# Patient Record
Sex: MALE | Race: WHITE | NOT HISPANIC OR LATINO | Employment: OTHER | URBAN - METROPOLITAN AREA
[De-identification: names, ages, dates, MRNs, and addresses within clinical notes are randomized per-mention and may not be internally consistent; named-entity substitution may affect disease eponyms.]

---

## 2017-10-04 ENCOUNTER — APPOINTMENT (OUTPATIENT)
Dept: ULTRASOUND IMAGING | Facility: CLINIC | Age: 82
End: 2017-10-04
Attending: PHYSICIAN ASSISTANT
Payer: MEDICARE

## 2017-10-04 ENCOUNTER — HOSPITAL ENCOUNTER (OUTPATIENT)
Facility: CLINIC | Age: 82
Setting detail: OBSERVATION
Discharge: HOME | End: 2017-10-05
Attending: EMERGENCY MEDICINE | Admitting: INTERNAL MEDICINE
Payer: MEDICARE

## 2017-10-04 DIAGNOSIS — S80.11XA HEMATOMA OF RIGHT LOWER EXTREMITY, INITIAL ENCOUNTER: ICD-10-CM

## 2017-10-04 DIAGNOSIS — R79.1 SUPRATHERAPEUTIC INR: ICD-10-CM

## 2017-10-04 DIAGNOSIS — D64.9 ANEMIA, UNSPECIFIED TYPE: ICD-10-CM

## 2017-10-04 LAB
ANION GAP SERPL CALCULATED.3IONS-SCNC: 7 MMOL/L (ref 3–14)
BASOPHILS # BLD AUTO: 0 10E9/L (ref 0–0.2)
BASOPHILS NFR BLD AUTO: 0.1 %
BUN SERPL-MCNC: 20 MG/DL (ref 7–30)
CALCIUM SERPL-MCNC: 7.9 MG/DL (ref 8.5–10.1)
CHLORIDE SERPL-SCNC: 107 MMOL/L (ref 94–109)
CO2 SERPL-SCNC: 25 MMOL/L (ref 20–32)
CREAT SERPL-MCNC: 0.99 MG/DL (ref 0.66–1.25)
DIFFERENTIAL METHOD BLD: ABNORMAL
EOSINOPHIL # BLD AUTO: 0 10E9/L (ref 0–0.7)
EOSINOPHIL NFR BLD AUTO: 0.2 %
ERYTHROCYTE [DISTWIDTH] IN BLOOD BY AUTOMATED COUNT: 19.1 % (ref 10–15)
GFR SERPL CREATININE-BSD FRML MDRD: 71 ML/MIN/1.7M2
GLUCOSE SERPL-MCNC: 141 MG/DL (ref 70–99)
HCT VFR BLD AUTO: 30.1 % (ref 40–53)
HGB BLD-MCNC: 8.8 G/DL (ref 13.3–17.7)
HGB BLD-MCNC: 9.9 G/DL (ref 13.3–17.7)
IMM GRANULOCYTES # BLD: 0 10E9/L (ref 0–0.4)
IMM GRANULOCYTES NFR BLD: 0.2 %
INR PPP: 5.23 (ref 0.86–1.14)
LACTATE BLD-SCNC: 2.1 MMOL/L (ref 0.7–2)
LYMPHOCYTES # BLD AUTO: 0.6 10E9/L (ref 0.8–5.3)
LYMPHOCYTES NFR BLD AUTO: 4.5 %
MCH RBC QN AUTO: 30.6 PG (ref 26.5–33)
MCHC RBC AUTO-ENTMCNC: 32.9 G/DL (ref 31.5–36.5)
MCV RBC AUTO: 93 FL (ref 78–100)
MONOCYTES # BLD AUTO: 1.2 10E9/L (ref 0–1.3)
MONOCYTES NFR BLD AUTO: 9.5 %
NEUTROPHILS # BLD AUTO: 10.7 10E9/L (ref 1.6–8.3)
NEUTROPHILS NFR BLD AUTO: 85.5 %
NRBC # BLD AUTO: 0 10*3/UL
NRBC BLD AUTO-RTO: 0 /100
PLATELET # BLD AUTO: 59 10E9/L (ref 150–450)
POTASSIUM SERPL-SCNC: 4.1 MMOL/L (ref 3.4–5.3)
RBC # BLD AUTO: 3.24 10E12/L (ref 4.4–5.9)
SODIUM SERPL-SCNC: 139 MMOL/L (ref 133–144)
WBC # BLD AUTO: 12.6 10E9/L (ref 4–11)

## 2017-10-04 PROCEDURE — 83605 ASSAY OF LACTIC ACID: CPT | Performed by: INTERNAL MEDICINE

## 2017-10-04 PROCEDURE — 25000128 H RX IP 250 OP 636: Performed by: INTERNAL MEDICINE

## 2017-10-04 PROCEDURE — 85018 HEMOGLOBIN: CPT | Performed by: INTERNAL MEDICINE

## 2017-10-04 PROCEDURE — 96361 HYDRATE IV INFUSION ADD-ON: CPT

## 2017-10-04 PROCEDURE — 25000128 H RX IP 250 OP 636: Performed by: PHYSICIAN ASSISTANT

## 2017-10-04 PROCEDURE — G0378 HOSPITAL OBSERVATION PER HR: HCPCS

## 2017-10-04 PROCEDURE — 80048 BASIC METABOLIC PNL TOTAL CA: CPT | Performed by: PHYSICIAN ASSISTANT

## 2017-10-04 PROCEDURE — 93971 EXTREMITY STUDY: CPT | Mod: RT

## 2017-10-04 PROCEDURE — 85025 COMPLETE CBC W/AUTO DIFF WBC: CPT | Performed by: PHYSICIAN ASSISTANT

## 2017-10-04 PROCEDURE — 96374 THER/PROPH/DIAG INJ IV PUSH: CPT

## 2017-10-04 PROCEDURE — 36415 COLL VENOUS BLD VENIPUNCTURE: CPT | Performed by: INTERNAL MEDICINE

## 2017-10-04 PROCEDURE — 99220 ZZC INITIAL OBSERVATION CARE,LEVL III: CPT | Performed by: INTERNAL MEDICINE

## 2017-10-04 PROCEDURE — 85610 PROTHROMBIN TIME: CPT | Performed by: PHYSICIAN ASSISTANT

## 2017-10-04 PROCEDURE — 99285 EMERGENCY DEPT VISIT HI MDM: CPT | Mod: 25

## 2017-10-04 RX ORDER — NALOXONE HYDROCHLORIDE 0.4 MG/ML
.1-.4 INJECTION, SOLUTION INTRAMUSCULAR; INTRAVENOUS; SUBCUTANEOUS
Status: DISCONTINUED | OUTPATIENT
Start: 2017-10-04 | End: 2017-10-05 | Stop reason: HOSPADM

## 2017-10-04 RX ORDER — ACETAMINOPHEN 325 MG/1
650 TABLET ORAL EVERY 4 HOURS PRN
Status: DISCONTINUED | OUTPATIENT
Start: 2017-10-04 | End: 2017-10-05 | Stop reason: HOSPADM

## 2017-10-04 RX ORDER — ONDANSETRON 2 MG/ML
4 INJECTION INTRAMUSCULAR; INTRAVENOUS EVERY 6 HOURS PRN
Status: DISCONTINUED | OUTPATIENT
Start: 2017-10-04 | End: 2017-10-05 | Stop reason: HOSPADM

## 2017-10-04 RX ORDER — AMOXICILLIN 250 MG
1-2 CAPSULE ORAL 2 TIMES DAILY
Status: DISCONTINUED | OUTPATIENT
Start: 2017-10-04 | End: 2017-10-05 | Stop reason: HOSPADM

## 2017-10-04 RX ORDER — DILTIAZEM HYDROCHLORIDE 30 MG/1
30 TABLET, FILM COATED ORAL ONCE
Status: COMPLETED | OUTPATIENT
Start: 2017-10-04 | End: 2017-10-05

## 2017-10-04 RX ORDER — ONDANSETRON 4 MG/1
4 TABLET, ORALLY DISINTEGRATING ORAL EVERY 6 HOURS PRN
Status: DISCONTINUED | OUTPATIENT
Start: 2017-10-04 | End: 2017-10-05 | Stop reason: HOSPADM

## 2017-10-04 RX ORDER — DILTIAZEM HYDROCHLORIDE 30 MG/1
30 TABLET, FILM COATED ORAL 2 TIMES DAILY
Status: DISCONTINUED | OUTPATIENT
Start: 2017-10-05 | End: 2017-10-05 | Stop reason: HOSPADM

## 2017-10-04 RX ORDER — ASCORBIC ACID 500 MG
500 TABLET ORAL DAILY
Status: DISCONTINUED | OUTPATIENT
Start: 2017-10-05 | End: 2017-10-05 | Stop reason: HOSPADM

## 2017-10-04 RX ORDER — DILTIAZEM HYDROCHLORIDE 30 MG/1
60 TABLET, FILM COATED ORAL 2 TIMES DAILY
Status: DISCONTINUED | OUTPATIENT
Start: 2017-10-05 | End: 2017-10-05 | Stop reason: HOSPADM

## 2017-10-04 RX ADMIN — SODIUM CHLORIDE 1000 ML: 9 INJECTION, SOLUTION INTRAVENOUS at 22:57

## 2017-10-04 RX ADMIN — PHYTONADIONE 5 MG: 10 INJECTION, EMULSION INTRAMUSCULAR; INTRAVENOUS; SUBCUTANEOUS at 21:03

## 2017-10-04 ASSESSMENT — ENCOUNTER SYMPTOMS
FEVER: 0
ABDOMINAL PAIN: 0
VOMITING: 0
DYSURIA: 0
CHILLS: 0
COUGH: 0
DIARRHEA: 0
NAUSEA: 0
SHORTNESS OF BREATH: 0

## 2017-10-04 NOTE — IP AVS SNAPSHOT
[facility]    [address] [place] [id number]    Phone:  [phone]                                       After Visit Summary   10/4/2017    Jama Paul    MRN: [id number]           After Visit Summary Signature Page     I have received my discharge instructions, and my questions have been answered. I have discussed any challenges I see with this plan with the nurse or doctor.    ..........................................................................................................................................  Patient/Patient Representative Signature      ..........................................................................................................................................  Patient Representative Print Name and Relationship to Patient    ..................................................               ................................................  Date                                            Time    ..........................................................................................................................................  Reviewed by Signature/Title    ...................................................              ..............................................  Date                                                            Time

## 2017-10-04 NOTE — IP AVS SNAPSHOT
MRN:[id number]                      After Visit Summary   10/4/2017    Jama Paul    MRN: [id number]           Thank you!     Thank you for choosing [facility] for your care. Our goal is always to provide you with excellent care. Hearing back from our patients is one way we can continue to improve our services. Please take a few minutes to complete the written survey that you may receive in the mail after you visit with us. Thank you!        Patient Information     Date Of Birth          2/13/1931        About your hospital stay     You were admitted on:  October 4, 2017 You last received care in the:  [facility]    You were discharged on:  October 5, 2017        Reason for your hospital stay       You were registered to observation due to supratherapeutic INR and left leg hematoma.                  Who to Call     For medical emergencies, please call 911.  For non-urgent questions about your medical care, please call your primary care provider or clinic, [phone]          Attending Provider     Provider Specialty    Anila Shepherd MD Emergency Medicine    [facility] ([place], [place]), Adolfo Perkins MD Internal Medicine       Primary Care Provider Office Phone # Fax #    Clint Pizarro [phone]      After Care Instructions     Activity       Your activity upon discharge: activity as tolerated            Diet       Follow this diet upon discharge: Orders Placed This Encounter      Regular Diet Adult                  Follow-up Appointments     Follow-up and recommended labs and tests        Follow up with primary care provider, Clint Pizarro, as scheduled later this afternoon.  The following labs/tests are recommended: INR, Iron studies, B12 and folic acid level.  Ongoing discussion regarding anticoagulation therapy.                  Pending Results     No orders found for last 3 day(s).            Statement of Approval     Ordered          10/05/17 1000  I have  "reviewed and agree with all the recommendations and orders detailed in this document.  EFFECTIVE NOW     Approved and electronically signed by:  Carter Whiting PA-C             Admission Information     Date & Time Provider Department Dept. Phone    10/4/2017 Adolfo Meadows MD [facility] [phone]      Your Vitals Were     Blood Pressure Pulse Temperature Respirations Height Weight    134/81 102 98.2  F (36.8  C) 16 1.829 m (6') 72.6 kg (160 lb)    Pulse Oximetry BMI (Body Mass Index)                98% 21.7 kg/m2          MyChart Information     [employer] lets you send messages to your doctor, view your test results, renew your prescriptions, schedule appointments and more. To sign up, go to [email or web address] . Click on \"Log in\" on the left side of the screen, which will take you to the Welcome page. Then click on \"Sign up Now\" on the right side of the page.     You will be asked to enter the access code listed below, as well as some personal information. Please follow the directions to create your username and password.     Your access code is: [email or web address]  Expires: 1/3/2018 10:31 AM     Your access code will  in 90 days. If you need help or a new code, please call your [facility] clinic or [phone].        Care EveryWhere ID     This is your Care EveryWhere ID. This could be used by other organizations to access your [facility] medical records  [id number]        Equal Access to Services     [facility] : Hadii aad ku hadasho Soomaali, waaxda luqadaha, qaybta kaalmada adeegyada, emani mayers . So [facility] [phone].    ATENCIÓN: Si habla español, tiene a cleaning disposición servicios gratuitos de asistencia lingüística. Llame al [phone].    We comply with applicable federal civil rights laws and [place] laws. We do not discriminate on the basis of race, color, national origin, age, disability, sex, sexual orientation, or gender " identity.               Review of your medicines      CONTINUE these medicines which have NOT CHANGED        Dose / Directions    CYANOCOBALAMIN PO   Notes to Patient:  Resume taking as you do at home          Dose:  500 mcg   Take 500 mcg by mouth daily   Refills:  0       * DILTIAZEM HCL PO   Notes to Patient:  Resume taking as you do at home          Dose:  30 mg   Take 30 mg by mouth 2 times daily Am and afternoon dose   Refills:  0       * DILTIAZEM HCL PO   Notes to Patient:  Resume taking as you do at home          Dose:  60 mg   Take 60 mg by mouth 2 times daily Evening and HS dose   Refills:  0       MAGNESIUM OXIDE PO   Notes to Patient:  Resume taking as you do at home          Dose:  200 mg   Take 200 mg by mouth daily   Refills:  0       * Multi-vitamin Tabs tablet   Notes to Patient:  Resume taking as you do at home          Dose:  1 tablet   Take 1 tablet by mouth daily   Refills:  0       * ICAPS AREDS FORMULA PO   Notes to Patient:  Resume taking as you do at home          Dose:  1 tablet   Take 1 tablet by mouth daily   Refills:  0       POTASSIUM CITRATE PO   Notes to Patient:  Resume taking as you do at home          Dose:  5 mEq   Take 5 mEq by mouth daily OTC dose unknown   Refills:  0       vitamin B complex with vitamin C Tabs tablet   Notes to Patient:  Resume taking as you do at home          Dose:  1 tablet   Take 1 tablet by mouth daily   Refills:  0       VITAMIN C PO   Notes to Patient:  Resume taking as you do at home          Dose:  500 mg   Take 500 mg by mouth daily   Refills:  0       VITAMIN D (CHOLECALCIFEROL) PO   Notes to Patient:  Resume taking as you do at home          Dose:  2000 Units   Take 2,000 Units by mouth daily   Refills:  0       * Notice:  This list has 4 medication(s) that are the same as other medications prescribed for you. Read the directions carefully, and ask your doctor or other care provider to review them with you.      STOP taking     warfarin 5 MG  tablet   Commonly known as:  COUMADIN                    Protect others around you: Learn how to safely use, store and throw away your medicines at www.disposemymeds.org.             Medication List: This is a list of all your medications and when to take them. Check marks below indicate your daily home schedule. Keep this list as a reference.      Medications           Morning Afternoon Evening Bedtime As Needed    CYANOCOBALAMIN PO   Take 500 mcg by mouth daily   Notes to Patient:  Resume taking as you do at home                                  * DILTIAZEM HCL PO   Take 30 mg by mouth 2 times daily Am and afternoon dose   Last time this was given:  60 mg on 10/5/2017  8:34 AM   Notes to Patient:  Resume taking as you do at home                                  * DILTIAZEM HCL PO   Take 60 mg by mouth 2 times daily Evening and HS dose   Last time this was given:  60 mg on 10/5/2017  8:34 AM   Notes to Patient:  Resume taking as you do at home                                  MAGNESIUM OXIDE PO   Take 200 mg by mouth daily   Notes to Patient:  Resume taking as you do at home                                  * Multi-vitamin Tabs tablet   Take 1 tablet by mouth daily   Notes to Patient:  Resume taking as you do at home                                  * ICAPS AREDS FORMULA PO   Take 1 tablet by mouth daily   Notes to Patient:  Resume taking as you do at home                                  POTASSIUM CITRATE PO   Take 5 mEq by mouth daily OTC dose unknown   Notes to Patient:  Resume taking as you do at home                                  vitamin B complex with vitamin C Tabs tablet   Take 1 tablet by mouth daily   Notes to Patient:  Resume taking as you do at home                                  VITAMIN C PO   Take 500 mg by mouth daily   Notes to Patient:  Resume taking as you do at home                                  VITAMIN D (CHOLECALCIFEROL) PO   Take 2,000 Units by mouth daily   Notes to Patient:  Resume  taking as you do at home                                  * Notice:  This list has 4 medication(s) that are the same as other medications prescribed for you. Read the directions carefully, and ask your doctor or other care provider to review them with you.

## 2017-10-05 VITALS
OXYGEN SATURATION: 98 % | DIASTOLIC BLOOD PRESSURE: 81 MMHG | BODY MASS INDEX: 21.67 KG/M2 | RESPIRATION RATE: 16 BRPM | HEIGHT: 72 IN | WEIGHT: 160 LBS | HEART RATE: 102 BPM | TEMPERATURE: 98.2 F | SYSTOLIC BLOOD PRESSURE: 134 MMHG

## 2017-10-05 LAB
ANION GAP SERPL CALCULATED.3IONS-SCNC: 8 MMOL/L (ref 3–14)
BUN SERPL-MCNC: 17 MG/DL (ref 7–30)
CALCIUM SERPL-MCNC: 7.7 MG/DL (ref 8.5–10.1)
CHLORIDE SERPL-SCNC: 108 MMOL/L (ref 94–109)
CO2 SERPL-SCNC: 24 MMOL/L (ref 20–32)
CREAT SERPL-MCNC: 0.94 MG/DL (ref 0.66–1.25)
ERYTHROCYTE [DISTWIDTH] IN BLOOD BY AUTOMATED COUNT: 19.4 % (ref 10–15)
GFR SERPL CREATININE-BSD FRML MDRD: 76 ML/MIN/1.7M2
GLUCOSE SERPL-MCNC: 96 MG/DL (ref 70–99)
HCT VFR BLD AUTO: 24.5 % (ref 40–53)
HGB BLD-MCNC: 8.2 G/DL (ref 13.3–17.7)
INR PPP: 2.07 (ref 0.86–1.14)
LACTATE BLD-SCNC: 1.1 MMOL/L (ref 0.7–2)
MCH RBC QN AUTO: 30.9 PG (ref 26.5–33)
MCHC RBC AUTO-ENTMCNC: 33.5 G/DL (ref 31.5–36.5)
MCV RBC AUTO: 93 FL (ref 78–100)
PLATELET # BLD AUTO: 52 10E9/L (ref 150–450)
POTASSIUM SERPL-SCNC: 4 MMOL/L (ref 3.4–5.3)
RBC # BLD AUTO: 2.65 10E12/L (ref 4.4–5.9)
SODIUM SERPL-SCNC: 140 MMOL/L (ref 133–144)
WBC # BLD AUTO: 9.8 10E9/L (ref 4–11)

## 2017-10-05 PROCEDURE — 80048 BASIC METABOLIC PNL TOTAL CA: CPT | Performed by: INTERNAL MEDICINE

## 2017-10-05 PROCEDURE — 36415 COLL VENOUS BLD VENIPUNCTURE: CPT | Performed by: INTERNAL MEDICINE

## 2017-10-05 PROCEDURE — 85610 PROTHROMBIN TIME: CPT | Performed by: INTERNAL MEDICINE

## 2017-10-05 PROCEDURE — 25000132 ZZH RX MED GY IP 250 OP 250 PS 637: Mod: GY | Performed by: INTERNAL MEDICINE

## 2017-10-05 PROCEDURE — A9270 NON-COVERED ITEM OR SERVICE: HCPCS | Mod: GY | Performed by: INTERNAL MEDICINE

## 2017-10-05 PROCEDURE — 83605 ASSAY OF LACTIC ACID: CPT | Performed by: INTERNAL MEDICINE

## 2017-10-05 PROCEDURE — 96361 HYDRATE IV INFUSION ADD-ON: CPT

## 2017-10-05 PROCEDURE — 40000893 ZZH STATISTIC PT IP EVAL DEFER

## 2017-10-05 PROCEDURE — 99217 ZZC OBSERVATION CARE DISCHARGE: CPT | Performed by: PHYSICIAN ASSISTANT

## 2017-10-05 PROCEDURE — G0378 HOSPITAL OBSERVATION PER HR: HCPCS

## 2017-10-05 PROCEDURE — 85027 COMPLETE CBC AUTOMATED: CPT | Performed by: INTERNAL MEDICINE

## 2017-10-05 RX ORDER — METOPROLOL TARTRATE 1 MG/ML
2.5 INJECTION, SOLUTION INTRAVENOUS EVERY 4 HOURS PRN
Status: DISCONTINUED | OUTPATIENT
Start: 2017-10-05 | End: 2017-10-05 | Stop reason: HOSPADM

## 2017-10-05 RX ORDER — METOPROLOL TARTRATE 25 MG/1
25 TABLET, FILM COATED ORAL ONCE
Status: COMPLETED | OUTPATIENT
Start: 2017-10-05 | End: 2017-10-05

## 2017-10-05 RX ADMIN — DILTIAZEM HYDROCHLORIDE 30 MG: 30 TABLET, FILM COATED ORAL at 00:33

## 2017-10-05 RX ADMIN — METOPROLOL TARTRATE 25 MG: 25 TABLET ORAL at 00:52

## 2017-10-05 RX ADMIN — SENNOSIDES AND DOCUSATE SODIUM 2 TABLET: 8.6; 5 TABLET ORAL at 00:51

## 2017-10-05 RX ADMIN — DILTIAZEM HYDROCHLORIDE 60 MG: 30 TABLET, FILM COATED ORAL at 08:34

## 2017-10-05 RX ADMIN — ACETAMINOPHEN 650 MG: 325 TABLET, FILM COATED ORAL at 05:03

## 2017-10-05 NOTE — ED PROVIDER NOTES
Emergency Department Attending Supervision Note  10/4/2017  8:09 PM      I evaluated this patient in conjunction with Gwendolyn Brooks PA-C.      Briefly, the patient has a history of HTN, anticoagulated on Coumadin for atrial fibrillation, and presented with leg pain. The patient had noticed some right calf pain and swelling a few days ago. He does not recall experiencing any trauma to the area. The pain and swelling increased, and he now has a hematoma at his right medial calf. The patient has been seen at [facility] the past two days. Yesterday, he saw Dr. Cuellar, who told him it was a hematoma, and he should be icing it. Dr. Boateng saw the patient today, and told him to present to the ED due to possible compartment syndrome.    On my exam:    Physical Exam   Constitutional:  Patient is oriented to person, place, and time. They appear well-developed and well-nourished. Mild distress secondary to leg pain.   HENT:   Mouth/Throat:   Oropharynx is clear and moist.   Eyes:    Conjunctivae normal and EOM are normal. Pupils are equal, round, and reactive to light.   Neck:    Normal range of motion.   Cardiovascular: Normal rate, Irregular rhythm and normal heart sounds.  Exam reveals no gallop and no friction rub.  No murmur heard.   Pulmonary/Chest:  Effort normal and breath sounds normal. Patient has no wheezes. Patient has no rales.   Abdominal:   Soft. Bowel sounds are normal. Patient exhibits no mass. There is no tenderness. There is no rebound and no guarding.   Musculoskeletal:  Patient has equal DP pulses bilaterally. He has a large hematoma on his right medial calf from the bottom of his knee, extending down to the ankle. He has a small pinpoint area of weeping at the mid calf. No surrounding erythema. Tenderness to palpation at site of hematoma. No paraesthesias. No pain with plantar or dorsi flexion of the foot.  Neurological:   Patient is alert and oriented to person, place, and time. Patient  has normal strength. No cranial nerve deficit or sensory deficit. GCS 15  Skin:   Skin is warm and dry. No rash noted. No erythema.   Psychiatric:   Patient has a normal mood and affect. Patient's behavior is normal. Judgment and thought content normal.     My impression:  Jama Paul is a 86 year old male with a large right leg hematoma, who is supratherapeutic on Coumadin. He last took Coumadin this morning at 1000. He has not had any recent dose changes. He has not had a any medication changes, it is not clear why he is supratherapeutic. He has had an enlarged hematoma from yesterday, so was sent from [facility]. Imaging confirms a very large hematoma. I do not see evidence of compartment syndrome. He has no pallor, no paraesthesias, no loss of pulses. No pain with passive range of motion of his foot, but he does have tenderness over the site of his hematoma with palpation. He has firm compartments, but not tense compartments. At this point I believe he requires reversal of Coumadin with Vitamin K, and admission to the hospital. Patient was admitted to Dr. Meadows.     Diagnosis:  1. Right leg hematoma  2. Supratherapeutic INR    Rubén GUERRERO, am serving as a scribe on 10/4/2017 at 8:09 PM to personally document services performed by Anila Shepherd MD, based on my observations and the provider's statements to me.         Anila Shepherd MD  10/04/17 4134

## 2017-10-05 NOTE — PLAN OF CARE
Problem: Patient Care Overview  Goal: Plan of Care/Patient Progress Review  Outcome: Improving  -diagnostic tests and consults completed and resulted - not met                  -vital signs normal or at patient baseline -not met  Nurse to notify provider when observation goals have been met and patient is ready for discharge.

## 2017-10-05 NOTE — H&P
[facility]    History and Physical  Hospitalist       Date of Admission:  10/4/2017    Assessment & Plan   Jama Paul is a 86 year old male with PMH of afib on warfarin, HTN, who presents with right leg hematoma. S/p Vit K IV in ER. Patient is stable. Admitted as obs.    Right leg hematoma: Nontraumatic, and no medication or recent dietary changes. He describes pain with walking, but cans still ambulate. No evidence of compartment syndrome on exam. US shows a [id number] hematoma. Initial INR is 5.2, and Hgb is 9.9-->8.8 (baseline ~11-13). No evidence of bleeding elsewhere. HR is 102. Patient's hematoma appears to have stabilized following Vit K IV, and given no evidence of compartment syndrome no role for surgery is indicated.   - Pedal pulse checks and compartment syndrome eval by nurses  - Telemetry  - Recheck INR, CBC in AM  - Given this major bleeding event, warfarin will be stopped and further treatment with warfarin should be discussed with PCP in 1-2 weeks    HTN  Possible orthostatic hypotension  Takes diltiazem for heart rate control of his atrial fibrillation. He endorses SBP ~105 in the morning, which sometimes causes dizziness/and lightheadedness. However when he lowers the dose of his diltiazem, HR increases to > 100, and so after discussion with his PCP he tolerates some hypotension as a side effect.  - Orthostatics    DVT Prophylaxis: None due to hematoma  Code Status: Full Code  PT ordered for tomorrow    Disposition: Expected discharge in 1 days if patient remains stable.      Adolfo Meadows MD    Primary Care Physician   Clint Pizarro    Chief Complaint   R leg swelling/ecchymosis    History is obtained from the patient. Wife Liane and daughter Anna at bedside.    History of Present Illness   Jama Paul is a 86 year old male with PMH of atrial fibrillation on coumadin, essential HTN, who presents with R leg ecchymosis. He describes spontaneous onset of R leg  swelling and ecchymosis yesterday morning around 10AM. He denies any trauma to the site; he remains active by walking every day with a stick, and riding a recumbent bike. He denies any new recent medications, or dietary changes. He denies any bleeding, and denies bloody or black/tarry stools. He has been on coumadin for 6 years. He denies any fevers, chills, shortness of breath. He denies any falls in the past month.    Regarding his blood pressure, he states that he has some relative hypotension in the morning with his diltiazem SBP ~105, but that when he lowers the dose his HR increases to > 100, and so he tolerates some hypotension as a side effect.    Past Medical History    I have reviewed this patient's medical history and updated it with pertinent information if needed.   Past Medical History:   Diagnosis Date     Antiplatelet or antithrombotic long-term use      Arrhythmia      Atrial fibrillation (H)      Elevated PSA      Unspecified essential hypertension        Past Surgical History   I have reviewed this patient's surgical history and updated it with pertinent information if needed.  Past Surgical History:   Procedure Laterality Date     COLONOSCOPY       EXPLORE GROIN  4/30/2014    Procedure: EXPLORE GROIN;  Surgeon: Sam Aguirre MD;  Location:  OR     HERNIORRHAPHY INGUINAL       HERNIORRHAPHY INGUINAL  4/30/2014    Procedure: HERNIORRHAPHY INGUINAL;  Surgeon: Sam Aguirre MD;  Location:  OR     MOHS MICROGRAPHIC PROCEDURE       PHACOEMULSIFICATION CLEAR CORNEA WITH STANDARD INTRAOCULAR LENS IMPLANT Right 9/8/2015    Procedure: PHACOEMULSIFICATION CLEAR CORNEA WITH STANDARD INTRAOCULAR LENS IMPLANT;  Surgeon: Gil Shannon MD;  Location:  EC     septic olecranon bursitis[         Prior to Admission Medications   Prior to Admission Medications   Prescriptions Last Dose Informant Patient Reported? Taking?   Ascorbic Acid (VITAMIN C PO) 10/3/2017 at Unknown time Self Yes Yes   Sig:  Take 500 mg by mouth daily   CYANOCOBALAMIN PO 10/3/2017 at Unknown time Self Yes Yes   Sig: Take 500 mcg by mouth daily   DILTIAZEM HCL PO 10/4/2017 at x1 Self Yes Yes   Sig: Take 30 mg by mouth 2 times daily Am and afternoon dose   DILTIAZEM HCL PO 10/3/2017 at Unknown time Self Yes Yes   Sig: Take 60 mg by mouth 2 times daily Evening and HS dose   MAGNESIUM OXIDE PO 10/3/2017 at Unknown time Self Yes Yes   Sig: Take 200 mg by mouth daily   Multiple Vitamins-Minerals (ICAPS AREDS FORMULA PO) 10/4/2017 at Unknown time Self Yes Yes   Sig: Take 1 tablet by mouth daily   POTASSIUM CITRATE PO 10/3/2017 at Unknown time Self Yes Yes   Sig: Take 5 mEq by mouth daily OTC dose unknown   VITAMIN D, CHOLECALCIFEROL, PO 10/3/2017 at Unknown time Self Yes Yes   Sig: Take 2,000 Units by mouth daily   multivitamin, therapeutic with minerals (MULTI-VITAMIN) TABS 10/3/2017 at Unknown time Self Yes Yes   Sig: Take 1 tablet by mouth daily   vitamin  B complex with vitamin C (VITAMIN  B COMPLEX) TABS 10/3/2017 at Unknown time Self Yes Yes   Sig: Take 1 tablet by mouth daily   warfarin (COUMADIN) 5 MG tablet 10/4/2017 at AM Self No Yes   Sig: Take 1 tablet by mouth daily. Take as directed by physician      Facility-Administered Medications: None     Allergies   No Known Allergies    Social History   I have reviewed this patient's social history and updated it with pertinent information if needed. Jama Paul  reports that he has never smoked. He does not have any smokeless tobacco history on file. He reports that he does not drink alcohol or use illicit drugs. 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    Family History   I have reviewed this patient's family history and updated it with pertinent information if needed.   No family history on file.Patient denies any family history of heart disease. Brother had hypertension. Dad had lung  cancer.    Review of Systems   The 10 point Review of Systems is negative other than noted in the HPI or here.     Physical Exam   Temp: 97.9  F (36.6  C) Temp src: Oral BP: 128/75 Pulse: 117 Heart Rate: 104 Resp: 20 SpO2: 98 % O2 Device: None (Room air)    Vital Signs with Ranges  Temp:  [97.9  F (36.6  C)] 97.9  F (36.6  C)  Pulse:  [] 117  Heart Rate:  [104] 104  Resp:  [20-28] 20  BP: (118-128)/(67-75) 128/75  SpO2:  [98 %-100 %] 98 %  160 lbs 0 oz    Constitutional: Male in NAD  Eyes: PERRL, nonicteric, normal ocular movements  HEENT: Normocephalic, atraumatic, oral mucosa moist  Respiratory: CTAB, no wheezing or crackles  Cardiovascular: irregular rate and rhythm, normal S1/2, no m/r/g  GI: No organomegaly, normoactive bowel sounds, nontender, nondistended, no flank tenderness  Skin: No rashes or scars  Musculoskeletal: Right lower extremity swollen and with large elliptical ecchymosis ~12cm in length over anterior portion. Good warmth in both legs and good distal pulses bilaterally. No significant knee swelling.  Neurologic: A&Ox3, no cranial nerve defects I-XI, normal strength in UE and LE  Psychiatric: Appropriate affect and mood    Data   Data reviewed today:  I personally reviewed leg US showing hematoma.    Recent Labs  Lab 10/04/17  1857   WBC 12.6*   HGB 9.9*   MCV 93   PLT 59*   INR 5.23*      POTASSIUM 4.1   CHLORIDE 107   CO2 25   BUN 20   CR 0.99   ANIONGAP 7   BARON 7.9*   *       Imaging:  Recent Results (from the past 24 hour(s))   US Lower Extremity Venous Duplex Right    Narrative    US LOWER EXTREMITY VENOUS DUPLEX RIGHT   10/4/2017 7:42 PM     HISTORY: Right leg pain. Fall.    COMPARISON: None.    TECHNIQUE: Spectral doppler and waveform analysis were performed on  the right lower extremity veins.    FINDINGS: The right common femoral, femoral, and popliteal veins are  patent, compressible, and negative for deep venous thrombosis. Calf  veins are segmentally seen but appear  negative for DVT where  visualized. Heterogeneous fluid collection anteriorly and medially at  the level of the right mid calf has an appearance suggestive of a  hematoma, and measures 10.4 x 10.1 x 3.6 cm.      Impression    IMPRESSION:    1. No evidence for deep venous thrombosis in the right lower extremity  veins.   2. A hematoma overlying the right lower leg anteriorly and medially at  the level of the midcalf measures 10.4 cm in greatest dimension.    ANDREEA DIXON MD

## 2017-10-05 NOTE — PROGRESS NOTES
[facility]    Sepsis Evaluation Progress Note    Date of Service: 10/04/2017    I was called to see Jama Paul due to abnormal vital signs triggering the Sepsis SIRS screening alert. He is not known to have an infection.     Physical Exam    Vital Signs:  Temp: 97.1  F (36.2  C) Temp src: Oral BP: 127/72 Pulse: 102 Heart Rate: 104 Resp: 16 SpO2: 99 % O2 Device: None (Room air)      Lab:  Lactic Acid   Date Value Ref Range Status   10/04/2017 2.1 (H) 0.7 - 2.0 mmol/L Final       The patient is at baseline mental status.    The rest of their physical exam is significant for R leg hematoma.    Assessment and Plan    The SIRS and exam findings are likely due to R leg hematoma, there is no sign of sepsis at this time.    Disposition: The patient will remain on the current unit. We will continue to monitor this patient closely.    Adolfo Meadows MD

## 2017-10-05 NOTE — PLAN OF CARE
Problem: Patient Care Overview  Goal: Plan of Care/Patient Progress Review  Outcome: Improving  -diagnostic tests and consults completed and resulted - not met                  -vital signs normal or at patient baseline -not met     HR elevated in AM (120's-130's), morning dose of 60 mg diltiazem given to pt and HR monitored. 90 minutes after medication administered HR decreased to 80's-90's. Pt requesting to be discharged and f/u w/his cardiologist outpatient this afternoon.  Orthostatics negative. Tolerating regular diet. RLE edematous, no drainage noted. Denies pain. Pt has f/u scheduled w/cardiologist this afternoon at 1500. Will d/c this morning.

## 2017-10-05 NOTE — PHARMACY-ADMISSION MEDICATION HISTORY
Admission medication history interview status for the 10/4/2017  admission is complete. 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 No    Time spent in this activity: 15 minutes    Prior to Admission medications    Medication Sig Last Dose Taking? Auth Provider   DILTIAZEM HCL PO Take 30 mg by mouth 2 times daily Am and afternoon dose 10/4/2017 at x1 Yes Unknown, Entered By History   DILTIAZEM HCL PO Take 60 mg by mouth 2 times daily Evening and HS dose 10/3/2017 at Unknown time Yes Unknown, Entered By History   Multiple Vitamins-Minerals (ICAPS AREDS FORMULA PO) Take 1 tablet by mouth daily 10/4/2017 at Unknown time Yes Unknown, Entered By History   CYANOCOBALAMIN PO Take 500 mcg by mouth daily 10/3/2017 at Unknown time Yes Unknown, Entered By History   Ascorbic Acid (VITAMIN C PO) Take 500 mg by mouth daily 10/3/2017 at Unknown time Yes Unknown, Entered By History   MAGNESIUM OXIDE PO Take 200 mg by mouth daily 10/3/2017 at Unknown time Yes Unknown, Entered By History   POTASSIUM CITRATE PO Take 5 mEq by mouth daily OTC dose unknown 10/3/2017 at Unknown time Yes Unknown, Entered By History   vitamin  B complex with vitamin C (VITAMIN  B COMPLEX) TABS Take 1 tablet by mouth daily 10/3/2017 at Unknown time Yes Unknown, Entered By History   multivitamin, therapeutic with minerals (MULTI-VITAMIN) TABS Take 1 tablet by mouth daily 10/3/2017 at Unknown time Yes Unknown, Entered By History   VITAMIN D, CHOLECALCIFEROL, PO Take 2,000 Units by mouth daily 10/3/2017 at Unknown time Yes Unknown, Entered By History   warfarin (COUMADIN) 5 MG tablet Take 1 tablet by mouth daily. Take as directed by  physician 10/4/2017 at AM Yes Clint Pizarro MD Tiffany M. Reinitz, PharmD

## 2017-10-05 NOTE — DISCHARGE SUMMARY
PRIMARY CARE PROVIDER:  Dr. Clint Pizarro.      DATE OF ADMISSION:  10/04/2017.      DATE OF DISCHARGE 10/05/2017.      DISCHARGE DIAGNOSES:   1.  Spontaneous right leg hematoma with a supertherapeutic INR.   2.  Chronic atrial fibrillation.   3.  Hypertension with possible orthostatic hypotension secondary to medication side effect.   4.  Vitamin B12 and vitamin D deficiencies.      DISCHARGE MEDICATIONS:       Review of your medicines      CONTINUE these medicines which have NOT CHANGED       Dose / Directions    CYANOCOBALAMIN PO   Notes to Patient:  Resume taking as you do at home          Dose:  500 mcg   Take 500 mcg by mouth daily   Refills:  0       * DILTIAZEM HCL PO   Notes to Patient:  Resume taking as you do at home          Dose:  30 mg   Take 30 mg by mouth 2 times daily Am and afternoon dose   Refills:  0       * DILTIAZEM HCL PO   Notes to Patient:  Resume taking as you do at home          Dose:  60 mg   Take 60 mg by mouth 2 times daily Evening and HS dose   Refills:  0       MAGNESIUM OXIDE PO   Notes to Patient:  Resume taking as you do at home          Dose:  200 mg   Take 200 mg by mouth daily   Refills:  0       * Multi-vitamin Tabs tablet   Notes to Patient:  Resume taking as you do at home          Dose:  1 tablet   Take 1 tablet by mouth daily   Refills:  0       * ICAPS AREDS FORMULA PO   Notes to Patient:  Resume taking as you do at home          Dose:  1 tablet   Take 1 tablet by mouth daily   Refills:  0       POTASSIUM CITRATE PO   Notes to Patient:  Resume taking as you do at home          Dose:  5 mEq   Take 5 mEq by mouth daily OTC dose unknown   Refills:  0       vitamin B complex with vitamin C Tabs tablet   Notes to Patient:  Resume taking as you do at home          Dose:  1 tablet   Take 1 tablet by mouth daily   Refills:  0       VITAMIN C PO   Notes to Patient:  Resume taking as you do at home          Dose:  500 mg   Take 500 mg by mouth daily   Refills:  0       VITAMIN D  (CHOLECALCIFEROL) PO   Notes to Patient:  Resume taking as you do at home          Dose:  2000 Units   Take 2,000 Units by mouth daily   Refills:  0       * Notice:  This list has 4 medication(s) that are the same as other medications prescribed for you. Read the directions carefully, and ask your doctor or other care provider to review them with you.      STOP taking          warfarin 5 MG tablet   Commonly known as:  COUMADIN                  MEDICATION CHANGES:  Jama Paul has been advised to hold or stop taking Coumadin until followup with primary care provider.      ALLERGIES:  No known drug allergies.      DISPOSITION:  Home.      FOLLOWUP WITH RECOMMENDATIONS:  The patient will follow up with Dr. Clint Pizarro as scheduled later this afternoon.  The patient will likely undergo an INR check.  Iron studies are being advised, B12 level and folic acid level, and ongoing discussion regarding anticoagulation therapy.      ACTIVITY:  As tolerated.      DIET:  Regular diet.      CONSULTS:  None.      LABORATORY, IMAGING AND PROCEDURES:   1.  Routine laboratory studies that included CBC with platelet differential, basic metabolic panel, INR, lactic acid level, hemoglobin, repeat lactic acid, a repeat basic metabolic panel and a repeat CBC with platelets.   2.  Right lower extremity venous ultrasound duplex.      PENDING RESULTS:  None.      PHYSICAL EXAMINATION ON DAY OF DISCHARGE:   VITAL SIGNS:  Temperature is 98.2 degrees Fahrenheit with a blood pressure of 134/81, heart rate of 88 beats per minute, respiratory rate of 16, O2 saturation 98%.  The patient is on room air and denying any pain.   GENERAL:  The patient is awake, alert and cooperative, in no apparent distress, alert and oriented x3.   HEENT:  Head is normocephalic, atraumatic.  Moist mucous membranes present.  No exudates noted in the posterior pharynx.  Uvula is midline.   NECK:  Supple, normal range of motion, no tracheal deviation, no cervical  lymphadenopathy present.   CARDIOVASCULAR:  Irregularly irregular.  No rubs, murmurs or gallops appreciated.   PULMONARY:  Lungs are clear to auscultation bilaterally, no wheezes, rhonchi or rales appreciated.   GASTROINTESTINAL:  Bowel sounds are present in all 4 quadrants, soft, nontender, nondistended.   NEUROLOGIC:  Cranial nerves II-XII appear grossly intact.  The patient is seen moving all 4 extremities without any difficulty.   EXTREMITIES:  The patient has substantial ecchymotic changes of the right lower extremity with noted hematoma which is currently undergoing a compression wrap.  There is some tightness when palpating the right calf but is nontender.  Left lower extremity is nontender to palpation and no edema is noted.      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      For full details, please read H&P as written by Dr. Adolfo Meadows.      HOSPITAL COURSE:   1.  Spontaneous right lower extremity hematoma with noted supertherapeutic INR:  The patient stated that this occurred randomly.  No evidence of trauma and no recent medication or dietary changes.  The patient was noted to have an INR that was supratherapeutic at 5.4.  The patient had presented because of ongoing right pain with ambulation.  Evaluation was negative for compartment syndrome.  Right lower extremity venous ultrasound duplex was negative for DVT.  The patient received vitamin K IV route in the Emergency Department.  INR on day of discharge is currently 2.  Hemoglobin appears to be low at 8.8, but no active bleeding is noted.  The patient was monitored on telemetry and patient remained in atrial fibrillation with occasional RVR.  The patient indicated that he had not  taken any of his diltiazem yesterday.  Coumadin was held during this stay and patient plans to follow up with his primary care provider later this afternoon.   2.  Chronic atrial fibrillation:  Patient is controlled with almost 4 times daily Diltiazem and chronic Coumadin.  The patient has a CHADS2-VASc score of 3.  The patient's prior to admit diltiazem was continued and his Coumadin was held.  In fact, the patient received IV vitamin K.  INR on day of discharge was noted to be 2.  The patient did have as needed Lopressor for which the patient did receive once.  The patient will be following up with primary care provider later this afternoon.  Would recommend continuation of diltiazem with ongoing management and adjustment to primary care provider.  The patient has been advised to hold Coumadin until ongoing discussion with his primary care provider is performed.   3.  Essential hypertension with noted orthostatic hypotension:  Patient had indicated that he occasionally has some orthostatic hypotension with systolic blood pressures in the 100 range in the mornings with occasional symptoms of dizziness and lightheadedness.  It is thought that this is secondary to diltiazem.  However, if his diltiazem dose decreases, his heart rate usually goes above 100.  The patient is working with Dr. Clint Pizarro, his primary care provider for management.  The patient plans to follow up with him later this afternoon.   4.  Vitamin B12 and vitamin D deficiency:  Patient's prior to admit replacement therapy was held during this stay and will be resumed at time of discharge.      CODE STATUS:  The patient elected to be full code.      The patient was discussed with Dr. Shelby Meadows who agrees with discharge at this time.  Dr. Meadows will evaluate the patient independently.      TOTAL DISCHARGE TIME:  Less than 30 minutes.         SHELBY MEADOWS MD       As dictated by AYDEE MENDOSA PA-C            D: 10/05/2017 10:39   T:  10/05/2017 11:06   MT: JAMAL      Name:     YINKA GONZALEZ   MRN:      [id number]        Account:        [id number]   :      1931           Admit Date:     [id number]                                  Discharge Date:       Document: [id number]       cc: Clint Pizarro MD

## 2017-10-05 NOTE — PLAN OF CARE
Problem: Patient Care Overview  Goal: Plan of Care/Patient Progress Review  Outcome: Improving  Pt is A&O X4. Pt leg wound has been covered and dressing is CDI. Pt vitals activated  sepsis protocol. 1000 ml bolus  And lactic was redrawn. New results are WDL. Pt has had elevated -150's  and rapid Afib. Hospitalitis notified and medication was ordered and given HR dropped into the 80-90's but A-fib still present. . Pt denies pain, VSS except for HR. Pt is voiding Continue to monitor.

## 2017-10-05 NOTE — ED PROVIDER NOTES
History     Chief Complaint:  Leg Pain    HPI   Jama Paul is a 86 year old male with what history of atrial fibrillation on warfarin who presents for evaluation of right leg pain.  He states that two days ago he noticed a small area of redness to the medial part of his right ankle while at his cabin while walking outside. Since that time, he has noted significant expanding ecchymosis and hematoma formation to his right calf.  He was seen at [facility] orthopedics by Dr. Cuellar yesterday and was diagnosed with a hematoma and asked to ice and elevate the extremity.  Today, the hematoma continued to expand and was evaluated in the orthopedic urgent care area and was told to come to the ED for ultrasound and laboratory evaluation. He denies injury or trauma to the area. The daughter notes that even from this morning it is markedly larger and rapidly expanding.  He denies any pain at rest, but states that it is painful when he is walking  around.  He denies any numbness/tingling to the extremity. Last INR was checked and 9/20 and was 2.8. He states that two weeks ago he had a flu-like illness, now resolved. No sick contacts or recent travel. He denies fever, shortness of breath, chest pain, abdominal pain, n/v/d, or any other acute symptoms.     Allergies:  The patient has no known drug allergies.    Medications:    Tiazac  Coumadin     Past Medical History:    Arrhythmia  Atrial fibrillation  HTN    Past Surgical History:    Colonoscopy  Herniorrhaphy   Phacoemulsification    Family History:    History reviewed.  No significant family history.    Social History:  Relationship status:   Tobacco use: Negative  Alcohol use: Negative  The patient presents with his wife and daughter.     Review of Systems   Constitutional: Negative for chills and fever.   Respiratory: Negative for cough and shortness of breath.    Cardiovascular: Negative for chest pain.   Gastrointestinal: Negative for abdominal pain,  diarrhea, nausea and vomiting.   Genitourinary: Negative for dysuria.   Musculoskeletal:        Right leg pain and swelling   Skin: Positive for rash.     Physical Exam   First Vitals:  BP: 118/67  Pulse: 89  Temp: 97.9  F (36.6  C)  Resp: 28  Height: 182.9 cm (6')  Weight: 72.6 kg (160 lb)  SpO2: 100 %    Physical Exam  General: Resting comfortably.  Alert and oriented.   Head:  The scalp, face, and head appear normal   Eyes:  The pupils are equal, round, and reactive to light     Extraocular muscles are intact    Conjunctivae and sclerae are normal    CV:  Irregularly irregular rhythm with normal rate    Normal S1/S2    No pathological murmur detected     Equal posterior tibialis/dorsalis pedis pulses noted bilaterally    Good capillary refill  Resp:  Lungs are clear to auscultation    Non-labored    No rales or wheezing   GI:  Abdomen is soft, non-distended    No rebound tenderness     Normal bowel sounds   MS:  No pain with active or passive dorsi or plantar flexion.  Skin:  Large area of ecchymosis noted to right medial calf area with an obvious hematoma. Tenderness to palpation of hematoma. No pallor.  No warmth noted. No erythema. Spontaneously weeping.    Neuro: Speech is normal and fluent. Distal sensation intact distal to hematoma.     Emergency Department Course   Laboratory:  Results for orders placed or performed during the hospital encounter of 10/04/17 (from the past 24 hour(s))   CBC with platelets differential   Result Value Ref Range    WBC 12.6 (H) 4.0 - 11.0 10e9/L    RBC Count 3.24 (L) 4.4 - 5.9 10e12/L    Hemoglobin 9.9 (L) 13.3 - 17.7 g/dL    Hematocrit 30.1 (L) 40.0 - 53.0 %    MCV 93 78 - 100 fl    MCH 30.6 26.5 - 33.0 pg    MCHC 32.9 31.5 - 36.5 g/dL    RDW 19.1 (H) 10.0 - 15.0 %    Platelet Count 59 (L) 150 - 450 10e9/L    Diff Method Automated Method     % Neutrophils 85.5 %    % Lymphocytes 4.5 %    % Monocytes 9.5 %    % Eosinophils 0.2 %    % Basophils 0.1 %    % Immature Granulocytes  0.2 %    Nucleated RBCs 0 0 /100    Absolute Neutrophil 10.7 (H) 1.6 - 8.3 10e9/L    Absolute Lymphocytes 0.6 (L) 0.8 - 5.3 10e9/L    Absolute Monocytes 1.2 0.0 - 1.3 10e9/L    Absolute Eosinophils 0.0 0.0 - 0.7 10e9/L    Absolute Basophils 0.0 0.0 - 0.2 10e9/L    Abs Immature Granulocytes 0.0 0 - 0.4 10e9/L    Absolute Nucleated RBC 0.0    Basic metabolic panel   Result Value Ref Range    Sodium 139 133 - 144 mmol/L    Potassium 4.1 3.4 - 5.3 mmol/L    Chloride 107 94 - 109 mmol/L    Carbon Dioxide 25 20 - 32 mmol/L    Anion Gap 7 3 - 14 mmol/L    Glucose 141 (H) 70 - 99 mg/dL    Urea Nitrogen 20 7 - 30 mg/dL    Creatinine 0.99 0.66 - 1.25 mg/dL    GFR Estimate 71 >60 mL/min/1.7m2    GFR Estimate If Black 86 >60 mL/min/1.7m2    Calcium 7.9 (L) 8.5 - 10.1 mg/dL   INR   Result Value Ref Range    INR 5.23 (HH) 0.86 - 1.14   US Lower Extremity Venous Duplex Right    Narrative    US LOWER EXTREMITY VENOUS DUPLEX RIGHT   10/4/2017 7:42 PM     HISTORY: Right leg pain. Fall.    COMPARISON: None.    TECHNIQUE: Spectral doppler and waveform analysis were performed on  the right lower extremity veins.    FINDINGS: The right common femoral, femoral, and popliteal veins are  patent, compressible, and negative for deep venous thrombosis. Calf  veins are segmentally seen but appear negative for DVT where  visualized. Heterogeneous fluid collection anteriorly and medially at  the level of the right mid calf has an appearance suggestive of a  hematoma, and measures 10.4 x 10.1 x 3.6 cm.      Impression    IMPRESSION:    1. No evidence for deep venous thrombosis in the right lower extremity  veins.   2. A hematoma overlying the right lower leg anteriorly and medially at  the level of the midcalf measures 10.4 cm in greatest dimension.     Imaging:   US Lower Extremity Venous Duplex Right   Preliminary Result   IMPRESSION:     1. No evidence for deep venous thrombosis in the right lower extremity   veins.    2. A hematoma overlying  the right lower leg anteriorly and medially at   the level of the midcalf measures 10.4 cm in greatest dimension.        Interventions:  Medications   phytonadione (AQUA-MEPHYTON) 5 mg in NaCl 0.9 % 50 mL intermittent infusion (0 mg Intravenous ED Infusing on Admission/transfer 10/4/17 2106)     Emergency Department Course:  Nursing notes and vitals reviewed. Dr. Shepherd also assessed this patient.  I performed an exam of the patient as documented above.  The above workup was undertaken.  I rechecked the patient and discussed results with him and his family  Dr. Cordoba of the hospitalist service was contacted and agreed to admission of this patient.  IV vitamin K was given  Findings and plan explained to the Patient and spouse and daughter. Patient discharged home, status improved, with instructions regarding supportive care, medications, and reasons to return as well as the importance of close follow-up was reviewed.      Impression & Plan    Medical Decision Making:  Jama Paul is a 86 year old male who presents for evaluation of right leg pain.  The patient is vitally stable and afebrile.  On exam, there is an obvious hematoma to his right calf.  There is no evidence of compartment syndrome.  Pulses are intact and equal bilaterally. There is no paresthesia or pallor.  Passive and active range of motion intact without pain.  CBC demonstrates anemia, but seems to be a chronic issue for this patient, so this is hard to correlate whether this is a significant drop from previous value in 2016.  BMP unremarkable.  INR is elevated to 5.23. Unsure of the cause of his sudden increase in INR, but the patient states that he recently had a flulike illness, but denies any major dietary changes. Ultrasound demonstrates a large midcalf hematoma measuring 81m50j7 cm. I do not think that the hematoma is infected. Although his white blood cells are mildly elevated,  there is no fever, warmth, or erythema noted on exam.  IV  vitamin K was initiated.  Given the size of his hematoma in the context of supra therapeutic INR, I feel like this patient warrants observation.  Dr. Cordoba of the hospital service was contacted and agreed to admission of this patient to observation. All  questions were answered prior to admission.        Diagnosis:    ICD-10-CM    1. Hematoma of right lower extremity, initial encounter S80.11XA    2. Supratherapeutic INR R79.1    3. Anemia, unspecified type D64.9        Disposition:  discharged to home    Rubén Sagastume  10/4/2017    EMERGENCY DEPARTMENT       Gwendolyn Brooks PA-C  10/04/17 1111       Anila Shepherd MD  10/04/17 [id number]

## 2017-10-05 NOTE — ED NOTES
[facility]  ED Nurse Handoff Report    ED Chief complaint: Leg Pain (bumped his leg on something yesterday, seen at [facility]. on warfarin. worse today, went back to [facility] and sent here for doppler flow US of leg, check labs . also c/o 'feeling off / not right' )      ED Diagnosis:   Final diagnoses:   Hematoma of right lower extremity, initial encounter   Supratherapeutic INR   Anemia, unspecified type       Code Status: See H&P    Allergies: No Known Allergies    Activity level - Baseline/Home:  Independent    Activity Level - Current:   Stand with Assist     Needed?: No    Isolation: No  Infection: Not Applicable    Bariatric?: No    Vital Signs:   Vitals:    10/04/17 1551 10/04/17 2000   BP: 118/67    Pulse: 89    Resp: 28    Temp: 97.9  F (36.6  C)    TempSrc: Oral    SpO2: 100% 98%   Weight: 72.6 kg (160 lb)    Height: 1.829 m (6')        Cardiac Rhythm: ,        Pain level: 0-10 Pain Scale: 3    Is this patient confused?: No    Patient Report: Initial Complaint: Right leg pain/bruising after hitting leg on something, on warfarin  Focused Assessment: AOx4. AVSS. Bruising/pain to right leg. CMS intact.   Abnormal Results: INR > 5  Treatments provided: INR reversed with aqua-mephyton  Family Comments: at bedside  OBS brochure/video discussed/provided to patient: yes      ED Medications:   Medications   phytonadione (AQUA-MEPHYTON) 5 mg in NaCl 0.9 % 50 mL intermittent infusion (not administered)       Drips infusing?:  No      ED NURSE PHONE NUMBER: *[id number]

## 2017-10-05 NOTE — PLAN OF CARE
Problem: Patient Care Overview  Goal: Plan of Care/Patient Progress Review  Outcome: Adequate for Discharge Date Met:  10/05/17  Discharge summary reviewed and given to pt. Follow-up appointment with PCP already scheduled for this afternoon. No new medications. Pt family provided transport. Pt left with all belongings.

## 2017-10-05 NOTE — PLAN OF CARE
Problem: Patient Care Overview  Goal: Plan of Care/Patient Progress Review  PT: Orders received, chart reviewed. Pt admitted with a leg hematoma under observation status. Pt seen at [facility] for this also. Pt has no skilled inpatient PT needs at this time.

## 2017-10-15 PROCEDURE — 96361 HYDRATE IV INFUSION ADD-ON: CPT

## 2017-10-26 ENCOUNTER — HOSPITAL ENCOUNTER (OUTPATIENT)
Dept: WOUND CARE | Facility: CLINIC | Age: 82
Discharge: HOME | End: 2017-10-26
Attending: PHYSICIAN ASSISTANT | Admitting: PHYSICIAN ASSISTANT
Payer: MEDICARE

## 2017-10-26 VITALS
BODY MASS INDEX: 21.68 KG/M2 | SYSTOLIC BLOOD PRESSURE: 132 MMHG | HEIGHT: 72 IN | TEMPERATURE: 97.9 F | RESPIRATION RATE: 18 BRPM | DIASTOLIC BLOOD PRESSURE: 69 MMHG | WEIGHT: 160.05 LBS | HEART RATE: 102 BPM

## 2017-10-26 DIAGNOSIS — S80.11XA HEMATOMA OF RIGHT LOWER EXTREMITY, INITIAL ENCOUNTER: Primary | ICD-10-CM

## 2017-10-26 PROCEDURE — 11042 DBRDMT SUBQ TIS 1ST 20SQCM/<: CPT | Performed by: PHYSICIAN ASSISTANT

## 2017-10-26 PROCEDURE — 99214 OFFICE O/P EST MOD 30 MIN: CPT | Mod: 25

## 2017-10-26 PROCEDURE — 11042 DBRDMT SUBQ TIS 1ST 20SQCM/<: CPT

## 2017-10-26 PROCEDURE — 99203 OFFICE O/P NEW LOW 30 MIN: CPT | Mod: 25 | Performed by: PHYSICIAN ASSISTANT

## 2017-10-26 RX ORDER — WARFARIN SODIUM 2.5 MG/1
2.5-5 TABLET ORAL
COMMUNITY
End: 2018-01-30

## 2017-10-26 NOTE — PROGRESS NOTES
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  He presented to the ED 10/4/17 after developing a spontaneous hematoma on his right lower leg. Was discovered to have an INR of 5.4 at that time and was admitted for adjustment. The hematoma was never evacuated intentionally but has been slightly oozing through the open sore.     WOUND CARE: Applying basic cover dressing.    DATE WOUND ACQUIRED: 10/4/2017    PAST MEDICAL HISTORY: hypertension, elevated PSA, atrial fibrillation, arrhythmia, anticoagulated    SOCIAL HISTORY: , daughter and wife can help with dressings if needed    MEDICATIONS:   Current Outpatient Prescriptions   Medication     warfarin (COUMADIN) 2.5 MG tablet     DILTIAZEM HCL PO     DILTIAZEM HCL PO     Multiple Vitamins-Minerals (ICAPS AREDS FORMULA PO)     CYANOCOBALAMIN PO     Ascorbic Acid (VITAMIN C PO)     MAGNESIUM OXIDE PO     POTASSIUM CITRATE PO     vitamin  B complex with vitamin C (VITAMIN  B COMPLEX) TABS     multivitamin, therapeutic with minerals (MULTI-VITAMIN) TABS     VITAMIN D, CHOLECALCIFEROL, PO     No current facility-administered medications for this encounter.        REVIEW OF SYSTEMS:  CONSTITUTIONAL: Denies fevers or acute illness  CARDIOVASCULAR: Denies circulatory issues or previous vascular procedures  ENDOCRINE: Denies diabetes    VITALS: /69  Pulse 102  Temp 97.9  F (36.6  C) (Temporal)  Resp 18  Ht 1.829 m (6')  Wt 72.6 kg (160 lb 0.9 oz)  BMI 21.71 kg/m2    PHYSICAL EXAM:  GENERAL: Patient is alert and oriented and in no acute distress  INTEGUMENTARY:   WOUND ASSESSMENT #1:     Location: right medial LL proximal      Size: 1.7 cm x 1.9 cm with a depth of 0.2 cm     Drainage: copious amount of clotted sanguinous drainage    Wound description: covered in eschar, once debrided copious amount of hematoma was evacuated,  significant undermining appreciated  WOUND ASSESSMENT #2:     Location: right medial LL distal      Size: 1.7 cm x 0.4 cm with a depth of 0.2 cm    Drainage: copious amount of serosanguinous drainage    Wound description: moist yellow slough overlying granular bed    PROCEDURE: Per standing orders, topical 4% lidocaine was applied to the wound by the [facility]. Timeout was called and informed consent was obtained. Using a sharp curette a surgical debridement was performed down to and including subcutaneous tissue of <20cm2. Hemostasis was achieved with pressure. The patient tolerated the procedure well.     ASSESSMENT: ulceration of right lower with fat-layer exposed    PLAN:   1. Primary dressing: hydrogen peroxide damp gauze; Secondary dressing: gauze  2. Compression with SpandaGrip  3. Will likely need wound VAC, and/or debridement with skin grafting due to extent of undermining. We will have him follow-up next week to evaluate his progress and determine a treatment plan going forward.    FOLLOW-UP: 1 week    CLAIR SARKAR PA-C

## 2017-10-31 ENCOUNTER — HOSPITAL ENCOUNTER (OUTPATIENT)
Dept: WOUND CARE | Facility: CLINIC | Age: 82
Discharge: HOME | End: 2017-10-31
Attending: PHYSICIAN ASSISTANT | Admitting: PHYSICIAN ASSISTANT
Payer: MEDICARE

## 2017-10-31 VITALS — HEART RATE: 114 BPM | DIASTOLIC BLOOD PRESSURE: 68 MMHG | TEMPERATURE: 98 F | SYSTOLIC BLOOD PRESSURE: 125 MMHG

## 2017-10-31 PROCEDURE — 11042 DBRDMT SUBQ TIS 1ST 20SQCM/<: CPT

## 2017-10-31 PROCEDURE — 11042 DBRDMT SUBQ TIS 1ST 20SQCM/<: CPT | Performed by: PHYSICIAN ASSISTANT

## 2017-10-31 NOTE — LETTER
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 He is unable to leave [place] currently due to wound care needs. Without proper care he is at risk of limb and even life-threatening infection.     Please don't hesitate to contact to our office with any questions.         Sincerely,          Gwendolyn Monson PA-C

## 2017-10-31 NOTE — PROGRESS NOTES
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  He presented to the ED 10/4/17 after developing a spontaneous hematoma on his right lower leg. Was discovered to have an INR of 5.4 at that time and was admitted for adjustment. The hematoma was never evacuated intentionally but has been slightly oozing through the open sore.     At our visit last week, a significant amount of hematoma was debrided revealing undermining up to 10 cm. He was planning on traveling to [place] soon but inquires whether he should stay in town to manage wound care.     WOUND CARE: Wound is being packed with hydrogen-peroxide damp gauze.     DATE WOUND ACQUIRED: 10/4/2017    PAST MEDICAL HISTORY: hypertension, elevated PSA, atrial fibrillation, arrhythmia, anticoagulated    SOCIAL HISTORY: , daughter and wife can help with dressings if needed, [facility] will go out three times weekly for dressing changes    MEDICATIONS: reviewed, see med rec for full list, significant for warfarin, multivitamin     VITALS: /68  Pulse 114  Temp 98  F (36.7  C) (Tympanic)    PHYSICAL EXAM:  GENERAL: Patient is alert and oriented and in no acute distress  INTEGUMENTARY:   WOUND ASSESSMENT #1:     Location: right medial LL       Size: 3.5 cm x 4.5 cm with a depth of 1.8 cm with undermining of 5.5 cm    Drainage: copious amount of clotted sanguinous drainage    Wound description: two wound openings were surgically opened to form one larger wound, more clot was appreciated and removed to reveal healthy bleeding tissue    PROCEDURE: Per standing orders, topical 4% lidocaine was applied to the wound by the [facility]. Timeout was called and informed consent was obtained. Using a sharp curette a surgical debridement was performed down to and including subcutaneous tissue of <20cm2. Hemostasis was achieved with pressure. The  patient tolerated the procedure well.     ASSESSMENT: ulceration of right lower with fat-layer exposed    PLAN:   1. NPWT was ordered, will continue with peroxide-damp gauze until VAC arrives  2. Discouraged travel to [place] until we can discontinue wound VAC and start more conservative therapy  3. Can continue normal activity      FOLLOW-UP: 2 weeks    CLAIR SARKAR PA-C

## 2017-11-14 ENCOUNTER — HOSPITAL ENCOUNTER (OUTPATIENT)
Dept: WOUND CARE | Facility: CLINIC | Age: 82
Discharge: HOME | End: 2017-11-14
Attending: PHYSICIAN ASSISTANT | Admitting: PHYSICIAN ASSISTANT
Payer: MEDICARE

## 2017-11-14 VITALS
DIASTOLIC BLOOD PRESSURE: 98 MMHG | RESPIRATION RATE: 18 BRPM | SYSTOLIC BLOOD PRESSURE: 131 MMHG | TEMPERATURE: 98.4 F | HEART RATE: 90 BPM

## 2017-11-14 PROCEDURE — 97605 NEG PRS WND THER DME<=50SQCM: CPT

## 2017-11-14 PROCEDURE — 97605 NEG PRS WND THER DME<=50SQCM: CPT | Performed by: PHYSICIAN ASSISTANT

## 2017-11-14 NOTE — PROGRESS NOTES
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  He presented to the ED 10/4/17 after developing a spontaneous hematoma on his right lower leg. Was discovered to have an INR of 5.4 at that time and was admitted for adjustment. The hematoma was never evacuated intentionally but has been slightly oozing through the open sore. At our first two visits a significant amount of hematoma was evacuated revealing extensive undermining.     INTERVAL HISTORY: Has now started on NPWT through [facility].     DATE WOUND ACQUIRED: 10/4/2017    PAST MEDICAL HISTORY: hypertension, elevated PSA, atrial fibrillation, arrhythmia, anticoagulated    SOCIAL HISTORY: , daughter and wife can help with dressings if needed, [facility] will go out three times weekly for dressing changes    MEDICATIONS: reviewed, see med rec for full list, significant for warfarin, multivitamin     VITALS: BP (!) 131/98  Pulse 90  Temp 98.4  F (36.9  C) (Temporal)  Resp 18    PHYSICAL EXAM:  GENERAL: Patient is alert and oriented and in no acute distress  INTEGUMENTARY:   WOUND ASSESSMENT #1:     Location: right medial LL       Size: 3.1 cm x 4.3 cm with a depth of 1.0 cm with undermining of at least 8 cm    Drainage: copious amount of clotted sanguinous drainage    Wound description: two wound openings were surgically opened to form one larger wound, more clot was appreciated and removed to reveal healthy bleeding tissue    PROCEDURE: Per standing order, topical 4% lidocaine was applied to the wound by the [facility]. The wound VAC was reapplied to the wound by the [facility].     ASSESSMENT: ulceration of right lower with fat-layer exposed    PLAN:   1. Continue NPWT  2. Instruct home RN to make sure to insert sponge deep into undermining    FOLLOW-UP: 3 weeks    CLAIR SARKAR PA-C

## 2017-11-17 ENCOUNTER — HOSPITAL ENCOUNTER (OUTPATIENT)
Facility: CLINIC | Age: 82
Setting detail: OBSERVATION
Discharge: HOME | End: 2017-11-18
Attending: INTERNAL MEDICINE | Admitting: INTERNAL MEDICINE
Payer: MEDICARE

## 2017-11-17 DIAGNOSIS — N40.1 BENIGN PROSTATIC HYPERPLASIA WITH INCOMPLETE BLADDER EMPTYING: Primary | ICD-10-CM

## 2017-11-17 DIAGNOSIS — R39.14 BENIGN PROSTATIC HYPERPLASIA WITH INCOMPLETE BLADDER EMPTYING: Primary | ICD-10-CM

## 2017-11-17 PROBLEM — R53.1 WEAKNESS: Status: ACTIVE | Noted: 2017-11-17

## 2017-11-17 LAB
ALBUMIN SERPL-MCNC: 3.2 G/DL (ref 3.4–5)
ALBUMIN UR-MCNC: 30 MG/DL
ALP SERPL-CCNC: 50 U/L (ref 40–150)
ALT SERPL W P-5'-P-CCNC: 14 U/L (ref 0–70)
ANION GAP SERPL CALCULATED.3IONS-SCNC: 5 MMOL/L (ref 3–14)
APPEARANCE UR: CLEAR
AST SERPL W P-5'-P-CCNC: 11 U/L (ref 0–45)
BASOPHILS # BLD AUTO: 0 10E9/L (ref 0–0.2)
BASOPHILS NFR BLD AUTO: 0.2 %
BILIRUB SERPL-MCNC: 0.7 MG/DL (ref 0.2–1.3)
BILIRUB UR QL STRIP: NEGATIVE
BUN SERPL-MCNC: 19 MG/DL (ref 7–30)
CALCIUM SERPL-MCNC: 8.2 MG/DL (ref 8.5–10.1)
CHLORIDE SERPL-SCNC: 105 MMOL/L (ref 94–109)
CO2 SERPL-SCNC: 27 MMOL/L (ref 20–32)
COLOR UR AUTO: YELLOW
CREAT SERPL-MCNC: 1.07 MG/DL (ref 0.66–1.25)
DIFFERENTIAL METHOD BLD: ABNORMAL
EOSINOPHIL # BLD AUTO: 0 10E9/L (ref 0–0.7)
EOSINOPHIL NFR BLD AUTO: 0 %
ERYTHROCYTE [DISTWIDTH] IN BLOOD BY AUTOMATED COUNT: 20 % (ref 10–15)
GFR SERPL CREATININE-BSD FRML MDRD: 65 ML/MIN/1.7M2
GLUCOSE SERPL-MCNC: 85 MG/DL (ref 70–99)
GLUCOSE UR STRIP-MCNC: NEGATIVE MG/DL
HCT VFR BLD AUTO: 31.2 % (ref 40–53)
HGB BLD-MCNC: 10.1 G/DL (ref 13.3–17.7)
HGB UR QL STRIP: ABNORMAL
IMM GRANULOCYTES # BLD: 0 10E9/L (ref 0–0.4)
IMM GRANULOCYTES NFR BLD: 0.1 %
INR PPP: 1.56 (ref 0.86–1.14)
KETONES UR STRIP-MCNC: NEGATIVE MG/DL
LEUKOCYTE ESTERASE UR QL STRIP: ABNORMAL
LYMPHOCYTES # BLD AUTO: 1.1 10E9/L (ref 0.8–5.3)
LYMPHOCYTES NFR BLD AUTO: 11.9 %
MCH RBC QN AUTO: 30.9 PG (ref 26.5–33)
MCHC RBC AUTO-ENTMCNC: 32.4 G/DL (ref 31.5–36.5)
MCV RBC AUTO: 95 FL (ref 78–100)
MONOCYTES # BLD AUTO: 0.7 10E9/L (ref 0–1.3)
MONOCYTES NFR BLD AUTO: 7.3 %
MUCOUS THREADS #/AREA URNS LPF: PRESENT /LPF
NEUTROPHILS # BLD AUTO: 7.4 10E9/L (ref 1.6–8.3)
NEUTROPHILS NFR BLD AUTO: 80.5 %
NITRATE UR QL: NEGATIVE
NRBC # BLD AUTO: 0 10*3/UL
NRBC BLD AUTO-RTO: 0 /100
PH UR STRIP: 6 PH (ref 5–7)
PLATELET # BLD AUTO: 76 10E9/L (ref 150–450)
POTASSIUM SERPL-SCNC: 4.4 MMOL/L (ref 3.4–5.3)
PROT SERPL-MCNC: 6.5 G/DL (ref 6.8–8.8)
RBC # BLD AUTO: 3.27 10E12/L (ref 4.4–5.9)
RBC #/AREA URNS AUTO: >182 /HPF (ref 0–2)
SODIUM SERPL-SCNC: 137 MMOL/L (ref 133–144)
SOURCE: ABNORMAL
SP GR UR STRIP: 1.01 (ref 1–1.03)
UROBILINOGEN UR STRIP-MCNC: NORMAL MG/DL (ref 0–2)
WBC # BLD AUTO: 9.2 10E9/L (ref 4–11)
WBC #/AREA URNS AUTO: 76 /HPF (ref 0–2)

## 2017-11-17 PROCEDURE — 80053 COMPREHEN METABOLIC PANEL: CPT | Performed by: PHYSICIAN ASSISTANT

## 2017-11-17 PROCEDURE — 96374 THER/PROPH/DIAG INJ IV PUSH: CPT

## 2017-11-17 PROCEDURE — 99220 ZZC INITIAL OBSERVATION CARE,LEVL III: CPT | Performed by: PHYSICIAN ASSISTANT

## 2017-11-17 PROCEDURE — 25000125 ZZHC RX 250

## 2017-11-17 PROCEDURE — 25000132 ZZH RX MED GY IP 250 OP 250 PS 637: Mod: GY | Performed by: PHYSICIAN ASSISTANT

## 2017-11-17 PROCEDURE — A9270 NON-COVERED ITEM OR SERVICE: HCPCS | Mod: GY | Performed by: PHYSICIAN ASSISTANT

## 2017-11-17 PROCEDURE — 36415 COLL VENOUS BLD VENIPUNCTURE: CPT | Performed by: PHYSICIAN ASSISTANT

## 2017-11-17 PROCEDURE — 25000128 H RX IP 250 OP 636: Performed by: PHYSICIAN ASSISTANT

## 2017-11-17 PROCEDURE — 25000125 ZZHC RX 250: Performed by: PHYSICIAN ASSISTANT

## 2017-11-17 PROCEDURE — G0378 HOSPITAL OBSERVATION PER HR: HCPCS

## 2017-11-17 PROCEDURE — 87086 URINE CULTURE/COLONY COUNT: CPT | Performed by: INTERNAL MEDICINE

## 2017-11-17 PROCEDURE — 87186 SC STD MICRODIL/AGAR DIL: CPT | Performed by: INTERNAL MEDICINE

## 2017-11-17 PROCEDURE — 99207 ZZC APP CREDIT; MD BILLING SHARED VISIT: CPT | Performed by: INTERNAL MEDICINE

## 2017-11-17 PROCEDURE — 87088 URINE BACTERIA CULTURE: CPT | Performed by: INTERNAL MEDICINE

## 2017-11-17 PROCEDURE — 85025 COMPLETE CBC W/AUTO DIFF WBC: CPT | Performed by: PHYSICIAN ASSISTANT

## 2017-11-17 PROCEDURE — 81001 URINALYSIS AUTO W/SCOPE: CPT | Performed by: PHYSICIAN ASSISTANT

## 2017-11-17 PROCEDURE — 85610 PROTHROMBIN TIME: CPT | Performed by: PHYSICIAN ASSISTANT

## 2017-11-17 RX ORDER — ACETAMINOPHEN 325 MG/1
650 TABLET ORAL EVERY 4 HOURS PRN
Status: DISCONTINUED | OUTPATIENT
Start: 2017-11-17 | End: 2017-11-18 | Stop reason: HOSPADM

## 2017-11-17 RX ORDER — METOPROLOL TARTRATE 1 MG/ML
2.5 INJECTION, SOLUTION INTRAVENOUS EVERY 4 HOURS PRN
Status: DISCONTINUED | OUTPATIENT
Start: 2017-11-17 | End: 2017-11-18 | Stop reason: HOSPADM

## 2017-11-17 RX ORDER — PROCHLORPERAZINE 25 MG
12.5 SUPPOSITORY, RECTAL RECTAL EVERY 12 HOURS PRN
Status: DISCONTINUED | OUTPATIENT
Start: 2017-11-17 | End: 2017-11-18 | Stop reason: HOSPADM

## 2017-11-17 RX ORDER — BISACODYL 10 MG
10 SUPPOSITORY, RECTAL RECTAL DAILY PRN
Status: DISCONTINUED | OUTPATIENT
Start: 2017-11-17 | End: 2017-11-18 | Stop reason: HOSPADM

## 2017-11-17 RX ORDER — ACETAMINOPHEN 650 MG/1
650 SUPPOSITORY RECTAL EVERY 4 HOURS PRN
Status: DISCONTINUED | OUTPATIENT
Start: 2017-11-17 | End: 2017-11-18 | Stop reason: HOSPADM

## 2017-11-17 RX ORDER — POLYETHYLENE GLYCOL 3350 17 G/17G
17 POWDER, FOR SOLUTION ORAL DAILY PRN
Status: DISCONTINUED | OUTPATIENT
Start: 2017-11-17 | End: 2017-11-18 | Stop reason: HOSPADM

## 2017-11-17 RX ORDER — AMOXICILLIN 250 MG
2 CAPSULE ORAL 2 TIMES DAILY PRN
Status: DISCONTINUED | OUTPATIENT
Start: 2017-11-17 | End: 2017-11-18 | Stop reason: HOSPADM

## 2017-11-17 RX ORDER — METOPROLOL TARTRATE 25 MG/1
25 TABLET, FILM COATED ORAL 2 TIMES DAILY
Status: DISCONTINUED | OUTPATIENT
Start: 2017-11-17 | End: 2017-11-18 | Stop reason: HOSPADM

## 2017-11-17 RX ORDER — HYDROCODONE BITARTRATE AND ACETAMINOPHEN 5; 325 MG/1; MG/1
1 TABLET ORAL EVERY 4 HOURS PRN
Status: DISCONTINUED | OUTPATIENT
Start: 2017-11-17 | End: 2017-11-18 | Stop reason: HOSPADM

## 2017-11-17 RX ORDER — SODIUM CHLORIDE 9 MG/ML
INJECTION, SOLUTION INTRAVENOUS CONTINUOUS
Status: DISCONTINUED | OUTPATIENT
Start: 2017-11-17 | End: 2017-11-18

## 2017-11-17 RX ORDER — ONDANSETRON 4 MG/1
4 TABLET, ORALLY DISINTEGRATING ORAL EVERY 6 HOURS PRN
Status: DISCONTINUED | OUTPATIENT
Start: 2017-11-17 | End: 2017-11-18 | Stop reason: HOSPADM

## 2017-11-17 RX ORDER — AMOXICILLIN 250 MG
1 CAPSULE ORAL 2 TIMES DAILY PRN
Status: DISCONTINUED | OUTPATIENT
Start: 2017-11-17 | End: 2017-11-18 | Stop reason: HOSPADM

## 2017-11-17 RX ORDER — TAMSULOSIN HYDROCHLORIDE 0.4 MG/1
0.4 CAPSULE ORAL AT BEDTIME
Status: DISCONTINUED | OUTPATIENT
Start: 2017-11-17 | End: 2017-11-18 | Stop reason: HOSPADM

## 2017-11-17 RX ORDER — LIDOCAINE 40 MG/G
CREAM TOPICAL
Status: DISCONTINUED | OUTPATIENT
Start: 2017-11-17 | End: 2017-11-18 | Stop reason: HOSPADM

## 2017-11-17 RX ORDER — ONDANSETRON 2 MG/ML
4 INJECTION INTRAMUSCULAR; INTRAVENOUS EVERY 6 HOURS PRN
Status: DISCONTINUED | OUTPATIENT
Start: 2017-11-17 | End: 2017-11-18 | Stop reason: HOSPADM

## 2017-11-17 RX ORDER — NALOXONE HYDROCHLORIDE 0.4 MG/ML
.1-.4 INJECTION, SOLUTION INTRAMUSCULAR; INTRAVENOUS; SUBCUTANEOUS
Status: DISCONTINUED | OUTPATIENT
Start: 2017-11-17 | End: 2017-11-18 | Stop reason: HOSPADM

## 2017-11-17 RX ORDER — PROCHLORPERAZINE MALEATE 5 MG
5 TABLET ORAL EVERY 6 HOURS PRN
Status: DISCONTINUED | OUTPATIENT
Start: 2017-11-17 | End: 2017-11-18 | Stop reason: HOSPADM

## 2017-11-17 RX ADMIN — METOPROLOL TARTRATE 25 MG: 25 TABLET ORAL at 18:14

## 2017-11-17 RX ADMIN — SODIUM CHLORIDE, PRESERVATIVE FREE: 5 INJECTION INTRAVENOUS at 14:23

## 2017-11-17 RX ADMIN — TAMSULOSIN HYDROCHLORIDE 0.4 MG: 0.4 CAPSULE ORAL at 21:03

## 2017-11-17 RX ADMIN — SODIUM CHLORIDE, PRESERVATIVE FREE: 5 INJECTION INTRAVENOUS at 14:20

## 2017-11-17 RX ADMIN — LIDOCAINE HYDROCHLORIDE 10 ML: 20 JELLY TOPICAL at 16:33

## 2017-11-17 RX ADMIN — METOPROLOL TARTRATE 2.5 MG: 5 INJECTION INTRAVENOUS at 18:15

## 2017-11-17 ASSESSMENT — ACTIVITIES OF DAILY LIVING (ADL): FALL_HISTORY_WITHIN_LAST_SIX_MONTHS: NO

## 2017-11-17 NOTE — IP AVS SNAPSHOT
[facility]    [address] [place] [address]., [address]    [place] [address]    Phone:  [phone]                                       After Visit Summary   11/17/2017    Jama Paul    MRN: [id number]           After Visit Summary Signature Page     I have received my discharge instructions, and my questions have been answered. I have discussed any challenges I see with this plan with the nurse or doctor.    ..........................................................................................................................................  Patient/Patient Representative Signature      ..........................................................................................................................................  Patient Representative Print Name and Relationship to Patient    ..................................................               ................................................  Date                                            Time    ..........................................................................................................................................  Reviewed by Signature/Title    ...................................................              ..............................................  Date                                                            Time

## 2017-11-17 NOTE — PHARMACY-ANTICOAGULATION SERVICE
Clinical Pharmacy - Warfarin Dosing Consult     Pharmacy has been consulted to manage this patient s warfarin therapy.  Indication: Atrial Fibrillation  Therapy Goal: INR 2-3  Provider/Team: RHIANNA Romano  Warfarin Prior to Admission: Yes  Warfarin PTA Regimen: 2.5mg daily  Recent documented change in oral intake/nutrition: No    INR   Date Value Ref Range Status   10/05/2017 2.07 (H) 0.86 - 1.14 Final   10/04/2017 5.23 (HH) 0.86 - 1.14 Final     Comment:     Critical Value called to and read back by  MAR MOCTEZUMA IN ER AT 1928 ARM         Will dose based on INR today.  Pharmacy will monitor Jama Paul daily and order warfarin doses to achieve specified goal.      Please contact pharmacy as soon as possible if the warfarin needs to be held for a procedure or if the warfarin goals change.

## 2017-11-17 NOTE — PHARMACY-ADMISSION MEDICATION HISTORY
Admission medication history interview status for the 11/17/2017  admission is complete. See EPIC admission navigator for prior to admission medications     Medication history source reliability:Good    Actions taken by pharmacist (provider contacted, etc):None     Additional medication history information not noted on PTA med list :None    Medication reconciliation/reorder completed by provider prior to medication history? No    Time spent in this activity: 10 min    Prior to Admission medications    Medication Sig Last Dose Taking? Auth Provider   METOPROLOL TARTRATE PO Take 25 mg by mouth 2 times daily 11/17/2017 at x1 Yes Reported, Patient   warfarin (COUMADIN) 2.5 MG tablet Take 2.5 mg by mouth daily 11/17/2017 at Unknown time Yes Reported, Patient   Multiple Vitamins-Minerals (ICAPS AREDS FORMULA PO) Take 1 tablet by mouth daily 11/16/2017 at Unknown time Yes Unknown, Entered By History   CYANOCOBALAMIN PO Take 500 mcg by mouth daily 11/16/2017 at Unknown time Yes Unknown, Entered By History   Ascorbic Acid (VITAMIN C PO) Take 500 mg by mouth daily 11/16/2017 at Unknown time Yes Unknown, Entered By History   MAGNESIUM OXIDE PO Take 200 mg by mouth daily 11/16/2017 at Unknown time Yes Unknown, Entered By History   POTASSIUM CITRATE PO Take 5 mEq by mouth daily OTC dose unknown 11/16/2017 at Unknown time Yes Unknown, Entered By History   vitamin  B complex with vitamin C (VITAMIN  B COMPLEX) TABS Take 1 tablet by mouth daily 11/16/2017 at Unknown time Yes Unknown, Entered By History   multivitamin, therapeutic with minerals (MULTI-VITAMIN) TABS Take 1 tablet by mouth daily 11/16/2017 at Unknown time Yes Unknown, Entered By History   VITAMIN D, CHOLECALCIFEROL, PO Take 2,000 Units by mouth daily 11/16/2017 at Unknown time Yes Unknown, Entered By History

## 2017-11-17 NOTE — H&P
PRIMARY CARE PROVIDER:  Clint Pizarro MD      CHIEF COMPLAINT:  Nocturia with associated weakness and unsteadiness.      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      The patient indicated that for the last 2 weeks he has developed nocturia that has become problematic.  He indicated that in the last 3-4 evenings, he has had significant diuresis, for which he has completely soaked through, at minimal, 10 hand towels and is having greater, greater difficulty walking following this diuresis.  The patient was assessed in primary care clinic and subsequently directly admitted to [facility].      I evaluated the patient in his hospital room, where he was lying in bed upon arrival.  His wife was seated at the bedside.  The patient indicated that, again he is having significant diuresis, with massive amounts of urine produced in the evening, for which he cannot control.  However, he states during the day he is having minimal urine production, no frequency, and denied dysuria.  He has been having some unsteadiness and weakness with ambulation and occasionally feeling as though he is about to faint.  He also indicated that he recently was being seen in [facility] and has a wound VAC in place that is changed every other day, and he was recently taken off his diltiazem and started on metoprolol tartrate.  Otherwise, the patient was denying headaches, chest pain, palpitations, shortness of breath, abdominal pain.      PAST MEDICAL HISTORY:   1.   Chronic atrial fibrillation.   2.  Hypertension.   3.  Vitamin B12 deficiency.   4.  Vitamin D deficiency.   5.  Recent spontaneous right lower extremity hematoma formation in the setting of supratherapeutic INR with a current wound VAC in place.   6.  History of elevated PSA, for which the patient follows with [facility].   7.  Chronic thrombocytopenia.       PAST SURGICAL HISTORY:   1.  Bilateral hernia repair.   2.  Mohs procedure for basal cell carcinoma.   3.  Left elbow septic bursitis evacuation/bursectomy.      PRIOR TO ADMIT MEDICATIONS:    Prior to Admission medications    Medication Sig Last Dose Taking? Auth Provider   METOPROLOL TARTRATE PO Take 25 mg by mouth 2 times daily 11/17/2017 at x1 Yes Reported, Patient   warfarin (COUMADIN) 2.5 MG tablet Take 2.5 mg by mouth daily 11/17/2017 at Unknown time Yes Reported, Patient   Multiple Vitamins-Minerals (ICAPS AREDS FORMULA PO) Take 1 tablet by mouth daily 11/16/2017 at Unknown time Yes Unknown, Entered By History   CYANOCOBALAMIN PO Take 500 mcg by mouth daily 11/16/2017 at Unknown time Yes Unknown, Entered By History   Ascorbic Acid (VITAMIN C PO) Take 500 mg by mouth daily 11/16/2017 at Unknown time Yes Unknown, Entered By History   MAGNESIUM OXIDE PO Take 200 mg by mouth daily 11/16/2017 at Unknown time Yes Unknown, Entered By History   POTASSIUM CITRATE PO Take 5 mEq by mouth daily OTC dose unknown 11/16/2017 at Unknown time Yes Unknown, Entered By History   vitamin  B complex with vitamin C (VITAMIN  B COMPLEX) TABS Take 1 tablet by mouth daily 11/16/2017 at Unknown time Yes Unknown, Entered By History   multivitamin, therapeutic with minerals (MULTI-VITAMIN) TABS Take 1 tablet by mouth daily 11/16/2017 at Unknown time Yes Unknown, Entered By History   VITAMIN D, CHOLECALCIFEROL, PO Take 2,000 Units by mouth daily 11/16/2017 at Unknown time Yes Unknown, Entered By History     ALLERGIES:  No known drug allergies.      SOCIAL HISTORY:  The  patient currently resides in a house in [place] with his wife.  He is a lifelong nonsmoker, indicated he consumes alcohol very minimally.  He denies street or illicit drug use and does not currently utilize a cane or walker.      FAMILY HISTORY:   1.  Mother is , had hypertension and CVA.   2.  Father is , had lung cancer.      REVIEW OF SYSTEMS:  A 10-point review of systems was performed.  All pertinent positives are listed in the history of present illness, otherwise is negative.      PHYSICAL EXAMINATION:   VITAL SIGNS:  Temperature is 97.5 degrees Fahrenheit with a blood pressure of 109/78, heart rate of 100 beats per minute, respiratory rate is not documented, O2 saturation 94% on room air.  The patient is denying any pain.   GENERAL:  The patient is awake, alert and cooperative, in no apparent distress, alert and oriented x3.   HEENT:  Normocephalic, atraumatic.  Moist mucous membranes present.  No exudates noted in the posterior pharynx.  Uvula is midline.  Eyes:  Pupils are equal, round, reactive to light.  Extraocular movements are intact.  Normal sclerae.   NECK:  Supple.  Normal range of motion.  No tracheal deviation.  No cervical lymphadenopathy present.   CARDIOVASCULAR:  Irregularly irregular.  No rubs, murmurs or gallops appreciated.   PULMONARY:  Lungs are clear to auscultation bilaterally.  No wheezes, rhonchi or rales appreciated.   GASTROINTESTINAL:  Bowel sounds are present in all 4 quadrants.  Soft, nontender, nondistended.   NEUROLOGIC:  Cranial nerves II-XII are grossly intact.  The patient demonstrates equal sensation, coordination and strength in the upper and lower extremities bilaterally.   EXTREMITIES:  No lower extremity edema noted bilaterally.  Calves were nontender to palpation.  A right lower extremity wound VAC is in place with gauze present around the site.      ASSESSMENT AND PLAN:  Jama Paul is an 86-year-old male with a past medical history significant for  chronic atrial fibrillation, hypertension, vitamin B12 deficiency, vitamin D deficiency, recent spontaneous right lower extremity hematoma formation in the setting of supratherapeutic international normalized ratio with ongoing wound vacuum-assisted closure management and history of elevated prostate-specific antigen, followed by [facility], who was directly admitted to [facility] due to significant nocturia with associated unsteadiness and weakness.   1.  Nocturia with associated weakness and unsteadiness:  The patient has indicated a substantial diuresis that has been occurring for the last 3 or 4 nights, for which he is soaking through 10 hand towels and has become very weak and having a greater difficulty walking, with associated unsteadiness and a sensation of almost wanting to faint.  The patient's nocturia seems to have begun approximately 2 weeks ago.  Unclear etiology and could represent BPH.  At this point, will check orthostatic blood pressures, urine analysis with urine culture, a comprehensive metabolic panel and complete blood count with differential.  We will order for Intravenous fluids with normal saline to be administered at 100 mL per hour.  Have also requested Physical Therapy and Social Work assessments. Will start QHS Flomax 0.4 mg.    2.  Chronic atrial fibrillation:  This appears to be stable at this point.  The patient was previously transitioned from diltiazem to metoprolol.  Will resume prior to admit metoprolol tartrate 25 mg 2 times daily and Coumadin therapy with pharmacy assistance.   3.  Hypertension:  The patient's blood pressures are slightly soft, currently at 109/78.  Will continue with metoprolol with hold parameters in place.   4.  Vitamin B12 and vitamin D deficiency:  Will hold prior to admit supplements.  These can be resumed at the time of discharge.   5.  Recent spontaneous right lower extremity hematoma formation in the setting of supratherapeutic  international normalized ratio:  This appears to be improving and resolving.  The patient currently has a wound vacuum-assisted closure in place that is changed every other day.  Will request a Wound Care consult.   6.  Chronic Thrombocytopenia:  Platelets are historically low between 50-70 range.  Does not appear to have active bleeding.  Will monitor.  7.  Deep vein thrombosis prophylaxis:  Will continue with Coumadin therapy with Pharmacy to assist with management.      CODE STATUS:  Full code.      DISPOSITION:  The patient is currently being registered under observation due to nocturia with associated weakness and lightheadedness.  Will begin workup this afternoon and PT assessment, and if the patient improves with IV fluids and monitoring overnight, the patient will be discharged home tomorrow.      The patient was discussed with Dr. Garcia who agrees with the assessment and plan as stated above.  Dr. Garcia will evaluate the patient independently.         DAVID GARCIA MD       As dictated by AYDEE MENDOSA PA-C            D: 2017 14:50   T: 2017 15:19   MT:       Name:     YINKA GONZALEZ   MRN:      [id number]        Account:      [id number]   :      1931           Admitted:     [id number]      Document: [id number]       cc: Clint Pizarro MD

## 2017-11-17 NOTE — PROVIDER NOTIFICATION
To RHIANNA Rosado   x2 nurses met resistance and unable to straight cath. PVR >470. clot in catheter when pulled back. Denies hematuria     - order for urology consult

## 2017-11-17 NOTE — IP AVS SNAPSHOT
MRN:[id number]                      After Visit Summary   11/17/2017    Jama Paul    MRN: [id number]           Thank you!     Thank you for choosing [facility] for your care. Our goal is always to provide you with excellent care. Hearing back from our patients is one way we can continue to improve our services. Please take a few minutes to complete the written survey that you may receive in the mail after you visit with us. Thank you!        Patient Information     Date Of Birth          2/13/1931        Designated Caregiver       Most Recent Value    Caregiver    Will someone help with your care after discharge? yes    Name of designated caregiver Paige    Phone number of caregiver [phone]    Caregiver address [facility]      About your hospital stay     You were admitted on:  November 17, 2017 You last received care in the:  [facility]    You were discharged on:  November 18, 2017        Reason for your hospital stay       Further evaluation of increased urinary frequency likely due to enlarged prostate.                  Who to Call     For medical emergencies, please call 911.  For non-urgent questions about your medical care, please call your primary care provider or clinic, [phone]          Attending Provider     Provider Specialty    Rick Ambrosio MD Internal Medicine       Primary Care Provider Office Phone # Fax #    Clint Pizarro [phone]      After Care Instructions     Activity       Your activity upon discharge: activity as tolerated            Diet       Follow this diet upon discharge: Orders Placed This Encounter      Room Service      Combination Diet Low Saturated Fat Na <2400mg Diet, No Caffeine Diet            Discharge Instructions       Resume previously ordered home PT.            Monitor and record       blood pressure daily  pulse daily  Keep log and f/u with PCP.                  Follow-up  "Appointments     Follow-up and recommended labs and tests        Follow up with primary care provider, Clint Pizarro, within 7 days for hospital follow- up.  No follow up labs or test are needed.  Follow up with Urology on 17.                  Your next 10 appointments already scheduled     Dec 13, 2017 11:00 AM CST   Return Visit with Dorian Rodriguez MD   [facility] ([facility])    [address]  [address]  [facility] [address]   [phone]              Pending Results     Date and Time Order Name Status Description    2017 2110 Urine Culture Aerobic Bacterial Preliminary             Statement of Approval     Ordered          17 6108  I have reviewed and agree with all the recommendations and orders detailed in this document.  EFFECTIVE NOW     Approved and electronically signed by:  Brooks Andres MD             Admission Information     Date & Time Provider Department Dept. Phone    2017 Rick Ambrosio MD [facility] [phone]      Your Vitals Were     Blood Pressure Pulse Temperature Respirations Height Weight    122/80 (BP Location: Right arm) 93 97.8  F (36.6  C) (Oral) 18 1.829 m (6') 74 kg (163 lb 3.2 oz)    Pulse Oximetry BMI (Body Mass Index)                98% 22.13 kg/m2          MyChart Information     [employer] lets you send messages to your doctor, view your test results, renew your prescriptions, schedule appointments and more. To sign up, go to [email or web address] . Click on \"Log in\" on the left side of the screen, which will take you to the Welcome page. Then click on \"Sign up Now\" on the right side of the page.     You will be asked to enter the access code listed below, as well as some personal information. Please follow the directions to create your username and password.     Your access code is: [email or web address]  Expires: 1/3/2018  9:31 AM     Your access code will  " in 90 days. If you need help or a new code, please call your [facility] clinic or [phone].        Care EveryWhere ID     This is your Care EveryWhere ID. This could be used by other organizations to access your [facility] medical records  [id number]        Equal Access to Services     CECILE MOURA : Caridad vargas Soofelia, waaxda luqadaha, qaybta kaalmada adeegyabrian, emani avalos. So [facility] [phone].    ATENCIÓN: Si habla español, tiene a cleaning disposición servicios gratuitos de asistencia lingüística. Llame al [phone].    We comply with applicable federal civil rights laws and [place] laws. We do not discriminate on the basis of race, color, national origin, age, disability, sex, sexual orientation, or gender identity.               Review of your medicines      START taking        Dose / Directions    tamsulosin 0.4 MG capsule   Commonly known as:  FLOMAX   Used for:  Benign prostatic hyperplasia with incomplete bladder emptying        Dose:  0.4 mg   Take 1 capsule (0.4 mg) by mouth At Bedtime for 14 days   Quantity:  14 capsule   Refills:  0         CONTINUE these medicines which have NOT CHANGED        Dose / Directions    CYANOCOBALAMIN PO        Dose:  500 mcg   Take 500 mcg by mouth daily   Refills:  0       MAGNESIUM OXIDE PO        Dose:  200 mg   Take 200 mg by mouth daily   Refills:  0       METOPROLOL TARTRATE PO        Dose:  25 mg   Take 25 mg by mouth 2 times daily   Refills:  0       * Multi-vitamin Tabs tablet        Dose:  1 tablet   Take 1 tablet by mouth daily   Refills:  0       * ICAPS AREDS FORMULA PO        Dose:  1 tablet   Take 1 tablet by mouth daily   Refills:  0       POTASSIUM CITRATE PO        Dose:  5 mEq   Take 5 mEq by mouth daily OTC dose unknown   Refills:  0       vitamin B complex with vitamin C Tabs tablet        Dose:  1 tablet   Take 1 tablet by mouth daily   Refills:  0       VITAMIN C PO        Dose:  500 mg   Take 500 mg by  mouth daily   Refills:  0       VITAMIN D (CHOLECALCIFEROL) PO        Dose:  2000 Units   Take 2,000 Units by mouth daily   Refills:  0       warfarin 2.5 MG tablet   Commonly known as:  COUMADIN        Dose:  2.5 mg   Take 2.5 mg by mouth daily   Refills:  0       * Notice:  This list has 2 medication(s) that are the same as other medications prescribed for you. Read the directions carefully, and ask your doctor or other care provider to review them with you.         Where to get your medicines      These medications were sent to [facility] [id number] [place] [address] - [address] AT [address] [place] [address] & [facility]  [address] [place], [place] [address] [address]    Hours:  24-hours Phone:  [phone]     tamsulosin 0.4 MG capsule                Protect others around you: Learn how to safely use, store and throw away your medicines at www.disposemymeds.org.             Medication List: This is a list of all your medications and when to take them. Check marks below indicate your daily home schedule. Keep this list as a reference.      Medications           Morning Afternoon Evening Bedtime As Needed    CYANOCOBALAMIN PO   Take 500 mcg by mouth daily                                   MAGNESIUM OXIDE PO   Take 200 mg by mouth daily                                   METOPROLOL TARTRATE PO   Take 25 mg by mouth 2 times daily   Last time this was given:  25 mg on 11/18/2017  8:37 AM                                      * Multi-vitamin Tabs tablet   Take 1 tablet by mouth daily                                   * ICAPS AREDS FORMULA PO   Take 1 tablet by mouth daily                                   POTASSIUM CITRATE PO   Take 5 mEq by mouth daily OTC dose unknown                                   tamsulosin 0.4 MG capsule   Commonly known as:  FLOMAX   Take 1 capsule (0.4 mg) by mouth At Bedtime for 14 days   Last time this was given:  0.4 mg on 11/17/2017  9:03 PM                                   vitamin B complex with vitamin C  Tabs tablet   Take 1 tablet by mouth daily                                   VITAMIN C PO   Take 500 mg by mouth daily                                   VITAMIN D (CHOLECALCIFEROL) PO   Take 2,000 Units by mouth daily                                   warfarin 2.5 MG tablet   Commonly known as:  COUMADIN   Take 2.5 mg by mouth daily                                   * Notice:  This list has 2 medication(s) that are the same as other medications prescribed for you. Read the directions carefully, and ask your doctor or other care provider to review them with you.              More Information      Understanding Atrial Fibrillation  What is atrial fibrillation?  Atrial fibrillation is an irregular heartbeat. It is fairly common, but it can be serious.   The atria are the two upper chambers of the heart. Normally, they have a steady, constant beat.   If the beat is rapid and uneven, we call this atrial fibrillation. It may feel as if your heart is racing out of control. This can cause discomfort, but the real danger is that blood can pool and form clots in the heart.   If a piece of a blood clot breaks loose, it may travel through the arteries until it gets stuck. This could block blood flow to an organ or other body part. If it blocks a heart artery, you could have a heart attack. If it blocks a brain artery, you could have a stroke.   What causes it?  Atrial fibrillation can be caused by:    Heart disease, such as coronary artery disease, myocarditis (inflammation of the heart muscle), rheumatic heart disease or heart valve disorder    Heart defects you were born with    A long history of high blood pressure    Swelling caused by an injury near the heart.  Sometimes it seems to happen for no reason.  What are the symptoms?  Not everyone feels symptoms, but if they do, symptoms may include:    Fast, fluttering or irregular heartbeat    Chest pain    Trouble breathing, or a sense that you can't catch your  breath    Feeling weak or dizzy    Feeling far more tired than normal    Cold sweats.  How is it diagnosed?  Your doctor will do an electrocardiogram (EKG). Electrodes will be place on your wrists, ankles and chest. Wires connect the electrodes to an EKG machine, which will record electrical signals from your heart.  How is it treated?  Treatment is important to reduce the risk of stroke, heart attack or other tissue damage.    A number of medicines are used to treat atrial fibrillation. Some slow your heart rate. Some help change the heartbeat back to normal. Some help keep blood clots from forming.    An electric charge may be used to get your heart back to its regular beat.    In some cases, a pacemaker or another implanted device can be used to control your heartbeat.  For informational purposes only. Not to replace the advice of your health care provider.   Copyright   2006 [employer]. All rights reserved. [employer] 763967 - REV 07/17.            Tamsulosin Hydrochloride Oral capsule  What is this medicine?  TAMSULOSIN (dale REMINGTON leslye sin) is used to treat enlargement of the prostate gland in men, a condition called benign prostatic hyperplasia or BPH. It is not for use in women. It works by relaxing muscles in the prostate and bladder neck. This improves urine flow and reduces BPH symptoms.  This medicine may be used for other purposes; ask your health care provider or pharmacist if you have questions.  What should I tell my health care provider before I take this medicine?  They need to know if you have any of the following conditions:    advanced kidney disease    advanced liver disease    low blood pressure    prostate cancer    an unusual or allergic reaction to tamsulosin, sulfa drugs, other medicines, foods, dyes, or preservatives    pregnant or trying to get pregnant    breast-feeding  How should I use this medicine?  Take this medicine by mouth about 30 minutes after the same meal every  day. Follow the directions on the prescription label. Swallow the capsules whole with a glass of water. Do not crush, chew, or open capsules. Do not take your medicine more often than directed. Do not stop taking your medicine unless your doctor tells you to.  Talk to your pediatrician regarding the use of this medicine in children. Special care may be needed.  Overdosage: If you think you have taken too much of this medicine contact a poison control center or emergency room at once.  NOTE: This medicine is only for you. Do not share this medicine with others.  What if I miss a dose?  If you miss a dose, take it as soon as you can. If it is almost time for your next dose, take only that dose. Do not take double or extra doses. If you stop taking your medicine for several days or more, ask your doctor or health care professional what dose you should start back on.  What may interact with this medicine?    cimetidine    fluoxetine    ketoconazole    medicines for erectile disfunction like sildenafil, tadalafil, vardenafil    medicines for high blood pressure    other alpha-blockers like alfuzosin, doxazosin, phentolamine, phenoxybenzamine, prazosin, terazosin    warfarin  This list may not describe all possible interactions. Give your health care provider a list of all the medicines, herbs, non-prescription drugs, or dietary supplements you use. Also tell them if you smoke, drink alcohol, or use illegal drugs. Some items may interact with your medicine.  What should I watch for while using this medicine?  Visit your doctor or health care professional for regular check ups. You will need lab work done before you start this medicine and regularly while you are taking it. Check your blood pressure as directed. Ask your health care professional what your blood pressure should be, and when you should contact him or her.  This medicine may make you feel dizzy or lightheaded. This is more likely to happen after the first  dose, after an increase in dose, or during hot weather or exercise. Drinking alcohol and taking some medicines can make this worse. Do not drive, use machinery, or do anything that needs mental alertness until you know how this medicine affects you. Do not sit or stand up quickly. If you begin to feel dizzy, sit down until you feel better. These effects can decrease once your body adjusts to the medicine.  Contact your doctor or health care professional right away if you have an erection that lasts longer than 4 hours or if it becomes painful. This may be a sign of a serious problem and must be treated right away to prevent permanent damage.  If you are thinking of having cataract surgery, tell your eye surgeon that you have taken this medicine.  What side effects may I notice from receiving this medicine?  Side effects that you should report to your doctor or health care professional as soon as possible:    allergic reactions like skin rash or itching, hives, swelling of the lips, mouth, tongue, or throat    breathing problems    change in vision    feeling faint or lightheaded    irregular heartbeat    prolonged or painful erection    weakness  Side effects that usually do not require medical attention (report to your doctor or health care professional if they continue or are bothersome):    back pain    change in sex drive or performance    constipation, nausea or vomiting    cough    drowsy    runny or stuffy nose    trouble sleeping  This list may not describe all possible side effects. Call your doctor for medical advice about side effects. You may report side effects to FDA at 4-648-FDA-1359.  Where should I keep my medicine?  Keep out of the reach of children.  Store at room temperature between 15 and 30 degrees C (59 and 86 degrees F). Throw away any unused medicine after the expiration date.  NOTE:This sheet is a summary. It may not cover all possible information. If you have questions about this medicine,  talk to your doctor, pharmacist, or health care provider. Copyright  2016 [employer]

## 2017-11-17 NOTE — PROGRESS NOTES
WOC consult received for wound vac consult. Pt currently under observation status in hospital and currently has home vac that is functioning well at therapy -125mmhg with intact dressing to RLE. Pt has tape over tubing he reports he uses for support so it does not pull off. Pt indicates he has homecare and vac change was last performed yesterday 11/16 and is due 11/18. Vac dressing left intact on home vac and call placed to PA to discuss dressing change since WOC team does not perform weekend visits. If patient Discharges home today or tomorrow homecare to perform scheduled dressing on 11/18. Pt health is currently stable besides frequent urination and will most likely return home Saturday or Sunday.  If patient remains in the hospital over the weekend WOC to f/u on Monday. If vac malfunctions staff may consult Vascular to see if they would assess. Vac should only remain on at most 2 hours if malfunctioning or alarming. If it alarms staff should remove and place wet to dry dressing in wound and cover with absorbing dressing and change 2xdaily. Wound care orders entered in case of leaking wound vac.     TO BE PERFORMED ONLY IF WOUND VAC MALFUNCTIONS    1. Gently remove dressing drape and any black sponge that is in wound bed. May moisten black sponge if sticking.  2. Cleanse wound with Microklenz  3. Apply Cavilon Skin prep to periwound.  4. Moisten kerlix with saline and loosely pack into wound bed.  5. Cover with ABD  6. Secure with Kerlix and tape  7. Change dressing 2xdaily.     **Wound vac currently in room is home wound vac, do not remove from patients belongings. **    Education/Communication 20minutes

## 2017-11-18 VITALS
TEMPERATURE: 97.8 F | HEART RATE: 93 BPM | RESPIRATION RATE: 18 BRPM | SYSTOLIC BLOOD PRESSURE: 122 MMHG | HEIGHT: 72 IN | DIASTOLIC BLOOD PRESSURE: 80 MMHG | OXYGEN SATURATION: 98 % | WEIGHT: 163.2 LBS | BODY MASS INDEX: 22.11 KG/M2

## 2017-11-18 LAB — INR PPP: 1.56 (ref 0.86–1.14)

## 2017-11-18 PROCEDURE — G0378 HOSPITAL OBSERVATION PER HR: HCPCS

## 2017-11-18 PROCEDURE — A9270 NON-COVERED ITEM OR SERVICE: HCPCS | Mod: GY | Performed by: PHYSICIAN ASSISTANT

## 2017-11-18 PROCEDURE — 25000132 ZZH RX MED GY IP 250 OP 250 PS 637: Mod: GY | Performed by: PHYSICIAN ASSISTANT

## 2017-11-18 PROCEDURE — A9270 NON-COVERED ITEM OR SERVICE: HCPCS | Mod: GY | Performed by: INTERNAL MEDICINE

## 2017-11-18 PROCEDURE — 99217 ZZC OBSERVATION CARE DISCHARGE: CPT | Performed by: INTERNAL MEDICINE

## 2017-11-18 PROCEDURE — 85610 PROTHROMBIN TIME: CPT | Performed by: PHYSICIAN ASSISTANT

## 2017-11-18 PROCEDURE — 25000132 ZZH RX MED GY IP 250 OP 250 PS 637: Mod: GY | Performed by: INTERNAL MEDICINE

## 2017-11-18 PROCEDURE — 36415 COLL VENOUS BLD VENIPUNCTURE: CPT | Performed by: PHYSICIAN ASSISTANT

## 2017-11-18 PROCEDURE — 25000128 H RX IP 250 OP 636: Performed by: PHYSICIAN ASSISTANT

## 2017-11-18 RX ORDER — CIPROFLOXACIN 500 MG/1
500 TABLET, FILM COATED ORAL EVERY 12 HOURS SCHEDULED
Status: DISCONTINUED | OUTPATIENT
Start: 2017-11-18 | End: 2017-11-18

## 2017-11-18 RX ORDER — TAMSULOSIN HYDROCHLORIDE 0.4 MG/1
0.4 CAPSULE ORAL AT BEDTIME
Qty: 14 CAPSULE | Refills: 0 | Status: SHIPPED | OUTPATIENT
Start: 2017-11-18 | End: 2017-12-02

## 2017-11-18 RX ORDER — WARFARIN SODIUM 4 MG/1
4 TABLET ORAL
Status: DISCONTINUED | OUTPATIENT
Start: 2017-11-18 | End: 2017-11-18

## 2017-11-18 RX ORDER — WARFARIN SODIUM 5 MG/1
5 TABLET ORAL
Status: DISCONTINUED | OUTPATIENT
Start: 2017-11-18 | End: 2017-11-18 | Stop reason: HOSPADM

## 2017-11-18 RX ADMIN — METOPROLOL TARTRATE 25 MG: 25 TABLET ORAL at 08:37

## 2017-11-18 RX ADMIN — SODIUM CHLORIDE, PRESERVATIVE FREE: 5 INJECTION INTRAVENOUS at 00:51

## 2017-11-18 RX ADMIN — CIPROFLOXACIN HYDROCHLORIDE 500 MG: 500 TABLET, FILM COATED ORAL at 08:37

## 2017-11-18 NOTE — DISCHARGE SUMMARY
[facility]    Discharge Summary  Hospitalist    Date of Admission:  11/17/2017  Date of Discharge:  11/18/2017  Discharging Provider: Brooks Andres MD  Date of Service (when I saw the patient): 11/18/17    Discharge Diagnoses   Nocturia due to probable BPH.  Urinary retention due to BPH  Hx chronic afib  Subtherapeutic INR  Deconditioned  Abnormal UA likely due to hematuria from traumatic straight cath attempts    History of Present Illness   Please see admission H&P for full details.    Hospital Course   Jama Paul was admitted on 11/17/2017.  The following problems were addressed during his hospitalization:    Jama Paul is an 86-year-old male with a past medical history significant for chronic atrial fibrillation, hypertension, vitamin B12 deficiency, vitamin D deficiency, recent spontaneous right lower extremity hematoma formation in the setting of supratherapeutic international normalized ratio with ongoing wound vacuum-assisted closure management and history of elevated prostate-specific antigen, followed by [facility], who was directly admitted to [facility] due to significant nocturia with associated unsteadiness and weakness.     1.  Nocturia with associated weakness and unsteadiness:  The patient has indicated a substantial diuresis that has been occurring for the last 3 or 4 nights, for which he is soaking through 10 hand towels and has become very weak and having a greater difficulty walking, with associated unsteadiness and a sensation of almost wanting to faint.  The patient's nocturia seems to have begun approximately 2 weeks ago.  Unclear etiology and could represent BPH. Negative orthostatics but patient was given IVF on admission. Hgb at 10.1 and above previous value of 8.2 on 10/5. Renal function and electrolytes normal. UA with blood but collected after failed straight cath, made interpretation for UTI unclear but was started on Cipro po  nonetheless. PVR was greater than 300ml and since staff unable to pass catheter, Urology was consulted.   Dr. Kraus did not recommend patient go home with catheter thus will have him f/u on 11/20 in clinic with Dr. Kelly. Patient was started on Flomax on admission which he feels provided improvement in urine flow and reduced nocturia. Urine culture pending at discharge time. PT recommended resuming home PT. Patient ambulating hallways without dizziness, palpitations, chest pain, worsening SOB. Tolerating diet. Patient would like to be discharged. Clinically stable for discharge home.    2.  Chronic atrial fibrillation:  This appears to be stable at this point.  The patient was previously transitioned from diltiazem to metoprolol.  Will resume prior to admit metoprolol tartrate 25 mg 2 times daily and Coumadin therapy with pharmacy assistance. Patient noted to have intermittent tachycardia while moving in bed. He did ambulate with staff prior to discharge with HR less than 110, remains asymptomatic as noted above. Patient states he has been on a stable dose of beta blocker managed via [place] and does not want to increase dose at this time. Recommend patient to check BP and HR daily at home and bring log at PCP follow up.    3.  Hypertension:  Stable, on home meds as above.    4.  Vitamin B12 and vitamin D deficiency:  Will hold prior to admit supplements while admitted under observation.  These can be resumed at the time of discharge.     5.  Recent spontaneous right lower extremity hematoma formation in the setting of supratherapeutic international normalized ratio:  This appears to be improving and resolving.  The patient currently has a wound vacuum-assisted closure in place that is changed every other day.  Appreciate Wound Care recommendations. Patient afebrile and with normal WBC. Follow up as previously scheduled for ongoing wound vac cares.    6.  Chronic Thrombocytopenia:  Platelets are historically low  between 50-70 range. Stable this stay.    Active Problems:    Weakness    Brooks Andres MD    Significant Results and Procedures   See below.    Pending Results   These results will be followed up by PCP and Urology.  Unresulted Labs Ordered in the Past 30 Days of this Admission     Date and Time Order Name Status Description    11/17/2017 2110 Urine Culture Aerobic Bacterial Preliminary           Code Status   Full Code       Primary Care Physician   Clint Pizarro    Physical Exam   Temp: 97.8  F (36.6  C) Temp src: Oral BP: 122/80 Pulse: 93 Heart Rate: 115 Resp: 18 SpO2: 98 % O2 Device: None (Room air)    Vitals:    11/18/17 0547   Weight: 74 kg (163 lb 3.2 oz)     Vital Signs with Ranges  Temp:  [97.6  F (36.4  C)-98.3  F (36.8  C)] 97.8  F (36.6  C)  Pulse:  [] 93  Heart Rate:  [] 115  Resp:  [16-18] 18  BP: (102-130)/(69-89) 122/80  SpO2:  [95 %-98 %] 98 %  I/O last 3 completed shifts:  In: 3291.67 [P.O.:1380; I.V.:1911.67]  Out: 725 [Urine:725]    Constitutional: Awake, alert, cooperative, no apparent distress  Respiratory: Clear to auscultation bilaterally, no crackles or wheezing  Cardiovascular: Regular rate and rhythm, normal S1 and S2, and no murmur noted  GI: Normal bowel sounds, soft, non-distended, non-tender  Skin/Integumen: No rashes, no cyanosis, no edema, RLE wound vac on medial calf in place.  Other: Oriented x 3, follows all commands.    Discharge Disposition   Discharged to home  Condition at discharge: Satisfactory    Consultations This Hospital Stay   PHARMACY TO DOSE WARFARIN  PHYSICAL THERAPY ADULT IP CONSULT  SOCIAL WORK IP CONSULT  WOUND OSTOMY CONTINENCE NURSE  IP CONSULT  UROLOGY IP CONSULT    Time Spent on this Encounter   Brooks GUERRERO, personally saw the patient today and spent greater than 30 minutes discharging this patient.    Discharge Orders     Reason for your hospital stay   Further evaluation of increased urinary frequency likely due to enlarged  prostate.     Follow-up and recommended labs and tests    Follow up with primary care provider, Clint Pizarro, within 7 days for hospital follow- up.  No follow up labs or test are needed.  Follow up with Urology on 11/20/17.     Activity   Your activity upon discharge: activity as tolerated     Monitor and record   blood pressure daily  pulse daily  Keep log and f/u with PCP.     Discharge Instructions   Resume previously ordered home PT.     Full Code     Diet   Follow this diet upon discharge: Orders Placed This Encounter     Room Service     Combination Diet Low Saturated Fat Na <2400mg Diet, No Caffeine Diet       Discharge Medications   Current Discharge Medication List      START taking these medications    Details   tamsulosin (FLOMAX) 0.4 MG capsule Take 1 capsule (0.4 mg) by mouth At Bedtime for 14 days  Qty: 14 capsule, Refills: 0    Associated Diagnoses: Benign prostatic hyperplasia with incomplete bladder emptying         CONTINUE these medications which have NOT CHANGED    Details   METOPROLOL TARTRATE PO Take 25 mg by mouth 2 times daily      warfarin (COUMADIN) 2.5 MG tablet Take 2.5 mg by mouth daily      !! Multiple Vitamins-Minerals (ICAPS AREDS FORMULA PO) Take 1 tablet by mouth daily      CYANOCOBALAMIN PO Take 500 mcg by mouth daily      Ascorbic Acid (VITAMIN C PO) Take 500 mg by mouth daily      MAGNESIUM OXIDE PO Take 200 mg by mouth daily      POTASSIUM CITRATE PO Take 5 mEq by mouth daily OTC dose unknown      vitamin  B complex with vitamin C (VITAMIN  B COMPLEX) TABS Take 1 tablet by mouth daily      !! multivitamin, therapeutic with minerals (MULTI-VITAMIN) TABS Take 1 tablet by mouth daily      VITAMIN D, CHOLECALCIFEROL, PO Take 2,000 Units by mouth daily       !! - Potential duplicate medications found. Please discuss with provider.        Allergies   No Known Allergies  Data   Most Recent 3 CBC's:  Recent Labs   Lab Test  11/17/17   1420  10/05/17   0548  10/04/17   2203   10/04/17   1857   WBC  9.2  9.8   --   12.6*   HGB  10.1*  8.2*  8.8*  9.9*   MCV  95  93   --   93   PLT  76*  52*   --   59*      Most Recent 3 BMP's:  Recent Labs   Lab Test  11/17/17   1420  10/05/17   0548  10/04/17   1857   NA  137  140  139   POTASSIUM  4.4  4.0  4.1   CHLORIDE  105  108  107   CO2  27  24  25   BUN  19  17  20   CR  1.07  0.94  0.99   ANIONGAP  5  8  7   BARON  8.2*  7.7*  7.9*   GLC  85  96  141*     Most Recent 2 LFT's:  Recent Labs   Lab Test  11/17/17   1420  04/30/14   1620   AST  11  23   ALT  14  26   ALKPHOS  50  43   BILITOTAL  0.7  1.1     Most Recent INR's and Anticoagulation Dosing History:  Anticoagulation Dose History     Recent Dosing and Labs Latest Ref Rng & Units 5/4/2014 5/5/2014 5/6/2014 10/4/2017 10/5/2017 11/17/2017 11/18/2017    Warfarin 3 mg - 6 mg - - - - - -    Warfarin 5 mg - - - 5 mg - - - -    INR 0.86 - 1.14 1.90(H) 2.96(H) 2.39(H) 5.23(HH) 2.07(H) 1.56(H) 1.56(H)        Most Recent 3 Troponin's:  Recent Labs   Lab Test  05/04/14   2105  05/04/14   1655  05/04/14   1140   TROPI  <0.012  <0.012  <0.012     Most Recent Cholesterol Panel:  Recent Labs   Lab Test  04/18/12   0849   CHOL  127   LDL  55   HDL  56   TRIG  81     Most Recent 6 Bacteria Isolates From Any Culture (See EPIC Reports for Culture Details):  Recent Labs   Lab Test  11/17/17   2110   CULT  Culture negative < 24 hours, reincubate     Most Recent TSH, T4 and A1c Labs:No lab results found.  Results for orders placed or performed during the hospital encounter of 10/04/17   US Lower Extremity Venous Duplex Right    Narrative    US LOWER EXTREMITY VENOUS DUPLEX RIGHT   10/4/2017 7:42 PM     HISTORY: Right leg pain. Fall.    COMPARISON: None.    TECHNIQUE: Spectral doppler and waveform analysis were performed on  the right lower extremity veins.    FINDINGS: The right common femoral, femoral, and popliteal veins are  patent, compressible, and negative for deep venous thrombosis. Calf  veins are  segmentally seen but appear negative for DVT where  visualized. Heterogeneous fluid collection anteriorly and medially at  the level of the right mid calf has an appearance suggestive of a  hematoma, and measures 10.4 x 10.1 x 3.6 cm.      Impression    IMPRESSION:    1. No evidence for deep venous thrombosis in the right lower extremity  veins.   2. A hematoma overlying the right lower leg anteriorly and medially at  the level of the midcalf measures 10.4 cm in greatest dimension.    ANDREEA DIXON MD

## 2017-11-18 NOTE — PLAN OF CARE
Problem: Patient Care Overview  Goal: Plan of Care/Patient Progress Review  Outcome: No Change  Pt. A&O. SBA. IV Fluids infusing.  Tele Afib RVR, upon arrival 105-110, increased to 110-120s with an episode to 150 with activity. PA paged. PO metoprolol given with PRN IV metoprolol. Rate currently 105-125 at rest. Pt. C/o nocturia with urgency and incontinence. Unable to get UA sample from pt. Due to urgency/inc. Bladder scanned for retention of 470cc. V.O. From [place] to straight cath for UA/retention. Writer and another unit nurse both met resistance and unable to obtain urine, PA paged, urology consult placed for tomorrow. Pt. Ortho BP negative, denies any blurry vision or dizziness since arrival.  Wound vac in place.

## 2017-11-18 NOTE — PROVIDER NOTIFICATION
MD Notification    Notified Person:  MD    Notified Persons Name:Dmitry    Notification Date/Time: 11/18 7:30    Notification Interaction:  Paged     Purpose of Notification: pos UA     Orders Received:    Comments:

## 2017-11-18 NOTE — PLAN OF CARE
Problem: Patient Care Overview  Goal: Plan of Care/Patient Progress Review  PT: PT orders received, chart reviewed, screen complete. Pt admit 11/17 under observation status with nocturia and associated weakness and unsteadiness, extreme diuresis. Prior to admit pt living in home with wife receiving home PT multiple times per week as well as a home RN for wound vac cares. 2 stairs to get in, but no stairs within home. Does not typically use walker, but has one available when needed.    Discharge Planner PT   Patient plan for discharge: Return home with home PT and use of walker.  Current status: Pt amb 250 ft with FWW, CGA. Demonstrates some impulsivity. Cues for walker management when turning.  Barriers to return to prior living situation: None.  Recommendations for discharge: Return home with home PT and use of walker.  Rationale for recommendations: Pt reports he is near baseline. 2 stairs to enter, but no stairs within home. Receives home PT multiple times per week. Wife available to assist if needed. Pt is safe to return home at this time.       Entered by: Josey Lopez 11/18/2017 9:46 AM

## 2017-11-18 NOTE — PLAN OF CARE
[id number]  Pt voiding small amount of urine at times and also incontinent. Flomax started this evening, bladder scan volume at 2245 was 415ml. Urine specimen sent to lab, Cx results pending.

## 2017-11-18 NOTE — PLAN OF CARE
Problem: Patient Care Overview  Goal: Plan of Care/Patient Progress Review  Outcome: Adequate for Discharge Date Met: 11/18/17  Pt. A&O. Tele Afib CVR/RVR. SBA with walker. Unsteady but improves with walker. Seen by PT and pt. States he has been working with PT at home and been using his walker the past couple days. Wound vac intact. Ortho BP neg, denies any blurry vision or dizziness during admission. Pt. Has continued incontinence during day and NOC which he places towels or clothes to catch the urine, retaining >400 cc. No fever, denies any burning, urine yellow nonodorous. D/C Cipro. Continue Flomax and pt. States he will see Dr. Kelly on Monday. Pt. Afib was 105-118 at start of shift, improved to 85-98 post PO metoprolol. Ambulated x 2 during shift with rate of 110 and resolved to 90s quickly with rest. After consult with Urologist pt. HR spiked to 150, pt. Reported being anxious to go and nonsymptomatic. MD updated, continue discharge. Pt. Stated he will recheck BP and HR at home and take his home metoprolol and coumadin. Educated on follow up plan, when to seek medical attention, s/sx of afib.  Pt. Picking up flomax from primary pharmacy.

## 2017-11-18 NOTE — PLAN OF CARE
Problem: Patient Care Overview  Goal: Plan of Care/Patient Progress Review  Outcome: Improving  Pt A and O x4, VSS overnight. Pain free. Wound vac to right leg wound, dressing intact. Voids in small amounts. Urology to see today as previous nurse(s) were unable to pass catheter (they are aware). Ns infusing. Tele A-fib with CVR.

## 2017-11-20 ENCOUNTER — TELEPHONE (OUTPATIENT)
Dept: WOUND CARE | Facility: CLINIC | Age: 82
End: 2017-11-20

## 2017-11-20 LAB
BACTERIA SPEC CULT: ABNORMAL
Lab: ABNORMAL
SPECIMEN SOURCE: ABNORMAL

## 2017-11-20 NOTE — TELEPHONE ENCOUNTER
Lea RN with home health reporting marked change in periwound skin.  Boggy from 9-10 o'clock in area where there was a tunnel that is closed.  Concerned that tunnel is abscessed.  Patient scheduled to be seen at [facility] on 11/22 to evaluate boggy area and wound bed.

## 2017-11-22 ENCOUNTER — HOSPITAL ENCOUNTER (OUTPATIENT)
Dept: WOUND CARE | Facility: CLINIC | Age: 82
Discharge: HOME | End: 2017-11-22
Attending: SURGERY | Admitting: SURGERY
Payer: MEDICARE

## 2017-11-22 VITALS
SYSTOLIC BLOOD PRESSURE: 123 MMHG | RESPIRATION RATE: 16 BRPM | HEART RATE: 103 BPM | TEMPERATURE: 98.6 F | DIASTOLIC BLOOD PRESSURE: 81 MMHG

## 2017-11-22 DIAGNOSIS — S80.11XA LEG HEMATOMA, RIGHT, INITIAL ENCOUNTER: Primary | ICD-10-CM

## 2017-11-22 PROCEDURE — A6197 ALGINATE DRSG >16 <=48 SQ IN: HCPCS

## 2017-11-22 PROCEDURE — 10140 I&D HMTMA SEROMA/FLUID COLLJ: CPT | Performed by: SURGERY

## 2017-11-22 PROCEDURE — 10160 PNXR ASPIR ABSC HMTMA BULLA: CPT | Performed by: SURGERY

## 2017-11-22 PROCEDURE — 11042 DBRDMT SUBQ TIS 1ST 20SQCM/<: CPT | Performed by: SURGERY

## 2017-11-22 RX ORDER — HYDROCODONE BITARTRATE AND ACETAMINOPHEN 5; 325 MG/1; MG/1
1-2 TABLET ORAL EVERY 6 HOURS PRN
Qty: 30 TABLET | Refills: 0 | Status: SHIPPED | OUTPATIENT
Start: 2017-11-22 | End: 2017-12-07

## 2017-11-23 NOTE — PROGRESS NOTES
[facility]AL[facility]       DATE OF SERVICE:  2017.         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  He had been on warfarin and his INR went up to above 5.  At any rate, the hematoma eventually opened up and he was first seen in the [facility]ound Clinic on 10/26.  The hematoma was evacuated and the patient was started on negative pressure wound therapy.  The patient returns today for followup.      PHYSICAL EXAMINATION:  On exam today, the patient is afebrile with temperature 98.6, blood pressure 123/81, pulse 103 and respirations 16.  The right proximal medial leg wound measures 3.2 x 3.9 x 0.4 cm with undermining of up to 4 cm.  There is a new area where the epithelium is denuded anterior to the previous opening.  Underneath this, there appears to be some hematoma and I think that this area should be opened to facilitate dressing changes.      PROCEDURE NOTE:  After informed consent and 10 mL of 1% Xylocaine with epinephrine, a 3 cm incision was made from the open ulcer anteriorly.  I then used a large curette to debride the hematoma which was organizing in that space.  We also took the curet and went around the periphery as best we could, as far as 4 cm proximally.  There was some bleeding and that was controlled with pressure dressing.      PLAN:  I think at this time because of the difficulty getting a negative pressure wound therapy sponge into the entire undermined area that we are going to go with the [place].  This will be cut to size and changed 3 times a week.  The patient does have a home health care nurses now.  He will return to the [facility] in 1 week's time.         CLARISSA CUEVAS MD             D: 2017 14:45   T: 2017 19:51   MT: BRYCE      Name:     YINKA GONZALEZ   MRN:      [id number]        Account:      [id number]   :      1931           Visit Date:    11/22/2017      Document: [id number]

## 2017-11-29 ENCOUNTER — HOSPITAL ENCOUNTER (OUTPATIENT)
Dept: WOUND CARE | Facility: CLINIC | Age: 82
Discharge: HOME | End: 2017-11-29
Attending: SURGERY | Admitting: SURGERY
Payer: MEDICARE

## 2017-11-29 VITALS — DIASTOLIC BLOOD PRESSURE: 75 MMHG | TEMPERATURE: 97.2 F | SYSTOLIC BLOOD PRESSURE: 119 MMHG

## 2017-11-29 PROCEDURE — 99212 OFFICE O/P EST SF 10 MIN: CPT | Performed by: SURGERY

## 2017-11-29 PROCEDURE — 97602 WOUND(S) CARE NON-SELECTIVE: CPT

## 2017-11-29 PROCEDURE — 27211076 ZZH DRESSING FIBRACOL 4X4 3/8 INCH

## 2017-11-30 NOTE — PROGRESS NOTES
[facility]       DATE OF SERVICE:  2017.         Yinka Gonzalez is an 86-year-old gentleman who had a spontaneous hematoma in his right medial lower leg secondary to an elevated INR.  He had necrosis over this and the hematoma was unroofed and drained.  One week ago he had some additional hematoma in an undermined area that was evacuated and incision was made in this area to facilitate dressing changes.  The patient returns today for continued wound care.  The patient has no new complaints.      PHYSICAL EXAMINATION:  On exam  today, the patient is afebrile with temperature 97.2, blood pressure  75, pulse of 88.  The right medial lower leg wound today measures 5.4 x 7.4 x 1.2 cm.  There is undermining from 11 to 2 of 2 cm.  There is no sign of acute infection.  The base of the ulcer is granular.  No debridement today is necessary.      IMPRESSION:  An improving wound secondary to hematoma.      PLAN:  At this point, we are going to switch to a collagen dressing in the form of Fibracol and this will be changed 3 times a week.  He will wear SpandaGrip for compression.  He will return to the [facility] in 2 weeks' time.         CLARISSA CUEVAS MD             D: 2017 16:18   T: 2017 09:17   MT: BRYCE      Name:     YINKA GONZALEZ   MRN:      [id number]        Account:      [id number]   :      1931           Visit Date:   2017      Document: [id number]

## 2017-12-07 ENCOUNTER — HOSPITAL ENCOUNTER (OUTPATIENT)
Facility: CLINIC | Age: 82
Setting detail: OBSERVATION
Discharge: HOME | End: 2017-12-08
Attending: EMERGENCY MEDICINE | Admitting: INTERNAL MEDICINE
Payer: MEDICARE

## 2017-12-07 ENCOUNTER — APPOINTMENT (OUTPATIENT)
Dept: GENERAL RADIOLOGY | Facility: CLINIC | Age: 82
End: 2017-12-07
Attending: EMERGENCY MEDICINE
Payer: MEDICARE

## 2017-12-07 DIAGNOSIS — I42.9 CARDIOMYOPATHY (H): ICD-10-CM

## 2017-12-07 DIAGNOSIS — Z79.01 LONG TERM CURRENT USE OF ANTICOAGULANT THERAPY: ICD-10-CM

## 2017-12-07 DIAGNOSIS — I51.89 CARDIAC MASS: ICD-10-CM

## 2017-12-07 DIAGNOSIS — R42 LIGHTHEADEDNESS: ICD-10-CM

## 2017-12-07 DIAGNOSIS — I50.9 CONGESTIVE HEART FAILURE, UNSPECIFIED CONGESTIVE HEART FAILURE CHRONICITY, UNSPECIFIED CONGESTIVE HEART FAILURE TYPE: ICD-10-CM

## 2017-12-07 DIAGNOSIS — I42.9 CARDIOMYOPATHY, UNSPECIFIED TYPE (H): Primary | ICD-10-CM

## 2017-12-07 DIAGNOSIS — I48.91 ATRIAL FIBRILLATION WITH RAPID VENTRICULAR RESPONSE (H): ICD-10-CM

## 2017-12-07 DIAGNOSIS — R53.81 PHYSICAL DECONDITIONING: ICD-10-CM

## 2017-12-07 PROBLEM — R53.1 GENERALIZED WEAKNESS: Status: ACTIVE | Noted: 2017-12-07

## 2017-12-07 LAB
ALBUMIN UR-MCNC: NEGATIVE MG/DL
ANION GAP SERPL CALCULATED.3IONS-SCNC: 7 MMOL/L (ref 3–14)
APPEARANCE UR: CLEAR
BACTERIA #/AREA URNS HPF: ABNORMAL /HPF
BASOPHILS # BLD AUTO: 0 10E9/L (ref 0–0.2)
BASOPHILS NFR BLD AUTO: 0.1 %
BILIRUB UR QL STRIP: NEGATIVE
BUN SERPL-MCNC: 22 MG/DL (ref 7–30)
CALCIUM SERPL-MCNC: 8.4 MG/DL (ref 8.5–10.1)
CHLORIDE SERPL-SCNC: 106 MMOL/L (ref 94–109)
CO2 SERPL-SCNC: 25 MMOL/L (ref 20–32)
COLOR UR AUTO: ABNORMAL
CREAT SERPL-MCNC: 1.1 MG/DL (ref 0.66–1.25)
D DIMER PPP FEU-MCNC: <0.3 UG/ML FEU (ref 0–0.5)
DIFFERENTIAL METHOD BLD: ABNORMAL
EOSINOPHIL # BLD AUTO: 0 10E9/L (ref 0–0.7)
EOSINOPHIL NFR BLD AUTO: 0 %
ERYTHROCYTE [DISTWIDTH] IN BLOOD BY AUTOMATED COUNT: 19.2 % (ref 10–15)
GFR SERPL CREATININE-BSD FRML MDRD: 63 ML/MIN/1.7M2
GLUCOSE SERPL-MCNC: 72 MG/DL (ref 70–99)
GLUCOSE UR STRIP-MCNC: NEGATIVE MG/DL
HCT VFR BLD AUTO: 33.1 % (ref 40–53)
HGB BLD-MCNC: 10.6 G/DL (ref 13.3–17.7)
HGB UR QL STRIP: NEGATIVE
IMM GRANULOCYTES # BLD: 0 10E9/L (ref 0–0.4)
IMM GRANULOCYTES NFR BLD: 0.1 %
INR PPP: 1.78 (ref 0.86–1.14)
INTERPRETATION ECG - MUSE: NORMAL
KETONES UR STRIP-MCNC: NEGATIVE MG/DL
LEUKOCYTE ESTERASE UR QL STRIP: NEGATIVE
LYMPHOCYTES # BLD AUTO: 1 10E9/L (ref 0.8–5.3)
LYMPHOCYTES NFR BLD AUTO: 13.6 %
MCH RBC QN AUTO: 30.8 PG (ref 26.5–33)
MCHC RBC AUTO-ENTMCNC: 32 G/DL (ref 31.5–36.5)
MCV RBC AUTO: 96 FL (ref 78–100)
MONOCYTES # BLD AUTO: 0.7 10E9/L (ref 0–1.3)
MONOCYTES NFR BLD AUTO: 8.6 %
MUCOUS THREADS #/AREA URNS LPF: PRESENT /LPF
NEUTROPHILS # BLD AUTO: 5.9 10E9/L (ref 1.6–8.3)
NEUTROPHILS NFR BLD AUTO: 77.6 %
NITRATE UR QL: NEGATIVE
NRBC # BLD AUTO: 0 10*3/UL
NRBC BLD AUTO-RTO: 0 /100
NT-PROBNP SERPL-MCNC: 4737 PG/ML (ref 0–1800)
PH UR STRIP: 6 PH (ref 5–7)
PLATELET # BLD AUTO: 52 10E9/L (ref 150–450)
PLATELET # BLD EST: ABNORMAL 10*3/UL
POTASSIUM SERPL-SCNC: 4.3 MMOL/L (ref 3.4–5.3)
RBC # BLD AUTO: 3.44 10E12/L (ref 4.4–5.9)
RBC #/AREA URNS AUTO: 1 /HPF (ref 0–2)
SODIUM SERPL-SCNC: 138 MMOL/L (ref 133–144)
SOURCE: ABNORMAL
SP GR UR STRIP: 1 (ref 1–1.03)
TROPONIN I SERPL-MCNC: <0.015 UG/L (ref 0–0.04)
UROBILINOGEN UR STRIP-MCNC: NORMAL MG/DL (ref 0–2)
WBC # BLD AUTO: 7.6 10E9/L (ref 4–11)
WBC #/AREA URNS AUTO: 1 /HPF (ref 0–2)

## 2017-12-07 PROCEDURE — 96375 TX/PRO/DX INJ NEW DRUG ADDON: CPT

## 2017-12-07 PROCEDURE — 81001 URINALYSIS AUTO W/SCOPE: CPT | Performed by: INTERNAL MEDICINE

## 2017-12-07 PROCEDURE — 84484 ASSAY OF TROPONIN QUANT: CPT | Performed by: EMERGENCY MEDICINE

## 2017-12-07 PROCEDURE — 25000128 H RX IP 250 OP 636: Performed by: INTERNAL MEDICINE

## 2017-12-07 PROCEDURE — 96374 THER/PROPH/DIAG INJ IV PUSH: CPT

## 2017-12-07 PROCEDURE — 85025 COMPLETE CBC W/AUTO DIFF WBC: CPT | Performed by: EMERGENCY MEDICINE

## 2017-12-07 PROCEDURE — 99285 EMERGENCY DEPT VISIT HI MDM: CPT | Mod: 25

## 2017-12-07 PROCEDURE — 25000128 H RX IP 250 OP 636: Performed by: EMERGENCY MEDICINE

## 2017-12-07 PROCEDURE — 85610 PROTHROMBIN TIME: CPT | Performed by: EMERGENCY MEDICINE

## 2017-12-07 PROCEDURE — 83880 ASSAY OF NATRIURETIC PEPTIDE: CPT | Performed by: EMERGENCY MEDICINE

## 2017-12-07 PROCEDURE — A9270 NON-COVERED ITEM OR SERVICE: HCPCS | Mod: GY | Performed by: INTERNAL MEDICINE

## 2017-12-07 PROCEDURE — 96361 HYDRATE IV INFUSION ADD-ON: CPT

## 2017-12-07 PROCEDURE — 99220 ZZC INITIAL OBSERVATION CARE,LEVL III: CPT | Performed by: INTERNAL MEDICINE

## 2017-12-07 PROCEDURE — 85379 FIBRIN DEGRADATION QUANT: CPT | Performed by: EMERGENCY MEDICINE

## 2017-12-07 PROCEDURE — 25000125 ZZHC RX 250: Performed by: EMERGENCY MEDICINE

## 2017-12-07 PROCEDURE — 71020 XR CHEST 2 VW: CPT

## 2017-12-07 PROCEDURE — 80048 BASIC METABOLIC PNL TOTAL CA: CPT | Performed by: EMERGENCY MEDICINE

## 2017-12-07 PROCEDURE — 93005 ELECTROCARDIOGRAM TRACING: CPT

## 2017-12-07 PROCEDURE — 25000132 ZZH RX MED GY IP 250 OP 250 PS 637: Mod: GY | Performed by: INTERNAL MEDICINE

## 2017-12-07 PROCEDURE — G0378 HOSPITAL OBSERVATION PER HR: HCPCS

## 2017-12-07 RX ORDER — METOPROLOL SUCCINATE 50 MG/1
50 TABLET, EXTENDED RELEASE ORAL EVERY EVENING
Status: DISCONTINUED | OUTPATIENT
Start: 2017-12-07 | End: 2017-12-08

## 2017-12-07 RX ORDER — PROCHLORPERAZINE MALEATE 5 MG
5 TABLET ORAL EVERY 6 HOURS PRN
Status: DISCONTINUED | OUTPATIENT
Start: 2017-12-07 | End: 2017-12-08 | Stop reason: HOSPADM

## 2017-12-07 RX ORDER — AMOXICILLIN 250 MG
1 CAPSULE ORAL 2 TIMES DAILY PRN
Status: DISCONTINUED | OUTPATIENT
Start: 2017-12-07 | End: 2017-12-08 | Stop reason: HOSPADM

## 2017-12-07 RX ORDER — AMOXICILLIN 250 MG
2 CAPSULE ORAL 2 TIMES DAILY PRN
Status: DISCONTINUED | OUTPATIENT
Start: 2017-12-07 | End: 2017-12-08 | Stop reason: HOSPADM

## 2017-12-07 RX ORDER — ACETAMINOPHEN 650 MG/1
650 SUPPOSITORY RECTAL EVERY 4 HOURS PRN
Status: DISCONTINUED | OUTPATIENT
Start: 2017-12-07 | End: 2017-12-08 | Stop reason: HOSPADM

## 2017-12-07 RX ORDER — WARFARIN SODIUM 2.5 MG/1
2.5 TABLET ORAL
Status: COMPLETED | OUTPATIENT
Start: 2017-12-07 | End: 2017-12-07

## 2017-12-07 RX ORDER — ONDANSETRON 4 MG/1
4 TABLET, ORALLY DISINTEGRATING ORAL EVERY 6 HOURS PRN
Status: DISCONTINUED | OUTPATIENT
Start: 2017-12-07 | End: 2017-12-08 | Stop reason: HOSPADM

## 2017-12-07 RX ORDER — ACETAMINOPHEN 325 MG/1
650 TABLET ORAL EVERY 4 HOURS PRN
Status: DISCONTINUED | OUTPATIENT
Start: 2017-12-07 | End: 2017-12-08 | Stop reason: HOSPADM

## 2017-12-07 RX ORDER — METOPROLOL TARTRATE 1 MG/ML
5 INJECTION, SOLUTION INTRAVENOUS ONCE
Status: DISCONTINUED | OUTPATIENT
Start: 2017-12-07 | End: 2017-12-07

## 2017-12-07 RX ORDER — METOPROLOL TARTRATE 1 MG/ML
2.5 INJECTION, SOLUTION INTRAVENOUS EVERY 4 HOURS PRN
Status: DISCONTINUED | OUTPATIENT
Start: 2017-12-07 | End: 2017-12-08 | Stop reason: HOSPADM

## 2017-12-07 RX ORDER — ONDANSETRON 2 MG/ML
4 INJECTION INTRAMUSCULAR; INTRAVENOUS EVERY 6 HOURS PRN
Status: DISCONTINUED | OUTPATIENT
Start: 2017-12-07 | End: 2017-12-08 | Stop reason: HOSPADM

## 2017-12-07 RX ORDER — PROCHLORPERAZINE 25 MG
12.5 SUPPOSITORY, RECTAL RECTAL EVERY 12 HOURS PRN
Status: DISCONTINUED | OUTPATIENT
Start: 2017-12-07 | End: 2017-12-08 | Stop reason: HOSPADM

## 2017-12-07 RX ORDER — LIDOCAINE 40 MG/G
CREAM TOPICAL
Status: DISCONTINUED | OUTPATIENT
Start: 2017-12-07 | End: 2017-12-08 | Stop reason: HOSPADM

## 2017-12-07 RX ORDER — NALOXONE HYDROCHLORIDE 0.4 MG/ML
.1-.4 INJECTION, SOLUTION INTRAMUSCULAR; INTRAVENOUS; SUBCUTANEOUS
Status: DISCONTINUED | OUTPATIENT
Start: 2017-12-07 | End: 2017-12-08 | Stop reason: HOSPADM

## 2017-12-07 RX ORDER — POLYETHYLENE GLYCOL 3350 17 G/17G
17 POWDER, FOR SOLUTION ORAL DAILY PRN
Status: DISCONTINUED | OUTPATIENT
Start: 2017-12-07 | End: 2017-12-08 | Stop reason: HOSPADM

## 2017-12-07 RX ORDER — METOPROLOL TARTRATE 1 MG/ML
5 INJECTION, SOLUTION INTRAVENOUS ONCE
Status: COMPLETED | OUTPATIENT
Start: 2017-12-07 | End: 2017-12-07

## 2017-12-07 RX ORDER — SODIUM CHLORIDE 9 MG/ML
INJECTION, SOLUTION INTRAVENOUS ONCE
Status: COMPLETED | OUTPATIENT
Start: 2017-12-07 | End: 2017-12-07

## 2017-12-07 RX ORDER — METOPROLOL SUCCINATE 25 MG/1
25 TABLET, EXTENDED RELEASE ORAL DAILY
Status: DISCONTINUED | OUTPATIENT
Start: 2017-12-07 | End: 2017-12-08

## 2017-12-07 RX ORDER — FUROSEMIDE 10 MG/ML
20 INJECTION INTRAMUSCULAR; INTRAVENOUS ONCE
Status: COMPLETED | OUTPATIENT
Start: 2017-12-07 | End: 2017-12-07

## 2017-12-07 RX ADMIN — WARFARIN SODIUM 2.5 MG: 2.5 TABLET ORAL at 17:12

## 2017-12-07 RX ADMIN — FUROSEMIDE 20 MG: 10 INJECTION, SOLUTION INTRAVENOUS at 17:12

## 2017-12-07 RX ADMIN — METOPROLOL TARTRATE 5 MG: 5 INJECTION INTRAVENOUS at 12:07

## 2017-12-07 RX ADMIN — METOPROLOL SUCCINATE 50 MG: 50 TABLET, EXTENDED RELEASE ORAL at 20:30

## 2017-12-07 RX ADMIN — METOPROLOL SUCCINATE 25 MG: 25 TABLET, EXTENDED RELEASE ORAL at 17:12

## 2017-12-07 RX ADMIN — SODIUM CHLORIDE: 9 INJECTION, SOLUTION INTRAVENOUS at 12:08

## 2017-12-07 ASSESSMENT — ENCOUNTER SYMPTOMS
NAUSEA: 0
WEAKNESS: 1
VOMITING: 0
WOUND: 1
COUGH: 0
FEVER: 0
SHORTNESS OF BREATH: 1

## 2017-12-07 NOTE — PHARMACY-ADMISSION MEDICATION HISTORY
Admission Medication History    Admission medication history interview status for the 12/7/2017 admission is complete. See EPIC admission navigator for prior to admission medications     Medication history source reliability:Good    Actions taken by pharmacist (provider contacted, etc):None     Additional medication history information not noted on PTA med list :None    Medication reconciliation/reorder completed by provider prior to medication history? No    Time spent in this activity: 15 minutes    Prior to Admission medications    Medication Sig Last Dose Taking? Auth Provider   warfarin (COUMADIN) 2.5 MG tablet Take 2.5-5 mg by mouth -  For A-fib  INR goal 2-3  5 mg on Monday, Wednesday and Friday  2.5 mg on all other days 12/7/2017 at AM Yes Reported, Patient   Multiple Vitamins-Minerals (ICAPS AREDS FORMULA PO) Take 1 tablet by mouth daily 12/6/2017 at Unknown time Yes Unknown, Entered By History   CYANOCOBALAMIN PO Take 500 mcg by mouth daily 12/6/2017 at Unknown time Yes Unknown, Entered By History   Ascorbic Acid (VITAMIN C PO) Take 500 mg by mouth daily 12/6/2017 at Unknown time Yes Unknown, Entered By History   POTASSIUM CITRATE PO Take 5 mEq by mouth daily OTC dose unknown 12/6/2017 at Unknown time Yes Unknown, Entered By History   vitamin  B complex with vitamin C (VITAMIN  B COMPLEX) TABS Take 1 tablet by mouth daily 12/6/2017 at Unknown time Yes Unknown, Entered By History   multivitamin, therapeutic with minerals (MULTI-VITAMIN) TABS Take 1 tablet by mouth daily 12/6/2017 at Unknown time Yes Unknown, Entered By History   VITAMIN D, CHOLECALCIFEROL, PO Take 2,000 Units by mouth daily 12/6/2017 at Unknown time Yes Unknown, Entered By History       Raymundo Frost, PharmD

## 2017-12-07 NOTE — IP AVS SNAPSHOT
MRN:[id number]                      After Visit Summary   12/7/2017    Jama Paul    MRN: [id number]           Thank you!     Thank you for choosing [facility] for your care. Our goal is always to provide you with excellent care. Hearing back from our patients is one way we can continue to improve our services. Please take a few minutes to complete the written survey that you may receive in the mail after you visit with us. Thank you!        Patient Information     Date Of Birth          2/13/1931        About your hospital stay     You were admitted on:  December 7, 2017 You last received care in the:  [facility]    You were discharged on:  December 8, 2017        Reason for your hospital stay       Observation hospitalization for atrial fibrillation with rapid ventricular response and for evaluation of cardiomyopathy.                  Who to Call     For medical emergencies, please call 911.  For non-urgent questions about your medical care, please call your primary care provider or clinic, [phone]          Attending Provider     Provider Specialty    Swati Bowman MD Emergency Medicine    [facility], Rick Briceno MD Internal Medicine       Primary Care Provider Office Phone # Fax #    Clint Pizarro [phone]      After Care Instructions     Activity       Your activity upon discharge: activity as tolerated            Diet       Follow this diet upon discharge:     Cardiac diet. Low Saturated Fat.  Sodium <2400mg per day.                  Follow-up Appointments     Follow-up and recommended labs and tests        Follow up with primary care provider, Clint Pizarro, within 7 days for hospital follow- up.  Follow up with Cardiology on 12/11 Monday at [facility] for a cardiac MRI.  Dr. Dillard has arranged for this.                  Your next 10 appointments already scheduled     Dec 11, 2017  7:30 AM CST   MR CARDIAC W CONTRAST STRESS AND  FLOW with SCIMR1   [facility] ([facility])    [address]  [address]  [place] [address] [phone]           Take your medicines as usual, unless your doctor tells you not to.   If you take Viagra, Levitra or Cialis, stop taking it 48 hours before your test.   If you take Aggrenox or dipyridamole (Persantine, Permole), stop taking it 48 hours before your test.   If you take Theophylline or Aminophylline, stop taking it 12 hours before your test.   If you are diabetic and take oral hypoglycemics, do not take them on the day of your test.  The day before your exam, drink extra fluids at least six 8-ounce glasses (unless your doctor wants you to limit your fluids).  Stop all caffeine 12 hours before the test. This includes coffee, tea, soda, chocolate and certain medicines (such as Anacin, Excedrin and NoDoz). Also avoid decaf coffee and tea, as these contain small amounts of caffeine.  Do not eat or drink for 3 hours before your exam. You may drink water and take your morning medicines.  You may need a blood test (creatinine test) within 30 days of your exam. Follow your doctor s orders if this is arranged before your exam.  If you are very claustrophobic, please let you doctor know. You may get a sedative medicine from your doctor before you arrive. Bring the medicine to the exam. Do not take it at home. Arrive one hour early. Bring someone who can take you home after the test. Your medicine will make you sleepy. After the exam, you may not drive, take a bus or take a taxi by yourself.  The MRI machine uses a strong magnet. Please wear clothes without metal (snaps, zippers). A sweatsuit works well, or we may give you a hospital gown.  Please remove any body piercings and hair extensions before you arrive. You will remove watches, jewelry, hairpins, wallets, dentures, partial dental plates and hearing aids. You may wear contact lenses, and  you may be able to wear your rings. We have a safe place to keep your personal items, but it is safer to leave them at home.   **IMPORTANT** THE INSTRUCTIONS BELOW ARE ONLY FOR THOSE PATIENTS WHO HAVE BEEN TOLD THEY WILL RECEIVE SEDATION OR GENERAL ANESTHESIA DURING THEIR MRI PROCEDURE:  IF YOU WILL RECEIVE SEDATION (take medicine to help you relax during your exam):   You must get the medicine from your doctor before you arrive. Bring the medicine to the exam. Do not take it at home.   Arrive one hour early. Bring someone who can take you home after the test. Your medicine will make you sleepy. After the exam, you may not drive, take a bus or take a taxi by yourself.   No eating 8 hours before your exam. You may have clear liquids up until 4 hours before your exam. (Clear liquids include water, clear tea, black coffee and fruit juice without pulp.)  IF YOU WILL RECEIVE ANESTHESIA (be asleep for your exam):   Arrive 1 1/2 hours early. Bring someone who can take you home after the test. You may not drive, take a bus or take a taxi by yourself.   No eating 8 hours before your exam. You may have clear liquids up until 4 hours before your exam. (Clear liquids include water, clear tea, black coffee and fruit juice without pulp.)  If you have any questions, please contact your Imaging Department exam site.            Dec 12, 2017  3:30 PM CST   Return Visit with Anila Nair PA-C   [facility] ([facility])    [address]  [place] [address] [phone]            Dec 13, 2017  3:15 PM CST   Return Visit with Dorian Rodriguez MD   [facility] ([facility])    [address]  [address]  [place] [address] [phone]              Additional Services     Physical Therapy Referral       *This therapy referral will be filtered to a centralized scheduling office at [facility]  "Services and the patient will receive a call to schedule an appointment at a [facility] location most convenient for them. *     [facility] provides Physical Therapy evaluation and treatment and many specialty services across the [place] system.  If requesting a specialty program, please choose from the list below.    If you have not heard from the scheduling office within 2 business days, please call [phone] for all locations, with the exception of [place], please call [phone].  Treatment: Evaluation & Treatment    Please be aware that coverage of these services is subject to the terms and limitations of your health insurance plan.  Call member services at your health plan with any benefit or coverage questions.      **Note to Provider:  If you are referring outside of [place] for the therapy appointment, please list the name of the location in the \"special instructions\" above, print the referral and give to the patient to schedule the appointment.                  Future tests that were ordered for you     MRI Cardiac w/contrast & stress & flow                 Pending Results     No orders found for last 3 day(s).            Statement of Approval     Ordered          12/08/17 2032  I have reviewed and agree with all the recommendations and orders detailed in this document.  EFFECTIVE NOW     Approved and electronically signed by:  Bharat Cortés PA             Admission Information     Date & Time Provider Department Dept. Phone    12/7/2017 Rick Ambrosio MD [facility] [phone]      Your Vitals Were     Blood Pressure Pulse Temperature Respirations Height Weight    149/95 (BP Location: Left arm) 116 96  F (35.6  C) (Oral) 18 1.829 m (6') 73.3 kg (161 lb 11.2 oz)    Pulse Oximetry BMI (Body Mass Index)                98% 21.93 kg/m2          MyChart Information     [employer] lets you send messages to your doctor, view your test results, renew your " "prescriptions, schedule appointments and more. To sign up, go to [email or web address] . Click on \"Log in\" on the left side of the screen, which will take you to the Welcome page. Then click on \"Sign up Now\" on the right side of the page.     You will be asked to enter the access code listed below, as well as some personal information. Please follow the directions to create your username and password.     Your access code is: [email or web address]  Expires: 1/3/2018  9:31 AM     Your access code will  in 90 days. If you need help or a new code, please call your [facility] clinic or [phone].        Care EveryWhere ID     This is your Care EveryWhere ID. This could be used by other organizations to access your [facility] medical records  [id number]        Equal Access to Services     CECILE [facility] Center[facility]AR : Caridad Carrillo, autumn isbell, danielle kapoor, emani mayers . So [facility] [phone].    ATENCIÓN: Si habla español, tiene a cleaning disposición servicios gratuitos de asistencia lingüística. Jaycee al [phone].    We comply with applicable federal civil rights laws and [place] laws. We do not discriminate on the basis of race, color, national origin, age, disability, sex, sexual orientation, or gender identity.               Review of your medicines      START taking        Dose / Directions    lisinopril 5 MG tablet   Commonly known as:  PRINIVIL/ZESTRIL   Used for:  Cardiomyopathy, unspecified type (H)        Dose:  5 mg   Take 1 tablet (5 mg) by mouth daily   Quantity:  30 tablet   Refills:  1       metoprolol 50 MG 24 hr tablet   Commonly known as:  TOPROL-XL   Used for:  Atrial fibrillation with rapid ventricular response (H)        Dose:  50 mg   Take 1 tablet (50 mg) by mouth 2 times daily   Quantity:  60 tablet   Refills:  1         CONTINUE these medicines which have NOT CHANGED        Dose / Directions    CYANOCOBALAMIN PO   Notes to Patient:  Resume taking " as you do at home          Dose:  500 mcg   Take 500 mcg by mouth daily   Refills:  0       * Multi-vitamin Tabs tablet   Notes to Patient:  Resume taking as you do at home            Dose:  1 tablet   Take 1 tablet by mouth daily   Refills:  0       * ICAPS AREDS FORMULA PO   Notes to Patient:  Resume taking as you do at home            Dose:  1 tablet   Take 1 tablet by mouth daily   Refills:  0       POTASSIUM CITRATE PO   Notes to Patient:  Resume taking as you do at home            Dose:  5 mEq   Take 5 mEq by mouth daily OTC dose unknown   Refills:  0       vitamin B complex with vitamin C Tabs tablet   Notes to Patient:  Resume taking as you do at home            Dose:  1 tablet   Take 1 tablet by mouth daily   Refills:  0       VITAMIN C PO   Notes to Patient:  Resume taking as you do at home            Dose:  500 mg   Take 500 mg by mouth daily   Refills:  0       VITAMIN D (CHOLECALCIFEROL) PO        Dose:  2000 Units   Take 2,000 Units by mouth daily   Refills:  0       warfarin 2.5 MG tablet   Commonly known as:  COUMADIN   Notes to Patient:  Resume taking as you do at home            Dose:  2.5-5 mg   Take 2.5-5 mg by mouth - For A-fib INR goal 2-3 5 mg on Monday, Wednesday and Friday 2.5 mg on all other days   Refills:  0       * Notice:  This list has 2 medication(s) that are the same as other medications prescribed for you. Read the directions carefully, and ask your doctor or other care provider to review them with you.         Where to get your medicines      These medications were sent to [facility] - [address]  [address] [place] [address], [place] [address]     Phone:  [phone]     lisinopril 5 MG tablet    metoprolol 50 MG 24 hr tablet                Protect others around you: Learn how to safely use, store and throw away your medicines at www.disposemymeds.org.             Medication List: This is a list of all your medications and when to take them. Check  marks below indicate your daily home schedule. Keep this list as a reference.      Medications           Morning Afternoon Evening Bedtime As Needed    CYANOCOBALAMIN PO   Take 500 mcg by mouth daily   Notes to Patient:  Resume taking as you do at home                                  lisinopril 5 MG tablet   Commonly known as:  PRINIVIL/ZESTRIL   Take 1 tablet (5 mg) by mouth daily   Last time this was given:  5 mg on 12/8/2017  4:39 PM                    Tomorrow 12/9               metoprolol 50 MG 24 hr tablet   Commonly known as:  TOPROL-XL   Take 1 tablet (50 mg) by mouth 2 times daily   Last time this was given:  25 mg on 12/8/2017 10:02 AM                    Today 6/8               * Multi-vitamin Tabs tablet   Take 1 tablet by mouth daily   Notes to Patient:  Resume taking as you do at home                                    * ICAPS AREDS FORMULA PO   Take 1 tablet by mouth daily   Notes to Patient:  Resume taking as you do at home                                    POTASSIUM CITRATE PO   Take 5 mEq by mouth daily OTC dose unknown   Notes to Patient:  Resume taking as you do at home                                    vitamin B complex with vitamin C Tabs tablet   Take 1 tablet by mouth daily   Notes to Patient:  Resume taking as you do at home                                    VITAMIN C PO   Take 500 mg by mouth daily   Notes to Patient:  Resume taking as you do at home                                    VITAMIN D (CHOLECALCIFEROL) PO   Take 2,000 Units by mouth daily                                warfarin 2.5 MG tablet   Commonly known as:  COUMADIN   Take 2.5-5 mg by mouth - For A-fib INR goal 2-3 5 mg on Monday, Wednesday and Friday 2.5 mg on all other days   Last time this was given:  2.5 mg on 12/7/2017  5:12 PM   Notes to Patient:  Resume taking as you do at home                                    * Notice:  This list has 2 medication(s) that are the same as other medications prescribed for you. Read  the directions carefully, and ask your doctor or other care provider to review them with you.

## 2017-12-07 NOTE — ED NOTES
[facility]  ED Nurse Handoff Report    ED Chief complaint: Generalized Weakness (and shortness of breath for the past week. Had near syncopal episode today.)      ED Diagnosis:   Final diagnoses:   Congestive heart failure, unspecified congestive heart failure chronicity, unspecified congestive heart failure type (H)   Atrial fibrillation with rapid ventricular response (H)   Long term current use of anticoagulant therapy   Lightheadedness       Code Status: Full Code    Allergies: No Known Allergies    Activity level - Baseline/Home:  Independent    Activity Level - Current:   Independent     Needed?: No    Isolation: No  Infection: Not Applicable    Bariatric?: No    Vital Signs:   Vitals:    12/07/17 1430 12/07/17 1445 12/07/17 1500 12/07/17 1515   BP: 132/86 (!) 134/114 126/83 122/86   Pulse:       Resp: 17 12   Temp:       TempSrc:       SpO2: 97% 92%  95%   Weight:       Height:           Cardiac Rhythm: ,   Cardiac  Cardiac Rhythm: Atrial fibrillation    Pain level: 0-10 Pain Scale: 0    Is this patient confused?: No    Patient Report: Initial Complaint: A/O x4.  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  Chronic afib and on coumadin.  Today presented with rapid afib.  5mg metoprolol given IV: heart rate 81 currently. 2nd dose available if heart rate rises above 100.  VS stable.  INR: 1.78; Hgb: 10.6; BNP 4737.  Possible plan for ECHO.  Will observe tonight.  Focused Assessment: see above  Tests Performed: blood  Abnormal Results: see above  Treatments provided: see above    Family Comments: none    OBS brochure/video discussed/provided to patient: Yes    ED Medications:   Medications   metoprolol (LOPRESSOR) injection 5 mg (not administered)   metoprolol (LOPRESSOR) injection 5 mg (5 mg Intravenous Given 12/7/17 1207)   0.9% sodium chloride infusion ( Intravenous New Bag 12/7/17 1208)       Drips infusing?:  No      ED NURSE PHONE NUMBER: [phone]

## 2017-12-07 NOTE — PLAN OF CARE
Problem: Patient Care Overview  Goal: Plan of Care/Patient Progress Review  Outpatient/Observation goals to be met before discharge home:    -diagnostic tests and consults completed and resulted : not met  -vital signs normal or at patient baseline : not met.HR tachy  - adequate control of HR : not met. In 120  Nurse to notify provider when observation goals have been met and patient is ready for discharge.

## 2017-12-07 NOTE — PHARMACY-ANTICOAGULATION SERVICE
Clinical Pharmacy - Warfarin Dosing Consult     Pharmacy has been consulted to manage this patient s warfarin therapy.  Indication: Atrial Fibrillation  Therapy Goal: INR 2-3  Warfarin Prior to Admission: Yes  Warfarin PTA Regimen: 5mg MWF, 2.5mg ROW,   had 2.5mg 12/7 am     INR   Date Value Ref [place] Status   12/07/2017 1.78 (H) 0.86 - 1.14 Final   11/18/2017 1.56 (H) 0.86 - 1.14 Final       Recommend warfarin 2.5 mg today in addition to 2.5mg taken at home this am.  Pharmacy will monitor Jama Paul daily and order warfarin doses to achieve specified goal.      Please contact pharmacy as soon as possible if the warfarin needs to be held for a procedure or if the warfarin goals change.

## 2017-12-07 NOTE — H&P
[facility]    History and Physical  Hospitalist       Date of Admission:  12/7/2017    Assessment & Plan   Jama Paul is a 86 year old male with past medical hx of chronic atrial fibrillation on warfarin and HTN who presents with c/o generalized weakness and exertional shortness of breath over the last 1 week. He is noted to be in A. Fib with RVR.    Atrial fibrillation with RVR  Has chronic a. Fib.  HR was 130 on EKG obtained in the ED. HR improved after 1x IV metoprolol to the 90s and intermittently going to low 100s at rest.  He takes Metoprolol Tartrate 25mg BID or just Metoprolol XL 50mg qhs depending on his BP. Given HR improved, will adjust PO meds for better control.   -- schedule Metoprolol XL 50qhs and 25mg qAM   -- will obtain echo to asses LV function  -- if HR remains persistently elevated, consider cards EP consultation  -- metoprolol 5mg IV q4hrs prn for HR >120  -- continue with Warfarin per pharmD dosing, INR 1.78  -- telemetry      Dyspnea on exertion:   Suspect this could be due to A.fib with RVR and some CHF component.  N-terminal BNP is 4000s.  CXR shows some blunting of costophrenic angle.  Less likely PE, D-dimer negative. Has LLE pitting edema, and per patient, chronic and stable.    -- will give Lasix 20mg IV x 1.   -- monitor urine output  -- echo as above    Generalized weakness.   Suspect this may be related to poorly controlled HR. CXR not suggestive of infection. No leukocytosis.   -- treatment of A. Fib as above  -- will check UA to ensure no underlying UTI contributing  -- PT evaluation ordered    Incidental finding of mass like opacity projecting off the lower right heart margin:  -- per radiology report, this is low attenuation, suggesting a fat component, but is indeterminate  -- follow up CT is recommended, discussed with patient and recommend outpatient CT imaging follow up    Right LE wound due to spontaneous hematoma in setting of supra therapeutic INR;    -- stable. Follows at [facility].  Gets dressing changes every other day    Chronic Thrombocytopenia:  -- 52 today and appears around his baseline. No signs of active bleeding  -- monitor      DVT Prophylaxis: Warfarin  Code Status: Full Code    Disposition: Expected discharge in 24-48 hours    [facility]    Primary Care Physician   Clint Pizarro    Chief Complaint   Generalized weakness  Shortness of breath    History is obtained from the patient    History of Present Illness   Jama Paul is a 86 year old male with past medical hx of chronic atrial fibrillation on warfarin and HTN who presents with c/o generalized weakness and exertional shortness of breath over the last 1 week.  He reports today he was so weak and could even get up to do anything which prompted him to present to the ED. He denies chest pain, palpitation, lightheadedness,, orthopnea, PND, fever, chills, diarrhea, abdominal pain, urinary symptoms, focal weakness or numbness.  He reports he checks his BP and radial pulse twice a day and has been normal.  He also has RLE wound that gets dressing changes every other day and per patient nurse check vitals at that time and his vitals were stable.      In the ED, patient is evaluated by Dr. Bowman. He was noted to be in A.fib with RVR with HR in the 130s.  BP was stable. Trop negative. D-dimer is negative. N-terminal pro-BNP is 4737. CXR showed mild left costophrenic angle non-specific blunting, pleural fluid vs thickening.  In addition, there is a new focal rounded approximately 4 cm masslike opacity projecting off the lower right heart margin on the frontal view. It is fairly lowattenuation, suggesting a fat component, but is indeterminate. CT scan recommended.    Patient was given Metoprolol IV x 1 with HR improvement in the 90s and some fluids. Admission for observation requested.        Past Medical History    I have reviewed this patient's medical history and  updated it with pertinent information if needed.   Past Medical History:   Diagnosis Date     Atrial fibrillation on chronic anticoagulation      Essential hypertension    Vitamin B12 deficiency.   Vitamin D deficiency.   Spontaneous right lower extremity hematoma formation in the setting of supratherapeutic INR in 10/2017  Chronic thrombocytopenia    Past Surgical History   I have reviewed this patient's surgical history and updated it with pertinent information if needed.  Past Surgical History:   Procedure Laterality Date     COLONOSCOPY       EXPLORE GROIN  4/30/2014    Procedure: EXPLORE GROIN;  Surgeon: Sam Aguirre MD;  Location:  OR     HERNIORRHAPHY INGUINAL       HERNIORRHAPHY INGUINAL  4/30/2014    Procedure: HERNIORRHAPHY INGUINAL;  Surgeon: Sam Aguirre MD;  Location:  OR     MOHS MICROGRAPHIC PROCEDURE       PHACOEMULSIFICATION CLEAR CORNEA WITH STANDARD INTRAOCULAR LENS IMPLANT Right 9/8/2015    Procedure: PHACOEMULSIFICATION CLEAR CORNEA WITH STANDARD INTRAOCULAR LENS IMPLANT;  Surgeon: Gil Shannon MD;  Location:  EC     septic olecranon bursitis[         Prior to Admission Medications   Prior to Admission Medications   Prescriptions Last Dose Informant Patient Reported? Taking?   Ascorbic Acid (VITAMIN C PO) 12/6/2017 at Unknown time Self Yes Yes   Sig: Take 500 mg by mouth daily   CYANOCOBALAMIN PO 12/6/2017 at Unknown time Self Yes Yes   Sig: Take 500 mcg by mouth daily   Multiple Vitamins-Minerals (ICAPS AREDS FORMULA PO) 12/6/2017 at Unknown time Self Yes Yes   Sig: Take 1 tablet by mouth daily   POTASSIUM CITRATE PO 12/6/2017 at Unknown time Self Yes Yes   Sig: Take 5 mEq by mouth daily OTC dose unknown   VITAMIN D, CHOLECALCIFEROL, PO 12/6/2017 at Unknown time Self Yes Yes   Sig: Take 2,000 Units by mouth daily   multivitamin, therapeutic with minerals (MULTI-VITAMIN) TABS 12/6/2017 at Unknown time Self Yes Yes   Sig: Take 1 tablet by mouth daily   vitamin  B complex  with vitamin C (VITAMIN  B COMPLEX) TABS 12/6/2017 at Unknown time Self Yes Yes   Sig: Take 1 tablet by mouth daily   warfarin (COUMADIN) 2.5 MG tablet 12/7/2017 at AM Self Yes Yes   Sig: Take 2.5-5 mg by mouth -  For A-fib  INR goal 2-3  5 mg on Monday, Wednesday and Friday  2.5 mg on all other days      Facility-Administered Medications: None     Allergies   No Known Allergies    Social History   I have reviewed this patient's social history and updated it with pertinent information if needed. Jama Paul  reports that he has never smoked. He does not have any smokeless tobacco history on file. He reports that he does not drink alcohol or use illicit drugs.    Family History   Reviewed with patient and non-contributory to current presentation.     Review of Systems   The 10 point Review of Systems is negative other than noted in the HPI    Physical Exam   Temp: 97.6  F (36.4  C) Temp src: Oral BP: (!) 135/91 Pulse: 116 Heart Rate: 87 Resp: 29 SpO2: 99 % O2 Device: None (Room air)    Vital Signs with Ranges  Temp:  [97.6  F (36.4  C)] 97.6  F (36.4  C)  Pulse:  [116] 116  Heart Rate:  [] 87  Resp:  [12-29] 29  BP: (113-135)/() 135/91  SpO2:  [92 %-100 %] 99 %  160 lbs 0 oz    Constitutional: alert, awake and no apparent distress  HEENT: normocephalic, oral mucosa is moist, no pharyngeal erythema or exudate, no conjunctival injection  Respiratory: decreased breathsounds at the bases, upper lung fields clear to auscultation  Cardiovascular: irregularly irregular  GI: soft, and non-tender, active bowel sounds  Lymph/Hematologic: no cervical lymphadeonpathy  Skin: warm. Dry and scaly in the lower exts.  Right LE shin in dressing from prior wound  Musculoskeletal: normal muscle tone, no joint effusion or swelling. LLE with 1+ pitting edema  Neurologic: strength appears symmetrical bilaterally in the UE and LE  Psychiatric: mood and affect are normal    Data   Data reviewed today:  I personally reviewed  the EKG tracing showing a.fib with RVR and the chest x-ray image(s) showing as mentioned above.    Recent Labs  Lab 12/07/17  1155   WBC 7.6   HGB 10.6*   MCV 96   PLT 52*   INR 1.78*      POTASSIUM 4.3   CHLORIDE 106   CO2 25   BUN 22   CR 1.10   ANIONGAP 7   BARON 8.4*   GLC 72   TROPI <0.015       Recent Results (from the past 24 hour(s))   XR Chest 2 Views    Narrative    CHEST TWO VIEWS 12/7/2017 12:22 PM    HISTORY:  Short of air.     COMPARISON:  CT scan of the chest from 12/23/2004.      Impression    IMPRESSION:  Probable scarring at the left costophrenic angle. Mild  left costophrenic angle nonspecific blunting, pleural fluid versus  thickening. Cardiac silhouette is mildly enlarged. There is a new  focal rounded approximately 4 cm masslike opacity projecting off the  lower right heart margin on the frontal view. It is fairly low  attenuation, suggesting a fat component, but is indeterminate. CT scan  recommended.    EDIN HENAO MD

## 2017-12-07 NOTE — IP AVS SNAPSHOT
[facility]    [address] [place] [id number]    Phone:  [phone]                                       After Visit Summary   12/7/2017    Jama Paul    MRN: [id number]           After Visit Summary Signature Page     I have received my discharge instructions, and my questions have been answered. I have discussed any challenges I see with this plan with the nurse or doctor.    ..........................................................................................................................................  Patient/Patient Representative Signature      ..........................................................................................................................................  Patient Representative Print Name and Relationship to Patient    ..................................................               ................................................  Date                                            Time    ..........................................................................................................................................  Reviewed by Signature/Title    ...................................................              ..............................................  Date                                                            Time

## 2017-12-07 NOTE — ED PROVIDER NOTES
History     Chief Complaint:  Generalized Weakness     HPI   Jama Paul is a 86 year old male, with a history of a-fib, hypertension, and on Coumadin, who presents with his wife to the ED for evaluation of generalized weakness. The patient reports he has had worsening generalized weakness and RODGERS for the past week. The patient notes having a near syncopal episode today. The patient reports having increased shortness of breath. The patient denies any fevers, cough, or chest pain. The patient notes having a wound on his right lower leg which is being treated through wound care and is doing well. He is on Coumadin for chronic atrial fibrillation but has not noticed his pulse rate racing recently and doesn't feel it going fast today. No nausea or vomiting or recent illness, no fevers.    Allergies:  No known drug allergies    Medications:    HYDROcodone-acetaminophen (NORCO) 5-325 MG per tablet   METOPROLOL TARTRATE PO   warfarin (COUMADIN) 2.5 MG tablet   Multiple Vitamins-Minerals (ICAPS AREDS FORMULA PO)   CYANOCOBALAMIN PO   Ascorbic Acid (VITAMIN C PO)   MAGNESIUM OXIDE PO   POTASSIUM CITRATE PO   vitamin  B complex with vitamin C (VITAMIN  B COMPLEX) TABS   multivitamin, therapeutic with minerals (MULTI-VITAMIN) TABS   VITAMIN D, CHOLECALCIFEROL, PO     Past Medical History:    A-fib  HTN  Cervical radiculopathy  Incarcerated hernia  Face basal cell carcinoma  Colon polyps  Septic olecranon bursitis    Past Surgical History:    Inguinal herniorrhaphy x2  Right IOL  Wound vacuum application     Family History:    History reviewed. No pertinent family history.     Social History:  Smoking status: Never smoker  Alcohol use: No  Presents to ED with wife   Marital Status:   [2]     Review of Systems   Constitutional: Negative for fever.   Respiratory: Positive for shortness of breath. Negative for cough.    Cardiovascular: Negative for chest pain.   Gastrointestinal: Negative for nausea and vomiting.    Skin: Positive for wound.   Neurological: Positive for weakness.   All other systems reviewed and are negative.    Physical Exam   Lying Orthostatic BP: 121/84, Pulse: 105 bpm  Standing Orthostatic BP: 134/114, Pulse: 120 bpm  Patient Vitals for the past 24 hrs:   BP Temp Temp src Pulse Heart Rate Resp SpO2 Height Weight   12/07/17 1600 (!) 135/91 - - - 87 - 99 % - -   12/07/17 1545 (!) 134/94 - - - 85 29 - - -   12/07/17 1530 126/87 - - - 89 25 - - -   12/07/17 1515 122/86 - - - 84 12 95 % - -   12/07/17 1500 126/83 - - - - - - - -   12/07/17 1445 (!) 134/114 - - - - - 92 % - -   12/07/17 1430 132/86 - - - 88 17 97 % - -   12/07/17 1415 (!) 121/109 - - - 114 27 98 % - -   12/07/17 1400 (!) 118/92 - - - 105 16 99 % - -   12/07/17 1345 (!) 129/94 - - - 90 16 97 % - -   12/07/17 1330 132/80 - - - 98 13 98 % - -   12/07/17 1315 129/90 - - - 93 15 100 % - -   12/07/17 1300 113/89 - - - 89 12 97 % - -   12/07/17 1245 123/89 - - - 95 12 97 % - -   12/07/17 1230 116/82 - - - 101 12 98 % - -   12/07/17 1215 121/80 - - - 92 19 - - -   12/07/17 1133 131/88 97.6  F (36.4  C) Oral 116 - 16 97 % 1.829 m (6') 72.6 kg (160 lb)     Physical Exam  Nursing note and vitals reviewed.  Constitutional:  Appears well-developed and well-nourished, comfortable.   HENT:   Head:   No evidence of facial or scalp trauma.  Nose:    Nose normal.   Mouth/Throat:  Mucosa is moist.  Eyes:    Conjunctivae are normal.      Pupils are equal, round, and reactive to light.      Right eye exhibits no discharge. Left eye exhibits no discharge.      No scleral icterus.   Cardiovascular:  Rapid irregular.      Normal heart sounds and intact distal pulses.       No murmur heard.  Pulmonary/Chest:  Effort normal and breath sounds normal. No respiratory distress.     No wheezes. No rales. No chest wall tenderness. No stridor.   Abdominal:   Soft. No distension and no mass. No tenderness.      No rebound and no guarding. No flank pain.  Musculoskeletal:  1+  left lower extremity edema. Normal range of motion.      No tenderness.                                       Neck supple, no midline cervical tenderness.   Neurological:   Alert and oriented to person, place, and year.      No cranial nerve deficit.      Exhibits normal muscle tone. Coordination normal.      GCS eye subscore is 4. GCS verbal subscore is 5.      GCS motor subscore is 6.   Skin:    Dry dressing on right shin. Hypostasis pigmentation in both lower legs. Skin is warm and dry. No rash    noted. No diaphoresis. No erythema.   Psychiatric:   Behavior is normal. Judgment and thought content normal.     Emergency Department Course     ECG (11:40:36):  Rate 136 bpm. RI interval *. QRS duration 82. QT/QTc 268/403. P-R-T axes * 23 6. Atrial fibrillation with rapid ventricular response. Nonspecific ST abnormality. No significant change compared to EKG dated 6/28/2016. Abnormal ECG. Interpreted at 1145 by Swati Bowman MD.    Imaging:  Radiographic findings were communicated with the patient and family who voiced understanding of the findings.    XR Chest 2 Views  IMPRESSION:  Probable scarring at the left costophrenic angle. Mild  left costophrenic angle nonspecific blunting, pleural fluid versus  thickening. Cardiac silhouette is mildly enlarged. There is a new  focal rounded approximately 4 cm masslike opacity projecting off the  lower right heart margin on the frontal view. It is fairly low  attenuation, suggesting a fat component, but is indeterminate. CT scan recommended. As read by Radiology.    Laboratory:  D dimer: <0.3  INR: 1.78(H)  Nt Pro BNP: 4737(H)  Troponin I: <0.015  CBC: HGB 10.6(L), PLT 52(L), o/w WNL (WBC 7.6)   BMP: Calcium 8.4(L) o/w WNL (Creatinine 1.10)    Interventions:  1207: Metoprolol 5mg IV  1208: NS 125ml/hr Infusion IV     Emergency Department Course:  Past medical records, nursing notes, and vitals reviewed.  1141: I performed an exam of the patient and obtained history, as  documented above.  IV inserted and blood drawn.    The patient was sent for a chest x-ray while in the emergency department, findings above.    1353: I rechecked the patient. Explained findings to patient and wife.    1509: I spoke to Dr. Ambrosio of the hospitalist service who accepts the patient for admission.     Findings and plan explained to the Patient and spouse who consents to admission. Discussed the patient with Dr. Ambrosio, who will admit the patient to a cardiac telemetry bed for further monitoring, evaluation, and treatment.     Impression & Plan      Medical Decision Making:  Patient comes in rapid atrial fibrillation. He is on Coumadin for this and normally controls his rate with metoprolol. He hasn't felt well over the past week with increasing weakness and lightheadedness along with some shortness of breath. He has no chest pain. His troponin is normal but his BNP is elevated at 4737. EKG shows rapid atrial fibrillation but no ischemic changes. INR subtherapeutic at 1.78, CBC shows low platelets and hemoglobin but normal white count, BMP is normal. He was given metoprolol 5 mg IV to help slow his rate down and started on a normal saline infusion at 125 an hour. He feels fine in bed. I did get a d-dimer because of the complaint of shortness of breath and it is normal. Chest x-ray did show a small pleural effusion slightly enlarged heart but there was an area at the apex look like the pericardial fat. I talked to the radiologist about this and she felt it looked likely benign but at some point a CT could be done with contrast. She did not feel it needed to be done in the ER. I relayed this to the admitting physician. He will likely need a cardiac echo and adjustment in his medications for better rate control. His pulse rate is now in the 80s and 90s after the 5 mg IV metoprolol dose but if his rate goes back up again over 100 I would give him a second dose. I discussed the case with Dr. Ambrosio will be  admitting the patient observation on telemetry in [facility].    Diagnosis:    ICD-10-CM   1. Congestive heart failure, unspecified congestive heart failure chronicity, unspecified congestive heart failure type (H) I50.9   2. Atrial fibrillation with rapid ventricular response (H) I48.91   3. Long term current use of anticoagulant therapy Z79.01   4. Lightheadedness R42     Disposition: Patient admitted to a cardiac telemetry bed by Dr. Ambrosio. Consider cardiac echo, adjustment in medications for better rate control, and increase the Coumadin dose.    Anila Artis  12/7/2017    EMERGENCY DEPARTMENT    I, Anila Artis, am serving as a scribe at 11:41 AM on 12/7/2017 to document services personally performed by Swati Bowman MD based on my observations and the provider's statements to me.        Swati Bowman MD  12/07/17 [id number]

## 2017-12-08 ENCOUNTER — APPOINTMENT (OUTPATIENT)
Dept: CARDIOLOGY | Facility: CLINIC | Age: 82
End: 2017-12-08
Attending: INTERNAL MEDICINE
Payer: MEDICARE

## 2017-12-08 ENCOUNTER — APPOINTMENT (OUTPATIENT)
Dept: CT IMAGING | Facility: CLINIC | Age: 82
End: 2017-12-08
Attending: PHYSICIAN ASSISTANT
Payer: MEDICARE

## 2017-12-08 VITALS
BODY MASS INDEX: 21.9 KG/M2 | SYSTOLIC BLOOD PRESSURE: 149 MMHG | RESPIRATION RATE: 18 BRPM | DIASTOLIC BLOOD PRESSURE: 95 MMHG | OXYGEN SATURATION: 98 % | HEIGHT: 72 IN | TEMPERATURE: 96 F | HEART RATE: 116 BPM | WEIGHT: 161.7 LBS

## 2017-12-08 DIAGNOSIS — I48.91 ATRIAL FIBRILLATION (H): Primary | ICD-10-CM

## 2017-12-08 LAB
ANION GAP SERPL CALCULATED.3IONS-SCNC: 7 MMOL/L (ref 3–14)
BUN SERPL-MCNC: 21 MG/DL (ref 7–30)
CALCIUM SERPL-MCNC: 8.2 MG/DL (ref 8.5–10.1)
CHLORIDE SERPL-SCNC: 106 MMOL/L (ref 94–109)
CO2 SERPL-SCNC: 25 MMOL/L (ref 20–32)
CREAT SERPL-MCNC: 1.07 MG/DL (ref 0.66–1.25)
GFR SERPL CREATININE-BSD FRML MDRD: 65 ML/MIN/1.7M2
GLUCOSE SERPL-MCNC: 87 MG/DL (ref 70–99)
INR PPP: 1.9 (ref 0.86–1.14)
POTASSIUM SERPL-SCNC: 4 MMOL/L (ref 3.4–5.3)
SODIUM SERPL-SCNC: 138 MMOL/L (ref 133–144)

## 2017-12-08 PROCEDURE — 96376 TX/PRO/DX INJ SAME DRUG ADON: CPT | Mod: 59

## 2017-12-08 PROCEDURE — 25000132 ZZH RX MED GY IP 250 OP 250 PS 637: Mod: GY | Performed by: PHYSICIAN ASSISTANT

## 2017-12-08 PROCEDURE — 36415 COLL VENOUS BLD VENIPUNCTURE: CPT | Performed by: INTERNAL MEDICINE

## 2017-12-08 PROCEDURE — A9270 NON-COVERED ITEM OR SERVICE: HCPCS | Mod: GY | Performed by: PHYSICIAN ASSISTANT

## 2017-12-08 PROCEDURE — 80048 BASIC METABOLIC PNL TOTAL CA: CPT | Performed by: INTERNAL MEDICINE

## 2017-12-08 PROCEDURE — 25000128 H RX IP 250 OP 636: Performed by: INTERNAL MEDICINE

## 2017-12-08 PROCEDURE — 25000125 ZZHC RX 250: Performed by: INTERNAL MEDICINE

## 2017-12-08 PROCEDURE — 99217 ZZC OBSERVATION CARE DISCHARGE: CPT | Performed by: PHYSICIAN ASSISTANT

## 2017-12-08 PROCEDURE — G0378 HOSPITAL OBSERVATION PER HR: HCPCS

## 2017-12-08 PROCEDURE — 71260 CT THORAX DX C+: CPT

## 2017-12-08 PROCEDURE — 99204 OFFICE O/P NEW MOD 45 MIN: CPT | Mod: 25 | Performed by: INTERNAL MEDICINE

## 2017-12-08 PROCEDURE — 93306 TTE W/DOPPLER COMPLETE: CPT | Mod: 26 | Performed by: INTERNAL MEDICINE

## 2017-12-08 PROCEDURE — 25000128 H RX IP 250 OP 636: Performed by: PHYSICIAN ASSISTANT

## 2017-12-08 PROCEDURE — 40000893 ZZH STATISTIC PT IP EVAL DEFER: Performed by: PHYSICAL THERAPIST

## 2017-12-08 PROCEDURE — A9270 NON-COVERED ITEM OR SERVICE: HCPCS | Mod: GY | Performed by: INTERNAL MEDICINE

## 2017-12-08 PROCEDURE — 93306 TTE W/DOPPLER COMPLETE: CPT

## 2017-12-08 PROCEDURE — 25000132 ZZH RX MED GY IP 250 OP 250 PS 637: Mod: GY | Performed by: INTERNAL MEDICINE

## 2017-12-08 PROCEDURE — 85610 PROTHROMBIN TIME: CPT | Performed by: INTERNAL MEDICINE

## 2017-12-08 RX ORDER — ACYCLOVIR 200 MG/1
0-1 CAPSULE ORAL
Status: DISCONTINUED | OUTPATIENT
Start: 2017-12-08 | End: 2017-12-08 | Stop reason: HOSPADM

## 2017-12-08 RX ORDER — METOPROLOL SUCCINATE 50 MG/1
50 TABLET, EXTENDED RELEASE ORAL 2 TIMES DAILY
Status: DISCONTINUED | OUTPATIENT
Start: 2017-12-08 | End: 2017-12-08 | Stop reason: HOSPADM

## 2017-12-08 RX ORDER — LISINOPRIL 5 MG/1
5 TABLET ORAL DAILY
Status: DISCONTINUED | OUTPATIENT
Start: 2017-12-08 | End: 2017-12-08 | Stop reason: HOSPADM

## 2017-12-08 RX ORDER — FUROSEMIDE 10 MG/ML
20 INJECTION INTRAMUSCULAR; INTRAVENOUS ONCE
Status: COMPLETED | OUTPATIENT
Start: 2017-12-08 | End: 2017-12-08

## 2017-12-08 RX ORDER — METOPROLOL SUCCINATE 50 MG/1
50 TABLET, EXTENDED RELEASE ORAL 2 TIMES DAILY
Qty: 60 TABLET | Refills: 1 | Status: SHIPPED | OUTPATIENT
Start: 2017-12-08 | End: 2017-12-20

## 2017-12-08 RX ORDER — WARFARIN SODIUM 5 MG/1
5 TABLET ORAL
Status: DISCONTINUED | OUTPATIENT
Start: 2017-12-08 | End: 2017-12-08 | Stop reason: HOSPADM

## 2017-12-08 RX ORDER — IOPAMIDOL 755 MG/ML
63 INJECTION, SOLUTION INTRAVASCULAR ONCE
Status: COMPLETED | OUTPATIENT
Start: 2017-12-08 | End: 2017-12-08

## 2017-12-08 RX ORDER — LISINOPRIL 5 MG/1
5 TABLET ORAL DAILY
Qty: 30 TABLET | Refills: 1 | Status: SHIPPED | OUTPATIENT
Start: 2017-12-08 | End: 2017-12-20

## 2017-12-08 RX ORDER — DIAZEPAM 5 MG
5 TABLET ORAL
Status: CANCELLED | OUTPATIENT
Start: 2017-12-08

## 2017-12-08 RX ORDER — METOPROLOL TARTRATE 1 MG/ML
5-15 INJECTION, SOLUTION INTRAVENOUS
Status: DISCONTINUED | OUTPATIENT
Start: 2017-12-08 | End: 2017-12-08 | Stop reason: HOSPADM

## 2017-12-08 RX ORDER — NITROGLYCERIN 0.4 MG/1
0.4 TABLET SUBLINGUAL
Status: DISCONTINUED | OUTPATIENT
Start: 2017-12-08 | End: 2017-12-08 | Stop reason: HOSPADM

## 2017-12-08 RX ORDER — METOPROLOL TARTRATE 50 MG
50-100 TABLET ORAL
Status: DISCONTINUED | OUTPATIENT
Start: 2017-12-08 | End: 2017-12-08 | Stop reason: HOSPADM

## 2017-12-08 RX ADMIN — METOPROLOL SUCCINATE 25 MG: 25 TABLET, EXTENDED RELEASE ORAL at 10:02

## 2017-12-08 RX ADMIN — IOPAMIDOL 63 ML: 755 INJECTION, SOLUTION INTRAVENOUS at 15:29

## 2017-12-08 RX ADMIN — SODIUM CHLORIDE 88 ML: 9 INJECTION, SOLUTION INTRAVENOUS at 15:29

## 2017-12-08 RX ADMIN — FUROSEMIDE 20 MG: 10 INJECTION, SOLUTION INTRAVENOUS at 12:13

## 2017-12-08 RX ADMIN — LISINOPRIL 5 MG: 5 TABLET ORAL at 16:39

## 2017-12-08 NOTE — PLAN OF CARE
Problem: Patient Care Overview  Goal: Plan of Care/Patient Progress Review  PT per chart review and discussion with PA and RN, pt with discharge plan to home with HH services, no acute care PT needs identified.  Will defer further services to .  Will complete order.

## 2017-12-08 NOTE — PLAN OF CARE
Problem: Patient Care Overview  Goal: Discharge Needs Assessment  Outcome: Improving  Problem: Patient Care Overview  Goal: Plan of Care/Patient Progress Review  Outcome: No Change  -diagnostic tests and consults completed and resulted : not met, cardiac consult due  -vital signs normal or at patient baseline : met  - adequate control of HR : met., hR in 70s   Nurse to notify provider when observation goals have been met and patient is ready for discharge.

## 2017-12-08 NOTE — DISCHARGE SUMMARY
[facility]    Discharge Summary  Hospitalist    Date of Admission:  12/7/2017  Date of Discharge:  12/8/2017  Discharging Provider: Bharat Cortés  Date of Service (when I saw the patient): 12/08/17    Discharge Diagnoses   Atrial fibrillation with RVR  Cardiomyopathy, likely new   Dyspnea on exertion   Moderate bilateral pleural effusions  Pulmonary nodules  Generalized weakness   Incidental finding of mass like opacity projecting off the lower right heart margin, Marked right atrial enlargement  Hx Right LE wound due to spontaneous hematoma in setting of supra therapeutic INR  Chronic Thrombocytopenia    History of Present Illness   Jama Paul is a 86 year old male with past medical hx of chronic atrial fibrillation on warfarin and HTN who presents with c/o generalized weakness and exertional shortness of breath over the last 1 week.  He reports he was so weak and couldn't even get up to do anything which prompted him to present to the ED. He denies chest pain, palpitation, lightheadedness, orthopnea, PND, fever, chills, diarrhea, abdominal pain, urinary symptoms, focal weakness or numbness.  He reports he checks his BP and radial pulse twice a day and has been normal.  He also has RLE wound that gets dressing changes every other day and per patient nurse check vitals at that time and his vitals were stable.       In the ED, patient is evaluated by Dr. Bowman. He was noted to be in A.fib with RVR with HR in the 130s.  BP was stable. Trop negative. D-dimer is negative. N-terminal pro-BNP is 4737. CXR showed mild left costophrenic angle non-specific blunting, pleural fluid vs thickening.  In addition, there is a new focal rounded approximately 4 cm masslike opacity projecting off the lower right heart margin on the frontal view. Patient was given Metoprolol IV x 1 with HR improvement in the 90s and some fluids. Admission for observation requested.    Hospital Course      Atrial  fibrillation with RVR  Cardiomyopathy, likely new  Has chronic a. Fib.  HR was 130 on EKG obtained in the ED. HR improved after 1x IV metoprolol to the 90s and intermittently going to low 100s at rest.  He takes Metoprolol Tartrate 25mg BID or just Metoprolol XL 50mg qhs depending on his BP. Given HR improved, will adjust PO meds for better control.   -- He was monitored on telemetry.  -- Changed to scheduled Toprol XL 50 mg BID with improvement of HR.  -- Echocardiogram 12/8 with multiple changes from previous echo in 2011.  LVEF reduced to 30-35% from previously normal EF in 2011.  There is moderate to severe concentric left ventricular hypertrophy. There is mod-severe global hypokinesia of the left ventricle. The right ventricle is moderate to severely dilated. Moderately decreas ed right ventricular systolic function. There is mild to moderate (1-2+) mitral regurgitation. There is moderate to mod-severe (2-3+) tricuspid regurgitation.   -- Discussed findings with patient.  He has never been told he has cardiomyopathy or heart failure to his knowledge. He has had an echo at an outside hospital about 1 yr ago ([facility] in [place]) which we are trying to obtain records.  He also has echos more recent than 2011 in the [place] system but results are not viewable in Care Everywhere  -- Cardiology was consulted.  Dr. Dillard and I discussed this patient's test results directly.  Per Dr. Dillard: He could have multiple etiologies for his cardiomyopathy.  One is tachycardia-induced given he presented with afib and RVR and did not know that he was tachycardic.  His heart rate is under better control now.  He is on Toprol 50 in the morning and 25 in the evening.  I have upped that to 50 twice a day.  I have also of course with cardiomyopathy started lisinopril at 5 mg a day.  He is also a bit hypertensive, so both of these adjustments should help with his blood pressure as well.  Cardiomyopathy is another possibility in the  differential as ischemic, so we discussed doing a stress test.  We initially discussed doing a coronary CT and that way we could also look at the possibility of this mass, but the CT scanner is down for coronaries, so we are going to do a cardiac MRI, rest and stress, and that will also be able to determine what this abnormal structure projecting off the right side of the heart on chest x-ray is.  I discussed with Marcelo Plunkett at the [facility].  Will plan to do this on Monday morning.    -- Add lisinopril 5 mg daily.  Do not start diuretics yet per cards.  -- Continue with Warfarin per home dosing, INR 1.78  -- Close cards follow up for Cardiac MRI and medication optimization as above.      Dyspnea on exertion   Moderate bilateral pleural effusions  Pulmonary nodules  Suspect this could be due to A.fib with RVR and some CHF component given pleural effusions seen on CT chest as well as his reduced EF on echo.  N-terminal BNP is 4000s.  CXR shows some blunting of costophrenic angle. CT chest negative for PE. Has LLE pitting edema, and per patient, chronic and stable.    -- Patient was given Lasix 20 mg IV x2 with good UOP   -- Dyspnea has improved somewhat.  No hypoxia.  -- Echo results as above  -- CT chest: 1. No evidence of pulmonary embolism.  2. Moderate-sized bilateral pleural effusions.  3. Marked right atrial enlargement with some adjacent chronic  atelectasis which accounts for the abnormality seen on chest x-ray.  4. 4 mm nodule in the right major fissure is likely a small lymph  Node.  -- Will need follow up CT for incidental pulmonary nodules at 6-12 months.      Generalized weakness.   Suspect this may be related to poorly controlled HR and cardiomyopthy. CXR not suggestive of infection. No leukocytosis.   -- treatment of A. Fib as above  -- Urinalysis does not show evidence for UTI  -- PT evaluation ordered, recommend continued [facility] services with no acute PT needs identified.      Incidental finding of  mass like opacity projecting off the lower right heart margin:  -- Seen on CXR, per radiology report, this is low attenuation, suggesting a fat component, but is indeterminate  -- CT chest shows Marked right atrial enlargement with some adjacent chronic atelectasis which accounts for the abnormality seen on chest x-ray.      Right LE wound due to spontaneous hematoma in setting of supra therapeutic INR;   -- stable. Follows at [facility].  Gets dressing changes every other day      Chronic Thrombocytopenia:  -- 52K and appears around his baseline. No signs of active bleeding  -- monitor    Bharat Cortés    Significant Results and Procedures   Echocardiogram     Pending Results   None    Code Status   Full Code       Primary Care Physician   Clint Pizarro    BP (!) 149/95 (BP Location: Left arm)  Pulse 76  Temp 96  F (35.6  C) (Oral)  Resp 18  Ht 1.829 m (6')  Wt 73.3 kg (161 lb 11.2 oz)  SpO2 98%  BMI 21.93 kg/m2   % O2 Device: Nasal cannula         Vitals:     12/07/17 1133 12/08/17 0546   Weight: 72.6 kg (160 lb) 73.3 kg (161 lb 11.2 oz)      Vital Signs with Ranges  Temp:  [96.3  F (35.7  C)-97.9  F (36.6  C)] 96.3  F (35.7  C)  Pulse:  [116] 116  Heart Rate:  [] 97  Resp:  [12-29] 18  BP: (113-149)/() 146/98  SpO2:  [92 %-100 %] 96 %  I/O last 3 completed shifts:  In: 240 [P.O.:240]  Out: 650 [Urine:650]     Constitutional:  Alert, oriented to person, place, date, situation.  Cooperative, lying in bed in NAD.  Respiratory:  Lungs CTAB.  No crackles, wheezes, or rhonchi, no labored breathing.  Cardiovascular:  Heart irregularly irregular, no MRG, chronic LLE edema.  GI:  Abdomen soft, NT/ND and with normoactive BS  Skin/Integumen:  Warm, dry, non-diaphoretic.  MSK: CMS x4 intact.      Discharge Disposition   Discharged to home  Condition at discharge: Stable    Consultations This Hospital Stay   Dr. Dillard of Cardiology    Discharge Orders     MRI Cardiac w/contrast &  stress & flow     Physical Therapy Referral     Reason for your hospital stay   Observation hospitalization for atrial fibrillation with rapid ventricular response and for evaluation of cardiomyopathy.     Follow-up and recommended labs and tests    Follow up with primary care provider, Clint Pizarro, within 7 days for hospital follow- up.  Follow up with Cardiology on 12/11 Monday at [facility] heart for a cardiac MRI.  Dr. Dillard has arranged for this.     Activity   Your activity upon discharge: activity as tolerated     Full Code     Diet   Follow this diet upon discharge:     Cardiac diet. Low Saturated Fat.  Sodium <2400mg per day.       Discharge Medications   Current Discharge Medication List      START taking these medications    Details   lisinopril (PRINIVIL/ZESTRIL) 5 MG tablet Take 1 tablet (5 mg) by mouth daily  Qty: 30 tablet, Refills: 1    Associated Diagnoses: Cardiomyopathy, unspecified type (H)      metoprolol (TOPROL-XL) 50 MG 24 hr tablet Take 1 tablet (50 mg) by mouth 2 times daily  Qty: 60 tablet, Refills: 1    Associated Diagnoses: Atrial fibrillation with rapid ventricular response (H)         CONTINUE these medications which have NOT CHANGED    Details   warfarin (COUMADIN) 2.5 MG tablet Take 2.5-5 mg by mouth -  For A-fib  INR goal 2-3  5 mg on Monday, Wednesday and Friday  2.5 mg on all other days      !! Multiple Vitamins-Minerals (ICAPS AREDS FORMULA PO) Take 1 tablet by mouth daily      CYANOCOBALAMIN PO Take 500 mcg by mouth daily      Ascorbic Acid (VITAMIN C PO) Take 500 mg by mouth daily      POTASSIUM CITRATE PO Take 5 mEq by mouth daily OTC dose unknown      vitamin  B complex with vitamin C (VITAMIN  B COMPLEX) TABS Take 1 tablet by mouth daily      !! multivitamin, therapeutic with minerals (MULTI-VITAMIN) TABS Take 1 tablet by mouth daily      VITAMIN D, CHOLECALCIFEROL, PO Take 2,000 Units by mouth daily       !! - Potential duplicate medications found. Please discuss  with provider.        Allergies   No Known Allergies  Data   Most Recent 3 CBC's:  Recent Labs   Lab Test  12/07/17   1155  11/17/17   1420  10/05/17   0548   WBC  7.6  9.2  9.8   HGB  10.6*  10.1*  8.2*   MCV  96  95  93   PLT  52*  76*  52*      Most Recent 3 BMP's:  Recent Labs   Lab Test  12/08/17   0645  12/07/17   1155  11/17/17   1420   NA  138  138  137   POTASSIUM  4.0  4.3  4.4   CHLORIDE  106  106  105   CO2  25  25  27   BUN  21  22  19   CR  1.07  1.10  1.07   ANIONGAP  7  7  5   BARON  8.2*  8.4*  8.2*   GLC  87  72  85     Most Recent 2 LFT's:  Recent Labs   Lab Test  11/17/17   1420  04/30/14   1620   AST  11  23   ALT  14  26   ALKPHOS  50  43   BILITOTAL  0.7  1.1     Most Recent INR's and Anticoagulation Dosing History:  Anticoagulation Dose History     Recent Dosing and Labs Latest Ref Rng & Units 5/6/2014 10/4/2017 10/5/2017 11/17/2017 11/18/2017 12/7/2017 12/8/2017    Warfarin 2.5 mg - - - - - - 2.5 mg -    Warfarin 5 mg - 5 mg - - - - - -    INR 0.86 - 1.14 2.39(H) 5.23(HH) 2.07(H) 1.56(H) 1.56(H) 1.78(H) 1.90(H)        Most Recent 3 Troponin's:  Recent Labs   Lab Test  12/07/17   1155  05/04/14   2105  05/04/14   1655   TROPI  <0.015  <0.012  <0.012     Most Recent Cholesterol Panel:  Recent Labs   Lab Test  04/18/12   0849   CHOL  127   LDL  55   HDL  56   TRIG  81     Most Recent 6 Bacteria Isolates From Any Culture (See EPIC Reports for Culture Details):  Recent Labs   Lab Test  11/17/17   2110   CULT  10,000 to 50,000 colonies/mL  Pseudomonas aeruginosa  *     Most Recent TSH, T4 and A1c Labs:No lab results found.  Results for orders placed or performed during the hospital encounter of 12/07/17   XR Chest 2 Views    Narrative    CHEST TWO VIEWS 12/7/2017 12:22 PM    HISTORY:  Short of air.     COMPARISON:  CT scan of the chest from 12/23/2004.      Impression    IMPRESSION:  Probable scarring at the left costophrenic angle. Mild  left costophrenic angle nonspecific blunting, pleural fluid  versus  thickening. Cardiac silhouette is mildly enlarged. There is a new  focal rounded approximately 4 cm masslike opacity projecting off the  lower right heart margin on the frontal view. It is fairly low  attenuation, suggesting a fat component, but is indeterminate. CT scan  recommended.    EDIN HENAO MD   CT Chest Pulmonary Embolism w Contrast    Narrative    CT CHEST PULMONARY EMBOLISM WITH CONTRAST   12/8/2017 3:35 PM     HISTORY: Shortness of breath and right chest mass.     TECHNIQUE:  63 mL Isovue -370. Radiation dose for this scan was  reduced using automated exposure control, adjustment of the mA and/or  kV according to patient size, or iterative reconstruction technique.    COMPARISON: 4/30/2014, 12/7/2017    FINDINGS:  There is no pulmonary embolism. There are moderate-sized  bilateral pleural effusions with compressive atelectasis in both lower  lobes. No pneumothorax. 4 mm nodule noted in the right major fissure  (series 3, image 31). No other pulmonary nodule or mass demonstrated.  Cystic changes noted throughout the lungs.    There is marked right atrial enlargement with some adjacent chronic  atelectasis which is similar to prior studies and likely accounts for  the abnormality seen on chest x-ray.    Cysts in the liver are unchanged. Patient has a large hiatal hernia.  Degenerative changes noted of the skeletal structures.      Impression    IMPRESSION:  1. No evidence of pulmonary embolism.  2. Moderate-sized bilateral pleural effusions.  3. Marked right atrial enlargement with some adjacent chronic  atelectasis which accounts for the abnormality seen on chest x-ray.  4. 4 mm nodule in the right major fissure is likely a small lymph  node.  Recommendations for one or multiple incidental lung nodules < 6mm :    Low risk patients: No routine follow-up.    High risk patients: Optional follow-up CT at 12 months; if  unchanged, no further follow-up.    *Low Risk: Minimal or absent history of  smoking or other known risk  factors.  *Nonsolid (ground glass) or partly solid nodules may require longer  follow-up to exclude indolent adenocarcinoma.  *Recommendations based on Guidelines for the Management of Incidental  Pulmonary Nodules Detected at CT: From the Fleischner Society 2017,  Radiology 2017.    5. Large hiatal hernia.    FRANCISCO J VIDAL MD

## 2017-12-08 NOTE — PLAN OF CARE
Problem: Patient Care Overview  Goal: Discharge Needs Assessment  Outcome: Improving  Problem: Patient Care Overview  Goal: Plan of Care/Patient Progress Review  Outcome: No Change  -diagnostic tests and consults completed and resulted : not met/ echo in am  -vital signs normal or at patient baseline : met  - adequate control of HR : not met.   Nurse to notify provider when observation goals have been met and patient is ready for discharge.

## 2017-12-08 NOTE — PLAN OF CARE
Problem: Patient Care Overview  Goal: Plan of Care/Patient Progress Review  Outcome: Improving  Pt arrived from ED. A/o. VSS. Mc[place]. No pain. ortho's negative. Incontinent at times. HR in 110's. Oral metoprolol given. Post HR is blanca 80-90s. Up SBA. IV lasix given. Baseline edema bilat legs.L<R. drsg noticed on RLL.per pt he bled from his chronic coumadin use and has HH nurse doing drsg change every other day. drsg is not due until tomorrow per pt. afib on tele. Will monitor.

## 2017-12-08 NOTE — PLAN OF CARE
Problem: Patient Care Overview  Goal: Plan of Care/Patient Progress Review  Outcome: Improving  Patient A&O, VsS,RA. Denied pain/dizziness. Tele Afib RVR. ECHO done this morning.  Cardiac consulted. CT scan done. Plan for MRI as out patient. Assist x1 for transfer. IV saline locked.Continue to monitor.

## 2017-12-08 NOTE — PLAN OF CARE
Problem: Patient Care Overview  Goal: Plan of Care/Patient Progress Review  Outcome: Improving      Pt A&O VSS denies pain or discomfort at this time. Tolerated diet up with 1. Baseline BLL edema noted. R lower leg has skin tear prior to arriving the unit. Pt has extended home care service. He prefers [facility] care nurse change his dressing. Pt states the weakness subsidized he plan to go home today.

## 2017-12-08 NOTE — PLAN OF CARE
Problem: Patient Care Overview  Goal: Plan of Care/Patient Progress Review  Outcome: No Change  -diagnostic tests and consults completed and resulted : not met  -vital signs normal or at patient baseline : met  - adequate control of HR : not met.   Nurse to notify provider when observation goals have been met and patient is ready for discharge.

## 2017-12-09 NOTE — PLAN OF CARE
Problem: Patient Care Overview  Goal: Discharge Needs Assessment  Outcome: Improving  Problem: Patient Care Overview  Goal: Plan of Care/Patient Progress Review  Outcome: No Change  -diagnostic tests and consults completed and resulted : not met, cardiac consult  And follow up scheduled and  dicussed with discharged paperwork.  -vital signs normal or at patient baseline : met  - adequate control of HR : met.  Nurse to notify provider when observation goals have been met and patient is ready for discharge.         Patient discharged at 6:06 PM to home. IV was discontinued. Pain at time of discharge was 0/10. Belongings returned to patient.  Discharge instructions and medications reviewed with patient.  Patient verbalized understanding and all questions were answered.  Prescriptions given to patient.  At time of discharge, patient condition was stable and left the obs unit escorted by spouse, son, and RN.

## 2017-12-09 NOTE — CONSULTS
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  In addition, he had a right lower extremity hematoma in the setting of a supratherapeutic INR in 10/2017 for which he has a residual wound.      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  His BNP was 4737.  His troponin was negative.  His creatinine is 1.1.  The directed INR was subtherapeutic at 1.7.  They also did a D-dimer which was negative at less than 0.3.  They ordered a chest x-ray and I reviewed that picture myself.  He has an enlarged cardiac silhouette, but he also has a mass-like opacity projecting off the right side of the heart which is unusual in appearance.  They discussed doing a CT to further characterize.  Furthermore, he had an echocardiogram done today and it showed an ejection fraction of 30-35% with severe global hypokinesis of the left ventricle, moderate to severe dilatation of the right ventricle with moderate to severe tricuspid insufficiency.  This at all new as he has had an echo he reports last at [facility] in 2015 and everything was normal at that time.  He has not had any chest pain or discomfort or other systemic symptoms when he did report he was in atrial fibrillation with RVR, so it is possible that this is tachycardia-induced cardiomyopathy, but of course, ischemic is not ruled out.  He does not have a history of any cardiac events in the past.  His hemoglobin was 10.6 and stable.  He has a normal white count.  EKG on presentation  showed atrial fibrillation with rapid ventricular response with a rate of 136.  Again, he was unaware that he was actually going fast when he came in.      PAST MEDICAL HISTORY INCLUDES:   1.  Atrial fibrillation, on  chronic anticoagulation.   2.  Hypertension.   3.  B12 deficiency.   4.  Vitamin D deficiency.   5.  Spontaneous right lower extremity hematoma formation in the setting of supratherapeutic INR in 10/2017.      PAST SURGICAL HISTORY:  Includes inguinal hernia, Mohs cornea.      MEDICATIONS PRIOR TO ADMISSION:  Included vitamin B12, vitamin C, multivitamin, vitamin D, Coumadin.      ALLERGIES:  No known drug allergies.      SOCIAL HISTORY:  He is a retired physician, lives with his wife.  No smoking.      FAMILY HISTORY:  No early coronary artery disease.      PHYSICAL EXAMINATION:   VITAL SIGNS:  Blood pressure 147/90 with a heart rate of 76.  He is afebrile, satting 96%, 161 pounds.   GENERAL:  He is a male appearing stated age, no acute distress.   CARDIOVASCULAR:  He is irregularly irregular with a holosystolic murmur best heard at the right lower sternal border.  I cannot appreciate a diastolic murmur.   LUNGS:  Bilaterally clear.   ABDOMEN:  Soft, nontender.   EXTREMITIES:  He has a bandage in his right lower extremity, mild edema bilaterally.   PSYCHIATRIC:  Normal affect.   SKIN:  No rashes.   HEENT:  Reasonable dentition.  No icterus.      LABORATORY EVALUATION:  As noted in the HPI.      IMPRESSION, REPORT AND PLAN:   1.  New diagnosis of cardiomyopathy after presenting with shortness of breath with an ejection fraction of around 30% with global hypokinesis.   2.  Severe right ventricular enlargement with severe tricuspid insufficiency, which is a new finding.   3.  Atrial fibrillation with rapid ventricular response.   4.  Abnormal appearance on the right cardiac silhouette on chest x-ray.      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  Today, I discussed his history, symptoms and the results of his testing in detail.  He is complex.   He of course could have multiple etiologies for his cardiomyopathy.  One of course is tachycardia-induced given he presented with afib and RVR  and did not know that he was tachycardic.  His heart rate is under better control now.  He is on Toprol 50 in the morning and 25 in the evening.  I have upped that to 50 twice a day.  I have also of course with cardiomyopathy started lisinopril at 5 mg a day.  He is also a bit hypertensive, so both of these adjustments should help with his blood pressure as well.  Cardiomyopathy is another possibility in the differential as ischemic, so we discussed doing a stress test.  We initially discussed doing a coronary CT and that way we could also look at the possibility of this mass, but the CT scanner is down for coronaries, so we are going to do a cardiac MRI, rest and stress, and that will also be able to determine what this abnormal structure projecting off the right side of the heart on chest x-ray is.  I discussed with Marcelo Plunkett at the [facility].  Will plan to do this on Monday morning.  With regards to his right heart enlargement and severe TR, we usually do not see this in the setting of tachycardia-induced cardiomyopathy.  There have been times where he has been more bedbound because of the hematoma and he has also been subtherapeutic with his INR at times.  Looking back through the records, I always see D-dimer is negative, but I do think we need to evaluate for PE and the possibility of pulmonary hypertension, so a CT of the chest, PE protocol, and at that time, we may also get an idea of what this projection is on the right side of the heart as well.  Would not be unreasonable to do before he leaves.  We will set him up for followup in clinic as well.      The patient understands and agrees with the plan as outlined.  All questions were answered.  It was a pleasure to see him.  Please do not hesitate to contact me with any questions or concerns.         OCTAVIO TORRES MD             D: 12/08/2017 15:12   T: 12/08/2017 18:01   MT: TS      Name:     YINKA GONZALEZ   MRN:      [id number]        Account:        [id number]   :      [id number]           Consult Date:  2017      Document: [id number]

## 2017-12-12 ENCOUNTER — OFFICE VISIT (OUTPATIENT)
Dept: CARDIOLOGY | Facility: CLINIC | Age: 82
End: 2017-12-12
Payer: MEDICARE

## 2017-12-12 VITALS
BODY MASS INDEX: 24.88 KG/M2 | HEIGHT: 69 IN | HEART RATE: 79 BPM | DIASTOLIC BLOOD PRESSURE: 72 MMHG | SYSTOLIC BLOOD PRESSURE: 128 MMHG | WEIGHT: 168 LBS

## 2017-12-12 DIAGNOSIS — I48.20 CHRONIC ATRIAL FIBRILLATION (H): ICD-10-CM

## 2017-12-12 DIAGNOSIS — I42.8 OTHER CARDIOMYOPATHY (H): Primary | ICD-10-CM

## 2017-12-12 PROCEDURE — 99214 OFFICE O/P EST MOD 30 MIN: CPT | Performed by: PHYSICIAN ASSISTANT

## 2017-12-12 NOTE — PATIENT INSTRUCTIONS
Thank you for your [facility] visit today. Your provider has recommended the following:    Medication Changes:  No changes today, but if you change your mind please call about the lasix (furosemide)  Recommendations:  Cardiac MRI as planned  Follow-up:  Cardiology follow up in 1 week.    Reminder:  Please bring in all current medications, over the counter supplements and vitamin bottles to your next appointment.            [facility]

## 2017-12-12 NOTE — PROGRESS NOTES
Please see separate dictation for HPI, PHYSICAL EXAM AND IMPRESSION/PLAN.    CURRENT MEDICATIONS:  Current Outpatient Prescriptions   Medication Sig Dispense Refill     lisinopril (PRINIVIL/ZESTRIL) 5 MG tablet Take 1 tablet (5 mg) by mouth daily 30 tablet 1     metoprolol (TOPROL-XL) 50 MG 24 hr tablet Take 1 tablet (50 mg) by mouth 2 times daily 60 tablet 1     Multiple Vitamins-Minerals (ICAPS AREDS FORMULA PO) Take 1 tablet by mouth daily       CYANOCOBALAMIN PO Take 500 mcg by mouth daily       Ascorbic Acid (VITAMIN C PO) Take 500 mg by mouth daily       POTASSIUM CITRATE PO Take 5 mEq by mouth daily OTC dose unknown       vitamin  B complex with vitamin C (VITAMIN  B COMPLEX) TABS Take 1 tablet by mouth daily       multivitamin, therapeutic with minerals (MULTI-VITAMIN) TABS Take 1 tablet by mouth daily       VITAMIN D, CHOLECALCIFEROL, PO Take 2,000 Units by mouth daily       warfarin (COUMADIN) 2.5 MG tablet Take 2.5-5 mg by mouth -  For A-fib  INR goal 2-3  5 mg on Monday, Wednesday and Friday  2.5 mg on all other days         ALLERGIES:   No Known Allergies    PAST MEDICAL HISTORY:  Past Medical History:   Diagnosis Date     Antiplatelet or antithrombotic long-term use      Arrhythmia      Atrial fibrillation (H)      Elevated PSA      Unspecified essential hypertension        PAST SURGICAL HISTORY:  Past Surgical History:   Procedure Laterality Date     COLONOSCOPY       EXPLORE GROIN  4/30/2014    Procedure: EXPLORE GROIN;  Surgeon: Sam Aguirre MD;  Location:  OR     HERNIORRHAPHY INGUINAL       HERNIORRHAPHY INGUINAL  4/30/2014    Procedure: HERNIORRHAPHY INGUINAL;  Surgeon: Sam Aguirre MD;  Location:  OR     MOHS MICROGRAPHIC PROCEDURE       PHACOEMULSIFICATION CLEAR CORNEA WITH STANDARD INTRAOCULAR LENS IMPLANT Right 9/8/2015    Procedure: PHACOEMULSIFICATION CLEAR CORNEA WITH STANDARD INTRAOCULAR LENS IMPLANT;  Surgeon: Gil Shannon MD;  Location: [facility] swetha  bursitis[         SOCIAL HISTORY:  Social History     Social History     Marital status:      Spouse name: N/A     Number of children: N/A     Years of education: N/A     Social History Main Topics     Smoking status: Never Smoker     Smokeless tobacco: Never Used     Alcohol use No     Drug use: No     Sexual activity: Not Asked     Other Topics Concern     None     Social History Narrative       FAMILY HISTORY:  History reviewed. No pertinent family history.    Review of Systems:  Skin:  Negative       Eyes:  Positive for glasses    ENT:  Negative      Respiratory:  Positive for dyspnea on exertion     Cardiovascular:  Negative      Gastroenterology: Negative      Genitourinary:  not assessed      Musculoskeletal:  Negative      Neurologic:  Negative      Psychiatric:  Negative      Heme/Lymph/Imm:  Negative      Endocrine:  Negative         Reviewed. Remainder of the note dictated.    Anila Nair PA-C

## 2017-12-12 NOTE — MR AVS SNAPSHOT
After Visit Summary   12/12/2017    Jama Paul    MRN: [id number]           Patient Information     Date Of Birth          2/13/1931        Visit Information        Provider Department      12/12/2017 3:30 PM Anila Nair PA-C [facility]        Today's Diagnoses     Essential hypertension    -  1    Other cardiomyopathy (H)          Care Instructions    Thank you for your [facility] visit today. Your provider has recommended the following:    Medication Changes:  No changes today, but if you change your mind please call about the lasix (furosemide)  Recommendations:  Cardiac MRI as planned  Follow-up:  Cardiology follow up in 1 week.    Reminder:  Please bring in all current medications, over the counter supplements and vitamin bottles to your next appointment.            [facility]              Follow-ups after your visit        Additional Services     Follow-Up with Cardiac Advanced Practice Provider                 Your next 10 appointments already scheduled     Dec 13, 2017  3:15 PM CST   Return Visit with Dorian Rodriguez MD   [facility] [place] ([facility])    [address]  [address]  [place] [address]   [phone]            Dec 15, 2017  9:00 AM CST   MR CARDIAC W CONTRAST STRESS AND FLOW with SCIMR1   [facility] (Cardiovascular Imaging at [facility])    [address]  [address]  [place] [address]   [phone]           Take your medicines as usual, unless your doctor tells you not to.   If you take Viagra, Levitra or Cialis, stop taking it 48 hours before your test.   If you take Aggrenox or dipyridamole (Persantine, Permole), stop taking it 48 hours before your test.   If you take Theophylline or Aminophylline, stop taking it 12 hours before your test.   If you are diabetic and take oral hypoglycemics, do  not take them on the day of your test.  The day before your exam, drink extra fluids at least six 8-ounce glasses (unless your doctor wants you to limit your fluids).  Stop all caffeine 12 hours before the test. This includes coffee, tea, soda, chocolate and certain medicines (such as Anacin, Excedrin and NoDoz). Also avoid decaf coffee and tea, as these contain small amounts of caffeine.  Do not eat or drink for 3 hours before your exam. You may drink water and take your morning medicines.  You may need a blood test (creatinine test) within 30 days of your exam. Follow your doctor s orders if this is arranged before your exam.  If you are very claustrophobic, please let you doctor know. You may get a sedative medicine from your doctor before you arrive. Bring the medicine to the exam. Do not take it at home. Arrive one hour early. Bring someone who can take you home after the test. Your medicine will make you sleepy. After the exam, you may not drive, take a bus or take a taxi by yourself.  The MRI machine uses a strong magnet. Please wear clothes without metal (snaps, zippers). A sweatsuit works well, or we may give you a hospital gown.  Please remove any body piercings and hair extensions before you arrive. You will remove watches, jewelry, hairpins, wallets, dentures, partial dental plates and hearing aids. You may wear contact lenses, and you may be able to wear your rings. We have a safe place to keep your personal items, but it is safer to leave them at home.   **IMPORTANT** THE INSTRUCTIONS BELOW ARE ONLY FOR THOSE PATIENTS WHO HAVE BEEN TOLD THEY WILL RECEIVE SEDATION OR GENERAL ANESTHESIA DURING THEIR MRI PROCEDURE:  IF YOU WILL RECEIVE SEDATION (take medicine to help you relax during your exam):   You must get the medicine from your doctor before you arrive. Bring the medicine to the exam. Do not take it at home.   Arrive one hour early. Bring someone who can take you home after the test. Your medicine  will make you sleepy. After the exam, you may not drive, take a bus or take a taxi by yourself.   No eating 8 hours before your exam. You may have clear liquids up until 4 hours before your exam. (Clear liquids include water, clear tea, black coffee and fruit juice without pulp.)  IF YOU WILL RECEIVE ANESTHESIA (be asleep for your exam):   Arrive 1 1/2 hours early. Bring someone who can take you home after the test. You may not drive, take a bus or take a taxi by yourself.   No eating 8 hours before your exam. You may have clear liquids up until 4 hours before your exam. (Clear liquids include water, clear tea, black coffee and fruit juice without pulp.)  If you have any questions, please contact your Imaging Department exam site.            Dec 21, 2017  1:30 PM CST   Neuro Eval with Shwetha Jorge, PT   [facility] ([facility])    [address]  [address]  [place] [address]   [phone]            Jan 31, 2018 10:00 AM CST   Ech Complete with [id number]   [facility]chocardiography (Cardiovascular Imaging at [facility])    [address]  [place] [address] [phone]           1. Please bring or wear a comfortable two-piece outfit. 2. You may eat, drink and take your normal medicines. 3. For any questions that cannot be answered, please contact the ordering physician            Feb 01, 2018  1:15 PM CST   Return Visit with Contreras Dillard MD   [facility] ([facility])    [address]  [place] [address] [phone]              Future tests that were ordered for you today     Open Future Orders        Priority Expected Expires Ordered    Follow-Up with Cardiac Advanced Practice Provider Routine 12/19/2017 12/12/2019 12/12/2017            Who to contact     If you have questions or need follow up information about today's clinic visit or your  "schedule please contact [facility] directly at [phone].  Normal or non-critical lab and imaging results will be communicated to you by MyChart, letter or phone within 4 business days after the clinic has received the results. If you do not hear from us within 7 days, please contact the clinic through MyChart or phone. If you have a critical or abnormal lab result, we will notify you by phone as soon as possible.  Submit refill requests through [employer] or call your pharmacy and they will forward the refill request to us. Please allow 3 business days for your refill to be completed.          Additional Information About Your Visit        [employer] Information     [employer] lets you send messages to your doctor, view your test results, renew your prescriptions, schedule appointments and more. To sign up, go to [email or web address] . Click on \"Log in\" on the left side of the screen, which will take you to the Welcome page. Then click on \"Sign up Now\" on the right side of the page.     You will be asked to enter the access code listed below, as well as some personal information. Please follow the directions to create your username and password.     Your access code is: [email or web address]  Expires: 1/3/2018  9:31 AM     Your access code will  in 90 days. If you need help or a new code, please call your [facility] clinic or [phone].        Care EveryWhere ID     This is your Care EveryWhere ID. This could be used by other organizations to access your [facility] medical records  [id number]        Your Vitals Were     Pulse Height BMI (Body Mass Index)             79 1.74 m (5' 8.5\") 25.17 kg/m2          Blood Pressure from Last 3 Encounters:   17 128/72   17 (!) 149/95   17 119/75    Weight from Last 3 Encounters:   17 76.2 kg (168 lb)   17 73.3 kg (161 lb 11.2 oz)   17 74 kg (163 lb 3.2 oz)               Primary Care Provider Office Phone # " Fax #    Clint Pizarro [phone]       [address] [address]        Equal Access to Services     CECILE MOURA : Caridad fredis alzo alicia Carrillo, wamaineda luqcarmen, qaybta kaparamda emelia, emani rollins michaelchandan buenrostro laalexandriaantelmo avalos. So [facility] [phone].    ATENCIÓN: Si anishala español, tiene a cleaning disposición servicios gratuitos de asistencia lingüística. Llame al [phone].    We comply with applicable federal civil rights laws and [place] laws. We do not discriminate on the basis of race, color, national origin, age, disability, sex, sexual orientation, or gender identity.            Thank you!     Thank you for choosing [facility]  for your care. Our goal is always to provide you with excellent care. Hearing back from our patients is one way we can continue to improve our services. Please take a few minutes to complete the written survey that you may receive in the mail after your visit with us. Thank you!             Your Updated Medication List - Protect others around you: Learn how to safely use, store and throw away your medicines at www.disposemymeds.org.          This list is accurate as of: 12/12/17  4:09 PM.  Always use your most recent med list.                   Brand Name Dispense Instructions for use Diagnosis    CYANOCOBALAMIN PO      Take 500 mcg by mouth daily        lisinopril 5 MG tablet    PRINIVIL/ZESTRIL    30 tablet    Take 1 tablet (5 mg) by mouth daily    Cardiomyopathy, unspecified type (H)       metoprolol 50 MG 24 hr tablet    TOPROL-XL    60 tablet    Take 1 tablet (50 mg) by mouth 2 times daily    Atrial fibrillation with rapid ventricular response (H)       * Multi-vitamin Tabs tablet      Take 1 tablet by mouth daily        * ICAPS AREDS FORMULA PO      Take 1 tablet by mouth daily        POTASSIUM CITRATE PO      Take 5 mEq by mouth daily OTC dose unknown        vitamin B complex  with vitamin C Tabs tablet      Take 1 tablet by mouth daily        VITAMIN C PO      Take 500 mg by mouth daily        VITAMIN D (CHOLECALCIFEROL) PO      Take 2,000 Units by mouth daily        warfarin 2.5 MG tablet    COUMADIN     Take 2.5-5 mg by mouth - For A-fib INR goal 2-3 5 mg on Monday, Wednesday and Friday 2.5 mg on all other days        * Notice:  This list has 2 medication(s) that are the same as other medications prescribed for you. Read the directions carefully, and ask your doctor or other care provider to review them with you.

## 2017-12-12 NOTE — PROGRESS NOTES
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        The patient's cardiac history includes atrial fibrillation which was first diagnosed about 6 years ago per his report.  It sounds like it was paroxysmal at one time, but now he has likely developed chronic atrial fibrillation.  He recently presented to the emergency department with shortness of breath.  At that point he was noted to be in atrial fibrillation with RVR.  He underwent an echocardiogram which showed a reduction in his EF 30%-35%.  There was moderate to severe global hypokinesis of the left ventricle.  The right ventricle was noted to be moderate to severely dilated as well.  There was mild to moderate mitral regurgitation and moderate to moderately severe tricuspid regurgitation.  Per the patient's report, he last underwent an echocardiogram in 2015 at [facility] (the records are not available to me at this time) at which time he reports everything was \"normal\".  As part of his workup during his recent hospitalization, he did have a chest x-ray which showed an abnormal appearance of the right cardiac silhouette.  He subsequently underwent a CTA of the chest to rule out pulmonary embolism.  No pulmonary embolism was noted.  They did mention that there was an area of chronic atelectasis which the radiologist felt accounted for the abnormality seen on the chest x-ray.  It was noted that he had moderate bilateral pleural effusions on the CTA as well.  He was given some IV Lasix during his hospitalization, but the discharge summary mentions that it was recommended to hold off on oral diuretic therapy.  He was changed from short-acting metoprolol (sounds like the patient was dosing this on his own according to his home blood pressure readings " per prior notes) to Toprol-XL 50 mg b.i.d., which improved his heart rate.  He was also started on lisinopril 5 mg daily.  He was set up for an outpatient stress cardiac MRI.  This was supposed to be performed yesterday but had to be rescheduled due to a metallic wound dressing that patient has placed on his right lower leg as treatment for a chronic wound.      At this point, the patient states that he is overall doing well.  He does state that he has been somewhat sedentary recently.  He denies any shortness of breath at rest, orthopnea or PND.  He does have a little bit of lower extremity edema but states that this is overall stable for him.  He is not having any chest discomfort.  He has multiple good questions about his recent cardiac testing and hospitalization, and we went over all of this in detail.  He has been following his blood pressure closely at home and typically is getting readings in the 120s/80.  He also has been following his heart rate at home which has been in the 70s-80s.      CURRENT CARDIAC MEDICATIONS:   1.  Lisinopril 5 mg daily.   2.  Toprol-XL 50 mg b.i.d.   3.  Coumadin as directed.      The remainder of his medications, allergies and review of systems were reviewed and as are documented separately.      PHYSICAL EXAMINATION:   GENERAL:  The patient is a pleasant 86-year-old gentleman who appears his stated age.  He is in no apparent distress.   VITAL SIGNS:  Blood pressure 128/72, pulse 79, weight is 168 pounds.   LUNGS:  Have slightly decreased breath sounds in the right base but are otherwise clear to auscultation.   CARDIAC:  Reveals an irregular rate and rhythm.  I do not appreciate any murmur.   EXTREMITIES:  Lower extremities show 1 to 2+ pitting edema bilaterally to the knee.   NEUROLOGIC:  Alert and oriented.      ASSESSMENT AND PLAN:  The patient is a pleasant 86-year-old gentleman who has a history of atrial fibrillation, previously it has been noted to be paroxysmal, but it  appears that he has now developed more persistent/chronic atrial fibrillation.  He recently presented with heart failure symptoms and was found to be in atrial fibrillation with RVR.  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  He will be undergoing a stress cardiac MRI later this week for further assessment.  At this point, he is on Toprol-XL and lisinopril.  His blood pressure and heart rate appear to be adequately controlled both in the office and at home.        As Dr. Dillard recently mentioned in her consult note, the patient does have right ventricular enlargement and moderate to moderately severe TR, which is not always seen in the setting of tachycardia-induced cardiomyopathy.  He did have recent CT scan which did not show any evidence of pulmonary embolus.  I think for the time being, we can keep an eye on this and look for improvement with medical therapy.  If it does not improve we can consider further testing such as a sleep study and PFTs.  I did discuss with the patient starting a low dose of diuretic therapy, but he declined for the time being.  I told him that this can be rediscussed next week when he follows up after the cardiac MRI (the patient and his family preferred to set up an in-office followup after the MRI to discuss results).      Of note, at the very end of our visit, the patient's daughter caught me in the hallway and mentioned that her father had not been very compliant with taking his medications regularly before his recent hospitalization.      Again, at this time I recommended following up with the stress cardiac MRI later this week.  I did not make any medication changes today but did let the patient and his family know that if he changes his mind about the diuretic therapy, they can certainly contact me.  Otherwise, this can be  rediscussed at his followup next week.  He also does have a followup echocardiogram planned in late January and the patient has requested to follow up with Dr. Villatoro following that study as he had previously met with Dr. Villatoro when he was first diagnosed with atrial fibrillation back in .      Thank you for allowing me to participate in the care of this very pleasant patient.         KYMBERLY GOLDEN PA-C             D: 2017 16:31   T: 2017 17:17   MT:       Name:     YINKA GONZALEZ   MRN:      [id number]        Account:      [id number]   :      1931           Service Date: 2017      Document: [id number]

## 2017-12-12 NOTE — LETTER
"12/12/2017      Clint Pizarro MD  [address] [address]      RE: Jama Smith Humberto       Dear Colleague,    I had the pleasure of seeing Jama Arroyosen in the [facility].    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        The patient's cardiac history includes atrial fibrillation which was first diagnosed about 6 years ago per his report.  It sounds like it was paroxysmal at one time, but now he has likely developed chronic atrial fibrillation.  He recently presented to the emergency department with shortness of breath.  At that point he was noted to be in atrial fibrillation with RVR.  He underwent an echocardiogram which showed a reduction in his EF 30%-35%.  There was moderate to severe global hypokinesis of the left ventricle.  The right ventricle was noted to be moderate to severely dilated as well.  There was mild to moderate mitral regurgitation and moderate to moderately severe tricuspid regurgitation.  Per the patient's report, he last underwent an echocardiogram in 2015 at [facility] (the records are not available to me at this time) at which time he reports everything was \"normal\".  As part of his workup during his recent hospitalization, he did have a chest x-ray which showed an abnormal appearance of the right cardiac silhouette.  He subsequently underwent a CTA of the chest to rule out pulmonary embolism.  No pulmonary embolism was noted.  They did mention that there was an area of chronic atelectasis which the radiologist felt accounted for the abnormality seen on the chest x-ray.  It was noted that he had moderate bilateral pleural effusions on the CTA as well.  He was given some IV Lasix during his " hospitalization, but the discharge summary mentions that it was recommended to hold off on oral diuretic therapy.  He was changed from short-acting metoprolol (sounds like the patient was dosing this on his own according to his home blood pressure readings per prior notes) to Toprol-XL 50 mg b.i.d., which improved his heart rate.  He was also started on lisinopril 5 mg daily.  He was set up for an outpatient stress cardiac MRI.  This was supposed to be performed yesterday but had to be rescheduled due to a metallic wound dressing that patient has placed on his right lower leg as treatment for a chronic wound.      At this point, the patient states that he is overall doing well.  He does state that he has been somewhat sedentary recently.  He denies any shortness of breath at rest, orthopnea or PND.  He does have a little bit of lower extremity edema but states that this is overall stable for him.  He is not having any chest discomfort.  He has multiple good questions about his recent cardiac testing and hospitalization, and we went over all of this in detail.  He has been following his blood pressure closely at home and typically is getting readings in the 120s/80.  He also has been following his heart rate at home which has been in the 70s-80s.      CURRENT CARDIAC MEDICATIONS:   1.  Lisinopril 5 mg daily.   2.  Toprol-XL 50 mg b.i.d.   3.  Coumadin as directed.      The remainder of his medications, allergies and review of systems were reviewed and as are documented separately.      PHYSICAL EXAMINATION:   GENERAL:  The patient is a pleasant 86-year-old gentleman who appears his stated age.  He is in no apparent distress.   VITAL SIGNS:  Blood pressure 128/72, pulse 79, weight is 168 pounds.   LUNGS:  Have slightly decreased breath sounds in the right base but are otherwise clear to auscultation.   CARDIAC:  Reveals an irregular rate and rhythm.  I do not appreciate any murmur.   EXTREMITIES:  Lower extremities  show 1 to 2+ pitting edema bilaterally to the knee.   NEUROLOGIC:  Alert and oriented.      ASSESSMENT AND PLAN:  The patient is a pleasant 86-year-old gentleman who has a history of atrial fibrillation, previously it has been noted to be paroxysmal, but it appears that he has now developed more persistent/chronic atrial fibrillation.  He recently presented with heart failure symptoms and was found to be in atrial fibrillation with RVR.  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  He will be undergoing a stress cardiac MRI later this week for further assessment.  At this point, he is on Toprol-XL and lisinopril.  His blood pressure and heart rate appear to be adequately controlled both in the office and at home.        As Dr. Dillard recently mentioned in her consult note, the patient does have right ventricular enlargement and moderate to moderately severe TR, which is not always seen in the setting of tachycardia-induced cardiomyopathy.  He did have recent CT scan which did not show any evidence of pulmonary embolus.  I think for the time being, we can keep an eye on this and look for improvement with medical therapy.  If it does not improve we can consider further testing such as a sleep study and PFTs.  I did discuss with the patient starting a low dose of diuretic therapy, but he declined for the time being.  I told him that this can be rediscussed next week when he follows up after the cardiac MRI (the patient and his family preferred to set up an in-office followup after the MRI to discuss results).      Of note, at the very end of our visit, the patient's daughter caught me in the hallway and mentioned that her father had not been very compliant with taking his medications regularly before his recent hospitalization.      Again, at this time I recommended  following up with the stress cardiac MRI later this week.  I did not make any medication changes today but did let the patient and his family know that if he changes his mind about the diuretic therapy, they can certainly contact me.  Otherwise, this can be rediscussed at his followup next week.  He also does have a followup echocardiogram planned in late January and the patient has requested to follow up with Dr. Villatoro following that study as he had previously met with Dr. Villatoro when he was first diagnosed with atrial fibrillation back in 2008.      Thank you for allowing me to participate in the care of this very pleasant patient.     Outpatient Encounter Prescriptions as of 12/12/2017   Medication Sig Dispense Refill     [DISCONTINUED] lisinopril (PRINIVIL/ZESTRIL) 5 MG tablet Take 1 tablet (5 mg) by mouth daily 30 tablet 1     [DISCONTINUED] metoprolol (TOPROL-XL) 50 MG 24 hr tablet Take 1 tablet (50 mg) by mouth 2 times daily 60 tablet 1     Multiple Vitamins-Minerals (ICAPS AREDS FORMULA PO) Take 1 tablet by mouth daily       CYANOCOBALAMIN PO Take 500 mcg by mouth daily       Ascorbic Acid (VITAMIN C PO) Take 500 mg by mouth daily       POTASSIUM CITRATE PO Take 5 mEq by mouth daily OTC dose unknown       vitamin  B complex with vitamin C (VITAMIN  B COMPLEX) TABS Take 1 tablet by mouth daily       multivitamin, therapeutic with minerals (MULTI-VITAMIN) TABS Take 1 tablet by mouth daily       VITAMIN D, CHOLECALCIFEROL, PO Take 2,000 Units by mouth daily       warfarin (COUMADIN) 2.5 MG tablet Take 2.5-5 mg by mouth -  For A-fib  INR goal 2-3  5 mg on Monday, Wednesday and Friday  2.5 mg on all other days       No facility-administered encounter medications on file as of 12/12/2017.      Again, thank you for allowing me to participate in the care of your patient.      Sincerely,    Anila Nair PA-C     [facility]

## 2017-12-13 ENCOUNTER — HOSPITAL ENCOUNTER (OUTPATIENT)
Dept: WOUND CARE | Facility: CLINIC | Age: 82
Discharge: HOME | End: 2017-12-13
Attending: SURGERY | Admitting: SURGERY
Payer: MEDICARE

## 2017-12-13 VITALS — TEMPERATURE: 98.1 F | HEART RATE: 103 BPM | SYSTOLIC BLOOD PRESSURE: 138 MMHG | DIASTOLIC BLOOD PRESSURE: 74 MMHG

## 2017-12-13 PROCEDURE — 97597 DBRDMT OPN WND 1ST 20 CM/<: CPT

## 2017-12-13 PROCEDURE — 97597 DBRDMT OPN WND 1ST 20 CM/<: CPT | Performed by: SURGERY

## 2017-12-13 PROCEDURE — A6213 FOAM DRG >16<=48 SQ IN W/BDR: HCPCS

## 2017-12-13 PROCEDURE — A6210 FOAM DRG >16<=48 SQ IN W/O B: HCPCS

## 2017-12-14 NOTE — PROGRESS NOTES
[facility]      DATE OF SERVICE:  12/13/2017.         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  He was treated for a time with a VAC.  We saw him first for this on 11/22/2017.  We evacuated the hematoma and have been dressing the wound with Silvercel.  The patient has no new complaints.      PHYSICAL EXAMINATION:  Today, the patient is afebrile with temperature of 98.1, blood pressure 138/74, pulse of 103.  The patient does have a wound on the right lower leg which measures 5.2 x 6.6 x 0.4 cm with undermining from 12 to 1 of up to 3.8 cm.      The wound measures smaller but the undermining going cephalad is increased.  There is no sign of acute infection but the wound should be debrided.      PROCEDURE NOTE:  After informed consent and topical anesthetic of 4% Xylocaine, a sharp curet is used to curet out the blood that underlies the undermined area.  There is no ongoing bleeding identified.  Pressure secured hemostasis and the patient tolerated the procedure well.        IMPRESSION:  I am concerned that perhaps the nurse that is dressing this wound is packing the wound a little bit tight cephalad.      PLAN:  We are going to ask them just to put in about 2 cm of the dressing and we are going to be using iodoform as the dressing.  They will just tuck this under the undermined area about 2 cm to facilitate drainage of that area and get that skin to stick down to the fascia.  This will be changed daily.  The patient will return to the [facility] to see Dr. Benjamin in about 3 weeks' time.  I did discuss with him the advantage of a skin graft,  this could increase the speed of ulcer closure, but it is not without risk.  He will discuss this with Dr. Benjamin on his next visit.         CLARISSA CUEVAS MD             D: 12/13/2017 15:51   T: 12/14/2017 06:31   MT:       Name:     YINKA GONZALEZ   MRN:      [id number]         Account:      [id number]   :      1931           Visit Date:   2017      Document: [id number]

## 2017-12-15 ENCOUNTER — HOSPITAL ENCOUNTER (OUTPATIENT)
Dept: CARDIOLOGY | Facility: CLINIC | Age: 82
Discharge: HOME | End: 2017-12-15
Attending: INTERNAL MEDICINE | Admitting: INTERNAL MEDICINE
Payer: MEDICARE

## 2017-12-15 DIAGNOSIS — I50.9 CONGESTIVE HEART FAILURE, UNSPECIFIED CONGESTIVE HEART FAILURE CHRONICITY, UNSPECIFIED CONGESTIVE HEART FAILURE TYPE: ICD-10-CM

## 2017-12-15 PROCEDURE — A9585 GADOBUTROL INJECTION: HCPCS | Performed by: INTERNAL MEDICINE

## 2017-12-15 PROCEDURE — 75563 CARD MRI W/STRESS IMG & DYE: CPT | Mod: 26 | Performed by: INTERNAL MEDICINE

## 2017-12-15 PROCEDURE — 93016 CV STRESS TEST SUPVJ ONLY: CPT | Performed by: INTERNAL MEDICINE

## 2017-12-15 PROCEDURE — 93017 CV STRESS TEST TRACING ONLY: CPT

## 2017-12-15 PROCEDURE — 93018 CV STRESS TEST I&R ONLY: CPT | Performed by: INTERNAL MEDICINE

## 2017-12-15 PROCEDURE — 25000128 H RX IP 250 OP 636: Performed by: INTERNAL MEDICINE

## 2017-12-15 RX ORDER — GADOBUTROL 604.72 MG/ML
5-65 INJECTION INTRAVENOUS ONCE
Status: COMPLETED | OUTPATIENT
Start: 2017-12-15 | End: 2017-12-15

## 2017-12-15 RX ORDER — ALBUTEROL SULFATE 90 UG/1
2 AEROSOL, METERED RESPIRATORY (INHALATION) EVERY 5 MIN PRN
Status: DISCONTINUED | OUTPATIENT
Start: 2017-12-15 | End: 2017-12-16 | Stop reason: HOSPADM

## 2017-12-15 RX ORDER — AMINOPHYLLINE 25 MG/ML
100 INJECTION, SOLUTION INTRAVENOUS ONCE
Status: COMPLETED | OUTPATIENT
Start: 2017-12-15 | End: 2017-12-15

## 2017-12-15 RX ORDER — REGADENOSON 0.08 MG/ML
0.4 INJECTION, SOLUTION INTRAVENOUS ONCE
Status: COMPLETED | OUTPATIENT
Start: 2017-12-15 | End: 2017-12-15

## 2017-12-15 RX ORDER — DIAZEPAM 5 MG
5 TABLET ORAL EVERY 30 MIN PRN
Status: DISCONTINUED | OUTPATIENT
Start: 2017-12-15 | End: 2017-12-16 | Stop reason: HOSPADM

## 2017-12-15 RX ORDER — ACYCLOVIR 200 MG/1
0-1 CAPSULE ORAL
Status: DISCONTINUED | OUTPATIENT
Start: 2017-12-15 | End: 2017-12-16 | Stop reason: HOSPADM

## 2017-12-15 RX ADMIN — REGADENOSON 0.4 MG: 0.08 INJECTION, SOLUTION INTRAVENOUS at 10:30

## 2017-12-15 RX ADMIN — AMINOPHYLLINE 100 MG: 25 INJECTION, SOLUTION INTRAVENOUS at 10:34

## 2017-12-15 RX ADMIN — GADOBUTROL 20 ML: 604.72 INJECTION INTRAVENOUS at 11:37

## 2017-12-20 ENCOUNTER — OFFICE VISIT (OUTPATIENT)
Dept: CARDIOLOGY | Facility: CLINIC | Age: 82
End: 2017-12-20
Attending: PHYSICIAN ASSISTANT
Payer: MEDICARE

## 2017-12-20 VITALS
HEIGHT: 69 IN | WEIGHT: 171 LBS | HEART RATE: 110 BPM | BODY MASS INDEX: 25.33 KG/M2 | DIASTOLIC BLOOD PRESSURE: 100 MMHG | SYSTOLIC BLOOD PRESSURE: 150 MMHG | OXYGEN SATURATION: 99 %

## 2017-12-20 DIAGNOSIS — I50.23 ACUTE ON CHRONIC SYSTOLIC CONGESTIVE HEART FAILURE (H): Primary | ICD-10-CM

## 2017-12-20 DIAGNOSIS — I42.8 OTHER CARDIOMYOPATHY (H): ICD-10-CM

## 2017-12-20 DIAGNOSIS — I48.91 ATRIAL FIBRILLATION WITH RAPID VENTRICULAR RESPONSE (H): ICD-10-CM

## 2017-12-20 PROCEDURE — 99214 OFFICE O/P EST MOD 30 MIN: CPT | Mod: 25 | Performed by: NURSE PRACTITIONER

## 2017-12-20 PROCEDURE — 93000 ELECTROCARDIOGRAM COMPLETE: CPT | Performed by: NURSE PRACTITIONER

## 2017-12-20 RX ORDER — FUROSEMIDE 20 MG
20 TABLET ORAL PRN
Qty: 30 TABLET | Refills: 11 | Status: SHIPPED | OUTPATIENT
Start: 2017-12-20 | End: 2017-12-21

## 2017-12-20 RX ORDER — METOPROLOL SUCCINATE 100 MG/1
100 TABLET, EXTENDED RELEASE ORAL 2 TIMES DAILY
Qty: 60 TABLET | Refills: 11 | Status: SHIPPED | OUTPATIENT
Start: 2017-12-20 | End: 2017-12-21

## 2017-12-20 NOTE — PATIENT INSTRUCTIONS
1) STOP Lisinopril     2) Increase Metoprolol XL to 100 mg twice daily    3) Lasix 20 mg as needed weight gain of 2-3 lbs in 24 hours or 5 lbs in week.    4) Follow up 2-3 weeks    5) Weigh yourself daily    6) [phone]

## 2017-12-20 NOTE — LETTER
12/20/2017    Clint Pizarro MD  [address] [address]    RE: Jama Paul       Dear Colleague,    I had the pleasure of seeing Jama Paul in the [facility].    Cardiology Clinic Progress Note  Jama Paul MRN# [id number]   YOB: 1931 Age: 86 year old     Reason for visit: Follow up atrial fibrillation and tricuspid regurgitation           Assessment and Plan:     1. Atrial fibrillation with rapid ventricular rate    EKG showed rate at 111 bpm    Discontinue lisinopril    Increase Toprol XL to 100 mg twice daily    Follow up in 2 weeks    2. Moderate to severe tricuspid regurgitation    3. 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  His past medical history significant for atrial fibrillation that was diagnosed 6 years ago.  It was paroxysmal at one time and also progressed to chronic.  He was recently seen in the emergency department for shortness of breath and found to be in atrial fibrillation with a rapid ventricular's response.  Echocardiogram revealed a new cardiomyopathy with his LVEF was 30-35% with moderate to severe global hypokinesis LV.  There was mild to moderate mitral regurgitation and moderate to moderate severe tricuspid regurgitation.     Cardiac MRI that showed his LVEF was 42% with moderate global hypokinesis of the LV, but RV was mildly dilated with an EF of 45% and there was moderate to severe TR.  There is no ischemia noted on the study.    Today in clinic his blood pressures elevated at 150/100 and his EKG reveals he is in rapid atrial fibrillation at 111 bpm.   His daughter.  "Lorraine accompanies the patient and his wife to the visit today.  She states he is under an immense amount of personal stress.  She states that his son is an alcoholic and is really not to live much longer.          This note was completed in part using Dragon voice recognition software. Although reviewed after completion, some word and grammatical errors may occur       Review of Systems:   Review of Systems:  Skin:  Negative     Eyes:  Positive for glasses  ENT:  Negative    Respiratory:  Positive for dyspnea on exertion;shortness of breath;dyspnea at rest  Cardiovascular:  Negative edema  Gastroenterology: Negative    Genitourinary:  Negative    Musculoskeletal:  Negative    Neurologic:  Negative    Psychiatric:  Negative    Heme/Lymph/Imm:  Negative    Endocrine:  Negative                Physical Exam:     Vitals: BP (!) 150/100 (BP Location: Left arm, Patient Position: Chair, Cuff Size: Adult Regular)  Pulse 110  Ht 1.74 m (5' 8.5\")  Wt 77.6 kg (171 lb)  SpO2 99%  BMI 25.62 kg/m2  Constitutional:  cooperative;well developed frail      Skin:  warm and dry to the touch;no apparent skin lesions or masses noted        Head:  normocephalic        Eyes:  pupils equal and round        ENT:  no pallor or cyanosis        Chest:  clear to auscultation;normal symmetry        Cardiac:   irregular rhythm                Abdomen:  abdomen soft        Extremities and Back:  no edema        Neurological:  affect appropriate             Medications:     Current Outpatient Prescriptions   Medication Sig Dispense Refill     lisinopril (PRINIVIL/ZESTRIL) 5 MG tablet Take 1 tablet (5 mg) by mouth daily 30 tablet 1     metoprolol (TOPROL-XL) 50 MG 24 hr tablet Take 1 tablet (50 mg) by mouth 2 times daily 60 tablet 1     warfarin (COUMADIN) 2.5 MG tablet Take 2.5-5 mg by mouth -  For A-fib  INR goal 2-3  5 mg on Monday, Wednesday and Friday  2.5 mg on all other days       Multiple Vitamins-Minerals (ICAPS AREDS FORMULA PO) Take 1 " tablet by mouth daily       CYANOCOBALAMIN PO Take 500 mcg by mouth daily       Ascorbic Acid (VITAMIN C PO) Take 500 mg by mouth daily       POTASSIUM CITRATE PO Take 5 mEq by mouth daily OTC dose unknown       vitamin  B complex with vitamin C (VITAMIN  B COMPLEX) TABS Take 1 tablet by mouth daily       multivitamin, therapeutic with minerals (MULTI-VITAMIN) TABS Take 1 tablet by mouth daily       VITAMIN D, CHOLECALCIFEROL, PO Take 2,000 Units by mouth daily             Family History   Problem Relation Age of Onset     HEART DISEASE No family hx of        Social History     Social History     Marital status:      Spouse name: N/A     Number of children: N/A     Years of education: N/A     Occupational History     Not on file.     Social History Main Topics     Smoking status: Never Smoker     Smokeless tobacco: Never Used     Alcohol use No     Drug use: No     Sexual activity: Not on file     Other Topics Concern     Not on file     Social History Narrative            Past Medical History:     Past Medical History:   Diagnosis Date     Antiplatelet or antithrombotic long-term use      Arrhythmia      Atrial fibrillation (H)      Elevated PSA      Unspecified essential hypertension               Past Surgical History:     Past Surgical History:   Procedure Laterality Date     COLONOSCOPY       EXPLORE GROIN  4/30/2014    Procedure: EXPLORE GROIN;  Surgeon: Sam Aguirre MD;  Location:  OR     HERNIORRHAPHY INGUINAL       HERNIORRHAPHY INGUINAL  4/30/2014    Procedure: HERNIORRHAPHY INGUINAL;  Surgeon: Sam Aguirre MD;  Location:  OR     MOHS MICROGRAPHIC PROCEDURE       PHACOEMULSIFICATION CLEAR CORNEA WITH STANDARD INTRAOCULAR LENS IMPLANT Right 9/8/2015    Procedure: PHACOEMULSIFICATION CLEAR CORNEA WITH STANDARD INTRAOCULAR LENS IMPLANT;  Surgeon: Gil Shannon MD;  Location:  EC     septic olecranon bursitis[                Allergies:   Review of patient's allergies indicates no  known allergies.       Data:   All laboratory data reviewed:    LAST CHOLESTEROL:  Lab Results   Component Value Date    CHOL 127 04/18/2012     Lab Results   Component Value Date    HDL 56 04/18/2012     Lab Results   Component Value Date    LDL 55 04/18/2012     Lab Results   Component Value Date    TRIG 81 04/18/2012     Lab Results   Component Value Date    CHOLHDLRATIO 2.3 04/18/2012       LAST BMP:  Lab Results   Component Value Date     12/08/2017      Lab Results   Component Value Date    POTASSIUM 4.0 12/08/2017     Lab Results   Component Value Date    CHLORIDE 106 12/08/2017     Lab Results   Component Value Date    BARON 8.2 12/08/2017     Lab Results   Component Value Date    CO2 25 12/08/2017     Lab Results   Component Value Date    BUN 21 12/08/2017     Lab Results   Component Value Date    CR 1.07 12/08/2017     Lab Results   Component Value Date    GLC 87 12/08/2017       LAST CBC:  Lab Results   Component Value Date    WBC 7.6 12/07/2017     Lab Results   Component Value Date    RBC 3.44 12/07/2017     Lab Results   Component Value Date    HGB 10.6 12/07/2017     Lab Results   Component Value Date    HCT 33.1 12/07/2017     Lab Results   Component Value Date    MCV 96 12/07/2017     Lab Results   Component Value Date    MCH 30.8 12/07/2017     Lab Results   Component Value Date    MCHC 32.0 12/07/2017     Lab Results   Component Value Date    RDW 19.2 12/07/2017     Lab Results   Component Value Date    PLT 52 12/07/2017     Thank you for allowing me to participate in the care of your patient.    Sincerely,     ERIC BROWN [facility]

## 2017-12-20 NOTE — PROGRESS NOTES
Cardiology Clinic Progress Note  Jama Paul MRN# [id number]   YOB: 1931 Age: 86 year old     Reason for visit: Follow up atrial fibrillation and tricuspid regurgitation           Assessment and Plan:     1. Atrial fibrillation with rapid ventricular rate    EKG showed rate at 111 bpm    Discontinue lisinopril    Increase Toprol XL to 100 mg twice daily    Follow up in 2 weeks    2. Moderate to severe tricuspid regurgitation    3. 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  His past medical history significant for atrial fibrillation that was diagnosed 6 years ago.  It was paroxysmal at one time and also progressed to chronic.  He was recently seen in the emergency department for shortness of breath and found to be in atrial fibrillation with a rapid ventricular's response.  Echocardiogram revealed a new cardiomyopathy with his LVEF was 30-35% with moderate to severe global hypokinesis LV.  There was mild to moderate mitral regurgitation and moderate to moderate severe tricuspid regurgitation.     Cardiac MRI that showed his LVEF was 42% with moderate global hypokinesis of the LV, but RV was mildly dilated with an EF of 45% and there was moderate to severe TR.  There is no ischemia noted on the study.    Today in clinic his blood pressures elevated at 150/100 and his EKG reveals he is in rapid atrial fibrillation at 111 bpm.   His daughterKarla Peters accompanies the patient and his wife to the visit today.  She states he is under an immense amount of personal stress.  She states that his son is an alcoholic and is really not to live much longer.          This note was completed in part using  "Dragon voice recognition software. Although reviewed after completion, some word and grammatical errors may occur       Review of Systems:   Review of Systems:  Skin:  Negative     Eyes:  Positive for glasses  ENT:  Negative    Respiratory:  Positive for dyspnea on exertion;shortness of breath;dyspnea at rest  Cardiovascular:  Negative edema  Gastroenterology: Negative    Genitourinary:  Negative    Musculoskeletal:  Negative    Neurologic:  Negative    Psychiatric:  Negative    Heme/Lymph/Imm:  Negative    Endocrine:  Negative                Physical Exam:     Vitals: BP (!) 150/100 (BP Location: Left arm, Patient Position: Chair, Cuff Size: Adult Regular)  Pulse 110  Ht 1.74 m (5' 8.5\")  Wt 77.6 kg (171 lb)  SpO2 99%  BMI 25.62 kg/m2  Constitutional:  cooperative;well developed frail      Skin:  warm and dry to the touch;no apparent skin lesions or masses noted        Head:  normocephalic        Eyes:  pupils equal and round        ENT:  no pallor or cyanosis        Chest:  clear to auscultation;normal symmetry        Cardiac:   irregular rhythm                Abdomen:  abdomen soft        Extremities and Back:  no edema        Neurological:  affect appropriate             Medications:     Current Outpatient Prescriptions   Medication Sig Dispense Refill     lisinopril (PRINIVIL/ZESTRIL) 5 MG tablet Take 1 tablet (5 mg) by mouth daily 30 tablet 1     metoprolol (TOPROL-XL) 50 MG 24 hr tablet Take 1 tablet (50 mg) by mouth 2 times daily 60 tablet 1     warfarin (COUMADIN) 2.5 MG tablet Take 2.5-5 mg by mouth -  For A-fib  INR goal 2-3  5 mg on Monday, Wednesday and Friday  2.5 mg on all other days       Multiple Vitamins-Minerals (ICAPS AREDS FORMULA PO) Take 1 tablet by mouth daily       CYANOCOBALAMIN PO Take 500 mcg by mouth daily       Ascorbic Acid (VITAMIN C PO) Take 500 mg by mouth daily       POTASSIUM CITRATE PO Take 5 mEq by mouth daily OTC dose unknown       vitamin  B complex with vitamin C " (VITAMIN  B COMPLEX) TABS Take 1 tablet by mouth daily       multivitamin, therapeutic with minerals (MULTI-VITAMIN) TABS Take 1 tablet by mouth daily       VITAMIN D, CHOLECALCIFEROL, PO Take 2,000 Units by mouth daily             Family History   Problem Relation Age of Onset     HEART DISEASE No family hx of        Social History     Social History     Marital status:      Spouse name: N/A     Number of children: N/A     Years of education: N/A     Occupational History     Not on file.     Social History Main Topics     Smoking status: Never Smoker     Smokeless tobacco: Never Used     Alcohol use No     Drug use: No     Sexual activity: Not on file     Other Topics Concern     Not on file     Social History Narrative            Past Medical History:     Past Medical History:   Diagnosis Date     Antiplatelet or antithrombotic long-term use      Arrhythmia      Atrial fibrillation (H)      Elevated PSA      Unspecified essential hypertension               Past Surgical History:     Past Surgical History:   Procedure Laterality Date     COLONOSCOPY       EXPLORE GROIN  4/30/2014    Procedure: EXPLORE GROIN;  Surgeon: Sam Aguirre MD;  Location:  OR     HERNIORRHAPHY INGUINAL       HERNIORRHAPHY INGUINAL  4/30/2014    Procedure: HERNIORRHAPHY INGUINAL;  Surgeon: Sam Aguirre MD;  Location:  OR     MOHS MICROGRAPHIC PROCEDURE       PHACOEMULSIFICATION CLEAR CORNEA WITH STANDARD INTRAOCULAR LENS IMPLANT Right 9/8/2015    Procedure: PHACOEMULSIFICATION CLEAR CORNEA WITH STANDARD INTRAOCULAR LENS IMPLANT;  Surgeon: Gil Shannon MD;  Location:  EC     septic olecranon bursitis[                Allergies:   Review of patient's allergies indicates no known allergies.       Data:   All laboratory data reviewed:    LAST CHOLESTEROL:  Lab Results   Component Value Date    CHOL 127 04/18/2012     Lab Results   Component Value Date    HDL 56 04/18/2012     Lab Results   Component Value Date    LDL  55 04/18/2012     Lab Results   Component Value Date    TRIG 81 04/18/2012     Lab Results   Component Value Date    CHOLHDLRATIO 2.3 04/18/2012       LAST BMP:  Lab Results   Component Value Date     12/08/2017      Lab Results   Component Value Date    POTASSIUM 4.0 12/08/2017     Lab Results   Component Value Date    CHLORIDE 106 12/08/2017     Lab Results   Component Value Date    BARON 8.2 12/08/2017     Lab Results   Component Value Date    CO2 25 12/08/2017     Lab Results   Component Value Date    BUN 21 12/08/2017     Lab Results   Component Value Date    CR 1.07 12/08/2017     Lab Results   Component Value Date    GLC 87 12/08/2017       LAST CBC:  Lab Results   Component Value Date    WBC 7.6 12/07/2017     Lab Results   Component Value Date    RBC 3.44 12/07/2017     Lab Results   Component Value Date    HGB 10.6 12/07/2017     Lab Results   Component Value Date    HCT 33.1 12/07/2017     Lab Results   Component Value Date    MCV 96 12/07/2017     Lab Results   Component Value Date    MCH 30.8 12/07/2017     Lab Results   Component Value Date    MCHC 32.0 12/07/2017     Lab Results   Component Value Date    RDW 19.2 12/07/2017     Lab Results   Component Value Date    PLT 52 12/07/2017         ERIC BROWN, CNP

## 2017-12-20 NOTE — MR AVS SNAPSHOT
After Visit Summary   12/20/2017    Jama Paul    MRN: [id number]           Patient Information     Date Of Birth          2/13/1931        Visit Information        Provider Department      12/20/2017 3:00 PM Vanita Baig APRN CNP [facility]        Today's Diagnoses     Other cardiomyopathy (H)        Atrial fibrillation with rapid ventricular response (H)          Care Instructions    1) STOP Lisinopril     2) Increase Metoprolol XL to 100 mg twice daily    3) Lasix 20 mg as needed weight gain of 2-3 lbs in 24 hours or 5 lbs in week.    4) Follow up 2-3 weeks    5) Weigh yourself daily    6) [phone]          Follow-ups after your visit        Your next 10 appointments already scheduled     Juan 15, 2018 11:15 AM CST   Return Visit with Wesley Benjamin MD   [facility] ([facility])    [address]  [place] [id number] [phone]            Jan 31, 2018 10:00 AM CST   Ech Complete with [id number]   [facility] ([facility])    [address]  [place] [id number] [phone]           1. Please bring or wear a comfortable two-piece outfit. 2. You may eat, drink and take your normal medicines. 3. For any questions that cannot be answered, please contact the ordering physician            Feb 06, 2018 10:45 AM CST   Return Visit with Cameron Villatoro MD   [facility] ([facility])    [address]  [place] [id number] [phone]              Who to contact     If you have questions or need follow up information about today's clinic visit or your schedule please contact [facility] directly at [phone].  Normal or non-critical lab and imaging results will be communicated to you by  "MyChart, letter or phone within 4 business days after the clinic has received the results. If you do not hear from us within 7 days, please contact the clinic through [employer] or phone. If you have a critical or abnormal lab result, we will notify you by phone as soon as possible.  Submit refill requests through [employer] or call your pharmacy and they will forward the refill request to us. Please allow 3 business days for your refill to be completed.          Additional Information About Your Visit        [employer] Residential Information     [employer] lets you send messages to your doctor, view your test results, renew your prescriptions, schedule appointments and more. To sign up, go to [email or web address] . Click on \"Log in\" on the left side of the screen, which will take you to the Welcome page. Then click on \"Sign up Now\" on the right side of the page.     You will be asked to enter the access code listed below, as well as some personal information. Please follow the directions to create your username and password.     Your access code is: [email or web address]  Expires: 1/3/2018  9:31 AM     Your access code will  in 90 days. If you need help or a new code, please call your [place] clinic or [phone].        Care EveryWhere ID     This is your Care EveryWhere ID. This could be used by other organizations to access your [place] medical records  [id number]        Your Vitals Were     Pulse Height Pulse Oximetry BMI (Body Mass Index)          110 1.74 m (5' 8.5\") 99% 25.62 kg/m2         Blood Pressure from Last 3 Encounters:   17 (!) 150/100   17 138/74   17 128/72    Weight from Last 3 Encounters:   17 77.6 kg (171 lb)   17 76.2 kg (168 lb)   17 73.3 kg (161 lb 11.2 oz)              We Performed the Following     EKG 12-lead complete w/read - Clinics (performed today)     Follow-Up with Cardiac Advanced Practice Provider          Today's Medication Changes          These changes are " accurate as of: 12/20/17  3:55 PM.  If you have any questions, ask your nurse or doctor.               These medicines have changed or have updated prescriptions.        Dose/Directions    metoprolol 100 MG 24 hr tablet   Commonly known as:  TOPROL-XL   This may have changed:    - medication strength  - how much to take   Used for:  Atrial fibrillation with rapid ventricular response (H)   Changed by:  Vanita Baig APRN CNP        Dose:  100 mg   Take 1 tablet (100 mg) by mouth 2 times daily   Quantity:  60 tablet   Refills:  11         Stop taking these medicines if you haven't already. Please contact your care team if you have questions.     lisinopril 5 MG tablet   Commonly known as:  PRINIVIL/ZESTRIL   Stopped by:  Vanita Baig APRN CNP                Where to get your medicines      These medications were sent to [facility] [address] [place] [address] & [address] [address]    Hours:  24-hours Phone:  [phone]     metoprolol 100 MG 24 hr tablet                Primary Care Provider Office Phone # Fax #    Clint Pizarro [phone]       [address] [address]        Equal Access to Services     CECILE MOURA AH: Hadii aad ku hadasho Soomaali, waaxda luqadaha, qaybta kaalmada adeegyada, waxay idiin hayecho avalos. So [facility] [phone].    ATENCIÓN: Si habla español, tiene a cleaning disposición servicios gratuitos de asistencia lingüística. ame al [phone].    We comply with applicable federal civil rights laws and [place] laws. We do not discriminate on the basis of race, color, national origin, age, disability, sex, sexual orientation, or gender identity.            Thank you!     Thank you for choosing [facility]  for your care. Our goal is always to provide you with excellent care. Hearing back from our patients is one way  we can continue to improve our services. Please take a few minutes to complete the written survey that you may receive in the mail after your visit with us. Thank you!             Your Updated Medication List - Protect others around you: Learn how to safely use, store and throw away your medicines at www.disposemymeds.org.          This list is accurate as of: 12/20/17  3:55 PM.  Always use your most recent med list.                   Brand Name Dispense Instructions for use Diagnosis    CYANOCOBALAMIN PO      Take 500 mcg by mouth daily        metoprolol 100 MG 24 hr tablet    TOPROL-XL    60 tablet    Take 1 tablet (100 mg) by mouth 2 times daily    Atrial fibrillation with rapid ventricular response (H)       * Multi-vitamin Tabs tablet      Take 1 tablet by mouth daily        * ICAPS AREDS FORMULA PO      Take 1 tablet by mouth daily        POTASSIUM CITRATE PO      Take 5 mEq by mouth daily OTC dose unknown        vitamin B complex with vitamin C Tabs tablet      Take 1 tablet by mouth daily        VITAMIN C PO      Take 500 mg by mouth daily        VITAMIN D (CHOLECALCIFEROL) PO      Take 2,000 Units by mouth daily        warfarin 2.5 MG tablet    COUMADIN     Take 2.5-5 mg by mouth - For A-fib INR goal 2-3 5 mg on Monday, Wednesday and Friday 2.5 mg on all other days        * Notice:  This list has 2 medication(s) that are the same as other medications prescribed for you. Read the directions carefully, and ask your doctor or other care provider to review them with you.

## 2017-12-21 DIAGNOSIS — I48.91 ATRIAL FIBRILLATION WITH RAPID VENTRICULAR RESPONSE (H): ICD-10-CM

## 2017-12-21 DIAGNOSIS — I50.23 ACUTE ON CHRONIC SYSTOLIC CONGESTIVE HEART FAILURE (H): ICD-10-CM

## 2017-12-21 RX ORDER — METOPROLOL SUCCINATE 100 MG/1
100 TABLET, EXTENDED RELEASE ORAL 2 TIMES DAILY
Qty: 180 TABLET | Refills: 3 | Status: SHIPPED | OUTPATIENT
Start: 2017-12-21 | End: 2018-03-06

## 2017-12-21 RX ORDER — FUROSEMIDE 20 MG
20 TABLET ORAL PRN
Qty: 30 TABLET | Refills: 11 | Status: SHIPPED | OUTPATIENT
Start: 2017-12-21 | End: 2018-01-30

## 2017-12-27 ENCOUNTER — APPOINTMENT (OUTPATIENT)
Dept: ULTRASOUND IMAGING | Facility: CLINIC | Age: 82
End: 2017-12-27
Attending: EMERGENCY MEDICINE
Payer: MEDICARE

## 2017-12-27 ENCOUNTER — TELEPHONE (OUTPATIENT)
Dept: CARDIOLOGY | Facility: CLINIC | Age: 82
End: 2017-12-27

## 2017-12-27 ENCOUNTER — HOSPITAL ENCOUNTER (EMERGENCY)
Facility: CLINIC | Age: 82
Discharge: HOME | End: 2017-12-27
Attending: EMERGENCY MEDICINE | Admitting: EMERGENCY MEDICINE
Payer: MEDICARE

## 2017-12-27 VITALS
DIASTOLIC BLOOD PRESSURE: 89 MMHG | RESPIRATION RATE: 18 BRPM | SYSTOLIC BLOOD PRESSURE: 125 MMHG | WEIGHT: 160 LBS | TEMPERATURE: 97.4 F | BODY MASS INDEX: 23.97 KG/M2 | OXYGEN SATURATION: 98 %

## 2017-12-27 DIAGNOSIS — D69.6 THROMBOCYTOPENIA (H): ICD-10-CM

## 2017-12-27 DIAGNOSIS — R60.0 PERIPHERAL EDEMA: ICD-10-CM

## 2017-12-27 LAB
ANION GAP SERPL CALCULATED.3IONS-SCNC: 2 MMOL/L (ref 3–14)
ANISOCYTOSIS BLD QL SMEAR: ABNORMAL
BASOPHILS # BLD AUTO: 0 10E9/L (ref 0–0.2)
BASOPHILS NFR BLD AUTO: 0.3 %
BUN SERPL-MCNC: 23 MG/DL (ref 7–30)
CALCIUM SERPL-MCNC: 8.1 MG/DL (ref 8.5–10.1)
CHLORIDE SERPL-SCNC: 110 MMOL/L (ref 94–109)
CO2 SERPL-SCNC: 28 MMOL/L (ref 20–32)
CREAT SERPL-MCNC: 1.08 MG/DL (ref 0.66–1.25)
DIFFERENTIAL METHOD BLD: ABNORMAL
EOSINOPHIL # BLD AUTO: 0 10E9/L (ref 0–0.7)
EOSINOPHIL NFR BLD AUTO: 0.1 %
ERYTHROCYTE [DISTWIDTH] IN BLOOD BY AUTOMATED COUNT: 18.7 % (ref 10–15)
GFR SERPL CREATININE-BSD FRML MDRD: 65 ML/MIN/1.7M2
GLUCOSE SERPL-MCNC: 142 MG/DL (ref 70–99)
HCT VFR BLD AUTO: 35.3 % (ref 40–53)
HGB BLD-MCNC: 11.3 G/DL (ref 13.3–17.7)
IMM GRANULOCYTES # BLD: 0 10E9/L (ref 0–0.4)
IMM GRANULOCYTES NFR BLD: 0 %
INR PPP: 2.19 (ref 0.86–1.14)
INTERPRETATION ECG - MUSE: NORMAL
LYMPHOCYTES # BLD AUTO: 0.6 10E9/L (ref 0.8–5.3)
LYMPHOCYTES NFR BLD AUTO: 8.7 %
MACROCYTES BLD QL SMEAR: PRESENT
MCH RBC QN AUTO: 30.9 PG (ref 26.5–33)
MCHC RBC AUTO-ENTMCNC: 32 G/DL (ref 31.5–36.5)
MCV RBC AUTO: 96 FL (ref 78–100)
MONOCYTES # BLD AUTO: 1 10E9/L (ref 0–1.3)
MONOCYTES NFR BLD AUTO: 14.4 %
NEUTROPHILS # BLD AUTO: 5.3 10E9/L (ref 1.6–8.3)
NEUTROPHILS NFR BLD AUTO: 76.5 %
NRBC # BLD AUTO: 0 10*3/UL
NRBC BLD AUTO-RTO: 0 /100
PLATELET # BLD AUTO: 54 10E9/L (ref 150–450)
PLATELET # BLD EST: ABNORMAL 10*3/UL
POTASSIUM SERPL-SCNC: 4.2 MMOL/L (ref 3.4–5.3)
RBC # BLD AUTO: 3.66 10E12/L (ref 4.4–5.9)
RBC INCLUSIONS BLD: SLIGHT
SODIUM SERPL-SCNC: 140 MMOL/L (ref 133–144)
WBC # BLD AUTO: 7 10E9/L (ref 4–11)

## 2017-12-27 PROCEDURE — 93971 EXTREMITY STUDY: CPT | Mod: LT

## 2017-12-27 PROCEDURE — 80048 BASIC METABOLIC PNL TOTAL CA: CPT | Performed by: EMERGENCY MEDICINE

## 2017-12-27 PROCEDURE — 85025 COMPLETE CBC W/AUTO DIFF WBC: CPT | Performed by: EMERGENCY MEDICINE

## 2017-12-27 PROCEDURE — 85610 PROTHROMBIN TIME: CPT | Performed by: EMERGENCY MEDICINE

## 2017-12-27 PROCEDURE — 99285 EMERGENCY DEPT VISIT HI MDM: CPT | Mod: 25

## 2017-12-27 PROCEDURE — 93005 ELECTROCARDIOGRAM TRACING: CPT

## 2017-12-27 ASSESSMENT — ENCOUNTER SYMPTOMS
ARTHRALGIAS: 0
ROS SKIN COMMENTS: LEFT LEG PAIN
COUGH: 0
SHORTNESS OF BREATH: 0

## 2017-12-27 NOTE — ED PROVIDER NOTES
History     Chief Complaint:  Concern for lack of pulse in left foot    HPI   Jama Paul is a 86 year old male with a past medical history of hypertension and atrial fibrillation on Coumadin who presents to the ED at the instruction of his home health care nurse, who saw him earlier today and reportedly couldn't feel a pulse on his left foot. The patient denies any swelling or pain to the left leg. He has not been experiencing chest pain, pressure or cough. The patient's daughter mentions he seems more short of breath lately, but the patient denies shortness of breath here in the ED. The patient's nurse saw him today to check the wound on his right leg and to redress the bandage there.    Allergies:  No known drug allergies    Medications:    Metoprolol  Lasix  Coumadin  Multivitamins  Vitamin D  Vitamin B  Potassium Citrate  Vitamin C  Cyanocobalamin    Past Medical History:    Atrial fibrillation  Elevated PSA  Hypertension  Colon polyps  Cervical radiculopathy    Past Surgical History:    Colonoscopy  Inguinal herniorrhaphy  MOHS micrographic procedure  Phacoemulsification clear cornea with std intraocular lens implant    Family History:    No past pertinent family history.    Social History:  The patient was accompanied to the ED by his daughter and his wife.  Smoking Status: Never  Smokeless Tobacco: Never  Alcohol Use: No  Marital Status:       Review of Systems   Respiratory: Negative for cough and shortness of breath.    Cardiovascular: Negative for chest pain and leg swelling.   Musculoskeletal: Negative for arthralgias.   Skin:        Left leg pain   All other systems reviewed and are negative.    Physical Exam   First Vitals:  Patient Vitals for the past 24 hrs:   BP Temp Temp src Heart Rate Resp SpO2 Weight   12/27/17 1611 125/89 - - 90 18 98 % -   12/27/17 1537 130/79 - - 89 18 98 % -   12/27/17 1447 (!) 143/117 97.4  F (36.3  C) Oral 97 20 98 % 72.6 kg (160 lb)     Physical  Exam  Constitutional: Elderly white male supine.  HENT: No signs of trauma.   Eyes: EOM are normal. Pupils are equal, round, and reactive to light.   Neck: Normal range of motion. No JVD present. No tracheal deviation. No cervical adenopathy.  Cardiovascular: Irregularly regular.  Exam reveals no gallop and no friction rub.    No murmur heard. 1+ femoral pulses bilaterally.  Pulmonary/Chest: Bilateral breath sounds normal. No wheezes, rhonchi or rales.  Abdominal: Soft. No tenderness. No rebound or guarding.   Musculoskeletal: No edema. No tenderness. Right leg- wound dressing in place not removed. Left leg- 1+ pt, 1+ dp, 2+ pop, trace edema, normal warmth and sensation.  Lymphadenopathy: No lymphadenopathy.   Neurological: Alert and oriented to person, place, and time. Normal strength. Coordination normal.   Skin: Skin is warm and dry. No rash noted. No erythema.     Emergency Department Course   ECG done at 1453.  Rate 93 bpm. IN interval *. QRS duration 84. QT/QTc 366/455. P-R-T axes * 21 -21.  Atrial fibrillation.    Imaging:  Radiographic findings were communicated with the patient who voiced understanding of the findings.    US Lower Extremity Venous Duplex Left:  Impression: No evidence of deep venous thrombosis.  Per Radiology.    Laboratory:  CBC: WBC 7.0, HGB 11.3, PLT 54  BMP: Chloride 110(H), Anion Gap 2(L), Glucose 142(H), Calcium 8/1(L), o/w WNL (Creat 1.08)  INR: 2.19(H)    Emergency Department Course:  Nursing notes and vitals reviewed.  I performed an exam of the patient as documented above.     Findings and plan explained to the patient and his family. Patient discharged home with instructions regarding supportive care, medications, and reasons to return. The importance of close follow-up was reviewed.     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 The patient himself, though, denied any pain or numbness. He has some swelling there and this is not new. He does have a wound on the right leg, which is being followed at the wound clinic and this was redressed just before coming in. He denies chest pain, any change in his respiration or belly pain. His daughter is concerned he might be more short of breath lately. On examination of the leg, it is warm. There are good pulses. There is some edema. His lungs are completely clear. His atrial fibrillation is noted on cardiac exam without any murmurs. Patient had an ultrasound ordered by nursing. This is negative. Labs reveal a low platelet count, which is not new to him. After examination, he recently had a large cardiac work up. I asked if he wanted anything else worked up for possible heart failure or lung disease and he declined. Patient will be discharged home with his family. His primary diagnosis is concern for blood clot. This is negative.    Diagnosis:  1. (R60.9) Peripheral edema    2. (D69.6) Thrombocytopenia (H)    3. Concern for blood clot     Disposition:  The patient was discharged home.    12/27/2017    EMERGENCY DEPARTMENT    YOLANDA, Janice Barry, am serving as a scribe at 1525 on December 27, 2017 to document services personally performed by , based on my observations and the provider's statements to me.       Robb Gillis MD  12/27/17 1501

## 2017-12-27 NOTE — TELEPHONE ENCOUNTER
Pt's daughter called stating that the Home Care RN could not find a pulse on patient's leg and foot and told them to proceed directly to the ER.  Called [facility] ER and informed them of situation- they will see him immediately when he gets there.

## 2017-12-27 NOTE — DISCHARGE INSTRUCTIONS
Leg Swelling in Both Legs    Swelling of the feet, ankles, and legs is called edema. It is caused by excess fluid that has collected in the tissues. Extra fluid in the body settles in the lowest part because of gravity. This is why the legs and feet are most affected.  Some of the causes for edema include:    Disease of the heart like congestive heart failure    Standing or sitting for long periods of time    Infection of the feet or legs    Blood pooling in the veins of your legs (venous insufficiency)    Dilated veins in your lower leg (varicose veins)    Garters or other clothing that is tight on your legs. This will cause blood to pool in your legs because the clothing limits blood flow.    Some medicines such as hormones like birth control pills, some blood pressure medicines like calcium channel blockers (amlodipine) and steroids, some antidepressants like MAO inhibitors and tricyclics    Menstrual periods that cause you to retain fluids    Many types of renal disease    Liver failure or cirrhosis    Pregnancy, some swelling is normal, but a sudden increase in leg swelling or weight gain can be a sign of a dangerous complication of pregnancy    Poor nutrition    Thyroid disease  Medical treatment will depend on what is causing the swelling in your legs. Your healthcare provider may prescribe water pills (diuretics) to get rid of the extra fluid.  Home care  Follow these guidelines when caring for yourself at home:    Don't wear clothing like garters that is tight on your legs.    Keep your legs up while lying or sitting.    If infection, injury, or recent surgery is causing the swelling, stay off your legs as much as possible until symptoms get better.    If your healthcare provider says that your leg swelling is caused by venous insufficiency or varicose veins, don't sit or  one place for long periods of time. Take breaks and walk about every few hours. Brisk walking is a good exercise. It helps  circulate the blood that has collected in your leg. Talk with your provider about using support stockings to stop daytime leg swelling.    If your provider says that heart disease is causing your leg swelling, follow a low-salt diet to stop extra fluid from staying in your body. You may also need medicine.  Follow-up care  Follow up with your healthcare provider, or as advised.  When to seek medical advice  Call your healthcare provider right away if any of these occur:    New shortness of breath or chest pain    Shortness of breath or chest pain that gets worse    Swelling in both legs or ankles that gets worse    Swelling of the abdomen    Redness, warmth, or swelling in one leg    Fever of 100.4 F (38 C) or higher, or as directed by your healthcare provider    Yellow color to your skin or eyes    Rapid, unexplained weight gain    Having to sleep upright or use an increased number of pillows  Date Last Reviewed: 3/31/2016    4076-0447 [employer]. [address] [place] [address], [place] [address]. All rights reserved. This information is not intended as a substitute for professional medical care. Always follow your healthcare professional's instructions.

## 2017-12-27 NOTE — ED AVS SNAPSHOT
Emergency Department    [address] [place] [address]    Phone:  [phone]    Fax:  [phone]                                       Jama Paul   MRN: [id number]    Department:   Emergency Department   Date of Visit:  12/27/2017           After Visit Summary Signature Page     I have received my discharge instructions, and my questions have been answered. I have discussed any challenges I see with this plan with the nurse or doctor.    ..........................................................................................................................................  Patient/Patient Representative Signature      ..........................................................................................................................................  Patient Representative Print Name and Relationship to Patient    ..................................................               ................................................  Date                                            Time    ..........................................................................................................................................  Reviewed by Signature/Title    ...................................................              ..............................................  Date                                                            Time

## 2017-12-27 NOTE — ED AVS SNAPSHOT
Emergency Department    [address] [facility] [address]    Phone:  [phone]    Fax:  [phone]                                       Jama Paul   MRN: [id number]    Department:   Emergency Department   Date of Visit:  12/27/2017           Patient Information     Date Of Birth          2/13/1931        Your diagnoses for this visit were:     Peripheral edema     Thrombocytopenia (H)        You were seen by Robb Gillis MD.      Follow-up Information     Follow up with Clint Pizarro MD.    Specialty:  Internal Medicine    Why:  keep appointment    Contact information:    [address] [address] [phone]          Discharge Instructions         Leg Swelling in Both Legs    Swelling of the feet, ankles, and legs is called edema. It is caused by excess fluid that has collected in the tissues. Extra fluid in the body settles in the lowest part because of gravity. This is why the legs and feet are most affected.  Some of the causes for edema include:    Disease of the heart like congestive heart failure    Standing or sitting for long periods of time    Infection of the feet or legs    Blood pooling in the veins of your legs (venous insufficiency)    Dilated veins in your lower leg (varicose veins)    Garters or other clothing that is tight on your legs. This will cause blood to pool in your legs because the clothing limits blood flow.    Some medicines such as hormones like birth control pills, some blood pressure medicines like calcium channel blockers (amlodipine) and steroids, some antidepressants like MAO inhibitors and tricyclics    Menstrual periods that cause you to retain fluids    Many types of renal disease    Liver failure or cirrhosis    Pregnancy, some swelling is normal, but a sudden increase in leg swelling or weight gain can be a sign of a dangerous complication of pregnancy    Poor nutrition    Thyroid  disease  Medical treatment will depend on what is causing the swelling in your legs. Your healthcare provider may prescribe water pills (diuretics) to get rid of the extra fluid.  Home care  Follow these guidelines when caring for yourself at home:    Don't wear clothing like garters that is tight on your legs.    Keep your legs up while lying or sitting.    If infection, injury, or recent surgery is causing the swelling, stay off your legs as much as possible until symptoms get better.    If your healthcare provider says that your leg swelling is caused by venous insufficiency or varicose veins, don't sit or  one place for long periods of time. Take breaks and walk about every few hours. Brisk walking is a good exercise. It helps circulate the blood that has collected in your leg. Talk with your provider about using support stockings to stop daytime leg swelling.    If your provider says that heart disease is causing your leg swelling, follow a low-salt diet to stop extra fluid from staying in your body. You may also need medicine.  Follow-up care  Follow up with your healthcare provider, or as advised.  When to seek medical advice  Call your healthcare provider right away if any of these occur:    New shortness of breath or chest pain    Shortness of breath or chest pain that gets worse    Swelling in both legs or ankles that gets worse    Swelling of the abdomen    Redness, warmth, or swelling in one leg    Fever of 100.4 F (38 C) or higher, or as directed by your healthcare provider    Yellow color to your skin or eyes    Rapid, unexplained weight gain    Having to sleep upright or use an increased number of pillows  Date Last Reviewed: 3/31/2016    5350-6357 [employer]. [address] [place] [address], [place] [address]. All rights reserved. This information is not intended as a substitute for professional medical care. Always follow your healthcare professional's instructions.          Future  Appointments        Provider Department Dept Phone Center    1/3/2018 1:30 PM ERIC BROWN CNP [facility] [phone] [facility]    1/15/2018 11:15 AM Wesley Benjamin MD [facility] [place] [phone] [place] NACHO    1/31/2018 10:00 AM [facility] HEART CLINIC ECHO ROOM 1 [facility] CV Echocardiography [phone] CVIMG    2/6/2018 10:45 AM Cameron Villatoro MD [facility] [phone] [facility] CLIN      24 Hour Appointment Hotline       To make an appointment at any [facility] clinic, call [phone] ([phone]). If you don't have a family doctor or clinic, we will help you find one. [facility] clinics are conveniently located to serve the needs of you and your family.             Review of your medicines      Our records show that you are taking the medicines listed below. If these are incorrect, please call your family doctor or clinic.        Dose / Directions Last dose taken    CYANOCOBALAMIN PO   Dose:  500 mcg        Take 500 mcg by mouth daily   Refills:  0        furosemide 20 MG tablet   Commonly known as:  LASIX   Dose:  20 mg   Quantity:  30 tablet        Take 1 tablet (20 mg) by mouth as needed   Refills:  11        metoprolol 100 MG 24 hr tablet   Commonly known as:  TOPROL-XL   Dose:  100 mg   Quantity:  180 tablet        Take 1 tablet (100 mg) by mouth 2 times daily   Refills:  3        * Multi-vitamin Tabs tablet   Dose:  1 tablet        Take 1 tablet by mouth daily   Refills:  0        * ICAPS AREDS FORMULA PO   Dose:  1 tablet        Take 1 tablet by mouth daily   Refills:  0        POTASSIUM CITRATE PO   Dose:  5 mEq        Take 5 mEq by mouth daily OTC dose unknown   Refills:  0        vitamin B complex with vitamin C Tabs tablet   Dose:  1 tablet        Take 1 tablet by mouth daily   Refills:  0        VITAMIN C PO   Dose:  500 mg        Take 500 mg by mouth daily    Refills:  0        VITAMIN D (CHOLECALCIFEROL) PO   Dose:  2000 Units        Take 2,000 Units by mouth daily   Refills:  0        warfarin 2.5 MG tablet   Commonly known as:  COUMADIN   Dose:  2.5-5 mg        Take 2.5-5 mg by mouth - For A-fib INR goal 2-3 5 mg on Monday, Wednesday and Friday 2.5 mg on all other days   Refills:  0        * Notice:  This list has 2 medication(s) that are the same as other medications prescribed for you. Read the directions carefully, and ask your doctor or other care provider to review them with you.            Procedures and tests performed during your visit     Basic metabolic panel    CBC with platelets + differential    EKG 12 lead    INR    US Lower Extremity Venous Duplex Left      Orders Needing Specimen Collection     None      Pending Results     No orders found from 12/25/2017 to 12/28/2017.            Pending Culture Results     No orders found from 12/25/2017 to 12/28/2017.            Pending Results Instructions     If you had any lab results that were not finalized at the time of your Discharge, you can call the ED Lab Result RN at [phone]. You will be contacted by this team for any positive Lab results or changes in treatment. The nurses are available 7 days a week from 10A to 6:30P.  You can leave a message 24 hours per day and they will return your call.        Test Results From Your Hospital Stay        12/27/2017  3:16 PM      Component Results     Component Value Ref Range & Units Status    Sodium 140 133 - 144 mmol/L Final    Potassium 4.2 3.4 - 5.3 mmol/L Final    Chloride 110 (H) 94 - 109 mmol/L Final    Carbon Dioxide 28 20 - 32 mmol/L Final    Anion Gap 2 (L) 3 - 14 mmol/L Final    Glucose 142 (H) 70 - 99 mg/dL Final    Urea Nitrogen 23 7 - 30 mg/dL Final    Creatinine 1.08 0.66 - 1.25 mg/dL Final    GFR Estimate 65 >60 mL/min/1.7m2 Final    Non  GFR Calc    GFR Estimate If Black 78 >60 mL/min/1.7m2 Final    African American GFR Calc     Calcium 8.1 (L) 8.5 - 10.1 mg/dL Final         12/27/2017  3:24 PM      Component Results     Component Value Ref Range & Units Status    WBC 7.0 4.0 - 11.0 10e9/L Final    RBC Count 3.66 (L) 4.4 - 5.9 10e12/L Final    Hemoglobin 11.3 (L) 13.3 - 17.7 g/dL Final    Hematocrit 35.3 (L) 40.0 - 53.0 % Final    MCV 96 78 - 100 fl Final    MCH 30.9 26.5 - 33.0 pg Final    MCHC 32.0 31.5 - 36.5 g/dL Final    RDW 18.7 (H) 10.0 - 15.0 % Final    Platelet Count 54 (L) 150 - 450 10e9/L Final    Diff Method Automated Method  Final    % Neutrophils 76.5 % Final    % Lymphocytes 8.7 % Final    % Monocytes 14.4 % Final    % Eosinophils 0.1 % Final    % Basophils 0.3 % Final    % Immature Granulocytes 0.0 % Final    Nucleated RBCs 0 0 /100 Final    Absolute Neutrophil 5.3 1.6 - 8.3 10e9/L Final    Absolute Lymphocytes 0.6 (L) 0.8 - 5.3 10e9/L Final    Absolute Monocytes 1.0 0.0 - 1.3 10e9/L Final    Absolute Eosinophils 0.0 0.0 - 0.7 10e9/L Final    Absolute Basophils 0.0 0.0 - 0.2 10e9/L Final    Abs Immature Granulocytes 0.0 0 - 0.4 10e9/L Final    Absolute Nucleated RBC 0.0  Final    Anisocytosis Moderate  Final    RBC Fragments Slight  Final    Macrocytes Present  Final    Platelet Estimate   Final    Automated count confirmed.  Platelet morphology is normal.         12/27/2017  3:09 PM      Component Results     Component Value Ref Range & Units Status    INR 2.19 (H) 0.86 - 1.14 Final         12/27/2017  3:36 PM      Narrative     VENOUS ULTRASOUND LEFT LEG  12/27/2017 3:33 PM     HISTORY: pain, swelling left calf, on Coumadin, hx hematoma on right;     COMPARISON: No direct comparison    FINDINGS:  Examination of the deep veins with graded compression and  color flow Doppler with spectral wave form analysis shows no evidence  of thrombus in the common femoral vein, femoral vein, popliteal vein  or calf veins.        Impression     IMPRESSION: No evidence of deep venous thrombosis.    THANH TINEO DO                 Clinical Quality Measure: Blood Pressure Screening     Your blood pressure was checked while you were in the emergency department today. The last reading we obtained was  BP: 130/79 . Please read the guidelines below about what these numbers mean and what you should do about them.  If your systolic blood pressure (the top number) is less than 120 and your diastolic blood pressure (the bottom number) is less than 80, then your blood pressure is normal. There is nothing more that you need to do about it.  If your systolic blood pressure (the top number) is 120-139 or your diastolic blood pressure (the bottom number) is 80-89, your blood pressure may be higher than it should be. You should have your blood pressure rechecked within a year by a primary care provider.  If your systolic blood pressure (the top number) is 140 or greater or your diastolic blood pressure (the bottom number) is 90 or greater, you may have high blood pressure. High blood pressure is treatable, but if left untreated over time it can put you at risk for heart attack, stroke, or kidney failure. You should have your blood pressure rechecked by a primary care provider within the next 4 weeks.  If your provider in the emergency department today gave you specific instructions to follow-up with your doctor or provider even sooner than that, you should follow that instruction and not wait for up to 4 weeks for your follow-up visit.        Thank you for choosing [facility]       Thank you for choosing [facility] for your care. Our goal is always to provide you with excellent care. Hearing back from our patients is one way we can continue to improve our services. Please take a few minutes to complete the written survey that you may receive in the mail after you visit with us. Thank you!        [employer] Information     [employer] lets you send messages to your doctor, view your test results, renew your prescriptions, schedule appointments and more. To sign up,  "go to [email or web address] . Click on \"Log in\" on the left side of the screen, which will take you to the Welcome page. Then click on \"Sign up Now\" on the right side of the page.     You will be asked to enter the access code listed below, as well as some personal information. Please follow the directions to create your username and password.     Your access code is: [email or web address]  Expires: 1/3/2018  9:31 AM     Your access code will  in 90 days. If you need help or a new code, please call your [facility] clinic or [phone].        Care EveryWhere ID     This is your Care EveryWhere ID. This could be used by other organizations to access your [facility] medical records  [id number]        Equal Access to Services     CECILE MOURA : Caridad Carrillo, autumn isbell, danielle kapoor, emani avalos. So [facility] [phone].    ATENCIÓN: Si habla español, tiene a cleaning disposición servicios gratuitos de asistencia lingüística. Jaycee al [phone].    We comply with applicable federal civil rights laws and [place] laws. We do not discriminate on the basis of race, color, national origin, age, disability, sex, sexual orientation, or gender identity.            After Visit Summary       This is your record. Keep this with you and show to your community pharmacist(s) and doctor(s) at your next visit.                  "

## 2018-01-03 ENCOUNTER — OFFICE VISIT (OUTPATIENT)
Dept: CARDIOLOGY | Facility: CLINIC | Age: 83
End: 2018-01-03
Attending: NURSE PRACTITIONER
Payer: MEDICARE

## 2018-01-03 VITALS
BODY MASS INDEX: 24.44 KG/M2 | SYSTOLIC BLOOD PRESSURE: 123 MMHG | HEIGHT: 69 IN | DIASTOLIC BLOOD PRESSURE: 75 MMHG | HEART RATE: 70 BPM | WEIGHT: 165 LBS

## 2018-01-03 DIAGNOSIS — I50.23 ACUTE ON CHRONIC SYSTOLIC CONGESTIVE HEART FAILURE (H): ICD-10-CM

## 2018-01-03 DIAGNOSIS — I48.91 ATRIAL FIBRILLATION WITH RAPID VENTRICULAR RESPONSE (H): ICD-10-CM

## 2018-01-03 PROCEDURE — 99214 OFFICE O/P EST MOD 30 MIN: CPT | Performed by: NURSE PRACTITIONER

## 2018-01-03 NOTE — PATIENT INSTRUCTIONS
1) No medication changes today    2) Follow at the end of January with echo and Dr. Villatoro    3) Call [phone]

## 2018-01-03 NOTE — MR AVS SNAPSHOT
After Visit Summary   1/3/2018    Jama Paul    MRN: [id number]           Patient Information     Date Of Birth          2/13/1931        Visit Information        Provider Department      1/3/2018 1:30 PM Vanita Baig APRN CNP [facility]        Today's Diagnoses     Atrial fibrillation with rapid ventricular response (H)        Acute on chronic systolic congestive heart failure (H)          Care Instructions    1) No medication changes today    2) Follow at the end of January with echo and Dr. Villatoro    3) Call [phone]          Follow-ups after your visit        Your next 10 appointments already scheduled     Juan 15, 2018 11:15 AM CST   Return Visit with Wesley Benjamin MD   [facility] [place] ([facility])    [address]  [facility] [id number] [phone]            Jan 31, 2018 10:00 AM CST   Ech Complete with [id number]   [facility] CV Echocardiography ([facility])    [address]  [facility] [id number] [phone]           1. Please bring or wear a comfortable two-piece outfit. 2. You may eat, drink and take your normal medicines. 3. For any questions that cannot be answered, please contact the ordering physician            Feb 06, 2018 10:45 AM CST   Return Visit with Cameron Villatoro MD   [facility] ([facility] PSA Clinics)    [address]  [place] [facility] [id number] [phone]              Who to contact     If you have questions or need follow up information about today's clinic visit or your schedule please contact [facility] directly at [phone].  Normal or non-critical lab and imaging results will be communicated to you by MyChart, letter or phone within 4 business days after the clinic has received the  "results. If you do not hear from us within 7 days, please contact the clinic through [employer] or phone. If you have a critical or abnormal lab result, we will notify you by phone as soon as possible.  Submit refill requests through [employer] or call your pharmacy and they will forward the refill request to us. Please allow 3 business days for your refill to be completed.          Additional Information About Your Visit        [employer] Information     [employer] lets you send messages to your doctor, view your test results, renew your prescriptions, schedule appointments and more. To sign up, go to [email or web address] . Click on \"Log in\" on the left side of the screen, which will take you to the Welcome page. Then click on \"Sign up Now\" on the right side of the page.     You will be asked to enter the access code listed below, as well as some personal information. Please follow the directions to create your username and password.     Your access code is: [email or web address]  Expires: 4/3/2018  2:08 PM     Your access code will  in 90 days. If you need help or a new code, please call your [facility] clinic or [phone].        Care EveryWhere ID     This is your Care EveryWhere ID. This could be used by other organizations to access your [facility] medical records  [id number]        Your Vitals Were     Pulse Height BMI (Body Mass Index)             70 1.741 m (5' 8.56\") 24.68 kg/m2          Blood Pressure from Last 3 Encounters:   18 123/75   17 125/89   17 (!) 150/100    Weight from Last 3 Encounters:   18 74.8 kg (165 lb)   17 72.6 kg (160 lb)   17 77.6 kg (171 lb)              We Performed the Following     Follow-Up with Cardiac Advanced Practice Provider        Primary Care Provider Office Phone # Fax #    Clint Pizarro [phone]       [address] [address]        Equal Access to Services     CECILE MOURA AH: Hadii " fredis Carrillo, wamissael jacksondee deeha, qaybta kafred michaelvilla, waxjudd emerald silvermanyvettejudie mayers bailey. So [facility] [phone].    ATENCIÓN: Si habla ferañol, tiene a cleaning disposición servicios gratuitos de asistencia lingüística. Jaycee al [phone].    We comply with applicable federal civil rights laws and [place] laws. We do not discriminate on the basis of race, color, national origin, age, disability, sex, sexual orientation, or gender identity.            Thank you!     Thank you for choosing [facility] HEART [facility]  for your care. Our goal is always to provide you with excellent care. Hearing back from our patients is one way we can continue to improve our services. Please take a few minutes to complete the written survey that you may receive in the mail after your visit with us. Thank you!             Your Updated Medication List - Protect others around you: Learn how to safely use, store and throw away your medicines at www.disposemymeds.org.          This list is accurate as of: 1/3/18  2:08 PM.  Always use your most recent med list.                   Brand Name Dispense Instructions for use Diagnosis    CYANOCOBALAMIN PO      Take 500 mcg by mouth daily        furosemide 20 MG tablet    LASIX    30 tablet    Take 1 tablet (20 mg) by mouth as needed    Atrial fibrillation with rapid ventricular response (H), Acute on chronic systolic congestive heart failure (H)       metoprolol 100 MG 24 hr tablet    TOPROL-XL    180 tablet    Take 1 tablet (100 mg) by mouth 2 times daily    Atrial fibrillation with rapid ventricular response (H)       * Multi-vitamin Tabs tablet      Take 1 tablet by mouth daily        * ICAPS AREDS FORMULA PO      Take 1 tablet by mouth daily        POTASSIUM CITRATE PO      Take 5 mEq by mouth daily OTC dose unknown        vitamin B complex with vitamin C Tabs tablet      Take 1 tablet by mouth daily        VITAMIN C PO      Take 500 mg by mouth daily         VITAMIN D (CHOLECALCIFEROL) PO      Take 2,000 Units by mouth daily        warfarin 2.5 MG tablet    COUMADIN     Take 2.5-5 mg by mouth - For A-fib INR goal 2-3 5 mg on Monday, Wednesday and Friday 2.5 mg on all other days        * Notice:  This list has 2 medication(s) that are the same as other medications prescribed for you. Read the directions carefully, and ask your doctor or other care provider to review them with you.

## 2018-01-03 NOTE — PROGRESS NOTES
Cardiology Clinic Progress Note  Jama Paul MRN# [id number]   YOB: 1931 Age: 86 year old     Reason for visit: Follow up atrial fibrillation and tricuspid regurgitation           Assessment and Plan:     1. Atrial fibrillation with controlled ventricular response     Continue Toprol XL to 100 mg twice daily    Anticoagulated on Coumadin    Follow up with Dr. Villatoro on 2/6/18    2. Moderate to severe tricuspid regurgitation    Repeat echo at the end of the month    3. 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 He is accompanied by his very supportive daughter Lorraine and his wife Paige.  His past medical history significant for atrial fibrillation that was diagnosed 6 years ago.  It was paroxysmal at one time and also progressed to chronic.  He was recently seen in the emergency department for shortness of breath and found to be in atrial fibrillation with a rapid ventricular's response.  Echocardiogram revealed a new cardiomyopathy with his LVEF was 30-35% with moderate to severe global hypokinesis LV.  There was mild to moderate mitral regurgitation and moderate to moderate severe tricuspid regurgitation. A cardiac MRI that showed his LVEF was 42% with moderate global hypokinesis of the LV, but RV was mildly dilated with an EF of 45% and there was moderate to severe TR.  There is no ischemia noted on the study.    At the last visit his metoprolol was increased to 100 mg twice daily for heart rate of 110 bpm.  Today's heart rate shows significant improvement at 70 bpm.  He denies any exertional chest tightness or shortness of breath.  He does note that he has occasional  "shortness of breath while sitting quietly and reading.  He palpates his pulse and per his report it is not rapid.  The patient does have a history of anxiety and significant stressors in his life due to his son struggles with alcoholism.            This note was completed in part using Dragon voice recognition software. Although reviewed after completion, some word and grammatical errors may occur       Review of Systems:   Review of Systems:  Skin:  Negative     Eyes:  Positive for glasses  ENT:  Negative    Respiratory:  Positive for dyspnea on exertion;shortness of breath ('good days, bad days')  Cardiovascular:  Negative    Gastroenterology: Negative    Genitourinary:  not assessed    Musculoskeletal:  Positive for arthritis  Neurologic:  Negative    Psychiatric:  Negative    Heme/Lymph/Imm:  Negative    Endocrine:  Negative                Physical Exam:     Vitals: /75  Pulse 70  Ht 1.741 m (5' 8.56\")  Wt 74.8 kg (165 lb)  BMI 24.68 kg/m2  Constitutional:  cooperative;well developed frail      Skin:  warm and dry to the touch;no apparent skin lesions or masses noted        Head:  normocephalic        Eyes:  pupils equal and round        ENT:  no pallor or cyanosis        Chest:  clear to auscultation;normal symmetry        Cardiac:   irregular rhythm                Abdomen:  abdomen soft        Extremities and Back:  no edema        Neurological:  affect appropriate             Medications:     Current Outpatient Prescriptions   Medication Sig Dispense Refill     metoprolol (TOPROL-XL) 100 MG 24 hr tablet Take 1 tablet (100 mg) by mouth 2 times daily 180 tablet 3     furosemide (LASIX) 20 MG tablet Take 1 tablet (20 mg) by mouth as needed 30 tablet 11     warfarin (COUMADIN) 2.5 MG tablet Take 2.5-5 mg by mouth -  For A-fib  INR goal 2-3  5 mg on Monday, Wednesday and Friday  2.5 mg on all other days       Multiple Vitamins-Minerals (ICAPS AREDS FORMULA PO) Take 1 tablet by mouth daily       " CYANOCOBALAMIN PO Take 500 mcg by mouth daily       Ascorbic Acid (VITAMIN C PO) Take 500 mg by mouth daily       POTASSIUM CITRATE PO Take 5 mEq by mouth daily OTC dose unknown       vitamin  B complex with vitamin C (VITAMIN  B COMPLEX) TABS Take 1 tablet by mouth daily       multivitamin, therapeutic with minerals (MULTI-VITAMIN) TABS Take 1 tablet by mouth daily       VITAMIN D, CHOLECALCIFEROL, PO Take 2,000 Units by mouth daily             Family History   Problem Relation Age of Onset     HEART DISEASE No family hx of        Social History     Social History     Marital status:      Spouse name: N/A     Number of children: N/A     Years of education: N/A     Occupational History     Not on file.     Social History Main Topics     Smoking status: Never Smoker     Smokeless tobacco: Never Used     Alcohol use No     Drug use: No     Sexual activity: Not on file     Other Topics Concern     Not on file     Social History Narrative            Past Medical History:     Past Medical History:   Diagnosis Date     Antiplatelet or antithrombotic long-term use      Arrhythmia      Atrial fibrillation (H)      Elevated PSA      Unspecified essential hypertension               Past Surgical History:     Past Surgical History:   Procedure Laterality Date     COLONOSCOPY       EXPLORE GROIN  4/30/2014    Procedure: EXPLORE GROIN;  Surgeon: Sam Aguirre MD;  Location:  OR     HERNIORRHAPHY INGUINAL       HERNIORRHAPHY INGUINAL  4/30/2014    Procedure: HERNIORRHAPHY INGUINAL;  Surgeon: Sam Aguirre MD;  Location:  OR     MOHS MICROGRAPHIC PROCEDURE       PHACOEMULSIFICATION CLEAR CORNEA WITH STANDARD INTRAOCULAR LENS IMPLANT Right 9/8/2015    Procedure: PHACOEMULSIFICATION CLEAR CORNEA WITH STANDARD INTRAOCULAR LENS IMPLANT;  Surgeon: Gil Shannon MD;  Location:  EC     septic olecranon bursitis[                Allergies:   Review of patient's allergies indicates no known allergies.       Data:    All laboratory data reviewed:    LAST CHOLESTEROL:  Lab Results   Component Value Date    CHOL 127 04/18/2012     Lab Results   Component Value Date    HDL 56 04/18/2012     Lab Results   Component Value Date    LDL 55 04/18/2012     Lab Results   Component Value Date    TRIG 81 04/18/2012     Lab Results   Component Value Date    CHOLHDLRATIO 2.3 04/18/2012       LAST BMP:  Lab Results   Component Value Date     12/08/2017      Lab Results   Component Value Date    POTASSIUM 4.0 12/08/2017     Lab Results   Component Value Date    CHLORIDE 106 12/08/2017     Lab Results   Component Value Date    BARON 8.2 12/08/2017     Lab Results   Component Value Date    CO2 25 12/08/2017     Lab Results   Component Value Date    BUN 21 12/08/2017     Lab Results   Component Value Date    CR 1.07 12/08/2017     Lab Results   Component Value Date    GLC 87 12/08/2017       LAST CBC:  Lab Results   Component Value Date    WBC 7.6 12/07/2017     Lab Results   Component Value Date    RBC 3.44 12/07/2017     Lab Results   Component Value Date    HGB 10.6 12/07/2017     Lab Results   Component Value Date    HCT 33.1 12/07/2017     Lab Results   Component Value Date    MCV 96 12/07/2017     Lab Results   Component Value Date    MCH 30.8 12/07/2017     Lab Results   Component Value Date    MCHC 32.0 12/07/2017     Lab Results   Component Value Date    RDW 19.2 12/07/2017     Lab Results   Component Value Date    PLT 52 12/07/2017         KALPESH FORMAN, APRN, CNP

## 2018-01-15 ENCOUNTER — HOSPITAL ENCOUNTER (OUTPATIENT)
Dept: WOUND CARE | Facility: CLINIC | Age: 83
Discharge: HOME | End: 2018-01-15
Attending: SURGERY | Admitting: SURGERY
Payer: MEDICARE

## 2018-01-15 VITALS — HEART RATE: 99 BPM | TEMPERATURE: 97.8 F | SYSTOLIC BLOOD PRESSURE: 118 MMHG | DIASTOLIC BLOOD PRESSURE: 67 MMHG

## 2018-01-15 PROCEDURE — 11045 DBRDMT SUBQ TISS EACH ADDL: CPT | Performed by: SURGERY

## 2018-01-15 PROCEDURE — A6210 FOAM DRG >16<=48 SQ IN W/O B: HCPCS

## 2018-01-15 PROCEDURE — 11042 DBRDMT SUBQ TIS 1ST 20SQCM/<: CPT | Performed by: SURGERY

## 2018-01-15 NOTE — PROGRESS NOTES
[facility] Progress Note    Subject: Jama Paul MD return to see us at the wound clinic along with his daughter and wife.  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  His INR which had always been very stable as noted be elevated at 5.0   This was treated.  He developed full-thickness skin necrosis requiring debridement.  He is been followed by my associate Dr. Robb Rodriguez at the wound clinic.  Dr. Rodriguez is now retired and the patient comes under my care.    Most recently home health care has been using iodoform on the ulcerated area.  There is much less drainage and slowly decrease in size.  Patient denies any pain in the area and is able to ambulate and do his other activities.    He reports that his appetite and diet with nutrition has been good.      Exam:  /67 (BP Location: Left arm)  Pulse 99  Temp 97.8  F (36.6  C) (Tympanic)  Alert and appropriate.  Irregular heartbeat consistent with atrial fibrillation.  +3 palpable right dorsalis pedis pulse.  No distal edema nor cellulitis.  Dry skin is noted on both legs.    The ulcerated area measures 6.2 x 6.3 x 0.1 cm.  Mild amount of fibrin is noted.  Some of the skin edges do have some darkish discoloration from the old hematoma and there is still some pockets of hematoma noted but relatively superficial.    Procedure:  Time out was called, informed consent obtained, and topical anesthetic of 4% lidocaine was applied per standing protocol, debridement was performed using a #15 blade down to and including subcutaneous tissue.  I debrided all slough and fibrin.  Debrided some of the skin edges for questionable viability with good bleeding being noted.  Several small pockets of hematoma were evacuated but these are still relatively superficial.  Most of the granulation tissue is quite healthy with very good  bleeding.  Bleeding controlled with light pressure. Patient tolerated procedure well.    Impression: Improving right calf ulceration from hematoma.  He has very good blood supply and worsening in improvement of the granulation tissue.  We discussed the possibility of eventually a split-thickness skin graft if the wound does not heal properly.  However, this is premature to discuss this since we need to have better granulation tissue with left hematoma before considering this.  This is totally optional and we will reevaluate this with the patient returns and stressed this to him.    Also discussed the importance of good lotion.  He should continue to be ambulatory since this will be beneficial for healing also.      Plan: We will dress the wounds with iodoform with home health care.  Patient will return to the clinic in 4 weeks time    Wesley Benjamin MD  01/15/18 12:27 PM

## 2018-01-16 ENCOUNTER — TELEPHONE (OUTPATIENT)
Dept: CARDIOLOGY | Facility: CLINIC | Age: 83
End: 2018-01-16

## 2018-01-16 DIAGNOSIS — I10 ESSENTIAL HYPERTENSION: Primary | ICD-10-CM

## 2018-01-16 RX ORDER — LISINOPRIL 5 MG/1
5 TABLET ORAL DAILY
Qty: 30 TABLET | Refills: 11 | Status: SHIPPED | OUTPATIENT
Start: 2018-01-16 | End: 2018-01-17

## 2018-01-16 NOTE — PATIENT INSTRUCTIONS
"Patient called with concerns over his BP. He states that his SBP has been consistently been running 140-145 and he has been feeling tired and \"not with it\". Will restart Lisinopril at 5 mg daily. Script sent to pharmacy. He will call with an update at the end of the week. ERIC Person, CNP  "

## 2018-01-17 DIAGNOSIS — I10 ESSENTIAL HYPERTENSION: ICD-10-CM

## 2018-01-17 RX ORDER — LISINOPRIL 5 MG/1
5 TABLET ORAL DAILY
Qty: 90 TABLET | Refills: 3 | Status: ON HOLD | OUTPATIENT
Start: 2018-01-17 | End: 2018-01-23

## 2018-01-19 ENCOUNTER — APPOINTMENT (OUTPATIENT)
Dept: CARDIOLOGY | Facility: CLINIC | Age: 83
DRG: 287 | End: 2018-01-19
Attending: INTERNAL MEDICINE
Payer: MEDICARE

## 2018-01-19 ENCOUNTER — HOSPITAL ENCOUNTER (INPATIENT)
Facility: CLINIC | Age: 83
LOS: 4 days | Discharge: NURSING, REHAB OR LONG-TERM CARE | DRG: 287 | End: 2018-01-23
Attending: EMERGENCY MEDICINE | Admitting: INTERNAL MEDICINE
Payer: MEDICARE

## 2018-01-19 ENCOUNTER — APPOINTMENT (OUTPATIENT)
Dept: GENERAL RADIOLOGY | Facility: CLINIC | Age: 83
DRG: 287 | End: 2018-01-19
Attending: EMERGENCY MEDICINE
Payer: MEDICARE

## 2018-01-19 DIAGNOSIS — D69.6 THROMBOCYTOPENIA (H): ICD-10-CM

## 2018-01-19 DIAGNOSIS — R79.1 SUPRATHERAPEUTIC INR: ICD-10-CM

## 2018-01-19 DIAGNOSIS — I48.20 CHRONIC ATRIAL FIBRILLATION (H): ICD-10-CM

## 2018-01-19 DIAGNOSIS — I10 ESSENTIAL HYPERTENSION: ICD-10-CM

## 2018-01-19 DIAGNOSIS — I50.9 ACUTE CONGESTIVE HEART FAILURE, UNSPECIFIED CONGESTIVE HEART FAILURE TYPE: Primary | ICD-10-CM

## 2018-01-19 DIAGNOSIS — R53.1 GENERALIZED WEAKNESS: ICD-10-CM

## 2018-01-19 DIAGNOSIS — R06.02 SHORTNESS OF BREATH: ICD-10-CM

## 2018-01-19 LAB
ALBUMIN SERPL-MCNC: 2.8 G/DL (ref 3.4–5)
ALP SERPL-CCNC: 33 U/L (ref 40–150)
ALT SERPL W P-5'-P-CCNC: 27 U/L (ref 0–70)
ANION GAP SERPL CALCULATED.3IONS-SCNC: 8 MMOL/L (ref 3–14)
AST SERPL W P-5'-P-CCNC: 21 U/L (ref 0–45)
BASOPHILS # BLD AUTO: 0 10E9/L (ref 0–0.2)
BASOPHILS NFR BLD AUTO: 0.1 %
BILIRUB SERPL-MCNC: 0.6 MG/DL (ref 0.2–1.3)
BUN SERPL-MCNC: 26 MG/DL (ref 7–30)
CALCIUM SERPL-MCNC: 6.6 MG/DL (ref 8.5–10.1)
CHLORIDE SERPL-SCNC: 113 MMOL/L (ref 94–109)
CO2 SERPL-SCNC: 23 MMOL/L (ref 20–32)
CREAT SERPL-MCNC: 1.08 MG/DL (ref 0.66–1.25)
D DIMER PPP FEU-MCNC: <0.3 UG/ML FEU (ref 0–0.5)
DIFFERENTIAL METHOD BLD: ABNORMAL
EOSINOPHIL # BLD AUTO: 0 10E9/L (ref 0–0.7)
EOSINOPHIL NFR BLD AUTO: 0.3 %
ERYTHROCYTE [DISTWIDTH] IN BLOOD BY AUTOMATED COUNT: 19.3 % (ref 10–15)
GFR SERPL CREATININE-BSD FRML MDRD: 65 ML/MIN/1.7M2
GLUCOSE SERPL-MCNC: 99 MG/DL (ref 70–99)
HCT VFR BLD AUTO: 32.9 % (ref 40–53)
HGB BLD-MCNC: 10.4 G/DL (ref 13.3–17.7)
IMM GRANULOCYTES # BLD: 0 10E9/L (ref 0–0.4)
IMM GRANULOCYTES NFR BLD: 0 %
INR PPP: 3.56 (ref 0.86–1.14)
INTERPRETATION ECG - MUSE: NORMAL
LACTATE BLD-SCNC: 2.1 MMOL/L (ref 0.7–2)
LYMPHOCYTES # BLD AUTO: 0.9 10E9/L (ref 0.8–5.3)
LYMPHOCYTES NFR BLD AUTO: 13.8 %
MAGNESIUM SERPL-MCNC: 2.2 MG/DL (ref 1.6–2.3)
MCH RBC QN AUTO: 30.8 PG (ref 26.5–33)
MCHC RBC AUTO-ENTMCNC: 31.6 G/DL (ref 31.5–36.5)
MCV RBC AUTO: 97 FL (ref 78–100)
MONOCYTES # BLD AUTO: 0.8 10E9/L (ref 0–1.3)
MONOCYTES NFR BLD AUTO: 11.7 %
NEUTROPHILS # BLD AUTO: 5 10E9/L (ref 1.6–8.3)
NEUTROPHILS NFR BLD AUTO: 74.1 %
NRBC # BLD AUTO: 0 10*3/UL
NRBC BLD AUTO-RTO: 0 /100
NT-PROBNP SERPL-MCNC: 5548 PG/ML (ref 0–450)
PLATELET # BLD AUTO: 38 10E9/L (ref 150–450)
POTASSIUM SERPL-SCNC: 3.8 MMOL/L (ref 3.4–5.3)
PROT SERPL-MCNC: 5.2 G/DL (ref 6.8–8.8)
RBC # BLD AUTO: 3.38 10E12/L (ref 4.4–5.9)
SODIUM SERPL-SCNC: 144 MMOL/L (ref 133–144)
TROPONIN I SERPL-MCNC: 0.02 UG/L (ref 0–0.04)
TROPONIN I SERPL-MCNC: 0.03 UG/L (ref 0–0.04)
TROPONIN I SERPL-MCNC: 0.03 UG/L (ref 0–0.04)
TSH SERPL DL<=0.005 MIU/L-ACNC: 2.87 MU/L (ref 0.4–4)
WBC # BLD AUTO: 6.7 10E9/L (ref 4–11)

## 2018-01-19 PROCEDURE — 84484 ASSAY OF TROPONIN QUANT: CPT | Performed by: INTERNAL MEDICINE

## 2018-01-19 PROCEDURE — 25000132 ZZH RX MED GY IP 250 OP 250 PS 637: Mod: GY | Performed by: INTERNAL MEDICINE

## 2018-01-19 PROCEDURE — 83880 ASSAY OF NATRIURETIC PEPTIDE: CPT | Performed by: EMERGENCY MEDICINE

## 2018-01-19 PROCEDURE — 25000128 H RX IP 250 OP 636: Performed by: INTERNAL MEDICINE

## 2018-01-19 PROCEDURE — A9270 NON-COVERED ITEM OR SERVICE: HCPCS | Mod: GY | Performed by: INTERNAL MEDICINE

## 2018-01-19 PROCEDURE — 71046 X-RAY EXAM CHEST 2 VIEWS: CPT

## 2018-01-19 PROCEDURE — 83735 ASSAY OF MAGNESIUM: CPT | Performed by: INTERNAL MEDICINE

## 2018-01-19 PROCEDURE — 84443 ASSAY THYROID STIM HORMONE: CPT | Performed by: INTERNAL MEDICINE

## 2018-01-19 PROCEDURE — 96374 THER/PROPH/DIAG INJ IV PUSH: CPT

## 2018-01-19 PROCEDURE — 85610 PROTHROMBIN TIME: CPT | Performed by: EMERGENCY MEDICINE

## 2018-01-19 PROCEDURE — 83605 ASSAY OF LACTIC ACID: CPT | Performed by: INTERNAL MEDICINE

## 2018-01-19 PROCEDURE — 99223 1ST HOSP IP/OBS HIGH 75: CPT | Mod: AI | Performed by: INTERNAL MEDICINE

## 2018-01-19 PROCEDURE — 99221 1ST HOSP IP/OBS SF/LOW 40: CPT | Mod: 25 | Performed by: INTERNAL MEDICINE

## 2018-01-19 PROCEDURE — 80053 COMPREHEN METABOLIC PANEL: CPT | Performed by: EMERGENCY MEDICINE

## 2018-01-19 PROCEDURE — 93306 TTE W/DOPPLER COMPLETE: CPT

## 2018-01-19 PROCEDURE — G0463 HOSPITAL OUTPT CLINIC VISIT: HCPCS

## 2018-01-19 PROCEDURE — 21400004 ZZH R&B CCU CSC INTERMEDIATE

## 2018-01-19 PROCEDURE — 84484 ASSAY OF TROPONIN QUANT: CPT | Performed by: EMERGENCY MEDICINE

## 2018-01-19 PROCEDURE — 99285 EMERGENCY DEPT VISIT HI MDM: CPT | Mod: 25

## 2018-01-19 PROCEDURE — 85025 COMPLETE CBC W/AUTO DIFF WBC: CPT | Performed by: EMERGENCY MEDICINE

## 2018-01-19 PROCEDURE — 85379 FIBRIN DEGRADATION QUANT: CPT | Performed by: EMERGENCY MEDICINE

## 2018-01-19 PROCEDURE — 93306 TTE W/DOPPLER COMPLETE: CPT | Mod: 26 | Performed by: INTERNAL MEDICINE

## 2018-01-19 PROCEDURE — 99207 ZZC APP CREDIT; MD BILLING SHARED VISIT: CPT | Performed by: INTERNAL MEDICINE

## 2018-01-19 PROCEDURE — 36415 COLL VENOUS BLD VENIPUNCTURE: CPT | Performed by: INTERNAL MEDICINE

## 2018-01-19 PROCEDURE — 93005 ELECTROCARDIOGRAM TRACING: CPT

## 2018-01-19 PROCEDURE — 25000128 H RX IP 250 OP 636: Performed by: EMERGENCY MEDICINE

## 2018-01-19 RX ORDER — FUROSEMIDE 10 MG/ML
40 INJECTION INTRAMUSCULAR; INTRAVENOUS
Status: DISCONTINUED | OUTPATIENT
Start: 2018-01-19 | End: 2018-01-21

## 2018-01-19 RX ORDER — ACETAMINOPHEN 325 MG/1
650 TABLET ORAL EVERY 4 HOURS PRN
Status: DISCONTINUED | OUTPATIENT
Start: 2018-01-19 | End: 2018-01-23 | Stop reason: HOSPADM

## 2018-01-19 RX ORDER — LISINOPRIL 5 MG/1
5 TABLET ORAL DAILY
Status: DISCONTINUED | OUTPATIENT
Start: 2018-01-19 | End: 2018-01-23 | Stop reason: HOSPADM

## 2018-01-19 RX ORDER — FUROSEMIDE 10 MG/ML
40 INJECTION INTRAMUSCULAR; INTRAVENOUS ONCE
Status: COMPLETED | OUTPATIENT
Start: 2018-01-19 | End: 2018-01-19

## 2018-01-19 RX ORDER — POTASSIUM CHLORIDE 7.45 MG/ML
10 INJECTION INTRAVENOUS
Status: DISCONTINUED | OUTPATIENT
Start: 2018-01-19 | End: 2018-01-23 | Stop reason: HOSPADM

## 2018-01-19 RX ORDER — METOPROLOL TARTRATE 1 MG/ML
5-15 INJECTION, SOLUTION INTRAVENOUS EVERY 6 HOURS PRN
Status: DISCONTINUED | OUTPATIENT
Start: 2018-01-19 | End: 2018-01-23 | Stop reason: HOSPADM

## 2018-01-19 RX ORDER — ONDANSETRON 2 MG/ML
4 INJECTION INTRAMUSCULAR; INTRAVENOUS EVERY 6 HOURS PRN
Status: DISCONTINUED | OUTPATIENT
Start: 2018-01-19 | End: 2018-01-23 | Stop reason: HOSPADM

## 2018-01-19 RX ORDER — ACETAMINOPHEN 650 MG/1
650 SUPPOSITORY RECTAL EVERY 4 HOURS PRN
Status: DISCONTINUED | OUTPATIENT
Start: 2018-01-19 | End: 2018-01-23 | Stop reason: HOSPADM

## 2018-01-19 RX ORDER — POTASSIUM CHLORIDE 29.8 MG/ML
20 INJECTION INTRAVENOUS
Status: DISCONTINUED | OUTPATIENT
Start: 2018-01-19 | End: 2018-01-23 | Stop reason: HOSPADM

## 2018-01-19 RX ORDER — NALOXONE HYDROCHLORIDE 0.4 MG/ML
.1-.4 INJECTION, SOLUTION INTRAMUSCULAR; INTRAVENOUS; SUBCUTANEOUS
Status: DISCONTINUED | OUTPATIENT
Start: 2018-01-19 | End: 2018-01-23 | Stop reason: HOSPADM

## 2018-01-19 RX ORDER — ONDANSETRON 4 MG/1
4 TABLET, ORALLY DISINTEGRATING ORAL EVERY 6 HOURS PRN
Status: DISCONTINUED | OUTPATIENT
Start: 2018-01-19 | End: 2018-01-23 | Stop reason: HOSPADM

## 2018-01-19 RX ORDER — POTASSIUM CL/LIDO/0.9 % NACL 10MEQ/0.1L
10 INTRAVENOUS SOLUTION, PIGGYBACK (ML) INTRAVENOUS
Status: DISCONTINUED | OUTPATIENT
Start: 2018-01-19 | End: 2018-01-23 | Stop reason: HOSPADM

## 2018-01-19 RX ORDER — POTASSIUM CHLORIDE 1.5 G/1.58G
20-40 POWDER, FOR SOLUTION ORAL
Status: DISCONTINUED | OUTPATIENT
Start: 2018-01-19 | End: 2018-01-23 | Stop reason: HOSPADM

## 2018-01-19 RX ORDER — MAGNESIUM SULFATE HEPTAHYDRATE 40 MG/ML
4 INJECTION, SOLUTION INTRAVENOUS EVERY 4 HOURS PRN
Status: DISCONTINUED | OUTPATIENT
Start: 2018-01-19 | End: 2018-01-23 | Stop reason: HOSPADM

## 2018-01-19 RX ORDER — POTASSIUM CHLORIDE 1500 MG/1
20-40 TABLET, EXTENDED RELEASE ORAL
Status: DISCONTINUED | OUTPATIENT
Start: 2018-01-19 | End: 2018-01-23 | Stop reason: HOSPADM

## 2018-01-19 RX ORDER — METOPROLOL SUCCINATE 100 MG/1
100 TABLET, EXTENDED RELEASE ORAL 2 TIMES DAILY
Status: DISCONTINUED | OUTPATIENT
Start: 2018-01-19 | End: 2018-01-23 | Stop reason: HOSPADM

## 2018-01-19 RX ADMIN — METOPROLOL SUCCINATE 100 MG: 100 TABLET, EXTENDED RELEASE ORAL at 09:07

## 2018-01-19 RX ADMIN — LISINOPRIL 5 MG: 5 TABLET ORAL at 16:07

## 2018-01-19 RX ADMIN — METOPROLOL SUCCINATE 100 MG: 100 TABLET, EXTENDED RELEASE ORAL at 21:35

## 2018-01-19 RX ADMIN — FUROSEMIDE 40 MG: 10 INJECTION, SOLUTION INTRAVENOUS at 16:06

## 2018-01-19 RX ADMIN — FUROSEMIDE 40 MG: 10 INJECTION, SOLUTION INTRAVENOUS at 03:19

## 2018-01-19 ASSESSMENT — ENCOUNTER SYMPTOMS
WEAKNESS: 1
SHORTNESS OF BREATH: 1
SPEECH DIFFICULTY: 1
FEVER: 0
CHEST TIGHTNESS: 1
LIGHT-HEADEDNESS: 1
COUGH: 0

## 2018-01-19 NOTE — PROGRESS NOTES
[facility]    Hospitalist Progress Note    Assessment & Plan   Jama Paul is a 86 year old male who was admitted on 1/19/2018. With increasing dyspnea at rest and on exertion    Problem 1: Acute on Chronic Systolic heart failure  - history of afib and historically has a tachycardia induced cardiomyopathy, no history of ischemia  - Part of the issue is his lack of diuretic therapy. Now receiving IV furosemide bid  - not receiving ACEI when coming in. Will restart that- Lisinopril 5mg as blood pressure looks it will support it  Last Echocardiogram was  12/8/2017 and EF was 30-35% with severe concentric LVH, moderate to severe global hypokinesia and moderately to severely dilated RV  Also  1-2+ mitral and 2-3+ tricuspid    Problem 2: Afib, rate controlled at this time but history of tachycardia and recent work to control his rate  - Will have cardiology see him  - On warfarin with a history of stable INRs in the 2.1 to 2.2 range, last INR 4 days ago. 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     Problem 5: mild normochromic, normocytic anemia which maybe due to chronic disease but could also be just an normal adjustment to age.    Problem 6: thrombocytopenia, why is not clear. Last normal platelet count was on 2/24/2009 when it was 207K, the platelet count on 4/8/2012 was 129 and all subsequent platelet counts are under 100K.  Liver disease is a distinct possibility, especially given the low albumin. Other liver tests including Bilirubin, AST, Alk Ptase and ALT are normal or low.  Common cause in this area include ETOH. Will check an ammonium level and ask hematology to see.   Addendum: It turns out that he has known about this and Dr. Clint Pizarro has told him not to worry about it until it is under 50K.  He does not drink ETOH very often so that is not the cause and he has no other history of liver disease.  -     DVT Prophylaxis: Warfarin  Code Status: Full Code    Disposition: Expected discharge in 3-4 days once consults are done and therapy stablized.    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  He relates that his radial pulse, when he checked it, was in the 80s but the ECG pulse was often in the 115 range. He had a deterioration of his EF over 2 years and developed RODGERS.  He has no history of ischemic hert disease and no chest pain.  In the recent past, there has been a push to control his rate and due to that his Lisinopril was stopped and the metoprolol dose increased. He has stopped his furosemide. He noted significant dyspnea at rest recently.   He has been on warfarin for years with stable INRs but developed a hematoma on his right shin. He has had some edema for years.  He also has a diagnosis of OLIVA for about 6 years and used the Cpap for  2-3 years then sent it back.  He doesn't remember who his sleep physician is.  He denies headaches, chest pain, or abdominal pain. Bowels and bladder work well and he eats well.     -Data reviewed today: I reviewed all new labs and imaging results over the last 24 hours. I personally reviewed the EKG tracing showing afib with a rate of 106, low limb lead voltage and LVH with strain and the chest x-ray image(s) showing bilateral small pleural effusions and atelectasis at the bases. The vasculature does not appear congested.    Physical Exam   Temp: 97.4  F (36.3  C) Temp src: Oral BP: (!) 137/96   Heart Rate: 92  Resp: 20 SpO2: 93 % O2 Device: None (Room air)    Vitals:    01/19/18 0048 01/19/18 0707   Weight: 72.6 kg (160 lb) 74.1 kg (163 lb 6.4 oz)     Vital Signs with Ranges  Temp:  [97.4  F (36.3  C)-97.7  F (36.5  C)] 97.4  F (36.3  C)  Heart Rate:  [] 92  Resp:  [16-63] 20  BP: (124-142)/() 137/96  SpO2:  [93 %-99 %] 93 %  I/O last 3 completed shifts:  In: -   Out: 850 [Urine:850]    Constitutional: Alert, cooperative  Respiratory: Lungs have dullness at the bases but is otherwise clear  Cardiovascular: irregularly irregular rhythm, variable S1, normal S2 and an S3. I don't hear the TI or MI murmurs (seen on echo)  2-3 mm of pitting edema in the left leg, right leg is wrapped  GI: soft, not tender, bowel sounds normal.  Skin/Integumen: stasis dermatitis in the left lower leg, right leg is wrapped  Other:  INR is 3.56    Medications     Reason anticoagulant not prescribed for atrial fibrillation       - MEDICATION INSTRUCTIONS -       - MEDICATION INSTRUCTIONS -       Reason ACE/ARB/ARNI order not selected         metoprolol succinate  100 mg Oral BID     furosemide  40 mg Intravenous BID       Data     Recent Labs  Lab 01/19/18  0533 01/19/18  0054   WBC  --  6.7   HGB  --  10.4*   MCV  --  97   PLT  --  38*   INR  --  3.56*   NA  --  144   POTASSIUM  --  3.8   CHLORIDE  --  113*   CO2  --  23   BUN  --  26   CR  --  1.08   ANIONGAP  --  8   BARON  --  6.6*   GLC  --  99   ALBUMIN  --  2.8*   PROTTOTAL  --  5.2*   BILITOTAL  --  0.6   ALKPHOS  --  33*   ALT  --  27   AST  --  21   TROPI 0.023 0.030       Imaging:   Recent Results (from the past 24 hour(s))   XR Chest 2 Views    Narrative    XR CHEST 2 VW  1/19/2018 2:30 AM      HISTORY: Dyspnea.      COMPARISON: 12/7/2017.    FINDINGS: The heart is enlarged without pulmonary edema. There are  small bilateral pleural effusions. Bibasilar infiltrates. The lungs  are otherwise clear. No pneumothorax. Degenerative disease in the  spine.      Impression     IMPRESSION: Small bilateral pleural effusions and bibasilar probable  atelectasis.    SARA CORREA MD

## 2018-01-19 NOTE — IP AVS SNAPSHOT
"` [facility]: [phone]                                              INTERAGENCY TRANSFER FORM - NURSING   2018                    Hospital Admission Date: 2018  YINKA GONZALEZ   : 1931  Sex: Male        Attending Provider: Brooks Andres MD     Allergies:  No Known Allergies    Infection:  None   Service:  HOSPITALIST    Ht:  1.854 m (6' 1\")   Wt:  70.1 kg (154 lb 9.6 oz)   Admission Wt:  72.6 kg (160 lb)    BMI:  20.4 kg/m 2   BSA:  1.9 m 2            Patient PCP Information     Provider PCP Type    Clint Pizarro MD General      Current Code Status     Date Active Code Status Order ID Comments User Context       Prior      Code Status History     Date Active Date Inactive Code Status Order ID Comments User Context    2018  8:08 AM  Full Code [id number]  Raymundo Proctor MD Outpatient    2018  5:14 AM 2018  8:08 AM Full Code [id number]  Brooks Andres MD Inpatient    2017  4:47 PM 2018  5:14 AM Full Code [id number]  Bharat Cortés PA Outpatient    2017  4:26 PM 2017  4:47 PM Full Code [id number]  Rikc Ambrosio MD Inpatient    2017  4:29 PM 2017  4:26 PM Full Code [id number]  Brooks Andres MD Outpatient    2017  1:34 PM 2017  4:29 PM Full Code [id number]  Carter Whiting PA-C Inpatient    10/5/2017  9:59 AM 2017  1:34 PM Full Code [id number]  Carter Whiting PA-C Outpatient    10/4/2017 10:04 PM 10/5/2017  9:59 AM Full Code [id number]  Adolfo Meadows MD Inpatient    2014  9:44 AM 10/4/2017 10:04 PM Full Code [id number]  Carter Whiting PA-C Outpatient    2014 11:09 PM 2014  9:44 AM Full Code [id number]  Carter Whiting PA-C Inpatient      Advance Directives        Does patient have a scanned Advance Directive/ACP document in EPIC?           No        Hospital Problems as of 2018              " Priority Class Noted POA    Atrial fibrillation (H) Medium  12/7/2011 Yes      Non-Hospital Problems as of 1/23/2018              Priority Class Noted    Essential hypertension Medium  12/7/2011    Colon polyps Medium  4/11/2012    Elevated PSA Medium  Unknown    BCC (basal cell carcinoma), face Medium  8/10/2012    Incarcerated hernia Medium  4/30/2014    Cervical radiculopathy Medium  5/19/2014    Neck pain Medium  5/21/2014    Hematoma Medium  10/4/2017    Weakness Medium  11/17/2017    Generalized weakness Medium  12/7/2017    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) Medium  1/23/2018      Immunizations     Name Date      Influenza (IIV3) PF 10/15/14     Influenza (IIV3) PF 11/01/12     Zoster vaccine, live 11/01/12          END      ASSESSMENT     Discharge Profile Flowsheet     EXPECTED DISCHARGE     Inspection of bony prominences  Full 01/23/18 1040    Expected Discharge Date  01/24/18 01/21/18 1501   Skin WDL  ex 01/23/18 1040    DISCHARGE NEEDS ASSESSMENT     Skin Color/Characteristics  bruised (ecchymotic);rufina 01/23/18 1040    Transportation Available  car;family or friend will provide 01/21/18 1452   Skin Temperature  warm 01/23/18 1040    # of Referrals Placed by CTS  Post Acute Facilities 01/22/18 2043   Skin Moisture  dry 01/23/18 1040    Equipment Used at Home  none 05/03/14 1029   Skin Integrity  wound(s) 01/23/18 1040    GASTROINTESTINAL (ADULT,PEDIATRIC,OB)     Full except areas not inspected   -- (UTV right shin) 01/20/18 1459    GI WDL  WDL 01/23/18 1040   Inspection under devices  Full 01/23/18 1040    Last Bowel Movement  01/18/18 01/20/18 0142   Skin Elasticity  slow return to original state 01/22/18 2034    Passing flatus  yes 01/22/18 0950   SAFETY      COMMUNICATION ASSESSMENT     Safety WDL  WDL 01/23/18 1040    Patient's communication style  spoken language (English or Bilingual) 01/19/18 0044   All Alarms  alarm(s) activated and audible 01/23/18 1040    SKIN      "Safety Factors  bed in position other than low 01/23/18 1040                 Assessment WDL (Within Defined Limits) Definitions           Safety WDL     Effective: 09/28/15    Row Information: <b>WDL Definition:</b> Bed in low position, wheels locked; call light in reach; upper side rails up x 2; ID band on<br> <font color=\"gray\"><i>Item=AS safety wdl>>List=AS safety wdl>>Version=F14</i></font>      Skin WDL     Effective: 09/28/15    Row Information: <b>WDL Definition:</b> Warm; dry; intact; elastic; without discoloration; pressure points without redness<br> <font color=\"gray\"><i>Item=AS skin wdl>>List=AS skin wdl>>Version=F14</i></font>      Vitals     Vital Signs Flowsheet     VITAL SIGNS     BMI (Calculated)  21.15 01/19/18 0049    Temp  97.4  F (36.3  C) 01/23/18 1103   POSITIONING      Temp src  Oral 01/23/18 1103   Body Position  independently positioning 01/23/18 1104    Resp  18 01/23/18 1103   Head of Bed (HOB)  HOB at 30-45 degrees 01/23/18 1104    Pulse  94 01/23/18 0951   Positioning/Transfer Devices  pillows 01/19/18 0604    Heart Rate  81 01/23/18 1103   DAILY CARE      Pulse/Heart Rate Source  Monitor 01/23/18 1103   Activity Management  ambulated in room;ambulated to bathroom 01/23/18 1104    BP  120/77 01/23/18 1103   Activity Assistance Provided  assistance, stand-by 01/23/18 1104    BP Location  Left arm 01/23/18 1103   Assistive Device Utilized  walker 01/23/18 1104    OXYGEN THERAPY     WILLIAM COMA SCALE      SpO2  97 % 01/23/18 1103   Best Eye Response  4-->(E4) spontaneous 01/23/18 1325    O2 Device  None (Room air) 01/23/18 1103   Best Motor Response  5-->(M5) localizes pain 01/23/18 1325    Oxygen Delivery  2 LPM 01/20/18 1352   Best Verbal Response  5-->(V5) oriented 01/23/18 1325    PAIN/COMFORT     [place] Coma Scale Score  14 01/23/18 1325    Patient Currently in Pain  denies 01/23/18 1325   ECG      0-10 Pain Scale  0 01/19/18 0454   Equipment  electrodes changed;telemetry batteries " "changed 01/21/18 1126    HEIGHT AND WEIGHT     ECG Rhythm  Atrial fibrillation 01/23/18 1325    Height  1.854 m (6' 1\") 01/19/18 0049   RIGHT JUGULAR INTERVENTIONAL PROCEDURE ACCESS      Height Method  Stated 01/19/18 0049   Site Assessment  Unchanged 01/23/18 1325    Weight  70.1 kg (154 lb 9.6 oz) 01/23/18 0608   Hemostasis management  Unchanged 01/23/18 1325    Weight Method  Standing scale 01/23/18 0608   CMS Right Arm  WDL 01/23/18 1325    BSA (Calculated - sq m)  1.93 01/19/18 0049   Radial Pulse - Right Arm  Normal 01/23/18 1325            Patient Lines/Drains/Airways Status    Active LINES/DRAINS/AIRWAYS     Name: Placement date: Placement time: Site: Days: Last dressing change:    Wound 01/19/18 Lower;Right Leg wound from supratherapeutic INR 01/19/18   0500   Leg   4             Patient Lines/Drains/Airways Status    Active PICC/CVC     None            Intake/Output Detail Report     Date Intake     Output Net    Shift P.O. I.V. IV Piggyback Total Urine Total       Noc 01/21/18 2300 - 01/22/18 0659 0 -- -- -- 300 300 -300    Day 01/22/18 0700 - 01/22/18 1459 0 913.75 -- 913.75 200 200 713.75    Julianna 01/22/18 1500 - 01/22/18 2259 -- -- -- -- 350 350 -350    Noc 01/22/18 2300 - 01/23/18 0659 200 -- -- 200 475 475 -275    Day 01/23/18 0700 - 01/23/18 1459 400 3 -- 403 200 200 203      Last Void/BM       Most Recent Value    Urine Occurrence 1 at 01/23/2018 0954    Stool Occurrence       Case Management/Discharge Planning     Case Management/Discharge Planning Flowsheet     REFERRAL INFORMATION     Description of Support System  Supportive;Involved 01/22/18 2043    Did the Initial Social Work Assessment result in a Social Work Case?  Yes 01/22/18 2043   Support Assessment  Adequate family and caregiver support 01/22/18 2043    Admission Type  inpatient 01/22/18 2043   Quality of Family Relationships  supportive;involved 01/22/18 2043    Arrived From  home or self-care 01/22/18 2043   COPING/STRESS      Referral " Source  physician 01/22/18 2043   Major Change/Loss/Stressor  denies 01/19/18 0529    # of Referrals Placed by CTS  Post Acute Facilities 01/22/18 2043   EXPECTED DISCHARGE      Post Acute Facilities  TCU 01/22/18 2043   Expected Discharge Date  01/24/18 01/21/18 1501    Reason For Consult  discharge planning 01/22/18 2043   DISCHARGE PLANNING      Record Reviewed  medical record 01/22/18 2043   Transportation Available  car;family or friend will provide 01/21/18 1452    CTS Assigned to Case  Roxi Teresa 01/22/18 2043   Equipment Used at Home  none 05/03/14 1029    LIVING ENVIRONMENT     ABUSE RISK SCREEN      Lives With  spouse 01/22/18 2043   QUESTION TO PATIENT:  Has a member of your family or a partner(now or in the past) intimidated, hurt, manipulated, or controlled you in any way?  patient declined to answer or is unable to answer 01/19/18 0049    Living Arrangements  house 01/22/18 2043   QUESTION TO PATIENT: Do you feel safe going back to the place where you are living?  patient declined to answer or is unable to answer 01/19/18 0049    Provides Primary Care For  no one 01/22/18 2043   OBSERVATION: Is there reason to believe there has been maltreatment of a vulnerable adult (ie. Physical/Sexual/Emotional abuse, self neglect, lack of adequate food, shelter, medical care, or financial exploitation)?  no 01/19/18 0049    Quality Of Family Relationships  supportive;involved 01/22/18 2043   (R) MENTAL HEALTH SUICIDE RISK      ASSESSMENT OF FAMILY/SOCIAL SUPPORT     Are you depressed or being treated for depression?  No 01/19/18 0636    Marital Status   01/22/18 2043   HOMICIDE RISK      Who is your support system?  Wife;Children 01/22/18 2043   Feels Like Hurting Others  no 01/19/18 0049    Spouse's Name  Paige 01/22/18 2043

## 2018-01-19 NOTE — ED NOTES
[facility]  ED Nurse Handoff Report    ED Chief complaint: Shortness of Breath (Sudden onset of SOB.  Rapid afib.  Chest discomfort with deep breath.  18G left hand accessed by EMS.  ) and Chest Pain      ED Diagnosis:   Final diagnoses:   Chronic atrial fibrillation (H)   Shortness of breath   Acute congestive heart failure, unspecified congestive heart failure type (H)   Thrombocytopenia (H)   Supratherapeutic INR       Code Status: Full Code    Allergies: No Known Allergies    Activity level - Baseline/Home:  Independent    Activity Level - Current:   Independent     Needed?: No    Isolation: No  Infection: Not Applicable    Bariatric?: No    Vital Signs:   Vitals:    01/19/18 0200 01/19/18 0215 01/19/18 0230 01/19/18 0254   BP: (!) 134/114  (!) 142/118 (!) 133/96   Resp: 21 21     Temp:       TempSrc:       SpO2: 93% 94% 99% 96%   Weight:       Height:           Cardiac Rhythm: ,        Pain level: 0-10 Pain Scale: 3    Is this patient confused?: No    Patient Report: Initial Complaint: A/O x4.  Tonight developed sudden SOB.  States it his chest is uncomfortable with deep breaths, otherwise denies pain.  Wife supportive and at bedside.  Ddimer and trop negative.  BNP elevated at 5548 and heart is enlarged on xray.  40mg lasix IV given.  2 IVs left upper ext.  Plan for echocardiogram and consult with cardiologist.  Patient compliant with plan, but voices concern of his road ahead.    Focused Assessment: see above  Tests Performed: see above  Abnormal Results: see above  Treatments provided: lasix    Family Comments: none    OBS brochure/video discussed/provided to patient: N/A    ED Medications:   Medications   furosemide (LASIX) injection 40 mg (40 mg Intravenous Given 1/19/18 0319)       Drips infusing?:  No      ED NURSE PHONE NUMBER: [phone]

## 2018-01-19 NOTE — CONSULTS
REASON FOR CONSULTATION:  Heart failure.      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  He is a patient of my partner, Dr. Villatoro.  He has been followed and worked up for atrial fibrillation with congestive heart failure, pulmonary hypertension and valvular insufficiency.  He had an echo dated 12/08/2017, which I have personally reviewed.  By report, it lists that the LV is normal in size.  There is mild to moderate plus LVH; ejection fraction is approximately 30% -35%.  I should comment that I agree with that.  There is not specific scintillations that would classically suggest amyloid.  There is a peculiar empty space in the midseptum, seen in the parasternal long and short axis views, but not on the apical views.  I do not know if that represents a very large coronary artery, artifact, or its overlay of the right ventricle or the papillary muscle of the right ventricle and this will need to be investigated.  Color Doppler was not turned on.  In addition, there was severe biatrial enlargement,  much worse in the right atrium.  The right ventricle is dilated and hypocontractile.  There was no obvious atrial septal defect.  They report up to 2+ mitral insufficiency and 2 to 3+ tricuspid valve insufficiency; I would agree.  They note in the chart that the IVC was normal in size with normal respiratory collapse but to my eye,  the IVC was actually dilated to approximately 2.4 and did not contract with sniff.  The reason I bring that up, it would suggest that there was pulmonary hypertension.  They checked the RVSP at 21+ RA pressure.  If my interpretation of the IVC is somewhat dilated and noncompressible, it would suggest that the CVP is probably at least 15-20,which would put the RVSP in the high 40s to low 50 range.  The aortic root was borderline enlarged.  There was a left pleural effusion noted and the patient was in atrial fib.      He had an MRI stress test which was negative for ischemia.   This was done on 12/15/2017.  They do note LV had moderate decreased function at 42%.  The mention normal wall thickness, but I believe the echo suggests probably some LVH.  They do not note the right ventricle enlarged and the RVEF is mildly reduced.  They do mention the severe biatrial enlargement.  They do mention moderate to severe tricuspid insufficiency.  They specifically comment that there is no evidence of MI, fibrosis or infiltrative disease.  They do note bilateral pleural effusions and again, as I mentioned, the stress test portion of study was negative.        The patient was prescribed Lasix on a p.r.n. basis but in fact, he only took 1 dose once ever so he has not been on that.  Starting within the last couple of weeks, he has been getting more short of breath.  He says actually says that he cannot talk, that his voice is weak but then the more one talks to him, we realize that what he really means is he is too short of breath to talk but became much worse shortly before admission.  He reports a little bit of lightheadedness.  He talks about a heavy chest, but he thinks it is because it was so hard to breathe, not angina, and as I mentioned, the stress test was negative.  Interestingly, he has not noticed any weight gain or any significant edema.  He always has a low-grade lower extremity edema.  He does have some type of injury.  He is on Coumadin for his atrial fibrillation and has some type of hematoma on his right ankle which had to be surgically drained.  He states that is healing by secondary intention and is doing well.  He has had no recent fever.  He does have a bit of a cough that is nonproductive.  He has not been around anyone who had a URI or has been sick.  He has not had sore throat, diarrhea, etc.  He states that here in the hospital, once he got oxygen and Lasix, he is already significantly better.      PAST MEDICAL HISTORY:   1.  Atrial fibrillation.   2.  Valvular heart disease as  above.   3.  Hypertension.   4.  Elevated PSA.   5.  Colon polyps.   6.  Basal cell cancer skin   7.  Previous incarcerated hernia.    8.  Cervical radiculopathy.   9.  Hematoma on his ankle as I alluded to above.   10.  Inguinal hernia.   11.  Pulmonary hypertension, not mentioned on the echo, but likely present.      MEDICATIONS ON ADMISSION:   1.  Lisinopril 5 mg daily.   2.  Toprol- mg b.i.d.   3.  Lasix 20 mg p.r.n.  As mentioned, he has only taken 1 dose ever.    4.  Coumadin in variable dose.     5.  Multivitamin.     6.  Vitamin B12 500 mcg daily.   7.  Vitamin C 500 mg daily.   8.  Vitamin B complex daily..   9.  Vitamin D 2000 units daily.      ALLERGIES:  None.      SOCIAL HISTORY:  He is a retired eye physician.  He is a never smoker; he does not drink alcohol.      FAMILY HISTORY:  Noncontributory.      PHYSICAL EXAMINATION:   GENERAL:  Reveals a very pleasant gentleman who is able to now talk freely without getting short of breath, although he did occasionally cough and have to stop momentarily before he continued to talk.    VITAL SIGNS:  Temperature is 97.6.  Blood pressure 116/73, heart rate currently is 72, weight 74.1 kg.   SKIN:  There are areas of bruising and thin skin, but no rash.   HEENT:  Nonicteric.   NECK:  Supple without thyromegaly or adenopathy.   CHEST:  Surprisingly clear.  There is some decreased breath sounds at the base which is probably consistent with effusion.   CARDIAC:  Reveals a prominent PMI.  The heart rhythm is a little more rapid now that he is talking.  There is a loud S2.  It is hard to know if it is split or not.  It is either a bundle or from pulmonary hypertension.  I am hearing an increased S2.  There is no classic S3.  There is an intermittent blowing murmur of either mitral or tricuspid valve insufficiency.  There may actually be a slight inflow murmur in early diastole.  Carotid pulses are 2+, femoral pulses are 2+, no bruits.  Neck veins are notably  distended to at least 10-12 cm of water.   ABDOMEN:  Somewhat scaphoid; liver is not enlarged and is not pulsatile.  Spleen tip is not felt.   EXTREMITIES:  There is no cyanosis.  There is no clubbing.  There is trace ankle edema, left greater than right.  The right is actually wrapped from the hematoma that is being addressed as I alluded to above.  There are also some hemosiderin deposits in the lower extremities suggesting there has been chronic edema there.   NEUROLOGIC:  Alert and oriented, cranial nerves II through XII are intact, motor and sensory intact.      LABORATORY DATA:  Echocardiogram from December as above.  Today, sodium 144, potassium 3.8, creatinine 1.0, BUN 26; calcium is quite low at 6.6 but it was 8.1 a couple weeks ago, so that needs to be rechecked.  Magnesium 2.2, albumin low at 2.8, total protein low at 5.2.  Transaminases are normal.  Alkaline phosphatase low at 33.  NT BNP elevated at 5548.  Troponins are negative x 2.  TSH 2.87, glucose 99, white count 6.7, hemoglobin 10.4, MCV 97, platelet count 38,000 and this is not a new finding.  This has not been mentioned previously.  Platelet counts have been low going back at least to 2014.  I do not know if this represents essential thrombocytopenia or not.      IMPRESSION:  From a cardiac standpoint, I presume he is in congestive heart failure.  He has bilateral pleural effusions.  His lung fields are relatively clear.  He has reduced LV and RV ejection fraction, probably mild to closer to moderate MR and moderately severe TR.  I believe he does have pulmonary hypertension, note the IVC is enlarged, suggesting elevated RA pressure.  He has atrial fib.  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  I will have to review the next echo to see what it is that I am seeing in the septum.  If it is a large artery or simply overlay of the papillary muscle of the right ventricle to explain  what that evacuated area is.  The echo does not have the classic look of amyloid, but this is an elderly gentleman with some valve thickening, significant atrial enlargement and perhaps some bone marrow issue. I think the mainstay of therapy will be a diuretic and make sure that the atrial fib rate is controlled.  We may need to investigate if he ever had a bone marrow biopsy, etc., to look for any pathology there which could also perhaps lead to an infiltrative cardiomyopathy such as amyloid, etc. versus senile cardiac amyloid alone versus again just rate-related cardiomyopathy.  We will repeat the echocardiogram and ask them to turn color Doppler on the midseptum to determine if that is actually a vessel I am seeing.   He may be a candidate for Entresto rather than just ACE inhibitors.  We can make that decision later.  I am going to recommend that we hold his Coumadin and use heparin in case we need to do a right heart catheterization on Monday to determine if needs pulmonary hypertension medications.  We will follow with you.         SHELBY SCHUMACHER MD             D: 2018 11:33   T: 2018 12:44   MT: [email or web address]      Name:     YINKA GONZALEZ   MRN:      [id number]        Account:       [id number]   :      1931           Consult Date:  2018      Document: [id number]       cc: Clint Villatoro MD, FACC

## 2018-01-19 NOTE — CONSULTS
BRIEF NUTRITION NOTE      REASON FOR ASSESSMENT:  Jama Paul is a 86 year old male seen by Registered Dietitian for CHF Consult for 2 gm NA Diet Education    Unable to meet with pt at this time, x 2 attempts, will educate as able.      FOLLOW UP/MONITORING:   Will re-evaluate in 2 - 3 days, or sooner, if re-consulted.          Emmie Tejeda RD, LD

## 2018-01-19 NOTE — PROGRESS NOTES
[facility] Nurse Inpatient Wound Assessment     Initial Assessment of wound(s) on pt's:   RLE        Data:   Patient History:      per MD note(s): Jama Paul is an 86-year-old male with a past medical history notable for atrial fibrillation, on anticoagulation, moderate to severe tricuspid regurgitation, tachycardia-mediated cardiomyopathy who presents due to worsening shortness of breath.         Nader Assessment and sub scores:   Nader Score  Av  Min: 19  Max: 19    Positioning: Pillows    Mattress:  Standard , Atmos Air mattress       Moisture Management:  Incontinence Protocol prn      Current Diet / Nutrition:       Active Diet Order      Combination Diet Low Saturated Fat Na <2400mg Diet, No Caffeine Diet    Labs:   Recent Labs   Lab Test  18   0054   ALBUMIN  2.8*   HGB  10.4*   INR  3.56*   WBC  6.7       Wound Assessment (location):   RLE  Wound History:  Hx hematoma related to elevated INR last October, followed at [facility] by Dr. Benjamin, seen last week and started on Iodofoam 3x/week.      Wound Base: dark brick-red moist raw beefy tissue    Specific Dimensions (length x width x depth, in cm) :   6 x 5.5 x 0.3cm    Tunneling:  N/A    Undermining: N/A    Palpation of the wound bed:  normal    Slough appearance:  none    Eschar appearance:  none    Periwound Skin: intact, slight maceration    Color: normal and consistent with surrounding tissue    Temperature  normal     Drainage:  Moderate to large on old dressing placed 2 days ago     Odor: none    Pain:  minimal            Intervention:     Patient's chart evaluated.      Wound(s) assessed    Wound Care: cleansed and redressed    Orders  Written    Supplies  at bedside    Discussed plan of care with Patient and Nurse          Assessment:       RLE wound related to previous hematoma has been slowly improving.  Appears clean and without acute infection.  Continues to have a lot of drainage.  Will use Aquacel Ag while pt in  hospital and then pt can go back to Iodofoam at home (this product not available in hospital).  Will also continue Tubigrip light compression sock to minimize edema.          Plan:     Nursing to notify the Provider(s) and re-consult the WOC Nurse if wound(s) deteriorate(s) or if the wound care plan needs reevaluation.      Plan of care for wound located on RLE: Daily:  1.  Cleanse and moisturize intact skin on RLE with mild cleanser/lotion.   2.  Cleanse wound with MicroKlenz, pat dry.  3.  Apply piece of dry Aquacel Ag to wound, cutting to fit.    4.  Cover with gauze and secure with Kerlix.  Label with time/date/initials.   5.  Apply Tubigrip stocking in double layer, evenly from base of toes to just below knee crease.  Ensure no wrinkles.  Elevate legs and float heels with pillows.      WOC Nurse will return: weekly and prn

## 2018-01-19 NOTE — H&P
CHIEF COMPLAINT:  Shortness of breath.      HISTORY OF PRESENT ILLNESS:  Jama Paul is an 86-year-old male with a past medical history notable for atrial fibrillation, on anticoagulation, moderate to severe tricuspid regurgitation, tachycardia-mediated cardiomyopathy who presents due to worsening shortness of breath.  The patient was admitted last month for shortness of breath and found to have new cardiomyopathy.  He followed up with Cardiology and saw RHIANNA Nair on 12/27/2017.  At that time he underwent cardiac MRI and adjustment of his medications including discontinuation of lisinopril and up-titration of metoprolol due to continued atrial fibrillation with rapid ventricular rate.  His cardiac MRI reportedly did not show any ischemia, and it was recommended for him to follow up with a repeat echocardiogram at the end of January 2018.  He has also been enrolled [facility].  The patient states that earlier yesterday he developed acute onset of worsening shortness of breath and air hunger.  He denies any fevers, chills or night sweats.  He denies any weight changes but does note lower extremity edema that maybe a little bit worse than usual.  He does have a wound on his right lower extremity that is followed by the wound clinic, but apparently was due to a spontaneous hematoma in the setting of a supratherapeutic INR.  The patient denies any chest pain, dizziness, lightheadedness and palpitations.      PAST MEDICAL HISTORY:     1.  Notable for weakness.   2.  Incarcerated hernia.   3.  Right lower extremity hematoma and wound.   4.  Essential hypertension.   5.  Elevated PSA.   6.  Basal cell carcinoma of the face.   7.  Tachycardia related cardiomyopathy.   8.  Atrial fibrillation, on anticoagulation.   9.  Moderate to severe tricuspid regurgitation.   10.  Chronic thrombocytopenia.      MEDICATIONS:     Prescriptions Prior to Admission   Medication Sig Dispense Refill Last Dose      lisinopril (PRINIVIL/ZESTRIL) 5 MG tablet Take 1 tablet (5 mg) by mouth daily 90 tablet 3      metoprolol (TOPROL-XL) 100 MG 24 hr tablet Take 1 tablet (100 mg) by mouth 2 times daily 180 tablet 3 Taking     furosemide (LASIX) 20 MG tablet Take 1 tablet (20 mg) by mouth as needed 30 tablet 11 Taking     warfarin (COUMADIN) 2.5 MG tablet Take 2.5-5 mg by mouth -  For A-fib  INR goal 2-3  5 mg on Monday, Wednesday and Friday  2.5 mg on all other days   Taking     Multiple Vitamins-Minerals (ICAPS AREDS FORMULA PO) Take 1 tablet by mouth daily   Taking     CYANOCOBALAMIN PO Take 500 mcg by mouth daily   Taking     Ascorbic Acid (VITAMIN C PO) Take 500 mg by mouth daily   Taking     POTASSIUM CITRATE PO Take 5 mEq by mouth daily OTC dose unknown   Taking     vitamin  B complex with vitamin C (VITAMIN  B COMPLEX) TABS Take 1 tablet by mouth daily   Taking     multivitamin, therapeutic with minerals (MULTI-VITAMIN) TABS Take 1 tablet by mouth daily   Taking     VITAMIN D, CHOLECALCIFEROL, PO Take 2,000 Units by mouth daily   Taking         ALLERGIES:  No known drug allergies.      SOCIAL HISTORY:  No tobacco or alcohol use.  Presently .      FAMILY HISTORY:  Reviewed and noncontributory.      REVIEW OF SYSTEMS:  A 10-point review of systems performed and negative as per HPI.      PHYSICAL EXAMINATION:   VITAL SIGNS:  Temperature 97.7 degrees Fahrenheit, heart rate 90s to low 100s, blood pressure initially 140/99 and recheck was 139/98, respirations 10-20, satting 94% and higher on room air.  Weight is 72.6 kilograms.   GENERAL:  Mild respiratory distress, sitting upright in the ER chair with wife at bedside, appears stated age.   HEENT:  Normocephalic, atraumatic.  Extraocular motions intact, moist mucous membranes, anicteric sclerae, uvula midline.   NECK:  No lymphadenopathy.  Trachea midline.   CARDIOVASCULAR:  Irregular rhythm, mildly tachycardic at times, no additional heart sounds.   PULMONARY:  Reduced  breath sounds at the bases bilaterally, otherwise clear.   ABDOMEN:  Bowel sounds positive.  Soft, nondistended, nontender.  No rebound or guarding noted.   EXTREMITIES:  Left lower extremity with pitting edema, 2+ up to the knee.  Right lower extremity is wrapped without any drainage.   EXTREMITIES:  Slightly cool to touch, difficult to palpate DP pulses bilaterally.   NEUROLOGIC:  Moves all 4 extremities.  Cranial nerves II-XII grossly intact.   PSYCHIATRIC:  Appropriate mood and affect, oriented x3.   SKIN:  No rashes or bruising noted.  However, right lower extremity is Ace wrapped.      LABORATORY DATA:  CMP shows chloride of 113.  GFR 65, creatinine 1.08, calcium 6.6, albumin 2.8, total protein 5.2, alkaline phosphatase 33.  BNP is 5548.  Initial troponin is negative.  Glucose 99.  CBC shows a hemoglobin of 10.4, hematocrit 32.9, platelet count 38,000.  INR 3.56.  D-dimer is negative.      IMAGING:  Chest x-ray shows small bilateral pleural effusions and bibasilar infiltrates likely due to atelectasis.  EKG shows atrial fibrillation with rapid ventricular rate, on my initial interpretation.  No acute ST-T wave changes indicative of active ischemia.      ASSESSMENT:  Jama Paul is an 86-year-old male with a past medical history notable for chronic atrial fibrillation, on anticoagulation, tachycardia-mediated cardiomyopathy with most recent EF of 30% to 35%, moderate to severe global hypokinesia of the left ventricle, 1-2+ mitral regurgitation, 2-3+ tricuspid regurgitation who presents due to acute onset of shortness of breath.  He is found to be in congestive heart failure exacerbation and atrial fibrillation with RVR and is thus admitted for further management.     PLAN:      1. Decompensated systolic heart failure due to atrial fibrillation with rapid ventricular response, tachycardia-mediated congestive heart failure.  The patient has had difficulty with controlling heart rates in the clinic setting and  his outpatient team has been increasing his metoprolol.  He was also discontinued recently from his lisinopril.  The patient is also not on any Lasix on a scheduled basis though it is available p.r.n.  He is found to have atrial fibrillation with rapid ventricular response.  Initial troponin is negative and EKG without ischemic changes.  His BNP is 5548.  His INR is also elevated at 3.56.  Chest x-ray shows persistent small bilateral pleural effusions.   - Continue with Lasix 40 mg IV b.i.d. that was started in the Emergency Department.  p.r.n. IV metoprolol for heart rate greater than 110-120 beats per minute.    - Check TSH with free T4 reflex.   - The patient was due to have an echocardiogram checked at the end of this month, will order it while he is here.   - Trend troponins, [place] [facility] [place]   - Potassium and magnesium replacement protocols have been ordered.     - Cardiology consult requested for further medication recommendations.   - I's and O's and daily weights.   - Low-salt diet and 2 liter fluid restriction.   - Supplemental oxygen as needed to maintain sats greater than 92%.   - Holding warfarin as INR is supratherapeutic, check INR in a.m.     2.  History of right lower extremity wound due to hematoma.  The patient follows with the [facility].    - Placed consult wound RN while he is admitted.     3.  History of thrombocytopenia.  Baseline platelet count is 50,000 to 80,000.  On admission his platelet count 38,000.   - No clinical bleeding noted, continue to monitor.    - Recheck CBC in a.m.     4.  Deep venous thrombosis prophylaxis:  Patient is supratherapeutic on warfarin.     5. Full code.         CATINA JANSEN MD             D: 2018 04:52   T: 2018 05:59   MT: NTS      Name:     YINKA GONZALEZ   MRN:      [id number]        Account:      [id number]   :      1931           Admitted:     [id number]      Document: [id number]

## 2018-01-19 NOTE — PHARMACY-ADMISSION MEDICATION HISTORY
Admission medication history interview status for the 1/19/2018  admission is complete. See EPIC admission navigator for prior to admission medications     Medication history source reliability:Good    Actions taken by pharmacist (provider contacted, etc):None     Additional medication history information not noted on PTA med list :None    Medication reconciliation/reorder completed by provider prior to medication history? Yes    Time spent in this activity: 30 minutes    Prior to Admission medications    Medication Sig Last Dose Taking? Auth Provider   lisinopril (PRINIVIL/ZESTRIL) 5 MG tablet Take 1 tablet (5 mg) by mouth daily  at pm Yes Vanita Baig APRN CNP   metoprolol (TOPROL-XL) 100 MG 24 hr tablet Take 1 tablet (100 mg) by mouth 2 times daily  Yes Vanita Baig APRN CNP   warfarin (COUMADIN) 2.5 MG tablet Take 2.5-5 mg by mouth -  For A-fib  INR goal 2-3  5 mg on Monday, Wednesday and Friday  2.5 mg on all other days 1/15/2018 Yes Reported, Patient   Multiple Vitamins-Minerals (ICAPS AREDS FORMULA PO) Take 1 tablet by mouth 2 times daily   Yes Unknown, Entered By History   CYANOCOBALAMIN PO Take 500 mcg by mouth daily  Yes Unknown, Entered By History   Ascorbic Acid (VITAMIN C PO) Take 500 mg by mouth daily  Yes Unknown, Entered By History   vitamin  B complex with vitamin C (VITAMIN  B COMPLEX) TABS Take 1 tablet by mouth daily  Yes Unknown, Entered By History   multivitamin, therapeutic with minerals (MULTI-VITAMIN) TABS Take 1 tablet by mouth daily  Yes Unknown, Entered By History   VITAMIN D, CHOLECALCIFEROL, PO Take 2,000 Units by mouth daily  Yes Unknown, Entered By History   furosemide (LASIX) 20 MG tablet Take 1 tablet (20 mg) by mouth as needed new, has not started  Vanita Baig APRN CNP

## 2018-01-19 NOTE — IP AVS SNAPSHOT
[facility]    [address] [place] [address]., [address]    [place] [address]    Phone:  [phone]                                       After Visit Summary   1/19/2018    Jama Paul    MRN: [id number]           After Visit Summary Signature Page     I have received my discharge instructions, and my questions have been answered. I have discussed any challenges I see with this plan with the nurse or doctor.    ..........................................................................................................................................  Patient/Patient Representative Signature      ..........................................................................................................................................  Patient Representative Print Name and Relationship to Patient    ..................................................               ................................................  Date                                            Time    ..........................................................................................................................................  Reviewed by Signature/Title    ...................................................              ..............................................  Date                                                            Time

## 2018-01-19 NOTE — PROGRESS NOTES
[facility] Progress Note  [facility] 66    SH, referral visit. The patient shared about his life long constance and family/personal connections to [employer] institutions. A member of [employer], the patient talked about his current health in context with his constance along with some of his family members struggles to find constance at this time.      provided reflective listening and offered prayer.      Coping with constance practices and family support.     SH will follow.         Martínez Brewer  Chaplain Resident

## 2018-01-19 NOTE — IP AVS SNAPSHOT
` `     [facility] CARDIAC SPECIALTY CARE: [phone]                 INTERAGENCY TRANSFER FORM - NOTES (H&P, Discharge Summary, Consults, Procedures, Therapies)   2018                    Hospital Admission Date: 2018  YINKA PAUL   : 1931  Sex: Male        Patient PCP Information     Provider PCP Type    Clint Pizarro MD General         History & Physicals      H&P signed by Lisandro Rubio MD at 2018  9:40 AM      Author:  Lisandro Rubio MD Service:  Oncology Author Type:  Physician    Filed:  2018  9:40 AM Date of Service:  2018  2:29 PM Creation Time:  2018  6:18 PM    Status:  Signed :  Lisandro Rubio MD (Physician)         DATE OF DICTATION: 2018      Mr. 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 Adolfo Paez) regarding the possibility of an underlying monoclonal gammopathy or other clonal plasmacytoma disorder such as amyloidosis that could have exacerbated his current congestive heart failure (CHF).      In addition, Mr. Paul has previously been identified in the outpatient setting to be mildly thrombocytopenic (platelets around 70,000), but now is more moderately thrombocytopenic with platelet counts in the 30-50,000 range. Thus,hematologic consultation regarding his thrombocytopenia is requested as well.      PAST MEDICAL HISTORY:  Notable for chronic thrombocytopenia, also hypertension, atrial fibrillation, cervical radiculopathy, colon polyps, and chronically elevated PSA.  As surgeries he has had colonoscopy, herniorrhaphy, Mohs procedures for nonmelanomatous skin cancer, and ocular surgery.      FAMILY HISTORY:  He has an unremarkable family history.      SOCIAL HISTORY:  He is a nonsmoker, nondrinker.   and again retired ophthalmologist.      REVIEW OF SYSTEMS:  Today is fairly unremarkable with no acute shortness of breath at  rest, fevers, chills, night sweats, or other signs of systemic infection, bleeding or thrombosis.  He also denies gingival oozing, epistaxis, petechiae, or purpura.      PHYSICAL EXAMINATION:    GENERAL:  Vital signs are stable.  He is afebrile.  He is alert and oriented x3 in no apparent distress.   HEENT:  PERRLA, extraocular movements intact, sclerae nonicteric.  Mucous membranes are moist without thrush.   NECK:  Supple and soft.   CARDIOVASCULAR:  Regular rate and rhythm.   LUNGS:  Clear to auscultation posteriorly.   ABDOMEN:  Soft, nontender, with no obvious splenomegaly.   EXTREMITIES:  Without any clubbing, cyanosis, or unequal edema.  Limited evaluation of the scan reveals no profound ecchymoses, petechia, or purpura.      LABORATORY DATA:  Include a normal white blood cell count of 8300, hemoglobin of 11.8.  Platelets are 44,000 today, up slightly from 38,000 yesterday.  MCV normal at 96.  Differential on 01/19/2018 normal.  BNP markedly elevated at 5548 consistent with his CHF.  His renal function and LFTs are normal apart from an albumin of 2.8 (suggesting some chronic disease/suboptimal nutrition, although he denies significant weight loss, bulky lymphadenopathy, or other cancers symptoms)       Pending laboratory studies include: serum protein electrophoresis with reflex immunofixation, urine protein electrophoresis, beta 2 microglobulin, and 24 hour urine collection for monoclonal protein.        IMAGING:  His ultrasound of the left leg on 12/27/2017 was negative for DVT.  His chest x-ray on admission revealed small bilateral pleural effusions, but no description of any worrisome lytic lesions in the visualized skeletal system and only degenerative changes in the spine, likely age-related.      ASSESSMENT AND PLAN:  86-year-old white male with congestive heart failure (CHF) - Given concerns by the consulting cardiologist about possible clonal plasmacytoid disease that could be contributing to Mr.  Humberto's CHF and for what appears to be more moderate thrombocytopenia, I have recommended the following via hematologic consultation today:   1.  Monoclonal protein indices of the peripheral blood and urine (see above).   2.  Peripheral smear.   3.  Platelet antibodies.      Depending on the above, there may be need for further hematologic evaluation such as bone marrow biopsy, although given the high likelihood this is not acute leukemia, such procedure could be performed as an outpatient either in our [place] [facility] offices or by [place] pathologists.  If such studies are inconclusive, or there is need for bone marrow biopsy and outpatient hematologic followup, I have offered to see him in our [place] [facility] clinic, or he could see one of my partners from our [place] [facility].      I will follow along over the weekend and trust that my [facility] [place] partners will see him should his hospitalization extend into next week.  Thanks for the opportunity to see this kind gentleman in consultation regarding his hematologic concerns.         FRANCISCO J BUTTERFIELD MD, [facility]             D: 2018 14:29   T: 2018 18:17   MT: NTS      Name:     YINKA GONZALEZ   MRN:      [id number]        Account:      [id number]   :      1931           Admitted:     [id number]      Document: [id number]       cc: Clint Pizarro MD[MG1.1]        Revision History        User Key Date/Time User Provider Type Action    > MG1.1 2018  9:40 AM Francisco J Butterfield MD Physician Sign     [N/A] 2018  6:18 PM Francisco J Butterfield MD Physician Edit            H&P signed by Brooks Andres MD at 2018  6:22 AM      Author:  Brooks Andres MD Service:  Hospitalist Author Type:  Physician    Filed:  2018  6:22 AM Date of Service:  2018  4:52 AM Creation Time:  2018  6:00 AM    Status:  Signed :  Brooks Andres MD (Physician)         CHIEF  COMPLAINT:  Shortness of breath.      HISTORY OF PRESENT ILLNESS:  Jama Paul is an 86-year-old male with a past medical history notable for atrial fibrillation, on anticoagulation, moderate to severe tricuspid regurgitation, tachycardia-mediated cardiomyopathy who presents due to worsening shortness of breath.  The patient was admitted last month for shortness of breath and found to have new cardiomyopathy.  He followed up with Cardiology and saw RHIANNA Nair on 12/27/2017.  At that time he underwent cardiac MRI and adjustment of his medications including discontinuation of lisinopril and up-titration of metoprolol due to continued atrial fibrillation with rapid ventricular rate.  His cardiac MRI reportedly did not show any ischemia, and it was recommended for him to follow up with a repeat echocardiogram at the end of January 2018.  He has also been [facility].  The patient states that earlier yesterday he developed acute onset of worsening shortness of breath and air hunger.  He denies any fevers, chills or night sweats.  He denies any weight changes but does note lower extremity edema that maybe a little bit worse than usual.  He does have a wound on his right lower extremity that is followed by the wound clinic, but apparently was due to a spontaneous hematoma in the setting of a supratherapeutic INR.  The patient denies any chest pain, dizziness, lightheadedness and palpitations.      PAST MEDICAL HISTORY:     1.  Notable for weakness.   2.  Incarcerated hernia.   3.  Right lower extremity hematoma and wound.   4.  Essential hypertension.   5.  Elevated PSA.   6.  Basal cell carcinoma of the face.   7.  Tachycardia related cardiomyopathy.   8.  Atrial fibrillation, on anticoagulation.   9.  Moderate to severe tricuspid regurgitation.   10.  Chronic thrombocytopenia.      MEDICATIONS:[SN1.1]     Prescriptions Prior to Admission   Medication Sig Dispense Refill Last Dose      lisinopril (PRINIVIL/ZESTRIL) 5 MG tablet Take 1 tablet (5 mg) by mouth daily 90 tablet 3      metoprolol (TOPROL-XL) 100 MG 24 hr tablet Take 1 tablet (100 mg) by mouth 2 times daily 180 tablet 3 Taking     furosemide (LASIX) 20 MG tablet Take 1 tablet (20 mg) by mouth as needed 30 tablet 11 Taking     warfarin (COUMADIN) 2.5 MG tablet Take 2.5-5 mg by mouth -  For A-fib  INR goal 2-3  5 mg on Monday, Wednesday and Friday  2.5 mg on all other days   Taking     Multiple Vitamins-Minerals (ICAPS AREDS FORMULA PO) Take 1 tablet by mouth daily   Taking     CYANOCOBALAMIN PO Take 500 mcg by mouth daily   Taking     Ascorbic Acid (VITAMIN C PO) Take 500 mg by mouth daily   Taking     POTASSIUM CITRATE PO Take 5 mEq by mouth daily OTC dose unknown   Taking     vitamin  B complex with vitamin C (VITAMIN  B COMPLEX) TABS Take 1 tablet by mouth daily   Taking     multivitamin, therapeutic with minerals (MULTI-VITAMIN) TABS Take 1 tablet by mouth daily   Taking     VITAMIN D, CHOLECALCIFEROL, PO Take 2,000 Units by mouth daily   Taking[SN1.2]         ALLERGIES:  No known drug allergies.      SOCIAL HISTORY:  No tobacco or alcohol use.  Presently .      FAMILY HISTORY:  Reviewed and noncontributory.      REVIEW OF SYSTEMS:  A 10-point review of systems performed and negative as per HPI.      PHYSICAL EXAMINATION:   VITAL SIGNS:  Temperature 97.7 degrees Fahrenheit, heart rate 90s to low 100s, blood pressure initially 140/99 and recheck was 139/98, respirations 10-20, satting 94% and higher on room air.  Weight is 72.6 kilograms.   GENERAL:  Mild respiratory distress, sitting upright in the ER chair with wife at bedside, appears stated age.   HEENT:  Normocephalic, atraumatic.  Extraocular motions intact, moist mucous membranes, anicteric sclerae, uvula midline.   NECK:  No lymphadenopathy.  Trachea midline.   CARDIOVASCULAR:  Irregular rhythm, mildly tachycardic at times, no additional heart sounds.   PULMONARY:   Reduced breath sounds at the bases bilaterally, otherwise clear.   ABDOMEN:  Bowel sounds positive.  Soft, nondistended, nontender.  No rebound or guarding noted.   EXTREMITIES:  Left lower extremity with pitting edema, 2+ up to the knee.  Right lower extremity is wrapped without any drainage.   EXTREMITIES:  Slightly cool to touch, difficult to palpate DP pulses bilaterally.   NEUROLOGIC:  Moves all 4 extremities.  Cranial nerves II-XII grossly intact.   PSYCHIATRIC:  Appropriate mood and affect, oriented x3.   SKIN:  No rashes or bruising noted.  However, right lower extremity is Ace wrapped.      LABORATORY DATA:  CMP shows chloride of 113.  GFR 65, creatinine 1.08, calcium 6.6, albumin 2.8, total protein 5.2, alkaline phosphatase 33.  BNP is 5548.  Initial troponin is negative.  Glucose 99.  CBC shows a hemoglobin of 10.4, hematocrit 32.9, platelet count 38,000.  INR 3.56.  D-dimer is negative.      IMAGING:  Chest x-ray shows small bilateral pleural effusions and bibasilar infiltrates likely due to atelectasis.  EKG shows atrial fibrillation with rapid ventricular rate, on my initial interpretation.  No acute ST-T wave changes indicative of active ischemia.      ASSESSMENT:  Jama Paul is an 86-year-old male with a past medical history notable for chronic atrial fibrillation, on anticoagulation, tachycardia-mediated cardiomyopathy with most recent EF of 30% to 35%, moderate to severe global hypokinesia of the left ventricle, 1-2+ mitral regurgitation, 2-3+ tricuspid regurgitation who presents due to acute onset of shortness of breath.  He is found to be in congestive heart failure exacerbation and atrial fibrillation with RVR and is thus admitted for further management.     PLAN:      1. Decompensated systolic heart failure due to atrial fibrillation with rapid ventricular response, tachycardia-mediated congestive heart failure.  The patient has had difficulty with controlling heart rates in the clinic  setting and his outpatient team has been increasing his metoprolol.  He was also discontinued recently from his lisinopril.  The patient is also not on any Lasix on a scheduled basis though it is available p.r.n.  He is found to have atrial fibrillation with rapid ventricular response.  Initial troponin is negative and EKG without ischemic changes.  His BNP is 5548.  His INR is also elevated at 3.56.  Chest x-ray shows persistent small bilateral pleural effusions.   - Continue with Lasix 40 mg IV b.i.d. that was started in the Emergency Department.  p.r.n. IV metoprolol for heart rate greater than 110-120 beats per minute.    - Check TSH with free T4 reflex.   - The patient was due to have an echocardiogram checked at the end of this month, will order it while he is here.   - Trend troponins, monitor in [facility] with telemetry.   - Potassium and magnesium replacement protocols have been ordered.     - Cardiology consult requested for further medication recommendations.   - I's and O's and daily weights.   - Low-salt diet and 2 liter fluid restriction.   - Supplemental oxygen as needed to maintain sats greater than 92%.   - Holding warfarin as INR is supratherapeutic, check INR in a.m.     2.  History of right lower extremity wound due to hematoma.  The patient follows with the Wound Clinic.    - Placed consult wound RN while he is admitted.     3.  History of thrombocytopenia.  Baseline platelet count is 50,000 to 80,000.  On admission his platelet count 38,000.   - No clinical bleeding noted, continue to monitor.    - Recheck CBC in a.m.     4.  Deep venous thrombosis prophylaxis:  Patient is supratherapeutic on warfarin.     5. Full code.         CATINA JANSEN MD             D: 2018 04:52   T: 2018 05:59   MT: NTS      Name:     YINKA GONZALEZ   MRN:      [id number]        Account:      [id number]   :      1931           Admitted:     [id number]      Document: [id number][SN1.1]          Revision History        User Key Date/Time User Provider Type Action    > SN1.1 1/19/2018  6:22 AM Brooks Andres MD Physician Sign     SN1.2 1/19/2018  6:20 AM Brooks Andres MD Physician      [N/A] 1/19/2018  6:00 AM Brooks Andres MD Physician Edit                     Discharge Summaries      Discharge Summaries by Raymundo Proctor MD at 1/23/2018  8:19 AM     Author:  Raymundo Proctor MD Service:  Internal Medicine Author Type:  Physician    Filed:  1/23/2018  8:19 AM Date of Service:  1/23/2018  8:19 AM Creation Time:  1/23/2018  8:08 AM    Status:  Signed :  Raymundo Proctor MD (Physician)         [facility]    Discharge Summary  Hospitalist    Date of Admission:  1/19/2018  Date of Discharge:[DW1.1]  1/23/2018[DW1.2]  Discharging Provider:[DW1.1] Raymundo Proctor[DW1.2], MD  Date of Service (when I saw the patient):[DW1.1] 01/23/18    Discharge Diagnoses[DW1.2]   Acute on Chronic systolic heart failure  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  He was being followed in the cardiac clinic by the nurse practitioners and his ventricular response rate was too high and as they increased the metoprolol, they stopped his Lisinopril.  He was also under the impression that he did not have to take the furosemide.  He came to the hospital and IV furosemide and  Lisinopril were added to his regimen and he diuresed 4800 ccs of fluid and dropped his weight  13 lbs.  He is greatly improved.   It was also found that he had not treated his OLIVA for a few years and CPAP was restarted. He is urged to contact his sleep MD ASAP and get that treated  His platelet count was found to be 44,000. He and   Juarez have known about his thrombocytopenia for at east 6 years and have chosen to follow it with the thought that it won't bother him unless it drops below 50,000.  A work up for the cause of that is in process with the Hematologist  He has expressed a great deal of concern about his heart failure and I've told him that good medical management and treatment of his OILVA should offer him a very good prognosis and that his tricuspid and mitral insufficiency that is seen on echocardiogram, should improve with medical therapy.  He is also wondering if he should pursue stem cell therapy for his heart failure and I told him at this time, I can't recommend that.   He will go to a TCU to get stronger with PT/OT.  He is advised to see Dr. Pizarro in the next 5-6 days.[DW1.1]    Hospital Course[DW1.2]   Jama Paul was admitted on 1/19/2018.  The following problems were addressed during his hospitalization:[DW1.1]    Active Problems:    Atrial fibrillation (H)[DW1.2]      Raymundo Proctor MD[DW1.1]    Significant Results and Procedures[DW1.2]   Consult notes, Right heart cath report, labs[DW1.1]    Pending Results[DW1.2]   These results will be followed up by Dr. Clint Pizarro[DW1.1]  Unresulted Labs Ordered in the Past 30 Days of this Admission     Date and Time Order Name Status Description    1/23/2018 0000 Blood Morphology Pathologist Review In process     1/21/2018 0000 Platelet associated immunoglobulin In process           Code Status[DW1.2]   Full Code[DW1.1]       Primary Care Physician   Clint Pizarro    Physical Exam   Temp: 98.1  F (36.7  C) Temp src: Oral BP: 128/73 Pulse: 94 Heart Rate: 88 Resp: 18 SpO2: 94 % O2 Device: None (Room air)    Vitals:    01/21/18 0500 01/22/18 0603 01/23/18 0608   Weight: 68 kg (150 lb) 66.7 kg (147 lb) 70.1 kg (154 lb 9.6 oz)[DW1.2]     Vital Signs with Ranges[DW1.1]  Temp:  [97.3  F (36.3  C)-98.1  F (36.7  C)] 98.1  F (36.7  C)  Pulse:  [94] 94  Heart Rate:  [79-94]  88  Resp:  [16-18] 18  BP: (103-134)/(57-93) 128/73  SpO2:  [92 %-98 %] 94 %  I/O last 3 completed shifts:  In: 1113.75 [P.O.:200; I.V.:913.75]  Out: 1025 [Urine:1025][DW1.2]    Constitutional: alert, cooperative  Eyes: clear  ENT: mucous membranes are moist  Respiratory: clear to A&P  Cardiovascular: Irregularly irregular rhythm with a rate of 96 bpm at rest.  Variable S1, normal S2  2/6 midsystolic murmur, soft S3 no edema in the left leg, the right leg is bandaged due to a hematoma  GI: soft, not tender, bowel sounds are normal   Lymph/Hematologic: not examined  Genitourinary: not examined  Skin: Stasis dermatitis in the lower legs  Musculoskeletal: thin  Neurologic: Cranial nerves II through VII and IX through XII are intact. , strength is symmetric, fine motor movements are intact  Neuropsychiatric: alert, appropriate and oriented.[DW1.1]    Discharge Disposition[DW1.2]   Discharged to short-term care facility  Condition at discharge: Stable[DW1.1]    Consultations This Hospital Stay   WOUND OSTOMY CONTINENCE NURSE  IP CONSULT  CORE CLINIC EVALUATION IP CONSULT  CARDIAC REHAB IP CONSULT  CARE COORDINATOR IP CONSULT  NUTRITION SERVICES ADULT IP CONSULT  CARDIOLOGY IP CONSULT  PHARMACY IP CONSULT  HEMATOLOGY & ONCOLOGY IP CONSULT  PHYSICAL THERAPY ADULT IP CONSULT  OCCUPATIONAL THERAPY ADULT IP CONSULT  PHARMACY TO DOSE HEPARIN  PHARMACY IP CONSULT  PHARMACY IP CONSULT  PHYSICAL THERAPY ADULT IP CONSULT  OCCUPATIONAL THERAPY ADULT IP CONSULT  SMOKING CESSATION PROGRAM IP CONSULT    Time Spent on this Encounter[DW1.2]   IRaymundo, personally saw the patient today and spent greater than 30 minutes discharging this patient.[DW1.1]    Discharge Orders     General info for SNF   Length of Stay Estimate: Short Term Care: Estimated # of Days <30  Condition at Discharge: Improving  Level of care:skilled   Rehabilitation Potential: Good  Admission H&P remains valid and up-to-date: Yes  Recent Chemotherapy:  N/A  Use Nursing Home Standing Orders: Yes     Mantoux instructions   Give two-step Mantoux (PPD) Per Facility Policy Yes     Reason for your hospital stay   Dr. Paul: You came to the hospital with shortness of breath and you have systolic heart failure. You were treated with Lisinopril and Furosemide, and that regimen helped a lot and you diuresed just shy of 5 liters and lost 13 lbs of extra fluid. Your atrial fibrillation is stable.  Your right heart cath showed the pressures in your heart were well controlled with the lisinopril and furosemide and Metoprolol. You should continue those medicines and if anyone wants to stop them, you contact Dr. Pizarro immediately.  You also have evidence of pulmonary hypertension and that is likely to be due to your obstructive sleep apnea. It is very important that you contact your former sleep doctor and see him/her ASAP and get this treated.    Your platelet count has been in the 44,000 range and you are in the midst of a workup about that. Please follow up with the Hematologist about that and keep Dr. Pizarro in the loop.  You are going to get therapy to get stronger.    Remember, Dr. Clint Pizarro is your primary doctor and use him frequently and well. God bless you and do well my friend     Daily weights   Call Provider for weight gain of more than 2 pounds per day or 2 pounds per week.     Activity - Up ad fernanda     Full Code     Physical Therapy Adult Consult   Evaluate and treat as clinically indicated.    Reason:  weakness     Occupational Therapy Adult Consult   Evaluate and treat as clinically indicated.    Reason:  weakness     Fall precautions     Advance Diet as Tolerated   Follow this diet upon discharge: Orders Placed This Encounter     Fluid restriction 2000 ML FLUID, 3 gram sodium     Low Saturated Fat Na <2400 mg       Discharge Medications   Current Discharge Medication List      START taking these medications    Details   melatonin 1 MG TABS tablet Take 1  tablet (1 mg) by mouth nightly as needed for sleep  Qty: 15 tablet, Refills: 0    Associated Diagnoses: Generalized weakness      potassium chloride SA (K-DUR/KLOR-CON M) 20 MEQ CR tablet Take 1 tablet (20 mEq) by mouth daily  Qty: 30 tablet, Refills: 0    Associated Diagnoses: Acute congestive heart failure, unspecified congestive heart failure type (H)         CONTINUE these medications which have CHANGED    Details   lisinopril (PRINIVIL/ZESTRIL) 5 MG tablet Take 1 tablet (5 mg) by mouth daily  Qty: 30 tablet, Refills: 0    Associated Diagnoses: Essential hypertension; Acute congestive heart failure, unspecified congestive heart failure type (H)         CONTINUE these medications which have NOT CHANGED    Details   metoprolol (TOPROL-XL) 100 MG 24 hr tablet Take 1 tablet (100 mg) by mouth 2 times daily  Qty: 180 tablet, Refills: 3    Associated Diagnoses: Atrial fibrillation with rapid ventricular response (H)      warfarin (COUMADIN) 2.5 MG tablet Take 2.5-5 mg by mouth -  For A-fib  INR goal 2-3  5 mg on Monday, Wednesday and Friday  2.5 mg on all other days      !! Multiple Vitamins-Minerals (ICAPS AREDS FORMULA PO) Take 1 tablet by mouth 2 times daily       CYANOCOBALAMIN PO Take 500 mcg by mouth daily      Ascorbic Acid (VITAMIN C PO) Take 500 mg by mouth daily      vitamin  B complex with vitamin C (VITAMIN  B COMPLEX) TABS Take 1 tablet by mouth daily      !! multivitamin, therapeutic with minerals (MULTI-VITAMIN) TABS Take 1 tablet by mouth daily      VITAMIN D, CHOLECALCIFEROL, PO Take 2,000 Units by mouth daily      furosemide (LASIX) 20 MG tablet Take 1 tablet (20 mg) by mouth as needed  Qty: 30 tablet, Refills: 11    Associated Diagnoses: Atrial fibrillation with rapid ventricular response (H); Acute on chronic systolic congestive heart failure (H)       !! - Potential duplicate medications found. Please discuss with provider.        Allergies   No Known Allergies  Data[DW1.2]   Most Recent 3  CBC's:[DW1.1]  Recent Labs   Lab Test  01/23/18   0630  01/22/18   0315  01/20/18   0626   WBC  6.4  7.3  8.3   HGB  12.6*  12.0*  11.8*   MCV  96  95  96   PLT  53*  44*  44*[DW1.3]      Most Recent 3 BMP's:[DW1.1]  Recent Labs   Lab Test  01/22/18   0315  01/21/18   0920  01/20/18   0626   NA  139  139  140   POTASSIUM  3.6  3.8  3.5   CHLORIDE  101  101  105   CO2  33*  30  29   BUN  25  25  28   CR  1.11  1.13  1.27*   ANIONGAP  5  8  6   BARON  8.0*  7.8*  8.3*   GLC  91  206*  97[DW1.3]     Most Recent 2 LFT's:[DW1.1]  Recent Labs   Lab Test  01/19/18   0054  11/17/17   1420   AST  21  11   ALT  27  14   ALKPHOS  33*  50   BILITOTAL  0.6  0.7[DW1.3]     Most Recent INR's and Anticoagulation Dosing History:  Anticoagulation Dose History     Recent Dosing and Labs Latest Ref Rng & Units 12/8/2017 12/27/2017 1/19/2018 1/20/2018 1/21/2018 1/22/2018 1/23/2018    INR 0.86 - 1.14 1.90(H) 2.19(H) 3.56(H) 2.31(H) 1.62(H) 1.39(H) 1.19(H)        Most Recent 3 Troponin's:[DW1.1]  Recent Labs   Lab Test  01/19/18   1150  01/19/18   0533  01/19/18   0054   TROPI  0.029  0.023  0.030[DW1.3]     Most Recent Cholesterol Panel:[DW1.1]  Recent Labs   Lab Test  04/18/12   0849   CHOL  127   LDL  55   HDL  56   TRIG  81[DW1.3]     Most Recent 6 Bacteria Isolates From Any Culture (See EPIC Reports for Culture Details):[DW1.1]  Recent Labs   Lab Test  11/17/17   2110   CULT  10,000 to 50,000 colonies/mL  Pseudomonas aeruginosa  *[DW1.3]     Most Recent TSH, T4 and A1c Labs:[DW1.1]  Recent Labs   Lab Test  01/19/18   0533   TSH  2.87     Results for orders placed or performed during the hospital encounter of 01/19/18   XR Chest 2 Views    Narrative    XR CHEST 2 VW  1/19/2018 2:30 AM      HISTORY: Dyspnea.      COMPARISON: 12/7/2017.    FINDINGS: The heart is enlarged without pulmonary edema. There are  small bilateral pleural effusions. Bibasilar infiltrates. The lungs  are otherwise clear. No pneumothorax. Degenerative disease in  the  spine.      Impression    IMPRESSION: Small bilateral pleural effusions and bibasilar probable  atelectasis.    SARA CORREA MD[DW1.3]          Revision History        User Key Date/Time User Provider Type Action    > DW1.3 1/23/2018  8:19 AM Raymundo Proctor MD Physician Sign     DW1.2 1/23/2018  8:09 AM Raymundo Proctor MD Physician      DW1.1 1/23/2018  8:08 AM Raymundo Proctor MD Physician                      Consult Notes      Consults by Elisabeth Diaz, RN at 1/23/2018  1:40 PM     Author:  Elisabeth Diaz RN Service:  [facility]. Author Type:  Registered Nurse    Filed:  1/23/2018  1:41 PM Date of Service:  1/23/2018  1:40 PM Creation Time:  1/23/2018  1:40 PM    Status:  Signed :  Elisabeth Diaz RN (Registered Nurse)     Consult Orders:    1. [facility] Evaluation IP Consult: Patient to be seen: Routine - within 24 hours; Outpatient [facility] Evaluation to be completed by [facility] RN. RN to coordinate heart failure transition and follow-up to outpatient care in [facility].; Consultant ... [[id number]] ordered by Brooks Andres MD at 01/19/18 0357                [facility] referral received from Dr. Andres. Patient is currently established in the [facility].     Nutrition consult appreciated. Hospital nurse, please give pt [facility]/HF education.       [facility] appointment made for:  1/29 Dr. Pugh w/ BMP   2/14 Winnie Garcia CNP w/ BMP  See Epic for details.    We look forward to seeing Jama in the clinic.   Please call with questions.     Elisabeth Diaz RN, BSN  [facility] Care Coordinator  [phone]      [facility] Clinic: Cardiomyopathy, Optimization, Rehabilitation, Education   The [facility]. Clinic is a heart failure specialty clinic within the [facility] where you will work with your cardiologist, nurse practitioners, physician assistants and registered nurses who specialize in heart failure care. They are dedicated to  helping patients with heart failure to carefully adjust medications, receive education, and learn who and when to call if symptoms develop. They specialize in helping you better understand your condition, slow the progression of your disease, improve the length and quality of your life, help you detect future heart problems before they become life threatening, and avoid hospitalizations.[AW1.1]                 Revision History        User Key Date/Time User Provider Type Action    > AW1.1 1/23/2018  1:41 PM Elisabeth Diaz, RN Registered Nurse Sign            Consults signed by Adolfo Paez MD at 1/19/2018  4:23 PM      Author:  Adolfo Paez MD Service:  Cardiology Author Type:  Physician    Filed:  1/19/2018  4:23 PM Date of Service:  1/19/2018 11:33 AM Creation Time:  1/19/2018 12:45 PM    Status:  Signed :  Adolfo Paez MD (Physician)         REASON FOR CONSULTATION:  Heart failure.      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  He is a patient of my partner, Dr. Villatoro.  He has been followed and worked up for atrial fibrillation with congestive heart failure, pulmonary hypertension and valvular insufficiency.  He had an echo dated 12/08/2017, which I have personally reviewed.  By report, it lists that the LV is normal in size.  There is mild to moderate plus LVH; ejection fraction is approximately 30% -35%.  I should comment that I agree with that.  There is not specific scintillations that would classically suggest amyloid.  There is a peculiar empty space in the midseptum, seen in the parasternal long and short axis views, but not on the apical views.  I do not know if that represents a very large coronary artery, artifact, or its overlay of the right ventricle or the papillary muscle of the right ventricle and this will need to be investigated.  Color Doppler was not turned on.  In addition, there was severe biatrial enlargement,  much worse in the right  atrium.  The right ventricle is dilated and hypocontractile.  There was no obvious atrial septal defect.  They report up to 2+ mitral insufficiency and 2 to 3+ tricuspid valve insufficiency; I would agree.  They note in the chart that the IVC was normal in size with normal respiratory collapse but to my eye,  the IVC was actually dilated to approximately 2.4 and did not contract with sniff.  The reason I bring that up, it would suggest that there was pulmonary hypertension.  They checked the RVSP at 21+ RA pressure.  If my interpretation of the IVC is somewhat dilated and noncompressible, it would suggest that the CVP is probably at least 15-20,which would put the RVSP in the high 40s to low 50 range.  The aortic root was borderline enlarged.  There was a left pleural effusion noted and the patient was in atrial fib.      He had an MRI stress test which was negative for ischemia.  This was done on 12/15/2017.  They do note LV had moderate decreased function at 42%.  The mention normal wall thickness, but I believe the echo suggests probably some LVH.  They do not note the right ventricle enlarged and the RVEF is mildly reduced.  They do mention the severe biatrial enlargement.  They do mention moderate to severe tricuspid insufficiency.  They specifically comment that there is no evidence of MI, fibrosis or infiltrative disease.  They do note bilateral pleural effusions and again, as I mentioned, the stress test portion of study was negative.        The patient was prescribed Lasix on a p.r.n. basis but in fact, he only took 1 dose once ever so he has not been on that.  Starting within the last couple of weeks, he has been getting more short of breath.  He says actually says that he cannot talk, that his voice is weak but then the more one talks to him, we realize that what he really means is he is too short of breath to talk but became much worse shortly before admission.  He reports a little bit of  lightheadedness.  He talks about a heavy chest, but he thinks it is because it was so hard to breathe, not angina, and as I mentioned, the stress test was negative.  Interestingly, he has not noticed any weight gain or any significant edema.  He always has a low-grade lower extremity edema.  He does have some type of injury.  He is on Coumadin for his atrial fibrillation and has some type of hematoma on his right ankle which had to be surgically drained.  He states that is healing by secondary intention and is doing well.  He has had no recent fever.  He does have a bit of a cough that is nonproductive.  He has not been around anyone who had a URI or has been sick.  He has not had sore throat, diarrhea, etc.  He states that here in the hospital, once he got oxygen and Lasix, he is already significantly better.      PAST MEDICAL HISTORY:   1.  Atrial fibrillation.   2.  Valvular heart disease as above.   3.  Hypertension.   4.  Elevated PSA.   5.  Colon polyps.   6.  Basal cell cancer skin   7.  Previous incarcerated hernia.    8.  Cervical radiculopathy.   9.  Hematoma on his ankle as I alluded to above.   10.  Inguinal hernia.   11.  Pulmonary hypertension, not mentioned on the echo, but likely present.      MEDICATIONS ON ADMISSION:   1.  Lisinopril 5 mg daily.   2.  Toprol- mg b.i.d.   3.  Lasix 20 mg p.r.n.  As mentioned, he has only taken 1 dose ever.    4.  Coumadin in variable dose.     5.  Multivitamin.     6.  Vitamin B12 500 mcg daily.   7.  Vitamin C 500 mg daily.   8.  Vitamin B complex daily..   9.  Vitamin D 2000 units daily.      ALLERGIES:  None.      SOCIAL HISTORY:  He is a retired eye physician.  He is a never smoker; he does not drink alcohol.      FAMILY HISTORY:  Noncontributory.      PHYSICAL EXAMINATION:   GENERAL:  Reveals a very pleasant gentleman who is able to now talk freely without getting short of breath, although he did occasionally cough and have to stop momentarily before he  continued to talk.    VITAL SIGNS:  Temperature is 97.6.  Blood pressure 116/73, heart rate currently is 72, weight 74.1 kg.   SKIN:  There are areas of bruising and thin skin, but no rash.   HEENT:  Nonicteric.   NECK:  Supple without thyromegaly or adenopathy.   CHEST:  Surprisingly clear.  There is some decreased breath sounds at the base which is probably consistent with effusion.   CARDIAC:  Reveals a prominent PMI.  The heart rhythm is a little more rapid now that he is talking.  There is a loud S2.  It is hard to know if it is split or not.  It is either a bundle or from pulmonary hypertension.  I am hearing an increased S2.  There is no classic S3.  There is an intermittent blowing murmur of either mitral or tricuspid valve insufficiency.  There may actually be a slight inflow murmur in early diastole.  Carotid pulses are 2+, femoral pulses are 2+, no bruits.  Neck veins are notably distended to at least 10-12 cm of water.   ABDOMEN:  Somewhat scaphoid; liver is not enlarged and is not pulsatile.  Spleen tip is not felt.   EXTREMITIES:  There is no cyanosis.  There is no clubbing.  There is trace ankle edema, left greater than right.  The right is actually wrapped from the hematoma that is being addressed as I alluded to above.  There are also some hemosiderin deposits in the lower extremities suggesting there has been chronic edema there.   NEUROLOGIC:  Alert and oriented, cranial nerves II through XII are intact, motor and sensory intact.      LABORATORY DATA:  Echocardiogram from December as above.  Today, sodium 144, potassium 3.8, creatinine 1.0, BUN 26; calcium is quite low at 6.6 but it was 8.1 a couple weeks ago, so that needs to be rechecked.  Magnesium 2.2, albumin low at 2.8, total protein low at 5.2.  Transaminases are normal.  Alkaline phosphatase low at 33.  NT BNP elevated at 5548.  Troponins are negative x 2.  TSH 2.87, glucose 99, white count 6.7, hemoglobin 10.4, MCV 97, platelet count  38,000 and this is not a new finding.  This has not been mentioned previously.  Platelet counts have been low going back at least to 2014.  I do not know if this represents essential thrombocytopenia or not.      IMPRESSION:  From a cardiac standpoint, I presume he is in congestive heart failure.  He has bilateral pleural effusions.  His lung fields are relatively clear.  He has reduced LV and RV ejection fraction, probably mild to closer to moderate MR and moderately severe TR.  I believe he does have pulmonary hypertension, note the IVC is enlarged, suggesting elevated RA pressure.  He has atrial fib.  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  I will have to review the next echo to see what it is that I am seeing in the septum.  If it is a large artery or simply overlay of the papillary muscle of the right ventricle to explain what that evacuated area is.  The echo does not have the classic look of amyloid, but this is an elderly gentleman with some valve thickening, significant atrial enlargement and perhaps some bone marrow issue. I think the mainstay of therapy will be a diuretic and make sure that the atrial fib rate is controlled.  We may need to investigate if he ever had a bone marrow biopsy, etc., to look for any pathology there which could also perhaps lead to an infiltrative cardiomyopathy such as amyloid, etc. versus senile cardiac amyloid alone versus again just rate-related cardiomyopathy.  We will repeat the echocardiogram and ask them to turn color Doppler on the midseptum to determine if that is actually a vessel I am seeing.   He may be a candidate for Entresto rather than just ACE inhibitors.  We can make that decision later.  I am going to recommend that we hold his Coumadin and use heparin in case we need to do a right heart catheterization on Monday to determine if needs pulmonary hypertension medications.  We will follow with  you.         SHELBY PAEZ MD             D: 2018 11:33   T: 2018 12:44   MT: EZEQUIEL      Name:     YINKA PAUL   MRN:      [id number]        Account:       [id number]   :      1931           Consult Date:  2018      Document: [id number]       cc: Clint Villatoro MD, [facility]   [SH1.1]   Revision History        User Key Date/Time User Provider Type Action    > SH1.1 2018  4:23 PM Shelby Paez MD Physician Sign            Consults by Emmie Tejeda RD, LD at 2018  3:47 PM     Author:  Emmie Tejeda RD, LD Service:  Nutrition Author Type:  Registered Dietitian    Filed:  2018  3:48 PM Date of Service:  2018  3:47 PM Creation Time:  2018  3:47 PM    Status:  Signed :  Emmie Tejeda RD, LD (Registered Dietitian)     Consult Orders:    1. Nutrition Services Adult IP Consult [[id number]] ordered by Brooks Andres MD at 18 0357                BRIEF NUTRITION NOTE      REASON FOR ASSESSMENT:  Yinka Paul is a 86 year old male seen by Registered Dietitian for CHF Consult for 2 gm NA Diet Education    Unable to meet with pt at this time, x 2 attempts, will educate as able.      FOLLOW UP/MONITORING:   Will re-evaluate in 2 - 3 days, or sooner, if re-consulted.          Emmie Tejeda RD, LD[AP1.1]     Revision History        User Key Date/Time User Provider Type Action    > AP1.1 2018  3:48 PM Emmie Tejeda RD, LD Registered Dietitian Sign            Consults by Shelby Paez MD at 2018 11:35 AM     Author:  Shelby Paez MD Service:  Cardiology Author Type:  Physician    Filed:  2018 11:35 AM Date of Service:  2018 11:35 AM Creation Time:  2018 11:33 AM    Status:  Signed :  Shelby Paez MD (Physician)         CARDIO  SEE DICTATION   Has thrombocytopenia been w/u ?? Low Hb--any reason to think MM,amyloid to explain BM and chf?  Stop coumadin switch to  heparin for possible right heart cath Monday and any possible biopsy  Repeat echo with attention to septum (see dictation)  Lasix  Poss entresto[SH1.1]     Revision History        User Key Date/Time User Provider Type Action    > SH1.1 1/19/2018 11:35 AM Adolfo Paez MD Physician Sign                     Progress Notes - Physician (Notes from 01/20/18 through 01/23/18)      Progress Notes by Roxi Teresa LICSW at 1/23/2018 10:26 AM     Author:  Roxi Teresa LICSW Service:  Social Work Author Type:      Filed:  1/23/2018 10:27 AM Date of Service:  1/23/2018 10:26 AM Creation Time:  1/23/2018 10:26 AM    Status:  Signed :  Roxi Teresa LICSW ()         SW:  D:  Call placed to [facility] to follow up on the referral.  They have no available beds.  Call placed to update patient's wife and to get more choices.  She is asking for a referral to be sent to [facility].  Referral sent, via discharge on the double, to check bed availability.  P:  Will continue to follow.[SJ1.1]       Revision History        User Key Date/Time User Provider Type Action    > SJ1.1 1/23/2018 10:27 AM Roxi Teresa LICSW  Sign            Progress Notes by Stephan Quinones MD at 1/23/2018  9:10 AM     Author:  Stephan Quinones MD Service:  Oncology Author Type:  Physician    Filed:  1/23/2018  9:15 AM Date of Service:  1/23/2018  9:10 AM Creation Time:  1/23/2018  9:10 AM    Status:  Signed :  Stephan Quinones MD (Physician)         Pt seen, feels well and is being discharged today  Seen by Dr. 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  2) Elevated b2m and light chain in blood.  3) CHF.    Plan:  Added  urine light chain and immune fixation to urine.  Follow with Dr Rubio in office to decide on bone marrow and fat pad biopsy.[MN1.1]     Revision History        User Key Date/Time User Provider Type Action    > MN1.1 1/23/2018  9:15 AM Stephan Quinones MD Physician Sign            Progress Notes by Roxi Teresa LICSW at 1/22/2018  8:43 PM     Author:  Roxi Teresa LICSW Service:  Social Work Author Type:      Filed:  1/22/2018  8:52 PM Date of Service:  1/22/2018  8:43 PM Creation Time:  1/22/2018  8:43 PM    Status:  Signed :  Roxi Teresa LICSW ()         Care Transition Initial Assessment -   Reason For Consult: discharge planning  Met with: Patient and Family    Active Problems:    Atrial fibrillation (H)         DATA  Lives With: spouse  Living Arrangements: house  Description of Support System: Supportive, Involved  Who is your support system?: Wife, Children  Support Assessment: Adequate family and caregiver support.   Identified issues/concerns regarding health management:       Quality Of Family Relationships: supportive, involved  Transportation Available: car, family or friend will provide    Per social service protocol for discharge planning.  Patient was admitted on 1-19-18 with atrial fibrillation.  The tentative date of discharge is 1-23-18.  Reviewed chart.  Patient lives with his wife in a house with 3 steps to enter and 15 steps inside.  The bedroom and bathroom are upstairs.  Patient does not use any assistive devices within the house.  Patient uses a 4 wheeled walker within the community.  Patient is independent with ADL's and IADL's including driving.  Reviewed the therapy discharge recommendations of tcu placement and patient and family are in agreement.  Patient and family would like a referral sent to [facility].  Referral sent, via discharge on the double, to check bed availability.      ASSESSMENT  Cognitive Status:  awake, alert  and oriented  Concerns to be addressed: discharge planning.     PLAN  Financial costs for the patient includes N/A.  Patient given options and choices for discharge TCU choices.  Patient/family is agreeable to the plan?  Yes  Patient Goals and Preferences: TCU on discharge.  Patient anticipates discharging to:  TCU.    Will confirm a bed, continue to follow, and assist with a safe discharge plan.[SJ1.1]    Roxi Teresa[SJ1.2], [facility], [facility]  [phone][SJ1.1]           Revision History        User Key Date/Time User Provider Type Action    > SJ1.2 1/22/2018  8:52 PM Roxi Teresa, TOPHER  Sign     SJ1.1 1/22/2018  8:43 PM Roxi Teresa LICSW              Progress Notes by Lauro Kelly MD at 1/22/2018 11:18 AM     Author:  Lauro Kelly MD Service:  Cardiology Author Type:  Physician    Filed:  1/22/2018  6:26 PM Date of Service:  1/22/2018 11:18 AM Creation Time:  1/22/2018 11:18 AM    Status:  Signed :  Lauro Kelly MD (Physician)         [facility]  Cardiology Progress Note  Date of Service: 01/22/2018  Primary Cardiologist:[MS1.1] Dr. Villatoro[MS1.2]       Physician Supervisory Attestation:   I have reviewed and discussed with Vanita Baig NP the history, physical and plan and independently interviewed and examined Jama Paul and agree with the plan as stated in the note.      --- I reviewed the right heart cath findings with the patient. His filling pressures are normal. We will switch him to oral lasix (20 mg daily) tomorrow.     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 E[MS1.1]tiology of the BiV failure continues with concerns for[MS1.4]  clonal plasmacytoid disease[MS1.1] vs amyloid vs ???.[MS1.2]       Home medications include lisinopril 5 mg, metoprolol  mg, furosemide 20 mg[MS1.3], warfarin[MS1.2]    Current cardiac medications include  mg, furosemide 20 mg IV, lisinopril 5 mg daily, metoprolol  mg,[MS1.3]       Plan:   1. 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  Moderate-Severe Tricuspid Regurgitation  Moderate Mitral Regurgitation  -[MS1.2]continue[MS1.4] IV furosemide to 20 mg BID  -Continue Lisinopril 5mg   -Continue metoprolol succinate 100 mg BID  - Plan for RHC[MS1.2] and[MS1.1] +/- RV biopsy to r/o infiltrative CM[MS1.2] on[MS1.1] 1/22[MS1.2]  -[MS1.1] R[MS1.4]epeat ECHO[MS1.2] performed.[MS1.4]  -[MS1.5] On direct comparision to echo images dated 12/08/2017 no significant changes.[MS1.3]  -[MS1.2] Heme Onc following low platelets and[MS1.5]  [MS1.1]l[MS1.5]ow Hb--any reason to think MM,amyloid to explain BM and chf?   - Dr Heifetz recommended considering  Entresto rather than just ACE inhibitors after RHC  .[MS1.2]    2. Atrial Fibrillation  - holding warfarin for[MS1.1] 1/22[MS1.2]  - heparin bridging for RHC monday      3. Obstructive sleep apnea  - agree with[MS1.1] restarting[MS1.4] CPAP    Vanita Baig,[MS1.1] APRN, CNP[HE1.1]  [facility]  Pager: [phone]-[MS1.1][phone][MS1.2]    Interval History[MS1.1]   Visited with[MS1.5] Jama Paul[MS1.6], his wife and daughter Lorraine today.    He is feeling better, able communicate effectively, and awaiting RHC.   H[MS1.5]e is being followed by [facility] for possible clonal plasmacytoid disease that could be contributing to CHF and for what appears to be more moderate thrombocytopenia,[MS1.3] They have[MS1.2] recommended the following   1.  Monoclonal protein indices of the peripheral blood and urine[MS1.3],[MS1.2] 2.  Peripheral smear.  -To look for red blood cell coining/rouleaux or other dysplastic/abnormal features that would prompt bone marrow biopsy consideration[MS1.3] and[MS1.2] 3.  Platelet antibodies. - To rule out immune causes for thrombocytopenia[MS1.3].[MS1.5]       Physical Exam   Temp: 97.5  F (36.4  C) Temp src: Oral BP: (!) 134/95 Pulse: 94 Heart Rate: 94 Resp: 16 SpO2: 94 % O2 Device: None (Room air)    Vitals:    01/20/18 0500 01/21/18 0500 01/22/18 0603   Weight: 75.6 kg (166 lb 9.6 oz) 68 kg (150 lb) 66.7 kg (147 lb)       Constitutional   alert and oriented, in no acute distress.  Skin   warm and dry to touch  ENT   no pallor or cyanosis  Neck[MS1.1]  No[MS1.5] JVP  Lungs[MS1.1] Clear[MS1.5]  Cardiac[MS1.1] irregularly irregular, + murmur[MS1.5]  Abdomen   abdomen soft, bowel sounds normoactive, no hepatosplenomegaly  Extremities and Back   no clubbing, cyanosis.[MS1.1] No[MS1.5] edema.[MS1.1]  2[MS1.5]+[MS1.1] radial and pedal[MS1.5] pulse    Neurological   no gross motor deficits noted, affect appropriate, oriented to time, person and place.    Medications     NaCl 75 mL/hr (01/22/18 0849)     HEParin 1,100 Units/hr (01/22/18 1113)     Reason anticoagulant not prescribed for atrial fibrillation       - MEDICATION INSTRUCTIONS -       - MEDICATION INSTRUCTIONS -         sodium chloride (PF)  3 mL Intracatheter Q8H     aspirin EC  325 mg Oral Daily     sodium chloride (PF)  10 mL Intravenous Once     furosemide  20 mg Intravenous BID     multivitamin  with lutein  1 capsule Oral BID     metoprolol succinate  100 mg Oral BID      lisinopril  5 mg Oral Daily       Data[MS1.1]   Most Recent 3 CBC's:[MS1.2]  Recent Labs   Lab Test  01/22/18 0315 01/20/18   0626  01/19/18   0054   WBC  7.3  8.3  6.7   HGB  12.0*  11.8*  10.4*   MCV  95  96  97   PLT  44*  44*  38*[MS1.7]     Most Recent 3 BMP's:[MS1.2]  Recent Labs   Lab Test  01/22/18 0315 01/21/18   0920  01/20/18   0626   NA  139  139  140   POTASSIUM  3.6  3.8  3.5   CHLORIDE  101  101  105   CO2  33*  30  29   BUN  25  25  28   CR  1.11  1.13  1.27*   ANIONGAP  5  8  6   BARON  8.0*  7.8*  8.3*   GLC  91  206*  97[MS1.7]   Most Recent 2 LFT's:[MS1.2]  Recent Labs   Lab Test  01/19/18 0054 11/17/17   1420   AST  21  11   ALT  27  14   ALKPHOS  33*  50   BILITOTAL  0.6  0.7[MS1.7]   Most Recent 3 Creatinines:[MS1.2]  Recent Labs   Lab Test  01/22/18 0315 01/21/18   0920  01/20/18   0626   CR  1.11  1.13  1.27*[MS1.7]   Most Recent 3 BNP's:[MS1.2]  Recent Labs   Lab Test  01/19/18 0054 12/07/17   1155   NTBNPI   --   4737*   NTBNP  5548*   --[MS1.7]    Most Recent D-dimer:[MS1.2]  Recent Labs   Lab Test  01/19/18   0054   DD  <0.3[MS1.7]   Most Recent TSH and T4:[MS1.2]  Recent Labs   Lab Test  01/19/18   0533   TSH  2.87[MS1.7]       Last 24 hours labs reviewed   EKG: (reviewed personally)[MS1.1] [MS1.3]      Tele:[MS1.1] Atrial fibrillation 70s-90s  [MS1.2]      MRI cardiac 12/15/2017  SUMMARY   ==========================================================================================================     Clinical history: Cardiomyopathy, atrial fibrillation  Comparison CMR: None     1. The LV is normal in cavity size and wall thickness. The global systolic function is moderately reduced.  The LVEF is 42%. There is moderate global hypokinesis of the left ventricle.     2. The RV is mildly dilated. The global systolic function is mildly reduced. The RVEF is 44%.      3. There is severe biatrial enlargement.     4. There is moderate to severe tricuspid regurgitation.     5.  Regadenoson stress perfusion imaging is negative for ischemia.     6. Late gadolinium enhancement imaging shows no MI, fibrosis or infiltrative disease.      7. Bilateral pleural effusions are noted.[MS1.3]    Echo:[MS1.1]   1/19/2018  Interpretation Summary     Left ventricular systolic function is moderately reduced.The visual ejection  fraction is estimated at 40%. There is moderate global hypokinesia of the left ventricle.  The right ventricle is severely dilated. The right ventricular systolic function is moderately reduced. The right atrium is severely dilated.The left atrium is moderately dilated.  There is moderate to mod-severe (2-3+) tricuspid regurgitation.There is moderate (2+) mitral regurgitation.     On direct comparision to echo images dated 12/08/2017 no significant changes.[MS1.3]           Revision History        User Key Date/Time User Provider Type Action    > AJ1.1 1/22/2018  6:26 PM Lauro Kelly MD Physician Sign     [N/A] 1/22/2018  4:24 PM Vanita Baig APRN CNP Nurse Practitioner Sign     MS1.6 1/22/2018  3:17 PM Tricia Queen, APRN CNP Nurse Practitioner Pend     MS1.5 1/22/2018  3:13 PM Tricia Queen, APRN CNP Nurse Practitioner      MS1.4 1/22/2018  3:09 PM Tricia Queen, APRN CNP Nurse Practitioner      HE1.1 1/22/2018  2:21 PM Vanita Baig APRN CNP Nurse Practitioner Pend     MS1.7 1/22/2018  2:09 PM Tricia Queen, APRN CNP Nurse Practitioner Pend     MS1.2 1/22/2018  1:44 PM Tricia Queen, APRN CNP Nurse Practitioner      MS1.3 1/22/2018 11:41 AM Tricia Queen, APRN CNP Nurse Practitioner      MS1.1 1/22/2018 11:18 AM Tricia Queen APRN CNP Nurse Practitioner             Progress Notes by Raymundo Proctor MD at 1/22/2018  4:33 PM     Author:  Raymundo Proctor MD Service:  Internal Medicine Author Type:  Physician    Filed:  1/22/2018  4:43 PM Date of Service:  1/22/2018  4:33 PM Creation  Time:  1/22/2018  4:33 PM    Status:  Signed :  Raymundo Proctor MD (Physician)         [facility]    Hospitalist Progress Note    Assessment & Plan   Jama Paul is a 86 year old male who was admitted on 1/19/2018. 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  - Pulmonary hypertension by echo, right heart failure shows that his pressures are good  - Moderate to severe Trucuspid regurgitation, hopefully will improve with medical therapy  - moderate mitral regurgitation, hopefully will improve with medical therapy ( furosemide, Lisinopril and Metoprolol  I&Os down 4.9 liters until this AM and weight is down 13 lbs    Problem 2: Afib   - problems with rate control on Metoprolol Succinate increased to 100mg bid with better control  - Some of the tachycardia maybe due to the CHF so better control of that is important  - Continue the Lisinopril, low sodium diet and Furosemide    Problem 3: Obstructive sleep apnea  - has not been treated for years and that is likely contributing to his heart failure,at least the right side  - Treat with Cpap while here  - will need to see his sleep doctor again    Problem 4: Thrombocytopenia, workup in progress, nothing definitive yet and can be completed as an outpatient    DVT Prophylaxis: Warfarin  Code Status: Full Code    Disposition: Expected discharge in 1 days once stablized.    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 Breathing is much better, no chest pain, headache, dizziness or nausea or vomiting. Voiding ok, bowels ok. Doesn't sleep well here. No abdominal pain and eating ok.  Work up for thrombocytopenia in the works    -Data reviewed today: I reviewed all new labs and imaging results over the last 24 hours. I personally reviewed no images or EKG's today.    Physical Exam   Temp: 97.3  F (36.3  C) Temp src: Axillary BP:  103/74 Pulse: 94 Heart Rate: 79 Resp: 16 SpO2: 92 % O2 Device: None (Room air)    Vitals:    01/20/18 0500 01/21/18 0500 01/22/18 0603   Weight: 75.6 kg (166 lb 9.6 oz) 68 kg (150 lb) 66.7 kg (147 lb)     Vital Signs with Ranges  Temp:  [97.3  F (36.3  C)-98.4  F (36.9  C)] 97.3  F (36.3  C)  Pulse:  [94] 94  Heart Rate:  [] 79  Resp:  [16-18] 16  BP: (103-134)/(58-95) 103/74  SpO2:  [92 %-95 %] 92 %  I/O last 3 completed shifts:  In: 1393.75 [P.O.:480; I.V.:913.75]  Out: 1300 [Urine:1300]    Constitutional: Alert, cooperative  After the  Right heart cath he is sleeping soundly  Respiratory: Lungs are clear to A&P  Cardiovascular: irregularly irregular rhythm, variable S1, normal S2, soft S3, no edema  GI: normal bowel sounds, not tender  Skin/Integumen:  Stasis dermatitis in the left leg, right leg bandaged, see previous notes  Other:      Medications     NaCl       Reason anticoagulant not prescribed for atrial fibrillation       - MEDICATION INSTRUCTIONS -       - MEDICATION INSTRUCTIONS -         sodium chloride (PF)  3 mL Intracatheter Q8H     [START ON 1/23/2018] aspirin EC  81 mg Oral Daily     furosemide  20 mg Intravenous BID     multivitamin  with lutein  1 capsule Oral BID     metoprolol succinate  100 mg Oral BID     lisinopril  5 mg Oral Daily       Data     Recent Labs  Lab 01/22/18  0315 01/21/18  0920 01/20/18  0626 01/19/18  1150 01/19/18  0533 01/19/18  0054   WBC 7.3  --  8.3  --   --  6.7   HGB 12.0*  --  11.8*  --   --  10.4*   MCV 95  --  96  --   --  97   PLT 44*  --  44*  --   --  38*   INR 1.39* 1.62* 2.31*  --   --  3.56*    139 140  --   --  144   POTASSIUM 3.6 3.8 3.5  --   --  3.8   CHLORIDE 101 101 105  --   --  113*   CO2 33* 30 29  --   --  23   BUN 25 25 28  --   --  26   CR 1.11 1.13 1.27*  --   --  1.08   ANIONGAP 5 8 6  --   --  8   BARON 8.0* 7.8* 8.3*  --   --  6.6*   GLC 91 206* 97  --   --  99   ALBUMIN  --   --   --   --   --  2.8*   PROTTOTAL  --   --   --   --    --  5.2*   BILITOTAL  --   --   --   --   --  0.6   ALKPHOS  --   --   --   --   --  33*   ALT  --   --   --   --   --  27   AST  --   --   --   --   --  21   TROPI  --   --   --  0.029 0.023 0.030       Imaging:   No results found for this or any previous visit (from the past 24 hour(s)).[DW1.1]       Revision History        User Key Date/Time User Provider Type Action    > DW1.1 1/22/2018  4:43 PM Raymundo Proctor MD Physician Sign            Progress Notes by Maxine Huang at 1/22/2018  8:47 AM     Author:  Maxine Huang Service:  Nutrition Author Type:  Dietetic Intern    Filed:  1/22/2018 11:37 AM Date of Service:  1/22/2018  8:47 AM Creation Time:  1/22/2018  8:47 AM    Status:  Attested :  Maxine Huang (Dietetic Intern)    Cosigner:  Amber Salazar RD, LD at 1/22/2018 11:46 AM        Attestation signed by Amber Salazar RD, LD at 1/22/2018 11:46 AM        I have reviewed the note below    Amber Salazar RD, LD  Clinical Dietitian - [facility]  Pager - [phone]                                 REASON FOR ASSESSMENT:  CHF Consult for 2 gm NA Diet Education    NUTRITION HISTORY:    Information obtained from:  Patient    Living situation:   Lives independently with wife in a house      Grocery shopping:  Wife -[SP1.1] pt states she[SP1.2] reads labels and is very aware of his nutrition needs    Meal preparation:  Wife[SP1.1]   -M[SP1.2]ost things are baked. Indicated a lot of seasoning of food is done with olive oil and lemon    Breakfast:  Yogurt- Yoplait (low-fat)   Cereal- cheerios with skim milk  Sausage (2-3)   Boiled eggs     Lunch:  Beef sandwich   Vegetables   Baked potato     Dinner:   Beef (roast, ribs)   Salad (field greens)   Vegetables (corn, peas, mixed vegetables)  Baked Potato     Previous diet instructions:  None      CURRENT DIET:  Fluid restriction 2000 ml & Caffeine Free    NUTRITION DIAGNOSIS:  Food- and nutrition-related knowledge deficit R/t  no previous CHF education AEB[SP1.1] above history.[SP1.2]     INTERVENTIONS:    Nutrition Prescription:  2g Na     Implementation:    Assessed learning needs, learning preferences, and willingness to learn    Nutrition Education (Content):  a) Provided handouts:  1) Tips for Low Na Diet  2) Label Reading  3) Low Na Foods/Drinks  4) Seasoning Your Food Without Salt  5) Low Na Recipe Booklet  b) Discussed rational for limiting Na for CHF and stressed importance of following 2 gm Na guidelines   c) Encouraged patient to keep a daily food record    Nutrition Education (Application):  a) Discussed current eating habits and recommended alternative food choices    Anticipated good compliance- stated his wife is very aware and will have her look through all the handouts when she arrives later in the day     Diet Education - refer to Education Flowsheet    Goals:    Patient verbalizes understanding of diet by stated his interest in seasoning food without salt & asking questions    All of the above goals met during the education session    Follow Up:    Provided RD contact information for future questions.    Recommend Out-Patient Nutrition Referral, if further diet instructions are needed.      Maxine Huang   Dietetic Intern[SP1.1]        Revision History        User Key Date/Time User Provider Type Action    > SP1.2 1/22/2018 11:37 AM Maxine Huang Dietetic Intern Sign     SP1.1 1/22/2018  8:47 AM Maxine Huang Dietetic Intern             Progress Notes by Raymundo Moncada at 1/22/2018 11:28 AM     Author:  Raymundo Moncada Service:  Spiritual Health Author Type:      Filed:  1/22/2018 11:31 AM Date of Service:  1/22/2018 11:28 AM Creation Time:  1/22/2018 11:28 AM    Status:  Signed :  Raymundo Moncada ()         SPIRITUAL HEALTH SERVICES Progress Note  [employer]    F/U visit.  Pt reflected on his career as  at [employer] and his role in helping get [facility] built.  Pt states that he loves  "living an active and meaningful life, and has a hard time \"sitting still\" (as he is doing as an inpatient here).  Pt also states his appreciation of hospital chaplains and their ministry--especially here at [facility].  SH affirmed pt's motivation for purposeful activity, and provided emotional/spiritual support and prayer.                                                                                                                                           Raymundo Moncada M.Div., [facility]  Staff   Pager [phone][DE1.1]       Revision History        User Key Date/Time User Provider Type Action    > DE1.1 1/22/2018 11:31 AM Raymundo Moncada  Sign            Progress Notes by Casie Aguila MD at 1/22/2018  9:20 AM     Author:  Casie Aguila MD Service:  Hem/Onc Author Type:  Physician    Filed:  1/22/2018  9:23 AM Date of Service:  1/22/2018  9:20 AM Creation Time:  1/22/2018  9:20 AM    Status:  Signed :  Casie Aguila MD (Physician)         Chart check for Dr. Rubio.  Plt count stable at 44K; SPEP and peripheral blood smear pending.[JH1.1]     Revision History        User Key Date/Time User Provider Type Action    > JH1.1 1/22/2018  9:23 AM Casie Aguila MD Physician Sign            Progress Notes by Matty García MD at 1/21/2018 11:46 AM     Author:  Matty García MD Service:  Cardiology Author Type:  Physician    Filed:  1/21/2018  5:59 PM Date of Service:  1/21/2018 11:46 AM Creation Time:  1/21/2018 11:46 AM    Status:  Signed :  Matty García MD (Physician)         [facility]    Cardiology Note    Assessment & Plan   Jama Paul is a 86 year old male who was admitted on 1/19/2018. With   increasing dyspnea at rest and on exertion.  He presents to [facility] with acute decompensated biventricular HF.  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  Moderate-Severe Tricuspid Regurgitation  Moderate Mitral Regurgitation  -[AM1.1]decrease[AM1.3] IV furosemide[AM1.1] to[AM1.3] 20[AM1.2] mg BID  -Continue Lisinopril 5mg   -Continue metoprolol succinate 100 mg BID  - Plan for RHC on Monday  - Last Echocardiogram was  12/8/2017 and EF was 30-35% with severe concentric LVH, moderate to severe global hypokinesia and moderately to severely dilated RV; 1-2+ mitral and 2-3+ tricuspid    Atrial Fibrillation  - holding warfarin for now  - heparin bridging for RHC monday     Obstructive sleep apnea  - agree with CPAP    DVT Prophylaxis: Warfarin  Code Status: Full Code      Matty García MD    Interval History    Diuresing well.  No acute overnight events. No complaints this morning.    -Data reviewed today:     Physical Exam   Temp: 97.4  F (36.3  C) Temp src: Oral BP: 99/62   Heart Rate: 86 Resp: 18 SpO2: 96 % O2 Device: None (Room air) Oxygen Delivery: 2 LPM  Vitals:    01/19/18 0707 01/20/18 0500 01/21/18 0500   Weight: 74.1 kg (163 lb 6.4 oz) 75.6 kg (166 lb 9.6 oz) 68 kg (150 lb)     Vital Signs with Ranges  Temp:  [97.4  F (36.3  C)-97.9  F (36.6  C)] 97.4  F (36.3  C)  Heart Rate:  [86-96] 86  Resp:  [16-18] 18  BP: ()/(56-90) 99/62  SpO2:  [91 %-98 %] 96 %  I/O last 3 completed shifts:  In: 1220 [P.O.:1220]  Out: 2300 [Urine:2300]    Constitutional: Alert, cooperative  Respiratory: Good air movement, clear anteriorly  Cardiovascular: irregularly irregular, variable S1, normal S2; S3. + 1/6 holosystolic murmur loudest at the apex, moderate LE edema, JVP elevated   GI: soft, not tender  Skin/Integumen: stasis dermatitis in the left lower leg, right leg is wrapped      Medications     HEParin 800 Units/hr (01/21/18 1118)     Reason anticoagulant not prescribed for atrial fibrillation       - MEDICATION INSTRUCTIONS -       - MEDICATION INSTRUCTIONS -         multivitamin  with lutein  1  capsule Oral BID     metoprolol succinate  100 mg Oral BID     furosemide  40 mg Intravenous BID     lisinopril  5 mg Oral Daily       Data     Recent Labs  Lab 01/21/18  0920 01/20/18  0626 01/19/18  1150 01/19/18  0533 01/19/18  0054   WBC  --  8.3  --   --  6.7   HGB  --  11.8*  --   --  10.4*   MCV  --  96  --   --  97   PLT  --  44*  --   --  38*   INR 1.62* 2.31*  --   --  3.56*    140  --   --  144   POTASSIUM 3.8 3.5  --   --  3.8   CHLORIDE 101 105  --   --  113*   CO2 30 29  --   --  23   BUN 25 28  --   --  26   CR 1.13 1.27*  --   --  1.08   ANIONGAP 8 6  --   --  8   BARON 7.8* 8.3*  --   --  6.6*   * 97  --   --  99   ALBUMIN  --   --   --   --  2.8*   PROTTOTAL  --   --   --   --  5.2*   BILITOTAL  --   --   --   --  0.6   ALKPHOS  --   --   --   --  33*   ALT  --   --   --   --  27   AST  --   --   --   --  21   TROPI  --   --  0.029 0.023 0.030       Imaging:   No results found for this or any previous visit (from the past 24 hour(s)).[AM1.1]     Revision History        User Key Date/Time User Provider Type Action    > AM1.2 1/21/2018  5:59 PM Matty García MD Physician Sign     AM1.3 1/21/2018 12:56 PM Matty García MD Physician      AM1.1 1/21/2018 11:46 AM Matty García MD Physician             Progress Notes by Desi Araujo OT at 1/21/2018  3:03 PM     Author:  Desi Araujo OT Service:  (none) Author Type:  Occupational Therapist    Filed:  1/21/2018  3:03 PM Date of Service:  1/21/2018  3:03 PM Creation Time:  1/21/2018  3:03 PM    Status:  Signed :  Desi Araujo OT (Occupational Therapist)          01/21/18 1428   Quick Adds   Type of Visit Initial Occupational Therapy Evaluation   Living Environment   Lives With spouse   Living Arrangements house   Home Accessibility stairs to enter home;stairs within home   Number of Stairs to Enter Home 3   Number of Stairs Within Home 15   Transportation Available car;family or friend will provide   Living  Environment Comment Pt lives with spouse in house, stairs to enter, full flight to 2nd story bedroom/full bathroom.   Self-Care   Usual Activity Tolerance good   Current Activity Tolerance moderate   Regular Exercise yes   Activity/Exercise Type other (see comments)  (used to walk increased distance around track, has declined)   Activity/Exercise/Self-Care Comment Pt reports he was ambulating around the track months ago, but recently has only been able to walk around the kitchen table   Functional Level Prior   Ambulation 0-->independent   Transferring 0-->independent   Toileting 0-->independent   Bathing 0-->independent   Dressing 0-->independent   Eating 0-->independent   Communication 0-->understands/communicates without difficulty   Swallowing 0-->swallows foods/liquids without difficulty   Cognition 0 - no cognition issues reported   Fall history within last six months no   Which of the above functional risks had a recent onset or change? ambulation;transferring;toileting;bathing;dressing   Prior Functional Level Comment Pt reports indep in all ADLs, IADLs of driving, light home management and mobility tasks with no AD in house, uses B walking poles and/or 4WW in community for increased distances.   General Information   Onset of Illness/Injury or Date of Surgery - Date 01/19/18   Referring Physician Brooks Andres MD   Patient/Family Goals Statement Pt's goal is to d/c home   Additional Occupational Profile Info/Pertinent History of Current Problem Per chart: Pt is an 86 year old male who was admitted on 1/19/2018 with increasing dyspnea at rest and on exertion, found to have Acute exacerbation of Chronic Systolic heart failure, bilateral pleural effusions   Precautions/Limitations fall precautions   Cognitive Status Examination   Orientation orientation to person, place and time   Level of Consciousness alert   Able to Follow Commands WNL/WFL   Personal Safety (Cognitive) mild impairment;decreased  insight to deficits;decreased awareness, need for assist   Visual Perception   Visual Perception Wears glasses   Sensory Examination   Sensory Comments Pt denies numbness/tingling in BUEs   Pain Assessment   Patient Currently in Pain No   Range of Motion (ROM)   ROM Comment WFL   Strength   Strength Comments Generalized weakness BUEs   Hand Strength   Hand Strength Comments Generalized weakness B gross grasp   Bed Mobility Skill: Sit to Supine   Level of [place]: Sit/Supine stand-by assist   Physical Assist/Nonphysical Assist: Sit/Supine 1 person assist   Bed Mobility Skill: Supine to Sit   Level of [place]: Supine/Sit stand-by assist   Physical Assist/Nonphysical Assist: Supine/Sit 1 person assist   Transfer Skill: Bed to Chair/Chair to Bed   Level of [place]: Bed to Chair contact guard   Physical Assist/Nonphysical Assist: Bed to Chair 1 person assist   Weight-Bearing Restrictions weight-bearing as tolerated   Assistive Device - Transfer Skill Bed to Chair Chair to Bed Rehab Eval standard walker   Transfer Skill: Sit to Stand   Level of [place]: Sit/Stand contact guard   Physical Assist/Nonphysical Assist: Sit/Stand 1 person assist   Transfer Skill: Sit to Stand weight-bearing as tolerated   Assistive Device for Transfer: Sit/Stand standard walker   Balance   Balance Comments Pt with noted balance deficits while ambulating in hallway, 2 LOB, 2 episodes of knee buckling requiring min/mod A to correct, pt reporting balance is decreased compared to normal- PT referral recommended   Upper Body Dressing   Level of [place]: Dress Upper Body minimum assist (75% patients effort)   Physical Assist/Nonphysical Assist: Dress Upper Body 1 person assist   Lower Body Dressing   Level of [place]: Dress Lower Body contact guard   Physical Assist/Nonphysical Assist: Dress Lower Body 1 person assist   Toileting   Level of [place]: Toilet minimum assist (75% patients effort)   Physical  "Assist/Nonphysical Assist: Toilet 1 person assist   Grooming   Level of [place]: Grooming contact guard   Physical Assist/Nonphysical Assist: Grooming 1 person assist   Instrumental Activities of Daily Living (IADL)   Previous Responsibilities medication management;finances;driving   IADL Comments Pt has support of spouse for IADLs as needed   Activities of Daily Living Analysis   Impairments Contributing to Impaired Activities of Daily Living balance impaired;strength decreased   ADL Comments Pt presents to OT/CR below baseline level of functioning in areas of self cares including bathing, dressing, grooming, toileting   General Therapy Interventions   Planned Therapy Interventions ADL retraining;IADL retraining;strengthening;transfer training;home program guidelines;progressive activity/exercise;risk factor education   Clinical Impression   Criteria for Skilled Therapeutic Interventions Met yes, treatment indicated   OT Diagnosis Impaired ADLs, IADLs and mobility   Influenced by the following impairments Decreased strength and functional activity tolerance, impaired balance, cardiac risk factors   Assessment of Occupational Performance 5 or more Performance Deficits   Identified Performance Deficits Bathing, dressing, grooming, toileting, homemaking, transfers   Clinical Decision Making (Complexity) Moderate complexity   Therapy Frequency daily   Predicted Duration of Therapy Intervention (days/wks) 4 days   Anticipated Discharge Disposition Home with Assist;Home with Home Therapy;Transitional Care Facility   Risks and Benefits of Treatment have been explained. Yes   Patient, Family & other staff in agreement with plan of care Yes   Clinical Impression Comments Pt would benefit from skilled OT/CR to maximize safety and indep in all ADLs, IADLS and mobility tasks due to current deficits impacting function, CV strengthening.    [facility] AM-PAC  \"6 Clicks\" Daily Activity Inpatient Short Form   1. " Putting on and taking off regular lower body clothing? 3 - A Little   2. Bathing (including washing, rinsing, drying)? 3 - A Little   3. Toileting, which includes using toilet, bedpan or urinal? 3 - A Little   4. Putting on and taking off regular upper body clothing? 3 - A Little   5. Taking care of personal grooming such as brushing teeth? 3 - A Little   6. Eating meals? 4 - None   Daily Activity Raw Score (Score out of 24.Lower scores equate to lower levels of function) 19   Total Evaluation Time   Total Evaluation Time (Minutes) 10[JL1.1]        Revision History        User Key Date/Time User Provider Type Action    > JL1.1 1/21/2018  3:03 PM Desi Araujo OT Occupational Therapist Sign            Progress Notes by Deidra Joiner MD at 1/21/2018  7:49 AM     Author:  Deidra Joiner MD Service:  Hospitalist Author Type:  Physician    Filed:  1/21/2018  2:42 PM Date of Service:  1/21/2018  7:49 AM Creation Time:  1/21/2018  7:49 AM    Status:  Signed :  Deidra Joiner MD (Physician)         [facility]  Hospitalist Progress Note  Date of service (when I saw the patient)01/21/2018:         Assessment and Plan:   Jama Paul is a 86 year old male who was admitted on 1/19/2018. 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  - diuresing well I/Os -ve 4,525 lit, but wt down 10 lbs down since admission[PK1.1], appears euvolemic[PK1.2]   - currently on IV Lasix 40 mg Bid today,  Lisinopril 5 mg & Toprol 100 mg bid  - creatinine creeping up 1.08-> 1.27[PK1.1] -> 1.13[PK1.2] today  -[PK1.1] lasix decreased to 20 mg bid[PK1.2], repeat BMP in am  - cardiology following, planning right heart cath on Monday       Atrial fibrillation:  - rate controlled at this time but history of tachycardia and recent work to control his rate  - maintained on Toprol 100 mg bid & warfarin PTA was 2.5mg daily and 5mg on MWF  - PTA warfarin stopped in hospital[PK1.1], INR drifted down to 1.62 today[PK1.2]  - pharmacy following,[PK1.1] started IV heparin to bridge[PK1.2] this am's INR[PK1.1] until interventions completed.[PK1.2]      Obstructive sleep apnea:  - not using CPAP any more  - Will start CPAP here       Pedal edema with stasis dermatitis:  - some of this maybe due to 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  Liver disease is a distinct possibility, especially given the low albumin. Other liver tests including Bilirubin, AST, Alk Ptase and ALT are normal or low. Common cause in this area include ETOH.   - counts stable, no obvious bleeding  - hematology consult requested on admission,pending  - cbc in am      DVT Prophylaxis: anticoagulated  Code Status: Full Code      Disposition: Expected discharge[PK1.1] pending completing cardiac w/u.PT/OT following.[PK1.2]    Deidra Joiner MD.  Hospitalist [phone] (7am -6 pm)                        Interval History:[PK1.1]   Feeling better , has  "multiple questions that answered.[PK1.2]              Medications:       multivitamin  with lutein  1 capsule Oral BID     metoprolol succinate  100 mg Oral BID     furosemide  40 mg Intravenous BID     lisinopril  5 mg Oral Daily     naloxone, melatonin, Reason anticoagulant not prescribed for atrial fibrillation, - MEDICATION INSTRUCTIONS -, potassium chloride, potassium chloride, potassium chloride, potassium chloride with lidocaine, potassium chloride, magnesium sulfate, acetaminophen, acetaminophen, ondansetron **OR** ondansetron, metoprolol, - MEDICATION INSTRUCTIONS -               Physical Exam:   Blood pressure 100/68, temperature 97.9  F (36.6  C), temperature source Oral, resp. rate 18, height 1.854 m (6' 1\"), weight 68 kg (150 lb), SpO2 93 %.  Wt Readings from Last 4 Encounters:   18 68 kg (150 lb)   18 74.8 kg (165 lb)   17 72.6 kg (160 lb)   17 77.6 kg (171 lb)         Vital Sign Ranges  Temperature Temp  Av.5  F (36.4  C)  Min: 96.6  F (35.9  C)  Max: 97.9  F (36.6  C)   Blood pressure Systolic (24hrs), Av , Min:100 , Max:133        Diastolic (24hrs), Av, Min:56, Max:85      Pulse No Data Recorded   Respirations Resp  Av.2  Min: 16  Max: 18   Pulse oximetry SpO2  Av.2 %  Min: 93 %  Max: 98 %         Intake/Output Summary (Last 24 hours) at 18 0749  Last data filed at 18 2200   Gross per 24 hour   Intake             1220 ml   Output             2300 ml   Net            -1080 ml       Constitutional: Awake, alert, cooperative, no apparent distress   Lungs: Clear to auscultation bilaterally, no crackles or wheezing   Cardiovascular: Regular rate and rhythm, normal S1 and S2,[PK1.1] systolic[PK1.2] murmur noted   Abdomen: Normal bowel sounds, soft, non-distended, non-tender   Skin: No rashes, no cyanosis, no edema   Neuro:                Data:        Lab Results   Component Value Date     2018     2018     2017 " "   Lab Results   Component Value Date    CHLORIDE 105 01/20/2018    CHLORIDE 113 01/19/2018    CHLORIDE 110 12/27/2017    Lab Results   Component Value Date    BUN 28 01/20/2018    BUN 26 01/19/2018    BUN 23 12/27/2017      Lab Results   Component Value Date    POTASSIUM 3.5 01/20/2018    POTASSIUM 3.8 01/19/2018    POTASSIUM 4.2 12/27/2017    Lab Results   Component Value Date    CO2 29 01/20/2018    CO2 23 01/19/2018    CO2 28 12/27/2017    Lab Results   Component Value Date    CR 1.27 01/20/2018    CR 1.08 01/19/2018    CR 1.08 12/27/2017[PK1.1]             Revision History        User Key Date/Time User Provider Type Action    > PK1.2 1/21/2018  2:42 PM Deidra Joiner MD Physician Sign     PK1.1 1/21/2018  7:49 AM Deidra Joiner MD Physician             Progress Notes by Lisandro Rubio MD at 1/21/2018 10:31 AM     Author:  Lisandro Rubio MD Service:  Oncology Author Type:  Physician    Filed:  1/21/2018 10:36 AM Date of Service:  1/21/2018 10:31 AM Creation Time:  1/21/2018 10:31 AM    Status:  Signed :  Lisandro Rubio MD (Physician)         HEME-ONC PROGRESS NOTE    History: Agent doing okay today with no new worrisome symptoms.    Physical Exam:  VS: Blood pressure 105/64, temperature 97.4  F (36.3  C), temperature source Oral, resp. rate 18, height 1.854 m (6' 1\"), weight 68 kg (150 lb), SpO2 91 %.  GEN- Alert and orientedx3 in NAD.  Exam deferred    LABS:   CBC Results: From yesterday  Recent Labs   Lab Test  01/20/18   0626   WBC  8.3   RBC  3.75*   HGB  11.8*   HCT  35.8*   MCV  96   MCH  31.5   MCHC  33.0   RDW  19.1*   PLT  44*       Last Basic Metabolic Panel:  Lab Results   Component Value Date     01/21/2018      Lab Results   Component Value Date    POTASSIUM 3.8 01/21/2018     Lab Results   Component Value Date    CHLORIDE 101 01/21/2018     Lab Results   Component Value Date    BARON 7.8 01/21/2018     Lab Results   Component Value Date    CO2 30 01/21/2018 "     Lab Results   Component Value Date    BUN 25 01/21/2018     Lab Results   Component Value Date    CR 1.13 01/21/2018     Lab Results   Component Value Date     01/21/2018       Liver Function Studies:   Recent Labs   Lab Test  01/19/18   0054   PROTTOTAL  5.2*   ALBUMIN  2.8*   BILITOTAL  0.6   ALKPHOS  33*   AST  21   ALT  27       Coagulation Studies:  Lab Results   Component Value Date    INR 1.62 01/21/2018       Imaging: none new    Assessment & Plan: 86-year-old white male with congestive heart failure (CHF) -   1)HEME/ONC- Given concerns by the consulting cardiologist about possible clonal plasmacytoid disease that could be contributing to Mr. Paul's CHF and for what appears to be more moderate thrombocytopenia, I recommended the following yesterday:  1.  Monoclonal protein indices of the peripheral blood and urine (see above);  2.  Peripheral smear.  -To look for red blood cell coining/rouleaux or other dysplastic/abnormal features that would prompt bone marrow biopsy consideration.  3.  Platelet antibodies. - To rule out immune causes for thrombocytopenia    At present, the results are pending.     Should we wish to further exclude amyloidosis, one could consider fat pad/bone marrow biopsy for Congo red staining.  In addition, I will send peripheral blood light chain analysis today as well.    Depending on the above, there may be need for further hematologic evaluation such as bone marrow biopsy,    Lisandro Rubio MD, MS  Hematologist-Oncologist  [facility][MG1.1]               Revision History        User Key Date/Time User Provider Type Action    > MG1.1 1/21/2018 10:36 AM Lisandro Rubio MD Physician Sign            Progress Notes by Flora Plummer RT at 1/21/2018  3:51 AM     Author:  Flora Plummer RT Service:  (none) Author Type:  Respiratory Therapist    Filed:  1/21/2018  3:51 AM Date of Service:  1/21/2018  3:51 AM Creation Time:  1/21/2018  3:51 AM    Status:  Signed :   Flora Plummer, RT (Respiratory Therapist)         Placed Pt on CPAP, but only tolerated for short period on time. Refused further use due to discomfort.     1/21/2018  Flora Plummer RRT[AS1.1]       Revision History        User Key Date/Time User Provider Type Action    > AS1.1 1/21/2018  3:51 AM Flora Plummer, RT Respiratory Therapist Sign            Progress Notes by Matty García MD at 1/20/2018  6:10 AM     Author:  aMtty García MD Service:  Cardiology Author Type:  Physician    Filed:  1/20/2018  8:15 PM Date of Service:  1/20/2018  6:10 AM Creation Time:  1/20/2018  6:10 AM    Status:  Signed :  Matty García MD (Physician)         [facility]    Cardiology Note[AM1.1]    Assessment & Plan[AM1.2]   Jama Paul is a 86 year old male who was admitted on 1/19/2018. With   increasing dyspnea at rest and on exertion.  He presents to [facility] with acute decompensated biventricular HF.    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  Moderate-Severe Tricuspid Regurgitation  Moderate Mitral Regurgitation  -Continue IV furosemide 40 mg BID  -Continue Lisinopril 5mg   -Continue metoprolol succinate 100 mg BID  - Plan for RHC on Monday +/- RV biopsy to r/o infiltrative CM  - Last Echocardiogram was  12/8/2017 and EF was 30-35% with severe concentric LVH, moderate to severe global hypokinesia and moderately to severely dilated RV; 1-2+ mitral and 2-3+ tricuspid    Atrial Fibrillation  - holding warfarin for now  - heparin bridging for RHC monday     Obstructive sleep apnea  - agree with CPAP    DVT Prophylaxis: Warfarin  Code Status:[AM1.1] Full Code[AM1.2]    Disposition: Expected discharge in[AM1.1] 2-3[AM1.3] days[AM1.1]     Matty García[AM1.2], MD[AM1.1]    Interval History[AM1.2]    Diuresing well.  No acute overnight events. No complaints this morning.    -Data reviewed today:[AM1.1]      Physical Exam   Temp: 97.6  F (36.4  C) Temp src: Oral BP: 118/80   Heart Rate: 93 Resp: 18 SpO2: 95 % O2 Device: None (Room air) Oxygen Delivery: 2 LPM  Vitals:    01/19/18 0048 01/19/18 0707 01/20/18 0500   Weight: 72.6 kg (160 lb) 74.1 kg (163 lb 6.4 oz) 75.6 kg (166 lb 9.6 oz)[AM1.2]     Vital Signs with Ranges[AM1.1]  Temp:  [97.4  F (36.3  C)-97.6  F (36.4  C)] 97.6  F (36.4  C)  Heart Rate:  [72-93] 93  Resp:  [18-20] 18  BP: (113-139)/(73-96) 118/80  SpO2:  [92 %-96 %] 95 %  I/O last 3 completed shifts:  In: 1180 [P.O.:1180]  Out: 4075 [Urine:4075][AM1.2]    Constitutional: Alert, cooperative  Respiratory:[AM1.1] Good air movement, clear anteriorly[AM1.3]  Cardiovascular: irregularly irregular, variable S1, normal S2 and an S3.[AM1.1] + 1/6 holosystolic murmur loudest at the apex[AM1.3] moderate LE edema, JVP elevated[AM1.1],[AM1.3]   GI: soft, not tender  Skin/Integumen: stasis dermatitis in the left lower leg, right leg is wrapped[AM1.1]      Medications     Reason anticoagulant not prescribed for atrial fibrillation       - MEDICATION INSTRUCTIONS -       - MEDICATION INSTRUCTIONS -         metoprolol succinate  100 mg Oral BID     furosemide  40 mg Intravenous BID     lisinopril  5 mg Oral Daily       Data     Recent Labs  Lab 01/19/18  1150 01/19/18  0533 01/19/18  0054   WBC  --   --  6.7   HGB  --   --  10.4*   MCV  --   --  97   PLT  --   --  38*   INR  --   --  3.56*   NA  --   --  144   POTASSIUM  --   --  3.8   CHLORIDE  --   --  113*   CO2  --   --  23   BUN  --   --  26   CR  --   --  1.08   ANIONGAP  --   --  8   BARON  --   --  6.6*   GLC  --   --  99   ALBUMIN  --   --  2.8*   PROTTOTAL  --   --  5.2*   BILITOTAL  --   --  0.6   ALKPHOS  --   --  33*   ALT  --   --  27   AST  --   --  21   TROPI 0.029 0.023 0.030[AM1.2]       Imaging:[AM1.1]   No results found for this or any previous visit (from the past 24 hour(s)).[AM1.2]     Revision History        User Key Date/Time User Provider Type  Action    > AM1.3 1/20/2018  8:15 PM Matty García MD Physician Sign     AM1.2 1/20/2018  6:25 AM May, Matty Martinez MD Physician      AM1.1 1/20/2018  6:10 AM May, Matty Martinez MD Physician             Progress Notes by Deidra Joiner MD at 1/20/2018  9:11 AM     Author:  Deidra Joiner MD Service:  Hospitalist Author Type:  Physician    Filed:  1/20/2018  3:42 PM Date of Service:  1/20/2018  9:11 AM Creation Time:  1/20/2018  9:11 AM    Status:  Signed :  Deidra Joiner MD (Physician)         [facility]  Hospitalist Progress Note  Date of service (when I saw the patient)01/20/2017:         Assessment and Plan:   Jama Paul is a 86 year old male who was admitted on 1/19/2018. 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  - diuresing, I/Os -ve3.355 lit, but wt up 6 lbs down since admission  -[PK1.1] continue[PK1.2]  IV Lasix 40 mg Bid[PK1.1] today[PK1.2], Lisinopril 5 mg & Toprol 100 mg bid  - cardiology following, planning right heart cath on Monday      Atrial fibrillation:  - rate controlled at this time but history of tachycardia and recent work to control his rate  - maintained on Toprol 100 mg bid & warfarin  - PTA warfarin stopped & switch to Heparin ggt with a history of stable INRs in the 2.1 to 2.2 range, last INR 4 days ago. 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       Mild normochromic, normocytic anemia[PK1.1]:[PK1.2]   which maybe due to chronic disease but could also be just an normal adjustment to age.   [PK1.1]   Chronic[PK1.2] Thrombocytopenia[PK1.1]:[PK1.2]   Last normal platelet count was on 2/24/2009 when it was 207K, the platelet count on 4/8/2012 was 129 and all subsequent platelet counts are under 100K.  Liver disease is a distinct possibility, especially given the low albumin. Other liver tests including Bilirubin, AST, Alk Ptase and ALT are normal or low. Common cause in this area include ETOH. Will check an ammonium level and ask hematology to see.   Addendum: It turns out that he has known about this and Dr. Clint Pizarro has told him not to worry about it until it is under 50K.  He does not drink ETOH very often so that is not the cause and he has no other history of liver disease.  -[PK1.1] Hematology consult pending[PK1.2]     DVT Prophylaxis: Warfarin  Code Status: Full Code     Disposition: Expected discharge in 3-4 days once consults are done and therapy stablized.   [PK1.1]  Deidra Joiner MD.  Hospitalist [phone] (7am -6 pm)[PK1.2]               Interval History:[PK1.1]   Feeling better.[PK1.2]              Medications:       metoprolol succinate  100 mg Oral BID     furosemide  40 mg Intravenous BID     lisinopril  5 mg Oral Daily     naloxone, melatonin, Reason anticoagulant not prescribed for atrial fibrillation, HOLD MEDICATION, - MEDICATION INSTRUCTIONS -, potassium chloride, potassium chloride, potassium chloride, potassium chloride with lidocaine, potassium chloride, magnesium sulfate, acetaminophen,  "acetaminophen, ondansetron **OR** ondansetron, metoprolol, - MEDICATION INSTRUCTIONS -               Physical Exam:   Blood pressure 133/67, temperature 96.6  F (35.9  C), resp. rate 16, height 1.854 m (6' 1\"), weight 75.6 kg (166 lb 9.6 oz), SpO2 97 %.  Wt Readings from Last 4 Encounters:   18 75.6 kg (166 lb 9.6 oz)   18 74.8 kg (165 lb)   17 72.6 kg (160 lb)   17 77.6 kg (171 lb)         Vital Sign Ranges  Temperature Temp  Av.3  F (36.3  C)  Min: 96.6  F (35.9  C)  Max: 97.6  F (36.4  C)   Blood pressure Systolic (24hrs), Av , Min:113 , Max:139        Diastolic (24hrs), Av, Min:67, Max:80      Pulse No Data Recorded   Respirations Resp  Av  Min: 16  Max: 20   Pulse oximetry SpO2  Av.8 %  Min: 92 %  Max: 97 %         Intake/Output Summary (Last 24 hours) at 18 0911  Last data filed at 18 0830   Gross per 24 hour   Intake             1420 ml   Output             2900 ml   Net            -1480 ml       Constitutional: Awake, alert, cooperative, no apparent distress   Lungs: crackles[PK1.1] L base,no[PK1.2] wheezing   Cardiovascular:[PK1.1] irr[PK1.2]egular rate and rhythm, normal S1 and S2, and no murmur noted   Abdomen: Normal bowel sounds, soft, non-distended, non-tender   Skin: No rashes, no cyanosis, no edema   Neuro:                Data:[PK1.1]   All laboratory data reviewed[PK1.2]       Revision History        User Key Date/Time User Provider Type Action    > PK1.2 2018  3:42 PM Deidra Joiner MD Physician Sign     PK1.1 2018  9:11 AM Deidra Joiner MD Physician             Progress Notes by Joanie Wells RN at 2018  2:36 PM     Author:  Joanie Wells RN Service:  (none) Author Type:  Registered Nurse    Filed:  2018  2:40 PM Date of Service:  2018  2:36 PM Creation Time:  2018  2:36 PM    Status:  Signed :  Joanie Wells RN (Registered Nurse)         Heart Failure Care " Pathway  GOALS TO BE MET BEFORE DISCHARGE:    1. Decrease congestion and/or edema with diuretic therapy to achieve near      optimal volume status.            Dyspnea improved:  Yes            Edema improved:     Yes        Net I/O and Weights since admission:          01/15 0700 - 01/20 0659  In: 1180 [P.O.:1180]  Out: 4625 [Urine:4625]  Net: -3445            Vitals:    01/19/18 0048 01/19/18 0707 01/20/18 0500   Weight: 72.6 kg (160 lb) 74.1 kg (163 lb 6.4 oz) 75.6 kg (166 lb 9.6 oz)       2.  O2 sats > 92% on RA or at prior home O2 therapy level.          Current oxygenation status:       SpO2: 98 %         O2 Device: Nasal cannula,  Oxygen Delivery: 2 LPM         Able to wean O2 this shift to keep sats > 92%: No       Does patient use Home O2?  No    3.  Tolerates ambulation and mobility near baseline: No        How many times did the patient ambulate with nursing staff this shift? 1    Please review the Heart Failure Care Pathway for additional HF goal outcomes.       VSS. Monitor remains atrial fib with CVR. Pt. Denies pain. Plan for possible right heart cath on Monday.  Joanie Wells RN  1/20/2018[MK1.1]        Revision History        User Key Date/Time User Provider Type Action    > MK1.1 1/20/2018  2:40 PM Joanie Wells, RN Registered Nurse Sign                  Procedure Notes     No notes of this type exist for this encounter.         Progress Notes - Therapies (Notes from 01/20/18 through 01/23/18)      Progress Notes by Desi Araujo OT at 1/21/2018  3:03 PM     Author:  Desi Araujo OT Service:  (none) Author Type:  Occupational Therapist    Filed:  1/21/2018  3:03 PM Date of Service:  1/21/2018  3:03 PM Creation Time:  1/21/2018  3:03 PM    Status:  Signed :  Desi Araujo OT (Occupational Therapist)          01/21/18 1428   Quick Adds   Type of Visit Initial Occupational Therapy Evaluation   Living Environment   Lives With spouse   Living Arrangements house   Home Accessibility  stairs to enter home;stairs within home   Number of Stairs to Enter Home 3   Number of Stairs Within Home 15   Transportation Available car;family or friend will provide   Living Environment Comment Pt lives with spouse in house, stairs to enter, full flight to 2nd story bedroom/full bathroom.   Self-Care   Usual Activity Tolerance good   Current Activity Tolerance moderate   Regular Exercise yes   Activity/Exercise Type other (see comments)  (used to walk increased distance around track, has declined)   Activity/Exercise/Self-Care Comment Pt reports he was ambulating around the track months ago, but recently has only been able to walk around the kitchen table   Functional Level Prior   Ambulation 0-->independent   Transferring 0-->independent   Toileting 0-->independent   Bathing 0-->independent   Dressing 0-->independent   Eating 0-->independent   Communication 0-->understands/communicates without difficulty   Swallowing 0-->swallows foods/liquids without difficulty   Cognition 0 - no cognition issues reported   Fall history within last six months no   Which of the above functional risks had a recent onset or change? ambulation;transferring;toileting;bathing;dressing   Prior Functional Level Comment Pt reports indep in all ADLs, IADLs of driving, light home management and mobility tasks with no AD in house, uses B walking poles and/or 4WW in community for increased distances.   General Information   Onset of Illness/Injury or Date of Surgery - Date 01/19/18   Referring Physician Brooks Andres MD   Patient/Family Goals Statement Pt's goal is to d/c home   Additional Occupational Profile Info/Pertinent History of Current Problem Per chart: Pt is an 86 year old male who was admitted on 1/19/2018 with increasing dyspnea at rest and on exertion, found to have Acute exacerbation of Chronic Systolic heart failure, bilateral pleural effusions   Precautions/Limitations fall precautions   Cognitive Status Examination    Orientation orientation to person, place and time   Level of Consciousness alert   Able to Follow Commands WNL/WFL   Personal Safety (Cognitive) mild impairment;decreased insight to deficits;decreased awareness, need for assist   Visual Perception   Visual Perception Wears glasses   Sensory Examination   Sensory Comments Pt denies numbness/tingling in BUEs   Pain Assessment   Patient Currently in Pain No   Range of Motion (ROM)   ROM Comment WFL   Strength   Strength Comments Generalized weakness BUEs   Hand Strength   Hand Strength Comments Generalized weakness B gross grasp   Bed Mobility Skill: Sit to Supine   Level of [place]: Sit/Supine stand-by assist   Physical Assist/Nonphysical Assist: Sit/Supine 1 person assist   Bed Mobility Skill: Supine to Sit   Level of [place]: Supine/Sit stand-by assist   Physical Assist/Nonphysical Assist: Supine/Sit 1 person assist   Transfer Skill: Bed to Chair/Chair to Bed   Level of [place]: Bed to Chair contact guard   Physical Assist/Nonphysical Assist: Bed to Chair 1 person assist   Weight-Bearing Restrictions weight-bearing as tolerated   Assistive Device - Transfer Skill Bed to Chair Chair to Bed Rehab Eval standard walker   Transfer Skill: Sit to Stand   Level of [place]: Sit/Stand contact guard   Physical Assist/Nonphysical Assist: Sit/Stand 1 person assist   Transfer Skill: Sit to Stand weight-bearing as tolerated   Assistive Device for Transfer: Sit/Stand standard walker   Balance   Balance Comments Pt with noted balance deficits while ambulating in hallway, 2 LOB, 2 episodes of knee buckling requiring min/mod A to correct, pt reporting balance is decreased compared to normal- PT referral recommended   Upper Body Dressing   Level of [place]: Dress Upper Body minimum assist (75% patients effort)   Physical Assist/Nonphysical Assist: Dress Upper Body 1 person assist   Lower Body Dressing   Level of [place]: Dress Lower Body contact guard    Physical Assist/Nonphysical Assist: Dress Lower Body 1 person assist   Toileting   Level of [place]: Toilet minimum assist (75% patients effort)   Physical Assist/Nonphysical Assist: Toilet 1 person assist   Grooming   Level of [place]: Grooming contact guard   Physical Assist/Nonphysical Assist: Grooming 1 person assist   Instrumental Activities of Daily Living (IADL)   Previous Responsibilities medication management;finances;driving   IADL Comments Pt has support of spouse for IADLs as needed   Activities of Daily Living Analysis   Impairments Contributing to Impaired Activities of Daily Living balance impaired;strength decreased   ADL Comments Pt presents to OT/CR below baseline level of functioning in areas of self cares including bathing, dressing, grooming, toileting   General Therapy Interventions   Planned Therapy Interventions ADL retraining;IADL retraining;strengthening;transfer training;home program guidelines;progressive activity/exercise;risk factor education   Clinical Impression   Criteria for Skilled Therapeutic Interventions Met yes, treatment indicated   OT Diagnosis Impaired ADLs, IADLs and mobility   Influenced by the following impairments Decreased strength and functional activity tolerance, impaired balance, cardiac risk factors   Assessment of Occupational Performance 5 or more Performance Deficits   Identified Performance Deficits Bathing, dressing, grooming, toileting, homemaking, transfers   Clinical Decision Making (Complexity) Moderate complexity   Therapy Frequency daily   Predicted Duration of Therapy Intervention (days/wks) 4 days   Anticipated Discharge Disposition Home with Assist;Home with Home Therapy;Transitional Care Facility   Risks and Benefits of Treatment have been explained. Yes   Patient, Family & other staff in agreement with plan of care Yes   Clinical Impression Comments Pt would benefit from skilled OT/CR to maximize safety and indep in all ADLs, IADLS and  "mobility tasks due to current deficits impacting function, CV strengthening.    [facility] AM-PAC  \"6 Clicks\" Daily Activity Inpatient Short Form   1. Putting on and taking off regular lower body clothing? 3 - A Little   2. Bathing (including washing, rinsing, drying)? 3 - A Little   3. Toileting, which includes using toilet, bedpan or urinal? 3 - A Little   4. Putting on and taking off regular upper body clothing? 3 - A Little   5. Taking care of personal grooming such as brushing teeth? 3 - A Little   6. Eating meals? 4 - None   Daily Activity Raw Score (Score out of 24.Lower scores equate to lower levels of function) 19   Total Evaluation Time   Total Evaluation Time (Minutes) 10[JL1.1]        Revision History        User Key Date/Time User Provider Type Action    > JL1.1 1/21/2018  3:03 PM Desi Araujo OT Occupational Therapist Sign            Progress Notes by Flora Plummer RT at 1/21/2018  3:51 AM     Author:  Flora Plummer RT Service:  (none) Author Type:  Respiratory Therapist    Filed:  1/21/2018  3:51 AM Date of Service:  1/21/2018  3:51 AM Creation Time:  1/21/2018  3:51 AM    Status:  Signed :  Flora Plummer RT (Respiratory Therapist)         Placed Pt on CPAP, but only tolerated for short period on time. Refused further use due to discomfort.     1/21/2018  Flora Plummer RRT[AS1.1]       Revision History        User Key Date/Time User Provider Type Action    > AS1.1 1/21/2018  3:51 AM Flora Plummer RT Respiratory Therapist Sign            "

## 2018-01-19 NOTE — CONSULTS
CARDIO  SEE DICTATION   Has thrombocytopenia been w/u ?? Low Hb--any reason to think MM,amyloid to explain BM and chf?  Stop coumadin switch to heparin for possible right heart cath Monday and any possible biopsy  Repeat echo with attention to septum (see dictation)  Lasix  Poss entresto

## 2018-01-19 NOTE — IP AVS SNAPSHOT
"[facility] CARDIAC SPECIALTY CARE: [phone]                                              INTERAGENCY TRANSFER FORM - LAB / IMAGING / EKG / EMG RESULTS   2018                    Hospital Admission Date: 2018  YINKA GONZALEZ   : 1931  Sex: Male        Attending Provider: Brooks Andres MD     Allergies:  No Known Allergies    Infection:  None   Service:  HOSPITALIST    Ht:  1.854 m (6' 1\")   Wt:  70.1 kg (154 lb 9.6 oz)   Admission Wt:  72.6 kg (160 lb)    BMI:  20.4 kg/m 2   BSA:  1.9 m 2            Patient PCP Information     Provider PCP Type    Clint Pizarro MD General         Lab Results - 3 Days      Blood Morphology Pathologist Review [[id number]]  Resulted: 18 1332, Result status: Final result    Ordering provider: Dorothy Segal MD  18 0000 Resulting lab: [facility]    Specimen Information    Type Source Collected On   Blood  18 0601          Components       Value Reference Range Flag Lab   Copath Report --      Result:         Patient Name: YINKA GONZALEZ  MR#: [id number]  Specimen #: [id number]  Collected: 2018  Received: 2018  Reported: 2018 13:28  Ordering Phy(s): DOROTHY SEGAL    For improved result formatting, select 'View Enhanced Report Format' under   Linked Documents section.    TEST(S):  Peripheral Smear Morphology    FINAL DIAGNOSIS:  Peripheral blood  - Slight normocytic normochromic anemia  - Moderate thrombocytopenia    COMMENT:  Causes for normochromic normocytic anemia typically include chronic   infectious/inflammatory conditions,  hypersplenomegaly, renal disease, medication reactions, nutritional   deficiencies, and acute hemorrhage.  Thrombocytopenia is present. Some common causes thrombocytopenia include   infection, medication, alcohol,  nutritional deficiency, immune-mediated mechanisms, and splenic   sequestration. Clinical correlation is  required.    Electronically signed out by:    Josey Vargas, " M.D.    CLINICAL HISTORY:  86-year-old man with pancytopenia and conc eddie about amyloidosis    PERIPHERAL BLOOD DATA:    PERIPHERAL BLOOD DATA  Patient Value (Reference Range >18 year old male)  6.35 .......WBC (4.0-11.0 x 10*9/L)  4.00 .......RBC (4.4-5.9 x 10*12/L)  12.6 .......HGB (13.3-17.7 g/dL)  38.5 .......HCT (40.0-53.0 %)  96.3 .....MCV (78-100fL)  31.5 .......MCH (26.5-33.0 pg)  32.7 .......MCHC (31.5-36.5 g/dL)  19.0 .......RDW (10.0-15.0 %)  53 .......PLT (150-450 x 10*9/L)  1.23 .......RETIC (0.5-2.0%)    PERIPHERAL BLOOD DIFFERENTIAL  (Reference ranges >18 year old)  Automated differential:    Percent  71.8....Neutrophils, segmented and bands (40 - 75)  10.1....Lymphocytes (20 - 48)  17.6....Monocytes (0 - 12)  0.3....Eosinophils (0 - 6)  0.2....Basophils (0 - 2)  0.0....Immature granulocytes (0 - 0.4)    Absolute  4.56....Neutrophils, segmented and bands  (1.6 - 8.3 x 10*9/L)  0.64....Lymphocytes  (0.8 - 5.3 x 10*9/L)  1.12....Monocytes  (0 -1.3 x 10*9/L)  0.02....Eosinophils  (0 - 0.7 x 10*9/L)  0.01....Basophils  (0 - 0.2 x 10*9/L)  0.00 ....Immature granulocytes (0 - 0.03)    PERIPHERAL MORPHOLOGY:    ERYTHROCYTES: The red blood cells appear normochromic, although very rare   hypochromic cells are noted.  Poikilocytosis includes there rare ovalocytes and nonspecific   poikilocytes. Polychromasia is not increased.  Rouleaux formation is not increased.    LEUKOCYTES: Leukocytes appear to be in the normal range. A discrete   population of atypical lymphocytes  observed.    PLATELETS: Platelet clumping is not observed. The morphology of the   platelets is normal.    CPT Codes:  A: 77129-OYKT    TESTING LAB LOCATION:  [address]  [id number] [phone]    COLLECTION SITE:  Client:  [facility]  Location:  [facility] (S)              Free kappa and lambda light chains, urine [[id number]]  Resulted: 01/23/18 0929, Result status: In process     Ordering provider: Stephan Quinones MD  01/23/18 0909 Resulting lab: [facility]    Specimen Information    Type Source Collected On   Urine  01/23/18 0800            Protein immunofixation urine [[id number]]  Resulted: 01/23/18 0922, Result status: In process    Ordering provider: Stephan Quinones MD  01/23/18 0916 Resulting lab: [facility]    Specimen Information    Type Source Collected On   Urine  01/23/18 0800            Protein electrophoresis timed urine [[id number]]  Resulted: 01/23/18 0838, Result status: In process    Ordering provider: Lisandro Rubio MD  01/20/18 1430 Resulting lab: [facility]    Specimen Information    Type Source Collected On   Urine Urine clean catch 01/23/18 0800            CBC with platelets differential [[id number]] (Abnormal)  Resulted: 01/23/18 0711, Result status: Final result    Ordering provider: Casie Aguila MD  01/23/18 0000 Resulting lab: [facility]    Specimen Information    Type Source Collected On   Blood  01/23/18 0630          Components       Value Reference Range Flag Lab   WBC 6.4 4.0 - 11.0 10e9/L  FrStHsLb   RBC Count 4.00 4.4 - 5.9 10e12/L L FrStHsLb   Hemoglobin 12.6 13.3 - 17.7 g/dL L FrStHsLb   Hematocrit 38.5 40.0 - 53.0 % L FrStHsLb   MCV 96 78 - 100 fl  FrStHsLb   MCH 31.5 26.5 - 33.0 pg  FrStHsLb   MCHC 32.7 31.5 - 36.5 g/dL  FrStHsLb   RDW 19.0 10.0 - 15.0 % H FrStHsLb   Platelet Count 53 150 - 450 10e9/L L FrStHsLb   Diff Method Automated Method   FrStHsLb   % Neutrophils 71.8 %  FrStHsLb   % Lymphocytes 10.1 %  FrStHsLb   % Monocytes 17.6 %  FrStHsLb   % Eosinophils 0.3 %  FrStHsLb   % Basophils 0.2 %  FrStHsLb   % Immature Granulocytes 0.0 %  FrStHsLb   Nucleated RBCs 0 0 /100  FrStHsLb   Absolute Neutrophil 4.6 1.6 - 8.3 10e9/L  FrStHsLb   Absolute Lymphocytes 0.6 0.8 - 5.3 10e9/L L FrStHsLb   Absolute Monocytes 1.1 0.0 - 1.3 10e9/L  FrStHsLb   Absolute Eosinophils 0.0 0.0 - 0.7 10e9/L  FrStHsLb   Absolute Basophils 0.0 0.0 - 0.2 10e9/L   FrStHsLb   Abs Immature Granulocytes 0.0 0 - 0.4 10e9/L  FrStHsLb   Absolute Nucleated RBC 0.0   FrStHsLb            INR [[id number]] (Abnormal)  Resulted: 01/23/18 0703, Result status: Final result    Ordering provider: Deidra Joiner MD  01/23/18 0000 Resulting lab: [facility]    Specimen Information    Type Source Collected On   Blood  01/23/18 0630          Components       Value Reference Range Flag Lab   INR 1.19 0.86 - 1.14 H FrStHsLb            Reticulocyte Count [[id number]]  Resulted: 01/23/18 0651, Result status: Final result    Ordering provider: Casie Aguila MD  01/23/18 0000 Resulting lab: [facility]    Specimen Information    Type Source Collected On   Blood  01/23/18 0630          Components       Value Reference Range Flag Lab   % Retic 1.2 0.5 - 2.0 %  FrStHsLb   Absolute Retic 49.2 25 - 95 10e9/L  FrStHsLb            Protein Immunofixation Serum [[id number]] (Abnormal)  Resulted: 01/22/18 1725, Result status: Final result    Ordering provider: Lisandro Rubio MD  01/21/18 0000 Resulting lab: [facility]    Specimen Information    Type Source Collected On   Blood  01/21/18 0603          Components       Value Reference Range Flag Lab   Immunofixation ELP (Note)   51   Comment:         No monoclonal protein seen on immunofixation.  Pathological significance requires clinical correlation.   MARTHA Live M.D., Pathologist.     IGG 1090 695 - 1620 mg/dL  51    70 - 380 mg/dL  51   IGM 59 60 - 265 mg/dL L 51            Protein electrophoresis [[id number]] (Abnormal)  Resulted: 01/22/18 1624, Result status: Final result    Ordering provider: Lisandro Rubio MD  01/21/18 0000 Resulting lab: [facility]    Specimen Information    Type Source Collected On   Blood  01/21/18 0603          Components       Value Reference Range Flag Lab   Albumin Fraction 3.3 3.7 - 5.1 g/dL L 51   Alpha 1 Fraction  0.3 0.2 - 0.4 g/dL  51   Alpha 2 Fraction 0.5 0.5 - 0.9 g/dL  51   Beta Fraction 0.6 0.6 - 1.0 g/dL  51   Gamma Fraction 1.2 0.7 - 1.6 g/dL  51   Monoclonal Peak 0.0 0.0 g/dL  51   ELP Interpretation: --   51   Result:         Hypoalbuminemia. No monoclonal protein seen. Pathologic significance requires clinical   correlation.  MARTHA Live M.D., Pathologist.               Beta 2 microglobulin [[id number]] (Abnormal)  Resulted: 01/22/18 1359, Result status: Final result    Ordering provider: Lisandro Rubio MD  01/21/18 0000 Resulting lab: [facility]    Specimen Information    Type Source Collected On   Blood  01/21/18 0603          Components       Value Reference Range Flag Lab   Beta-2-Microglobulin 5.5 <2.3 mg/L H 51            Kappa and lambda light chain [[id number]] (Abnormal)  Resulted: 01/22/18 1057, Result status: Final result    Ordering provider: Lisandro Rubio MD  01/21/18 1036 Resulting lab: [facility]    Specimen Information    Type Source Collected On   Blood  01/21/18 1727          Components       Value Reference Range Flag Lab   Kappa Free Lt Chain 5.69 0.33 - 1.94 mg/dL H 51   Lambda Free Lt Chain 3.22 0.57 - 2.63 mg/dL H 51   Kappa Lambda Ratio 1.77 0.26 - 1.65 H 51            Basic metabolic panel [[id number]] (Abnormal)  Resulted: 01/22/18 0405, Result status: Final result    Ordering provider: Deidra Joiner MD  01/22/18 0000 Resulting lab: [facility]    Specimen Information    Type Source Collected On   Blood  01/22/18 0315          Components       Value Reference Range Flag Lab   Sodium 139 133 - 144 mmol/L  FrStHsLb   Potassium 3.6 3.4 - 5.3 mmol/L  FrStHsLb   Chloride 101 94 - 109 mmol/L  FrStHsLb   Carbon Dioxide 33 20 - 32 mmol/L H FrStHsLb   Anion Gap 5 3 - 14 mmol/L  FrStHsLb   Glucose 91 70 - 99 mg/dL  FrStHsLb   Urea Nitrogen 25 7 - 30 mg/dL  FrStHsLb   Creatinine 1.11 0.66 - 1.25 mg/dL   FrStHsLb   GFR Estimate 63 >60 mL/min/1.7m2  FrStHsLb   Comment:  Non  GFR Calc   GFR Estimate If Black 76 >60 mL/min/1.7m2  FrStHsLb   Comment:  African American GFR Calc   Calcium 8.0 8.5 - 10.1 mg/dL L FrStHsLb            CBC with platelets [[id number]] (Abnormal)  Resulted: 01/22/18 0404, Result status: Final result    Ordering provider: Deidra Joiner MD  01/22/18 0000 Resulting lab: [facility]    Specimen Information    Type Source Collected On   Blood  01/22/18 0315          Components       Value Reference Range Flag Lab   WBC 7.3 4.0 - 11.0 10e9/L  FrStHsLb   RBC Count 3.86 4.4 - 5.9 10e12/L L FrStHsLb   Hemoglobin 12.0 13.3 - 17.7 g/dL L FrStHsLb   Hematocrit 36.8 40.0 - 53.0 % L FrStHsLb   MCV 95 78 - 100 fl  FrStHsLb   MCH 31.1 26.5 - 33.0 pg  FrStHsLb   MCHC 32.6 31.5 - 36.5 g/dL  FrStHsLb   RDW 19.0 10.0 - 15.0 % H FrStHsLb   Platelet Count 44 150 - 450 10e9/L LL FrStHsLb   Comment:  .   Previous critical documented              INR [[id number]] (Abnormal)  Resulted: 01/22/18 0402, Result status: Final result    Ordering provider: Deidra Joiner MD  01/22/18 0000 Resulting lab: [facility]    Specimen Information    Type Source Collected On   Blood  01/22/18 0315          Components       Value Reference Range Flag Lab   INR 1.39 0.86 - 1.14 H FrStHsLb            Heparin Xa (10a) Level [[id number]]  Resulted: 01/22/18 0402, Result status: Final result    Ordering provider: Winnie Mathias RPH  01/22/18 0000 Resulting lab: [facility]    Specimen Information    Type Source Collected On   Blood  01/22/18 0315          Components       Value Reference Range Flag Lab   Heparin 10A Level 0.20 IU/mL  FrStHsLb   Comment:         Therapeutic Range:    UFH:   0.15-0.35 IU/mL for low             intensity dosing           0.30-0.70 IU/mL for high             intensity dosing for             DVT and PE    Enoxaparin: If  administered             once daily with dose             1.5mg/k.0-2.0 IU/mL                If administered             twice daily with dose             1mg/k.60-1.0 IU/mL              Heparin 10a Level [[id number]]  Resulted: 18 1923, Result status: Final result    Ordering provider: Deidra Joiner MD  18 1130 Resulting lab: [facility]    Specimen Information    Type Source Collected On   Blood  18 1727          Components       Value Reference Range Flag Lab   Heparin 10A Level <0.10 IU/mL  FrStHsLb   Comment:         Therapeutic Range:    UFH:   0.15-0.35 IU/mL for low             intensity dosing           0.30-0.70 IU/mL for high             intensity dosing for             DVT and PE    Enoxaparin: If administered             once daily with dose             1.5mg/k.0-2.0 IU/mL                If administered             twice daily with dose             1mg/k.60-1.0 IU/mL              Basic metabolic panel [[id number]] (Abnormal)  Resulted: 18 1006, Result status: Final result    Ordering provider: Deidra Joiner MD  18 0814 Resulting lab: [facility]    Specimen Information    Type Source Collected On   Blood  18 0920          Components       Value Reference Range Flag Lab   Sodium 139 133 - 144 mmol/L  FrStHsLb   Potassium 3.8 3.4 - 5.3 mmol/L  FrStHsLb   Chloride 101 94 - 109 mmol/L  FrStHsLb   Carbon Dioxide 30 20 - 32 mmol/L  FrStHsLb   Anion Gap 8 3 - 14 mmol/L  FrStHsLb   Glucose 206 70 - 99 mg/dL H FrStHsLb   Urea Nitrogen 25 7 - 30 mg/dL  FrStHsLb   Creatinine 1.13 0.66 - 1.25 mg/dL  FrStHsLb   GFR Estimate 61 >60 mL/min/1.7m2  FrStHsLb   Comment:  Non  GFR Calc   GFR Estimate If Black 74 >60 mL/min/1.7m2  FrStHsLb   Comment:  African American GFR Calc   Calcium 7.8 8.5 - 10.1 mg/dL L FrStHsLb            INR [[id number]] (Abnormal)  Resulted: 18 0959, Result status: Final  result    Ordering provider: Winnie Mathias [facility]  01/21/18 0810 Resulting lab: [facility]    Specimen Information    Type Source Collected On   Blood  01/21/18 0920          Components       Value Reference Range Flag Lab   INR 1.62 0.86 - 1.14 H FrStHsLb            Basic metabolic panel [[id number]] (Abnormal)  Resulted: 01/20/18 0710, Result status: Final result    Ordering provider: Brooks Andres MD  01/20/18 0000 Resulting lab: [facility]    Specimen Information    Type Source Collected On   Blood  01/20/18 0626          Components       Value Reference Range Flag Lab   Sodium 140 133 - 144 mmol/L  FrStHsLb   Potassium 3.5 3.4 - 5.3 mmol/L  FrStHsLb   Chloride 105 94 - 109 mmol/L  FrStHsLb   Carbon Dioxide 29 20 - 32 mmol/L  FrStHsLb   Anion Gap 6 3 - 14 mmol/L  FrStHsLb   Glucose 97 70 - 99 mg/dL  FrStHsLb   Urea Nitrogen 28 7 - 30 mg/dL  FrStHsLb   Creatinine 1.27 0.66 - 1.25 mg/dL H FrStHsLb   GFR Estimate 54 >60 mL/min/1.7m2 L FrStHsLb   Comment:  Non  GFR Calc   GFR Estimate If Black 65 >60 mL/min/1.7m2  FrStHsLb   Comment:  African American GFR Calc   Calcium 8.3 8.5 - 10.1 mg/dL L FrStHsLb            Ammonia [[id number]]  Resulted: 01/20/18 0706, Result status: Final result    Ordering provider: Raymundo Proctor MD  01/20/18 0000 Resulting lab: [facility]    Specimen Information    Type Source Collected On   Blood  01/20/18 0626          Components       Value Reference Range Flag Lab   Ammonia 40 10 - 50 umol/L  FrStHsLb            CBC with platelets [[id number]] (Abnormal)  Resulted: 01/20/18 0657, Result status: Final result    Ordering provider: Brooks Andres MD  01/20/18 0000 Resulting lab: [facility]    Specimen Information    Type Source Collected On   Blood  01/20/18 0626          Components       Value Reference Range Flag Lab   WBC 8.3 4.0 - 11.0 10e9/L  FrStHsLb   RBC Count 3.75 4.4 - 5.9 10e12/L L  "FrStHsLb   Hemoglobin 11.8 13.3 - 17.7 g/dL L FrStHsLb   Hematocrit 35.8 40.0 - 53.0 % L FrStHsLb   MCV 96 78 - 100 fl  FrStHsLb   MCH 31.5 26.5 - 33.0 pg  FrStHsLb   MCHC 33.0 31.5 - 36.5 g/dL  FrStHsLb   RDW 19.1 10.0 - 15.0 % H FrStHsLb   Platelet Count 44 150 - 450 10e9/L LL FrStHsLb   Comment:         This result has been called to . by Zhen Askew on 01 20 2018 at 0657,   and has been read back.   Previous critical documented              INR [[id number]] (Abnormal)  Resulted: 01/20/18 0657, Result status: Final result    Ordering provider: Brooks Andres MD  01/20/18 0000 Resulting lab: [facility]    Specimen Information    Type Source Collected On   Blood  01/20/18 0626          Components       Value Reference Range Flag Lab   INR 2.31 0.86 - 1.14 H FrStHsLb            Testing Performed By     Lab - Abbreviation Name Director Address Valid Date Range    14 - FrStHsLb [facility] Unknown [address] [place] [address] 05/08/15 1057 - Present    45 - XPC108 [facility] Unknown Unknown 01/28/02 0000 - Present    51 - Unknown [facility] Unknown [address] [facility] [address] 12/31/14 1010 - Present    88 - Unknown [facility] Unknown Unknown 10/30/02 0000 - Present            Unresulted Labs (24h ago through future)    Start       Ordered    01/23/18 0000  Basic metabolic panel  Routine      01/23/18 0922    01/22/18 0600  INR  DAILY,   Routine      01/21/18 0800    Unscheduled  Potassium  CONDITIONAL X 1 STAT,   STAT     Comments:  RN to release order IF the pronounced diuretic diuresics response is greater than 1000 mL or if there is new onset of arrhythmia.    01/19/18 0514    Unscheduled  Potassium  (Potassium Replacement - \"Standard\" - For K levels less than 3.4 mmol/L - UU,UR,UA,RH,SH,PH,WY )  CONDITIONAL (SPECIFY),   Routine     Comments:  Obtain Potassium Level for these conditions:  *IF no potassium result within 24 hours " "before initiation of order set, draw potassium level with next lab collect.    *2 HOURS AFTER last IV potassium replacement dose and 4 hours after an oral replacement dose.  *Next morning after potassium dose.     Repeat Potassium Replacement if necessary.    18    Unscheduled  Magnesium  (Magnesium Replacement -  Adult - \"Standard\" - Replacement for all levels less than 1.6 mg/dL )  CONDITIONAL (SPECIFY),   Routine     Comments:  Obtain Magnesium Level for these conditions:  *IF no magnesium result within 24 hrs before initiation of order set, draw magnesium level with next lab collect.    *2 HOURS AFTER last magnesium replacement dose when magnesium replacement given for level less than 1.6   *Next morning after magnesium dose.     Repeat Magnesium Replacement if necessary.    18         ECG/EMG Results      Echocardiogram Complete [[id number]]  Resulted: 18 1346, Result status: Edited Result - FINAL    Ordering provider: Adolfo Schumacher MD  18 1139 Resulted by: Long Pugh MD    Performed: 18 1335 - 18 1419 Resulting lab: RADIOLOGY RESULTS    Narrative:       [id number]  [facility]  [id number]  [id number]^ENDY^ADOLFO^ESTEFANY           [facility]  Echocardiography Laboratory  [address] [place] [address]        Name: YINKA GONZALEZ  MRN: [id number]  : 1931  Study Date: 2018 01:46 PM  Age: 86 yrs  Gender: Male  Patient Location: [facility]  Reason For Study: CHF  Ordering Physician: ADOLFO SCHUMACHER  Referring Physician: ADOLFO SCHUMACHER  Performed By: KEERTHI Smith     BSA: 2.0 m2  Height: 73 in  Weight: 162 lb  HR: 103  BP: 116/73 mmHg  _____________________________________________________________________________  __        Procedure  Complete Echo Adult.  _____________________________________________________________________________  __        Interpretation Summary     Left ventricular systolic function is moderately reduced.The " visual ejection  fraction is estimated at 40%.There is moderate global hypokinesia of the left  ventricle.  The right ventricle is severely dilated.The right ventricular systolic  function is moderately reduced.  The right atrium is severely dilated.The left atrium is moderately dilated.  There is moderate to mod-severe (2-3+) tricuspid regurgitation.There is  moderate (2+) mitral regurgitation.     On direct comparision to echo images dated 12/08/2017 no significant chnages.  _____________________________________________________________________________  __        Left Ventricle  The left ventricle is normal in size. There is mild concentric left  ventricular hypertrophy. Left ventricular systolic function is moderately  reduced. The visual ejection fraction is estimated at 40%. There is moderate  global hypokinesia of the left ventricle.     Right Ventricle  The right ventricle is severely dilated. The right ventricular systolic  function is moderately reduced.     Atria  The left atrium is moderately dilated. The right atrium is severely dilated.  There is no color Doppler evidence of an atrial shunt.     Mitral Valve  There is moderate (2+) mitral regurgitation.        Tricuspid Valve  There is moderate to mod-severe (2-3+) tricuspid regurgitation. The right  ventricular systolic pressure is approximated at 28.8 mmHg plus the right  atrial pressure.     Aortic Valve  The aortic valve is trileaflet. There is trace aortic regurgitation. No aortic  stenosis is present.     Pulmonic Valve  There is trace pulmonic valvular regurgitation. There is no pulmonic valvular  stenosis.     Vessels  The aortic root is normal size. Normal size ascending aorta. Dilation of the  inferior vena cava is present with normal respiratory variation in diameter.     Pericardium  There is no pericardial effusion. Moderate left pleural effusion.     _____________________________________________________________________________  __  MMode/2D  Measurements & Calculations  RVDd: 5.0 cm  IVSd: 1.2 cm  LVIDd: 4.1 cm  LVPWd: 1.2 cm  EDV(Teich): 75.0 ml  LV mass(C)d: 172.9 grams  LV mass(C)dI: 87.9 grams/m2     Ao root diam: 3.5 cm  LA dimension: 5.4 cm  LA/Ao: 1.5  LVOT diam: 2.1 cm  LVOT area: 3.5 cm2  LA Volume (BP): 89.1 ml  LA Volume Index (BP): 45.2 ml/m2  RWT: 0.60  TAPSE: 1.2 cm        Doppler Measurements & Calculations  MV E max paul: 59.0 cm/sec  MV dec time: 0.21 sec     TR max paul: 268.4 cm/sec  TR max P.8 mmHg  E/E' av.5  Lateral E/e': 4.7  Medial E/e': 8.4           _____________________________________________________________________________  __           Report approved by: Tremayne Khan 2018 03:25 PM       1    Type Source Collected On     18 1346          View Image (below)              Encounter-Level Documents:     There are no encounter-level documents.      Order-Level Documents:     There are no order-level documents.

## 2018-01-19 NOTE — IP AVS SNAPSHOT
[facility] CARDIAC SPECIALTY CARE: [phone]                                              INTERAGENCY TRANSFER FORM - PHYSICIAN ORDERS   1/19/2018                   CHF Orders/Instructions for Nursing Home/TCU     CHF Orders and Instructions for Nursing Home/TCU  1.  Have the patient get a scale to put in their room and then use at home.  2.  Post a weight chart or calendar in room next to the scale.  3.  Ask patient to weigh themselves daily first thing when they get up in the morning, before breakfast, with only their night gown on and record on the chart/calendar (if able).  4.  Record NH/TCU admission weight.  5.  Record daily am weight, with same clothes every day. If patient is in a wheelchair, note weight of wheel chair.   6.  Provide patient CHF education booklet and review with patient and family.  7.  Vital signs twice daily and prn. Check oxygen sats prn dyspnea.  8.  Apply Oxygen 2 or 3 liters/min by nasal cannula prn O2 Sat < 90%.   9.  Notify NP/MD:   1. If HR <50 bpm, SBP <90mmHg, or O2 Sat < 90%.   2. If weight is up more than 2 lbs. in one day or 5 lbs. in one week from their admission weight OR if patient has signs or symptoms of CHF, such as worsening dyspnea or leg edema.  10.  ACE-wraps or support stockings (knee high) to bilateral lower extremities prn edema. On in am and off at HS.   11.  Diet: 2 gm sodium cardiac diet, with additional diet modifications if ordered elsewhere.  12.  Fluid restriction:  1500cc/day, if hospital discharge sodium < 134.  13.  Dietician: CHF education  14.  PT/OT to Evaluate and Treat for deconditioning and CHF.  12.  Activity as tolerated   13.  PT to notify RN if wheelchair equipment has been modified (change in weight should also be noted on the patients weight calendar).    Heart Failure Follow-up Appointments and Instructions for TCU Discharge   _____An appointment to enroll the patient into [facility].O.R.E. Clinic may already have been made (see section  " Your next appointments already scheduled ).  If there is a question about the appointment or you would like to speak with a C.O.R.E. nurse, please call one of the following locations:     [facility]):                                                    [phone]    [facility] ([place]):                                                   [phone]    [facility] ([place]):                   [phone]    _____If no C.O.R.E. appointment was made, please call one of the locations and schedule:    NP/PA appointment 14 days after hospital discharge if still in TCU    NP/PA appointment for 3-5 days after discharge from TCU    _____Have patient bring their med list and daily vitals/weight chart with them to appointment.    _____At discharge from the TCU give the patient the \"General Recommendations to Control Heart Failure When You Get Home  information for them to take home and their scale.  _____Upon discharge from the TCU reinforce the importance of home management of CHF including follow up in [facility] Clinic.  What is the C.O.R.E. Clinic?  Cardiomyopathy  Optimization  Rehabilitation  Education  The [facility].R.E. Clinic is a heart failure specialty clinic within [facility].  It is an outpatient disease management program that is based on a phase-by-phase approach, which is tailored to each patient s individual needs.  The cardiologist, nurse practitioner, physician assistant and nurses provide an ongoing outpatient care and treatment plan that guides heart failure and cardiomyopathy patients from evaluation and education to stabilization. This team works with the current primary care doctor and cardiologist to help patients:    Avoid hospitalizations    Slow the progression of the disease    Improve length and quality of life    Know who and when to call if heart failure symptoms appear    Receive easy access to quality health care and " "advice    Better understand your condition and treatment    Decrease the tremendous cost burden of heart failure care    Detect future heart problems before they become life threatening         Hospital Admission Date: 2018  YINKA GONZALEZ   : 1931  Sex: Male        Attending Provider: Brooks Andres MD     Allergies:  No Known Allergies    Infection:  None   Service:  HOSPITALIST    Ht:  1.854 m (6' 1\")   Wt:  70.1 kg (154 lb 9.6 oz)   Admission Wt:  72.6 kg (160 lb)    BMI:  20.4 kg/m 2   BSA:  1.9 m 2            Patient PCP Information     Provider PCP Type    Clint Pizarro MD General      ED Clinical Impression     Diagnosis Description Comment Added By Time Added    Chronic atrial fibrillation (H) [I48.2] Chronic atrial fibrillation (H) [I48.2]  Gulshan Gibbons MD 2018  3:02 AM    Shortness of breath [R06.02] Shortness of breath [R06.02]  Gulshan Gibbons MD 2018  3:02 AM    Acute congestive heart failure, unspecified congestive heart failure type (H) [I50.9] Acute congestive heart failure, unspecified congestive heart failure type (H) [I50.9]  Gulshan Gibbons MD 2018  3:02 AM    Thrombocytopenia (H) [D69.6] Thrombocytopenia (H) [D69.6]  Gulshan Gibbons MD 2018  3:04 AM    Supratherapeutic INR [R79.1] Supratherapeutic INR [R79.1]  Gulshan Gibbons MD 2018  3:04 AM      Hospital Problems as of 2018              Priority Class Noted POA    Atrial fibrillation (H) Medium  2011 Yes      Non-Hospital Problems as of 2018              Priority Class Noted    Essential hypertension Medium  2011    Colon polyps Medium  2012    Elevated PSA Medium  Unknown    BCC (basal cell carcinoma), face Medium  8/10/2012    Incarcerated hernia Medium  2014    Cervical radiculopathy Medium  2014    Neck pain Medium  2014    Hematoma Medium  10/4/2017    Weakness Medium  2017    Generalized weakness Medium  2017    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) Medium  1/23/2018      Code Status History     Date Active Date Inactive Code Status Order ID Comments User Context    1/23/2018  8:08 AM  Full Code [id number]  Raymundo Proctor MD Outpatient    1/19/2018  5:14 AM 1/23/2018  8:08 AM Full Code [id number]  Brooks Andres MD Inpatient    12/8/2017  4:47 PM 1/19/2018  5:14 AM Full Code [id number]  Bharat Cortés PA Outpatient    12/7/2017  4:26 PM 12/8/2017  4:47 PM Full Code [id number]  Rick Ambrosio MD Inpatient    11/18/2017  4:29 PM 12/7/2017  4:26 PM Full Code [id number]  Brooks Andres MD Outpatient    11/17/2017  1:34 PM 11/18/2017  4:29 PM Full Code [id number]  Carter Whiting PA-C Inpatient    10/5/2017  9:59 AM 11/17/2017  1:34 PM Full Code [id number]  Carter Whiting PA-C Outpatient    10/4/2017 10:04 PM 10/5/2017  9:59 AM Full Code [id number]  Adolfo Meadows MD Inpatient    5/6/2014  9:44 AM 10/4/2017 10:04 PM Full Code [id number]  Carter Whiting PA-C Outpatient    4/30/2014 11:09 PM 5/6/2014  9:44 AM Full Code [id number]  Carter Whiting PA-C Inpatient         Medication Review      START taking        Dose / Directions Comments    melatonin 1 MG Tabs tablet   Used for:  Generalized weakness        Dose:  1 mg   Take 1 tablet (1 mg) by mouth nightly as needed for sleep   Quantity:  15 tablet   Refills:  0        potassium chloride SA 20 MEQ CR tablet   Commonly known as:  K-DUR/KLOR-CON M   Used for:  Acute congestive heart failure, unspecified congestive heart failure type (H)        Dose:  20 mEq   Take 1 tablet (20 mEq) by mouth daily   Quantity:  30 tablet   Refills:  0          CONTINUE these medications which have NOT CHANGED        Dose / Directions Comments    CYANOCOBALAMIN PO        Dose:  500 mcg   Take 500 mcg by mouth daily   Refills:  0        furosemide 20 MG tablet   Commonly known as:  LASIX   Used for:  Atrial fibrillation with  rapid ventricular response (H), Acute on chronic systolic congestive heart failure (H)        Dose:  20 mg   Take 1 tablet (20 mg) by mouth as needed   Quantity:  30 tablet   Refills:  11        lisinopril 5 MG tablet   Commonly known as:  PRINIVIL/ZESTRIL   Used for:  Essential hypertension, Acute congestive heart failure, unspecified congestive heart failure type (H)        Dose:  5 mg   Take 1 tablet (5 mg) by mouth daily   Quantity:  30 tablet   Refills:  0        metoprolol succinate 100 MG 24 hr tablet   Commonly known as:  TOPROL-XL   Used for:  Atrial fibrillation with rapid ventricular response (H)        Dose:  100 mg   Take 1 tablet (100 mg) by mouth 2 times daily   Quantity:  180 tablet   Refills:  3        * Multi-vitamin Tabs tablet        Dose:  1 tablet   Take 1 tablet by mouth daily   Refills:  0        * ICAPS AREDS FORMULA PO        Dose:  1 tablet   Take 1 tablet by mouth 2 times daily   Refills:  0        vitamin B complex with vitamin C Tabs tablet        Dose:  1 tablet   Take 1 tablet by mouth daily   Refills:  0        VITAMIN C PO        Dose:  500 mg   Take 500 mg by mouth daily   Refills:  0        VITAMIN D (CHOLECALCIFEROL) PO        Dose:  2000 Units   Take 2,000 Units by mouth daily   Refills:  0        warfarin 2.5 MG tablet   Commonly known as:  COUMADIN        Dose:  2.5-5 mg   Take 2.5-5 mg by mouth - For A-fib INR goal 2-3 5 mg on Monday, Wednesday and Friday 2.5 mg on all other days   Refills:  0        * Notice:  This list has 2 medication(s) that are the same as other medications prescribed for you. Read the directions carefully, and ask your doctor or other care provider to review them with you.              Further instructions from your care team       Plan:     Nursing to notify the Provider(s) and re-consult the [facility] Nurse if wound(s) deteriorate(s) or if the wound care plan needs reevaluation.       Plan of care for wound located on RLE: Daily:  1.  Cleanse and moisturize  intact skin on RLE with mild cleanser/lotion.   2.  Cleanse wound with MicroKlenz, pat dry.  3.  Apply piece of dry Aquacel Ag to wound, cutting to fit.    4.  Cover with gauze and secure with Kerlix.  Label with time/date/initials.   5.  Apply Tubigrip stocking in double layer, evenly from base of toes to just below knee crease.  Ensure no wrinkles.  Elevate legs and float heels with pillows.       Summary of Visit     Reason for your hospital stay       Dr. Paul: You came to the hospital with shortness of breath and you have systolic heart failure. You were treated with Lisinopril and Furosemide, and that regimen helped a lot and you diuresed just shy of 5 liters and lost 13 lbs of extra fluid. Your atrial fibrillation is stable.  Your right heart cath showed the pressures in your heart were well controlled with the lisinopril and furosemide and Metoprolol. You should continue those medicines and if anyone wants to stop them, you contact Dr. Pizarro immediately.  You also have evidence of pulmonary hypertension and that is likely to be due to your obstructive sleep apnea. It is very important that you contact your former sleep doctor and see him/her ASAP and get this treated.    Your platelet count has been in the 44,000 range and you are in the midst of a workup about that. Please follow up with the Hematologist about that and keep Dr. Pizarro in the loop.  You are going to get therapy to get stronger.    Remember, Dr. Clint Pizarro is your primary doctor and use him frequently and well. God bless you and do well my friend             After Care     Activity - Up ad fernanda           Advance Diet as Tolerated       Follow this diet upon discharge: Orders Placed This Encounter      Fluid restriction 2000 ML FLUID, 3 gram sodium      Low Saturated Fat Na <2400 mg       Daily weights       Call Provider for weight gain of more than 2 pounds per day or 2 pounds per week.       Fall precautions           General info for  SNF       Length of Stay Estimate: Short Term Care: Estimated # of Days <30  Condition at Discharge: Improving  Level of care:skilled   Rehabilitation Potential: Good  Admission H&P remains valid and up-to-date: Yes  Recent Chemotherapy: N/A  Use Nursing Home Standing Orders: Yes       Mantoux instructions       Give two-step Mantoux (PPD) Per Facility Policy Yes             Referrals     Occupational Therapy Adult Consult       Evaluate and treat as clinically indicated.    Reason:  weakness       Physical Therapy Adult Consult       Evaluate and treat as clinically indicated.    Reason:  weakness             Lab Orders     Basic metabolic panel                 Your next 10 appointments already scheduled     Jan 29, 2018 11:10 AM CST   LAB with [facility]   [facility] ([facility])    [address]  [place] [address] [phone]           Please do not eat 10-12 hours before your appointment if you are coming in fasting for labs on lipids, cholesterol, or glucose (sugar). This does not apply to pregnant women. Water, hot tea and black coffee (with nothing added) are okay. Do not drink other fluids, diet soda or chew gum.            Jan 29, 2018 12:15 PM CST   Core MD [place] with Long Pugh MD   [facility] ([facility])    [address]  [place] [address]   [phone]            Feb 05, 2018 10:30 AM CST   Return Visit with Wesley Benjamin MD   [facility] [place] ([facility])    [address]  [address]  [place] [address]   [phone]            Feb 14, 2018  8:30 AM CST   LAB with [facility]   [facility] ([facility])    [address]  [place] [address]   [phone]           Please do not eat 10-12 hours before your appointment if you are coming in  fasting for labs on lipids, cholesterol, or glucose (sugar). This does not apply to pregnant women. Water, hot tea and black coffee (with nothing added) are okay. Do not drink other fluids, diet soda or chew gum.            Feb 14, 2018  9:30 AM CST   CORE Enrollment with ERIC Fernandez CNP   [facility] ([facility])    [address] [place] [id number] [phone]              Statement of Approval     Ordered          01/23/18 0808  I have reviewed and agree with all the recommendations and orders detailed in this document.  EFFECTIVE NOW     Approved and electronically signed by:  Raymundo Proctor MD               General Recommendations To Control Heart Failure When You Get Home (Give at DC from [facility])       Heart Failure Instructions for Patients and Families: Please read and check off each of these important instructions as you do them when you get home.          Weight and Symptoms       ___ Put a scale in your bathroom.    ___ Post a weight chart or calendar next to your scale.    ___ Weigh yourself everyday as soon as you get up in the morning (before breakfast).  You should only be wearing your pajamas.  Write your weight on the chart/calendar.  ___ Bring your weight chart/calendar with you to all appointments.  ___ Call your doctor or nurse practitioner if you gain 2 pounds (in 1 day) or 5 pounds in (1 week) from your goal  good  weight.  Your good weight is also called your  dry  weight.  Your doctor or nurse will tell you what your good weight should be.    ___ Call your doctor or nurse practitioner if you have shortness of breath that gets worse over time, leg swelling or fatigue.       Medications and Diet       ___ Make sure to take your medication as prescribed.    ___ Bring a current list of your medication and all of your medicine bottles with you to all appointments.    ___ Limit fluids if you still have swelling or  shortness of breath, or if your doctor tells you to do so.    ___ Eat less than 2000 mg of sodium (salt) every day. Read food labels, and do not add salt to meals. Remember, if you eat less salt you retain less fluid.  ___ Follow a heart healthy diet that is low in saturated fat.        Activity and Suggested Lifestyle Changes      ___ Stay active. Talk to your doctor about an exercise program that is safe for your heart.  ___ Stop smoking. Reduce alcohol use.      ___ Lose weight if you are overweight. Extra weight puts a lot of stress on the heart.                 Control for Leg Swelling     ___ Keep your legs elevated (raised) as needed for swelling. If swelling is uncomfortable or elevation doesn t help, ask your doctor about using ACE wraps or support stockings.        What is the [facility].R[facility]E. Clinic?  Cardiomyopathy  Optimization  Rehabilitation  Education    The [facility] Clinic is a heart failure specialty clinic within [facility].  It is an outpatient disease management program that is based on a phase-by-phase approach, which is tailored to each patient s individual needs.  The cardiologist, nurse practitioner, physician assistant and nurses provide an ongoing outpatient care and treatment plan that guides heart failure and cardiomyopathy patients from evaluation and education to stabilization. This team works with your current primary care doctor and cardiologist to help you:    - Avoid hospitalizations  - Slow the progression of the disease  - Improve length and quality of life  - Know who and when to call if heart failure symptoms appear  - Receive easy access to quality health care and advice  - Better understand your condition and treatment  - Decrease the tremendous cost burden of heart failure care  - Detect future heart problems before they become life threatening                                 Follow-up Appointments     ___ An appointment with the [facility] Clinic may already have been made  for you (see section   Your next appointments already scheduled ).  If you have a question about your appointment, would like to speak with a C.O.R.E. nurse, or would like to become a [facility] Clinic patient, please call one of the following locations:     - [facility] ([place]):                                             [phone]  - [facility]):                                            [phone]  - [facility] ([place]):                 [phone]      ___ Your C.O.R.E. Clinic Team will continue to educate you on your heart failure and may adjust medications based on your vital signs, lab work, and how you are feeling.  Therefore, it is very important to bring the following to all C.O.R.E. appointments:    - An accurate list of your medications  - Your medicine bottles  - Your weight chart/calendar

## 2018-01-19 NOTE — PLAN OF CARE
Problem: Cardiac: Heart Failure (Adult)  Goal: Signs and Symptoms of Listed Potential Problems Will be Absent, Minimized or Managed (Cardiac: Heart Failure)  Signs and symptoms of listed potential problems will be absent, minimized or managed by discharge/transition of care (reference Cardiac: Heart Failure (Adult) CPG).  Outcome: No Change  Heart Failure Care Pathway  GOALS TO BE MET BEFORE DISCHARGE:    1. Decrease congestion and/or edema with diuretic therapy to achieve near      optimal volume status.            Dyspnea improved:  Yes            Edema improved:     NA        Net I/O and Weights since admission:                       Vitals:    01/19/18 0048   Weight: 72.6 kg (160 lb)       2.  O2 sats > 92% on RA or at prior home O2 therapy level.          Current oxygenation status:       SpO2: 95 %         O2 Device: None (Room air),            Able to wean O2 this shift to keep sats > 92%:  NA       Does patient use Home O2? No    3.  Tolerates ambulation and mobility near baseline: NA        How many times did the patient ambulate with nursing staff this shift? 1    Please review the Heart Failure Care Pathway for additional HF goal outcomes.    Pt arrived at [facility] at 0500. VSS on RA. No complaints of pain or SOB. Pt has been incontinent of urine. Tele shows Afib CVR. <2000mL fluid restriction. Pt has lower leg wound, WOCN consult today. Plan is for cardiologist consult and echo. Will continue to monitor.     Keaton Tian RN  1/19/2018

## 2018-01-19 NOTE — IP AVS SNAPSHOT
MRN:[id number]                      After Visit Summary   1/19/2018    Jama Paul    MRN: [id number]           Thank you!     Thank you for choosing [facility] for your care. Our goal is always to provide you with excellent care. Hearing back from our patients is one way we can continue to improve our services. Please take a few minutes to complete the written survey that you may receive in the mail after you visit with us. Thank you!        Patient Information     Date Of Birth          2/13/1931        Designated Caregiver       Most Recent Value    Caregiver    Will someone help with your care after discharge? no      About your hospital stay     You were admitted on:  January 19, 2018 You last received care in the:  [facility]    You were discharged on:  January 23, 2018        Reason for your hospital stay       Dr. Paul: You came to the hospital with shortness of breath and you have systolic heart failure. You were treated with Lisinopril and Furosemide, and that regimen helped a lot and you diuresed just shy of 5 liters and lost 13 lbs of extra fluid. Your atrial fibrillation is stable.  Your right heart cath showed the pressures in your heart were well controlled with the lisinopril and furosemide and Metoprolol. You should continue those medicines and if anyone wants to stop them, you contact Dr. Pizarro immediately.  You also have evidence of pulmonary hypertension and that is likely to be due to your obstructive sleep apnea. It is very important that you contact your former sleep doctor and see him/her ASAP and get this treated.    Your platelet count has been in the 44,000 range and you are in the midst of a workup about that. Please follow up with the Hematologist about that and keep Dr. Pizarro in the loop.  You are going to get therapy to get stronger.    Remember, Dr. Clint Pizarro is your primary doctor and use him frequently and well. God bless  you and do well my friend                  Who to Call     For medical emergencies, please call 911.  For non-urgent questions about your medical care, please call your primary care provider or clinic, [phone]          Attending Provider     Provider Specialty    Gulshan Gibbons MD Emergency Medicine    Brooks Andres MD Internal Medicine       Primary Care Provider Office Phone # Fax #    Clint Pizarro [phone]      After Care Instructions     Activity - Up ad fernanda           Advance Diet as Tolerated       Follow this diet upon discharge: Orders Placed This Encounter      Fluid restriction 2000 ML FLUID, 3 gram sodium      Low Saturated Fat Na <2400 mg            Daily weights       Call Provider for weight gain of more than 2 pounds per day or 2 pounds per week.            Fall precautions           General info for SNF       Length of Stay Estimate: Short Term Care: Estimated # of Days <30  Condition at Discharge: Improving  Level of care:skilled   Rehabilitation Potential: Good  Admission H&P remains valid and up-to-date: Yes  Recent Chemotherapy: N/A  Use Nursing Home Standing Orders: Yes            Mantoux instructions       Give two-step Mantoux (PPD) Per Facility Policy Yes                  Your next 10 appointments already scheduled     Jan 29, 2018 11:10 AM CST   LAB with [facility]   [facility] [place] ([facility])    [address] [place] [id number] [phone]           Please do not eat 10-12 hours before your appointment if you are coming in fasting for labs on lipids, cholesterol, or glucose (sugar). This does not apply to pregnant women. Water, hot tea and black coffee (with nothing added) are okay. Do not drink other fluids, diet soda or chew gum.            Jan 29, 2018 12:15 PM CST   Core MD [place] with Long Pugh MD   [facility]   [place] ([facility])    [address] [place] [address]  [facility] [address]  [facility] [address]   [phone]            Feb 05, 2018 10:30 AM CST   Return Visit with Wesley Benjamin MD   [facility] [place] ([facility])    [address] [facility] [address]  [place] [address]   [phone]            Feb 14, 2018  8:30 AM CST   LAB with [facility]   [facility] ([facility])    [address] [place], [place] [address]  [facility] [address]   [phone]           Please do not eat 10-12 hours before your appointment if you are coming in fasting for labs on lipids, cholesterol, or glucose (sugar). This does not apply to pregnant women. Water, hot tea and black coffee (with nothing added) are okay. Do not drink other fluids, diet soda or chew gum.            Feb 14, 2018  9:30 AM CST   CORE Enrollment with ERIC Fernandez CNP   [facility] ([facility])    [address] [place], [place] [id number]  [facility] [address]   [phone]              Additional Services     Occupational Therapy Adult Consult       Evaluate and treat as clinically indicated.    Reason:  weakness            Physical Therapy Adult Consult       Evaluate and treat as clinically indicated.    Reason:  weakness                  Future tests that were ordered for you     Basic metabolic panel                 Further instructions from your care team       Plan:     Nursing to notify the Provider(s) and re-consult the [facility] Nurse if wound(s) deteriorate(s) or if the wound care plan needs reevaluation.       Plan of care for wound located on RLE: Daily:  1.  Cleanse and moisturize intact skin on RLE with mild cleanser/lotion.   2.  Cleanse wound with MicroKlenz, pat dry.  3.  Apply piece of dry Aquacel Ag to wound, cutting to fit.    4.  Cover with gauze and secure with Kerlix.  Label with time/date/initials.   5.  Apply Tubigrip stocking in double layer, evenly from base of toes  to just below knee crease.  Ensure no wrinkles.  Elevate legs and float heels with pillows.       General Recommendations To Control Heart Failure When You Get Home       Heart Failure Instructions for Patients and Families: Please read and check off each of these important instructions as you do them when you get home.          Weight and Symptoms       ___ Put a scale in your bathroom.    ___ Post a weight chart or calendar next to your scale.    ___ Weigh yourself everyday as soon as you get up in the morning (before breakfast).  You should only be wearing your pajamas.  Write your weight on the chart/calendar.  ___ Bring your weight chart/calendar with you to all appointments.  ___ Call your doctor or nurse practitioner if you gain 2 pounds (in 1 day) or 5 pounds in (1 week) from your goal  good  weight.  Your good weight is also called your  dry  weight.  Your doctor or nurse will tell you what your good weight should be.    ___ Call your doctor or nurse practitioner if you have shortness of breath that gets worse over time, leg swelling or fatigue.       Medications and Diet       ___ Make sure to take your medication as prescribed.    ___ Bring a current list of your medication and all of your medicine bottles with you to all appointments.    ___ Limit fluids if you still have swelling or shortness of breath, or if your doctor tells you to do so.    ___ Eat less than 2000 mg of sodium (salt) every day. Read food labels, and do not add salt to meals. Remember, if you eat less salt you retain less fluid.  ___ Follow a heart healthy diet that is low in saturated fat.        Activity and Suggested Lifestyle Changes      ___ Stay active. Talk to your doctor about an exercise program that is safe for your heart.  ___ Stop smoking. Reduce alcohol use.      ___ Lose weight if you are overweight. Extra weight puts a lot of stress on the heart.                 Control for Leg Swelling     ___ Keep your legs elevated  (raised) as needed for swelling. If swelling is uncomfortable or elevation doesn t help, ask your doctor about using ACE wraps or support stockings.        What is the [facility]?  Cardiomyopathy  Optimization  Rehabilitation  Education    The [facility] Clinic is a heart failure specialty clinic within [facility].  It is an outpatient disease management program that is based on a phase-by-phase approach, which is tailored to each patient s individual needs.  The cardiologist, nurse practitioner, physician assistant and nurses provide an ongoing outpatient care and treatment plan that guides heart failure and cardiomyopathy patients from evaluation and education to stabilization. This team works with your current primary care doctor and cardiologist to help you:    - Avoid hospitalizations  - Slow the progression of the disease  - Improve length and quality of life  - Know who and when to call if heart failure symptoms appear  - Receive easy access to quality health care and advice  - Better understand your condition and treatment  - Decrease the tremendous cost burden of heart failure care  - Detect future heart problems before they become life threatening                                 Follow-up Appointments     ___ An appointment with the [facility] may already have been made for you (see section   Your next appointments already scheduled ).  If you have a question about your appointment, would like to speak with a C.O.R.E. nurse, or would like to become a [facility] Clinic patient, please call one of the following locations:     - [facility]):                                             [phone]  - [facility]):                                            [phone]  - [facility] ([place]):                 [phone]      ___ Your C.O.R.E. Clinic Team will continue to educate you on your heart failure and may  "adjust medications based on your vital signs, lab work, and how you are feeling.  Therefore, it is very important to bring the following to all C.O.R.E. appointments:    - An accurate list of your medications  - Your medicine bottles  - Your weight chart/calendar                   Pending Results     Date and Time Order Name Status Description    1/23/2018 0916 Protein immunofixation urine In process     1/23/2018 0909 Free kappa and lambda light chains, urine In process     1/22/2018 0314 EKG 12-lead, tracing only Preliminary     1/21/2018 0000 Platelet associated immunoglobulin In process     1/20/2018 1430 Protein electrophoresis timed urine In process             Statement of Approval     Ordered          01/23/18 0808  I have reviewed and agree with all the recommendations and orders detailed in this document.  EFFECTIVE NOW     Approved and electronically signed by:  Raymundo Proctor MD             Admission Information     Date & Time Provider Department Dept. Phone    1/19/2018 Brooks Andres MD [facility] [phone]      Your Vitals Were     Blood Pressure Pulse Temperature Respirations Height Weight    120/77 (BP Location: Left arm) 94 97.4  F (36.3  C) (Oral) 18 1.854 m (6' 1\") 70.1 kg (154 lb 9.6 oz)    Pulse Oximetry BMI (Body Mass Index)                97% 20.4 kg/m2          [employer] Information     [facility] lets you send messages to your doctor, view your test results, renew your prescriptions, schedule appointments and more. To sign up, go to [email or web address] . Click on \"Log in\" on the left side of the screen, which will take you to the Welcome page. Then click on \"Sign up Now\" on the right side of the page.     You will be asked to enter the access code listed below, as well as some personal information. Please follow the directions to create your username and password.     Your access code is: [email or web address]  Expires: 4/3/2018  2:08 PM     Your access code " will  in 90 days. If you need help or a new code, please call your [facility] clinic or [phone].        Care EveryWhere ID     This is your Care EveryWhere ID. This could be used by other organizations to access your [facility] medical records  [id number]        Equal Access to Services     CECILE MOURA : Hadii aad ku hadnicodaryn Somilaali, waaxda luqadaha, qaybta kaalmada adechandanhamiltonbrian, emani avalos. So [facility] [phone].    ATENCIÓN: Si habla español, tiene a cleaning disposición servicios gratuitos de asistencia lingüística. Jaycee al [phone].    We comply with applicable federal civil rights laws and [place] laws. We do not discriminate on the basis of race, color, national origin, age, disability, sex, sexual orientation, or gender identity.               Review of your medicines      START taking        Dose / Directions    melatonin 1 MG Tabs tablet   Used for:  Generalized weakness        Dose:  1 mg   Take 1 tablet (1 mg) by mouth nightly as needed for sleep   Quantity:  15 tablet   Refills:  0       potassium chloride SA 20 MEQ CR tablet   Commonly known as:  K-DUR/KLOR-CON M   Used for:  Acute congestive heart failure, unspecified congestive heart failure type (H)        Dose:  20 mEq   Take 1 tablet (20 mEq) by mouth daily   Quantity:  30 tablet   Refills:  0         CONTINUE these medicines which have NOT CHANGED        Dose / Directions    CYANOCOBALAMIN PO        Dose:  500 mcg   Take 500 mcg by mouth daily   Refills:  0       furosemide 20 MG tablet   Commonly known as:  LASIX   Used for:  Atrial fibrillation with rapid ventricular response (H), Acute on chronic systolic congestive heart failure (H)        Dose:  20 mg   Take 1 tablet (20 mg) by mouth as needed   Quantity:  30 tablet   Refills:  11       lisinopril 5 MG tablet   Commonly known as:  PRINIVIL/ZESTRIL   Used for:  Essential hypertension, Acute congestive heart failure, unspecified congestive heart failure  type (H)        Dose:  5 mg   Take 1 tablet (5 mg) by mouth daily   Quantity:  30 tablet   Refills:  0       metoprolol succinate 100 MG 24 hr tablet   Commonly known as:  TOPROL-XL   Used for:  Atrial fibrillation with rapid ventricular response (H)        Dose:  100 mg   Take 1 tablet (100 mg) by mouth 2 times daily   Quantity:  180 tablet   Refills:  3       * Multi-vitamin Tabs tablet        Dose:  1 tablet   Take 1 tablet by mouth daily   Refills:  0       * ICAPS AREDS FORMULA PO        Dose:  1 tablet   Take 1 tablet by mouth 2 times daily   Refills:  0       vitamin B complex with vitamin C Tabs tablet        Dose:  1 tablet   Take 1 tablet by mouth daily   Refills:  0       VITAMIN C PO        Dose:  500 mg   Take 500 mg by mouth daily   Refills:  0       VITAMIN D (CHOLECALCIFEROL) PO        Dose:  2000 Units   Take 2,000 Units by mouth daily   Refills:  0       warfarin 2.5 MG tablet   Commonly known as:  COUMADIN        Dose:  2.5-5 mg   Take 2.5-5 mg by mouth - For A-fib INR goal 2-3 5 mg on Monday, Wednesday and Friday 2.5 mg on all other days   Refills:  0       * Notice:  This list has 2 medication(s) that are the same as other medications prescribed for you. Read the directions carefully, and ask your doctor or other care provider to review them with you.         Where to get your medicines      Some of these will need a paper prescription and others can be bought over the counter. Ask your nurse if you have questions.     You don't need a prescription for these medications     lisinopril 5 MG tablet    melatonin 1 MG Tabs tablet    potassium chloride SA 20 MEQ CR tablet                Protect others around you: Learn how to safely use, store and throw away your medicines at www.disposemymeds.org.             Medication List: This is a list of all your medications and when to take them. Check marks below indicate your daily home schedule. Keep this list as a reference.      Medications            Morning Afternoon Evening Bedtime As Needed    CYANOCOBALAMIN PO   Take 500 mcg by mouth daily                                   furosemide 20 MG tablet   Commonly known as:  LASIX   Take 1 tablet (20 mg) by mouth as needed   Last time this was given:  20 mg on 1/23/2018  9:51 AM                                   lisinopril 5 MG tablet   Commonly known as:  PRINIVIL/ZESTRIL   Take 1 tablet (5 mg) by mouth daily   Last time this was given:  5 mg on 1/23/2018  9:51 AM                                   melatonin 1 MG Tabs tablet   Take 1 tablet (1 mg) by mouth nightly as needed for sleep                                   metoprolol succinate 100 MG 24 hr tablet   Commonly known as:  TOPROL-XL   Take 1 tablet (100 mg) by mouth 2 times daily   Last time this was given:  100 mg on 1/23/2018  9:51 AM                                      * Multi-vitamin Tabs tablet   Take 1 tablet by mouth daily                                   * ICAPS AREDS FORMULA PO   Take 1 tablet by mouth 2 times daily                                   potassium chloride SA 20 MEQ CR tablet   Commonly known as:  K-DUR/KLOR-CON M   Take 1 tablet (20 mEq) by mouth daily                                   vitamin B complex with vitamin C Tabs tablet   Take 1 tablet by mouth daily                                   VITAMIN C PO   Take 500 mg by mouth daily                                   VITAMIN D (CHOLECALCIFEROL) PO   Take 2,000 Units by mouth daily                                   warfarin 2.5 MG tablet   Commonly known as:  COUMADIN   Take 2.5-5 mg by mouth - For A-fib INR goal 2-3 5 mg on Monday, Wednesday and Friday 2.5 mg on all other days                                   * Notice:  This list has 2 medication(s) that are the same as other medications prescribed for you. Read the directions carefully, and ask your doctor or other care provider to review them with you.

## 2018-01-19 NOTE — IP AVS SNAPSHOT
` `     [facility] CARD[facility] SPECIALTY CARE: [phone]            Medication Administration Report for Jama Paul as of 01/23/18 1350   Legend:    Given Hold Not Given Due Canceled Entry Other Actions    Time Time (Time) Time  Time-Action       Inactive    Active    Linked        Medications 01/17/18 01/18/18 01/19/18 01/20/18 01/21/18 01/22/18 01/23/18      Dose: 300 mL Freq: ONCE PRN Route: IV  PRN Reason: other  PRN Comment: for symptomatic hypotension with femoral sheath removal  Start: 01/22/18 1609   End: 01/23/18 0408   Admin Instructions: Post-procedurally for CAR cath lab procedure           0408-Med Discontinued       acetaminophen (TYLENOL) Suppository 650 mg  Dose: 650 mg Freq: EVERY 4 HOURS PRN Route: RE  PRN Reason: mild pain  Start: 01/19/18 0514   Admin Instructions: Alternate ibuprofen (if ordered) with acetaminophen.  Maximum acetaminophen dose from all sources = 75 mg/kg/day not to exceed 4 grams/day.               acetaminophen (TYLENOL) tablet 325-650 mg  Dose: 325-650 mg Freq: EVERY 4 HOURS PRN Route: PO  PRN Reasons: mild pain,headaches  Start: 01/22/18 1609   Admin Instructions: Maximum acetaminophen dose from all sources = 75 mg/kg/day not to exceed 4 grams/day.               acetaminophen (TYLENOL) tablet 650 mg  Dose: 650 mg Freq: EVERY 4 HOURS PRN Route: PO  PRN Reason: mild pain  Start: 01/19/18 0514   Admin Instructions: Alternate ibuprofen (if ordered) with acetaminophen.  Maximum acetaminophen dose from all sources = 75 mg/kg/day not to exceed 4 grams/day.               aspirin EC EC tablet 81 mg  Dose: 81 mg Freq: DAILY Route: PO  Start: 01/23/18 0900   Admin Instructions: DO NOT CRUSH.           0951 (81 mg)-Given             Dose: 0.5 mg Freq: EVERY 5 MIN PRN Route: IV  PRN Reason: other  PRN Comment: symptomatic bradycardia associated with FEMORAL sheath pulls  Start: 01/22/18 1609   End: 01/23/18 0408   Admin Instructions: May repeat x1.  Notify provider [Notify  Interventional Fellow/Cardiologist () or Cardiologist (/) if no improvement after second dose of atropine.  Post-procedurally for CAR cath lab procedure.           0408-Med Discontinued       Continuing beta blocker from home medication list OR beta blocker order already placed during this visit  Freq: DOES NOT GO TO MAR Route: XX  PRN Reason: other  Start: 01/19/18 0514   Admin Instructions: Continuing beta blocker from home medication list OR beta blocker order already placed during this visit                 Dose: 25-50 mcg Freq: EVERY 15 MIN PRN Route: IV  PRN Reason: other  PRN Comment: severe pain related to pulling the FEMORAL sheath out ONLY  Start: 01/22/18 1609   End: 01/23/18 0408   Admin Instructions: May repeat x1.  Post -procedurally for CAR cath lab procedure.  For ordered doses up to 100 mcg give IV Push undiluted over a minimum of 3-5 minutes.           0408-Med Discontinued         Dose: 0.2 mg Freq: EVERY 1 MIN PRN Route: IV  PRN Reason: benzodiazepine reversal  Start: 01/22/18 1609   End: 01/23/18 0408   Admin Instructions: May repeat X3.  Notify provider [Notify Interventional Fellow/Cardiologist ([facility]) or Cardiologist (/)].  Post-procedurally for CAR cath lab procedure.  Irritant. For ordered doses up to 1 mg, give IV Push undiluted. Administer each 0.2mg over 15 seconds.           0408-Med Discontinued       furosemide (LASIX) tablet 20 mg  Dose: 20 mg Freq: DAILY Route: PO  Start: 01/23/18 0900          0951 (20 mg)-Given           HOLD:  Metformin and metformin containing medications if patient received IV contrast  Freq: HOLD Route: XX  Start: 01/22/18 1609   End: 01/24/18 1608   Admin Instructions: Hold metformin (GLUCOPHAGE, GLUMETZA, FORTAMET, RIOMET) and metformin containing medications: alogliptin/metformin (KAZANO), glipizide/metformin (METAGLIP), glyburide/metformin (GLUCOVANCE), rosiglitazone/metformin (AVANDAMET), dapagliflozin/metformin (XIGDUO XR), sitagliptin/metformin  "(JANUMET, JANUMET XR), linagliptin/metformin (JENTADUETO), repaglinide/metformin (PRANDIMET), saxagliptin/metformin (KOMBIGLYZE XR), canagliflozin/metformin (INVOKAMET), and pioglitazone/metformin (ACTOPLUS MET, ACTOPLUS MET XR) on day of the procedure and for 48 hours after IV contrast given. If patient was on one of these medications pre-procedure,  continue to HOLD for 48 hours post-procedure.               HYDROcodone-acetaminophen (NORCO) 5-325 MG per tablet 1-2 tablet  Dose: 1-2 tablet Freq: EVERY 4 HOURS PRN Route: PO  PRN Reason: moderate to severe pain  Start: 01/22/18 1609   Admin Instructions: Maximum acetaminophen dose from all sources= 75 mg/kg/day not to exceed 4 grams               lidocaine (LMX4) cream  Freq: EVERY 1 HOUR PRN Route: Top  PRN Reason: pain  PRN Comment: with VAD insertion or accessing implanted port.  Start: 01/22/18 1609   Admin Instructions: Do NOT give if patient has a history of allergy to any local anesthetic or any \"laura\" product.   Apply 30 minutes prior to VAD insertion or port access. MAX Dose: 2.5 g (  of 5 g tube)               lidocaine 1 % 1 mL  Dose: 1 mL Freq: EVERY 1 HOUR PRN Route: OTHER  PRN Comment: mild pain with VAD insertion or accessing implanted port  Start: 01/22/18 1609   Admin Instructions: Do NOT give if patient has a history of allergy to any local anesthetic or any \"laura\" product. MAX dose 1 mL subcutaneous OR intradermal in divided doses.               lisinopril (PRINIVIL/ZESTRIL) tablet 5 mg  Dose: 5 mg Freq: DAILY Route: PO  Start: 01/19/18 1530   Admin Instructions: Hold for SBP < 100       1607 (5 mg)-Given        0845 (5 mg)-Given        (1122)-Not Given        0809 (5 mg)-Given        0951 (5 mg)-Given           magnesium sulfate 4 g in 100 mL sterile water (premade)  Dose: 4 g Freq: EVERY 4 HOURS PRN Route: IV  PRN Reason: magnesium supplementation  Start: 01/19/18 4614   Admin Instructions: For serum Mg++ less than 1.6 mg/dL  Give 4 g and " recheck magnesium level 2 hours after dose, and next AM.               melatonin tablet 1 mg  Dose: 1 mg Freq: AT BEDTIME PRN Route: PO  PRN Reason: sleep  Start: 01/19/18 0514   Admin Instructions: Do not give unless at least 6 hours of uninterrupted sleep is expected.               metoprolol (LOPRESSOR) injection 5-15 mg  Dose: 5-15 mg Freq: EVERY 6 HOURS PRN Route: IV  PRN Reason: other  PRN Comment: IF heart rate  greater than 110-120 bpm  Start: 01/19/18 0514   Admin Instructions: Give as: 5 mg IV EVERY 5  MINUTES PRN over 5 minute intervals.  Hold for Systolic Blood Pressure less than 90 mmHg.  For ordered doses up to 15 mg, give IV Push undiluted over 5-10 minutes.               metoprolol succinate (TOPROL-XL) 24 hr tablet 100 mg  Dose: 100 mg Freq: 2 TIMES DAILY Route: PO  Start: 01/19/18 0900   Admin Instructions: DO NOT CRUSH. Tablet may be split in half along score line. Hold for sbp<90 or hr<55       0907 (100 mg)-Given       2135 (100 mg)-Given        0845 (100 mg)-Given       2017 (100 mg)-Given        0919 (100 mg)-Given       2109 (100 mg)-Given        0812 (100 mg)-Given       2123 (100 mg)-Given        0951 (100 mg)-Given       [ ] 2100             Dose: 0.5-1 mg Freq: EVERY 5 MIN PRN Route: IV  PRN Reason: other  PRN Comment: anxiety associated with pulling the FEMORAL sheath out ONLY  Start: 01/22/18 1609   End: 01/23/18 0408   Admin Instructions: May repeat x1 after 5 minutes, if needed.   Post -procedurally for CAR cath lab procedure.  For ordered doses up to 2.5 mg give IV Push slowly titrated over a minimum of 2 minutes. Dilute each 1mg in 4mL of NS.           0408-Med Discontinued       multivitamin  with lutein (OCUVITE WITH LTEIN) per capsule 1 capsule  Dose: 1 capsule Freq: 2 TIMES DAILY Route: PO  Start: 01/20/18 0930   Admin Instructions: Auto sub for ICap        0953 (1 capsule)-Given       2017 (1 capsule)-Given        0919 (1 capsule)-Given       2108 (1 capsule)-Given        0811  (1 capsule)-Given       2123 (1 capsule)-Given        0951 (1 capsule)-Given       [ ] 2100           naloxone (NARCAN) injection 0.1-0.4 mg  Dose: 0.1-0.4 mg Freq: EVERY 2 MIN PRN Route: IV  PRN Reason: opioid reversal  Start: 01/22/18 1609   Admin Instructions: For respiratory rate LESS than or EQUAL to 8.  Partial reversal dose:  0.1 mg titrated q 2 minutes for Analgesia Side Effects Monitoring Sedation Level of 3 (frequently drowsy, arousable, drifts to sleep during conversation).Full reversal dose:  0.4 mg bolus for Analgesia Side Effects Monitoring Sedation Level of 4 (somnolent, minimal or no response to stimulation).  For ordered doses up to 2mg give IVP. Give each 0.4mg over 15 seconds in emergency situations. For non-emergent situations further dilute in 9mL of NS to facilitate titration of response.               naloxone (NARCAN) injection 0.1-0.4 mg  Dose: 0.1-0.4 mg Freq: EVERY 2 MIN PRN Route: IV  PRN Reason: opioid reversal  Start: 01/19/18 0514   Admin Instructions: For respiratory rate LESS than or EQUAL to 8.  Partial reversal dose:  0.1 mg titrated q 2 minutes for Analgesia Side Effects Monitoring Sedation Level of 3 (frequently drowsy, arousable, drifts to sleep during conversation).Full reversal dose:  0.4 mg bolus for Analgesia Side Effects Monitoring Sedation Level of 4 (somnolent, minimal or no response to stimulation).  For ordered doses up to 2mg give IVP. Give each 0.4mg over 15 seconds in emergency situations. For non-emergent situations further dilute in 9mL of NS to facilitate titration of response.                 Dose: 0.2-0.4 mg Freq: EVERY 2 MIN PRN Route: IV  PRN Reasons: opioid reversal,other  PRN Comment: respiratory depression  Start: 01/22/18 1609   End: 01/23/18 0408   Admin Instructions: Notify provider [Notify Interventional Fellow/Cardiologist ([facility]) or Cardiologist (,)].  For respiratory rate less than or equal to 8.  Partial reversal dose:  0.2 mg titrated q 2  minutes for Sedation Level of 3 (frequently drowsy, arousable, drifts to sleep during conversation).Full reversal dose:  0.4 mg bolus for Sedation Level of 4 (somnolent, minimal or no response to stimulation).  Post-procedurally for CAR cath lab procedure.  For ordered doses up to 2mg give IVP. Give each 0.4mg over 15 seconds in emergency situations. For non-emergent situations further dilute in 9mL of NS to facilitate titration of response.           0408-Med Discontinued       No anticoagulants IF patient has had acute trauma/surgery or recent intracranial, GI or urinary tract bleeding.   Freq: DOES NOT GO TO MAR Route: OTHER  PRN Reason: other  Start: 01/19/18 0514   Admin Instructions: No anticoagulants IF patient has had acute trauma/surgery or recent intracranial, GI or urinary tract bleeding.                  ondansetron (ZOFRAN-ODT) ODT tab 4 mg  Dose: 4 mg Freq: EVERY 6 HOURS PRN Route: PO  PRN Reasons: nausea,vomiting  Start: 01/19/18 0514   Admin Instructions: This is Step 1 of nausea and vomiting management.  If nausea not resolved in 15 minutes, go to Step 2 prochlorperazine (COMPAZINE). Do not push through foil backing. Peel back foil and gently remove. Place on tongue immediately. Administration with liquid unnecessary  With dry hands, peel back foil backing and gently remove tablet; do not push oral disintegrating tablet through foil backing; administer immediately on tongue and oral disintegrating tablet dissolves in seconds; then swallow with saliva; liquid not required.              Or  ondansetron (ZOFRAN) injection 4 mg  Dose: 4 mg Freq: EVERY 6 HOURS PRN Route: IV  PRN Reasons: nausea,vomiting  Start: 01/19/18 0514   Admin Instructions: This is Step 1 of nausea and vomiting management.  If nausea not resolved in 15 minutes, go to Step 2 prochlorperazine (COMPAZINE).  Irritant. For ordered doses up to 4 mg, give IV Push undiluted over 2-5 minutes.               Patient is already receiving  anticoagulation with heparin, enoxaparin (LOVENOX), warfarin (COUMADIN)  or other anticoagulant medication  Freq: CONTINUOUS PRN Route: XX  Start: 01/19/18 0514              potassium chloride (KLOR-CON) Packet 20-40 mEq  Dose: 20-40 mEq Freq: EVERY 2 HOURS PRN Route: ORAL OR FEED  PRN Reason: potassium supplementation  Start: 01/19/18 0514   Admin Instructions: Use if unable to tolerate tablets.  If Serum K+ 3.0-3.3, dose = 60 mEq po total dose (40 mEq x1 followed in 2 hours by 20 mEq x1). Recheck K+ level 4 hours after dose and the next AM.  If Serum K+ 2.5-2.9, dose = 80 mEq po total dose (40 mEq Q2H x2). Recheck K+ level 4 hours after dose and the next AM.  If Serum K+ less than 2.5, See IV order.  Dissolve packet contents in 4-8 ounces of cold water or juice.               potassium chloride 10 mEq in 100 mL intermittent infusion with 10 mg lidocaine  Dose: 10 mEq Freq: EVERY 1 HOUR PRN Route: IV  PRN Reason: potassium supplementation  Start: 01/19/18 0514   Admin Instructions: Infuse via PERIPHERAL LINE. Use potassium with lidocaine for pain with peripheral administration.  If Serum K+ 3.0-3.3, dose = 10 mEq/hr x4 doses (40 mEq IV total dose). Recheck K+ level 2 hours after dose and the next AM.  If Serum K+ less than 3.0, dose = 10 mEq/hr x6 doses (60 mEq IV total dose). Recheck K+ level 2 hours after dose and the next AM.               potassium chloride 10 mEq in 100 mL sterile water intermittent infusion (premix)  Dose: 10 mEq Freq: EVERY 1 HOUR PRN Route: IV  PRN Reason: potassium supplementation  Start: 01/19/18 0514   Admin Instructions: Infuse via PERIPHERAL LINE or CENTRAL LINE. Use for central line replacement if patient weight less than 65 kg, if patient is on TPN with high potassium content or if unit does not stock 20 mEq bags.   If Serum K+ 3.0-3.3, dose = 10 mEq/hr x4 doses (40 mEq IV total dose). Recheck K+ level 2 hours after dose and the next AM.   If Serum K+ less than 3.0, dose = 10 mEq/hr  x6 doses (60 mEq IV total dose). Recheck K+ level 2 hours after dose and the next AM.               potassium chloride 20 mEq in 50 mL intermittent infusion  Dose: 20 mEq Freq: EVERY 1 HOUR PRN Route: IV  PRN Reason: potassium supplementation  Start: 01/19/18 0514   Admin Instructions: Infuse via CENTRAL LINE Only. May need EKG if less than 65 kg or on TPN - Max rate is 0.3 mEq/kg/hr for patients not on EKG monitoring.   If Serum K+ 3.0-3.3, dose = 20 mEq/hr x2 doses (40 mEq IV total dose). Recheck K+ level 2 hours after dose and the next AM.  If Serum K+ less than 3.0, dose = 20 mEq/hr x3 doses (60 mEq IV total dose). Recheck K+ level 2 hours after dose and the next AM.               potassium chloride SA (K-DUR/KLOR-CON M) CR tablet 20-40 mEq  Dose: 20-40 mEq Freq: EVERY 2 HOURS PRN Route: PO  PRN Reason: potassium supplementation  Start: 01/19/18 0514   Admin Instructions: Use if able to take PO.   If Serum K+ 3.0-3.3, dose = 60 mEq po total dose (40 mEq x1 followed in 2 hours by 20 mEq x1). Recheck K+ level 4 hours after dose and the next AM.  If Serum K+ 2.5-2.9, dose = 80 mEq po total dose (40 mEq Q2H x2). Recheck K+ level 4 hours after dose and the next AM.  If Serum K+ less than 2.5, See IV order.  DO NOT CRUSH               sodium chloride (PF) 0.9% PF flush 3 mL  Dose: 3 mL Freq: EVERY 8 HOURS Route: IK  Start: 01/22/18 1615   Admin Instructions: And Q1H PRN, to lock peripheral IV dormant line.          1756 (3 mL)-Given        0011 (3 mL)-Given       0954 (3 mL)-Given       [ ] 1615           sodium chloride (PF) 0.9% PF flush 3 mL  Dose: 3 mL Freq: EVERY 1 HOUR PRN Route: IK  PRN Reason: line flush  Start: 01/22/18 1609   Admin Instructions: for peripheral IV flush post IV meds              Future Medications  Medications 01/17/18 01/18/18 01/19/18 01/20/18 01/21/18 01/22/18 01/23/18       warfarin (COUMADIN) tablet 7.5 mg  Dose: 7.5 mg Freq: ONCE AT 6PM Route: PO  Start: 01/23/18 1800          [ ]  1800          Completed Medications  Medications 01/17/18 01/18/18 01/19/18 01/20/18 01/21/18 01/22/18 01/23/18         Dose: 3,200 Units Freq: ONCE Route: IV  Start: 01/21/18 2000   End: 01/21/18 2107   Admin Instructions: Nurse to administer dose from existing infusion. If no infusion bag or syringe for this order is available, contact pharmacist to re-enter medication order.           2107 (3,200 Units)-Given               Dose: 1-10 mL Freq: ONCE PRN Route: SC  PRN Comment: For local anesthesia for groin puncture as verbally ordered by prescriber during the procedure.  Start: 01/22/18 1532   End: 01/22/18 1533   Admin Instructions: The provider administers the medication. Dose may be  into smaller doses for administration.              1533 (30 mg)-Given by Other           Discontinued Medications  Medications 01/17/18 01/18/18 01/19/18 01/20/18 01/21/18 01/22/18 01/23/18         Dose: 500-1,000 mL Freq: ONCE PRN Route: IV  PRN Comment: when verbally ordered by prescriber during procedure  Start: 01/22/18 1532   End: 01/22/18 1609   Admin Instructions: IV fluid bolus with pressure bag          1609-Med Discontinued          Rate: 75 mL/hr Freq: CONTINUOUS Route: IV  Last Dose: Stopped (01/22/18 2204)  Start: 01/22/18 1615   End: 01/23/18 0710   Admin Instructions: or until patient is ambulating.  IF PATIENT IS RECEIVING RENAL DIALYSIS, RN to reduce rate of 0.9% sodium chloride IV solution to 10 mL /hour (TKO) to prevent fluid overload          1755 ( )-New Bag       2204-Stopped        0710-Med Discontinued         Rate: 150 mL/hr Freq: CONTINUOUS Route: IV  Last Dose: 75 mL/hr (01/22/18 0849)  Start: 01/22/18 0700   End: 01/22/18 1609   Admin Instructions: 150 mL/hr IV for 2 hours prior to cardiac cath lab procedure, then decrease to 75 mL/hr to run throughout the procedure. Start at 0700 for inpatients.    IF PATIENT IS RECEIVING RENAL DIALYSIS, RN to reduce rate of  0.9 % sodium chloride IV solution  to 10 mL /hour (TKO) IV infusion to prevent fluid overload.          0649 ( )-New Bag       0849 (75 mL/hr)-Rate/Dose Change       1609-Med Discontinued          Dose: 20 mcg Freq: ONCE Route: INTRACORONAR  Start: 01/22/18 1545   End: 01/22/18 1609   Admin Instructions: For RIGHT Intracoronary Artery, when verbally ordered by the prescriber during the procedure.    For 20 mcg, use 1 mL of solution  Dose will be administered  as follows:  Give  20 mcg  (1 mL) ; wait 5 minutes;  Then Give 50 mcg (2.5 mL)   For a MAX of 70 mcg dose.   Dose may be adjusted/limited by patient response at the request of the prescriber.                 1609-Med Discontinued       Followed by    Dose: 50 mcg Freq: ONCE Route: INTRACORONAR  Start: 01/22/18 1545   End: 01/22/18 1609   Admin Instructions: For RIGHT Intracoronary Artery, when verbally ordered by the prescriber during the procedure.    For 50 mcg, use 2.5 mL of solution.  Dose will be administered  as follows:  Give  20 mcg  (1 mL) ; wait 5 minutes;  Then Give 50 mcg (2.5 mL)   For a MAX of 70 mcg dose.   Dose may be adjusted/limited by patient response at the request of the prescriber.                 1609-Med Discontinued          Dose: 20 mcg Freq: ONCE Route: INTRACORONAR  Start: 01/22/18 1545   End: 01/22/18 1609   Admin Instructions: For LEFT Intracoronary Artery, when verbally ordered by the prescriber during the procedure.  For 20 mcg, use 1 mL of solution  Dose will be administered as follows:  Give 20 mcg (1 mL) ; wait 5 minutes;  Then Give 50 mcg (2.5 mL) ; wait 5 minutes;  Then Give 50 mcg (2.5 mL) of solution  For a MAX of 120 mcg dose.   Dose may be adjusted/limited by patient response at the request of the prescriber.                 1609-Med Discontinued       Followed by    Dose: 50 mcg Freq: ONCE Route: INTRACORONAR  Start: 01/22/18 1545   End: 01/22/18 1609   Admin Instructions: For LEFT Intracoronary Artery, when verbally ordered by the prescriber during the  procedure.  For 50 mcg, use 2.5 mL of solution  Dose will be administered as follows:  Give 20 mcg (1 mL) ; wait 5 minutes;  Then Give 50 mcg (2.5 mL) ; wait 5 minutes;  Then Give 50 mcg (2.5 mL) of solution  For a MAX of 120 mcg dose.   Dose may be adjusted/limited by patient response at the request of the prescriber.                 1609-Med Discontinued       Followed by    Dose: 50 mcg Freq: ONCE Route: INTRACORONAR  Start: 01/22/18 1545   End: 01/22/18 1609   Admin Instructions: For LEFT Intracoronary Artery, when verbally ordered by the prescriber during the procedure.  For 50 mcg, use 2.5 mL of solution  Dose will be administered as follows:  Give 20 mcg (1 mL) ; wait 5 minutes;  Then Give 50 mcg (2.5 mL) ; wait 5 minutes;  Then Give 50 mcg (2.5 mL) of solution  For a MAX of 120 mcg dose.   Dose may be adjusted/limited by patient response at the request of the prescriber.                 1609-Med Discontinued          Dose: 12-12,000 mcg Freq: EVERY 1 MIN PRN Route: INTRACORONAR  PRN Comment: when ordered by prescriber during procedure.  Start: 01/22/18 1532   End: 01/22/18 1609   Admin Instructions: Nursing to dilute as instructed by provider.  Prescriber will infuse each dose.  May repeat Q1 min PRN at the discretion of the provider.          1609-Med Discontinued          Rate: 560.3 mL/hr Freq: CONTINUOUS PRN Route: IV  PRN Comment:  when verbally ordered by the prescriber during  procedure  Start: 01/22/18 1532   End: 01/22/18 1609   Admin Instructions: Infuse for 3-5 minutes, as directed.          1609-Med Discontinued          Dose: 150-300 mg Freq: EVERY 10 MIN PRN Route: IV  PRN Comment: when verbally ordered by prescriber during the procedure.   Start: 01/22/18 1532   End: 01/22/18 1609   Admin Instructions: For pulseless VT/VF, give undiluted, rapid IVP.    For stable atrial arrhythmias, dilute in D5W and give over 10 min.  May repeat x 1.          1609-Med Discontinued          Dose:  mg  Freq: ONCE PRN Route: PO  PRN Reason: other  PRN Comment: when verbally ordered by prescriber during the procedure.  Start: 01/22/18 1532   End: 01/22/18 1609         1609-Med Discontinued          Dose: 325 mg Freq: DAILY Route: PO  Start: 01/22/18 0314   End: 01/22/18 1609   Admin Instructions: Once the day prior to the procedure and once the morning of the cath lab procedure.     Exceptions:   IF Patient is allergic to aspirin OR  IF Patient already received Aspirin 325 mg today          0812 (325 mg)-Given       1609-Med Discontinued          Dose: 325 mg Freq: ONCE PRN Route: PO  PRN Reason: other  PRN Comment: when verbally ordered by prescriber during the procedure.  Start: 01/22/18 1532   End: 01/22/18 1609         1609-Med Discontinued          Dose: 0.5-1 mg Freq: EVERY 1 MIN PRN Route: IV  PRN Reason: other  PRN Comment: when verbally ordered by prescriber during the procedure.  Start: 01/22/18 1532   End: 01/22/18 1609         1609-Med Discontinued          Rate: 23.3 mL/hr Freq: CONTINUOUS PRN Route: IV  PRN Comment: when verbally ordered by prescriber during procedure.  Start: 01/22/18 1532   End: 01/22/18 1609         1609-Med Discontinued          Dose: 0.75 mg/kg Freq: ONCE PRN Route: IV  PRN Comment: when verbally ordered by prescriber during the procedure.  Start: 01/22/18 1532   End: 01/22/18 1609   Admin Instructions: Nurse to administer dose from existing infusion. If no infusion bag or syringe for this order is available, contact pharmacist to re-enter medication order.          1609-Med Discontinued          Dose: 1-10 mL Freq: ONCE PRN Route: SC  PRN Comment: For local anesthesia for the groin puncture as verbally ordered by prescriber during the procedure  Start: 01/22/18 1532   End: 01/22/18 1609   Admin Instructions: The provider administers the medication. Dose may be  into smaller doses for administration.          1609-Med Discontinued          Dose: 300-600 mg Freq: ONCE PRN  Route: PO  PRN Comment: when verbally ordered by prescriber during the procedure.  Start: 01/22/18 1532   End: 01/22/18 1609   Admin Instructions: Loading dose.          1609-Med Discontinued          Dose: 75 mg Freq: ONCE PRN Route: PO  PRN Comment: when verbally ordered by prescriber during the procedure.  Start: 01/22/18 1532   End: 01/22/18 1609         1609-Med Discontinued          Dose: 12.5-50 mL Freq: EVERY 30 MIN PRN Route: IV  PRN Reason: other  PRN Comment: when verbally ordered by prescriber during the procedure.  Start: 01/22/18 1532   End: 01/22/18 1609   Admin Instructions: Vesicant. For ordered doses up to 25 mg, give IV Push undiluted. Give each 5g over 1 minute.          1609-Med Discontinued          Dose: 5 mg Freq: ONCE PRN Route: PO  PRN Reason: anxiety  Start: 01/22/18 0314   End: 01/22/18 1609   Admin Instructions: Give 1 hour prior to the procedure.  Hold if respiratory rate less than 12 or Systolic Blood Pressure less than 90 mmHg.          1609-Med Discontinued          Dose: 25-50 mg Freq: ONCE PRN Route: IV  PRN Reason: other  PRN Comment: when verbally ordered by prescriber during the procedure.  Start: 01/22/18 1532   End: 01/22/18 1609   Admin Instructions: Can be given as 25mg x 2 doses, or, as 50mg x 1 dose.   Give no faster than 25mg/min.  For ordered doses up to 50 mg, give IV Push undiluted. Give each 25mg over a minimum of 1 minute. Extend in non-emergency          1609-Med Discontinued          Rate: 4-40 mL/hr Freq: CONTINUOUS PRN Route: IV  PRN Comment: when verbally ordered by prescriber during the procedure.  Start: 01/22/18 1532   End: 01/22/18 1609   Admin Instructions: Start at 2 to 5 mcg/kg/min and titrate by 1 to 2 mcg/kg/min, every 5 minutes, up to a maximum of 20 mcg/kg/min.  Vesicant.          1609-Med Discontinued          Rate: 5-50 mL/hr Freq: CONTINUOUS PRN Route: IV  PRN Comment: when verbally ordered by prescriber during the procedure.  Start: 01/22/18 5132    End: 01/22/18 1609   Admin Instructions: Start at 2 to 5 mcg/kg/min and titrate by 1 to 2 mcg/kg/min, every 5 minutes, up to a maximum of 20 mcg/kg/min.  Vesicant.          1609-Med Discontinued          Dose: 1.25-2.5 mg Freq: ONCE PRN Route: IV  PRN Reason: other  PRN Comment: when verbally ordered by the prescriber during the procedure.   Start: 01/22/18 1532   End: 01/22/18 1609   Admin Instructions: For ordered doses up to 1.25 mg, give IV Push undiluted over 5 minutes.          1609-Med Discontinued          Dose: 0.3 mg Freq: EVERY 5 MIN PRN Route: IV  PRN Reason: other  PRN Comment: when verbally ordered by prescriber during the procedure  Start: 01/22/18 1532   End: 01/22/18 1609   Admin Instructions: Max of 3 doses  0.1 mg/mL = 1:10,000 concentration          1609-Med Discontinued          Dose: 0.3 mg Freq: EVERY 3 MIN PRN Route: SC  PRN Reason: anaphylaxis  PRN Comment: ALLERGIC REACTION, when verbally ordered by prescriber during the procedure  Start: 01/22/18 1532   End: 01/22/18 1609   Admin Instructions: May also give by IM route.  NOT for IV use.  Max of 3 doses  Not for direct undiluted intravenous injection. (1 mg/mL = 1:1,000 concentration). Protect from light.          1609-Med Discontinued          Dose: 1 mg Freq: EVERY 5 MIN PRN Route: IV  PRN Reason: other  PRN Comment: when verbally ordered by prescriber during the procedure.  Start: 01/22/18 1532   End: 01/22/18 1609   Admin Instructions: Max of 3 doses  0.1 mg/mL = 1:10,000 concentration          1609-Med Discontinued          Dose: 25-50 mcg Freq: EVERY 2 MIN PRN Route: IV  PRN Reasons: moderate to severe pain,other  PRN Comment: when verbally ordered by prescriber during the procedure.  Start: 01/22/18 1532   End: 01/22/18 1609   Admin Instructions: Doses can be exceeded under direct oversight of the patient by physician.  For ordered doses up to 100 mcg give IV Push undiluted over a minimum of 3-5 minutes.          1515 (42  mcg)-Given       1530 (50 mcg)-Given       1609-Med Discontinued          Dose: 0.2 mg Freq: EVERY 1 MIN PRN Route: IV  PRN Reason: other  PRN Comment: sedation reversal, when verbally ordered by prescriber during the procedure.  Start: 01/22/18 1532   End: 01/22/18 1609   Admin Instructions: Max cumulative dose of 1 mg.  Irritant. For ordered doses up to 1 mg, give IV Push undiluted. Administer each 0.2mg over 15 seconds.          1609-Med Discontinued          Dose: 20 mg Freq: 2 TIMES DAILY. Route: IV  Start: 01/21/18 1600   End: 01/23/18 0710   Admin Instructions: For ordered doses up to 40 mg, give IV Push undiluted over 1-2 minutes.         1611 (20 mg)-Given        (1500)-Not Given       1819 (20 mg)-Given        0710-Med Discontinued         Dose:  mg Freq: ONCE PRN Route: IV  PRN Comment: when verbally ordered by prescriber during the procedure.  Start: 01/22/18 1532   End: 01/22/18 1609   Admin Instructions: For ordered doses up to 40 mg, give IV Push undiluted over 1-2 minutes.          1609-Med Discontinued          Dose: 40 mg Freq: 2 TIMES DAILY. Route: IV  Start: 01/19/18 0800   End: 01/21/18 1149   Admin Instructions: For ordered doses up to 40 mg, give IV Push undiluted over 1-2 minutes.       (0900)-Not Given [C]       1606 (40 mg)-Given        0845 (40 mg)-Given       1656 (40 mg)-Given        (1106)-Not Given [C]       1149-Med Discontinued           Dose: 1,000-10,000 Units Freq: EVERY 5 MIN PRN Route: IV  PRN Reason: other  PRN Comment: bolus dose  Start: 01/22/18 1532   End: 01/22/18 1609   Admin Instructions: when verbally ordered by prescriber during procedure.   Up to a max of 25,000 units.  Physician may request an additional bolus.          1609-Med Discontinued          Rate: 11 mL/hr Freq: CONTINUOUS Route: IV  Last Dose: Stopped (01/22/18 1505)  Start: 01/21/18 1045   End: 01/22/18 1630   Admin Instructions: Xa= 0.20,  continue at 1100 units/hr and recheck 1/23 am      Goal  Heparin 10a level: 0.15-0.35 IU/mL      Heparin dosing per [facility] protocol.       Platelet count is being monitored every 3 days.         1118 (800 Units/hr)-New Bag       2108 (1,100 Units/hr)-Rate/Dose Change        1113 (1,100 Units/hr)-New Bag       1505-Stopped       1630-Med Discontinued          Freq: HOLD Route: XX  Start: 01/22/18 0314   End: 01/22/18 1609         1609-Med Discontinued          Freq: HOLD Route: XX  Start: 01/19/18 0514   End: 01/20/18 1713   Admin Instructions: IF patient has received sildenafil (VIAGRA/REVATIO) within the last 8 hours, avanafil (STENDRA) within the last 8 hours, vardenafil (LEVITRA/STAXYN) within the last 18 hours, or tadalafil (CIALIS/ADCIRCA) within the last 36 hours.  Inform provider if patient has taken one of those medications.        1713-Med Discontinued            Dose: 10-20 mg Freq: EVERY 15 MIN PRN Route: IV  PRN Reason: other  PRN Comment:  to manage blood pressure, when verbally ordered by prescriber during the procedure.  Start: 01/22/18 1532   End: 01/22/18 1609   Admin Instructions: Can be given as 10mg x 2 doses, or, as 20mg x 1 dose.  Give to keep Systolic Blood Pressure less than 160 mmHg.  For ordered doses up to 40 mg, give IV Push undiluted over 1 minute.          1609-Med Discontinued          Freq: HOLD Route: XX  Start: 01/22/18 0314   End: 01/22/18 1609   Admin Instructions: ALL ORAL HYPOGLYCEMICS and include glipizide, glyburide, glimepiride, gliclazide, metformin (GLUCOPHAGE, GLUMETZA, FORTAMET, RIOMET) and metformin containing medications: alogliptin/metformin (KAZANO), glipizide/metformin (METAGLIP), glyburide/metformin (GLUCOVANCE), rosiglitazone/metformin (AVANDAMET), dapagliflozin/metformin (XIGDUO XR), sitagliptin/metformin (JANUMET, JANUMET XR), linagliptin/metformin (JENTADUETO), repaglinide/metformin (PRANDIMET), saxagliptin/metformin (KOMBIGLYZE XR), canagliflozin/metformin (INVOKAMET), and pioglitazone/metformin (ACTOPLUS MET, ACTOPLUS  "MET XR).          1609-Med Discontinued          Freq: EVERY 1 HOUR PRN Route: Top  PRN Reason: pain  PRN Comment: with VAD insertion or accessing implanted port.  Start: 01/22/18 0314   End: 01/22/18 1609   Admin Instructions: Do NOT give if patient has a history of allergy to any local anesthetic or any \"laura\" product.   Apply 30 minutes prior to VAD insertion or port access. MAX Dose: 2.5 g (  of 5 g tube)          1609-Med Discontinued          Dose: 30 mL Freq: ONCE PRN Route: SC  PRN Comment: local anesthetic  Start: 01/22/18 1532   End: 01/22/18 1609   Admin Instructions: Dose may be  into smaller doses dependent upon the procedure when ordered by prescriber during the procedure          1609-Med Discontinued          Dose: 1 mL Freq: EVERY 1 HOUR PRN Route: OTHER  PRN Comment: mild pain with VAD insertion or accessing implanted port  Start: 01/22/18 0314   End: 01/22/18 1609   Admin Instructions: Do NOT give if patient has a history of allergy to any local anesthetic or any \"laura\" product. MAX dose 1 mL subcutaneous OR intradermal in divided doses.          1609-Med Discontinued          Dose: 0.5 mg Freq: EVERY 2 HOURS PRN Route: IV  PRN Reason: anxiety  PRN Comment: prior to cath lab procedure  Start: 01/22/18 0314   End: 01/22/18 1609   Admin Instructions: Give on nursing unit.      For IV PUSH: Dilute with equal volume of NS. For ordered doses up to 4 mg give IV Push. Administer each 2mg over 1-5 minutes.          1609-Med Discontinued       Or    Dose: 0.5 mg Freq: EVERY 2 HOURS PRN Route: PO  PRN Reason: anxiety  PRN Comment: prior to cath lab procedure  Start: 01/22/18 0314   End: 01/22/18 1609   Admin Instructions: Give on nursing unit            1609-Med Discontinued          Dose: 0.5-2 mg Freq: EVERY 4 HOURS PRN Route: IV  PRN Reason: other  PRN Comment: when verbally ordered by prescriber during the procedure.  Start: 01/22/18 1532   End: 01/22/18 1609   Admin Instructions: For IV " PUSH: Dilute with equal volume of NS. For ordered doses up to 4 mg give IV Push. Administer each 2mg over 1-5 minutes.          1609-Med Discontinued          Dose: 125 mg Freq: ONCE PRN Route: IV  PRN Comment: when verbally ordered by prescriber during the procedure  Start: 01/22/18 1532   End: 01/22/18 1609   Admin Instructions: Give Doses 125mg and less IV Push over 2-3 minutes - reconstitute with 2 mL of bacteriostatic water if no diluent is provided. Doses greater than 125 mg need to be in at least 50 mL IVPB.          1609-Med Discontinued          Dose: 5 mg Freq: EVERY 5 MIN PRN Route: IV  PRN Reason: other  PRN Comment: when verbally ordered by prescriber during the procedure.  Start: 01/22/18 1532   End: 01/22/18 1609   Admin Instructions: Max of 3 doses  For ordered doses up to 15 mg, give IV Push undiluted over 5-10 minutes.          1609-Med Discontinued          Dose: 0.5-2 mg Freq: EVERY 2 MIN PRN Route: IV  PRN Reason: other  PRN Comment: when verbally ordered by prescriber during the procedure.  Start: 01/22/18 1532   End: 01/22/18 1609   Admin Instructions: Doses can be exceeded under direct oversight of the patient by physician.  For ordered doses up to 2.5 mg give IV Push slowly titrated over a minimum of 2 minutes. Dilute each 1mg in 4mL of NS.          1515 (1 mg)-Given       1530 (1 mg)-Given       1609-Med Discontinued          Dose: 1-2 mg Freq: EVERY 5 MIN PRN Route: IV  PRN Reason: other  PRN Comment: moderate pain/sedation, when verbally ordered by provider during procedure  Start: 01/22/18 1532   End: 01/22/18 1609   Admin Instructions: For ordered doses up to 15 mg give IV Push undiluted over 4-5 minutes.          1609-Med Discontinued          Dose: 0.4 mg Freq: EVERY 5 MIN PRN Route: IV  PRN Reason: other  PRN Comment: when verbally ordered by prescriber during the procedure.  Start: 01/22/18 1532   End: 01/22/18 1609   Admin Instructions: For respiratory rate LESS than or EQUAL to 8.   Partial reversal dose:  0.1 mg titrated q 2 minutes for Analgesia Side Effects Monitoring Sedation Level of 3 (frequently drowsy, arousable, drifts to sleep during conversation).Full reversal dose:  0.4 mg bolus for Analgesia Side Effects Monitoring Sedation Level of 4 (somnolent, minimal or no response to stimulation).  For ordered doses up to 2mg give IVP. Give each 0.4mg over 15 seconds in emergency situations. For non-emergent situations further dilute in 9mL of NS to facilitate titration of response.          1609-Med Discontinued          Dose: 100 mcg Freq: EVERY 1 MIN PRN Route: INTRACORONAR  PRN Comment: when verbally ordered during the procedure   Start: 01/22/18 1532   End: 01/22/18 1609   Admin Instructions: To a max of 1 mg  Protect from light.          1609-Med Discontinued          Dose: 10 mg Freq: ONCE PRN Route: PO  PRN Reason: other  PRN Comment: when verbally ordered by the prescriber during the procedure  Start: 01/22/18 1532   End: 01/22/18 1609   Admin Instructions: Puncture and swallow capsule and give in Cath Lab ONLY.          1609-Med Discontinued          Dose: 0.4 mg Freq: EVERY 5 MIN PRN Route: SL  PRN Reason: chest pain  PRN Comment: when verbally ordered by prescriber during the procedure.  Start: 01/22/18 1532   End: 01/22/18 1609   Admin Instructions: Max of 3 doses in 15 minutes.          1609-Med Discontinued          Rate: 1.4-40 mL/hr Freq: CONTINUOUS PRN Route: IV  PRN Comment: when verbally ordered by prescriber during the procedure.  Start: 01/22/18 1532   End: 01/22/18 1609   Admin Instructions: Start at 0.07 to 0.15 mcg/kg/min. Titrate up by 0.1 to 0.2 mcg/kg/min every 5 minutes PRN as directed by the prescriber to a max of 2 mcg/kg/min  Vesicant.          1609-Med Discontinued          Dose: 100-200 mcg Freq: EVERY 1 MIN PRN Route: INTRACORONAR  PRN Comment: when verbally ordered by prescriber during procedure  Start: 01/22/18 1532   End: 01/22/18 1609   Admin  Instructions: Prescriber will infuse each dose          1609-Med Discontinued          Dose: 100-500 mcg Freq: ONCE PRN Route: IA  PRN Comment: for radial artery sheath protocol, when verbally ordered by prescriber during the procedure  Start: 01/22/18 1532   End: 01/22/18 1609   Admin Instructions: Prescriber will administer the dose.          1609-Med Discontinued          Rate: 2.5-100.1 mL/hr Freq: CONTINUOUS PRN Route: IV  PRN Reason: other  PRN Comment: when verbally ordered by prescriber during procedure  Start: 01/22/18 1532   End: 01/22/18 1609   Admin Instructions: For pulmonary hypertension or hypertensive crisis.  Titrate up by 0.25 to 0.5 mcg/kg/min every 3 minutes PRN as directed by prescriber  Conc: 0.4 mg/mL. Protect from light. Irritant.          1609-Med Discontinued          Dose: 100-200 mcg Freq: EVERY 2 MIN PRN Route: INTRACORONAR  PRN Comment: when ordered by prescriber during procedure.    Start: 01/22/18 1532   End: 01/22/18 1609   Admin Instructions: Provider will infuse each dose.  Protect from light.          1609-Med Discontinued          Dose: 4 mg Freq: EVERY 4 HOURS PRN Route: IV  PRN Reason: nausea  PRN Comment: when verbally ordered by prescriber during procedure  Start: 01/22/18 1532   End: 01/22/18 1609   Admin Instructions: Irritant. For ordered doses up to 4 mg, give IV Push undiluted over 2-5 minutes.          1609-Med Discontinued          Rate: .1 mL/hr Freq: CONTINUOUS Route: IV  Start: 01/22/18 1545   End: 01/22/18 1609   Admin Instructions: Start at lowest dose ordered.  Titrate by 0.1 to 0.3 mcg/kg/min every 5 minutes PRN as directed by prescriber with a max of 6 mcg/kg/min titrated to blood pressure  Vesicant.                 1609-Med Discontinued          Dose:  mcg Freq: EVERY 3 MIN PRN Route: INTRACORONAR  PRN Comment: when verbally ordered by prescriber during procedure   Start: 01/22/18 1532   End: 01/22/18 1609   Admin Instructions: Do not give if HR is  less than 50 bpm - can cause profound bradycardia if given.  Vesicant.          1609-Med Discontinued          Dose: 10 mEq Freq: ONCE PRN Route: IV  PRN Reason: potassium supplementation  PRN Comment: when verbally ordered by prescriber during procedure   Start: 01/22/18 1532   End: 01/22/18 1609   Admin Instructions: for K+ less than 3.5          1609-Med Discontinued          Dose: 20 mEq Freq: ONCE PRN Route: IV  PRN Reason: potassium supplementation  PRN Comment: when verbally ordered by prescriber during procedure   Start: 01/22/18 1532   End: 01/22/18 1609   Admin Instructions: - for K+ less than 3.5  CENTRAL LINE infusions only.          1609-Med Discontinued          Dose: 20 mEq Freq: ONCE PRN Route: PO  PRN Reason: potassium supplementation  Start: 01/22/18 0314   End: 01/22/18 1609   Admin Instructions: If K+ is less than 4.0 MMOL/L  give potassium chloride (K-DUR)  If K+ is less than 3.5 MMOL/L give potassium chloride (K-DUR) and NOTIFY provider for further orders  Prior to cardiac cath lab procedure.  DO NOT CRUSH          1609-Med Discontinued          Dose: 10-60 mg Freq: ONCE PRN Route: PO  PRN Comment: when verbally ordered by prescriber during procedure   Start: 01/22/18 1532   End: 01/22/18 1609   Admin Instructions: May be crushed - must be given immediately after crushing          1609-Med Discontinued          Dose: 6.25-25 mg Freq: EVERY 4 HOURS PRN Route: IV  PRN Reason: other  PRN Comment: when verbally ordered by prescriber during procedure   Start: 01/22/18 1532   End: 01/22/18 1609   Admin Instructions: Maximum rate is 25 mg/minute.  WARNING FOR IV Administration: Intra-arterial injection causes tissue necrosis. **STOP administration if pain or discomfort.  Vesicant. Dilute 25 mg with 10 mL of normal saline in a syringe. Resulting concentration = 2.5 mg/ mL. Administer over 3-5 minutes. High risk of tissue necrosis with extravasation.          1609-Med Discontinued          Dose: 1-5 mg  Freq: ONCE PRN Route: IV  PRN Comment:  when verbally ordered by prescriber during procedure.  Start: 01/22/18 1532   End: 01/22/18 1609   Admin Instructions: TEST DOSE  Give slow IV push          1609-Med Discontinued          Dose:  mg Freq: EVERY 5 MIN PRN Route: IV  PRN Comment:  when verbally ordered by prescriber during procedure.  Start: 01/22/18 1532   End: 01/22/18 1609   Admin Instructions: Can be started 5 minutes after test dose.  Give slow IV push          1609-Med Discontinued          Dose: 50 mEq Freq: EVERY 5 MIN PRN Route: IV  PRN Comment: up to a max of 200 mEq, when verbally ordered by prescriber during the procedure    Start: 01/22/18 1532   End: 01/22/18 1609   Admin Instructions: Vesicant.          1609-Med Discontinued          Dose: 10 mL Freq: ONCE Route: IV  Start: 01/22/18 0315   End: 01/22/18 1609   Admin Instructions: To lock peripheral IV dormant line                 1609-Med Discontinued          Dose: 10 mL Freq: EVERY 10 MIN PRN Route: IV  PRN Reason: other  PRN Comment: for peripheral IV flush post IV meds  Start: 01/22/18 0314   End: 01/22/18 1609         1609-Med Discontinued          Dose: 3 mL Freq: EVERY 8 HOURS Route: IK  Start: 01/22/18 0314   End: 01/22/18 1609   Admin Instructions: And Q1H PRN, to lock peripheral IV dormant line.          0443 (3 mL)-Given       (1113)-Not Given       1609-Med Discontinued          Dose: 3 mL Freq: EVERY 1 HOUR PRN Route: IK  PRN Reason: line flush  Start: 01/22/18 0314   End: 01/22/18 1609   Admin Instructions: for peripheral IV flush post IV meds          1609-Med Discontinued          Dose:  mg Freq: ONCE PRN Route: PO  PRN Comment: when verbally ordered by prescriber during the procedure  Start: 01/22/18 1532   End: 01/22/18 1609         1609-Med Discontinued          Dose: 40 Units Freq: ONCE PRN Route: IV  PRN Reason: other  PRN Comment: when verbally ordered by prescriber during procedure   Start: 01/22/18 1532   End:  01/22/18 1609   Admin Instructions: Vesicant.          1609-Med Discontinued          Dose: 1-2.5 mg Freq: ONCE PRN Route: IA  PRN Comment: when verbally ordered by prescriber during procedure   Start: 01/22/18 1532   End: 01/22/18 1609   Admin Instructions: For radial or brachial access.  Protect from light.          1609-Med Discontinued     Medications 01/17/18 01/18/18 01/19/18 01/20/18 01/21/18 01/22/18 01/23/18

## 2018-01-19 NOTE — IP AVS SNAPSHOT
MRN:[id number]                      After Visit Summary   1/19/2018    Jama Paul    MRN: [id number]           Thank you!     Thank you for choosing [facility] for your care. Our goal is always to provide you with excellent care. Hearing back from our patients is one way we can continue to improve our services. Please take a few minutes to complete the written survey that you may receive in the mail after you visit with us. Thank you!        Patient Information     Date Of Birth          2/13/1931        Designated Caregiver       Most Recent Value    Caregiver    Will someone help with your care after discharge? no      About your hospital stay     You were admitted on:  January 19, 2018 You last received care in the:  [facility]    You were discharged on:  January 23, 2018        Reason for your hospital stay       Dr. Paul: You came to the hospital with shortness of breath and you have systolic heart failure. You were treated with Lisinopril and Furosemide, and that regimen helped a lot and you diuresed just shy of 5 liters and lost 13 lbs of extra fluid. Your atrial fibrillation is stable.  Your right heart cath showed the pressures in your heart were well controlled with the lisinopril and furosemide and Metoprolol. You should continue those medicines and if anyone wants to stop them, you contact Dr. Pizarro immediately.  You also have evidence of pulmonary hypertension and that is likely to be due to your obstructive sleep apnea. It is very important that you contact your former sleep doctor and see him/her ASAP and get this treated.    Your platelet count has been in the 44,000 range and you are in the midst of a workup about that. Please follow up with the Hematologist about that and keep Dr. Pizarro in the loop.  You are going to get therapy to get stronger.    Remember, Dr. Clint Pizarro is your primary doctor and use him frequently and well. God  bless you and do well my friend                  Who to Call     For medical emergencies, please call 911.  For non-urgent questions about your medical care, please call your primary care provider or clinic, [phone]          Attending Provider     Provider Specialty    Gulshan Gibbons MD Emergency Medicine    Brooks Andres MD Internal Medicine       Primary Care Provider Office Phone # Fax #    Clint Pizarro [phone]      After Care Instructions     Activity - Up ad fernanda           Advance Diet as Tolerated       Follow this diet upon discharge: Orders Placed This Encounter      Fluid restriction 2000 ML FLUID, 3 gram sodium      Low Saturated Fat Na <2400 mg            Daily weights       Call Provider for weight gain of more than 2 pounds per day or 2 pounds per week.            Fall precautions           General info for SNF       Length of Stay Estimate: Short Term Care: Estimated # of Days <30  Condition at Discharge: Improving  Level of care:skilled   Rehabilitation Potential: Good  Admission H&P remains valid and up-to-date: Yes  Recent Chemotherapy: N/A  Use Nursing Home Standing Orders: Yes            Mantoux instructions       Give two-step Mantoux (PPD) Per Facility Policy Yes                  Your next 10 appointments already scheduled     Jan 29, 2018 11:10 AM CST   LAB with [facility]   [facility] [place] ([facility])    [address] [place] [address] [phone]           Please do not eat 10-12 hours before your appointment if you are coming in fasting for labs on lipids, cholesterol, or glucose (sugar). This does not apply to pregnant women. Water, hot tea and black coffee (with nothing added) are okay. Do not drink other fluids, diet soda or chew gum.            Jan 29, 2018 12:15 PM CST   Core MD [place] with Long Pugh MD   [facility]   [place] ([facility])    [address]  [facility] [address]   [phone]            Feb 05, 2018 10:30 AM CST   Return Visit with Wesley Benjamin MD   [facility] [place] ([facility])    [address]  [place] [address]   [phone]            Feb 14, 2018  8:30 AM CST   LAB with [facility]   [facility] ([facility])    [address]  [facility] [address]   [phone]           Please do not eat 10-12 hours before your appointment if you are coming in fasting for labs on lipids, cholesterol, or glucose (sugar). This does not apply to pregnant women. Water, hot tea and black coffee (with nothing added) are okay. Do not drink other fluids, diet soda or chew gum.            Feb 14, 2018  9:30 AM CST   CORE Enrollment with ERIC Fernandez CNP   [facility] ([facility])    [address] [place], [place] [address]  [facility] [address]   [phone]              Additional Services     Occupational Therapy Adult Consult       Evaluate and treat as clinically indicated.    Reason:  weakness            Physical Therapy Adult Consult       Evaluate and treat as clinically indicated.    Reason:  weakness                  Future tests that were ordered for you     Basic metabolic panel                 Further instructions from your care team       Plan:     Nursing to notify the Provider(s) and re-consult the [facility] Nurse if wound(s) deteriorate(s) or if the wound care plan needs reevaluation.       Plan of care for wound located on RLE: Daily:  1.  Cleanse and moisturize intact skin on RLE with mild cleanser/lotion.   2.  Cleanse wound with MicroKlenz, pat dry.  3.  Apply piece of dry Aquacel Ag to wound, cutting to fit.    4.  Cover with gauze and secure with Kerlix.  Label with time/date/initials.   5.  Apply Tubigrip stocking in double layer, evenly from base  "of toes to just below knee crease.  Ensure no wrinkles.  Elevate legs and float heels with pillows.       General Recommendations To Control Heart Failure When You Get Home     Instructions To Patients and Families: Please read and check off each of these important instructions as you do them when you get home.           Weight and symptoms      ___ Put a scale in your bathroom  ___ Post a weight chart or calendar next to the scale  ___Weigh yourself every day as soon as you you get up in the morning. You should only be wearing your pajamas. Write your weight on the chart/calendar.  ___ Bring your weight chart/calendar with you to all appointments    ___Call your doctor if you gain 2 pounds in 1 day or 5 pounds in 1 week from your \"dry\" weight (baseline weight). Also call your doctor if you have shortness of breath that gets worse over time, leg swelling or fatigue.         Medicines and diet     ___ Make sure to take your medicines as prescribed.    ___Bring a current list of your medicines and all of your medicine bottles with you to all appointments.    ___ Limit fluids if you still have swelling or shortness of breath, or if your doctor tells you to do so.  ___ Eat less than 2000 mg of sodium (salt) every day. Read food labels, and do not add salt to meals.   ___ Heart healthy diet with low fat and low cholesterol          Activity and suggested lifestyle changes    ___ Stay active. Talk to your doctor about an exercise program that is safe for your heart.    ___ Stop smoking. Reduce alcohol use.      ___ Lose weight if you are overweight. Extra weight puts a lot of stress on the heart.          Control for Leg Swelling   ___ Keep your legs elevated (raised) as needed for swelling. If swelling is uncomfortable or elevation doesn t help, ask your doctor about using ACE wrap or Jobst stockings.          Follow-up appointments   ___ Make a [facility].O.R.E. Clinic appointment with a basic metabolic panel lab draw 3 to 5 " days after you leave the hospital. Call one of the following locations:   [facility]  [phone],  [facility] [phone],  [facility]  [phone].     ___ Make sure to take your medications as prescribed and bring an accurate list of your medications and your weight chart/calendar to your follow up appointment at the C.O.R.E. Clinic for continued education and adjustments          What is the CORE clinic?    The C.O.R.E (Cardiomyopathy, Optimization, Rehabilitation, Education) Clinic is a heart failure specialty clinic within the [facility]. At C.O.R.E., you will work with nurse practitioners to carefully adjust medicines, get education and learn who and when to call if symptoms appear. C.O.R.E nurses specialize in helping you:    better understand your disease.    slow the progress of your disease.    improve the length and quality of your life.    detect future heart problems before they become life threatening.    avoid hospital stays.            Pending Results     Date and Time Order Name Status Description    1/23/2018 0916 Protein immunofixation urine In process     1/23/2018 0909 Free kappa and lambda light chains, urine In process     1/22/2018 0314 EKG 12-lead, tracing only Preliminary     1/21/2018 0000 Platelet associated immunoglobulin In process     1/20/2018 1430 Protein electrophoresis timed urine In process             Statement of Approval     Ordered          01/23/18 0808  I have reviewed and agree with all the recommendations and orders detailed in this document.  EFFECTIVE NOW     Approved and electronically signed by:  Raymundo Proctor MD             Admission Information     Date & Time Provider Department Dept. Phone    1/19/2018 Brooks Andres MD [facility] [phone]      Your Vitals Were     Blood Pressure Pulse  "Temperature Respirations Height Weight    120/77 (BP Location: Left arm) 94 97.4  F (36.3  C) (Oral) 18 1.854 m (6' 1\") 70.1 kg (154 lb 9.6 oz)    Pulse Oximetry BMI (Body Mass Index)                97% 20.4 kg/m2          [email or web address] Information     [email or web address] lets you send messages to your doctor, view your test results, renew your prescriptions, schedule appointments and more. To sign up, go to [email or web address] . Click on \"Log in\" on the left side of the screen, which will take you to the Welcome page. Then click on \"Sign up Now\" on the right side of the page.     You will be asked to enter the access code listed below, as well as some personal information. Please follow the directions to create your username and password.     Your access code is: [email or web address]  Expires: 4/3/2018  2:08 PM     Your access code will  in 90 days. If you need help or a new code, please call your [facility] clinic or [phone].        Care EveryWhere ID     This is your Care EveryWhere ID. This could be used by other organizations to access your [facility] medical records  [id number]        Equal Access to Services     CECILE MOURA AH: Caridad dunhamo Somilaali, waaxda luqadaha, qaybta kaalmada adeegyada, emani avalos. So [facility] [phone].    ATENCIÓN: Si habla español, tiene a cleaning disposición servicios gratuitos de asistencia lingüística. Llame al [phone].    We comply with applicable federal civil rights and [place] laws. We do not discriminate on the basis of race, color, national origin, age, disability, sex, sexual orientation or gender identity.                             Review of your medicines      START taking        Dose / Directions    melatonin 1 MG Tabs tablet   Used for:  Generalized weakness        Dose:  1 mg   Take 1 tablet (1 mg) by mouth nightly as needed for sleep   Quantity:  15 tablet   Refills:  0       potassium chloride SA 20 MEQ CR tablet   Commonly known as:  " K-DUR/KLOR-CON M   Used for:  Acute congestive heart failure, unspecified congestive heart failure type (H)        Dose:  20 mEq   Take 1 tablet (20 mEq) by mouth daily   Quantity:  30 tablet   Refills:  0         CONTINUE these medicines which have NOT CHANGED        Dose / Directions    CYANOCOBALAMIN PO        Dose:  500 mcg   Take 500 mcg by mouth daily   Refills:  0       furosemide 20 MG tablet   Commonly known as:  LASIX   Used for:  Atrial fibrillation with rapid ventricular response (H), Acute on chronic systolic congestive heart failure (H)        Dose:  20 mg   Take 1 tablet (20 mg) by mouth as needed   Quantity:  30 tablet   Refills:  11       lisinopril 5 MG tablet   Commonly known as:  PRINIVIL/ZESTRIL   Used for:  Essential hypertension, Acute congestive heart failure, unspecified congestive heart failure type (H)        Dose:  5 mg   Take 1 tablet (5 mg) by mouth daily   Quantity:  30 tablet   Refills:  0       metoprolol succinate 100 MG 24 hr tablet   Commonly known as:  TOPROL-XL   Used for:  Atrial fibrillation with rapid ventricular response (H)        Dose:  100 mg   Take 1 tablet (100 mg) by mouth 2 times daily   Quantity:  180 tablet   Refills:  3       * Multi-vitamin Tabs tablet        Dose:  1 tablet   Take 1 tablet by mouth daily   Refills:  0       * ICAPS AREDS FORMULA PO        Dose:  1 tablet   Take 1 tablet by mouth 2 times daily   Refills:  0       vitamin B complex with vitamin C Tabs tablet        Dose:  1 tablet   Take 1 tablet by mouth daily   Refills:  0       VITAMIN C PO        Dose:  500 mg   Take 500 mg by mouth daily   Refills:  0       VITAMIN D (CHOLECALCIFEROL) PO        Dose:  2000 Units   Take 2,000 Units by mouth daily   Refills:  0       warfarin 2.5 MG tablet   Commonly known as:  COUMADIN        Dose:  2.5-5 mg   Take 2.5-5 mg by mouth - For A-fib INR goal 2-3 5 mg on Monday, Wednesday and Friday 2.5 mg on all other days   Refills:  0       * Notice:  This list  has 2 medication(s) that are the same as other medications prescribed for you. Read the directions carefully, and ask your doctor or other care provider to review them with you.         Where to get your medicines      Some of these will need a paper prescription and others can be bought over the counter. Ask your nurse if you have questions.     You don't need a prescription for these medications     lisinopril 5 MG tablet    melatonin 1 MG Tabs tablet    potassium chloride SA 20 MEQ CR tablet                Protect others around you: Learn how to safely use, store and throw away your medicines at www.disposemymeds.org.             Medication List: This is a list of all your medications and when to take them. Check marks below indicate your daily home schedule. Keep this list as a reference.      Medications           Morning Afternoon Evening Bedtime As Needed    CYANOCOBALAMIN PO   Take 500 mcg by mouth daily                                   furosemide 20 MG tablet   Commonly known as:  LASIX   Take 1 tablet (20 mg) by mouth as needed   Last time this was given:  20 mg on 1/23/2018  9:51 AM                                   lisinopril 5 MG tablet   Commonly known as:  PRINIVIL/ZESTRIL   Take 1 tablet (5 mg) by mouth daily   Last time this was given:  5 mg on 1/23/2018  9:51 AM                                   melatonin 1 MG Tabs tablet   Take 1 tablet (1 mg) by mouth nightly as needed for sleep                                   metoprolol succinate 100 MG 24 hr tablet   Commonly known as:  TOPROL-XL   Take 1 tablet (100 mg) by mouth 2 times daily   Last time this was given:  100 mg on 1/23/2018  9:51 AM                                      * Multi-vitamin Tabs tablet   Take 1 tablet by mouth daily                                   * ICAPS AREDS FORMULA PO   Take 1 tablet by mouth 2 times daily                                   potassium chloride SA 20 MEQ CR tablet   Commonly known as:  K-DUR/KLOR-CON M   Take  1 tablet (20 mEq) by mouth daily                                   vitamin B complex with vitamin C Tabs tablet   Take 1 tablet by mouth daily                                   VITAMIN C PO   Take 500 mg by mouth daily                                   VITAMIN D (CHOLECALCIFEROL) PO   Take 2,000 Units by mouth daily                                   warfarin 2.5 MG tablet   Commonly known as:  COUMADIN   Take 2.5-5 mg by mouth - For A-fib INR goal 2-3 5 mg on Monday, Wednesday and Friday 2.5 mg on all other days                                   * Notice:  This list has 2 medication(s) that are the same as other medications prescribed for you. Read the directions carefully, and ask your doctor or other care provider to review them with you.

## 2018-01-19 NOTE — IP AVS SNAPSHOT
` ` Patient Information     Patient Name Sex     Jama Paul ([id number]) Male 1931       Room Bed    [facility] 0262-01      Patient Demographics     Address Phone    [address]  [place] [address] [phone] (Home)  none (Work)  [phone] (Mobile)      Patient Ethnicity & Race     Ethnic Group Patient Race    American White      Emergency Contact(s)     Name Relation Home Work Mobile    Paige Paul Spouse [phone] none [phone]    rajesh grenee Daughter [phone] none [phone]    Jama Paul Son [phone] none none      Documents on File        Status Date Received Description       Documents for the Patient    Face Sheet  ()      Privacy Notice - [place] Received 14     External Medication Information Consent Accepted 17     Patient ID Received 14     Consent for Services - Hospital/Clinic Received () 04/15/11     Consent for Services - [facility] Received () 11     Privacy Notice - [facility] Received 11     Insurance Card Received 11 BCBS    Insurance Card Received 11 MEDICARE    Consent for Services - Hospital/Clinic Received () 08/10/12     Consent for Services - Hospital/Clinic  () 12 [facility] CONSENT FOR SERVICE    HIM ESLA Authorization - File Only  13 AUTHORIZATION FOR RELEASE OF PROTECTED HEALTH INFORMATION- [facility] 12    Consent for EHR Access  13 Copied from existing Consent for services - C/HOD collected on 2012    [facility] Specified Other       Consent for Services - Hospital/Clinic Received () 13     Consent for Services - Hospital/Clinic  () 13 CONSENT FOR SERVICE    Consent to Communicate Received 05/15/14 PHI Auth    Physical Therapy Re-Certification       Physical Therapy Certification Received 06/17/14 5/15/14-14    Consent for Services - Geriatrics Received 14     Insurance Card Received 14 [facility]     Insurance Card Received 14 Medicare    Consent for Services - Hospital/Clinic Received () 10/08/14     Consent for Services - Hospital/Clinic Received () 12/07/15     Consent for Services - Hospital/Clinic  12/08/15 CONSENT FOR SERVICE    Consent for Services - UMP       Consent for Services/Privacy Notice - Hospital/Clinic-[facility]sign Received () 16     Consent for Services/Privacy Notice - Hospital/Clinic Received 10/04/17     Insurance Card Received 10/04/17     Patient ID Received 10/04/17     Care Everywhere Prospective Auth Received 10/26/17     Patient ID Received 10/26/17     Insurance Card Received 10/26/17 MEDICARE/BCBS     Consent for Services/Privacy Notice - Hospital/Clinic Received 10/26/17     HIM ELSA Authorization - File Only  18 HIM ROL AUTHORIZATION - FILE ONLY    Insurance Card Received (Deleted) 11     CE Persistent Point of Care Auth Received 17 CE Persistent Point of Care Authorization       Documents for the Encounter    CMS IM for Patient Signature Received 18     EMS/Ambulance Record  18 [place] FIRE DEPARTMENT    CMS IM for Patient Signature Received 18 2MM    Cardiac Cath - HIM Scan   Cardiac Cath Report      Admission Information     Attending Provider Admitting Provider Admission Type Admission Date/Time    Brooks Andres MD Nalawadi, Shantesh, MD Emergency 18  0044    Discharge Date Hospital Service Auth/Cert Status Service Area     Hospitalist [facility]    Unit Room/Bed Admission Status        CARDIAC SPEC CARE 0262/0262-01 Admission (Confirmed)       Admission     Complaint    Atrial fibrillation (H)      Hospital Account     Name Acct ID Class Status Primary Coverage    Jama Paul [id number] Inpatient Open MEDICARE - MEDICARE            Guarantor Account (for Hospital Account #[id number])     Name Relation to Pt Service Area Active? Acct Type    Jama Paul  FCS  Yes Personal/Family    Address Phone          [address]  [place] [address] [phone](H)              Coverage Information (for Hospital Account #[id number])     1. MEDICARE/MEDICARE     F/O Payor/Plan Precert #    MEDICARE/MEDICARE     Subscriber Subscriber #    HumbertoJama badillo Luis [id number]    Address Phone    [employer]  [address]  [place] [address] [phone]          2. BCBS/[place]     F/O Payor/Plan Precert #    [place]     Subscriber Subscriber #    Humberto Jama Luis [id number]    Address Phone    [address] [address]  [place], [place] [id number] [phone]

## 2018-01-19 NOTE — ED NOTES
DATE:  1/19/2018   TIME OF RECEIPT FROM LAB:  0140  LAB TEST:  plt  LAB VALUE:  38  RESULTS GIVEN WITH READ-BACK TO (PROVIDER):  Gulshan Gibbons MD  TIME LAB VALUE REPORTED TO PROVIDER:   0142

## 2018-01-19 NOTE — ED PROVIDER NOTES
"  History     Chief Complaint:  Shortness of breath     HPI   Jama Paul is a 86 year old male with a history of hypertension, on Coumadin for atrial fibrillation, and cardiomyopathy who presents with shortness of breath. The patient's significant other reports that last week the patient looked \"phased out\" and had developed a change in speech with lower voice and difficulty talking. By 0900 yesterday, the patient states he woke up with shortness of breath, rapid breaths, and a heavy chest, and he felt like he could barely speak to his significant other for lack of breath and attempts made him lightheaded. Afterwards, he states to have returned to bed and had symptoms last for 20 minutes. As the day progressed, he reports to have felt a worsening discomfort in his chest and overall discomfort. The patient also claimed that walking did not make his pain worse. The patient decided to go to the ER for concern of his heavy chest symptom. Upon presentation, the patient is feeling better but has a sense that something is not right with himself. The patient denies fever and cough, but endorses that his strength is down. The patient has further noted no swelling change in his legs, although he has baseline swelling in both legs with the left being more prominent and the right gradually getting better. The patient denies a history of pulmonary embolism and heart failure. He denies palpitations and syncope.    Allergies:  No known drug allergies    Medications:    Lisinopril  metoprolol  Furosemide  Warfarin     Past Medical History:    Antiplatelet or Antithrombotic Long -Term use  Arrhythmia  Hematoma  Cervical Radiculopathy  Incarcerated Hernia  BCC, Face  Colon Polyps  Elevated PSA  Atrial Fibrillation  Essential Hypertension    Past Surgical History:    Colonoscopy  Explore Groin  Herniorrhaphy Inguinal  Mohs Micrographic Procedure  Phacoemulsification Clear Cornea with Standard Intraocular Lens Implant  Septic " "Olecranon Bursitis    Family History:    History reviewed. No pertinent family history.     Social History:  Marital Status:     Tobacco Status: Never Used  Alcohol Use: No     Review of Systems   Constitutional: Negative for fever.   Respiratory: Positive for chest tightness (heavyness in chest) and shortness of breath. Negative for cough.    Cardiovascular: Positive for chest pain. Negative for leg swelling.   Neurological: Positive for speech difficulty, weakness and light-headedness.   All other systems reviewed and are negative.      Physical Exam   First Vitals:  Patient Vitals for the past 24 hrs:   BP Temp Temp src Heart Rate Resp SpO2 Height Weight   01/19/18 0419 (!) 139/98 - - - - - - -   01/19/18 0411 - - - - 21 - - -   01/19/18 0350 - - - 94 (!) 63 94 % - -   01/19/18 0254 (!) 133/96 - - - - 96 % - -   01/19/18 0230 (!) 142/118 - - - - 99 % - -   01/19/18 0215 - - - 101 21 94 % - -   01/19/18 0200 (!) 134/114 - - 86 21 93 % - -   01/19/18 0130 (!) 125/100 - - 88 - - - -   01/19/18 0129 - - - - 16 97 % - -   01/19/18 0115 - - - 85 16 97 % - -   01/19/18 0100 (!) 126/98 - - 92 16 96 % - -   01/19/18 0057 135/89 - - 100 21 98 % - -   01/19/18 0048 (!) 140/99 97.7  F (36.5  C) Oral 119 20 97 % 1.854 m (6' 1\") 72.6 kg (160 lb)   01/19/18 0045 - - - - - 98 % - -   01/19/18 0044 (!) 140/99 - - - - - - -        Physical Exam  General:                    Nontoxic. Well appearing. Resting comfortably  Head:                                  Scalp, face, and head appear normal  Eyes:                                   Pupils are equal, round, and reactive to light                                              Conjunctivae non-injected and sclerae white  ENT:                                    The external nose is normal                                              Pinnae are normal                                              The oropharynx is normal, mucous membranes moist                                      "         Uvula is in the midline  Neck:                                  Normal range of motion                                              There is no rigidity noted                                              Trachea is in the midline  CV:                                      Irregularly irregular rhythm, regular rate.                                              Normal S1/S2, no S3/S4                                              No murmur or rub  Resp:                                  Lungs are clear and equal bilaterally.  Breath sounds diminished at the bilateral bases.                                              Mild tachypnea                                              No increased work of breathing                                              No rales, wheezing, or rhonchi  GI:                                       Abdomen is soft, no rigidity or guarding                                              No distension, or mass                                              No tenderness or rebound tenderness   MS:                                      Normal muscular tone                                              Chronic ulcer to the medial aspect of the right lower leg without significant erythema induration purulent discharge or bleeding.  No warmth to the area.                                              Symmetric motor strength                                              2+ pitting edema left lower extremity.  Trace pitting edema right lower extremity. DP pulses 2+ and symmetric  Skin:                                   No rash or acute skin lesions noted, except as noted above  Neuro:           Awake and alert                                              Speech is normal and fluent                                              Moves all extremities spontaneously  Psych:                                            Normal affect.  Appropriate interactions.    Emergency Department Course     ECG  (00:50:46):  Rate 106 bpm. LA interval *. QRS duration 82. QT/QTc 356/472. P-R-T axes * 57 10. Interpretation: Atrial Fibrillation with rapid Ventricular Response, Low Voltage QRS, Cannot Rule Out Anterior Infarct (age undetermined), Abnormal ECG. Interpreted at 0201 by Gulshan Gibbons MD on 01/19/2018.    Imaging:  Radiographic findings were communicated with the patient and family who voiced understanding of the findings.    X-ray Chest, 2 views:  IMPRESSION: Small bilateral pleural effusions and bibasilar probable  atelectasis.  Result per radiology.     Laboratory:  CBC: HGB 10.4 (L), PLT 38 (LL) o/w WNL (WBC 6.7)   CMP: Chloride 113 (H), Calcium (6.6 (L), Albumin 2.8 (L), Protein Total 5.2 (L), Alkphos 33 (L) o/w WNL (Creatinine 1.08)  0054: Troponin: 0.030  INR: 3.56 (H)  D Dimer: <0.3  Pro-BNP: 5548 (H)    Interventions:  0319: Lasix 40 mg IV    Emergency Department Course:  Past medical records, nursing notes, and vitals reviewed.  0200: I performed an exam of the patient and obtained history, as documented above.   IV inserted and blood drawn. The patient was placed on continuous cardiac monitoring and pulse oximetry.   The patient was sent for a X-ray while in the emergency department, findings above.     0314: Discussed the patient with Dr. Andres, who will admit the patient to a medical bed for further monitoring, evaluation, and treatment.     0316: I rechecked the patient. Findings and plan explained to the Patient who consents to admission.     Impression & Plan      Medical Decision Making:  Jama Paul is a 86 year old male with a PMH of A-fib on Coumadin who presents for evaluation of shortness of breath. The patient has a history of what sounds to be some heart failure/cardiomyopathy detected on previous echocardiograms, the last being 8 months ago, which showed an EF somewhere between 35-45%. He presents now with acutely worsening shortness of breath and chest heaviness. He denies his  symptoms being exertional or pleuritic in nature.  I considered a broad differential of his dyspnea including CHF exacerbation, PE, dissection, hemothorax, pleural effusion, pneumonia, acute coronary syndrome, reactive airway disease, bronchitis, upper airway obstruction, foreign body, etc.  Given the history and exam plus laboratory findings I feel congestive heart failure is the most likely etiology. BNP is elevated, CXR with bilateral pleural effusions, there is mild peripheral edema. His dyspnea/CHF may be due to underlying valvular disease which has been shown on Echo in the past.   The patient received IV diuretics in ED.  A-fib is rate controlled in the ED. DDimer negative. Patient with mild tachypnea but no significant respiratory failure or hypoxia. Given his symptoms and underlying disease pt will be admitted to the hospitalist for further evaluation and treatment. Pt remained stable during his entire ED course.    Diagnosis:    ICD-10-CM   1. Acute congestive heart failure, unspecified congestive heart failure type (H) I50.9   2. Chronic atrial fibrillation (H) I48.2   3. Shortness of breath R06.02   4. Thrombocytopenia (H) D69.6   5. Supratherapeutic INR R79.1       Matty Riggins  1/19/2018    EMERGENCY DEPARTMENT  I, Matty Riggins, am serving as a scribe at 2:00 AM on 1/19/2018 to document services personally performed by Gulhsan Gibbons MD based on my observations and the provider's statements to me.       Gulshan Gibbons MD  01/19/18 4435

## 2018-01-20 LAB
AMMONIA PLAS-SCNC: 40 UMOL/L (ref 10–50)
ANION GAP SERPL CALCULATED.3IONS-SCNC: 6 MMOL/L (ref 3–14)
BUN SERPL-MCNC: 28 MG/DL (ref 7–30)
CALCIUM SERPL-MCNC: 8.3 MG/DL (ref 8.5–10.1)
CHLORIDE SERPL-SCNC: 105 MMOL/L (ref 94–109)
CO2 SERPL-SCNC: 29 MMOL/L (ref 20–32)
CREAT SERPL-MCNC: 1.27 MG/DL (ref 0.66–1.25)
ERYTHROCYTE [DISTWIDTH] IN BLOOD BY AUTOMATED COUNT: 19.1 % (ref 10–15)
GFR SERPL CREATININE-BSD FRML MDRD: 54 ML/MIN/1.7M2
GLUCOSE SERPL-MCNC: 97 MG/DL (ref 70–99)
HCT VFR BLD AUTO: 35.8 % (ref 40–53)
HGB BLD-MCNC: 11.8 G/DL (ref 13.3–17.7)
INR PPP: 2.31 (ref 0.86–1.14)
MCH RBC QN AUTO: 31.5 PG (ref 26.5–33)
MCHC RBC AUTO-ENTMCNC: 33 G/DL (ref 31.5–36.5)
MCV RBC AUTO: 96 FL (ref 78–100)
PLATELET # BLD AUTO: 44 10E9/L (ref 150–450)
POTASSIUM SERPL-SCNC: 3.5 MMOL/L (ref 3.4–5.3)
RBC # BLD AUTO: 3.75 10E12/L (ref 4.4–5.9)
SODIUM SERPL-SCNC: 140 MMOL/L (ref 133–144)
WBC # BLD AUTO: 8.3 10E9/L (ref 4–11)

## 2018-01-20 PROCEDURE — 21400004 ZZH R&B CCU CSC INTERMEDIATE

## 2018-01-20 PROCEDURE — 25000132 ZZH RX MED GY IP 250 OP 250 PS 637: Mod: GY | Performed by: INTERNAL MEDICINE

## 2018-01-20 PROCEDURE — 99207 ZZC MOONLIGHTING INDICATOR: CPT | Performed by: INTERNAL MEDICINE

## 2018-01-20 PROCEDURE — 25000128 H RX IP 250 OP 636: Performed by: INTERNAL MEDICINE

## 2018-01-20 PROCEDURE — A9270 NON-COVERED ITEM OR SERVICE: HCPCS | Mod: GY | Performed by: INTERNAL MEDICINE

## 2018-01-20 PROCEDURE — 36415 COLL VENOUS BLD VENIPUNCTURE: CPT | Performed by: INTERNAL MEDICINE

## 2018-01-20 PROCEDURE — 99233 SBSQ HOSP IP/OBS HIGH 50: CPT | Performed by: INTERNAL MEDICINE

## 2018-01-20 PROCEDURE — 99207 ZZC CDG-MDM COMPONENT: MEETS MODERATE - UP CODED: CPT | Performed by: INTERNAL MEDICINE

## 2018-01-20 PROCEDURE — 85610 PROTHROMBIN TIME: CPT | Performed by: INTERNAL MEDICINE

## 2018-01-20 PROCEDURE — 99232 SBSQ HOSP IP/OBS MODERATE 35: CPT | Performed by: INTERNAL MEDICINE

## 2018-01-20 PROCEDURE — 80048 BASIC METABOLIC PNL TOTAL CA: CPT | Performed by: INTERNAL MEDICINE

## 2018-01-20 PROCEDURE — 82140 ASSAY OF AMMONIA: CPT | Performed by: INTERNAL MEDICINE

## 2018-01-20 PROCEDURE — 40000275 ZZH STATISTIC RCP TIME EA 10 MIN

## 2018-01-20 PROCEDURE — 85027 COMPLETE CBC AUTOMATED: CPT | Performed by: INTERNAL MEDICINE

## 2018-01-20 RX ORDER — VIT C/E/ZN/COPPR/LUTEIN/ZEAXAN 60 MG-6 MG
1 CAPSULE ORAL 2 TIMES DAILY
Status: DISCONTINUED | OUTPATIENT
Start: 2018-01-20 | End: 2018-01-23 | Stop reason: HOSPADM

## 2018-01-20 RX ADMIN — LISINOPRIL 5 MG: 5 TABLET ORAL at 08:45

## 2018-01-20 RX ADMIN — FUROSEMIDE 40 MG: 10 INJECTION, SOLUTION INTRAVENOUS at 08:45

## 2018-01-20 RX ADMIN — Medication 1 CAPSULE: at 09:53

## 2018-01-20 RX ADMIN — METOPROLOL SUCCINATE 100 MG: 100 TABLET, EXTENDED RELEASE ORAL at 20:17

## 2018-01-20 RX ADMIN — Medication 1 CAPSULE: at 20:17

## 2018-01-20 RX ADMIN — FUROSEMIDE 40 MG: 10 INJECTION, SOLUTION INTRAVENOUS at 16:56

## 2018-01-20 RX ADMIN — METOPROLOL SUCCINATE 100 MG: 100 TABLET, EXTENDED RELEASE ORAL at 08:45

## 2018-01-20 NOTE — PROGRESS NOTES
Heart Failure Care Pathway  GOALS TO BE MET BEFORE DISCHARGE:    1. Decrease congestion and/or edema with diuretic therapy to achieve near      optimal volume status.            Dyspnea improved:  Yes            Edema improved:     Yes        Net I/O and Weights since admission:          01/15 0700 - 01/20 0659  In: 1180 [P.O.:1180]  Out: 4625 [Urine:4625]  Net: -3445            Vitals:    01/19/18 0048 01/19/18 0707 01/20/18 0500   Weight: 72.6 kg (160 lb) 74.1 kg (163 lb 6.4 oz) 75.6 kg (166 lb 9.6 oz)       2.  O2 sats > 92% on RA or at prior home O2 therapy level.          Current oxygenation status:       SpO2: 98 %         O2 Device: Nasal cannula,  Oxygen Delivery: 2 LPM         Able to wean O2 this shift to keep sats > 92%: No       Does patient use Home O2?  No    3.  Tolerates ambulation and mobility near baseline: No        How many times did the patient ambulate with nursing staff this shift? 1    Please review the Heart Failure Care Pathway for additional HF goal outcomes.       VSS. Monitor remains atrial fib with CVR. Pt. Denies pain. Plan for possible right heart cath on Monday.  Joanie Wells RN  1/20/2018

## 2018-01-20 NOTE — PROGRESS NOTES
[facility]    Cardiology Note    Assessment & Plan   Jama Paul is a 86 year old male who was admitted on 1/19/2018. With   increasing dyspnea at rest and on exertion.  He presents to [facility] with acute decompensated biventricular HF.    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  Moderate-Severe Tricuspid Regurgitation  Moderate Mitral Regurgitation  -Continue IV furosemide 40 mg BID  -Continue Lisinopril 5mg   -Continue metoprolol succinate 100 mg BID  - Plan for RHC on Monday +/- RV biopsy to r/o infiltrative CM  - Last Echocardiogram was  12/8/2017 and EF was 30-35% with severe concentric LVH, moderate to severe global hypokinesia and moderately to severely dilated RV; 1-2+ mitral and 2-3+ tricuspid    Atrial Fibrillation  - holding warfarin for now  - heparin bridging for RHC monday     Obstructive sleep apnea  - agree with CPAP    DVT Prophylaxis: Warfarin  Code Status: Full Code    Disposition: Expected discharge in 2-3 days     Matty García MD    Interval History    Diuresing well.  No acute overnight events. No complaints this morning.    -Data reviewed today:     Physical Exam   Temp: 97.6  F (36.4  C) Temp src: Oral BP: 118/80   Heart Rate: 93 Resp: 18 SpO2: 95 % O2 Device: None (Room air) Oxygen Delivery: 2 LPM  Vitals:    01/19/18 0048 01/19/18 0707 01/20/18 0500   Weight: 72.6 kg (160 lb) 74.1 kg (163 lb 6.4 oz) 75.6 kg (166 lb 9.6 oz)     Vital Signs with Ranges  Temp:  [97.4  F (36.3  C)-97.6  F (36.4  C)] 97.6  F (36.4  C)  Heart Rate:  [72-93] 93  Resp:  [18-20] 18  BP: (113-139)/(73-96) 118/80  SpO2:  [92 %-96 %] 95 %  I/O last 3 completed shifts:  In: 1180 [P.O.:1180]  Out: 4075 [Urine:4075]    Constitutional: Alert, cooperative  Respiratory: Good air movement, clear anteriorly  Cardiovascular: irregularly irregular, variable S1, normal S2 and an S3. + 1/6 holosystolic murmur  loudest at the apex moderate LE edema, JVP elevated,   GI: soft, not tender  Skin/Integumen: stasis dermatitis in the left lower leg, right leg is wrapped      Medications     Reason anticoagulant not prescribed for atrial fibrillation       - MEDICATION INSTRUCTIONS -       - MEDICATION INSTRUCTIONS -         metoprolol succinate  100 mg Oral BID     furosemide  40 mg Intravenous BID     lisinopril  5 mg Oral Daily       Data     Recent Labs  Lab 01/19/18  1150 01/19/18  0533 01/19/18  0054   WBC  --   --  6.7   HGB  --   --  10.4*   MCV  --   --  97   PLT  --   --  38*   INR  --   --  3.56*   NA  --   --  144   POTASSIUM  --   --  3.8   CHLORIDE  --   --  113*   CO2  --   --  23   BUN  --   --  26   CR  --   --  1.08   ANIONGAP  --   --  8   BARON  --   --  6.6*   GLC  --   --  99   ALBUMIN  --   --  2.8*   PROTTOTAL  --   --  5.2*   BILITOTAL  --   --  0.6   ALKPHOS  --   --  33*   ALT  --   --  27   AST  --   --  21   TROPI 0.029 0.023 0.030       Imaging:   No results found for this or any previous visit (from the past 24 hour(s)).

## 2018-01-20 NOTE — PROGRESS NOTES
[facility]  Hospitalist Progress Note  Date of service (when I saw the patient)01/20/2017:         Assessment and Plan:   Jama Paul is a 86 year old male who was admitted on 1/19/2018. 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  - diuresing, I/Os -ve3.355 lit, but wt up 6 lbs down since admission  - continue  IV Lasix 40 mg Bid today, Lisinopril 5 mg & Toprol 100 mg bid  - cardiology following, planning right heart cath on Monday      Atrial fibrillation:  - rate controlled at this time but history of tachycardia and recent work to control his rate  - maintained on Toprol 100 mg bid & warfarin  - PTA warfarin stopped & switch to Heparin ggt with a history of stable INRs in the 2.1 to 2.2 range, last INR 4 days ago. 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       Mild normochromic, normocytic anemia:   which maybe due to chronic disease but could also be just an normal adjustment to age.      Chronic Thrombocytopenia:   Last normal platelet count was on 2/24/2009 when it was 207K, the platelet count on 4/8/2012 was 129 and all subsequent  "platelet counts are under 100K.  Liver disease is a distinct possibility, especially given the low albumin. Other liver tests including Bilirubin, AST, Alk Ptase and ALT are normal or low. Common cause in this area include ETOH. Will check an ammonium level and ask hematology to see.   Addendum: It turns out that he has known about this and Dr. Clint Pizarro has told him not to worry about it until it is under 50K.  He does not drink ETOH very often so that is not the cause and he has no other history of liver disease.  - Hematology consult pending     DVT Prophylaxis: Warfarin  Code Status: Full Code     Disposition: Expected discharge in 3-4 days once consults are done and therapy stablized.     Deidra Joiner MD.  Hospitalist [phone] (7am -6 pm)               Interval History:   Feeling better.              Medications:       metoprolol succinate  100 mg Oral BID     furosemide  40 mg Intravenous BID     lisinopril  5 mg Oral Daily     naloxone, melatonin, Reason anticoagulant not prescribed for atrial fibrillation, HOLD MEDICATION, - MEDICATION INSTRUCTIONS -, potassium chloride, potassium chloride, potassium chloride, potassium chloride with lidocaine, potassium chloride, magnesium sulfate, acetaminophen, acetaminophen, ondansetron **OR** ondansetron, metoprolol, - MEDICATION INSTRUCTIONS -               Physical Exam:   Blood pressure 133/67, temperature 96.6  F (35.9  C), resp. rate 16, height 1.854 m (6' 1\"), weight 75.6 kg (166 lb 9.6 oz), SpO2 97 %.  Wt Readings from Last 4 Encounters:   18 75.6 kg (166 lb 9.6 oz)   18 74.8 kg (165 lb)   17 72.6 kg (160 lb)   17 77.6 kg (171 lb)         Vital Sign Ranges  Temperature Temp  Av.3  F (36.3  C)  Min: 96.6  F (35.9  C)  Max: 97.6  F (36.4  C)   Blood pressure Systolic (24hrs), Av , Min:113 , Max:139        Diastolic (24hrs), Av, Min:67, Max:80      Pulse No Data Recorded   Respirations Resp  Av  Min: 16  Max: 20 "   Pulse oximetry SpO2  Av.8 %  Min: 92 %  Max: 97 %         Intake/Output Summary (Last 24 hours) at 18 0911  Last data filed at 18 0830   Gross per 24 hour   Intake             1420 ml   Output             2900 ml   Net            -1480 ml       Constitutional: Awake, alert, cooperative, no apparent distress   Lungs: crackles L base,no wheezing   Cardiovascular: irregular rate and rhythm, normal S1 and S2, and no murmur noted   Abdomen: Normal bowel sounds, soft, non-distended, non-tender   Skin: No rashes, no cyanosis, no edema   Neuro:                Data:   All laboratory data reviewed

## 2018-01-20 NOTE — PLAN OF CARE
Problem: Patient Care Overview  Goal: Plan of Care/Patient Progress Review  Outcome: No Change  Denies pain, [place], Repeat echo w/color doppler.  Chronic low platelets.  Echo EF 40%.  Normally A&Ox4.  Did wake up confused one time, wondering where he was and why was his sleep apnea test not scheduled.  Reoriented, then was fine after.  Continue to monitor.

## 2018-01-20 NOTE — PLAN OF CARE
Problem: Cardiac: Heart Failure (Adult)  Goal: Signs and Symptoms of Listed Potential Problems Will be Absent, Minimized or Managed (Cardiac: Heart Failure)  Signs and symptoms of listed potential problems will be absent, minimized or managed by discharge/transition of care (reference Cardiac: Heart Failure (Adult) CPG).   Outcome: No Change  Neuro/CMS: A&O x4, 1+ edema in BLEs  CV: Afib CVR  Resp/O2: 95% on 2 L NS, coarse LLL  GI/Diet: cardiac diet, good appetite  : Voiding adequately  Skin/Incisions: RLE wound wrapped - CDI  Activity: Ax1 to BR  Lines: PIV SL  VS/Pain: Denies pain, VSS  D/C Plan: TBD

## 2018-01-20 NOTE — PLAN OF CARE
Problem: Cardiac: Heart Failure (Adult)  Goal: Signs and Symptoms of Listed Potential Problems Will be Absent, Minimized or Managed (Cardiac: Heart Failure)  Signs and symptoms of listed potential problems will be absent, minimized or managed by discharge/transition of care (reference Cardiac: Heart Failure (Adult) CPG).   Outcome: Improving  Heart Failure Care Pathway  GOALS TO BE MET BEFORE DISCHARGE:    1. Decrease congestion and/or edema with diuretic therapy to achieve near      optimal volume status.            Dyspnea improved:  Yes            Edema improved:     Yes        Net I/O and Weights since admission:          01/14 2300 - 01/19 2259  In: 1180 [P.O.:1180]  Out: 4075 [Urine:4075]  Net: -2895            Vitals:    01/19/18 0048 01/19/18 0707   Weight: 72.6 kg (160 lb) 74.1 kg (163 lb 6.4 oz)       2.  O2 sats > 92% on RA or at prior home O2 therapy level.          Current oxygenation status:       SpO2: 95 %         O2 Device: None (Room air),  Oxygen Delivery: 2 LPM         Able to wean O2 this shift to keep sats > 92%:  Yes       Does patient use Home O2? No    3.  Tolerates ambulation and mobility near baseline: Yes        How many times did the patient ambulate with nursing staff this shift? 1    Please review the Heart Failure Care Pathway for additional HF goal outcomes.    Bright Garza RN  1/20/2018

## 2018-01-21 ENCOUNTER — APPOINTMENT (OUTPATIENT)
Dept: OCCUPATIONAL THERAPY | Facility: CLINIC | Age: 83
DRG: 287 | End: 2018-01-21
Payer: MEDICARE

## 2018-01-21 LAB
ANION GAP SERPL CALCULATED.3IONS-SCNC: 8 MMOL/L (ref 3–14)
BUN SERPL-MCNC: 25 MG/DL (ref 7–30)
CALCIUM SERPL-MCNC: 7.8 MG/DL (ref 8.5–10.1)
CHLORIDE SERPL-SCNC: 101 MMOL/L (ref 94–109)
CO2 SERPL-SCNC: 30 MMOL/L (ref 20–32)
CREAT SERPL-MCNC: 1.13 MG/DL (ref 0.66–1.25)
GFR SERPL CREATININE-BSD FRML MDRD: 61 ML/MIN/1.7M2
GLUCOSE SERPL-MCNC: 206 MG/DL (ref 70–99)
INR PPP: 1.62 (ref 0.86–1.14)
LMWH PPP CHRO-ACNC: <0.1 IU/ML
POTASSIUM SERPL-SCNC: 3.8 MMOL/L (ref 3.4–5.3)
SODIUM SERPL-SCNC: 139 MMOL/L (ref 133–144)

## 2018-01-21 PROCEDURE — 83883 ASSAY NEPHELOMETRY NOT SPEC: CPT

## 2018-01-21 PROCEDURE — A9270 NON-COVERED ITEM OR SERVICE: HCPCS | Mod: GY | Performed by: INTERNAL MEDICINE

## 2018-01-21 PROCEDURE — 99232 SBSQ HOSP IP/OBS MODERATE 35: CPT | Performed by: INTERNAL MEDICINE

## 2018-01-21 PROCEDURE — 86023 IMMUNOGLOBULIN ASSAY: CPT

## 2018-01-21 PROCEDURE — 21400004 ZZH R&B CCU CSC INTERMEDIATE

## 2018-01-21 PROCEDURE — 25000132 ZZH RX MED GY IP 250 OP 250 PS 637: Mod: GY | Performed by: INTERNAL MEDICINE

## 2018-01-21 PROCEDURE — 97166 OT EVAL MOD COMPLEX 45 MIN: CPT | Mod: GO

## 2018-01-21 PROCEDURE — 99207 ZZC CDG-MDM COMPONENT: MEETS MODERATE - UP CODED: CPT | Performed by: INTERNAL MEDICINE

## 2018-01-21 PROCEDURE — 36415 COLL VENOUS BLD VENIPUNCTURE: CPT | Performed by: INTERNAL MEDICINE

## 2018-01-21 PROCEDURE — 86334 IMMUNOFIX E-PHORESIS SERUM: CPT

## 2018-01-21 PROCEDURE — 97535 SELF CARE MNGMENT TRAINING: CPT | Mod: GO

## 2018-01-21 PROCEDURE — 97110 THERAPEUTIC EXERCISES: CPT | Mod: GO

## 2018-01-21 PROCEDURE — 25000128 H RX IP 250 OP 636

## 2018-01-21 PROCEDURE — 82232 ASSAY OF BETA-2 PROTEIN: CPT

## 2018-01-21 PROCEDURE — 25000128 H RX IP 250 OP 636: Performed by: INTERNAL MEDICINE

## 2018-01-21 PROCEDURE — 85610 PROTHROMBIN TIME: CPT | Performed by: INTERNAL MEDICINE

## 2018-01-21 PROCEDURE — 99233 SBSQ HOSP IP/OBS HIGH 50: CPT | Performed by: INTERNAL MEDICINE

## 2018-01-21 PROCEDURE — 82784 ASSAY IGA/IGD/IGG/IGM EACH: CPT

## 2018-01-21 PROCEDURE — 85520 HEPARIN ASSAY: CPT | Performed by: INTERNAL MEDICINE

## 2018-01-21 PROCEDURE — 40000133 ZZH STATISTIC OT WARD VISIT

## 2018-01-21 PROCEDURE — 00000402 ZZHCL STATISTIC TOTAL PROTEIN

## 2018-01-21 PROCEDURE — 36415 COLL VENOUS BLD VENIPUNCTURE: CPT

## 2018-01-21 PROCEDURE — 80048 BASIC METABOLIC PNL TOTAL CA: CPT | Performed by: INTERNAL MEDICINE

## 2018-01-21 PROCEDURE — 84165 PROTEIN E-PHORESIS SERUM: CPT

## 2018-01-21 RX ORDER — FUROSEMIDE 10 MG/ML
20 INJECTION INTRAMUSCULAR; INTRAVENOUS
Status: DISCONTINUED | OUTPATIENT
Start: 2018-01-21 | End: 2018-01-23

## 2018-01-21 RX ADMIN — FUROSEMIDE 20 MG: 10 INJECTION, SOLUTION INTRAVENOUS at 16:11

## 2018-01-21 RX ADMIN — METOPROLOL SUCCINATE 100 MG: 100 TABLET, EXTENDED RELEASE ORAL at 09:19

## 2018-01-21 RX ADMIN — Medication 3200 UNITS: at 21:07

## 2018-01-21 RX ADMIN — Medication 1 CAPSULE: at 21:08

## 2018-01-21 RX ADMIN — METOPROLOL SUCCINATE 100 MG: 100 TABLET, EXTENDED RELEASE ORAL at 21:09

## 2018-01-21 RX ADMIN — HEPARIN SODIUM 800 UNITS/HR: 10000 INJECTION, SOLUTION INTRAVENOUS at 11:18

## 2018-01-21 RX ADMIN — Medication 1 CAPSULE: at 09:19

## 2018-01-21 ASSESSMENT — ACTIVITIES OF DAILY LIVING (ADL)
PREVIOUS_RESPONSIBILITIES: MEDICATION MANAGEMENT;FINANCES;DRIVING
IADL_COMMENTS: PT HAS SUPPORT OF SPOUSE FOR IADLS AS NEEDED

## 2018-01-21 NOTE — PLAN OF CARE
Problem: Patient Care Overview  Goal: Plan of Care/Patient Progress Review  OT/CR: Order received, eval completed and treatment initiated. Pt is an 86 year old male who was admitted on 1/19/2018 with increasing dyspnea at rest and on exertion, found to have acute exacerbation of Chronic Systolic heart failure, bilateral pleural effusions. Pt lives with spouse in house, stairs to enter/within. Pt reports indep in all ADLs, IADLs and mobility tasks with no AD inside the house, uses B walking poles and/or 4WW for increased distance in community.     Discharge Planner OT   Patient plan for discharge: Home  Current status: Pt completed bed mobility supine <> sit EOB with SBA, transfers with CGA/min A. Pt ambulated in hallway ~300 ft FWW level requiring min/mod A for balance deficits due to 2 significant LOB as well as 2 episodes of knee buckling while ambulating requiring assist to self correct. Pt reports balance is below baseline. Pt required min A for toileting tasks for brief change due to soiled brief, CGA for LE dressing and grooming tasks. /71, HR 88 at rest; /58,  post activity.   Barriers to return to prior living situation: Current level of assist, impaired balance, FALL RISK, decreased strength and functional activity tolerance, impaired safety  Recommendations for discharge: TCU, however pending progress with mobility and improvement in balance, may be able to return home with HH therapies. PT consult requested to address balance deficits.  Rationale for recommendations: Pt below baseline level of functioning and would benefit from skilled OT/CR to maximize safety and indep in all ADLs, IADLs and mobility tasks, improve strength and functional activity tolerance as well as CV strengthening and education.        Entered by: Desi Araujo 01/21/2018 3:06 PM

## 2018-01-21 NOTE — PROGRESS NOTES
[facility]  Hospitalist Progress Note  Date of service (when I saw the patient)01/21/2018:         Assessment and Plan:   Jama Paul is a 86 year old male who was admitted on 1/19/2018. 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  - diuresing well I/Os -ve 4,525 lit, but wt down 10 lbs down since admission, appears euvolemic   - currently on IV Lasix 40 mg Bid today, Lisinopril 5 mg & Toprol 100 mg bid  - creatinine creeping up 1.08-> 1.27 -> 1.13 today  - lasix decreased to 20 mg bid, repeat BMP in am  - cardiology following, planning right heart cath on Monday       Atrial fibrillation:  - rate controlled at this time but history of tachycardia and recent work to control his rate  - maintained on Toprol 100 mg bid & warfarin PTA was 2.5mg daily and 5mg on MWF  - PTA warfarin stopped in hospital, INR drifted down to 1.62 today  - pharmacy following, started IV heparin to bridge this am's INR until interventions completed.      Obstructive sleep apnea:  - not using CPAP any more  - Will start CPAP here       Pedal edema with stasis dermatitis:  - some of this maybe due to standing for prolonged times as he was an Opthalmologic surgeon, but there is likely right sided heart failure, some of which is due to left sided heart failure, but some maybe due to pulmonary hypertension from untreated OLIVA.   - discussed better dietary compliance at d/c , poss may benefit from a fluid restriction.       Chronic normochromic,  "normocytic anemia:   Chronic Thrombocytopenia:   Last normal platelet count was on 2009 when it was 207K, the platelet count on 2012 was 129 and all subsequent platelet counts are under 100K.  Liver disease is a distinct possibility, especially given the low albumin. Other liver tests including Bilirubin, AST, Alk Ptase and ALT are normal or low. Common cause in this area include ETOH.   - counts stable, no obvious bleeding  - hematology consult requested on admission,pending  - cbc in am      DVT Prophylaxis: anticoagulated  Code Status: Full Code      Disposition: Expected discharge pending completing cardiac w/u.PT/OT following.    Deidra Joiner MD.  Hospitalist [phone] (7am -6 pm)                        Interval History:   Feeling better , has multiple questions that answered.              Medications:       multivitamin  with lutein  1 capsule Oral BID     metoprolol succinate  100 mg Oral BID     furosemide  40 mg Intravenous BID     lisinopril  5 mg Oral Daily     naloxone, melatonin, Reason anticoagulant not prescribed for atrial fibrillation, - MEDICATION INSTRUCTIONS -, potassium chloride, potassium chloride, potassium chloride, potassium chloride with lidocaine, potassium chloride, magnesium sulfate, acetaminophen, acetaminophen, ondansetron **OR** ondansetron, metoprolol, - MEDICATION INSTRUCTIONS -               Physical Exam:   Blood pressure 100/68, temperature 97.9  F (36.6  C), temperature source Oral, resp. rate 18, height 1.854 m (6' 1\"), weight 68 kg (150 lb), SpO2 93 %.  Wt Readings from Last 4 Encounters:   18 68 kg (150 lb)   18 74.8 kg (165 lb)   17 72.6 kg (160 lb)   17 77.6 kg (171 lb)         Vital Sign Ranges  Temperature Temp  Av.5  F (36.4  C)  Min: 96.6  F (35.9  C)  Max: 97.9  F (36.6  C)   Blood pressure Systolic (24hrs), Av , Min:100 , Max:133        Diastolic (24hrs), Av, Min:56, Max:85      Pulse No Data Recorded "   Respirations Resp  Av.2  Min: 16  Max: 18   Pulse oximetry SpO2  Av.2 %  Min: 93 %  Max: 98 %         Intake/Output Summary (Last 24 hours) at 18 0749  Last data filed at 18 2200   Gross per 24 hour   Intake             1220 ml   Output             2300 ml   Net            -1080 ml       Constitutional: Awake, alert, cooperative, no apparent distress   Lungs: Clear to auscultation bilaterally, no crackles or wheezing   Cardiovascular: Regular rate and rhythm, normal S1 and S2, systolic murmur noted   Abdomen: Normal bowel sounds, soft, non-distended, non-tender   Skin: No rashes, no cyanosis, no edema   Neuro:                Data:        Lab Results   Component Value Date     2018     2018     2017    Lab Results   Component Value Date    CHLORIDE 105 2018    CHLORIDE 113 2018    CHLORIDE 110 2017    Lab Results   Component Value Date    BUN 28 2018    BUN 26 2018    BUN 23 2017      Lab Results   Component Value Date    POTASSIUM 3.5 2018    POTASSIUM 3.8 2018    POTASSIUM 4.2 2017    Lab Results   Component Value Date    CO2 29 2018    CO2 23 2018    CO2 28 2017    Lab Results   Component Value Date    CR 1.27 2018    CR 1.08 2018    CR 1.08 2017

## 2018-01-21 NOTE — PROGRESS NOTES
Placed Pt on CPAP, but only tolerated for short period on time. Refused further use due to discomfort.     1/21/2018  Flora Plummer RRT

## 2018-01-21 NOTE — PROGRESS NOTES
"HEME-ONC PROGRESS NOTE    History: Agent doing okay today with no new worrisome symptoms.    Physical Exam:  VS: Blood pressure 105/64, temperature 97.4  F (36.3  C), temperature source Oral, resp. rate 18, height 1.854 m (6' 1\"), weight 68 kg (150 lb), SpO2 91 %.  GEN- Alert and orientedx3 in NAD.  Exam deferred    LABS:   CBC Results: From yesterday  Recent Labs   Lab Test  01/20/18   0626   WBC  8.3   RBC  3.75*   HGB  11.8*   HCT  35.8*   MCV  96   MCH  31.5   MCHC  33.0   RDW  19.1*   PLT  44*       Last Basic Metabolic Panel:  Lab Results   Component Value Date     01/21/2018      Lab Results   Component Value Date    POTASSIUM 3.8 01/21/2018     Lab Results   Component Value Date    CHLORIDE 101 01/21/2018     Lab Results   Component Value Date    BARON 7.8 01/21/2018     Lab Results   Component Value Date    CO2 30 01/21/2018     Lab Results   Component Value Date    BUN 25 01/21/2018     Lab Results   Component Value Date    CR 1.13 01/21/2018     Lab Results   Component Value Date     01/21/2018       Liver Function Studies:   Recent Labs   Lab Test  01/19/18   0054   PROTTOTAL  5.2*   ALBUMIN  2.8*   BILITOTAL  0.6   ALKPHOS  33*   AST  21   ALT  27       Coagulation Studies:  Lab Results   Component Value Date    INR 1.62 01/21/2018       Imaging: none new    Assessment & Plan: 86-year-old white male with congestive heart failure (CHF) -   1)HEME/ONC- Given concerns by the consulting cardiologist about possible clonal plasmacytoid disease that could be contributing to Mr. Paul's CHF and for what appears to be more moderate thrombocytopenia, I recommended the following yesterday:  1.  Monoclonal protein indices of the peripheral blood and urine (see above);  2.  Peripheral smear.  -To look for red blood cell coining/rouleaux or other dysplastic/abnormal features that would prompt bone marrow biopsy consideration.  3.  Platelet antibodies. - To rule out immune causes for thrombocytopenia    At " present, the results are pending.     Should we wish to further exclude amyloidosis, one could consider fat pad/bone marrow biopsy for Congo red staining.  In addition, I will send peripheral blood light chain analysis today as well.    Depending on the above, there may be need for further hematologic evaluation such as bone marrow biopsy,    Lisandro Rubio MD, MS  Hematologist-Oncologist  [facility]

## 2018-01-21 NOTE — PLAN OF CARE
Problem: Patient Care Overview  Goal: Plan of Care/Patient Progress Review  Outcome: No Change  Pt AOx4, forgetful. VSS on RA. Denies pain, chest pain and shortness of breath. LS clear. Refuses bipap overnight. Incontinent of urine at times, voids per urinal. Up SBA. Tele afb RVR/CVR and BBB. D/C pending clinical course. Will continue to monitor.

## 2018-01-21 NOTE — PROGRESS NOTES
01/21/18 1428   Quick Adds   Type of Visit Initial Occupational Therapy Evaluation   Living Environment   Lives With spouse   Living Arrangements house   Home Accessibility stairs to enter home;stairs within home   Number of Stairs to Enter Home 3   Number of Stairs Within Home 15   Transportation Available car;family or friend will provide   Living Environment Comment Pt lives with spouse in house, stairs to enter, full flight to 2nd story bedroom/full bathroom.   Self-Care   Usual Activity Tolerance good   Current Activity Tolerance moderate   Regular Exercise yes   Activity/Exercise Type other (see comments)  (used to walk increased distance around track, has declined)   Activity/Exercise/Self-Care Comment Pt reports he was ambulating around the track months ago, but recently has only been able to walk around the kitchen table   Functional Level Prior   Ambulation 0-->independent   Transferring 0-->independent   Toileting 0-->independent   Bathing 0-->independent   Dressing 0-->independent   Eating 0-->independent   Communication 0-->understands/communicates without difficulty   Swallowing 0-->swallows foods/liquids without difficulty   Cognition 0 - no cognition issues reported   Fall history within last six months no   Which of the above functional risks had a recent onset or change? ambulation;transferring;toileting;bathing;dressing   Prior Functional Level Comment Pt reports indep in all ADLs, IADLs of driving, light home management and mobility tasks with no AD in house, uses B walking poles and/or 4WW in community for increased distances.   General Information   Onset of Illness/Injury or Date of Surgery - Date 01/19/18   Referring Physician Brooks Andres MD   Patient/Family Goals Statement Pt's goal is to d/c home   Additional Occupational Profile Info/Pertinent History of Current Problem Per chart: Pt is an 86 year old male who was admitted on 1/19/2018 with increasing dyspnea at rest and on  exertion, found to have Acute exacerbation of Chronic Systolic heart failure, bilateral pleural effusions   Precautions/Limitations fall precautions   Cognitive Status Examination   Orientation orientation to person, place and time   Level of Consciousness alert   Able to Follow Commands WNL/WFL   Personal Safety (Cognitive) mild impairment;decreased insight to deficits;decreased awareness, need for assist   Visual Perception   Visual Perception Wears glasses   Sensory Examination   Sensory Comments Pt denies numbness/tingling in BUEs   Pain Assessment   Patient Currently in Pain No   Range of Motion (ROM)   ROM Comment WFL   Strength   Strength Comments Generalized weakness BUEs   Hand Strength   Hand Strength Comments Generalized weakness B gross grasp   Bed Mobility Skill: Sit to Supine   Level of [place]: Sit/Supine stand-by assist   Physical Assist/Nonphysical Assist: Sit/Supine 1 person assist   Bed Mobility Skill: Supine to Sit   Level of [place]: Supine/Sit stand-by assist   Physical Assist/Nonphysical Assist: Supine/Sit 1 person assist   Transfer Skill: Bed to Chair/Chair to Bed   Level of [place]: Bed to Chair contact guard   Physical Assist/Nonphysical Assist: Bed to Chair 1 person assist   Weight-Bearing Restrictions weight-bearing as tolerated   Assistive Device - Transfer Skill Bed to Chair Chair to Bed Rehab Eval standard walker   Transfer Skill: Sit to Stand   Level of [place]: Sit/Stand contact guard   Physical Assist/Nonphysical Assist: Sit/Stand 1 person assist   Transfer Skill: Sit to Stand weight-bearing as tolerated   Assistive Device for Transfer: Sit/Stand standard walker   Balance   Balance Comments Pt with noted balance deficits while ambulating in hallway, 2 LOB, 2 episodes of knee buckling requiring min/mod A to correct, pt reporting balance is decreased compared to normal- PT referral recommended   Upper Body Dressing   Level of [place]: Dress Upper Body minimum  assist (75% patients effort)   Physical Assist/Nonphysical Assist: Dress Upper Body 1 person assist   Lower Body Dressing   Level of [place]: Dress Lower Body contact guard   Physical Assist/Nonphysical Assist: Dress Lower Body 1 person assist   Toileting   Level of [place]: Toilet minimum assist (75% patients effort)   Physical Assist/Nonphysical Assist: Toilet 1 person assist   Grooming   Level of [place]: Grooming contact guard   Physical Assist/Nonphysical Assist: Grooming 1 person assist   Instrumental Activities of Daily Living (IADL)   Previous Responsibilities medication management;finances;driving   IADL Comments Pt has support of spouse for IADLs as needed   Activities of Daily Living Analysis   Impairments Contributing to Impaired Activities of Daily Living balance impaired;strength decreased   ADL Comments Pt presents to OT/CR below baseline level of functioning in areas of self cares including bathing, dressing, grooming, toileting   General Therapy Interventions   Planned Therapy Interventions ADL retraining;IADL retraining;strengthening;transfer training;home program guidelines;progressive activity/exercise;risk factor education   Clinical Impression   Criteria for Skilled Therapeutic Interventions Met yes, treatment indicated   OT Diagnosis Impaired ADLs, IADLs and mobility   Influenced by the following impairments Decreased strength and functional activity tolerance, impaired balance, cardiac risk factors   Assessment of Occupational Performance 5 or more Performance Deficits   Identified Performance Deficits Bathing, dressing, grooming, toileting, homemaking, transfers   Clinical Decision Making (Complexity) Moderate complexity   Therapy Frequency daily   Predicted Duration of Therapy Intervention (days/wks) 4 days   Anticipated Discharge Disposition Home with Assist;Home with Home Therapy;Transitional Care Facility   Risks and Benefits of Treatment have been explained. Yes  "  Patient, Family & other staff in agreement with plan of care Yes   Clinical Impression Comments Pt would benefit from skilled OT/CR to maximize safety and indep in all ADLs, IADLS and mobility tasks due to current deficits impacting function, CV strengthening.    [facility] AM-PAC  \"6 Clicks\" Daily Activity Inpatient Short Form   1. Putting on and taking off regular lower body clothing? 3 - A Little   2. Bathing (including washing, rinsing, drying)? 3 - A Little   3. Toileting, which includes using toilet, bedpan or urinal? 3 - A Little   4. Putting on and taking off regular upper body clothing? 3 - A Little   5. Taking care of personal grooming such as brushing teeth? 3 - A Little   6. Eating meals? 4 - None   Daily Activity Raw Score (Score out of 24.Lower scores equate to lower levels of function) 19   Total Evaluation Time   Total Evaluation Time (Minutes) 10     "

## 2018-01-21 NOTE — PLAN OF CARE
Problem: Patient Care Overview  Goal: Plan of Care/Patient Progress Review  Outcome: Improving  Heart Failure Care Pathway  GOALS TO BE MET BEFORE DISCHARGE:    1. Decrease congestion and/or edema with diuretic therapy to achieve near      optimal volume status.            Dyspnea improved: Yes            Edema improved:    Yes        Net I/O and Weights since admission:          01/15 1500 - 01/20 1459  In: 1900 [P.O.:1900]  Out: 5625 [Urine:5625]  Net: -3725            Vitals:    01/19/18 0048 01/19/18 0707 01/20/18 0500   Weight: 72.6 kg (160 lb) 74.1 kg (163 lb 6.4 oz) 75.6 kg (166 lb 9.6 oz)       2.  O2 sats > 92% on RA or at prior home O2 therapy level.          Current oxygenation status:       SpO2: 93 %         O2 Device: None (Room air),  Oxygen Delivery: 2 LPM         Able to wean O2 this shift to keep sats > 92%: Yes       Does patient use Home O2? No    3.  Tolerates ambulation and mobility near baseline: No        How many times did the patient ambulate with nursing staff this shift? 0    Please review the Heart Failure Care Pathway for additional HF goal outcomes.    Padmini Ellington RN  1/20/2018

## 2018-01-22 ENCOUNTER — APPOINTMENT (OUTPATIENT)
Dept: CARDIOLOGY | Facility: CLINIC | Age: 83
DRG: 287 | End: 2018-01-22
Attending: INTERNAL MEDICINE
Payer: MEDICARE

## 2018-01-22 LAB
ALBUMIN SERPL ELPH-MCNC: 3.3 G/DL (ref 3.7–5.1)
ALPHA1 GLOB SERPL ELPH-MCNC: 0.3 G/DL (ref 0.2–0.4)
ALPHA2 GLOB SERPL ELPH-MCNC: 0.5 G/DL (ref 0.5–0.9)
ANION GAP SERPL CALCULATED.3IONS-SCNC: 5 MMOL/L (ref 3–14)
B-GLOBULIN SERPL ELPH-MCNC: 0.6 G/DL (ref 0.6–1)
B2 MICROGLOB SERPL-MCNC: 5.5 MG/L
BUN SERPL-MCNC: 25 MG/DL (ref 7–30)
CALCIUM SERPL-MCNC: 8 MG/DL (ref 8.5–10.1)
CHLORIDE SERPL-SCNC: 101 MMOL/L (ref 94–109)
CO2 BLDCOV-SCNC: 29 MMOL/L (ref 21–28)
CO2 SERPL-SCNC: 33 MMOL/L (ref 20–32)
CREAT SERPL-MCNC: 1.11 MG/DL (ref 0.66–1.25)
ERYTHROCYTE [DISTWIDTH] IN BLOOD BY AUTOMATED COUNT: 19 % (ref 10–15)
GAMMA GLOB SERPL ELPH-MCNC: 1.2 G/DL (ref 0.7–1.6)
GFR SERPL CREATININE-BSD FRML MDRD: 63 ML/MIN/1.7M2
GLUCOSE SERPL-MCNC: 91 MG/DL (ref 70–99)
HCT VFR BLD AUTO: 36.8 % (ref 40–53)
HGB BLD-MCNC: 12 G/DL (ref 13.3–17.7)
IGA SERPL-MCNC: 233 MG/DL (ref 70–380)
IGG SERPL-MCNC: 1090 MG/DL (ref 695–1620)
IGM SERPL-MCNC: 59 MG/DL (ref 60–265)
INR PPP: 1.39 (ref 0.86–1.14)
KAPPA LC UR-MCNC: 5.69 MG/DL (ref 0.33–1.94)
KAPPA LC/LAMBDA SER: 1.77 {RATIO} (ref 0.26–1.65)
LAMBDA LC SERPL-MCNC: 3.22 MG/DL (ref 0.57–2.63)
LMWH PPP CHRO-ACNC: 0.2 IU/ML
M PROTEIN SERPL ELPH-MCNC: 0 G/DL
MCH RBC QN AUTO: 31.1 PG (ref 26.5–33)
MCHC RBC AUTO-ENTMCNC: 32.6 G/DL (ref 31.5–36.5)
MCV RBC AUTO: 95 FL (ref 78–100)
PCO2 BLDV: 47 MM HG (ref 40–50)
PH BLDV: 7.4 PH (ref 7.32–7.43)
PLATELET # BLD AUTO: 44 10E9/L (ref 150–450)
PO2 BLDV: 36 MM HG (ref 25–47)
POTASSIUM SERPL-SCNC: 3.6 MMOL/L (ref 3.4–5.3)
PROT PATTERN SERPL ELPH-IMP: ABNORMAL
PROT PATTERN SERPL IFE-IMP: ABNORMAL
RBC # BLD AUTO: 3.86 10E12/L (ref 4.4–5.9)
SAO2 % BLDV FROM PO2: 69 %
SODIUM SERPL-SCNC: 139 MMOL/L (ref 133–144)
WBC # BLD AUTO: 7.3 10E9/L (ref 4–11)

## 2018-01-22 PROCEDURE — 99152 MOD SED SAME PHYS/QHP 5/>YRS: CPT

## 2018-01-22 PROCEDURE — 82803 BLOOD GASES ANY COMBINATION: CPT

## 2018-01-22 PROCEDURE — 25000128 H RX IP 250 OP 636: Performed by: INTERNAL MEDICINE

## 2018-01-22 PROCEDURE — A9270 NON-COVERED ITEM OR SERVICE: HCPCS | Mod: GY | Performed by: INTERNAL MEDICINE

## 2018-01-22 PROCEDURE — 99152 MOD SED SAME PHYS/QHP 5/>YRS: CPT | Performed by: INTERNAL MEDICINE

## 2018-01-22 PROCEDURE — 80048 BASIC METABOLIC PNL TOTAL CA: CPT | Performed by: INTERNAL MEDICINE

## 2018-01-22 PROCEDURE — 93451 RIGHT HEART CATH: CPT | Mod: 26 | Performed by: INTERNAL MEDICINE

## 2018-01-22 PROCEDURE — 27211181 ZZH BALLOON TIP PRESSURE CR5

## 2018-01-22 PROCEDURE — 93451 RIGHT HEART CATH: CPT

## 2018-01-22 PROCEDURE — 99153 MOD SED SAME PHYS/QHP EA: CPT

## 2018-01-22 PROCEDURE — 25000132 ZZH RX MED GY IP 250 OP 250 PS 637: Mod: GY | Performed by: INTERNAL MEDICINE

## 2018-01-22 PROCEDURE — 21400004 ZZH R&B CCU CSC INTERMEDIATE

## 2018-01-22 PROCEDURE — 27210795 ZZH PAD DEFIB QUICK CR4

## 2018-01-22 PROCEDURE — 85520 HEPARIN ASSAY: CPT | Performed by: INTERNAL MEDICINE

## 2018-01-22 PROCEDURE — 36415 COLL VENOUS BLD VENIPUNCTURE: CPT | Performed by: INTERNAL MEDICINE

## 2018-01-22 PROCEDURE — 85610 PROTHROMBIN TIME: CPT | Performed by: INTERNAL MEDICINE

## 2018-01-22 PROCEDURE — 25000125 ZZHC RX 250: Performed by: INTERNAL MEDICINE

## 2018-01-22 PROCEDURE — 93005 ELECTROCARDIOGRAM TRACING: CPT

## 2018-01-22 PROCEDURE — 93010 ELECTROCARDIOGRAM REPORT: CPT | Performed by: INTERNAL MEDICINE

## 2018-01-22 PROCEDURE — 85027 COMPLETE CBC AUTOMATED: CPT | Performed by: INTERNAL MEDICINE

## 2018-01-22 PROCEDURE — 99232 SBSQ HOSP IP/OBS MODERATE 35: CPT | Performed by: INTERNAL MEDICINE

## 2018-01-22 PROCEDURE — 27210788 ZZH MANIFOLD CR3

## 2018-01-22 PROCEDURE — 27210946 ZZH KIT HC TOTES DISP CR8

## 2018-01-22 PROCEDURE — 4A023N6 MEASUREMENT OF CARDIAC SAMPLING AND PRESSURE, RIGHT HEART, PERCUTANEOUS APPROACH: ICD-10-PCS | Performed by: INTERNAL MEDICINE

## 2018-01-22 RX ORDER — POTASSIUM CHLORIDE 29.8 MG/ML
20 INJECTION INTRAVENOUS
Status: DISCONTINUED | OUTPATIENT
Start: 2018-01-22 | End: 2018-01-22 | Stop reason: HOSPADM

## 2018-01-22 RX ORDER — SODIUM CHLORIDE 9 MG/ML
INJECTION, SOLUTION INTRAVENOUS CONTINUOUS
Status: DISCONTINUED | OUTPATIENT
Start: 2018-01-22 | End: 2018-01-23

## 2018-01-22 RX ORDER — SODIUM CHLORIDE 9 MG/ML
INJECTION, SOLUTION INTRAVENOUS CONTINUOUS
Status: DISCONTINUED | OUTPATIENT
Start: 2018-01-22 | End: 2018-01-22 | Stop reason: HOSPADM

## 2018-01-22 RX ORDER — PROMETHAZINE HYDROCHLORIDE 25 MG/ML
6.25-25 INJECTION, SOLUTION INTRAMUSCULAR; INTRAVENOUS EVERY 4 HOURS PRN
Status: DISCONTINUED | OUTPATIENT
Start: 2018-01-22 | End: 2018-01-22 | Stop reason: HOSPADM

## 2018-01-22 RX ORDER — HYDRALAZINE HYDROCHLORIDE 20 MG/ML
10-20 INJECTION INTRAMUSCULAR; INTRAVENOUS
Status: DISCONTINUED | OUTPATIENT
Start: 2018-01-22 | End: 2018-01-22 | Stop reason: HOSPADM

## 2018-01-22 RX ORDER — FENTANYL CITRATE 50 UG/ML
25-50 INJECTION, SOLUTION INTRAMUSCULAR; INTRAVENOUS
Status: ACTIVE | OUTPATIENT
Start: 2018-01-22 | End: 2018-01-23

## 2018-01-22 RX ORDER — NALOXONE HYDROCHLORIDE 0.4 MG/ML
0.4 INJECTION, SOLUTION INTRAMUSCULAR; INTRAVENOUS; SUBCUTANEOUS EVERY 5 MIN PRN
Status: DISCONTINUED | OUTPATIENT
Start: 2018-01-22 | End: 2018-01-22 | Stop reason: HOSPADM

## 2018-01-22 RX ORDER — CLOPIDOGREL 300 MG/1
300-600 TABLET, FILM COATED ORAL
Status: DISCONTINUED | OUTPATIENT
Start: 2018-01-22 | End: 2018-01-22 | Stop reason: HOSPADM

## 2018-01-22 RX ORDER — LORAZEPAM 2 MG/ML
.5-2 INJECTION INTRAMUSCULAR EVERY 4 HOURS PRN
Status: DISCONTINUED | OUTPATIENT
Start: 2018-01-22 | End: 2018-01-22 | Stop reason: HOSPADM

## 2018-01-22 RX ORDER — POTASSIUM CHLORIDE 1500 MG/1
20 TABLET, EXTENDED RELEASE ORAL
Status: DISCONTINUED | OUTPATIENT
Start: 2018-01-22 | End: 2018-01-22 | Stop reason: HOSPADM

## 2018-01-22 RX ORDER — DIAZEPAM 5 MG
5 TABLET ORAL
Status: DISCONTINUED | OUTPATIENT
Start: 2018-01-22 | End: 2018-01-22 | Stop reason: HOSPADM

## 2018-01-22 RX ORDER — PROTAMINE SULFATE 10 MG/ML
25-100 INJECTION, SOLUTION INTRAVENOUS EVERY 5 MIN PRN
Status: DISCONTINUED | OUTPATIENT
Start: 2018-01-22 | End: 2018-01-22 | Stop reason: HOSPADM

## 2018-01-22 RX ORDER — NITROGLYCERIN 5 MG/ML
100-500 VIAL (ML) INTRAVENOUS
Status: DISCONTINUED | OUTPATIENT
Start: 2018-01-22 | End: 2018-01-22 | Stop reason: HOSPADM

## 2018-01-22 RX ORDER — DEXTROSE MONOHYDRATE 25 G/50ML
12.5-5 INJECTION, SOLUTION INTRAVENOUS EVERY 30 MIN PRN
Status: DISCONTINUED | OUTPATIENT
Start: 2018-01-22 | End: 2018-01-22 | Stop reason: HOSPADM

## 2018-01-22 RX ORDER — ATROPINE SULFATE 0.1 MG/ML
0.5 INJECTION INTRAVENOUS EVERY 5 MIN PRN
Status: ACTIVE | OUTPATIENT
Start: 2018-01-22 | End: 2018-01-23

## 2018-01-22 RX ORDER — ATROPINE SULFATE 0.1 MG/ML
.5-1 INJECTION INTRAVENOUS
Status: DISCONTINUED | OUTPATIENT
Start: 2018-01-22 | End: 2018-01-22 | Stop reason: HOSPADM

## 2018-01-22 RX ORDER — EPINEPHRINE 1 MG/ML
0.3 INJECTION, SOLUTION, CONCENTRATE INTRAVENOUS
Status: DISCONTINUED | OUTPATIENT
Start: 2018-01-22 | End: 2018-01-22 | Stop reason: HOSPADM

## 2018-01-22 RX ORDER — SODIUM NITROPRUSSIDE 25 MG/ML
100-200 INJECTION INTRAVENOUS
Status: DISCONTINUED | OUTPATIENT
Start: 2018-01-22 | End: 2018-01-22 | Stop reason: HOSPADM

## 2018-01-22 RX ORDER — METHYLPREDNISOLONE SODIUM SUCCINATE 125 MG/2ML
125 INJECTION, POWDER, LYOPHILIZED, FOR SOLUTION INTRAMUSCULAR; INTRAVENOUS
Status: DISCONTINUED | OUTPATIENT
Start: 2018-01-22 | End: 2018-01-22 | Stop reason: HOSPADM

## 2018-01-22 RX ORDER — NALOXONE HYDROCHLORIDE 0.4 MG/ML
.1-.4 INJECTION, SOLUTION INTRAMUSCULAR; INTRAVENOUS; SUBCUTANEOUS
Status: DISCONTINUED | OUTPATIENT
Start: 2018-01-22 | End: 2018-01-23 | Stop reason: HOSPADM

## 2018-01-22 RX ORDER — NITROGLYCERIN 0.4 MG/1
0.4 TABLET SUBLINGUAL EVERY 5 MIN PRN
Status: DISCONTINUED | OUTPATIENT
Start: 2018-01-22 | End: 2018-01-22 | Stop reason: HOSPADM

## 2018-01-22 RX ORDER — CLOPIDOGREL BISULFATE 75 MG/1
75 TABLET ORAL
Status: DISCONTINUED | OUTPATIENT
Start: 2018-01-22 | End: 2018-01-22 | Stop reason: HOSPADM

## 2018-01-22 RX ORDER — METOPROLOL TARTRATE 1 MG/ML
5 INJECTION, SOLUTION INTRAVENOUS EVERY 5 MIN PRN
Status: DISCONTINUED | OUTPATIENT
Start: 2018-01-22 | End: 2018-01-22 | Stop reason: HOSPADM

## 2018-01-22 RX ORDER — ASPIRIN 81 MG/1
81 TABLET ORAL DAILY
Status: DISCONTINUED | OUTPATIENT
Start: 2018-01-23 | End: 2018-01-23 | Stop reason: HOSPADM

## 2018-01-22 RX ORDER — LIDOCAINE 40 MG/G
CREAM TOPICAL
Status: DISCONTINUED | OUTPATIENT
Start: 2018-01-22 | End: 2018-01-23 | Stop reason: HOSPADM

## 2018-01-22 RX ORDER — ADENOSINE 3 MG/ML
12-12000 INJECTION, SOLUTION INTRAVENOUS
Status: DISCONTINUED | OUTPATIENT
Start: 2018-01-22 | End: 2018-01-22 | Stop reason: HOSPADM

## 2018-01-22 RX ORDER — LIDOCAINE HYDROCHLORIDE 10 MG/ML
30 INJECTION, SOLUTION EPIDURAL; INFILTRATION; INTRACAUDAL; PERINEURAL
Status: DISCONTINUED | OUTPATIENT
Start: 2018-01-22 | End: 2018-01-22 | Stop reason: HOSPADM

## 2018-01-22 RX ORDER — NITROGLYCERIN 20 MG/100ML
.07-2 INJECTION INTRAVENOUS CONTINUOUS PRN
Status: DISCONTINUED | OUTPATIENT
Start: 2018-01-22 | End: 2018-01-22 | Stop reason: HOSPADM

## 2018-01-22 RX ORDER — MORPHINE SULFATE 2 MG/ML
1-2 INJECTION, SOLUTION INTRAMUSCULAR; INTRAVENOUS EVERY 5 MIN PRN
Status: DISCONTINUED | OUTPATIENT
Start: 2018-01-22 | End: 2018-01-22 | Stop reason: HOSPADM

## 2018-01-22 RX ORDER — PROTAMINE SULFATE 10 MG/ML
1-5 INJECTION, SOLUTION INTRAVENOUS
Status: DISCONTINUED | OUTPATIENT
Start: 2018-01-22 | End: 2018-01-22 | Stop reason: HOSPADM

## 2018-01-22 RX ORDER — LIDOCAINE 40 MG/G
CREAM TOPICAL
Status: DISCONTINUED | OUTPATIENT
Start: 2018-01-22 | End: 2018-01-22 | Stop reason: HOSPADM

## 2018-01-22 RX ORDER — HEPARIN SODIUM 1000 [USP'U]/ML
1000-10000 INJECTION, SOLUTION INTRAVENOUS; SUBCUTANEOUS EVERY 5 MIN PRN
Status: DISCONTINUED | OUTPATIENT
Start: 2018-01-22 | End: 2018-01-22 | Stop reason: HOSPADM

## 2018-01-22 RX ORDER — FUROSEMIDE 10 MG/ML
20-100 INJECTION INTRAMUSCULAR; INTRAVENOUS
Status: DISCONTINUED | OUTPATIENT
Start: 2018-01-22 | End: 2018-01-22 | Stop reason: HOSPADM

## 2018-01-22 RX ORDER — ENALAPRILAT 1.25 MG/ML
1.25-2.5 INJECTION INTRAVENOUS
Status: DISCONTINUED | OUTPATIENT
Start: 2018-01-22 | End: 2018-01-22 | Stop reason: HOSPADM

## 2018-01-22 RX ORDER — BUPIVACAINE HYDROCHLORIDE 2.5 MG/ML
1-10 INJECTION, SOLUTION EPIDURAL; INFILTRATION; INTRACAUDAL
Status: DISCONTINUED | OUTPATIENT
Start: 2018-01-22 | End: 2018-01-22 | Stop reason: HOSPADM

## 2018-01-22 RX ORDER — FLUMAZENIL 0.1 MG/ML
0.2 INJECTION, SOLUTION INTRAVENOUS
Status: ACTIVE | OUTPATIENT
Start: 2018-01-22 | End: 2018-01-23

## 2018-01-22 RX ORDER — NIFEDIPINE 10 MG/1
10 CAPSULE ORAL
Status: DISCONTINUED | OUTPATIENT
Start: 2018-01-22 | End: 2018-01-22 | Stop reason: HOSPADM

## 2018-01-22 RX ORDER — NITROGLYCERIN 5 MG/ML
100-200 VIAL (ML) INTRAVENOUS
Status: DISCONTINUED | OUTPATIENT
Start: 2018-01-22 | End: 2018-01-22 | Stop reason: HOSPADM

## 2018-01-22 RX ORDER — VERAPAMIL HYDROCHLORIDE 2.5 MG/ML
1-2.5 INJECTION, SOLUTION INTRAVENOUS
Status: DISCONTINUED | OUTPATIENT
Start: 2018-01-22 | End: 2018-01-22 | Stop reason: HOSPADM

## 2018-01-22 RX ORDER — ASPIRIN 325 MG
325 TABLET ORAL
Status: DISCONTINUED | OUTPATIENT
Start: 2018-01-22 | End: 2018-01-22 | Stop reason: HOSPADM

## 2018-01-22 RX ORDER — LORAZEPAM 2 MG/ML
0.5 INJECTION INTRAMUSCULAR
Status: DISCONTINUED | OUTPATIENT
Start: 2018-01-22 | End: 2018-01-22 | Stop reason: HOSPADM

## 2018-01-22 RX ORDER — ASPIRIN 81 MG/1
81-324 TABLET, CHEWABLE ORAL
Status: DISCONTINUED | OUTPATIENT
Start: 2018-01-22 | End: 2018-01-22 | Stop reason: HOSPADM

## 2018-01-22 RX ORDER — NALOXONE HYDROCHLORIDE 0.4 MG/ML
.2-.4 INJECTION, SOLUTION INTRAMUSCULAR; INTRAVENOUS; SUBCUTANEOUS
Status: ACTIVE | OUTPATIENT
Start: 2018-01-22 | End: 2018-01-23

## 2018-01-22 RX ORDER — DOBUTAMINE HYDROCHLORIDE 200 MG/100ML
2-20 INJECTION INTRAVENOUS CONTINUOUS PRN
Status: DISCONTINUED | OUTPATIENT
Start: 2018-01-22 | End: 2018-01-22 | Stop reason: HOSPADM

## 2018-01-22 RX ORDER — ACETAMINOPHEN 325 MG/1
325-650 TABLET ORAL EVERY 4 HOURS PRN
Status: DISCONTINUED | OUTPATIENT
Start: 2018-01-22 | End: 2018-01-23 | Stop reason: HOSPADM

## 2018-01-22 RX ORDER — LIDOCAINE HYDROCHLORIDE 10 MG/ML
1-10 INJECTION, SOLUTION EPIDURAL; INFILTRATION; INTRACAUDAL; PERINEURAL
Status: COMPLETED | OUTPATIENT
Start: 2018-01-22 | End: 2018-01-22

## 2018-01-22 RX ORDER — PRASUGREL 10 MG/1
10-60 TABLET, FILM COATED ORAL
Status: DISCONTINUED | OUTPATIENT
Start: 2018-01-22 | End: 2018-01-22 | Stop reason: HOSPADM

## 2018-01-22 RX ORDER — DOPAMINE HYDROCHLORIDE 160 MG/100ML
2-20 INJECTION, SOLUTION INTRAVENOUS CONTINUOUS PRN
Status: DISCONTINUED | OUTPATIENT
Start: 2018-01-22 | End: 2018-01-22 | Stop reason: HOSPADM

## 2018-01-22 RX ORDER — POTASSIUM CHLORIDE 7.45 MG/ML
10 INJECTION INTRAVENOUS
Status: DISCONTINUED | OUTPATIENT
Start: 2018-01-22 | End: 2018-01-22 | Stop reason: HOSPADM

## 2018-01-22 RX ORDER — LORAZEPAM 0.5 MG/1
0.5 TABLET ORAL
Status: DISCONTINUED | OUTPATIENT
Start: 2018-01-22 | End: 2018-01-22 | Stop reason: HOSPADM

## 2018-01-22 RX ORDER — NICARDIPINE HYDROCHLORIDE 2.5 MG/ML
100 INJECTION INTRAVENOUS
Status: DISCONTINUED | OUTPATIENT
Start: 2018-01-22 | End: 2018-01-22 | Stop reason: HOSPADM

## 2018-01-22 RX ORDER — FENTANYL CITRATE 50 UG/ML
25-50 INJECTION, SOLUTION INTRAMUSCULAR; INTRAVENOUS
Status: DISCONTINUED | OUTPATIENT
Start: 2018-01-22 | End: 2018-01-22 | Stop reason: HOSPADM

## 2018-01-22 RX ORDER — PHENYLEPHRINE HCL IN 0.9% NACL 1 MG/10 ML
20-100 SYRINGE (ML) INTRAVENOUS
Status: DISCONTINUED | OUTPATIENT
Start: 2018-01-22 | End: 2018-01-22 | Stop reason: HOSPADM

## 2018-01-22 RX ORDER — DIPHENHYDRAMINE HYDROCHLORIDE 50 MG/ML
25-50 INJECTION INTRAMUSCULAR; INTRAVENOUS
Status: DISCONTINUED | OUTPATIENT
Start: 2018-01-22 | End: 2018-01-22 | Stop reason: HOSPADM

## 2018-01-22 RX ORDER — ONDANSETRON 2 MG/ML
4 INJECTION INTRAMUSCULAR; INTRAVENOUS EVERY 4 HOURS PRN
Status: DISCONTINUED | OUTPATIENT
Start: 2018-01-22 | End: 2018-01-22 | Stop reason: HOSPADM

## 2018-01-22 RX ORDER — FLUMAZENIL 0.1 MG/ML
0.2 INJECTION, SOLUTION INTRAVENOUS
Status: DISCONTINUED | OUTPATIENT
Start: 2018-01-22 | End: 2018-01-22 | Stop reason: HOSPADM

## 2018-01-22 RX ORDER — HYDROCODONE BITARTRATE AND ACETAMINOPHEN 5; 325 MG/1; MG/1
1-2 TABLET ORAL EVERY 4 HOURS PRN
Status: DISCONTINUED | OUTPATIENT
Start: 2018-01-22 | End: 2018-01-23 | Stop reason: HOSPADM

## 2018-01-22 RX ADMIN — HEPARIN SODIUM 1100 UNITS/HR: 10000 INJECTION, SOLUTION INTRAVENOUS at 11:13

## 2018-01-22 RX ADMIN — FENTANYL CITRATE 50 MCG: 50 INJECTION, SOLUTION INTRAMUSCULAR; INTRAVENOUS at 15:30

## 2018-01-22 RX ADMIN — METOPROLOL SUCCINATE 100 MG: 100 TABLET, EXTENDED RELEASE ORAL at 08:12

## 2018-01-22 RX ADMIN — FUROSEMIDE 20 MG: 10 INJECTION, SOLUTION INTRAVENOUS at 18:19

## 2018-01-22 RX ADMIN — SODIUM CHLORIDE: 9 INJECTION, SOLUTION INTRAVENOUS at 06:49

## 2018-01-22 RX ADMIN — FENTANYL CITRATE 50 MCG: 50 INJECTION, SOLUTION INTRAMUSCULAR; INTRAVENOUS at 15:15

## 2018-01-22 RX ADMIN — LISINOPRIL 5 MG: 5 TABLET ORAL at 08:09

## 2018-01-22 RX ADMIN — SODIUM CHLORIDE: 9 INJECTION, SOLUTION INTRAVENOUS at 17:55

## 2018-01-22 RX ADMIN — LIDOCAINE HYDROCHLORIDE 30 MG: 10 INJECTION, SOLUTION EPIDURAL; INFILTRATION; INTRACAUDAL; PERINEURAL at 15:33

## 2018-01-22 RX ADMIN — METOPROLOL SUCCINATE 100 MG: 100 TABLET, EXTENDED RELEASE ORAL at 21:23

## 2018-01-22 RX ADMIN — Medication 1 CAPSULE: at 08:11

## 2018-01-22 RX ADMIN — MIDAZOLAM 1 MG: 1 INJECTION INTRAMUSCULAR; INTRAVENOUS at 15:30

## 2018-01-22 RX ADMIN — ASPIRIN 325 MG: 325 TABLET, DELAYED RELEASE ORAL at 08:12

## 2018-01-22 RX ADMIN — MIDAZOLAM 1 MG: 1 INJECTION INTRAMUSCULAR; INTRAVENOUS at 15:15

## 2018-01-22 RX ADMIN — Medication 1 CAPSULE: at 21:23

## 2018-01-22 NOTE — PLAN OF CARE
Problem: Patient Care Overview  Goal: Plan of Care/Patient Progress Review  PT: Received eval orders.  Discussed with RN who reports appropriate for eval prior to angio, however at time of attempt pt leaving for procedure.  Daughter present and reports family preference is for patient to disch to TCU as feel currently level of mobility is significantly below baseline and do not feel spouse would be able to meet current care needs at home.  Daughter states they have discussed TCU preference with patient as well.

## 2018-01-22 NOTE — PLAN OF CARE
Problem: Patient Care Overview  Goal: Plan of Care/Patient Progress Review  OT/CR: Pt at cath lab

## 2018-01-22 NOTE — PROGRESS NOTES
[facility]    Hospitalist Progress Note    Assessment & Plan   Jama Paul is a 86 year old male who was admitted on 1/19/2018. 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  - Pulmonary hypertension by echo, right heart failure shows that his pressures are good  - Moderate to severe Trucuspid regurgitation, hopefully will improve with medical therapy  - moderate mitral regurgitation, hopefully will improve with medical therapy ( furosemide, Lisinopril and Metoprolol  I&Os down 4.9 liters until this AM and weight is down 13 lbs    Problem 2: Afib   - problems with rate control on Metoprolol Succinate increased to 100mg bid with better control  - Some of the tachycardia maybe due to the CHF so better control of that is important  - Continue the Lisinopril, low sodium diet and Furosemide    Problem 3: Obstructive sleep apnea  - has not been treated for years and that is likely contributing to his heart failure,at least the right side  - Treat with Cpap while here  - will need to see his sleep doctor again    Problem 4: Thrombocytopenia, workup in progress, nothing definitive yet and can be completed as an outpatient    DVT Prophylaxis: Warfarin  Code Status: Full Code    Disposition: Expected discharge in 1 days once stablized.    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 Breathing is much better, no chest pain, headache, dizziness or nausea or vomiting. Voiding ok, bowels ok. Doesn't sleep well here. No abdominal pain and eating ok.  Work up for thrombocytopenia in the works    -Data reviewed today: I reviewed all new labs and imaging results over the last 24 hours. I personally reviewed no images or EKG's today.    Physical Exam   Temp: 97.3  F (36.3  C) Temp src: Axillary BP: 103/74 Pulse: 94 Heart Rate: 79 Resp: 16 SpO2: 92 % O2 Device: None (Room air)    Vitals:     01/20/18 0500 01/21/18 0500 01/22/18 0603   Weight: 75.6 kg (166 lb 9.6 oz) 68 kg (150 lb) 66.7 kg (147 lb)     Vital Signs with Ranges  Temp:  [97.3  F (36.3  C)-98.4  F (36.9  C)] 97.3  F (36.3  C)  Pulse:  [94] 94  Heart Rate:  [] 79  Resp:  [16-18] 16  BP: (103-134)/(58-95) 103/74  SpO2:  [92 %-95 %] 92 %  I/O last 3 completed shifts:  In: 1393.75 [P.O.:480; I.V.:913.75]  Out: 1300 [Urine:1300]    Constitutional: Alert, cooperative  After the  Right heart cath he is sleeping soundly  Respiratory: Lungs are clear to A&P  Cardiovascular: irregularly irregular rhythm, variable S1, normal S2, soft S3, no edema  GI: normal bowel sounds, not tender  Skin/Integumen:  Stasis dermatitis in the left leg, right leg bandaged, see previous notes  Other:      Medications     NaCl       Reason anticoagulant not prescribed for atrial fibrillation       - MEDICATION INSTRUCTIONS -       - MEDICATION INSTRUCTIONS -         sodium chloride (PF)  3 mL Intracatheter Q8H     [START ON 1/23/2018] aspirin EC  81 mg Oral Daily     furosemide  20 mg Intravenous BID     multivitamin  with lutein  1 capsule Oral BID     metoprolol succinate  100 mg Oral BID     lisinopril  5 mg Oral Daily       Data     Recent Labs  Lab 01/22/18  0315 01/21/18  0920 01/20/18  0626 01/19/18  1150 01/19/18  0533 01/19/18  0054   WBC 7.3  --  8.3  --   --  6.7   HGB 12.0*  --  11.8*  --   --  10.4*   MCV 95  --  96  --   --  97   PLT 44*  --  44*  --   --  38*   INR 1.39* 1.62* 2.31*  --   --  3.56*    139 140  --   --  144   POTASSIUM 3.6 3.8 3.5  --   --  3.8   CHLORIDE 101 101 105  --   --  113*   CO2 33* 30 29  --   --  23   BUN 25 25 28  --   --  26   CR 1.11 1.13 1.27*  --   --  1.08   ANIONGAP 5 8 6  --   --  8   BARON 8.0* 7.8* 8.3*  --   --  6.6*   GLC 91 206* 97  --   --  99   ALBUMIN  --   --   --   --   --  2.8*   PROTTOTAL  --   --   --   --   --  5.2*   BILITOTAL  --   --   --   --   --  0.6   ALKPHOS  --   --   --   --   --  33*   ALT   --   --   --   --   --  27   AST  --   --   --   --   --  21   TROPI  --   --   --  0.029 0.023 0.030       Imaging:   No results found for this or any previous visit (from the past 24 hour(s)).

## 2018-01-22 NOTE — PROGRESS NOTES
"SPIRITUAL HEALTH SERVICES Progress Note  [facility]    F/U visit.  Pt reflected on his career as  at [facility] [employer] and his role in helping get [employer] built.  Pt states that he loves living an active and meaningful life, and has a hard time \"sitting still\" (as he is doing as an inpatient here).  Pt also states his appreciation of [facility] chaplains and their ministry--especially here at [facility].  SH affirmed pt's motivation for purposeful activity, and provided emotional/spiritual support and prayer.                                                                                                                                           Raymundo Moncada M.Div., [facility]  Staff   Pager [phone]    "

## 2018-01-22 NOTE — PROGRESS NOTES
[facility]  Cardiology Progress Note  Date of Service: 01/22/2018  Primary Cardiologist: Dr. Villatoro       Physician Supervisory Attestation:   I have reviewed and discussed with Vanita Baig NP the history, physical and plan and independently interviewed and examined Jama Paul and agree with the plan as stated in the note.      --- I reviewed the right heart cath findings with the patient. His filling pressures are normal. We will switch him to oral lasix (20 mg daily) tomorrow.     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 Etiology of the BiV failure continues with concerns for  clonal plasmacytoid disease vs amyloid vs ???.       Home medications include lisinopril 5 mg, metoprolol  mg, furosemide 20 mg, warfarin    Current cardiac medications include  mg, furosemide 20 mg IV, lisinopril 5 mg daily, metoprolol  mg,       Plan:   1. 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  Moderate-Severe Tricuspid Regurgitation  Moderate Mitral Regurgitation  -continue IV furosemide to 20 mg BID  -Continue Lisinopril 5mg   -Continue metoprolol succinate 100 mg BID  - Plan for RHC and +/- RV biopsy to r/o infiltrative CM on 1/22  - Repeat ECHO performed.  - On direct comparision to echo images dated  12/08/2017 no significant changes.  - Heme Onc following low platelets and  low Hb--any reason to think MM,amyloid to explain BM and chf?   - Dr Paez recommended considering  Entresto rather than just ACE inhibitors after RHC  .    2. Atrial Fibrillation  - holding warfarin for 1/22  - heparin bridging for RHC monday      3. Obstructive sleep apnea  - agree with restarting CPAP    Vanita Baig, ERIC, CNP  [facility]  Pager: [phone]    Interval History   Visited with Jama Paul, his wife and daughter Lorraine today.   He is feeling better, able communicate effectively, and awaiting RHC.   He is being followed by [facility] for possible clonal plasmacytoid disease that could be contributing to CHF and for what appears to be more moderate thrombocytopenia, They have recommended the following   1.  Monoclonal protein indices of the peripheral blood and urine, 2.  Peripheral smear.  -To look for red blood cell coining/rouleaux or other dysplastic/abnormal features that would prompt bone marrow biopsy consideration and 3.  Platelet antibodies. - To rule out immune causes for thrombocytopenia.       Physical Exam   Temp: 97.5  F (36.4  C) Temp src: Oral BP: (!) 134/95 Pulse: 94 Heart Rate: 94 Resp: 16 SpO2: 94 % O2 Device: None (Room air)    Vitals:    01/20/18 0500 01/21/18 0500 01/22/18 0603   Weight: 75.6 kg (166 lb 9.6 oz) 68 kg (150 lb) 66.7 kg (147 lb)       Constitutional   alert and oriented, in no acute distress.  Skin   warm and dry to touch  ENT   no pallor or cyanosis  Neck  No JVP  Lungs Clear  Cardiac irregularly irregular, + murmur  Abdomen   abdomen soft, bowel sounds normoactive, no hepatosplenomegaly  Extremities and Back   no clubbing, cyanosis. No edema.  2+ radial and pedal pulse    Neurological   no gross motor deficits noted, affect appropriate, oriented to time, person and place.    Medications     NaCl 75 mL/hr (01/22/18 0849)     HEParin 1,100 Units/hr (01/22/18 1113)     Reason  anticoagulant not prescribed for atrial fibrillation       - MEDICATION INSTRUCTIONS -       - MEDICATION INSTRUCTIONS -         sodium chloride (PF)  3 mL Intracatheter Q8H     aspirin EC  325 mg Oral Daily     sodium chloride (PF)  10 mL Intravenous Once     furosemide  20 mg Intravenous BID     multivitamin  with lutein  1 capsule Oral BID     metoprolol succinate  100 mg Oral BID     lisinopril  5 mg Oral Daily       Data   Most Recent 3 CBC's:  Recent Labs   Lab Test  01/22/18 0315 01/20/18   0626  01/19/18   0054   WBC  7.3  8.3  6.7   HGB  12.0*  11.8*  10.4*   MCV  95  96  97   PLT  44*  44*  38*     Most Recent 3 BMP's:  Recent Labs   Lab Test  01/22/18 0315 01/21/18   0920  01/20/18   0626   NA  139  139  140   POTASSIUM  3.6  3.8  3.5   CHLORIDE  101  101  105   CO2  33*  30  29   BUN  25  25  28   CR  1.11  1.13  1.27*   ANIONGAP  5  8  6   BARON  8.0*  7.8*  8.3*   GLC  91  206*  97   Most Recent 2 LFT's:  Recent Labs   Lab Test  01/19/18 0054  11/17/17   1420   AST  21  11   ALT  27  14   ALKPHOS  33*  50   BILITOTAL  0.6  0.7   Most Recent 3 Creatinines:  Recent Labs   Lab Test  01/22/18 0315 01/21/18   0920  01/20/18   0626   CR  1.11  1.13  1.27*   Most Recent 3 BNP's:  Recent Labs   Lab Test  01/19/18 0054  12/07/17   1155   NTBNPI   --   4737*   NTBNP  5548*   --    Most Recent D-dimer:  Recent Labs   Lab Test  01/19/18   0054   DD  <0.3   Most Recent TSH and T4:  Recent Labs   Lab Test  01/19/18   0533   TSH  2.87       Last 24 hours labs reviewed   EKG: (reviewed personally)       Tele: Atrial fibrillation 70s-90s        MRI cardiac 12/15/2017  SUMMARY   ==========================================================================================================     Clinical history: Cardiomyopathy, atrial fibrillation  Comparison CMR: None     1. The LV is normal in cavity size and wall thickness. The global systolic function is moderately reduced.  The LVEF is 42%. There is moderate  global hypokinesis of the left ventricle.     2. The RV is mildly dilated. The global systolic function is mildly reduced. The RVEF is 44%.      3. There is severe biatrial enlargement.     4. There is moderate to severe tricuspid regurgitation.     5. Regadenoson stress perfusion imaging is negative for ischemia.     6. Late gadolinium enhancement imaging shows no MI, fibrosis or infiltrative disease.      7. Bilateral pleural effusions are noted.    Echo:   1/19/2018  Interpretation Summary     Left ventricular systolic function is moderately reduced.The visual ejection  fraction is estimated at 40%. There is moderate global hypokinesia of the left ventricle.  The right ventricle is severely dilated. The right ventricular systolic function is moderately reduced. The right atrium is severely dilated.The left atrium is moderately dilated.  There is moderate to mod-severe (2-3+) tricuspid regurgitation.There is moderate (2+) mitral regurgitation.     On direct comparision to echo images dated 12/08/2017 no significant changes.

## 2018-01-22 NOTE — PLAN OF CARE
Problem: Patient Care Overview  Goal: Plan of Care/Patient Progress Review  Outcome: No Change  Patient A&O. VSS on RA. Heparin gtt infusing 11 mL/hr. Up with assist of 1. NPO since midnight for angiogram today. Denies pain. Wound on RLE CDI. Tele: ALYSSIA Cevallos CVR. Will continue to monitor.

## 2018-01-22 NOTE — PLAN OF CARE
Problem: Patient Care Overview  Goal: Plan of Care/Patient Progress Review  Outcome: Improving  Heart Failure Care Pathway  GOALS TO BE MET BEFORE DISCHARGE:    1. Decrease congestion and/or edema with diuretic therapy to achieve near      optimal volume status.            Dyspnea improved:  Yes            Edema improved:     Yes        Net I/O and Weights since admission:          01/16 1500 - 01/21 1459  In: 2670 [P.O.:2640; I.V.:30]  Out: 7025 [Urine:7025]  Net: -4355            Vitals:    01/19/18 0048 01/19/18 0707 01/20/18 0500 01/21/18 0500   Weight: 72.6 kg (160 lb) 74.1 kg (163 lb 6.4 oz) 75.6 kg (166 lb 9.6 oz) 68 kg (150 lb)       2.  O2 sats > 92% on RA or at prior home O2 therapy level.          Current oxygenation status:       SpO2: 94 %         O2 Device: None (Room air),            Able to wean O2 this shift to keep sats > 92%:  Yes       Does patient use Home O2? No    3.  Tolerates ambulation and mobility near baseline: No        How many times did the patient ambulate with nursing staff this shift? 0    Please review the Heart Failure Care Pathway for additional HF goal outcomes.    Padmini Ellington RN  1/21/2018

## 2018-01-23 VITALS
HEART RATE: 82 BPM | WEIGHT: 157 LBS | OXYGEN SATURATION: 95 % | DIASTOLIC BLOOD PRESSURE: 82 MMHG | SYSTOLIC BLOOD PRESSURE: 126 MMHG | RESPIRATION RATE: 18 BRPM | TEMPERATURE: 97.1 F | BODY MASS INDEX: 20.71 KG/M2

## 2018-01-23 VITALS
WEIGHT: 154.6 LBS | DIASTOLIC BLOOD PRESSURE: 77 MMHG | OXYGEN SATURATION: 97 % | TEMPERATURE: 97.4 F | HEIGHT: 73 IN | BODY MASS INDEX: 20.49 KG/M2 | SYSTOLIC BLOOD PRESSURE: 120 MMHG | RESPIRATION RATE: 18 BRPM | HEART RATE: 94 BPM

## 2018-01-23 DIAGNOSIS — I50.43 ACUTE ON CHRONIC COMBINED SYSTOLIC AND DIASTOLIC CHF (CONGESTIVE HEART FAILURE) (H): Primary | ICD-10-CM

## 2018-01-23 LAB
BASOPHILS # BLD AUTO: 0 10E9/L (ref 0–0.2)
BASOPHILS NFR BLD AUTO: 0.2 %
COPATH REPORT: NORMAL
DIFFERENTIAL METHOD BLD: ABNORMAL
EOSINOPHIL # BLD AUTO: 0 10E9/L (ref 0–0.7)
EOSINOPHIL NFR BLD AUTO: 0.3 %
ERYTHROCYTE [DISTWIDTH] IN BLOOD BY AUTOMATED COUNT: 19 % (ref 10–15)
HCT VFR BLD AUTO: 38.5 % (ref 40–53)
HGB BLD-MCNC: 12.6 G/DL (ref 13.3–17.7)
IMM GRANULOCYTES # BLD: 0 10E9/L (ref 0–0.4)
IMM GRANULOCYTES NFR BLD: 0 %
INR PPP: 1.19 (ref 0.86–1.14)
LYMPHOCYTES # BLD AUTO: 0.6 10E9/L (ref 0.8–5.3)
LYMPHOCYTES NFR BLD AUTO: 10.1 %
MCH RBC QN AUTO: 31.5 PG (ref 26.5–33)
MCHC RBC AUTO-ENTMCNC: 32.7 G/DL (ref 31.5–36.5)
MCV RBC AUTO: 96 FL (ref 78–100)
MONOCYTES # BLD AUTO: 1.1 10E9/L (ref 0–1.3)
MONOCYTES NFR BLD AUTO: 17.6 %
NEUTROPHILS # BLD AUTO: 4.6 10E9/L (ref 1.6–8.3)
NEUTROPHILS NFR BLD AUTO: 71.8 %
NRBC # BLD AUTO: 0 10*3/UL
NRBC BLD AUTO-RTO: 0 /100
PLATELET # BLD AUTO: 53 10E9/L (ref 150–450)
RBC # BLD AUTO: 4 10E12/L (ref 4.4–5.9)
RETICS # AUTO: 49.2 10E9/L (ref 25–95)
RETICS/RBC NFR AUTO: 1.2 % (ref 0.5–2)
WBC # BLD AUTO: 6.4 10E9/L (ref 4–11)

## 2018-01-23 PROCEDURE — A9270 NON-COVERED ITEM OR SERVICE: HCPCS | Mod: GY | Performed by: INTERNAL MEDICINE

## 2018-01-23 PROCEDURE — 25000132 ZZH RX MED GY IP 250 OP 250 PS 637: Mod: GY | Performed by: INTERNAL MEDICINE

## 2018-01-23 PROCEDURE — 36415 COLL VENOUS BLD VENIPUNCTURE: CPT | Performed by: INTERNAL MEDICINE

## 2018-01-23 PROCEDURE — 86335 IMMUNFIX E-PHORSIS/URINE/CSF: CPT | Performed by: INTERNAL MEDICINE

## 2018-01-23 PROCEDURE — 85060 BLOOD SMEAR INTERPRETATION: CPT | Performed by: INTERNAL MEDICINE

## 2018-01-23 PROCEDURE — 99239 HOSP IP/OBS DSCHRG MGMT >30: CPT | Performed by: INTERNAL MEDICINE

## 2018-01-23 PROCEDURE — 84156 ASSAY OF PROTEIN URINE: CPT | Performed by: INTERNAL MEDICINE

## 2018-01-23 PROCEDURE — 40000847 ZZHCL STATISTIC MORPHOLOGY W/INTERP HISTOLOGY TC 85060: Performed by: INTERNAL MEDICINE

## 2018-01-23 PROCEDURE — 85025 COMPLETE CBC W/AUTO DIFF WBC: CPT | Performed by: INTERNAL MEDICINE

## 2018-01-23 PROCEDURE — 85610 PROTHROMBIN TIME: CPT | Performed by: INTERNAL MEDICINE

## 2018-01-23 PROCEDURE — 83883 ASSAY NEPHELOMETRY NOT SPEC: CPT | Performed by: INTERNAL MEDICINE

## 2018-01-23 PROCEDURE — 99232 SBSQ HOSP IP/OBS MODERATE 35: CPT | Performed by: INTERNAL MEDICINE

## 2018-01-23 PROCEDURE — 85045 AUTOMATED RETICULOCYTE COUNT: CPT | Performed by: INTERNAL MEDICINE

## 2018-01-23 PROCEDURE — 84166 PROTEIN E-PHORESIS/URINE/CSF: CPT | Performed by: INTERNAL MEDICINE

## 2018-01-23 RX ORDER — FUROSEMIDE 20 MG
20 TABLET ORAL DAILY
Status: DISCONTINUED | OUTPATIENT
Start: 2018-01-23 | End: 2018-01-23 | Stop reason: HOSPADM

## 2018-01-23 RX ORDER — WARFARIN SODIUM 7.5 MG/1
7.5 TABLET ORAL
Status: DISCONTINUED | OUTPATIENT
Start: 2018-01-23 | End: 2018-01-23 | Stop reason: HOSPADM

## 2018-01-23 RX ORDER — LISINOPRIL 5 MG/1
5 TABLET ORAL DAILY
Qty: 30 TABLET | Refills: 0 | Status: ON HOLD | DISCHARGE
Start: 2018-01-23 | End: 2018-03-01

## 2018-01-23 RX ORDER — POTASSIUM CHLORIDE 1500 MG/1
20 TABLET, EXTENDED RELEASE ORAL DAILY
Qty: 30 TABLET | Refills: 0 | DISCHARGE
Start: 2018-01-23 | End: 2018-03-12

## 2018-01-23 RX ADMIN — METOPROLOL SUCCINATE 100 MG: 100 TABLET, EXTENDED RELEASE ORAL at 09:51

## 2018-01-23 RX ADMIN — FUROSEMIDE 20 MG: 20 TABLET ORAL at 09:51

## 2018-01-23 RX ADMIN — Medication 1 CAPSULE: at 09:51

## 2018-01-23 RX ADMIN — LISINOPRIL 5 MG: 5 TABLET ORAL at 09:51

## 2018-01-23 RX ADMIN — ASPIRIN 81 MG: 81 TABLET, COATED ORAL at 09:51

## 2018-01-23 NOTE — PLAN OF CARE
Problem: Patient Care Overview  Goal: Plan of Care/Patient Progress Review  Occupational Therapy Discharge Summary    Reason for therapy discharge:    Discharged to transitional care facility.    Progress towards therapy goal(s). See goals on Care Plan in [facility] electronic health record for goal details.  Goals partially met.  Barriers to achieving goals:   limited tolerance for therapy and discharge from facility.    Therapy recommendation(s):    Continued therapy is recommended.  Rationale/Recommendations:  pt will benefit from continued OT and PT to advance mobility and ADL and for further education in CHF mgmt. .

## 2018-01-23 NOTE — PROGRESS NOTES
SW:  D:  Received discharge orders for patient.  Received a call back from [facility].  They can accept patient today.  Patient's family will transport around 14:00 today.  Patient and wife informed of the plan and I agreement to the plan.  Call placed to update [facility] and faxed the orders and the PAS.    PAS-RR    D: Per DHS regulation, SW completed and submitted PAS-RR to [place] [employer] Direct Connect via the Senior LinkAge Line.  PAS-RR confirmation # is : [id number].    I: SW spoke with patient's wife and they are aware a PAS-RR has been submitted.  SW reviewed with patient's wife that they may be contacted for a follow up appointment within 10 days of hospital discharge if their SNF stay is < 30 days.  Contact information for Senior LinkAge Line was also provided.    A: Patient's wife verbalized understanding.    P: Further questions may be directed to [facility] LinkAge Line at #[phone], option #4 for PAS-RR staff.

## 2018-01-23 NOTE — PROGRESS NOTES
Care Transition Initial Assessment -   Reason For Consult: discharge planning  Met with: Patient and Family    Active Problems:    Atrial fibrillation (H)         DATA  Lives With: spouse  Living Arrangements: house  Description of Support System: Supportive, Involved  Who is your support system?: Wife, Children  Support Assessment: Adequate family and caregiver support.   Identified issues/concerns regarding health management:       Quality Of Family Relationships: supportive, involved  Transportation Available: car, family or friend will provide    Per social service protocol for discharge planning.  Patient was admitted on 1-19-18 with atrial fibrillation.  The tentative date of discharge is 1-23-18.  Reviewed chart.  Patient lives with his wife in a house with 3 steps to enter and 15 steps inside.  The bedroom and bathroom are upstairs.  Patient does not use any assistive devices within the house.  Patient uses a 4 wheeled walker within the community.  Patient is independent with ADL's and IADL's including driving.  Reviewed the therapy discharge recommendations of tcu placement and patient and family are in agreement.  Patient and family would like a referral sent to [facility].  Referral sent, via discharge on the double, to check bed availability.      ASSESSMENT  Cognitive Status:  awake, alert and oriented  Concerns to be addressed: discharge planning.     PLAN  Financial costs for the patient includes N/A.  Patient given options and choices for discharge TCU choices.  Patient/family is agreeable to the plan?  Yes  Patient Goals and Preferences: TCU on discharge.  Patient anticipates discharging to:  TCU.    Will confirm a bed, continue to follow, and assist with a safe discharge plan.    JASMINA Love, [facility]  [phone]

## 2018-01-23 NOTE — PROGRESS NOTES
Pt seen, feels well and is being discharged today  Seen by Dr. 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  2) Elevated b2m and light chain in blood.  3) CHF.    Plan:  Added urine light chain and immune fixation to urine.  Follow with Dr Rubio in office to decide on bone marrow and fat pad biopsy.

## 2018-01-23 NOTE — DISCHARGE INSTRUCTIONS
Plan:     Nursing to notify the Provider(s) and re-consult the [facility] Nurse if wound(s) deteriorate(s) or if the wound care plan needs reevaluation.       Plan of care for wound located on RLE: Daily:  1.  Cleanse and moisturize intact skin on RLE with mild cleanser/lotion.   2.  Cleanse wound with MicroKlenz, pat dry.  3.  Apply piece of dry Aquacel Ag to wound, cutting to fit.    4.  Cover with gauze and secure with Kerlix.  Label with time/date/initials.   5.  Apply Tubigrip stocking in double layer, evenly from base of toes to just below knee crease.  Ensure no wrinkles.  Elevate legs and float heels with pillows.

## 2018-01-23 NOTE — PROGRESS NOTES
[facility]  Cardiology Progress Note  Date of Service: 01/23/2018  Primary Cardiologist: Dr. Villatoro         Physician Supervisory Attestation:   I have reviewed and discussed with Vanita Baig NP the history, physical and plan and independently interviewed and examined Jama Paul and agree with the plan as stated in the note.    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 Etiology of the BiV failure continues with concerns for  clonal plasmacytoid disease vs amyloid vs ???.       Home medications include lisinopril 5 mg, metoprolol  mg, furosemide 20 mg, warfarin    Current cardiac medications include  mg, furosemide 20 mg IV, lisinopril 5 mg daily, metoprolol  mg,       Plan:   1. 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  - On direct comparision to echo images dated 12/08/2017 no significant changes.  -Net negative 4.6L and 13 lbs.  -Consider Entresto as outpatient  .    2. Atrial Fibrillation  -Restart Warfarin tonight at 7.5 mg and recheck INR tomorrow at TCU     3. Obstructive sleep apnea  - agree with restarting  CPAP    4. Thrombocytopenia with concerns for amyloidosis  -Recommendation by hematology is to follow with Dr. Joanne [facility] Follow up:   Dr. Villatoro on 2/6/18 with labs prior.    Vanita Baig, APRN, CNP  [facility]  Pager: [phone]    Interval History   No SOB or chest discomfort. Results of the RHC relayed to patient. He is comfortable discharging today and following up with Dr. Villatoro as scheduled.     Physical Exam   Temp: 98.1  F (36.7  C) Temp src: Oral BP: 128/73   Heart Rate: 88 Resp: 18 SpO2: 94 % O2 Device: None (Room air)    Vitals:    01/21/18 0500 01/22/18 0603 01/23/18 0608   Weight: 68 kg (150 lb) 66.7 kg (147 lb) 70.1 kg (154 lb 9.6 oz)       Constitutional   alert and oriented, in no acute distress.  Skin   warm and dry to touch  ENT   no pallor or cyanosis  Neck  No JVP, right IJ site with small old blood under Tegaderm.   Lungs Clear  Cardiac irregularly irregular, + murmur  Abdomen   abdomen soft, bowel sounds normoactive, no hepatosplenomegaly  Extremities and Back   no clubbing, cyanosis. No edema.  2+ radial and pedal pulse    Neurological   no gross motor deficits noted, affect appropriate, oriented to time, person and place.    Medications     Reason anticoagulant not prescribed for atrial fibrillation       - MEDICATION INSTRUCTIONS -       - MEDICATION INSTRUCTIONS -         furosemide  20 mg Oral Daily     sodium chloride (PF)  3 mL Intracatheter Q8H     aspirin EC  81 mg Oral Daily     multivitamin  with lutein  1 capsule Oral BID     metoprolol succinate  100 mg Oral BID     lisinopril  5 mg Oral Daily       Data   Most Recent 3 CBC's:  Recent Labs   Lab Test  01/23/18   0630  01/22/18   0315  01/20/18   0626   WBC  6.4  7.3  8.3   HGB  12.6*  12.0*  11.8*   MCV  96  95  96   PLT  53*  44*  44*     Most Recent 3 BMP's:  Recent Labs   Lab Test  01/22/18   0315  01/21/18   0920  01/20/18   0626   NA  139  139  140   POTASSIUM  3.6  3.8  3.5   CHLORIDE  101  101  105   CO2   33*  30  29   BUN  25  25  28   CR  1.11  1.13  1.27*   ANIONGAP  5  8  6   BARON  8.0*  7.8*  8.3*   GLC  91  206*  97   Most Recent 2 LFT's:  Recent Labs   Lab Test  01/19/18   0054  11/17/17   1420   AST  21  11   ALT  27  14   ALKPHOS  33*  50   BILITOTAL  0.6  0.7   Most Recent 3 Creatinines:  Recent Labs   Lab Test  01/22/18   0315  01/21/18   0920  01/20/18   0626   CR  1.11  1.13  1.27*   Most Recent 3 BNP's:  Recent Labs   Lab Test  01/19/18   0054  12/07/17   1155   NTBNPI   --   4737*   NTBNP  5548*   --    Most Recent D-dimer:  Recent Labs   Lab Test  01/19/18   0054   DD  <0.3   Most Recent TSH and T4:  Recent Labs   Lab Test  01/19/18   0533   TSH  2.87       Last 24 hours labs reviewed   EKG: (reviewed personally)       Tele: Atrial fibrillation 70s-90s        MRI cardiac 12/15/2017  SUMMARY   ==========================================================================================================     Clinical history: Cardiomyopathy, atrial fibrillation  Comparison CMR: None     1. The LV is normal in cavity size and wall thickness. The global systolic function is moderately reduced.  The LVEF is 42%. There is moderate global hypokinesis of the left ventricle.     2. The RV is mildly dilated. The global systolic function is mildly reduced. The RVEF is 44%.      3. There is severe biatrial enlargement.     4. There is moderate to severe tricuspid regurgitation.     5. Regadenoson stress perfusion imaging is negative for ischemia.     6. Late gadolinium enhancement imaging shows no MI, fibrosis or infiltrative disease.      7. Bilateral pleural effusions are noted.    Echo:   1/19/2018  Interpretation Summary     Left ventricular systolic function is moderately reduced.The visual ejection  fraction is estimated at 40%. There is moderate global hypokinesia of the left ventricle.  The right ventricle is severely dilated. The right ventricular systolic function is moderately reduced. The right atrium is  severely dilated.The left atrium is moderately dilated.  There is moderate to mod-severe (2-3+) tricuspid regurgitation.There is moderate (2+) mitral regurgitation.     On direct comparision to echo images dated 12/08/2017 no significant changes.

## 2018-01-23 NOTE — PLAN OF CARE
Problem: Patient Care Overview  Goal: Plan of Care/Patient Progress Review  Outcome: No Change  Post heart cath Right jugular, pt denied pain or other discomforts. Pt in afib with CVR. Vitals remain stable and is mid 90's on room air.  Denies shortness of breath.  Attempting to collect urine for 24hour specimen but pt also has issues with incontinence.  Right IJ site had small amount of blood upon transfer and this remains the same.  CMS within normal limits. Will continue to monitor. Up with SBA.

## 2018-01-23 NOTE — CONSULTS
[facility] referral received from Dr. Andres. Patient is currently established in the [facility].     Nutrition consult appreciated. Hospital nurse, please give pt [facility]/HF education.       [facility] appointment made for:  1/29 Dr. Pugh w/ BMP   2/14 Winnie Garcia CNP w/ BMP  See Epic for details.    We look forward to seeing Jama in the clinic.   Please call with questions.     Elisabeth Diaz RN, BSN  [facility] Care Coordinator  [phone]      [facility]: Cardiomyopathy, Optimization, Rehabilitation, Education   The [facility] is a heart failure specialty clinic within the [facility] where you will work with your cardiologist, nurse practitioners, physician assistants and registered nurses who specialize in heart failure care. They are dedicated to helping patients with heart failure to carefully adjust medications, receive education, and learn who and when to call if symptoms develop. They specialize in helping you better understand your condition, slow the progression of your disease, improve the length and quality of your life, help you detect future heart problems before they become life threatening, and avoid hospitalizations.

## 2018-01-23 NOTE — PLAN OF CARE
Problem: Cardiac: Heart Failure (Adult)  Goal: Signs and Symptoms of Listed Potential Problems Will be Absent, Minimized or Managed (Cardiac: Heart Failure)  Signs and symptoms of listed potential problems will be absent, minimized or managed by discharge/transition of care (reference Cardiac: Heart Failure (Adult) CPG).   Outcome: No Change  Post heart cath, pt denied pain or other discomforts. Pt in afib with CVR. Vitals remain stable and is mid 90's on room air.  Denies shortness of breath.  Attempting to collect urine for 24hour specimen but pt also has issues with incontinence.  Right IJ site had small amount of blood upon transfer and this remains the same.  CMS within normal limits. Will continue to monitor.

## 2018-01-23 NOTE — PLAN OF CARE
Problem: Patient Care Overview  Goal: Plan of Care/Patient Progress Review  Outcome: Adequate for Discharge Date Met: 01/23/18  D/C to TCU with wife no pain up in the room with assist.

## 2018-01-23 NOTE — DISCHARGE SUMMARY
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  He was being followed in the cardiac clinic by the nurse practitioners and his ventricular response rate was too high and as they increased the metoprolol, they stopped his Lisinopril.  He was also under the impression that he did not have to take the furosemide.  He came to the hospital and IV furosemide and  Lisinopril were added to his regimen and he diuresed 4800 ccs of fluid and dropped his weight  13 lbs.  He is greatly improved.   It was also found that he had not treated his OLIVA for a few years and CPAP was restarted. He is urged to contact his sleep MD ASAP and get that treated  His platelet count was found to be 44,000. He and Dr. Pizarro have known about his thrombocytopenia for at east 6 years and have chosen to follow it with the thought that it won't bother him unless it drops below 50,000.  A work up for the cause of that is in process with the Hematologist  He has expressed a great deal of concern about his heart failure and I've told him that good medical management and treatment of his OLIVA should offer him a very good prognosis and that his tricuspid and mitral insufficiency that is seen on echocardiogram, should improve with medical therapy.  He is also wondering if he should pursue stem cell therapy for his heart failure and I told him at this time, I can't recommend that.   He will go to a TCU to get  stronger with PT/OT.  He is advised to see Dr. Pizarro in the next 5-6 days.    Hospital Course   Jama Paul was admitted on 1/19/2018.  The following problems were addressed during his hospitalization:    Active Problems:    Atrial fibrillation (H)      Raymundo Proctor MD    Significant Results and Procedures   Consult notes, Right heart cath report, labs    Pending Results   These results will be followed up by Dr. Clint Pizarro  Unresulted Labs Ordered in the Past 30 Days of this Admission     Date and Time Order Name Status Description    1/23/2018 0000 Blood Morphology Pathologist Review In process     1/21/2018 0000 Platelet associated immunoglobulin In process           Code Status   Full Code       Primary Care Physician   Clint Pizarro    Physical Exam   Temp: 98.1  F (36.7  C) Temp src: Oral BP: 128/73 Pulse: 94 Heart Rate: 88 Resp: 18 SpO2: 94 % O2 Device: None (Room air)    Vitals:    01/21/18 0500 01/22/18 0603 01/23/18 0608   Weight: 68 kg (150 lb) 66.7 kg (147 lb) 70.1 kg (154 lb 9.6 oz)     Vital Signs with Ranges  Temp:  [97.3  F (36.3  C)-98.1  F (36.7  C)] 98.1  F (36.7  C)  Pulse:  [94] 94  Heart Rate:  [79-94] 88  Resp:  [16-18] 18  BP: (103-134)/(57-93) 128/73  SpO2:  [92 %-98 %] 94 %  I/O last 3 completed shifts:  In: 1113.75 [P.O.:200; I.V.:913.75]  Out: 1025 [Urine:1025]    Constitutional: alert, cooperative  Eyes: clear  ENT: mucous membranes are moist  Respiratory: clear to A&P  Cardiovascular: Irregularly irregular rhythm with a rate of 96 bpm at rest.  Variable S1, normal S2  2/6 midsystolic murmur, soft S3 no edema in the left leg, the right leg is bandaged due to a hematoma  GI: soft, not tender, bowel sounds are normal   Lymph/Hematologic: not examined  Genitourinary: not examined  Skin: Stasis dermatitis in the lower legs  Musculoskeletal: thin  Neurologic: Cranial nerves II through VII and IX through XII are intact. , strength is symmetric, fine motor movements are  intact  Neuropsychiatric: alert, appropriate and oriented.    Discharge Disposition   Discharged to short-term care facility  Condition at discharge: Stable    Consultations This Hospital Stay   WOUND OSTOMY CONTINENCE NURSE  IP CONSULT  CORE CLINIC EVALUATION IP CONSULT  CARDIAC REHAB IP CONSULT  CARE COORDINATOR IP CONSULT  NUTRITION SERVICES ADULT IP CONSULT  CARDIOLOGY IP CONSULT  PHARMACY IP CONSULT  HEMATOLOGY & ONCOLOGY IP CONSULT  PHYSICAL THERAPY ADULT IP CONSULT  OCCUPATIONAL THERAPY ADULT IP CONSULT  PHARMACY TO DOSE HEPARIN  PHARMACY IP CONSULT  PHARMACY IP CONSULT  PHYSICAL THERAPY ADULT IP CONSULT  OCCUPATIONAL THERAPY ADULT IP CONSULT  SMOKING CESSATION PROGRAM IP CONSULT    Time Spent on this Encounter   I, Raymundo Proctor, personally saw the patient today and spent greater than 30 minutes discharging this patient.    Discharge Orders     General info for SNF   Length of Stay Estimate: Short Term Care: Estimated # of Days <30  Condition at Discharge: Improving  Level of care:skilled   Rehabilitation Potential: Good  Admission H&P remains valid and up-to-date: Yes  Recent Chemotherapy: N/A  Use Nursing Home Standing Orders: Yes     Mantoux instructions   Give two-step Mantoux (PPD) Per Facility Policy Yes     Reason for your hospital stay   Dr. Paul: You came to the hospital with shortness of breath and you have systolic heart failure. You were treated with Lisinopril and Furosemide, and that regimen helped a lot and you diuresed just shy of 5 liters and lost 13 lbs of extra fluid. Your atrial fibrillation is stable.  Your right heart cath showed the pressures in your heart were well controlled with the lisinopril and furosemide and Metoprolol. You should continue those medicines and if anyone wants to stop them, you contact Dr. Pizarro immediately.  You also have evidence of pulmonary hypertension and that is likely to be due to your obstructive sleep apnea. It is very important that you contact  your former sleep doctor and see him/her ASAP and get this treated.    Your platelet count has been in the 44,000 range and you are in the midst of a workup about that. Please follow up with the Hematologist about that and keep Dr. Pizarro in the loop.  You are going to get therapy to get stronger.    Remember, Dr. Clint Pizarro is your primary doctor and use him frequently and well. God bless you and do well my friend     Daily weights   Call Provider for weight gain of more than 2 pounds per day or 2 pounds per week.     Activity - Up ad fernanda     Full Code     Physical Therapy Adult Consult   Evaluate and treat as clinically indicated.    Reason:  weakness     Occupational Therapy Adult Consult   Evaluate and treat as clinically indicated.    Reason:  weakness     Fall precautions     Advance Diet as Tolerated   Follow this diet upon discharge: Orders Placed This Encounter     Fluid restriction 2000 ML FLUID, 3 gram sodium     Low Saturated Fat Na <2400 mg       Discharge Medications   Current Discharge Medication List      START taking these medications    Details   melatonin 1 MG TABS tablet Take 1 tablet (1 mg) by mouth nightly as needed for sleep  Qty: 15 tablet, Refills: 0    Associated Diagnoses: Generalized weakness      potassium chloride SA (K-DUR/KLOR-CON M) 20 MEQ CR tablet Take 1 tablet (20 mEq) by mouth daily  Qty: 30 tablet, Refills: 0    Associated Diagnoses: Acute congestive heart failure, unspecified congestive heart failure type (H)         CONTINUE these medications which have CHANGED    Details   lisinopril (PRINIVIL/ZESTRIL) 5 MG tablet Take 1 tablet (5 mg) by mouth daily  Qty: 30 tablet, Refills: 0    Associated Diagnoses: Essential hypertension; Acute congestive heart failure, unspecified congestive heart failure type (H)         CONTINUE these medications which have NOT CHANGED    Details   metoprolol (TOPROL-XL) 100 MG 24 hr tablet Take 1 tablet (100 mg) by mouth 2 times daily  Qty: 180  tablet, Refills: 3    Associated Diagnoses: Atrial fibrillation with rapid ventricular response (H)      warfarin (COUMADIN) 2.5 MG tablet Take 2.5-5 mg by mouth -  For A-fib  INR goal 2-3  5 mg on Monday, Wednesday and Friday  2.5 mg on all other days      !! Multiple Vitamins-Minerals (ICAPS AREDS FORMULA PO) Take 1 tablet by mouth 2 times daily       CYANOCOBALAMIN PO Take 500 mcg by mouth daily      Ascorbic Acid (VITAMIN C PO) Take 500 mg by mouth daily      vitamin  B complex with vitamin C (VITAMIN  B COMPLEX) TABS Take 1 tablet by mouth daily      !! multivitamin, therapeutic with minerals (MULTI-VITAMIN) TABS Take 1 tablet by mouth daily      VITAMIN D, CHOLECALCIFEROL, PO Take 2,000 Units by mouth daily      furosemide (LASIX) 20 MG tablet Take 1 tablet (20 mg) by mouth as needed  Qty: 30 tablet, Refills: 11    Associated Diagnoses: Atrial fibrillation with rapid ventricular response (H); Acute on chronic systolic congestive heart failure (H)       !! - Potential duplicate medications found. Please discuss with provider.        Allergies   No Known Allergies  Data   Most Recent 3 CBC's:  Recent Labs   Lab Test  01/23/18   0630  01/22/18   0315  01/20/18   0626   WBC  6.4  7.3  8.3   HGB  12.6*  12.0*  11.8*   MCV  96  95  96   PLT  53*  44*  44*      Most Recent 3 BMP's:  Recent Labs   Lab Test  01/22/18   0315  01/21/18   0920  01/20/18   0626   NA  139  139  140   POTASSIUM  3.6  3.8  3.5   CHLORIDE  101  101  105   CO2  33*  30  29   BUN  25  25  28   CR  1.11  1.13  1.27*   ANIONGAP  5  8  6   BARON  8.0*  7.8*  8.3*   GLC  91  206*  97     Most Recent 2 LFT's:  Recent Labs   Lab Test  01/19/18   0054  11/17/17   1420   AST  21  11   ALT  27  14   ALKPHOS  33*  50   BILITOTAL  0.6  0.7     Most Recent INR's and Anticoagulation Dosing History:  Anticoagulation Dose History     Recent Dosing and Labs Latest Ref Rng & Units 12/8/2017 12/27/2017 1/19/2018 1/20/2018 1/21/2018 1/22/2018 1/23/2018    INR 0.86 -  1.14 1.90(H) 2.19(H) 3.56(H) 2.31(H) 1.62(H) 1.39(H) 1.19(H)        Most Recent 3 Troponin's:  Recent Labs   Lab Test  01/19/18   1150  01/19/18   0533  01/19/18   0054   TROPI  0.029  0.023  0.030     Most Recent Cholesterol Panel:  Recent Labs   Lab Test  04/18/12   0849   CHOL  127   LDL  55   HDL  56   TRIG  81     Most Recent 6 Bacteria Isolates From Any Culture (See EPIC Reports for Culture Details):  Recent Labs   Lab Test  11/17/17   2110   CULT  10,000 to 50,000 colonies/mL  Pseudomonas aeruginosa  *     Most Recent TSH, T4 and A1c Labs:  Recent Labs   Lab Test  01/19/18   0533   TSH  2.87     Results for orders placed or performed during the hospital encounter of 01/19/18   XR Chest 2 Views    Narrative    XR CHEST 2 VW  1/19/2018 2:30 AM      HISTORY: Dyspnea.      COMPARISON: 12/7/2017.    FINDINGS: The heart is enlarged without pulmonary edema. There are  small bilateral pleural effusions. Bibasilar infiltrates. The lungs  are otherwise clear. No pneumothorax. Degenerative disease in the  spine.      Impression    IMPRESSION: Small bilateral pleural effusions and bibasilar probable  atelectasis.    SARA CORREA MD

## 2018-01-23 NOTE — PROGRESS NOTES
SW:  D:  Call placed to [facility] to follow up on the referral.  They have no available beds.  Call placed to update patient's wife and to get more choices.  She is asking for a referral to be sent to [place].  Referral sent, via discharge on the double, to check bed availability.  P:  Will continue to follow.

## 2018-01-24 ENCOUNTER — NURSING HOME VISIT (OUTPATIENT)
Dept: GERIATRICS | Facility: CLINIC | Age: 83
End: 2018-01-24
Payer: MEDICARE

## 2018-01-24 DIAGNOSIS — D69.6 THROMBOCYTOPENIA (H): ICD-10-CM

## 2018-01-24 DIAGNOSIS — R53.1 GENERALIZED WEAKNESS: ICD-10-CM

## 2018-01-24 DIAGNOSIS — G47.33 OSA (OBSTRUCTIVE SLEEP APNEA): ICD-10-CM

## 2018-01-24 DIAGNOSIS — I48.91 ATRIAL FIBRILLATION, UNSPECIFIED TYPE (H): ICD-10-CM

## 2018-01-24 DIAGNOSIS — I10 ESSENTIAL HYPERTENSION: ICD-10-CM

## 2018-01-24 DIAGNOSIS — I50.43 ACUTE ON CHRONIC COMBINED SYSTOLIC AND DIASTOLIC HRT FAIL (H): Primary | ICD-10-CM

## 2018-01-24 LAB
INTERPRETATION ECG - MUSE: NORMAL
LAB SCANNED RESULT: NORMAL
PROT ELPH PNL UR ELPH: NORMAL
PROT PATTERN UR ELPH-IMP: NORMAL

## 2018-01-24 PROCEDURE — 99310 SBSQ NF CARE HIGH MDM 45: CPT | Performed by: NURSE PRACTITIONER

## 2018-01-24 NOTE — PROGRESS NOTES
[place] [facility]  PRIMARY CARE PROVIDER AND CLINIC:  Clint Pizarro [facility] [address] / [facility]*  Chief Complaint   Patient presents with     Hospital F/U       HPI:    Jama Paul is a 86 year old  (2/13/1931), past medical history significant for  pulmonary hypertension, valvular insufficiency, chronic atrial fibrillation admitted on 1/19/2018 with increased dyspnea at rest and on exertion admitted to the [facility] from [facility].  Hospital stay 1/19/18 through 1/23/18.    Acute on chronic systolic HF: possible tachycardia induced cardiomyopathy, pulmonary HTN, mod/severe tricuspid regurgitation, mod MR: IV lasix, lisinopril and metoprolol, diuresed 4.9 liters weight down 13lbs  Afib: rate control with metoprolol  OLIVA: start CPAP, patient has not Tx sleep apnea for years.   Thrombocytopenia: workup by hematology, thought to be amyloidosis final results are still pending.    Admitted to this facility for  rehab, medical management and nursing care.  HPI information obtained from: facility chart records, facility staff, patient report and [facility] chart review.  Current issues are:  ON exam today patient is sitting up in WC, denies SOB, CP, palpitations, states he is feeling very well, denies RODGERS states he is here in TCU to get stronger, states he has some right leg weakness, denies fever, chills, cough, cogestion, N/V/D or constipation        Last 3 BPs: 126/82, 107/76, 123/80   HR Ranges: 82 bpm  Admission Weight: 154 lbs      CODE STATUS/ADVANCE DIRECTIVES DISCUSSION:   CPR/Full code   Patient's living condition: lives with spouse    ALLERGIES:Review of patient's allergies indicates no known allergies.  PAST MEDICAL HISTORY:  has a past medical history of Antiplatelet or antithrombotic long-term use; Arrhythmia; Atrial fibrillation (H); Elevated PSA; and Unspecified essential hypertension. He also has no past medical history  of Malignant hyperthermia or PONV (postoperative nausea and vomiting).  PAST SURGICAL HISTORY:  has a past surgical history that includes septic olecranon bursitis[; Herniorrhaphy inguinal; Mohs micrographic procedure; Herniorrhaphy inguinal (4/30/2014); Explore groin (4/30/2014); colonoscopy; and Phacoemulsification clear cornea with standard intraocular lens implant (Right, 9/8/2015).  FAMILY HISTORY: family history is negative for HEART DISEASE.  SOCIAL HISTORY:  reports that he has never smoked. He has never used smokeless tobacco. He reports that he does not drink alcohol or use illicit drugs.    Post Discharge Medication Reconciliation Status: discharge medications reconciled, continue medications without change.  Current Outpatient Prescriptions   Medication Sig Dispense Refill     melatonin 1 MG TABS tablet Take 1 tablet (1 mg) by mouth nightly as needed for sleep 15 tablet 0     lisinopril (PRINIVIL/ZESTRIL) 5 MG tablet Take 1 tablet (5 mg) by mouth daily 30 tablet 0     potassium chloride SA (K-DUR/KLOR-CON M) 20 MEQ CR tablet Take 1 tablet (20 mEq) by mouth daily 30 tablet 0     metoprolol (TOPROL-XL) 100 MG 24 hr tablet Take 1 tablet (100 mg) by mouth 2 times daily 180 tablet 3     furosemide (LASIX) 20 MG tablet Take 1 tablet (20 mg) by mouth as needed 30 tablet 11     warfarin (COUMADIN) 2.5 MG tablet Take 2.5-5 mg by mouth -  For A-fib  INR goal 2-3  5 mg on Monday, Wednesday and Friday  2.5 mg on all other days       Multiple Vitamins-Minerals (ICAPS AREDS FORMULA PO) Take 1 tablet by mouth 2 times daily        CYANOCOBALAMIN PO Take 500 mcg by mouth daily       Ascorbic Acid (VITAMIN C PO) Take 500 mg by mouth daily       multivitamin, therapeutic with minerals (MULTI-VITAMIN) TABS Take 1 tablet by mouth daily       VITAMIN D, CHOLECALCIFEROL, PO Take 2,000 Units by mouth daily         ROS:  10 point ROS of systems including Constitutional, Eyes, Respiratory, Cardiovascular, Gastroenterology,  Genitourinary, Integumentary, Muscularskeletal, Psychiatric were all negative except for pertinent positives noted in my HPI.    Exam:  /82  Pulse 82  Temp 97.1  F (36.2  C)  Resp 18  Wt 157 lb (71.2 kg)  SpO2 95%  BMI 20.71 kg/m2  GENERAL APPEARANCE:  Alert, in no distress  ENT:  Mouth and posterior oropharynx normal, moist mucous membranes, normal hearing acuity  EYES:  EOM, conjunctivae, lids, pupils and irises normal, PERRL  RESP:  respiratory effort and palpation of chest normal, lungs clear to auscultation , no respiratory distress  CV:  Palpation and auscultation of heart done , peripheral edema trace+ in LE bilaterally, irregular rhythm rate controlled  ABDOMEN:  normal bowel sounds, soft, nontender, no hepatosplenomegaly or other masses  M/S:   Examination of:   right upper extremity, left upper extremity, right lower extremity and left lower extremity  Inspection, ROM, stability and muscle strength normal  SKIN:  Inspection of skin and subcutaneous tissue baseline  NEURO:   Cranial nerves 2-12 are normal tested and grossly at patient's baseline, speech WNL  PSYCH:  affect and mood normal    Lab/Diagnostic data:  CBC RESULTS:   Recent Labs   Lab Test  01/23/18   0630  01/22/18   0315   WBC  6.4  7.3   RBC  4.00*  3.86*   HGB  12.6*  12.0*   HCT  38.5*  36.8*   MCV  96  95   MCH  31.5  31.1   MCHC  32.7  32.6   RDW  19.0*  19.0*   PLT  53*  44*       Last Basic Metabolic Panel:  Recent Labs   Lab Test  01/22/18   0315  01/21/18   0920   NA  139  139   POTASSIUM  3.6  3.8   CHLORIDE  101  101   BARON  8.0*  7.8*   CO2  33*  30   BUN  25  25   CR  1.11  1.13   GLC  91  206*       Liver Function Studies -   Recent Labs   Lab Test  01/19/18   0054  11/17/17   1420   PROTTOTAL  5.2*  6.5*   ALBUMIN  2.8*  3.2*   BILITOTAL  0.6  0.7   ALKPHOS  33*  50   AST  21  11   ALT  27  14       TSH   Date Value Ref Range Status   01/19/2018 2.87 0.40 - 4.00 mU/L Final       ASSESSMENT/PLAN:  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 (H)  Generalized weakness  Acute/ongoing: daily weights, vitals daily and prn, BMP follow, lasix 20mg QD, KCL 20meq QD, lisinopril 5mg QD, metoprolol xl 100mg BID  PT and OT for strengthening  F/u with cardiology on 1/29/18    Atrial fibrillation, unspecified type (H)  Essential hypertension  Acute/ongoing: vitals daily and prn, BMP follow, continue lisinopril 5mg QD, metoprolol xl 100mg BID, coumadin as directed INR goal of 2-3    OLIVA (obstructive sleep apnea)  Ongoing: CPAP at night, f/u with cardio pulmonary 1/29/18    Thrombocytopenia (H)  Ongoing: CBC in AM, f/u with hematology as directed        Orders:  BMP and CBC in AM  Lasix 20mg QD not Prn  Call NP if weight gain of > 2lbs in one day      Electronically signed by:  Tonya Lynn Haase, APRN CNP

## 2018-01-25 ENCOUNTER — TRANSFERRED RECORDS (OUTPATIENT)
Dept: HEALTH INFORMATION MANAGEMENT | Facility: CLINIC | Age: 83
End: 2018-01-25

## 2018-01-25 ENCOUNTER — CARE COORDINATION (OUTPATIENT)
Dept: CARDIOLOGY | Facility: CLINIC | Age: 83
End: 2018-01-25

## 2018-01-25 LAB
ALBUMIN UR QL: DETECTED
ALPHA1 GLOB 24H UR QL ELPH: DETECTED
ALPHA2 GLOB UR QL ELPH: DETECTED
B-GLOBULIN UR QL ELPH: DETECTED
BUN SERPL-MCNC: 24 MG/DL (ref 9–26)
CALCIUM SERPL-MCNC: 8.4 MG/DL (ref 8.4–10.2)
CHLORIDE SERPLBLD-SCNC: 104 MMOL/L (ref 98–109)
CO2 SERPL-SCNC: 27 MMOL/L (ref 22–31)
COLLECT DURATION TIME SPEC: 24 H
CREAT SERPL-MCNC: 1.04 MG/DL (ref 0.73–1.18)
DIFFERENTIAL: ABNORMAL
ERYTHROCYTE [DISTWIDTH] IN BLOOD BY AUTOMATED COUNT: 18.7 % (ref 11–15)
GAMMA GLOB UR QL ELPH: DETECTED
GFR SERPL CREATININE-BSD FRML MDRD: >60 ML/MIN/1.73M2
GLUCOSE SERPL-MCNC: 86 MG/DL (ref 70–100)
HCT VFR BLD AUTO: 36.6 % (ref 39–51)
HEMOGLOBIN: 11.7 G/DL (ref 13.4–17.5)
INTERPRETATION UR IFE-IMP: ABNORMAL
KAPPA LC FREE 24H UR-MRATE: 126 MG/D
KAPPA LC FREE UR-MCNC: 16.8 MG/DL (ref 0.14–2.42)
KAPPA LC FREE/LAMBDA FREE UR: 43.08 RATIO (ref 2.04–10.37)
LAMBDA LC FREE 24H UR-MRATE: 2.93 MG/D
LAMBDA LC FREE UR-MCNC: 0.39 MG/DL (ref 0.02–0.67)
MCV RBC AUTO: 97.1 FL (ref 80–100)
PLATELET # BLD AUTO: 40 K/CMM (ref 140–450)
POTASSIUM SERPL-SCNC: 4.1 MMOL/L (ref 3.5–5.2)
PROT 24H UR-MRATE: 129 MG/D (ref 10–140)
RBC # BLD AUTO: 3.77 M/CMM (ref 4.2–5.9)
SODIUM SERPL-SCNC: 138 MMOL/L (ref 136–145)
SPECIMEN VOL ?TM UR: 750 ML
WBC # BLD AUTO: 5.6 K/CMM (ref 3.8–11)

## 2018-01-25 NOTE — PROGRESS NOTES
Patient was evaluated by cardiology while inpatient for HF.RN called [facility] TCU to confirm the follow up plan for patient with Care Coordinator. RN confirmed with Care Coordinator that patient has a scheduled F/U appt on 1/29/18 with Dr. Pugh in our CORE clinic (1110am lab and 1215pm with HARRY). RN confirmed with Care Coordinator that patient will bring list of daily weights and last progress note to appt.

## 2018-01-28 ENCOUNTER — NURSING HOME VISIT (OUTPATIENT)
Dept: GERIATRICS | Facility: CLINIC | Age: 83
End: 2018-01-28
Payer: MEDICARE

## 2018-01-28 DIAGNOSIS — I48.91 ATRIAL FIBRILLATION, UNSPECIFIED TYPE (H): ICD-10-CM

## 2018-01-28 DIAGNOSIS — I50.43 ACUTE ON CHRONIC COMBINED SYSTOLIC AND DIASTOLIC HRT FAIL (H): Primary | ICD-10-CM

## 2018-01-28 DIAGNOSIS — D69.6 THROMBOCYTOPENIA (H): ICD-10-CM

## 2018-01-28 DIAGNOSIS — G47.33 OSA (OBSTRUCTIVE SLEEP APNEA): ICD-10-CM

## 2018-01-28 PROCEDURE — 99305 1ST NF CARE MODERATE MDM 35: CPT | Performed by: INTERNAL MEDICINE

## 2018-01-28 NOTE — MR AVS SNAPSHOT
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)    -  1    Atrial fibrillation, unspecified type (H)        OLIVA (obstructive sleep apnea)        Thrombocytopenia (H)           Follow-ups after your visit        Your next 10 appointments already scheduled     Feb 05, 2018 10:30 AM CST   Return Visit with Wesley Benjamin MD   [facility] [place] ([facility] [place])    [address]  [address]  [place] [address]   [phone]            Feb 13, 2018 12:00 PM CST   LAB with [facility]   [facility] [place] ([facility])    [address]  [place] [address] [phone]           Please do not eat 10-12 hours before your appointment if you are coming in fasting for labs on lipids, cholesterol, or glucose (sugar). This does not apply to pregnant women. Water, hot tea and black coffee (with nothing added) are okay. Do not drink other fluids, diet soda or chew gum.            Feb 14, 2018  9:30 AM CST   CORE Enrollment with ERIC Fernandez [facility] ([facility])    [address]  [place] [address] [phone]            Apr 16, 2018 12:45 PM CDT   Ech Complete with [id number]   [facility] ([facility] at [facility] [place])    [address]  [place] [address] [phone]           1. Please bring or wear a comfortable two-piece outfit. 2. You may eat, drink and take your normal medicines. 3. For any questions that cannot be answered, please contact the  ordering physician            Apr 16, 2018  2:00 PM CDT   LAB with [facility]   [facility] AT [place] ([facility])    [address]  [place]  [place] [address] [phone]           Please do not eat 10-12 hours before your appointment if you are coming in fasting for labs on lipids, cholesterol, or glucose (sugar). This does not apply to pregnant women. Water, hot tea and black coffee (with nothing added) are okay. Do not drink other fluids, diet soda or chew gum.            Apr 18, 2018 10:45 AM CDT   Return Visit with Long Pugh MD   [facility] ([facility])    [address]  [place] [address] [phone]              Future tests that were ordered for you today     Open Future Orders        Priority Expected Expires Ordered    Basic metabolic panel Routine 4/29/2018 1/29/2019 1/29/2018    Follow-Up with Cardiologist Routine 4/29/2018 1/29/2019 1/29/2018    Echocardiogram Routine 4/29/2018 1/29/2019 1/29/2018    Basic metabolic panel Routine 2/28/2018 1/29/2019 1/29/2018    Follow-Up with Cardiac Advanced Practice Provider Routine 2/28/2018 1/29/2019 1/29/2018            Who to contact     If you have questions or need follow up information about today's clinic visit or your schedule please contact [facility] directly at [phone].  Normal or non-critical lab and imaging results will be communicated to you by MyChart, letter or phone within 4 business days after the clinic has received the results. If you do not hear from us within 7 days, please contact the clinic through MyChart or phone. If you have a critical or abnormal lab result, we will notify you by phone as soon as possible.  Submit refill requests through [employer] or call your pharmacy and they will forward the refill request to us. Please allow 3 business days for your refill to be completed.           "Additional Information About Your Visit        MyChart Information     [employer] lets you send messages to your doctor, view your test results, renew your prescriptions, schedule appointments and more. To sign up, go to [email or web address] . Click on \"Log in\" on the left side of the screen, which will take you to the Welcome page. Then click on \"Sign up Now\" on the right side of the page.     You will be asked to enter the access code listed below, as well as some personal information. Please follow the directions to create your username and password.     Your access code is: [email or web address]  Expires: 4/3/2018  2:08 PM     Your access code will  in 90 days. If you need help or a new code, please call your [facility] clinic or [phone].        Care EveryWhere ID     This is your Care EveryWhere ID. This could be used by other organizations to access your [facility] medical records  [id number]         Blood Pressure from Last 3 Encounters:   18 102/58   18 126/82   18 120/77    Weight from Last 3 Encounters:   18 163 lb 1.6 oz (74 kg)   18 157 lb (71.2 kg)   18 154 lb 9.6 oz (70.1 kg)              Today, you had the following     No orders found for display       Primary Care Provider Office Phone # Fax #    Clint Pizarro [phone]       [place] [address]        Equal Access to Services     PATSY MOURA : Hadii fredis lazo hadasho Soofelia, waaxda luqadaha, qaybta kaalmada adeegyabrian, emani avalos. So [facility] [phone].    ATENCIÓN: Si habla español, tiene a cleaning disposición servicios gratuitos de asistencia lingüística. Llame al [phone].    We comply with applicable federal civil rights laws and [place] laws. We do not discriminate on the basis of race, color, national origin, age, disability, sex, sexual orientation, or gender identity.            Thank you!     Thank you for " choosing [facility]  for your care. Our goal is always to provide you with excellent care. Hearing back from our patients is one way we can continue to improve our services. Please take a few minutes to complete the written survey that you may receive in the mail after your visit with us. Thank you!             Your Updated Medication List - Protect others around you: Learn how to safely use, store and throw away your medicines at www.disposemymeds.org.          This list is accurate as of 1/28/18 11:59 PM.  Always use your most recent med list.                   Brand Name Dispense Instructions for use Diagnosis    CYANOCOBALAMIN PO      Take 500 mcg by mouth daily        furosemide 20 MG tablet    LASIX    30 tablet    Take 1 tablet (20 mg) by mouth as needed    Atrial fibrillation with rapid ventricular response (H), Acute on chronic systolic congestive heart failure (H)       lisinopril 5 MG tablet    PRINIVIL/ZESTRIL    30 tablet    Take 1 tablet (5 mg) by mouth daily    Essential hypertension, Acute congestive heart failure, unspecified congestive heart failure type (H)       melatonin 1 MG Tabs tablet     15 tablet    Take 1 tablet (1 mg) by mouth nightly as needed for sleep    Generalized weakness       metoprolol succinate 100 MG 24 hr tablet    TOPROL-XL    180 tablet    Take 1 tablet (100 mg) by mouth 2 times daily    Atrial fibrillation with rapid ventricular response (H)       * Multi-vitamin Tabs tablet      Take 1 tablet by mouth daily        * ICAPS AREDS FORMULA PO      Take 1 tablet by mouth 2 times daily        potassium chloride SA 20 MEQ CR tablet    K-DUR/KLOR-CON M    30 tablet    Take 1 tablet (20 mEq) by mouth daily    Acute congestive heart failure, unspecified congestive heart failure type (H)       VITAMIN C PO      Take 500 mg by mouth daily        VITAMIN D (CHOLECALCIFEROL) PO      Take 2,000 Units by mouth daily        warfarin 2.5 MG tablet    COUMADIN     Take  2.5-5 mg by mouth - For A-fib INR goal 2-3 5 mg on Monday, Wednesday and Friday 2.5 mg on all other days        * Notice:  This list has 2 medication(s) that are the same as other medications prescribed for you. Read the directions carefully, and ask your doctor or other care provider to review them with you.

## 2018-01-29 ENCOUNTER — OFFICE VISIT (OUTPATIENT)
Dept: CARDIOLOGY | Facility: CLINIC | Age: 83
End: 2018-01-29
Payer: MEDICARE

## 2018-01-29 VITALS
HEIGHT: 69 IN | DIASTOLIC BLOOD PRESSURE: 58 MMHG | WEIGHT: 163.1 LBS | HEART RATE: 63 BPM | SYSTOLIC BLOOD PRESSURE: 102 MMHG | BODY MASS INDEX: 24.16 KG/M2

## 2018-01-29 DIAGNOSIS — I50.43 ACUTE ON CHRONIC COMBINED SYSTOLIC AND DIASTOLIC CHF (CONGESTIVE HEART FAILURE) (H): ICD-10-CM

## 2018-01-29 DIAGNOSIS — I10 ESSENTIAL HYPERTENSION: Primary | ICD-10-CM

## 2018-01-29 LAB
ANION GAP SERPL CALCULATED.3IONS-SCNC: 8.6 MMOL/L (ref 6–17)
BUN SERPL-MCNC: 23 MG/DL (ref 7–30)
CALCIUM SERPL-MCNC: 8.9 MG/DL (ref 8.5–10.5)
CHLORIDE SERPL-SCNC: 105 MMOL/L (ref 98–107)
CO2 SERPL-SCNC: 28 MMOL/L (ref 23–29)
CREAT SERPL-MCNC: 1.22 MG/DL (ref 0.7–1.3)
GFR SERPL CREATININE-BSD FRML MDRD: 56 ML/MIN/1.7M2
GLUCOSE SERPL-MCNC: 124 MG/DL (ref 70–105)
POTASSIUM SERPL-SCNC: 4.6 MMOL/L (ref 3.5–5.1)
SODIUM SERPL-SCNC: 137 MMOL/L (ref 136–145)

## 2018-01-29 PROCEDURE — 36415 COLL VENOUS BLD VENIPUNCTURE: CPT | Performed by: INTERNAL MEDICINE

## 2018-01-29 PROCEDURE — 80048 BASIC METABOLIC PNL TOTAL CA: CPT | Performed by: INTERNAL MEDICINE

## 2018-01-29 PROCEDURE — 99214 OFFICE O/P EST MOD 30 MIN: CPT | Performed by: INTERNAL MEDICINE

## 2018-01-29 NOTE — PROGRESS NOTES
HPI and Plan:   See dictation(#[id number])    Orders Placed This Encounter   Procedures     Basic metabolic panel     Basic metabolic panel     Follow-Up with Cardiac Advanced Practice Provider     Follow-Up with Cardiologist     Echocardiogram       No orders of the defined types were placed in this encounter.      There are no discontinued medications.      Encounter Diagnoses   Name Primary?     Acute on chronic combined systolic and diastolic CHF (congestive heart failure) (H)      Essential hypertension Yes       CURRENT MEDICATIONS:  Current Outpatient Prescriptions   Medication Sig Dispense Refill     melatonin 1 MG TABS tablet Take 1 tablet (1 mg) by mouth nightly as needed for sleep 15 tablet 0     lisinopril (PRINIVIL/ZESTRIL) 5 MG tablet Take 1 tablet (5 mg) by mouth daily 30 tablet 0     potassium chloride SA (K-DUR/KLOR-CON M) 20 MEQ CR tablet Take 1 tablet (20 mEq) by mouth daily 30 tablet 0     metoprolol (TOPROL-XL) 100 MG 24 hr tablet Take 1 tablet (100 mg) by mouth 2 times daily 180 tablet 3     furosemide (LASIX) 20 MG tablet Take 1 tablet (20 mg) by mouth as needed 30 tablet 11     warfarin (COUMADIN) 2.5 MG tablet Take 2.5-5 mg by mouth -  For A-fib  INR goal 2-3  5 mg on Monday, Wednesday and Friday  2.5 mg on all other days       Multiple Vitamins-Minerals (ICAPS AREDS FORMULA PO) Take 1 tablet by mouth 2 times daily        CYANOCOBALAMIN PO Take 500 mcg by mouth daily       Ascorbic Acid (VITAMIN C PO) Take 500 mg by mouth daily       multivitamin, therapeutic with minerals (MULTI-VITAMIN) TABS Take 1 tablet by mouth daily       VITAMIN D, CHOLECALCIFEROL, PO Take 2,000 Units by mouth daily         ALLERGIES   No Known Allergies    PAST MEDICAL HISTORY:  Past Medical History:   Diagnosis Date     Antiplatelet or antithrombotic long-term use      Arrhythmia      Atrial fibrillation (H)      Elevated PSA      Unspecified essential hypertension        PAST SURGICAL HISTORY:  Past Surgical History:  "  Procedure Laterality Date     COLONOSCOPY       EXPLORE GROIN  4/30/2014    Procedure: EXPLORE GROIN;  Surgeon: Sam Aguirre MD;  Location:  OR     HERNIORRHAPHY INGUINAL       HERNIORRHAPHY INGUINAL  4/30/2014    Procedure: HERNIORRHAPHY INGUINAL;  Surgeon: Sam Aguirre MD;  Location:  OR     MOHS MICROGRAPHIC PROCEDURE       PHACOEMULSIFICATION CLEAR CORNEA WITH STANDARD INTRAOCULAR LENS IMPLANT Right 9/8/2015    Procedure: PHACOEMULSIFICATION CLEAR CORNEA WITH STANDARD INTRAOCULAR LENS IMPLANT;  Surgeon: Gil Shannon MD;  Location:  EC     septic olecranon bursitis[         FAMILY HISTORY:  Family History   Problem Relation Age of Onset     HEART DISEASE No family hx of        SOCIAL HISTORY:  Social History     Social History     Marital status:      Spouse name: N/A     Number of children: N/A     Years of education: N/A     Social History Main Topics     Smoking status: Never Smoker     Smokeless tobacco: Never Used     Alcohol use No     Drug use: No     Sexual activity: Not Asked     Other Topics Concern     None     Social History Narrative       Review of Systems:  Skin:  Positive for bruising shins   Eyes:  Positive for glasses    ENT:  Negative      Respiratory:  Negative       Cardiovascular:  Negative      Gastroenterology: Negative      Genitourinary:  Positive for urinary frequency due to meds  Musculoskeletal:  Negative      Neurologic:  Negative      Psychiatric:  Negative      Heme/Lymph/Imm:  Negative      Endocrine:  Negative        Physical Exam:  Vitals: /58  Pulse 63  Ht 1.741 m (5' 8.54\")  Wt 74 kg (163 lb 1.6 oz)  BMI 24.41 kg/m2    Constitutional:           Skin:             Head:           Eyes:           Lymph:      ENT:           Neck:           Respiratory:            Cardiac:                                                           GI:           Extremities and Muscular Skeletal:                 Neurological:           Psych:     "         CC  Brooks Andres MD  [address] [address]

## 2018-01-29 NOTE — LETTER
1/29/2018      Clint Pizarro MD  [address] [address]      RE: Jama Paul       Dear Colleague,    I had the pleasure of seeing Jama Paul in the [facility].    Service Date: 01/29/2018      REASON FOR CLINIC VISIT:  Establish Cardiology C.O.R.E. care.      HISTORY OF PRESENT ILLNESS:  Mr. Paul is a very pleasant 86-year-old gentleman with a history of paroxysmal atrial fibrillation which eventually converted to what looks like chronic atrial fibrillation, hypertension, was admitted in 12/2017 with acute decompensated congestive heart failure with echocardiogram showing LVEF of 30-35% with global hypokinesia, 2-3+ tricuspid regurgitation, 1-2+ mitral regurgitation.  He also underwent cardiac MRI that showed LVEF of 42%, which was mildly reduced with moderate global hypokinesia and RVEF of 44% with also moderate to severe biatrial enlargement.  There was moderate to severe tricuspid regurgitation noted.  There was also regadenoson stress perfusion done which was negative for ischemia.  No delayed enhancement was seen.  The patient was again admitted earlier this month for decompensated congestive heart failure.  He underwent diuresis and also underwent right heart catheterization which remarkably showed normal filling of both right and left-sided pressures and normal cardiac output.  Specifically, RA mean was 6, PA mean was 19, wedge mean was 11, cardiac output was 6.37 liters with index of 3.38.  He was discharged on Lasix to [place].  Today, he is coming to establish Cardiology C.O.R.E. care.  He has been seen by Dr. Dillard and Chris Nair.  The patient is accompanied by his daughter and wife.  Overall, he tells me that he feels great, the best so far in the last several weeks.  He is doing cardiac rehab along with physical therapy at the [place] [facility] and does not endorse any shortness of breath or chest  discomfort.  His weights are now 160 pounds, which has slowly increased from 156-158 and then 160 and has been stable at that.  He takes 20 mg daily of Lasix.  Additionally, he is on Toprol- mg b.i.d.  He is also on Coumadin.  He is on lisinopril 5 mg daily.  BMP today shows normal potassium, stable creatinine, normal sodium.  He tells me that the diet at [facility] is low in salt.  The patient had several questions about whether he needs to continue to stay in [facility] or he can go home.  His wife and daughter express apprehension that they may not be able to take care of him, mainly due to balance issues and weakness.        To be noted, he also had a repeat echocardiogram done recently that was personally reviewed by me.  LVEF appears around 40%.  Overall, moderately reduced LV systolic function with moderate global hypokinesia.  The RV appears moderately reduced with severe right atrial enlargement and moderate left atrial enlargement with 2-3+ tricuspid regurgitation, 2+ mitral regurgitation.      PHYSICAL EXAMINATION:   VITAL SIGNS:  Blood pressure 102/58, heart rate 63 and regular, weight 163 pounds.  To be noted, at [facility] the weight is 160 pounds today, BMI 24.46.   GENERAL:  The patient appears pleasant, comfortable.   NECK:  Normal JVP, no bruit.   CARDIOVASCULAR SYSTEM:  There is grade 2/6 systolic murmur heard best over the left lower sternal border.  No S3, S4, rub or gallop.   RESPIRATORY SYSTEM:  Clear to auscultation bilaterally.    ABDOMEN:  Soft, nontender.   EXTREMITIES:  There are some ecchymosis marks, minimal pitting pedal edema.   NEUROLOGICAL:  Alert, oriented x3.   PSYCHIATRIC:  Normal affect.   SKIN:  There is some rash due to ecchymosis on the shins.      IMPRESSION AND PLAN:  A pleasant 86-year-old gentleman with nonischemic cardiomyopathy with negative stress test and cardiac MRI with both LV and RV systolic dysfunction with recent episode of decompensated congestive  heart failure and has done well in response to diuresis.  It was felt that the cardiomyopathy may have happened due to atrial fibrillation with rapid ventricular rate during his presentation last month.  Rates have been well controlled on beta blocker.  Clinically, he appears compensated, euvolemic.  I had a long discussion with patient and his family regarding the importance of daily weight.  If his weight goes up to 162 pounds, I recommend increasing Lasix to 40 mg daily instead of 20 mg daily and then once it comes down to 160 or less to resume back 20 mg daily of Lasix.  We also talked at length about the importance of salt restriction and keeping sodium less than 2 grams a day.  I recommend followup with an HARRY in 1 month with a BMP and follow up with me in 3 months with a repeat echocardiogram.  I am hopeful that if this is tachycardia-mediated cardiomyopathy we should see further improvement in LV and RV systolic function and that also should translate into improvement in valvular regurgitation.  His CHADS2-VASc score is 5 and he should continue Coumadin for stroke prophylaxis.         DEIRDRE SPENCER MD             D: 2018   T: 2018   MT: MARTIN      Name:     YINKA GONZALEZ   MRN:      [id number]        Account:      [id number]   :      1931           Service Date: 2018      Document: [id number]         Outpatient Encounter Prescriptions as of 2018   Medication Sig Dispense Refill     lisinopril (PRINIVIL/ZESTRIL) 5 MG tablet Take 1 tablet (5 mg) by mouth daily 30 tablet 0     potassium chloride SA (K-DUR/KLOR-CON M) 20 MEQ CR tablet Take 1 tablet (20 mEq) by mouth daily 30 tablet 0     [DISCONTINUED] melatonin 1 MG TABS tablet Take 1 tablet (1 mg) by mouth nightly as needed for sleep 15 tablet 0     metoprolol (TOPROL-XL) 100 MG 24 hr tablet Take 1 tablet (100 mg) by mouth 2 times daily 180 tablet 3     [DISCONTINUED] furosemide (LASIX) 20 MG tablet Take 1 tablet (20 mg) by mouth  as needed (Patient not taking: Reported on 1/30/2018) 30 tablet 11     [DISCONTINUED] warfarin (COUMADIN) 2.5 MG tablet Take 2.5-5 mg by mouth -  For A-fib  INR goal 2-3  5 mg on Monday, Wednesday and Friday  2.5 mg on all other days       Multiple Vitamins-Minerals (ICAPS AREDS FORMULA PO) Take 1 tablet by mouth 2 times daily        CYANOCOBALAMIN PO Take 500 mcg by mouth daily       Ascorbic Acid (VITAMIN C PO) Take 500 mg by mouth daily       multivitamin, therapeutic with minerals (MULTI-VITAMIN) TABS Take 1 tablet by mouth daily       [DISCONTINUED] VITAMIN D, CHOLECALCIFEROL, PO Take 2,000 Units by mouth daily       No facility-administered encounter medications on file as of 1/29/2018.        Again, thank you for allowing me to participate in the care of your patient.      Sincerely,    Long Pugh MD     [facility]    cc:   Brooks Andres MD  [address] [address]

## 2018-01-29 NOTE — MR AVS SNAPSHOT
After Visit Summary   1/29/2018    Jama Paul    MRN: [id number]           Patient Information     Date Of Birth          2/13/1931        Visit Information        Provider Department      1/29/2018 12:15 PM Long Pugh MD [facility]        Today's Diagnoses     Essential hypertension    -  1    Acute on chronic combined systolic and diastolic CHF (congestive heart failure) (H)           Follow-ups after your visit        Additional Services     Follow-Up with Cardiac Advanced Practice Provider           Follow-Up with Cardiologist                 Your next 10 appointments already scheduled     Feb 05, 2018 10:30 AM CST   Return Visit with Wesley Benjamin MD   [facility] ([facility])    [address]  [address]  [place] [address]   [phone]            Feb 13, 2018 12:00 PM CST   LAB with [facility]   [facility] [place] ([facility])    [address]  [place] [address]   [phone]           Please do not eat 10-12 hours before your appointment if you are coming in fasting for labs on lipids, cholesterol, or glucose (sugar). This does not apply to pregnant women. Water, hot tea and black coffee (with nothing added) are okay. Do not drink other fluids, diet soda or chew gum.            Feb 14, 2018  9:30 AM CST   CORE Enrollment with ERIC Fernandez CNP   [facility] ([facility])    [address]  [place] [address] [phone]              Future tests that were ordered for you today     Open Future Orders        Priority Expected Expires Ordered    Basic metabolic panel Routine 4/29/2018 1/29/2019 1/29/2018    Follow-Up with Cardiologist Routine 4/29/2018 1/29/2019 1/29/2018    Echocardiogram Routine 4/29/2018 1/29/2019 1/29/2018    Basic metabolic panel  "Routine 2018    Follow-Up with Cardiac Advanced Practice Provider Routine 2018            Who to contact     If you have questions or need follow up information about today's clinic visit or your schedule please contact [facility] directly at [phone].  Normal or non-critical lab and imaging results will be communicated to you by MyChart, letter or phone within 4 business days after the clinic has received the results. If you do not hear from us within 7 days, please contact the clinic through [employer] or phone. If you have a critical or abnormal lab result, we will notify you by phone as soon as possible.  Submit refill requests through [employer] or call your pharmacy and they will forward the refill request to us. Please allow 3 business days for your refill to be completed.          Additional Information About Your Visit        [employer] Information     [employer] lets you send messages to your doctor, view your test results, renew your prescriptions, schedule appointments and more. To sign up, go to [email or web address] . Click on \"Log in\" on the left side of the screen, which will take you to the Welcome page. Then click on \"Sign up Now\" on the right side of the page.     You will be asked to enter the access code listed below, as well as some personal information. Please follow the directions to create your username and password.     Your access code is: [email or web address]  Expires: 4/3/2018  2:08 PM     Your access code will  in 90 days. If you need help or a new code, please call your [place] clinic or [phone].        Care EveryWhere ID     This is your Care EveryWhere ID. This could be used by other organizations to access your [place] medical records  [id number]        Your Vitals Were     Pulse Height BMI (Body Mass Index)             63 1.741 m (5' 8.54\") 24.41 kg/m2          Blood Pressure from Last 3 " Encounters:   01/29/18 102/58   01/23/18 126/82   01/23/18 120/77    Weight from Last 3 Encounters:   01/29/18 74 kg (163 lb 1.6 oz)   01/23/18 71.2 kg (157 lb)   01/23/18 70.1 kg (154 lb 9.6 oz)              We Performed the Following     Follow-Up with [facility]- New MD consult        Primary Care Provider Office Phone # Fax #    Clint Pizarro [phone]       [place] [address]        Equal Access to Services     [facility]: Hadii fredis lazo hadasho Soofelia, waaxda luqadaha, qaybta kaalmada adechandanyabrian, emani mayers . So [facility] [phone].    ATENCIÓN: Si habla español, tiene a cleaning disposición servicios gratuitos de asistencia lingüística. [facility] [phone].    We comply with applicable federal civil rights laws and [place] laws. We do not discriminate on the basis of race, color, national origin, age, disability, sex, sexual orientation, or gender identity.            Thank you!     Thank you for choosing [facility]  for your care. Our goal is always to provide you with excellent care. Hearing back from our patients is one way we can continue to improve our services. Please take a few minutes to complete the written survey that you may receive in the mail after your visit with us. Thank you!             Your Updated Medication List - Protect others around you: Learn how to safely use, store and throw away your medicines at www.disposemymeds.org.          This list is accurate as of 1/29/18  1:02 PM.  Always use your most recent med list.                   Brand Name Dispense Instructions for use Diagnosis    CYANOCOBALAMIN PO      Take 500 mcg by mouth daily        furosemide 20 MG tablet    LASIX    30 tablet    Take 1 tablet (20 mg) by mouth as needed    Atrial fibrillation with rapid ventricular response (H), Acute on chronic systolic congestive heart failure (H)        lisinopril 5 MG tablet    PRINIVIL/ZESTRIL    30 tablet    Take 1 tablet (5 mg) by mouth daily    Essential hypertension, Acute congestive heart failure, unspecified congestive heart failure type (H)       melatonin 1 MG Tabs tablet     15 tablet    Take 1 tablet (1 mg) by mouth nightly as needed for sleep    Generalized weakness       metoprolol succinate 100 MG 24 hr tablet    TOPROL-XL    180 tablet    Take 1 tablet (100 mg) by mouth 2 times daily    Atrial fibrillation with rapid ventricular response (H)       * Multi-vitamin Tabs tablet      Take 1 tablet by mouth daily        * ICAPS AREDS FORMULA PO      Take 1 tablet by mouth 2 times daily        potassium chloride SA 20 MEQ CR tablet    K-DUR/KLOR-CON M    30 tablet    Take 1 tablet (20 mEq) by mouth daily    Acute congestive heart failure, unspecified congestive heart failure type (H)       VITAMIN C PO      Take 500 mg by mouth daily        VITAMIN D (CHOLECALCIFEROL) PO      Take 2,000 Units by mouth daily        warfarin 2.5 MG tablet    COUMADIN     Take 2.5-5 mg by mouth - For A-fib INR goal 2-3 5 mg on Monday, Wednesday and Friday 2.5 mg on all other days        * Notice:  This list has 2 medication(s) that are the same as other medications prescribed for you. Read the directions carefully, and ask your doctor or other care provider to review them with you.

## 2018-01-29 NOTE — PROGRESS NOTES
Service Date: 01/29/2018      REASON FOR CLINIC VISIT:  Establish Cardiology C.O.R.E. [facility].      HISTORY OF PRESENT ILLNESS:  Mr. Paul is a very pleasant 86-year-old gentleman with a history of paroxysmal atrial fibrillation which eventually converted to what looks like chronic atrial fibrillation, hypertension, was admitted in 12/2017 with acute decompensated congestive heart failure with echocardiogram showing LVEF of 30-35% with global hypokinesia, 2-3+ tricuspid regurgitation, 1-2+ mitral regurgitation.  He also underwent cardiac MRI that showed LVEF of 42%, which was mildly reduced with moderate global hypokinesia and RVEF of 44% with also moderate to severe biatrial enlargement.  There was moderate to severe tricuspid regurgitation noted.  There was also regadenoson stress perfusion done which was negative for ischemia.  No delayed enhancement was seen.  The patient was again admitted earlier this month for decompensated congestive heart failure.  He underwent diuresis and also underwent right heart catheterization which remarkably showed normal filling of both right and left-sided pressures and normal cardiac output.  Specifically, RA mean was 6, PA mean was 19, wedge mean was 11, cardiac output was 6.37 liters with index of 3.38.  He was discharged on Lasix to [facility].  Today, he is coming to establish Cardiology C.O.R.E. care.  He has been seen by Dr. Dillard and Chris Nair.  The patient is accompanied by his daughter and wife.  Overall, he tells me that he feels great, the best so far in the last several weeks.  He is doing cardiac rehab along with physical therapy at the [facility] and does not endorse any shortness of breath or chest discomfort.  His weights are now 160 pounds, which has slowly increased from 156-158 and then 160 and has been stable at that.  He takes 20 mg daily of Lasix.  Additionally, he is on Toprol- mg b.i.d.  He is also on Coumadin.  He is on lisinopril 5 mg  daily.  BMP today shows normal potassium, stable creatinine, normal sodium.  He tells me that the diet at [place] is low in salt.  The patient had several questions about whether he needs to continue to stay in [place] or he can go home.  His wife and daughter express apprehension that they may not be able to take care of him, mainly due to balance issues and weakness.        To be noted, he also had a repeat echocardiogram done recently that was personally reviewed by me.  LVEF appears around 40%.  Overall, moderately reduced LV systolic function with moderate global hypokinesia.  The RV appears moderately reduced with severe right atrial enlargement and moderate left atrial enlargement with 2-3+ tricuspid regurgitation, 2+ mitral regurgitation.      PHYSICAL EXAMINATION:   VITAL SIGNS:  Blood pressure 102/58, heart rate 63 and regular, weight 163 pounds.  To be noted, at [place] the weight is 160 pounds today, BMI 24.46.   GENERAL:  The patient appears pleasant, comfortable.   NECK:  Normal JVP, no bruit.   CARDIOVASCULAR SYSTEM:  There is grade 2/6 systolic murmur heard best over the left lower sternal border.  No S3, S4, rub or gallop.   RESPIRATORY SYSTEM:  Clear to auscultation bilaterally.    ABDOMEN:  Soft, nontender.   EXTREMITIES:  There are some ecchymosis marks, minimal pitting pedal edema.   NEUROLOGICAL:  Alert, oriented x3.   PSYCHIATRIC:  Normal affect.   SKIN:  There is some rash due to ecchymosis on the shins.      IMPRESSION AND PLAN:  A pleasant 86-year-old gentleman with nonischemic cardiomyopathy with negative stress test and cardiac MRI with both LV and RV systolic dysfunction with recent episode of decompensated congestive heart failure and has done well in response to diuresis.  It was felt that the cardiomyopathy may have happened due to atrial fibrillation with rapid ventricular rate during his presentation last month.  Rates have been well controlled on beta blocker.   Clinically, he appears compensated, euvolemic.  I had a long discussion with patient and his family regarding the importance of daily weight.  If his weight goes up to 162 pounds, I recommend increasing Lasix to 40 mg daily instead of 20 mg daily and then once it comes down to 160 or less to resume back 20 mg daily of Lasix.  We also talked at length about the importance of salt restriction and keeping sodium less than 2 grams a day.  I recommend followup with an HARRY in 1 month with a BMP and follow up with me in 3 months with a repeat echocardiogram.  I am hopeful that if this is tachycardia-mediated cardiomyopathy we should see further improvement in LV and RV systolic function and that also should translate into improvement in valvular regurgitation.  His CHADS2-VASc score is 5 and he should continue Coumadin for stroke prophylaxis.         DEIRDRE SPENCER MD             D: 2018   T: 2018   MT: MARTIN      Name:     YINKA GONZALEZ   MRN:      [id number]        Account:      [id number]   :      1931           Service Date: 2018      Document: [id number]

## 2018-01-29 NOTE — PROGRESS NOTES
[place] [facility]  PRIMARY CARE PROVIDER AND CLINIC:  Clint Pizarro [address] / [facility]*      Pt was seen by Dr Angel on 1/28/18 for an initial TCU visit    HPI:    Jama Paul is a 86 year old  (2/13/1931), PMH of   pulmonary hypertension, valvular insufficiency, chronic atrial fibrillation who was hospitalized at [facility] from 1/19/18-1/23/18 for the management of an exacerbation of CHF.    Hospital course reviewed by me, is as per the hospital d/c summary and NP note.    Pt presented with increase dyspnea, was found to be in CHF  CHF felt to be precipitated by tachycardia associated with underlying afib and inadvertent d/c of lasix  Pt diuresed 4800 cc with wt loss of 4800 cc    R heart cath 1/22/18 wnl  Echo nl LV size, EF 40 %, severely dilated RV, mod reduced systolic function, mod-severe TI, 2 + MR  Neg stress test  Status improved with diuresis, rate control (with B Blocker)  CPAP restarted    Pt was found to have thrombocytopenia (40 K).  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  Heme onc f/u planned       Admitted to this facility for  rehab, medical management and nursing care.      Pt reports feeling stronger, states breathing is much improved  He states he is anxious to dc to home  He is walking with walker in therapy  Appetite is good      CODE STATUS/ADVANCE DIRECTIVES DISCUSSION:   CPR/Full code   Patient's living condition: lives with spouse    ALLERGIES:Review of patient's allergies indicates no known allergies.  PAST MEDICAL HISTORY:  has a past medical history of Antiplatelet or antithrombotic long-term use; Arrhythmia; Atrial fibrillation (H); Elevated PSA; and Unspecified essential hypertension. He also has no past medical history of Malignant hyperthermia or PONV (postoperative nausea and vomiting).  PAST SURGICAL HISTORY:  has a past surgical  history that includes septic olecranon bursitis[; Herniorrhaphy inguinal; Mohs micrographic procedure; Herniorrhaphy inguinal (4/30/2014); Explore groin (4/30/2014); colonoscopy; and Phacoemulsification clear cornea with standard intraocular lens implant (Right, 9/8/2015).  FAMILY HISTORY: family history is negative for HEART DISEASE.  SOCIAL HISTORY:  reports that he has never smoked. He has never used smokeless tobacco. He reports that he does not drink alcohol or use illicit drugs.          Post Discharge Medication Reconciliation Status:     .  Current Outpatient Prescriptions   Medication Sig Dispense Refill     melatonin 1 MG TABS tablet Take 1 tablet (1 mg) by mouth nightly as needed for sleep 15 tablet 0     lisinopril (PRINIVIL/ZESTRIL) 5 MG tablet Take 1 tablet (5 mg) by mouth daily 30 tablet 0     potassium chloride SA (K-DUR/KLOR-CON M) 20 MEQ CR tablet Take 1 tablet (20 mEq) by mouth daily 30 tablet 0     metoprolol (TOPROL-XL) 100 MG 24 hr tablet Take 1 tablet (100 mg) by mouth 2 times daily 180 tablet 3     furosemide (LASIX) 20 MG tablet Take 1 tablet (20 mg) by mouth as needed 30 tablet 11     warfarin (COUMADIN) 2.5 MG tablet Take 2.5-5 mg by mouth -  For A-fib  INR goal 2-3  5 mg on Monday, Wednesday and Friday  2.5 mg on all other days       Multiple Vitamins-Minerals (ICAPS AREDS FORMULA PO) Take 1 tablet by mouth 2 times daily        CYANOCOBALAMIN PO Take 500 mcg by mouth daily       Ascorbic Acid (VITAMIN C PO) Take 500 mg by mouth daily       multivitamin, therapeutic with minerals (MULTI-VITAMIN) TABS Take 1 tablet by mouth daily       VITAMIN D, CHOLECALCIFEROL, PO Take 2,000 Units by mouth daily         ROS:  10 point ROS neg except as noted above    Exam:    GENERAL APPEARANCE:  Alert, in no distress, eating breakfast  ENT:  Mouth and posterior oropharynx normal, moist mucous membranes, normal hearing acuity  EYES:  EOM, conjunctivae, lids, pupils and irises normal, PERRL  RESP:  RR 12,  lungs clear  CV:  Irreg, HR 70s, 2/6 syst M  ABDOMEN: soft, non-tender  M/S:   Trace LE edema  SKIN:  Inspection of skin and subcutaneous tissue baseline  NEURO:   Cranial nerves 2-12 are normal. Pt is fully oriented  No focal weakness  Gait was not assessed   PSYCH:  affect and mood normal    Lab/Diagnostic data:  CBC RESULTS:   Recent Labs   Lab Test  01/23/18   0630  01/22/18   0315   WBC  6.4  7.3   RBC  4.00*  3.86*   HGB  12.6*  12.0*   HCT  38.5*  36.8*   MCV  96  95   MCH  31.5  31.1   MCHC  32.7  32.6   RDW  19.0*  19.0*   PLT  53*  44*       Last Basic Metabolic Panel:  Recent Labs   Lab Test  01/22/18   0315  01/21/18   0920   NA  139  139   POTASSIUM  3.6  3.8   CHLORIDE  101  101   BARON  8.0*  7.8*   CO2  33*  30   BUN  25  25   CR  1.11  1.13   GLC  91  206*       Liver Function Studies -   Recent Labs   Lab Test  01/19/18   0054  11/17/17   1420   PROTTOTAL  5.2*  6.5*   ALBUMIN  2.8*  3.2*   BILITOTAL  0.6  0.7   ALKPHOS  33*  50   AST  21  11   ALT  27  14       TSH   Date Value Ref Range Status   01/19/2018 2.87 0.40 - 4.00 mU/L Final       ASSESSMENT/PLAN:    Acute on chronic combined systolic and diastolic heart failure  Chronic afib, HR controlled,   Plan continue current med regimen, monitor wt exam, vitals, BMP  PT/OT  Cardiology f/u  CPAP for OLIVA  Warfarin      Thrombocytopenia (H)  Etiology not clear, but 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 Hematology f/u      Martínez Angel MD

## 2018-01-30 VITALS
BODY MASS INDEX: 24.23 KG/M2 | WEIGHT: 161.9 LBS | SYSTOLIC BLOOD PRESSURE: 93 MMHG | HEART RATE: 55 BPM | OXYGEN SATURATION: 97 % | RESPIRATION RATE: 20 BRPM | DIASTOLIC BLOOD PRESSURE: 53 MMHG | TEMPERATURE: 97.2 F

## 2018-01-30 NOTE — PROGRESS NOTES
[facility] DISCHARGE SUMMARY    PATIENT'S NAME: Jama Paul  YOB: 1931  MEDICAL RECORD NUMBER:  [id number]    PRIMARY CARE PROVIDER AND CLINIC RESPONSIBLE AFTER TRANSFER: Clint Pizarro [address]*     CODE STATUS/ADVANCE DIRECTIVES DISCUSSION:   CPR/Full code      No Known Allergies    TRANSFERRING PROVIDERS: Tonya Lynn Haase, APRN CNP, Dr. Jeanne MD  DATE OF SNF ADMISSION:  January / 23 / 2018  DATE OF SNF (anticipated) DISCHARGE: February / 02 / 2018  DISCHARGE DISPOSITION: Non-FMG Provider   Nursing Facility: [facility] stay 1/19/18 to 1/23/18.     Condition on Discharge:  Stable.  Function:  Walking up to 1000 feet using a 4WW independently  Cognitive Scores: BIMS: 13/15, SBT: 2/28  Equipment: walker    DISCHARGE DIAGNOSIS:   1. 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)    2. Generalized weakness    3. Atrial fibrillation, unspecified type (H)    4. Essential hypertension    5. OLIVA (obstructive sleep apnea)    6. Thrombocytopenia (H)          PAST MEDICAL HISTORY:  has a past medical history of Antiplatelet or antithrombotic long-term use; Arrhythmia; Atrial fibrillation (H); Elevated PSA; and Unspecified essential hypertension. He also has no past medical history of Malignant hyperthermia or PONV (postoperative nausea and vomiting).    DISCHARGE MEDICATIONS:  Current Outpatient Prescriptions   Medication Sig Dispense Refill     Warfarin Therapy Reminder 1 each continuous       Furosemide (LASIX PO) Take 20 mg by mouth       melatonin 1 MG TABS tablet Take 1 tablet (1 mg) by mouth nightly as needed for sleep 15 tablet 0     lisinopril (PRINIVIL/ZESTRIL) 5 MG tablet Take 1 tablet (5 mg) by mouth daily 30 tablet 0     potassium chloride SA (K-DUR/KLOR-CON M) 20 MEQ CR tablet Take 1 tablet (20 mEq) by mouth daily 30 tablet 0     metoprolol (TOPROL-XL) 100 MG  24 hr tablet Take 1 tablet (100 mg) by mouth 2 times daily 180 tablet 3     Multiple Vitamins-Minerals (ICAPS AREDS FORMULA PO) Take 1 tablet by mouth 2 times daily        CYANOCOBALAMIN PO Take 500 mcg by mouth daily       Ascorbic Acid (VITAMIN C PO) Take 500 mg by mouth daily       multivitamin, therapeutic with minerals (MULTI-VITAMIN) TABS Take 1 tablet by mouth daily       VITAMIN D, CHOLECALCIFEROL, PO Take 2,000 Units by mouth daily         MEDICATION CHANGES/RATIONALE:   There were no medication changes made during TCU stay  INR 1/24: 1.2, 1/26: 2.1, 1/29 1.4  Last 3 BPs: 93/53, 117/71, 119/74  HR Ranges: 55-91 bpm  Admission Weight: 154 lbs  Current Weight: 161.9 lbs  Controlled medications sent with patient: not applicable/none     ROS:    10 point ROS of systems including Constitutional, Eyes, Respiratory, Cardiovascular, Gastroenterology, Genitourinary, Integumentary, Muscularskeletal, Psychiatric were all negative except for pertinent positives noted in my HPI.    Physical Exam:   Vitals: BP 93/53  Pulse 55  Temp 97.2  F (36.2  C)  Resp 20  Wt 161 lb 14.4 oz (73.4 kg)  SpO2 97%  BMI 24.23 kg/m2  BMI= Body mass index is 24.23 kg/(m^2).  GENERAL APPEARANCE:  Alert, in no distress  ENT:  Mouth and posterior oropharynx normal, moist mucous membranes, normal hearing acuity  EYES:  EOM, conjunctivae, lids, pupils and irises normal, PERRL  RESP:  respiratory effort and palpation of chest normal, lungs clear to auscultation , no respiratory distress  CV:  Palpation and auscultation of heart done , peripheral edema 1+ pedal edema bilaterally, irregular rhythm rate controlled  ABDOMEN:  normal bowel sounds, soft, nontender, no hepatosplenomegaly or other masses  M/S:   Examination of:   right upper extremity, left upper extremity, right lower extremity and left lower extremity  Inspection, ROM, stability and muscle strength normal  SKIN:  Inspection of skin and subcutaneous tissue baseline  NEURO:    Cranial nerves 2-12 are normal tested and grossly at patient's baseline, speech WNL  PSYCH:  affect and mood normal      HPI Nursing Facility Course: Patient progressed in therapy to walking > 1000 feet using 4WW, indep with ADL's, will DC home with OP cardiac rehab  HPI information obtained from: facility chart records, facility staff, patient report and [facility] chart review.      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)  Generalized weakness  Acute/ongoing: DC home with OP cardiac rehab   lasix 20mg QD if weight is > 162 lbs give 40mg lasix until weight is < 160, KCL 20meq QD, lisinopril 5mg QD, metoprolol xl 100mg BID    F/u with cardiology on 1/29/18 gave instructions to increase lasix to 40mg QD if weight is > 162lbs until weight is < 160 then resume lasix 20mg QD     Atrial fibrillation, unspecified type (H)  Essential hypertension  Acute/ongoing: continue lisinopril 5mg QD, metoprolol xl 100mg BID, coumadin as directed INR goal of 2-3     OLIVA (obstructive sleep apnea)  Ongoing: CPAP at night, f/u with cardio pulmonary 1/29/18     Thrombocytopenia (H)  Ongoing:  f/u with hematology as directed          DISCHARGE PLAN:  OP Cardiac Rehab  Patient instructed to follow-up with:  PCP in 7 days      Current Rescue scheduled appointments:  Future Appointments  Date Time Provider Department Center   2/5/2018 10:30 AM Wesley Benjamin MD [facility]   2/13/2018 12:00 PM [facility] CLIN   2/14/2018 9:30 AM Winnie Garcia Sm, APRN CNP [facility] PSA CLIN   4/16/2018 12:45 PM [id number]   4/16/2018 2:00 PM [facility] CLIN   4/18/2018 10:45 AM Long Pugh MD [facility] PSA CLIN       MTM referral needed and placed by this provider: No    Pending labs: none  SNF labs   CBC RESULTS:   Recent Labs   Lab Test 01/25/18 01/23/18   0630  01/22/18   0315   WBC  5.6  6.4  7.3   RBC  3.77*  4.00*  3.86*   HGB  11.7*  12.6*  12.0*   HCT  36.6*  38.5*  36.8*    MCV  97.1  96  95   MCH   --   31.5  31.1   MCHC   --   32.7  32.6   RDW  18.7*  19.0*  19.0*   PLT  40*  53*  44*       Last Basic Metabolic Panel:  Recent Labs   Lab Test  01/29/18   1054 01/25/18   NA  137  138   POTASSIUM  4.6  4.1   CHLORIDE  105  104   BARON  8.9  8.4   CO2  28  27   BUN  23  24   CR  1.22  1.04   GLC  124*  86       Liver Function Studies -   Recent Labs   Lab Test  01/19/18   0054  11/17/17   1420   PROTTOTAL  5.2*  6.5*   ALBUMIN  2.8*  3.2*   BILITOTAL  0.6  0.7   ALKPHOS  33*  50   AST  21  11   ALT  27  14       TSH   Date Value Ref Range Status   01/19/2018 2.87 0.40 - 4.00 mU/L Final         Discharge Treatments:wound to right lateral calf cleanse with micorklenz, apply dry aquacel AG to wound and cover with gauze then cover with tubigrip.     TOTAL DISCHARGE TIME:   Greater than 30 minutes  Electronically signed by:  Tonya Lynn Haase, APRN CNP

## 2018-01-31 ENCOUNTER — DISCHARGE SUMMARY NURSING HOME (OUTPATIENT)
Dept: GERIATRICS | Facility: CLINIC | Age: 83
End: 2018-01-31
Payer: MEDICARE

## 2018-01-31 DIAGNOSIS — I50.43 ACUTE ON CHRONIC COMBINED SYSTOLIC AND DIASTOLIC HRT FAIL (H): Primary | ICD-10-CM

## 2018-01-31 DIAGNOSIS — R53.1 GENERALIZED WEAKNESS: ICD-10-CM

## 2018-01-31 DIAGNOSIS — D69.6 THROMBOCYTOPENIA (H): ICD-10-CM

## 2018-01-31 DIAGNOSIS — I48.91 ATRIAL FIBRILLATION, UNSPECIFIED TYPE (H): ICD-10-CM

## 2018-01-31 DIAGNOSIS — I10 ESSENTIAL HYPERTENSION: ICD-10-CM

## 2018-01-31 DIAGNOSIS — G47.33 OSA (OBSTRUCTIVE SLEEP APNEA): ICD-10-CM

## 2018-01-31 PROCEDURE — 99316 NF DSCHRG MGMT 30 MIN+: CPT | Performed by: NURSE PRACTITIONER

## 2018-02-02 ENCOUNTER — MEDICAL CORRESPONDENCE (OUTPATIENT)
Dept: HEALTH INFORMATION MANAGEMENT | Facility: CLINIC | Age: 83
End: 2018-02-02

## 2018-02-02 ENCOUNTER — CARE COORDINATION (OUTPATIENT)
Dept: CARDIOLOGY | Facility: CLINIC | Age: 83
End: 2018-02-02

## 2018-02-02 NOTE — PROGRESS NOTES
YAEL from Hugo at [facility] outpatient cardiac rehab. Pt called wanting to get enrolled in cardiac rehab. Hugo would like us to review chart to review EF. To qualify for cardiac rehab, EF needs to be 35% or less. Reviewed chart, most recent Echo showed EF of 40%. Returned call to Hugo and reviewed this. Will review with Dr. Pugh if OK for WEL program and have him sign orders. Orders placed in Dr. Pugh' box. MATTY Scales 12:56 PM 02/02/18

## 2018-02-05 ENCOUNTER — HOSPITAL ENCOUNTER (OUTPATIENT)
Dept: WOUND CARE | Facility: CLINIC | Age: 83
Discharge: HOME | End: 2018-02-05
Attending: SURGERY | Admitting: SURGERY
Payer: MEDICARE

## 2018-02-05 VITALS — TEMPERATURE: 97.9 F | SYSTOLIC BLOOD PRESSURE: 105 MMHG | DIASTOLIC BLOOD PRESSURE: 71 MMHG | HEART RATE: 90 BPM

## 2018-02-05 PROCEDURE — A6210 FOAM DRG >16<=48 SQ IN W/O B: HCPCS

## 2018-02-05 PROCEDURE — 11042 DBRDMT SUBQ TIS 1ST 20SQCM/<: CPT | Performed by: SURGERY

## 2018-02-05 PROCEDURE — 11042 DBRDMT SUBQ TIS 1ST 20SQCM/<: CPT

## 2018-02-05 NOTE — PROGRESS NOTES
[facility] [place] Progress Note    Subject:  Jama Paul and his daughter return to see us today at the wound clinic for follow-up of his right traumatic medial calf ulcer.  This was secondary to contusion with underlying hyper coagulable state due to his Coumadin.  Full-thickness skin necrosis.  We have been applying iodofoam to the area and allowing this to heal by secondary intent.  He recently bumped his left tibial area and has a small abrasion there is doing well with just a Tegaderm dressing.    He needed a right heart cath recently.  This was relatively normal.  He was in the [place] for a short period of time and is now back at home.  He reports his appetite and nutritional intake is been good.  He has no pain associated with the right calf ulcer.      Exam:  /71 (BP Location: Left arm)  Pulse 90  Temp 97.9  F (36.6  C) (Tympanic)  Alert and appropriate.  Very comfortable.  Right medial calf ulcer is decreased in size.  This now measures 5.3 x 4.8 x 0.1 cm.  There is a thick layer of fibrin slough.  Edges are well defined with no undermining.  No cellulitis.  Minimal superficial ulcer left tibial region treated just with Tegaderm.    Procedure:  Time out was called, informed consent obtained, and topical anesthetic of 4% lidocaine was applied per standing protocol, debridement was performed using a #15 blade down to and including subcutaneous tissue.  We excised all fibrin slough down to very healthy robust firm granulation tissue covering 100% of the wound.  Skin edges were slightly debrided on the bleeding tissue to encourage tissue ingrowth.  Bleeding controlled with light pressure. Patient tolerated procedure well.    Impression: Continued improvement of right medial calf traumatic ulcer.  He has very good granulation tissue.  He and his family decided to allow this to heal by secondary intent.  Home health care is seeing him daily due to a recent discharge from [facility].   Dressing changes will be performed every other day using iodofoam.    Plan: We will dress the wounds with above.  Patient will return to the clinic in 2 weeks time    Wesley Benjamin MD  02/05/18 10:56 AM

## 2018-02-08 NOTE — PROGRESS NOTES
Faxed signed WEL orders to [facility] and sent signed orders to scan.  MATTY Hicks 11:49 AM 2/8/2018

## 2018-02-13 DIAGNOSIS — I50.43 ACUTE ON CHRONIC COMBINED SYSTOLIC AND DIASTOLIC CHF (CONGESTIVE HEART FAILURE) (H): ICD-10-CM

## 2018-02-13 LAB
ANION GAP SERPL CALCULATED.3IONS-SCNC: 8.6 MMOL/L (ref 6–17)
BUN SERPL-MCNC: 21 MG/DL (ref 7–30)
CALCIUM SERPL-MCNC: 8.4 MG/DL (ref 8.5–10.5)
CHLORIDE SERPL-SCNC: 103 MMOL/L (ref 98–107)
CO2 SERPL-SCNC: 27 MMOL/L (ref 23–29)
CREAT SERPL-MCNC: 1.49 MG/DL (ref 0.7–1.3)
GFR SERPL CREATININE-BSD FRML MDRD: 45 ML/MIN/1.7M2
GLUCOSE SERPL-MCNC: 133 MG/DL (ref 70–105)
POTASSIUM SERPL-SCNC: 4.6 MMOL/L (ref 3.5–5.1)
SODIUM SERPL-SCNC: 134 MMOL/L (ref 136–145)

## 2018-02-13 PROCEDURE — 80048 BASIC METABOLIC PNL TOTAL CA: CPT | Performed by: INTERNAL MEDICINE

## 2018-02-13 PROCEDURE — 36415 COLL VENOUS BLD VENIPUNCTURE: CPT | Performed by: INTERNAL MEDICINE

## 2018-02-14 ENCOUNTER — OFFICE VISIT (OUTPATIENT)
Dept: CARDIOLOGY | Facility: CLINIC | Age: 83
End: 2018-02-14
Payer: MEDICARE

## 2018-02-14 VITALS
WEIGHT: 175.2 LBS | SYSTOLIC BLOOD PRESSURE: 112 MMHG | HEIGHT: 69 IN | OXYGEN SATURATION: 100 % | BODY MASS INDEX: 25.95 KG/M2 | DIASTOLIC BLOOD PRESSURE: 70 MMHG | HEART RATE: 96 BPM

## 2018-02-14 DIAGNOSIS — I50.31 ACUTE DIASTOLIC CONGESTIVE HEART FAILURE (H): Primary | ICD-10-CM

## 2018-02-14 DIAGNOSIS — I50.43 ACUTE ON CHRONIC COMBINED SYSTOLIC AND DIASTOLIC CHF (CONGESTIVE HEART FAILURE) (H): ICD-10-CM

## 2018-02-14 PROCEDURE — 99214 OFFICE O/P EST MOD 30 MIN: CPT | Performed by: NURSE PRACTITIONER

## 2018-02-14 RX ORDER — FUROSEMIDE 20 MG
20 TABLET ORAL DAILY
COMMUNITY
End: 2018-12-17

## 2018-02-14 RX ORDER — MULTIVIT-MIN/IRON/FOLIC ACID/K 18-600-40
3000 CAPSULE ORAL DAILY
COMMUNITY

## 2018-02-14 NOTE — PROGRESS NOTES
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 at the [facility].  He recently was evaluated by Dr. Pugh, one of the physicians in the [facility].  He has a nonischemic cardiomyopathy with a reduced ejection fraction by echo of 30-35%.  Recent echo, reviewed by Dr. Pugh, showed an ejection fraction of 40% with overall moderately reduced LV systolic function with moderate global hypokinesia.  The RV appears moderately reduced with severe right atrial enlargement and moderate left atrial enlargement with 2-3+ tricuspid regurgitation and 2+ mitral regurgitation.  The patient has had 2 recent admissions for decompensated heart failure.  He required IV diuresing.  With the second admission, he underwent right heart catheterization that showed normal filling of both right and left-sided pressures with normal cardiac output.  His RA mean was 6, PA mean was 19, the wedge mean was 11.  Cardiac output was 6.37 liters with an index of 3.38.  He recovered at [facility] after his second discharge.  He was discharged on furosemide for a diuretic.  When he was seen 2 weeks ago by Dr. Pugh, his weight in the office was 163 pounds.  His weight today in the office is 175 pounds.  Today both his wife and his daughter who are here with him and Jama said a weight of 160 or 163 is a very low weight.  Jama tells me that a normal weight for him is more 172-175 pounds.  Since he has been discharged from [facility], his appetite has improved.  He is eating out more, but he does admit he is eating more salt.  He has not kept track of his sodium intake.  Likewise, he has not started to check his weight daily.      He has paroxysmal atrial fibrillation.  He is on warfarin therapy.  His INRs are managed through Dr. Pizarro's office.  His last INR was 2.      A basic metabolic panel was drawn today.  It showed a  sodium of 134, potassium 4.6, BUN 21, a creatinine of 1.49, GFR 45.  We reviewed his medication list.  It appears he is taking his pills as recommended, although he did not bring his pills in today.      His CHADS2-VASc score is 5.  He is on appropriate anticoagulation medication.  There are concerns of balance issue.        Today, he denies any chest pain or chest pressure.  He denies lightheadedness or dizziness.  He does report increase in urination after taking his furosemide. He denies abdominal swelling, new ankle swelling or PND.     PHYSICAL EXAMINATION:   GENERAL:  The patient is alert and oriented.  Skin warm and dry.  He is frail.  He needs assistance on and off the exam table.   LUNGS:  Clear, but somewhat diminished breath sounds in the bases.   CARDIAC:  Heart tones are irregular.  He has a 2/6 systolic murmur heard best left lower sternal border. No S3, S4, rubs or murmurs.   ABDOMEN:  Soft with positive hepatic jugular distention.   EXTREMITIES:  Lower extremities with marked ecchymosis with 1+ bilateral edema.      IMPRESSION AND PLAN:  This is an 87-year-old gentleman with nonischemic cardiomyopathy with a depressed LV systolic dysfunction in the 35-40% range, depending on imaging.  He has established care in the [facility].  He was seen 2 weeks ago by Dr. Pugh.  His weight now has increased possibly due to better appetite since returning home, higher sodium in diet, or a component of AFib with rapid ventricular response contributing to his heart failure.  His rate appears to be well controlled on beta blocker.  Today we have spent more than 50% of this clinic visit talking about the importance of daily weights, reviewing the weight charts, a 2-gram sodium diet and foods to avoid.  Due to his signs of fluid overload, I am going to push up his furosemide to 40 mg daily for 3 days.  He will start weighing himself at home.  After 1 week, we will get a feel of what his daily weights are.  We will  watch his lab work closely to prevent worsening renal function or hyponatremia.  I will see him back in 1 month with a BMP.  Dr. Pugh wants to see him in April.  That appointment has been made.  A repeat echo is also scheduled in April.  I will consider placing a 24-hour Holter monitor in a month or perhaps before that if he remains fluid volume up.      It has been my pleasure to meet Yinka today.         ERIC GUERRA, MANISH             D: 2018   T: 2018   MT: MARTIN      Name:     YINKA GONZALEZ   MRN:      [id number]        Account:      [id number]   :      1931           Service Date: 2018      Document: [id number]

## 2018-02-14 NOTE — PROGRESS NOTES
HPI and Plan:   See dictation    Orders Placed This Encounter   Procedures     Basic metabolic panel     Follow-Up with [facility] - HARRY visit     Orders Placed This Encounter   Medications     furosemide (LASIX) 20 MG tablet     Sig: Take 20 mg by mouth daily     Vitamin D, Cholecalciferol, 1000 UNITS TABS     Sig: Take 1,000 Units by mouth daily     Magnesium Hydroxide (MAGNESIA PO)     Sig: Take by mouth daily     Medications Discontinued During This Encounter   Medication Reason     Furosemide (LASIX PO) Stopped by Patient     melatonin 1 MG TABS tablet Stopped by Patient     VITAMIN D, CHOLECALCIFEROL, PO Stopped by Patient         Encounter Diagnosis   Name Primary?     Acute on chronic combined systolic and diastolic CHF (congestive heart failure) (H)        CURRENT MEDICATIONS:  Current Outpatient Prescriptions   Medication Sig Dispense Refill     furosemide (LASIX) 20 MG tablet Take 20 mg by mouth daily       Vitamin D, Cholecalciferol, 1000 UNITS TABS Take 1,000 Units by mouth daily       Magnesium Hydroxide (MAGNESIA PO) Take by mouth daily       Warfarin Therapy Reminder 1 each continuous       lisinopril (PRINIVIL/ZESTRIL) 5 MG tablet Take 1 tablet (5 mg) by mouth daily 30 tablet 0     potassium chloride SA (K-DUR/KLOR-CON M) 20 MEQ CR tablet Take 1 tablet (20 mEq) by mouth daily 30 tablet 0     metoprolol (TOPROL-XL) 100 MG 24 hr tablet Take 1 tablet (100 mg) by mouth 2 times daily 180 tablet 3     Multiple Vitamins-Minerals (ICAPS AREDS FORMULA PO) Take 1 tablet by mouth 2 times daily        CYANOCOBALAMIN PO Take 500 mcg by mouth daily       Ascorbic Acid (VITAMIN C PO) Take 500 mg by mouth daily       multivitamin, therapeutic with minerals (MULTI-VITAMIN) TABS Take 1 tablet by mouth daily         ALLERGIES   No Known Allergies    PAST MEDICAL HISTORY:  Past Medical History:   Diagnosis Date     Antiplatelet or antithrombotic long-term use      Arrhythmia      Atrial fibrillation (H)      Elevated  "PSA      Unspecified essential hypertension        PAST SURGICAL HISTORY:  Past Surgical History:   Procedure Laterality Date     COLONOSCOPY       EXPLORE GROIN  4/30/2014    Procedure: EXPLORE GROIN;  Surgeon: Sam Aguirre MD;  Location:  OR     HERNIORRHAPHY INGUINAL       HERNIORRHAPHY INGUINAL  4/30/2014    Procedure: HERNIORRHAPHY INGUINAL;  Surgeon: Sam Aguirre MD;  Location:  OR     MOHS MICROGRAPHIC PROCEDURE       PHACOEMULSIFICATION CLEAR CORNEA WITH STANDARD INTRAOCULAR LENS IMPLANT Right 9/8/2015    Procedure: PHACOEMULSIFICATION CLEAR CORNEA WITH STANDARD INTRAOCULAR LENS IMPLANT;  Surgeon: Gil Shannon MD;  Location:  EC     septic olecranon bursitis[         FAMILY HISTORY:  Family History   Problem Relation Age of Onset     HEART DISEASE No family hx of        SOCIAL HISTORY:  Social History     Social History     Marital status:      Spouse name: N/A     Number of children: N/A     Years of education: N/A     Social History Main Topics     Smoking status: Never Smoker     Smokeless tobacco: Never Used     Alcohol use No     Drug use: No     Sexual activity: Not Asked     Other Topics Concern     None     Social History Narrative       Review of Systems:  Skin:  Positive for bruising   Eyes:  Positive for glasses  ENT:  Negative    Respiratory:  Negative    Cardiovascular:  Negative    Gastroenterology: Negative    Genitourinary:  Positive for urinary frequency  Musculoskeletal:  Negative    Neurologic:  Negative    Psychiatric:  Negative    Heme/Lymph/Imm:       Endocrine:         Physical Exam:  Vitals: /70  Pulse 96  Ht 1.74 m (5' 8.5\")  Wt 79.5 kg (175 lb 3.2 oz)  SpO2 100%  BMI 26.25 kg/m2    Constitutional:  cooperative;well developed frail      Skin:  warm and dry to the touch;no apparent skin lesions or masses noted          Head:  normocephalic        Eyes:  pupils equal and round        Lymph:      ENT:  no pallor or cyanosis        Neck:    JVP " elevated      Respiratory:  clear to auscultation;normal symmetry         Cardiac:   irregular rhythm                                                       GI:  abdomen soft        Extremities and Muscular Skeletal:      bilateral LE edema;trace;1+          Neurological:  affect appropriate        Psych:  affect appropriate, oriented to time, person and place Anxious      Recent Lab Results:  LIPID RESULTS:  Lab Results   Component Value Date    CHOL 127 04/18/2012    HDL 56 04/18/2012    LDL 55 04/18/2012    TRIG 81 04/18/2012    CHOLHDLRATIO 2.3 04/18/2012       LIVER ENZYME RESULTS:  Lab Results   Component Value Date    AST 21 01/19/2018    ALT 27 01/19/2018       CBC RESULTS:  Lab Results   Component Value Date    WBC 5.6 01/25/2018    RBC 3.77 (L) 01/25/2018    HGB 11.7 (L) 01/25/2018    HCT 36.6 (L) 01/25/2018    MCV 97.1 01/25/2018    MCH 31.5 01/23/2018    MCHC 32.7 01/23/2018    RDW 18.7 (H) 01/25/2018    PLT 40 (L) 01/25/2018       BMP RESULTS:  Lab Results   Component Value Date     (L) 02/13/2018    POTASSIUM 4.6 02/13/2018    CHLORIDE 103 02/13/2018    CO2 27 02/13/2018    ANIONGAP 8.6 02/13/2018     (H) 02/13/2018    BUN 21 02/13/2018    CR 1.49 (H) 02/13/2018    GFRESTIMATED 45 (L) 02/13/2018    GFRESTBLACK 54 (L) 02/13/2018    BARON 8.4 (L) 02/13/2018        A1C RESULTS:  No results found for: A1C    INR RESULTS:  Lab Results   Component Value Date    INR 1.19 (H) 01/23/2018    INR 1.39 (H) 01/22/2018           CC  Brooks Andres MD  [id number] [id number]

## 2018-02-14 NOTE — NURSING NOTE
The After Visit Summary was reviewed with the patient following their office visit with Winnie Garcia. Patient was educated about any medication changes, the importance of following a low sodium diet, importance of recording daily weights, and when to call [facility] clinic. Patient verbalized understanding of the information and agrees to call with any questions or concerns.     Labs: Lab results from today were reviewed with the patient during the office visit. A copy of the results were provided on the After Visit Summary.     Return appointment: Patient was given instructions on when and how to schedule their next office visit with the [facility] clinic.     Medication changes: Increase furosemide to 40 mg x 3 days, then resume taking 20 mg daily. The [facility] nurse will call you next week for a wt/sx update.     Sonam Sow RN  [facility] Nurse  [phone]

## 2018-02-14 NOTE — PATIENT INSTRUCTIONS
Call [facility] nurse for any questions or concerns:  [phone]   *If you have concerns after hours, please call [phone], option 2 to speak with on call Cardiologist.    Dry weight 172 pounds    1. Medication changes from today:   Increase your furosemide to 40 mg daily for 3 days then go back to 20 mg daily     2. Weigh yourself daily and write it down.     3. Call CORE nurse if your weight is up more than 2 pounds in one day or 5 pounds in one week.     4. Call CORE nurse if you feel more short of breath, have more abdominal bloating, or leg swelling.     5. Continue low sodium diet (less than 2000 mg daily). If you eat less salt, you will retain less fluid.     6. Alcohol can weaken your heart further. You should avoid alcohol or limit its use to special times, such as a holiday or birthday.      7. Do NOT take Aleve or ibuprofen without talking to your doctor first.      8. Lab Results: **     Component      Latest Ref Rng & Units 1/29/2018 2/13/2018   Sodium      136 - 145 mmol/L 137 134 (L)   Potassium      3.5 - 5.1 mmol/L 4.6 4.6   Chloride      98 - 107 mmol/L 105 103   Carbon Dioxide      23 - 29 mmol/L 28 27   Anion Gap      6 - 17 mmol/L 8.6 8.6   Glucose      70 - 105 mg/dL 124 (H) 133 (H)   Urea Nitrogen      7 - 30 mg/dL 23 21   Creatinine      0.70 - 1.30 mg/dL 1.22 1.49 (H)   GFR Estimate      >60 mL/min/1.7m2 56 (L) 45 (L)   GFR Estimate If Black      >60 mL/min/1.7m2 68 54 (L)   Calcium      8.5 - 10.5 mg/dL 8.9 8.4 (L)     [facility]: Cardiomyopathy, Optimization, Rehabilitation, Education  The [facility] is a heart failure specialty clinic within the [facility] where you will work with specialized nurse practitioners, physician assistants, doctors, and registered nurses. They are dedicated to helping patients with heart failure to carefully adjust medications, receive education, and learn who and when to call if symptoms develop. They specialize in helping you better understand  your condition, slow the progression of your disease, improve the length and quality of your life, help you detect future heart problems before they become life threatening, and avoid hospitalizations.

## 2018-02-14 NOTE — LETTER
2/14/2018      Clint Pizarro MD  [address] [address]      RE: Jama Paul       Dear Colleague,    I had the pleasure of seeing Jama Paul in the [facility].    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 at the [facility].  He recently was evaluated by Dr. Pugh, one of the physicians in the [facility].  He has a nonischemic cardiomyopathy with a reduced ejection fraction by echo of 30-35%.  Recent echo, reviewed by Dr. Pugh, showed an ejection fraction of 40% with overall moderately reduced LV systolic function with moderate global hypokinesia.  The RV appears moderately reduced with severe right atrial enlargement and moderate left atrial enlargement with 2-3+ tricuspid regurgitation and 2+ mitral regurgitation.  The patient has had 2 recent admissions for decompensated heart failure.  He required IV diuresing.  With the second admission, he underwent right heart catheterization that showed normal filling of both right and left-sided pressures with normal cardiac output.  His RA mean was 6, PA mean was 19, the wedge mean was 11.  Cardiac output was 6.37 liters with an index of 3.38.  He recovered at [facility] after his second discharge.  He was discharged on furosemide for a diuretic.  When he was seen 2 weeks ago by Dr. Pugh, his weight in the office was 163 pounds.  His weight today in the office is 175 pounds.  Today both his wife and his daughter who are here with him and Jama said a weight of 160 or 163 is a very low weight.  Jama tells me that a normal weight for him is more 172-175 pounds.  Since he has been discharged from [facility], his appetite has improved.  He is eating out more, but he does admit he is eating more salt.  He has not kept track of his sodium intake.   Likewise, he has not started to check his weight daily.      He has paroxysmal atrial fibrillation.  He is on warfarin therapy.  His INRs are managed through Dr. Pizarro's office.  His last INR was 2.      A basic metabolic panel was drawn today.  It showed a sodium of 134, potassium 4.6, BUN 21, a creatinine of 1.49, GFR 45.  We reviewed his medication list.  It appears he is taking his pills as recommended, although he did not bring his pills in today.      His CHADS2-VASc score is 5.  He is on appropriate anticoagulation medication.  There are concerns of balance issue.        Today, he denies any chest pain or chest pressure.  He denies lightheadedness or dizziness.  He does report increase in urination after taking his furosemide. He denies abdominal swelling, new ankle swelling or PND.     PHYSICAL EXAMINATION:   GENERAL:  The patient is alert and oriented.  Skin warm and dry.  He is frail.  He needs assistance on and off the exam table.   LUNGS:  Clear, but somewhat diminished breath sounds in the bases.   CARDIAC:  Heart tones are irregular.  He has a 2/6 systolic murmur heard best left lower sternal border. No S3, S4, rubs or murmurs.   ABDOMEN:  Soft with positive hepatic jugular distention.   EXTREMITIES:  Lower extremities with marked ecchymosis with 1+ bilateral edema.      IMPRESSION AND PLAN:  This is an 87-year-old gentleman with nonischemic cardiomyopathy with a depressed LV systolic dysfunction in the 35-40% range, depending on imaging.  He has established care in the [facility].  He was seen 2 weeks ago by Dr. Pugh.  His weight now has increased possibly due to better appetite since returning home, higher sodium in diet, or a component of AFib with rapid ventricular response contributing to his heart failure.  His rate appears to be well controlled on beta blocker.  Today we have spent more than 50% of this clinic visit talking about the importance of daily weights, reviewing the weight charts,  a 2-gram sodium diet and foods to avoid.  Due to his signs of fluid overload, I am going to push up his furosemide to 40 mg daily for 3 days.  He will start weighing himself at home.  After 1 week, we will get a feel of what his daily weights are.  We will watch his lab work closely to prevent worsening renal function or hyponatremia.  I will see him back in 1 month with a BMP.  Dr. Pugh wants to see him in April.  That appointment has been made.  A repeat echo is also scheduled in April.  I will consider placing a 24-hour Holter monitor in a month or perhaps before that if he remains fluid volume up.      It has been my pleasure to meet Yinka today.         ERIC GUERRA, MANISH             D: 2018   T: 2018   MT: MARTIN      Name:     YINKA GONZALEZ   MRN:      [id number]        Account:      [id number]   :      1931           Service Date: 2018      Document: [id number]           Outpatient Encounter Prescriptions as of 2018   Medication Sig Dispense Refill     furosemide (LASIX) 20 MG tablet Take 20 mg by mouth daily       Vitamin D, Cholecalciferol, 1000 UNITS TABS Take 1,000 Units by mouth daily       Magnesium Hydroxide (MAGNESIA PO) Take by mouth daily       Warfarin Therapy Reminder 1 each continuous       lisinopril (PRINIVIL/ZESTRIL) 5 MG tablet Take 1 tablet (5 mg) by mouth daily 30 tablet 0     potassium chloride SA (K-DUR/KLOR-CON M) 20 MEQ CR tablet Take 1 tablet (20 mEq) by mouth daily 30 tablet 0     metoprolol (TOPROL-XL) 100 MG 24 hr tablet Take 1 tablet (100 mg) by mouth 2 times daily 180 tablet 3     Multiple Vitamins-Minerals (ICAPS AREDS FORMULA PO) Take 1 tablet by mouth 2 times daily        CYANOCOBALAMIN PO Take 500 mcg by mouth daily       Ascorbic Acid (VITAMIN C PO) Take 500 mg by mouth daily       multivitamin, therapeutic with minerals (MULTI-VITAMIN) TABS Take 1 tablet by mouth daily       [DISCONTINUED] Furosemide (LASIX PO) Take 20 mg by mouth        [DISCONTINUED] melatonin 1 MG TABS tablet Take 1 tablet (1 mg) by mouth nightly as needed for sleep 15 tablet 0     [DISCONTINUED] VITAMIN D, CHOLECALCIFEROL, PO Take 2,000 Units by mouth daily       No facility-administered encounter medications on file as of 2/14/2018.      Again, thank you for allowing me to participate in the care of your patient.      Sincerely,    ERIC Olivares CNP     [facility]    cc:   Brooks Andres MD  [address] [address]

## 2018-02-19 ENCOUNTER — HOSPITAL ENCOUNTER (OUTPATIENT)
Dept: WOUND CARE | Facility: CLINIC | Age: 83
Discharge: HOME | End: 2018-02-19
Attending: SURGERY | Admitting: SURGERY
Payer: MEDICARE

## 2018-02-19 VITALS — DIASTOLIC BLOOD PRESSURE: 91 MMHG | HEART RATE: 103 BPM | TEMPERATURE: 97.5 F | SYSTOLIC BLOOD PRESSURE: 125 MMHG

## 2018-02-19 PROCEDURE — 11042 DBRDMT SUBQ TIS 1ST 20SQCM/<: CPT | Performed by: SURGERY

## 2018-02-19 PROCEDURE — A6022 COLLAGEN DRSG>16<=48 SQ IN: HCPCS

## 2018-02-19 NOTE — MR AVS SNAPSHOT
MRN:[id number]                      After Visit Summary   2/19/2018    Jama Paul    MRN: [id number]           Visit Information        Provider Department      2/19/2018 10:30 AM Wesley Benjamin MD [facility] [place]        Your next 10 appointments already scheduled     Feb 28, 2018 10:00 AM CST   LAB with [facility]   [facility] ([facility])    [address] [place] [address] [address]  [facility] [address]   [phone]           Please do not eat 10-12 hours before your appointment if you are coming in fasting for labs on lipids, cholesterol, or glucose (sugar). This does not apply to pregnant women. Water, hot tea and black coffee (with nothing added) are okay. Do not drink other fluids, diet soda or chew gum.            Mar 22, 2018 10:00 AM CDT   LAB with [facility]   [facility] ([facility])    [address] [place], [place] [address]  [facility] [address]   [phone]           Please do not eat 10-12 hours before your appointment if you are coming in fasting for labs on lipids, cholesterol, or glucose (sugar). This does not apply to pregnant women. Water, hot tea and black coffee (with nothing added) are okay. Do not drink other fluids, diet soda or chew gum.            Mar 23, 2018  9:30 AM CDT   Core Return with ERIC Fernandez CNP   [facility] ([facility])    [address] [place] [address] [address]  [facility] [address]   [phone]            Apr 16, 2018 12:45 PM CDT   Ech Complete with [id number]   [facility] CV Echocardiography ([facility])    [address] [place] [address]  [address]  [facility] [address] [phone]           1. Please bring or wear a comfortable two-piece outfit. 2. You may eat, drink and take your normal medicines. 3. For any questions that cannot be answered,  please contact the ordering physician            Apr 16, 2018  2:00 PM CDT   LAB with [facility]   [facility] [place] ([facility])    [address]  [place]  [place] [id number] [phone]           Please do not eat 10-12 hours before your appointment if you are coming in fasting for labs on lipids, cholesterol, or glucose (sugar). This does not apply to pregnant women. Water, hot tea and black coffee (with nothing added) are okay. Do not drink other fluids, diet soda or chew gum.            Apr 18, 2018 10:45 AM CDT   Return Visit with Long Pugh MD   [facility] ([facility])    [address]  [facility] [id number] [phone]                Further instructions from your care team             [facility]  [address], [facility] [address]  Appointment Phone [phone] Nurse Advisors [phone]    Jama Paul      2/13/1931  [facility] Phone:[phone]  Fax:[phone]  Includes [place] and [facility]     Wound Dressing Change to right medial lower leg  Cleanse wound and surrounding skin with: wound cleanser  Cover wound with Fibracol cut to size of wound (this will dissolve into the wound)  Cover wound with ABD pad and secure with roll gauze  Change dressing Monday, Wednesday, Friday  Compression:   You have a compression wrap Spandagrip E is supposed to be removed at night and put back on first thing in the morning.   Please remove compression dressing if toes turn blue and/or tingle and can not be relieved by raising the leg for one hour.      Wesley Benjamin M.D.. February 19, 2018        Call us at [phone] if you have any questions about your wounds, have redness or swelling around your wound, have a fever of 101 or greater or if you have any other problems or concerns. We answer the phone Monday  "through Friday 8 am to 4 pm, please leave a message as we check the voicemail frequently throughout the day.     Follow up with Provider - 3-4 weeks             MyChart Information     [facility] [employer] lets you send messages to your doctor, view your test results, renew your prescriptions, schedule appointments and more. To sign up, go to [email or web address] . Click on \"Log in\" on the left side of the screen, which will take you to the Welcome page. Then click on \"Sign up Now\" on the right side of the page.     You will be asked to enter the access code listed below, as well as some personal information. Please follow the directions to create your username and password.     Your access code is: [email or web address]  Expires: 4/3/2018  2:08 PM     Your access code will  in 90 days. If you need help or a new code, please call your [facility] clinic or [phone].        Care EveryWhere ID     This is your Care EveryWhere ID. This could be used by other organizations to access your [facility] medical records  [id number]        Equal Access to Services     CECILE MOURA : Caridad Carrillo, autumn isbell, emani moody. So [facility] [phone].    ATENCIÓN: Si habla español, tiene a cleaning disposición servicios gratuitos de asistencia lingüística. Llame al [phone].    We comply with applicable federal civil rights laws and [place] laws. We do not discriminate on the basis of race, color, national origin, age, disability, sex, sexual orientation, or gender identity.            "

## 2018-02-19 NOTE — PROGRESS NOTES
[facility] [place] Progress Note    Subject: Jama Paul returns to see us today for his traumatic right tibial ulcer.  He is moving into [place] with his wife which will be a much better situation for him since he is now 87 years old becoming more frail but still mentally very alert.  He reports that his nutritional intake has been good but is not taking any supplements.    Home health care is changing the dressings 3 times weekly presently using iodoform.  He has no pain at the site.  He ambulates with the use of a cane.  He did develop a superficial ulceration of the left tibial region when he bumped this against a table.      Exam:  BP (!) 125/91 (BP Location: Left arm)  Pulse 103  Temp 97.5  F (36.4  C) (Tympanic)  Alert and appropriate.  No significant swelling.  Right tibial ulcer measures 4.7 x 4.7 x 0.1 cm.  Underlying granulation tissue is very healthy and robust covering the entire wound.  Mild amount of slough is noted on the wound base.  No undermining.  Epithelialization is noted on all skin edges circumferentially.  Left tibial traumatic ulcer is essentially healed over.    Procedure:  Time out was called, informed consent obtained, and topical anesthetic of 4% lidocaine was applied per standing protocol, debridement was performed using a #15 blade down to and including subcutaneous tissue.  Debrided to remove all soft to healthy bleeding granulation tissue.  Skin edges were slightly debrided to encourage tissue ingrowth.  Bleeding controlled with light pressure. Patient tolerated procedure well.  He had no pain at all with the debridement.    Impression: Slow but steady improvement of right traumatic calf ulcer.  He has adequate blood supply for healing to occur.  Wound is cleaning up nicely and thus we will change to Fibracol and discontinue the iodoform.  This will be changed 3 times weekly with home health care.  This was applied today we discussed importance of  keeping this moist with he and his family.         We also stressed the importance of additional nutritional supplements.  They will purchased a protein drink that he will take 1 or 2 daily to give him enough protein to aid in healing.    Plan: We will dress the wounds with Fibracol.  Patient will return to the clinic in 2 weeks time    Wesley Benjamin MD  02/19/18 10:53 AM

## 2018-02-19 NOTE — DISCHARGE INSTRUCTIONS
[facility]  [address], [place] [address]  Appointment Phone [phone] Nurse Advisors [phone]    Jama Paul      2/13/1931  [facility] Phone:[phone]  Fax:[phone]  Includes [place] and [place] Locations     Wound Dressing Change to right medial lower leg  Cleanse wound and surrounding skin with: wound cleanser  Cover wound with Fibracol cut to size of wound (this will dissolve into the wound)  Cover wound with ABD pad and secure with roll gauze  Change dressing Monday, Wednesday, Friday  Compression:   You have a compression wrap Spandagrip E is supposed to be removed at night and put back on first thing in the morning.   Please remove compression dressing if toes turn blue and/or tingle and can not be relieved by raising the leg for one hour.      Wesley Benjamin M.D.. February 19, 2018        Call us at [phone] if you have any questions about your wounds, have redness or swelling around your wound, have a fever of 101 or greater or if you have any other problems or concerns. We answer the phone Monday through Friday 8 am to 4 pm, please leave a message as we check the voicemail frequently throughout the day.     Follow up with Provider - 3-4 weeks

## 2018-02-26 ENCOUNTER — CARE COORDINATION (OUTPATIENT)
Dept: CARDIOLOGY | Facility: CLINIC | Age: 83
End: 2018-02-26

## 2018-02-26 NOTE — PROGRESS NOTES
"Contacted patient to ask what his weights have been running. Wt today was 170 #, he said his wt doesn't fluctuate much (169-171). He denies any sob or dizziness, still taking Lasix 20 mg qd. He just spoke with  and he told patient that as long as he is not having symptoms he shouldn't worry about his sbp being in the 90's. I asked him once again if he has had any symptoms, he said \"no I just thought the sbp was a little on the low side.\" \" Tell Winnie that I will stay with the medication regimen for now and if I have symptoms, I will call your office.\" Pt said he exercises everyday and tries to stay hydrated (following 2000 ml FR). I told him I would update mit and encouraged him to call if he does experience symptoms. Sonam Sow RN 1:48 PM 02/26/18      "

## 2018-02-26 NOTE — PROGRESS NOTES
Received message from patient saying he had this toprol xl increased at last office visit and it is making him feel sluggish. Contacted , he wants Winnie Garcia to know he is feeling fine, however, he doesn't have much energy in the morning since the Toprol was increased to 100 mg bid. His sbp have been running in the low 90's. If he gets up to do some exercise, the highest systolic blood pressure only gets up to 115.  He would like to know if Winnie would consider cutting his evening dose of Toprol to 50 mg at hs to see if it makes a difference. I told her I would update Winnie and call back with her recommendations. Sonam Sow RN 11:47 AM 02/26/18

## 2018-02-26 NOTE — TELEPHONE ENCOUNTER
His metoprolol was increased back when he saw Dr. Pugh, I increased his lasix to 40 mg for 3 days. How much weight did he lose? Could the weight loss be contributing to his low BP?

## 2018-02-28 ENCOUNTER — APPOINTMENT (OUTPATIENT)
Dept: GENERAL RADIOLOGY | Facility: CLINIC | Age: 83
DRG: 293 | End: 2018-02-28
Attending: EMERGENCY MEDICINE
Payer: MEDICARE

## 2018-02-28 ENCOUNTER — HOSPITAL ENCOUNTER (INPATIENT)
Facility: CLINIC | Age: 83
LOS: 1 days | Discharge: HOME | DRG: 293 | End: 2018-03-01
Attending: EMERGENCY MEDICINE | Admitting: INTERNAL MEDICINE
Payer: MEDICARE

## 2018-02-28 DIAGNOSIS — I50.9 CONGESTIVE HEART FAILURE, UNSPECIFIED CONGESTIVE HEART FAILURE CHRONICITY, UNSPECIFIED CONGESTIVE HEART FAILURE TYPE: ICD-10-CM

## 2018-02-28 DIAGNOSIS — I48.91 ATRIAL FIBRILLATION, UNSPECIFIED TYPE (H): Primary | ICD-10-CM

## 2018-02-28 DIAGNOSIS — I50.43 ACUTE ON CHRONIC COMBINED SYSTOLIC AND DIASTOLIC CONGESTIVE HEART FAILURE (H): ICD-10-CM

## 2018-02-28 DIAGNOSIS — I50.9 ACUTE CONGESTIVE HEART FAILURE, UNSPECIFIED CONGESTIVE HEART FAILURE TYPE: ICD-10-CM

## 2018-02-28 DIAGNOSIS — R06.02 SOB (SHORTNESS OF BREATH): ICD-10-CM

## 2018-02-28 DIAGNOSIS — I10 ESSENTIAL HYPERTENSION: ICD-10-CM

## 2018-02-28 LAB
ALBUMIN SERPL-MCNC: 3.4 G/DL (ref 3.4–5)
ALP SERPL-CCNC: 41 U/L (ref 40–150)
ALT SERPL W P-5'-P-CCNC: 32 U/L (ref 0–70)
ANION GAP SERPL CALCULATED.3IONS-SCNC: 7 MMOL/L (ref 3–14)
AST SERPL W P-5'-P-CCNC: 19 U/L (ref 0–45)
BASOPHILS # BLD AUTO: 0 10E9/L (ref 0–0.2)
BASOPHILS NFR BLD AUTO: 0.3 %
BILIRUB SERPL-MCNC: 0.7 MG/DL (ref 0.2–1.3)
BUN SERPL-MCNC: 29 MG/DL (ref 7–30)
CALCIUM SERPL-MCNC: 7.9 MG/DL (ref 8.5–10.1)
CHLORIDE SERPL-SCNC: 107 MMOL/L (ref 94–109)
CO2 SERPL-SCNC: 26 MMOL/L (ref 20–32)
CREAT SERPL-MCNC: 1.19 MG/DL (ref 0.66–1.25)
DIFFERENTIAL METHOD BLD: ABNORMAL
EOSINOPHIL # BLD AUTO: 0 10E9/L (ref 0–0.7)
EOSINOPHIL NFR BLD AUTO: 0.2 %
ERYTHROCYTE [DISTWIDTH] IN BLOOD BY AUTOMATED COUNT: 19.7 % (ref 10–15)
GFR SERPL CREATININE-BSD FRML MDRD: 58 ML/MIN/1.7M2
GLUCOSE SERPL-MCNC: 172 MG/DL (ref 70–99)
HCT VFR BLD AUTO: 33 % (ref 40–53)
HGB BLD-MCNC: 10.6 G/DL (ref 13.3–17.7)
IMM GRANULOCYTES # BLD: 0 10E9/L (ref 0–0.4)
IMM GRANULOCYTES NFR BLD: 0 %
INR PPP: 3.43 (ref 0.86–1.14)
INTERPRETATION ECG - MUSE: NORMAL
LYMPHOCYTES # BLD AUTO: 0.4 10E9/L (ref 0.8–5.3)
LYMPHOCYTES NFR BLD AUTO: 5.9 %
MCH RBC QN AUTO: 31.8 PG (ref 26.5–33)
MCHC RBC AUTO-ENTMCNC: 32.1 G/DL (ref 31.5–36.5)
MCV RBC AUTO: 99 FL (ref 78–100)
MONOCYTES # BLD AUTO: 0.8 10E9/L (ref 0–1.3)
MONOCYTES NFR BLD AUTO: 13 %
NEUTROPHILS # BLD AUTO: 4.9 10E9/L (ref 1.6–8.3)
NEUTROPHILS NFR BLD AUTO: 80.6 %
NRBC # BLD AUTO: 0 10*3/UL
NRBC BLD AUTO-RTO: 0 /100
NT-PROBNP SERPL-MCNC: 5689 PG/ML (ref 0–1800)
PLATELET # BLD AUTO: 44 10E9/L (ref 150–450)
POTASSIUM SERPL-SCNC: 4.2 MMOL/L (ref 3.4–5.3)
PROT SERPL-MCNC: 6.5 G/DL (ref 6.8–8.8)
RBC # BLD AUTO: 3.33 10E12/L (ref 4.4–5.9)
SODIUM SERPL-SCNC: 140 MMOL/L (ref 133–144)
TROPONIN I SERPL-MCNC: <0.015 UG/L (ref 0–0.04)
WBC # BLD AUTO: 6.1 10E9/L (ref 4–11)

## 2018-02-28 PROCEDURE — 71046 X-RAY EXAM CHEST 2 VIEWS: CPT

## 2018-02-28 PROCEDURE — 85610 PROTHROMBIN TIME: CPT | Performed by: EMERGENCY MEDICINE

## 2018-02-28 PROCEDURE — G0463 HOSPITAL OUTPT CLINIC VISIT: HCPCS

## 2018-02-28 PROCEDURE — 12000000 ZZH R&B MED SURG/OB

## 2018-02-28 PROCEDURE — 85025 COMPLETE CBC W/AUTO DIFF WBC: CPT | Performed by: EMERGENCY MEDICINE

## 2018-02-28 PROCEDURE — A9270 NON-COVERED ITEM OR SERVICE: HCPCS | Mod: GY | Performed by: NURSE PRACTITIONER

## 2018-02-28 PROCEDURE — 80053 COMPREHEN METABOLIC PANEL: CPT | Performed by: EMERGENCY MEDICINE

## 2018-02-28 PROCEDURE — 83880 ASSAY OF NATRIURETIC PEPTIDE: CPT | Performed by: EMERGENCY MEDICINE

## 2018-02-28 PROCEDURE — 84484 ASSAY OF TROPONIN QUANT: CPT | Performed by: EMERGENCY MEDICINE

## 2018-02-28 PROCEDURE — 93005 ELECTROCARDIOGRAM TRACING: CPT

## 2018-02-28 PROCEDURE — 25000128 H RX IP 250 OP 636: Performed by: NURSE PRACTITIONER

## 2018-02-28 PROCEDURE — 25000132 ZZH RX MED GY IP 250 OP 250 PS 637: Mod: GY | Performed by: NURSE PRACTITIONER

## 2018-02-28 PROCEDURE — 99207 ZZC APP CREDIT; MD BILLING SHARED VISIT: CPT | Performed by: NURSE PRACTITIONER

## 2018-02-28 PROCEDURE — 25000128 H RX IP 250 OP 636: Performed by: EMERGENCY MEDICINE

## 2018-02-28 PROCEDURE — 99223 1ST HOSP IP/OBS HIGH 75: CPT | Mod: AI | Performed by: INTERNAL MEDICINE

## 2018-02-28 PROCEDURE — 96374 THER/PROPH/DIAG INJ IV PUSH: CPT

## 2018-02-28 PROCEDURE — 99285 EMERGENCY DEPT VISIT HI MDM: CPT | Mod: 25

## 2018-02-28 RX ORDER — AMOXICILLIN 250 MG
1 CAPSULE ORAL 2 TIMES DAILY PRN
Status: DISCONTINUED | OUTPATIENT
Start: 2018-02-28 | End: 2018-03-01 | Stop reason: HOSPADM

## 2018-02-28 RX ORDER — POLYETHYLENE GLYCOL 3350 17 G/17G
17 POWDER, FOR SOLUTION ORAL DAILY PRN
Status: DISCONTINUED | OUTPATIENT
Start: 2018-02-28 | End: 2018-03-01 | Stop reason: HOSPADM

## 2018-02-28 RX ORDER — FUROSEMIDE 10 MG/ML
20 INJECTION INTRAMUSCULAR; INTRAVENOUS
Status: DISCONTINUED | OUTPATIENT
Start: 2018-02-28 | End: 2018-03-01

## 2018-02-28 RX ORDER — LISINOPRIL 2.5 MG/1
2.5 TABLET ORAL DAILY
Status: DISCONTINUED | OUTPATIENT
Start: 2018-03-01 | End: 2018-03-01 | Stop reason: HOSPADM

## 2018-02-28 RX ORDER — LISINOPRIL 5 MG/1
5 TABLET ORAL DAILY
Status: DISCONTINUED | OUTPATIENT
Start: 2018-03-01 | End: 2018-02-28

## 2018-02-28 RX ORDER — NALOXONE HYDROCHLORIDE 0.4 MG/ML
.1-.4 INJECTION, SOLUTION INTRAMUSCULAR; INTRAVENOUS; SUBCUTANEOUS
Status: DISCONTINUED | OUTPATIENT
Start: 2018-02-28 | End: 2018-03-01 | Stop reason: HOSPADM

## 2018-02-28 RX ORDER — ACETAMINOPHEN 650 MG/1
650 SUPPOSITORY RECTAL EVERY 4 HOURS PRN
Status: DISCONTINUED | OUTPATIENT
Start: 2018-02-28 | End: 2018-03-01 | Stop reason: HOSPADM

## 2018-02-28 RX ORDER — FUROSEMIDE 10 MG/ML
20 INJECTION INTRAMUSCULAR; INTRAVENOUS ONCE
Status: COMPLETED | OUTPATIENT
Start: 2018-02-28 | End: 2018-02-28

## 2018-02-28 RX ORDER — AMOXICILLIN 250 MG
2 CAPSULE ORAL 2 TIMES DAILY PRN
Status: DISCONTINUED | OUTPATIENT
Start: 2018-02-28 | End: 2018-03-01 | Stop reason: HOSPADM

## 2018-02-28 RX ORDER — ACETAMINOPHEN 325 MG/1
650 TABLET ORAL EVERY 4 HOURS PRN
Status: DISCONTINUED | OUTPATIENT
Start: 2018-02-28 | End: 2018-03-01 | Stop reason: HOSPADM

## 2018-02-28 RX ORDER — NITROGLYCERIN 0.4 MG/1
0.4 TABLET SUBLINGUAL EVERY 5 MIN PRN
Status: DISCONTINUED | OUTPATIENT
Start: 2018-02-28 | End: 2018-03-01 | Stop reason: HOSPADM

## 2018-02-28 RX ORDER — ONDANSETRON 2 MG/ML
4 INJECTION INTRAMUSCULAR; INTRAVENOUS EVERY 6 HOURS PRN
Status: DISCONTINUED | OUTPATIENT
Start: 2018-02-28 | End: 2018-03-01 | Stop reason: HOSPADM

## 2018-02-28 RX ORDER — METOPROLOL SUCCINATE 100 MG/1
100 TABLET, EXTENDED RELEASE ORAL 2 TIMES DAILY
Status: DISCONTINUED | OUTPATIENT
Start: 2018-02-28 | End: 2018-03-01 | Stop reason: HOSPADM

## 2018-02-28 RX ORDER — ONDANSETRON 4 MG/1
4 TABLET, ORALLY DISINTEGRATING ORAL EVERY 6 HOURS PRN
Status: DISCONTINUED | OUTPATIENT
Start: 2018-02-28 | End: 2018-03-01 | Stop reason: HOSPADM

## 2018-02-28 RX ORDER — LIDOCAINE 40 MG/G
CREAM TOPICAL
Status: DISCONTINUED | OUTPATIENT
Start: 2018-02-28 | End: 2018-03-01 | Stop reason: HOSPADM

## 2018-02-28 RX ORDER — DOCUSATE SODIUM 100 MG/1
100 CAPSULE, LIQUID FILLED ORAL 2 TIMES DAILY
Status: DISCONTINUED | OUTPATIENT
Start: 2018-02-28 | End: 2018-03-01 | Stop reason: HOSPADM

## 2018-02-28 RX ADMIN — FUROSEMIDE 20 MG: 10 INJECTION, SOLUTION INTRAVENOUS at 15:52

## 2018-02-28 RX ADMIN — METOPROLOL SUCCINATE 100 MG: 100 TABLET, EXTENDED RELEASE ORAL at 20:55

## 2018-02-28 RX ADMIN — FUROSEMIDE 20 MG: 10 INJECTION, SOLUTION INTRAVENOUS at 11:48

## 2018-02-28 RX ADMIN — Medication 1 MG: at 22:47

## 2018-02-28 ASSESSMENT — ENCOUNTER SYMPTOMS
SHORTNESS OF BREATH: 1
NAUSEA: 0
COUGH: 0
FEVER: 0
ABDOMINAL PAIN: 0
VOMITING: 0

## 2018-02-28 NOTE — PROGRESS NOTES
"SPIRITUAL HEALTH SERVICES Progress Note  [facility]     visited pt on request. Pt shared story of his health journey and his need \"to be strong for my family\" as he approaches EOL. Pt shared family history of his father, who was a rural [place]  who sang and played the zither for his congregants who were in the [address] [place] [address]. 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 Pt is long time member of [employer] and cites his constance as a source of strength and hope. Pt says, \"I have lived a long and happy life.\" SH provided reflective listening and support and joined pt in speaking of what we might [facility]. Pt welcomed prayer. Unit  can follow.    Chico Acuña M.Div.  Chaplain Resident  [phone] Pager  "

## 2018-02-28 NOTE — ED NOTES
[facility]  ED Nurse Handoff Report    ED Chief complaint: Shortness of Breath (Awoke with SOB. )      ED Diagnosis:   Final diagnoses:   SOB (shortness of breath)   Acute on chronic combined systolic and diastolic congestive heart failure (H)       Code Status: not addressed in ED     Allergies: No Known Allergies    Activity level - Baseline/Home:  Independent    Activity Level - Current:   Independent     Needed?: No    Isolation: No  Infection: Not Applicable    Bariatric?: No    Vital Signs:   Vitals:    02/28/18 1047 02/28/18 1049 02/28/18 1100 02/28/18 1115   BP: 121/90  119/74 113/74   Resp:       Temp:       TempSrc:       SpO2:  (!) 85% 94%    Weight:       Height:           Cardiac Rhythm: ,        Pain level: 0-10 Pain Scale: 0    Is this patient confused?: No    Patient Report: Initial Complaint: woke up SOB   Focused Assessment: hx of simular episode 1 month ago and was hospitalized, went to TCU after, has chronic pedal edema which he states is stable and possibly improving, no CP, no wt gain, very pleasant and oriented   Tests Performed: labs cxr, ecg   Abnormal Results: platelets low   Treatments provided: lasix     Family Comments: here and supportive     OBS brochure/video discussed/provided to patient: N/A    ED Medications:   Medications   furosemide (LASIX) injection 20 mg (not administered)       Drips infusing?:  No      ED NURSE PHONE NUMBER: [phone]

## 2018-02-28 NOTE — IP AVS SNAPSHOT
[place] [address] [facility]    [address] [place] [place] [address]    Phone:  [phone]                                       After Visit Summary   2/28/2018    Jama Paul    MRN: [id number]           After Visit Summary Signature Page     I have received my discharge instructions, and my questions have been answered. I have discussed any challenges I see with this plan with the nurse or doctor.    ..........................................................................................................................................  Patient/Patient Representative Signature      ..........................................................................................................................................  Patient Representative Print Name and Relationship to Patient    ..................................................               ................................................  Date                                            Time    ..........................................................................................................................................  Reviewed by Signature/Title    ...................................................              ..............................................  Date                                                            Time

## 2018-02-28 NOTE — PROGRESS NOTES
"Received message from MATTY Lara that patient was initially transferred to a-Critical access hospital nurse. Jane stated that patient was very anxious because he reported being short of breath. I contacted patient, he said he woke up \"very\" short of breath and noticed he had a very fast heart rate. He is still sob and said he didn't know what to do. \"my thought is to go to the ED so I can be monitored.\" He wanted me to let [facility] know that he is going to be evaluated as the sob and fast HR have not yet resolved. His wife will be taking him to the ED now. Will route message to [facility] as an FYI. Sonam Sow RN 9:41 AM 02/28/18      "

## 2018-02-28 NOTE — LETTER
Transition Communication Hand-off for Care Transitions to Next Level of Care Provider    Name: Jama Paul  MRN #: [id number]  Primary Care Provider: Clint Pizarro     Primary Clinic: [place] [address]     Reason for Hospitalization:  SOB (shortness of breath) [R06.02]  Acute on chronic combined systolic and diastolic congestive heart failure (H) [I50.43]  Admit Date/Time: 2/28/2018 10:10 AM  Discharge Date: 3/1  Payor Source: Payor: MEDICARE / Plan: MEDICARE / Product Type: Medicare /     Readmission Assessment Measure (TARA) Risk Score/category: Elevated  Reason for Communication Hand-off Referral: Admission diagnoses: CHF  Fragility  Avoidable readmission within 30 days  Discharge Plan: Dc to Home  Concern for non-adherence with plan of care: No     Discharge Needs Assessment:  Needs       Most Recent Value    Anticipated Changes Related to Illness none    Equipment Currently Used at Home walker, standard          Already enrolled in Tele-monitoring program and name of program:  Yes  Follow-up specialty is recommended: No    Follow-up plan:  Future Appointments  Date Time Provider Department Center   3/2/2018 2:00 PM [facility]   3/5/2018 10:45 AM Wesley Benjamin MD [facility]   3/6/2018 11:10 AM [facility]   3/6/2018 12:00 PM Winnie Garcia, [facility] CNP [facility]   3/22/2018 10:00 AM [facility]   3/23/2018 9:30 AM Winnie Garcia APRN CNP [facility]   4/16/2018 12:45 PM [id number]   4/16/2018 2:00 PM [facility]   4[facility]18/2018 10:45 [facility]       Any outstanding tests or procedures:        Referrals     Future Labs/Procedures    Cardiac Rehab Referral     Comments:    Resume prior to admission cardiac rehab.     If you have not heard from the scheduling office within 2 business days, please call [phone] for  "all locations.    Please be aware that coverage of these services is subject to the terms and limitations of your health insurance plan.  Call member services at your health plan with any benefit or coverage questions.      **Note to Provider:  If you are referring outside of [facility] for the therapy appointment, please list the name of the location in the \"special instructions\" above, print the referral and give to the patient to schedule the appointment.        Hospital Course:       Patient with history of CHH and EF of 40% was admitted with  Increased shortness of breath . improved with IV Lasix and at dc with continued improvement in respiratory status.  MD feels triggering factor is not clear. Reports compliance with medications, ?dietary non-compliance may be contributing.    ----  Blood pressures fluctuating,pt dcd on PTA BP meds and  advised to keep record of his bp and f/u with PCP for further adjustment.   Reluctant to have a primary MD appt at this time as he has to follow up with cardiology.      Alesia Marks    AVS/Discharge Summary is the source of truth; this is a helpful guide for improved communication of patient story          "

## 2018-02-28 NOTE — PLAN OF CARE
Alert and oriented x4. VSS. Denies pain. Up with SBA. Some SOB per pt. WOC consult. 2gm sodium diet, tolerating. On Tele. Plan to continue to monitor.     Tele A fib CVR. Voiding adequately.

## 2018-02-28 NOTE — ED PROVIDER NOTES
"  History     Chief Complaint:  Shortness of breath     HPI   Jama Paul is a 87 year old male on Warfarin with a history of Atrial Fibrillation, arrhythmia, hypertension and weakness who presents to the emergency department for evaluation of shortness of breath. He took all his medications this morning.The patient notes that the last time he was here he had a heart catheterization which showed no acute changes for him. Today, the patient awoke with shortness of breath this morning while he was laying on his right side. The patient states that he has been doing quite well at home, but had \"severe dyspnea\" at waking this morning. This lasted for twenty minutes before gradually improving. However, now in the emergency department, his daughter states that he does not sound well to her and the patient confirms this. Last time the patient had an episode of dyspnea, he states that his Lasix was increased to 40mg per day for three days before being lowered down to 20mg, which is his baseline. This helped him at that time. He denies abdominal pain, nausea, vomiting, new leg swelling, chest pain, fever, cough, and ill contacts at home. He also states that he does not feel like he is working to breathe more than at baseline.     Allergies:  No Known Drug Allergies     Medications:    Lasix  Lisinopril  Warfarin  Metoprolol   Magnesia   K-DUR/KLOR-CON    Past Medical History:    Antiplatelet or antithrombotic long term use  Arrhythmia   Atrial Fibrillation   Elevated PSA  Hypertension   Acute on chronic combined systolic and diastolic heart failure  Generalized weakness  Colon polyps  Basal Cell Carcinoma, face     Past Surgical History:    Colonoscopy  Explore Groin  Herniorrhaphy inguinal x2  MOHS Micrographic procedure  Phacoemulsification clear cornea with standard intraocular lens implant     Family History:    History reviewed. No pertinent family history.      Social History:  The patient was accompanied to the " ED by his wife and daughter.  Smoking Status: Never  Smokeless Tobacco: Never  Alcohol Use: No   Marital Status:        Review of Systems   Constitutional: Negative for fever.   Respiratory: Positive for shortness of breath. Negative for cough.    Cardiovascular: Negative for chest pain and leg swelling.   Gastrointestinal: Negative for abdominal pain, nausea and vomiting.   All other systems reviewed and are negative.      Physical Exam     Patient Vitals for the past 24 hrs:   BP Temp Temp src Pulse Heart Rate Resp SpO2 Height Weight   02/28/18 1233 (!) 131/91 97.9  F (36.6  C) Oral 77 - 16 98 % - -   02/28/18 1212 (!) 127/91 - - 89 - 18 97 % - -   02/28/18 1200 (!) 127/91 - - - 89 - 97 % - -   02/28/18 1145 (!) 125/95 - - - 78 - 97 % - -   02/28/18 1115 113/74 - - - - - - - -   02/28/18 1100 119/74 - - - 71 - 94 % - -   02/28/18 1049 - - - - 93 - (!) 85 % - -   02/28/18 1047 121/90 - - - - - - - -   02/28/18 1010 115/72 97.6  F (36.4  C) Oral - 52 14 90 % 1.829 m (6') 77.1 kg (170 lb)        Physical Exam  General: Alert, appears well-developed and well-nourished. Cooperative.     In mild distress  HEENT:  Head:  Atraumatic  Ears:  External ears are normal  Mouth/Throat:  Oropharynx is without erythema or exudate and mucous membranes are dry.   Eyes:   Conjunctivae normal and EOM are normal. No scleral icterus.  Neck:   Normal range of motion. Neck supple.  CV:  Irregular rate, irregular rhythm , normal heart sounds and radial pulses are 2+ and symmetric.    Resp:  Coarse breath sounds, mild laboring with mild subcostal retractions, no accessory muscle use  GI:  Abdomen is soft, no distension, no tenderness. No rebound or guarding.   MS:  Normal range of motion. 1+ pitting edema.    Normal strength in all 4 extremities.     Back atraumatic.    No midline cervical, thoracic, or lumbar tenderness  Skin:  Warm and dry.  No rash or lesions noted.  Neuro:   Alert. Normal strength.   GCS: 15   Psych:  Normal  mood and affect.    Emergency Department Course     ECG:  Indication: Shortness of breath   Completed at 1007.  Read at 1015.   Atrial Fibrillation  Anterior age undetermined   Abnormal ECG  Compared to old ECG without significant changes  Rate 93 bpm. AK interval *. QRS duration 88. QT/QTc 380/472. P-R-T axes * 42 239.    Imaging:  Radiology findings were communicated with the patient and family who voiced understanding of the findings.    XR Chest 2 views:  IMPRESSION: Trace bilateral pleural effusions with associated  bibasilar atelectasis, not significant changed. No pneumothorax seen  on either side. Mildly enlarged cardiac silhouette again seen.  Report per radiology     Laboratory:  Laboratory findings were communicated with the patient and family who voiced understanding of the findings.    CBC: HGB 10.6 (L), PLT 44 (LL) o/w WNL. (WBC 6.1)   CMP: Glucose 172 (H), GFR Estimate 58 (L) o/w WNL (Creatinine 1.19)    INR: 3.43 (H)   Troponin (Collected 1024): <0.015    BNP: 5689 (H)     Interventions:  1148 - Lasix 20mg IV    Emergency Department Course:  Nursing notes and vitals reviewed.  EKG obtained in the ED, see results above.   IV was inserted and blood was drawn for laboratory testing, results above.  The patient was sent for a XR Chest 2 views while in the emergency department, results above.     1020: I performed an exam of the patient as documented above.  1108: Patient rechecked and updated.    1127: Patient rechecked and updated.   1135: I spoke with Dr. Ambrosio of the Hospitalist service regarding patient's presentation, findings, and plan of care.    Findings and plan explained to the Patient and spouse and daughter who consents to admission. Discussed the patient with Dr. Ambrosio, who will admit the patient to a inpatient med bed for further monitoring, evaluation, and treatment.  I personally reviewed the laboratory and imaging results with the Patient and spouse and daughter and answered all  related questions prior to admission.    Impression & Plan      Medical Decision Making:  Jama Paul is a 87 year old male with a past medical history of Atrial Fibrillation and combined systolic and diastolic heart failure with a most recent ejection fraction approximately 40% as of January 19, 2018, who presents with worsening shortness of breath, onset this morning. Patient is on coumadin for his history of Atrial Fibrillation. His work up here is similar to prior emergency department visit for which he was admitted for worsening heart failure. His BNP mildly elevated to 5600. Patient has no obvious electrolyte abnormalities, renal function appears intact. He has been taking his furosemide on a daily basis, although he may require some titration of this. ECG shows Atrial Fibrillation with no acute ischemic change. Negative troponin here. CBC relatively unremarkable accept for thrombocytopenia which is consistent with prior low platelet counts. He is monitored by Dr. Pizarro for this and last note commenting they're trending his platelet counts over the last several months. Patient does desaturate to the upper 80's with walking but is not requiring oxygen while resting. He does appear dyspneic on my exam. He does have some mild subcostal retraction. We did give him a dose of 20mg of IV Lasix while here in the emergency department as he has already taken his home dose. He will be admitted for further management of suspect acute on chronic diastolic and systolic heart failure. Patient agreed to admission at this time. Discussed case with Dr. Ambrosio who agreed to admission.      Critical Care time:  none    Diagnosis:    ICD-10-CM    1. SOB (shortness of breath) R06.02    2. Acute on chronic combined systolic and diastolic congestive heart failure (H) I50.43        Disposition:  Admitted to an inpatient [facility] bed    Scribe Disclosure:  Evonne GUERRERO am serving as a scribe at 10:20 AM on 2/28/2018 to document  services personally performed by Jarocho Starks MD based on my observations and the provider's statements to me.   2/28/2018    EMERGENCY DEPARTMENT       Jarocho Starks MD  02/28/18 151

## 2018-02-28 NOTE — IP AVS SNAPSHOT
MRN:[id number]                      After Visit Summary   2/28/2018    Jama Paul    MRN: [id number]           Thank you!     Thank you for choosing [facility] for your care. Our goal is always to provide you with excellent care. Hearing back from our patients is one way we can continue to improve our services. Please take a few minutes to complete the written survey that you may receive in the mail after you visit with us. Thank you!        Patient Information     Date Of Birth          2/13/1931        About your hospital stay     You were admitted on:  February 28, 2018 You last received care in the:  [place] [address] [facility]    You were discharged on:  March 1, 2018       Who to Call     For medical emergencies, please call 911.  For non-urgent questions about your medical care, please call your primary care provider or clinic, [phone]          Attending Provider     Provider Specialty    Jarocho Starks MD Emergency Medicine    Rick Ambrosio MD Internal Medicine       Primary Care Provider Office Phone # Fax #    Clint Pizarro [phone]      After Care Instructions     Activity       Your activity upon discharge: activity as tolerated            Diet       Follow this diet upon discharge: 2gram sodium diet.                  Follow-up Appointments     Follow-up and recommended labs and tests        Follow up with [facility]   INR check on 3/2/2018 for further dosing of your Warfarin. Don't take warfarin tonight (3/1/18)                  Your next 10 appointments already scheduled     Mar 05, 2018 10:45 AM CST   Return Visit with Wesley Benjamin MD   [facility] [place] ([facility])    [address] [facility] [address]  [facility] [address]   [phone]            Mar 07, 2018  8:50 AM CST   Core Return with ERIC Fernandez [facility]  [facility])    [address] [place], [place] [address]  [facility] [address]   [phone]            Mar 22, 2018 10:00 AM CDT   LAB with [facility]   [facility] ([facility])    [address] [id number]  [facility] [address]   [phone]           Please do not eat 10-12 hours before your appointment if you are coming in fasting for labs on lipids, cholesterol, or glucose (sugar). This does not apply to pregnant women. Water, hot tea and black coffee (with nothing added) are okay. Do not drink other fluids, diet soda or chew gum.            Mar 23, 2018  9:30 AM CDT   Core Return with ERIC Fernandez CNP   [facility] ([facility])    [address] [place] [address]   [phone]            Apr 16, 2018 12:45 PM CDT   Ech Complete with [id number]   [facility] CV Echocardiography ([facility] [place])    [address] [place] [address] [phone]           1. Please bring or wear a comfortable two-piece outfit. 2. You may eat, drink and take your normal medicines. 3. For any questions that cannot be answered, please contact the ordering physician            Apr 16, 2018  2:00 PM CDT   LAB with [facility]   [facility] ([facility])    [address] [place] [address]   [phone]           Please do not eat 10-12 hours before your appointment if you are coming in fasting for labs on lipids, cholesterol, or glucose (sugar). This does not apply to pregnant women. Water, hot tea and black coffee (with nothing added) are okay. Do not drink other fluids, diet soda or chew gum.            Apr 18, 2018 10:45 AM CDT   Return Visit with Long Pugh MD   [facility] ([facility])    [address] [place], [place] [address]  [facility]  "[id number] [phone]              Additional Services     Cardiac Rehab Referral       Resume prior to admission cardiac rehab.     If you have not heard from the scheduling office within 2 business days, please call [phone] for all locations.    Please be aware that coverage of these services is subject to the terms and limitations of your health insurance plan.  Call member services at your health plan with any benefit or coverage questions.      **Note to Provider:  If you are referring outside of [facility] for the therapy appointment, please list the name of the location in the \"special instructions\" above, print the referral and give to the patient to schedule the appointment.            MED THERAPY MANAGE REFERRAL       Patient has diagnosis of Diabetes, Heart Failure, COPD, or AMI and is on more than 5 medications                  Warfarin Instruction     You have started taking a medicine called warfarin. This is a blood-thinning medicine (anticoagulant). It helps prevent and treat blood clots.      Before leaving the hospital, make sure you know how much to take and how long to take it.      You will need regular blood tests to make sure your blood is clotting safely. It is very important to see your doctor for regular blood tests.    Talk to your doctor before taking any new medicine (this includes over-the-counter drugs and herbal products). Many medicines can interact with warfarin. This may cause more bleeding or too much clotting.     Eating a lot of vitamin K--found in green, leafy vegetables--can change the way warfarin works in your body. Do NOT avoid these foods. Instead, try to eat the same amount each day.     Bleeding is the most common side-effect of warfarin. You may notice bleeding gums, a bloody nose, bruises and bleeding longer when you cut yourself. See a doctor at once if:   o You cough up blood  o You find blood in your stool (poop)  o You have a deep cut, or a cut that bleeds " "longer than 10 minutes   o You have a bad cut, hard fall, accident or hit your head (go to urgent care or the emergency room).    For women who can get pregnant: This medicine can harm an unborn baby. Be very careful not to get pregnant while taking this medicine. If you think you might be pregnant, call your doctor right away.    For more information, read \"Guide to Warfarin Therapy,  the booklet you received in the hospital.        General Recommendations To Control Heart Failure When You Get Home       Heart Failure Instructions for Patients and Families: Please read and check off each of these important instructions as you do them when you get home.          Weight and Symptoms       ___ Put a scale in your bathroom.    ___ Post a weight chart or calendar next to your scale.    ___ Weigh yourself everyday as soon as you get up in the morning (before breakfast).  You should only be wearing your pajamas.  Write your weight on the chart/calendar.  ___ Bring your weight chart/calendar with you to all appointments.  ___ Call your doctor or nurse practitioner if you gain 2 pounds (in 1 day) or 5 pounds in (1 week) from your goal  good  weight.  Your good weight is also called your  dry  weight.  Your doctor or nurse will tell you what your good weight should be.    ___ Call your doctor or nurse practitioner if you have shortness of breath that gets worse over time, leg swelling or fatigue.       Medications and Diet       ___ Make sure to take your medication as prescribed.    ___ Bring a current list of your medication and all of your medicine bottles with you to all appointments.    ___ Limit fluids if you still have swelling or shortness of breath, or if your doctor tells you to do so.    ___ Eat less than 2000 mg of sodium (salt) every day. Read food labels, and do not add salt to meals. Remember, if you eat less salt you retain less fluid.  ___ Follow a heart healthy diet that is low in saturated fat.        " Activity and Suggested Lifestyle Changes      ___ Stay active. Talk to your doctor about an exercise program that is safe for your heart.  ___ Stop smoking. Reduce alcohol use.      ___ Lose weight if you are overweight. Extra weight puts a lot of stress on the heart.                 Control for Leg Swelling     ___ Keep your legs elevated (raised) as needed for swelling. If swelling is uncomfortable or elevation doesn t help, ask your doctor about using ACE wraps or support stockings.        What is the [facility]?  Cardiomyopathy  Optimization  Rehabilitation  Education    The [facility] is a heart failure specialty clinic within [facility].  It is an outpatient disease management program that is based on a phase-by-phase approach, which is tailored to each patient s individual needs.  The cardiologist, nurse practitioner, physician assistant and nurses provide an ongoing outpatient care and treatment plan that guides heart failure and cardiomyopathy patients from evaluation and education to stabilization. This team works with your current primary care doctor and cardiologist to help you:    - Avoid hospitalizations  - Slow the progression of the disease  - Improve length and quality of life  - Know who and when to call if heart failure symptoms appear  - Receive easy access to quality health care and advice  - Better understand your condition and treatment  - Decrease the tremendous cost burden of heart failure care  - Detect future heart problems before they become life threatening                                 Follow-up Appointments     ___ An appointment with the [facility] may already have been made for you (see section   Your next appointments already scheduled ).  If you have a question about your appointment, would like to speak with a [facility] nurse, or would like to become a [facility] patient, please call one of the following locations:     - [facility]  "([place]):                                             [phone]  - [facility] ([place]):                                            [phone]  - [facility] ([place]):                 [phone]      ___ Your [facility] Team will continue to educate you on your heart failure and may adjust medications based on your vital signs, lab work, and how you are feeling.  Therefore, it is very important to bring the following to all [facility] appointments:    - An accurate list of your medications  - Your medicine bottles  - Your weight chart/calendar                   Pending Results     No orders found for last 3 day(s).            Statement of Approval     Ordered          03/01/18 9946  I have reviewed and agree with all the recommendations and orders detailed in this document.  EFFECTIVE NOW     Approved and electronically signed by:  Rick Ambrosio MD             Admission Information     Date & Time Provider Department Dept. Phone    2/28/2018 Rick Ambrosio MD [place] [address] [facility] [phone]      Your Vitals Were     Blood Pressure Pulse Temperature Respirations Height Weight    123/84 (BP Location: Right arm) 62 97.6  F (36.4  C) (Oral) 18 1.829 m (6') 73.2 kg (161 lb 6.4 oz)    Pulse Oximetry BMI (Body Mass Index)                96% 21.89 kg/m2          [employer] Information     [facility] lets you send messages to your doctor, view your test results, renew your prescriptions, schedule appointments and more. To sign up, go to [email or web address] . Click on \"Log in\" on the left side of the screen, which will take you to the Welcome page. Then click on \"Sign up Now\" on the right side of the page.     You will be asked to enter the access code listed below, as well as some personal information. Please follow the directions to create your username and password.     Your access code is: [email or web address]  Expires: 4/3/2018  2:08 " PM     Your access code will  in 90 days. If you need help or a new code, please call your [facility] clinic or [phone].        Care EveryWhere ID     This is your Care EveryWhere ID. This could be used by other organizations to access your [facility] medical records  [id number]        Equal Access to Services     MANDAPATSY ZENY : Hadii fredis ku hadasho Soomaali, waaxda luqadaha, qaybta kaalmada michaelarianada, emani silvermanyvettejudie avalos. So [facility] [place] [phone].    ATENCIÓN: Si habla español, tiene a cleaning disposición servicios gratuitos de asistencia lingüística. Llame al [phone].    We comply with applicable federal civil rights laws and [place] laws. We do not discriminate on the basis of race, color, national origin, age, disability, sex, sexual orientation, or gender identity.               Review of your medicines      CONTINUE these medicines which may have CHANGED, or have new prescriptions. If we are uncertain of the size of tablets/capsules you have at home, strength may be listed as something that might have changed.        Dose / Directions    lisinopril 5 MG tablet   Commonly known as:  PRINIVIL/ZESTRIL   This may have changed:  how much to take   Used for:  Essential hypertension, Acute congestive heart failure, unspecified congestive heart failure type (H)        Dose:  2.5 mg   Take 0.5 tablets (2.5 mg) by mouth daily   Quantity:  30 tablet   Refills:  0       warfarin 5 MG tablet   Commonly known as:  COUMADIN   This may have changed:    - medication strength  - how much to take   Used for:  Atrial fibrillation, unspecified type (H)        Dose:  5 mg   Start taking on:  3/2/2018   Take 1 tablet (5 mg) by mouth daily 5 mg on Zwuiaw-Wxuppfkgd-Mrxrov, 2.5 mg Riioxbm-Csbkyhuw-Mzhmwapz-.  Patient takes in the morning.   Quantity:  30 tablet   Refills:  0         CONTINUE these medicines which have NOT CHANGED        Dose / Directions    CYANOCOBALAMIN PO        Dose:  500 mcg    Take 500 mcg by mouth daily   Refills:  0       LASIX 20 MG tablet   Generic drug:  furosemide        Dose:  20 mg   Take 20 mg by mouth daily   Refills:  0       MAGNESIA PO        Take by mouth daily Patient doesn't know strength   Refills:  0       metoprolol succinate 100 MG 24 hr tablet   Commonly known as:  TOPROL-XL   Used for:  Atrial fibrillation with rapid ventricular response (H)        Dose:  100 mg   Take 1 tablet (100 mg) by mouth 2 times daily   Quantity:  180 tablet   Refills:  3       * Multi-vitamin Tabs tablet        Dose:  1 tablet   Take 1 tablet by mouth daily   Refills:  0       * ICAPS AREDS FORMULA PO        Dose:  1 tablet   Take 1 tablet by mouth daily Takes in addition to multivitamin with minerals   Refills:  0       potassium chloride SA 20 MEQ CR tablet   Commonly known as:  K-DUR/KLOR-CON M   Used for:  Acute congestive heart failure, unspecified congestive heart failure type (H)        Dose:  20 mEq   Take 1 tablet (20 mEq) by mouth daily   Quantity:  30 tablet   Refills:  0       VITAMIN C PO        Dose:  500 mg   Take 500 mg by mouth daily   Refills:  0       Vitamin D (Cholecalciferol) 1000 UNITS Tabs        Dose:  2000 Units   Take 2,000 Units by mouth daily   Refills:  0       * Notice:  This list has 2 medication(s) that are the same as other medications prescribed for you. Read the directions carefully, and ask your doctor or other care provider to review them with you.         Where to get your medicines      Some of these will need a paper prescription and others can be bought over the counter. Ask your nurse if you have questions.     You don't need a prescription for these medications     lisinopril 5 MG tablet    warfarin 5 MG tablet                Protect others around you: Learn how to safely use, store and throw away your medicines at www.disposemymeds.org.             Medication List: This is a list of all your medications and when to take them. Check marks below  indicate your daily home schedule. Keep this list as a reference.      Medications           Morning Afternoon Evening Bedtime As Needed    CYANOCOBALAMIN PO   Take 500 mcg by mouth daily                                LASIX 20 MG tablet   Take 20 mg by mouth daily   Last time this was given:  20 mg on 3/1/2018 11:42 AM   Generic drug:  furosemide                                lisinopril 5 MG tablet   Commonly known as:  PRINIVIL/ZESTRIL   Take 0.5 tablets (2.5 mg) by mouth daily   Last time this was given:  2.5 mg on 3/1/2018  8:36 AM                                MAGNESIA PO   Take by mouth daily Patient doesn't know strength                                metoprolol succinate 100 MG 24 hr tablet   Commonly known as:  TOPROL-XL   Take 1 tablet (100 mg) by mouth 2 times daily   Last time this was given:  100 mg on 3/1/2018  8:36 AM                                * Multi-vitamin Tabs tablet   Take 1 tablet by mouth daily                                * ICAPS AREDS FORMULA PO   Take 1 tablet by mouth daily Takes in addition to multivitamin with minerals                                potassium chloride SA 20 MEQ CR tablet   Commonly known as:  K-DUR/KLOR-CON M   Take 1 tablet (20 mEq) by mouth daily                                VITAMIN C PO   Take 500 mg by mouth daily                                Vitamin D (Cholecalciferol) 1000 UNITS Tabs   Take 2,000 Units by mouth daily                                warfarin 5 MG tablet   Commonly known as:  COUMADIN   Take 1 tablet (5 mg) by mouth daily 5 mg on Iaepgh-Cotcooqno-Udbnmy, 2.5 mg Bqyyhmv-Bawaugym-Dululaam-Sunday.  Patient takes in the morning.   Start taking on:  3/2/2018                                * Notice:  This list has 2 medication(s) that are the same as other medications prescribed for you. Read the directions carefully, and ask your doctor or other care provider to review them with you.

## 2018-02-28 NOTE — PHARMACY-ADMISSION MEDICATION HISTORY
Admission medication history interview status for the 2/28/2018  admission is complete. See EPIC admission navigator for prior to admission medications     Medication history source reliability:Good    Actions taken by pharmacist (provider contacted, etc):None     Additional medication history information not noted on PTA med list :None    Medication reconciliation/reorder completed by provider prior to medication history? No    Time spent in this activity: 15 minutes    Prior to Admission medications    Medication Sig Last Dose Taking? Auth Provider   WARFARIN SODIUM PO Take by mouth daily 5 mg on Rpsuwn-Cgsmkluzz-Fmlspk, 2.5 mg Ozkuoll-Dgjenykc-Cpfbcsfa-Sunday.  Patient takes in the morning. 2/28/2018 at am Yes Unknown, Entered By History   furosemide (LASIX) 20 MG tablet Take 20 mg by mouth daily 2/28/2018 at am Yes Reported, Patient   Vitamin D, Cholecalciferol, 1000 UNITS TABS Take 2,000 Units by mouth daily  2/27/2018 Yes Reported, Patient   Magnesium Hydroxide (MAGNESIA PO) Take by mouth daily Patient doesn't know strength 2/27/2018 Yes Reported, Patient   lisinopril (PRINIVIL/ZESTRIL) 5 MG tablet Take 1 tablet (5 mg) by mouth daily 2/28/2018 at am Yes Raymundo Proctor MD   potassium chloride SA (K-DUR/KLOR-CON M) 20 MEQ CR tablet Take 1 tablet (20 mEq) by mouth daily 2/27/2018 Yes Raymundo Proctor MD   metoprolol (TOPROL-XL) 100 MG 24 hr tablet Take 1 tablet (100 mg) by mouth 2 times daily 2/28/2018 at am Yes Vanita Baig, APRN CNP   Multiple Vitamins-Minerals (ICAPS AREDS FORMULA PO) Take 1 tablet by mouth daily  2/27/2018 Yes Unknown, Entered By History   CYANOCOBALAMIN PO Take 500 mcg by mouth daily 2/27/2018 Yes Unknown, Entered By History   Ascorbic Acid (VITAMIN C PO) Take 500 mg by mouth daily 2/27/2018 Yes Unknown, Entered By History   multivitamin, therapeutic with minerals (MULTI-VITAMIN) TABS Take 1 tablet by mouth daily 2/27/2018 Yes Unknown, Entered By History

## 2018-02-28 NOTE — PROGRESS NOTES
[facility] Nurse Inpatient Wound Assessment     Initial Assessment of wound(s) on pt's:   RLE        Data:   Patient History:      per MD note(s): Jama Paul is a 87 year old male with past medical history of heart failure, atrial fibrillation on coumadin, and hypertension who presented to the ED today 2/2 shortness of breath at rest upon waking this morning.        Current Diet / Nutrition:           Active Diet Order      2 Gram Sodium Diet No Caffeine for 24 hours (once tests completed, may have caffeine)    Labs:   Recent Labs   Lab Test  02/28/18   1024   ALBUMIN  3.4   HGB  10.6*   INR  3.43*   WBC  6.1       Wound Assessment (location):   Anterior RLE  Wound History:  Chronic wound from minor trauma, sees Dr. Benjamin at [facility] every other week, just started on Fibracol collagen dressings and has home care change 3x/week.  Wears Spandigrip stockings for light compression.     Wound Base: dark red moist tissue, fairly granular, scant filminess. Old dressing partially adhered but easily removed.    Specific Dimensions (length x width x depth, in cm) :   5 x 4.5 x 0.2cm    Tunneling:  N/A    Undermining: N/A    Palpation of the wound bed:  normal    Slough appearance:  none    Eschar appearance:  none    Periwound Skin: intact, legs thin and very dry    Color: normal and consistent with surrounding tissue    Temperature  normal     Drainage:  Moderate dark creamy      Odor: mild to old dressing    Pain:  minimal            Intervention:     Patient's chart evaluated.      Wound(s) assessed    Wound Care: legs cleansed and moisturized, wound cleansed and dressed    Orders  Written    Supplies  at bedside    Discussed plan of care with Patient, Family and Nurse          Assessment:       RLE chronic wound r/t minor trauma appears clean and slowly healing.  Will continue collagen dressings using Palak while pt in hospital.           Plan:     Nursing to notify the Provider(s) and re-consult  the WOC Nurse if wound(s) deteriorate(s) or if the wound care plan needs reevaluation.      Skin care to BLE and wound care to RLE: Daily:  1.  Cleanse intact skin on lower legs and feet with bath wipes or other mild cleanser, then apply Sween 24 cream.  2.  Cleanse wound with MicroKlenz, ok to wipe away old collagen dressing if any still left in wound base.    3.  Swab periwound with skin prep (to help with adherence of dressing), let dry.   4.  Cover wound with Palak (a white foam-like collagen dressing that comes in small hexagonal packet, dissolves into wound with moisture; order more from [facility] as needed #[id number]).  Cut pieces of the Palak to fit wound and 'puzzle' them together to cover wound base only (to not waste expensive dressings)!  5.  Cover with Mepilex 4x4, label with time/date/initials.   6.  Apply pt's Spandigrip stocking, evenly from base of toes to knee crease.   7.  Elevate legs when possible, float heels at all times, one pillow under each leg.      WOC Nurse will return: weekly and prn

## 2018-02-28 NOTE — H&P
[facility]    History and Physical  Hospitalist       Date of Admission:  2/28/2018    Assessment & Plan   Jama Paul is a 87 year old male with past medical history of heart failure, atrial fibrillation on coumadin, and hypertension who presented to the ED today 2/2 shortness of breath at rest upon waking this morning.     Summary:   Acute exacerbation of heart failure with reduced EF of 40% -  Patient was admitted at the end of January for acute exacerbation and had an echo and cardiac cath which showed no acute changes. Patient follows with DELISA REYES CNP at the [facility]. BNP was elevated in the emergency department, consistent with prior HF exacerbation admissions. As mentioned above, patient was given a 3 day course of 40mg PO lasix to improve symptoms, this was not effective.    -- IV lasix BID with the plan to transition back to PO when euvolemic   -- strict I/O's  -- sodium reduced diet  -- telemetry  -- daily weights  -- continue PTA ACE/BB    Atrial fibrillation - has been on coumadin for 6 years, INR in the ED was 3.43  -- continue PTA coumadin with pharmacy dosing  -- continue metoprolol XL    Hypertension  -- Chronic, patient has most recently been hypotensive with HF regimen.     Thrombocytopenia and now present anemia - has been present the last 6 years 2/2 coumadin anticoagulation.   -- stable and will continue to monitor  -- CBC in AM    Pain # Pain Assessment:   Current Pain Score 2/28/2018 2/19/2018 2/5/2018   Patient currently in pain? denies no no   Pain score (0-10) 0 - -   Pain location - - -   Pain descriptors - - -   Jama ricketts pain level was assessed and he currently denies pain.     Active Diet Order      2 Gram Sodium Diet No Caffeine for 24 hours (once tests completed, may have caffeine)  Fluids: None  DVT Prophylaxis: Warfarin and Pneumatic Compression Devices  Code Status: Full Code  Disposition: Expected discharge in 3 days once euvolemic.    Silverio  ERIC Vázquez, CNP  Text Page  (7am to 6pm)  This patient was discussed with DAVID GARCIA MD of the Hospitalist Service, who independently examined the patient. She is in agreement with the above plan.    Primary Care Physician   Clint Pizarro    Chief Complaint   Shortness of breath    History is obtained from the patient, family, and medical record    History of Present Illness   Jama Paul is a 87 year old male with past medical history of heart failure, atrial fibrillation on coumadin, and hypertension who presented to the ED today 2/2 shortness of breath at rest upon waking this morning. Patient was concerned that this was at rest and without activity. Patient denies recent chest pain, abdominal pain, headache, cough, fever, or any recent illness. Patient states that he has been walking in the afternoon, has noted some lower blood pressures (sBP ~90 mmHg) in the morning that have been asymptomatic. Patient's PO lasix dose was increased to 40mg during last exacerbation, this was reduced back to 20mg after 3 days, which the patient took this morning along with his other medications.   In the emergency department patient was ambulated with a noted drop in his SpO2 from mid-90's to lower 90's. On exam patient was sating in the mid-90's on room air, does not appear in distress. Will admit to inpatient medicine bed for further management of fluid volume status.     Past Medical History    I have reviewed this patient's medical history and updated it with pertinent information if needed.   Past Medical History:   Diagnosis Date     Antiplatelet or antithrombotic long-term use      Arrhythmia      Atrial fibrillation (H)      Elevated PSA      Thrombocytopenia (H)      Unspecified essential hypertension        Past Surgical History   I have reviewed this patient's surgical history and updated it with pertinent information if needed.  Past Surgical History:   Procedure Laterality Date     COLONOSCOPY        EXPLORE GROIN  4/30/2014    Procedure: EXPLORE GROIN;  Surgeon: Sam Aguirre MD;  Location:  OR     HERNIORRHAPHY INGUINAL       HERNIORRHAPHY INGUINAL  4/30/2014    Procedure: HERNIORRHAPHY INGUINAL;  Surgeon: Sam Aguirre MD;  Location:  OR     MOHS MICROGRAPHIC PROCEDURE       PHACOEMULSIFICATION CLEAR CORNEA WITH STANDARD INTRAOCULAR LENS IMPLANT Right 9/8/2015    Procedure: PHACOEMULSIFICATION CLEAR CORNEA WITH STANDARD INTRAOCULAR LENS IMPLANT;  Surgeon: Gil Shannon MD;  Location:  EC     septic olecranon bursitis[         Prior to Admission Medications   Prior to Admission Medications   Prescriptions Last Dose Informant Patient Reported? Taking?   Ascorbic Acid (VITAMIN C PO) 2/27/2018 Self Yes Yes   Sig: Take 500 mg by mouth daily   CYANOCOBALAMIN PO 2/27/2018 Self Yes Yes   Sig: Take 500 mcg by mouth daily   Magnesium Hydroxide (MAGNESIA PO) 2/27/2018 Self Yes Yes   Sig: Take by mouth daily Patient doesn't know strength   Multiple Vitamins-Minerals (ICAPS AREDS FORMULA PO) 2/27/2018 Self Yes Yes   Sig: Take 1 tablet by mouth daily Takes in addition to multivitamin with minerals   Vitamin D, Cholecalciferol, 1000 UNITS TABS 2/27/2018 Self Yes Yes   Sig: Take 2,000 Units by mouth daily    WARFARIN SODIUM PO 2/28/2018 at am Self Yes Yes   Sig: Take by mouth daily 5 mg on Ykqjqg-Kixrbydes-Vcqijx, 2.5 mg Qtovkjt-Jjwdielx-Fubycohd-Sunday.  Patient takes in the morning.   furosemide (LASIX) 20 MG tablet 2/28/2018 at am Self Yes Yes   Sig: Take 20 mg by mouth daily   lisinopril (PRINIVIL/ZESTRIL) 5 MG tablet 2/28/2018 at am Self No Yes   Sig: Take 1 tablet (5 mg) by mouth daily   metoprolol (TOPROL-XL) 100 MG 24 hr tablet 2/28/2018 at am Self No Yes   Sig: Take 1 tablet (100 mg) by mouth 2 times daily   multivitamin, therapeutic with minerals (MULTI-VITAMIN) TABS 2/27/2018 Self Yes Yes   Sig: Take 1 tablet by mouth daily   potassium chloride SA (K-DUR/KLOR-CON M) 20 MEQ CR tablet  2/27/2018 Self No Yes   Sig: Take 1 tablet (20 mEq) by mouth daily      Facility-Administered Medications: None     Allergies   No Known Allergies    Social History   I have reviewed this patient's social history and updated it with pertinent information if needed. Jama Paul  reports that he has never smoked. He has never used smokeless tobacco. He reports that he does not drink alcohol or use illicit drugs.    Family History   I have reviewed this patient's family history and updated it with pertinent information if needed.   Family History   Problem Relation Age of Onset     HEART DISEASE No family hx of        Review of Systems   The 10 point Review of Systems is negative other than noted in the HPI or here.     Physical Exam   Temp: 97.9  F (36.6  C) Temp src: Oral BP: (!) 131/91 Pulse: 77 Heart Rate: 89 Resp: 16 SpO2: 98 % O2 Device: None (Room air)    Vital Signs with Ranges  Temp:  [97.6  F (36.4  C)-97.9  F (36.6  C)] 97.9  F (36.6  C)  Pulse:  [77-89] 77  Heart Rate:  [52-93] 89  Resp:  [14-18] 16  BP: (113-131)/(72-95) 131/91  SpO2:  [85 %-98 %] 98 %  170 lbs 0 oz    Constitutional: Awake, alert, cooperative, no apparent distress.  HEENT: Moist mucous membranes, normal dentition, conjunctiva and pupils examined and normal.  Respiratory: Fine crackles heard bilaterally to the bases with diminished breath sounds, expiratory wheeze noted.  Cardiovascular: Irregularly irregular rate and rhythm, normal S1 and S2, mid-diastolic murmur noted 2/6, could not appreciate any JVD.  GI/: Soft, non-distended, non-tender, normal bowel sounds. Clear yellow urine, no difficulty voiding  Lymph/Hematologic: No anterior cervical or supraclavicular adenopathy.  Skin: +1 LLE edema, scaly skin texture, no rashes, no cyanosis  Musculoskeletal: No joint swelling, erythema or tenderness.  Neurologic: Cranial nerves 2-12 intact, normal strength and sensation.  Psychiatric: Alert, oriented to person, place and time, no  obvious anxiety or depression.    Data   Data reviewed today:  I personally reviewed no images or EKG's today.    Recent Labs  Lab 02/28/18  1024   WBC 6.1   HGB 10.6*   MCV 99   PLT 44*   INR 3.43*      POTASSIUM 4.2   CHLORIDE 107   CO2 26   BUN 29   CR 1.19   ANIONGAP 7   BARON 7.9*   *   ALBUMIN 3.4   PROTTOTAL 6.5*   BILITOTAL 0.7   ALKPHOS 41   ALT 32   AST 19   TROPI <0.015       Imaging:  Recent Results (from the past 24 hour(s))   XR Chest 2 Views    Narrative    XR CHEST 2 VW 2/28/2018 10:50 AM    COMPARISON: 1/19/2018    HISTORY: Shortness of breath.      Impression    IMPRESSION: Trace bilateral pleural effusions with associated  bibasilar atelectasis, not significant changed. No pneumothorax seen  on either side. Mildly enlarged cardiac silhouette again seen.    LILIANA VALDEZ MD

## 2018-03-01 VITALS
HEART RATE: 62 BPM | BODY MASS INDEX: 21.86 KG/M2 | SYSTOLIC BLOOD PRESSURE: 123 MMHG | HEIGHT: 72 IN | OXYGEN SATURATION: 96 % | WEIGHT: 161.4 LBS | DIASTOLIC BLOOD PRESSURE: 84 MMHG | RESPIRATION RATE: 18 BRPM | TEMPERATURE: 97.6 F

## 2018-03-01 LAB
ANION GAP SERPL CALCULATED.3IONS-SCNC: 6 MMOL/L (ref 3–14)
BUN SERPL-MCNC: 30 MG/DL (ref 7–30)
CALCIUM SERPL-MCNC: 8.1 MG/DL (ref 8.5–10.1)
CHLORIDE SERPL-SCNC: 106 MMOL/L (ref 94–109)
CO2 SERPL-SCNC: 28 MMOL/L (ref 20–32)
CREAT SERPL-MCNC: 1.2 MG/DL (ref 0.66–1.25)
ERYTHROCYTE [DISTWIDTH] IN BLOOD BY AUTOMATED COUNT: 19.5 % (ref 10–15)
GFR SERPL CREATININE-BSD FRML MDRD: 57 ML/MIN/1.7M2
GLUCOSE SERPL-MCNC: 94 MG/DL (ref 70–99)
HCT VFR BLD AUTO: 31.9 % (ref 40–53)
HGB BLD-MCNC: 10.4 G/DL (ref 13.3–17.7)
INR PPP: 3.29 (ref 0.86–1.14)
MCH RBC QN AUTO: 31.9 PG (ref 26.5–33)
MCHC RBC AUTO-ENTMCNC: 32.6 G/DL (ref 31.5–36.5)
MCV RBC AUTO: 98 FL (ref 78–100)
PLATELET # BLD AUTO: 46 10E9/L (ref 150–450)
POTASSIUM SERPL-SCNC: 3.6 MMOL/L (ref 3.4–5.3)
RBC # BLD AUTO: 3.26 10E12/L (ref 4.4–5.9)
SODIUM SERPL-SCNC: 140 MMOL/L (ref 133–144)
WBC # BLD AUTO: 6.2 10E9/L (ref 4–11)

## 2018-03-01 PROCEDURE — 85027 COMPLETE CBC AUTOMATED: CPT | Performed by: NURSE PRACTITIONER

## 2018-03-01 PROCEDURE — 25000132 ZZH RX MED GY IP 250 OP 250 PS 637: Mod: GY | Performed by: NURSE PRACTITIONER

## 2018-03-01 PROCEDURE — 85610 PROTHROMBIN TIME: CPT | Performed by: NURSE PRACTITIONER

## 2018-03-01 PROCEDURE — 36415 COLL VENOUS BLD VENIPUNCTURE: CPT | Performed by: NURSE PRACTITIONER

## 2018-03-01 PROCEDURE — A9270 NON-COVERED ITEM OR SERVICE: HCPCS | Mod: GY | Performed by: NURSE PRACTITIONER

## 2018-03-01 PROCEDURE — 25000132 ZZH RX MED GY IP 250 OP 250 PS 637: Mod: GY | Performed by: INTERNAL MEDICINE

## 2018-03-01 PROCEDURE — 40000894 ZZH STATISTIC OT IP EVAL DEFER

## 2018-03-01 PROCEDURE — 99239 HOSP IP/OBS DSCHRG MGMT >30: CPT | Performed by: INTERNAL MEDICINE

## 2018-03-01 PROCEDURE — 80048 BASIC METABOLIC PNL TOTAL CA: CPT | Performed by: NURSE PRACTITIONER

## 2018-03-01 PROCEDURE — A9270 NON-COVERED ITEM OR SERVICE: HCPCS | Mod: GY | Performed by: INTERNAL MEDICINE

## 2018-03-01 RX ORDER — FUROSEMIDE 20 MG
20 TABLET ORAL DAILY
Status: DISCONTINUED | OUTPATIENT
Start: 2018-03-01 | End: 2018-03-01 | Stop reason: HOSPADM

## 2018-03-01 RX ORDER — WARFARIN SODIUM 5 MG/1
5 TABLET ORAL DAILY
Qty: 30 TABLET | Refills: 0
Start: 2018-03-02 | End: 2018-09-10 | Stop reason: DRUGHIGH

## 2018-03-01 RX ORDER — LISINOPRIL 5 MG/1
2.5 TABLET ORAL DAILY
Qty: 30 TABLET | Refills: 0
Start: 2018-03-01 | End: 2018-03-06

## 2018-03-01 RX ADMIN — METOPROLOL SUCCINATE 100 MG: 100 TABLET, EXTENDED RELEASE ORAL at 08:36

## 2018-03-01 RX ADMIN — LISINOPRIL 2.5 MG: 2.5 TABLET ORAL at 08:36

## 2018-03-01 RX ADMIN — FUROSEMIDE 20 MG: 20 TABLET ORAL at 11:42

## 2018-03-01 NOTE — DISCHARGE SUMMARY
[facility]    Discharge Summary  Hospitalist    Date of Admission:  2/28/2018  Date of Discharge:  3/1/2018  Discharging Provider: Rick Ambrosio  Date of Service (when I saw the patient): 03/01/18    Discharge Diagnoses   Acute exacerbation of systolic CHF.  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 A. Fib on chronic anticoagulation who is presenting for evaluation of acute shortness of breath and admitted for management of CHF. See H & P for detail.     Hospital Course   Jama Paul was admitted on 2/28/2018.  The following problems were addressed during his hospitalization:    Acute exacerbation of systolic CHF.  EF of  heart failure with reduced LV EF of 40% with global hypokinesis in 1/2019.  CXR whosed bilateral pleural effusion. N-terminal proBNP 5689. Improved with IV Lasix given in the ED. He was given additional IV Lasix after admission with continued improvement in respiratory status. Triggering factor is not clear. Reports compliance with medications, ?dietary non-compliance may be contributing.    -- transition him back to PTA Lasix 20mg PO daily  -- continue f/u with [facility]  -- f/u with cardiology as planned  -- continue with ACEi and BB. Note below Lisinopril dose decreased.     Atrial fibrillation - has been on coumadin for 6 years,   -- INR 3.43>3.29 on discharge.  -- Warfarin to be held again tonight  -- INR check tomorrow for further warfarin dosing.   -- continue metoprolol XL     Hypertension  -- Chronic. Patient reporting low blood pressure in the morning at home and feeling run down when it happens. Recent increase in BB for better HR control. Thus decreased Lisinopril from 5 mg to 2.5mg daily.    -- advise to keep record of his bp and f/u with PCP for further adjustment.      Thrombocytopenia and anemia  - has been present the last 6 years  2/2 coumadin anticoagulation.   -- chronic and stable. F/u as outpatient.      # Discharge Pain Plan:   - Patient currently has NO PAIN and is not being prescribed pain medications on discharge.    Rick Ambrosio    Significant Results and Procedures   See labs and imaging below    Pending Results     Unresulted Labs Ordered in the Past 30 Days of this Admission     No orders found for last 61 day(s).          Code Status   Full Code       Primary Care Physician   Clint Pizarro    Physical Exam   Temp: 97.6  F (36.4  C) Temp src: Oral BP: 123/84 Pulse: 62 Heart Rate: 82 Resp: 18 SpO2: 96 % O2 Device: None (Room air)    Vitals:    02/28/18 1010 03/01/18 0700   Weight: 77.1 kg (170 lb) 73.2 kg (161 lb 6.4 oz)     Vital Signs with Ranges  Temp:  [97.4  F (36.3  C)-97.8  F (36.6  C)] 97.6  F (36.4  C)  Pulse:  [62] 62  Heart Rate:  [60-82] 82  Resp:  [16-18] 18  BP: (123-127)/(75-84) 123/84  SpO2:  [94 %-100 %] 96 %  I/O last 3 completed shifts:  In: 730 [P.O.:730]  Out: 1675 [Urine:1675]    General: alert, awake and no apparent distress  CVS: irregularly irregular  Resp: CTAB  Abd: soft and non-tender  Ext: no LE edema    Discharge Disposition   Discharged to home  Condition at discharge: Stable    Consultations This Hospital Stay   [facility]IN[facility] EVALUATION IP CONSULT  CARDIAC REHAB IP CONSULT  CARE COORDINATOR IP CONSULT  NUTRITION SERVICES ADULT IP CONSULT  SOCIAL WORK IP CONSULT  PHARMACY TO DOSE WARFARIN  WOUND OSTOMY CONTINENCE NURSE  IP CONSULT    Time Spent on this Encounter   Rick GUERRERO, personally saw the patient today and spent 40 minutes discharging this patient.    Discharge Orders     Cardiac Rehab Referral     Follow-up and recommended labs and tests    [facility]   INR check on 3/2/2018 for further dosing of your Warfarin. Don't take warfarin tonight (3/1/18)     Activity   Your activity upon discharge: activity as tolerated     Full Code     Diet   Follow this diet  upon discharge: 2gram sodium diet.       Discharge Medications   Current Discharge Medication List      CONTINUE these medications which have CHANGED    Details   warfarin (COUMADIN) 5 MG tablet Take 1 tablet (5 mg) by mouth daily 5 mg on Vsxxae-Zxxhtxwhc-Nqzxnb, 2.5 mg Earyuqx-Vytlcscw-Dgbphrrd-Sunday.  Patient takes in the morning.  Qty: 30 tablet, Refills: 0    Comments: Don't take warfarin on 3/1/18. INR check on 3/2 for further dose recommendations.  Associated Diagnoses: Atrial fibrillation, unspecified type (H)      lisinopril (PRINIVIL/ZESTRIL) 5 MG tablet Take 0.5 tablets (2.5 mg) by mouth daily  Qty: 30 tablet, Refills: 0    Associated Diagnoses: Essential hypertension; Acute congestive heart failure, unspecified congestive heart failure type (H)         CONTINUE these medications which have NOT CHANGED    Details   furosemide (LASIX) 20 MG tablet Take 20 mg by mouth daily      Vitamin D, Cholecalciferol, 1000 UNITS TABS Take 2,000 Units by mouth daily       Magnesium Hydroxide (MAGNESIA PO) Take by mouth daily Patient doesn't know strength      potassium chloride SA (K-DUR/KLOR-CON M) 20 MEQ CR tablet Take 1 tablet (20 mEq) by mouth daily  Qty: 30 tablet, Refills: 0    Associated Diagnoses: Acute congestive heart failure, unspecified congestive heart failure type (H)      metoprolol (TOPROL-XL) 100 MG 24 hr tablet Take 1 tablet (100 mg) by mouth 2 times daily  Qty: 180 tablet, Refills: 3    Associated Diagnoses: Atrial fibrillation with rapid ventricular response (H)      !! Multiple Vitamins-Minerals (ICAPS AREDS FORMULA PO) Take 1 tablet by mouth daily Takes in addition to multivitamin with minerals      CYANOCOBALAMIN PO Take 500 mcg by mouth daily      Ascorbic Acid (VITAMIN C PO) Take 500 mg by mouth daily      !! multivitamin, therapeutic with minerals (MULTI-VITAMIN) TABS Take 1 tablet by mouth daily       !! - Potential duplicate medications found. Please discuss with provider.        Allergies    No Known Allergies  Data   Most Recent 3 CBC's:  Recent Labs   Lab Test  03/01/18   0612  02/28/18   1024 01/25/18   WBC  6.2  6.1  5.6   HGB  10.4*  10.6*  11.7*   MCV  98  99  97.1   PLT  46*  44*  40*      Most Recent 3 BMP's:  Recent Labs   Lab Test  03/01/18   0612  02/28/18   1024  02/13/18   1157   NA  140  140  134*   POTASSIUM  3.6  4.2  4.6   CHLORIDE  106  107  103   CO2  28  26  27   BUN  30  29  21   CR  1.20  1.19  1.49*   ANIONGAP  6  7  8.6   BARON  8.1*  7.9*  8.4*   GLC  94  172*  133*     Most Recent 2 LFT's:  Recent Labs   Lab Test  02/28/18   1024  01/19/18   0054   AST  19  21   ALT  32  27   ALKPHOS  41  33*   BILITOTAL  0.7  0.6     Most Recent INR's and Anticoagulation Dosing History:  Anticoagulation Dose History     Recent Dosing and Labs Latest Ref Rng & Units 1/19/2018 1/20/2018 1/21/2018 1/22/2018 1/23/2018 2/28/2018 3/1/2018    INR 0.86 - 1.14 3.56(H) 2.31(H) 1.62(H) 1.39(H) 1.19(H) 3.43(H) 3.29(H)        Most Recent 3 Troponin's:  Recent Labs   Lab Test  02/28/18   1024  01/19/18   1150  01/19/18   0533   TROPI  <0.015  0.029  0.023     Most Recent Cholesterol Panel:  Recent Labs   Lab Test  04/18/12   0849   CHOL  127   LDL  55   HDL  56   TRIG  81     Most Recent 6 Bacteria Isolates From Any Culture (See EPIC Reports for Culture Details):  Recent Labs   Lab Test  11/17/17   2110   CULT  10,000 to 50,000 colonies/mL  Pseudomonas aeruginosa  *     Most Recent TSH, T4 and A1c Labs:  Recent Labs   Lab Test  01/19/18   0533   TSH  2.87     Results for orders placed or performed during the hospital encounter of 02/28/18   XR Chest 2 Views    Narrative    XR CHEST 2 VW 2/28/2018 10:50 AM    COMPARISON: 1/19/2018    HISTORY: Shortness of breath.      Impression    IMPRESSION: Trace bilateral pleural effusions with associated  bibasilar atelectasis, not significant changed. No pneumothorax seen  on either side. Mildly enlarged cardiac silhouette again seen.    LILIANA VALDEZ MD

## 2018-03-01 NOTE — PROGRESS NOTES
Tried calling Lorraine, pt's daughter to let her know I have a 3/7 appointment slot for an earlier post hospital f/u. Left message with call back number. Sonam Sow RN 2:15 PM 03/01/18

## 2018-03-01 NOTE — CONSULTS
REASON FOR ASSESSMENT:  CHF Consult for 2 gm NA Diet Education    NUTRITION HISTORY:    Information obtained from:  The pt and EMR. Pt was seen by us on 1/22 during last admit and was instructed on 2 gm Na diet.  He reports there have been no changes since that visit.  Pt's wife does the shopping and meal preparations, she reads labels and is very aware of his needs.    Breakfast:  Yogurt- Yoplait (low-fat)   Cereal- cheerios with skim milk  Sausage (2-3)   Boiled eggs     Lunch:  Beef sandwich   Vegetables   Baked potato     Dinner:   Beef (roast, ribs)   Salad (field greens)   Vegetables (corn, peas, mixed vegetables)  Baked Potato     CURRENT DIET:  2 gm Na. Good appetite, he's eating 100%.    NUTRITION DIAGNOSIS:  None identified at this time.      INTERVENTIONS:    Nutrition Prescription:  2 gm Na diet    Implementation:  No further education needed at this time    Goals:  Pt will continue to limit Na intake to =/<2 gm/ day.    Follow Up:    Provided RD contact information for future questions.    Linda Dugan RD  Pager [phone] (M-F)            [phone] (W/E & Hol)

## 2018-03-01 NOTE — PLAN OF CARE
Problem: Patient Care Overview  Goal: Plan of Care/Patient Progress Review  OT/CR: Orders received, eval attempted. Educated pt on role of CR, pt reports being familiar with CR as he completes OP rehab/CORE. Pt reports understanding all of his CHF education (ie: weights, diet, medications, etc). Pt reports that he feels much better and simply wants to d/c home. Pt open to continuing with OP rehab/CORE. Pt states he hasn't been moving much while IP, encouraged participation with CR at this time, pt declined to complete IP CR until he was able to speak with his MD.

## 2018-03-01 NOTE — PLAN OF CARE
Problem: Patient Care Overview  Goal: Plan of Care/Patient Progress Review  Outcome: Adequate for Discharge Date Met: 03/01/18  VSS, O2 wnl RA. A&Ox4. SBA/FR. Tolerating low Na diet, good appetite. IV lasix switched to po. Adequate UOP, incontinent at times. Denies CP/SOB. Belongings packed and sent with the pt. Went through AVS, pt verbalized understanding. Pt's wife provided transportation. Left the floor @ 1545.

## 2018-03-01 NOTE — CONSULTS
Care Transition Initial Assessment - RN  Met with: Patient.  DATA   Active Problems:    CHF (congestive heart failure) (H)   Cognitive Status: alert and oriented.   Contact information and PCP information verified: Yes  Lives With: spouse  Insurance concerns: No Insurance issues identified  ASSESSMENT  Patient currently receives the following services:  none        Identified issues/concerns regarding health management: Lives with spouse admitted for CHF exacerbation,discussed care at home with diet and therapy. Patient stating he is very knowledgeable of his conditions,follows a 2 gm sodium diet and manages medications. Patients follows up with the [facility] as outpatient. Is aware that he should be monitoring his weight daily and calling clinic for increase I wt or sob. Plan for dc today with f/u  at the heart clinic next week.  PLAN  Financial costs for the patient include none  Patient given options and choices for discharge yes  Patient/family is agreeable to the plan?  Yes:   Patient anticipates discharging to Home.  Patient anticipates needs for home equipment: Yes  Discharge Planner   Discharge Plans in progress: yes  Barriers to discharge plan: No  Plan/Disposition: Home   Appointments: Completed   Care  (CTS) will continue to follow as needed.

## 2018-03-01 NOTE — PLAN OF CARE
Problem: Patient Care Overview  Goal: Plan of Care/Patient Progress Review  Outcome: Improving  VSS, O2 wnl RA. A&Ox4. SBA with a walker/FR. Tolerating low Na diet, good appetite. Denies CP/SOB. RLE dressing changed, CDI. Trace edema BLE. Possible discharge today. RN will cont to monitor.

## 2018-03-01 NOTE — PLAN OF CARE
Problem: Patient Care Overview  Goal: Plan of Care/Patient Progress Review  Outcome: No Change  Alert & oriented x 4, SBA, VSS on RA, lung sound clear, denies pain and SOB, tele Afib with CVR, trace to mild edema in bilateral lower extremities, incontinence at times, uses urinal, strict I&O's, on IV lasix BID,2 gram na diet, tolerating well, refused colace due to soft bowel movement, RN will continue to monitor.

## 2018-03-01 NOTE — PROGRESS NOTES
"Received call from patient's daughter saying she would like Winnie Garcia to speak with her father. He was admitted to the ED yesterday for acute shortness of breath. He was given Lasix 40 mg daily x 3 days, however, this was not effective. Pt was diuresed with IV lasix in the hospital. 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 \"My father is in denial about his heart disease\". \"He doesn't sleep much at night and spends his mornings at the kitchen table most of the day and frequently checks his pulse.\" She said he has anxiety and she and her mother have suggested maybe he needs an anti-anxiety medication but this angered him. She also reported that he self adjusts his medications. I informed her that [facility] is not available today or tomorrow. I told her I spoke with Flora Burrows, CV clinical specialist  earlier today and she informed me the plan is for patient to be discharged today. Daughter was surprised by this. Pt and his wife are planning to move to [facility] soon, pt told his daughter, \"If we don't like it , I wonder if we can move out\". I told her that he has a scheduled appointment with Winnie Garcia CNP on 3/23/18, however, I will try to see if I can move up the appointment. Lorraine said she would really appreciate this. Will update SSmit. Sonam Sow RN 2:01 PM 03/01/18          "

## 2018-03-02 ENCOUNTER — CARE COORDINATION (OUTPATIENT)
Dept: CARDIOLOGY | Facility: CLINIC | Age: 83
End: 2018-03-02

## 2018-03-02 ENCOUNTER — ALLIED HEALTH/NURSE VISIT (OUTPATIENT)
Dept: PHARMACY | Facility: CLINIC | Age: 83
End: 2018-03-02
Payer: COMMERCIAL

## 2018-03-02 DIAGNOSIS — I10 ESSENTIAL HYPERTENSION: ICD-10-CM

## 2018-03-02 DIAGNOSIS — I50.9 CONGESTIVE HEART FAILURE, UNSPECIFIED CONGESTIVE HEART FAILURE CHRONICITY, UNSPECIFIED CONGESTIVE HEART FAILURE TYPE: Primary | ICD-10-CM

## 2018-03-02 DIAGNOSIS — E63.9 NUTRITIONAL DEFICIENCY: ICD-10-CM

## 2018-03-02 DIAGNOSIS — I48.91 ATRIAL FIBRILLATION, UNSPECIFIED TYPE (H): ICD-10-CM

## 2018-03-02 PROCEDURE — 99605 MTMS BY PHARM NP 15 MIN: CPT | Performed by: PHARMACIST

## 2018-03-02 NOTE — PROGRESS NOTES
Patient was evaluated by cardiology while inpatient for SOB (HF). Called patient and left  to discuss any post hospital d/c questions he may have, review medication changes, and confirm f/u appts. RN advised patient in  that he has an apt scheduled on 3/7/18 with HARRY Winnie Garcia in our [facility]. Patient advised in  to call clinic with any cardiac related questions or concerns prior to his apt on 3/7/18.

## 2018-03-02 NOTE — MR AVS SNAPSHOT
After Visit Summary   3/2/2018    Jama Paul    MRN: [id number]           Patient Information     Date Of Birth          2/13/1931        Visit Information        Provider Department      3/2/2018 2:00 PM Brittny Gordon, [facility] [place]        Today's Diagnoses     Congestive heart failure, unspecified congestive heart failure chronicity, unspecified congestive heart failure type (H)    -  1    Atrial fibrillation, unspecified type (H)        Essential hypertension        Nutritional deficiency           Follow-ups after your visit        Your next 10 appointments already scheduled     Mar 05, 2018 10:45 AM CST   Return Visit with Wesley Benjamin MD   [facility] [place] ([facility])    [address]  [facility] [address]   [phone]            Mar 06, 2018 11:10 AM CST   LAB with MCGEE LAB   [facility] ([facility])    [address] [place] [address] [address]  [facility] [address] [phone]           Please do not eat 10-12 hours before your appointment if you are coming in fasting for labs on lipids, cholesterol, or glucose (sugar). This does not apply to pregnant women. Water, hot tea and black coffee (with nothing added) are okay. Do not drink other fluids, diet soda or chew gum.            Mar 06, 2018 12:00 PM CST   Core Return with ERIC Fernandez [facility] ([facility])    [address]  [facility] [address] [phone]            Mar 22, 2018 10:00 AM CDT   LAB with MCGEE LAB   [facility] ([facility])    [address] [place], [place] [id number]  [facility] [address] [phone]           Please do not eat 10-12 hours before your appointment if you are coming in fasting for labs on lipids, cholesterol, or glucose (sugar). This does  not apply to pregnant women. Water, hot tea and black coffee (with nothing added) are okay. Do not drink other fluids, diet soda or chew gum.            Mar 23, 2018  9:30 AM CDT   Core Return with ERIC Fernandez CNP   [facility] ([facility])    [address]  [facility] [address]   [phone]            Apr 16, 2018 12:45 PM CDT   Ech Complete with SHCVECHR2   [facility] CV Echocardiography (Cardiovascular Imaging at [facility])    [address] [place] [address]  [address]  [facility] [address] [phone]           1. Please bring or wear a comfortable two-piece outfit. 2. You may eat, drink and take your normal medicines. 3. For any questions that cannot be answered, please contact the ordering physician            Apr 16, 2018  2:00 PM CDT   LAB with MCGEE LAB   [facility] [place] ([facility])    [address] [place] [address]   [phone]           Please do not eat 10-12 hours before your appointment if you are coming in fasting for labs on lipids, cholesterol, or glucose (sugar). This does not apply to pregnant women. Water, hot tea and black coffee (with nothing added) are okay. Do not drink other fluids, diet soda or chew gum.            Apr 18, 2018 10:45 AM CDT   Return Visit with Long Pugh MD   [facility] ([facility])    [address] [place] [address] [phone]              Who to contact     If you have questions or need follow up information about today's clinic visit or your schedule please contact [facility] [place] directly at [phone].  Normal or non-critical lab and imaging results will be communicated to you by MyChart, letter or phone within 4 business days after the clinic has received the results. If you do not hear from us within 7 days,  "please contact the clinic through [employer] or phone. If you have a critical or abnormal lab result, we will notify you by phone as soon as possible.  Submit refill requests through [employer] or call your pharmacy and they will forward the refill request to us. Please allow 3 business days for your refill to be completed.          Additional Information About Your Visit        MegathreadharParallocity Information     [employer] lets you send messages to your doctor, view your test results, renew your prescriptions, schedule appointments and more. To sign up, go to [email or web address] . Click on \"Log in\" on the left side of the screen, which will take you to the Welcome page. Then click on \"Sign up Now\" on the right side of the page.     You will be asked to enter the access code listed below, as well as some personal information. Please follow the directions to create your username and password.     Your access code is: [email or web address]  Expires: 4/3/2018  2:08 PM     Your access code will  in 90 days. If you need help or a new code, please call your [place] clinic or [phone].        Care EveryWhere ID     This is your Care EveryWhere ID. This could be used by other organizations to access your [place] medical records  [id number]         Blood Pressure from Last 3 Encounters:   18 123/84   18 (!) 125/91   18 112/70    Weight from Last 3 Encounters:   18 161 lb 6.4 oz (73.2 kg)   18 175 lb 3.2 oz (79.5 kg)   18 161 lb 14.4 oz (73.4 kg)              Today, you had the following     No orders found for display       Primary Care Provider Office Phone # Fax #    Clint Pizarro [phone]       [address] [address]        Equal Access to Services     [facility] : Caridad Carrillo, wamissael frazierqcarmen, qaybta kaalemani del valle . [facility] [phone].    ATENCIÓN: Si anishala " español, tiene a cleaning disposición servicios gratuitos de asistencia lingüística. Jaycee yoo [phone].    We comply with applicable federal civil rights laws and [place] laws. We do not discriminate on the basis of race, color, national origin, age, disability, sex, sexual orientation, or gender identity.            Thank you!     Thank you for choosing [facility] R[facility]UMATOLOG[facility]  for your care. Our goal is always to provide you with excellent care. Hearing back from our patients is one way we can continue to improve our services. Please take a few minutes to complete the written survey that you may receive in the mail after your visit with us. Thank you!             Your Updated Medication List - Protect others around you: Learn how to safely use, store and throw away your medicines at www.disposemymeds.org.          This list is accurate as of 3/2/18  2:56 PM.  Always use your most recent med list.                   Brand Name Dispense Instructions for use Diagnosis    CYANOCOBALAMIN PO      Take 500 mcg by mouth daily        LASIX 20 MG tablet   Generic drug:  furosemide      Take 20 mg by mouth daily        lisinopril 5 MG tablet    PRINIVIL/ZESTRIL    30 tablet    Take 0.5 tablets (2.5 mg) by mouth daily    Essential hypertension, Acute congestive heart failure, unspecified congestive heart failure type (H)       MAGNESIA PO      Take by mouth daily Patient doesn't know strength        metoprolol succinate 100 MG 24 hr tablet    TOPROL-XL    180 tablet    Take 1 tablet (100 mg) by mouth 2 times daily    Atrial fibrillation with rapid ventricular response (H)       * Multi-vitamin Tabs tablet      Take 1 tablet by mouth daily        * ICAPS AREDS FORMULA PO      Take 1 tablet by mouth daily Takes in addition to multivitamin with minerals        potassium chloride SA 20 MEQ CR tablet    K-DUR/KLOR-CON M    30 tablet    Take 1 tablet (20 mEq) by mouth daily    Acute congestive heart failure,  unspecified congestive heart failure type (H)       VITAMIN C PO      Take 500 mg by mouth daily        Vitamin D (Cholecalciferol) 1000 UNITS Tabs      Take 2,000 Units by mouth daily        warfarin 5 MG tablet    COUMADIN    30 tablet    Take 1 tablet (5 mg) by mouth daily 5 mg on Ljssof-Iuolchuda-Icapyo, 2.5 mg Nshlfhr-Ljervsdj-Dklxbmxh-Sunday.  Patient takes in the morning.    Atrial fibrillation, unspecified type (H)       * Notice:  This list has 2 medication(s) that are the same as other medications prescribed for you. Read the directions carefully, and ask your doctor or other care provider to review them with you.

## 2018-03-02 NOTE — PROGRESS NOTES
SUBJECTIVE/OBJECTIVE:                Jama Paul is a 87 year old male called for a transitions of care visit.  He was discharged from [facility] on 3/1 for CHF exacerbation and A fib.     Chief Complaint: Says his blood pressures are getting too low for him in the morning. He states that multiple people went over this medications in the hospital.    Allergies/ADRs: Reviewed in Epic  Tobacco: No tobacco use   Alcohol: none  PCP is Dr. Pizarro is with [facility]    Medication Adherence/Access:  Medication barriers: none.     Hypertension/CHF: Current therapy includes lisinopril 5 mg (supposed to be 2.5 mg daily - decreased dose), furosemide 20 mg daily, metoprolol  mg BID, and potassium chloride 20 mEq daily. Per CareEverywhere, warfarin 2.5 mg MWF and 5 mg daily ROW. INR goal 2-3.  Reports his BP is 105 in the AM and 115 in the PM. However, reports he was at 88 mmHg this morning. He only reports the top numbers. He is checking his blood pressure TID. He sees Dr. Pizarro regularly and plans to follow-up with him if he continues to see low BP in the morning. He is seeing Dr. Garcia in Cardiology on 3/6.   HR: 66 this morning - [place] wants it around 85   EF: 40%     AFib: Current therapy includes warfarin and metoprolol  mg BID. He does not have the current warfarin dose with him - but wrote it down - believes it was 2.5 mg. Seeing INR clinic again on Tuesday - managed by [facility]. No reported concerns or side effects.  INR today was 2.2 per his report.   INR today:   Lab Results   Component Value Date    INR 3.29 03/01/2018    INR 3.43 02/28/2018    INR 1.19 01/23/2018    INR 1.39 01/22/2018    INR 1.62 01/21/2018    INR 2.31 01/20/2018     Nutritional Deficiency: Current therapy includes vitamin D 2,000 units daily, magnesium daily (unknown dose), Preservision daily, vitamin C 500 mg daily, and B 12 500 mcg dailyand multivitamin daily. No reported side effects.    Current labs include:  BP Readings from Last 3  Encounters:   03/01/18 123/84   02/19/18 (!) 125/91   02/14/18 112/70     Lab Results   Component Value Date    CHOL 127 04/18/2012     Lab Results   Component Value Date    TRIG 81 04/18/2012     Lab Results   Component Value Date    HDL 56 04/18/2012     Lab Results   Component Value Date    LDL 55 04/18/2012     Liver Function Studies -   Recent Labs   Lab Test  02/28/18   1024   PROTTOTAL  6.5*   ALBUMIN  3.4   BILITOTAL  0.7   ALKPHOS  41   AST  19   ALT  32     Last Basic Metabolic Panel:  Lab Results   Component Value Date     03/01/2018      Lab Results   Component Value Date    POTASSIUM 3.6 03/01/2018     Lab Results   Component Value Date    CHLORIDE 106 03/01/2018     Lab Results   Component Value Date    BUN 30 03/01/2018     Lab Results   Component Value Date    CR 1.20 03/01/2018     GFR Estimate   Date Value Ref Range Status   03/01/2018 57 (L) >60 mL/min/1.7m2 Final     Comment:     Non  GFR Calc   02/28/2018 58 (L) >60 mL/min/1.7m2 Final     Comment:     Non  GFR Calc   02/13/2018 45 (L) >60 mL/min/1.7m2 Final     TSH   Date Value Ref Range Status   01/19/2018 2.87 0.40 - 4.00 mU/L Final     Most Recent Immunizations   Administered Date(s) Administered     Influenza (IIV3) PF 10/15/2014     Zoster vaccine, live 11/01/2012       ASSESSMENT:                 Current medications were reviewed today.      Medication Adherence: fair, needs improvement - see below    Hypertension/CHF: Needs improvement. Pt continues to have low BP, which may be because he was not aware lisinopril had been decreased. Discussed this with patient per discharge summary orders. Pt is agreeable to splitting tablets. He has plans for f/up with Cardiology and will see PCP if needed.    AFib: Improved. INR is at goal.     Nutritional Deficiency: Stable.    PLAN:                1. Pt to take lisinopril 2.5 mg daily as directed (new dose at discharge)  2. Pt to continue medications as directed  and follow-up as planned    I spent 10 minutes with this patient today. I offer these suggestions with the understanding that I don't fully understand Jama's past medical history and the complexity of his health conditions. Jama should make no changes without the approval of his physician. A copy of the visit note was provided to the patient's primary care provider.    Will follow up as needed per patient preference.    The patient declined a summary of these recommendations as an after visit summary.    Brittny Gordon PharmD   [facility] Pharmacist   Phone: [phone]

## 2018-03-05 ENCOUNTER — HOSPITAL ENCOUNTER (OUTPATIENT)
Dept: WOUND CARE | Facility: CLINIC | Age: 83
Discharge: HOME | End: 2018-03-05
Attending: SURGERY | Admitting: SURGERY
Payer: MEDICARE

## 2018-03-05 VITALS — DIASTOLIC BLOOD PRESSURE: 71 MMHG | HEART RATE: 89 BPM | TEMPERATURE: 96.9 F | SYSTOLIC BLOOD PRESSURE: 119 MMHG

## 2018-03-05 PROCEDURE — 11042 DBRDMT SUBQ TIS 1ST 20SQCM/<: CPT | Performed by: SURGERY

## 2018-03-05 PROCEDURE — 11042 DBRDMT SUBQ TIS 1ST 20SQCM/<: CPT

## 2018-03-05 PROCEDURE — A6022 COLLAGEN DRSG>16<=48 SQ IN: HCPCS

## 2018-03-05 NOTE — PROGRESS NOTES
[facility] Wound Healing Register Progress Note    Subject: Jama Paul return to see us for his right calf ulcer along with his wife and daughter.  He had exacerbation of his CHF with shortness of breath and required an overnight hospital stay last week.  He has had no breathing problems since.    Home health care is changing his dressings every other day using Fibracol.  Fibracol is dissolved at the time of the dressing change which is what we would expect.  He has no pain and discomfort from the site very minimal if any drainage.  Size continues to decrease.      Exam:  /71  Pulse 89  Temp 96.9  F (36.1  C) (Temporal)  Alert and appropriate.  Frail.  No leg edema.  Calf ulcer has decreased measuring 4.1 x 4.2 x 0.1 cm.  Mild amount of slough and fibrin.  One can see do epithelialization along all edges growing into the center of the wound.  Granulation tissue is very healthy.  Nails are thickened with onychomycotic changes.  Small callus over the tip of the left second toe but no underlying ulcer.    Procedure:  Time out was called, informed consent obtained, and topical anesthetic of 4% lidocaine was applied per standing protocol, debridement was performed using a #15 blade down to and including subcutaneous tissue.  We remove the fibrin slough from the right medial calf ulcer down to very healthy robust bleeding granulation tissue.  Less than 0.1 cm of the skin edges were excised circumferentially to allow for bleeding and tissue ingrowth.  Tolerated debridement with no pain at all.  Bleeding controlled with light pressure. Patient tolerated procedure well.    We also trimmed the toenails on both of his feet with a  and emery board to help prevent pressure ulcers from developing.      Impression: Continued improvement and decreasing size of right medial calf ulcer.  In his family have decided to let this heal by secondary intent which I feel is very appropriate.  Continue with Fibracol  dressing changes every other day.    Plan: We will dress the wounds with Fibracol.  Patient will return to the clinic in 2 weeks time    Wesley Benjamin MD  03/05/18 11:01 AM

## 2018-03-05 NOTE — MR AVS SNAPSHOT
MRN:[id number]                      After Visit Summary   3/5/2018    Jama Paul    MRN: [id number]           Visit Information        Provider Department      3/5/2018 10:45 AM Wesley Benjamin MD [facility] Wound Healing [place]        Your next 10 appointments already scheduled     Mar 06, 2018 11:10 AM CST   LAB with MCGEE LAB   [facility] ([facility])    [address] [place], [place] [address]  [facility] [address]   [phone]           Please do not eat 10-12 hours before your appointment if you are coming in fasting for labs on lipids, cholesterol, or glucose (sugar). This does not apply to pregnant women. Water, hot tea and black coffee (with nothing added) are okay. Do not drink other fluids, diet soda or chew gum.            Mar 06, 2018 12:00 PM CST   Core Return with ERIC Fernandez [facility] ([facility])    [address] [place], [place] [address]  [facility] [address]   [phone]            Mar 22, 2018 10:00 AM CDT   LAB with MCGEE LAB   [facility] ([facility])    [address] [place], [place] [address]  [facility] [address]   [phone]           Please do not eat 10-12 hours before your appointment if you are coming in fasting for labs on lipids, cholesterol, or glucose (sugar). This does not apply to pregnant women. Water, hot tea and black coffee (with nothing added) are okay. Do not drink other fluids, diet soda or chew gum.            Mar 23, 2018  9:30 AM CDT   Core Return with ERIC Fernandez [facility] ([facility])    [address] [id number]  [facility] [address]   [phone]            Apr 16, 2018 12:45 PM CDT   Ech Complete with SHCVECHR2   [facility] CV Echocardiography (Cardiovascular Imaging at [facility])    [facility]  [facility]  [address]  [facility] [address] [phone]           1. Please bring or wear a comfortable two-piece outfit. 2. You may eat, drink and take your normal medicines. 3. For any questions that cannot be answered, please contact the ordering physician            Apr 16, 2018  2:00 PM CDT   LAB with MCGEE LAB   [facility] ER HEART AT [place] ([facility])    [address]  [facility] [address] [phone]           Please do not eat 10-12 hours before your appointment if you are coming in fasting for labs on lipids, cholesterol, or glucose (sugar). This does not apply to pregnant women. Water, hot tea and black coffee (with nothing added) are okay. Do not drink other fluids, diet soda or chew gum.            Apr 18, 2018 10:45 AM CDT   Return Visit with Long Pugh MD   [facility] ([facility])    [address]  [facility] [address] [phone]                Further instructions from your care team             [facility] WOUND HEALING [address], [facility] [address]  Appointment Phone [phone] Nurse Advisors [phone]     Jama Paul      2/13/1931  [facility] Phone:[phone]  Fax:[phone]  Includes [facility]      Wound Dressing Change to right medial lower leg  Cleanse wound and surrounding skin with: wound cleanser  Cover wound with Fibracol cut to size of wound (this will dissolve into the wound)  Cover wound with ABD pad and secure with roll gauze  Change dressing Monday, Wednesday, Friday  Compression:   You have a compression wrap Spandagrip E is supposed to be removed at night and put back on first thing in the morning. On Both Lower Legs  Please remove compression dressing if toes turn blue and/or tingle and can not be relieved by raising the leg for one hour.       Wesley Benjamin,  "M.D.2018        Call us at [phone] if you have any questions about your wounds, have redness or swelling around your wound, have a fever of 101 or greater or if you have any other problems or concerns. We answer the phone Monday through Friday 8 am to 4 pm, please leave a message as we check the voicemail frequently throughout the day.      Follow up with Provider - 3-4 weeks          MyChart Information     [employer] lets you send messages to your doctor, view your test results, renew your prescriptions, schedule appointments and more. To sign up, go to [email or web address] . Click on \"Log in\" on the left side of the screen, which will take you to the Welcome page. Then click on \"Sign up Now\" on the right side of the page.     You will be asked to enter the access code listed below, as well as some personal information. Please follow the directions to create your username and password.     Your access code is: [email or web address]  Expires: 4/3/2018  2:08 PM     Your access code will  in 90 days. If you need help or a new code, please call your [place] clinic or [phone].        Care EveryWhere ID     This is your Care EveryWhere ID. This could be used by other organizations to access your [place] medical records  [id number]        Equal Access to Services     PATSY MOURA : Caridad Carrillo, wamissael isbell, qaybta kaalemani del valle. So [facility] [phone].    ATENCIÓN: Si habla español, tiene a cleaning disposición servicios gratuitos de asistencia lingüística. Llame al [phone].    We comply with applicable federal civil rights laws and [place] laws. We do not discriminate on the basis of race, color, national origin, age, disability, sex, sexual orientation, or gender identity.            "

## 2018-03-06 ENCOUNTER — OFFICE VISIT (OUTPATIENT)
Dept: CARDIOLOGY | Facility: CLINIC | Age: 83
End: 2018-03-06
Payer: MEDICARE

## 2018-03-06 VITALS
DIASTOLIC BLOOD PRESSURE: 86 MMHG | WEIGHT: 170 LBS | HEIGHT: 69 IN | SYSTOLIC BLOOD PRESSURE: 128 MMHG | HEART RATE: 80 BPM | OXYGEN SATURATION: 96 % | BODY MASS INDEX: 25.18 KG/M2

## 2018-03-06 DIAGNOSIS — I50.43 ACUTE ON CHRONIC COMBINED SYSTOLIC AND DIASTOLIC CHF (CONGESTIVE HEART FAILURE) (H): ICD-10-CM

## 2018-03-06 DIAGNOSIS — I48.91 ATRIAL FIBRILLATION WITH RAPID VENTRICULAR RESPONSE (H): ICD-10-CM

## 2018-03-06 LAB
ANION GAP SERPL CALCULATED.3IONS-SCNC: 10 MMOL/L (ref 6–17)
BUN SERPL-MCNC: 23 MG/DL (ref 7–30)
CALCIUM SERPL-MCNC: 8.5 MG/DL (ref 8.5–10.5)
CHLORIDE SERPL-SCNC: 102 MMOL/L (ref 98–107)
CO2 SERPL-SCNC: 27 MMOL/L (ref 23–29)
CREAT SERPL-MCNC: 1.18 MG/DL (ref 0.7–1.3)
GFR SERPL CREATININE-BSD FRML MDRD: 58 ML/MIN/1.7M2
GLUCOSE SERPL-MCNC: 104 MG/DL (ref 70–105)
POTASSIUM SERPL-SCNC: 4 MMOL/L (ref 3.5–5.1)
SODIUM SERPL-SCNC: 135 MMOL/L (ref 136–145)

## 2018-03-06 PROCEDURE — 36415 COLL VENOUS BLD VENIPUNCTURE: CPT | Performed by: NURSE PRACTITIONER

## 2018-03-06 PROCEDURE — 99214 OFFICE O/P EST MOD 30 MIN: CPT | Performed by: NURSE PRACTITIONER

## 2018-03-06 PROCEDURE — 80048 BASIC METABOLIC PNL TOTAL CA: CPT | Performed by: NURSE PRACTITIONER

## 2018-03-06 RX ORDER — METOPROLOL SUCCINATE 100 MG/1
100 TABLET, EXTENDED RELEASE ORAL 2 TIMES DAILY
Qty: 180 TABLET | Refills: 3 | Status: SHIPPED | OUTPATIENT
Start: 2018-03-06 | End: 2018-03-12

## 2018-03-06 NOTE — PROGRESS NOTES
HPI and Plan:   See dictation    Orders Placed This Encounter   Procedures     Basic metabolic panel     Follow-Up with [facility] - [facility] visit     Orders Placed This Encounter   Medications     sacubitril-valsartan (ENTRESTO) 24-26 MG per tablet     Sig: Take 1 tablet by mouth 2 times daily     Dispense:  1 tablet     Refill:  0     samples     metoprolol succinate (TOPROL-XL) 100 MG 24 hr tablet     Sig: Take 1 tablet (100 mg) by mouth 2 times daily Take 1 tablet (100 mg by mouth) in the AM, 50 mg (1/2 tablet) in the PM     Dispense:  180 tablet     Refill:  3     Medications Discontinued During This Encounter   Medication Reason     lisinopril (PRINIVIL/ZESTRIL) 5 MG tablet      metoprolol (TOPROL-XL) 100 MG 24 hr tablet Reorder         Encounter Diagnoses   Name Primary?     Acute on chronic combined systolic and diastolic CHF (congestive heart failure) (H)      Atrial fibrillation with rapid ventricular response (H)        CURRENT MEDICATIONS:  Current Outpatient Prescriptions   Medication Sig Dispense Refill     sacubitril-valsartan (ENTRESTO) 24-26 MG per tablet Take 1 tablet by mouth 2 times daily 1 tablet 0     metoprolol succinate (TOPROL-XL) 100 MG 24 hr tablet Take 1 tablet (100 mg) by mouth 2 times daily Take 1 tablet (100 mg by mouth) in the AM, 50 mg (1/2 tablet) in the  tablet 3     warfarin (COUMADIN) 5 MG tablet Take 1 tablet (5 mg) by mouth daily 5 mg on Xevqli-Ybzchychy-Limqvd, 2.5 mg Fhcaczq-Hdxneozl-Oprlhive-Sunday.  Patient takes in the morning. 30 tablet 0     furosemide (LASIX) 20 MG tablet Take 20 mg by mouth daily       Vitamin D, Cholecalciferol, 1000 UNITS TABS Take 2,000 Units by mouth daily        Magnesium Hydroxide (MAGNESIA PO) Take by mouth daily Patient doesn't know strength       potassium chloride SA (K-DUR/KLOR-CON M) 20 MEQ CR tablet Take 1 tablet (20 mEq) by mouth daily 30 tablet 0     Multiple Vitamins-Minerals (ICAPS AREDS FORMULA PO) Take 1 tablet by mouth daily  Takes in addition to multivitamin with minerals       CYANOCOBALAMIN PO Take 500 mcg by mouth daily       Ascorbic Acid (VITAMIN C PO) Take 500 mg by mouth daily       multivitamin, therapeutic with minerals (MULTI-VITAMIN) TABS Take 1 tablet by mouth daily       [DISCONTINUED] metoprolol (TOPROL-XL) 100 MG 24 hr tablet Take 1 tablet (100 mg) by mouth 2 times daily 180 tablet 3       ALLERGIES   No Known Allergies    PAST MEDICAL HISTORY:  Past Medical History:   Diagnosis Date     Antiplatelet or antithrombotic long-term use      Arrhythmia      Atrial fibrillation (H)      Elevated PSA      Thrombocytopenia (H)      Unspecified essential hypertension        PAST SURGICAL HISTORY:  Past Surgical History:   Procedure Laterality Date     COLONOSCOPY       EXPLORE GROIN  4/30/2014    Procedure: EXPLORE GROIN;  Surgeon: Sam Aguirre MD;  Location:  OR     HERNIORRHAPHY INGUINAL       HERNIORRHAPHY INGUINAL  4/30/2014    Procedure: HERNIORRHAPHY INGUINAL;  Surgeon: Sam Aguirre MD;  Location:  OR     MOHS MICROGRAPHIC PROCEDURE       PHACOEMULSIFICATION CLEAR CORNEA WITH STANDARD INTRAOCULAR LENS IMPLANT Right 9/8/2015    Procedure: PHACOEMULSIFICATION CLEAR CORNEA WITH STANDARD INTRAOCULAR LENS IMPLANT;  Surgeon: Gil Shannon MD;  Location:  [facility]     septic olecranon bursitis[         FAMILY HISTORY:  Family History   Problem Relation Age of Onset     HEART DISEASE No family hx of        SOCIAL HISTORY:  Social History     Social History     Marital status:      Spouse name: N/A     Number of children: N/A     Years of education: N/A     Social History Main Topics     Smoking status: Never Smoker     Smokeless tobacco: Never Used     Alcohol use No     Drug use: No     Sexual activity: Not on file     Other Topics Concern     Not on file     Social History Narrative       Review of Systems:  Skin:  Positive for bruising   Eyes:  Positive for glasses  ENT:  Negative    Respiratory:  Negative  "dyspnea on exertion;shortness of breath  Cardiovascular:  Negative edema;Positive for  Gastroenterology: Negative    Genitourinary:  Positive for urinary frequency  Musculoskeletal:  Negative arthritis  Neurologic:  Negative    Psychiatric:  Negative    Heme/Lymph/Imm:  Negative    Endocrine:  Negative      Physical Exam:  Vitals: /86 (BP Location: Right arm, Patient Position: Chair, Cuff Size: Adult Regular)  Pulse 80  Ht 1.74 m (5' 8.5\")  Wt 77.1 kg (170 lb)  SpO2 96%  BMI 25.47 kg/m2    Constitutional:           Skin:             Head:           Eyes:           Lymph:      ENT:           Neck:           Respiratory:            Cardiac:                                                           GI:           Extremities and Muscular Skeletal:                 Neurological:           Psych:           Recent Lab Results:  LIPID RESULTS:  Lab Results   Component Value Date    CHOL 127 04/18/2012    HDL 56 04/18/2012    LDL 55 04/18/2012    TRIG 81 04/18/2012    CHOLHDLRATIO 2.3 04/18/2012       LIVER ENZYME RESULTS:  Lab Results   Component Value Date    AST 19 02/28/2018    ALT 32 02/28/2018       CBC RESULTS:  Lab Results   Component Value Date    WBC 6.2 03/01/2018    RBC 3.26 (L) 03/01/2018    HGB 10.4 (L) 03/01/2018    HCT 31.9 (L) 03/01/2018    MCV 98 03/01/2018    MCH 31.9 03/01/2018    MCHC 32.6 03/01/2018    RDW 19.5 (H) 03/01/2018    PLT 46 (LL) 03/01/2018       BMP RESULTS:  Lab Results   Component Value Date     (L) 03/06/2018    POTASSIUM 4.0 03/06/2018    CHLORIDE 102 03/06/2018    CO2 27 03/06/2018    ANIONGAP 10 03/06/2018     03/06/2018    BUN 23 03/06/2018    CR 1.18 03/06/2018    GFRESTIMATED 58 (L) 03/06/2018    GFRESTBLACK 71 03/06/2018    BARON 8.5 03/06/2018        A1C RESULTS:  No results found for: A1C    INR RESULTS:  Lab Results   Component Value Date    INR 3.29 (H) 03/01/2018    INR 3.43 (H) 02/28/2018           KIRAN Garcia, APRN CNP  [address]  [place] [id number]

## 2018-03-06 NOTE — PROGRESS NOTES
Service Date: 03/06/2018      HISTORY OF PRESENT ILLNESS:  Mr. Paul is a pleasant 87-year-old gentleman whom I see in the [facility] at the [facility] for management of his nonischemic cardiomyopathy.  He is New York Heart Association class III.  His recent echo showed an ejection fraction of 40% with overall moderately reduced LV systolic function with moderate global hypokinesis.  The patient recently was in the hospital for decompensated heart failure.  He received 1 dose of IV Lasix and then he was switched back and discharged on his daily p.o. furosemide 20 mg dose.  Prior to his last hospitalization, a baseline weight of 172 pounds was established.  When I saw him last, I felt he was fluid overloaded and I increased his furosemide to 40 mg for 3 days.  In followup, over the phone the [facility] nurse touched base to see how his weight did with the increased furosemide dose.  The patient reports that there was no real difference with the weight.  He went back to 20 mg a day.  A week later though, however, the patient called in to report sudden onset of shortness of breath.  When he woke up in the morning, his weight that day at home was 170 pounds.  He was directed to go to the emergency room.  In the emergency room, a chest x-ray showed trace bilateral pleural effusions with associated bibasilar atelectasis.  Lab work revealed a hemoglobin of 10.6 which is stable, normal sodium of 140, creatinine 1.19, N-terminal proBNP 5689.  A previous BNP was 5548 done as an outpatient.  The patient was discharged on furosemide 20 mg a day after spending 2 days in the hospital.  Today, he is here in followup.  He said his home weight today was 170 pounds.  His weight in clinic here was 167 pounds.  Today, the patient states he is feeling well.  He does report some low blood pressures in the morning. When he gets his INR checked, he has the RN check his blood pressure and sometimes it is as  low as .  When it is that low, he does feel more tired.  Today, he denies worsening shortness of breath, cough.  He denies dizziness or lightheadedness.  He denies any new lower leg swelling.  He denies any PND.      He has some lower extremity wounds and he sees Dr. Benjamin.  In review of Dr. Benjamin's last note, the patient's wounds are slowly improving.      LABORATORY DATA:  Lab work done today:  Sodium 135, potassium 4, BUN 23, creatinine 1.1, GFR of 58.      PHYSICAL EXAMINATION:   GENERAL:  The patient is tall.  The patient is elderly in appearance.  Kyphosis present, needs help getting on and off the exam table.  He is a good historian, verbalizes understanding of his medications, 2 gram sodium diet, etc.   CARDIAC:  Heart tones are irregular with an S1, S2 without an S3, S4.   LUNGS:  Clear without crackles or wheezes.   ABDOMEN:  Soft.   NECK:  Veins are flat.   EXTREMITIES:  Lower extremities with bilateral dressings in place.      IMPRESSION AND PLAN:  The patient with a recent acute on chronic systolic heart failure.  Today he does appear euvolemic.  We will continue with furosemide at 20 mg a day.  We had a long discussion about his trials and tribulations of a 2-gram sodium diet.  Today, I would like to start him on Entresto 24-26 mg  b.i.d.  I have given him instructions to do the following:  On 03/07 stop lisinopril.  On 03/08 start Entresto evening dose 24-26 mg.  On 03/09 and beyond, Entresto 24-26 twice daily.  We also talked about causes of his low morning blood pressures.  I think his evening dose of metoprolol at 100 mg could be contributing to it.  I have asked him to reduce the evening dose of metoprolol to 50 mg.  We will continue with the morning dose at 100 mg.  This will help his ischemic cardiomyopathy and help control his heart rate with his atrial fibrillation.  I have asked him to come back and see me next week.  I think he requires close followup with the above medication changes,  given his age and frailty.  He will continue to check his weight every day and record it.  He is going to call us if he loses 3 pounds before I see him next or if he gains 2-3 pounds overnight or 5 pounds in a week.      It has been my pleasure to see Yinka again.         ERIC GUERRA, MANISH             D: 2018   T: 2018   MT: MARTIN      Name:     YINKA GONZALEZ   MRN:      [id number]        Account:      [id number]   :      1931           Service Date: 2018      Document: [id number]

## 2018-03-06 NOTE — LETTER
3/6/2018    Clint Pizarro MD  [address] [address]    RE: Jama Paul       Dear Colleague,    I had the pleasure of seeing Jama Paul in the [facility].    HPI and Plan:   See dictation    Orders Placed This Encounter   Procedures     Basic metabolic panel     [facility] with [facility] - H[facility]Y visit     Orders Placed This Encounter   Medications     sacubitril-valsartan (ENTRESTO) 24-26 MG per tablet     Sig: Take 1 tablet by mouth 2 times daily     Dispense:  1 tablet     Refill:  0     samples     metoprolol succinate (TOPROL-XL) 100 MG 24 hr tablet     Sig: Take 1 tablet (100 mg) by mouth 2 times daily Take 1 tablet (100 mg by mouth) in the AM, 50 mg (1/2 tablet) in the PM     Dispense:  180 tablet     Refill:  3     Medications Discontinued During This Encounter   Medication Reason     lisinopril (PRINIVIL/ZESTRIL) 5 MG tablet      metoprolol (TOPROL-XL) 100 MG 24 hr tablet Reorder         Encounter Diagnoses   Name Primary?     Acute on chronic combined systolic and diastolic CHF (congestive heart failure) (H)      Atrial fibrillation with rapid ventricular response (H)        CURRENT MEDICATIONS:  Current Outpatient Prescriptions   Medication Sig Dispense Refill     sacubitril-valsartan (ENTRESTO) 24-26 MG per tablet Take 1 tablet by mouth 2 times daily 1 tablet 0     metoprolol succinate (TOPROL-XL) 100 MG 24 hr tablet Take 1 tablet (100 mg) by mouth 2 times daily Take 1 tablet (100 mg by mouth) in the AM, 50 mg (1/2 tablet) in the  tablet 3     warfarin (COUMADIN) 5 MG tablet Take 1 tablet (5 mg) by mouth daily 5 mg on Kxrnvr-Hujyajnuy-Ljjzus, 2.5 mg Wjvzicw-Hvpcxkzt-Ntxpodkt-Sunday.  Patient takes in the morning. 30 tablet 0     furosemide (LASIX) 20 MG tablet Take 20 mg by mouth daily       Vitamin D, Cholecalciferol, 1000 UNITS TABS Take 2,000 Units by mouth daily        Magnesium Hydroxide (MAGNESIA PO) Take  by mouth daily Patient doesn't know strength       potassium chloride SA (K-DUR/KLOR-CON M) 20 MEQ CR tablet Take 1 tablet (20 mEq) by mouth daily 30 tablet 0     Multiple Vitamins-Minerals (ICAPS AREDS FORMULA PO) Take 1 tablet by mouth daily Takes in addition to multivitamin with minerals       CYANOCOBALAMIN PO Take 500 mcg by mouth daily       Ascorbic Acid (VITAMIN C PO) Take 500 mg by mouth daily       multivitamin, therapeutic with minerals (MULTI-VITAMIN) TABS Take 1 tablet by mouth daily       [DISCONTINUED] metoprolol (TOPROL-XL) 100 MG 24 hr tablet Take 1 tablet (100 mg) by mouth 2 times daily 180 tablet 3       ALLERGIES   No Known Allergies    PAST MEDICAL HISTORY:  Past Medical History:   Diagnosis Date     Antiplatelet or antithrombotic long-term use      Arrhythmia      Atrial fibrillation (H)      Elevated PSA      Thrombocytopenia (H)      Unspecified essential hypertension        PAST SURGICAL HISTORY:  Past Surgical History:   Procedure Laterality Date     COLONOSCOPY       EXPLORE GROIN  4/30/2014    Procedure: EXPLORE GROIN;  Surgeon: Sam Aguirre MD;  Location:  OR     HERNIORRHAPHY INGUINAL       HERNIORRHAPHY INGUINAL  4/30/2014    Procedure: HERNIORRHAPHY INGUINAL;  Surgeon: Sam Aguirre MD;  Location:  OR     MOHS MICROGRAPHIC PROCEDURE       PHACOEMULSIFICATION CLEAR CORNEA WITH STANDARD INTRAOCULAR LENS IMPLANT Right 9/8/2015    Procedure: PHACOEMULSIFICATION CLEAR CORNEA WITH STANDARD INTRAOCULAR LENS IMPLANT;  Surgeon: Gil Shannon MD;  Location:  [facility]     septic olecranon bursitis[         FAMILY HISTORY:  Family History   Problem Relation Age of Onset     HEART DISEASE No family hx of        SOCIAL HISTORY:  Social History     Social History     Marital status:      Spouse name: N/A     Number of children: N/A     Years of education: N/A     Social History Main Topics     Smoking status: Never Smoker     Smokeless tobacco: Never Used     Alcohol use No      "Drug use: No     Sexual activity: Not on file     Other Topics Concern     Not on file     Social History Narrative       Review of Systems:  Skin:  Positive for bruising   Eyes:  Positive for glasses  ENT:  Negative    Respiratory:  Negative dyspnea on exertion;shortness of breath  Cardiovascular:  Negative edema;Positive for  Gastroenterology: Negative    Genitourinary:  Positive for urinary frequency  Musculoskeletal:  Negative arthritis  Neurologic:  Negative    Psychiatric:  Negative    Heme/Lymph/Imm:  Negative    Endocrine:  Negative      Physical Exam:  Vitals: /86 (BP Location: Right arm, Patient Position: Chair, Cuff Size: Adult Regular)  Pulse 80  Ht 1.74 m (5' 8.5\")  Wt 77.1 kg (170 lb)  SpO2 96%  BMI 25.47 kg/m2    Constitutional:           Skin:             Head:           Eyes:           Lymph:      ENT:           Neck:           Respiratory:            Cardiac:                                                           GI:           Extremities and Muscular Skeletal:                 Neurological:           Psych:           Recent Lab Results:  LIPID RESULTS:  Lab Results   Component Value Date    CHOL 127 04/18/2012    HDL 56 04/18/2012    LDL 55 04/18/2012    TRIG 81 04/18/2012    CHOLHDLRATIO 2.3 04/18/2012       LIVER ENZYME RESULTS:  Lab Results   Component Value Date    AST 19 02/28/2018    ALT 32 02/28/2018       CBC RESULTS:  Lab Results   Component Value Date    WBC 6.2 03/01/2018    RBC 3.26 (L) 03/01/2018    HGB 10.4 (L) 03/01/2018    HCT 31.9 (L) 03/01/2018    MCV 98 03/01/2018    MCH 31.9 03/01/2018    MCHC 32.6 03/01/2018    RDW 19.5 (H) 03/01/2018    PLT 46 (LL) 03/01/2018       BMP RESULTS:  Lab Results   Component Value Date     (L) 03/06/2018    POTASSIUM 4.0 03/06/2018    CHLORIDE 102 03/06/2018    CO2 27 03/06/2018    ANIONGAP 10 03/06/2018     03/06/2018    BUN 23 03/06/2018    CR 1.18 03/06/2018    GFRESTIMATED 58 (L) 03/06/2018    GFRESTBLACK 71 03/06/2018 "    BARON 8.5 03/06/2018        A1C RESULTS:  No results found for: A1C    INR RESULTS:  Lab Results   Component Value Date    INR 3.29 (H) 03/01/2018    INR 3.43 (H) 02/28/2018           CC  ERIC Fernandez CNP  [address]  [place] [id number]                  Thank you for allowing me to participate in the care of your patient.      Sincerely,     ERIC Olivares CNP     [facility]    cc:   ERIC Fernandez CNP  [address]  [place] [id number]

## 2018-03-06 NOTE — LETTER
3/6/2018      Clint Pizarro MD  [address] [address]      RE: Jama Paul       Dear Colleague,    I had the pleasure of seeing Jama Paul in the [facility].    Service Date: 03/06/2018      HISTORY OF PRESENT ILLNESS:  Mr. Paul is a pleasant 87-year-old gentleman whom I see in the [facility] at the [facility] for management of his nonischemic cardiomyopathy.  He is New York Heart Association class III.  His recent echo showed an ejection fraction of 40% with overall moderately reduced LV systolic function with moderate global hypokinesis.  The patient recently was in the hospital for decompensated heart failure.  He received 1 dose of IV Lasix and then he was switched back and discharged on his daily p.o. furosemide 20 mg dose.  Prior to his last hospitalization, a baseline weight of 172 pounds was established.  When I saw him last, I felt he was fluid overloaded and I increased his furosemide to 40 mg for 3 days.  In followup, over the phone the [facility] nurse touched base to see how his weight did with the increased furosemide dose.  The patient reports that there was no real difference with the weight.  He went back to 20 mg a day.  A week later though, however, the patient called in to report sudden onset of shortness of breath.  When he woke up in the morning, his weight that day at home was 170 pounds.  He was directed to go to the emergency room.  In the emergency room, a chest x-ray showed trace bilateral pleural effusions with associated bibasilar atelectasis.  Lab work revealed a hemoglobin of 10.6 which is stable, normal sodium of 140, creatinine 1.19, N-terminal proBNP 5689.  A previous BNP was 5548 done as an outpatient.  The patient was discharged on furosemide 20 mg a day after spending 2 days in the hospital.  Today, he is here in followup.  He said his home weight today  was 170 pounds.  His weight in clinic here was 167 pounds.  Today, the patient states he is feeling well.  He does report some low blood pressures in the morning. When he gets his INR checked, he has the RN check his blood pressure and sometimes it is as low as .  When it is that low, he does feel more tired.  Today, he denies worsening shortness of breath, cough.  He denies dizziness or lightheadedness.  He denies any new lower leg swelling.  He denies any PND.      He has some lower extremity wounds and he sees Dr. Benjamin.  In review of Dr. Benjamin's last note, the patient's wounds are slowly improving.      LABORATORY DATA:  Lab work done today:  Sodium 135, potassium 4, BUN 23, creatinine 1.1, GFR of 58.      PHYSICAL EXAMINATION:   GENERAL:  The patient is tall.  The patient is elderly in appearance.  Kyphosis present, needs help getting on and off the exam table.  He is a good historian, verbalizes understanding of his medications, 2 gram sodium diet, etc.   CARDIAC:  Heart tones are irregular with an S1, S2 without an S3, S4.   LUNGS:  Clear without crackles or wheezes.   ABDOMEN:  Soft.   NECK:  Veins are flat.   EXTREMITIES:  Lower extremities with bilateral dressings in place.      IMPRESSION AND PLAN:  The patient with a recent acute on chronic systolic heart failure.  Today he does appear euvolemic.  We will continue with furosemide at 20 mg a day.  We had a long discussion about the trials and tribulations of a 2-gram sodium diet.  Today, I would like to start him on Entresto 24-26 mg  b.i.d.  I have given him instructions to do the following:  On 03/07 stop lisinopril.  On 03/08 start Entresto evening dose 24-26 one.  On 03/09 and beyond, Entresto 24-26 twice daily.  We also talked about causes of his low morning blood pressures.  I think his evening dose of metoprolol at 100 mg could be contributing to it.  I have asked him to reduce the evening dose of metoprolol to 50 mg.  We will continue with  the morning dose at 100 mg.  This will help his ischemic cardiomyopathy and help control his heart rate with his atrial fibrillation.  I have asked him to come back and see me next week.  I think he requires close followup with the above medication changes, given his age and frailty.  He will continue to check his weight every day and record it.  He is going to call us if he loses 3 pounds before I see him next or if he gains 2-3 pounds overnight or 5 pounds in a week.      It has been my pleasure to see Yinka again.         ERIC GUERRA, CNP             D: 2018   T: 2018   MT: MARTIN      Name:     YINKA GONZALEZ   MRN:      [id number]        Account:      [id number]   :      1931           Service Date: 2018      Document: [id number]         Outpatient Encounter Prescriptions as of 3/6/2018   Medication Sig Dispense Refill     sacubitril-valsartan (ENTRESTO) 24-26 MG per tablet Take 1 tablet by mouth 2 times daily 1 tablet 0     metoprolol succinate (TOPROL-XL) 100 MG 24 hr tablet Take 1 tablet (100 mg) by mouth 2 times daily Take 1 tablet (100 mg by mouth) in the AM, 50 mg (1/2 tablet) in the  tablet 3     warfarin (COUMADIN) 5 MG tablet Take 1 tablet (5 mg) by mouth daily 5 mg on Jlisjk-Bxivxoypu-Epmfjs, 2.5 mg Ylclsah-Yjdzmbry-Rraiqjyh-.  Patient takes in the morning. 30 tablet 0     furosemide (LASIX) 20 MG tablet Take 20 mg by mouth daily       Vitamin D, Cholecalciferol, 1000 UNITS TABS Take 2,000 Units by mouth daily        Magnesium Hydroxide (MAGNESIA PO) Take by mouth daily Patient doesn't know strength       potassium chloride SA (K-DUR/KLOR-CON M) 20 MEQ CR tablet Take 1 tablet (20 mEq) by mouth daily 30 tablet 0     Multiple Vitamins-Minerals (ICAPS AREDS FORMULA PO) Take 1 tablet by mouth daily Takes in addition to multivitamin with minerals       CYANOCOBALAMIN PO Take 500 mcg by mouth daily       Ascorbic Acid (VITAMIN C PO) Take 500 mg by mouth daily        multivitamin, therapeutic with minerals (MULTI-VITAMIN) TABS Take 1 tablet by mouth daily       [DISCONTINUED] lisinopril (PRINIVIL/ZESTRIL) 5 MG tablet Take 0.5 tablets (2.5 mg) by mouth daily 30 tablet 0     [DISCONTINUED] metoprolol (TOPROL-XL) 100 MG 24 hr tablet Take 1 tablet (100 mg) by mouth 2 times daily 180 tablet 3     No facility-administered encounter medications on file as of 3/6/2018.        Again, thank you for allowing me to participate in the care of your patient.      Sincerely,    ERIC Olivares [facility]

## 2018-03-06 NOTE — PATIENT INSTRUCTIONS
March 7 th Stop lisinopril     On March 8th start Entresto-evening dose 24/26 mg -1 tablet  March 9 and beyond Entresto 24/26mg twice daily      Reduce the evening dose of metoprolol to 50 mg, continue with the morning dose at 100 mg   See me back in 2 weeks    Call us if you lose 3 pounds or if you gain 2-3 pounds overnight or 5 pounds in 1 week

## 2018-03-06 NOTE — MR AVS SNAPSHOT
After Visit Summary   3/6/2018    Jama Paul    MRN: [id number]           Patient Information     Date Of Birth          2/13/1931        Visit Information        Provider Department      3/6/2018 12:00 PM Winnie Garcia APRN CNP [facility]        Today's Diagnoses     Acute on chronic combined systolic and diastolic CHF (congestive heart failure) (H)        Atrial fibrillation with rapid ventricular response (H)          Care Instructions    March 7 th Stop lisinopril     On March 8th start Entresto-evening dose 24/26 mg -1 tablet  March 9 and beyond Entresto 24/26mg twice daily      Reduce the evening dose of metoprolol to 50 mg, continue with the morning dose at 100 mg   See me back in 2 weeks    Call us if you lose 3 pounds or if you gain 2-3 pounds overnight or 5 pounds in 1 week                  Follow-ups after your visit        Additional Services     Follow-Up with [facility] visit                 Your next 10 appointments already scheduled     Mar 12, 2018  2:00 PM CDT   LAB with [facility]   [facility] ([facility])    [address] [place] [address]   [phone]           Please do not eat 10-12 hours before your appointment if you are coming in fasting for labs on lipids, cholesterol, or glucose (sugar). This does not apply to pregnant women. Water, hot tea and black coffee (with nothing added) are okay. Do not drink other fluids, diet soda or chew gum.            Mar 12, 2018  3:00 PM CDT   Core Return with ERIC Fernandez CNP   [facility] ([facility])    [address] [place] [address]   [phone]            Mar 19, 2018 11:00 AM CDT   Return Visit with Wesley Benjamin MD   [facility] ([facility])    [address]  [facility] [address]   [phone]            Mar 22, 2018 10:00 AM CDT   LAB with [facility]   [facility] ([facility])    [id number]  [facility] [address]   [phone]           Please do not eat 10-12 hours before your appointment if you are coming in fasting for labs on lipids, cholesterol, or glucose (sugar). This does not apply to pregnant women. Water, hot tea and black coffee (with nothing added) are okay. Do not drink other fluids, diet soda or chew gum.            Mar 23, 2018  9:30 AM CDT   Core Return with ERIC Fernandez CNP   [facility] ([facility])    [address] [place], [place] [id number]  [facility] [address]   [phone]            Apr 16, 2018 12:45 PM CDT   Ech Complete with [id number]   [facility] CV Echocardiography ([facility])    [address] [place] [address]  [address]  [facility] [address] [phone]           1. Please bring or wear a comfortable two-piece outfit. 2. You may eat, drink and take your normal medicines. 3. For any questions that cannot be answered, please contact the ordering physician            Apr 16, 2018  2:00 PM CDT   LAB with [facility]   [facility] ([facility])    [address] [place] [address] [phone]           Please do not eat 10-12 hours before your appointment if you are coming in fasting for labs on lipids, cholesterol, or glucose (sugar). This does not apply to pregnant women. Water, hot tea and black coffee (with nothing added) are okay. Do not drink other fluids, diet soda or chew gum.            Apr 18, 2018 10:45 AM CDT   Return Visit with Long Pugh MD   [facility] ([facility])    [address]  [facility] [address]   [phone]              Future  "tests that were ordered for you today     Open Future Orders        Priority Expected Expires Ordered    Follow-Up with [facility] visit Routine 3/20/2018 3/6/2019 3/6/2018    Basic metabolic panel Routine 3/20/2018 3/6/2019 3/6/2018            Who to contact     If you have questions or need follow up information about today's clinic visit or your schedule please contact [facility] directly at [phone].  Normal or non-critical lab and imaging results will be communicated to you by MyChart, letter or phone within 4 business days after the clinic has received the results. If you do not hear from us within 7 days, please contact the clinic through 3DVistahart or phone. If you have a critical or abnormal lab result, we will notify you by phone as soon as possible.  Submit refill requests through [employer] or call your pharmacy and they will forward the refill request to us. Please allow 3 business days for your refill to be completed.          Additional Information About Your Visit        MyChart Information     [employer] lets you send messages to your doctor, view your test results, renew your prescriptions, schedule appointments and more. To sign up, go to [email or web address] . Click on \"Log in\" on the left side of the screen, which will take you to the Welcome page. Then click on \"Sign up Now\" on the right side of the page.     You will be asked to enter the access code listed below, as well as some personal information. Please follow the directions to create your username and password.     Your access code is: [email or web address]  Expires: 4/3/2018  2:08 PM     Your access code will  in 90 days. If you need help or a new code, please call your [facility] clinic or [phone].        Care EveryWhere ID     This is your Care EveryWhere ID. This could be used by other organizations to access your [facility] medical records  [id number]        Your Vitals Were     Pulse " "Height Pulse Oximetry BMI (Body Mass Index)          80 1.74 m (5' 8.5\") 96% 25.02 kg/m2         Blood Pressure from Last 3 Encounters:   03/06/18 128/86   03/05/18 119/71   03/01/18 123/84    Weight from Last 3 Encounters:   03/06/18 75.8 kg (167 lb)   03/01/18 73.2 kg (161 lb 6.4 oz)   02/14/18 79.5 kg (175 lb 3.2 oz)              We Performed the Following     Follow-Up with Cardiac Advanced Practice Provider          Today's Medication Changes          These changes are accurate as of 3/6/18 12:42 PM.  If you have any questions, ask your nurse or doctor.               Start taking these medicines.        Dose/Directions    sacubitril-valsartan 24-26 MG per tablet   Commonly known as:  ENTRESTO   Used for:  Acute on chronic combined systolic and diastolic CHF (congestive heart failure) (H)   Started by:  Winnie Garcia APRN CNP        Dose:  1 tablet   Take 1 tablet by mouth 2 times daily   Quantity:  1 tablet   Refills:  0         These medicines have changed or have updated prescriptions.        Dose/Directions    metoprolol succinate 100 MG 24 hr tablet   Commonly known as:  TOPROL-XL   This may have changed:  additional instructions   Used for:  Atrial fibrillation with rapid ventricular response (H)   Changed by:  Winnie Garcia APRN CNP        Dose:  100 mg   Take 1 tablet (100 mg) by mouth 2 times daily Take 1 tablet (100 mg by mouth) in the AM, 50 mg (1/2 tablet) in the PM   Quantity:  180 tablet   Refills:  3         Stop taking these medicines if you haven't already. Please contact your care team if you have questions.     lisinopril 5 MG tablet   Commonly known as:  PRINIVIL/ZESTRIL   Stopped by:  Winnie Garcia APRN CNP                Where to get your medicines      These medications were sent to [facility] [address] [place] [address] [address] & [facility]  [address] [place] [address]    Hours:  24-hours Phone:  [phone]     metoprolol succinate 100 MG 24 " hr tablet    sacubitril-valsartan 24-26 MG per tablet                Primary Care Provider Office Phone # Fax #    Clint Pizarro [phone]       [address] [address]        Equal Access to Services     MANDAPATSY ZENY : Hadii aad ku hadasho Soomaali, waaxda luqadaha, qaybta kaalmada adeegyada, waxay clydein hayjessien henry toñitojudie avalos. So [facility] [phone].    ATENCIÓN: Si habla español, tiene a cleaning disposición servicios gratuitos de asistencia lingüística. Lizzieame al [phone].    We comply with applicable federal civil rights laws and [place] laws. We do not discriminate on the basis of race, color, national origin, age, disability, sex, sexual orientation, or gender identity.            Thank you!     Thank you for choosing [facility]  for your care. Our goal is always to provide you with excellent care. Hearing back from our patients is one way we can continue to improve our services. Please take a few minutes to complete the written survey that you may receive in the mail after your visit with us. Thank you!             Your Updated Medication List - Protect others around you: Learn how to safely use, store and throw away your medicines at www.disposemymeds.org.          This list is accurate as of 3/6/18 12:42 PM.  Always use your most recent med list.                   Brand Name Dispense Instructions for use Diagnosis    CYANOCOBALAMIN PO      Take 500 mcg by mouth daily        LASIX 20 MG tablet   Generic drug:  furosemide      Take 20 mg by mouth daily        MAGNESIA PO      Take by mouth daily Patient doesn't know strength        metoprolol succinate 100 MG 24 hr tablet    TOPROL-XL    180 tablet    Take 1 tablet (100 mg) by mouth 2 times daily Take 1 tablet (100 mg by mouth) in the AM, 50 mg (1/2 tablet) in the PM    Atrial fibrillation with rapid ventricular response (H)       * Multi-vitamin Tabs  tablet      Take 1 tablet by mouth daily        * ICAPS AREDS FORMULA PO      Take 1 tablet by mouth daily Takes in addition to multivitamin with minerals        potassium chloride SA 20 MEQ CR tablet    K-DUR/KLOR-CON M    30 tablet    Take 1 tablet (20 mEq) by mouth daily    Acute congestive heart failure, unspecified congestive heart failure type (H)       sacubitril-valsartan 24-26 MG per tablet    ENTRESTO    1 tablet    Take 1 tablet by mouth 2 times daily    Acute on chronic combined systolic and diastolic CHF (congestive heart failure) (H)       VITAMIN C PO      Take 500 mg by mouth daily        Vitamin D (Cholecalciferol) 1000 UNITS Tabs      Take 2,000 Units by mouth daily        warfarin 5 MG tablet    COUMADIN    30 tablet    Take 1 tablet (5 mg) by mouth daily 5 mg on Wootyc-Inrtdovbm-Rvsczp, 2.5 mg Ywcxxyc-Kourmsuu-Slwwptdw-Sunday.  Patient takes in the morning.    Atrial fibrillation, unspecified type (H)       * Notice:  This list has 2 medication(s) that are the same as other medications prescribed for you. Read the directions carefully, and ask your doctor or other care provider to review them with you.

## 2018-03-12 ENCOUNTER — OFFICE VISIT (OUTPATIENT)
Dept: CARDIOLOGY | Facility: CLINIC | Age: 83
End: 2018-03-12
Attending: NURSE PRACTITIONER
Payer: MEDICARE

## 2018-03-12 VITALS
WEIGHT: 164.5 LBS | DIASTOLIC BLOOD PRESSURE: 62 MMHG | HEIGHT: 69 IN | BODY MASS INDEX: 24.37 KG/M2 | SYSTOLIC BLOOD PRESSURE: 98 MMHG | HEART RATE: 84 BPM

## 2018-03-12 DIAGNOSIS — I50.43 ACUTE ON CHRONIC COMBINED SYSTOLIC AND DIASTOLIC CHF (CONGESTIVE HEART FAILURE) (H): ICD-10-CM

## 2018-03-12 LAB
ANION GAP SERPL CALCULATED.3IONS-SCNC: 12.2 MMOL/L (ref 6–17)
BUN SERPL-MCNC: 24 MG/DL (ref 7–30)
CALCIUM SERPL-MCNC: 8.5 MG/DL (ref 8.5–10.5)
CHLORIDE SERPL-SCNC: 102 MMOL/L (ref 98–107)
CO2 SERPL-SCNC: 26 MMOL/L (ref 23–29)
CREAT SERPL-MCNC: 1.12 MG/DL (ref 0.7–1.3)
GFR SERPL CREATININE-BSD FRML MDRD: 62 ML/MIN/1.7M2
GLUCOSE SERPL-MCNC: 126 MG/DL (ref 70–105)
POTASSIUM SERPL-SCNC: 4.2 MMOL/L (ref 3.5–5.1)
SODIUM SERPL-SCNC: 136 MMOL/L (ref 136–145)

## 2018-03-12 PROCEDURE — 80048 BASIC METABOLIC PNL TOTAL CA: CPT | Performed by: NURSE PRACTITIONER

## 2018-03-12 PROCEDURE — 99213 OFFICE O/P EST LOW 20 MIN: CPT | Performed by: NURSE PRACTITIONER

## 2018-03-12 PROCEDURE — 36415 COLL VENOUS BLD VENIPUNCTURE: CPT | Performed by: NURSE PRACTITIONER

## 2018-03-12 RX ORDER — METOPROLOL SUCCINATE 100 MG/1
100 TABLET, EXTENDED RELEASE ORAL
COMMUNITY
End: 2018-07-16

## 2018-03-12 NOTE — LETTER
3/12/2018    Clint Pizarro MD  [address] [address]    RE: Jama Paul       Dear Colleague,    I had the pleasure of seeing Jama Paul in the [facility].    Primary Provider: Clint Pizarro   Primary Cardiology: Dr. Pugh      HISTORY OF PRESENT ILLNESS:  Mr. Paul is a pleasant 87-year-old gentleman whom I see in the [facility] at the [facility] for management of his nonischemic cardiomyopathy.  He is New York Heart Association class III.  His recent echo showed an ejection fraction of 40% with overall moderately reduced LV systolic function with moderate global hypokinesis. When I saw the patient last I started Entresto 24-26 mg bid.  Today the pt states he is doing well. His blood pressure in the morning at times is low, but he denies light-headedness or dizziness. His wife reports she has observed more energy and less the need for rest periods between activities.  His current dose of furosemide is 20 mg every day. He checks his weight daily but doesn't record it.  He said his home weight today was 170 pounds.  His weight in clinic here was 164 pounds.  Today, the patient states he is feeling well.   When he gets his INR checked, he has the RN check his blood pressure and sometimes it is as low as .  When it is that low, he does feel more tired.  Today, he denies worsening shortness of breath, cough.  He denies dizziness or lightheadedness.  He denies any new lower leg swelling.  He denies any PND.       He has some lower extremity wounds and he sees Dr. Benjamin.  In review of Dr. Benjamin's last note, the patient's wounds are slowly improving.       LABORATORY DATA:  Lab work done today and reviewed with pt. BMP stable with normalization of his Sodium.       PHYSICAL EXAMINATION:   GENERAL:  The patient is tall.  The patient is elderly in appearance.  Kyphosis present, needs help  getting on and off the exam table.  He is a good historian, verbalizes understanding of his medications, 2 gram sodium diet, etc.   CARDIAC:  Heart tones are irregular with an S1, S2 without an S3, S4.   LUNGS:  Clear without crackles or wheezes.   ABDOMEN:  Soft.   NECK:  Veins are flat.   EXTREMITIES:  Lower extremities with bilateral dressings in place.       IMPRESSION AND PLAN:  The patient with a recent acute on chronic systolic heart failure.  Today he does appear euvolemic.  We will continue with furosemide at 20 mg a day.  We had a long discussion about his trials and tribulations of a 2-gram sodium diet.  Today, I continue with the same dose of Entresto 24-26 mg  b.i.d.  I will reduce his metoprolol to 50 mg bid to prevent hypotension while we optimize his Entresto. We will need to watch his heart rate with his atrial fibrillation.   He will continue to check his weight every day and record it.  He is going to call us if he loses 3 pounds before I see him next or if he gains 2-3 pounds overnight or 5 pounds in a week.       It has been my pleasure to see Jama again.         Results for orders placed or performed in visit on 03/12/18 (from the past 24 hour(s))   Basic metabolic panel   Result Value Ref Range    Sodium 136 136 - 145 mmol/L    Potassium 4.2 3.5 - 5.1 mmol/L    Chloride 102 98 - 107 mmol/L    Carbon Dioxide 26 23 - 29 mmol/L    Anion Gap 12.2 6 - 17 mmol/L    Glucose 126 (H) 70 - 105 mg/dL    Urea Nitrogen 24 7 - 30 mg/dL    Creatinine 1.12 0.70 - 1.30 mg/dL    GFR Estimate 62 >60 mL/min/1.7m2    GFR Estimate If Black 75 >60 mL/min/1.7m2    Calcium 8.5 8.5 - 10.5 mg/dL         Thank you for allowing me to participate in Jama's care.    .Winnie Garcia. APRN, CNP  [facility]    No orders of the defined types were placed in this encounter.      Orders Placed This Encounter   Medications     metoprolol succinate (TOPROL XL) 100 MG 24 hr tablet     Sig: Take 50 mg by  mouth 2 times daily 1/2 tablet twice a day        Medications Discontinued During This Encounter   Medication Reason     metoprolol succinate (TOPROL-XL) 100 MG 24 hr tablet      Magnesium Hydroxide (MAGNESIA PO) Stopped by Patient     potassium chloride SA (K-DUR/KLOR-CON M) 20 MEQ CR tablet Stopped by Patient     multivitamin, therapeutic with minerals (MULTI-VITAMIN) TABS Stopped by Patient         Encounter Diagnosis   Name Primary?     Acute on chronic combined systolic and diastolic CHF (congestive heart failure) (H)        CURRENT MEDICATIONS:  Current Outpatient Prescriptions   Medication Sig Dispense Refill     metoprolol succinate (TOPROL XL) 100 MG 24 hr tablet Take 50 mg by mouth 2 times daily 1/2 tablet twice a day        sacubitril-valsartan (ENTRESTO) 24-26 MG per tablet Take 1 tablet by mouth 2 times daily 1 tablet 0     warfarin (COUMADIN) 5 MG tablet Take 1 tablet (5 mg) by mouth daily 5 mg on Ncycmj-Qchsiquda-Llqvrt, 2.5 mg Gxuxyxl-Uqhkdflt-Ngilhxuo-Sunday.  Patient takes in the morning. 30 tablet 0     furosemide (LASIX) 20 MG tablet Take 20 mg by mouth daily       Vitamin D, Cholecalciferol, 1000 UNITS TABS Take 2,000 Units by mouth daily        CYANOCOBALAMIN PO Take 500 mcg by mouth daily       Ascorbic Acid (VITAMIN C PO) Take 500 mg by mouth daily       Multiple Vitamins-Minerals (ICAPS AREDS FORMULA PO) Take 1 tablet by mouth daily Takes in addition to multivitamin with minerals         ALLERGIES   No Known Allergies    PAST MEDICAL HISTORY:  Past Medical History:   Diagnosis Date     Antiplatelet or antithrombotic long-term use      Arrhythmia      Atrial fibrillation (H)      Elevated PSA      Thrombocytopenia (H)      Unspecified essential hypertension        PAST SURGICAL HISTORY:  Past Surgical History:   Procedure Laterality Date     COLONOSCOPY       EXPLORE GROIN  4/30/2014    Procedure: EXPLORE GROIN;  Surgeon: Sam Aguirre MD;  Location: [facility] OR     HERNIORRHAPHY INGUINAL        "HERNIORRHAPHY INGUINAL  4/30/2014    Procedure: HERNIORRHAPHY INGUINAL;  Surgeon: Sam Aguirre MD;  Location:  OR     MOHS MICROGRAPHIC PROCEDURE       PHACOEMULSIFICATION CLEAR CORNEA WITH STANDARD INTRAOCULAR LENS IMPLANT Right 9/8/2015    Procedure: PHACOEMULSIFICATION CLEAR CORNEA WITH STANDARD INTRAOCULAR LENS IMPLANT;  Surgeon: Gil Shannon MD;  Location:  [facility]     septic olecranon bursitis[         FAMILY HISTORY:  Family History   Problem Relation Age of Onset     HEART DISEASE No family hx of        SOCIAL HISTORY:  Social History     Social History     Marital status:      Spouse name: N/A     Number of children: N/A     Years of education: N/A     Social History Main Topics     Smoking status: Never Smoker     Smokeless tobacco: Never Used     Alcohol use No     Drug use: No     Sexual activity: Not Asked     Other Topics Concern     None     Social History Narrative       Review of Systems:  Skin:  Positive for bruising shins   Eyes:  Positive for      ENT:  Negative      Respiratory:  Negative       Cardiovascular:  Negative   wears compression socks most of the time  Gastroenterology: Negative      Genitourinary:  Positive for urinary frequency    Musculoskeletal:  Negative      Neurologic:  Negative      Psychiatric:  Negative      Heme/Lymph/Imm:  Negative      Endocrine:  Negative        Physical Exam:  Vitals: BP 98/62  Pulse 84  Ht 1.74 m (5' 8.5\")  Wt 74.6 kg (164 lb 8 oz)  BMI 24.65 kg/m2    Constitutional:  cooperative;well developed frail      Skin:  warm and dry to the touch;no apparent skin lesions or masses noted          Head:  normocephalic        Eyes:  pupils equal and round        Lymph:      ENT:  no pallor or cyanosis        Neck:           Respiratory:  clear to auscultation;normal symmetry         Cardiac:   irregular rhythm                                                       GI:  abdomen soft        Extremities and Muscular Skeletal:  no edema   " bilateral LE edema;trace;1+          Neurological:  affect appropriate        Psych:  affect appropriate, oriented to time, person and place Anxious          CC  ERIC Fernandez CNP  [address]  [place] [address]                Thank you for allowing me to participate in the care of your patient.      Sincerely,     ERIC Olivares CNP     [facility]    cc:   ERIC Fernandez CNP  [address]  [place] [id number]

## 2018-03-12 NOTE — PROGRESS NOTES
Primary Provider: Clint Pizarro   Primary Cardiology: Dr. Pugh      HISTORY OF PRESENT ILLNESS:  Mr. Paul is a pleasant 87-year-old gentleman whom I see in the [facility] at the [facility] for management of his nonischemic cardiomyopathy.  He is [place] York Heart Association class III.  His recent echo showed an ejection fraction of 40% with overall moderately reduced LV systolic function with moderate global hypokinesis. When I saw the patient last I started Entresto 24-26 mg bid.  Today the pt states he is doing well. His blood pressure in the morning at times is low, but he denies light-headedness or dizziness. His wife reports she has observed more energy and less the need for rest periods between activities.  His current dose of furosemide is 20 mg every day. He checks his weight daily but doesn't record it.  He said his home weight today was 170 pounds.  His weight in clinic here was 164 pounds.  Today, the patient states he is feeling well.   When he gets his INR checked, he has the RN check his blood pressure and sometimes it is as low as .  When it is that low, he does feel more tired.  Today, he denies worsening shortness of breath, cough.  He denies dizziness or lightheadedness.  He denies any new lower leg swelling.  He denies any PND.       He has some lower extremity wounds and he sees Dr. Benjamin.  In review of Dr. Benjamin's last note, the patient's wounds are slowly improving.       LABORATORY DATA:  Lab work done today and reviewed with pt. BMP stable with normalization of his Sodium.       PHYSICAL EXAMINATION:   GENERAL:  The patient is tall.  The patient is elderly in appearance.  Kyphosis present, needs help getting on and off the exam table.  He is a good historian, verbalizes understanding of his medications, 2 gram sodium diet, etc.   CARDIAC:  Heart tones are irregular with an S1, S2 without an S3, S4.   LUNGS:  Clear without crackles or wheezes.    ABDOMEN:  Soft.   NECK:  Veins are flat.   EXTREMITIES:  Lower extremities with bilateral dressings in place.       IMPRESSION AND PLAN:  The patient with a recent acute on chronic systolic heart failure.  Today he does appear euvolemic.  We will continue with furosemide at 20 mg a day.  We had a long discussion about his trials and tribulations of a 2-gram sodium diet.  Today, I continue with the same dose of Entresto 24-26 mg  b.i.d. I will reduce his metoprolol to 50 mg bid to prevent hypotension while we optimize his Entresto. We will need to watch his heart rate with his atrial fibrillation.   He will continue to check his weight every day and record it.  He is going to call us if he loses 3 pounds before I see him next or if he gains 2-3 pounds overnight or 5 pounds in a week.       It has been my pleasure to see Jama again.         Results for orders placed or performed in visit on 03/12/18 (from the past 24 hour(s))   Basic metabolic panel   Result Value Ref Range    Sodium 136 136 - 145 mmol/L    Potassium 4.2 3.5 - 5.1 mmol/L    Chloride 102 98 - 107 mmol/L    Carbon Dioxide 26 23 - 29 mmol/L    Anion Gap 12.2 6 - 17 mmol/L    Glucose 126 (H) 70 - 105 mg/dL    Urea Nitrogen 24 7 - 30 mg/dL    Creatinine 1.12 0.70 - 1.30 mg/dL    GFR Estimate 62 >60 mL/min/1.7m2    GFR Estimate If Black 75 >60 mL/min/1.7m2    Calcium 8.5 8.5 - 10.5 mg/dL         Thank you for allowing me to participate in Jama's care.    .Winnie Garcia. APRN, CNP  [facility]    No orders of the defined types were placed in this encounter.      Orders Placed This Encounter   Medications     metoprolol succinate (TOPROL XL) 100 MG 24 hr tablet     Sig: Take 50 mg by mouth 2 times daily 1/2 tablet twice a day        Medications Discontinued During This Encounter   Medication Reason     metoprolol succinate (TOPROL-XL) 100 MG 24 hr tablet      Magnesium Hydroxide (MAGNESIA PO) Stopped by Patient     potassium  chloride SA (K-DUR/KLOR-CON M) 20 MEQ CR tablet Stopped by Patient     multivitamin, therapeutic with minerals (MULTI-VITAMIN) TABS Stopped by Patient         Encounter Diagnosis   Name Primary?     Acute on chronic combined systolic and diastolic CHF (congestive heart failure) (H)        CURRENT MEDICATIONS:  Current Outpatient Prescriptions   Medication Sig Dispense Refill     metoprolol succinate (TOPROL XL) 100 MG 24 hr tablet Take 50 mg by mouth 2 times daily 1/2 tablet twice a day        sacubitril-valsartan (ENTRESTO) 24-26 MG per tablet Take 1 tablet by mouth 2 times daily 1 tablet 0     warfarin (COUMADIN) 5 MG tablet Take 1 tablet (5 mg) by mouth daily 5 mg on Nyyrzi-Ccgkmuwig-Hkhadr, 2.5 mg Lopbsgd-Selpsqev-Nfglergv-Sunday.  Patient takes in the morning. 30 tablet 0     furosemide (LASIX) 20 MG tablet Take 20 mg by mouth daily       Vitamin D, Cholecalciferol, 1000 UNITS TABS Take 2,000 Units by mouth daily        CYANOCOBALAMIN PO Take 500 mcg by mouth daily       Ascorbic Acid (VITAMIN C PO) Take 500 mg by mouth daily       Multiple Vitamins-Minerals (ICAPS AREDS FORMULA PO) Take 1 tablet by mouth daily Takes in addition to multivitamin with minerals         ALLERGIES   No Known Allergies    PAST MEDICAL HISTORY:  Past Medical History:   Diagnosis Date     Antiplatelet or antithrombotic long-term use      Arrhythmia      Atrial fibrillation (H)      Elevated PSA      Thrombocytopenia (H)      Unspecified essential hypertension        PAST SURGICAL HISTORY:  Past Surgical History:   Procedure Laterality Date     COLONOSCOPY       EXPLORE GROIN  4/30/2014    Procedure: EXPLORE GROIN;  Surgeon: Sam Aguirre MD;  Location:  OR     HERNIORRHAPHY INGUINAL       HERNIORRHAPHY INGUINAL  4/30/2014    Procedure: HERNIORRHAPHY INGUINAL;  Surgeon: Sam Aguirre MD;  Location:  OR     MOHS MICROGRAPHIC PROCEDURE       PHACOEMULSIFICATION CLEAR CORNEA WITH STANDARD INTRAOCULAR LENS IMPLANT Right 9/8/2015     "Procedure: PHACOEMULSIFICATION CLEAR CORNEA WITH STANDARD INTRAOCULAR LENS IMPLANT;  Surgeon: Gil Shannon MD;  Location:  [facility]     septic olecranon bursitis[         FAMILY HISTORY:  Family History   Problem Relation Age of Onset     HEART DISEASE No family hx of        SOCIAL HISTORY:  Social History     Social History     Marital status:      Spouse name: N/A     Number of children: N/A     Years of education: N/A     Social History Main Topics     Smoking status: Never Smoker     Smokeless tobacco: Never Used     Alcohol use No     Drug use: No     Sexual activity: Not Asked     Other Topics Concern     None     Social History Narrative       Review of Systems:  Skin:  Positive for bruising shins   Eyes:  Positive for      ENT:  Negative      Respiratory:  Negative       Cardiovascular:  Negative   wears compression socks most of the time  Gastroenterology: Negative      Genitourinary:  Positive for urinary frequency    Musculoskeletal:  Negative      Neurologic:  Negative      Psychiatric:  Negative      Heme/Lymph/Imm:  Negative      Endocrine:  Negative        Physical Exam:  Vitals: BP 98/62  Pulse 84  Ht 1.74 m (5' 8.5\")  Wt 74.6 kg (164 lb 8 oz)  BMI 24.65 kg/m2    Constitutional:  cooperative;well developed frail      Skin:  warm and dry to the touch;no apparent skin lesions or masses noted          Head:  normocephalic        Eyes:  pupils equal and round        Lymph:      ENT:  no pallor or cyanosis        Neck:           Respiratory:  clear to auscultation;normal symmetry         Cardiac:   irregular rhythm                                                       GI:  abdomen soft        Extremities and Muscular Skeletal:  no edema   bilateral LE edema;trace;1+          Neurological:  affect appropriate        Psych:  affect appropriate, oriented to time, person and place Anxious          CC  Winnie Garcia, APRN CNP  [address]  [place] [id number]              "

## 2018-03-12 NOTE — LETTER
3/12/2018    Clint Pizarro MD  [address] [address]    RE: Jama Paul       Dear Colleague,    I had the pleasure of seeing Jama Paul in the [facility].    Primary Provider: Clint Pizarro   Primary Cardiology: Dr. Pugh      HISTORY OF PRESENT ILLNESS:  Mr. Paul is a pleasant 87-year-old gentleman whom I see in the [facility] at the [facility] for management of his nonischemic cardiomyopathy.  He is New [place] Heart Association class III.  His recent echo showed an ejection fraction of 40% with overall moderately reduced LV systolic function with moderate global hypokinesis. When I saw the patient last I started Entresto 24-26 mg bid.  Today the pt states he is doing well. His blood pressure in the morning at times is low, but he denies light-headedness or dizziness. His wife reports she has observed more energy and less the need for rest periods between activities.  His current dose of furosemide is 20 mg every day. He checks his weight daily but doesn't record it.  He said his home weight today was 170 pounds.  His weight in clinic here was 164 pounds.  Today, the patient states he is feeling well.   When he gets his INR checked, he has the RN check his blood pressure and sometimes it is as low as .  When it is that low, he does feel more tired.  Today, he denies worsening shortness of breath, cough.  He denies dizziness or lightheadedness.  He denies any new lower leg swelling.  He denies any PND.       He has some lower extremity wounds and he sees Dr. Benjamin.  In review of Dr. Benjamin's last note, the patient's wounds are slowly improving.       LABORATORY DATA:  Lab work done today and reviewed with pt. BMP stable with normalization of his Sodium.       PHYSICAL EXAMINATION:   GENERAL:  The patient is tall.  The patient is elderly in appearance.  Kyphosis present, needs help  getting on and off the exam table.  He is a good historian, verbalizes understanding of his medications, 2 gram sodium diet, etc.   CARDIAC:  Heart tones are irregular with an S1, S2 without an S3, S4.   LUNGS:  Clear without crackles or wheezes.   ABDOMEN:  Soft.   NECK:  Veins are flat.   EXTREMITIES:  Lower extremities with bilateral dressings in place.       IMPRESSION AND PLAN:  The patient with a recent acute on chronic systolic heart failure.  Today he does appear euvolemic.  We will continue with furosemide at 20 mg a day.  We had a long discussion about his trials and tribulations of a 2-gram sodium diet.  Today, I continue with the same dose of Entresto 24-26 mg  b.i.d.  I will reduce his metoprolol to 50 mg bid to prevent hypotension while we optimize his Entresto. We will need to watch his heart rate with his atrial fibrillation.   He will continue to check his weight every day and record it.  He is going to call us if he loses 3 pounds before I see him next or if he gains 2-3 pounds overnight or 5 pounds in a week.       It has been my pleasure to see Jama again.         Results for orders placed or performed in visit on 03/12/18 (from the past 24 hour(s))   Basic metabolic panel   Result Value Ref Range    Sodium 136 136 - 145 mmol/L    Potassium 4.2 3.5 - 5.1 mmol/L    Chloride 102 98 - 107 mmol/L    Carbon Dioxide 26 23 - 29 mmol/L    Anion Gap 12.2 6 - 17 mmol/L    Glucose 126 (H) 70 - 105 mg/dL    Urea Nitrogen 24 7 - 30 mg/dL    Creatinine 1.12 0.70 - 1.30 mg/dL    GFR Estimate 62 >60 mL/min/1.7m2    GFR Estimate If Black 75 >60 mL/min/1.7m2    Calcium 8.5 8.5 - 10.5 mg/dL         Thank you for allowing me to participate in Jama's care.    .Winnie Garcia. APRN, CNP  [facility]    No orders of the defined types were placed in this encounter.      Orders Placed This Encounter   Medications     metoprolol succinate (TOPROL XL) 100 MG 24 hr tablet     Sig: Take 50 mg by  mouth 2 times daily 1/2 tablet twice a day        Medications Discontinued During This Encounter   Medication Reason     metoprolol succinate (TOPROL-XL) 100 MG 24 hr tablet      Magnesium Hydroxide (MAGNESIA PO) Stopped by Patient     potassium chloride SA (K-DUR/KLOR-CON M) 20 MEQ CR tablet Stopped by Patient     multivitamin, therapeutic with minerals (MULTI-VITAMIN) TABS Stopped by Patient         Encounter Diagnosis   Name Primary?     Acute on chronic combined systolic and diastolic CHF (congestive heart failure) (H)        CURRENT MEDICATIONS:  Current Outpatient Prescriptions   Medication Sig Dispense Refill     metoprolol succinate (TOPROL XL) 100 MG 24 hr tablet Take 50 mg by mouth 2 times daily 1/2 tablet twice a day        sacubitril-valsartan (ENTRESTO) 24-26 MG per tablet Take 1 tablet by mouth 2 times daily 1 tablet 0     warfarin (COUMADIN) 5 MG tablet Take 1 tablet (5 mg) by mouth daily 5 mg on Wlznvo-Anpqubgdx-Tzbvoy, 2.5 mg Mfspwyq-Jesrutkd-Pnqgfgyn-Sunday.  Patient takes in the morning. 30 tablet 0     furosemide (LASIX) 20 MG tablet Take 20 mg by mouth daily       Vitamin D, Cholecalciferol, 1000 UNITS TABS Take 2,000 Units by mouth daily        CYANOCOBALAMIN PO Take 500 mcg by mouth daily       Ascorbic Acid (VITAMIN C PO) Take 500 mg by mouth daily       Multiple Vitamins-Minerals (ICAPS AREDS FORMULA PO) Take 1 tablet by mouth daily Takes in addition to multivitamin with minerals         ALLERGIES   No Known Allergies    PAST MEDICAL HISTORY:  Past Medical History:   Diagnosis Date     Antiplatelet or antithrombotic long-term use      Arrhythmia      Atrial fibrillation (H)      Elevated PSA      Thrombocytopenia (H)      Unspecified essential hypertension        PAST SURGICAL HISTORY:  Past Surgical History:   Procedure Laterality Date     COLONOSCOPY       EXPLORE GROIN  4/30/2014    Procedure: EXPLORE GROIN;  Surgeon: Sam Aguirre MD;  Location: [facility] OR     HERNIORRHAPHY INGUINAL        "HERNIORRHAPHY INGUINAL  4/30/2014    Procedure: HERNIORRHAPHY INGUINAL;  Surgeon: Sam Aguirre MD;  Location:  OR     MOHS MICROGRAPHIC PROCEDURE       PHACOEMULSIFICATION CLEAR CORNEA WITH STANDARD INTRAOCULAR LENS IMPLANT Right 9/8/2015    Procedure: PHACOEMULSIFICATION CLEAR CORNEA WITH STANDARD INTRAOCULAR LENS IMPLANT;  Surgeon: Gil Shannon MD;  Location:  [facility]     septic olecranon bursitis[         FAMILY HISTORY:  Family History   Problem Relation Age of Onset     HEART DISEASE No family hx of        SOCIAL HISTORY:  Social History     Social History     Marital status:      Spouse name: N/A     Number of children: N/A     Years of education: N/A     Social History Main Topics     Smoking status: Never Smoker     Smokeless tobacco: Never Used     Alcohol use No     Drug use: No     Sexual activity: Not Asked     Other Topics Concern     None     Social History Narrative       Review of Systems:  Skin:  Positive for bruising shins   Eyes:  Positive for      ENT:  Negative      Respiratory:  Negative       Cardiovascular:  Negative   wears compression socks most of the time  Gastroenterology: Negative      Genitourinary:  Positive for urinary frequency    Musculoskeletal:  Negative      Neurologic:  Negative      Psychiatric:  Negative      Heme/Lymph/Imm:  Negative      Endocrine:  Negative        Physical Exam:  Vitals: BP 98/62  Pulse 84  Ht 1.74 m (5' 8.5\")  Wt 74.6 kg (164 lb 8 oz)  BMI 24.65 kg/m2    Constitutional:  cooperative;well developed frail      Skin:  warm and dry to the touch;no apparent skin lesions or masses noted          Head:  normocephalic        Eyes:  pupils equal and round        Lymph:      ENT:  no pallor or cyanosis        Neck:           Respiratory:  clear to auscultation;normal symmetry         Cardiac:   irregular rhythm                                                       GI:  abdomen soft        Extremities and Muscular Skeletal:  no edema   " bilateral LE edema;trace;1+          Neurological:  affect appropriate        Psych:  affect appropriate, oriented to time, person and place Anxious    Thank you for allowing me to participate in the care of your patient.    Sincerely,     ERIC [facility]

## 2018-03-12 NOTE — MR AVS SNAPSHOT
After Visit Summary   3/12/2018    Jama Paul    MRN: [id number]           Patient Information     Date Of Birth          2/13/1931        Visit Information        Provider Department      3/12/2018 3:00 PM Winnie Garcia, ERIC HAMMOND [facility]        Today's Diagnoses     Acute on chronic combined systolic and diastolic CHF (congestive heart failure) (H)          Care Instructions    Call CORE nurse for any questions or concerns:  [phone]   *If you have concerns after hours, please call [phone], option 2 to speak with on call Cardiologist.    1. Medication changes from today:  Reduce the morning dose of metoprolol xl to 50 mg twice a day.  This is half-tablet twice a day     2. Weigh yourself daily and write it down.     3. Call CORE nurse if your weight is up more than 2 pounds in one day or 5 pounds in one week.     4. Call CORE nurse if you feel more short of breath, have more abdominal bloating, or leg swelling.     5. Continue low sodium diet (less than 2000 mg daily). If you eat less salt, you will retain less fluid.     6. Alcohol can weaken your heart further. You should avoid alcohol or limit its use to special times, such as a holiday or birthday.      7. Do NOT take Aleve or ibuprofen without talking to your doctor first.      8. Lab Results:      Component      Latest Ref Rng & Units 3/6/2018 3/12/2018   Sodium      136 - 145 mmol/L 135 (L) 136   Potassium      3.5 - 5.1 mmol/L 4.0 4.2   Chloride      98 - 107 mmol/L 102 102   Carbon Dioxide      23 - 29 mmol/L 27 26   Anion Gap      6 - 17 mmol/L 10 12.2   Glucose      70 - 105 mg/dL 104 126 (H)   Urea Nitrogen      7 - 30 mg/dL 23 24   Creatinine      0.70 - 1.30 mg/dL 1.18 1.12   GFR Estimate      >60 mL/min/1.7m2 58 (L) 62   GFR Estimate If Black      >60 mL/min/1.7m2 71 75   Calcium      8.5 - 10.5 mg/dL 8.5 8.5   CORE Clinic: Cardiomyopathy, Optimization, Rehabilitation,  Education  The CORE Clinic is a heart failure specialty clinic within the [facility] where you will work with specialized nurse practitioners, physician assistants, doctors, and registered nurses. They are dedicated to helping patients with heart failure to carefully adjust medications, receive education, and learn who and when to call if symptoms develop. They specialize in helping you better understand your condition, slow the progression of your disease, improve the length and quality of your life, help you detect future heart problems before they become life threatening, and avoid hospitalizations.              Follow-ups after your visit        Your next 10 appointments already scheduled     Mar 19, 2018 11:00 AM CDT   Return Visit with Wesley Benjamin MD   [facility] [place] ([facility])    [address]  [address]  [place] [address] [phone]            Mar 22, 2018 10:00 AM CDT   LAB with MCGEE LAB   [facility] PHYSICIANS [facility]ART AT [place] ([facility])    [address]  [place] [address] [phone]           Please do not eat 10-12 hours before your appointment if you are coming in fasting for labs on lipids, cholesterol, or glucose (sugar). This does not apply to pregnant women. Water, hot tea and black coffee (with nothing added) are okay. Do not drink other fluids, diet soda or chew gum.            Mar 23, 2018  9:30 AM CDT   Core Return with ERIC Fernandez CNP   [facility] ([facility])    [address]  [place] [address] [phone]            Apr 16, 2018 12:45 PM CDT   Ech Complete with [id number]   [facility] CV Echocardiography (Cardiovascular Imaging at [facility])    [address]  [place] [address] [phone]           1. Please bring or wear a comfortable  "two-piece outfit. 2. You may eat, drink and take your normal medicines. 3. For any questions that cannot be answered, please contact the ordering physician            Apr 16, 2018  2:00 PM CDT   LAB with MCGEE LAB   [facility] [place] ([facility])    [address] [place] [address]   [phone]           Please do not eat 10-12 hours before your appointment if you are coming in fasting for labs on lipids, cholesterol, or glucose (sugar). This does not apply to pregnant women. Water, hot tea and black coffee (with nothing added) are okay. Do not drink other fluids, diet soda or chew gum.            Apr 17, 2018 10:00 AM CDT   Core MD Return with Long Pugh MD   [facility] ([facility])    [address] [place], [place] [address]  [facility] [address]   [phone]              Who to contact     If you have questions or need follow up information about today's clinic visit or your schedule please contact [facility] directly at [phone].  Normal or non-critical lab and imaging results will be communicated to you by MyChart, letter or phone within 4 business days after the clinic has received the results. If you do not hear from us within 7 days, please contact the clinic through [employer] or phone. If you have a critical or abnormal lab result, we will notify you by phone as soon as possible.  Submit refill requests through [employer] or call your pharmacy and they will forward the refill request to us. Please allow 3 business days for your refill to be completed.          Additional Information About Your Visit        [employer] Information     [employer] lets you send messages to your doctor, view your test results, renew your prescriptions, schedule appointments and more. To sign up, go to [email or web address] . Click on \"Log in\" on the left side of the screen, which will " "take you to the Welcome page. Then click on \"Sign up Now\" on the right side of the page.     You will be asked to enter the access code listed below, as well as some personal information. Please follow the directions to create your username and password.     Your access code is: [email or web address]  Expires: 4/3/2018  3:08 PM     Your access code will  in 90 days. If you need help or a new code, please call your [place] clinic or [phone].        Care EveryWhere ID     This is your Care EveryWhere ID. This could be used by other organizations to access your [place] medical records  [id number]        Your Vitals Were     Height BMI (Body Mass Index)                1.74 m (5' 8.5\") 24.65 kg/m2           Blood Pressure from Last 3 Encounters:   18 128/86   18 119/71   18 123/84    Weight from Last 3 Encounters:   18 74.6 kg (164 lb 8 oz)   18 77.1 kg (170 lb)   18 73.2 kg (161 lb 6.4 oz)              We Performed the Following     Follow-Up with CORE Clinic - HARRY visit          Today's Medication Changes          These changes are accurate as of 3/12/18  3:31 PM.  If you have any questions, ask your nurse or doctor.               These medicines have changed or have updated prescriptions.        Dose/Directions    TOPROL  MG 24 hr tablet   This may have changed:  Another medication with the same name was removed. Continue taking this medication, and follow the directions you see here.   Generic drug:  metoprolol succinate   Changed by:  Winnie Garcia, APRN CNP        Dose:  50 mg   Take 50 mg by mouth 2 times daily 1/2 tablet twice a day   Refills:  0                Primary Care Provider Office Phone # Fax #    Clint Pizarro [phone]       [address] [address]        Equal Access to Services     [facility] ZENY AH: Caridad Carrillo, waaxda luqadaha, qaybta olivaalsara kapoor, emani rollins " henry silvermanyvettejudie baumann'aan ah. So [facility] [phone].    ATENCIÓN: Si christiano bryant, tiene a cleaning disposición servicios gratuitos de asistencia lingüística. Jaycee yoo [phone].    We comply with applicable federal civil rights laws and [place] laws. We do not discriminate on the basis of race, color, national origin, age, disability, sex, sexual orientation, or gender identity.            Thank you!     Thank you for choosing [facility]  for your care. Our goal is always to provide you with excellent care. Hearing back from our patients is one way we can continue to improve our services. Please take a few minutes to complete the written survey that you may receive in the mail after your visit with us. Thank you!             Your Updated Medication List - Protect others around you: Learn how to safely use, store and throw away your medicines at www.disposemymeds.org.          This list is accurate as of 3/12/18  3:31 PM.  Always use your most recent med list.                   Brand Name Dispense Instructions for use Diagnosis    CYANOCOBALAMIN PO      Take 500 mcg by mouth daily        ICAPS AREDS FORMULA PO      Take 1 tablet by mouth daily Takes in addition to multivitamin with minerals        LASIX 20 MG tablet   Generic drug:  furosemide      Take 20 mg by mouth daily        sacubitril-valsartan 24-26 MG per tablet    ENTRESTO    1 tablet    Take 1 tablet by mouth 2 times daily    Acute on chronic combined systolic and diastolic CHF (congestive heart failure) (H)       TOPROL  MG 24 hr tablet   Generic drug:  metoprolol succinate      Take 50 mg by mouth 2 times daily 1/2 tablet twice a day        VITAMIN C PO      Take 500 mg by mouth daily        Vitamin D (Cholecalciferol) 1000 UNITS Tabs      Take 2,000 Units by mouth daily        warfarin 5 MG tablet    COUMADIN    30 tablet    Take 1 tablet (5 mg) by mouth daily 5 mg on Rbagfa-Zddfujsge-Pqxdvb, 2.5 mg  Olfgbnk-Gjnktmqd-Aukghjbp-Sunday.  Patient takes in the morning.    Atrial fibrillation, unspecified type (H)

## 2018-03-12 NOTE — PATIENT INSTRUCTIONS
Call CORE nurse for any questions or concerns:  [phone]   *If you have concerns after hours, please call [phone], option 2 to speak with on call Cardiologist.    1. Medication changes from today:  Reduce the morning dose of metoprolol xl to 50 mg twice a day.  This is half-tablet twice a day     2. Weigh yourself daily and write it down.     3. Call CORE nurse if your weight is up more than 2 pounds in one day or 5 pounds in one week.     4. Call CORE nurse if you feel more short of breath, have more abdominal bloating, or leg swelling.     5. Continue low sodium diet (less than 2000 mg daily). If you eat less salt, you will retain less fluid.     6. Alcohol can weaken your heart further. You should avoid alcohol or limit its use to special times, such as a holiday or birthday.      7. Do NOT take Aleve or ibuprofen without talking to your doctor first.      8. Lab Results:      Component      Latest Ref Rng & Units 3/6/2018 3/12/2018   Sodium      136 - 145 mmol/L 135 (L) 136   Potassium      3.5 - 5.1 mmol/L 4.0 4.2   Chloride      98 - 107 mmol/L 102 102   Carbon Dioxide      23 - 29 mmol/L 27 26   Anion Gap      6 - 17 mmol/L 10 12.2   Glucose      70 - 105 mg/dL 104 126 (H)   Urea Nitrogen      7 - 30 mg/dL 23 24   Creatinine      0.70 - 1.30 mg/dL 1.18 1.12   GFR Estimate      >60 mL/min/1.7m2 58 (L) 62   GFR Estimate If Black      >60 mL/min/1.7m2 71 75   Calcium      8.5 - 10.5 mg/dL 8.5 8.5   CORE Clinic: Cardiomyopathy, Optimization, Rehabilitation, Education  The CORE Clinic is a heart failure specialty clinic within the [facility] where you will work with specialized nurse practitioners, physician assistants, doctors, and registered nurses. They are dedicated to helping patients with heart failure to carefully adjust medications, receive education, and learn who and when to call if symptoms develop. They specialize in helping you better understand your condition, slow the progression  of your disease, improve the length and quality of your life, help you detect future heart problems before they become life threatening, and avoid hospitalizations.

## 2018-03-13 ENCOUNTER — CARE COORDINATION (OUTPATIENT)
Dept: CARDIOLOGY | Facility: CLINIC | Age: 83
End: 2018-03-13

## 2018-03-13 NOTE — PROGRESS NOTES
"Received VM from pt who reports he saw FORTUNATO Ashley yesterday and was told to call if his BP drops.  Pt reports he checked BP at home this AM and it was 107, which is what it normally is.  He then had INR appt at BP was 84 and 91.  He states he will continue to check BP at home and call if goes lower than normal at home.    Per chart review, Dion's OV note from 3/12 states, \"When he gets his INR checked, he has the RN check his blood pressure and sometimes it is as low as .  When it is that low, he does feel more tired.\" And plan states, \"Today, I continue with the same dose of Entresto 24-26 mg  b.i.d. I will reduce his metoprolol to 50 mg bid to prevent hypotension while we optimize his Entresto.\"    Called pt, no answer, LVM requesting to know if he was lightheaded/dizzy or more fatigued with low BP readings at INR appt today and also to inquire if home BP reading was before or after AM meds.  Left CORE number.  Will plan to review that pt did decrease metoprolol dose to 50mg BID when return call received.      Of note, pt is scheduled for OV with FORTUNATO Ashley on 3/23/18 with BMP day prior.    MATTY Hicks 3:46 PM 3/13/2018         "

## 2018-03-14 NOTE — PROGRESS NOTES
"Received message from patient saying he wants Winnie Garcia to know that his SBP today was 110. He is not experiencing any lightheadedness and dizziness. \" I am doing okay\".  I tried calling him back, left message to call back to discuss if he had other readings and how often he is taking his blood pressure. Pt saw Winnie Garcia on 3/12/18 and Metoprolol was decreased to 50 mg bid and kept him on same Entresto dose 24/26 bid. Will await patient's call. Sonam Sow RN 9:45 AM 03/14/18      "

## 2018-03-15 ENCOUNTER — ALLIED HEALTH/NURSE VISIT (OUTPATIENT)
Dept: CARDIAC REHAB | Facility: CLINIC | Age: 83
End: 2018-03-15

## 2018-03-19 ENCOUNTER — HOSPITAL ENCOUNTER (OUTPATIENT)
Dept: WOUND CARE | Facility: CLINIC | Age: 83
Discharge: HOME | End: 2018-03-19
Attending: SURGERY | Admitting: SURGERY
Payer: MEDICARE

## 2018-03-19 VITALS — SYSTOLIC BLOOD PRESSURE: 130 MMHG | TEMPERATURE: 96.5 F | DIASTOLIC BLOOD PRESSURE: 75 MMHG | HEART RATE: 127 BPM

## 2018-03-19 PROCEDURE — A6022 COLLAGEN DRSG>16<=48 SQ IN: HCPCS

## 2018-03-19 PROCEDURE — 11042 DBRDMT SUBQ TIS 1ST 20SQCM/<: CPT

## 2018-03-19 PROCEDURE — 11042 DBRDMT SUBQ TIS 1ST 20SQCM/<: CPT | Performed by: SURGERY

## 2018-03-19 NOTE — MR AVS SNAPSHOT
MRN:[id number]                      After Visit Summary   3/19/2018    Jama Paul    MRN: [id number]           Visit Information        Provider Department      3/19/2018 11:00 AM Wesley Benjamin MD [facility]        Your next 10 appointments already scheduled     May 21, 2018 10:30 AM CDT   Return Visit with Wesley Benjamin MD   [facility] ([facility])    [address]  [address]  [place] [address] [phone]            Jul 16, 2018  8:30 AM CDT   LAB with MCGEE LAB   [facility] PHYSICIANS HEART AT [place] ([facility])    [address]  [place] [place] [place] [address] [phone]           Please do not eat 10-12 hours before your appointment if you are coming in fasting for labs on lipids, cholesterol, or glucose (sugar). This does not apply to pregnant women. Water, hot tea and black coffee (with nothing added) are okay. Do not drink other fluids, diet soda or chew gum.            Jul 16, 2018  9:30 AM CDT   Core Return with ERIC Fernandez CNP   [facility] ([facility])    [address]  [place] [place] [facility] [address] [phone] OPT 2                Further instructions from your care team              [facility]  [address], [facility] [place] [facility] [address]  Appointment Phone [phone] Nurse Advisors [phone]      Jama Paul      2/13/1931  [facility] Phone:[phone]  Fax:[phone]  Includes [place] and [place] Locations       Wound Dressing Change to right medial lower leg  Cleanse wound and surrounding skin with: wound cleanser  Cover wound with Fibracol cut to size of wound (this will dissolve into the wound)  Cover wound with ABD pad and secure with roll gauze  Change dressing Monday, Wednesday,  "Friday  Compression:   You have a compression wrap Spandagrip D is supposed to be removed at night and put back on first thing in the morning. On Both Lower Legs  Please remove compression dressing if toes turn blue and/or tingle and can not be relieved by raising the leg for one hour.        Wesley Benjamin M.D.2018          Call us at [phone] if you have any questions about your wounds, have redness or swelling around your wound, have a fever of 101 or greater or if you have any other problems or concerns. We answer the phone Monday through Friday 8 am to 4 pm, please leave a message as we check the voicemail frequently throughout the day.       Follow up with Provider - 3-4 weeks         [employer] Information     [employer] lets you send messages to your doctor, view your test results, renew your prescriptions, schedule appointments and more. To sign up, go to [email or web address] . Click on \"Log in\" on the left side of the screen, which will take you to the Welcome page. Then click on \"Sign up Now\" on the right side of the page.     You will be asked to enter the access code listed below, as well as some personal information. Please follow the directions to create your username and password.     Your access code is: [email or web address]  Expires: 2018 10:31 AM     Your access code will  in 90 days. If you need help or a new code, please call your [place] clinic or [phone].        Care EveryWhere ID     This is your Care EveryWhere ID. This could be used by other organizations to access your [place] medical records  [id number]        Equal Access to Services     [facility] : Hadluca Carrillo, wamissael isbell, qaemani nguyen. So [facility] [phone].    ATENCIÓN: Si habla español, tiene a cleaning disposición servicios gratuitos de asistencia lingüística. Llame al [phone].    We comply with applicable federal civil rights " laws and [place] laws. We do not discriminate on the basis of race, color, national origin, age, disability, sex, sexual orientation, or gender identity.

## 2018-03-19 NOTE — PROGRESS NOTES
[facility] [place] Progress Note    Subject: Jama Paul returns to see us today for his right mid anterior medial calf ulcer.  This was partially related to swelling heart failure.  Home health care is changing the dressings every other day using Fibracol and a Spandagrip.  His appetite has been good he does take nutritional supplements.  We have noticed a continued improvement of the ulceration with very healthy granulation tissue and a progressive decrease in size every visit here in the clinic where he comes every other week.    3 weeks ago is admitted for exacerbation of his congestive heart failure.  He was started on Entresto twice daily following this which is made a significant improvement in his breathing, leg swelling, and overall well-being.  His wife and daughter were here today think that he looks excellent after initiation of this medication.  Alert and appropriate.  Talkative      Exam:  /75 (BP Location: Left arm)  Pulse 127  Temp 96.5  F (35.8  C) (Temporal)  Alert and appropriate.  Very talkative and bright.  Breathing is normal.  No significant lower extremity swelling.  Ulceration is improving.  This now measures 4 x 3.8 x 0.1 cm.  Mild amount of slough is noted with no undermining.  One can see growing and epithelium at all edges.  There is somewhat proud flesh located on the anterior segment of the ulcer.  No cellulitis or edema.    Procedure:  Time out was called, informed consent obtained, and topical anesthetic of 4% lidocaine was applied per standing protocol, debridement was performed using a #15 blade down to and including subcutaneous tissue.  Removed also fibrin and slough.  The proud flesh was excised until was level with the epithelial skin edge.  Skin edge was slightly debrided circumferentially to encourage tissue ingrowth.  Very healthy granulation is noted throughout with no edematous changes.  Bleeding controlled with light pressure. Patient  tolerated procedure well.    Impression: Continued improvement.  Size of the ulcer is decreased in the granulation tissue is very healthy.  Continue with same dressing protocol.  There was some confusion about the Spandagrip not being placed on the foot but just starting at the distal calf which caused discomfort and swelling and we have addressed this.    Plan: We will dress the wounds with Fibracol  Patient will return to the clinic in 2 weeks time    Wesley Benjamin MD  03/19/18 11:14 AM

## 2018-03-19 NOTE — DISCHARGE INSTRUCTIONS
[facility]  [address], [place] [address]  Appointment Phone [phone] Nurse Advisors [phone]      Jama Paul      2/13/1931  [facility] Phone:[phone]  Fax:[phone]  Includes [place] and [place] Locations       Wound Dressing Change to right medial lower leg  Cleanse wound and surrounding skin with: wound cleanser  Cover wound with Fibracol cut to size of wound (this will dissolve into the wound)  Cover wound with ABD pad and secure with roll gauze  Change dressing Monday, Wednesday, Friday  Compression:   You have a compression wrap Spandagrip D is supposed to be removed at night and put back on first thing in the morning. On Both Lower Legs  Please remove compression dressing if toes turn blue and/or tingle and can not be relieved by raising the leg for one hour.        Wesley Benjamin M.D.March 19, 2018          Call us at [phone] if you have any questions about your wounds, have redness or swelling around your wound, have a fever of 101 or greater or if you have any other problems or concerns. We answer the phone Monday through Friday 8 am to 4 pm, please leave a message as we check the voicemail frequently throughout the day.       Follow up with Provider - 3-4 weeks

## 2018-03-22 DIAGNOSIS — I50.43 ACUTE ON CHRONIC COMBINED SYSTOLIC AND DIASTOLIC CHF (CONGESTIVE HEART FAILURE) (H): ICD-10-CM

## 2018-03-22 LAB
ANION GAP SERPL CALCULATED.3IONS-SCNC: 9.9 MMOL/L (ref 6–17)
BUN SERPL-MCNC: 20 MG/DL (ref 7–30)
CALCIUM SERPL-MCNC: 8.4 MG/DL (ref 8.5–10.5)
CHLORIDE SERPL-SCNC: 102 MMOL/L (ref 98–107)
CO2 SERPL-SCNC: 27 MMOL/L (ref 23–29)
CREAT SERPL-MCNC: 1.14 MG/DL (ref 0.7–1.3)
GFR SERPL CREATININE-BSD FRML MDRD: 61 ML/MIN/1.7M2
GLUCOSE SERPL-MCNC: 153 MG/DL (ref 70–105)
POTASSIUM SERPL-SCNC: 3.9 MMOL/L (ref 3.5–5.1)
SODIUM SERPL-SCNC: 135 MMOL/L (ref 136–145)

## 2018-03-22 PROCEDURE — 36415 COLL VENOUS BLD VENIPUNCTURE: CPT | Performed by: INTERNAL MEDICINE

## 2018-03-22 PROCEDURE — 80048 BASIC METABOLIC PNL TOTAL CA: CPT | Performed by: INTERNAL MEDICINE

## 2018-03-23 ENCOUNTER — OFFICE VISIT (OUTPATIENT)
Dept: CARDIOLOGY | Facility: CLINIC | Age: 83
End: 2018-03-23
Attending: NURSE PRACTITIONER
Payer: MEDICARE

## 2018-03-23 VITALS
HEART RATE: 100 BPM | BODY MASS INDEX: 24.66 KG/M2 | DIASTOLIC BLOOD PRESSURE: 66 MMHG | WEIGHT: 166.5 LBS | OXYGEN SATURATION: 99 % | SYSTOLIC BLOOD PRESSURE: 108 MMHG | HEIGHT: 69 IN

## 2018-03-23 DIAGNOSIS — I50.43 ACUTE ON CHRONIC COMBINED SYSTOLIC AND DIASTOLIC CHF (CONGESTIVE HEART FAILURE) (H): ICD-10-CM

## 2018-03-23 PROCEDURE — 99214 OFFICE O/P EST MOD 30 MIN: CPT | Performed by: NURSE PRACTITIONER

## 2018-03-23 NOTE — MR AVS SNAPSHOT
After Visit Summary   3/23/2018    Jama Paul    MRN: [id number]           Patient Information     Date Of Birth          2/13/1931        Visit Information        Provider Department      3/23/2018 9:30 AM Winnie Garcia, ERIC HAMMOND [facility]        Today's Diagnoses     Acute on chronic combined systolic and diastolic CHF (congestive heart failure) (H)          Care Instructions    Call CORE nurse for any questions or concerns:  [phone]   *If you have concerns after hours, please call [phone], option 2 to speak with on call Cardiologist.    1. Medication changes from today:  Continue with Entresto at same dose: 24/26 mg twice a day  Increase morning dose of Metoprolol to 100 mg and keep afternoon dose at 50 mg     2. Weigh yourself daily and write it down.     3. Call CORE nurse if your weight is up more than 2 pounds in one day or 5 pounds in one week.     4. Call CORE nurse if you feel more short of breath, have more abdominal bloating, or leg swelling.     5. Continue low sodium diet (less than 2000 mg daily). If you eat less salt, you will retain less fluid.     6. Alcohol can weaken your heart further. You should avoid alcohol or limit its use to special times, such as a holiday or birthday.      7. Do NOT take Aleve or ibuprofen without talking to your doctor first.      8. Lab Results:   Component      Latest Ref Rng & Units 3/22/2018   Sodium      136 - 145 mmol/L 135 (L)   Potassium      3.5 - 5.1 mmol/L 3.9   Chloride      98 - 107 mmol/L 102   Carbon Dioxide      23 - 29 mmol/L 27   Anion Gap      6 - 17 mmol/L 9.9   Glucose      70 - 105 mg/dL 153 (H)   Urea Nitrogen      7 - 30 mg/dL 20   Creatinine      0.70 - 1.30 mg/dL 1.14   GFR Estimate      >60 mL/min/1.7m2 61   GFR Estimate If Black      >60 mL/min/1.7m2 74   Calcium      8.5 - 10.5 mg/dL 8.4 (L)        [facility]: Cardiomyopathy, Optimization, Rehabilitation, Education  The  [facility] is a heart failure specialty clinic within the [facility] where you will work with specialized nurse practitioners, physician assistants, doctors, and registered nurses. They are dedicated to helping patients with heart failure to carefully adjust medications, receive education, and learn who and when to call if symptoms develop. They specialize in helping you better understand your condition, slow the progression of your disease, improve the length and quality of your life, help you detect future heart problems before they become life threatening, and avoid hospitalizations.                Follow-ups after your visit        Your next 10 appointments already scheduled     Apr 02, 2018 10:45 AM CDT   Return Visit with Wesley Benjamin MD   [facility] [place] ([facility])    [address]  [address]  [facility] [address] [phone]            Apr 16, 2018 12:45 PM CDT   Ech Complete with [id number]   [facility] CV Echocardiography (Cardiovascular Imaging at [facility])    [address]  [address]  [facility] [address] [phone]           1. Please bring or wear a comfortable two-piece outfit. 2. You may eat, drink and take your normal medicines. 3. For any questions that cannot be answered, please contact the ordering physician            Apr 16, 2018  2:00 PM CDT   LAB with [facility]   [facility] [place] ([facility])    [address]  [facility] [address] [phone]           Please do not eat 10-12 hours before your appointment if you are coming in fasting for labs on lipids, cholesterol, or glucose (sugar). This does not apply to pregnant women. Water, hot tea and black coffee (with nothing added) are okay. Do not drink other fluids, diet soda or chew gum.            Apr 17, 2018 10:00 AM CDT   Core MD Return with Long Pugh MD   [place]  "[facility] ([facility])    [address] [place] [address] [address]  [facility] [address] [phone] OPT 2              Who to contact     If you have questions or need follow up information about today's clinic visit or your schedule please contact [facility] directly at [phone].  Normal or non-critical lab and imaging results will be communicated to you by MyChart, letter or phone within 4 business days after the clinic has received the results. If you do not hear from us within 7 days, please contact the clinic through MyChart or phone. If you have a critical or abnormal lab result, we will notify you by phone as soon as possible.  Submit refill requests through [email or web address] or call your pharmacy and they will forward the refill request to us. Please allow 3 business days for your refill to be completed.          Additional Information About Your Visit        [facility] Information     [email or web address] lets you send messages to your doctor, view your test results, renew your prescriptions, schedule appointments and more. To sign up, go to [email or web address] . Click on \"Log in\" on the left side of the screen, which will take you to the Welcome page. Then click on \"Sign up Now\" on the right side of the page.     You will be asked to enter the access code listed below, as well as some personal information. Please follow the directions to create your username and password.     Your access code is: [email or web address]  Expires: 4/3/2018  3:08 PM     Your access code will  in 90 days. If you need help or a new code, please call your [place] clinic or [phone].        Care EveryWhere ID     This is your Care EveryWhere ID. This could be used by other organizations to access your [place] medical records  [id number]        Your Vitals Were     Pulse Height Pulse Oximetry BMI (Body Mass Index)          100 1.74 m (5' 8.5\") 99% 24.95 kg/m2         Blood " Pressure from Last 3 Encounters:   03/23/18 108/66   03/19/18 130/75   03/12/18 98/62    Weight from Last 3 Encounters:   03/23/18 75.5 kg (166 lb 8 oz)   03/12/18 74.6 kg (164 lb 8 oz)   03/06/18 77.1 kg (170 lb)              We Performed the Following     Follow-Up with [facility] - [facility]Y visit        Primary Care Provider Office Phone # Fax #    Clint Pizarro [phone]       [place] [address]        Equal Access to Services     [facility]: Hadii fredis lazo hadasho Soofelia, waaxda luqadaha, qaybta kaalmada adechandanyabrian, emani mayers . So [facility] [phone].    ATENCIÓN: Si habla español, tiene a cleaning disposición servicios gratuitos de asistencia lingüística. [facility] [phone].    We comply with applicable federal civil rights laws and [place] laws. We do not discriminate on the basis of race, color, national origin, age, disability, sex, sexual orientation, or gender identity.            Thank you!     Thank you for choosing [facility]  for your care. Our goal is always to provide you with excellent care. Hearing back from our patients is one way we can continue to improve our services. Please take a few minutes to complete the written survey that you may receive in the mail after your visit with us. Thank you!             Your Updated Medication List - Protect others around you: Learn how to safely use, store and throw away your medicines at www.disposemymeds.org.          This list is accurate as of 3/23/18  9:59 AM.  Always use your most recent med list.                   Brand Name Dispense Instructions for use Diagnosis    CYANOCOBALAMIN PO      Take 500 mcg by mouth daily        ICAPS AREDS FORMULA PO      Take 1 tablet by mouth daily Takes in addition to multivitamin with minerals        LASIX 20 MG tablet   Generic drug:  furosemide      Take 20 mg by mouth daily         sacubitril-valsartan 24-26 MG per tablet    ENTRESTO    1 tablet    Take 1 tablet by mouth 2 times daily    Acute on chronic combined systolic and diastolic CHF (congestive heart failure) (H)       TOPROL  MG 24 hr tablet   Generic drug:  metoprolol succinate      Take 100 mg by mouth 2 times daily 1 tablet in the AM, 1/2 tablet in the PM        VITAMIN C PO      Take 500 mg by mouth daily        Vitamin D (Cholecalciferol) 1000 UNITS Tabs      Take 2,000 Units by mouth daily        warfarin 5 MG tablet    COUMADIN    30 tablet    Take 1 tablet (5 mg) by mouth daily 5 mg on Zpvqxq-Xbfokbapn-Qrrnlp, 2.5 mg Ktjhcja-Ttakrpuf-Tsymtgrj-Sunday.  Patient takes in the morning.    Atrial fibrillation, unspecified type (H)

## 2018-03-23 NOTE — PROGRESS NOTES
HPI and Plan:   See dictation    No orders of the defined types were placed in this encounter.    No orders of the defined types were placed in this encounter.    There are no discontinued medications.      Encounter Diagnosis   Name Primary?     Acute on chronic combined systolic and diastolic CHF (congestive heart failure) (H)        CURRENT MEDICATIONS:  Current Outpatient Prescriptions   Medication Sig Dispense Refill     metoprolol succinate (TOPROL XL) 100 MG 24 hr tablet Take 100 mg by mouth 2 times daily 1 tablet in the AM, 1/2 tablet in the PM        sacubitril-valsartan (ENTRESTO) 24-26 MG per tablet Take 1 tablet by mouth 2 times daily 1 tablet 0     warfarin (COUMADIN) 5 MG tablet Take 1 tablet (5 mg) by mouth daily 5 mg on Wycemb-Hrbedlipq-Ntvjne, 2.5 mg Zaxlnyt-Jlaocaop-Kkzhhbyo-Sunday.  Patient takes in the morning. 30 tablet 0     furosemide (LASIX) 20 MG tablet Take 20 mg by mouth daily       Vitamin D, Cholecalciferol, 1000 UNITS TABS Take 2,000 Units by mouth daily        Multiple Vitamins-Minerals (ICAPS AREDS FORMULA PO) Take 1 tablet by mouth daily Takes in addition to multivitamin with minerals       CYANOCOBALAMIN PO Take 500 mcg by mouth daily       Ascorbic Acid (VITAMIN C PO) Take 500 mg by mouth daily         ALLERGIES   No Known Allergies    PAST MEDICAL HISTORY:  Past Medical History:   Diagnosis Date     Antiplatelet or antithrombotic long-term use      Arrhythmia      Atrial fibrillation (H)      Elevated PSA      Thrombocytopenia (H)      Unspecified essential hypertension        PAST SURGICAL HISTORY:  Past Surgical History:   Procedure Laterality Date     COLONOSCOPY       EXPLORE GROIN  4/30/2014    Procedure: EXPLORE GROIN;  Surgeon: Sam Aguirre MD;  Location:  OR     HERNIORRHAPHY INGUINAL       HERNIORRHAPHY INGUINAL  4/30/2014    Procedure: HERNIORRHAPHY INGUINAL;  Surgeon: Sam Aguirre MD;  Location:  OR     MOHS MICROGRAPHIC PROCEDURE       PHACOEMULSIFICATION  "CLEAR CORNEA WITH STANDARD INTRAOCULAR LENS IMPLANT Right 9/8/2015    Procedure: PHACOEMULSIFICATION CLEAR CORNEA WITH STANDARD INTRAOCULAR LENS IMPLANT;  Surgeon: Gil Shannon MD;  Location:  EC     septic olecranon bursitis[         FAMILY HISTORY:  Family History   Problem Relation Age of Onset     HEART DISEASE No family hx of        SOCIAL HISTORY:  Social History     Social History     Marital status:      Spouse name: N/A     Number of children: N/A     Years of education: N/A     Social History Main Topics     Smoking status: Never Smoker     Smokeless tobacco: Never Used     Alcohol use No     Drug use: No     Sexual activity: Not Asked     Other Topics Concern     Parent/Sibling W/ Cabg, Mi Or Angioplasty Before 65f 55m? No     Social History Narrative       Review of Systems:  Skin:  Positive for bruising   Eyes:  Positive for    ENT:  Negative    Respiratory:  Negative    Cardiovascular:  Negative fatigue;Positive for  Gastroenterology: Negative    Genitourinary:  Positive for urinary frequency  Musculoskeletal:  Negative    Neurologic:  Negative    Psychiatric:  Negative    Heme/Lymph/Imm:  Negative    Endocrine:  Negative      Physical Exam:  Vitals: /66  Pulse 100  Ht 1.74 m (5' 8.5\")  Wt 75.5 kg (166 lb 8 oz)  SpO2 99%  BMI 24.95 kg/m2    Constitutional:  cooperative;well developed frail      Skin:  warm and dry to the touch;no apparent skin lesions or masses noted          Head:  normocephalic        Eyes:  pupils equal and round        Lymph:      ENT:  no pallor or cyanosis        Neck:  carotid pulses are full and equal bilaterally, JVP normal, no carotid bruit;JVP normal        Respiratory:  clear to auscultation;normal symmetry         Cardiac:   irregular rhythm;tachycardic                                                       GI:  abdomen soft        Extremities and Muscular Skeletal:  no edema   bilateral LE edema;trace;1+          Neurological:  affect " appropriate        Psych:  affect appropriate, oriented to time, person and place Anxious      Recent Lab Results:  LIPID RESULTS:  Lab Results   Component Value Date    CHOL 127 04/18/2012    HDL 56 04/18/2012    LDL 55 04/18/2012    TRIG 81 04/18/2012    CHOLHDLRATIO 2.3 04/18/2012       LIVER ENZYME RESULTS:  Lab Results   Component Value Date    AST 19 02/28/2018    ALT 32 02/28/2018       CBC RESULTS:  Lab Results   Component Value Date    WBC 6.2 03/01/2018    RBC 3.26 (L) 03/01/2018    HGB 10.4 (L) 03/01/2018    HCT 31.9 (L) 03/01/2018    MCV 98 03/01/2018    MCH 31.9 03/01/2018    MCHC 32.6 03/01/2018    RDW 19.5 (H) 03/01/2018    PLT 46 (LL) 03/01/2018       BMP RESULTS:  Lab Results   Component Value Date     (L) 03/22/2018    POTASSIUM 3.9 03/22/2018    CHLORIDE 102 03/22/2018    CO2 27 03/22/2018    ANIONGAP 9.9 03/22/2018     (H) 03/22/2018    BUN 20 03/22/2018    CR 1.14 03/22/2018    GFRESTIMATED 61 03/22/2018    GFRESTBLACK 74 03/22/2018    BARON 8.4 (L) 03/22/2018        A1C RESULTS:  No results found for: A1C    INR RESULTS:  Lab Results   Component Value Date    INR 3.29 (H) 03/01/2018    INR 3.43 (H) 02/28/2018           CC  Winnie Garcia, APRN CNP  [address]  [place] [address]

## 2018-03-23 NOTE — PATIENT INSTRUCTIONS
Call CORE nurse for any questions or concerns:  [phone]   *If you have concerns after hours, please call [phone], option 2 to speak with on call Cardiologist.    1. Medication changes from today:  Continue with Entresto at same dose: 24/26 mg twice a day  Increase morning dose of Metoprolol to 100 mg and keep afternoon dose at 50 mg     2. Weigh yourself daily and write it down.     3. Call CORE nurse if your weight is up more than 2 pounds in one day or 5 pounds in one week.     4. Call CORE nurse if you feel more short of breath, have more abdominal bloating, or leg swelling.     5. Continue low sodium diet (less than 2000 mg daily). If you eat less salt, you will retain less fluid.     6. Alcohol can weaken your heart further. You should avoid alcohol or limit its use to special times, such as a holiday or birthday.      7. Do NOT take Aleve or ibuprofen without talking to your doctor first.      8. Lab Results:   Component      Latest Ref Rng & Units 3/22/2018   Sodium      136 - 145 mmol/L 135 (L)   Potassium      3.5 - 5.1 mmol/L 3.9   Chloride      98 - 107 mmol/L 102   Carbon Dioxide      23 - 29 mmol/L 27   Anion Gap      6 - 17 mmol/L 9.9   Glucose      70 - 105 mg/dL 153 (H)   Urea Nitrogen      7 - 30 mg/dL 20   Creatinine      0.70 - 1.30 mg/dL 1.14   GFR Estimate      >60 mL/min/1.7m2 61   GFR Estimate If Black      >60 mL/min/1.7m2 74   Calcium      8.5 - 10.5 mg/dL 8.4 (L)        [facility]: Cardiomyopathy, Optimization, Rehabilitation, Education  The [facility] is a heart failure specialty clinic within the [facility] where you will work with specialized nurse practitioners, physician assistants, doctors, and registered nurses. They are dedicated to helping patients with heart failure to carefully adjust medications, receive education, and learn who and when to call if symptoms develop. They specialize in helping you better understand your condition, slow the progression of  your disease, improve the length and quality of your life, help you detect future heart problems before they become life threatening, and avoid hospitalizations.

## 2018-03-23 NOTE — LETTER
3/23/2018    Clint Pizarro MD  [address] [address]    RE: Jama Paul       Dear Colleague,    I had the pleasure of seeing Jama Paul in the [facility].    HPI and Plan:   See dictation    No orders of the defined types were placed in this encounter.    No orders of the defined types were placed in this encounter.    There are no discontinued medications.      Encounter Diagnosis   Name Primary?     Acute on chronic combined systolic and diastolic CHF (congestive heart failure) (H)        CURRENT MEDICATIONS:  Current Outpatient Prescriptions   Medication Sig Dispense Refill     metoprolol succinate (TOPROL XL) 100 MG 24 hr tablet Take 100 mg by mouth 2 times daily 1 tablet in the AM, 1/2 tablet in the PM        sacubitril-valsartan (ENTRESTO) 24-26 MG per tablet Take 1 tablet by mouth 2 times daily 1 tablet 0     warfarin (COUMADIN) 5 MG tablet Take 1 tablet (5 mg) by mouth daily 5 mg on Xbidom-Bnpywjnbs-Orhhau, 2.5 mg Yoznhui-Mwrzdpbt-Ukpqcydm-Sunday.  Patient takes in the morning. 30 tablet 0     furosemide (LASIX) 20 MG tablet Take 20 mg by mouth daily       Vitamin D, Cholecalciferol, 1000 UNITS TABS Take 2,000 Units by mouth daily        Multiple Vitamins-Minerals (ICAPS AREDS FORMULA PO) Take 1 tablet by mouth daily Takes in addition to multivitamin with minerals       CYANOCOBALAMIN PO Take 500 mcg by mouth daily       Ascorbic Acid (VITAMIN C PO) Take 500 mg by mouth daily         ALLERGIES   No Known Allergies    PAST MEDICAL HISTORY:  Past Medical History:   Diagnosis Date     Antiplatelet or antithrombotic long-term use      Arrhythmia      Atrial fibrillation (H)      Elevated PSA      Thrombocytopenia (H)      Unspecified essential hypertension        PAST SURGICAL HISTORY:  Past Surgical History:   Procedure Laterality Date     COLONOSCOPY       EXPLORE GROIN  4/30/2014    Procedure: EXPLORE GROIN;  Surgeon:  "Sam Aguirre MD;  Location:  OR     HERNIORRHAPHY INGUINAL       HERNIORRHAPHY INGUINAL  4/30/2014    Procedure: HERNIORRHAPHY INGUINAL;  Surgeon: Sam Aguirre MD;  Location:  OR     MOHS MICROGRAPHIC PROCEDURE       PHACOEMULSIFICATION CLEAR CORNEA WITH STANDARD INTRAOCULAR LENS IMPLANT Right 9/8/2015    Procedure: PHACOEMULSIFICATION CLEAR CORNEA WITH STANDARD INTRAOCULAR LENS IMPLANT;  Surgeon: Gil Shannon MD;  Location:  EC     septic olecranon bursitis[         FAMILY HISTORY:  Family History   Problem Relation Age of Onset     HEART DISEASE No family hx of        SOCIAL HISTORY:  Social History     Social History     Marital status:      Spouse name: N/A     Number of children: N/A     Years of education: N/A     Social History Main Topics     Smoking status: Never Smoker     Smokeless tobacco: Never Used     Alcohol use No     Drug use: No     Sexual activity: Not Asked     Other Topics Concern     Parent/Sibling W/ Cabg, Mi Or Angioplasty Before 65f 55m? No     Social History Narrative       Review of Systems:  Skin:  Positive for bruising   Eyes:  Positive for    ENT:  Negative    Respiratory:  Negative    Cardiovascular:  Negative fatigue;Positive for  Gastroenterology: Negative    Genitourinary:  Positive for urinary frequency  Musculoskeletal:  Negative    Neurologic:  Negative    Psychiatric:  Negative    Heme/Lymph/Imm:  Negative    Endocrine:  Negative      Physical Exam:  Vitals: /66  Pulse 100  Ht 1.74 m (5' 8.5\")  Wt 75.5 kg (166 lb 8 oz)  SpO2 99%  BMI 24.95 kg/m2    Constitutional:  cooperative;well developed frail      Skin:  warm and dry to the touch;no apparent skin lesions or masses noted          Head:  normocephalic        Eyes:  pupils equal and round        Lymph:      ENT:  no pallor or cyanosis        Neck:  carotid pulses are full and equal bilaterally, JVP normal, no carotid bruit;JVP normal        Respiratory:  clear to auscultation;normal " symmetry         Cardiac:   irregular rhythm;tachycardic                                                       GI:  abdomen soft        Extremities and Muscular Skeletal:  no edema   bilateral LE edema;trace;1+          Neurological:  affect appropriate        Psych:  affect appropriate, oriented to time, person and place Anxious      Recent Lab Results:  LIPID RESULTS:  Lab Results   Component Value Date    CHOL 127 04/18/2012    HDL 56 04/18/2012    LDL 55 04/18/2012    TRIG 81 04/18/2012    CHOLHDLRATIO 2.3 04/18/2012       LIVER ENZYME RESULTS:  Lab Results   Component Value Date    AST 19 02/28/2018    ALT 32 02/28/2018       CBC RESULTS:  Lab Results   Component Value Date    WBC 6.2 03/01/2018    RBC 3.26 (L) 03/01/2018    HGB 10.4 (L) 03/01/2018    HCT 31.9 (L) 03/01/2018    MCV 98 03/01/2018    MCH 31.9 03/01/2018    MCHC 32.6 03/01/2018    RDW 19.5 (H) 03/01/2018    PLT 46 (LL) 03/01/2018       BMP RESULTS:  Lab Results   Component Value Date     (L) 03/22/2018    POTASSIUM 3.9 03/22/2018    CHLORIDE 102 03/22/2018    CO2 27 03/22/2018    ANIONGAP 9.9 03/22/2018     (H) 03/22/2018    BUN 20 03/22/2018    CR 1.14 03/22/2018    GFRESTIMATED 61 03/22/2018    GFRESTBLACK 74 03/22/2018    BARON 8.4 (L) 03/22/2018        A1C RESULTS:  No results found for: A1C    INR RESULTS:  Lab Results   Component Value Date    INR 3.29 (H) 03/01/2018    INR 3.43 (H) 02/28/2018           CC  ERIC Fernandez CNP  [address]  [place] [address]                  Thank you for allowing me to participate in the care of your patient.      Sincerely,     ERIC Olivares CNP     [facility]    cc:   ERIC Fernandez CNP  [address]  [place] [address]

## 2018-03-23 NOTE — LETTER
3/23/2018      Clint Pizarro MD  [address] [address]      RE: Jama Paul       Dear Colleague,    I had the pleasure of seeing Jama Paul in the [facility].    Service Date: 03/23/2018      HISTORY OF PRESENT ILLNESS:  Jama Paul is a pleasant 87-year-old gentleman whom I am seeing at the [facility] at the [facility] for management of his nonischemic cardiomyopathy.  He is New York Heart Association class III.  His last echo demonstrated an ejection fraction of 40% with an overall moderate reduced LV systolic function with moderate global hypokinesis.  Today he is here in followup.        At the last couple of visits, I have been up titrating his medications.  Currently he is on:   1.  Entresto 24-26 mg b.i.d.   2.  Metoprolol 50 mg b.i.d.   3.  Warfarin   4.  Furosemide 20 mg daily.    5.  Vitamin D.    6.  Multivitamins.        Jama tends to run a low blood pressure.  Therefore, when I started Entresto, I reduced his metoprolol succinate to 50 mg per day.  He has been checking his blood pressure at home.  At home, it has been running about 105-107.  He went and had his INR checked and at that time his systolic blood pressure was 84 and 91.  He was asymptomatic with dizziness or lightheadedness.  Today, his blood pressure is 108/66.  His pulse, however, is running anywhere from 100-110 at rest.  He has a history of chronic atrial fibrillation.  He has a well controlled ventricular rate when his metoprolol was at 100 b.i.d.  Today he denies lightheadedness or dizziness.  He does deny palpitations.  He said this morning when he checked his pulse it was running faster than he recalls.      Today Jama, his daughter and his wife proclaim how happy they are with Entresto.  The family has noticed he is brighter, his voice is stronger.  Today, Jama states he does not have any dyspnea with any  activity.  He is participating in cardiac rehab phase III, the [facility] program.      He has an ulcer on his ankle.  He sees Dr. Benjamin for this.  Currently, his leg is wrapped.  It appears that the size of the ulcer is reduced and that the granulation tissue is healthy.  Please see review of systems and physical exam.        IMPRESSION AND PLAN:     1.  Yinka is a delightful 87-year-old gentleman who I am seeing for his nonischemic cardiomyopathy.  He also has a history of AFib with a rapid ventricular response and a history of hypertension.  Today, he appears to be New York Heart Association Class II.  He will continue with his Entresto at the same dose, 24-25 b.i.d., and his furosemide at 20 mg per day.   2.  AFib with a rapid ventricular response.  I am concerned about his fast heart rate.  We are balancing too low of a blood pressure versus too fast of a heart rate.  Therefore, today I am going to increase his morning dose of metoprolol to 100 and keeping the afternoon dose at 50 mg.  Yinka will continue to monitor his blood pressure and heart rate.  He will report any concerns he may have including any symptomatology.        Today we reviewed continuation of a 2 gram sodium diet.  In the next month, they are moving to [facility].  This move will include packing up 1 kitchen and having to establish a new kitchen setting at [place] in that timeframe.  Yinka concludes he will be eating out more which he verbalized it will be more challenging to follow a 2 gram sodium diet.      Lab work, basic metabolic panel was reviewed today.      Yinka will follow up with Dr. Pugh as scheduled in April.  There is a cardiac echo scheduled before this visit.      It is always a pleasure to see Yinka.         ERIC GUERRA, CNP             D: 2018   T: 2018   MT: MARTIN      Name:     YINKA GONZALEZ   MRN:      [id number]        Account:      [id number]   :      1931           Service Date: 2018       Document: [id number]           Outpatient Encounter Prescriptions as of 3/23/2018   Medication Sig Dispense Refill     metoprolol succinate (TOPROL XL) 100 MG 24 hr tablet Take 100 mg by mouth 2 times daily 1 tablet in the AM, 1/2 tablet in the PM        sacubitril-valsartan (ENTRESTO) 24-26 MG per tablet Take 1 tablet by mouth 2 times daily 1 tablet 0     warfarin (COUMADIN) 5 MG tablet Take 1 tablet (5 mg) by mouth daily 5 mg on Tlebdv-Twmzhrcls-Jfkanv, 2.5 mg Yyiefyw-Hmwqolfe-Akrddnec-Sunday.  Patient takes in the morning. 30 tablet 0     furosemide (LASIX) 20 MG tablet Take 20 mg by mouth daily       Vitamin D, Cholecalciferol, 1000 UNITS TABS Take 2,000 Units by mouth daily        Multiple Vitamins-Minerals (ICAPS AREDS FORMULA PO) Take 1 tablet by mouth daily Takes in addition to multivitamin with minerals       CYANOCOBALAMIN PO Take 500 mcg by mouth daily       Ascorbic Acid (VITAMIN C PO) Take 500 mg by mouth daily       No facility-administered encounter medications on file as of 3/23/2018.        Again, thank you for allowing me to participate in the care of your patient.      Sincerely,    ERIC [facility]

## 2018-03-23 NOTE — PROGRESS NOTES
Service Date: 03/23/2018      HISTORY OF PRESENT ILLNESS:  Jama Paul is a pleasant 87-year-old gentleman whom I am seeing at the [facility] at the [facility] for management of his nonischemic cardiomyopathy.  He is New York Heart Association class III.  His last echo demonstrated an ejection fraction of 40% with an overall moderate reduced LV systolic function with moderate global hypokinesis.  Today he is here in followup.        At the last couple of visits, I have been up titrating his medications.  Currently he is on:   1.  Entresto 24-26 mg b.i.d.   2.  Metoprolol 50 mg b.i.d.   3.  Warfarin   4.  Furosemide 20 mg daily.    5.  Vitamin D.    6.  Multivitamins.        Jama tends to run a low blood pressure.  Therefore, when I started Entresto, I reduced his metoprolol succinate to 50 mg per day.  He has been checking his blood pressure at home.  At home, it has been running about 105-107.  He went and had his INR checked and at that time his systolic blood pressure was 84 and 91.  He was asymptomatic with dizziness or lightheadedness.  Today, his blood pressure is 108/66.  His pulse, however, is running anywhere from 100-110 at rest.  He has a history of chronic atrial fibrillation.  He has a well controlled ventricular rate when his metoprolol was at 100 b.i.d.  Today he denies lightheadedness or dizziness.  He does deny palpitations.  He said this morning when he checked his pulse it was running faster than he recalls.      Today Jama, his daughter and his wife proclaim how happy they are with Entresto.  The family has noticed he is brighter, his voice is stronger.  Today, Jama states he does not have any dyspnea with any activity.  He is participating in cardiac rehab phase III, the [facility] program.      He has an ulcer on his ankle.  He sees Dr. Benjamin for this.  Currently, his leg is wrapped.  It appears that the size of the ulcer is reduced and that the granulation tissue is  healthy.  Please see review of systems and physical exam.        IMPRESSION AND PLAN:     1.  Yinka is a delightful 87-year-old gentleman who I am seeing for his nonischemic cardiomyopathy.  He also has a history of AFib with a rapid ventricular response and a history of hypertension.  Today, he appears to be New York Heart Association Class II.  He will continue with his Entresto at the same dose, 24-25 b.i.d., and his furosemide at 20 mg per day.   2.  AFib with a rapid ventricular response.  I am concerned about his fast heart rate.  We are balancing too low of a blood pressure versus too fast of a heart rate.  Therefore, today I am going to increase his morning dose of metoprolol to 100 and keeping the afternoon dose at 50 mg.  Yinka will continue to monitor his blood pressure and heart rate.  He will report any concerns he may have including any symptomatology.        Today we reviewed continuation of a 2 gram sodium diet.  In the next month, they are moving to [facility].  This move will include packing up 1 kitchen and having to establish a new kitchen setting at [place] in that timeframe.  Yinka concludes he will be eating out more which he verbalized it will be more challenging to follow a 2 gram sodium diet.      Lab work, basic metabolic panel was reviewed today.      Yinka will follow up with Dr. Pugh as scheduled in April.  There is a cardiac echo scheduled before this visit.      It is always a pleasure to see Yinka.         ERIC GUERRA, MANISH             D: 2018   T: 2018   MT: MARTIN      Name:     YINKA GONZALEZ   MRN:      [id number]        Account:      [id number]   :      1931           Service Date: 2018      Document: [id number]

## 2018-04-02 ENCOUNTER — HOSPITAL ENCOUNTER (OUTPATIENT)
Dept: WOUND CARE | Facility: CLINIC | Age: 83
Discharge: HOME | End: 2018-04-02
Attending: SURGERY | Admitting: SURGERY
Payer: MEDICARE

## 2018-04-02 VITALS
SYSTOLIC BLOOD PRESSURE: 101 MMHG | DIASTOLIC BLOOD PRESSURE: 67 MMHG | TEMPERATURE: 97.2 F | HEART RATE: 99 BPM | RESPIRATION RATE: 16 BRPM

## 2018-04-02 PROCEDURE — A6022 COLLAGEN DRSG>16<=48 SQ IN: HCPCS

## 2018-04-02 PROCEDURE — 11042 DBRDMT SUBQ TIS 1ST 20SQCM/<: CPT

## 2018-04-02 PROCEDURE — 11042 DBRDMT SUBQ TIS 1ST 20SQCM/<: CPT | Performed by: SURGERY

## 2018-04-02 NOTE — PROGRESS NOTES
[facility] [place] Progress Note    Subject: Jama Paul returns today with his family for follow-up of his Right mid anterior medial calf ulcer.  This is associated with marked swelling from his congestive heart failure with overlying skin necrosis.    On his visit 2 weeks ago the ulcer measured 4 x 3.8 x 0.1 cm with a good granulation bed being noted. He has been using Fibracol on the ulcer bed. They report that the Fibracol is completely dissolved when the dressings are changed.  He is having no pain and is ambulatory.    New medication he was started on for his congestive heart failure (Entresto) has been working extremely well.  Is no longer short of breath and his leg swelling is almost completely resolved.          Exam:  /67  Pulse 99  Temp 97.2  F (36.2  C) (Oral)  Resp 16  Very alert and appropriate.  Normal affect.  No significant leg swelling bilaterally.  Right anterior calf ulcer continues to decrease in size.  This now measures 3.7 x 3.6 cm with a depth of 0.1 cm.  Mild amount of slough and fibrinous noted with very healthy robust granulation tissue healing 95% of the wound.  At the 8 o'clock position there is one small area that has some underlying hematoma at the skin edge.  Rare epithelial sensation is noted circumferentially    Procedure:  Time out was called, informed consent obtained, and topical anesthetic of 4% lidocaine was applied per standing protocol, debridement was performed using a #15 blade down to and including subcutaneous tissue.  We removed all slough and fibrin.  Evacuated the small hematoma.  Debrided the skin edges circumferentially down the bleeding healthy tissue.  Bleeding controlled with light pressure. Patient tolerated procedure well.    Impression: Continued improvement with decrease in size of wound.  Very healthy firm robust granulation tissue.  We will continue with the Fibracol.  As the wound depth continues to decrease and also decrease  in size he may be a candidate in the near future for Woun'Dres collagen gel and this is been discussed.    Plan: We will dress the wounds with above.  Patient will return to the clinic in 2 weeks time    Wesley Benjamin MD  04/02/18 11:15 AM

## 2018-04-02 NOTE — MR AVS SNAPSHOT
MRN:[id number]                      After Visit Summary   4/2/2018    Jama Paul    MRN: [id number]           Visit Information        Provider Department      4/2/2018 10:45 AM Wesley Benjamin MD [facility]        Your next 10 appointments already scheduled     Apr 16, 2018 12:45 PM CDT   Ech Complete with [id number]   [facility] CV [facility] (Cardiovascular Imaging at [facility])    [address]  [address]  [facility] [address] [phone]           1. Please bring or wear a comfortable two-piece outfit. 2. You may eat, drink and take your normal medicines. 3. For any questions that cannot be answered, please contact the ordering physician            Apr 16, 2018  2:00 PM CDT   LAB with [facility]   [facility] [place] [facility]    [address]  [place] [address] [phone]           Please do not eat 10-12 hours before your appointment if you are coming in fasting for labs on lipids, cholesterol, or glucose (sugar). This does not apply to pregnant women. Water, hot tea and black coffee (with nothing added) are okay. Do not drink other fluids, diet soda or chew gum.            Apr 17, 2018 10:00 AM CDT   Core MD Return with Long Pugh MD   [facility]    [address]  [place] [id number] [phone] OPT 2                Further instructions from your care team              [facility]  [address], [place] [address]  Appointment Phone [phone] Nurse Advisors [phone]      Jama Paul      2/13/1931  [facility] Phone:[phone]  Fax:[phone]  [place] [facility]       Wound Dressing Change to right medial lower leg  Cleanse wound and surrounding skin with:  "wound cleanser  Cover wound with Fibracol cut to size of wound (this will dissolve into the wound)  Cover wound with ABD pad and secure with roll gauze  Change dressing Monday, Wednesday, Friday    Compression:   You have a compression wrap Spandagrip D is supposed to be removed at night and put back on first thing in the morning. On Both Lower Legs  Please remove compression dressing if toes turn blue and/or tingle and can not be relieved by raising the leg for one hour.     A diet high in protein is important for wound healing, we recommend getting 60 grams of protein per day. Taking protein shakes or bars are a good way to get extra protein in your diet.        Wesley Benjamin M.D. 2018          Call us at [phone] if you have any questions about your wounds, have redness or swelling around your wound, have a fever of 101 or greater or if you have any other problems or concerns. We answer the phone Monday through Friday 8 am to 4 pm, please leave a message as we check the voicemail frequently throughout the day.       Follow up with Provider - 3-4 weeks       [employer] Information     [employer] lets you send messages to your doctor, view your test results, renew your prescriptions, schedule appointments and more. To sign up, go to [email or web address] . Click on \"Log in\" on the left side of the screen, which will take you to the Welcome page. Then click on \"Sign up Now\" on the right side of the page.     You will be asked to enter the access code listed below, as well as some personal information. Please follow the directions to create your username and password.     Your access code is: [email or web address]  Expires: 4/3/2018  3:08 PM     Your access code will  in 90 days. If you need help or a new code, please call your [place] clinic or [phone].        Care EveryWhere ID     This is your Care EveryWhere ID. This could be used by other organizations to access your [place] medical " records  [id number]        Equal Access to Services     CECILE MOURA : Caridad Carrillo, autumn isbell, danielle kapoor, emani avalos. So [facility] [phone].    ATENCIÓN: Si habla español, tiene a cleaning disposición servicios gratuitos de asistencia lingüística. Llame al [phone].    We comply with applicable federal civil rights laws and [place] laws. We do not discriminate on the basis of race, color, national origin, age, disability, sex, sexual orientation, or gender identity.

## 2018-04-02 NOTE — DISCHARGE INSTRUCTIONS
[facility]  [address] [facility] [address], [place] [address]  Appointment Phone [phone] Nurse Advisors [phone]      Jama Paul      2/13/1931  [facility] Phone:[phone]  Fax:[phone]  Includes [place] and [place] Locations       Wound Dressing Change to right medial lower leg  Cleanse wound and surrounding skin with: wound cleanser  Cover wound with Fibracol cut to size of wound (this will dissolve into the wound)  Cover wound with ABD pad and secure with roll gauze  Change dressing Monday, Wednesday, Friday    Compression:   You have a compression wrap Spandagrip D is supposed to be removed at night and put back on first thing in the morning. On Both Lower Legs  Please remove compression dressing if toes turn blue and/or tingle and can not be relieved by raising the leg for one hour.     A diet high in protein is important for wound healing, we recommend getting 60 grams of protein per day. Taking protein shakes or bars are a good way to get extra protein in your diet.        Wesley Benjamin M.D. April 2nd, 2018          Call us at [phone] if you have any questions about your wounds, have redness or swelling around your wound, have a fever of 101 or greater or if you have any other problems or concerns. We answer the phone Monday through Friday 8 am to 4 pm, please leave a message as we check the voicemail frequently throughout the day.       Follow up with Provider - 3-4 weeks

## 2018-04-06 DIAGNOSIS — I50.43 ACUTE ON CHRONIC COMBINED SYSTOLIC AND DIASTOLIC CHF (CONGESTIVE HEART FAILURE) (H): ICD-10-CM

## 2018-04-16 ENCOUNTER — HOSPITAL ENCOUNTER (OUTPATIENT)
Dept: CARDIOLOGY | Facility: CLINIC | Age: 83
Discharge: HOME | End: 2018-04-16
Attending: INTERNAL MEDICINE | Admitting: INTERNAL MEDICINE
Payer: MEDICARE

## 2018-04-16 DIAGNOSIS — I50.43 ACUTE ON CHRONIC COMBINED SYSTOLIC AND DIASTOLIC CHF (CONGESTIVE HEART FAILURE) (H): ICD-10-CM

## 2018-04-16 LAB
ANION GAP SERPL CALCULATED.3IONS-SCNC: 10.2 MMOL/L (ref 6–17)
BUN SERPL-MCNC: 24 MG/DL (ref 7–30)
CALCIUM SERPL-MCNC: 8.7 MG/DL (ref 8.5–10.5)
CHLORIDE SERPL-SCNC: 102 MMOL/L (ref 98–107)
CO2 SERPL-SCNC: 28 MMOL/L (ref 23–29)
CREAT SERPL-MCNC: 1.21 MG/DL (ref 0.7–1.3)
GFR SERPL CREATININE-BSD FRML MDRD: 57 ML/MIN/1.7M2
GLUCOSE SERPL-MCNC: 117 MG/DL (ref 70–105)
POTASSIUM SERPL-SCNC: 4.2 MMOL/L (ref 3.5–5.1)
SODIUM SERPL-SCNC: 136 MMOL/L (ref 136–145)

## 2018-04-16 PROCEDURE — 93306 TTE W/DOPPLER COMPLETE: CPT

## 2018-04-16 PROCEDURE — 93306 TTE W/DOPPLER COMPLETE: CPT | Mod: 26 | Performed by: INTERNAL MEDICINE

## 2018-04-16 PROCEDURE — 36415 COLL VENOUS BLD VENIPUNCTURE: CPT | Performed by: INTERNAL MEDICINE

## 2018-04-16 PROCEDURE — 80048 BASIC METABOLIC PNL TOTAL CA: CPT | Performed by: INTERNAL MEDICINE

## 2018-04-17 ENCOUNTER — OFFICE VISIT (OUTPATIENT)
Dept: CARDIOLOGY | Facility: CLINIC | Age: 83
End: 2018-04-17
Attending: INTERNAL MEDICINE
Payer: MEDICARE

## 2018-04-17 VITALS
HEIGHT: 69 IN | WEIGHT: 167.8 LBS | SYSTOLIC BLOOD PRESSURE: 136 MMHG | DIASTOLIC BLOOD PRESSURE: 87 MMHG | HEART RATE: 84 BPM | BODY MASS INDEX: 24.85 KG/M2

## 2018-04-17 DIAGNOSIS — I50.43 ACUTE ON CHRONIC COMBINED SYSTOLIC AND DIASTOLIC CHF (CONGESTIVE HEART FAILURE) (H): ICD-10-CM

## 2018-04-17 PROCEDURE — 99214 OFFICE O/P EST MOD 30 MIN: CPT | Performed by: INTERNAL MEDICINE

## 2018-04-17 NOTE — PROGRESS NOTES
HPI and Plan:   See dictation(#[id number])    Orders Placed This Encounter   Procedures     Basic metabolic panel     Follow-Up with [facility] visit       No orders of the defined types were placed in this encounter.      There are no discontinued medications.      Encounter Diagnosis   Name Primary?     Acute on chronic combined systolic and diastolic CHF (congestive heart failure) (H)        CURRENT MEDICATIONS:  Current Outpatient Prescriptions   Medication Sig Dispense Refill     sacubitril-valsartan (ENTRESTO) 24-26 MG per tablet Take 1 tablet by mouth 2 times daily 180 tablet 3     metoprolol succinate (TOPROL XL) 100 MG 24 hr tablet Take 100 mg by mouth 2 times daily 1 tablet in the AM, 1/2 tablet in the PM        warfarin (COUMADIN) 5 MG tablet Take 1 tablet (5 mg) by mouth daily 5 mg on Zzdqcu-Peapkcyux-Qzuxeq, 2.5 mg Ghrhtay-Enszpnso-Pjhewvcr-Sunday.  Patient takes in the morning. 30 tablet 0     furosemide (LASIX) 20 MG tablet Take 20 mg by mouth daily       Vitamin D, Cholecalciferol, 1000 UNITS TABS Take 2,000 Units by mouth daily        Multiple Vitamins-Minerals (ICAPS AREDS FORMULA PO) Take 1 tablet by mouth daily Takes in addition to multivitamin with minerals       CYANOCOBALAMIN PO Take 500 mcg by mouth daily       Ascorbic Acid (VITAMIN C PO) Take 500 mg by mouth daily         ALLERGIES   No Known Allergies    PAST MEDICAL HISTORY:  Past Medical History:   Diagnosis Date     Antiplatelet or antithrombotic long-term use      Arrhythmia      Atrial fibrillation (H)      Elevated PSA      Thrombocytopenia (H)      Unspecified essential hypertension        PAST SURGICAL HISTORY:  Past Surgical History:   Procedure Laterality Date     COLONOSCOPY       EXPLORE GROIN  4/30/2014    Procedure: EXPLORE GROIN;  Surgeon: Sam Aguirre MD;  Location:  OR     HERNIORRHAPHY INGUINAL       HERNIORRHAPHY INGUINAL  4/30/2014    Procedure: HERNIORRHAPHY INGUINAL;  Surgeon: Sam Aguirre MD;  Location:   "OR     MOHS MICROGRAPHIC PROCEDURE       PHACOEMULSIFICATION CLEAR CORNEA WITH STANDARD INTRAOCULAR LENS IMPLANT Right 9/8/2015    Procedure: PHACOEMULSIFICATION CLEAR CORNEA WITH STANDARD INTRAOCULAR LENS IMPLANT;  Surgeon: Gil Shannon MD;  Location: [facility]     septic olecranon bursitis[         FAMILY HISTORY:  Family History   Problem Relation Age of Onset     HEART DISEASE No family hx of        SOCIAL HISTORY:  Social History     Social History     Marital status:      Spouse name: N/A     Number of children: N/A     Years of education: N/A     Social History Main Topics     Smoking status: Never Smoker     Smokeless tobacco: Never Used     Alcohol use No     Drug use: No     Sexual activity: Not Asked     Other Topics Concern     Parent/Sibling W/ Cabg, Mi Or Angioplasty Before 65f 55m? No     Social History Narrative       Review of Systems:  Skin:  Negative       Eyes:  Positive for glasses;cataracts hx of cataracts in right eyes.   ENT:  Negative      Respiratory:  Positive for shortness of breath;dyspnea on exertion pt reports SOB infrequently.    Cardiovascular:  Negative;palpitations;chest pain;edema   wears compression socks most of the time on right leg.  Gastroenterology: Negative      Genitourinary:  Positive for urinary frequency;nocturia due to meds  Musculoskeletal:  Negative      Neurologic:  Negative      Psychiatric:  Negative      Heme/Lymph/Imm:  Negative      Endocrine:  Negative        Physical Exam:  Vitals: /87  Pulse 84  Ht 1.74 m (5' 8.5\")  Wt 76.1 kg (167 lb 12.8 oz)  BMI 25.14 kg/m2    Constitutional:           Skin:             Head:           Eyes:           Lymph:      ENT:           Neck:           Respiratory:            Cardiac:                                                           GI:           Extremities and Muscular Skeletal:                 Neurological:           Psych:             CC  Long Pugh MD  [address]  [place] " [id number]

## 2018-04-17 NOTE — PROGRESS NOTES
Service Date: 04/17/2018      REASON FOR CLINIC VISIT:  Followup of congestive heart failure.      HISTORY OF PRESENT ILLNESS:  Mr. Paul is a very pleasant 87-year-old gentleman with a history of chronic atrial fibrillation, hypertension, chronic thrombocytopenia, who was admitted in 12/2017 with acute decompensated congestive heart failure with LVEF of around 30%-35% with global hypokinesia, with cardiac MRI showing LVEF of 42% with global hypokinesia, with RVEF of 44%, with moderate to severe biatrial enlargement.  Regadenoson stress perfusion was negative for ischemia, and no delayed enhancement was seen.  The patient was then again admitted in January for decompensated congestive heart failure, underwent diuresis and also underwent right heart catheterization that showed normal filling pressure, both left and right-sided, with normal cardiac output.  I saw the patient for the first time in January this year.  Clinically, it appeared that the congestive heart failure may be tachycardia-mediated.  In the followup, he was seen by Winnie Garcia.  He had 1 more hospitalization for acute decompensated congestive heart failure.      Today, he is coming for followup.  The patient tells me this is the best he is feeling so far.  He is weighing now 167 pounds, which appears quite stable.  He had a repeat echocardiogram done yesterday that showed remarkable improvement in LV function.  LVEF is now around 65%-70%.  Mildly decreased RV systolic function, 2 to 3+ tricuspid regurgitation, RVSP of 24 mmHg plus RA, left pleural effusion was also noted.  It looks like in the last few months, lisinopril was changed to Entresto.  The patient tells me that he is fairly active now, he does treadmill, he goes to [facility] where he does cardiovascular exercise on machine for about half an hour, his breathing is quite stable.  In fact, this is the best he has felt in the last 4 months.  No chest discomfort.  No orthopnea.  He is  careful with salt intake.  He is presently on 100 mg of Toprol-XL in the morning and 50 in the evening.  He is also on Entresto.  He takes Coumadin.  Kidney functions yesterday show stable creatinine 1.21.  Potassium normal at 4.2.      PHYSICAL EXAMINATION:   VITAL SIGNS:  Blood pressure 136/87, heart rate 84 irregular, weight 167 pounds, BMI 25.2.   GENERAL:  The patient appears pleasant, comfortable.   NECK:  JVP appears normal.   CARDIOVASCULAR SYSTEM:  Irregular, normal rate, no murmur, rub or gallop.   RESPIRATORY SYSTEM:  Slightly decreased air entry at the left base, otherwise clear to auscultation bilaterally, no crackles or wheezing.   ABDOMEN:  Soft, nontender.   EXTREMITIES:  No pitting pedal edema.   NEUROLOGICAL:  Alert, oriented x3.   PSYCHIATRIC:  Normal affect.   SKIN:  No obvious rash.   HEENT:  Mild pallor.      IMPRESSION AND PLAN:  A pleasant 87-year-old gentleman who recently had acute decompensated congestive heart failure in the setting of rapid ventricular rate with chronic atrial fibrillation, with CHADS2-VASc score of 5 on Coumadin for stroke prophylaxis.  His ventricular rates are well controlled.  Blood pressure is reasonably controlled.  Echocardiogram has shown significant improvement in LV function, is now normal to hyperdynamic.  There is evidence of pleural effusion on echocardiogram.  Clinically, on auscultation there is only a small area of dullness in the left lower base; otherwise, he appears euvolemic and compensated.  Symptom-wise he is feeling the best so far.  The patient was very happy to learn the results of the echocardiogram.  At this time, I recommend continuing current cardiac medications of metoprolol, Entresto, Coumadin and Lasix.  I recommended him to continue to observe salt restriction, keeping sodium less than 2 grams per day, and if there is 3 pounds or more of weight gain to take an extra dose of Lasix that day.  If he continues to feel stable cardiac-wise we  can see him back in 3 months, sooner if he notices any change in clinical status.         DEIRDRE SPENCER MD             D: 2018   T: 2018   MT: MAG      Name:     YINKA GONZALEZ   MRN:      [id number]        Account:      [id number]   :      1931           Service Date: 2018      Document: [id number]

## 2018-04-17 NOTE — LETTER
4/17/2018      Clint Pizarro MD  [address] [address]      RE: Jama Paul       Dear Colleague,    I had the pleasure of seeing Jama Paul in the [facility].    Service Date: 04/17/2018      REASON FOR CLINIC VISIT:  Followup of congestive heart failure.      HISTORY OF PRESENT ILLNESS:  Mr. Paul is a very pleasant 87-year-old gentleman with a history of chronic atrial fibrillation, hypertension, chronic thrombocytopenia, who was admitted in 12/2017 with acute decompensated congestive heart failure with LVEF of around 30%-35% with global hypokinesia, with cardiac MRI showing LVEF of 42% with global hypokinesia, with RVEF of 44%, with moderate to severe biatrial enlargement.  Regadenoson stress perfusion was negative for ischemia, and no delayed enhancement was seen.  The patient was then again admitted in January for decompensated congestive heart failure, underwent diuresis and also underwent right heart catheterization that showed normal filling pressure, both left and right-sided, with normal cardiac output.  I saw the patient for the first time in January this year.  Clinically, it appeared that the congestive heart failure may be tachycardia-mediated.  In the followup, he was seen by Winnie Garcia.  He had 1 more hospitalization for acute decompensated congestive heart failure.      Today, he is coming for followup.  The patient tells me this is the best he is feeling so far.  He is weighing now 167 pounds, which appears quite stable.  He had a repeat echocardiogram done yesterday that showed remarkable improvement in LV function.  LVEF is now around 65%-70%.  Mildly decreased RV systolic function, 2 to 3+ tricuspid regurgitation, RVSP of 24 mmHg plus RA, left pleural effusion was also noted.  It looks like in the last few months, lisinopril was changed to Entresto.  The patient tells me that he is fairly active now, he  does treadmill, he goes to [employer] where he does cardiovascular exercise on machine for about half an hour, his breathing is quite stable.  In fact, this is the best he has felt in the last 4 months.  No chest discomfort.  No orthopnea.  He is careful with salt intake.  He is presently on 100 mg of Toprol-XL in the morning and 50 in the evening.  He is also on Entresto.  He takes Coumadin.  Kidney functions yesterday show stable creatinine 1.21.  Potassium normal at 4.2.      PHYSICAL EXAMINATION:   VITAL SIGNS:  Blood pressure 136/87, heart rate 84 irregular, weight 167 pounds, BMI 25.2.   GENERAL:  The patient appears pleasant, comfortable.   NECK:  JVP appears normal.   CARDIOVASCULAR SYSTEM:  Irregular, normal rate, no murmur, rub or gallop.   RESPIRATORY SYSTEM:  Slightly decreased air entry at the left base, otherwise clear to auscultation bilaterally, no crackles or wheezing.   ABDOMEN:  Soft, nontender.   EXTREMITIES:  No pitting pedal edema.   NEUROLOGICAL:  Alert, oriented x3.   PSYCHIATRIC:  Normal affect.   SKIN:  No obvious rash.   HEENT:  Mild pallor.      IMPRESSION AND PLAN:  A pleasant 87-year-old gentleman who recently had acute decompensated congestive heart failure in the setting of rapid ventricular rate with chronic atrial fibrillation, with CHADS2-VASc score of 5 on Coumadin for stroke prophylaxis.  His ventricular rates are well controlled.  Blood pressure is reasonably controlled.  Echocardiogram has shown significant improvement in LV function, is now normal to hyperdynamic.  There is evidence of pleural effusion on echocardiogram.  Clinically, on auscultation there is only a small area of dullness in the left lower base; otherwise, he appears euvolemic and compensated.  Symptom-wise he is feeling the best so far.  The patient was very happy to learn the results of the echocardiogram.  At this time, I recommend continuing current cardiac medications of metoprolol, Entresto, Coumadin  and Lasix.  I recommended him to continue to observe salt restriction, keeping sodium less than 2 grams per day, and if there is 3 pounds or more of weight gain to take an extra dose of Lasix that day.  If he continues to feel stable cardiac-wise we can see him back in 3 months, sooner if he notices any change in clinical status.         LONG PUGH MD             D: 2018   T: 2018   MT: MAG      Name:     YINKA GONZALEZ   MRN:      [id number]        Account:      [id number]   :      1931           Service Date: 2018      Document: [id number]         Outpatient Encounter Prescriptions as of 2018   Medication Sig Dispense Refill     Ascorbic Acid (VITAMIN C PO) Take 500 mg by mouth daily       CYANOCOBALAMIN PO Take 500 mcg by mouth daily       furosemide (LASIX) 20 MG tablet Take 20 mg by mouth daily       metoprolol succinate (TOPROL XL) 100 MG 24 hr tablet Take 100 mg by mouth 2 times daily 1 tablet in the AM, 1/2 tablet in the PM        Multiple Vitamins-Minerals (ICAPS AREDS FORMULA PO) Take 1 tablet by mouth daily Takes in addition to multivitamin with minerals       sacubitril-valsartan (ENTRESTO) 24-26 MG per tablet Take 1 tablet by mouth 2 times daily 180 tablet 3     Vitamin D, Cholecalciferol, 1000 UNITS TABS Take 2,000 Units by mouth daily        warfarin (COUMADIN) 5 MG tablet Take 1 tablet (5 mg) by mouth daily 5 mg on Vohjco-Xslxhrpxx-Khfjqf, 2.5 mg Fbpncak-Wtrhchka-Jexyybdh-.  Patient takes in the morning. 30 tablet 0     No facility-administered encounter medications on file as of 2018.        Again, thank you for allowing me to participate in the care of your patient.      Sincerely,    Long Pugh MD     [facility]

## 2018-04-17 NOTE — MR AVS SNAPSHOT
After Visit Summary   4/17/2018    Jama Paul    MRN: [id number]           Patient Information     Date Of Birth          2/13/1931        Visit Information        Provider Department      4/17/2018 10:00 AM Long Pugh MD [facility]        Today's Diagnoses     Acute on chronic combined systolic and diastolic CHF (congestive heart failure) (H)           Follow-ups after your visit        Additional Services     Follow-Up with [facility] visit                 Your next 10 appointments already scheduled     Apr 30, 2018 11:00 AM CDT   Return Visit with Wesley Benjamin MD   [facility])    [address]  [address]  [facility] [address] [phone]              Future tests that were ordered for you today     Open Future Orders        Priority Expected Expires Ordered    Follow-Up with [facility] visit Routine 7/16/2018 4/17/2019 4/17/2018    Basic metabolic panel Routine 7/16/2018 4/17/2019 4/17/2018            Who to contact     If you have questions or need follow up information about today's clinic visit or your schedule please contact [facility] directly at [phone].  Normal or non-critical lab and imaging results will be communicated to you by MyChart, letter or phone within 4 business days after the clinic has received the results. If you do not hear from us within 7 days, please contact the clinic through [employer] or phone. If you have a critical or abnormal lab result, we will notify you by phone as soon as possible.  Submit refill requests through [employer] or call your pharmacy and they will forward the refill request to us. Please allow 3 business days for your refill to be completed.          Additional Information About Your Visit        [employer] Information     [employer] lets you send messages to your doctor, view your  "test results, renew your prescriptions, schedule appointments and more. To sign up, go to [email or web address] . Click on \"Log in\" on the left side of the screen, which will take you to the Welcome page. Then click on \"Sign up Now\" on the right side of the page.     You will be asked to enter the access code listed below, as well as some personal information. Please follow the directions to create your username and password.     Your access code is: [email or web address]  Expires: 2018 10:31 AM     Your access code will  in 90 days. If you need help or a new code, please call your [place] clinic or [phone].        Care EveryWhere ID     This is your Care EveryWhere ID. This could be used by other organizations to access your [place] medical records  [id number]        Your Vitals Were     Pulse Height BMI (Body Mass Index)             84 1.74 m (5' 8.5\") 25.14 kg/m2          Blood Pressure from Last 3 Encounters:   18 136/87   18 101/67   18 108/66    Weight from Last 3 Encounters:   18 76.1 kg (167 lb 12.8 oz)   18 75.5 kg (166 lb 8 oz)   18 74.6 kg (164 lb 8 oz)              We Performed the Following     Follow-Up with Cardiologist        Primary Care Provider Office Phone # Fax #    Clint Pizarro [phone]       [place] [address]        Equal Access to Services     [facility]: Hadii aad violet hadasho Soomaali, waaxda luqadaha, qaybta kaalmada adechandanyabrian, emani avalos. So [facility] [phone].    ATENCIÓN: Si habla español, tiene a cleaning disposición servicios gratuitos de asistencia lingüística. Llame al [phone].    We comply with applicable federal civil rights laws and [place] laws. We do not discriminate on the basis of race, color, national origin, age, disability, sex, sexual orientation, or gender identity.            Thank you!     Thank you for choosing " [facility]  for your care. Our goal is always to provide you with excellent care. Hearing back from our patients is one way we can continue to improve our services. Please take a few minutes to complete the written survey that you may receive in the mail after your visit with us. Thank you!             Your Updated Medication List - Protect others around you: Learn how to safely use, store and throw away your medicines at www.disposemymeds.org.          This list is accurate as of 4/17/18 10:32 AM.  Always use your most recent med list.                   Brand Name Dispense Instructions for use Diagnosis    CYANOCOBALAMIN PO      Take 500 mcg by mouth daily        ICAPS AREDS FORMULA PO      Take 1 tablet by mouth daily Takes in addition to multivitamin with minerals        LASIX 20 MG tablet   Generic drug:  furosemide      Take 20 mg by mouth daily        sacubitril-valsartan 24-26 MG per tablet    ENTRESTO    180 tablet    Take 1 tablet by mouth 2 times daily    Acute on chronic combined systolic and diastolic CHF (congestive heart failure) (H)       TOPROL  MG 24 hr tablet   Generic drug:  metoprolol succinate      Take 100 mg by mouth 2 times daily 1 tablet in the AM, 1/2 tablet in the PM        VITAMIN C PO      Take 500 mg by mouth daily        Vitamin D (Cholecalciferol) 1000 units Tabs      Take 2,000 Units by mouth daily        warfarin 5 MG tablet    COUMADIN    30 tablet    Take 1 tablet (5 mg) by mouth daily 5 mg on Wjmvvs-Ufvvrzqol-Ukfttl, 2.5 mg Xesbnpi-Fdsxidea-Ygtzoijj-Sunday.  Patient takes in the morning.    Atrial fibrillation, unspecified type (H)

## 2018-04-17 NOTE — LETTER
4/17/2018    Clint Pizarro MD  [address] [address]    RE: Jama Paul       Dear Colleague,    I had the pleasure of seeing Jama Paul in the [facility].    HPI and Plan:   See dictation(#[id number])    Orders Placed This Encounter   Procedures     Basic metabolic panel     Follow-Up with [facility] visit       No orders of the defined types were placed in this encounter.      There are no discontinued medications.      Encounter Diagnosis   Name Primary?     Acute on chronic combined systolic and diastolic CHF (congestive heart failure) (H)        CURRENT MEDICATIONS:  Current Outpatient Prescriptions   Medication Sig Dispense Refill     sacubitril-valsartan (ENTRESTO) 24-26 MG per tablet Take 1 tablet by mouth 2 times daily 180 tablet 3     metoprolol succinate (TOPROL XL) 100 MG 24 hr tablet Take 100 mg by mouth 2 times daily 1 tablet in the AM, 1/2 tablet in the PM        warfarin (COUMADIN) 5 MG tablet Take 1 tablet (5 mg) by mouth daily 5 mg on Kvftxl-Ietmrqryb-Fpfbng, 2.5 mg Ipyqbly-Hivjqsyt-Lhgtzpti-Sunday.  Patient takes in the morning. 30 tablet 0     furosemide (LASIX) 20 MG tablet Take 20 mg by mouth daily       Vitamin D, Cholecalciferol, 1000 UNITS TABS Take 2,000 Units by mouth daily        Multiple Vitamins-Minerals (ICAPS AREDS FORMULA PO) Take 1 tablet by mouth daily Takes in addition to multivitamin with minerals       CYANOCOBALAMIN PO Take 500 mcg by mouth daily       Ascorbic Acid (VITAMIN C PO) Take 500 mg by mouth daily         ALLERGIES   No Known Allergies    PAST MEDICAL HISTORY:  Past Medical History:   Diagnosis Date     Antiplatelet or antithrombotic long-term use      Arrhythmia      Atrial fibrillation (H)      Elevated PSA      Thrombocytopenia (H)      Unspecified essential hypertension        PAST SURGICAL HISTORY:  Past Surgical History:   Procedure Laterality Date     COLONOSCOPY        "EXPLORE GROIN  4/30/2014    Procedure: EXPLORE GROIN;  Surgeon: Sam Aguirre MD;  Location:  OR     HERNIORRHAPHY INGUINAL       HERNIORRHAPHY INGUINAL  4/30/2014    Procedure: HERNIORRHAPHY INGUINAL;  Surgeon: Sam Aguirre MD;  Location:  OR     MOHS MICROGRAPHIC PROCEDURE       PHACOEMULSIFICATION CLEAR CORNEA WITH STANDARD INTRAOCULAR LENS IMPLANT Right 9/8/2015    Procedure: PHACOEMULSIFICATION CLEAR CORNEA WITH STANDARD INTRAOCULAR LENS IMPLANT;  Surgeon: Gil Shannon MD;  Location:  EC     septic olecranon bursitis[         FAMILY HISTORY:  Family History   Problem Relation Age of Onset     HEART DISEASE No family hx of        SOCIAL HISTORY:  Social History     Social History     Marital status:      Spouse name: N/A     Number of children: N/A     Years of education: N/A     Social History Main Topics     Smoking status: Never Smoker     Smokeless tobacco: Never Used     Alcohol use No     Drug use: No     Sexual activity: Not Asked     Other Topics Concern     Parent/Sibling W/ Cabg, Mi Or Angioplasty Before 65f 55m? No     Social History Narrative       Review of Systems:  Skin:  Negative       Eyes:  Positive for glasses;cataracts hx of cataracts in right eyes.   ENT:  Negative      Respiratory:  Positive for shortness of breath;dyspnea on exertion pt reports SOB infrequently.    Cardiovascular:  Negative;palpitations;chest pain;edema   wears compression socks most of the time on right leg.  Gastroenterology: Negative      Genitourinary:  Positive for urinary frequency;nocturia due to meds  Musculoskeletal:  Negative      Neurologic:  Negative      Psychiatric:  Negative      Heme/Lymph/Imm:  Negative      Endocrine:  Negative        Physical Exam:  Vitals: /87  Pulse 84  Ht 1.74 m (5' 8.5\")  Wt 76.1 kg (167 lb 12.8 oz)  BMI 25.14 kg/m2    Constitutional:           Skin:             Head:           Eyes:           Lymph:      ENT:           Neck:       "     Respiratory:            Cardiac:                                                           GI:           Extremities and Muscular Skeletal:                 Neurological:           Psych:             CC  Long Pugh MD  [address]  [place] [address]                    Thank you for allowing me to participate in the care of your patient.      Sincerely,     Long Pugh MD     [facility]    cc:   Long Pugh MD  [address] [place]  [place] [address]

## 2018-04-30 ENCOUNTER — HOSPITAL ENCOUNTER (OUTPATIENT)
Dept: WOUND CARE | Facility: CLINIC | Age: 83
Discharge: HOME | End: 2018-04-30
Attending: SURGERY | Admitting: SURGERY
Payer: MEDICARE

## 2018-04-30 VITALS
HEART RATE: 83 BPM | DIASTOLIC BLOOD PRESSURE: 74 MMHG | SYSTOLIC BLOOD PRESSURE: 104 MMHG | RESPIRATION RATE: 12 BRPM | TEMPERATURE: 96.7 F

## 2018-04-30 PROCEDURE — A6022 COLLAGEN DRSG>16<=48 SQ IN: HCPCS

## 2018-04-30 PROCEDURE — 11042 DBRDMT SUBQ TIS 1ST 20SQCM/<: CPT | Performed by: SURGERY

## 2018-04-30 PROCEDURE — 11042 DBRDMT SUBQ TIS 1ST 20SQCM/<: CPT

## 2018-04-30 NOTE — PROGRESS NOTES
[facility] [place] Progress Note    Subject: Jama Paul returns with his wife and daughter for follow-up of his right calf ulceration.  He has been using Fibracol on the ulceration which is progressively decreased in size.  Due to its location he a has not had any problems with his other activities including ambulation.  He and his wife just completed their move to [facility].  He still takes some protein supplements.  He has no complaints at all including no pain.      Exam:  /74  Pulse 83  Temp 96.7  F (35.9  C) (Temporal)  Resp 12  Alert and appropriate.  No leg edema.  No cellulitis.  Ulcerated area measures 3.2 x 3.7 cm with hyper granulation tissue noted.  The more proximal lateral portion of the ulcer has healed.  Mild amount of slough and fibrin.  No undermining.      Procedure:  Time out was called, informed consent obtained, and topical anesthetic of 4% lidocaine was applied per standing protocol, debridement was performed using a #15 blade down to and including subcutaneous tissue.  We excised the hyper granulation tissue down to the level of the adjacent epidermis.  Skin edges and base were debrided.  Mild edema is noted within the granulation tissue but following debridement this does look very healthy and robust.  Bleeding controlled with light pressure. Patient tolerated procedure well.    Impression: Slowly improving right medial calf ulcer.  Or allowing this to heal by secondary intent.  Continue with Fibracol dressings 3 times a week    Plan: We will dress the wounds with Fibracol and low-grade compression with Spandagrip.  Patient will return to the clinic in 3 weeks time    Wesley Benjamin MD  04/30/18 12:02 PM

## 2018-04-30 NOTE — MR AVS SNAPSHOT
MRN:[id number]                      After Visit Summary   4/30/2018    Jama Paul    MRN: [id number]           Visit Information        Provider Department      4/30/2018 11:00 AM Wesley Benjamin MD [facility]        Your next 10 appointments already scheduled     May 21, 2018 10:30 AM CDT   Return Visit with Wesley Benjamin MD   [facility] ([facility])    [address]  [address]  [place] [address] [phone]            Jul 16, 2018  8:30 AM CDT   LAB with MCGEE LAB   [facility]YS[facility] AT [place] ([facility])    [address]  [place] [facility] [address] [phone]           Please do not eat 10-12 hours before your appointment if you are coming in fasting for labs on lipids, cholesterol, or glucose (sugar). This does not apply to pregnant women. Water, hot tea and black coffee (with nothing added) are okay. Do not drink other fluids, diet soda or chew gum.            Jul 16, 2018  9:30 AM CDT   Core Return with ERIC Fernandez CNP   [facility] ([facility])    [address]  [place] [facility] [address] [phone] OPT 2                Further instructions from your care team              [facility]  [address], [place] [facility] [address]  Appointment Phone [phone] Nurse Advisors [phone]      Jama Paul      2/13/1931  [facility] Phone:[phone]  Fax:[phone]  Includes [place] and [place] Locations       Wound Dressing Change to right medial lower leg  Cleanse wound and surrounding skin with: wound cleanser  Cover wound with Fibracol cut to size of wound (this will dissolve into the wound)  Cover wound with ABD pad and secure with roll gauze  Change dressing Monday, Wednesday,  "Friday     Compression:   You have a compression wrap Spandagrip D is supposed to be removed at night and put back on first thing in the morning. On Both Lower Legs  Please remove compression dressing if toes turn blue and/or tingle and can not be relieved by raising the leg for one hour.      A diet high in protein is important for wound healing, we recommend getting 60 grams of protein per day. Taking protein shakes or bars are a good way to get extra protein in your diet.        Wesley Benjamin M.D. 2018          Call us at [phone] if you have any questions about your wounds, have redness or swelling around your wound, have a fever of 101 or greater or if you have any other problems or concerns. We answer the phone Monday through Friday 8 am to 4 pm, please leave a message as we check the voicemail frequently throughout the day.       Follow up with Provider - 3-4 weeks          [employer] Information     [employer] lets you send messages to your doctor, view your test results, renew your prescriptions, schedule appointments and more. To sign up, go to [email or web address] . Click on \"Log in\" on the left side of the screen, which will take you to the Welcome page. Then click on \"Sign up Now\" on the right side of the page.     You will be asked to enter the access code listed below, as well as some personal information. Please follow the directions to create your username and password.     Your access code is: [email or web address]  Expires: 2018 10:31 AM     Your access code will  in 90 days. If you need help or a new code, please call your [place] clinic or [phone].        Care EveryWhere ID     This is your Care EveryWhere ID. This could be used by other organizations to access your [place] medical records  [id number]        Equal Access to Services     CECILE AVALOS: Caridad Carrillo, autumn isbell, qaybryan kapoor, emani avalos. " So [facility] [phone].    ATENCIÓN: Si habla español, tiene a cleaning disposición servicios gratuitos de asistencia lingüística. Llame al [phone].    We comply with applicable federal civil rights laws and [place] laws. We do not discriminate on the basis of race, color, national origin, age, disability, sex, sexual orientation, or gender identity.

## 2018-04-30 NOTE — DISCHARGE INSTRUCTIONS
[facility]  [address] [facility] [address], [place] [address]  Appointment Phone [phone] Nurse Advisors [phone]      Jama Paul      2/13/1931  [facility] Phone:[phone]  Fax:[phone]  Includes [place] and [place] Locations       Wound Dressing Change to right medial lower leg  Cleanse wound and surrounding skin with: wound cleanser  Cover wound with Fibracol cut to size of wound (this will dissolve into the wound)  Cover wound with ABD pad and secure with roll gauze  Change dressing Monday, Wednesday, Friday     Compression:   You have a compression wrap Spandagrip D is supposed to be removed at night and put back on first thing in the morning. On Both Lower Legs  Please remove compression dressing if toes turn blue and/or tingle and can not be relieved by raising the leg for one hour.      A diet high in protein is important for wound healing, we recommend getting 60 grams of protein per day. Taking protein shakes or bars are a good way to get extra protein in your diet.        Wesley Benjamin M.D. April 30nd, 2018          Call us at [phone] if you have any questions about your wounds, have redness or swelling around your wound, have a fever of 101 or greater or if you have any other problems or concerns. We answer the phone Monday through Friday 8 am to 4 pm, please leave a message as we check the voicemail frequently throughout the day.       Follow up with Provider - 3-4 weeks

## 2018-04-30 NOTE — DISCHARGE INSTRUCTIONS
[facility]  [address] [facility] [address], [place] [address]  Appointment Phone [phone] Nurse Advisors [phone]     Jama Paul      2/13/1931  [facility] Phone:[phone]  Fax:[phone]  Includes [place] and [place] Locations      Wound Dressing Change to right medial lower leg  Cleanse wound and surrounding skin with: wound cleanser  Cover wound with Fibracol cut to size of wound (this will dissolve into the wound)  Cover wound with ABD pad and secure with roll gauze  Change dressing Monday, Wednesday, Friday  Compression:   You have a compression wrap Spandagrip E is supposed to be removed at night and put back on first thing in the morning. On Both Lower Legs  Please remove compression dressing if toes turn blue and/or tingle and can not be relieved by raising the leg for one hour.       Wesley Benjamin M.D.March 5, 2018        Call us at [phone] if you have any questions about your wounds, have redness or swelling around your wound, have a fever of 101 or greater or if you have any other problems or concerns. We answer the phone Monday through Friday 8 am to 4 pm, please leave a message as we check the voicemail frequently throughout the day.      Follow up with Provider - 3-4 weeks

## 2018-05-21 ENCOUNTER — HOSPITAL ENCOUNTER (OUTPATIENT)
Dept: WOUND CARE | Facility: CLINIC | Age: 83
Discharge: HOME | End: 2018-05-21
Attending: SURGERY | Admitting: SURGERY
Payer: MEDICARE

## 2018-05-21 VITALS
SYSTOLIC BLOOD PRESSURE: 103 MMHG | DIASTOLIC BLOOD PRESSURE: 66 MMHG | HEART RATE: 77 BPM | TEMPERATURE: 97.5 F | RESPIRATION RATE: 12 BRPM

## 2018-05-21 PROCEDURE — A6212 FOAM DRG <=16 SQ IN W/BORDER: HCPCS

## 2018-05-21 PROCEDURE — 11042 DBRDMT SUBQ TIS 1ST 20SQCM/<: CPT

## 2018-05-21 PROCEDURE — 11042 DBRDMT SUBQ TIS 1ST 20SQCM/<: CPT | Performed by: SURGERY

## 2018-05-21 PROCEDURE — A6022 COLLAGEN DRSG>16<=48 SQ IN: HCPCS

## 2018-05-21 NOTE — DISCHARGE INSTRUCTIONS
[facility]  [address] [facility] [address], [place] [address]  Appointment Phone [phone] Nurse Advisors [phone]      Jama Paul      2/13/1931  [facility] Phone:[phone]  Fax:[phone]  Includes [place] and [place] Locations       Wound Dressing Change to right medial lower leg  Cleanse wound and surrounding skin with: wound cleanser  Cover wound with Fibracol cut to size of wound (this will dissolve into the wound)  Cover dressing with adhesive foam  Change dressing Tuesday and Fridays      Compression:   You have a compression wrap Spandagrip D is supposed to be removed at night and put back on first thing in the morning. On Both Lower Legs  Please remove compression dressing if toes turn blue and/or tingle and can not be relieved by raising the leg for one hour.       A diet high in protein is important for wound healing, we recommend getting 60 grams of protein per day. Taking protein shakes or bars are a good way to get extra protein in your diet.        Wesley Benjamin M.D. May 21, 2018          Call us at [phone] if you have any questions about your wounds, have redness or swelling around your wound, have a fever of 101 or greater or if you have any other problems or concerns. We answer the phone Monday through Friday 8 am to 4 pm, please leave a message as we check the voicemail frequently throughout the day.       Follow up with Provider - 3-4 weeks

## 2018-05-21 NOTE — MR AVS SNAPSHOT
MRN:[id number]                      After Visit Summary   5/21/2018    Jama Paul    MRN: [id number]           Visit Information        Provider Department      5/21/2018 10:30 AM Wesley Benjamin MD [facility]        Your next 10 appointments already scheduled     Jul 16, 2018  8:30 AM CDT   LAB with MCGEE LAB   [facility] PHYSICIANS HEART AT [place] ([facility])    [address]  [place] [address] [phone]           Please do not eat 10-12 hours before your appointment if you are coming in fasting for labs on lipids, cholesterol, or glucose (sugar). This does not apply to pregnant women. Water, hot tea and black coffee (with nothing added) are okay. Do not drink other fluids, diet soda or chew gum.            Jul 16, 2018  9:30 AM CDT   Core Return with ERIC Fernandez CNP   [facility] ([facility])    [address]  [place] [id number] [phone] OPT 2                Further instructions from your care team              [facility]  [address], [place] [address]  Appointment Phone [phone] Nurse Advisors [phone]      Jama Paul      2/13/1931  [facility] Phone:[phone]  Fax:[phone]  Includes [place] and [facility]       Wound Dressing Change to right medial lower leg  Cleanse wound and surrounding skin with: wound cleanser  Cover wound with Fibracol cut to size of wound (this will dissolve into the wound)  Cover dressing with adhesive foam  Change dressing Tuesday and Fridays      Compression:   You have a compression wrap Spandagrip D is supposed to be removed at night and put back on first thing in the morning. On Both Lower Legs  Please remove compression dressing if toes turn blue and/or tingle and can not be relieved by raising  "the leg for one hour.       A diet high in protein is important for wound healing, we recommend getting 60 grams of protein per day. Taking protein shakes or bars are a good way to get extra protein in your diet.        Wesley Benjamin M.D. May 21, 2018          Call us at [phone] if you have any questions about your wounds, have redness or swelling around your wound, have a fever of 101 or greater or if you have any other problems or concerns. We answer the phone Monday through Friday 8 am to 4 pm, please leave a message as we check the voicemail frequently throughout the day.       Follow up with Provider - 3-4 weeks       MyChart Information     [employer] lets you send messages to your doctor, view your test results, renew your prescriptions, schedule appointments and more. To sign up, go to [email or web address] . Click on \"Log in\" on the left side of the screen, which will take you to the Welcome page. Then click on \"Sign up Now\" on the right side of the page.     You will be asked to enter the access code listed below, as well as some personal information. Please follow the directions to create your username and password.     Your access code is: [email or web address]  Expires: 2018 10:31 AM     Your access code will  in 90 days. If you need help or a new code, please call your [place] clinic or [phone].        Care EveryWhere ID     This is your Care EveryWhere ID. This could be used by other organizations to access your [place] medical records  [id number]        Equal Access to Services     CECILE MOURA : Hadii fredis vargas Soofelia, waaxda luqadaha, qaybta kaalmada emani kapoor idijasson avalos. So [facility] [phone].    ATENCIÓN: Si habla español, tiene a cleaning disposición servicios gratuitos de asistencia lingüística. Llame al [phone].    We comply with applicable federal civil rights laws and [place] laws. We do not discriminate on the basis of race, color, " national origin, age, disability, sex, sexual orientation, or gender identity.

## 2018-05-21 NOTE — PROGRESS NOTES
[facility] Wound [facility]aling [place] Progress Note    Subject: Jama Paul Returns to see us today for his right medial calf ulcer.  Is using Fibracol and home health care is changing this twice weekly.  He is here with his daughter and wife.    At his new complex they have a workout facility and is trying to exercise up to several hours daily.  However, his family is concerned he is not getting enough nutrition.  We had him on protein drinks but he is not taking these on a regular basis.      Exam:  /66  Pulse 77  Temp 97.5  F (36.4  C) (Temporal)  Resp 12  Alert and appropriate.  Very comfortable.  Ulceration continues to improve.  This now measures 2.2 x 1.3 with minimal depth.  Mild amount of slough and fibrin but overall excellent granulation tissue.  Wound is at least 60% smaller than originally.  Dry skin distally on the ankle region with no ulcers.    Procedure:  Time out was called, informed consent obtained, and topical anesthetic of 4% lidocaine was applied per standing protocol, debridement was performed using a #15 blade down to and including subcutaneous tissue.  We excised the fibrin slough and debrided the skin edges.  Very good healthy granulation tissue fills the entire wound with good bleeding.  No undermining.  Bleeding controlled with light pressure. Patient tolerated procedure well.    Impression: Continued improvement.  Continue with Fibracol and twice weekly home health care.  Discussed the importance of protein supplements.  We will look into taking Muscle Milk at least 2 daily.    Plan: We will dress the wounds with above.  Patient will return to the clinic in 3 weeks time    Wesley Benjamin MD  05/21/18 11:18 AM

## 2018-05-24 ENCOUNTER — TELEPHONE (OUTPATIENT)
Dept: CARDIOLOGY | Facility: CLINIC | Age: 83
End: 2018-05-24

## 2018-05-24 NOTE — TELEPHONE ENCOUNTER
Pt calling to report that he has INR's managed at [facility] office but Dr Pizarro is retiring so he will be transferring to a [facility] clinic for his primary care. He sees Dr Pugh and Winnie Garcia in our clinic and is asking about transitioning INR management to our clinic. He takes Warfarin for Afib (range 2.0-3.0). He is scheduled for one more INR at [facility] office this week then he will call us to set up an appt in our INR clinic. Anticoagulation records are available in Care Everywhere. Keren

## 2018-05-30 ENCOUNTER — TELEPHONE (OUTPATIENT)
Dept: CARDIOLOGY | Facility: CLINIC | Age: 83
End: 2018-05-30

## 2018-05-30 NOTE — TELEPHONE ENCOUNTER
Pt calling to schedule an INR appt in our clinic. He takes Warfarin for chronic afib (range 2.0-3.0) and usually has INR's managed at [facility] office but Dr Wolfe is retiring so pt wants to transfer INR management to our clinic. Records are available for viewing in Care Everywhere. He has Warfarin 5 mg tabs. 5/24/18 INR 2.5 and he has been taking 5 mg daily since then. Note stated that he was restarting Ensure. Scheduled INR appt for tomorrow. Keren

## 2018-05-31 ENCOUNTER — ANTICOAGULATION THERAPY VISIT (OUTPATIENT)
Dept: CARDIOLOGY | Facility: CLINIC | Age: 83
End: 2018-05-31
Payer: MEDICARE

## 2018-05-31 DIAGNOSIS — Z79.01 LONG TERM CURRENT USE OF ANTICOAGULANT THERAPY: Primary | ICD-10-CM

## 2018-05-31 DIAGNOSIS — I48.91 ATRIAL FIBRILLATION (H): ICD-10-CM

## 2018-05-31 DIAGNOSIS — Z79.01 LONG-TERM (CURRENT) USE OF ANTICOAGULANTS: ICD-10-CM

## 2018-05-31 LAB — INR POINT OF CARE: 2.5 (ref 0.86–1.14)

## 2018-05-31 PROCEDURE — 85610 PROTHROMBIN TIME: CPT | Mod: QW | Performed by: INTERNAL MEDICINE

## 2018-05-31 PROCEDURE — 36416 COLLJ CAPILLARY BLOOD SPEC: CPT | Performed by: INTERNAL MEDICINE

## 2018-05-31 NOTE — PROGRESS NOTES
ANTICOAGULATION INITIAL CLINIC VISIT    Patient Name:  Jama Paul  Date:  5/31/2018  Referred by: Dr. Pugh  Contact Type:  Face to Face    SUBJECTIVE:  Coumadin education was completed today.  Topics covered include:  -Introduction to coumadin  -Proper Administration  -INR Testing  -Sign/Symptoms of Bleeding  -Signs/Symptoms of Clot Formation or Stroke  -Dietary Intake of Vitamin K  -Drug Interactions  -Anticoagulation Identification (bracelet, necklace or wallet card)  -Future Surgery  -Effects of Alcohol, Tobacco, and Exercise on Coumadin    Coumadin Education Booklet and Coumadin Identification Wallet Card were given to the patient.       Patient Findings     Positives No Problem Findings    Comments Switching to our INR clinic for management           OBJECTIVE    INR Protime   Date Value Ref Range Status   05/31/2018 2.5 (A) 0.86 - 1.14 Final       ASSESSMENT / PLAN  INR assessment THER    Recheck INR In: 2 WEEKS    INR Location Clinic      Anticoagulation Summary as of 5/31/2018     INR goal 2.0-3.0   Today's INR 2.5   Warfarin maintenance plan 5 mg (5 mg x 1) every day   Full warfarin instructions 5 mg every day   Weekly warfarin total 35 mg   Plan last modified Mirela Moreno, RN (5/31/2018)   Next INR check 6/14/2018   Target end date Indefinite    Indications   Long-term (current) use of anticoagulants [Z79.01] [Z79.01]  Atrial fibrillation (H) [I48.91]         Anticoagulation Episode Summary     INR check location     Preferred lab     Send INR reminders to [facility] HEART INR NURSE    Comments       Anticoagulation Care Providers     Provider Role Specialty Phone number    Long Pugh MD Responsible Cardiology [phone]            See the Encounter Report to view Anticoagulation Flowsheet and Dosing Calendar (Go to Encounters tab in chart review, and find the Anticoagulation Therapy Visit)    INR 2.5.  Switching to our INR clinic for management but not new to Warfarin, afib, 2-3.  INR  was 1.7 on 5/18 and he had been drinking ensure daily but normally didn't do that so he stopped the ensure and hasn't had any for the past 2 weeks.  He has been taking 5mg/day and INR on 5/24 was 2.5.  He will continue the 5mg/day and recheck INR in 2 weeks and continue NO ensure.  Patient will switch Warfarin from AM to PM.  Rob Moreno RN

## 2018-05-31 NOTE — MR AVS SNAPSHOT
Jama Paul   5/31/2018 3:20 PM   Anticoagulation Therapy Visit    Description:  87 year old male   Provider:  [facility]AG[facility]   Department:  [facility]           INR as of 5/31/2018     Today's INR 2.5      Anticoagulation Summary as of 5/31/2018     INR goal 2.0-3.0   Today's INR 2.5   Full warfarin instructions 5 mg every day   Next INR check 6/14/2018    Indications   Long-term (current) use of anticoagulants [Z79.01] [Z79.01]  Atrial fibrillation (H) [I48.91]         Your next Anticoagulation Clinic appointment(s)     Jun 14, 2018 11:20 AM CDT   Anticoagulation Visit with  ANTICOAGULATION   [facility] ([facility])    [facility] [address] [phone] OPT 2              Contact Numbers     Anticoagulant (INR) Clinic Number: [phone]          May 2018 Details    Sun Mon Tue Wed Thu Fri Sat       1               2               3               4               5                 6               7               8               9               10               11               12                 13               14               15               16               17               18               19                 20               21               22               23               24               25               26                 27               28               29               30               31      5 mg   See details         Date Details   05/31 This INR check               How to take your warfarin dose     To take:  5 mg Take 1 of the 5 mg tablets.           June 2018 Details    Sun Mon Tue Wed Thu Fri Sat          1      5 mg         2      5 mg           3      5 mg         4      5 mg         5      5 mg         6      5 mg         7      5 mg         8      5 mg         9      5 mg           10      5 mg         11      5 mg         12      5 mg         13      5 mg         14            15                16                 17               18               19               20               21               22               23                 24               25               26               27               28               29               30                Date Details   No additional details    Date of next INR:  6/14/2018         How to take your warfarin dose     To take:  5 mg Take 1 of the 5 mg tablets.

## 2018-06-11 ENCOUNTER — HOSPITAL ENCOUNTER (OUTPATIENT)
Dept: WOUND CARE | Facility: CLINIC | Age: 83
Discharge: HOME | End: 2018-06-11
Attending: SURGERY | Admitting: SURGERY
Payer: MEDICARE

## 2018-06-11 VITALS
RESPIRATION RATE: 12 BRPM | HEART RATE: 101 BPM | DIASTOLIC BLOOD PRESSURE: 75 MMHG | SYSTOLIC BLOOD PRESSURE: 110 MMHG | TEMPERATURE: 97.2 F

## 2018-06-11 DIAGNOSIS — S81.801D WOUND OF RIGHT LOWER EXTREMITY, SUBSEQUENT ENCOUNTER: ICD-10-CM

## 2018-06-11 PROCEDURE — A6248 HYDROGEL DRSG GEL FILLER: HCPCS

## 2018-06-11 PROCEDURE — 97597 DBRDMT OPN WND 1ST 20 CM/<: CPT | Performed by: SURGERY

## 2018-06-11 PROCEDURE — 97597 DBRDMT OPN WND 1ST 20 CM/<: CPT

## 2018-06-11 NOTE — DISCHARGE INSTRUCTIONS
"       [facility]  [address] [facility] [address], [place] [address]  Appointment Phone [phone] Nurse Advisors [phone]      Jama Paul      2/13/1931    Wound Dressing Change to right medial lower leg  Cleanse wound and surrounding skin with: wound cleanser  Cover wound with Woun'dres gel  Cover gel with 2\" bandaids  Change dressing daily       Compression:   You have a compression wrap Spandagrip D is supposed to be removed at night and put back on first thing in the morning. On Both Lower Legs  Please remove compression dressing if toes turn blue and/or tingle and can not be relieved by raising the leg for one hour.       A diet high in protein is important for wound healing, we recommend getting 60 grams of protein per day. Taking protein shakes or bars are a good way to get extra protein in your diet.        Wesley Benjamin M.D. June 11, 2018          Call us at [phone] if you have any questions about your wounds, have redness or swelling around your wound, have a fever of 101 or greater or if you have any other problems or concerns. We answer the phone Monday through Friday 8 am to 4 pm, please leave a message as we check the voicemail frequently throughout the day.       Follow up with Provider - 3-4 weeks     "

## 2018-06-11 NOTE — PROGRESS NOTES
[facility] Wound [facility]aling [place] Progress Note    Subject: Jama Paul returns today with his daughter and wife.  He had a traumatic ulceration of right medial calf that is gradually improving.  Home health care has been seeing him for dressing changes using Fibracol twice weekly.  Wound is continued to decrease in size.  He has no pain or discomfort.  He is using a Spandagrip on the right calf and foot.    His wife has now completed their move to [facility].  They are very pleased with the facilities.  He is going down to the gym and working out on a daily basis.  With his increased activity his appetite and nutritional intake has been better.      Exam:  /75  Pulse 101  Temp 97.2  F (36.2  C) (Temporal)  Resp 12  Very comfortable.  Alert and very talkative.  Quite appropriate.  +3 right dorsalis pedis pulse.  No edema.  No cellulitis.  Ulceration is improved.  This now measures 1.7 x 1.1 cm with no depth.  Mild amount of slough is noted.  Rest of the ulcer is healed well.    Procedure:  Time out was called, informed consent obtained, and topical anesthetic of 4% lidocaine was applied per standing protocol, debridement was performed using a #15 blade and a selective fashion.  There is slough over the excellent granulation bed with healthy bleeding noted.  No undermining.  Bleeding controlled with light pressure. Patient tolerated procedure well.    Impression: Continued improvement.  Will discontinue Fibracol and home health care.  He will be started on Woun'Dres collagen gel and a protective Band-Aid.  This should be changed daily and perhaps twice a day if it loosens after he is exercising.  Continue with Spandagrip.  He may shower with soap and water including the ulcer with no dressing no immersion in the tub.    Plan: We will dress the wounds with Woun'Dres collagen gel daily.  Patient will return to the clinic in 4 weeks time    Wesley Benjamin MD  06/11/18 11:34 AM

## 2018-06-11 NOTE — MR AVS SNAPSHOT
"                  MRN:[id number]                      After Visit Summary   6/11/2018    Jama Paul    MRN: [id number]           Visit Information        Provider Department      6/11/2018 10:45 AM Wesley Benjamin MD [facility]        Your next 10 appointments already scheduled     Jun 14, 2018 11:20 AM CDT   Anticoagulation Visit with MCGEE ANTICOAGULATION   [facility] ([facility])    [address]  [place] [id number] [phone] OPT 2            Jul 16, 2018  8:30 AM CDT   LAB with MCGEE LAB   [facility] [place] ([facility])    [address]  [facility] [id number] [phone]           Please do not eat 10-12 hours before your appointment if you are coming in fasting for labs on lipids, cholesterol, or glucose (sugar). This does not apply to pregnant women. Water, hot tea and black coffee (with nothing added) are okay. Do not drink other fluids, diet soda or chew gum.            Jul 16, 2018  9:30 AM CDT   Core Return with ERIC Fernandez CNP   [facility] ([facility])    [address]  [facility] [id number] [phone] OPT 2                Further instructions from your care team              [facility]  [address], [place] [address]  Appointment Phone [phone] Nurse Advisors [phone]      Jama Paul      2/13/1931    Wound Dressing Change to right medial lower leg  Cleanse wound and surrounding skin with: wound cleanser  Cover wound with Woun'dres gel  Cover gel with 2\" bandaids  Change dressing daily       Compression:   You have a compression wrap Spandagrip D is supposed to be removed at night and put back on first thing in the morning. On Both Lower Legs  Please remove compression dressing if toes " turn blue and/or tingle and can not be relieved by raising the leg for one hour.       A diet high in protein is important for wound healing, we recommend getting 60 grams of protein per day. Taking protein shakes or bars are a good way to get extra protein in your diet.        Wesley Benjamin M.D. June 11, 2018          Call us at [phone] if you have any questions about your wounds, have redness or swelling around your wound, have a fever of 101 or greater or if you have any other problems or concerns. We answer the phone Monday through Friday 8 am to 4 pm, please leave a message as we check the voicemail frequently throughout the day.       Follow up with Provider - 3-4 weeks       Care EveryWhere ID     This is your Care EveryWhere ID. This could be used by other organizations to access your [place] medical records  [id number]        Equal Access to Services     CECILE MOURA : Caridad Carrillo, autumn isbell, emani moody. So [facility] [phone].    ATENCIÓN: Si habla español, tiene a cleaning disposición servicios gratuitos de asistencia lingüística. Llame al [phone].    We comply with applicable federal civil rights laws and [place] laws. We do not discriminate on the basis of race, color, national origin, age, disability, sex, sexual orientation, or gender identity.

## 2018-06-18 ENCOUNTER — ANTICOAGULATION THERAPY VISIT (OUTPATIENT)
Dept: CARDIOLOGY | Facility: CLINIC | Age: 83
End: 2018-06-18
Attending: INTERNAL MEDICINE
Payer: MEDICARE

## 2018-06-18 DIAGNOSIS — Z79.01 LONG-TERM (CURRENT) USE OF ANTICOAGULANTS: ICD-10-CM

## 2018-06-18 DIAGNOSIS — I48.91 ATRIAL FIBRILLATION, UNSPECIFIED TYPE (H): ICD-10-CM

## 2018-06-18 LAB — INR POINT OF CARE: 3 (ref 0.86–1.14)

## 2018-06-18 PROCEDURE — 36416 COLLJ CAPILLARY BLOOD SPEC: CPT | Performed by: INTERNAL MEDICINE

## 2018-06-18 PROCEDURE — 85610 PROTHROMBIN TIME: CPT | Mod: QW | Performed by: INTERNAL MEDICINE

## 2018-06-18 NOTE — MR AVS SNAPSHOT
Jama Paul   6/18/2018 11:20 AM   Anticoagulation Therapy Visit    Description:  87 year old male   Provider:  EVERARDO ANTICOAGULATION   Department:  [facility]           INR as of 6/18/2018     Today's INR 3.0      Anticoagulation Summary as of 6/18/2018     INR goal 2.0-3.0   Today's INR 3.0   Full warfarin instructions 5 mg every day   Next INR check 7/9/2018    Indications   Long-term (current) use of anticoagulants [Z79.01] [Z79.01]  Atrial fibrillation (H) [I48.91]         Your next Anticoagulation Clinic appointment(s)     Jul 09, 2018 11:40 AM CDT   Anticoagulation Visit with  ANTICOAGULATION   [facility] ([facility])    [address]  [facility] [address] [phone] OPT 2              Contact Numbers     Anticoagulant (INR) Clinic Number: [phone]          June 2018 Details    Sun Mon Tue Wed Thu Fri Sat          1               2                 3               4               5               6               7               8               9                 10               11               12               13               14               15               16                 17               18      5 mg   See details      19      5 mg         20      5 mg         21      5 mg         22      5 mg         23      5 mg           24      5 mg         25      5 mg         26      5 mg         27      5 mg         28      5 mg         29      5 mg         30      5 mg          Date Details   06/18 This INR check               How to take your warfarin dose     To take:  5 mg Take 1 of the 5 mg tablets.           July 2018 Details    Sun Mon Tue Wed Thu Fri Sat     1      5 mg         2      5 mg         3      5 mg         4      5 mg         5      5 mg         6      5 mg         7      5 mg           8      5 mg         9            10               11               12               13               14                  15               16               17               18               19               20               21                 22               23               24               25               26               27               28                 29               30               31                    Date Details   No additional details    Date of next INR:  7/9/2018         How to take your warfarin dose     To take:  5 mg Take 1 of the 5 mg tablets.

## 2018-06-18 NOTE — PROGRESS NOTES
ANTICOAGULATION FOLLOW-UP CLINIC VISIT    Patient Name:  Jama Paul  Date:  6/18/2018  Contact Type:  Face to Face    SUBJECTIVE:     Patient Findings     Positives No Problem Findings           OBJECTIVE    INR Protime   Date Value Ref Range Status   06/18/2018 3.0 (A) 0.86 - 1.14 Final       ASSESSMENT / PLAN  INR assessment THER    Recheck INR In: 3 WEEKS    INR Location Clinic      Anticoagulation Summary as of 6/18/2018     INR goal 2.0-3.0   Today's INR 3.0   Warfarin maintenance plan 5 mg (5 mg x 1) every day   Full warfarin instructions 5 mg every day   Weekly warfarin total 35 mg   No change documented Mirela Moreno RN   Plan last modified Mirela Moreno RN (5/31/2018)   Next INR check 7/9/2018   Target end date Indefinite    Indications   Long-term (current) use of anticoagulants [Z79.01] [Z79.01]  Atrial fibrillation (H) [I48.91]         Anticoagulation Episode Summary     INR check location     Preferred lab     Send INR reminders to [facility] HEART INR NURSE    Comments       Anticoagulation Care Providers     Provider Role Specialty Phone number    Long Pugh MD Responsible Cardiology [phone]            See the Encounter Report to view Anticoagulation Flowsheet and Dosing Calendar (Go to Encounters tab in chart review, and find the Anticoagulation Therapy Visit)    INR 3.0.  He was new to our INR clinic last time but not new to Warfarin.  He had been taking 5mg/day and no Ensure prior to the last INR check.  No bleeding.  He said he has been avoiding the greens.  We will keep the 5mg/day and recheck in 3 weeks after Dr. Benjamin appointment.  He will increase his greens.  He is supposed to increase his protein intake due to his wounds also.  Rob Moreno, RN

## 2018-07-09 ENCOUNTER — HOSPITAL ENCOUNTER (OUTPATIENT)
Dept: WOUND CARE | Facility: CLINIC | Age: 83
Discharge: HOME | End: 2018-07-09
Attending: SURGERY | Admitting: SURGERY
Payer: MEDICARE

## 2018-07-09 ENCOUNTER — ANTICOAGULATION THERAPY VISIT (OUTPATIENT)
Dept: CARDIOLOGY | Facility: CLINIC | Age: 83
End: 2018-07-09
Payer: MEDICARE

## 2018-07-09 VITALS
DIASTOLIC BLOOD PRESSURE: 73 MMHG | HEART RATE: 89 BPM | RESPIRATION RATE: 16 BRPM | SYSTOLIC BLOOD PRESSURE: 104 MMHG | TEMPERATURE: 97.6 F

## 2018-07-09 DIAGNOSIS — Z79.01 LONG-TERM (CURRENT) USE OF ANTICOAGULANTS: ICD-10-CM

## 2018-07-09 DIAGNOSIS — S81.801D TRAUMATIC OPEN WOUND OF RIGHT LOWER LEG, SUBSEQUENT ENCOUNTER: ICD-10-CM

## 2018-07-09 DIAGNOSIS — I48.91 ATRIAL FIBRILLATION, UNSPECIFIED TYPE (H): ICD-10-CM

## 2018-07-09 LAB — INR POINT OF CARE: 3.6 (ref 0.86–1.14)

## 2018-07-09 PROCEDURE — 36416 COLLJ CAPILLARY BLOOD SPEC: CPT | Performed by: INTERNAL MEDICINE

## 2018-07-09 PROCEDURE — A6248 HYDROGEL DRSG GEL FILLER: HCPCS

## 2018-07-09 PROCEDURE — 97597 DBRDMT OPN WND 1ST 20 CM/<: CPT

## 2018-07-09 PROCEDURE — 85610 PROTHROMBIN TIME: CPT | Mod: QW | Performed by: INTERNAL MEDICINE

## 2018-07-09 PROCEDURE — 97597 DBRDMT OPN WND 1ST 20 CM/<: CPT | Performed by: SURGERY

## 2018-07-09 NOTE — PROGRESS NOTES
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  This continues to do well.  He is applying Woun'Dres collagen gel on a daily basis.    He does remain very active.  They have moved out of their home to [place].  He exercises at their facility on a daily basis.    He is here today with his wife and daughter.      Exam:  /73  Pulse 89  Temp 97.6  F (36.4  C) (Temporal)  Resp 16  Alert and appropriate.  Very comfortable.  Very talkative.  Medial calf ulcer continues to improve.  This now measures 2 x 1.5 cm with very minimal depth.  Mild amount of slough and fibrinous noted.    Procedure:   Patient was determined to be capable of making their own medical decisions and informed consent was obtained. Topical anesthetic of 4% lidocaine was applied, debridement was performed using a #15 blade in a selective fashion removing the overall lung mild slough and fibrin.  Very healthy granulation tissues noted.  Surrounding skin edges medial around this are darker likely due to hemorrhage but otherwise very healthy.  Bleeding controlled with light pressure. Patient tolerated procedure well.    Impression: Continued improvement.  Apply Woun'Dres collagen gel on a daily basis along with protective dressing.    Plan: We will dress the wounds with Woun'Dres collagen gel.  Patient will return to the clinic in 4 weeks time    Wesley Benjamin MD  07/09/18 11:12 AM

## 2018-07-09 NOTE — MR AVS SNAPSHOT
"                  MRN:[id number]                      After Visit Summary   7/9/2018    Jama Paul    MRN: [id number]           Visit Information        Provider Department      7/9/2018 10:45 AM Wesley Benjamin MD [facility]        Your next 10 appointments already scheduled     Jul 09, 2018 11:40 AM CDT   Anticoagulation Visit with MCGEE ANTICOAGULATION   [facility] ([facility])    [address]  [place] [address] [phone] OPT 2            Jul 16, 2018  8:30 AM CDT   LAB with MCGEE LAB   [facility] AT [place] ([facility])    [address]  [facility] [id number] [phone]           Please do not eat 10-12 hours before your appointment if you are coming in fasting for labs on lipids, cholesterol, or glucose (sugar). This does not apply to pregnant women. Water, hot tea and black coffee (with nothing added) are okay. Do not drink other fluids, diet soda or chew gum.            Jul 16, 2018  9:30 AM CDT   Core Return with ERIC Fernandez CNP   [facility] ([facility])    [address]  [facility] [facility] [id number] [phone] OPT 2                Further instructions from your care team              [facility]  [address], [place] [address]  Appointment Phone [phone] Nurse Advisors [phone]      Jama Paul      2/13/1931     Wound Dressing Change to right medial lower leg  Cleanse wound and surrounding skin with: wound cleanser  Cover wound with Woun'dres gel  Cover gel with 2\" bandaid  Change dressing daily       Compression:   You have a compression wrap Spandagrip D is supposed to be removed at night and put back on first thing in the morning. On Both Lower Legs  Please remove compression dressing if toes " turn blue and/or tingle and can not be relieved by raising the leg for one hour.       A diet high in protein is important for wound healing, we recommend getting 60 grams of protein per day. Taking protein shakes or bars are a good way to get extra protein in your diet.        Wesley Benjamin M.D. July 9, 2018          Call us at [phone] if you have any questions about your wounds, have redness or swelling around your wound, have a fever of 101 or greater or if you have any other problems or concerns. We answer the phone Monday through Friday 8 am to 4 pm, please leave a message as we check the voicemail frequently throughout the day.       Follow up with Dr. Benjamin 4 weeks       Care EveryWhere ID     This is your Care EveryWhere ID. This could be used by other organizations to access your [place] medical records  [id number]        Equal Access to Services     CECILE MOURA : Caridad Carrillo, autumn isbell, emani moody . So [facility] [phone].    ATENCIÓN: Si habla español, tiene a cleaning disposición servicios gratuitos de asistencia lingüística. Llame al [phone].    We comply with applicable federal civil rights laws and [place] laws. We do not discriminate on the basis of race, color, national origin, age, disability, sex, sexual orientation, or gender identity.

## 2018-07-09 NOTE — PROGRESS NOTES
ANTICOAGULATION FOLLOW-UP CLINIC VISIT    Patient Name:  Jama Paul  Date:  7/9/2018  Contact Type:  Face to Face    SUBJECTIVE:     Patient Findings     Positives No Problem Findings           OBJECTIVE    INR Protime   Date Value Ref Range Status   07/09/2018 3.6 (A) 0.86 - 1.14 Final       ASSESSMENT / PLAN  INR assessment SUPRA    Recheck INR In: 1 WEEK    INR Location Clinic      Anticoagulation Summary as of 7/9/2018     INR goal 2.0-3.0   Today's INR 3.6!   Warfarin maintenance plan 5 mg (5 mg x 1) every day   Full warfarin instructions 7/10: Hold; Otherwise 5 mg every day   Weekly warfarin total 35 mg   Plan last modified Mirela Moreno, RN (5/31/2018)   Next INR check 7/16/2018   Target end date Indefinite    Indications   Long-term (current) use of anticoagulants [Z79.01] [Z79.01]  Atrial fibrillation (H) [I48.91]         Anticoagulation Episode Summary     INR check location     Preferred lab     Send INR reminders to [facility] HEART INR NURSE    Comments       Anticoagulation Care Providers     Provider Role Specialty Phone number    Long Pugh MD Responsible Cardiology [phone]            See the Encounter Report to view Anticoagulation Flowsheet and Dosing Calendar (Go to Encounters tab in chart review, and find the Anticoagulation Therapy Visit)    INR 3.6.  He just had his leg wounds cleaned by Dr. Benjamin and they are bleeding through the bandage a little bit.  He already took his 5mg dose today and I told him again to switch the Warfarin to the PM.  Hold Warfarin dose tomorrow x1 then back to 5mg/day.  He hasn't been drinking boost or ensure but in the past he was drinking 2 bottles per day before we were managing his INRs.  Dr. Benjamin would like him to increase his protein and patient has lots of ensure at home so he will drink some today then for the next week try MWF 3x in the next week.  He does have a good appetite.  Recheck in 1 week when he is back to see Winnie Garcia.   Rob Moreno RN

## 2018-07-09 NOTE — DISCHARGE INSTRUCTIONS
"       [facility]  [address], [place] [address]  Appointment Phone [phone] Nurse Advisors [phone]      Jama Paul      2/13/1931     Wound Dressing Change to right medial lower leg  Cleanse wound and surrounding skin with: wound cleanser  Cover wound with Woun'dres gel  Cover gel with 2\" bandaid  Change dressing daily       Compression:   You have a compression wrap Spandagrip D is supposed to be removed at night and put back on first thing in the morning. On Both Lower Legs  Please remove compression dressing if toes turn blue and/or tingle and can not be relieved by raising the leg for one hour.       A diet high in protein is important for wound healing, we recommend getting 60 grams of protein per day. Taking protein shakes or bars are a good way to get extra protein in your diet.        Wesley Benjamin M.D. July 9, 2018          Call us at [phone] if you have any questions about your wounds, have redness or swelling around your wound, have a fever of 101 or greater or if you have any other problems or concerns. We answer the phone Monday through Friday 8 am to 4 pm, please leave a message as we check the voicemail frequently throughout the day.       Follow up with Dr. Benjamin 4 weeks     "

## 2018-07-09 NOTE — MR AVS SNAPSHOT
Jama Paul   7/9/2018 11:40 AM   Anticoagulation Therapy Visit    Description:  87 year old male   Provider:  EVERARDO ANTICOAGULATION   Department:  [facility]           INR as of 7/9/2018     Today's INR 3.6!      Anticoagulation Summary as of 7/9/2018     INR goal 2.0-3.0   Today's INR 3.6!   Full warfarin instructions 7/10: Hold; Otherwise 5 mg every day   Next INR check 7/16/2018    Indications   Long-term (current) use of anticoagulants [Z79.01] [Z79.01]  Atrial fibrillation (H) [I48.91]         Your next Anticoagulation Clinic appointment(s)     Jul 16, 2018  8:40 AM CDT   Anticoagulation Visit with MCGEE ANTICOAGULATION   [facility] ([facility])    [address] [place] [address]   [phone] OPT 2              Contact Numbers     Anticoagulant (INR) Clinic Number: [phone]          July 2018 Details    Sun Mon Tue Wed Thu Fri Sat     1               2               3               4               5               6               7                 8               9      5 mg   See details      10      Hold         11      5 mg         12      5 mg         13      5 mg         14      5 mg           15      5 mg         16            17               18               19               20               21                 22               23               24               25               26               27               28                 29               30               31                    Date Details   07/09 This INR check       Date of next INR:  7/16/2018         How to take your warfarin dose     To take:  5 mg Take 1 of the 5 mg tablets.    Hold Do not take your warfarin dose. See the Details table to the right for additional instructions.

## 2018-07-16 ENCOUNTER — OFFICE VISIT (OUTPATIENT)
Dept: CARDIOLOGY | Facility: CLINIC | Age: 83
End: 2018-07-16
Attending: INTERNAL MEDICINE
Payer: MEDICARE

## 2018-07-16 ENCOUNTER — ANTICOAGULATION THERAPY VISIT (OUTPATIENT)
Dept: CARDIOLOGY | Facility: CLINIC | Age: 83
End: 2018-07-16
Payer: MEDICARE

## 2018-07-16 VITALS
OXYGEN SATURATION: 98 % | HEART RATE: 84 BPM | WEIGHT: 168.4 LBS | HEIGHT: 69 IN | BODY MASS INDEX: 24.94 KG/M2 | DIASTOLIC BLOOD PRESSURE: 68 MMHG | SYSTOLIC BLOOD PRESSURE: 116 MMHG

## 2018-07-16 DIAGNOSIS — I50.43 ACUTE ON CHRONIC COMBINED SYSTOLIC AND DIASTOLIC CHF (CONGESTIVE HEART FAILURE) (H): ICD-10-CM

## 2018-07-16 DIAGNOSIS — Z79.01 LONG-TERM (CURRENT) USE OF ANTICOAGULANTS: ICD-10-CM

## 2018-07-16 DIAGNOSIS — I48.91 ATRIAL FIBRILLATION, UNSPECIFIED TYPE (H): ICD-10-CM

## 2018-07-16 LAB
ANION GAP SERPL CALCULATED.3IONS-SCNC: 10.9 MMOL/L (ref 6–17)
BUN SERPL-MCNC: 22 MG/DL (ref 7–30)
CALCIUM SERPL-MCNC: 8.8 MG/DL (ref 8.5–10.5)
CHLORIDE SERPL-SCNC: 105 MMOL/L (ref 98–107)
CO2 SERPL-SCNC: 27 MMOL/L (ref 23–29)
CREAT SERPL-MCNC: 1.27 MG/DL (ref 0.7–1.3)
GFR SERPL CREATININE-BSD FRML MDRD: 54 ML/MIN/1.7M2
GLUCOSE SERPL-MCNC: 143 MG/DL (ref 70–105)
INR POINT OF CARE: 2.7 (ref 0.86–1.14)
POTASSIUM SERPL-SCNC: 3.9 MMOL/L (ref 3.5–5.1)
SODIUM SERPL-SCNC: 139 MMOL/L (ref 136–145)

## 2018-07-16 PROCEDURE — 80048 BASIC METABOLIC PNL TOTAL CA: CPT | Performed by: INTERNAL MEDICINE

## 2018-07-16 PROCEDURE — 85610 PROTHROMBIN TIME: CPT | Mod: QW | Performed by: INTERNAL MEDICINE

## 2018-07-16 PROCEDURE — 36416 COLLJ CAPILLARY BLOOD SPEC: CPT | Performed by: INTERNAL MEDICINE

## 2018-07-16 PROCEDURE — 99214 OFFICE O/P EST MOD 30 MIN: CPT | Performed by: NURSE PRACTITIONER

## 2018-07-16 RX ORDER — METOPROLOL SUCCINATE 100 MG/1
TABLET, EXTENDED RELEASE ORAL
COMMUNITY
End: 2018-10-16

## 2018-07-16 NOTE — PATIENT INSTRUCTIONS
Call CORE nurse for any questions or concerns:  [phone]   *If you have concerns after hours, please call [phone], option 2 to speak with on call Cardiologist.    1. Medication changes from today:    Metoprolol 50 mg in morning  And Metoprolol 100 mg in the PM     2. Weigh yourself daily and write it down. Restart your daily weights   3. Call CORE nurse if your weight is up more than 2 pounds in one day or 5 pounds in one week.     4. Call CORE nurse if you feel more short of breath, have more abdominal bloating, or leg swelling.     5. Continue low sodium diet (less than 2000 mg daily). If you eat less salt, you will retain less fluid.     6. Alcohol can weaken your heart further. You should avoid alcohol or limit its use to special times, such as a holiday or birthday.      7. Do NOT take Aleve or ibuprofen without talking to your doctor first.      8. Lab Results: **     Component      Latest Ref Rng & Units 4/16/2018 7/16/2018   Sodium      136 - 145 mmol/L 136 139   Potassium      3.5 - 5.1 mmol/L 4.2 3.9   Chloride      98 - 107 mmol/L 102 105   Carbon Dioxide      23 - 29 mmol/L 28 27   Anion Gap      6 - 17 mmol/L 10.2 10.9   Glucose      70 - 105 mg/dL 117 (H) 143 (H)   Urea Nitrogen      7 - 30 mg/dL 24 22   Creatinine      0.70 - 1.30 mg/dL 1.21 1.27   GFR Estimate      >60 mL/min/1.7m2 57 (L) 54 (L)   GFR Estimate If Black      >60 mL/min/1.7m2 69 65   Calcium      8.5 - 10.5 mg/dL 8.7 8.8     [facility]: Cardiomyopathy, Optimization, Rehabilitation, Education  The [facility] is a heart failure specialty clinic within the [facility] where you will work with specialized nurse practitioners, physician assistants, doctors, and registered nurses. They are dedicated to helping patients with heart failure to carefully adjust medications, receive education, and learn who and when to call if symptoms develop. They specialize in helping you better understand your condition, slow the  progression of your disease, improve the length and quality of your life, help you detect future heart problems before they become life threatening, and avoid hospitalizations.

## 2018-07-16 NOTE — MR AVS SNAPSHOT
After Visit Summary   7/16/2018    Jama Paul    MRN: [id number]           Patient Information     Date Of Birth          2/13/1931        Visit Information        Provider Department      7/16/2018 9:30 AM Winnie Garcia, ERIC HAMMOND [facility]        Today's Diagnoses     Acute on chronic combined systolic and diastolic CHF (congestive heart failure) (H)          Care Instructions    Call CORE nurse for any questions or concerns:  [phone]   *If you have concerns after hours, please call [phone], option 2 to speak with on call Cardiologist.    1. Medication changes from today:    Metoprolol 50 mg in morning  And Metoprolol 100 mg in the PM     2. Weigh yourself daily and write it down. Restart your daily weights   3. Call CORE nurse if your weight is up more than 2 pounds in one day or 5 pounds in one week.     4. Call CORE nurse if you feel more short of breath, have more abdominal bloating, or leg swelling.     5. Continue low sodium diet (less than 2000 mg daily). If you eat less salt, you will retain less fluid.     6. Alcohol can weaken your heart further. You should avoid alcohol or limit its use to special times, such as a holiday or birthday.      7. Do NOT take Aleve or ibuprofen without talking to your doctor first.      8. Lab Results: **     Component      Latest Ref Rng & Units 4/16/2018 7/16/2018   Sodium      136 - 145 mmol/L 136 139   Potassium      3.5 - 5.1 mmol/L 4.2 3.9   Chloride      98 - 107 mmol/L 102 105   Carbon Dioxide      23 - 29 mmol/L 28 27   Anion Gap      6 - 17 mmol/L 10.2 10.9   Glucose      70 - 105 mg/dL 117 (H) 143 (H)   Urea Nitrogen      7 - 30 mg/dL 24 22   Creatinine      0.70 - 1.30 mg/dL 1.21 1.27   GFR Estimate      >60 mL/min/1.7m2 57 (L) 54 (L)   GFR Estimate If Black      >60 mL/min/1.7m2 69 65   Calcium      8.5 - 10.5 mg/dL 8.7 8.8     [facility]: Cardiomyopathy, Optimization, Rehabilitation,  Education  The [facility] is a heart failure specialty clinic within the [facility] where you will work with specialized nurse practitioners, physician assistants, doctors, and registered nurses. They are dedicated to helping patients with heart failure to carefully adjust medications, receive education, and learn who and when to call if symptoms develop. They specialize in helping you better understand your condition, slow the progression of your disease, improve the length and quality of your life, help you detect future heart problems before they become life threatening, and avoid hospitalizations.              Follow-ups after your visit        Additional Services     Follow-Up with [facility] Clinic - HARRY visit                 Your next 10 appointments already scheduled     Jul 30, 2018  9:00 AM CDT   Anticoagulation Visit with [facility]AGULATION   [facility] ([facility])    [address]  [place] [address]   [phone] OPT 2            Aug 06, 2018 10:45 AM CDT   Return Visit with Wesley Benjamin MD   [facility] [place] ([facility])    [address]  [address]  [place] [address]   [phone]            Oct 16, 2018 10:00 AM CDT   LAB with [facility] LAB   [facility] [place] ([facility])    [address]  [place] [address]   [phone]           Please do not eat 10-12 hours before your appointment if you are coming in fasting for labs on lipids, cholesterol, or glucose (sugar). This does not apply to pregnant women. Water, hot tea and black coffee (with nothing added) are okay. Do not drink other fluids, diet soda or chew gum.            Oct 16, 2018 10:50 AM CDT   Core Return with Jazlyn Cr PA-C   [facility] ([facility])    [address]  [address]  [place] [address]   [phone] OPT 2              Future tests that were ordered for you today     Open Future Orders        Priority Expected Expires Ordered    Follow-Up with [facility] - HARRY visit Routine 10/14/2018 7/16/2019 7/16/2018    Basic metabolic panel Routine 10/14/2018 7/16/2019 7/16/2018            Who to contact     If you have questions or need follow up information about today's clinic visit or your schedule please contact [facility]   KAYLA directly at [phone].  Normal or non-critical lab and imaging results will be communicated to you by MyChart, letter or phone within 4 business days after the clinic has received the results. If you do not hear from us within 7 days, please contact the clinic through MyChart or phone. If you have a critical or abnormal lab result, we will notify you by phone as soon as possible.  Submit refill requests through [employer] or call your pharmacy and they will forward the refill request to us. Please allow 3 business days for your refill to be completed.          Additional Information About Your Visit        Care EveryWhere ID     This is your Care EveryWhere ID. This could be used by other organizations to access your [place] medical records  [id number]        Your Vitals Were     Pulse Height Pulse Oximetry BMI (Body Mass Index)          84 1.74 m (5' 8.5\") 98% 25.23 kg/m2         Blood Pressure from Last 3 Encounters:   07/16/18 116/68   07/09/18 104/73   06/11/18 110/75    Weight from Last 3 Encounters:   07/16/18 76.4 kg (168 lb 6.4 oz)   04/17/18 76.1 kg (167 lb 12.8 oz)   03/23/18 75.5 kg (166 lb 8 oz)              We Performed the Following     Follow-Up with [facility] Clinic - HARRY visit        Primary Care Provider Fax #    Physician No Ref-Primary [phone]       No address on file        Equal Access to Services     CECILE MOURA AH: Caridad Carrillo, auutmn isbell, emani moody " emerald rodriguezchandan hopsonaan ah. So [facility] [phone].    ATENCIÓN: Si christiano bryant, tiene a cleaning disposición servicios gratuitos de asistencia lingüística. Jaycee al [phone].    We comply with applicable federal civil rights laws and [place] laws. We do not discriminate on the basis of race, color, national origin, age, disability, sex, sexual orientation, or gender identity.            Thank you!     Thank you for choosing [facility]  for your care. Our goal is always to provide you with excellent care. Hearing back from our patients is one way we can continue to improve our services. Please take a few minutes to complete the written survey that you may receive in the mail after your visit with us. Thank you!             Your Updated Medication List - Protect others around you: Learn how to safely use, store and throw away your medicines at www.disposemymeds.org.          This list is accurate as of 7/16/18 10:16 AM.  Always use your most recent med list.                   Brand Name Dispense Instructions for use Diagnosis    CYANOCOBALAMIN PO      Take 500 mcg by mouth daily        ENSURE COMPLETE PO      3 times a week        ICAPS AREDS FORMULA PO      Take 1 tablet by mouth daily Takes in addition to multivitamin with minerals        LASIX 20 MG tablet   Generic drug:  furosemide      Take 20 mg by mouth daily        metoprolol succinate 100 MG 24 hr tablet    TOPROL-XL     100 mg QAM & 0.5 tabs/50 mg QPM        sacubitril-valsartan 24-26 MG per tablet    ENTRESTO    180 tablet    Take 1 tablet by mouth 2 times daily    Acute on chronic combined systolic and diastolic CHF (congestive heart failure) (H)       VITAMIN C PO      Take 500 mg by mouth daily        Vitamin D (Cholecalciferol) 1000 units Tabs      Take 2,000 Units by mouth daily        warfarin 5 MG tablet    COUMADIN    30 tablet    Take 1 tablet (5 mg) by mouth daily 5 mg on Ojfhqd-Gidsnyfbj-Jlupkv,  2.5 mg Lqrwakc-Gszxyjqu-Pztnlcbb-Sunday.  Patient takes in the morning.    Atrial fibrillation, unspecified type (H)

## 2018-07-16 NOTE — MR AVS SNAPSHOT
Jama Paul   7/16/2018 8:40 AM   Anticoagulation Therapy Visit    Description:  87 year old male   Provider:  [facility]   Department:  [facility]           INR as of 7/16/2018     Today's INR 2.7      Anticoagulation Summary as of 7/16/2018     INR goal 2.0-3.0   Today's INR 2.7   Full warfarin instructions 5 mg every day   Next INR check 7/30/2018    Indications   Long-term (current) use of anticoagulants [Z79.01] [Z79.01]  Atrial fibrillation (H) [I48.91]         Your next Anticoagulation Clinic appointment(s)     Jul 30, 2018  9:00 AM CDT   Anticoagulation Visit with  ANTICOAGULATION   [facility] ([facility])    [address]  [place] [address] [phone] OPT 2              Contact Numbers     Anticoagulant (INR) Clinic Number: [phone]          July 2018 Details    Sun Mon Tue Wed Thu Fri Sat     1               2               3               4               5               6               7                 8               9               10               11               12               13               14                 15               16      5 mg   See details      17      5 mg         18      5 mg         19      5 mg         20      5 mg         21      5 mg           22      5 mg         23      5 mg         24      5 mg         25      5 mg         26      5 mg         27      5 mg         28      5 mg           29      5 mg         30            31                    Date Details   07/16 This INR check       Date of next INR:  7/30/2018         How to take your warfarin dose     To take:  5 mg Take 1 of the 5 mg tablets.

## 2018-07-16 NOTE — LETTER
7/16/2018      Physician No Ref-Primary  No address on file      RE: Jama Paul       Dear Colleague,    I had the pleasure of seeing Jama Paul in the [facility].    Service Date: 07/16/2018      HISTORY OF PRESENT ILLNESS:  Jama is a pleasant 87-year-old gentleman who is here today in the [facility] for management of his heart failure with a reduced ejection fraction.  He is New York Heart Association class II, stage C.  His last echo demonstrated ejection fraction of greater than 70% with moderate concentric left ventricular hypertrophy.  Right ventricle was moderately dilated.  There was mildly decreased right ventricular systolic function, biatrial enlargement, moderate to moderate-to-severe 2+ tricuspid regurgitation.  This echo was done when he was in the hospital with AFib with a rapid ventricular response.  In the past, he has had a negative stress test.  He also in the past has undergone a right heart catheterization which showed normal filling pressures with a slowing down of his heart rate.  He has had no further hospitalizations for congestive heart failure.  Today he states he is doing well.  He has moved to [facility] since we have seen him and with that he goes down to the exercise facility every day.  He is doing light weightlifting.  He works on a bicycle and a treadmill.  He is going to start walking the halls.  He is taking some balance and flexibility classes.  He does check his blood pressure once or twice a day.  He said when his systolic blood pressure is in the 90s he does feel a little sluggish, but when his heart rate is in the mid 100s he feels good.  He denies chest pain, chest pressure.  He denies worsening shortness of breath at rest or with exertion.  He reports no PND.  He has a healing lower ankle ulcer that is healing well and is managed by Dr. Benjamin.  Please see review of system and physical exam.      IMPRESSION AND PLAN:   This is an 87-year-old gentleman with a previous history of acute decompensated heart failure in the setting of rapid ventricular rate with congestive heart failure.  His ventricular rates continue to be well controlled.  However, he does have bouts of lower blood pressure.  We have him on a stable dose of metoprolol, Entresto, warfarin and Coumadin.  I have asked him to stop taking his blood pressure as frequently, but he does like to know what his numbers are.  Therefore, I have recommended that he take his metoprolol 50 mg in the morning and 100 in the evening instead of previously 100 in the morning, 50 in the evening.  That will allow for his blood pressure to be a little higher during his waking hours when he works out.  At this point, I will keep him on the same dose of Entresto.  In the future, if he has persistent low blood pressures we also could consider reducing his daily dose of furosemide.      Since he has moved to [facility], he has stopped weighing himself daily.  Today, I reinforced how important this is so we can closely monitor his weights to prevent future hospitalizations.  It has been my pleasure to work with Yinka.  He will follow up in 3 months in the [facility].         ERIC GUERRA CNP             D: 2018   T: 2018   MT: MARTIN      Name:     YINKA GONZALEZ   MRN:      [id number]        Account:      [id number]   :      1931           Service Date: 2018      Document: [id number]         Outpatient Encounter Prescriptions as of 2018   Medication Sig Dispense Refill     Ascorbic Acid (VITAMIN C PO) Take 500 mg by mouth daily       CYANOCOBALAMIN PO Take 500 mcg by mouth daily       furosemide (LASIX) 20 MG tablet Take 20 mg by mouth daily       metoprolol succinate (TOPROL-XL) 100 MG 24 hr tablet 100 mg QAM & 0.5 tabs/50 mg QPM       Multiple Vitamins-Minerals (ICAPS AREDS FORMULA PO) Take 1 tablet by mouth daily Takes in addition to multivitamin  with minerals       Nutritional Supplements (ENSURE COMPLETE PO) 3 times a week       sacubitril-valsartan (ENTRESTO) 24-26 MG per tablet Take 1 tablet by mouth 2 times daily 180 tablet 3     Vitamin D, Cholecalciferol, 1000 UNITS TABS Take 2,000 Units by mouth daily        warfarin (COUMADIN) 5 MG tablet Take 1 tablet (5 mg) by mouth daily 5 mg on Pymfwj-Xzkpuvmau-Potkqh, 2.5 mg Xudgmmm-Blyqmgbe-Nvsksxnx-Sunday.  Patient takes in the morning. 30 tablet 0     [DISCONTINUED] metoprolol succinate (TOPROL XL) 100 MG 24 hr tablet Take 100 mg by mouth 2 times daily 1/2 tablet in the AM, 1 tablet in the PM        No facility-administered encounter medications on file as of 7/16/2018.        Again, thank you for allowing me to participate in the care of your patient.      Sincerely,    ERIC [facility]

## 2018-07-16 NOTE — PROGRESS NOTES
HPI and Plan:   See dictation    Orders Placed This Encounter   Procedures     Basic metabolic panel     Follow-Up with [facility] - HARRY visit     Orders Placed This Encounter   Medications     metoprolol succinate (TOPROL-XL) 100 MG 24 hr tablet     Si mg QAM & 0.5 tabs/50 mg QPM     Nutritional Supplements (ENSURE COMPLETE PO)     Sig: 3 times a week     Medications Discontinued During This Encounter   Medication Reason     metoprolol succinate (TOPROL XL) 100 MG 24 hr tablet Stopped by Patient         Encounter Diagnosis   Name Primary?     Acute on chronic combined systolic and diastolic CHF (congestive heart failure) (H)        CURRENT MEDICATIONS:  Current Outpatient Prescriptions   Medication Sig Dispense Refill     Ascorbic Acid (VITAMIN C PO) Take 500 mg by mouth daily       CYANOCOBALAMIN PO Take 500 mcg by mouth daily       furosemide (LASIX) 20 MG tablet Take 20 mg by mouth daily       metoprolol succinate (TOPROL-XL) 100 MG 24 hr tablet 100 mg QAM & 0.5 tabs/50 mg QPM       Multiple Vitamins-Minerals (ICAPS AREDS FORMULA PO) Take 1 tablet by mouth daily Takes in addition to multivitamin with minerals       Nutritional Supplements (ENSURE COMPLETE PO) 3 times a week       sacubitril-valsartan (ENTRESTO) 24-26 MG per tablet Take 1 tablet by mouth 2 times daily 180 tablet 3     Vitamin D, Cholecalciferol, 1000 UNITS TABS Take 2,000 Units by mouth daily        warfarin (COUMADIN) 5 MG tablet Take 1 tablet (5 mg) by mouth daily 5 mg on Khwtuz-Jljamlzrd-Iemspu, 2.5 mg Hevamym-Mnuiumdt-Bqsfhddy-.  Patient takes in the morning. 30 tablet 0       ALLERGIES   No Known Allergies    PAST MEDICAL HISTORY:  Past Medical History:   Diagnosis Date     Antiplatelet or antithrombotic long-term use      Arrhythmia      Atrial fibrillation (H)      Elevated PSA      Thrombocytopenia (H)      Unspecified essential hypertension        PAST SURGICAL HISTORY:  Past Surgical History:   Procedure Laterality Date      "COLONOSCOPY       EXPLORE GROIN  4/30/2014    Procedure: EXPLORE GROIN;  Surgeon: Sam Aguirre MD;  Location:  OR     HERNIORRHAPHY INGUINAL       HERNIORRHAPHY INGUINAL  4/30/2014    Procedure: HERNIORRHAPHY INGUINAL;  Surgeon: Sam Aguirre MD;  Location:  OR     MOHS MICROGRAPHIC PROCEDURE       PHACOEMULSIFICATION CLEAR CORNEA WITH STANDARD INTRAOCULAR LENS IMPLANT Right 9/8/2015    Procedure: PHACOEMULSIFICATION CLEAR CORNEA WITH STANDARD INTRAOCULAR LENS IMPLANT;  Surgeon: Gil Shannon MD;  Location:  EC     septic olecranon bursitis[         FAMILY HISTORY:  Family History   Problem Relation Age of Onset     HEART DISEASE No family hx of        SOCIAL HISTORY:  Social History     Social History     Marital status:      Spouse name: N/A     Number of children: N/A     Years of education: N/A     Social History Main Topics     Smoking status: Never Smoker     Smokeless tobacco: Never Used     Alcohol use No     Drug use: No     Sexual activity: Not Asked     Other Topics Concern     Parent/Sibling W/ Cabg, Mi Or Angioplasty Before 65f 55m? No     Social History Narrative       Review of Systems:  Skin:  Negative     Eyes:  Positive for glasses;cataracts  ENT:  Negative    Respiratory:  Positive for shortness of breath;dyspnea on exertion  Cardiovascular:    fatigue;Positive for  Gastroenterology: Negative    Genitourinary:  Positive for urinary frequency;nocturia  Musculoskeletal:  Negative    Neurologic:  Negative    Psychiatric:  Negative    Heme/Lymph/Imm:  Negative    Endocrine:  Negative      Physical Exam:  Vitals: /68  Pulse 84  Ht 1.74 m (5' 8.5\")  Wt 76.4 kg (168 lb 6.4 oz)  SpO2 98%  BMI 25.23 kg/m2    Constitutional:  cooperative;well developed frail      Skin:  warm and dry to the touch;no apparent skin lesions or masses noted          Head:  normocephalic        Eyes:  pupils equal and round        Lymph:      ENT:  no pallor or cyanosis        Neck:  carotid " pulses are full and equal bilaterally, JVP normal, no carotid bruit;JVP normal        Respiratory:  clear to auscultation;normal symmetry         Cardiac:   irregular rhythm                                                       GI:  abdomen soft        Extremities and Muscular Skeletal:  no edema   bilateral LE edema;trace;1+          Neurological:  affect appropriate        Psych:  affect appropriate, oriented to time, person and place Anxious      Recent Lab Results:  LIPID RESULTS:  Lab Results   Component Value Date    CHOL 127 04/18/2012    HDL 56 04/18/2012    LDL 55 04/18/2012    TRIG 81 04/18/2012    CHOLHDLRATIO 2.3 04/18/2012       LIVER ENZYME RESULTS:  Lab Results   Component Value Date    AST 19 02/28/2018    ALT 32 02/28/2018       CBC RESULTS:  Lab Results   Component Value Date    WBC 6.2 03/01/2018    RBC 3.26 (L) 03/01/2018    HGB 10.4 (L) 03/01/2018    HCT 31.9 (L) 03/01/2018    MCV 98 03/01/2018    MCH 31.9 03/01/2018    MCHC 32.6 03/01/2018    RDW 19.5 (H) 03/01/2018    PLT 46 (LL) 03/01/2018       BMP RESULTS:  Lab Results   Component Value Date     07/16/2018    POTASSIUM 3.9 07/16/2018    CHLORIDE 105 07/16/2018    CO2 27 07/16/2018    ANIONGAP 10.9 07/16/2018     (H) 07/16/2018    BUN 22 07/16/2018    CR 1.27 07/16/2018    GFRESTIMATED 54 (L) 07/16/2018    GFRESTBLACK 65 07/16/2018    BARON 8.8 07/16/2018        A1C RESULTS:  No results found for: A1C    INR RESULTS:  Lab Results   Component Value Date    INR 2.7 (A) 07/16/2018    INR 3.6 (A) 07/09/2018    INR 3.29 (H) 03/01/2018    INR 3.43 (H) 02/28/2018           CC  Long Pugh MD  [address]  [place] [id number]

## 2018-07-16 NOTE — PROGRESS NOTES
ANTICOAGULATION FOLLOW-UP CLINIC VISIT    Patient Name:  Jama Paul  Date:  7/16/2018  Contact Type:  Face to Face    SUBJECTIVE:     Patient Findings     Positives No Problem Findings           OBJECTIVE    INR Protime   Date Value Ref Range Status   07/16/2018 2.7 (A) 0.86 - 1.14 Final       ASSESSMENT / PLAN  INR assessment THER    Recheck INR In: 2 WEEKS    INR Location Clinic      Anticoagulation Summary as of 7/16/2018     INR goal 2.0-3.0   Today's INR 2.7   Warfarin maintenance plan 5 mg (5 mg x 1) every day   Full warfarin instructions 5 mg every day   Weekly warfarin total 35 mg   No change documented Mirela Moreno RN   Plan last modified Mirela Moreno RN (5/31/2018)   Next INR check 7/30/2018   Target end date Indefinite    Indications   Long-term (current) use of anticoagulants [Z79.01] [Z79.01]  Atrial fibrillation (H) [I48.91]         Anticoagulation Episode Summary     INR check location     Preferred lab     Send INR reminders to [facility] HEART INR NURSE    Comments       Anticoagulation Care Providers     Provider Role Specialty Phone number    Long Pugh MD Responsible Cardiology [phone]            See the Encounter Report to view Anticoagulation Flowsheet and Dosing Calendar (Go to Encounters tab in chart review, and find the Anticoagulation Therapy Visit)    INR 2.7.  Seeing Winnie Garcia today.  INR back in range.  He is now taking Warfarin in the evening.  He will continue a small salad daily and boost/ensure 3x per week and continue the 5mg/day and recheck in 2 weeks.  Rob Moreno RN

## 2018-07-16 NOTE — LETTER
2018    Physician No Ref-Primary  No address on file    RE: Jama Paul       Dear Colleague,    I had the pleasure of seeing Jama Paul in the [facility].    HPI and Plan:   See dictation    Orders Placed This Encounter   Procedures     Basic metabolic panel     Follow-Up with [facility] - HARRY visit     Orders Placed This Encounter   Medications     metoprolol succinate (TOPROL-XL) 100 MG 24 hr tablet     Si mg QAM & 0.5 tabs/50 mg QPM     Nutritional Supplements (ENSURE COMPLETE PO)     Sig: 3 times a week     Medications Discontinued During This Encounter   Medication Reason     metoprolol succinate (TOPROL XL) 100 MG 24 hr tablet Stopped by Patient         Encounter Diagnosis   Name Primary?     Acute on chronic combined systolic and diastolic CHF (congestive heart failure) (H)        CURRENT MEDICATIONS:  Current Outpatient Prescriptions   Medication Sig Dispense Refill     Ascorbic Acid (VITAMIN C PO) Take 500 mg by mouth daily       CYANOCOBALAMIN PO Take 500 mcg by mouth daily       furosemide (LASIX) 20 MG tablet Take 20 mg by mouth daily       metoprolol succinate (TOPROL-XL) 100 MG 24 hr tablet 100 mg QAM & 0.5 tabs/50 mg QPM       Multiple Vitamins-Minerals (ICAPS AREDS FORMULA PO) Take 1 tablet by mouth daily Takes in addition to multivitamin with minerals       Nutritional Supplements (ENSURE COMPLETE PO) 3 times a week       sacubitril-valsartan (ENTRESTO) 24-26 MG per tablet Take 1 tablet by mouth 2 times daily 180 tablet 3     Vitamin D, Cholecalciferol, 1000 UNITS TABS Take 2,000 Units by mouth daily        warfarin (COUMADIN) 5 MG tablet Take 1 tablet (5 mg) by mouth daily 5 mg on Qvrhbt-Qudqqvqpq-Bvrgen, 2.5 mg Aohzwiv-Dspmgrmx-Shkekpng-.  Patient takes in the morning. 30 tablet 0       ALLERGIES   No Known Allergies    PAST MEDICAL HISTORY:  Past Medical History:   Diagnosis Date     Antiplatelet or antithrombotic long-term use       "Arrhythmia      Atrial fibrillation (H)      Elevated PSA      Thrombocytopenia (H)      Unspecified essential hypertension        PAST SURGICAL HISTORY:  Past Surgical History:   Procedure Laterality Date     COLONOSCOPY       EXPLORE GROIN  4/30/2014    Procedure: EXPLORE GROIN;  Surgeon: Sam Aguirre MD;  Location:  OR     HERNIORRHAPHY INGUINAL       HERNIORRHAPHY INGUINAL  4/30/2014    Procedure: HERNIORRHAPHY INGUINAL;  Surgeon: Sam Aguirre MD;  Location:  OR     MOHS MICROGRAPHIC PROCEDURE       PHACOEMULSIFICATION CLEAR CORNEA WITH STANDARD INTRAOCULAR LENS IMPLANT Right 9/8/2015    Procedure: PHACOEMULSIFICATION CLEAR CORNEA WITH STANDARD INTRAOCULAR LENS IMPLANT;  Surgeon: Gil Shannon MD;  Location:  EC     septic olecranon bursitis[         FAMILY HISTORY:  Family History   Problem Relation Age of Onset     HEART DISEASE No family hx of        SOCIAL HISTORY:  Social History     Social History     Marital status:      Spouse name: N/A     Number of children: N/A     Years of education: N/A     Social History Main Topics     Smoking status: Never Smoker     Smokeless tobacco: Never Used     Alcohol use No     Drug use: No     Sexual activity: Not Asked     Other Topics Concern     Parent/Sibling W/ Cabg, Mi Or Angioplasty Before 65f 55m? No     Social History Narrative       Review of Systems:  Skin:  Negative     Eyes:  Positive for glasses;cataracts  ENT:  Negative    Respiratory:  Positive for shortness of breath;dyspnea on exertion  Cardiovascular:    fatigue;Positive for  Gastroenterology: Negative    Genitourinary:  Positive for urinary frequency;nocturia  Musculoskeletal:  Negative    Neurologic:  Negative    Psychiatric:  Negative    Heme/Lymph/Imm:  Negative    Endocrine:  Negative      Physical Exam:  Vitals: /68  Pulse 84  Ht 1.74 m (5' 8.5\")  Wt 76.4 kg (168 lb 6.4 oz)  SpO2 98%  BMI 25.23 kg/m2    Constitutional:  cooperative;well developed frail  "     Skin:  warm and dry to the touch;no apparent skin lesions or masses noted          Head:  normocephalic        Eyes:  pupils equal and round        Lymph:      ENT:  no pallor or cyanosis        Neck:  carotid pulses are full and equal bilaterally, JVP normal, no carotid bruit;JVP normal        Respiratory:  clear to auscultation;normal symmetry         Cardiac:   irregular rhythm                                                       GI:  abdomen soft        Extremities and Muscular Skeletal:  no edema   bilateral LE edema;trace;1+          Neurological:  affect appropriate        Psych:  affect appropriate, oriented to time, person and place Anxious      Recent Lab Results:  LIPID RESULTS:  Lab Results   Component Value Date    CHOL 127 04/18/2012    HDL 56 04/18/2012    LDL 55 04/18/2012    TRIG 81 04/18/2012    CHOLHDLRATIO 2.3 04/18/2012       LIVER ENZYME RESULTS:  Lab Results   Component Value Date    AST 19 02/28/2018    ALT 32 02/28/2018       CBC RESULTS:  Lab Results   Component Value Date    WBC 6.2 03/01/2018    RBC 3.26 (L) 03/01/2018    HGB 10.4 (L) 03/01/2018    HCT 31.9 (L) 03/01/2018    MCV 98 03/01/2018    MCH 31.9 03/01/2018    MCHC 32.6 03/01/2018    RDW 19.5 (H) 03/01/2018    PLT 46 (LL) 03/01/2018       BMP RESULTS:  Lab Results   Component Value Date     07/16/2018    POTASSIUM 3.9 07/16/2018    CHLORIDE 105 07/16/2018    CO2 27 07/16/2018    ANIONGAP 10.9 07/16/2018     (H) 07/16/2018    BUN 22 07/16/2018    CR 1.27 07/16/2018    GFRESTIMATED 54 (L) 07/16/2018    GFRESTBLACK 65 07/16/2018    BARON 8.8 07/16/2018        A1C RESULTS:  No results found for: A1C    INR RESULTS:  Lab Results   Component Value Date    INR 2.7 (A) 07/16/2018    INR 3.6 (A) 07/09/2018    INR 3.29 (H) 03/01/2018    INR 3.43 (H) 02/28/2018           CC  Long Pugh MD  [address] [facility] [address]  [place] [id number]                  Thank you for allowing me to participate in the care of your  patient.      Sincerely,     ERIC Olivares CNP     [facility]    cc:   Long Pugh MD  [address]  [place] [id number]

## 2018-07-16 NOTE — NURSING NOTE
The After Visit Summary was reviewed with the patient following their office visit with Winnie Garcia. Patient was educated about any medication changes, the importance of following a low sodium diet, importance of recording daily weights, and when to call [facility]. Patient verbalized understanding of the information and agrees to call with any questions or concerns.     Labs: Lab results from today were reviewed with the patient during the office visit. A copy of the results were provided on the After Visit Summary.     Return appointment:  Appointment with HARLEY Holland on 10/16@ 10:50, and lab prior to appt at 10:00    Medication changes:   Metoprolol 50 mg in morning  And Metoprolol 100 mg in the PM           Saranya Quinn, RN  [facility] RN Care Coordinator  [facility]  [phone]

## 2018-07-30 ENCOUNTER — ANTICOAGULATION THERAPY VISIT (OUTPATIENT)
Dept: CARDIOLOGY | Facility: CLINIC | Age: 83
End: 2018-07-30
Payer: MEDICARE

## 2018-07-30 DIAGNOSIS — I48.91 ATRIAL FIBRILLATION, UNSPECIFIED TYPE (H): ICD-10-CM

## 2018-07-30 DIAGNOSIS — Z79.01 LONG-TERM (CURRENT) USE OF ANTICOAGULANTS: ICD-10-CM

## 2018-07-30 LAB
INR POINT OF CARE: >8 (ref 0.86–1.14)
INR PPP: 6.82 (ref 0.86–1.14)

## 2018-07-30 PROCEDURE — 85610 PROTHROMBIN TIME: CPT | Performed by: INTERNAL MEDICINE

## 2018-07-30 PROCEDURE — 36416 COLLJ CAPILLARY BLOOD SPEC: CPT | Performed by: INTERNAL MEDICINE

## 2018-07-30 PROCEDURE — 85610 PROTHROMBIN TIME: CPT | Mod: QW | Performed by: INTERNAL MEDICINE

## 2018-07-30 NOTE — PROGRESS NOTES
ANTICOAGULATION FOLLOW-UP CLINIC VISIT    Patient Name:  Jama Paul  Date:  7/30/2018  Contact Type:  Face to Face    SUBJECTIVE:     Patient Findings     Positives OTC meds (taking Tylenol 1-3 times a day for left shoulder pain for a couple of weeks), Unexplained INR or factor level change           OBJECTIVE    INR   Date Value Ref Range Status   07/30/2018 6.82 (HH) 0.86 - 1.14 Final     Comment:     Critical Value called to and read back by   LEA KELLY [facility] BY  7.30.18 @ [facility]         ASSESSMENT / PLAN  INR assessment SUPRA    Recheck INR In: 2 DAYS    INR Location Clinic      Anticoagulation Summary as of 7/30/2018     INR goal 2.0-3.0   Today's INR 6.82!   Warfarin maintenance plan 5 mg (5 mg x 1) every day   Full warfarin instructions 7/30: Hold; 7/31: Hold; Otherwise 5 mg every day   Weekly warfarin total 35 mg   Plan last modified Mirela Moreno, RN (5/31/2018)   Next INR check 8/1/2018   Target end date Indefinite    Indications   Long-term (current) use of anticoagulants [Z79.01] [Z79.01]  Atrial fibrillation (H) [I48.91]         Anticoagulation Episode Summary     INR check location     Preferred lab     Send INR reminders to [facility] HEART INR NURSE    Comments       Anticoagulation Care Providers     Provider Role Specialty Phone number    Long Pugh MD Responsible Cardiology [phone]            See the Encounter Report to view Anticoagulation Flowsheet and Dosing Calendar (Go to Encounters tab in chart review, and find the Anticoagulation Therapy Visit)    POCT INR >8.0 x2. He developed a small area of scapular swelling and left shoulder pain about 2 weeks ago [place] He has been taking Tylenol 1-3x/day for shoulder pain since then. Still eating a small salad daily and Ensure 3x/wk. No change in other meds. Denies infection, diarrhea or dehydration. No ETOH. No bleeding or abnormal bruising. No further swelling of the shoulder area. Dr Pugh is off today so  reviewed INR result with Dr Paez. Per verbal order from Dr Paez, pt took Vitamin K 1 mg capsule from our stock supply and had venous INR drawn per INR protocol. Advised to eat dark greens 2x/today and at least once tomorrow. Will hold warfarin today and tomorrow with recheck of INR Wed. Advised that if he has any bleeding or neuro changes, that he should be seen urgently in ER. Pt verbalized understanding. Will call pt later today when the venous INR result is available.  Dosage adjustment made based on physician directed care plan.  Katherin Pastor RN    11:15 am Venous INR 6.82. Reviewed result with Dr Paez. No change in the current plan as noted above per verbal order from Dr Paez. Spoke with pt to review the venous INR result. He already took the 1 mg oral Vit K capsule, will eat 2 salads today and at least one tomorrow, he will drink 1 bottle of Ensure today and tomorrow (usually has it on MWF), will hold warfarin MTu and recheck INR Wed. Pt verbalized understanding. Keren

## 2018-07-30 NOTE — MR AVS SNAPSHOT
Jama Paul   7/30/2018 9:00 AM   Anticoagulation Therapy Visit    Description:  87 year old male   Provider:  [facility]   Department:  [facility]           INR as of 7/30/2018     Today's INR >8.0!      Anticoagulation Summary as of 7/30/2018     INR goal 2.0-3.0   Today's INR >8.0!   Full warfarin instructions 7/30: Hold; 7/31: Hold; Otherwise 5 mg every day   Next INR check 8/1/2018    Indications   Long-term (current) use of anticoagulants [Z79.01] [Z79.01]  Atrial fibrillation (H) [I48.91]         Contact Numbers     Anticoagulant (INR) Clinic Number: [phone]          July 2018 Details    Sun Mon Tue Wed Thu Fri Sat     1               2               3               4               5               6               7                 8               9               10               11               12               13               14                 15               16               17               18               19               20               21                 22               23               24               25               26               27               28                 29               30      Hold   See details      31      Hold              Date Details   07/30 This INR check               How to take your warfarin dose     Hold Do not take your warfarin dose. See the Details table to the right for additional instructions.                August 2018 Details    Sun Mon Tue Wed Thu Fri Sat        1            2               3               4                 5               6               7               8               9               10               11                 12               13               14               15               16               17               18                 19               20               21               22               23               24               25                 26               27               28                29               30               31                 Date Details   No additional details    Date of next INR:  8/1/2018         How to take your warfarin dose     To take:  5 mg Take 1 of the 5 mg tablets.

## 2018-08-01 ENCOUNTER — ANTICOAGULATION THERAPY VISIT (OUTPATIENT)
Dept: CARDIOLOGY | Facility: CLINIC | Age: 83
End: 2018-08-01
Payer: MEDICARE

## 2018-08-01 DIAGNOSIS — Z79.01 LONG-TERM (CURRENT) USE OF ANTICOAGULANTS: ICD-10-CM

## 2018-08-01 DIAGNOSIS — I48.91 ATRIAL FIBRILLATION (H): ICD-10-CM

## 2018-08-01 LAB — INR POINT OF CARE: 1.8 (ref 0.86–1.14)

## 2018-08-01 PROCEDURE — 36416 COLLJ CAPILLARY BLOOD SPEC: CPT | Performed by: INTERNAL MEDICINE

## 2018-08-01 PROCEDURE — 85610 PROTHROMBIN TIME: CPT | Mod: QW | Performed by: INTERNAL MEDICINE

## 2018-08-01 NOTE — MR AVS SNAPSHOT
Jama Paul   8/1/2018 2:00 PM   Anticoagulation Therapy Visit    Description:  87 year old male   Provider:  [facility] ANTICOAGUL[facility]   Department:  [facility]           INR as of 8/1/2018     Today's INR 1.8!      Anticoagulation Summary as of 8/1/2018     INR goal 2.0-3.0   Today's INR 1.8!   Full warfarin instructions 5 mg every day   Next INR check 8/8/2018    Indications   Long-term (current) use of anticoagulants [Z79.01] [Z79.01]  Atrial fibrillation (H) [I48.91]         Your next Anticoagulation Clinic appointment(s)     Aug 08, 2018  2:00 PM CDT   Anticoagulation Visit with [facility]   [facility] ([facility])    [facility]  [facility] [address] [phone] OPT 2              Contact Numbers     Anticoagulant (INR) Clinic Number: [phone]          August 2018 Details    Sun Mon Tue Wed Thu Fri Sat        1      5 mg   See details      2      5 mg         3      5 mg         4      5 mg           5      5 mg         6      5 mg         7      5 mg         8            9               10               11                 12               13               14               15               16               17               18                 19               20               21               22               23               24               25                 26               27               28               29               30               31                 Date Details   08/01 This INR check       Date of next INR:  8/8/2018         How to take your warfarin dose     To take:  5 mg Take 1 of the 5 mg tablets.

## 2018-08-01 NOTE — PROGRESS NOTES
ANTICOAGULATION FOLLOW-UP CLINIC VISIT    Patient Name:  Jama Paul  Date:  8/1/2018  Contact Type:  Face to Face    SUBJECTIVE:        OBJECTIVE    INR Protime   Date Value Ref Range Status   08/01/2018 1.8 (A) 0.86 - 1.14 Final       ASSESSMENT / PLAN  INR assessment SUB    Recheck INR In: 1 WEEK    INR Location Clinic      Anticoagulation Summary as of 8/1/2018     INR goal 2.0-3.0   Today's INR 1.8!   Warfarin maintenance plan 5 mg (5 mg x 1) every day   Full warfarin instructions 5 mg every day   Weekly warfarin total 35 mg   No change documented Penfield, Lynette E, RN   Plan last modified Mirela Moreno RN (5/31/2018)   Next INR check 8/8/2018   Target end date Indefinite    Indications   Long-term (current) use of anticoagulants [Z79.01] [Z79.01]  Atrial fibrillation (H) [I48.91]         Anticoagulation Episode Summary     INR check location     Preferred lab     Send INR reminders to [facility] HEART INR NURSE    Comments       Anticoagulation Care Providers     Provider Role Specialty Phone number    Long Pugh MD Responsible Cardiology [phone]            See the Encounter Report to view Anticoagulation Flowsheet and Dosing Calendar (Go to Encounters tab in chart review, and find the Anticoagulation Therapy Visit)    INR=1.8  He has been eating more salads, had 1 mg vitamin K. He denies bleeding or bruising. Denies any med or diet changes. Will have him resume 5 mg daily, very little greens through Friday, then resume usual greens. Will recheck in a week.   Lynette E. Penfield, RN

## 2018-08-08 ENCOUNTER — ANTICOAGULATION THERAPY VISIT (OUTPATIENT)
Dept: CARDIOLOGY | Facility: CLINIC | Age: 83
End: 2018-08-08
Payer: MEDICARE

## 2018-08-08 DIAGNOSIS — I48.91 ATRIAL FIBRILLATION, UNSPECIFIED TYPE (H): ICD-10-CM

## 2018-08-08 DIAGNOSIS — Z79.01 LONG-TERM (CURRENT) USE OF ANTICOAGULANTS: ICD-10-CM

## 2018-08-08 LAB — INR POINT OF CARE: 2.9 (ref 0.86–1.14)

## 2018-08-08 PROCEDURE — 36416 COLLJ CAPILLARY BLOOD SPEC: CPT | Performed by: INTERNAL MEDICINE

## 2018-08-08 PROCEDURE — 85610 PROTHROMBIN TIME: CPT | Mod: QW | Performed by: INTERNAL MEDICINE

## 2018-08-08 NOTE — PROGRESS NOTES
ANTICOAGULATION FOLLOW-UP CLINIC VISIT    Patient Name:  Jama Paul  Date:  8/8/2018  Contact Type:  Face to Face    SUBJECTIVE:     Patient Findings     Positives Change in diet/appetite (eating a salad daily but has not resumed drinking ensure 3x/wk), Bruising (occasional bruise on his arms)           OBJECTIVE    INR Protime   Date Value Ref Range Status   08/08/2018 2.9 (A) 0.86 - 1.14 Final       ASSESSMENT / PLAN  INR assessment THER    Recheck INR In: 10 DAYS    INR Location Clinic      Anticoagulation Summary as of 8/8/2018     INR goal 2.0-3.0   Today's INR 2.9   Warfarin maintenance plan 5 mg (5 mg x 1) every day   Full warfarin instructions 5 mg every day   Weekly warfarin total 35 mg   No change documented Katherin Pastor, MATTY   Plan last modified Mirela Moreno RN (5/31/2018)   Next INR check 8/20/2018   Target end date Indefinite    Indications   Long-term (current) use of anticoagulants [Z79.01] [Z79.01]  Atrial fibrillation (H) [I48.91]         Anticoagulation Episode Summary     INR check location     Preferred lab     Send INR reminders to [facility] HEART INR NURSE    Comments       Anticoagulation Care Providers     Provider Role Specialty Phone number    Long Pugh MD Responsible Cardiology [phone]            See the Encounter Report to view Anticoagulation Flowsheet and Dosing Calendar (Go to Encounters tab in chart review, and find the Anticoagulation Therapy Visit)    INR 2.9 INR back in range on his usual dosing of 5 mg daily. He resumed eating a salad daily but did not resume drinking Ensure 3x/wk yet. He decreased his use of PRN Tylenol (had been taking Tylenol for discomfort of upper back a few weeks ago). Occasional bruise on his arms. Will continue to take 5 mg daily and add back the Ensure 1 bottle 3x/week. Recheck INR in 12 days before his OV with Dr Benjamin.     Katherin Pastor, RN

## 2018-08-08 NOTE — MR AVS SNAPSHOT
Jama Paul   8/8/2018 2:00 PM   Anticoagulation Therapy Visit    Description:  87 year old male   Provider:   ANTICOAGULATION   Department:  [facility]           INR as of 8/8/2018     Today's INR 2.9      Anticoagulation Summary as of 8/8/2018     INR goal 2.0-3.0   Today's INR 2.9   Full warfarin instructions 5 mg every day   Next INR check 8/22/2018    Indications   Long-term (current) use of anticoagulants [Z79.01] [Z79.01]  Atrial fibrillation (H) [I48.91]         Your next Anticoagulation Clinic appointment(s)     Aug 08, 2018  2:00 PM CDT   Anticoagulation Visit with  ANTICOAGULATION   [facility] ([facility])    [address]  [facility] [address] [phone] OPT 2            Aug 20, 2018 10:00 AM CDT   Anticoagulation Visit with  ANTICOAGULATION   [facility] ([facility])    [id number]  [facility] [address]   [phone] OPT 2              Contact Numbers     Anticoagulant (INR) Clinic Number: [phone]          August 2018 Details    Sun Mon Tue Wed Thu Fri Sat        1               2               3               4                 5               6               7               8      5 mg   See details      9      5 mg         10      5 mg         11      5 mg           12      5 mg         13      5 mg         14      5 mg         15      5 mg         16      5 mg         17      5 mg         18      5 mg           19      5 mg         20      5 mg         21      5 mg         22            23               24               25                 26               27               28               29               30               31                 Date Details   08/08 This INR check       Date of next INR:  8/22/2018         How to take your warfarin dose     To take:  5 mg Take 1 of the 5 mg tablets.

## 2018-08-20 ENCOUNTER — HOSPITAL ENCOUNTER (OUTPATIENT)
Dept: WOUND CARE | Facility: CLINIC | Age: 83
Discharge: HOME | End: 2018-08-20
Attending: SURGERY | Admitting: SURGERY
Payer: MEDICARE

## 2018-08-20 ENCOUNTER — ANTICOAGULATION THERAPY VISIT (OUTPATIENT)
Dept: CARDIOLOGY | Facility: CLINIC | Age: 83
End: 2018-08-20
Payer: MEDICARE

## 2018-08-20 VITALS
DIASTOLIC BLOOD PRESSURE: 67 MMHG | RESPIRATION RATE: 16 BRPM | SYSTOLIC BLOOD PRESSURE: 108 MMHG | TEMPERATURE: 97 F | HEART RATE: 91 BPM

## 2018-08-20 DIAGNOSIS — Z79.01 LONG-TERM (CURRENT) USE OF ANTICOAGULANTS: ICD-10-CM

## 2018-08-20 DIAGNOSIS — I48.91 ATRIAL FIBRILLATION, UNSPECIFIED TYPE (H): ICD-10-CM

## 2018-08-20 DIAGNOSIS — S81.801D TRAUMATIC OPEN WOUND OF RIGHT LOWER LEG, SUBSEQUENT ENCOUNTER: ICD-10-CM

## 2018-08-20 PROBLEM — S81.801A TRAUMATIC OPEN WOUND OF RIGHT LOWER LEG: Status: ACTIVE | Noted: 2018-08-20

## 2018-08-20 PROBLEM — T14.8XXA HEMATOMA: Status: RESOLVED | Noted: 2017-10-04 | Resolved: 2018-08-20

## 2018-08-20 LAB — INR POINT OF CARE: 4.3 (ref 0.86–1.14)

## 2018-08-20 PROCEDURE — 11042 DBRDMT SUBQ TIS 1ST 20SQCM/<: CPT

## 2018-08-20 PROCEDURE — 85610 PROTHROMBIN TIME: CPT | Mod: QW | Performed by: INTERNAL MEDICINE

## 2018-08-20 PROCEDURE — 36416 COLLJ CAPILLARY BLOOD SPEC: CPT | Performed by: INTERNAL MEDICINE

## 2018-08-20 PROCEDURE — A6248 HYDROGEL DRSG GEL FILLER: HCPCS

## 2018-08-20 PROCEDURE — 11042 DBRDMT SUBQ TIS 1ST 20SQCM/<: CPT | Performed by: SURGERY

## 2018-08-20 NOTE — PROGRESS NOTES
ANTICOAGULATION FOLLOW-UP CLINIC VISIT    Patient Name:  Jama Paul  Date:  8/20/2018  Contact Type:  Face to Face    SUBJECTIVE:     Patient Findings     Positives No Problem Findings           OBJECTIVE    INR Protime   Date Value Ref Range Status   08/20/2018 4.3 (A) 0.86 - 1.14 Final       ASSESSMENT / PLAN  INR assessment SUPRA    Recheck INR In: 1 WEEK    INR Location Clinic      Anticoagulation Summary as of 8/20/2018     INR goal 2.0-3.0   Today's INR 4.3!   Warfarin maintenance plan 5 mg (5 mg x 1) every day   Full warfarin instructions 8/20: Hold; Otherwise 5 mg every day   Weekly warfarin total 35 mg   Plan last modified Mirela Moreno RN (5/31/2018)   Next INR check 8/28/2018   Target end date Indefinite    Indications   Long-term (current) use of anticoagulants [Z79.01] [Z79.01]  Atrial fibrillation (H) [I48.91]         Anticoagulation Episode Summary     INR check location     Preferred lab     Send INR reminders to [facility] HEART INR NURSE    Comments       Anticoagulation Care Providers     Provider Role Specialty Phone number    Long Pugh MD Responsible Cardiology [phone]            See the Encounter Report to view Anticoagulation Flowsheet and Dosing Calendar (Go to Encounters tab in chart review, and find the Anticoagulation Therapy Visit)    INR 4.3 Held a dose today. Will add an ensure this week and increase salad amount (eats a salad daily). Denies unusual bleeding or bruising. No added doses. No new medications and is not taking tylenol. Next appt in 8 days. Dosage adjustment made based on physician directed care plan.    Brenda Roy, RN

## 2018-08-20 NOTE — PROGRESS NOTES
[facility] [place] Progress Note    Subject: Jama Paul returns to see me today with his wife for his traumatic right mid medial calf ulcer.  This is continuously slowly improved with local wound care and debridements.  He is using Woun'Dres collagen gel over the area followed by a Band-Aid and a Spandagrip.    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  He also is taking the nutritional supplements.  He is also changing the dressing himself daily with no need for home health care.      Exam:  /67  Pulse 91  Temp 97  F (36.1  C) (Temporal)  Resp 16  Alert and appropriate.  Very comfortable.  Mentally alert.  No calf edema.  Ulceration is smaller now measuring 1.7 x 1.1 x 0.1 cm.  Mild amount of slough and fibrin.  His last visit this measured 2 x 1.5 cm.    Procedure:   Patient was determined to be capable of making their own medical decisions and informed consent was obtained. Topical anesthetic of 4% lidocaine was applied, debridement was performed using a #15 blade down to and including subcutaneous tissue.  All slough and fibrin removed.  We excised the base of the wound and the skin edges 0.1 cm with very healthy bleeding tissue noted throughout.  Very healthy granulation tissue fills 100% of the wound.  Bleeding controlled with light pressure. Patient tolerated procedure well.    Impression: Continued slow improvement.  Continue with Woun'Dres collagen gel and protective Band-Aid to be changed daily.  No restrictions at all with his activities.  Continue with protein supplements.    Plan: We will dress the wounds with Woun'Dres collagen gel.  Patient will return to the clinic in 4 weeks time    Wesley Benjamin MD on 8/20/2018 at 11:11 AM

## 2018-08-20 NOTE — MR AVS SNAPSHOT
"                  MRN:[id number]                      After Visit Summary   8/20/2018    Jama Paul    MRN: [id number]           Visit Information        Provider Department      8/20/2018 11:00 AM Wesley Benjamin MD [facility]        Your next 10 appointments already scheduled     Aug 28, 2018 10:00 AM CDT   Anticoagulation Visit with MCGEE ANTICOAGULATION   [facility] ([facility])    [address]  [facility] [id number] [phone] OPT 2            Oct 16, 2018 10:00 AM CDT   LAB with MCGEE LAB   [facility] [place] ([facility])    [address]  [facility] [id number] [phone]           Please do not eat 10-12 hours before your appointment if you are coming in fasting for labs on lipids, cholesterol, or glucose (sugar). This does not apply to pregnant women. Water, hot tea and black coffee (with nothing added) are okay. Do not drink other fluids, diet soda or chew gum.            Oct 16, 2018 10:50 AM CDT   Core Return with Jazlyn Cr PA-C   [facility] ([facility])    [address]  [facility] [id number] [phone] OPT 2                Further instructions from your care team              [facility]  [address], [place] [address]  Appointment Phone [phone] Nurse Advisors [phone]      Jama Paul      2/13/1931      Wound Dressing Change to right medial lower leg  Cleanse wound and surrounding skin with: wound cleanser  Cover wound with Woun'dres gel  Cover gel with 2\" bandaid  Change dressing daily       Compression:   You have a compression wrap Spandagrip D is supposed to be removed at night and put back on first thing in the morning.  Please remove compression dressing if toes turn blue " and/or tingle and can not be relieved by raising the leg for one hour.       A diet high in protein is important for wound healing, we recommend getting 60 grams of protein per day. Taking protein shakes or bars are a good way to get extra protein in your diet.        Wesley Benjamin M.D. August 20, 2018          Call us at [phone] if you have any questions about your wounds, have redness or swelling around your wound, have a fever of 101 or greater or if you have any other problems or concerns. We answer the phone Monday through Friday 8 am to 4 pm, please leave a message as we check the voicemail frequently throughout the day.       Follow up with Dr. Benjamin 4 weeks          Care EveryWhere ID     This is your Care EveryWhere ID. This could be used by other organizations to access your [place] medical records  [id number]        Equal Access to Services     CECILE MOURA AH: Caridad Carrillo, autumn isbell, emani moody. So [facility] [phone].    ATENCIÓN: Si habla español, tiene a cleaning disposición servicios gratuitos de asistencia lingüística. Llame al [phone].    We comply with applicable federal civil rights laws and [place] laws. We do not discriminate on the basis of race, color, national origin, age, disability, sex, sexual orientation, or gender identity.

## 2018-08-20 NOTE — MR AVS SNAPSHOT
Jama Paul   8/20/2018 10:00 AM   Anticoagulation Therapy Visit    Description:  87 year old male   Provider:  EVERARDO ANTICOAGULATION   Department:  [facility]           INR as of 8/20/2018     Today's INR 4.3!      Anticoagulation Summary as of 8/20/2018     INR goal 2.0-3.0   Today's INR 4.3!   Full warfarin instructions 8/20: Hold; Otherwise 5 mg every day   Next INR check 8/28/2018    Indications   Long-term (current) use of anticoagulants [Z79.01] [Z79.01]  Atrial fibrillation (H) [I48.91]         Your next Anticoagulation Clinic appointment(s)     Aug 28, 2018 10:00 AM CDT   Anticoagulation Visit with MCGEE ANTICOAGULATION   [facility] ([facility])    [address] [place] [id number]   [phone] OPT 2              Contact Numbers     Anticoagulant (INR) Clinic Number: [phone]          August 2018 Details    Sun Mon Tue Wed Thu Fri Sat        1               2               3               4                 5               6               7               8               9               10               11                 12               13               14               15               16               17               18                 19               20      Hold   See details      21      5 mg         22      5 mg         23      5 mg         24      5 mg         25      5 mg           26      5 mg         27      5 mg         28            29               30               31                 Date Details   08/20 This INR check       Date of next INR:  8/28/2018         How to take your warfarin dose     To take:  5 mg Take 1 of the 5 mg tablets.    Hold Do not take your warfarin dose. See the Details table to the right for additional instructions.

## 2018-08-20 NOTE — DISCHARGE INSTRUCTIONS
"       [facility]  [address], [place] [address]  Appointment Phone [phone] Nurse Advisors [phone]      Jama Luis Humberto      2/13/1931      Wound Dressing Change to right medial lower leg  Cleanse wound and surrounding skin with: wound cleanser  Cover wound with Woun'dres gel  Cover gel with 2\" bandaid  Change dressing daily       Compression:   You have a compression wrap Spandagrip D is supposed to be removed at night and put back on first thing in the morning.  Please remove compression dressing if toes turn blue and/or tingle and can not be relieved by raising the leg for one hour.       A diet high in protein is important for wound healing, we recommend getting 60 grams of protein per day. Taking protein shakes or bars are a good way to get extra protein in your diet.        Wesley Benjamin M.D. August 20, 2018          Call us at [phone] if you have any questions about your wounds, have redness or swelling around your wound, have a fever of 101 or greater or if you have any other problems or concerns. We answer the phone Monday through Friday 8 am to 4 pm, please leave a message as we check the voicemail frequently throughout the day.       Follow up with Dr. Benjamin 4 weeks        "

## 2018-08-30 ENCOUNTER — ANTICOAGULATION THERAPY VISIT (OUTPATIENT)
Dept: CARDIOLOGY | Facility: CLINIC | Age: 83
End: 2018-08-30
Payer: MEDICARE

## 2018-08-30 DIAGNOSIS — I48.91 ATRIAL FIBRILLATION, UNSPECIFIED TYPE (H): ICD-10-CM

## 2018-08-30 DIAGNOSIS — Z79.01 LONG-TERM (CURRENT) USE OF ANTICOAGULANTS: ICD-10-CM

## 2018-08-30 LAB — INR POINT OF CARE: 2.4 (ref 0.86–1.14)

## 2018-08-30 PROCEDURE — 36416 COLLJ CAPILLARY BLOOD SPEC: CPT | Performed by: INTERNAL MEDICINE

## 2018-08-30 PROCEDURE — 85610 PROTHROMBIN TIME: CPT | Mod: QW | Performed by: INTERNAL MEDICINE

## 2018-08-30 NOTE — MR AVS SNAPSHOT
Jama Paul   8/30/2018 1:40 PM   Anticoagulation Therapy Visit    Description:  87 year old male   Provider:  EVERARDO ANTICOAGULATION   Department:  [facility]           INR as of 8/30/2018     Today's INR 2.4      Anticoagulation Summary as of 8/30/2018     INR goal 2.0-3.0   Today's INR 2.4   Full warfarin instructions 2.5 mg on Mon; 5 mg all other days   Next INR check 9/17/2018    Indications   Long-term (current) use of anticoagulants [Z79.01] [Z79.01]  Atrial fibrillation (H) [I48.91]         Your next Anticoagulation Clinic appointment(s)     Sep 17, 2018 10:20 AM CDT   Anticoagulation Visit with MCGEE ANTICOAGULATION   [facility] ([facility])    [address] [place] [address] [phone] OPT 2              Contact Numbers     Anticoagulant (INR) Clinic Number: [phone]          August 2018 Details    Sun Mon Tue Wed Thu Fri Sat        1               2               3               4                 5               6               7               8               9               10               11                 12               13               14               15               16               17               18                 19               20               21               22               23               24               25                 26               27               28               29               30      5 mg   See details      31      5 mg           Date Details   08/30 This INR check               How to take your warfarin dose     To take:  5 mg Take 1 of the 5 mg tablets.           September 2018 Details    Sun Mon Tue Wed Thu Fri Sat           1      5 mg           2      5 mg         3      2.5 mg         4      5 mg         5      5 mg         6      5 mg         7      5 mg         8      5 mg           9      5 mg         10      2.5 mg         11      5 mg         12      5 mg         13       5 mg         14      5 mg         15      5 mg           16      5 mg         17            18               19               20               21               22                 23               24               25               26               27               28               29                 30                      Date Details   No additional details    Date of next INR:  9/17/2018         How to take your warfarin dose     To take:  2.5 mg Take 0.5 of a 5 mg tablet.    To take:  5 mg Take 1 of the 5 mg tablets.

## 2018-08-30 NOTE — PROGRESS NOTES
ANTICOAGULATION FOLLOW-UP CLINIC VISIT    Patient Name:  Jama Paul  Date:  8/30/2018  Contact Type:  Face to Face    SUBJECTIVE:     Patient Findings     Positives Change in diet/appetite (eating daily mixed green salads and Ensure 3x/week)           OBJECTIVE    INR Protime   Date Value Ref Range Status   08/30/2018 2.4 (A) 0.86 - 1.14 Final       ASSESSMENT / PLAN  INR assessment THER    Recheck INR In: 3 WEEKS    INR Location Clinic      Anticoagulation Summary as of 8/30/2018     INR goal 2.0-3.0   Today's INR 2.4   Warfarin maintenance plan 2.5 mg (5 mg x 0.5) on Mon; 5 mg (5 mg x 1) all other days   Full warfarin instructions 2.5 mg on Mon; 5 mg all other days   Weekly warfarin total 32.5 mg   Plan last modified Katherin Pastor RN (8/30/2018)   Next INR check 9/17/2018   Target end date Indefinite    Indications   Long-term (current) use of anticoagulants [Z79.01] [Z79.01]  Atrial fibrillation (H) [I48.91]         Anticoagulation Episode Summary     INR check location     Preferred lab     Send INR reminders to [facility] HEART INR NURSE    Comments       Anticoagulation Care Providers     Provider Role Specialty Phone number    Long Pugh MD Responsible Cardiology [phone]            See the Encounter Report to view Anticoagulation Flowsheet and Dosing Calendar (Go to Encounters tab in chart review, and find the Anticoagulation Therapy Visit)    INR 2.4 INR back in range after holding 5 mg dose last week. INR's have been labile recently.He is eating a daily mixed green salad and drinking Ensure 3x/wk. No change in meds. No bleeding or bruising. Will decrease dosing to 2.5 mg Mondays and 5 mg all other days to keep INR at lower end of range and to try to avoid the periodic spikes of elevated INR's. Recheck in 2 1/2 weeks. Dosage adjustment made based on physician directed care plan.    Katherin Pastor RN

## 2018-09-10 DIAGNOSIS — Z79.01 LONG TERM CURRENT USE OF ANTICOAGULANT THERAPY: Primary | ICD-10-CM

## 2018-09-10 RX ORDER — WARFARIN SODIUM 5 MG/1
TABLET ORAL
Qty: 100 TABLET | Refills: 1 | Status: SHIPPED | OUTPATIENT
Start: 2018-09-10 | End: 2019-04-10 | Stop reason: ALTCHOICE

## 2018-09-17 ENCOUNTER — HOSPITAL ENCOUNTER (OUTPATIENT)
Dept: WOUND CARE | Facility: CLINIC | Age: 83
Discharge: HOME | End: 2018-09-17
Attending: SURGERY | Admitting: SURGERY
Payer: MEDICARE

## 2018-09-17 ENCOUNTER — ANTICOAGULATION THERAPY VISIT (OUTPATIENT)
Dept: CARDIOLOGY | Facility: CLINIC | Age: 83
End: 2018-09-17
Payer: MEDICARE

## 2018-09-17 VITALS
HEART RATE: 107 BPM | SYSTOLIC BLOOD PRESSURE: 104 MMHG | TEMPERATURE: 97.4 F | RESPIRATION RATE: 16 BRPM | DIASTOLIC BLOOD PRESSURE: 68 MMHG

## 2018-09-17 DIAGNOSIS — Z79.01 LONG-TERM (CURRENT) USE OF ANTICOAGULANTS: ICD-10-CM

## 2018-09-17 DIAGNOSIS — I48.91 ATRIAL FIBRILLATION, UNSPECIFIED TYPE (H): ICD-10-CM

## 2018-09-17 DIAGNOSIS — S81.801D TRAUMATIC OPEN WOUND OF RIGHT LOWER LEG, SUBSEQUENT ENCOUNTER: ICD-10-CM

## 2018-09-17 LAB — INR POINT OF CARE: 2.3 (ref 0.86–1.14)

## 2018-09-17 PROCEDURE — A6248 HYDROGEL DRSG GEL FILLER: HCPCS

## 2018-09-17 PROCEDURE — 11042 DBRDMT SUBQ TIS 1ST 20SQCM/<: CPT | Performed by: SURGERY

## 2018-09-17 PROCEDURE — 85610 PROTHROMBIN TIME: CPT | Mod: QW | Performed by: INTERNAL MEDICINE

## 2018-09-17 PROCEDURE — 11042 DBRDMT SUBQ TIS 1ST 20SQCM/<: CPT

## 2018-09-17 PROCEDURE — 36416 COLLJ CAPILLARY BLOOD SPEC: CPT | Performed by: INTERNAL MEDICINE

## 2018-09-17 NOTE — PROGRESS NOTES
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  This continues improved.  He is using Woun'Dres collagen gel that is changing himself daily along with the Spandagrip.    The ulcer continues to decrease in size.  He remains very active and works out on a regular basis at [employer].  He continues his nutritional protein supplements.      Exam:  /68  Pulse 107  Temp 97.4  F (36.3  C) (Temporal)  Resp 16  Alert and appropriate.  Very pleasant.  Mild right calf edema.  No infection.  Ulcer is decreased to 1.2 x 1.01 m with a depth of 0.1 cm.  This is filled with fibrin slough with very good underlying granulation tissue no undermining.    Procedure:   Patient was determined to be capable of making their own medical decisions and informed consent was obtained. Topical anesthetic of 4% lidocaine was applied, debridement was performed using a #15 blade down to and including subcutaneous tissue.  All slough and fibrin removed and we slightly debrided the skin edges 0.1 cm with healthy epithelium be noted.  Base is excellent firm granulation tissue with good vascularity.  Bleeding controlled with light pressure. Patient tolerated procedure well.    Impression: Change in improvement.  Or allowing this wound to heal by secondary intent.  He will continue using the Woun'Dres collagen gel on a daily basis along with this compression.  Continue with nutritional supplements.    Plan: We will dress the wounds with above.  Patient will return to the clinic in 5 weeks time    Wesley Benjamin MD on 9/17/2018 at 11:24 AM

## 2018-09-17 NOTE — MR AVS SNAPSHOT
"                  MRN:[id number]                      After Visit Summary   9/17/2018    Jama Paul    MRN: [id number]           Visit Information        Provider Department      9/17/2018 11:00 AM Wesley Benjamin MD [facility]        Your next 10 appointments already scheduled     Oct 16, 2018 10:00 AM CDT   LAB with MCGEE LAB   [facility] [place] ([facility])    [address]  [place]  [place] [id number] [phone]           Please do not eat 10-12 hours before your appointment if you are coming in fasting for labs on lipids, cholesterol, or glucose (sugar). This does not apply to pregnant women. Water, hot tea and black coffee (with nothing added) are okay. Do not drink other fluids, diet soda or chew gum.            Oct 16, 2018 10:20 AM CDT   Anticoagulation Visit with MCGEE ANTICOAGULATION   [facility] ([facility])    [address]  [place] [address] [phone] OPT 2            Oct 16, 2018 10:50 AM CDT   Core Return with Jazlyn Cr PA-C   [facility] ([facility])    [address]  [place] [address] [phone] OPT 2                Further instructions from your care team              [facility]  [address], [place] [address]  Appointment Phone [phone] Nurse Advisors [phone]      Jama Paul      2/13/1931      Wound Dressing Change to right medial lower leg  Cleanse wound and surrounding skin with: wound cleanser  Cover wound with Woun'dres gel  Cover gel with 2\" bandaid  Change dressing daily       Compression:   You have a compression wrap Spandagrip D is supposed to be removed at night and put back on first thing in the morning.  Please remove compression dressing if toes turn blue " and/or tingle and can not be relieved by raising the leg for one hour.       A diet high in protein is important for wound healing, we recommend getting 60 grams of protein per day. Taking protein shakes or bars are a good way to get extra protein in your diet.        Wesley Benjamin M.D. September 17, 2018          Call us at [phone] if you have any questions about your wounds, have redness or swelling around your wound, have a fever of 101 or greater or if you have any other problems or concerns. We answer the phone Monday through Friday 8 am to 4 pm, please leave a message as we check the voicemail frequently throughout the day.       Follow up with Dr. Benjamin 4 weeks           Care EveryWhere ID     This is your Care EveryWhere ID. This could be used by other organizations to access your [place] medical records  [id number]        Equal Access to Services     CECILE MOURA AH: Caridad Carrillo, autumn isbell, emani moody. So [facility] [phone].    ATENCIÓN: Si habla español, tiene a cleaning disposición servicios gratuitos de asistencia lingüística. Llame al [phone].    We comply with applicable federal civil rights laws and [place] laws. We do not discriminate on the basis of race, color, national origin, age, disability, sex, sexual orientation, or gender identity.

## 2018-09-17 NOTE — MR AVS SNAPSHOT
Jama Paul   9/17/2018 10:20 AM   Anticoagulation Therapy Visit    Description:  87 year old male   Provider:  EVERARDO ANTICOAGULATION   Department:  [facility]           INR as of 9/17/2018     Today's INR 2.3      Anticoagulation Summary as of 9/17/2018     INR goal 2.0-3.0   Today's INR 2.3   Full warfarin instructions 2.5 mg on Mon; 5 mg all other days   Next INR check 10/15/2018    Indications   Long-term (current) use of anticoagulants [Z79.01] [Z79.01]  Atrial fibrillation (H) [I48.91]         Your next Anticoagulation Clinic appointment(s)     Oct 16, 2018 10:20 AM CDT   Anticoagulation Visit with  ANTICOAGULATION   [facility] ([facility])    [address] [place], [place] [id number]  [facility] [address] [phone] OPT 2              Contact Numbers     Anticoagulant (INR) Clinic Number: [phone]          September 2018 Details    Sun Mon Tue Wed Thu Fri Sat           1                 2               3               4               5               6               7               8                 9               10               11               12               13               14               15                 16               17      2.5 mg   See details      18      5 mg         19      5 mg         20      5 mg         21      5 mg         22      5 mg           23      5 mg         24      2.5 mg         25      5 mg         26      5 mg         27      5 mg         28      5 mg         29      5 mg           30      5 mg                Date Details   09/17 This INR check               How to take your warfarin dose     To take:  2.5 mg Take 0.5 of a 5 mg tablet.    To take:  5 mg Take 1 of the 5 mg tablets.           October 2018 Details    Sun Mon Tue Wed Thu Fri Sat      1      2.5 mg         2      5 mg         3      5 mg         4      5 mg         5      5 mg         6      5 mg           7      5 mg         8      2.5 mg          9      5 mg         10      5 mg         11      5 mg         12      5 mg         13      5 mg           14      5 mg         15            16               17               18               19               20                 21               22               23               24               25               26               27                 28               29               30               31                   Date Details   No additional details    Date of next INR:  10/15/2018         How to take your warfarin dose     To take:  2.5 mg Take 0.5 of a 5 mg tablet.    To take:  5 mg Take 1 of the 5 mg tablets.

## 2018-09-17 NOTE — DISCHARGE INSTRUCTIONS
"       [facility]  [address] [facility] [address], [place] [address]  Appointment Phone [phone] Nurse Advisors [phone]      Jama Luis Humberto      2/13/1931      Wound Dressing Change to right medial lower leg  Cleanse wound and surrounding skin with: wound cleanser  Cover wound with Woun'dres gel  Cover gel with 2\" bandaid  Change dressing daily       Compression:   You have a compression wrap Spandagrip D is supposed to be removed at night and put back on first thing in the morning.  Please remove compression dressing if toes turn blue and/or tingle and can not be relieved by raising the leg for one hour.       A diet high in protein is important for wound healing, we recommend getting 60 grams of protein per day. Taking protein shakes or bars are a good way to get extra protein in your diet.        Wesley Benjamin M.D. September 17, 2018          Call us at [phone] if you have any questions about your wounds, have redness or swelling around your wound, have a fever of 101 or greater or if you have any other problems or concerns. We answer the phone Monday through Friday 8 am to 4 pm, please leave a message as we check the voicemail frequently throughout the day.       Follow up with Dr. Benjamin 4 weeks         "

## 2018-10-15 ENCOUNTER — HOSPITAL ENCOUNTER (OUTPATIENT)
Dept: WOUND CARE | Facility: CLINIC | Age: 83
Discharge: HOME | End: 2018-10-15
Attending: SURGERY | Admitting: SURGERY
Payer: MEDICARE

## 2018-10-15 VITALS
RESPIRATION RATE: 16 BRPM | SYSTOLIC BLOOD PRESSURE: 115 MMHG | TEMPERATURE: 97.6 F | HEART RATE: 80 BPM | DIASTOLIC BLOOD PRESSURE: 72 MMHG

## 2018-10-15 DIAGNOSIS — S81.801D TRAUMATIC OPEN WOUND OF RIGHT LOWER LEG, SUBSEQUENT ENCOUNTER: ICD-10-CM

## 2018-10-15 PROCEDURE — A6248 HYDROGEL DRSG GEL FILLER: HCPCS

## 2018-10-15 PROCEDURE — 11042 DBRDMT SUBQ TIS 1ST 20SQCM/<: CPT | Performed by: SURGERY

## 2018-10-15 PROCEDURE — 11042 DBRDMT SUBQ TIS 1ST 20SQCM/<: CPT

## 2018-10-15 NOTE — MR AVS SNAPSHOT
"                  MRN:[id number]                      After Visit Summary   10/15/2018    Jama Paul    MRN: [id number]           Visit Information        Provider Department      10/15/2018 11:00 AM Wesley Benjamin MD [facility]        Your next 10 appointments already scheduled     Oct 16, 2018 10:00 AM CDT   LAB with MCGEE LAB   [facility] AT [place] ([facility])    [address]  [place]  [place] [id number] [phone]           Please do not eat 10-12 hours before your appointment if you are coming in fasting for labs on lipids, cholesterol, or glucose (sugar). This does not apply to pregnant women. Water, hot tea and black coffee (with nothing added) are okay. Do not drink other fluids, diet soda or chew gum.            Oct 16, 2018 10:20 AM CDT   Anticoagulation Visit with MCGEE ANTICOAGULATION   [facility] ([facility])    [address]  [place] [address] [phone] OPT 2            Oct 16, 2018 10:50 AM CDT   Core Return with Jazlyn Cr PA-C   [facility] ([facility])    [address]  [place] [address] [phone] OPT 2                Further instructions from your care team                 [facility]  [address], [place] [address]  Appointment Phone [phone] Nurse Advisors [phone]      Jama Paul      2/13/1931      Wound Dressing Change to right medial lower leg  Cleanse wound and surrounding skin with: wound cleanser  Cover wound with Woun'dres gel  Cover gel with 2\" bandaid  Change dressing daily       Compression:   You have a compression wrap Spandagrip D is supposed to be removed at night and put back on first thing in the morning.  Please remove compression dressing if toes turn blue " and/or tingle and can not be relieved by raising the leg for one hour.       A diet high in protein is important for wound healing, we recommend getting 60 grams of protein per day. Taking protein shakes or bars are a good way to get extra protein in your diet.        Wesley Benjamin M.D. October 15, 2018          Call us at [phone] if you have any questions about your wounds, have redness or swelling around your wound, have a fever of 101 or greater or if you have any other problems or concerns. We answer the phone Monday through Friday 8 am to 4 pm, please leave a message as we check the voicemail frequently throughout the day.       Follow up with Dr. Benjamin 4 weeks           Care EveryWhere ID     This is your Care EveryWhere ID. This could be used by other organizations to access your [place] medical records  [id number]        Equal Access to Services     CECILE MOURA AH: Caridad Carrillo, autumn isbell, emani moody. So [facility] [phone].    ATENCIÓN: Si habla español, tiene a cleaning disposición servicios gratuitos de asistencia lingüística. Llame al [phone].    We comply with applicable federal civil rights laws and [place] laws. We do not discriminate on the basis of race, color, national origin, age, disability, sex, sexual orientation, or gender identity.

## 2018-10-15 NOTE — PROGRESS NOTES
[facility] [place] Progress Note    Subject: Jama Paul returns for follow-up of his traumatic right mid medial calf ulcer.  This continues to decrease in size.  He does wife live independently and is able to change his dressings himself using Woun'Dres collagen gel.  He is taking his protein supplements.  He has an exercise facility in his assisted living [place] if he uses on a daily basis.      Exam:  /72  Pulse 80  Temp 97.6  F (36.4  C) (Temporal)  Resp 16  Alert and appropriate.  Normal affect.  Open ulcer measures 0.9 x 1 x 0.1 cm with a small light scab over this fibrin.  The rest of the ulcer located anteriorly and laterally is completely epithelialized and very mature.  No swelling or cellulitis.  No undermining.    Procedure:   Patient was determined to be capable of making their own medical decisions and informed consent was obtained. Topical anesthetic of 4% lidocaine was applied, debridement was performed using a #15 blade down to and including subcutaneous tissue.  Removed all slough and fibrin down to very healthy firm granulation bed in the entire wound.  Skin edges slightly debrided 0.1 cm on the bleeding tissue.  Bleeding controlled with light pressure. Patient tolerated procedure well.    Impression: Continued improvement.  Continue with daily dressing changes using Woun'Dres collagen gel.    Plan: We will dress the wounds with Woun'Dres collagen gel.  Patient will return to the clinic in 2 weeks time    Wesley Benjamin MD on 10/15/2018 at 11:31 AM

## 2018-10-15 NOTE — DISCHARGE INSTRUCTIONS
"          [facility]  [address] [facility] [address], [place] [address]  Appointment Phone [phone] Nurse Advisors [phone]      Jama Luis Humberto      2/13/1931      Wound Dressing Change to right medial lower leg  Cleanse wound and surrounding skin with: wound cleanser  Cover wound with Woun'dres gel  Cover gel with 2\" bandaid  Change dressing daily       Compression:   You have a compression wrap Spandagrip D is supposed to be removed at night and put back on first thing in the morning.  Please remove compression dressing if toes turn blue and/or tingle and can not be relieved by raising the leg for one hour.       A diet high in protein is important for wound healing, we recommend getting 60 grams of protein per day. Taking protein shakes or bars are a good way to get extra protein in your diet.        Wesley Benjamin M.D. October 15, 2018          Call us at [phone] if you have any questions about your wounds, have redness or swelling around your wound, have a fever of 101 or greater or if you have any other problems or concerns. We answer the phone Monday through Friday 8 am to 4 pm, please leave a message as we check the voicemail frequently throughout the day.       Follow up with Dr. Benjamin 4 weeks         "

## 2018-10-16 ENCOUNTER — OFFICE VISIT (OUTPATIENT)
Dept: CARDIOLOGY | Facility: CLINIC | Age: 83
End: 2018-10-16
Payer: MEDICARE

## 2018-10-16 ENCOUNTER — ANTICOAGULATION THERAPY VISIT (OUTPATIENT)
Dept: CARDIOLOGY | Facility: CLINIC | Age: 83
End: 2018-10-16
Payer: MEDICARE

## 2018-10-16 VITALS
DIASTOLIC BLOOD PRESSURE: 84 MMHG | HEIGHT: 69 IN | BODY MASS INDEX: 25.25 KG/M2 | SYSTOLIC BLOOD PRESSURE: 132 MMHG | HEART RATE: 77 BPM | WEIGHT: 170.5 LBS

## 2018-10-16 DIAGNOSIS — I48.91 ATRIAL FIBRILLATION, UNSPECIFIED TYPE (H): ICD-10-CM

## 2018-10-16 DIAGNOSIS — I50.43 ACUTE ON CHRONIC COMBINED SYSTOLIC AND DIASTOLIC CHF (CONGESTIVE HEART FAILURE) (H): ICD-10-CM

## 2018-10-16 DIAGNOSIS — I48.20 CHRONIC ATRIAL FIBRILLATION (H): Primary | ICD-10-CM

## 2018-10-16 DIAGNOSIS — Z79.01 LONG TERM CURRENT USE OF ANTICOAGULANTS WITH INR GOAL OF 2.0-3.0: ICD-10-CM

## 2018-10-16 LAB
ANION GAP SERPL CALCULATED.3IONS-SCNC: 11.3 MMOL/L (ref 6–17)
BUN SERPL-MCNC: 22 MG/DL (ref 7–30)
CALCIUM SERPL-MCNC: 8.7 MG/DL (ref 8.5–10.5)
CHLORIDE SERPL-SCNC: 103 MMOL/L (ref 98–107)
CO2 SERPL-SCNC: 27 MMOL/L (ref 23–29)
CREAT SERPL-MCNC: 1.24 MG/DL (ref 0.7–1.3)
GFR SERPL CREATININE-BSD FRML MDRD: 55 ML/MIN/1.7M2
GLUCOSE SERPL-MCNC: 99 MG/DL (ref 70–105)
INR POINT OF CARE: 2.1 (ref 0.86–1.14)
POTASSIUM SERPL-SCNC: 4.3 MMOL/L (ref 3.5–5.1)
SODIUM SERPL-SCNC: 137 MMOL/L (ref 136–145)

## 2018-10-16 PROCEDURE — 99214 OFFICE O/P EST MOD 30 MIN: CPT | Performed by: PHYSICIAN ASSISTANT

## 2018-10-16 PROCEDURE — 99207 ZZC NO CHARGE NURSE ONLY: CPT | Performed by: INTERNAL MEDICINE

## 2018-10-16 PROCEDURE — 80048 BASIC METABOLIC PNL TOTAL CA: CPT | Performed by: INTERNAL MEDICINE

## 2018-10-16 PROCEDURE — 85610 PROTHROMBIN TIME: CPT | Mod: QW | Performed by: INTERNAL MEDICINE

## 2018-10-16 PROCEDURE — 36416 COLLJ CAPILLARY BLOOD SPEC: CPT | Performed by: INTERNAL MEDICINE

## 2018-10-16 RX ORDER — METOPROLOL SUCCINATE 50 MG/1
50 TABLET, EXTENDED RELEASE ORAL 2 TIMES DAILY
COMMUNITY
End: 2018-10-16

## 2018-10-16 RX ORDER — METOPROLOL SUCCINATE 50 MG/1
50 TABLET, EXTENDED RELEASE ORAL 2 TIMES DAILY
Qty: 180 TABLET | Refills: 3 | Status: SHIPPED | OUTPATIENT
Start: 2018-10-16 | End: 2019-01-22 | Stop reason: DRUGHIGH

## 2018-10-16 NOTE — PROGRESS NOTES
Cardiology Progress Note    Date of Service: 10/16/2018  Patient seen today in follow up of: CORE follow up  Primary cardiologist: Dr. Pugh    HPI:  Jama Paul is a very pleasant 87-year-old male with a history of chronic atrial fibrillation, hypertension, chronic thrombocytopenia, and chronic congestive heart failure who is here today for CORE clinic follow-up.    In December 2017 he was admitted with acute congestive heart failure.  LVEF was 30-35% with global hypokinesia.  He had a cardiac MRI done which showed an LVEF of 42%.  RVEF was 44% with moderate to severe biatrial enlargement.  He had underwent a nuclear medicine stress perfusion study which was negative for ischemia.  He was subsequently readmitted in January again for decompensated heart failure and underwent diuresis.  He had a right heart cath done during that hospitalization which showed normal filling pressures with normal cardiac output.  He has been followed in the CORE clinic with ERIC Olivares and Dr. Pugh since then.  His atrial fibrillation has been managed with a rate control strategy and he has been anticoagulated with Coumadin.    His last hospitalization was in February 2018 for congestive heart failure.  Echocardiogram in January of this year showed an LVEF percent.  His medical therapy has included Entresto 24-26 mg twice daily, metoprolol succinate 100 mg in the morning and 50 mg in the afternoon, and low-dose Lasix 20 mg daily.  His last echocardiogram in April showed improvement in his LV function with an LVEF of 65-70%.  RV function was mildly decreased.  There was 2-3+ tricuspid regurgitation.    He is here today for routine 3-month CORE clinic follow-up. Since his last visit, he has been doing quite well. He continues to work out at the gym at [facility] for 1.5 hours a day without issues. He denies any increased dyspnea on exertion, palpitations, orthopnea, PND, chest pain or any other new issues. Since his  last visit, he cut his Metoprolol XL back to 50 mg twice daily because he was concerned about his blood pressures. He checks his blood pressure three times a day as well as his pulse. Primarily in the mornings, he has noted some lower blood pressure readings in the 80 - 90s systolic. He has been asymptomatic with this. His pulse typically runs int he 80 - 90s.    ASSESSMENT/PLAN:  1.  Nonsichemic cardiomyopathy. LVEF previously 30 - 35% in the setting of rapid atrial fibrillation. This has normalized by his last echocardiogram. He is maintained on Metoprolol XL as well as Entresto.    2.  Chronic heart failure with preserved ejection fraction. Currently on 20 mg of lasix daily. He appears euvolemic today and has no complaints consistent with congestive heart failure.   3.  Chronic atrial fibrillation. Rate controlled on Metoprolol XL. He checks his pulse regularly three times daily and reports his rates are well controlled. He is appropriately anticoagulated with warfarin. His INR was therapeutic today.   4.  OLIVA.     Jama seems to be doing quite well. He appears to be compensated from a congestive heart failure standpoint. His HRs appear fairly well controlled. I made no changes to his medications today. I will have him return for CORE clinic follow up in three months and he will see Dr. Pugh in six months time.     This note was completed in part using Dragon voice recognition software. Although reviewed after completion, some word and grammatical errors may occur.    Orders this Visit:  No orders of the defined types were placed in this encounter.    Orders Placed This Encounter   Medications     metoprolol succinate (TOPROL-XL) 50 MG 24 hr tablet     Sig: Take 50 mg by mouth 2 times daily     Medications Discontinued During This Encounter   Medication Reason     metoprolol succinate (TOPROL-XL) 100 MG 24 hr tablet Stopped by Patient       CURRENT MEDICATIONS:  Current Outpatient Prescriptions   Medication Sig  Dispense Refill     Ascorbic Acid (VITAMIN C PO) Take 500 mg by mouth daily       CYANOCOBALAMIN PO Take 500 mcg by mouth daily       furosemide (LASIX) 20 MG tablet Take 20 mg by mouth daily       metoprolol succinate (TOPROL-XL) 50 MG 24 hr tablet Take 50 mg by mouth 2 times daily       Multiple Vitamins-Minerals (ICAPS AREDS FORMULA PO) Take 1 tablet by mouth daily Takes in addition to multivitamin with minerals       Nutritional Supplements (ENSURE COMPLETE PO) 2 drinks a week       sacubitril-valsartan (ENTRESTO) 24-26 MG per tablet Take 1 tablet by mouth 2 times daily 180 tablet 3     Vitamin D, Cholecalciferol, 1000 UNITS TABS Take 2,000 Units by mouth daily        warfarin (COUMADIN) 5 MG tablet Take 1 tab daily or as directed per INR clinic 100 tablet 1     ALLERGIES  No Known Allergies  PAST MEDICAL HISTORY:  Past Medical History:   Diagnosis Date     Antiplatelet or antithrombotic long-term use      Arrhythmia      Atrial fibrillation (H)      Elevated PSA      Thrombocytopenia (H)      Unspecified essential hypertension      PAST SURGICAL HISTORY:  Past Surgical History:   Procedure Laterality Date     COLONOSCOPY       EXPLORE GROIN  4/30/2014    Procedure: EXPLORE GROIN;  Surgeon: Sam Aguirre MD;  Location:  OR     HERNIORRHAPHY INGUINAL       HERNIORRHAPHY INGUINAL  4/30/2014    Procedure: HERNIORRHAPHY INGUINAL;  Surgeon: Sam Aguirre MD;  Location:  OR     MOHS MICROGRAPHIC PROCEDURE       PHACOEMULSIFICATION CLEAR CORNEA WITH STANDARD INTRAOCULAR LENS IMPLANT Right 9/8/2015    Procedure: PHACOEMULSIFICATION CLEAR CORNEA WITH STANDARD INTRAOCULAR LENS IMPLANT;  Surgeon: Gil Shannon MD;  Location:  EC     septic olecranon bursitis[       FAMILY HISTORY:  Family History   Problem Relation Age of Onset     HEART DISEASE No family hx of      SOCIAL HISTORY:  Social History     Social History     Marital status:      Spouse name: N/A     Number of children: N/A     Years  "of education: N/A     Social History Main Topics     Smoking status: Never Smoker     Smokeless tobacco: Never Used     Alcohol use No     Drug use: No     Sexual activity: Not Asked     Other Topics Concern     Parent/Sibling W/ Cabg, Mi Or Angioplasty Before 65f 55m? No     Social History Narrative     Review of Systems:  Skin:  Negative     Eyes:  Positive for glasses;cataracts  ENT:  Negative    Respiratory:  Negative    Cardiovascular:    fatigue;Positive for  Gastroenterology: Negative    Genitourinary:  Positive for urinary frequency;nocturia  Musculoskeletal:  Negative    Neurologic:  Negative    Psychiatric:  Negative    Heme/Lymph/Imm:  Negative    Endocrine:  Negative       Physical Exam:  Vitals: /84  Pulse 118  Ht 1.74 m (5' 8.5\")  Wt 77.3 kg (170 lb 8 oz)  BMI 25.55 kg/m2   Wt Readings from Last 4 Encounters:   10/16/18 77.3 kg (170 lb 8 oz)   07/16/18 76.4 kg (168 lb 6.4 oz)   04/17/18 76.1 kg (167 lb 12.8 oz)   03/23/18 75.5 kg (166 lb 8 oz)     GEN: well nourished, in no acute distress.  HEENT:  Pupils equal, round. Sclerae nonicteric.   NECK: Supple, no masses appreciated. Neck veins appear flat at 30 degrees.  C/V:  Irregular rate and rhythm, soft systolic murmur at the RUSB  RESP: Respirations are unlabored. Breath sounds are mildly decreased at the R base otherwise clear.  GI: Abdomen soft, nontender.  EXTREM: No LE edema.  NEURO: Alert and oriented, cooperative.  SKIN: Warm and dry.     Recent Lab Results:  LIPID RESULTS:  Lab Results   Component Value Date    CHOL 127 04/18/2012    HDL 56 04/18/2012    LDL 55 04/18/2012    TRIG 81 04/18/2012    CHOLHDLRATIO 2.3 04/18/2012     LIVER ENZYME RESULTS:  Lab Results   Component Value Date    AST 19 02/28/2018    ALT 32 02/28/2018     CBC RESULTS:  Lab Results   Component Value Date    WBC 6.2 03/01/2018    RBC 3.26 (L) 03/01/2018    HGB 10.4 (L) 03/01/2018    HCT 31.9 (L) 03/01/2018    MCV 98 03/01/2018    MCH 31.9 03/01/2018    MCHC 32.6 " 03/01/2018    RDW 19.5 (H) 03/01/2018    PLT 46 (LL) 03/01/2018     BMP RESULTS:  Lab Results   Component Value Date     10/16/2018    POTASSIUM 4.3 10/16/2018    CHLORIDE 103 10/16/2018    CO2 27 10/16/2018    ANIONGAP 11.3 10/16/2018    GLC 99 10/16/2018    BUN 22 10/16/2018    CR 1.24 10/16/2018    GFRESTIMATED 55 (L) 10/16/2018    GFRESTBLACK 67 10/16/2018    BARON 8.7 10/16/2018      A1C RESULTS:  No results found for: A1C  INR RESULTS:  Lab Results   Component Value Date    INR 2.1 (A) 10/16/2018    INR 2.3 (A) 09/17/2018    INR 6.82 (HH) 07/30/2018    INR 3.29 (H) 03/01/2018       New/Pertinent imaging results since last visit:  None    Jazlyn Cr PA-C  [facility]

## 2018-10-16 NOTE — MR AVS SNAPSHOT
Jama Paul   10/16/2018 10:20 AM   Anticoagulation Therapy Visit    Description:  87 year old male   Provider:  [facility]   Department:  [facility]           INR as of 10/16/2018     Today's INR 2.1      Anticoagulation Summary as of 10/16/2018     INR goal 2.0-3.0   Today's INR 2.1   Full warfarin instructions 2.5 mg on Mon; 5 mg all other days   Next INR check 11/6/2018    Indications   Atrial fibrillation (H) [I48.91]  Long term current use of anticoagulants with INR goal of 2.0-3.0 [Z79.01]         Your next Anticoagulation Clinic appointment(s)     Nov 06, 2018 10:40 AM CST   Anticoagulation Visit with  ANTICOAGULATION   [facility] ([facility])    [address] [place] [id number] [phone] OPT 2              Contact Numbers     Anticoagulant (INR) Clinic Number: [phone]          October 2018 Details    Sun Mon Tue Wed Thu Fri Sat      1               2               3               4               5               6                 7               8               9               10               11               12               13                 14               15               16      5 mg   See details      17      5 mg         18      5 mg         19      5 mg         20      5 mg           21      5 mg         22      2.5 mg         23      5 mg         24      5 mg         25      5 mg         26      5 mg         27      5 mg           28      5 mg         29      2.5 mg         30      5 mg         31      5 mg             Date Details   10/16 This INR check               How to take your warfarin dose     To take:  2.5 mg Take 0.5 of a 5 mg tablet.    To take:  5 mg Take 1 of the 5 mg tablets.           November 2018 Details    Sun Mon Tue Wed Thu Fri Sat         1      5 mg         2      5 mg         3      5 mg           4      5 mg         5      2.5 mg         6            7                8               9               10                 11               12               13               14               15               16               17                 18               19               20               21               22               23               24                 25               26               27               28               29               30                 Date Details   No additional details    Date of next INR:  11/6/2018         How to take your warfarin dose     To take:  2.5 mg Take 0.5 of a 5 mg tablet.    To take:  5 mg Take 1 of the 5 mg tablets.

## 2018-10-16 NOTE — LETTER
10/16/2018    Mariusz Shields MD  [address] [facility] [address]  [place] [address]    RE: Jama Paul       Dear Colleague,    I had the pleasure of seeing Jama Paul in the [facility].      Cardiology Progress Note    Date of Service: 10/16/2018  Patient seen today in follow up of: CORE follow up  Primary cardiologist: Dr. Pugh    HPI:  Jama Paul is a very pleasant 87-year-old male with a history of chronic atrial fibrillation, hypertension, chronic thrombocytopenia, and chronic congestive heart failure who is here today for CORE clinic follow-up.    In December 2017 he was admitted with acute congestive heart failure.  LVEF was 30-35% with global hypokinesia.  He had a cardiac MRI done which showed an LVEF of 42%.  RVEF was 44% with moderate to severe biatrial enlargement.  He had underwent a nuclear medicine stress perfusion study which was negative for ischemia.  He was subsequently readmitted in January again for decompensated heart failure and underwent diuresis.  He had a right heart cath done during that hospitalization which showed normal filling pressures with normal cardiac output.  He has been followed in the CORE clinic with ERIC Olivares and Dr. Pugh since then.  His atrial fibrillation has been managed with a rate control strategy and he has been anticoagulated with Coumadin.    His last hospitalization was in February 2018 for congestive heart failure.  Echocardiogram in January of this year showed an LVEF percent.  His medical therapy has included Entresto 24-26 mg twice daily, metoprolol succinate 100 mg in the morning and 50 mg in the afternoon, and low-dose Lasix 20 mg daily.  His last echocardiogram in April showed improvement in his LV function with an LVEF of 65-70%.  RV function was mildly decreased.  There was 2-3+ tricuspid regurgitation.    He is here today for routine 3-month CORE clinic follow-up. Since his last visit, he has been doing  quite well. He continues to work out at the gym at [facility] for 1.5 hours a day without issues. He denies any increased dyspnea on exertion, palpitations, orthopnea, PND, chest pain or any other new issues. Since his last visit, he cut his Metoprolol XL back to 50 mg twice daily because he was concerned about his blood pressures. He checks his blood pressure three times a day as well as his pulse. Primarily in the mornings, he has noted some lower blood pressure readings in the 80 - 90s systolic. He has been asymptomatic with this. His pulse typically runs int he 80 - 90s.    ASSESSMENT/PLAN:  1.  Nonsichemic cardiomyopathy. LVEF previously 30 - 35% in the setting of rapid atrial fibrillation. This has normalized by his last echocardiogram. He is maintained on Metoprolol XL as well as Entresto.    2.  Chronic heart failure with preserved ejection fraction. Currently on 20 mg of lasix daily. He appears euvolemic today and has no complaints consistent with congestive heart failure.   3.  Chronic atrial fibrillation. Rate controlled on Metoprolol XL. He checks his pulse regularly three times daily and reports his rates are well controlled. He is appropriately anticoagulated with warfarin. His INR was therapeutic today.   4.  OLIVA.     Jama seems to be doing quite well. He appears to be compensated from a congestive heart failure standpoint. His HRs appear fairly well controlled. I made no changes to his medications today. I will have him return for CORE clinic follow up in three months and he will see Dr. Pugh in six months time.     This note was completed in part using Dragon voice recognition software. Although reviewed after completion, some word and grammatical errors may occur.    Orders this Visit:  No orders of the defined types were placed in this encounter.    Orders Placed This Encounter   Medications     metoprolol succinate (TOPROL-XL) 50 MG 24 hr tablet     Sig: Take 50 mg by mouth 2 times daily      Medications Discontinued During This Encounter   Medication Reason     metoprolol succinate (TOPROL-XL) 100 MG 24 hr tablet Stopped by Patient       CURRENT MEDICATIONS:  Current Outpatient Prescriptions   Medication Sig Dispense Refill     Ascorbic Acid (VITAMIN C PO) Take 500 mg by mouth daily       CYANOCOBALAMIN PO Take 500 mcg by mouth daily       furosemide (LASIX) 20 MG tablet Take 20 mg by mouth daily       metoprolol succinate (TOPROL-XL) 50 MG 24 hr tablet Take 50 mg by mouth 2 times daily       Multiple Vitamins-Minerals (ICAPS AREDS FORMULA PO) Take 1 tablet by mouth daily Takes in addition to multivitamin with minerals       Nutritional Supplements (ENSURE COMPLETE PO) 2 drinks a week       sacubitril-valsartan (ENTRESTO) 24-26 MG per tablet Take 1 tablet by mouth 2 times daily 180 tablet 3     Vitamin D, Cholecalciferol, 1000 UNITS TABS Take 2,000 Units by mouth daily        warfarin (COUMADIN) 5 MG tablet Take 1 tab daily or as directed per INR clinic 100 tablet 1     ALLERGIES  No Known Allergies  PAST MEDICAL HISTORY:  Past Medical History:   Diagnosis Date     Antiplatelet or antithrombotic long-term use      Arrhythmia      Atrial fibrillation (H)      Elevated PSA      Thrombocytopenia (H)      Unspecified essential hypertension      PAST SURGICAL HISTORY:  Past Surgical History:   Procedure Laterality Date     COLONOSCOPY       EXPLORE GROIN  4/30/2014    Procedure: EXPLORE GROIN;  Surgeon: Sam Aguirre MD;  Location:  OR     HERNIORRHAPHY INGUINAL       HERNIORRHAPHY INGUINAL  4/30/2014    Procedure: HERNIORRHAPHY INGUINAL;  Surgeon: Sam Aguirre MD;  Location:  OR     MOHS MICROGRAPHIC PROCEDURE       PHACOEMULSIFICATION CLEAR CORNEA WITH STANDARD INTRAOCULAR LENS IMPLANT Right 9/8/2015    Procedure: PHACOEMULSIFICATION CLEAR CORNEA WITH STANDARD INTRAOCULAR LENS IMPLANT;  Surgeon: Gil Shannon MD;  Location:  EC     septic olecranon bursitis[       FAMILY  "HISTORY:  Family History   Problem Relation Age of Onset     HEART DISEASE No family hx of      SOCIAL HISTORY:  Social History     Social History     Marital status:      Spouse name: N/A     Number of children: N/A     Years of education: N/A     Social History Main Topics     Smoking status: Never Smoker     Smokeless tobacco: Never Used     Alcohol use No     Drug use: No     Sexual activity: Not Asked     Other Topics Concern     Parent/Sibling W/ Cabg, Mi Or Angioplasty Before 65f 55m? No     Social History Narrative     Review of Systems:  Skin:  Negative     Eyes:  Positive for glasses;cataracts  ENT:  Negative    Respiratory:  Negative    Cardiovascular:    fatigue;Positive for  Gastroenterology: Negative    Genitourinary:  Positive for urinary frequency;nocturia  Musculoskeletal:  Negative    Neurologic:  Negative    Psychiatric:  Negative    Heme/Lymph/Imm:  Negative    Endocrine:  Negative       Physical Exam:  Vitals: /84  Pulse 118  Ht 1.74 m (5' 8.5\")  Wt 77.3 kg (170 lb 8 oz)  BMI 25.55 kg/m2   Wt Readings from Last 4 Encounters:   10/16/18 77.3 kg (170 lb 8 oz)   07/16/18 76.4 kg (168 lb 6.4 oz)   04/17/18 76.1 kg (167 lb 12.8 oz)   03/23/18 75.5 kg (166 lb 8 oz)     GEN: well nourished, in no acute distress.  HEENT:  Pupils equal, round. Sclerae nonicteric.   NECK: Supple, no masses appreciated. Neck veins appear flat at 30 degrees.  C/V:  Irregular rate and rhythm, soft systolic murmur at the RUSB  RESP: Respirations are unlabored. Breath sounds are mildly decreased at the R base otherwise clear.  GI: Abdomen soft, nontender.  EXTREM: No LE edema.  NEURO: Alert and oriented, cooperative.  SKIN: Warm and dry.     Recent Lab Results:  LIPID RESULTS:  Lab Results   Component Value Date    CHOL 127 04/18/2012    HDL 56 04/18/2012    LDL 55 04/18/2012    TRIG 81 04/18/2012    CHOLHDLRATIO 2.3 04/18/2012     LIVER ENZYME RESULTS:  Lab Results   Component Value Date    AST 19 02/28/2018 "    ALT 32 02/28/2018     CBC RESULTS:  Lab Results   Component Value Date    WBC 6.2 03/01/2018    RBC 3.26 (L) 03/01/2018    HGB 10.4 (L) 03/01/2018    HCT 31.9 (L) 03/01/2018    MCV 98 03/01/2018    MCH 31.9 03/01/2018    MCHC 32.6 03/01/2018    RDW 19.5 (H) 03/01/2018    PLT 46 (LL) 03/01/2018     BMP RESULTS:  Lab Results   Component Value Date     10/16/2018    POTASSIUM 4.3 10/16/2018    CHLORIDE 103 10/16/2018    CO2 27 10/16/2018    ANIONGAP 11.3 10/16/2018    GLC 99 10/16/2018    BUN 22 10/16/2018    CR 1.24 10/16/2018    GFRESTIMATED 55 (L) 10/16/2018    GFRESTBLACK 67 10/16/2018    BARON 8.7 10/16/2018      A1C RESULTS:  No results found for: A1C  INR RESULTS:  Lab Results   Component Value Date    INR 2.1 (A) 10/16/2018    INR 2.3 (A) 09/17/2018    INR 6.82 (HH) 07/30/2018    INR 3.29 (H) 03/01/2018       New/Pertinent imaging results since last visit:  None    Jazlyn Cr PA-C  [facility]      Thank you for allowing me to participate in the care of your patient.      Sincerely,     Jazlyn Cr PA-C     [facility]    cc:   No referring provider defined for this encounter.

## 2018-10-16 NOTE — MR AVS SNAPSHOT
After Visit Summary   10/16/2018    Jama Paul    MRN: [id number]           Patient Information     Date Of Birth          2/13/1931        Visit Information        Provider Department      10/16/2018 10:50 AM Jazlyn Cr PA-C [facility]        Today's Diagnoses     Chronic atrial fibrillation (H)    -  1    Acute on chronic combined systolic and diastolic CHF (congestive heart failure) (H)          Care Instructions    Call CORE nurse for any questions or concerns:  [phone]   *If you have concerns after hours, please call [phone], option 2 to speak with on call Cardiologist.    1. Medication changes from today:  No medication changes today.      2. Weigh yourself daily and write it down.     3. Call CORE nurse if your weight is up more than 2 pounds in one day or 5 pounds in one week.     4. Call CORE nurse if you feel more short of breath, have more abdominal bloating, or leg swelling.     5. Continue low sodium diet (less than 2000 mg daily). If you eat less salt, you will retain less fluid.     6. Alcohol can weaken your heart further. You should avoid alcohol or limit its use to special times, such as a holiday or birthday.      7. Do NOT take Aleve or ibuprofen without talking to your doctor first.      8. Lab Results: your labs today look very stable.      Component      Latest Ref Rng & Units 7/16/2018 10/16/2018   Sodium      136 - 145 mmol/L 139 137   Potassium      3.5 - 5.1 mmol/L 3.9 4.3   Chloride      98 - 107 mmol/L 105 103   Carbon Dioxide      23 - 29 mmol/L 27 27   Anion Gap      6 - 17 mmol/L 10.9 11.3   Glucose      70 - 105 mg/dL 143 (H) 99   Urea Nitrogen      7 - 30 mg/dL 22 22   Creatinine      0.70 - 1.30 mg/dL 1.27 1.24   GFR Estimate      >60 mL/min/1.7m2 54 (L) 55 (L)   GFR Estimate If Black      >60 mL/min/1.7m2 65 67   Calcium      8.5 - 10.5 mg/dL 8.8 8.7     CORE Clinic: Cardiomyopathy, Optimization,  Rehabilitation, Education  The CORE Clinic is a heart failure specialty clinic within the [facility] [place] where you will work with specialized nurse practitioners, physician assistants, doctors, and registered nurses. They are dedicated to helping patients with heart failure to carefully adjust medications, receive education, and learn who and when to call if symptoms develop. They specialize in helping you better understand your condition, slow the progression of your disease, improve the length and quality of your life, help you detect future heart problems before they become life threatening, and avoid hospitalizations.                Follow-ups after your visit        Additional Services     Follow-Up with CORE Clinic - HARRY visit                 Your next 10 appointments already scheduled     Nov 06, 2018 10:40 AM CST   Anticoagulation Visit with [facility]   [facility]   [place] ([facility])    [address]  [place] [address] [phone] OPT 2            Nov 12, 2018 11:00 AM CST   Return Visit with Wesley Benjamin MD   [facility] ([facility])    [address]  [address]  [place] [id number] [phone]              Future tests that were ordered for you today     Open Future Orders        Priority Expected Expires Ordered    Follow-Up with CORE Clinic - HARRY visit Routine 1/14/2019 10/16/2019 10/16/2018    Basic metabolic panel Routine 1/14/2019 10/16/2019 10/16/2018            Who to contact     If you have questions or need follow up information about today's clinic visit or your schedule please contact [facility]   [place] directly at [phone].  Normal or non-critical lab and imaging results will be communicated to you by MyChart, letter or phone within 4 business days after the clinic has received the results. If you do not hear from us  "within 7 days, please contact the clinic through [employer] or phone. If you have a critical or abnormal lab result, we will notify you by phone as soon as possible.  Submit refill requests through [employer] or call your pharmacy and they will forward the refill request to us. Please allow 3 business days for your refill to be completed.          Additional Information About Your Visit        Care EveryWhere ID     This is your Care EveryWhere ID. This could be used by other organizations to access your [facility] medical records  [id number]        Your Vitals Were     Pulse Height BMI (Body Mass Index)             77 1.74 m (5' 8.5\") 25.55 kg/m2          Blood Pressure from Last 3 Encounters:   10/16/18 132/84   10/15/18 115/72   09/17/18 104/68    Weight from Last 3 Encounters:   10/16/18 77.3 kg (170 lb 8 oz)   07/16/18 76.4 kg (168 lb 6.4 oz)   04/17/18 76.1 kg (167 lb 12.8 oz)              We Performed the Following     Follow-Up with CORE Clinic - HARRY visit          Where to get your medicines      These medications were sent to [facility] [id number]  [place], [place] - [address]  [address] [place] [address]    Hours:  24-hours Phone:  [phone]     metoprolol succinate 50 MG 24 hr tablet          Primary Care Provider Office Phone # Fax #    Mariusz Shields [phone] [address]  [place] [address]        Equal Access to Services     [facility]: Hadii aad ku hadasho Soomaali, waaxda luqadaha, qaybta kaalmada adevilla, emani amyers . So [facility] [phone].    ATENCIÓN: Si habla español, tiene a cleaning disposición servicios gratuitos de asistencia lingüística. Llame al [phone].    We comply with applicable federal civil rights laws and [place] laws. We do not discriminate on the basis of race, color, national origin, age, disability, sex, sexual orientation, or gender identity.            Thank " you!     Thank you for choosing [facility]  for your care. Our goal is always to provide you with excellent care. Hearing back from our patients is one way we can continue to improve our services. Please take a few minutes to complete the written survey that you may receive in the mail after your visit with us. Thank you!             Your Updated Medication List - Protect others around you: Learn how to safely use, store and throw away your medicines at www.disposemymeds.org.          This list is accurate as of 10/16/18 11:32 AM.  Always use your most recent med list.                   Brand Name Dispense Instructions for use Diagnosis    CYANOCOBALAMIN PO      Take 500 mcg by mouth daily        ENSURE COMPLETE PO      2 drinks a week        ICAPS AREDS FORMULA PO      Take 1 tablet by mouth daily Takes in addition to multivitamin with minerals        LASIX 20 MG tablet   Generic drug:  furosemide      Take 20 mg by mouth daily        metoprolol succinate 50 MG 24 hr tablet    TOPROL-XL    180 tablet    Take 1 tablet (50 mg) by mouth 2 times daily    Acute on chronic combined systolic and diastolic CHF (congestive heart failure) (H), Chronic atrial fibrillation (H)       sacubitril-valsartan 24-26 MG per tablet    ENTRESTO    180 tablet    Take 1 tablet by mouth 2 times daily    Acute on chronic combined systolic and diastolic CHF (congestive heart failure) (H)       VITAMIN C PO      Take 500 mg by mouth daily        Vitamin D (Cholecalciferol) 1000 units Tabs      Take 2,000 Units by mouth daily        warfarin 5 MG tablet    COUMADIN    100 tablet    Take 1 tab daily or as directed per INR clinic    Long term current use of anticoagulant therapy

## 2018-10-16 NOTE — LETTER
10/16/2018    Mariusz Shields MD  [address]  [place] [address]    RE: Jama Paul       Dear Colleague,    I had the pleasure of seeing Jama Paul in the [facility].      Cardiology Progress Note    Date of Service: 10/16/2018  Patient seen today in follow up of: CORE follow up  Primary cardiologist: Dr. Pugh    HPI:  Jama Paul is a very pleasant 87-year-old male with a history of chronic atrial fibrillation, hypertension, chronic thrombocytopenia, and chronic congestive heart failure who is here today for CORE clinic follow-up.    In December 2017 he was admitted with acute congestive heart failure.  LVEF was 30-35% with global hypokinesia.  He had a cardiac MRI done which showed an LVEF of 42%.  RVEF was 44% with moderate to severe biatrial enlargement.  He had underwent a nuclear medicine stress perfusion study which was negative for ischemia.  He was subsequently readmitted in January again for decompensated heart failure and underwent diuresis.  He had a right heart cath done during that hospitalization which showed normal filling pressures with normal cardiac output.  He has been followed in the CORE clinic with ERIC Olivares and Dr. Pugh since then.  His atrial fibrillation has been managed with a rate control strategy and he has been anticoagulated with Coumadin.    His last hospitalization was in February 2018 for congestive heart failure.  Echocardiogram in January of this year showed an LVEF percent.  His medical therapy has included Entresto 24-26 mg twice daily, metoprolol succinate 100 mg in the morning and 50 mg in the afternoon, and low-dose Lasix 20 mg daily.  His last echocardiogram in April showed improvement in his LV function with an LVEF of 65-70%.  RV function was mildly decreased.  There was 2-3+ tricuspid regurgitation.    He is here today for routine 3-month CORE clinic follow-up. Since his last visit, he has been doing  quite well. He continues to work out at the gym at [facility] for 1.5 hours a day without issues. He denies any increased dyspnea on exertion, palpitations, orthopnea, PND, chest pain or any other new issues. Since his last visit, he cut his Metoprolol XL back to 50 mg twice daily because he was concerned about his blood pressures. He checks his blood pressure three times a day as well as his pulse. Primarily in the mornings, he has noted some lower blood pressure readings in the 80 - 90s systolic. He has been asymptomatic with this. His pulse typically runs int he 80 - 90s.    ASSESSMENT/PLAN:  1.  Nonsichemic cardiomyopathy. LVEF previously 30 - 35% in the setting of rapid atrial fibrillation. This has normalized by his last echocardiogram. He is maintained on Metoprolol XL as well as Entresto.    2.  Chronic heart failure with preserved ejection fraction. Currently on 20 mg of lasix daily. He appears euvolemic today and has no complaints consistent with congestive heart failure.   3.  Chronic atrial fibrillation. Rate controlled on Metoprolol XL. He checks his pulse regularly three times daily and reports his rates are well controlled. He is appropriately anticoagulated with warfarin. His INR was therapeutic today.   4.  OLIVA.     Jama seems to be doing quite well. He appears to be compensated from a congestive heart failure standpoint. His HRs appear fairly well controlled. I made no changes to his medications today. I will have him return for CORE clinic follow up in three months and he will see Dr. Pugh in six months time.     This note was completed in part using Dragon voice recognition software. Although reviewed after completion, some word and grammatical errors may occur.    Orders this Visit:  No orders of the defined types were placed in this encounter.    Orders Placed This Encounter   Medications     metoprolol succinate (TOPROL-XL) 50 MG 24 hr tablet     Sig: Take 50 mg by mouth 2 times daily      Medications Discontinued During This Encounter   Medication Reason     metoprolol succinate (TOPROL-XL) 100 MG 24 hr tablet Stopped by Patient       CURRENT MEDICATIONS:  Current Outpatient Prescriptions   Medication Sig Dispense Refill     Ascorbic Acid (VITAMIN C PO) Take 500 mg by mouth daily       CYANOCOBALAMIN PO Take 500 mcg by mouth daily       furosemide (LASIX) 20 MG tablet Take 20 mg by mouth daily       metoprolol succinate (TOPROL-XL) 50 MG 24 hr tablet Take 50 mg by mouth 2 times daily       Multiple Vitamins-Minerals (ICAPS AREDS FORMULA PO) Take 1 tablet by mouth daily Takes in addition to multivitamin with minerals       Nutritional Supplements (ENSURE COMPLETE PO) 2 drinks a week       sacubitril-valsartan (ENTRESTO) 24-26 MG per tablet Take 1 tablet by mouth 2 times daily 180 tablet 3     Vitamin D, Cholecalciferol, 1000 UNITS TABS Take 2,000 Units by mouth daily        warfarin (COUMADIN) 5 MG tablet Take 1 tab daily or as directed per INR clinic 100 tablet 1     ALLERGIES  No Known Allergies  PAST MEDICAL HISTORY:  Past Medical History:   Diagnosis Date     Antiplatelet or antithrombotic long-term use      Arrhythmia      Atrial fibrillation (H)      Elevated PSA      Thrombocytopenia (H)      Unspecified essential hypertension      PAST SURGICAL HISTORY:  Past Surgical History:   Procedure Laterality Date     COLONOSCOPY       EXPLORE GROIN  4/30/2014    Procedure: EXPLORE GROIN;  Surgeon: Sam Aguirre MD;  Location:  OR     HERNIORRHAPHY INGUINAL       HERNIORRHAPHY INGUINAL  4/30/2014    Procedure: HERNIORRHAPHY INGUINAL;  Surgeon: Sam Aguirre MD;  Location:  OR     MOHS MICROGRAPHIC PROCEDURE       PHACOEMULSIFICATION CLEAR CORNEA WITH STANDARD INTRAOCULAR LENS IMPLANT Right 9/8/2015    Procedure: PHACOEMULSIFICATION CLEAR CORNEA WITH STANDARD INTRAOCULAR LENS IMPLANT;  Surgeon: Gil Shannon MD;  Location:  EC     septic olecranon bursitis[       FAMILY  "HISTORY:  Family History   Problem Relation Age of Onset     HEART DISEASE No family hx of      SOCIAL HISTORY:  Social History     Social History     Marital status:      Spouse name: N/A     Number of children: N/A     Years of education: N/A     Social History Main Topics     Smoking status: Never Smoker     Smokeless tobacco: Never Used     Alcohol use No     Drug use: No     Sexual activity: Not Asked     Other Topics Concern     Parent/Sibling W/ Cabg, Mi Or Angioplasty Before 65f 55m? No     Social History Narrative     Review of Systems:  Skin:  Negative     Eyes:  Positive for glasses;cataracts  ENT:  Negative    Respiratory:  Negative    Cardiovascular:    fatigue;Positive for  Gastroenterology: Negative    Genitourinary:  Positive for urinary frequency;nocturia  Musculoskeletal:  Negative    Neurologic:  Negative    Psychiatric:  Negative    Heme/Lymph/Imm:  Negative    Endocrine:  Negative       Physical Exam:  Vitals: /84  Pulse 118  Ht 1.74 m (5' 8.5\")  Wt 77.3 kg (170 lb 8 oz)  BMI 25.55 kg/m2   Wt Readings from Last 4 Encounters:   10/16/18 77.3 kg (170 lb 8 oz)   07/16/18 76.4 kg (168 lb 6.4 oz)   04/17/18 76.1 kg (167 lb 12.8 oz)   03/23/18 75.5 kg (166 lb 8 oz)     GEN: well nourished, in no acute distress.  HEENT:  Pupils equal, round. Sclerae nonicteric.   NECK: Supple, no masses appreciated. Neck veins appear flat at 30 degrees.  C/V:  Irregular rate and rhythm, soft systolic murmur at the RUSB  RESP: Respirations are unlabored. Breath sounds are mildly decreased at the R base otherwise clear.  GI: Abdomen soft, nontender.  EXTREM: No LE edema.  NEURO: Alert and oriented, cooperative.  SKIN: Warm and dry.     Recent Lab Results:  LIPID RESULTS:  Lab Results   Component Value Date    CHOL 127 04/18/2012    HDL 56 04/18/2012    LDL 55 04/18/2012    TRIG 81 04/18/2012    CHOLHDLRATIO 2.3 04/18/2012     LIVER ENZYME RESULTS:  Lab Results   Component Value Date    AST 19 02/28/2018 "    ALT 32 02/28/2018     CBC RESULTS:  Lab Results   Component Value Date    WBC 6.2 03/01/2018    RBC 3.26 (L) 03/01/2018    HGB 10.4 (L) 03/01/2018    HCT 31.9 (L) 03/01/2018    MCV 98 03/01/2018    MCH 31.9 03/01/2018    MCHC 32.6 03/01/2018    RDW 19.5 (H) 03/01/2018    PLT 46 (LL) 03/01/2018     BMP RESULTS:  Lab Results   Component Value Date     10/16/2018    POTASSIUM 4.3 10/16/2018    CHLORIDE 103 10/16/2018    CO2 27 10/16/2018    ANIONGAP 11.3 10/16/2018    GLC 99 10/16/2018    BUN 22 10/16/2018    CR 1.24 10/16/2018    GFRESTIMATED 55 (L) 10/16/2018    GFRESTBLACK 67 10/16/2018    BARON 8.7 10/16/2018      A1C RESULTS:  No results found for: A1C  INR RESULTS:  Lab Results   Component Value Date    INR 2.1 (A) 10/16/2018    INR 2.3 (A) 09/17/2018    INR 6.82 (HH) 07/30/2018    INR 3.29 (H) 03/01/2018       New/Pertinent imaging results since last visit:  None    Jazlyn Cr PA-C  [facility]        Thank you for allowing me to participate in the care of your patient.      Sincerely,     Jazlyn Cr PA-C     [facility]

## 2018-10-16 NOTE — PATIENT INSTRUCTIONS
Call CORE nurse for any questions or concerns:  [phone]   *If you have concerns after hours, please call [phone], option 2 to speak with on call Cardiologist.    1. Medication changes from today:  No medication changes today.      2. Weigh yourself daily and write it down.     3. Call CORE nurse if your weight is up more than 2 pounds in one day or 5 pounds in one week.     4. Call CORE nurse if you feel more short of breath, have more abdominal bloating, or leg swelling.     5. Continue low sodium diet (less than 2000 mg daily). If you eat less salt, you will retain less fluid.     6. Alcohol can weaken your heart further. You should avoid alcohol or limit its use to special times, such as a holiday or birthday.      7. Do NOT take Aleve or ibuprofen without talking to your doctor first.      8. Lab Results: your labs today look very stable.      Component      Latest Ref Rng & Units 7/16/2018 10/16/2018   Sodium      136 - 145 mmol/L 139 137   Potassium      3.5 - 5.1 mmol/L 3.9 4.3   Chloride      98 - 107 mmol/L 105 103   Carbon Dioxide      23 - 29 mmol/L 27 27   Anion Gap      6 - 17 mmol/L 10.9 11.3   Glucose      70 - 105 mg/dL 143 (H) 99   Urea Nitrogen      7 - 30 mg/dL 22 22   Creatinine      0.70 - 1.30 mg/dL 1.27 1.24   GFR Estimate      >60 mL/min/1.7m2 54 (L) 55 (L)   GFR Estimate If Black      >60 mL/min/1.7m2 65 67   Calcium      8.5 - 10.5 mg/dL 8.8 8.7     CORE Clinic: Cardiomyopathy, Optimization, Rehabilitation, Education  The CORE Clinic is a heart failure specialty clinic within the [facility] where you will work with specialized nurse practitioners, physician assistants, doctors, and registered nurses. They are dedicated to helping patients with heart failure to carefully adjust medications, receive education, and learn who and when to call if symptoms develop. They specialize in helping you better understand your condition, slow the progression of your disease, improve  the length and quality of your life, help you detect future heart problems before they become life threatening, and avoid hospitalizations.

## 2018-10-16 NOTE — PROGRESS NOTES
ANTICOAGULATION FOLLOW-UP CLINIC VISIT    Patient Name:  Jama Paul  Date:  10/16/2018  Contact Type:  Face to Face    SUBJECTIVE:     Patient Findings     Positives Change in diet/appetite (Will reduce Ensure from three times weekly to twice weekly. Eats daily salad.)           OBJECTIVE    INR Protime   Date Value Ref Range Status   10/16/2018 2.1 (A) 0.86 - 1.14 Final       ASSESSMENT / PLAN  INR assessment THER    Recheck INR In: 3 WEEKS    INR Location Clinic      Anticoagulation Summary as of 10/16/2018     INR goal 2.0-3.0   Today's INR 2.1   Warfarin maintenance plan 2.5 mg (5 mg x 0.5) on Mon; 5 mg (5 mg x 1) all other days   Full warfarin instructions 2.5 mg on Mon; 5 mg all other days   Weekly warfarin total 32.5 mg   No change documented Karla Fang RN   Plan last modified Katherin Pastor RN (8/30/2018)   Next INR check 11/6/2018   Target end date Indefinite    Indications   Atrial fibrillation (H) [I48.91]  Long term current use of anticoagulants with INR goal of 2.0-3.0 [Z79.01]         Anticoagulation Episode Summary     INR check location     Preferred lab     Send INR reminders to [facility] HEART INR NURSE    Comments       Anticoagulation Care Providers     Provider Role Specialty Phone number    Long Pugh MD Responsible Cardiology [phone]            See the Encounter Report to view Anticoagulation Flowsheet and Dosing Calendar (Go to Encounters tab in chart review, and find the Anticoagulation Therapy Visit)    INR 2.1 today. Pt denies any recent illness or medication changes. Denies any bleeding or abnormal bruising. Will decrease weekly Ensure from three times weekly to twice weekly. Pt prefers to continue to eat daily salad instead. Continue current dosing of 2.5 mg on Mon and 5 mg remaining days. Next INR recheck is in 3 weeks.    [place] Fang, MATTY

## 2018-11-12 ENCOUNTER — ANTICOAGULATION THERAPY VISIT (OUTPATIENT)
Dept: CARDIOLOGY | Facility: CLINIC | Age: 83
End: 2018-11-12
Payer: MEDICARE

## 2018-11-12 ENCOUNTER — HOSPITAL ENCOUNTER (OUTPATIENT)
Dept: WOUND CARE | Facility: CLINIC | Age: 83
Discharge: HOME | End: 2018-11-12
Attending: SURGERY | Admitting: SURGERY
Payer: MEDICARE

## 2018-11-12 VITALS
SYSTOLIC BLOOD PRESSURE: 124 MMHG | TEMPERATURE: 97.3 F | HEART RATE: 96 BPM | RESPIRATION RATE: 16 BRPM | DIASTOLIC BLOOD PRESSURE: 80 MMHG

## 2018-11-12 DIAGNOSIS — Z79.01 LONG TERM CURRENT USE OF ANTICOAGULANTS WITH INR GOAL OF 2.0-3.0: ICD-10-CM

## 2018-11-12 DIAGNOSIS — I48.91 ATRIAL FIBRILLATION, UNSPECIFIED TYPE (H): ICD-10-CM

## 2018-11-12 DIAGNOSIS — S81.801D TRAUMATIC OPEN WOUND OF RIGHT LOWER LEG, SUBSEQUENT ENCOUNTER: ICD-10-CM

## 2018-11-12 LAB — INR POINT OF CARE: 1.6 (ref 0.86–1.14)

## 2018-11-12 PROCEDURE — 36416 COLLJ CAPILLARY BLOOD SPEC: CPT | Performed by: INTERNAL MEDICINE

## 2018-11-12 PROCEDURE — 97597 DBRDMT OPN WND 1ST 20 CM/<: CPT | Performed by: SURGERY

## 2018-11-12 PROCEDURE — 85610 PROTHROMBIN TIME: CPT | Mod: QW | Performed by: INTERNAL MEDICINE

## 2018-11-12 PROCEDURE — 97597 DBRDMT OPN WND 1ST 20 CM/<: CPT

## 2018-11-12 PROCEDURE — 99207 ZZC NO CHARGE NURSE ONLY: CPT | Performed by: INTERNAL MEDICINE

## 2018-11-12 NOTE — PROGRESS NOTES
ANTICOAGULATION FOLLOW-UP CLINIC VISIT    Patient Name:  Jama Paul  Date:  11/12/2018  Contact Type:  Face to Face    SUBJECTIVE:     Patient Findings     Positives Change in diet/appetite           OBJECTIVE    INR Protime   Date Value Ref Range Status   11/12/2018 1.6 (A) 0.86 - 1.14 Final       ASSESSMENT / PLAN  INR assessment SUB    Recheck INR In: 2 WEEKS    INR Location Clinic      Anticoagulation Summary as of 11/12/2018     INR goal 2.0-3.0   Today's INR 1.6!   Warfarin maintenance plan 5 mg (5 mg x 1) every day   Full warfarin instructions 5 mg every day   Weekly warfarin total 35 mg   Plan last modified Mirela Moreno RN (11/12/2018)   Next INR check 11/26/2018   Target end date Indefinite    Indications   Atrial fibrillation (H) [I48.91]  Long term current use of anticoagulants with INR goal of 2.0-3.0 [Z79.01]         Anticoagulation Episode Summary     INR check location     Preferred lab     Send INR reminders to [facility]ART INR NURSE    Comments       Anticoagulation Care Providers     Provider Role Specialty Phone number    Long Pugh MD Responsible Cardiology [phone]            See the Encounter Report to view Anticoagulation Flowsheet and Dosing Calendar (Go to Encounters tab in chart review, and find the Anticoagulation Therapy Visit)    INR 1.6.  Denied missed doses.  He is eating spinach salad daily.  He cut back on the Ensure from 3x weekly to 2x weekly.  Increase by 2.5mg overall to 5mg/day and recheck INR ini 2 weeks.  Rob Moreno RN

## 2018-11-12 NOTE — DISCHARGE INSTRUCTIONS
"       [facility]  [address], [place] [address]  Appointment Phone [phone] Nurse Advisors [phone]      Jama Luis Humberto      2/13/1931      Wound Dressing Change to right medial lower leg  Cleanse wound and surrounding skin with: wound cleanser  Cover wound with Woun'dres gel  Cover gel with 2\" bandaid  Change dressing daily       Compression:   You have a compression wrap Spandagrip D is supposed to be removed at night and put back on first thing in the morning.  Please remove compression dressing if toes turn blue and/or tingle and can not be relieved by raising the leg for one hour.       A diet high in protein is important for wound healing, we recommend getting 60 grams of protein per day. Taking protein shakes or bars are a good way to get extra protein in your diet.        Wesley Benjamin M.D. November 12, 2018     Call us at [phone] if you have any questions about your wounds, have redness or swelling around your wound, have a fever of 101 or greater or if you have any other problems or concerns. We answer the phone Monday through Friday 8 am to 4 pm, please leave a message as we check the voicemail frequently throughout the day.       Follow up with Dr. Benjamin 4 weeks         "

## 2018-11-12 NOTE — MR AVS SNAPSHOT
Jama Paul   11/12/2018 10:00 AM   Anticoagulation Therapy Visit    Description:  87 year old male   Provider:  [facility]ARDO ANTICOAGULATION   Department:  [facility]           INR as of 11/12/2018     Today's INR 1.6!      Anticoagulation Summary as of 11/12/2018     INR goal 2.0-3.0   Today's INR 1.6!   Full warfarin instructions 5 mg every day   Next INR check 11/26/2018    Indications   Atrial fibrillation (H) [I48.91]  Long term current use of anticoagulants with INR goal of 2.0-3.0 [Z79.01]         Your next Anticoagulation Clinic appointment(s)     Nov 26, 2018 11:00 AM CST   Anticoagulation Visit with [facility] ANTICOAGULATION   [facility] ([facility])    [facility] [id number]  [facility] [address] [phone] OPT 2              Contact Numbers     Anticoagulant (INR) Clinic Number: [phone]          November 2018 Details    Sun Mon Tue Wed Thu Fri Sat         1               2               3                 4               5               6               7               8               9               10                 11               12      5 mg   See details      13      5 mg         14      5 mg         15      5 mg         16      5 mg         17      5 mg           18      5 mg         19      5 mg         20      5 mg         21      5 mg         22      5 mg         23      5 mg         24      5 mg           25      5 mg         26            27               28               29               30                 Date Details   11/12 This INR check       Date of next INR:  11/26/2018         How to take your warfarin dose     To take:  5 mg Take 1 of the 5 mg tablets.

## 2018-11-12 NOTE — PROGRESS NOTES
[facility] [place] Progress Note    Subject: Jama Paul returns for follow-up.  He is applying Woun'Dres collagen gel and a Band-Aid along with compression to his right mid medial calf traumatic ulcer.  This continues to decrease in size.  He has no pain associated with this and is able to do all of his normal activities including working out on a daily basis.  He does continue with his nutritional supplements.      Exam:  /80  Pulse 96  Temp 97.3  F (36.3  C) (Temporal)  Resp 16  Very alert and appropriate.  No edema nor cellulitis.  Remaining ulcer measures 0.3 x 0.3 x 0.1 cm.  Last visit on 10/15/2018 ulcer measured 0.9 x 1 x 0.1 cm.    Procedure:   Patient was determined to be capable of making their own medical decisions and informed consent was obtained. Topical anesthetic of 4% lidocaine was applied, selective debridement was performed with a #15 blade scalpel.  Remove the mild amount of slough.  Debrided down to good healthy bleeding tissue circumferentially.  Final depth was 0.3 cm.  Bleeding controlled with light pressure. Patient tolerated procedure well.    Impression: Slow but steady improvement.  Continue with Woun'Dres collagen gel.    Plan: We will dress the wounds with Woun'Dres collagen gel.  Patient will return to the clinic in 4 weeks time    Wesley Benjamin MD on 11/12/2018 at 11:36 AM

## 2018-11-27 ENCOUNTER — ANTICOAGULATION THERAPY VISIT (OUTPATIENT)
Dept: CARDIOLOGY | Facility: CLINIC | Age: 83
End: 2018-11-27
Payer: MEDICARE

## 2018-11-27 DIAGNOSIS — I48.91 ATRIAL FIBRILLATION, UNSPECIFIED TYPE (H): ICD-10-CM

## 2018-11-27 DIAGNOSIS — Z79.01 LONG TERM CURRENT USE OF ANTICOAGULANTS WITH INR GOAL OF 2.0-3.0: ICD-10-CM

## 2018-11-27 LAB — INR POINT OF CARE: 2.5 (ref 0.86–1.14)

## 2018-11-27 PROCEDURE — 85610 PROTHROMBIN TIME: CPT | Mod: QW | Performed by: INTERNAL MEDICINE

## 2018-11-27 PROCEDURE — 36416 COLLJ CAPILLARY BLOOD SPEC: CPT | Performed by: INTERNAL MEDICINE

## 2018-11-27 NOTE — PROGRESS NOTES
ANTICOAGULATION FOLLOW-UP CLINIC VISIT    Patient Name:  Jama Paul  Date:  11/27/2018  Contact Type:  Face to Face    SUBJECTIVE:     Patient Findings     Positives No Problem Findings           OBJECTIVE    INR Protime   Date Value Ref Range Status   11/27/2018 2.5 (A) 0.86 - 1.14 Final       ASSESSMENT / PLAN  INR assessment THER    Recheck INR In: 4 WEEKS    INR Location Clinic      Anticoagulation Summary as of 11/27/2018     INR goal 2.0-3.0   Today's INR 2.5   Warfarin maintenance plan 5 mg (5 mg x 1) every day   Full warfarin instructions 5 mg every day   Weekly warfarin total 35 mg   No change documented Talisha Jaramillo, RN   Plan last modified Mirela Moreno RN (11/12/2018)   Next INR check 12/27/2018   Target end date Indefinite    Indications   Atrial fibrillation (H) [I48.91]  Long term current use of anticoagulants with INR goal of 2.0-3.0 [Z79.01]         Anticoagulation Episode Summary     INR check location     Preferred lab     Send INR reminders to [facility] HEART INR NURSE    Comments       Anticoagulation Care Providers     Provider Role Specialty Phone number    Long Pugh MD Responsible Cardiology [phone]            See the Encounter Report to view Anticoagulation Flowsheet and Dosing Calendar (Go to Encounters tab in chart review, and find the Anticoagulation Therapy Visit)    INR 2.5 No complications noted. No med or diet changes. No signs/sx bleeding or clots. Continue 5 mg daily and recheck in 4 weeks.    Talisha Jaramillo RN

## 2018-11-27 NOTE — MR AVS SNAPSHOT
Jama Paul   11/27/2018 11:00 AM   Anticoagulation Therapy Visit    Description:  87 year old male   Provider:  [facility] ANTICOAGULATION   Department:  [facility]           INR as of 11/27/2018     Today's INR 2.5      Anticoagulation Summary as of 11/27/2018     INR goal 2.0-3.0   Today's INR 2.5   Full warfarin instructions 5 mg every day   Next INR check 12/27/2018    Indications   Atrial fibrillation (H) [I48.91]  Long term current use of anticoagulants with INR goal of 2.0-3.0 [Z79.01]         Your next Anticoagulation Clinic appointment(s)     Dec 27, 2018 11:00 AM CST   Anticoagulation Visit with  ANTICOAGULATION   [facility] ([facility])    [facility] [address] [address] [phone] OPT 2              Contact Numbers     Anticoagulant (INR) Clinic Number: [phone]          November 2018 Details    Sun Mon Tue Wed Thu Fri Sat         1               2               3                 4               5               6               7               8               9               10                 11               12               13               14               15               16               17                 18               19               20               21               22               23               24                 25               26               27      5 mg   See details      28      5 mg         29      5 mg         30      5 mg           Date Details   11/27 This INR check               How to take your warfarin dose     To take:  5 mg Take 1 of the 5 mg tablets.           December 2018 Details    Sun Mon Tue Wed Thu Fri Sat           1      5 mg           2      5 mg         3      5 mg         4      5 mg         5      5 mg         6      5 mg         7      5 mg         8      5 mg           9      5 mg         10      5 mg         11      5 mg         12      5 mg         13      5 mg          14      5 mg         15      5 mg           16      5 mg         17      5 mg         18      5 mg         19      5 mg         20      5 mg         21      5 mg         22      5 mg           23      5 mg         24      5 mg         25      5 mg         26      5 mg         27            28               29                 30               31                     Date Details   No additional details    Date of next INR:  12/27/2018         How to take your warfarin dose     To take:  5 mg Take 1 of the 5 mg tablets.

## 2018-11-29 ENCOUNTER — TELEPHONE (OUTPATIENT)
Dept: CARDIOLOGY | Facility: CLINIC | Age: 83
End: 2018-11-29

## 2018-11-29 NOTE — TELEPHONE ENCOUNTER
Pt calling to report that he has noted loose stools while eating daily spinach salads so he wants to stop eating the spinach daily and needs a substitution for the vit K in his diet. After discussion with pt, he will stop eating spinach and instead he will drink 1 bottle of Ensure 3x/wk and eat broccoli 2x/wk. Will recheck INR on 12/10 before his OV with Dr Benjamin. Keren

## 2018-12-10 ENCOUNTER — HOSPITAL ENCOUNTER (OUTPATIENT)
Dept: WOUND CARE | Facility: CLINIC | Age: 83
Discharge: HOME | End: 2018-12-10
Attending: SURGERY | Admitting: SURGERY
Payer: MEDICARE

## 2018-12-10 ENCOUNTER — ANTICOAGULATION THERAPY VISIT (OUTPATIENT)
Dept: CARDIOLOGY | Facility: CLINIC | Age: 83
End: 2018-12-10
Payer: MEDICARE

## 2018-12-10 ENCOUNTER — APPOINTMENT (OUTPATIENT)
Dept: CARDIOLOGY | Facility: CLINIC | Age: 83
End: 2018-12-10
Payer: MEDICARE

## 2018-12-10 VITALS
RESPIRATION RATE: 16 BRPM | DIASTOLIC BLOOD PRESSURE: 74 MMHG | TEMPERATURE: 96.1 F | SYSTOLIC BLOOD PRESSURE: 124 MMHG | HEART RATE: 107 BPM

## 2018-12-10 DIAGNOSIS — I48.91 ATRIAL FIBRILLATION, UNSPECIFIED TYPE (H): ICD-10-CM

## 2018-12-10 DIAGNOSIS — S81.801D TRAUMATIC OPEN WOUND OF RIGHT LOWER LEG, SUBSEQUENT ENCOUNTER: ICD-10-CM

## 2018-12-10 DIAGNOSIS — Z79.01 LONG TERM CURRENT USE OF ANTICOAGULANTS WITH INR GOAL OF 2.0-3.0: ICD-10-CM

## 2018-12-10 PROBLEM — S81.801A TRAUMATIC OPEN WOUND OF RIGHT LOWER LEG: Status: RESOLVED | Noted: 2018-08-20 | Resolved: 2018-12-10

## 2018-12-10 LAB — INR POINT OF CARE: 2.8 (ref 0.86–1.14)

## 2018-12-10 PROCEDURE — 85610 PROTHROMBIN TIME: CPT | Mod: QW | Performed by: INTERNAL MEDICINE

## 2018-12-10 PROCEDURE — 99213 OFFICE O/P EST LOW 20 MIN: CPT | Performed by: SURGERY

## 2018-12-10 PROCEDURE — 36416 COLLJ CAPILLARY BLOOD SPEC: CPT | Performed by: INTERNAL MEDICINE

## 2018-12-10 PROCEDURE — 99207 ZZC NO CHARGE NURSE ONLY: CPT | Performed by: INTERNAL MEDICINE

## 2018-12-10 PROCEDURE — G0463 HOSPITAL OUTPT CLINIC VISIT: HCPCS

## 2018-12-10 NOTE — PROGRESS NOTES
ANTICOAGULATION FOLLOW-UP CLINIC VISIT    Patient Name:  Jama Paul  Date:  12/10/2018  Contact Type:  Face to Face    SUBJECTIVE:     Patient Findings     Positives:   Change in diet/appetite, No Problem Findings    Comments:   Drinking ensure daily instead of 3x weekly and stopped salads due to stomach upset           OBJECTIVE    INR Protime   Date Value Ref Range Status   12/10/2018 2.8 (A) 0.86 - 1.14 Final       ASSESSMENT / PLAN  INR assessment THER    Recheck INR In: 2 WEEKS    INR Location Clinic      Anticoagulation Summary  As of 12/10/2018    INR goal:   2.0-3.0   TTR:   52.0 % (6.1 mo)   INR used for dosin.8 (12/10/2018)   Warfarin maintenance plan:   5 mg (5 mg x 1) every day   Full warfarin instructions:   5 mg every day   Weekly warfarin total:   35 mg   No change documented:   Mirela Moreno RN   Plan last modified:   Mirela Moreno RN (2018)   Next INR check:   2018   Target end date:   Indefinite    Indications    Atrial fibrillation (H) [I48.91]  Long term current use of anticoagulants with INR goal of 2.0-3.0 [Z79.01]             Anticoagulation Episode Summary     INR check location:       Preferred lab:       Send INR reminders to:   [facility] HEART INR NURSE    Comments:         Anticoagulation Care Providers     Provider Role Specialty Phone number    Long Pugh MD Responsible Cardiology [phone]            See the Encounter Report to view Anticoagulation Flowsheet and Dosing Calendar (Go to Encounters tab in chart review, and find the Anticoagulation Therapy Visit)    INR 2.8.  He was just 2.5 about 2 weeks ago.  He had been drinking boost/ensure 3x weekly and eating salads but he stopped the salads due to stomach upset and increased the shakes to daily.  Continue the 5mg/day and keep drinking the shakes daily.  He might try eating some green veggies instead of salad if his stomach doesn't get upset.  Recheck INR in about 2 weeks.  Rob  RN    Mirela Moreno, RN

## 2018-12-10 NOTE — PROGRESS NOTES
[facility] [place] Progress Note    Subject: Jama Paul returns for follow-up of his traumatic right calf ulcer.  At his last visit on 11/12/2018 the ulcer decrease down to 0.3 x 0.3 x 0.1 cm.  He has been using Woun'Dres collagen gel.  He also uses a Spandagrip over his calf during the day to help  protect his calf from trauma.    He still remains active.  He is at [facility] and does use there exercise room on a daily basis.  Appetite and nutrition have remained good and he continues on nutritional supplements.      Exam:  /74   Pulse 107   Temp 96.1  F (35.6  C) (Temporal)   Resp 16   Alert and appropriate.  Very comfortable.  Calf ulcer is completely re-epithelialized with no open sites.  No edema nor erythema.      Impression: Healed traumatic calf ulcer.    Plan: We will dress the wounds with regular lotion.  Would recommend that he continue using the Spandagrip for the next month during the day to help protect the site as the epithelium matures  .  Patient will return to the clinic as needed.    Wesley Benjamin MD on 12/10/2018 at 11:15 AM

## 2018-12-10 NOTE — PROGRESS NOTES
CWCN time spent educating pt on care of healed wound, need for lotion on a daily basis.GARRETT LujanN, RN-BC, CWCN

## 2018-12-17 DIAGNOSIS — I50.43 ACUTE ON CHRONIC COMBINED SYSTOLIC AND DIASTOLIC CHF (CONGESTIVE HEART FAILURE) (H): ICD-10-CM

## 2018-12-17 DIAGNOSIS — I48.20 CHRONIC ATRIAL FIBRILLATION (H): ICD-10-CM

## 2018-12-17 RX ORDER — FUROSEMIDE 20 MG
20 TABLET ORAL DAILY
Qty: 90 TABLET | Refills: 3 | Status: ON HOLD | OUTPATIENT
Start: 2018-12-17 | End: 2019-11-17

## 2018-12-27 ENCOUNTER — ANTICOAGULATION THERAPY VISIT (OUTPATIENT)
Dept: CARDIOLOGY | Facility: CLINIC | Age: 83
End: 2018-12-27
Payer: MEDICARE

## 2018-12-27 DIAGNOSIS — Z79.01 LONG TERM CURRENT USE OF ANTICOAGULANTS WITH INR GOAL OF 2.0-3.0: ICD-10-CM

## 2018-12-27 DIAGNOSIS — I48.91 ATRIAL FIBRILLATION, UNSPECIFIED TYPE (H): ICD-10-CM

## 2018-12-27 LAB — INR POINT OF CARE: 2.7 (ref 0.86–1.14)

## 2018-12-27 PROCEDURE — 85610 PROTHROMBIN TIME: CPT | Mod: QW | Performed by: INTERNAL MEDICINE

## 2018-12-27 PROCEDURE — 36416 COLLJ CAPILLARY BLOOD SPEC: CPT | Performed by: INTERNAL MEDICINE

## 2018-12-27 NOTE — PROGRESS NOTES
ANTICOAGULATION FOLLOW-UP CLINIC VISIT    Patient Name:  Jama Paul  Date:  2018  Contact Type:  Face to Face    SUBJECTIVE:     Patient Findings     Positives:   No Problem Findings           OBJECTIVE    INR Protime   Date Value Ref Range Status   2018 2.7 (A) 0.86 - 1.14 Final       ASSESSMENT / PLAN  INR assessment THER    Recheck INR In: 2 WEEKS    INR Location Clinic      Anticoagulation Summary  As of 2018    INR goal:   2.0-3.0   TTR:   56.1 % (6.7 mo)   INR used for dosin.7 (2018)   Warfarin maintenance plan:   5 mg (5 mg x 1) every day   Full warfarin instructions:   5 mg every day   Weekly warfarin total:   35 mg   No change documented:   Mirela Moreno RN   Plan last modified:   Mirela Moreno RN (2018)   Next INR check:   1/15/2019   Target end date:   Indefinite    Indications    Atrial fibrillation (H) [I48.91]  Long term current use of anticoagulants with INR goal of 2.0-3.0 [Z79.01]             Anticoagulation Episode Summary     INR check location:       Preferred lab:       Send INR reminders to:   [facility] HEART INR NURSE    Comments:         Anticoagulation Care Providers     Provider Role Specialty Phone number    Long Pugh MD Responsible Cardiology [phone]            See the Encounter Report to view Anticoagulation Flowsheet and Dosing Calendar (Go to Encounters tab in chart review, and find the Anticoagulation Therapy Visit)    INR 2.7.  He is still taking the ensure daily and will continue that.  Continue 5mg/day and recheck in 2-3 weeks when back for CORE appt.  Rob Moreno RN

## 2019-01-15 ENCOUNTER — OFFICE VISIT (OUTPATIENT)
Dept: CARDIOLOGY | Facility: CLINIC | Age: 84
End: 2019-01-15
Attending: PHYSICIAN ASSISTANT
Payer: MEDICARE

## 2019-01-15 ENCOUNTER — ANTICOAGULATION THERAPY VISIT (OUTPATIENT)
Dept: CARDIOLOGY | Facility: CLINIC | Age: 84
End: 2019-01-15
Payer: MEDICARE

## 2019-01-15 VITALS
DIASTOLIC BLOOD PRESSURE: 84 MMHG | SYSTOLIC BLOOD PRESSURE: 126 MMHG | HEIGHT: 69 IN | HEART RATE: 116 BPM | WEIGHT: 164.9 LBS | OXYGEN SATURATION: 98 % | BODY MASS INDEX: 24.42 KG/M2

## 2019-01-15 DIAGNOSIS — I50.43 ACUTE ON CHRONIC COMBINED SYSTOLIC AND DIASTOLIC CHF (CONGESTIVE HEART FAILURE) (H): ICD-10-CM

## 2019-01-15 DIAGNOSIS — I48.91 ATRIAL FIBRILLATION, UNSPECIFIED TYPE (H): ICD-10-CM

## 2019-01-15 DIAGNOSIS — I48.20 CHRONIC ATRIAL FIBRILLATION (H): ICD-10-CM

## 2019-01-15 DIAGNOSIS — Z79.01 LONG TERM CURRENT USE OF ANTICOAGULANTS WITH INR GOAL OF 2.0-3.0: ICD-10-CM

## 2019-01-15 LAB
ANION GAP SERPL CALCULATED.3IONS-SCNC: 5.9 MMOL/L (ref 6–17)
BUN SERPL-MCNC: 21 MG/DL (ref 7–30)
CALCIUM SERPL-MCNC: 9.2 MG/DL (ref 8.5–10.5)
CHLORIDE SERPL-SCNC: 105 MMOL/L (ref 98–107)
CO2 SERPL-SCNC: 28 MMOL/L (ref 23–29)
CREAT SERPL-MCNC: 1.23 MG/DL (ref 0.7–1.3)
GFR SERPL CREATININE-BSD FRML MDRD: 56 ML/MIN/{1.73_M2}
GLUCOSE SERPL-MCNC: 103 MG/DL (ref 70–105)
INR POINT OF CARE: 2.2 (ref 0.86–1.14)
POTASSIUM SERPL-SCNC: 3.9 MMOL/L (ref 3.5–5.1)
SODIUM SERPL-SCNC: 135 MMOL/L (ref 136–145)

## 2019-01-15 PROCEDURE — 85610 PROTHROMBIN TIME: CPT | Mod: QW | Performed by: INTERNAL MEDICINE

## 2019-01-15 PROCEDURE — 80048 BASIC METABOLIC PNL TOTAL CA: CPT | Performed by: INTERNAL MEDICINE

## 2019-01-15 PROCEDURE — 36416 COLLJ CAPILLARY BLOOD SPEC: CPT | Performed by: INTERNAL MEDICINE

## 2019-01-15 PROCEDURE — 99214 OFFICE O/P EST MOD 30 MIN: CPT | Performed by: PHYSICIAN ASSISTANT

## 2019-01-15 ASSESSMENT — MIFFLIN-ST. JEOR: SCORE: 1405.42

## 2019-01-15 NOTE — LETTER
1/15/2019    Mariusz Shields MD  [address]  [facility] [address]    RE: Jama Paul       Dear Colleague,    I had the pleasure of seeing Jama Smith Humberto in the [facility].    [id number]  HPI and Plan:   See dictation    Orders this Visit:  Orders Placed This Encounter   Procedures     CBC with platelets     Follow-Up with Cardiologist     Holter Monitor 24 hour Adult Pediatric     Echocardiogram Complete     Orders Placed This Encounter   Medications     sacubitril-valsartan (ENTRESTO) 24-26 MG per tablet     Sig: Take 1/2 pill in the morning and a full pill at night.     Dispense:  180 tablet     Refill:  3     Dose adjustment only- DO NOT FILL!     Medications Discontinued During This Encounter   Medication Reason     sacubitril-valsartan (ENTRESTO) 24-26 MG per tablet          Encounter Diagnoses   Name Primary?     Acute on chronic combined systolic and diastolic CHF (congestive heart failure) (H)      Chronic atrial fibrillation (H)        CURRENT MEDICATIONS:  Current Outpatient Medications   Medication Sig Dispense Refill     Ascorbic Acid (VITAMIN C PO) Take 500 mg by mouth daily       CYANOCOBALAMIN PO Take 500 mcg by mouth daily       furosemide (LASIX) 20 MG tablet Take 1 tablet (20 mg) by mouth daily 90 tablet 3     metoprolol succinate (TOPROL-XL) 50 MG 24 hr tablet Take 1 tablet (50 mg) by mouth 2 times daily 180 tablet 3     Multiple Vitamins-Minerals (ICAPS AREDS FORMULA PO) Take 1 tablet by mouth daily Takes in addition to multivitamin with minerals       Nutritional Supplements (ENSURE COMPLETE PO) 1 drink a day       sacubitril-valsartan (ENTRESTO) 24-26 MG per tablet Take 1/2 pill in the morning and a full pill at night. 180 tablet 3     Vitamin D, Cholecalciferol, 1000 UNITS TABS Take 2,000 Units by mouth daily        warfarin (COUMADIN) 5 MG tablet Take 1 tab daily or as directed per INR clinic 100 tablet 1       ALLERGIES   No Known Allergies    PAST  MEDICAL HISTORY:  Past Medical History:   Diagnosis Date     Antiplatelet or antithrombotic long-term use      Arrhythmia      Atrial fibrillation (H)      Elevated PSA      Thrombocytopenia (H)      Unspecified essential hypertension        PAST SURGICAL HISTORY:  Past Surgical History:   Procedure Laterality Date     COLONOSCOPY       EXPLORE GROIN  4/30/2014    Procedure: EXPLORE GROIN;  Surgeon: Sam Aguirre MD;  Location:  OR     HERNIORRHAPHY INGUINAL       HERNIORRHAPHY INGUINAL  4/30/2014    Procedure: HERNIORRHAPHY INGUINAL;  Surgeon: Sam Aguirre MD;  Location:  OR     MOHS MICROGRAPHIC PROCEDURE       PHACOEMULSIFICATION CLEAR CORNEA WITH STANDARD INTRAOCULAR LENS IMPLANT Right 9/8/2015    Procedure: PHACOEMULSIFICATION CLEAR CORNEA WITH STANDARD INTRAOCULAR LENS IMPLANT;  Surgeon: Gli Shannon MD;  Location:  EC     septic olecranon bursitis[         FAMILY HISTORY:  Family History   Problem Relation Age of Onset     Heart Disease No family hx of        SOCIAL HISTORY:  Social History     Socioeconomic History     Marital status:      Spouse name: None     Number of children: None     Years of education: None     Highest education level: None   Social Needs     Financial resource strain: None     Food insecurity - worry: None     Food insecurity - inability: None     Transportation needs - medical: None     Transportation needs - non-medical: None   Occupational History     None   Tobacco Use     Smoking status: Never Smoker     Smokeless tobacco: Never Used   Substance and Sexual Activity     Alcohol use: No     Drug use: No     Sexual activity: None   Other Topics Concern     Parent/sibling w/ CABG, MI or angioplasty before 65F 55M? No   Social History Narrative     None       Review of Systems:  Skin:  Negative     Eyes:  Positive for glasses;cataracts  ENT:  Negative    Respiratory:  Negative    Cardiovascular:    fatigue;Positive for  Gastroenterology: Negative   "  Genitourinary:  Positive for urinary frequency;nocturia  Musculoskeletal:  Negative    Neurologic:  Negative    Psychiatric:  Negative    Heme/Lymph/Imm:  Negative    Endocrine:  Negative      Physical Exam:  Vitals: /84   Pulse 116   Ht 1.74 m (5' 8.5\")   Wt 74.8 kg (164 lb 14.4 oz)   SpO2 98%   BMI 24.71 kg/m      Please refer to dictation for physical exam    Recent Lab Results:  LIPID RESULTS:  Lab Results   Component Value Date    CHOL 127 04/18/2012    HDL 56 04/18/2012    LDL 55 04/18/2012    TRIG 81 04/18/2012    CHOLHDLRATIO 2.3 04/18/2012       LIVER ENZYME RESULTS:  Lab Results   Component Value Date    AST 19 02/28/2018    ALT 32 02/28/2018       CBC RESULTS:  Lab Results   Component Value Date    WBC 6.2 03/01/2018    RBC 3.26 (L) 03/01/2018    HGB 10.4 (L) 03/01/2018    HCT 31.9 (L) 03/01/2018    MCV 98 03/01/2018    MCH 31.9 03/01/2018    MCHC 32.6 03/01/2018    RDW 19.5 (H) 03/01/2018    PLT 46 (LL) 03/01/2018       BMP RESULTS:  Lab Results   Component Value Date     (L) 01/15/2019    POTASSIUM 3.9 01/15/2019    CHLORIDE 105 01/15/2019    CO2 28 01/15/2019    ANIONGAP 5.9 (L) 01/15/2019     01/15/2019    BUN 21 01/15/2019    CR 1.23 01/15/2019    GFRESTIMATED 56 (L) 01/15/2019    GFRESTBLACK 67 01/15/2019    BARON 9.2 01/15/2019        A1C RESULTS:  No results found for: A1C    INR RESULTS:  Lab Results   Component Value Date    INR 2.2 (A) 01/15/2019    INR 2.7 (A) 12/27/2018    INR 6.82 (HH) 07/30/2018    INR 3.29 (H) 03/01/2018           CC  Jazlyn Cr PA-C  [address]  [place] [address]        Thank you for allowing me to participate in the care of your patient.      Sincerely,     Lawanda Carrera PA-C     [facility]    cc:   Jazlyn Cr, JANIS  [address]  [place] [address]        "

## 2019-01-15 NOTE — LETTER
1/15/2019      Mariusz Shields MD  [address] [facility] [address]  [facility] [address]      RE: Jama Paul       Dear Colleague,    I had the pleasure of seeing Jama Paul in the [facility].    Service Date: 01/15/2019      PRIMARY CARDIOLOGIST:  Dr. Pugh.      REASON FOR VISIT:  Heart failure followup.      HISTORY OF PRESENT ILLNESS:  Mr. Paul is a delightful 87-year-old gentleman with past medical history significant for the followin.  Nonischemic cardiomyopathy, likely tachycardic-induced with diagnosis in 2017 and EF of 30%-35%.  With medication management and rate control, improved to actually hyperdynamic at about 65%-70% last year.   2.  Moderate to severe tricuspid regurgitation.   3.  Permanent atrial fibrillation, on a rate control and Coumadin.   4.  Sleep apnea, as patient no longer wearing his CPAP.      He comes in today for routine followup.  He overall has been feeling well with the exception of low blood pressures and poor energy in the morning.  He notes that in the morning about a half hour after he takes his pills, his blood pressure is in the 90s/50s.  He says with this he has poor energy and lacks any motivation.  He denies chest pain, palpitations, syncope or presyncope.  Once he gets up and goes to the gym, he feels better and his blood pressures come up.  Blood pressures are otherwise stable in the 120s, and his pulse is in the 80s-90s at home.  He states he no longer wears his CPAP because there was a time where he felt like he needed it because he could not sleep, and now he says he can sleep just fine and he realized he does not need it anymore.  He has not had another sleep study, and I suspect what he is referring to is actually heart failure symptoms.      SOCIAL HISTORY:  He comes in today with his wife, Paige.  He is a lifelong nonsmoker, no alcohol use.      PHYSICAL EXAMINATION:   GENERAL:  Well-developed, well-nourished, fit-appearing  gentleman in no acute distress.   HEENT:  Normocephalic, atraumatic.  Bilateral hearing aids in place.   HEART:  Irregularly irregular and tachycardic.  I do not appreciate murmur or rub.   LUNGS:  Clear.  Good airflow deep bilateral lobes.   EXTREMITIES:  No peripheral edema.   SKIN:  Warm and dry.      ASSESSMENT AND PLAN:   1.  History of nonischemic cardiomyopathy with EF as low as 30%-35%, recovered to actually hyperdynamic at 65%-70%.  He has been maintained on Entresto, which most likely has helped normalize his EF, although I suspect he is getting a little hypotensive from it as well.  As such, we are going to decrease his Entresto to half a pill in the morning.  He will continue on a full pill at night of 24/26 mg.  The morning dose will be 12/13.  He will also remain on Toprol 50 mg daily and furosemide 20 mg daily.  With this, his renal function has remained stable and normal.   2.  Chronic atrial fibrillation.  Tachycardic today and I suspect just ongoing AFib, unclear reasons.  His pulse is not controlled as it previously has been.  He offers up no illness or other issue.  I did ask him to wear a 24-hour monitor Holter, and he is agreeable to this.  If it is not controlled, would assess for anemia and thyroid and then adjust medications.   3.  Sleep apnea, untreated.  I did discuss with him that this is likely a chronic illness, and he just feels well because we have treated his heart failure, though he is fairly resistant to this.  He does not want a sleep study whatsoever, and he does not want to wear his mask, and to be noted he politely declines both of these.  That being said, we will focus more on his heart rate at this time and when he is willing would get him back for a sleep study.      He is overall doing well.  He will get a complete echocardiogram, BMP and hemoglobin when he follows up with Dr. Pugh in April.  He certainly can call and will address issues sooner if there is concern.       Thank you for allowing me to participate in this delightful gentleman's care.      Lawanda Carrera PA-C      cc:   Mariusz Shields MD    [address]    [place]  [address]         LAWANDA CARRERA PA-C             D: 01/15/2019   T: 01/15/2019   MT: MAG      Name:     YINKA GONZALEZ   MRN:      [id number]        Account:      [id number]   :      1931           Service Date: 01/15/2019      Document: [id number]         Outpatient Encounter Medications as of 1/15/2019   Medication Sig Dispense Refill     Ascorbic Acid (VITAMIN C PO) Take 500 mg by mouth daily       CYANOCOBALAMIN PO Take 500 mcg by mouth daily       furosemide (LASIX) 20 MG tablet Take 1 tablet (20 mg) by mouth daily 90 tablet 3     metoprolol succinate (TOPROL-XL) 50 MG 24 hr tablet Take 1 tablet (50 mg) by mouth 2 times daily 180 tablet 3     Multiple Vitamins-Minerals (ICAPS AREDS FORMULA PO) Take 1 tablet by mouth daily Takes in addition to multivitamin with minerals       Nutritional Supplements (ENSURE COMPLETE PO) 1 drink a day       sacubitril-valsartan (ENTRESTO) 24-26 MG per tablet Take 1/2 pill in the morning and a full pill at night. 180 tablet 3     Vitamin D, Cholecalciferol, 1000 UNITS TABS Take 2,000 Units by mouth daily        warfarin (COUMADIN) 5 MG tablet Take 1 tab daily or as directed per INR clinic 100 tablet 1     [DISCONTINUED] sacubitril-valsartan (ENTRESTO) 24-26 MG per tablet Take 1 tablet by mouth 2 times daily 180 tablet 3     No facility-administered encounter medications on file as of 1/15/2019.        Again, thank you for allowing me to participate in the care of your patient.      Sincerely,    Lawanda Carrera PA-C     [facility]

## 2019-01-15 NOTE — PROGRESS NOTES
ANTICOAGULATION FOLLOW-UP CLINIC VISIT    Patient Name:  Jama Paul  Date:  1/15/2019  Contact Type:  Face to Face    SUBJECTIVE:     Patient Findings     Positives:   No Problem Findings           OBJECTIVE    INR Protime   Date Value Ref Range Status   01/15/2019 2.2 (A) 0.86 - 1.14 Final       ASSESSMENT / PLAN  INR assessment THER    Recheck INR In: 4 WEEKS    INR Location Clinic      Anticoagulation Summary  As of 1/15/2019    INR goal:   2.0-3.0   TTR:   59.9 % (7.3 mo)   INR used for dosin.2 (1/15/2019)   Warfarin maintenance plan:   5 mg (5 mg x 1) every day   Full warfarin instructions:   5 mg every day   Weekly warfarin total:   35 mg   No change documented:   Katherin Pastor RN   Plan last modified:   Mirela Moreno RN (2018)   Next INR check:   2019   Target end date:   Indefinite    Indications    Atrial fibrillation (H) [I48.91]  Long term current use of anticoagulants with INR goal of 2.0-3.0 [Z79.01]             Anticoagulation Episode Summary     INR check location:       Preferred lab:       Send INR reminders to:   [facility] HEART INR NURSE    Comments:         Anticoagulation Care Providers     Provider Role Specialty Phone number    Long Pugh MD Responsible Cardiology [phone]            See the Encounter Report to view Anticoagulation Flowsheet and Dosing Calendar (Go to Encounters tab in chart review, and find the Anticoagulation Therapy Visit)    INR 2.2 No change in meds or diet (still drinks 1 can of Ensure daily). No abnormal bleeding or bruising. Will continue current dosing of 5 mg daily and recheck in 4 weeks.    Katherin Pasotr RN

## 2019-01-15 NOTE — PATIENT INSTRUCTIONS
Call [facility] nurse for any questions or concerns:  [phone]   *If you have concerns after hours, please call [phone], option 2 to speak with on call Cardiologist.    1. Medication changes from today:  To help your blood pressure be higher in morning, decrease your Entresto to 1/2 pill in the morning.  Continue a full pill at bedtime.    Continue other medications.       2. Weigh yourself daily and write it down.     3. Call CORE nurse if your weight is up more than 2 pounds in one day or 5 pounds in one week.     4. Call CORE nurse if you feel more short of breath, have more abdominal bloating, or leg swelling.     5. Continue low sodium diet (less than 2000 mg daily). If you eat less salt, you will retain less fluid.     6. Alcohol can weaken your heart further. You should avoid alcohol or limit its use to special times, such as a holiday or birthday.      7. Do NOT take Aleve or ibuprofen without talking to your doctor first.      8. Lab Results: labs look great.       Component      Latest Ref Rng & Units 10/16/2018 1/15/2019   Sodium      136 - 145 mmol/L 137 135 (L)   Potassium      3.5 - 5.1 mmol/L 4.3 3.9   Chloride      98 - 107 mmol/L 103 105   Carbon Dioxide      23 - 29 mmol/L 27 28   Anion Gap      6 - 17 mmol/L 11.3 5.9 (L)   Glucose      70 - 105 mg/dL 99 103   Urea Nitrogen      7 - 30 mg/dL 22 21   Creatinine      0.70 - 1.30 mg/dL 1.24 1.23   GFR Estimate      >60 mL/min/1.73:m2 55 (L) 56 (L)   GFR Estimate If Black      >60 mL/min/1.73:m2 67 67   Calcium      8.5 - 10.5 mg/dL 8.7 9.2     [facility]: Cardiomyopathy, Optimization, Rehabilitation, Education  The [facility] is a heart failure specialty clinic within the [facility] where you will work with specialized nurse practitioners, physician assistants, doctors, and registered nurses. They are dedicated to helping patients with heart failure to carefully adjust medications, receive education, and learn who and when to call if  symptoms develop. They specialize in helping you better understand your condition, slow the progression of your disease, improve the length and quality of your life, help you detect future heart problems before they become life threatening, and avoid hospitalizations.

## 2019-01-15 NOTE — PROGRESS NOTES
[id number]  HPI and Plan:   See dictation    Orders this Visit:  Orders Placed This Encounter   Procedures     CBC with platelets     Follow-Up with Cardiologist     Holter Monitor 24 hour Adult Pediatric     Echocardiogram Complete     Orders Placed This Encounter   Medications     sacubitril-valsartan (ENTRESTO) 24-26 MG per tablet     Sig: Take 1/2 pill in the morning and a full pill at night.     Dispense:  180 tablet     Refill:  3     Dose adjustment only- DO NOT FILL!     Medications Discontinued During This Encounter   Medication Reason     sacubitril-valsartan (ENTRESTO) 24-26 MG per tablet          Encounter Diagnoses   Name Primary?     Acute on chronic combined systolic and diastolic CHF (congestive heart failure) (H)      Chronic atrial fibrillation (H)        CURRENT MEDICATIONS:  Current Outpatient Medications   Medication Sig Dispense Refill     Ascorbic Acid (VITAMIN C PO) Take 500 mg by mouth daily       CYANOCOBALAMIN PO Take 500 mcg by mouth daily       furosemide (LASIX) 20 MG tablet Take 1 tablet (20 mg) by mouth daily 90 tablet 3     metoprolol succinate (TOPROL-XL) 50 MG 24 hr tablet Take 1 tablet (50 mg) by mouth 2 times daily 180 tablet 3     Multiple Vitamins-Minerals (ICAPS AREDS FORMULA PO) Take 1 tablet by mouth daily Takes in addition to multivitamin with minerals       Nutritional Supplements (ENSURE COMPLETE PO) 1 drink a day       sacubitril-valsartan (ENTRESTO) 24-26 MG per tablet Take 1/2 pill in the morning and a full pill at night. 180 tablet 3     Vitamin D, Cholecalciferol, 1000 UNITS TABS Take 2,000 Units by mouth daily        warfarin (COUMADIN) 5 MG tablet Take 1 tab daily or as directed per INR clinic 100 tablet 1       ALLERGIES   No Known Allergies    PAST MEDICAL HISTORY:  Past Medical History:   Diagnosis Date     Antiplatelet or antithrombotic long-term use      Arrhythmia      Atrial fibrillation (H)      Elevated PSA      Thrombocytopenia (H)      Unspecified essential  hypertension        PAST SURGICAL HISTORY:  Past Surgical History:   Procedure Laterality Date     COLONOSCOPY       EXPLORE GROIN  4/30/2014    Procedure: EXPLORE GROIN;  Surgeon: Sam Aguirre MD;  Location:  OR     HERNIORRHAPHY INGUINAL       HERNIORRHAPHY INGUINAL  4/30/2014    Procedure: HERNIORRHAPHY INGUINAL;  Surgeon: Sam Aguirre MD;  Location:  OR     MOHS MICROGRAPHIC PROCEDURE       PHACOEMULSIFICATION CLEAR CORNEA WITH STANDARD INTRAOCULAR LENS IMPLANT Right 9/8/2015    Procedure: PHACOEMULSIFICATION CLEAR CORNEA WITH STANDARD INTRAOCULAR LENS IMPLANT;  Surgeon: Gil Shannon MD;  Location:  EC     septic olecranon bursitis[         FAMILY HISTORY:  Family History   Problem Relation Age of Onset     Heart Disease No family hx of        SOCIAL HISTORY:  Social History     Socioeconomic History     Marital status:      Spouse name: None     Number of children: None     Years of education: None     Highest education level: None   Social Needs     Financial resource strain: None     Food insecurity - worry: None     Food insecurity - inability: None     Transportation needs - medical: None     Transportation needs - non-medical: None   Occupational History     None   Tobacco Use     Smoking status: Never Smoker     Smokeless tobacco: Never Used   Substance and Sexual Activity     Alcohol use: No     Drug use: No     Sexual activity: None   Other Topics Concern     Parent/sibling w/ CABG, MI or angioplasty before 65F 55M? No   Social History Narrative     None       Review of Systems:  Skin:  Negative     Eyes:  Positive for glasses;cataracts  ENT:  Negative    Respiratory:  Negative    Cardiovascular:    fatigue;Positive for  Gastroenterology: Negative    Genitourinary:  Positive for urinary frequency;nocturia  Musculoskeletal:  Negative    Neurologic:  Negative    Psychiatric:  Negative    Heme/Lymph/Imm:  Negative    Endocrine:  Negative      Physical Exam:  Vitals: /84   " Pulse 116   Ht 1.74 m (5' 8.5\")   Wt 74.8 kg (164 lb 14.4 oz)   SpO2 98%   BMI 24.71 kg/m     Please refer to dictation for physical exam    Recent Lab Results:  LIPID RESULTS:  Lab Results   Component Value Date    CHOL 127 04/18/2012    HDL 56 04/18/2012    LDL 55 04/18/2012    TRIG 81 04/18/2012    CHOLHDLRATIO 2.3 04/18/2012       LIVER ENZYME RESULTS:  Lab Results   Component Value Date    AST 19 02/28/2018    ALT 32 02/28/2018       CBC RESULTS:  Lab Results   Component Value Date    WBC 6.2 03/01/2018    RBC 3.26 (L) 03/01/2018    HGB 10.4 (L) 03/01/2018    HCT 31.9 (L) 03/01/2018    MCV 98 03/01/2018    MCH 31.9 03/01/2018    MCHC 32.6 03/01/2018    RDW 19.5 (H) 03/01/2018    PLT 46 (LL) 03/01/2018       BMP RESULTS:  Lab Results   Component Value Date     (L) 01/15/2019    POTASSIUM 3.9 01/15/2019    CHLORIDE 105 01/15/2019    CO2 28 01/15/2019    ANIONGAP 5.9 (L) 01/15/2019     01/15/2019    BUN 21 01/15/2019    CR 1.23 01/15/2019    GFRESTIMATED 56 (L) 01/15/2019    GFRESTBLACK 67 01/15/2019    BARON 9.2 01/15/2019        A1C RESULTS:  No results found for: A1C    INR RESULTS:  Lab Results   Component Value Date    INR 2.2 (A) 01/15/2019    INR 2.7 (A) 12/27/2018    INR 6.82 (HH) 07/30/2018    INR 3.29 (H) 03/01/2018           CC  Jazlyn Cr PA-C  [id number]  [id number]  [place] [id number]      "

## 2019-01-16 ENCOUNTER — HOSPITAL ENCOUNTER (OUTPATIENT)
Dept: CARDIOLOGY | Facility: CLINIC | Age: 84
Discharge: HOME | End: 2019-01-16
Attending: PHYSICIAN ASSISTANT | Admitting: PHYSICIAN ASSISTANT
Payer: MEDICARE

## 2019-01-16 ENCOUNTER — CARE COORDINATION (OUTPATIENT)
Dept: CARDIOLOGY | Facility: CLINIC | Age: 84
End: 2019-01-16

## 2019-01-16 DIAGNOSIS — I48.20 CHRONIC ATRIAL FIBRILLATION (H): ICD-10-CM

## 2019-01-16 PROCEDURE — 93225 XTRNL ECG REC<48 HRS REC: CPT

## 2019-01-16 PROCEDURE — 93227 XTRNL ECG REC<48 HR R&I: CPT | Performed by: INTERNAL MEDICINE

## 2019-01-16 NOTE — PROGRESS NOTES
Incoming call from Rob w/ [facility] ([phone]) requesting clarification of entresto rx as package insert does not speak to possibility of splitting med in half.     Pt seen yesterday by Lawanda Carrera PAC and entresto dosing decreased from 24/26mg BID to 12/13mg QAM and 24/26mg QPM d/t soft BP.     Called and LVM for Rob at [facility] stating that above dosing is what was intended, and that rx does not need to be filled at this time. Left RN number if questions.    Elisabeth Diaz RN BSN   9:11 AM 01/16/19

## 2019-01-16 NOTE — PROGRESS NOTES
Service Date: 01/15/2019      PRIMARY CARDIOLOGIST:  Dr. Pugh.      REASON FOR VISIT:  Heart failure followup.      HISTORY OF PRESENT ILLNESS:  Mr. Paul is a delightful 87-year-old gentleman with past medical history significant for the followin.  Nonischemic cardiomyopathy, likely tachycardic-induced with diagnosis in 2017 and EF of 30%-35%.  With medication management and rate control, improved to actually hyperdynamic at about 65%-70% last year.   2.  Moderate to severe tricuspid regurgitation.   3.  Permanent atrial fibrillation, on a rate control and Coumadin.   4.  Sleep apnea, as patient no longer wearing his CPAP.      He comes in today for routine followup.  He overall has been feeling well with the exception of low blood pressures and poor energy in the morning.  He notes that in the morning about a half hour after he takes his pills, his blood pressure is in the 90s/50s.  He says with this he has poor energy and lacks any motivation.  He denies chest pain, palpitations, syncope or presyncope.  Once he gets up and goes to the gym, he feels better and his blood pressures come up.  Blood pressures are otherwise stable in the 120s, and his pulse is in the 80s-90s at home.  He states he no longer wears his CPAP because there was a time where he felt like he needed it because he could not sleep, and now he says he can sleep just fine and he realized he does not need it anymore.  He has not had another sleep study, and I suspect what he is referring to is actually heart failure symptoms.      SOCIAL HISTORY:  He comes in today with his wife, Paige.  He is a lifelong nonsmoker, no alcohol use.      PHYSICAL EXAMINATION:   GENERAL:  Well-developed, well-nourished, fit-appearing gentleman in no acute distress.   HEENT:  Normocephalic, atraumatic.  Bilateral hearing aids in place.   HEART:  Irregularly irregular and tachycardic.  I do not appreciate murmur or rub.   LUNGS:  Clear.  Good airflow deep  bilateral lobes.   EXTREMITIES:  No peripheral edema.   SKIN:  Warm and dry.      ASSESSMENT AND PLAN:   1.  History of nonischemic cardiomyopathy with EF as low as 30%-35%, recovered to actually hyperdynamic at 65%-70%.  He has been maintained on Entresto, which most likely has helped normalize his EF, although I suspect he is getting a little hypotensive from it as well.  As such, we are going to decrease his Entresto to half a pill in the morning.  He will continue on a full pill at night of 24/26 mg.  The morning dose will be 12/13.  He will also remain on Toprol 50 mg daily and furosemide 20 mg daily.  With this, his renal function has remained stable and normal.   2.  Chronic atrial fibrillation.  Tachycardic today and I suspect just ongoing AFib, unclear reasons.  His pulse is not controlled as it previously has been.  He offers up no illness or other issue.  I did ask him to wear a 24-hour monitor Holter, and he is agreeable to this.  If it is not controlled, would assess for anemia and thyroid and then adjust medications.   3.  Sleep apnea, untreated.  I did discuss with him that this is likely a chronic illness, and he just feels well because we have treated his heart failure, though he is fairly resistant to this.  He does not want a sleep study whatsoever, and he does not want to wear his mask, and to be noted he politely declines both of these.  That being said, we will focus more on his heart rate at this time and when he is willing would get him back for a sleep study.      He is overall doing well.  He will get a complete echocardiogram, BMP and hemoglobin when he follows up with Dr. Pugh in April.  He certainly can call and will address issues sooner if there is concern.      Thank you for allowing me to participate in this delightful gentleman's care.      Lawanda Carrera PA-C      cc:   Mariusz Shields MD    [address]    [place]  [address]         LAWANDA CADE  REGULO MIGUEL PA-C             D: 01/15/2019   T: 01/15/2019   MT: MAG      Name:     YINKA GONZALEZ   MRN:      [id number]        Account:      [id number]   :      1931           Service Date: 01/15/2019      Document: [id number]

## 2019-01-22 ENCOUNTER — CARE COORDINATION (OUTPATIENT)
Dept: LAB | Facility: CLINIC | Age: 84
End: 2019-01-22

## 2019-01-22 DIAGNOSIS — I50.9 CHF (CONGESTIVE HEART FAILURE) (H): Primary | ICD-10-CM

## 2019-01-22 RX ORDER — METOPROLOL SUCCINATE 50 MG/1
TABLET, EXTENDED RELEASE ORAL
Qty: 270 TABLET | Refills: 3 | Status: SHIPPED | OUTPATIENT
Start: 2019-01-22 | End: 2019-10-30

## 2019-01-22 NOTE — PROGRESS NOTES
Call to patient w/Holter results and recommendations per [place]:  Average HR was 89 bpm. He had a minimum HR of 55 bpm at 07:20:43 (17-Jan) and a maximum HR of 160 bp.  No pauses.  Given high of 160 I think he's running a bit high overall.  Pls have him increase toprol to 100 mg in the morning, ok to continue 50 mg at hs.  Also pls check a cbc and tsh reflex t4 in the next couple of weeks.  Can be done at Dr. Anaya office.  Pls for f/u with Dr. Pugh in April.  Lawanda Carrera PA-C 1/22/2019 8:54 AM     Patient verbalized understanding and confirmed increased Toprol dose to 100mg am, 50mg pm. Lab scheduled for Tuesday 2/5 @1100 - order entered and med list updated. Patient had no questions.  Brenda Palacios RN on 1/22/2019 at 9:47 AM

## 2019-01-25 ENCOUNTER — TELEPHONE (OUTPATIENT)
Dept: CARDIOLOGY | Facility: CLINIC | Age: 84
End: 2019-01-25

## 2019-01-25 ENCOUNTER — ANTICOAGULATION THERAPY VISIT (OUTPATIENT)
Dept: CARDIOLOGY | Facility: CLINIC | Age: 84
End: 2019-01-25
Payer: MEDICARE

## 2019-01-25 DIAGNOSIS — Z79.01 LONG TERM CURRENT USE OF ANTICOAGULANTS WITH INR GOAL OF 2.0-3.0: ICD-10-CM

## 2019-01-25 DIAGNOSIS — I48.91 ATRIAL FIBRILLATION, UNSPECIFIED TYPE (H): ICD-10-CM

## 2019-01-25 LAB — INR POINT OF CARE: 2.6 (ref 0.86–1.14)

## 2019-01-25 PROCEDURE — 36416 COLLJ CAPILLARY BLOOD SPEC: CPT | Performed by: INTERNAL MEDICINE

## 2019-01-25 PROCEDURE — 85610 PROTHROMBIN TIME: CPT | Mod: QW | Performed by: INTERNAL MEDICINE

## 2019-01-25 NOTE — TELEPHONE ENCOUNTER
Pt called to report that he had left eye vitreous bleeding yesterday -- was seen by opthamologist and had an injection into his eye. Pt requested an INR appt today - scheduled INR appt at 11:40 am. Keren

## 2019-01-25 NOTE — PROGRESS NOTES
ANTICOAGULATION FOLLOW-UP CLINIC VISIT    Patient Name:  Jama Paul  Date:  2019  Contact Type:  Face to Face    SUBJECTIVE:     Patient Findings     Positives:   Other complaints (Spontaneous bleeding left sclera)           OBJECTIVE    INR Protime   Date Value Ref Range Status   2019 2.6 (A) 0.86 - 1.14 Final       ASSESSMENT / PLAN  INR assessment THER    Recheck INR In: 2 WEEKS    INR Location Clinic      Anticoagulation Summary  As of 2019    INR goal:   2.0-3.0   TTR:   61.6 % (7.6 mo)   INR used for dosin.6 (2019)   Warfarin maintenance plan:   5 mg (5 mg x 1) every day   Full warfarin instructions:   5 mg every day   Weekly warfarin total:   35 mg   No change documented:   Karla Fang RN   Plan last modified:   Mirela Moreno RN (2018)   Next INR check:   2019   Priority:   INR   Target end date:   Indefinite    Indications    Atrial fibrillation (H) [I48.91]  Long term current use of anticoagulants with INR goal of 2.0-3.0 [Z79.01]             Anticoagulation Episode Summary     INR check location:       Preferred lab:       Send INR reminders to:   [facility] HEART INR NURSE    Comments:         Anticoagulation Care Providers     Provider Role Specialty Phone number    Long Pugh MD Responsible Cardiology [phone]            See the Encounter Report to view Anticoagulation Flowsheet and Dosing Calendar (Go to Encounters tab in chart review, and find the Anticoagulation Therapy Visit)    INR 2.6 today. Pt here for INR check secondary to spontaneous left scleral hemorrhage. Opthomalogist suggested INR check. Pt has a few 1-2 inch diameter bruises to right forearm, otherwise denies any abnormal bleeding, bruising, black or bloody stools, nosebleeds. Denies recent medication changes, illness or dietary changes. Does not eat greens, but drinks one can of Ensure daily as before. Continue current dose of warfarin at 5 mg daily. Next INR on 19 as  previously scheduled. No further questions and verbalized understanding.    Karla Fang RN

## 2019-01-31 ENCOUNTER — CARE COORDINATION (OUTPATIENT)
Dept: CARDIOLOGY | Facility: CLINIC | Age: 84
End: 2019-01-31

## 2019-01-31 NOTE — PROGRESS NOTES
"Received VM from pt on 1/30/19 with FYI. Pt said he had a left eye hemorrhage. He said he was stable and call was just an FYI. He has been evaluated by an ophthalmologist and they are just going to monitor for now. Pt feeling fine, though it has affected his vision.     Reviewed chart, pt seen in \"INR clinic on 1/25 per request of ophthalmologist  secondary to spontaneous left scleral hemorrhage. INR was 2.6, pt continued on his current dose of Warfarin and recommended to keep next INR check as scheduled on 2/12/19.     Pt last seen in clinic on 1/15/19 by RHIANNA Ríos. Pt overall doing well, but a little hypotensive. Entresto decreased to 24/26mg tablet, 1/2 tablet in am and 1 full tablet in pm. Holter monitor also ordered due to chronic Afib and tachycardic when in clinic 1/15. Holter monitor placed 1/16/19. See telephone note 1/22/19, Holter showed average HR 89 bpm and max  bpm. Per RHIANNA Ríos- Metoprolol increased to 100mg in am and 50mg in pm, as well as CBC and TSH ordered for next few weeks, which is scheduled on 2/5/19. Orders in for Echo/BMP/Hgb and follow up with Dr. Pugh in April.     FYI sent to RHIANNA Ríos regarding left eye hemorrhage. MATTY Scales 4:31 PM 01/31/19           "

## 2019-02-04 NOTE — PROGRESS NOTES
I'm sorry to hear about his hemorraghe- hopefully it will not recur.  Update appreciated.   Lawanda Carrera PA-C 2/4/2019 1:11 PM

## 2019-02-05 DIAGNOSIS — I50.9 CHF (CONGESTIVE HEART FAILURE) (H): ICD-10-CM

## 2019-02-05 LAB
ERYTHROCYTE [DISTWIDTH] IN BLOOD BY AUTOMATED COUNT: 18.9 % (ref 10–15)
HCT VFR BLD AUTO: 34.9 % (ref 40–53)
HGB BLD-MCNC: 11.3 G/DL (ref 13.3–17.7)
MCH RBC QN AUTO: 30.9 PG (ref 26.5–33)
MCHC RBC AUTO-ENTMCNC: 32.4 G/DL (ref 31.5–36.5)
MCV RBC AUTO: 95 FL (ref 78–100)
PLATELET # BLD AUTO: 37 10E9/L (ref 150–450)
RBC # BLD AUTO: 3.66 10E12/L (ref 4.4–5.9)
TSH SERPL DL<=0.005 MIU/L-ACNC: 0.99 MU/L (ref 0.4–4)
WBC # BLD AUTO: 6.1 10E9/L (ref 4–11)

## 2019-02-05 PROCEDURE — 36415 COLL VENOUS BLD VENIPUNCTURE: CPT | Performed by: PHYSICIAN ASSISTANT

## 2019-02-05 PROCEDURE — 84443 ASSAY THYROID STIM HORMONE: CPT | Performed by: PHYSICIAN ASSISTANT

## 2019-02-05 PROCEDURE — 85027 COMPLETE CBC AUTOMATED: CPT | Performed by: PHYSICIAN ASSISTANT

## 2019-02-05 NOTE — PROGRESS NOTES
Mild anemia with hgb 11.3 better than previous.  TSH normal, not the cause of afib.    BUT platelets markedly low at 37- apparently were last year when drawn by PCP.  Pls make sure pcp sees and clinic visit is arranged with him or hem per pcp preference.  Lawanda Carrera PA-C 2/5/2019 3:59 PM

## 2019-02-05 NOTE — PROGRESS NOTES
CBC and TSH results drawn today per RHIANNA Ríos received. Notification from lab with critical platelet count.     Next visit 4/11/19 for repeat CBC and Echo, and then visit with Dr. Pugh 4/16/19. Messaged results to RHIANNA Ríos for review. MATTY Scales 12:20 PM 02/05/19       Component      Latest Ref Rng & Units 3/1/2018 2/5/2019   WBC      4.0 - 11.0 10e9/L 6.2 6.1   RBC Count      4.4 - 5.9 10e12/L 3.26 (L) 3.66 (L)   Hemoglobin      13.3 - 17.7 g/dL 10.4 (L) 11.3 (L)   Hematocrit      40.0 - 53.0 % 31.9 (L) 34.9 (L)   MCV      78 - 100 fl 98 95   MCH      26.5 - 33.0 pg 31.9 30.9   MCHC      31.5 - 36.5 g/dL 32.6 32.4   RDW      10.0 - 15.0 % 19.5 (H) 18.9 (H)   Platelet Count      150 - 450 10e9/L 46 (LL) 37 (LL)   TSH      0.40 - 4.00 mU/L  0.99

## 2019-02-06 ENCOUNTER — CARE COORDINATION (OUTPATIENT)
Dept: CARDIOLOGY | Facility: CLINIC | Age: 84
End: 2019-02-06

## 2019-02-06 NOTE — PROGRESS NOTES
Received call from pt who requested to know what his kidney function labs results from 2/5/19 were.  Reviewed with pt that kidney function was not checked on 2/5, was last checked on 1/15/19.  Discussed that CBC and TSH were the labs checked 2/5/19 and [place] had recommended pt follow up with PMD regarding low platelet count.  Pt states his platelets have been low for years and PMD is already aware but will check with PMD regarding plan.     MATTY Hicks 11:01 AM 2/6/2019

## 2019-02-08 ENCOUNTER — TELEPHONE (OUTPATIENT)
Dept: ONCOLOGY | Facility: CLINIC | Age: 84
End: 2019-02-08

## 2019-02-08 ENCOUNTER — OFFICE VISIT (OUTPATIENT)
Dept: FAMILY MEDICINE | Facility: CLINIC | Age: 84
End: 2019-02-08
Payer: MEDICARE

## 2019-02-08 VITALS
HEART RATE: 105 BPM | WEIGHT: 160 LBS | HEIGHT: 69 IN | SYSTOLIC BLOOD PRESSURE: 127 MMHG | BODY MASS INDEX: 23.7 KG/M2 | OXYGEN SATURATION: 100 % | TEMPERATURE: 97.6 F | DIASTOLIC BLOOD PRESSURE: 73 MMHG

## 2019-02-08 DIAGNOSIS — I42.8 NON-ISCHEMIC CARDIOMYOPATHY (H): ICD-10-CM

## 2019-02-08 DIAGNOSIS — H44.812 EYE HEMORRHAGE, LEFT: ICD-10-CM

## 2019-02-08 DIAGNOSIS — I10 ESSENTIAL HYPERTENSION: ICD-10-CM

## 2019-02-08 DIAGNOSIS — D69.6 THROMBOCYTOPENIA (H): ICD-10-CM

## 2019-02-08 DIAGNOSIS — I50.43 ACUTE ON CHRONIC COMBINED SYSTOLIC AND DIASTOLIC HRT FAIL (H): ICD-10-CM

## 2019-02-08 DIAGNOSIS — I48.91 ATRIAL FIBRILLATION, UNSPECIFIED TYPE (H): Primary | ICD-10-CM

## 2019-02-08 PROBLEM — R53.1 GENERALIZED WEAKNESS: Status: RESOLVED | Noted: 2017-12-07 | Resolved: 2019-02-08

## 2019-02-08 PROBLEM — Z79.01 LONG TERM CURRENT USE OF ANTICOAGULANTS WITH INR GOAL OF 2.0-3.0: Status: RESOLVED | Noted: 2018-10-16 | Resolved: 2019-02-08

## 2019-02-08 PROBLEM — I50.9 CHF (CONGESTIVE HEART FAILURE) (H): Status: RESOLVED | Noted: 2018-02-28 | Resolved: 2019-02-08

## 2019-02-08 PROBLEM — R53.1 WEAKNESS: Status: RESOLVED | Noted: 2017-11-17 | Resolved: 2019-02-08

## 2019-02-08 PROBLEM — Z79.01 LONG-TERM (CURRENT) USE OF ANTICOAGULANTS: Status: RESOLVED | Noted: 2018-05-31 | Resolved: 2019-02-08

## 2019-02-08 LAB
BASOPHILS # BLD AUTO: 0 10E9/L (ref 0–0.2)
BASOPHILS NFR BLD AUTO: 0.3 %
DIFFERENTIAL METHOD BLD: ABNORMAL
EOSINOPHIL # BLD AUTO: 0 10E9/L (ref 0–0.7)
EOSINOPHIL NFR BLD AUTO: 0.1 %
ERYTHROCYTE [DISTWIDTH] IN BLOOD BY AUTOMATED COUNT: 19 % (ref 10–15)
FOLATE SERPL-MCNC: 23.5 NG/ML
HCT VFR BLD AUTO: 33.6 % (ref 40–53)
HGB BLD-MCNC: 10.7 G/DL (ref 13.3–17.7)
LYMPHOCYTES # BLD AUTO: 0.4 10E9/L (ref 0.8–5.3)
LYMPHOCYTES NFR BLD AUTO: 5.9 %
MCH RBC QN AUTO: 31.3 PG (ref 26.5–33)
MCHC RBC AUTO-ENTMCNC: 31.8 G/DL (ref 31.5–36.5)
MCV RBC AUTO: 98 FL (ref 78–100)
MONOCYTES # BLD AUTO: 0.7 10E9/L (ref 0–1.3)
MONOCYTES NFR BLD AUTO: 10.5 %
NEUTROPHILS # BLD AUTO: 5.7 10E9/L (ref 1.6–8.3)
NEUTROPHILS NFR BLD AUTO: 83.2 %
PLATELET # BLD AUTO: 29 10E9/L (ref 150–450)
RBC # BLD AUTO: 3.42 10E12/L (ref 4.4–5.9)
VIT B12 SERPL-MCNC: 320 PG/ML (ref 193–986)
WBC # BLD AUTO: 6.8 10E9/L (ref 4–11)

## 2019-02-08 PROCEDURE — 86803 HEPATITIS C AB TEST: CPT | Performed by: INTERNAL MEDICINE

## 2019-02-08 PROCEDURE — 82746 ASSAY OF FOLIC ACID SERUM: CPT | Performed by: INTERNAL MEDICINE

## 2019-02-08 PROCEDURE — 82607 VITAMIN B-12: CPT | Performed by: INTERNAL MEDICINE

## 2019-02-08 PROCEDURE — 87389 HIV-1 AG W/HIV-1&-2 AB AG IA: CPT | Performed by: INTERNAL MEDICINE

## 2019-02-08 PROCEDURE — 85025 COMPLETE CBC W/AUTO DIFF WBC: CPT | Performed by: INTERNAL MEDICINE

## 2019-02-08 PROCEDURE — 83550 IRON BINDING TEST: CPT | Performed by: INTERNAL MEDICINE

## 2019-02-08 PROCEDURE — 99204 OFFICE O/P NEW MOD 45 MIN: CPT | Performed by: INTERNAL MEDICINE

## 2019-02-08 PROCEDURE — 00000402 ZZHCL STATISTIC TOTAL PROTEIN: Performed by: INTERNAL MEDICINE

## 2019-02-08 PROCEDURE — 84165 PROTEIN E-PHORESIS SERUM: CPT | Performed by: INTERNAL MEDICINE

## 2019-02-08 PROCEDURE — 82728 ASSAY OF FERRITIN: CPT | Performed by: INTERNAL MEDICINE

## 2019-02-08 PROCEDURE — 83540 ASSAY OF IRON: CPT | Performed by: INTERNAL MEDICINE

## 2019-02-08 PROCEDURE — 80053 COMPREHEN METABOLIC PANEL: CPT | Performed by: INTERNAL MEDICINE

## 2019-02-08 PROCEDURE — 36415 COLL VENOUS BLD VENIPUNCTURE: CPT | Performed by: INTERNAL MEDICINE

## 2019-02-08 ASSESSMENT — MIFFLIN-ST. JEOR: SCORE: 1383.2

## 2019-02-08 NOTE — TELEPHONE ENCOUNTER
Received a call from Dr. Shields to reach out to pt and scheduled him asap for Dx thrombocytopenia. LVM for pt to cb and schedule at [phone].

## 2019-02-08 NOTE — PATIENT INSTRUCTIONS
Stop the coumadin, do not take any aspirin or nsaids    Let's have you see the hematologists asap    Call if any problems    Mariusz Shields M.D.

## 2019-02-08 NOTE — PROGRESS NOTES
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  His prior internist, Dr. Clint Pizarro, retired.  The patient himself has been retired for a number of years.  He does have regular cardiology follow-up for nonischemic cardiomyopathy and I reviewed those notes.  With respect to that it was found in December 2017 with an EF of 30-35%.  He was treated medically and hospitalized in 2018 but since then his ef has normalized.  He did have a cardiac mri stress test and no ischemia was found.  He has valvular issues as noted.    The patient has low platelet for years and has not seen hematology before.  Most recently, about a week ago he did have spontaneously bleeding into his left eye.  He does not use asa or nsaids and has regular inr's done.    The patient otherwise feels quite well.  He is very active.  He has no other complaints and review of systems.    The patient does not fall but does use walking sticks.  He lives independently with his wife.  [place] are in [place].    A complete review of systems is otherwise negative.               Past Medical History:      Past Medical History:   Diagnosis Date     Atrial fibrillation (H) 2011     Elevated PSA      Non-ischemic cardiomyopathy (H)     2017     Thrombocytopenia (H)      Unspecified essential hypertension              Past Surgical History:      Past Surgical History:   Procedure Laterality Date     CARPAL TUNNEL RELEASE RT/LT  2014     EXPLORE GROIN  4/30/2014    Procedure: EXPLORE GROIN;  Surgeon: Sam Aguirre MD;  Location:  OR     HERNIORRHAPHY INGUINAL  4/30/2014    Procedure: HERNIORRHAPHY INGUINAL;  Surgeon: Sam Aguirre MD;  Location:  OR     MOHS MICROGRAPHIC PROCEDURE       PHACOEMULSIFICATION CLEAR CORNEA WITH STANDARD INTRAOCULAR LENS IMPLANT Right 9/8/2015    Procedure: PHACOEMULSIFICATION CLEAR CORNEA WITH STANDARD INTRAOCULAR LENS IMPLANT;  Surgeon: Gil Shannon MD;   Location:  EC     septic olecranon bursitis[               Social History:     Social History     Socioeconomic History     Marital status:      Spouse name: Not on file     Number of children: 3     Years of education: Not on file     Highest education level: Not on file   Social Needs     Financial resource strain: Not on file     Food insecurity - worry: Not on file     Food insecurity - inability: Not on file     Transportation needs - medical: Not on file     Transportation needs - non-medical: Not on file   Occupational History     Not on file   Tobacco Use     Smoking status: Never Smoker     Smokeless tobacco: Never Used   Substance and Sexual Activity     Alcohol use: No     Drug use: No     Sexual activity: Not on file   Other Topics Concern     Parent/sibling w/ CABG, MI or angioplasty before 65F 55M? No   Social History Narrative     Not on file             Family History:   reviewed         Allergies:   No Known Allergies          Medications:     Current Outpatient Medications   Medication Sig Dispense Refill     Ascorbic Acid (VITAMIN C PO) Take 500 mg by mouth daily       CYANOCOBALAMIN PO Take 500 mcg by mouth daily       furosemide (LASIX) 20 MG tablet Take 1 tablet (20 mg) by mouth daily 90 tablet 3     metoprolol succinate ER (TOPROL XL) 50 MG 24 hr tablet Take 2 tablets(100mg) by mouth every am and 1 tablet(50mg) every pm 270 tablet 3     Multiple Vitamins-Minerals (ICAPS AREDS FORMULA PO) Take 1 tablet by mouth daily Takes in addition to multivitamin with minerals       Nutritional Supplements (ENSURE COMPLETE PO) 1 drink a day       sacubitril-valsartan (ENTRESTO) 24-26 MG per tablet Take 1/2 pill in the morning and a full pill at night. 180 tablet 3     Vitamin D, Cholecalciferol, 1000 UNITS TABS Take 2,000 Units by mouth daily        warfarin (COUMADIN) 5 MG tablet Take 1 tab daily or as directed per INR clinic 100 tablet 1               Review of Systems:   The 10 point Review of  "Systems is negative other than noted in the HPI           Physical Exam:   Blood pressure 127/73, pulse 105, temperature 97.6  F (36.4  C), temperature source Oral, height 1.74 m (5' 8.5\"), weight 72.6 kg (160 lb), SpO2 100 %.    Exam:  Constitutional: healthy appearing, alert and in no distress  Heent: Normocephalic. Head without obvious masses or lesions. PERRLDC, EOMI. Mouth exam within normal limits: tongue, mucous membranes, posterior pharynx all normal, no lesions or abnormalities seen.  Tm's and canals within normal limits bilaterally. Neck supple, no nuchal rigidity or masses. No supraclavicular, or cervical adenopathy. Thyroid symmetric, no masses.  Cardiovascular: irregular rate and rhythm, 1/6 systolic murmer, rub or gallops.  JVP not elevated, no edema.  Carotids within normal limits bilaterally, no bruits.  Respiratory: Normal respiratory effort.  Lungs clear, normal flow, no wheezing or crackles.  Breasts: Normal bilaterally.  No masses or lesions.  Nipples within normal limites.  No axillary lesions or nodes.  Gastrointestinal: Normal active bowel sounds.   Soft, not tender, no masses, guarding or rebound.  No hepatosplenomegaly.   Genitourinary: Rectal not done  Musculoskeletal: extremities normal, no gross deformities noted.  Skin: no suspicious lesions or rashes but has one large bruise right upper arm  Neurologic: Mental status within normal limits.  Speech fluent.  No gross motor abnormalities and gait intact.  Psychiatric: mentation appears normal and affect normal.         Data:   Labs sent        Assessment:   1. Low platelet and hemoglobin, may be itp but with low hemoglobin need to consider other causes.  Not new or acute but platelet count has dropped over the years and is significantly low now  2. Afib, rate ok, on coum, but, given low platelet and bleed into eye need to consider risk/benefit of anticoagulation  3. Recent bleed into eye, due to low platelet in face of coumadin  4. Gait " issues, not unsteady but uses walking sticks so at higher fall risk  5. Cmyop, ?rate related, resolved  6. Dec rv function, no pulmonary embolis on ct 12/17  7. hcm           Plan:   After lengthy discussion with the patient regarding the risk and benefits of anticoagulation in the face of a platelet count that is below 40,000, and with the recent spontaneous hemorrhage into his left eye causing blindness, I believe the risk benefit ratio of anticoagulation merits discontinuation of this.  The patient agrees.  Therefore at this time we will discontinue his Coumadin.  He will also not use aspirin or NSAIDs for now until we get his platelet count started out.    Labs today  See heme this Tuesday for low platelet  shingrix today  Follow up after melissa Shields M.D.

## 2019-02-09 LAB
ALBUMIN SERPL-MCNC: 3.7 G/DL (ref 3.4–5)
ALP SERPL-CCNC: 38 U/L (ref 40–150)
ALT SERPL W P-5'-P-CCNC: 15 U/L (ref 0–70)
ANION GAP SERPL CALCULATED.3IONS-SCNC: 7 MMOL/L (ref 3–14)
AST SERPL W P-5'-P-CCNC: 11 U/L (ref 0–45)
BILIRUB SERPL-MCNC: 0.6 MG/DL (ref 0.2–1.3)
BUN SERPL-MCNC: 25 MG/DL (ref 7–30)
CALCIUM SERPL-MCNC: 8.4 MG/DL (ref 8.5–10.1)
CHLORIDE SERPL-SCNC: 108 MMOL/L (ref 94–109)
CO2 SERPL-SCNC: 22 MMOL/L (ref 20–32)
CREAT SERPL-MCNC: 1.25 MG/DL (ref 0.66–1.25)
FERRITIN SERPL-MCNC: 33 NG/ML (ref 26–388)
GFR SERPL CREATININE-BSD FRML MDRD: 51 ML/MIN/{1.73_M2}
GLUCOSE SERPL-MCNC: 106 MG/DL (ref 70–99)
IRON SATN MFR SERPL: 29 % (ref 15–46)
IRON SERPL-MCNC: 82 UG/DL (ref 35–180)
POTASSIUM SERPL-SCNC: 4 MMOL/L (ref 3.4–5.3)
PROT SERPL-MCNC: 6.6 G/DL (ref 6.8–8.8)
SODIUM SERPL-SCNC: 137 MMOL/L (ref 133–144)
TIBC SERPL-MCNC: 278 UG/DL (ref 240–430)

## 2019-02-11 ENCOUNTER — ONCOLOGY VISIT (OUTPATIENT)
Dept: ONCOLOGY | Facility: CLINIC | Age: 84
End: 2019-02-11
Attending: INTERNAL MEDICINE
Payer: MEDICARE

## 2019-02-11 ENCOUNTER — TELEPHONE (OUTPATIENT)
Dept: ONCOLOGY | Facility: CLINIC | Age: 84
End: 2019-02-11

## 2019-02-11 DIAGNOSIS — D72.0 GENETIC ANOMALIES OF LEUKOCYTES (H): ICD-10-CM

## 2019-02-11 DIAGNOSIS — D69.6 THROMBOCYTOPENIA (H): Primary | ICD-10-CM

## 2019-02-11 LAB
ALBUMIN SERPL ELPH-MCNC: 3.9 G/DL (ref 3.7–5.1)
ALPHA1 GLOB SERPL ELPH-MCNC: 0.3 G/DL (ref 0.2–0.4)
ALPHA2 GLOB SERPL ELPH-MCNC: 0.4 G/DL (ref 0.5–0.9)
B-GLOBULIN SERPL ELPH-MCNC: 0.6 G/DL (ref 0.6–1)
GAMMA GLOB SERPL ELPH-MCNC: 1.1 G/DL (ref 0.7–1.6)
HCV AB SERPL QL IA: NONREACTIVE
HIV 1+2 AB+HIV1 P24 AG SERPL QL IA: NONREACTIVE
M PROTEIN SERPL ELPH-MCNC: 0 G/DL
PROT PATTERN SERPL ELPH-IMP: ABNORMAL

## 2019-02-11 PROCEDURE — 99205 OFFICE O/P NEW HI 60 MIN: CPT | Performed by: INTERNAL MEDICINE

## 2019-02-11 PROCEDURE — G0463 HOSPITAL OUTPT CLINIC VISIT: HCPCS

## 2019-02-11 ASSESSMENT — MIFFLIN-ST. JEOR: SCORE: 1364.37

## 2019-02-11 ASSESSMENT — PAIN SCALES - GENERAL: PAINLEVEL: NO PAIN (0)

## 2019-02-11 NOTE — PROGRESS NOTES
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     Dr. Paul woke up 3 weeks ago and could not see. He had a bleed in left eye behind the retina and has lost all his vision. He has been on coumadin for atrial fibrillation for the last 5 years. He has never previously had problems with bleeding.     He was once hospitalized last year and hospitalist questioned the persistent low PLT which had been present for previous 5 years.     He is on metoprolol. He followed with [facility] and was on cardizem. He has switched to cardiology here has been switched to atenolol and Entresto (sacubitril - valsartan) with good control.     He has been good health otherwise. He is cognition is good and he is able to do most things he would like to do. He has no pain. He is now limited a little with vision loss in his left eye.     He has unrestricted exercise tolerance. He does exercise almost daily.      PAST MEDICAL HISTORY     Past Medical History:   Diagnosis Date     Atrial fibrillation (H) 2011     Elevated PSA      Eye hemorrhage, left 02/2019     Non-ischemic cardiomyopathy (H)     2017, med treatment, echo done 4/18 nl ef, mod to severe tr     Thrombocytopenia (H)      Unspecified essential hypertension           CURRENT OUTPATIENT MEDICATIONS     Current Outpatient Medications   Medication Sig     Ascorbic Acid (VITAMIN C PO) Take 500 mg by mouth daily     CYANOCOBALAMIN PO Take 500 mcg by mouth daily     furosemide (LASIX) 20 MG tablet Take 1 tablet (20 mg) by mouth daily     metoprolol succinate ER (TOPROL XL) 50 MG 24 hr tablet Take 2  tablets(100mg) by mouth every am and 1 tablet(50mg) every pm     Multiple Vitamins-Minerals (ICAPS AREDS FORMULA PO) Take 1 tablet by mouth daily Takes in addition to multivitamin with minerals     Nutritional Supplements (ENSURE COMPLETE PO) 1 drink a day     sacubitril-valsartan (ENTRESTO) 24-26 MG per tablet Take 1/2 pill in the morning and a full pill at night.     Vitamin D, Cholecalciferol, 1000 UNITS TABS Take 2,000 Units by mouth daily      warfarin (COUMADIN) 5 MG tablet Take 1 tab daily or as directed per INR clinic     No current facility-administered medications for this visit.         ALLERGIES    No Known Allergies     SOCIAL HISTORY     He is  and lives with his wife. He is a retired ophthalmologists.     He is never smoker and does not use alcohol. He denies drug abuse.      FAMILY HISTORY   -None contributory  - No family history of cancer in any of the immediate family members.      REVIEW OF SYSTEMS   As above in the HPI, o/w complete 12-point ROS was negative.     PHYSICAL EXAM   B/P: 128/80, T: Data Unavailable, P: 101, R: 16  @LASTSAO2(4)@  Wt Readings from Last 3 Encounters:   02/11/19 76.2 kg (168 lb)   02/08/19 72.6 kg (160 lb)   01/15/19 74.8 kg (164 lb 14.4 oz)     GEN: NAD  HEENT: PERRL, EOMI, no icterus, injection or pallor. Oropharynx is clear.  NECK: no cervical or supraclavicular lymphadenopathy  LUNGS: clear bilaterally  CV: regular, no murmurs, rubs, or gallops  ABDOMEN: soft, non-tender, non-distended, normal bowel sounds, no hepatosplenomegaly by percussion or palpation  EXT: warm, well perfused, no edema  NEURO: alert  SKIN: no rashes     LABORATORY AND IMAGING STUDIES     Recent Labs   Lab Test 02/08/19  1510 01/15/19  1107 10/16/18  0958 07/16/18  0827 04/16/18  1334    135* 137 139 136   POTASSIUM 4.0 3.9 4.3 3.9 4.2   CHLORIDE 108 105 103 105 102   CO2 22 28 27 27 28   ANIONGAP 7 5.9* 11.3 10.9 10.2   BUN 25 21 22 22 24   CR 1.25 1.23 1.24 1.27 1.21   *  103 99 143* 117*   BARON 8.4* 9.2 8.7 8.8 8.7     Recent Labs   Lab Test 01/19/18  0533   MAG 2.2     Recent Labs   Lab Test 02/08/19  1510 02/05/19  1101 03/01/18  0612 02/28/18  1024 01/25/18 01/23/18  0630  01/19/18  0054 12/27/17  1444   WBC 6.8 6.1 6.2 6.1 5.6 6.4   < > 6.7 7.0   HGB 10.7* 11.3* 10.4* 10.6* 11.7* 12.6*   < > 10.4* 11.3*   PLT 29* 37* 46* 44* 40* 53*   < > 38* 54*   MCV 98 95 98 99 97.1 96   < > 97 96   NEUTROPHIL 83.2  --   --  80.6  --  71.8  --  74.1 76.5    < > = values in this interval not displayed.     Recent Labs   Lab Test 02/08/19  1510 02/28/18  1024 01/19/18  0054   BILITOTAL 0.6 0.7 0.6   ALKPHOS 38* 41 33*   ALT 15 32 27   AST 11 19 21   ALBUMIN 3.7 3.4 2.8*     TSH   Date Value Ref Range Status   02/05/2019 0.99 0.40 - 4.00 mU/L Final   01/19/2018 2.87 0.40 - 4.00 mU/L Final     No results for input(s): CEA in the last 95621 hours.  Results for orders placed or performed during the hospital encounter of 02/28/18   XR Chest 2 Views    Narrative    XR CHEST 2 VW 2/28/2018 10:50 AM    COMPARISON: 1/19/2018    HISTORY: Shortness of breath.      Impression    IMPRESSION: Trace bilateral pleural effusions with associated  bibasilar atelectasis, not significant changed. No pneumothorax seen  on either side. Mildly enlarged cardiac silhouette again seen.    LILIANA VALDEZ MD       Lab Results   Component Value Date    PATH  01/23/2018     Patient Name: YINKA GONZALEZ  MR#: [id number]  Specimen #: [id number]  Collected: 1/23/2018  Received: 1/23/2018  Reported: 1/23/2018 13:28  Ordering Phy(s): DOROTHY SEGAL    For improved result formatting, select 'View Enhanced Report Format' under   Linked Documents section.    TEST(S):  Peripheral Smear Morphology    FINAL DIAGNOSIS:  Peripheral blood  - Slight normocytic normochromic anemia  - Moderate thrombocytopenia    COMMENT:  Causes for normochromic normocytic anemia typically include chronic   infectious/inflammatory  conditions,  hypersplenomegaly, renal disease, medication reactions, nutritional   deficiencies, and acute hemorrhage.  Thrombocytopenia is present. Some common causes thrombocytopenia include   infection, medication, alcohol,  nutritional deficiency, immune-mediated mechanisms, and splenic   sequestration. Clinical correlation is  required.    Electronically signed out by:    Josey Vargas M.D.    CLINICAL HISTORY:  86-year-old man with pancytopenia and concern about amyloidosis    PERIPHERAL BLOOD DATA:    PERIPHERAL BLOOD DATA  Patient Value (Reference Range >18 year old male)  6.35 .......WBC (4.0-11.0 x 10*9/L)  4.00 .......RBC (4.4-5.9 x 10*12/L)  12.6 .......HGB (13.3-17.7 g/dL)  38.5 .......HCT (40.0-53.0 %)  96.3 .....MCV (78-100fL)  31.5 .......MCH (26.5-33.0 pg)  32.7 .......MCHC (31.5-36.5 g/dL)  19.0 .......RDW (10.0-15.0 %)  53 .......PLT (150-450 x 10*9/L)  1.23 .......RETIC (0.5-2.0%)    PERIPHERAL BLOOD DIFFERENTIAL  (Reference ranges >18 year old)  Automated differential:    Percent  71.8....Neutrophils, segmented and bands (40 - 75)  10.1....Lymphocytes (20 - 48)  17.6....Monocytes (0 - 12)  0.3....Eosinophils (0 - 6)  0.2....Basophils (0 - 2)  0.0....Immature granulocytes (0 - 0.4)    Absolute  4.56....Neutrophils, segmented and bands  (1.6 - 8.3 x 10*9/L)  0.64....Lymphocytes  (0.8 - 5.3 x 10*9/L)  1.12....Monocytes  (0 -1.3 x 10*9/L)  0.02....Eosinophils  (0 - 0.7 x 10*9/L)  0.01....Basophils  (0 - 0.2 x 10*9/L)  0.00....Immature granulocytes (0 - 0.03)    PERIPHERAL MORPHOLOGY:    ERYTHROCYTES: The red blood cells appear normochromic, although very rare   hypochromic cells are noted.  Poikilocytosis includes there rare ovalocytes and nonspecific   poikilocytes. Polychromasia is not increased.  Rouleaux formation is not increased.    LEUKOCYTES: Leukocytes appear to be in the normal range. A discrete   population of atypical lymphocytes  observed.    PLATELETS: Platelet clumping is not  observed. The morphology of the   platelets is normal.    CPT Codes:  A: 74120-GRER    TESTING LAB LOCATION:  [address]  [id number] [phone]    COLLECTION SITE:  Client:  [facility]  Location:  [facility] (S)         ASSESSMENT    1. Severe persistent progressive thrombocytopenia concerning for myelodysplastic syndrome  2. Recent bleed behind retina with vision loss while on coumadin  3. 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  He has been on long-term anticoagulation with Coumadin for his atrial fibrillation.  Most recently he did have a bleed behind his retina with a complete loss of vision in his left eye.  The risk of bleeding is quite significant with anticoagulation especially when the platelet counts are below 50,000.  I explained to him that platelet is our first line of defense against bleeding while the coagulation pathway is the second.  The risk of having a CVA from atrial fibrillation is roughly about 2% in a year.  The risk of bleeding for him could be in the same range or possibly a lot higher.  I will review his case with Dr. SPENCER his cardiologist and have his office discuss anticoagulation with him.  On reviewing the chart more closely it appears that his primary care physician has already recommended him to hold Coumadin.  From our discussion it appears that he is still taking the medication.  I extensively reviewed causes of low platelet counts (thrombocytopenia) including allergic reaction to new medications, infections, alcohol abuse, liver disease with hypersplenism, deficiencies of vitamin B12/folate, rheumatologic disorders like SLE, RA, primary hematologic disorder and consumptive coagulopathy as in DIC/TTP.  Often this could be spurious thrombocytopenia with an erroneous measurement of platelet due to platelet clumping because of EDTA anticoagulant.     The list of medications that can cause thrombocytopenia is long and virtually most have been associated with low PLT. We reviewed the most common and serious offending agent- heparin.   I reviewed infections as a cause for low platelet count. Often viral infections can lead to low PLT count. The most notorious of these are the HIV, hepatitis C, EBV, H pylori, sepsis. Lilly not have any known liver disease.He denies any risk factors for HIV or hepatitis.   He has persistently low PLT counts which has been steadily dropping. I worry that he has myelodysplastic syndrome a preleukemic condition where the cells from bone marrow do not appropriately mature. He would need a bone marrow biopsy for further evaluation.   I have extensively reviewed the procedure for bone marrow biopsy withhim. This is performed as an outpatient under sedation and with local anaesethetic. Bone marrow is the soft tissue inside bones that helps form blood cells. It is found in the hollow part of most bones. The procedure involves collection of bone marrow and a small amount of marrow fluid aspirate for analysis with a biopsy/aspirate needle. Only the finished cells are released in the circulation and bone marrow biopsy/aspirate helps assess precursor cells for abnormalities. It helps establish primary hematologic disorders like myelodysplastic/ myeloproliferative disorders, leukemia, lymphoma and multiple myeloma.   For the most part management would depend on the cause identified. In significant number of patients are unable to identify a cause and it is attributed to autoimmune destruction of platelets referred as immune thrombocytopenic purpura - ITP which is treated with steroids, intravenous gamma globulin, rituximab with splenomegaly and immunosuppression for refractory cases.   While Dr. Paul was quite open to the idea of bone marrow biopsy, his daughter was quite skeptical about it. She would like to research more on the topic  before he agrees.   We reviewed management of possible myelodysplastic syndrome. I would recommend that we consider initiation of therapy as he already had a catastrophic bleed which has been partly contributed by his low PLT and in part by his anticoagulation.        PLAN   - Recommend discontinuing coumadin. I would talk to his cardiologist - Dr Pugh and ensure that he agrees   He has a INR check tomorrow  - Recommend a bone marrow biopsy to work up for thrombocytopenia as myelodysplastic syndrome is higher on the suspected cause given his age and persistent and steadily declining platelett counts.    Patient's daughter would like to research more on the topic and then he will call us   I have entered the orders and this can be scheduled at any time  - I would like to see him in a month with repeat CBC prior to visit    Addendum: I spoke to Dr. Pugh and he agrees on holding his coumadin     Over 60 min of direct face to face time spent with patient with more than 50% time spent in counseling and coordinating care.     Pa Masters ,  Division of Hematology, Oncology & Transplantation  [facility].

## 2019-02-11 NOTE — TELEPHONE ENCOUNTER
ONCOLOGY INTAKE: Records Information      APPT INFORMATION: 2/11/19 at 12:30PM  Referring provider:  Dr. Mariusz Shields  Referring provider s clinic:  [facility]  Reason for visit/diagnosis:  Thrombocytopenia    Were the records received with the referral (via [employer])? In [facility]    Has patient been seen for any external appt for this diagnosis (enter clinic/location)? No - per pt he has not been seen or treated for this outside of /[facility].

## 2019-02-11 NOTE — PROGRESS NOTES
"Oncology Rooming Note    February 11, 2019 12:40 PM   Jama Paul is a 87 year old male who presents for:    Chief Complaint   Patient presents with     Oncology Clinic Visit     BCC (basal cell carcinoma), face     Initial Vitals: /80 (BP Location: Left arm, Cuff Size: Adult Regular)   Pulse 101   Resp 16   Ht 1.727 m (5' 8\")   Wt 76.2 kg (168 lb)   SpO2 99%   BMI 25.54 kg/m   Estimated body mass index is 25.54 kg/m  as calculated from the following:    Height as of this encounter: 1.727 m (5' 8\").    Weight as of this encounter: 76.2 kg (168 lb). Body surface area is 1.91 meters squared.  No Pain (0) Comment: Data Unavailable   No LMP for male patient.  Allergies reviewed: Yes  Medications reviewed: Yes    Medications: Medication refills not needed today.  Pharmacy name entered into [facility] [employer]:    [facility] [address] [place], [place] - [address] AT [address] [place] [address] [facility] - [address]    Clinical concerns: none     8 minutes for nursing intake (face to face time)     Estella Ledezma CMA                      "

## 2019-02-11 NOTE — LETTER
"    2/11/2019         RE: Jama Paul  [address] [place] [address]  [facility] [address]        Dear Colleague,    Thank you for referring your patient, Jama Paul, to the [facility]. Please see a copy of my visit note below.    Oncology Rooming Note    February 11, 2019 12:40 PM   Jama Paul is a 87 year old male who presents for:    Chief Complaint   Patient presents with     Oncology Clinic Visit     BCC (basal cell carcinoma), face     Initial Vitals: /80 (BP Location: Left arm, Cuff Size: Adult Regular)   Pulse 101   Resp 16   Ht 1.727 m (5' 8\")   Wt 76.2 kg (168 lb)   SpO2 99%   BMI 25.54 kg/m    Estimated body mass index is 25.54 kg/m  as calculated from the following:    Height as of this encounter: 1.727 m (5' 8\").    Weight as of this encounter: 76.2 kg (168 lb). Body surface area is 1.91 meters squared.  No Pain (0) Comment: Data Unavailable   No LMP for male patient.  Allergies reviewed: Yes  Medications reviewed: Yes    Medications: Medication refills not needed today.  Pharmacy name entered into Grid2020:    [id number] - [place], [place] - [address] [facility] [place] [address]    Clinical concerns: none     8 minutes for nursing intake (face to face time)     Estella Ledezma CMA                        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     Dr. Paul woke up 3 weeks ago and could not see. He had a bleed in left eye behind the retina and has lost all his vision. He has been on coumadin for " atrial fibrillation for the last 5 years. He has never previously had problems with bleeding.     He was once hospitalized last year and hospitalist questioned the persistent low PLT which had been present for previous 5 years.     He is on metoprolol. He followed with [facility] and was on cardizem. He has switched to cardiology here has been switched to atenolol and Entresto (sacubitril - valsartan) with good control.     He has been good health otherwise. He is cognition is good and he is able to do most things he would like to do. He has no pain. He is now limited a little with vision loss in his left eye.     He has unrestricted exercise tolerance. He does exercise almost daily.      PAST MEDICAL HISTORY     Past Medical History:   Diagnosis Date     Atrial fibrillation (H) 2011     Elevated PSA      Eye hemorrhage, left 02/2019     Non-ischemic cardiomyopathy (H)     2017, med treatment, echo done 4/18 nl ef, mod to severe tr     Thrombocytopenia (H)      Unspecified essential hypertension           CURRENT OUTPATIENT MEDICATIONS     Current Outpatient Medications   Medication Sig     Ascorbic Acid (VITAMIN C PO) Take 500 mg by mouth daily     CYANOCOBALAMIN PO Take 500 mcg by mouth daily     furosemide (LASIX) 20 MG tablet Take 1 tablet (20 mg) by mouth daily     metoprolol succinate ER (TOPROL XL) 50 MG 24 hr tablet Take 2 tablets(100mg) by mouth every am and 1 tablet(50mg) every pm     Multiple Vitamins-Minerals (ICAPS AREDS FORMULA PO) Take 1 tablet by mouth daily Takes in addition to multivitamin with minerals     Nutritional Supplements (ENSURE COMPLETE PO) 1 drink a day     sacubitril-valsartan (ENTRESTO) 24-26 MG per tablet Take 1/2 pill in the morning and a full pill at night.     Vitamin D, Cholecalciferol, 1000 UNITS TABS Take 2,000 Units by mouth daily      warfarin (COUMADIN) 5 MG tablet Take 1 tab daily or as directed per INR clinic     No current facility-administered medications for this  visit.         ALLERGIES    No Known Allergies     SOCIAL HISTORY     He is  and lives with his wife. He is a retired ophthalmologists.     He is never smoker and does not use alcohol. He denies drug abuse.      FAMILY HISTORY   -None contributory  - No family history of cancer in any of the immediate family members.      REVIEW OF SYSTEMS   As above in the HPI, o/w complete 12-point ROS was negative.     PHYSICAL EXAM   B/P: 128/80, T: Data Unavailable, P: 101, R: 16  @LASTSAO2(4)@  Wt Readings from Last 3 Encounters:   02/11/19 76.2 kg (168 lb)   02/08/19 72.6 kg (160 lb)   01/15/19 74.8 kg (164 lb 14.4 oz)     GEN: NAD  HEENT: PERRL, EOMI, no icterus, injection or pallor. Oropharynx is clear.  NECK: no cervical or supraclavicular lymphadenopathy  LUNGS: clear bilaterally  CV: regular, no murmurs, rubs, or gallops  ABDOMEN: soft, non-tender, non-distended, normal bowel sounds, no hepatosplenomegaly by percussion or palpation  EXT: warm, well perfused, no edema  NEURO: alert  SKIN: no rashes     LABORATORY AND IMAGING STUDIES     Recent Labs   Lab Test 02/08/19  1510 01/15/19  1107 10/16/18  0958 07/16/18  0827 04/16/18  1334    135* 137 139 136   POTASSIUM 4.0 3.9 4.3 3.9 4.2   CHLORIDE 108 105 103 105 102   CO2 22 28 27 27 28   ANIONGAP 7 5.9* 11.3 10.9 10.2   BUN 25 21 22 22 24   CR 1.25 1.23 1.24 1.27 1.21   * 103 99 143* 117*   BARON 8.4* 9.2 8.7 8.8 8.7     Recent Labs   Lab Test 01/19/18  0533   MAG 2.2     Recent Labs   Lab Test 02/08/19  1510 02/05/19  1101 03/01/18  0612 02/28/18  1024 01/25/18 01/23/18  0630  01/19/18  0054 12/27/17  1444   WBC 6.8 6.1 6.2 6.1 5.6 6.4   < > 6.7 7.0   HGB 10.7* 11.3* 10.4* 10.6* 11.7* 12.6*   < > 10.4* 11.3*   PLT 29* 37* 46* 44* 40* 53*   < > 38* 54*   MCV 98 95 98 99 97.1 96   < > 97 96   NEUTROPHIL 83.2  --   --  80.6  --  71.8  --  74.1 76.5    < > = values in this interval not displayed.     Recent Labs   Lab Test 02/08/19  1510 02/28/18  1024  01/19/18  0054   BILITOTAL 0.6 0.7 0.6   ALKPHOS 38* 41 33*   ALT 15 32 27   AST 11 19 21   ALBUMIN 3.7 3.4 2.8*     TSH   Date Value Ref Range Status   02/05/2019 0.99 0.40 - 4.00 mU/L Final   01/19/2018 2.87 0.40 - 4.00 mU/L Final     No results for input(s): CEA in the last 33790 hours.  Results for orders placed or performed during the hospital encounter of 02/28/18   XR Chest 2 Views    Narrative    XR CHEST 2 VW 2/28/2018 10:50 AM    COMPARISON: 1/19/2018    HISTORY: Shortness of breath.      Impression    IMPRESSION: Trace bilateral pleural effusions with associated  bibasilar atelectasis, not significant changed. No pneumothorax seen  on either side. Mildly enlarged cardiac silhouette again seen.    LILIANA VALDEZ MD       Lab Results   Component Value Date    PATH  01/23/2018     Patient Name: YINKA GONZALEZ  MR#: [id number]  Specimen #: [id number]  Collected: 1/23/2018  Received: 1/23/2018  Reported: 1/23/2018 13:28  Ordering Phy(s): DOROTHY SEGAL    For improved result formatting, select 'View Enhanced Report Format' under   Linked Documents section.    TEST(S):  Peripheral Smear Morphology    FINAL DIAGNOSIS:  Peripheral blood  - Slight normocytic normochromic anemia  - Moderate thrombocytopenia    COMMENT:  Causes for normochromic normocytic anemia typically include chronic   infectious/inflammatory conditions,  hypersplenomegaly, renal disease, medication reactions, nutritional   deficiencies, and acute hemorrhage.  Thrombocytopenia is present. Some common causes thrombocytopenia include   infection, medication, alcohol,  nutritional deficiency, immune-mediated mechanisms, and splenic   sequestration. Clinical correlation is  required.    Electronically signed out by:    Josey Vargas M.D.    CLINICAL HISTORY:  86-year-old man with pancytopenia and concern about amyloidosis    PERIPHERAL BLOOD DATA:    PERIPHERAL BLOOD DATA  Patient Value (Reference Range >18 year old male)  6.35 .......WBC (4.0-11.0  x 10*9/L)  4.00 .......RBC (4.4-5.9 x 10*12/L)  12.6 .......HGB (13.3-17.7 g/dL)  38.5 .......HCT (40.0-53.0 %)  96.3 .....MCV (78-100fL)  31.5 .......MCH (26.5-33.0 pg)  32.7 .......MCHC (31.5-36.5 g/dL)  19.0 .......RDW (10.0-15.0 %)  53 .......PLT (150-450 x 10*9/L)  1.23 .......RETIC (0.5-2.0%)    PERIPHERAL BLOOD DIFFERENTIAL  (Reference ranges >18 year old)  Automated differential:    Percent  71.8....Neutrophils, segmented and bands (40 - 75)  10.1....Lymphocytes (20 - 48)  17.6....Monocytes (0 - 12)  0.3....Eosinophils (0 - 6)  0.2....Basophils (0 - 2)  0.0....Immature granulocytes (0 - 0.4)    Absolute  4.56....Neutrophils, segmented and bands  (1.6 - 8.3 x 10*9/L)  0.64....Lymphocytes  (0.8 - 5.3 x 10*9/L)  1.12....Monocytes  (0 -1.3 x 10*9/L)  0.02....Eosinophils  (0 - 0.7 x 10*9/L)  0.01....Basophils  (0 - 0.2 x 10*9/L)  0.00....Immature granulocytes (0 - 0.03)    PERIPHERAL MORPHOLOGY:    ERYTHROCYTES: The red blood cells appear normochromic, although very rare   hypochromic cells are noted.  Poikilocytosis includes there rare ovalocytes and nonspecific   poikilocytes. Polychromasia is not increased.  Rouleaux formation is not increased.    LEUKOCYTES: Leukocytes appear to be in the normal range. A discrete   population of atypical lymphocytes  observed.    PLATELETS: Platelet clumping is not observed. The morphology of the   platelets is normal.    CPT Codes:  A: 82308-QENV    TESTING LAB LOCATION:  [address]  [address] [phone]    COLLECTION SITE:  Client:  [facility]  Location:  [facility] (S)         ASSESSMENT    1. Severe persistent progressive thrombocytopenia concerning for myelodysplastic syndrome  2. Recent bleed behind retina with vision loss while on coumadin  3. 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  He has been on long-term anticoagulation with  Coumadin for his atrial fibrillation.  Most recently he did have a bleed behind his retina with a complete loss of vision in his left eye.  The risk of bleeding is quite significant with anticoagulation especially when the platelet counts are below 50,000.  I explained to him that platelet is our first line of defense against bleeding while the coagulation pathway is the second.  The risk of having a CVA from atrial fibrillation is roughly about 2% in a year.  The risk of bleeding for him could be in the same range or possibly a lot higher.  I will review his case with Dr. SPENCER his cardiologist and have his office discuss anticoagulation with him.  On reviewing the chart more closely it appears that his primary care physician has already recommended him to hold Coumadin.  From our discussion it appears that he is still taking the medication.  I extensively reviewed causes of low platelet counts (thrombocytopenia) including allergic reaction to new medications, infections, alcohol abuse, liver disease with hypersplenism, deficiencies of vitamin B12/folate, rheumatologic disorders like SLE, RA, primary hematologic disorder and consumptive coagulopathy as in DIC/TTP.  Often this could be spurious thrombocytopenia with an erroneous measurement of platelet due to platelet clumping because of EDTA anticoagulant.    The list of medications that can cause thrombocytopenia is long and virtually most have been associated with low PLT. We reviewed the most common and serious offending agent- heparin.   I reviewed infections as a cause for low platelet count. Often viral infections can lead to low PLT count. The most notorious of these are the HIV, hepatitis C, EBV, H pylori, sepsis. Lilly not have any known liver disease.He denies any risk factors for HIV or hepatitis.   He has persistently low PLT counts which has been steadily dropping. I worry that he has myelodysplastic syndrome a preleukemic condition where the cells  from bone marrow do not appropriately mature. He would need a bone marrow biopsy for further evaluation.   I have extensively reviewed the procedure for bone marrow biopsy withhim. This is performed as an outpatient under sedation and with local anaesethetic. Bone marrow is the soft tissue inside bones that helps form blood cells. It is found in the hollow part of most bones. The procedure involves collection of bone marrow and a small amount of marrow fluid aspirate for analysis with a biopsy/aspirate needle. Only the finished cells are released in the circulation and bone marrow biopsy/aspirate helps assess precursor cells for abnormalities. It helps establish primary hematologic disorders like myelodysplastic/ myeloproliferative disorders, leukemia, lymphoma and multiple myeloma.   For the most part management would depend on the cause identified. In significant number of patients are unable to identify a cause and it is attributed to autoimmune destruction of platelets referred as immune thrombocytopenic purpura - ITP which is treated with steroids, intravenous gamma globulin, rituximab with splenomegaly and immunosuppression for refractory cases.   While Dr. Paul was quite open to the idea of bone marrow biopsy, his daughter was quite skeptical about it. She would like to research more on the topic before he agrees.   We reviewed management of possible myelodysplastic syndrome. I would recommend that we consider initiation of therapy as he already had a catastrophic bleed which has been partly contributed by his low PLT and in part by his anticoagulation.        PLAN   - Recommend discontinuing coumadin. I would talk to his cardiologist - Dr Pugh and ensure that he agrees   He has a INR check tomorrow  - Recommend a bone marrow biopsy to work up for thrombocytopenia as myelodysplastic syndrome is higher on the suspected cause given his age and persistent and steadily declining platelett counts.    Patient's  daughter would like to research more on the topic and then he will call us   I have entered the orders and this can be scheduled at any time  - I would like to see him in a month with repeat CBC prior to visit    Addendum: I spoke to Dr. Pugh and he agrees on holding his coumadin     Over 60 min of direct face to face time spent with patient with more than 50% time spent in counseling and coordinating care.     Pa Masters ,  Division of Hematology, Oncology & Transplantation  [facility].       Again, thank you for allowing me to participate in the care of your patient.        Sincerely,        Pa Britton MD

## 2019-02-12 ENCOUNTER — ANTICOAGULATION THERAPY VISIT (OUTPATIENT)
Dept: CARDIOLOGY | Facility: CLINIC | Age: 84
End: 2019-02-12
Payer: MEDICARE

## 2019-02-12 ENCOUNTER — CARE COORDINATION (OUTPATIENT)
Dept: CARDIOLOGY | Facility: CLINIC | Age: 84
End: 2019-02-12

## 2019-02-12 VITALS
WEIGHT: 157.6 LBS | SYSTOLIC BLOOD PRESSURE: 128 MMHG | OXYGEN SATURATION: 99 % | RESPIRATION RATE: 16 BRPM | BODY MASS INDEX: 23.89 KG/M2 | HEIGHT: 68 IN | DIASTOLIC BLOOD PRESSURE: 80 MMHG | HEART RATE: 101 BPM

## 2019-02-12 DIAGNOSIS — I48.91 ATRIAL FIBRILLATION, UNSPECIFIED TYPE (H): ICD-10-CM

## 2019-02-12 LAB — INR POINT OF CARE: 1.9 (ref 0.86–1.14)

## 2019-02-12 PROCEDURE — 36416 COLLJ CAPILLARY BLOOD SPEC: CPT | Performed by: INTERNAL MEDICINE

## 2019-02-12 PROCEDURE — 85610 PROTHROMBIN TIME: CPT | Mod: QW | Performed by: INTERNAL MEDICINE

## 2019-02-12 NOTE — PROGRESS NOTES
Patient will be contacted by INR nurse re: discontinuation of coumadin.  Brenda Palacios RN on 2/12/2019 at 2:05 PM

## 2019-02-12 NOTE — PROGRESS NOTES
Incoming call from patient. He has been dealing w/a bleed into his L eye. He is on coumadin for chronic AF. He has also had a low plt count for several yrs. Onset of the bleed ~3 weeks ago - he has lost all vision in that eye. He was seen yesterday by  Oncologist to discuss plan for his severe thrombocytopenia. Patient remains on coumadin and oncologist asking for our opinion about stopping. Patient requesting input from Lawanda Carrera PA-C.  Will forward to provider for review.  Brenda Palacios RN on 2/12/2019 at 9:20 AM

## 2019-02-12 NOTE — PROGRESS NOTES
CHADS VASC  (age, hx of chf) stroke risk likely 3%/ year.  Agree with holding coumadin, at this point the risk of bleeding likely exceeds the risk of stroke.  Lawanda Carrera PA-C 2/12/2019 12:50 PM

## 2019-02-12 NOTE — PROGRESS NOTES
ANTICOAGULATION FOLLOW-UP CLINIC VISIT    Patient Name:  Jama Paul  Date:  2019  Contact Type:  Face to Face    SUBJECTIVE:        OBJECTIVE    INR Protime   Date Value Ref Range Status   2019 1.9 (A) 0.86 - 1.14 Final       ASSESSMENT / PLAN  No question data found.  Anticoagulation Summary  As of 2019    INR goal:   2.0-3.0   TTR:   63.4 % (8.2 mo)   INR used for dosin.9! (2019)   Warfarin maintenance plan:   No maintenance plan   Full warfarin instructions:   No maintenance plan   Plan last modified:   Katherin Pastor, RN (2019)   Next INR check:      Target end date:   Indefinite    Indications    Atrial fibrillation (H) [I48.91]  Long term current use of anticoagulants with INR goal of 2.0-3.0 (Resolved) [Z79.01]             Anticoagulation Episode Summary     INR check location:       Preferred lab:       Resolved date:   2019    Resolved reason:   Therapy  Complete    Send INR reminders to:   [facility] HEART INR NURSE    Comments:         Anticoagulation Care Providers     Provider Role Specialty Phone number    Long Pugh MD Responsible Cardiology [phone]            See the Encounter Report to view Anticoagulation Flowsheet and Dosing Calendar (Go to Encounters tab in chart review, and find the Anticoagulation Therapy Visit)    INR 1.9 Pt states that he had an OV with Dr Britton, hematologist at [facility], because of his history of thrombocytopenia (Plt 29) and recent bleed in left eye behind the retina with loss of vision in the left eye. Dr Britton recommended that pt hold Coumadin and he contacted Dr Pugh to discuss this. Dr Britton's OV note had an addendum stating that he spoke with Dr Pugh and that he agreed to hold coumadin. Will research further and call pt at home with Dr Pugh' orders.  Found Dr Pugh' staff message to his nursing team:  Per message from Dr Pugh - ----- Message -----   From: Long Pugh MD   Sent: 2019   5:49 PM   To: Pa Britton,  MD, Vikas Guzman RN, *   Subject: RE: Coumadin for CHF/A fib with PLT counts o*     Thank you Dr Britton,   I agree with witholding coumadin with retinal bleed history and thrombocytopenia.   Rio/Nohemi/Pamela could you please update Dr England regarding that I concur with withholding coumadin.   I also recommend review with EP regarding suitability of Watchman procedure. I can see him in the clinic too and discuss this urther     Thank you   Long     ----- Message -----   From: Pa Britton MD   Sent: 2/11/2019   4:11 PM   To: Long Pugh MD, Mariusz Shields MD   Subject: Coumadin for CHF/A fib with PLT counts of 30k     Hello Dr. 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 I would advise against anticoagulation with his current PLT counts.   I have recommended bone marrow biopsy and he seemed to agree but his daughter was skeptical.   Could you please follow up with him and also let him aware that you agree with holding coumadin.   Pa     12:05 pm Left a message for pt to call back to review Dr Pugh' orders to hold Coumadin and schedule an appt with EP regarding suitability of Watchman procedure and to schedule appt with Dr Pugh to discuss this further. Dosage adjustment made based on physician directed care plan.    1:50 pm Spoke with pt to review that he is to hold Coumadin (Warfarin), schedule an appt with Dr Pugh and also schedule an appt with EP MD regarding suitability of the Watchman procedure. Pt verbalized understanding. Will have scheduling call pt to set up the appts.   Katherin Pastor RN

## 2019-02-13 ENCOUNTER — TELEPHONE (OUTPATIENT)
Dept: CARDIOLOGY | Facility: CLINIC | Age: 84
End: 2019-02-13

## 2019-02-13 NOTE — TELEPHONE ENCOUNTER
Concerned about being off Coumadin, and the risk of stroke.   Questioning if rhythm can be restored to sinus. States he has had atrial fib for approximately 7 years.   Informed it is unlikely, after 7 years of atrial fib, of restoring back to sinus rhythm. Informed Dr. Pugh was the one who recommended, based on his bleeding issues, especially retinal, that he stop his coumadin.   Informed him of the order placed and OV scheduled with EP to discuss the options available to him in light of stopping the coumadin, and to discuss if the Watchman or any other procedure is available for him.   Questioning if he should take an ASA daily. Has OV in one week with Dr. Pugh to discuss and the EP OV is scheduled for 2/27/19. States that he had bleeding issues with just taking a baby ASA in the past.   Discussed there are risk and benefits of taking medications and not taking medications.   Will stay off coumadin. And will discuss further with Dr. Pugh and Dr. Hermosillo in the couple of weeks.

## 2019-02-15 ENCOUNTER — TELEPHONE (OUTPATIENT)
Dept: ONCOLOGY | Facility: CLINIC | Age: 84
End: 2019-02-15

## 2019-02-15 NOTE — TELEPHONE ENCOUNTER
Received call from Pt's daughter Lorraine as per Pt's request.    Pt is very anxious about being off of Coumadin with his AFib.  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    Reviewed Cardiology phone call notes from 2-13-19 with Lorraine.    Encouraged Lorraine to assist Pt with being on MyChart for ability to re-read answers to Pt's concerns to help alleviate Pt's anxiety.    Pt would like to see  sooner--notified Lorraine that  has openings her at [facility] on Thursday,  2-28-19, then forwarded her to scheduling as per her request.

## 2019-02-18 ENCOUNTER — TELEPHONE (OUTPATIENT)
Dept: FAMILY MEDICINE | Facility: CLINIC | Age: 84
End: 2019-02-18

## 2019-02-18 NOTE — TELEPHONE ENCOUNTER
Reason for Call:  Telephone Visit    Detailed comments: patient asking for a Telephone Visit with     Phone Number Patient can be reached at: Home number on file [phone] (home)    Best Time: anytime    Can we leave a detailed message on this number? YES    Call taken on 2/18/2019 at 9:57 AM by Yasmani Heck

## 2019-02-22 ENCOUNTER — OFFICE VISIT (OUTPATIENT)
Dept: CARDIOLOGY | Facility: CLINIC | Age: 84
End: 2019-02-22
Attending: PHYSICIAN ASSISTANT
Payer: MEDICARE

## 2019-02-22 VITALS
HEART RATE: 84 BPM | SYSTOLIC BLOOD PRESSURE: 112 MMHG | BODY MASS INDEX: 23.36 KG/M2 | DIASTOLIC BLOOD PRESSURE: 66 MMHG | WEIGHT: 157.7 LBS | HEIGHT: 69 IN

## 2019-02-22 DIAGNOSIS — I50.43 ACUTE ON CHRONIC COMBINED SYSTOLIC AND DIASTOLIC CHF (CONGESTIVE HEART FAILURE) (H): ICD-10-CM

## 2019-02-22 PROCEDURE — 99214 OFFICE O/P EST MOD 30 MIN: CPT | Performed by: INTERNAL MEDICINE

## 2019-02-22 ASSESSMENT — MIFFLIN-ST. JEOR: SCORE: 1367.76

## 2019-02-22 NOTE — PROGRESS NOTES
HPI and Plan:   See dictation(#[id number])    No orders of the defined types were placed in this encounter.      No orders of the defined types were placed in this encounter.      There are no discontinued medications.      Encounter Diagnosis   Name Primary?     Acute on chronic combined systolic and diastolic CHF (congestive heart failure) (H)        CURRENT MEDICATIONS:  Current Outpatient Medications   Medication Sig Dispense Refill     Ascorbic Acid (VITAMIN C PO) Take 500 mg by mouth daily       CYANOCOBALAMIN PO Take 500 mcg by mouth daily       furosemide (LASIX) 20 MG tablet Take 1 tablet (20 mg) by mouth daily 90 tablet 3     metoprolol succinate ER (TOPROL XL) 50 MG 24 hr tablet Take 2 tablets(100mg) by mouth every am and 1 tablet(50mg) every pm 270 tablet 3     Multiple Vitamins-Minerals (ICAPS AREDS FORMULA PO) Take 1 tablet by mouth daily Takes in addition to multivitamin with minerals       Nutritional Supplements (ENSURE COMPLETE PO) 1 drink a day       sacubitril-valsartan (ENTRESTO) 24-26 MG per tablet Take 1/2 pill in the morning and a full pill at night. 180 tablet 3     Vitamin D, Cholecalciferol, 1000 UNITS TABS Take 2,000 Units by mouth daily        warfarin (COUMADIN) 5 MG tablet Take 1 tab daily or as directed per INR clinic (Patient not taking: Reported on 2/22/2019) 100 tablet 1       ALLERGIES   No Known Allergies    PAST MEDICAL HISTORY:  Past Medical History:   Diagnosis Date     Atrial fibrillation (H) 2011     Elevated PSA      Eye hemorrhage, left 02/2019     Non-ischemic cardiomyopathy (H)     2017, med treatment, echo done 4/18 nl ef, mod to severe tr     Thrombocytopenia (H)      Unspecified essential hypertension        PAST SURGICAL HISTORY:  Past Surgical History:   Procedure Laterality Date     CARPAL TUNNEL RELEASE RT/LT  2014     EXPLORE GROIN  4/30/2014    Procedure: EXPLORE GROIN;  Surgeon: Sam Aguirre MD;  Location: [facility] OR     HERNIORRHAPHY INGUINAL  4/30/2014     Procedure: HERNIORRHAPHY INGUINAL;  Surgeon: Sam Aguirre MD;  Location:  OR     MOHS MICROGRAPHIC PROCEDURE       PHACOEMULSIFICATION CLEAR CORNEA WITH STANDARD INTRAOCULAR LENS IMPLANT Right 9/8/2015    Procedure: PHACOEMULSIFICATION CLEAR CORNEA WITH STANDARD INTRAOCULAR LENS IMPLANT;  Surgeon: Gil Shannon MD;  Location:  [facility]     septic olecranon bursitis[         FAMILY HISTORY:  Family History   Problem Relation Age of Onset     Heart Disease No family hx of        SOCIAL HISTORY:  Social History     Socioeconomic History     Marital status:      Spouse name: None     Number of children: 3     Years of education: None     Highest education level: None   Occupational History     None   Social Needs     Financial resource strain: None     Food insecurity:     Worry: None     Inability: None     Transportation needs:     Medical: None     Non-medical: None   Tobacco Use     Smoking status: Never Smoker     Smokeless tobacco: Never Used   Substance and Sexual Activity     Alcohol use: No     Drug use: No     Sexual activity: None   Lifestyle     Physical activity:     Days per week: None     Minutes per session: None     Stress: None   Relationships     Social connections:     Talks on phone: None     Gets together: None     Attends Shinto service: None     Active member of club or organization: None     Attends meetings of clubs or organizations: None     Relationship status: None     Intimate partner violence:     Fear of current or ex partner: None     Emotionally abused: None     Physically abused: None     Forced sexual activity: None   Other Topics Concern     Parent/sibling w/ CABG, MI or angioplasty before 65F 55M? No   Social History Narrative     None       Review of Systems:  Skin:  Negative       Eyes:  Positive for glasses loss of vision in left eye  ENT:  Negative      Respiratory:  Negative       Cardiovascular:  Negative      Gastroenterology: Negative      Genitourinary:  " Positive for incontinence at night  Musculoskeletal:  Negative      Neurologic:  Negative      Psychiatric:  Negative      Heme/Lymph/Imm:  Negative      Endocrine:  Negative        Physical Exam:  Vitals: /66   Pulse 84   Ht 1.74 m (5' 8.5\")   Wt 71.5 kg (157 lb 11.2 oz)   BMI 23.63 kg/m      Constitutional:           Skin:             Head:           Eyes:           Lymph:      ENT:           Neck:           Respiratory:            Cardiac:                                                           GI:           Extremities and Muscular Skeletal:                 Neurological:           Psych:             CC  Lawanda Carrera, PA-C  [address]  [place] [address]                  "

## 2019-02-22 NOTE — LETTER
2/22/2019    Mariusz Shields MD  [address]  [place] [address]    RE: Jama Paul       Dear Colleague,    I had the pleasure of seeing Jama Paul in the [facility].    HPI and Plan:   See dictation(#[id number])    No orders of the defined types were placed in this encounter.      No orders of the defined types were placed in this encounter.      There are no discontinued medications.      Encounter Diagnosis   Name Primary?     Acute on chronic combined systolic and diastolic CHF (congestive heart failure) (H)        CURRENT MEDICATIONS:  Current Outpatient Medications   Medication Sig Dispense Refill     Ascorbic Acid (VITAMIN C PO) Take 500 mg by mouth daily       CYANOCOBALAMIN PO Take 500 mcg by mouth daily       furosemide (LASIX) 20 MG tablet Take 1 tablet (20 mg) by mouth daily 90 tablet 3     metoprolol succinate ER (TOPROL XL) 50 MG 24 hr tablet Take 2 tablets(100mg) by mouth every am and 1 tablet(50mg) every pm 270 tablet 3     Multiple Vitamins-Minerals (ICAPS AREDS FORMULA PO) Take 1 tablet by mouth daily Takes in addition to multivitamin with minerals       Nutritional Supplements (ENSURE COMPLETE PO) 1 drink a day       sacubitril-valsartan (ENTRESTO) 24-26 MG per tablet Take 1/2 pill in the morning and a full pill at night. 180 tablet 3     Vitamin D, Cholecalciferol, 1000 UNITS TABS Take 2,000 Units by mouth daily        warfarin (COUMADIN) 5 MG tablet Take 1 tab daily or as directed per INR clinic (Patient not taking: Reported on 2/22/2019) 100 tablet 1       ALLERGIES   No Known Allergies    PAST MEDICAL HISTORY:  Past Medical History:   Diagnosis Date     Atrial fibrillation (H) 2011     Elevated PSA      Eye hemorrhage, left 02/2019     Non-ischemic cardiomyopathy (H)     2017, med treatment, echo done 4/18 nl ef, mod to severe tr     Thrombocytopenia (H)      Unspecified essential hypertension        PAST SURGICAL HISTORY:  Past Surgical  History:   Procedure Laterality Date     CARPAL TUNNEL RELEASE RT/LT  2014     EXPLORE GROIN  4/30/2014    Procedure: EXPLORE GROIN;  Surgeon: Sam Aguirre MD;  Location:  OR     HERNIORRHAPHY INGUINAL  4/30/2014    Procedure: HERNIORRHAPHY INGUINAL;  Surgeon: Sam Aguirre MD;  Location:  OR     MOHS MICROGRAPHIC PROCEDURE       PHACOEMULSIFICATION CLEAR CORNEA WITH STANDARD INTRAOCULAR LENS IMPLANT Right 9/8/2015    Procedure: PHACOEMULSIFICATION CLEAR CORNEA WITH STANDARD INTRAOCULAR LENS IMPLANT;  Surgeon: Gil Shannon MD;  Location:  [facility]     septic olecranon bursitis[         FAMILY HISTORY:  Family History   Problem Relation Age of Onset     Heart Disease No family hx of        SOCIAL HISTORY:  Social History     Socioeconomic History     Marital status:      Spouse name: None     Number of children: 3     Years of education: None     Highest education level: None   Occupational History     None   Social Needs     Financial resource strain: None     Food insecurity:     Worry: None     Inability: None     Transportation needs:     Medical: None     Non-medical: None   Tobacco Use     Smoking status: Never Smoker     Smokeless tobacco: Never Used   Substance and Sexual Activity     Alcohol use: No     Drug use: No     Sexual activity: None   Lifestyle     Physical activity:     Days per week: None     Minutes per session: None     Stress: None   Relationships     Social connections:     Talks on phone: None     Gets together: None     Attends Religion service: None     Active member of club or organization: None     Attends meetings of clubs or organizations: None     Relationship status: None     Intimate partner violence:     Fear of current or ex partner: None     Emotionally abused: None     Physically abused: None     Forced sexual activity: None   Other Topics Concern     Parent/sibling w/ CABG, MI or angioplasty before 65F 55M? No   Social History Narrative     None  "      Review of Systems:  Skin:  Negative       Eyes:  Positive for glasses loss of vision in left eye  ENT:  Negative      Respiratory:  Negative       Cardiovascular:  Negative      Gastroenterology: Negative      Genitourinary:  Positive for incontinence at night  Musculoskeletal:  Negative      Neurologic:  Negative      Psychiatric:  Negative      Heme/Lymph/Imm:  Negative      Endocrine:  Negative        Physical Exam:  Vitals: /66   Pulse 84   Ht 1.74 m (5' 8.5\")   Wt 71.5 kg (157 lb 11.2 oz)   BMI 23.63 kg/m       Constitutional:           Skin:             Head:           Eyes:           Lymph:      ENT:           Neck:           Respiratory:            Cardiac:                                                           GI:           Extremities and Muscular Skeletal:                 Neurological:           Psych:             CC  Lawanda Carrera PA-C  [address]  [place] [address]                    Thank you for allowing me to participate in the care of your patient.      Sincerely,     Long Pugh MD     [facility]    cc:   Lawanda Carrera PA-C  [address]  [place] [address]        "

## 2019-02-22 NOTE — LETTER
2/22/2019      Mariusz Shields MD  [address] [facility] [address]  [facility] [address]      RE: Jama Arroyosen       Dear Colleague,    I had the pleasure of seeing Jama Paul in the [facility].    Service Date: 02/22/2019      REASON FOR CLINIC VISIT:  Cardiomyopathy, atrial fibrillation, recent retinal bleed.      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  History of stress cardiac MRI without any evidence of inducible ischemia or delayed enhancement.  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  He was seen by Hematology, Dr. Britton, and he has history of chronic thrombocytopenia which has been slowly getting worse and appropriately Coumadin was discontinued.  Today he is coming to Cardiology Clinic accompanied by his wife.  Other than this history of retinal bleed, the patient remarkably has not had any bleeding issues in the last several years.  His latest platelet counts were 29.  He was seen by Dr. Britton, and bone marrow biopsy was recommended.  To be noted, the patient otherwise feels well.  Recent Holter monitor shows well-controlled ventricular rate.  He is on beta blocker.  He is on Entresto.  Recent kidney function shows stable creatinine.  He denies any chest discomfort or shortness of breath.  His CHADS2-VASc score is 4 (age, history of hypertension, history of congestive heart failure).  Overall, his CHADS2-VASc score gives him a stroke risk of 4% but probably less than that because of underlying thrombocytopenia, which probably decreases his risk of clot formation.  Also HAS-BLED score gives him risk of major bleeding greater than 4%.       PHYSICAL EXAMINATION:   VITAL SIGNS:  Blood pressure 112/66, heart rate 84 irregular, weight 157 pounds, BMI 23.68.   GENERAL:  The patient appears pleasant, comfortable.   NECK:  Normal JVP, no bruit.   CARDIOVASCULAR SYSTEM:  Irregular, normal rate.  No murmur, rub or gallop.   RESPIRATORY SYSTEM:  Clear to auscultation bilaterally.   GASTROINTESTINAL SYSTEM:  Abdomen soft, nontender.   EXTREMITIES:  No pitting pedal edema.     NEUROLOGICAL:  Alert, oriented x3.   PSYCH:  Normal affect.    SKIN:  No obvious rash.   HEENT:  No pallor or icterus.      IMPRESSION AND PLAN:  A very pleasant 88-year-old gentleman with history of chronic atrial fibrillation with history of nonischemic cardiomyopathy with subsequent improvement in LV function to normal, CHADS2-VASc score of 4, was on Coumadin, with history of chronic thrombocytopenia which has worsened and had a retinal bleed.  He is now off Coumadin.  I had a long discussion with the patient and his wife.  We discussed pros and cons of Coumadin.  At this time, given a major bleed intraocular in nature and worsening platelet count, I think the risks/benefits at this time do not favor continuing Coumadin.  The patient understands the clinical situation and is agreeable to keep off Coumadin.  I do recommend the patient follow up with EP to discuss whether he is a candidate for left atrial appendage occlusion procedure/Watchman procedure.  The patient was also inquiring about next steps he needs to take for bone marrow biopsy as recommended by Dr. Britton.  I will contact Dr. 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   1.  Chronic atrial fibrillation, CHADS2-VASc score of 4, was on Coumadin, off Coumadin now because of retinal bleed and worsening thrombocytopenia.   2.  History of nonischemic cardiomyopathy, LVEF has improved to normal.  Clinically appears compensated, not in congestive heart failure at present.  Ventricular rates appear well  controlled on beta blocker on Entresto.   3.  Thrombocytopenia.      RECOMMENDATIONS:   1.  Agree to keep off Coumadin.   2.  Review with EP to see if he is a candidate for Watchman procedure.  I did briefly discuss the procedure with him.   3.  Follow up with Dr. Britton's office regarding bone marrow biopsy as recommended.         LONG PUGH MD             D: 2019   T: 2019   MT: MAG      Name:     YINKA GONZALEZ   MRN:      [id number]        Account:      [id number]   :      1931           Service Date: 2019      Document: [id number]         Outpatient Encounter Medications as of 2019   Medication Sig Dispense Refill     Ascorbic Acid (VITAMIN C PO) Take 500 mg by mouth daily       CYANOCOBALAMIN PO Take 500 mcg by mouth daily       furosemide (LASIX) 20 MG tablet Take 1 tablet (20 mg) by mouth daily 90 tablet 3     metoprolol succinate ER (TOPROL XL) 50 MG 24 hr tablet Take 2 tablets(100mg) by mouth every am and 1 tablet(50mg) every pm 270 tablet 3     Multiple Vitamins-Minerals (ICAPS AREDS FORMULA PO) Take 1 tablet by mouth daily Takes in addition to multivitamin with minerals       Nutritional Supplements (ENSURE COMPLETE PO) 1 drink a day       sacubitril-valsartan (ENTRESTO) 24-26 MG per tablet Take 1/2 pill in the morning and a full pill at night. 180 tablet 3     Vitamin D, Cholecalciferol, 1000 UNITS TABS Take 2,000 Units by mouth daily        warfarin (COUMADIN) 5 MG tablet Take 1 tab daily or as directed per INR clinic (Patient not taking: Reported on 2019) 100 tablet 1     No facility-administered encounter medications on file as of 2019.        Again, thank you for allowing me to participate in the care of your patient.      Sincerely,    Long Pugh MD     [facility]

## 2019-02-23 NOTE — PROGRESS NOTES
Service Date: 02/22/2019      REASON FOR CLINIC VISIT:  Cardiomyopathy, atrial fibrillation, recent retinal bleed.      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  History of stress cardiac MRI without any evidence of inducible ischemia or delayed enhancement.  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  He was seen by Hematology, Dr. Britton, and he has history of chronic thrombocytopenia which has been slowly getting worse and appropriately Coumadin was discontinued.  Today he is coming to Cardiology Clinic accompanied by his wife.  Other than this history of retinal bleed, the patient remarkably has not had any bleeding issues in the last several years.  His latest platelet counts were 29.  He was seen by Dr. Britton, and bone marrow biopsy was recommended.  To be noted, the patient otherwise feels well.  Recent Holter monitor shows well-controlled ventricular rate.  He is on beta blocker.  He is on Entresto.  Recent kidney function shows stable creatinine.  He denies any chest discomfort or shortness of breath.  His CHADS2-VASc score is 4 (age, history of hypertension, history of congestive heart failure).  Overall, his CHADS2-VASc score gives him a stroke risk of 4% but probably less than that because of underlying thrombocytopenia, which probably decreases his risk of clot formation.  Also HAS-BLED score gives him risk of major bleeding greater than 4%.      PHYSICAL EXAMINATION:   VITAL SIGNS:  Blood pressure 112/66, heart rate 84 irregular, weight 157 pounds, BMI 23.68.   GENERAL:  The patient appears pleasant, comfortable.   NECK:  Normal JVP, no bruit.   CARDIOVASCULAR SYSTEM:   Irregular, normal rate.  No murmur, rub or gallop.   RESPIRATORY SYSTEM:  Clear to auscultation bilaterally.   GASTROINTESTINAL SYSTEM:  Abdomen soft, nontender.   EXTREMITIES:  No pitting pedal edema.     NEUROLOGICAL:  Alert, oriented x3.   PSYCH:  Normal affect.    SKIN:  No obvious rash.   HEENT:  No pallor or icterus.      IMPRESSION AND PLAN:  A very pleasant 88-year-old gentleman with history of chronic atrial fibrillation with history of nonischemic cardiomyopathy with subsequent improvement in LV function to normal, CHADS2-VASc score of 4, was on Coumadin, with history of chronic thrombocytopenia which has worsened and had a retinal bleed.  He is now off Coumadin.  I had a long discussion with the patient and his wife.  We discussed pros and cons of Coumadin.  At this time, given a major bleed intraocular in nature and worsening platelet count, I think the risks/benefits at this time do not favor continuing Coumadin.  The patient understands the clinical situation and is agreeable to keep off Coumadin.  I do recommend the patient follow up with EP to discuss whether he is a candidate for left atrial appendage occlusion procedure/Watchman procedure.  The patient was also inquiring about next steps he needs to take for bone marrow biopsy as recommended by Dr. Britton.  I will contact Dr. 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   1.  Chronic atrial fibrillation, CHADS2-VASc score of 4, was on Coumadin, off Coumadin now because of retinal bleed and worsening thrombocytopenia.   2.  History of nonischemic cardiomyopathy, LVEF has improved to normal.  Clinically appears compensated, not in congestive heart failure at present.  Ventricular rates appear well controlled on beta blocker on Entresto.   3.  Thrombocytopenia.      RECOMMENDATIONS:   1.  Agree to keep off Coumadin.   2.  Review with EP to see if he is a candidate for Watchman procedure.  I did briefly discuss the procedure with  him.   3.  Follow up with Dr. Britton's office regarding bone marrow biopsy as recommended.         DEIRDRE SPENCER MD             D: 2019   T: 2019   MT: MAG      Name:     YINKA GONZALEZ   MRN:      [id number]        Account:      [id number]   :      1931           Service Date: 2019      Document: [id number]

## 2019-02-25 DIAGNOSIS — T45.1X5A ANTINEOPLASTIC CHEMOTHERAPY INDUCED PANCYTOPENIA (H): ICD-10-CM

## 2019-02-25 DIAGNOSIS — D61.810 ANTINEOPLASTIC CHEMOTHERAPY INDUCED PANCYTOPENIA (H): ICD-10-CM

## 2019-02-25 DIAGNOSIS — D72.0 GENETIC ANOMALIES OF LEUKOCYTES (H): ICD-10-CM

## 2019-02-25 DIAGNOSIS — D69.6 THROMBOCYTOPENIA (H): Primary | ICD-10-CM

## 2019-02-25 DIAGNOSIS — D72.9 ABNORMAL WHITE BLOOD CELL (WBC): ICD-10-CM

## 2019-02-26 ENCOUNTER — TELEPHONE (OUTPATIENT)
Dept: ONCOLOGY | Facility: CLINIC | Age: 84
End: 2019-02-26

## 2019-02-26 NOTE — TELEPHONE ENCOUNTER
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     Could you call and let the patient know that I could see him in 3 weeks after biopsy to review results.     Pa       Called Pt & notified him.  Pt is at lunch out to eat right now & will call scheduling back to set up both his BMBx procedure & his 3wk follow-up afterwards with .

## 2019-02-26 NOTE — TELEPHONE ENCOUNTER
Pt's daughter Lorraine called to confirm that Pt needs a BMBx & a follow-up appt with  3 wks later.    Affirmed this & will have  Marlena genao Lorraine's call to schedule.

## 2019-02-27 ENCOUNTER — OFFICE VISIT (OUTPATIENT)
Dept: CARDIOLOGY | Facility: CLINIC | Age: 84
End: 2019-02-27
Attending: INTERNAL MEDICINE
Payer: MEDICARE

## 2019-02-27 VITALS
HEART RATE: 73 BPM | BODY MASS INDEX: 23.55 KG/M2 | SYSTOLIC BLOOD PRESSURE: 114 MMHG | WEIGHT: 159 LBS | HEIGHT: 69 IN | DIASTOLIC BLOOD PRESSURE: 70 MMHG

## 2019-02-27 DIAGNOSIS — I48.91 ATRIAL FIBRILLATION, UNSPECIFIED TYPE (H): ICD-10-CM

## 2019-02-27 PROCEDURE — 99214 OFFICE O/P EST MOD 30 MIN: CPT | Performed by: INTERNAL MEDICINE

## 2019-02-27 ASSESSMENT — MIFFLIN-ST. JEOR: SCORE: 1373.66

## 2019-02-27 NOTE — PROGRESS NOTES
Electrophysiology/ Clinic Note         H&P and Plan:     REASON FOR VISIT:  Evaluation of atrial fibrillation, palpitation, syncope or device care.       HISTORY OF PRESENT ILLNESS: Mr. 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    Patient is known to have severe thrombocytopenia (platelet chronically around 40) and has been on chronic therapy with Coumadin due to elevated chads Vasc score (chads vas score of 4).  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  Coumadin was discontinued and he was referred here for discussion for a watchman procedure.    Today, he denies any symptoms such as chest pain, shortness of breath, lightheadedness, near-syncope or syncope.  He is scheduled to have a bone marrow biopsy done next week for further workup of thrombocytopenia.      ASSESSMENT AND PLAN:   1. Persistent atrial fibrillation/ thrombocytopenia/spontaneous retinal bleed.    Had an extensive discussion about the watchman procedure.  I explained the procedure in details.  He understand there is a 3% risk of complication associated procedure.    We also discussed the fact that he will require Coumadin/aspirin for 40 days after procedure, and after the 45-day window he will need Plavix/aspirin for 6 months.    At this time, I am not sure he will tolerate the anticoagulation perioperatively due to severe low platelets.  Recommend moving forward with bone marrow biopsy to evaluate etiology of thrombocytopenia.  If we are able to manage thrombocytopenia and have platelets levels there are taut acceptable for the procedure, we will then readdress the watchman procedure.    Additionally I estimate his risk  "of stroke is about 5 %/year, and I explained to him that any form of oral anticoagulation will increase the risk by 60% on (2 about 3 %/year).  Therefore, based on his advanced age and currently issues with thrombocytopenia), I am not sure moving forward with the watchman procedure is the best step at this time.      Gurjit Hermosillo MD    Physical Exam:  Vitals: /70   Pulse 73   Ht 1.74 m (5' 8.5\")   Wt 72.1 kg (159 lb)   BMI 23.82 kg/m      Constitutional:  AAO x3.  Pt is in NAD.  HEAD: normocephalic.  SKIN: Skin normal color, texture and turgor with no lesions or eruptions.  Eyes: PERRL, EOMI.  ENT:  Supple, normal JVP. No lymphadenopathy or thyroid enlargement.  Chest:  CTAB.  Cardiac:  IIR, variable sound of S1 and Normal S2.   No murmurs rubs or gallop.    Abdomen:  Normal BS.  Soft, non-tender and non-distended.  No rebound or guarding.    Extremities:  Pedious pulses palpable B/L.  No LE edema noticed.   Neurological: Strength and sensation grossly symmetric and intact throughout.       CURRENT MEDICATIONS:  Current Outpatient Medications   Medication Sig Dispense Refill     furosemide (LASIX) 20 MG tablet Take 1 tablet (20 mg) by mouth daily 90 tablet 3     metoprolol succinate ER (TOPROL XL) 50 MG 24 hr tablet Take 2 tablets(100mg) by mouth every am and 1 tablet(50mg) every pm 270 tablet 3     sacubitril-valsartan (ENTRESTO) 24-26 MG per tablet Take 1/2 pill in the morning and a full pill at night. 180 tablet 3     Vitamin D, Cholecalciferol, 1000 UNITS TABS Take 2,000 Units by mouth daily        Ascorbic Acid (VITAMIN C PO) Take 500 mg by mouth daily       CYANOCOBALAMIN PO Take 500 mcg by mouth daily       Multiple Vitamins-Minerals (ICAPS AREDS FORMULA PO) Take 1 tablet by mouth daily Takes in addition to multivitamin with minerals       Nutritional Supplements (ENSURE COMPLETE PO) 1 drink a day       warfarin (COUMADIN) 5 MG tablet Take 1 tab daily or as directed per INR clinic (Patient not " taking: Reported on 2/22/2019) 100 tablet 1       ALLERGIES   No Known Allergies    PAST MEDICAL HISTORY:  Past Medical History:   Diagnosis Date     Elevated PSA      Eye hemorrhage, left 02/2019     Non-ischemic cardiomyopathy (H)     2017, med treatment, echo done 4/18 nl ef, mod to severe tr     Permanent atrial fibrillation (H) 2011     Thrombocytopenia (H)      Unspecified essential hypertension        PAST SURGICAL HISTORY:  Past Surgical History:   Procedure Laterality Date     CARPAL TUNNEL RELEASE RT/LT  2014     EXPLORE GROIN  4/30/2014    Procedure: EXPLORE GROIN;  Surgeon: Sam Aguirre MD;  Location:  OR     HERNIORRHAPHY INGUINAL  4/30/2014    Procedure: HERNIORRHAPHY INGUINAL;  Surgeon: Sam Aguirre MD;  Location:  OR     MOHS MICROGRAPHIC PROCEDURE       PHACOEMULSIFICATION CLEAR CORNEA WITH STANDARD INTRAOCULAR LENS IMPLANT Right 9/8/2015    Procedure: PHACOEMULSIFICATION CLEAR CORNEA WITH STANDARD INTRAOCULAR LENS IMPLANT;  Surgeon: Gil Shannon MD;  Location:  [facility]     septic olecranon bursitis[         FAMILY HISTORY:  Family History   Problem Relation Age of Onset     Heart Disease No family hx of        SOCIAL HISTORY:  Social History     Socioeconomic History     Marital status:      Spouse name: None     Number of children: 3     Years of education: None     Highest education level: None   Occupational History     None   Social Needs     Financial resource strain: None     Food insecurity:     Worry: None     Inability: None     Transportation needs:     Medical: None     Non-medical: None   Tobacco Use     Smoking status: Never Smoker     Smokeless tobacco: Never Used   Substance and Sexual Activity     Alcohol use: No     Drug use: No     Sexual activity: None   Lifestyle     Physical activity:     Days per week: None     Minutes per session: None     Stress: None   Relationships     Social connections:     Talks on phone: None     Gets together: None     Attends  [employer] service: None     Active member of club or organization: None     Attends meetings of clubs or organizations: None     Relationship status: None     Intimate partner violence:     Fear of current or ex partner: None     Emotionally abused: None     Physically abused: None     Forced sexual activity: None   Other Topics Concern     Parent/sibling w/ CABG, MI or angioplasty before 65F 55M? No   Social History Narrative     None       Review of Systems:  Skin:  Negative bruising   Eyes:  Positive for glasses  ENT:  Negative    Respiratory:  Negative    Cardiovascular:  Negative fatigue  Gastroenterology: Negative    Genitourinary:  Positive for incontinence  Musculoskeletal:  Negative arthritis  Neurologic:  Negative    Psychiatric:  Negative    Heme/Lymph/Imm:  Negative    Endocrine:  Negative        Recent Lab Results:  LIPID RESULTS:  Lab Results   Component Value Date    CHOL 127 04/18/2012    HDL 56 04/18/2012    LDL 55 04/18/2012    TRIG 81 04/18/2012    CHOLHDLRATIO 2.3 04/18/2012       LIVER ENZYME RESULTS:  Lab Results   Component Value Date    AST 11 02/08/2019    ALT 15 02/08/2019       CBC RESULTS:  Lab Results   Component Value Date    WBC 6.8 02/08/2019    RBC 3.42 (L) 02/08/2019    HGB 10.7 (L) 02/08/2019    HCT 33.6 (L) 02/08/2019    MCV 98 02/08/2019    MCH 31.3 02/08/2019    MCHC 31.8 02/08/2019    RDW 19.0 (H) 02/08/2019    PLT 29 (LL) 02/08/2019       BMP RESULTS:  Lab Results   Component Value Date     02/08/2019    POTASSIUM 4.0 02/08/2019    CHLORIDE 108 02/08/2019    CO2 22 02/08/2019    ANIONGAP 7 02/08/2019     (H) 02/08/2019    BUN 25 02/08/2019    CR 1.25 02/08/2019    GFRESTIMATED 51 (L) 02/08/2019    GFRESTBLACK 59 (L) 02/08/2019    BARON 8.4 (L) 02/08/2019        A1C RESULTS:  No results found for: A1C    INR RESULTS:  Lab Results   Component Value Date    INR 1.9 (A) 02/12/2019    INR 2.6 (A) 01/25/2019    INR 6.82 (HH) 07/30/2018    INR 3.29 (H) 03/01/2018          ECHOCARDIOGRAM  Recent Results (from the past 8760 hour(s))   Echocardiogram    Narrative    [id number]  [facility]  [id number]  [id number]^TJ^DEIRDRE^        [facility]  [facility]  Echocardiography Laboratory  [address]  [address]  [place] [address]  Phone [phone]  Fax [phone]        Name: YINKA GONZALEZ  MRN: [id number]  : 1931  Study Date: 2018 12:49 PM  Age: 87 yrs  Gender: Male  [place] [facility] [place]  Reason For Study: , Acute on chronic combined systolic and diastolic CHF  (congesti  Ordering Physician: DEIRDRE SPENCER  Referring Physician: DEIRDRE SPENCER  Performed By: Travis Jade RDCS     BSA: 1.9 m2  Height: 68 in  Weight: 166 lb  HR: 91  BP: 128/81 mmHg  _____________________________________________________________________________  __     Procedure  Complete Echo Adult.     _____________________________________________________________________________  __        Interpretation Summary     The rhythm was rapid atrial fibrillation.  Hyperdynamic left ventricular function  The visual ejection fraction is estimated at >70%.  There is moderate concentric left ventricular hypertrophy.  The right ventricle is moderately dilated.  Mildly decreased right ventricular systolic function  There is severe biatrial enlargement.  There is moderate to mod-severe (2-3+) tricuspid regurgitation.  Large left pleural effusion     Since the last study 2018, the ventricular response rate has increased,  there has been interim development of a large left pleural effusion, LV  systolic performance is now hyperdynamic ( was estimated 40%) and RV systolic  performance has improved.  _____________________________________________________________________________  __        Left Ventricle  The left ventricle is normal in size. There is moderate concentric left  ventricular hypertrophy. Hyperdynamic left ventricular function. The  visual  ejection fraction is estimated at >70%. Left ventricular diastolic function is  indeterminate. No regional wall motion abnormalities noted. There is no  thrombus seen in the left ventricle.     Right Ventricle  The right ventricle is moderately dilated. There is normal right ventricular  wall thickness. Mildly decreased right ventricular systolic function. There is  no mass or thrombus in the right ventricle.     Atria  There is severe biatrial enlargement. Intact atrial septum.     Mitral Valve  There is no mitral regurgitation noted. There is no mitral valve stenosis.     Tricuspid Valve  Normal tricuspid valve. There is moderate to mod-severe (2-3+) tricuspid  regurgitation. The right ventricular systolic pressure is approximated at 23.5  mmHg plus the right atrial pressure. There is no tricuspid stenosis.        Aortic Valve  The aortic valve is trileaflet. No aortic regurgitation is present. No aortic  stenosis is present.     Pulmonic Valve  Normal pulmonic valve. There is trace pulmonic valvular regurgitation. There  is no pulmonic valvular stenosis.     Vessels  The aortic root is normal size. Normal size ascending aorta. Dilated inferior  vena cava. The pulmonary artery is normal size.     Pericardium  The pericardium appears normal. Large left pleural effusion.     Rhythm  The rhythm was rapid atrial fibrillation.     _____________________________________________________________________________  __  MMode/2D Measurements & Calculations  IVSd: 1.8 cm  LVIDd: 4.0 cm  LVIDs: 2.6 cm  LVPWd: 1.6 cm  FS: 34.2 %  LV mass(C)d: 280.3 grams  LV mass(C)dI: 148.5 grams/m2  Ao root diam: 3.7 cm  LA dimension: 5.0 cm     asc Aorta Diam: 3.7 cm  LA/Ao: 1.4  LVOT diam: 2.3 cm  LVOT area: 4.1 cm2  LA Volume (BP): 120.0 ml  LA Volume Index (BP): 63.5 ml/m2  RWT: 0.80        Doppler Measurements & Calculations  MV E max paul: 97.4 cm/sec  MV dec slope: 585.2 cm/sec2  PI end-d paul: 120.7 cm/sec     TR max paul: 241.7  cm/sec  TR max P.5 mmHg  E/E' av.4  Lateral E/e': 8.5  Medial E/e': 10.3           _____________________________________________________________________________  __           Report approved by: Dr. Pako Ware 2018 05:31 PM            No orders of the defined types were placed in this encounter.    No orders of the defined types were placed in this encounter.    There are no discontinued medications.      Encounter Diagnosis   Name Primary?     Atrial fibrillation, unspecified type (H)          CC  Long Pugh MD  [address]  [place] [id number]

## 2019-02-27 NOTE — LETTER
2/27/2019    Mariusz Shields MD  [address] [facility] [address]  [facility] [address]    RE: Jama Paul       Dear Colleague,    I had the pleasure of seeing Jama Arroyosen in the [facility].    Electrophysiology/ Clinic Note         H&P and Plan:     REASON FOR VISIT:  Evaluation of atrial fibrillation, palpitation, syncope or device care.       HISTORY OF PRESENT ILLNESS: Mr. 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    Patient is known to have severe thrombocytopenia (platelet chronically around 40) and has been on chronic therapy with Coumadin due to elevated chads Vasc score (chads vas score of 4).  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  Coumadin was discontinued and he was referred here for discussion for a watchman procedure.    Today, he denies any symptoms such as chest pain, shortness of breath, lightheadedness, near-syncope or syncope.  He is scheduled to have a bone marrow biopsy done next week for further workup of thrombocytopenia.      ASSESSMENT AND PLAN:   1. Persistent atrial fibrillation/ thrombocytopenia/spontaneous retinal bleed.     Had an extensive discussion about the watchman procedure.  I explained the procedure in details.  He understand there is a 3% risk of complication associated procedure.    We also discussed the fact that he will require Coumadin/aspirin for 40 days after procedure, and after the 45-day window he will need Plavix/aspirin for 6 months.    At this time, I am not sure he will tolerate the anticoagulation perioperatively due to severe low platelets.  Recommend moving forward with bone marrow biopsy to evaluate  "etiology of thrombocytopenia.  If we are able to manage thrombocytopenia and have platelets levels there are taut acceptable for the procedure, we will then readdress the watchman procedure.    Additionally I estimate his risk of stroke is about 5 %/year, and I explained to him that any form of oral anticoagulation will increase the risk by 60% on (2 about 3 %/year).  Therefore, based on his advanced age and currently issues with thrombocytopenia), I am not sure moving forward with the watchman procedure is the best step at this time.      Gurjit Hermosillo MD    Physical Exam:  Vitals: /70   Pulse 73   Ht 1.74 m (5' 8.5\")   Wt 72.1 kg (159 lb)   BMI 23.82 kg/m       Constitutional:  AAO x3.  Pt is in NAD.  HEAD: normocephalic.  SKIN: Skin normal color, texture and turgor with no lesions or eruptions.  Eyes: PERRL, EOMI.  ENT:  Supple, normal JVP. No lymphadenopathy or thyroid enlargement.  Chest:  CTAB.  Cardiac:  IIR, variable sound of S1 and Normal S2.   No murmurs rubs or gallop.    Abdomen:  Normal BS.  Soft, non-tender and non-distended.  No rebound or guarding.    Extremities:  Pedious pulses palpable B/L.  No LE edema noticed.   Neurological: Strength and sensation grossly symmetric and intact throughout.       CURRENT MEDICATIONS:  Current Outpatient Medications   Medication Sig Dispense Refill     furosemide (LASIX) 20 MG tablet Take 1 tablet (20 mg) by mouth daily 90 tablet 3     metoprolol succinate ER (TOPROL XL) 50 MG 24 hr tablet Take 2 tablets(100mg) by mouth every am and 1 tablet(50mg) every pm 270 tablet 3     sacubitril-valsartan (ENTRESTO) 24-26 MG per tablet Take 1/2 pill in the morning and a full pill at night. 180 tablet 3     Vitamin D, Cholecalciferol, 1000 UNITS TABS Take 2,000 Units by mouth daily        Ascorbic Acid (VITAMIN C PO) Take 500 mg by mouth daily       CYANOCOBALAMIN PO Take 500 mcg by mouth daily       Multiple Vitamins-Minerals (ICAPS AREDS FORMULA PO) Take 1 tablet " by mouth daily Takes in addition to multivitamin with minerals       Nutritional Supplements (ENSURE COMPLETE PO) 1 drink a day       warfarin (COUMADIN) 5 MG tablet Take 1 tab daily or as directed per INR clinic (Patient not taking: Reported on 2/22/2019) 100 tablet 1       ALLERGIES   No Known Allergies    PAST MEDICAL HISTORY:  Past Medical History:   Diagnosis Date     Elevated PSA      Eye hemorrhage, left 02/2019     Non-ischemic cardiomyopathy (H)     2017, med treatment, echo done 4/18 nl ef, mod to severe tr     Permanent atrial fibrillation (H) 2011     Thrombocytopenia (H)      Unspecified essential hypertension        PAST SURGICAL HISTORY:  Past Surgical History:   Procedure Laterality Date     CARPAL TUNNEL RELEASE RT/LT  2014     EXPLORE GROIN  4/30/2014    Procedure: EXPLORE GROIN;  Surgeon: Sam Aguirre MD;  Location:  OR     HERNIORRHAPHY INGUINAL  4/30/2014    Procedure: HERNIORRHAPHY INGUINAL;  Surgeon: Sam Aguirre MD;  Location:  OR     MOHS MICROGRAPHIC PROCEDURE       PHACOEMULSIFICATION CLEAR CORNEA WITH STANDARD INTRAOCULAR LENS IMPLANT Right 9/8/2015    Procedure: PHACOEMULSIFICATION CLEAR CORNEA WITH STANDARD INTRAOCULAR LENS IMPLANT;  Surgeon: Gil Shannon MD;  Location:  EC     septic olecranon bursitis[         FAMILY HISTORY:  Family History   Problem Relation Age of Onset     Heart Disease No family hx of        SOCIAL HISTORY:  Social History     Socioeconomic History     Marital status:      Spouse name: None     Number of children: 3     Years of education: None     Highest education level: None   Occupational History     None   Social Needs     Financial resource strain: None     Food insecurity:     Worry: None     Inability: None     Transportation needs:     Medical: None     Non-medical: None   Tobacco Use     Smoking status: Never Smoker     Smokeless tobacco: Never Used   Substance and Sexual Activity     Alcohol use: No     Drug use: No      Sexual activity: None   Lifestyle     Physical activity:     Days per week: None     Minutes per session: None     Stress: None   Relationships     Social connections:     Talks on phone: None     Gets together: None     Attends Confucianist service: None     Active member of club or organization: None     Attends meetings of clubs or organizations: None     Relationship status: None     Intimate partner violence:     Fear of current or ex partner: None     Emotionally abused: None     Physically abused: None     Forced sexual activity: None   Other Topics Concern     Parent/sibling w/ CABG, MI or angioplasty before 65F 55M? No   Social History Narrative     None       Review of Systems:  Skin:  Negative bruising   Eyes:  Positive for glasses  ENT:  Negative    Respiratory:  Negative    Cardiovascular:  Negative fatigue  Gastroenterology: Negative    Genitourinary:  Positive for incontinence  Musculoskeletal:  Negative arthritis  Neurologic:  Negative    Psychiatric:  Negative    Heme/Lymph/Imm:  Negative    Endocrine:  Negative        Recent Lab Results:  LIPID RESULTS:  Lab Results   Component Value Date    CHOL 127 04/18/2012    HDL 56 04/18/2012    LDL 55 04/18/2012    TRIG 81 04/18/2012    CHOLHDLRATIO 2.3 04/18/2012       LIVER ENZYME RESULTS:  Lab Results   Component Value Date    AST 11 02/08/2019    ALT 15 02/08/2019       CBC RESULTS:  Lab Results   Component Value Date    WBC 6.8 02/08/2019    RBC 3.42 (L) 02/08/2019    HGB 10.7 (L) 02/08/2019    HCT 33.6 (L) 02/08/2019    MCV 98 02/08/2019    MCH 31.3 02/08/2019    MCHC 31.8 02/08/2019    RDW 19.0 (H) 02/08/2019    PLT 29 (LL) 02/08/2019       BMP RESULTS:  Lab Results   Component Value Date     02/08/2019    POTASSIUM 4.0 02/08/2019    CHLORIDE 108 02/08/2019    CO2 22 02/08/2019    ANIONGAP 7 02/08/2019     (H) 02/08/2019    BUN 25 02/08/2019    CR 1.25 02/08/2019    GFRESTIMATED 51 (L) 02/08/2019    GFRESTBLACK 59 (L) 02/08/2019    BARON 8.4  (L) 2019        A1C RESULTS:  No results found for: A1C    INR RESULTS:  Lab Results   Component Value Date    INR 1.9 (A) 2019    INR 2.6 (A) 2019    INR 6.82 (HH) 2018    INR 3.29 (H) 2018         ECHOCARDIOGRAM  Recent Results (from the past 8760 hour(s))   Echocardiogram    Narrative    [id number]  [facility]  [id number]  [id number]^TJ^DEIRDRE^        [facility]  [facility]  Echocardiography Laboratory  [address]  [address]  [place] [address]  Phone [phone]  Fax [phone]        Name: IYNKA GONZALEZ  MRN: [id number]  : 1931  Study Date: 2018 12:49 PM  Age: 87 yrs  Gender: Male  [place] [facility] [place]  Reason For Study: , Acute on chronic combined systolic and diastolic CHF  (congesti  Ordering Physician: DEIRDRE SPENCER  Referring Physician: DEIRDRE SPENCER  Performed By: Travis Jade RDCS     BSA: 1.9 m2  Height: 68 in  Weight: 166 lb  HR: 91  BP: 128/81 mmHg  _____________________________________________________________________________  __     Procedure  Complete Echo Adult.     _____________________________________________________________________________  __        Interpretation Summary     The rhythm was rapid atrial fibrillation.  Hyperdynamic left ventricular function  The visual ejection fraction is estimated at >70%.  There is moderate concentric left ventricular hypertrophy.  The right ventricle is moderately dilated.  Mildly decreased right ventricular systolic function  There is severe biatrial enlargement.  There is moderate to mod-severe (2-3+) tricuspid regurgitation.  Large left pleural effusion     Since the last study 2018, the ventricular response rate has increased,  there has been interim development of a large left pleural effusion, LV  systolic performance is now hyperdynamic ( was estimated 40%) and RV systolic  performance has  improved.  _____________________________________________________________________________  __        Left Ventricle  The left ventricle is normal in size. There is moderate concentric left  ventricular hypertrophy. Hyperdynamic left ventricular function. The visual  ejection fraction is estimated at >70%. Left ventricular diastolic function is  indeterminate. No regional wall motion abnormalities noted. There is no  thrombus seen in the left ventricle.     Right Ventricle  The right ventricle is moderately dilated. There is normal right ventricular  wall thickness. Mildly decreased right ventricular systolic function. There is  no mass or thrombus in the right ventricle.     Atria  There is severe biatrial enlargement. Intact atrial septum.     Mitral Valve  There is no mitral regurgitation noted. There is no mitral valve stenosis.     Tricuspid Valve  Normal tricuspid valve. There is moderate to mod-severe (2-3+) tricuspid  regurgitation. The right ventricular systolic pressure is approximated at 23.5  mmHg plus the right atrial pressure. There is no tricuspid stenosis.        Aortic Valve  The aortic valve is trileaflet. No aortic regurgitation is present. No aortic  stenosis is present.     Pulmonic Valve  Normal pulmonic valve. There is trace pulmonic valvular regurgitation. There  is no pulmonic valvular stenosis.     Vessels  The aortic root is normal size. Normal size ascending aorta. Dilated inferior  vena cava. The pulmonary artery is normal size.     Pericardium  The pericardium appears normal. Large left pleural effusion.     Rhythm  The rhythm was rapid atrial fibrillation.     _____________________________________________________________________________  __  MMode/2D Measurements & Calculations  IVSd: 1.8 cm  LVIDd: 4.0 cm  LVIDs: 2.6 cm  LVPWd: 1.6 cm  FS: 34.2 %  LV mass(C)d: 280.3 grams  LV mass(C)dI: 148.5 grams/m2  Ao root diam: 3.7 cm  LA dimension: 5.0 cm     asc Aorta Diam: 3.7 cm  LA/Ao:  1.4  LVOT diam: 2.3 cm  LVOT area: 4.1 cm2  LA Volume (BP): 120.0 ml  LA Volume Index (BP): 63.5 ml/m2  RWT: 0.80        Doppler Measurements & Calculations  MV E max paul: 97.4 cm/sec  MV dec slope: 585.2 cm/sec2  PI end-d paul: 120.7 cm/sec     TR max paul: 241.7 cm/sec  TR max P.5 mmHg  E/E' av.4  Lateral E/e': 8.5  Medial E/e': 10.3           _____________________________________________________________________________  __           Report approved by: Dr. Pako Ware 2018 05:31 PM            No orders of the defined types were placed in this encounter.    No orders of the defined types were placed in this encounter.    There are no discontinued medications.      Encounter Diagnosis   Name Primary?     Atrial fibrillation, unspecified type (H)          CC  Long Pugh MD  [address]  [place] [id number]                Thank you for allowing me to participate in the care of your patient.      Sincerely,     Gurjit Hermosillo MD     [facility]    cc:   Long Pugh MD  [address]  [place] [id number]

## 2019-02-27 NOTE — LETTER
2/27/2019    Mariusz Shields MD  [address] [facility] [address]  [facility] [address]    RE: Jama Paul       Dear Colleague,    I had the pleasure of seeing Jama Arroyosen in the [facility].    Electrophysiology/ Clinic Note         H&P and Plan:     REASON FOR VISIT:  Evaluation of atrial fibrillation, palpitation, syncope or device care.       HISTORY OF PRESENT ILLNESS: Mr. 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    Patient is known to have severe thrombocytopenia (platelet chronically around 40) and has been on chronic therapy with Coumadin due to elevated chads Vasc score (chads vas score of 4).  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  Coumadin was discontinued and he was referred here for discussion for a watchman procedure.    Today, he denies any symptoms such as chest pain, shortness of breath, lightheadedness, near-syncope or syncope.  He is scheduled to have a bone marrow biopsy done next week for further workup of thrombocytopenia.      ASSESSMENT AND PLAN:   1. Persistent atrial fibrillation/ thrombocytopenia/spontaneous retinal bleed.     Had an extensive discussion about the watchman procedure.  I explained the procedure in details.  He understand there is a 3% risk of complication associated procedure.    We also discussed the fact that he will require Coumadin/aspirin for 40 days after procedure, and after the 45-day window he will need Plavix/aspirin for 6 months.    At this time, I am not sure he will tolerate the anticoagulation perioperatively due to severe low platelets.  Recommend moving forward with bone marrow biopsy to evaluate  "etiology of thrombocytopenia.  If we are able to manage thrombocytopenia and have platelets levels there are taut acceptable for the procedure, we will then readdress the watchman procedure.    Additionally I estimate his risk of stroke is about 5 %/year, and I explained to him that any form of oral anticoagulation will increase the risk by 60% on (2 about 3 %/year).  Therefore, based on his advanced age and currently issues with thrombocytopenia), I am not sure moving forward with the watchman procedure is the best step at this time.      Gurjit Hermosillo MD    Physical Exam:  Vitals: /70   Pulse 73   Ht 1.74 m (5' 8.5\")   Wt 72.1 kg (159 lb)   BMI 23.82 kg/m       Constitutional:  AAO x3.  Pt is in NAD.  HEAD: normocephalic.  SKIN: Skin normal color, texture and turgor with no lesions or eruptions.  Eyes: PERRL, EOMI.  ENT:  Supple, normal JVP. No lymphadenopathy or thyroid enlargement.  Chest:  CTAB.  Cardiac:  IIR, variable sound of S1 and Normal S2.   No murmurs rubs or gallop.    Abdomen:  Normal BS.  Soft, non-tender and non-distended.  No rebound or guarding.    Extremities:  Pedious pulses palpable B/L.  No LE edema noticed.   Neurological: Strength and sensation grossly symmetric and intact throughout.       CURRENT MEDICATIONS:  Current Outpatient Medications   Medication Sig Dispense Refill     furosemide (LASIX) 20 MG tablet Take 1 tablet (20 mg) by mouth daily 90 tablet 3     metoprolol succinate ER (TOPROL XL) 50 MG 24 hr tablet Take 2 tablets(100mg) by mouth every am and 1 tablet(50mg) every pm 270 tablet 3     sacubitril-valsartan (ENTRESTO) 24-26 MG per tablet Take 1/2 pill in the morning and a full pill at night. 180 tablet 3     Vitamin D, Cholecalciferol, 1000 UNITS TABS Take 2,000 Units by mouth daily        Ascorbic Acid (VITAMIN C PO) Take 500 mg by mouth daily       CYANOCOBALAMIN PO Take 500 mcg by mouth daily       Multiple Vitamins-Minerals (ICAPS AREDS FORMULA PO) Take 1 tablet " by mouth daily Takes in addition to multivitamin with minerals       Nutritional Supplements (ENSURE COMPLETE PO) 1 drink a day       warfarin (COUMADIN) 5 MG tablet Take 1 tab daily or as directed per INR clinic (Patient not taking: Reported on 2/22/2019) 100 tablet 1       ALLERGIES   No Known Allergies    PAST MEDICAL HISTORY:  Past Medical History:   Diagnosis Date     Elevated PSA      Eye hemorrhage, left 02/2019     Non-ischemic cardiomyopathy (H)     2017, med treatment, echo done 4/18 nl ef, mod to severe tr     Permanent atrial fibrillation (H) 2011     Thrombocytopenia (H)      Unspecified essential hypertension        PAST SURGICAL HISTORY:  Past Surgical History:   Procedure Laterality Date     CARPAL TUNNEL RELEASE RT/LT  2014     EXPLORE GROIN  4/30/2014    Procedure: EXPLORE GROIN;  Surgeon: Sam Aguirre MD;  Location:  OR     HERNIORRHAPHY INGUINAL  4/30/2014    Procedure: HERNIORRHAPHY INGUINAL;  Surgeon: Sam Aguirre MD;  Location:  OR     MOHS MICROGRAPHIC PROCEDURE       PHACOEMULSIFICATION CLEAR CORNEA WITH STANDARD INTRAOCULAR LENS IMPLANT Right 9/8/2015    Procedure: PHACOEMULSIFICATION CLEAR CORNEA WITH STANDARD INTRAOCULAR LENS IMPLANT;  Surgeon: Gil Shannon MD;  Location:  EC     septic olecranon bursitis[         FAMILY HISTORY:  Family History   Problem Relation Age of Onset     Heart Disease No family hx of        SOCIAL HISTORY:  Social History     Socioeconomic History     Marital status:      Spouse name: None     Number of children: 3     Years of education: None     Highest education level: None   Occupational History     None   Social Needs     Financial resource strain: None     Food insecurity:     Worry: None     Inability: None     Transportation needs:     Medical: None     Non-medical: None   Tobacco Use     Smoking status: Never Smoker     Smokeless tobacco: Never Used   Substance and Sexual Activity     Alcohol use: No     Drug use: No      Sexual activity: None   Lifestyle     Physical activity:     Days per week: None     Minutes per session: None     Stress: None   Relationships     Social connections:     Talks on phone: None     Gets together: None     Attends Confucianist service: None     Active member of club or organization: None     Attends meetings of clubs or organizations: None     Relationship status: None     Intimate partner violence:     Fear of current or ex partner: None     Emotionally abused: None     Physically abused: None     Forced sexual activity: None   Other Topics Concern     Parent/sibling w/ CABG, MI or angioplasty before 65F 55M? No   Social History Narrative     None       Review of Systems:  Skin:  Negative bruising   Eyes:  Positive for glasses  ENT:  Negative    Respiratory:  Negative    Cardiovascular:  Negative fatigue  Gastroenterology: Negative    Genitourinary:  Positive for incontinence  Musculoskeletal:  Negative arthritis  Neurologic:  Negative    Psychiatric:  Negative    Heme/Lymph/Imm:  Negative    Endocrine:  Negative        Recent Lab Results:  LIPID RESULTS:  Lab Results   Component Value Date    CHOL 127 04/18/2012    HDL 56 04/18/2012    LDL 55 04/18/2012    TRIG 81 04/18/2012    CHOLHDLRATIO 2.3 04/18/2012       LIVER ENZYME RESULTS:  Lab Results   Component Value Date    AST 11 02/08/2019    ALT 15 02/08/2019       CBC RESULTS:  Lab Results   Component Value Date    WBC 6.8 02/08/2019    RBC 3.42 (L) 02/08/2019    HGB 10.7 (L) 02/08/2019    HCT 33.6 (L) 02/08/2019    MCV 98 02/08/2019    MCH 31.3 02/08/2019    MCHC 31.8 02/08/2019    RDW 19.0 (H) 02/08/2019    PLT 29 (LL) 02/08/2019       BMP RESULTS:  Lab Results   Component Value Date     02/08/2019    POTASSIUM 4.0 02/08/2019    CHLORIDE 108 02/08/2019    CO2 22 02/08/2019    ANIONGAP 7 02/08/2019     (H) 02/08/2019    BUN 25 02/08/2019    CR 1.25 02/08/2019    GFRESTIMATED 51 (L) 02/08/2019    GFRESTBLACK 59 (L) 02/08/2019    BARON 8.4  (L) 2019        A1C RESULTS:  No results found for: A1C    INR RESULTS:  Lab Results   Component Value Date    INR 1.9 (A) 2019    INR 2.6 (A) 2019    INR 6.82 (HH) 2018    INR 3.29 (H) 2018         ECHOCARDIOGRAM  Recent Results (from the past 8760 hour(s))   Echocardiogram    Narrative    [id number]  Former[facility]19  [id number]  [id number]^TJ^DEIRDRE^        [facility]  [facility]  Echocardiography Laboratory  [address]  [place] [address]  Phone [phone]  Fax [phone]        Name: YINKA GONZALEZ  MRN: [id number]  : 1931  Study Date: 2018 12:49 PM  Age: 87 yrs  Gender: Male  [place] [facility] [place]  Reason For Study: , Acute on chronic combined systolic and diastolic CHF  (congesti  Ordering Physician: DEIRDRE SPENCER  Referring Physician: DEIRDRE SPENCER  Performed By: Travis Jade RDCS     BSA: 1.9 m2  Height: 68 in  Weight: 166 lb  HR: 91  BP: 128/81 mmHg  _____________________________________________________________________________  __     Procedure  Complete Echo Adult.     _____________________________________________________________________________  __        Interpretation Summary     The rhythm was rapid atrial fibrillation.  Hyperdynamic left ventricular function  The visual ejection fraction is estimated at >70%.  There is moderate concentric left ventricular hypertrophy.  The right ventricle is moderately dilated.  Mildly decreased right ventricular systolic function  There is severe biatrial enlargement.  There is moderate to mod-severe (2-3+) tricuspid regurgitation.  Large left pleural effusion     Since the last study 2018, the ventricular response rate has increased,  there has been interim development of a large left pleural effusion, LV  systolic performance is now hyperdynamic ( was estimated 40%) and RV systolic  performance has  improved.  _____________________________________________________________________________  __        Left Ventricle  The left ventricle is normal in size. There is moderate concentric left  ventricular hypertrophy. Hyperdynamic left ventricular function. The visual  ejection fraction is estimated at >70%. Left ventricular diastolic function is  indeterminate. No regional wall motion abnormalities noted. There is no  thrombus seen in the left ventricle.     Right Ventricle  The right ventricle is moderately dilated. There is normal right ventricular  wall thickness. Mildly decreased right ventricular systolic function. There is  no mass or thrombus in the right ventricle.     Atria  There is severe biatrial enlargement. Intact atrial septum.     Mitral Valve  There is no mitral regurgitation noted. There is no mitral valve stenosis.     Tricuspid Valve  Normal tricuspid valve. There is moderate to mod-severe (2-3+) tricuspid  regurgitation. The right ventricular systolic pressure is approximated at 23.5  mmHg plus the right atrial pressure. There is no tricuspid stenosis.        Aortic Valve  The aortic valve is trileaflet. No aortic regurgitation is present. No aortic  stenosis is present.     Pulmonic Valve  Normal pulmonic valve. There is trace pulmonic valvular regurgitation. There  is no pulmonic valvular stenosis.     Vessels  The aortic root is normal size. Normal size ascending aorta. Dilated inferior  vena cava. The pulmonary artery is normal size.     Pericardium  The pericardium appears normal. Large left pleural effusion.     Rhythm  The rhythm was rapid atrial fibrillation.     _____________________________________________________________________________  __  MMode/2D Measurements & Calculations  IVSd: 1.8 cm  LVIDd: 4.0 cm  LVIDs: 2.6 cm  LVPWd: 1.6 cm  FS: 34.2 %  LV mass(C)d: 280.3 grams  LV mass(C)dI: 148.5 grams/m2  Ao root diam: 3.7 cm  LA dimension: 5.0 cm     asc Aorta Diam: 3.7 cm  LA/Ao:  1.4  LVOT diam: 2.3 cm  LVOT area: 4.1 cm2  LA Volume (BP): 120.0 ml  LA Volume Index (BP): 63.5 ml/m2  RWT: 0.80        Doppler Measurements & Calculations  MV E max paul: 97.4 cm/sec  MV dec slope: 585.2 cm/sec2  PI end-d paul: 120.7 cm/sec     TR max paul: 241.7 cm/sec  TR max P.5 mmHg  E/E' av.4  Lateral E/e': 8.5  Medial E/e': 10.3           _____________________________________________________________________________  __           Report approved by: Dr. Pako Ware 2018 05:31 PM            No orders of the defined types were placed in this encounter.    No orders of the defined types were placed in this encounter.    There are no discontinued medications.      Encounter Diagnosis   Name Primary?     Atrial fibrillation, unspecified type (H)            Thank you for allowing me to participate in the care of your patient.    Sincerely,     Gurjit Hermosillo MD     [facility]

## 2019-03-04 ENCOUNTER — HOSPITAL ENCOUNTER (OUTPATIENT)
Facility: CLINIC | Age: 84
Discharge: HOME | End: 2019-03-04
Attending: PATHOLOGY | Admitting: PATHOLOGY
Payer: MEDICARE

## 2019-03-04 ENCOUNTER — ANESTHESIA (OUTPATIENT)
Dept: GASTROENTEROLOGY | Facility: CLINIC | Age: 84
End: 2019-03-04
Payer: MEDICARE

## 2019-03-04 ENCOUNTER — ANESTHESIA EVENT (OUTPATIENT)
Dept: GASTROENTEROLOGY | Facility: CLINIC | Age: 84
End: 2019-03-04
Payer: MEDICARE

## 2019-03-04 VITALS — HEART RATE: 74 BPM | OXYGEN SATURATION: 98 % | DIASTOLIC BLOOD PRESSURE: 71 MMHG | SYSTOLIC BLOOD PRESSURE: 108 MMHG

## 2019-03-04 DIAGNOSIS — D69.6 THROMBOCYTOPENIA (H): Primary | ICD-10-CM

## 2019-03-04 LAB
BASOPHILS # BLD AUTO: 0 10E9/L (ref 0–0.2)
BASOPHILS NFR BLD AUTO: 0.2 %
DIFFERENTIAL METHOD BLD: ABNORMAL
EOSINOPHIL # BLD AUTO: 0 10E9/L (ref 0–0.7)
EOSINOPHIL NFR BLD AUTO: 0 %
ERYTHROCYTE [DISTWIDTH] IN BLOOD BY AUTOMATED COUNT: 18.4 % (ref 10–15)
HCT VFR BLD AUTO: 33.9 % (ref 40–53)
HGB BLD-MCNC: 11 G/DL (ref 13.3–17.7)
IMM GRANULOCYTES # BLD: 0 10E9/L (ref 0–0.4)
IMM GRANULOCYTES NFR BLD: 0.2 %
LYMPHOCYTES # BLD AUTO: 0.8 10E9/L (ref 0.8–5.3)
LYMPHOCYTES NFR BLD AUTO: 14.5 %
MCH RBC QN AUTO: 31.3 PG (ref 26.5–33)
MCHC RBC AUTO-ENTMCNC: 32.4 G/DL (ref 31.5–36.5)
MCV RBC AUTO: 96 FL (ref 78–100)
MONOCYTES # BLD AUTO: 0.2 10E9/L (ref 0–1.3)
MONOCYTES NFR BLD AUTO: 3.3 %
NEUTROPHILS # BLD AUTO: 4.5 10E9/L (ref 1.6–8.3)
NEUTROPHILS NFR BLD AUTO: 81.8 %
NRBC # BLD AUTO: 0 10*3/UL
NRBC BLD AUTO-RTO: 0 /100
PLATELET # BLD AUTO: 34 10E9/L (ref 150–450)
RBC # BLD AUTO: 3.52 10E12/L (ref 4.4–5.9)
RETICS # AUTO: 51 10E9/L (ref 25–95)
RETICS/RBC NFR AUTO: 1.5 % (ref 0.5–2)
WBC # BLD AUTO: 5.5 10E9/L (ref 4–11)

## 2019-03-04 PROCEDURE — 88184 FLOWCYTOMETRY/ TC 1 MARKER: CPT | Performed by: PATHOLOGY

## 2019-03-04 PROCEDURE — 88305 TISSUE EXAM BY PATHOLOGIST: CPT | Performed by: INTERNAL MEDICINE

## 2019-03-04 PROCEDURE — 38222 DX BONE MARROW BX & ASPIR: CPT | Performed by: INTERNAL MEDICINE

## 2019-03-04 PROCEDURE — 88237 TISSUE CULTURE BONE MARROW: CPT | Performed by: INTERNAL MEDICINE

## 2019-03-04 PROCEDURE — 88342 IMHCHEM/IMCYTCHM 1ST ANTB: CPT | Mod: 26 | Performed by: INTERNAL MEDICINE

## 2019-03-04 PROCEDURE — 00000159 ZZHCL STATISTIC H-SEND OUTS PREP: Performed by: INTERNAL MEDICINE

## 2019-03-04 PROCEDURE — 38221 DX BONE MARROW BIOPSIES: CPT | Performed by: INTERNAL MEDICINE

## 2019-03-04 PROCEDURE — 88313 SPECIAL STAINS GROUP 2: CPT | Mod: 26 | Performed by: INTERNAL MEDICINE

## 2019-03-04 PROCEDURE — 37000008 ZZH ANESTHESIA TECHNICAL FEE, 1ST 30 MIN: Performed by: PATHOLOGY

## 2019-03-04 PROCEDURE — 81479 UNLISTED MOLECULAR PATHOLOGY: CPT | Performed by: PATHOLOGY

## 2019-03-04 PROCEDURE — 25000128 H RX IP 250 OP 636: Performed by: NURSE ANESTHETIST, CERTIFIED REGISTERED

## 2019-03-04 PROCEDURE — 88305 TISSUE EXAM BY PATHOLOGIST: CPT | Mod: 26 | Performed by: INTERNAL MEDICINE

## 2019-03-04 PROCEDURE — 85060 BLOOD SMEAR INTERPRETATION: CPT | Performed by: INTERNAL MEDICINE

## 2019-03-04 PROCEDURE — 00000058 ZZHCL STATISTIC BONE MARROW ASP PERF TC 38220: Performed by: INTERNAL MEDICINE

## 2019-03-04 PROCEDURE — 40000847 ZZHCL STATISTIC MORPHOLOGY W/INTERP HISTOLOGY TC 85060: Performed by: INTERNAL MEDICINE

## 2019-03-04 PROCEDURE — 88313 SPECIAL STAINS GROUP 2: CPT | Performed by: INTERNAL MEDICINE

## 2019-03-04 PROCEDURE — 88264 CHROMOSOME ANALYSIS 20-25: CPT | Performed by: INTERNAL MEDICINE

## 2019-03-04 PROCEDURE — 36415 COLL VENOUS BLD VENIPUNCTURE: CPT | Performed by: PATHOLOGY

## 2019-03-04 PROCEDURE — 88342 IMHCHEM/IMCYTCHM 1ST ANTB: CPT | Mod: XU | Performed by: INTERNAL MEDICINE

## 2019-03-04 PROCEDURE — 40000948 ZZHCL STATISTIC BONE MARROW ASP TC 85097: Performed by: INTERNAL MEDICINE

## 2019-03-04 PROCEDURE — 88280 CHROMOSOME KARYOTYPE STUDY: CPT | Performed by: INTERNAL MEDICINE

## 2019-03-04 PROCEDURE — 88185 FLOWCYTOMETRY/TC ADD-ON: CPT | Performed by: PATHOLOGY

## 2019-03-04 PROCEDURE — 85045 AUTOMATED RETICULOCYTE COUNT: CPT | Performed by: PATHOLOGY

## 2019-03-04 PROCEDURE — 25000125 ZZHC RX 250: Performed by: NURSE ANESTHETIST, CERTIFIED REGISTERED

## 2019-03-04 PROCEDURE — 25800030 ZZH RX IP 258 OP 636: Performed by: NURSE ANESTHETIST, CERTIFIED REGISTERED

## 2019-03-04 PROCEDURE — 40000424 ZZHCL STATISTIC BONE MARROW CORE PERF TC 38221: Performed by: INTERNAL MEDICINE

## 2019-03-04 PROCEDURE — 37000009 ZZH ANESTHESIA TECHNICAL FEE, EACH ADDTL 15 MIN: Performed by: PATHOLOGY

## 2019-03-04 PROCEDURE — 00000008 ZZHCL STATISTIC ADDL BM ASP PERF PF 38220: Performed by: INTERNAL MEDICINE

## 2019-03-04 PROCEDURE — 40000010 ZZH STATISTIC ANES STAT CODE-CRNA PER MINUTE: Performed by: PATHOLOGY

## 2019-03-04 PROCEDURE — 38222 DX BONE MARROW BX & ASPIR: CPT | Performed by: PATHOLOGY

## 2019-03-04 PROCEDURE — 85025 COMPLETE CBC W/AUTO DIFF WBC: CPT | Performed by: PATHOLOGY

## 2019-03-04 PROCEDURE — 85097 BONE MARROW INTERPRETATION: CPT | Performed by: INTERNAL MEDICINE

## 2019-03-04 PROCEDURE — 38221 DX BONE MARROW BIOPSIES: CPT | Performed by: PATHOLOGY

## 2019-03-04 PROCEDURE — 88311 DECALCIFY TISSUE: CPT | Mod: 26 | Performed by: INTERNAL MEDICINE

## 2019-03-04 PROCEDURE — 40001005 ZZHCL STATISTIC FLOW >15 ABY TC 88189: Performed by: PATHOLOGY

## 2019-03-04 PROCEDURE — 88311 DECALCIFY TISSUE: CPT | Performed by: INTERNAL MEDICINE

## 2019-03-04 RX ORDER — LIDOCAINE HYDROCHLORIDE 10 MG/ML
8-10 INJECTION, SOLUTION EPIDURAL; INFILTRATION; INTRACAUDAL; PERINEURAL
Status: DISCONTINUED | OUTPATIENT
Start: 2019-03-04 | End: 2019-03-04 | Stop reason: HOSPADM

## 2019-03-04 RX ORDER — ONDANSETRON 2 MG/ML
INJECTION INTRAMUSCULAR; INTRAVENOUS PRN
Status: DISCONTINUED | OUTPATIENT
Start: 2019-03-04 | End: 2019-03-04

## 2019-03-04 RX ORDER — FLUMAZENIL 0.1 MG/ML
0.2 INJECTION, SOLUTION INTRAVENOUS
Status: DISCONTINUED | OUTPATIENT
Start: 2019-03-04 | End: 2019-03-04 | Stop reason: HOSPADM

## 2019-03-04 RX ORDER — FENTANYL CITRATE 50 UG/ML
INJECTION, SOLUTION INTRAMUSCULAR; INTRAVENOUS PRN
Status: DISCONTINUED | OUTPATIENT
Start: 2019-03-04 | End: 2019-03-04

## 2019-03-04 RX ORDER — LIDOCAINE HYDROCHLORIDE 20 MG/ML
INJECTION, SOLUTION INFILTRATION; PERINEURAL PRN
Status: DISCONTINUED | OUTPATIENT
Start: 2019-03-04 | End: 2019-03-04

## 2019-03-04 RX ORDER — PROPOFOL 10 MG/ML
INJECTION, EMULSION INTRAVENOUS CONTINUOUS PRN
Status: DISCONTINUED | OUTPATIENT
Start: 2019-03-04 | End: 2019-03-04

## 2019-03-04 RX ORDER — SODIUM CHLORIDE, SODIUM LACTATE, POTASSIUM CHLORIDE, CALCIUM CHLORIDE 600; 310; 30; 20 MG/100ML; MG/100ML; MG/100ML; MG/100ML
INJECTION, SOLUTION INTRAVENOUS CONTINUOUS PRN
Status: DISCONTINUED | OUTPATIENT
Start: 2019-03-04 | End: 2019-03-04

## 2019-03-04 RX ORDER — PROPOFOL 10 MG/ML
INJECTION, EMULSION INTRAVENOUS PRN
Status: DISCONTINUED | OUTPATIENT
Start: 2019-03-04 | End: 2019-03-04

## 2019-03-04 RX ORDER — NALOXONE HYDROCHLORIDE 0.4 MG/ML
.1-.4 INJECTION, SOLUTION INTRAMUSCULAR; INTRAVENOUS; SUBCUTANEOUS
Status: DISCONTINUED | OUTPATIENT
Start: 2019-03-04 | End: 2019-03-04 | Stop reason: HOSPADM

## 2019-03-04 RX ADMIN — PHENYLEPHRINE HYDROCHLORIDE 50 MCG: 10 INJECTION, SOLUTION INTRAMUSCULAR; INTRAVENOUS; SUBCUTANEOUS at 12:06

## 2019-03-04 RX ADMIN — PROPOFOL 75 MCG/KG/MIN: 10 INJECTION, EMULSION INTRAVENOUS at 11:58

## 2019-03-04 RX ADMIN — PROPOFOL 20 MG: 10 INJECTION, EMULSION INTRAVENOUS at 12:09

## 2019-03-04 RX ADMIN — DEXMEDETOMIDINE HYDROCHLORIDE 8 MCG: 100 INJECTION, SOLUTION INTRAVENOUS at 11:50

## 2019-03-04 RX ADMIN — FENTANYL CITRATE 25 MCG: 50 INJECTION, SOLUTION INTRAMUSCULAR; INTRAVENOUS at 11:58

## 2019-03-04 RX ADMIN — DEXMEDETOMIDINE HYDROCHLORIDE 4 MCG: 100 INJECTION, SOLUTION INTRAVENOUS at 11:56

## 2019-03-04 RX ADMIN — DEXMEDETOMIDINE HYDROCHLORIDE 8 MCG: 100 INJECTION, SOLUTION INTRAVENOUS at 11:53

## 2019-03-04 RX ADMIN — PHENYLEPHRINE HYDROCHLORIDE 100 MCG: 10 INJECTION, SOLUTION INTRAMUSCULAR; INTRAVENOUS; SUBCUTANEOUS at 12:20

## 2019-03-04 RX ADMIN — PROPOFOL 30 MG: 10 INJECTION, EMULSION INTRAVENOUS at 11:58

## 2019-03-04 RX ADMIN — PHENYLEPHRINE HYDROCHLORIDE 100 MCG: 10 INJECTION, SOLUTION INTRAMUSCULAR; INTRAVENOUS; SUBCUTANEOUS at 12:17

## 2019-03-04 RX ADMIN — SODIUM CHLORIDE, POTASSIUM CHLORIDE, SODIUM LACTATE AND CALCIUM CHLORIDE: 600; 310; 30; 20 INJECTION, SOLUTION INTRAVENOUS at 11:50

## 2019-03-04 RX ADMIN — ONDANSETRON 4 MG: 2 INJECTION INTRAMUSCULAR; INTRAVENOUS at 12:04

## 2019-03-04 RX ADMIN — LIDOCAINE HYDROCHLORIDE 35 MG: 20 INJECTION, SOLUTION INFILTRATION; PERINEURAL at 11:51

## 2019-03-04 ASSESSMENT — ENCOUNTER SYMPTOMS: DYSRHYTHMIAS: 1

## 2019-03-04 NOTE — ANESTHESIA PREPROCEDURE EVALUATION
Anesthesia Pre-Procedure Evaluation    Patient: Jama Paul   MRN: [id number] : 1931          Preoperative Diagnosis: SEVERE THROMBOCYTOPENIA    Procedure(s):  BIOPSY BONE MARROW    Past Medical History:   Diagnosis Date     Congestive heart failure (H)      Elevated PSA      Eye hemorrhage, left 2019     Non-ischemic cardiomyopathy (H)     2017, med treatment, echo done  nl ef, mod to severe tr     Permanent atrial fibrillation (H)      Thrombocytopenia (H)      Unspecified essential hypertension      Past Surgical History:   Procedure Laterality Date     CARPAL TUNNEL RELEASE RT/LT       EXPLORE GROIN  2014    Procedure: EXPLORE GROIN;  Surgeon: Sam Aguirre MD;  Location:  OR     HERNIORRHAPHY INGUINAL  2014    Procedure: HERNIORRHAPHY INGUINAL;  Surgeon: Sam Aguirre MD;  Location:  OR     MOHS MICROGRAPHIC PROCEDURE       PHACOEMULSIFICATION CLEAR CORNEA WITH STANDARD INTRAOCULAR LENS IMPLANT Right 2015    Procedure: PHACOEMULSIFICATION CLEAR CORNEA WITH STANDARD INTRAOCULAR LENS IMPLANT;  Surgeon: Gil Shannon MD;  Location:  EC     septic olecranon bursitis[         Anesthesia Evaluation     .             ROS/MED HX    ENT/Pulmonary:     (+)other ENT- OS blindess, , . .    Neurologic:       Cardiovascular: Comment: Chronic Afib. On Coumadin. Retinal bleed. Plt count 29. Coumadin stopped. Worked up for watchman's procedure. Need BMBx prior.     (+) hypertension----. Taking blood thinners : Instructions Given to patient: recently stopped coumadin 2/2 low PLTs. CHF etiology: non-ischemic cardiomyopathy Last EF: 70% . . :. dysrhythmias a-fib, .       METS/Exercise Tolerance:     Hematologic:     (+) Anemia, Other Hematologic Disorder-Chronic Low PLTs 20-40       Musculoskeletal:         GI/Hepatic:         Renal/Genitourinary:         Endo:  - neg endo ROS       Psychiatric:         Infectious Disease:  - neg infectious disease ROS      "  Malignancy:         Other:                          Physical Exam  Normal systems: cardiovascular, pulmonary and dental    Airway   Mallampati: II  TM distance: >3 FB  Neck ROM: full    Dental     Cardiovascular       Pulmonary             Lab Results   Component Value Date    WBC 6.8 02/08/2019    HGB 10.7 (L) 02/08/2019    HCT 33.6 (L) 02/08/2019    PLT 29 (LL) 02/08/2019     02/08/2019    POTASSIUM 4.0 02/08/2019    CHLORIDE 108 02/08/2019    CO2 22 02/08/2019    BUN 25 02/08/2019    CR 1.25 02/08/2019     (H) 02/08/2019    BARON 8.4 (L) 02/08/2019    MAG 2.2 01/19/2018    ALBUMIN 3.7 02/08/2019    PROTTOTAL 6.6 (L) 02/08/2019    ALT 15 02/08/2019    AST 11 02/08/2019    ALKPHOS 38 (L) 02/08/2019    BILITOTAL 0.6 02/08/2019    LIPASE 76 04/30/2014    ABRIL 40 01/20/2018    INR 1.9 (A) 02/12/2019    TSH 0.99 02/05/2019       Preop Vitals  BP Readings from Last 3 Encounters:   03/04/19 126/80   02/27/19 114/70   02/22/19 112/66    Pulse Readings from Last 3 Encounters:   03/04/19 97   02/27/19 73   02/22/19 84      Resp Readings from Last 3 Encounters:   02/11/19 16   12/10/18 16   11/12/18 16    SpO2 Readings from Last 3 Encounters:   03/04/19 97%   02/11/19 99%   02/08/19 100%      Temp Readings from Last 1 Encounters:   02/08/19 36.4  C (97.6  F) (Oral)    Ht Readings from Last 1 Encounters:   02/27/19 1.74 m (5' 8.5\")      Wt Readings from Last 1 Encounters:   02/27/19 72.1 kg (159 lb)    Estimated body mass index is 23.82 kg/m  as calculated from the following:    Height as of 2/27/19: 1.74 m (5' 8.5\").    Weight as of 2/27/19: 72.1 kg (159 lb).       Anesthesia Plan      History & Physical Review  History and physical reviewed and following examination; no interval change.    ASA Status:  3 .    NPO Status:  > 8 hours    Plan for MAC Reason for MAC:  Deep or markedly invasive procedure (G8)  PONV prophylaxis:  Ondansetron (or other 5HT-3)       Postoperative Care  Postoperative pain management:  " IV analgesics.      Consents  Anesthetic plan, risks, benefits and alternatives discussed with:  Patient..                 Cameron Westbrook, DO, DO

## 2019-03-04 NOTE — ANESTHESIA POSTPROCEDURE EVALUATION
Patient: Jama Paul    Procedure(s):  BIOPSY BONE MARROW    Diagnosis:SEVERE THROMBOCYTOPENIA  Diagnosis Additional Information: No value filed.    Anesthesia Type:  MAC    Note:  Anesthesia Post Evaluation    Patient location during evaluation: bedside  Patient participation: Able to fully participate in evaluation  Level of consciousness: awake  Pain management: adequate  Airway patency: patent  Cardiovascular status: acceptable  Respiratory status: acceptable  Hydration status: acceptable  PONV: none     Anesthetic complications: None          Last vitals:  Vitals:    03/04/19 1300 03/04/19 1310 03/04/19 1319   BP: 100/66 93/62 108/71   Pulse: 72 65 74   SpO2: 99% 97% 98%         Electronically Signed By: Cameron Westrbook DO, DO  March 4, 2019  4:47 PM

## 2019-03-04 NOTE — ANESTHESIA CARE TRANSFER NOTE
Patient: Jama Paul    Procedure(s):  BIOPSY BONE MARROW    Diagnosis: SEVERE THROMBOCYTOPENIA  Diagnosis Additional Information: No value filed.    Anesthesia Type:   MAC     Note:  Airway :Nasal Cannula  Patient transferred to:PACU  Comments: Pt awake and able to verbalize needs. VSS. All monitors on and audible. Spontaneous respiration without difficulty. o2 sats 100% on 3L/nasal cannula. Pt denies pain. Report given to PACU RN.Handoff Report: Identifed the Patient, Identified the Reponsible Provider, Reviewed the pertinent medical history, Discussed the surgical course, Reviewed Intra-OP anesthesia mangement and issues during anesthesia, Set expectations for post-procedure period and Allowed opportunity for questions and acknowledgement of understanding      Vitals: (Last set prior to Anesthesia Care Transfer)    CRNA VITALS  3/4/2019 1146 - 3/4/2019 1231      3/4/2019             NIBP:  99/67    NIBP Mean:  72    Resp Rate (set):  10                Electronically Signed By: ERIC Schmitt CRNA  March 4, 2019  12:31 PM

## 2019-03-04 NOTE — PROCEDURES
The patient was positively identified and informed consent was obtained (see the completed Affirmation of Consent for Bone Marrow Aspiration and/or Biopsy Procedure(s) form in the patient's chart). The patient was placed in the prone position and the bony landmarks of the pelvis were identified. Medical staff reconfirmed the patient's name, date of birth and procedure. The skin over the posterior iliac crest was scrubbed and draped in a sterile fashion. The local area of the procedure was anesthetized with a total of 12 mL of 1% Lidocaine and a small incision was made.  The patient did receive conscious sedation.    Trephine bone marrow core(s) was/were obtained from the left posterior iliac crest. Bone marrow aspirate was obtained from the left posterior iliac crest for: morphology with possible immunophenotyping and/or cytogenetics and molecular diagnostics.    Direct pressure was applied to the biopsy site with sterile gauze. The biopsy site was cleaned with alcohol and a sterile dressing was placed over the biopsy incision using a pressure bandage. The patient was then placed in the supine position to maintain pressure on the biopsy site. Post-procedure wound care instructions, including routine dressing instructions and analgesia, were given to the patient. The procedure was completed without complication.

## 2019-03-04 NOTE — OR NURSING
Discharge instructions reviewed with patient.  Dressing is C/D/I.  Patient has number to call if there are any questions.

## 2019-03-05 LAB — COPATH REPORT: NORMAL

## 2019-03-13 ENCOUNTER — MYC MEDICAL ADVICE (OUTPATIENT)
Dept: ONCOLOGY | Facility: CLINIC | Age: 84
End: 2019-03-13

## 2019-03-13 LAB
COPATH REPORT: NORMAL

## 2019-03-28 ENCOUNTER — ONCOLOGY VISIT (OUTPATIENT)
Dept: ONCOLOGY | Facility: CLINIC | Age: 84
End: 2019-03-28
Attending: INTERNAL MEDICINE
Payer: MEDICARE

## 2019-03-28 VITALS
WEIGHT: 160 LBS | RESPIRATION RATE: 16 BRPM | HEIGHT: 69 IN | BODY MASS INDEX: 23.7 KG/M2 | HEART RATE: 64 BPM | DIASTOLIC BLOOD PRESSURE: 79 MMHG | TEMPERATURE: 97.6 F | SYSTOLIC BLOOD PRESSURE: 128 MMHG

## 2019-03-28 DIAGNOSIS — D69.6 THROMBOCYTOPENIA (H): Primary | ICD-10-CM

## 2019-03-28 DIAGNOSIS — D69.6 THROMBOCYTOPENIA (H): ICD-10-CM

## 2019-03-28 PROCEDURE — G0463 HOSPITAL OUTPT CLINIC VISIT: HCPCS

## 2019-03-28 PROCEDURE — 99215 OFFICE O/P EST HI 40 MIN: CPT | Performed by: INTERNAL MEDICINE

## 2019-03-28 RX ORDER — DEXAMETHASONE 4 MG/1
40 TABLET ORAL
Qty: 50 TABLET | Refills: 0 | Status: SHIPPED | OUTPATIENT
Start: 2019-03-28 | End: 2019-04-09

## 2019-03-28 RX ORDER — OMEPRAZOLE 40 MG/1
CAPSULE, DELAYED RELEASE ORAL
Qty: 90 CAPSULE | Refills: 3 | Status: SHIPPED | OUTPATIENT
Start: 2019-03-28 | End: 2019-10-30

## 2019-03-28 RX ORDER — OMEPRAZOLE 40 MG/1
40 CAPSULE, DELAYED RELEASE ORAL DAILY
Qty: 30 CAPSULE | Refills: 0 | Status: SHIPPED | OUTPATIENT
Start: 2019-03-28 | End: 2019-03-28

## 2019-03-28 ASSESSMENT — PAIN SCALES - GENERAL: PAINLEVEL: NO PAIN (0)

## 2019-03-28 ASSESSMENT — MIFFLIN-ST. JEOR: SCORE: 1378.2

## 2019-03-28 NOTE — LETTER
"    3/28/2019         RE: Jama Paul  [address]  [facility] [address]        Dear Colleague,    Thank you for referring your patient, Jama Paul, to the [facility]. Please see a copy of my visit note below.     .  Oncology Rooming Note    March 28, 2019 11:40 AM   Jmaa Paul is a 88 year old male who presents for:    Chief Complaint   Patient presents with     Oncology Clinic Visit     Thrombocytopenia (H)     Initial Vitals: /79 (BP Location: Right arm, Patient Position: Sitting, Cuff Size: Adult Regular)   Pulse 64   Resp 16   Wt 72.6 kg (160 lb)    L/min   BMI 23.97 kg/m    Estimated body mass index is 23.97 kg/m  as calculated from the following:    Height as of 2/27/19: 1.74 m (5' 8.5\").    Weight as of this encounter: 72.6 kg (160 lb). Body surface area is 1.87 meters squared.  Data Unavailable Comment: Data Unavailable   No LMP for male patient.  Allergies reviewed: Yes  Medications reviewed: Yes    Medications: Medication refills not needed today.  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 These findings document the presence of an abnormal clonal process most frequently associated with myeloid disorders, including myelodysplastic syndrome.     CURRENT INTERVENTIONS:  Ongoing evaluation    SUBJECTIVE   Jama Paul is being followed for severe thrombocytopenia    Jama tolerated his bone marrow biopsy and had no difficulty with the procedure. He is quite happy to note that it did not bother him. He is here to review results.       PAST MEDICAL HISTORY     Past Medical History:   Diagnosis Date     Congestive heart failure (H)      Elevated PSA      Eye hemorrhage, left 02/2019     Non-ischemic cardiomyopathy (H)     2017, med treatment, echo done 4/18 nl ef, mod to severe tr     Permanent atrial fibrillation (H) 2011     Thrombocytopenia (H)      Unspecified essential hypertension          CURRENT OUTPATIENT MEDICATIONS     Current Outpatient Medications   Medication Sig     Ascorbic Acid (VITAMIN C PO) Take 500 mg by mouth daily     CYANOCOBALAMIN PO Take 500 mcg by mouth daily     dexamethasone (DECADRON) 4 MG tablet Take 40 mg by mouth daily (with breakfast).     furosemide (LASIX) 20 MG tablet Take 1 tablet (20 mg) by mouth daily     metoprolol succinate ER (TOPROL XL) 50 MG 24 hr tablet Take 2 tablets(100mg) by mouth every am and 1 tablet(50mg) every pm     Multiple Vitamins-Minerals (ICAPS AREDS FORMULA PO) Take 1 tablet by mouth daily Takes in addition to multivitamin with minerals     Nutritional Supplements (ENSURE COMPLETE PO) 1 drink a day     sacubitril-valsartan (ENTRESTO) 24-26 MG per tablet Take 1/2 pill in the morning and a full pill at night.     Vitamin D, Cholecalciferol, 1000 UNITS TABS Take 2,000 Units by mouth daily      omeprazole (PRILOSEC) 40 MG DR capsule TAKE 1 CAPSULE(40 MG) BY MOUTH DAILY     warfarin (COUMADIN) 5 MG tablet Take 1 tab daily or  "as directed per INR clinic (Patient not taking: Reported on 2/22/2019)     No current facility-administered medications for this visit.         ALLERGIES    No Known Allergies     REVIEW OF SYSTEMS   As above in the HPI, o/w complete 12-point ROS was negative.     PHYSICAL EXAM   /79 (BP Location: Right arm, Patient Position: Sitting, Cuff Size: Adult Regular)   Pulse 64   Temp 97.6  F (36.4  C) (Axillary)   Resp 16   Ht 1.74 m (5' 8.5\")   Wt 72.6 kg (160 lb)    L/min   BMI 23.97 kg/m     GEN: NAD  HEENT: PERRL, EOMI, no icterus, injection or pallor. Oropharynx is clear.  LYMPHATICs: no cervical or supraclavicular lymphadenopathy; no other abn lymphadenopathy  PULMONARY: clear with good air entry bilaterally  CARDIOVASCULAR: regular, no murmurs, rubs, or gallops  GASTROINTESTINAL: soft, non-tender, non-distended, normal bowel sounds, no hepatosplenomegaly by percussion or palpation  MUSCULOSKELTAL: warm, well perfused, no edema  NEURO: awake, alert and oriented to time place and person, cranial nerves intact - II - XII, no focal neurologic deficits  SKIN: no rashes     LABORATORY AND IMAGING STUDIES     Recent Labs   Lab Test 02/08/19  1510 01/15/19  1107 10/16/18  0958 07/16/18  0827 04/16/18  1334    135* 137 139 136   POTASSIUM 4.0 3.9 4.3 3.9 4.2   CHLORIDE 108 105 103 105 102   CO2 22 28 27 27 28   ANIONGAP 7 5.9* 11.3 10.9 10.2   BUN 25 21 22 22 24   CR 1.25 1.23 1.24 1.27 1.21   * 103 99 143* 117*   BARON 8.4* 9.2 8.7 8.8 8.7     Recent Labs   Lab Test 01/19/18  0533   MAG 2.2     Recent Labs   Lab Test 03/04/19  1128 02/08/19  1510 02/05/19  1101 03/01/18  0612 02/28/18  1024  01/23/18  0630  01/19/18  0054   WBC 5.5 6.8 6.1 6.2 6.1   < > 6.4   < > 6.7   HGB 11.0* 10.7* 11.3* 10.4* 10.6*   < > 12.6*   < > 10.4*   PLT 34* 29* 37* 46* 44*   < > 53*   < > 38*   MCV 96 98 95 98 99   < > 96   < > 97   NEUTROPHIL 81.8 83.2  --   --  80.6  --  71.8  --  74.1    < > = values in this " interval not displayed.     Recent Labs   Lab Test 02/08/19  1510 02/28/18  1024 01/19/18  0054   BILITOTAL 0.6 0.7 0.6   ALKPHOS 38* 41 33*   ALT 15 32 27   AST 11 19 21   ALBUMIN 3.7 3.4 2.8*     TSH   Date Value Ref Range Status   02/05/2019 0.99 0.40 - 4.00 mU/L Final   01/19/2018 2.87 0.40 - 4.00 mU/L Final     No results for input(s): CEA in the last 11116 hours.  Results for orders placed or performed during the hospital encounter of 02/28/18   XR Chest 2 Views    Narrative    XR CHEST 2 VW 2/28/2018 10:50 AM    COMPARISON: 1/19/2018    HISTORY: Shortness of breath.      Impression    IMPRESSION: Trace bilateral pleural effusions with associated  bibasilar atelectasis, not significant changed. No pneumothorax seen  on either side. Mildly enlarged cardiac silhouette again seen.    LILIANA VALDEZ MD      Lab Results   Component Value Date    PATH  03/04/2019     Patient Name: YINKA GONZALEZ  MR#: [id number]  Specimen #: [id number]  Collected: 3/4/2019  Received: 3/4/2019  Reported: 3/6/2019 13:13  Ordering Phy(s): FRANCA CHAUDHRY    For improved result formatting, select 'View Enhanced Report Format' under   Linked Documents section.    ORIGINAL REPORT FOLLOWS ADDENDUM    ADDENDUM    TO ORIGINAL REPORT  Status: Signed Out  Date Ordered:3/13/2019  Date Complete:3/13/2019  Date Reported:3/13/2019 20:47  Signed Out By: Shlomo Childers M.D.    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  Although deletion of 20q is not   by itself an MDS-defining chromosomal  abnormality, it is an abnormality often seen in myeloid disorders   (including MDS).  Although the morphologic  findings in this case remain quantitatively insufficient and thus preclude   an outright diagnosis of MDS at  this time, this abnormal cytogenetic finding further raises the   possibility of an evolving clonal  myeloid  lesion.  Followup of CBC and blood and/or bone marrow sampling at a later   date is recommended to monitor for  potential progression.    __________________________________________    ORIGINAL REPORT:    TEST(S):  Bone Marrow Core and Aspirate, left    FINAL DIAGNOSIS:  Bone marrow aspirate, biopsy, clot section, and peripheral blood:    1. Hypercellular bone marrow with increased trilineage hematopoiesis   (including adequate megakaryopoiesis).  2. No sufficient morphologic features of myelodysplasia.  3. No increase in CD34-positive blasts.  4. Cytogenetic and molecular studies are pending and results will be   reported separately when available.  5. Peripheral blood with marked thrombocytopenia and moderate normocytic   anemia.    See comment.    COMMENT: Noted is the reported history (per available electronic medical   record) including atrial fibrillation  (on anticoagulation), essential hypertension, and longstanding   thrombocytopenia.  Although the current  material demonstrates occasional morphologic features which may also be   seen in myelodysplastic syndrome  (MDS), such features in this case are present in a quantity deemed   diagnostically insufficient for MDS at this  time.  Thrombocytopenia is nonspecific and may be associated with   idiopathic thrombocytopenic purpura (ITP),  medications, autoimmune disorders, alcohol use, splenomegaly, and platelet   consumptive processes.  The marrow  hyperplasia may be compensatory/physiologic in response to the peripheral   blood cytopenias.  The finding of  ring sideroblasts are of uncertain significance, as ring sideroblasts may   also be seen in cases of  nonneoplastic sideroblastic anemia.    To further evaluate for the presence of an underlying clonal process,   however, cytogenetic studies are pending  and results will be reported separately when available.  Also, given the   finding of ring sideroblasts,  molecular studies for SF3B1 are pending  and will be reported separately.    Correlation with those results is  recommended.    Electronically signed out by:    Shlomo Childers M.D.    PERIPHERAL BLOOD DATA:  Numerical values with reference ranges for the CBC and differential   associated with this specimen are listed  in another section of this report.    Erythrocytes: The hemoglobin is quantitatively moderately decreased and   the erythrocytes are normocytic and  normochromic.  There is nonspecific anisopoikilocytosis.  There is no   polychromasia.  There is no rouleaux.  Nucleated erythroid cells (NRBC's) are not seen on scanning.  Schistocytes   are not seen on scanning.    Leukocytes: The WBC is quantitatively normal.  There are no significant   morphologic abnormalities.    Platelets: Platelets are quantitatively markedly decreased.  There are no   significant morphologic  abnormalities.    BONE MARROW:  Bone marrow differential (500 cell differential, performed on a ME   preparation slide):    01%     Blasts (reference range: 0-1%)  52%     Neutrophils and precursors (reference range: 54-63%)  31%     Erythroid precursors (reference range: 18-24%)  05%     Monocytes (reference range: 1-1.5%)  02%     Eosinophils (reference range: 1-3%)  08%     Lymphocytes (reference range: 8-12%)  01%     Plasma cells (reference range: 0-1.5%)    BONE MARROW ASPIRATE:    The aspirate is adequate for review (see differential).    The M:E ratio is normal.    Erythroid precursors appear increased in quantity.  On scanning there is   occasional nuclear budding and  internuclear bridging, features which may be categorized as dysplastic,   but comprising less than 10% of the  erythroid lineage, and overall interpreted as morphologically insufficient   for dysplasia.    Granulocyte precursors appear increased in quantity.  Blasts are not   proportionately increased.  On scanning  there is occasional abnormal nuclear segmentation, decreased cytoplasmic   granularity,  and nuclear-cytoplasmic  dyssynchrony, features which may be categorized as dysplastic, but   comprising less than 10% of the granulocyte  lineage, and overall interpreted as morphologically insufficient for   dysplasia.    Megakaryocytes appear increased in quantity and demonstrate no significant   morphologic abnormalities.    Lymphocytes and plasma cells appear normal in quantity and demonstrate no   significant morphologic  abnormalities.    The touch imprint shows similar findings.    BONE MARROW CORE:    The bone marrow core biopsy material is adequate for review (2.3 cm in   linear aggregate).  The overall  cellularity is increased for age (80%).    Erythroid precursors appear increased in quantity and normal in   distribution.    Granulocyte precursors appear increased in quantity and normal in   distribution.    Megakaryocytes appear increased in quantity and normal in distribution.    Lymphocytes and plasma cells appear normal in quantity and normal in   distribution on H&E.  There are no  lymphoid aggregates.    Sections of the clot correlate with these findings.    IRON STAIN:    Iron stains are performed with adequate controls.  Iron stores appear   decreased.  Sideroblasts are identified.   Ring sideroblasts are seen but comprise less than 15% of erythroid   precursors.    ADDITIONAL STAINS:    For further evaluation of the immunoarchitecture, immunohistochemical   stains with antibodies to CD34 are  performed on the core section with adequate controls.  CD34 is positive in   scattered blasts which are not  proportionately increased and comprise less than 5% of the cellularity.    FLOW CYTOMETRY:    For interpretative support, flow cytometric studies are performed and   interpreted on a representative  sampling.  (For complete details, please see separate flow cytometry   report, [id number]).  In summary, that  study demonstrates polytypic B cells, no aberrant immunophenotype on T   cells, polytypic  plasma cells, no  increase in myeloid blasts, and no abnormal myeloid blast population.    CYTOGENETICS AND/OR FLUORESCENCE IN SITU HYBRIDIZATION (FISH):    Cytogenetic studies are pending and results will be reported separately   when available.    MOLECULAR STUDIES:    Molecular studies for SF3B1 are pending and will be reported separately.    CLINICAL LAB RESULTS:  Battery Order No. Lab Test Code Clinical Result Ref. Range Units Result   Date  Hemogram/Diff/PLT  S WBC Count 5.5 4.0-11.0 10e9/L 3/4/2019 11:51       RBC Count L 3.52 4.4-5.9 10e12/L 3/4/2019 11:51       Hemoglobin L 11.0 13.3-17.7 g/dL 3/4/2019 11:51       Hematocrit L 33.9 40.0-53.0 % 3/4/2019 11:51       MCV 96  fl 3/4/2019 11:51       MCH 31.3 26.5-33.0 pg 3/4/2019 11:51       MCHC 32.4 31.5-36.5 g/dL 3/4/2019 11:51       RDW H 18.4 10.0-15.0 % 3/4/2019 11:51       Platelet Count SEE TEXT 150-450 10e9/L 3/4/2019 11:51       Text/Comments:   Flags  LL  34 This result has been called to [facility] by Jonathan Samuels   on 03 04 2019 at 1148, and has been read back.        SEE TEXT  ...    PATH  03/04/2019     Patient Name: YINKA GONZALEZ  MR#: [id number]  Specimen #: [id number]  Collected: 3/4/2019 12:05  Received: 3/4/2019 17:51  Reported: 3/13/2019 18:11  Ordering Phy(s): FRANCA CHAUDHRY  Additional Phy(s): KAREN MOYA    For improved result formatting, select 'View Enhanced Report Format' under   Linked Documents section.  __________________________________________    TEST(S) REQUESTED:  Bone Marrow Chromosome Analysis    SPECIMEN DESCRIPTION:  Bone Marrow Aspirate    CLINICAL COMMENTS:  Severe thrombocytopenia.    [id number]    Metaphases analyzed:                  20  Additional metaphases screened:          0  Metaphases karyotyped:               3  Banding utilized:               G-banding  Band resolution:             < 400 - 400  Karyotypically normal cells:              1  Karyotypically abnormal cells:              19    METHODS:  Unstimulated, 24 and 48 hour cultures.    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    These findings document the presence of an abnormal clonal process in this   patient's bone marrow. Deletions of  20q are well documented recurring abnormalities that are most frequently   associated with myeloid disorders,  including myelodysplastic syndrome. However, the finding of an isolated   deletion 20q is not an MDS-defining  abnormality in the absence of morphologic criteria for MDS (Arber DA et   al, 2016, Blood 127:2391). Correlation  with clinical and other laboratory findings may be helpful for further   interpretation of these results;  additionally, if clinically indicated, a follow-up study to monitor the   behavior of this clone is also  recommended.    Juana Trinidad, Ph.D/  Ana Paula Johnson, Ph.D/Director  Cytogenetics Laboratory    Electronically Signed Out By:  Anila Vergara M.D., UM[facility]iciansT Codes:  A: 01990-DVDTF, 91939-OCSWC, 27099-HNCDHQA, 40540-EOHUOF, 18959-YLTTOSY    TESTING LAB LOCATION:  [address] [id number] [phone]    COLLECTION SITE:  Client:  [facility]  Location:  [facility] (S)      PATH  03/04/2019     Patient Name: YINKA GONZALEZ  MR#: [id number]  Specimen #: [id number]  Collected: 3/4/2019 12:05  Received: 3/4/2019 17:44  Reported: 3/5/2019 13:13  Ordering Phy(s): JOE MORENO    For improved result formatting, select 'View Enhanced Report Format' under   Linked Documents section.  _________________________________________    SPECIMEN(S):  Bone marrow, left    INTERPRETATION:  Bone marrow, left:       Polytypic B cells        No aberrant immunophenotype on T cells       Polytypic plasma cells       No increase in myeloid blasts and no abnormal myeloid blast   population    COMMENT:  Final interpretation requires correlation with results of other ancillary   studies, morphologic and clinical  features.    RESULTS:  Percentages reported below are based on the total number of CD45 positive   viable leukocytes. If applicable,  percentage of plasma cells is from total viable nucleated cells.  0.2 % polytypic B cells  1.3% hematogones/normal B lineage precursors  3.8% T cells with a CD4:CD8 ratio of 0.7:1.    0.5% NK cells    0.2% polytypic plasma cells    The plasma cell percentage by flow cytometry is expected to be less than   by morphology due to specimen  processing.  2% cells in the blast gate (CD45 dim and low side scatter blast gate).   There is no aberrant immunophenotype on  the myeloid blasts.  1% CD34 positive blasts    ANTIBODIES:  Eight, nine, and ten color analyses are performed for the following   antigens: CD2, CD3, CD4, CD5, CD7, CD8,  CD10, CD11b, CD13, CD14, CD15, CD16, CD19, CD20, CD33, CD34, CD38, CD45,   CD56, CD64, , , HLA-DR, and  surface and cytoplasmic kappa and lambda immunoglobulin light chains.   Cells are gated to isolate populations  (CD45 versus side scatter and forward scatter versus side scatter), to   exclude debris (forward scatter versus  side scatter) and to exclude cell doublets (forward scatter height versus   forward scatter width and side  scatter height versus side scatter width). Forward scatter varies with   cell size. Side scatter varies with the  amount of cytoplasmic granules. Intensity for CD45 usually increases as   hematolymphoid cells mature. The  analytic sensitivity of this assay to detect monotypic plasma cells as   rare flow events is 0.01%.    CLINICAL HISTORY:  88 year old male with thrombocytopenia    I have personally reviewed all specimens and/or slides, including the    listed special stains, and used them  with my medical judgment to determine the final diagnosis.    Electronically signed out by:    Pako Foster M.D.,[facility]    This test was developed and its performance characteristics determined by   [facility]. It has not been cleared or   approved by the US Food and Drug  Administration.  FDA does not require this test to go through premarket   FDA review. This test is used for  clinical purposes and should not be regarded as investigational or for   research.  This laboratory is certified  under the Clinical Laboratory Improvement Amendments (CLIA) as qualified   to perform high complexity clinical  laboratory testing.    CPT Codes:  A: 78911-FD, 29142-LCUOQTO, 44348-78-HKBU88(22), 45709-PMVT>15,   48619-50-QQWR03    TESTING LAB LOCATION:  [address] [address] [phone]    COLLECTION SITE:  Client:  [facility]  Location:  [facility] (S)      PATH  03/04/2019     Patient Name: YINKA GONZALEZ  MR#: [id number]  Specimen #: [id number]  Collected: 3/4/2019 12:05  Received: 3/5/2019 09:55  Reported: 3/12/2019 14:36  Ordering Phy(s): JEO MORENO  Additional Phy(s): KAREN MOYA    For improved result formatting, select 'View Enhanced Report Format' under   Linked Documents section.  _________________________________________    ORIGINAL REPORT FOLLOWS ADDENDUM  ADDENDUM    TO ORIGINAL REPORT  Status: Signed Out  Date Ordered:3/13/2019  Date Complete:3/13/2019  Date Reported:3/13/2019 16:54  Signed Out By: Marcin Cee MD    INTERPRETATION:  This addendum is created to correct the reference to the interpretation of   the morphological and  immunophenotypic findings. The diagnosis remains the same.    COMMENTS:  The concurrent bone marrow morphology and immunophenotypic evaluation    showed NO definite evidence for myeloid  neoplasm ([id number], [id number]).    __________________________________________    <<<ORIGINAL REPORT>>>    TEST(S) REQUESTED:  A: Next Generation Sequencing Oncology-SF3B1  B: Process and Hold    SPECIMEN DESCRIPTION:  Bone Marrow (Left)  SM19-23    ---------------------------------------------------------------------    SIGNIFICANT RESULTS    ---------------------------------------------------------------------    Detected Alterations of Known or Potential Pathogenicity: None    Detected Alterations of Uncertain Significance: None    Genes with No Detected Alterations of the Amino Acid Sequence: SF3B1    ---------------------------------------------------------------------    INTERPRETATION OF RESULTS    ---------------------------------------------------------------------    No pathogenic mutations were identified in the SF3B1 gene.    The concurrent bone marrow morphology and immunophenotypic evaluation   showed definite evidence for myeloid  neoplasm (SM19-23, [id number]).    Clinical management should not be based on this assay and interpretation   alone.  Correlation with morphologic examination, other ancillary studies and   clinical presentation is recommended.    ---------------------------------------------------------------------    COVERAGE  Unless otherwise reported, all positions in the genes ordered were   sequenced at or greater than 125x. If any  genes were sequenced at <125x coverage, the gene and the fraction of that   gene sequenced at 125x is listed  below. Mutations present at low variant allele fractions (VAFs) in lower   coverage positions cannot be formally  excluded.    METHODOLOGY :  Genomic DNA is extracted from the sample, library preparation is mediated   by tagmentation following [employer]  protocols, and target enrichment is performed by hybrid capture using [employer]   customized baits and Illumina Rapid  capture reagents per 's protocol. The  enriched libraries are   sequenced on an Illumina MiSeq (using  version 3 chemistry). FASTQ files are processed through a customized   bioinformatic pipeline including  [employer], PandaSeq, BWA, and [employer]. Variant call files (VCF) are   uploaded to [employer] software for  variant filtering, annotation, and reporting. Additional information   regarding these methods including the  assay bedfile is available upon request.    The following types of variants are not included in the clinical report,   but information about these variants  is available upon request:  *Variants that are present at >1% MAF in population databases AND are not   reported as pathogenic/likely  pathogenic in ClinVar.  *Variants with a MAF <1%, that were interpreted to be benign or likely   benign variants.  *Intronic variants that reside more than 2 bases from the exon/intron   boundary AND are not reported as  pathogenic/likely pathogenic.  *Untranslated region (UTR) variants not previously categorized as   pathogenic or likely pathogenic in relevant  clinical databases.  *Some variants may be excluded if interpreted as technical artifacts based   on review of sequencing quality  data.    LIMITATIONS  This assay has been validated to detected single nucleotide variants   (SNVs) and insertion-deletions (indels)  less than or equal to 30bp that arepresent at 5% or greater variant allele   fraction (VAF). Based on these  sensitivities, tumor cell population must comprise at least 10% of the   submitted specimen. Specimens with  borderline tumor cellularity may lead to false negative results. Caution   is advised for the interpretation of  negative results in these situations, and correlation with morphology and   other laboratory results is  recommended to ensure adequate representation of the cell population of   interest. Please note also that large  indels (>30bp) may not be detected in this assay. Additional information   from the  validation of this assay  regarding the detection of low VAF variants and/or large indels is   available upon request.    DISCLAIMER  This test was developed and its performance characteristics determined by   the [facility], [facility]. It has not been cleared or   approved by the FDA. The laboratory is  regulated under CLIA as qualified to perform high-complexity testing. This   test is used for clinical purposes.  It should not be regarded as investigational or for research.    REFERENCES:  Raheem TORRES, Amberly ALLISON, Darren LB, Olaf C, Alexandre GUERRA, et al. 2015. Criteria   for Clinical Reporting of Variants  from a Broad Target Capture NGS Assay without Davis Verification. JSM   biomarkers 2(1):1005.    Kira Coleman Spears MD, Yoel GOMEZ, Booker OBRIEN, Shana LEIGH, Mirella Jj Bower M, Suzi PADILLA,  Marcos KINGSLEY. 2014. Implementation of Cloud based next generation   sequencing data analysis in a clinical  laboratory. BMC Res Notes. 7:314. PMID: 83979664.    Jagdish JA, Jorge A AW, O'Taz TD, Hans W, Eleuterio EE, Jessica S, Mckeon S,   Do R, Berry X, Silvano G, Raya HM, Chad  D, Delma SM, Vel S, Eduardo MJ, Elva G, Butch D, Angy DA,   Brenda E, Sunhamiltonev S, Chely  CD, Sheree MJ, Yaneli JM, [facility] GO, [facility], NHL Exome Sequencing   Project. 2012. Evolution and functional  impact of rare coding variation from deep sequencing of human exomes.   Science. 337(9453):64-9. PMID: 54491010.    Shana Jj Bunjer K, Amberly Moreno Schomaker M, Alexandre GUERRA,   Kira Sarmiento, Irving Y, Linda Hall MD, Booker JEAN, Suzi PADILLA, Marcos B. 2015. Clinical   validation of targeted next-generation  sequencing for inherited disorders. Arch. Pathol. Lab. Med. 139(2):204-10.   PMID: 01901881.    This test was developed and its performance characteristics determined by   the [facility],  [facility]  [facility] and the [facility]   [facility]  [facility] Room 1-210, [address] [place] [address]).   Genomic DNA  extraction, library preparation, sequencing, and interpretation of   sequencing data is performed at [facility],  [facility] Diagn[facility] Laboratory.  This test   has not been cleared or approved by  the FDA. The laboratory is regulated under CLIA as qualified to perform   high-complexity testing. This test is  used for clinical purposes. It should not be regarded as investigational   or for research.    A resident/fellow in an accredited training program was involved in the   selection of testing, review of  laboratory data, and/or interpretation of this case.  I, as the senior   physician, attest that I: (i) confirmed  appropriate testing, (ii) examined the relevant raw data for the   specimen(s); and (iii) rendered or confirmed  the interpretation(s).    INTERPRETATION:  RESULTS:  DNA processing completed.  The sample is archived for future use.    Electronically Signed Out By:  Marcin Cee MD    CPT Codes:  A: -SVIWCP, [id number]    TESTING LAB LOCATION:  [address] [address]         ASSESSMENT AND PLAN   1. 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 Recent bleed behind retina with vision loss while on coumadin  4. ECOG PS 1    I reviewed his severe thrombocytopenia and findings of his bone marrow biopsy with him. There has been good news that there are no blasts or any clear evidence of MDS/AML. He does have hypercellular marrow with 80% cellularity which is clearly not normal. We would not have to act on it at this time.     He has normal thrombocytes - PLT precursors  suggesting that his production of PLT has not been affected and it is peripheral destruction. This suggests ITP. I reviewed management of ITP including corticosteroids. We do have options of either prednisone 1 mg/kg or a short course of high dose dexamethasone. I would favor the latter as he would not have to take it for prolonged periods if it is effective. There is no taper with a short course of dexamethasone 40 mg daily.     I reviewed the side effects, risks, benefits and alternatives. I will prescribe him omeprazole along with dexamethasone to prevent gastritis and encouraged him to take this with breakfast. I will have him follow up with our NP - Eric in a week with labs to assess how well he is tolerating and PLT response.     I briefly reviewed management of MDS. We will follow his WBC counts. He has normal WBC and only mild anemia.     All questions for patient his wife and daughter were answered.     Over 45 min of direct face to face time spent with patient with more than 50% time spent in counseling and coordinating care.        Again, thank you for allowing me to participate in the care of your patient.        Sincerely,        Pa Britton MD

## 2019-03-28 NOTE — PROGRESS NOTES
" .  Oncology Rooming Note    March 28, 2019 11:40 AM   Jama Paul is a 88 year old male who presents for:    Chief Complaint   Patient presents with     Oncology Clinic Visit     Thrombocytopenia (H)     Initial Vitals: /79 (BP Location: Right arm, Patient Position: Sitting, Cuff Size: Adult Regular)   Pulse 64   Resp 16   Wt 72.6 kg (160 lb)    L/min   BMI 23.97 kg/m   Estimated body mass index is 23.97 kg/m  as calculated from the following:    Height as of 2/27/19: 1.74 m (5' 8.5\").    Weight as of this encounter: 72.6 kg (160 lb). Body surface area is 1.87 meters squared.  Data Unavailable Comment: Data Unavailable   No LMP for male patient.  Allergies reviewed: Yes  Medications reviewed: Yes    Medications: Medication refills not needed today.  Pharmacy name entered into Zipnosis:    [id number]  [place], [place] - [address] [facility] - [address]    Clinical concerns: no          Iris Sorenson MA            "

## 2019-03-31 NOTE — PROGRESS NOTES
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 These findings document the presence of an abnormal clonal process most frequently associated with myeloid disorders, including myelodysplastic syndrome.     CURRENT INTERVENTIONS:  Ongoing evaluation    SUBJECTIVE   Jama Paul is being followed for severe thrombocytopenia    Jama tolerated his bone marrow biopsy and had no difficulty with the procedure. He is quite happy to note that it did not bother him. He is here to review results.       PAST MEDICAL HISTORY     Past Medical History:   Diagnosis Date     Congestive heart failure (H)      Elevated PSA      Eye hemorrhage, left 02/2019     Non-ischemic cardiomyopathy (H)     2017, med treatment, echo done 4/18 nl ef, mod to severe tr     Permanent atrial fibrillation (H) 2011     Thrombocytopenia (H)      Unspecified essential hypertension          CURRENT OUTPATIENT MEDICATIONS     Current Outpatient Medications   Medication Sig     Ascorbic Acid (VITAMIN C PO) Take 500 mg by mouth daily     CYANOCOBALAMIN PO Take 500 mcg by mouth daily     dexamethasone (DECADRON) 4 MG tablet Take 40 mg by mouth daily (with breakfast).     furosemide (LASIX) 20 MG tablet Take 1 tablet (20 mg) by mouth  "daily     metoprolol succinate ER (TOPROL XL) 50 MG 24 hr tablet Take 2 tablets(100mg) by mouth every am and 1 tablet(50mg) every pm     Multiple Vitamins-Minerals (ICAPS AREDS FORMULA PO) Take 1 tablet by mouth daily Takes in addition to multivitamin with minerals     Nutritional Supplements (ENSURE COMPLETE PO) 1 drink a day     sacubitril-valsartan (ENTRESTO) 24-26 MG per tablet Take 1/2 pill in the morning and a full pill at night.     Vitamin D, Cholecalciferol, 1000 UNITS TABS Take 2,000 Units by mouth daily      omeprazole (PRILOSEC) 40 MG DR capsule TAKE 1 CAPSULE(40 MG) BY MOUTH DAILY     warfarin (COUMADIN) 5 MG tablet Take 1 tab daily or as directed per INR clinic (Patient not taking: Reported on 2/22/2019)     No current facility-administered medications for this visit.         ALLERGIES    No Known Allergies     REVIEW OF SYSTEMS   As above in the HPI, o/w complete 12-point ROS was negative.     PHYSICAL EXAM   /79 (BP Location: Right arm, Patient Position: Sitting, Cuff Size: Adult Regular)   Pulse 64   Temp 97.6  F (36.4  C) (Axillary)   Resp 16   Ht 1.74 m (5' 8.5\")   Wt 72.6 kg (160 lb)    L/min   BMI 23.97 kg/m    GEN: NAD  HEENT: PERRL, EOMI, no icterus, injection or pallor. Oropharynx is clear.  LYMPHATICs: no cervical or supraclavicular lymphadenopathy; no other abn lymphadenopathy  PULMONARY: clear with good air entry bilaterally  CARDIOVASCULAR: regular, no murmurs, rubs, or gallops  GASTROINTESTINAL: soft, non-tender, non-distended, normal bowel sounds, no hepatosplenomegaly by percussion or palpation  MUSCULOSKELTAL: warm, well perfused, no edema  NEURO: awake, alert and oriented to time place and person, cranial nerves intact - II - XII, no focal neurologic deficits  SKIN: no rashes     LABORATORY AND IMAGING STUDIES     Recent Labs   Lab Test 02/08/19  1510 01/15/19  1107 10/16/18  0958 07/16/18  0827 04/16/18  1334    135* 137 139 136   POTASSIUM 4.0 3.9 4.3 3.9 " 4.2   CHLORIDE 108 105 103 105 102   CO2 22 28 27 27 28   ANIONGAP 7 5.9* 11.3 10.9 10.2   BUN 25 21 22 22 24   CR 1.25 1.23 1.24 1.27 1.21   * 103 99 143* 117*   BARON 8.4* 9.2 8.7 8.8 8.7     Recent Labs   Lab Test 01/19/18  0533   MAG 2.2     Recent Labs   Lab Test 03/04/19  1128 02/08/19  1510 02/05/19  1101 03/01/18  0612 02/28/18  1024  01/23/18  0630  01/19/18  0054   WBC 5.5 6.8 6.1 6.2 6.1   < > 6.4   < > 6.7   HGB 11.0* 10.7* 11.3* 10.4* 10.6*   < > 12.6*   < > 10.4*   PLT 34* 29* 37* 46* 44*   < > 53*   < > 38*   MCV 96 98 95 98 99   < > 96   < > 97   NEUTROPHIL 81.8 83.2  --   --  80.6  --  71.8  --  74.1    < > = values in this interval not displayed.     Recent Labs   Lab Test 02/08/19  1510 02/28/18  1024 01/19/18  0054   BILITOTAL 0.6 0.7 0.6   ALKPHOS 38* 41 33*   ALT 15 32 27   AST 11 19 21   ALBUMIN 3.7 3.4 2.8*     TSH   Date Value Ref Range Status   02/05/2019 0.99 0.40 - 4.00 mU/L Final   01/19/2018 2.87 0.40 - 4.00 mU/L Final     No results for input(s): CEA in the last 50152 hours.  Results for orders placed or performed during the hospital encounter of 02/28/18   XR Chest 2 Views    Narrative    XR CHEST 2 VW 2/28/2018 10:50 AM    COMPARISON: 1/19/2018    HISTORY: Shortness of breath.      Impression    IMPRESSION: Trace bilateral pleural effusions with associated  bibasilar atelectasis, not significant changed. No pneumothorax seen  on either side. Mildly enlarged cardiac silhouette again seen.    LILIANA VALDEZ MD      Lab Results   Component Value Date    PATH  03/04/2019     Patient Name: YINKA GONZALEZ  MR#: [id number]  Specimen #: [id number]  Collected: 3/4/2019  Received: 3/4/2019  Reported: 3/6/2019 13:13  Ordering Phy(s): FRANCA CHAUDHRY    For improved result formatting, select 'View Enhanced Report Format' under   Linked Documents section.    ORIGINAL REPORT FOLLOWS ADDENDUM    ADDENDUM    TO ORIGINAL REPORT  Status: Signed Out  Date Ordered:3/13/2019  Date  Complete:3/13/2019  Date Reported:3/13/2019 20:47  Signed Out By: Shlomo Childers M.D.    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  Although deletion of 20q is not   by itself an MDS-defining chromosomal  abnormality, it is an abnormality often seen in myeloid disorders   (including MDS).  Although the morphologic  findings in this case remain quantitatively insufficient and thus preclude   an outright diagnosis of MDS at  this time, this abnormal cytogenetic finding further raises the   possibility of an evolving clonal myeloid  lesion.  Followup of CBC and blood and/or bone marrow sampling at a later   date is recommended to monitor for  potential progression.    __________________________________________    ORIGINAL REPORT:    TEST(S):  Bone Marrow Core and Aspirate, left    FINAL DIAGNOSIS:  Bone marrow aspirate, biopsy, clot section, and peripheral blood:    1. Hypercellular bone marrow with increased trilineage hematopoiesis   (including adequate megakaryopoiesis).  2. No sufficient morphologic features of myelodysplasia.  3. No increase in CD34-positive blasts.  4. Cytogenetic and molecular studies are pending and results will be   reported separately when available.  5. Peripheral blood with marked thrombocytopenia and moderate normocytic   anemia.    See comment.    COMMENT: Noted is the reported history (per available electronic medical   record) including atrial fibrillation  (on anticoagulation), essential hypertension, and longstanding   thrombocytopenia.  Although the current  material demonstrates occasional morphologic features which may also be   seen in myelodysplastic syndrome  (MDS), such features in this case are present in a quantity deemed   diagnostically insufficient for MDS at this  time.  Thrombocytopenia is nonspecific and may be associated with   idiopathic  thrombocytopenic purpura (ITP),  medications, autoimmune disorders, alcohol use, splenomegaly, and platelet   consumptive processes.  The marrow  hyperplasia may be compensatory/physiologic in response to the peripheral   blood cytopenias.  The finding of  ring sideroblasts are of uncertain significance, as ring sideroblasts may   also be seen in cases of  nonneoplastic sideroblastic anemia.    To further evaluate for the presence of an underlying clonal process,   however, cytogenetic studies are pending  and results will be reported separately when available.  Also, given the   finding of ring sideroblasts,  molecular studies for SF3B1 are pending and will be reported separately.    Correlation with those results is  recommended.    Electronically signed out by:    Shlomo Childers M.D.    PERIPHERAL BLOOD DATA:  Numerical values with reference ranges for the CBC and differential   associated with this specimen are listed  in another section of this report.    Erythrocytes: The hemoglobin is quantitatively moderately decreased and   the erythrocytes are normocytic and  normochromic.  There is nonspecific anisopoikilocytosis.  There is no   polychromasia.  There is no rouleaux.  Nucleated erythroid cells (NRBC's) are not seen on scanning.  Schistocytes   are not seen on scanning.    Leukocytes: The WBC is quantitatively normal.  There are no significant   morphologic abnormalities.    Platelets: Platelets are quantitatively markedly decreased.  There are no   significant morphologic  abnormalities.    BONE MARROW:  Bone marrow differential (500 cell differential, performed on a ME   preparation slide):    01%     Blasts (reference range: 0-1%)  52%     Neutrophils and precursors (reference range: 54-63%)  31%     Erythroid precursors (reference range: 18-24%)  05%     Monocytes (reference range: 1-1.5%)  02%     Eosinophils (reference range: 1-3%)  08%     Lymphocytes (reference range: 8-12%)  01%     Plasma  cells (reference range: 0-1.5%)    BONE MARROW ASPIRATE:    The aspirate is adequate for review (see differential).    The M:E ratio is normal.    Erythroid precursors appear increased in quantity.  On scanning there is   occasional nuclear budding and  internuclear bridging, features which may be categorized as dysplastic,   but comprising less than 10% of the  erythroid lineage, and overall interpreted as morphologically insufficient   for dysplasia.    Granulocyte precursors appear increased in quantity.  Blasts are not   proportionately increased.  On scanning  there is occasional abnormal nuclear segmentation, decreased cytoplasmic   granularity, and nuclear-cytoplasmic  dyssynchrony, features which may be categorized as dysplastic, but   comprising less than 10% of the granulocyte  lineage, and overall interpreted as morphologically insufficient for   dysplasia.    Megakaryocytes appear increased in quantity and demonstrate no significant   morphologic abnormalities.    Lymphocytes and plasma cells appear normal in quantity and demonstrate no   significant morphologic  abnormalities.    The touch imprint shows similar findings.    BONE MARROW CORE:    The bone marrow core biopsy material is adequate for review (2.3 cm in   linear aggregate).  The overall  cellularity is increased for age (80%).    Erythroid precursors appear increased in quantity and normal in   distribution.    Granulocyte precursors appear increased in quantity and normal in   distribution.    Megakaryocytes appear increased in quantity and normal in distribution.    Lymphocytes and plasma cells appear normal in quantity and normal in   distribution on H&E.  There are no  lymphoid aggregates.    Sections of the clot correlate with these findings.    IRON STAIN:    Iron stains are performed with adequate controls.  Iron stores appear   decreased.  Sideroblasts are identified.   Ring sideroblasts are seen but comprise less than 15% of  erythroid   precursors.    ADDITIONAL STAINS:    For further evaluation of the immunoarchitecture, immunohistochemical   stains with antibodies to CD34 are  performed on the core section with adequate controls.  CD34 is positive in   scattered blasts which are not  proportionately increased and comprise less than 5% of the cellularity.    FLOW CYTOMETRY:    For interpretative support, flow cytometric studies are performed and   interpreted on a representative  sampling.  (For complete details, please see separate flow cytometry   report, [id number]).  In summary, that  study demonstrates polytypic B cells, no aberrant immunophenotype on T   cells, polytypic plasma cells, no  increase in myeloid blasts, and no abnormal myeloid blast population.    CYTOGENETICS AND/OR FLUORESCENCE IN SITU HYBRIDIZATION (FISH):    Cytogenetic studies are pending and results will be reported separately   when available.    MOLECULAR STUDIES:    Molecular studies for SF3B1 are pending and will be reported separately.    CLINICAL LAB RESULTS:  Battery Order No. Lab Test Code Clinical Result Ref. Range Units Result   Date  Hemogram/Diff/PLT  S WBC Count 5.5 4.0-11.0 10e9/L 3/4/2019 11:51       RBC Count L 3.52 4.4-5.9 10e12/L 3/4/2019 11:51       Hemoglobin L 11.0 13.3-17.7 g/dL 3/4/2019 11:51       Hematocrit L 33.9 40.0-53.0 % 3/4/2019 11:51       MCV 96  fl 3/4/2019 11:51       MCH 31.3 26.5-33.0 pg 3/4/2019 11:51       MCHC 32.4 31.5-36.5 g/dL 3/4/2019 11:51       RDW H 18.4 10.0-15.0 % 3/4/2019 11:51       Platelet Count SEE TEXT 150-450 10e9/L 3/4/2019 11:51       Text/Comments:   Flags  LL  34 This result has been called to [facility] by Jonathan Samuels   on 03 04 2019 at 1148, and has been read back.        SEE TEXT  ...    PATH  03/04/2019     Patient Name: YINKA GONZALEZ  MR#: [id number]  Specimen #: [id number]  Collected: 3/4/2019 12:05  Received: 3/4/2019 17:51  Reported: 3/13/2019 18:11  Ordering Phy(s):  FRANCA CHAUDHRY  Additional Phy(s): KAREN CRUZ [facility]    For improved result formatting, select 'View Enhanced Report Format' under   Linked Documents section.  __________________________________________    TEST(S) REQUESTED:  Bone Marrow Chromosome Analysis    SPECIMEN DESCRIPTION:  Bone Marrow Aspirate    CLINICAL COMMENTS:  Severe thrombocytopenia.    [id number]    Metaphases analyzed:                  20  Additional metaphases screened:          0  Metaphases karyotyped:               3  Banding utilized:               G-banding  Band resolution:             < 400 - 400  Karyotypically normal cells:              1  Karyotypically abnormal cells:             19    METHODS:  Unstimulated, 24 and 48 hour cultures.    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    These findings document the presence of an abnormal clonal process in this   patient's bone marrow. Deletions of  20q are well documented recurring abnormalities that are most frequently   associated with myeloid disorders,  including myelodysplastic syndrome. However, the finding of an isolated   deletion 20q is not an MDS-defining  abnormality in the absence of morphologic criteria for MDS (Arber DA et   al, 2016, Blood 127:2391). Correlation  with clinical and other laboratory findings may be helpful for further   interpretation of these results;  additionally, if clinically indicated, a follow-up study to monitor the   behavior of this clone is also  recommended.    Juana Trinidad, Ph.D/  Ana Paula Johnson, Ph.D/Director  Cytogenetics Laboratory    Electronically Signed Out By:  Anila Vergara M.D., UMPhysiciansT Codes:  A: 92516-KMCEQ, 33908-ZVZWC, 18151-NSGDMZL, 80225-FSUMBA, 18571-NDVYEHD    TESTING LAB LOCATION:  [facility]   PWB, [facility] [address] [place] [id number] [phone]    COLLECTION SITE:  Client:  [facility]  Location:  [facility] (S)      PATH  03/04/2019     Patient Name: YINKA GONZALEZ  MR#: [id number]  Specimen #: [id number]  Collected: 3/4/2019 12:05  Received: 3/4/2019 17:44  Reported: 3/5/2019 13:13  Ordering Phy(s): JOE MORENO    For improved result formatting, select 'View Enhanced Report Format' under   Linked Documents section.  _________________________________________    SPECIMEN(S):  Bone marrow, left    INTERPRETATION:  Bone marrow, left:       Polytypic B cells       No aberrant immunophenotype on T cells       Polytypic plasma cells       No increase in myeloid blasts and no abnormal myeloid blast   population    COMMENT:  Final interpretation requires correlation with results of other ancillary   studies, morphologic and clinical  features.    RESULTS:  Percentages reported below are based on the total number of CD45 positive   viable leukocytes. If applicable,  percentage of plasma cells is from total viable nucleated cells.  0.2 % polytypic B cells  1.3% hematogones/normal B lineage precursors  3.8% T cells with a CD4:CD8 ratio of 0.7:1.    0.5% NK cells    0.2% polytypic plasma cells    The plasma cell percentage by flow cytometry is expected to be less than   by morphology due to specimen  processing.  2% cells in the blast gate (CD45 dim and low side scatter blast gate).   There is no aberrant immunophenotype on  the myeloid blasts.  1% CD34 positive blasts    ANTIBODIES:  Eight, nine, and ten color analyses are performed for the following   antigens: CD2, CD3, CD4, CD5, CD7, CD8,  CD10, CD11b, CD13, CD14, CD15, CD16, CD19, CD20, CD33, CD34, CD38, CD45,   CD56, CD64, , , HLA-DR, and  surface and cytoplasmic kappa and lambda immunoglobulin light chains.   Cells are gated to isolate populations  (CD45 versus  side scatter and forward scatter versus side scatter), to   exclude debris (forward scatter versus  side scatter) and to exclude cell doublets (forward scatter height versus   forward scatter width and side  scatter height versus side scatter width). Forward scatter varies with   cell size. Side scatter varies with the  amount of cytoplasmic granules. Intensity for CD45 usually increases as   hematolymphoid cells mature. The  analytic sensitivity of this assay to detect monotypic plasma cells as   rare flow events is 0.01%.    CLINICAL HISTORY:  88 year old male with thrombocytopenia    I have personally reviewed all specimens and/or slides, including the   listed special stains, and used them  with my medical judgment to determine the final diagnosis.    Electronically signed out by:    Pako Foster M.D.,[facility]    This test was developed and its performance characteristics determined by   [facility], [facility]. It has not been cleared or   approved by the US Food and Drug  Administration.  FDA does not require this test to go through premarket   FDA review. This test is used for  clinical purposes and should not be regarded as investigational or for   research.  This laboratory is certified  under the Clinical Laboratory Improvement Amendments (CLIA) as qualified   to perform high complexity clinical  laboratory testing.    CPT Codes:  A: 42326-ZL, 37245-XTJFEXU, 05516-61-FRXK67(22), 99443-LENT>15,   13151-29-LTIA14    TESTING LAB LOCATION:  [address] [address]  [phone]    COLLECTION SITE:  Client:  [facility]  Location:  [facility] (S)      PATH  03/04/2019     Patient Name: YIKNA GONZALEZ  MR#: [id number]  Specimen #: [id number]  Collected: 3/4/2019 12:05  Received: 3/5/2019 09:55  Reported: 3/12/2019 14:36  Ordering Jania(s): JOE OQUENDO  TIFFANIE  Additional Phy(s): [facility] CRUZ [facility]    For improved result formatting, select 'View Enhanced Report Format' under   Linked Documents section.  _________________________________________    ORIGINAL REPORT FOLLOWS ADDENDUM  ADDENDUM    TO ORIGINAL REPORT  Status: Signed Out  Date Ordered:3/13/2019  Date Complete:3/13/2019  Date Reported:3/13/2019 16:54  Signed Out By: Marcin Cee MD    INTERPRETATION:  This addendum is created to correct the reference to the interpretation of   the morphological and  immunophenotypic findings. The diagnosis remains the same.    COMMENTS:  The concurrent bone marrow morphology and immunophenotypic evaluation   showed NO definite evidence for myeloid  neoplasm (SM19-23, [id number]).    __________________________________________    <<<ORIGINAL REPORT>>>    TEST(S) REQUESTED:  A: Next Generation Sequencing Oncology-SF3B1  B: Process and Hold    SPECIMEN DESCRIPTION:  Bone Marrow (Left)  SM19-23    ---------------------------------------------------------------------    SIGNIFICANT RESULTS    ---------------------------------------------------------------------    Detected Alterations of Known or Potential Pathogenicity: None    Detected Alterations of Uncertain Significance: None    Genes with No Detected Alterations of the Amino Acid Sequence: SF3B1    ---------------------------------------------------------------------    INTERPRETATION OF RESULTS    ---------------------------------------------------------------------    No pathogenic mutations were identified in the SF3B1 gene.    The concurrent bone marrow morphology and immunophenotypic evaluation   showed definite evidence for myeloid  neoplasm (SM19-23, [id number]).    Clinical management should not be based on this assay and interpretation   alone.  Correlation with morphologic examination, other ancillary studies and   clinical presentation is  recommended.    ---------------------------------------------------------------------    COVERAGE  Unless otherwise reported, all positions in the genes ordered were   sequenced at or greater than 125x. If any  genes were sequenced at <125x coverage, the gene and the fraction of that   gene sequenced at 125x is listed  below. Mutations present at low variant allele fractions (VAFs) in lower   coverage positions cannot be formally  excluded.    METHODOLOGY :  Genomic DNA is extracted from the sample, library preparation is mediated   by tagmentation following [employer]  protocols, and target enrichment is performed by hybrid capture using [employer]im[employer]   customized baits and Illumina Rapid  capture reagents per 's protocol. The enriched libraries are   sequenced on an Illumina MiSeq (using  version 3 chemistry). FASTQ files are processed through a customized   bioinformatic pipeline including  CutAdapt, PandaSeq, BWA, and Freebayes. Variant call files (VCF) are   uploaded to C[employer] software for  variant filtering, annotation, and reporting. Additional information   regarding these methods including the  assay bedfile is available upon request.    The following types of variants are not included in the clinical report,   but information about these variants  is available upon request:  *Variants that are present at >1% MAF in population databases AND are not   reported as pathogenic/likely  pathogenic in ClinVar.  *Variants with a MAF <1%, that were interpreted to be benign or likely   benign variants.  *Intronic variants that reside more than 2 bases from the exon/intron   boundary AND are not reported as  pathogenic/likely pathogenic.  *Untranslated region (UTR) variants not previously categorized as   pathogenic or likely pathogenic in relevant  clinical databases.  *Some variants may be excluded if interpreted as technical artifacts based   on review of sequencing quality  data.    LIMITATIONS  This assay has  been validated to detected single nucleotide variants   (SNVs) and insertion-deletions (indels)  less than or equal to 30bp that arepresent at 5% or greater variant allele   fraction (VAF). Based on these  sensitivities, tumor cell population must comprise at least 10% of the   submitted specimen. Specimens with  borderline tumor cellularity may lead to false negative results. Caution   is advised for the interpretation of  negative results in these situations, and correlation with morphology and   other laboratory results is  recommended to ensure adequate representation of the cell population of   interest. Please note also that large  indels (>30bp) may not be detected in this assay. Additional information   from the validation of this assay  regarding the detection of low VAF variants and/or large indels is   available upon request.    DISCLAIMER  This test was developed and its performance characteristics determined by   the [facility], Molecular Diagnostics Laboratory. It has not been cleared or   approved by the FDA. The laboratory is  regulated under CLIA as qualified to perform high-complexity testing. This   test is used for clinical purposes.  It should not be regarded as investigational or for research.    REFERENCES:  Raheem TORRES, Amberly ALLISON, Darren HIRSCH, Olaf C, Alexandre GUERRA, et al. 2015. Criteria   for Clinical Reporting of Variants  from a Broad Target Capture NGS Assay without Lorenzo Verification. JSM   biomarkers 2(1):1005.    Alexandre GUERRA, Kira J, Linda MORGAN, Yoel J, Booker KB, Shana A, Yohe S,   Mirella M, Amberly ALLISON, Suzi KA,  Marcos B. 2014. Implementation of Cloud based next generation   sequencing data analysis in a clinical  laboratory. BMC Res Notes. 7:314. PMID: 09947973.    Jagdish JA, Jorge A AW, O'Taz TD, Hans W, Eleuterio EE, Jessica S, Mike S,    R, Berry X, Silvano G, Elver HM, Chad  D, Delma SM, Vel S, Eduardo MJ, Elva G, Butch ROSS, Angy  DA,   Brenda E, Abraham S, Chely  CD, Sheree MJ, Yaneli DAMICO, Negar GO, [place] GO, NHL Exome Sequencing   Project. 2012. Evolution and functional  impact of rare coding variation from deep sequencing of human exomes.   Science. 337(9427):64-9. PMID: 10016630.    Wong S, Shana A, Delroy K, Valerie T, Amberly M, Mirella M, Alexandre G,   Ronnie J, Kira J, Irving Y, Javed LEIGH, Linda MORGAN, Booker JEAN, Suzi PADILLA, Marcos KINGSLEY. 2015. Clinical   validation of targeted next-generation  sequencing for inherited disorders. Arch. Pathol. Lab. Med. 139(2):204-10.   PMID: 77174198.    This test was developed and its performance characteristics determined by   the [facility],  Molecular Diagnostics Laboratory and the [facility]   [facility]omics [facility](Cancer &  Cardiovascular Research Building Room [address], [address] [place] [address]).   Genomic DNA  extraction, library preparation, sequencing, and interpretation of   sequencing data is performed at [facility],  Molecular Diagnostics Laboratory.  This test   has not been cleared or approved by  the FDA. The laboratory is regulated under CLIA as qualified to perform   high-complexity testing. This test is  used for clinical purposes. It should not be regarded as investigational   or for research.    A resident/fellow in an accredited training program was involved in the   selection of testing, review of  laboratory data, and/or interpretation of this case.  I, as the senior   physician, attest that I: (i) confirmed  appropriate testing, (ii) examined the relevant raw data for the   specimen(s); and (iii) rendered or confirmed  the interpretation(s).    INTERPRETATION:  RESULTS:  DNA processing completed.  The sample is archived for future use.    Electronically Signed Out By:  Marcin Cee MD    CPT Codes:  A: -[id number], [id number]    TESTING LAB LOCATION:  [facility]  [place]  [address] [facility] [address] [place] [address]         ASSESSMENT AND PLAN   1. 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 Recent bleed behind retina with vision loss while on coumadin  4. ECOG PS 1    I reviewed his severe thrombocytopenia and findings of his bone marrow biopsy with him. There has been good news that there are no blasts or any clear evidence of MDS/AML. He does have hypercellular marrow with 80% cellularity which is clearly not normal. We would not have to act on it at this time.     He has normal thrombocytes - PLT precursors suggesting that his production of PLT has not been affected and it is peripheral destruction. This suggests ITP. I reviewed management of ITP including corticosteroids. We do have options of either prednisone 1 mg/kg or a short course of high dose dexamethasone. I would favor the latter as he would not have to take it for prolonged periods if it is effective. There is no taper with a short course of dexamethasone 40 mg daily.     I reviewed the side effects, risks, benefits and alternatives. I will prescribe him omeprazole along with dexamethasone to prevent gastritis and encouraged him to take this with breakfast. I will have him follow up with our NP - Eric in a week with labs to assess how well he is tolerating and PLT response.     I briefly reviewed management of MDS. We will follow his WBC counts. He has normal WBC and only mild anemia.     All questions for patient his wife and daughter were answered.     Over 45 min of direct face to face time spent with patient with more than 50% time spent in counseling and coordinating care.

## 2019-04-05 ENCOUNTER — HOSPITAL ENCOUNTER (OUTPATIENT)
Facility: CLINIC | Age: 84
Setting detail: SPECIMEN
Discharge: HOME | End: 2019-04-05
Attending: INTERNAL MEDICINE | Admitting: NURSE PRACTITIONER
Payer: MEDICARE

## 2019-04-05 ENCOUNTER — ONCOLOGY VISIT (OUTPATIENT)
Dept: ONCOLOGY | Facility: CLINIC | Age: 84
End: 2019-04-05
Attending: INTERNAL MEDICINE
Payer: MEDICARE

## 2019-04-05 VITALS
HEART RATE: 78 BPM | TEMPERATURE: 98.4 F | HEIGHT: 69 IN | DIASTOLIC BLOOD PRESSURE: 69 MMHG | OXYGEN SATURATION: 100 % | WEIGHT: 162.7 LBS | SYSTOLIC BLOOD PRESSURE: 115 MMHG | BODY MASS INDEX: 24.1 KG/M2

## 2019-04-05 DIAGNOSIS — D69.3 IDIOPATHIC THROMBOCYTOPENIC PURPURA (H): ICD-10-CM

## 2019-04-05 DIAGNOSIS — D69.6 THROMBOCYTOPENIA (H): Primary | ICD-10-CM

## 2019-04-05 LAB
BASOPHILS # BLD AUTO: 0 10E9/L (ref 0–0.2)
BASOPHILS NFR BLD AUTO: 0 %
DIFFERENTIAL METHOD BLD: ABNORMAL
EOSINOPHIL # BLD AUTO: 0 10E9/L (ref 0–0.7)
EOSINOPHIL NFR BLD AUTO: 0.1 %
ERYTHROCYTE [DISTWIDTH] IN BLOOD BY AUTOMATED COUNT: 18.6 % (ref 10–15)
HCT VFR BLD AUTO: 32.1 % (ref 40–53)
HGB BLD-MCNC: 10.5 G/DL (ref 13.3–17.7)
IMM GRANULOCYTES # BLD: 0 10E9/L (ref 0–0.4)
IMM GRANULOCYTES NFR BLD: 0.1 %
LYMPHOCYTES # BLD AUTO: 0.4 10E9/L (ref 0.8–5.3)
LYMPHOCYTES NFR BLD AUTO: 4.4 %
MCH RBC QN AUTO: 31.1 PG (ref 26.5–33)
MCHC RBC AUTO-ENTMCNC: 32.7 G/DL (ref 31.5–36.5)
MCV RBC AUTO: 95 FL (ref 78–100)
MONOCYTES # BLD AUTO: 0.1 10E9/L (ref 0–1.3)
MONOCYTES NFR BLD AUTO: 1.7 %
NEUTROPHILS # BLD AUTO: 7.8 10E9/L (ref 1.6–8.3)
NEUTROPHILS NFR BLD AUTO: 93.7 %
NRBC # BLD AUTO: 0 10*3/UL
NRBC BLD AUTO-RTO: 0 /100
PLATELET # BLD AUTO: 31 10E9/L (ref 150–450)
RBC # BLD AUTO: 3.38 10E12/L (ref 4.4–5.9)
WBC # BLD AUTO: 8.4 10E9/L (ref 4–11)

## 2019-04-05 PROCEDURE — 99214 OFFICE O/P EST MOD 30 MIN: CPT | Performed by: NURSE PRACTITIONER

## 2019-04-05 PROCEDURE — 36415 COLL VENOUS BLD VENIPUNCTURE: CPT

## 2019-04-05 PROCEDURE — G0463 HOSPITAL OUTPT CLINIC VISIT: HCPCS

## 2019-04-05 PROCEDURE — 85025 COMPLETE CBC W/AUTO DIFF WBC: CPT | Performed by: NURSE PRACTITIONER

## 2019-04-05 ASSESSMENT — PAIN SCALES - GENERAL: PAINLEVEL: NO PAIN (0)

## 2019-04-05 ASSESSMENT — MIFFLIN-ST. JEOR: SCORE: 1390.44

## 2019-04-05 NOTE — PROGRESS NOTES
"Oncology Rooming Note    April 5, 2019 2:34 PM   Jama Paul is a 88 year old male who presents for:    Chief Complaint   Patient presents with     Oncology Clinic Visit     Initial Vitals: /69 (BP Location: Right arm, Patient Position: Chair, Cuff Size: Adult Regular)   Pulse 78   Temp 98.4  F (36.9  C) (Oral)   Ht 1.74 m (5' 8.5\")   Wt 73.8 kg (162 lb 11.2 oz)   SpO2 100%   BMI 24.38 kg/m   Estimated body mass index is 24.38 kg/m  as calculated from the following:    Height as of this encounter: 1.74 m (5' 8.5\").    Weight as of this encounter: 73.8 kg (162 lb 11.2 oz). Body surface area is 1.89 meters squared.  No Pain (0) Comment: Data Unavailable   No LMP for male patient.  Allergies reviewed: No  Medications reviewed: Yes    Medications: MEDICATION REFILLS NEEDED TODAY. Provider was notified.  Pharmacy name entered into Vite:    [facility] [address] [place] [address] - [address] PHARMACY [place], [place] - [address] [address]    Clinical concerns:  doctor was notified.     May need refill on Latrice Sauceda CMA            "

## 2019-04-05 NOTE — PROGRESS NOTES
Medical Assistant Note:  Jama Paul presents today for Blood  draw   Patient seen by provider today: Yes: Eric.   present during visit today: Not Applicable.    Concerns: No Concerns.    Procedure:  Lab draw site: LAC, Needle type: BF, Gauge: 21 wrapped with coban.    Post Assessment:  Labs drawn without difficulty: Yes.    Discharge Plan:  Departure Mode: Uses walker.    Face to Face Time: 5min  Patient tolerated draw well  .    Shama Sauceda

## 2019-04-05 NOTE — LETTER
"    4/5/2019         RE: Jaam Paul  [address]  [facility] [address]        Dear Colleague,    Thank you for referring your patient, Jama Paul, to the [facility]. Please see a copy of my visit note below.    Oncology Rooming Note    April 5, 2019 2:34 PM   Jama Paul is a 88 year old male who presents for:    Chief Complaint   Patient presents with     Oncology Clinic Visit     Initial Vitals: /69 (BP Location: Right arm, Patient Position: Chair, Cuff Size: Adult Regular)   Pulse 78   Temp 98.4  F (36.9  C) (Oral)   Ht 1.74 m (5' 8.5\")   Wt 73.8 kg (162 lb 11.2 oz)   SpO2 100%   BMI 24.38 kg/m    Estimated body mass index is 24.38 kg/m  as calculated from the following:    Height as of this encounter: 1.74 m (5' 8.5\").    Weight as of this encounter: 73.8 kg (162 lb 11.2 oz). Body surface area is 1.89 meters squared.  No Pain (0) Comment: Data Unavailable   No LMP for male patient.  Allergies reviewed: No  Medications reviewed: Yes    Medications: MEDICATION REFILLS NEEDED TODAY. Provider was notified.  Pharmacy name entered into Eyeview:    [facility] [address] [place] [address] [address] PHARMACY [facility] [address] [address]    Clinical concerns:  doctor was notified.     May need refill on Decadron      Shama Sauceda [facility]              Medical Assistant Note:  Jama Paul presents today for Blood  draw   Patient seen by provider today: Yes: Eric.   present during visit today: Not Applicable.    Concerns: No Concerns.    Procedure:  Lab draw site: LAC, Needle type: BF, Gauge: 21 wrapped with coban.    Post Assessment:  Labs drawn without difficulty: Yes.    Discharge Plan:  Departure Mode: Uses walker.    Face to Face Time: 5min  Patient tolerated draw well  .    Shama Sauceda            Oncology/Hematology Visit Note  Apr 5, 2019    Reason for Visit: follow up of " ITP    History of Present Illness: Jama Paul is a 88 year old male with ITP  -Currently on dexamethasone    Interval History:  He saw Dr. Britton last week and was noted to start on dexamethasone 40 mg p.o. Daily  He could not tolerate 40 mg but he was taking 20 mg daily  Patient denies fever chills sweats denies cough no shortness of breath.  Denies nausea vomiting diarrhea denies abdominal pain.  He denies bleeding      Review of Systems:  14 point ROS of systems including Constitutional, Eyes, Respiratory, Cardiovascular, Gastroenterology, Genitourinary, Integumentary, Muscularskeletal, Psychiatric were all negative except for pertinent positives noted in my HPI.      Current Outpatient Medications   Medication Sig Dispense Refill     Ascorbic Acid (VITAMIN C PO) Take 500 mg by mouth daily       CYANOCOBALAMIN PO Take 500 mcg by mouth daily       dexamethasone (DECADRON) 4 MG tablet Take 40 mg by mouth daily (with breakfast). 50 tablet 0     furosemide (LASIX) 20 MG tablet Take 1 tablet (20 mg) by mouth daily 90 tablet 3     metoprolol succinate ER (TOPROL XL) 50 MG 24 hr tablet Take 2 tablets(100mg) by mouth every am and 1 tablet(50mg) every pm 270 tablet 3     Multiple Vitamins-Minerals (ICAPS AREDS FORMULA PO) Take 1 tablet by mouth daily Takes in addition to multivitamin with minerals       Nutritional Supplements (ENSURE COMPLETE PO) 1 drink a day       omeprazole (PRILOSEC) 40 MG DR capsule TAKE 1 CAPSULE(40 MG) BY MOUTH DAILY 90 capsule 3     sacubitril-valsartan (ENTRESTO) 24-26 MG per tablet Take 1/2 pill in the morning and a full pill at night. 180 tablet 3     Vitamin D, Cholecalciferol, 1000 UNITS TABS Take 2,000 Units by mouth daily        warfarin (COUMADIN) 5 MG tablet Take 1 tab daily or as directed per INR clinic 100 tablet 1       Physical Examination:  General: The patient is a pleasant male in no acute distress.  /69 (BP Location: Right arm, Patient Position: Chair, Cuff Size: Adult  "Regular)   Pulse 78   Temp 98.4  F (36.9  C) (Oral)   Ht 1.74 m (5' 8.5\")   Wt 73.8 kg (162 lb 11.2 oz)   SpO2 100%   BMI 24.38 kg/m     HEENT: EOMI, PERRL. Sclerae are anicteric. Oral mucosa is pink and moist with no lesions or thrush.   Lymph: Neck is supple with no lymphadenopathy in the cervical or supraclavicular areas.   Heart: Regular rate and rhythm.   Lungs: Clear to auscultation bilaterally.   GI: Bowel sounds present, soft, nontender with no palpable hepatosplenomegaly or masses.   Extremities: No lower extremity edema noted bilaterally.   Skin: No rashes, petechiae, or bruising noted on exposed skin.    Laboratory Data:  Results for YINKA GONZALEZ (MRN [id number]) as of 4/5/2019 16:20   Ref. Range 4/5/2019 15:10   WBC Latest Ref Range: 4.0 - 11.0 10e9/L 8.4   Hemoglobin Latest Ref Range: 13.3 - 17.7 g/dL 10.5 (L)   Hematocrit Latest Ref Range: 40.0 - 53.0 % 32.1 (L)   Platelet Count Latest Ref Range: 150 - 450 10e9/L 31 (LL)   RBC Count Latest Ref Range: 4.4 - 5.9 10e12/L 3.38 (L)   MCV Latest Ref Range: 78 - 100 fl 95   MCH Latest Ref Range: 26.5 - 33.0 pg 31.1   MCHC Latest Ref Range: 31.5 - 36.5 g/dL 32.7   RDW Latest Ref Range: 10.0 - 15.0 % 18.6 (H)   Diff Method Unknown Automated Method   % Neutrophils Latest Units: % 93.7   % Lymphocytes Latest Units: % 4.4   % Monocytes Latest Units: % 1.7   % Eosinophils Latest Units: % 0.1   % Basophils Latest Units: % 0.0   % Immature Granulocytes Latest Units: % 0.1   Nucleated RBCs Latest Ref Range: 0 /100 0   Absolute Neutrophil Latest Ref Range: 1.6 - 8.3 10e9/L 7.8   Absolute Lymphocytes Latest Ref Range: 0.8 - 5.3 10e9/L 0.4 (L)   Absolute Monocytes Latest Ref Range: 0.0 - 1.3 10e9/L 0.1   Absolute Eosinophils Latest Ref Range: 0.0 - 0.7 10e9/L 0.0   Absolute Basophils Latest Ref Range: 0.0 - 0.2 10e9/L 0.0   Abs Immature Granulocytes Latest Ref Range: 0 - 0.4 10e9/L 0.0   Absolute Nucleated RBC Unknown 0.0       Assessment and Plan:    This is " a 88-year-old male with    ITP  -Bone marrow biopsy did not show dysplasia or blasts  Patient instructed to take dexamethasone 40 mg daily.  He cannot tolerate 40 mg.  He is taking 20 mg p.o. Daily.  Labs reviewed- no response to steroids.  Hold steroids (no need to taper with dexamethasone)  -Spoke to Dr. Britton -we will try treatment with IVIG daily x 2 days.  -Schedule patient for IVIG infusionx 2 days next week   See me with IVIG infusion  -Patient and daughter instructed to go to ER in the event of bleeding fall or injury    ERIC Hill CNP  Hem/Onc   [facility]               Again, thank you for allowing me to participate in the care of your patient.        Sincerely,        ERIC Hill CNP

## 2019-04-05 NOTE — PROGRESS NOTES
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 Britton last week and was noted to start on dexamethasone 40 mg p.o. Daily  He could not tolerate 40 mg but he was taking 20 mg daily  Patient denies fever chills sweats denies cough no shortness of breath.  Denies nausea vomiting diarrhea denies abdominal pain.  He denies bleeding      Review of Systems:  14 point ROS of systems including Constitutional, Eyes, Respiratory, Cardiovascular, Gastroenterology, Genitourinary, Integumentary, Muscularskeletal, Psychiatric were all negative except for pertinent positives noted in my HPI.      Current Outpatient Medications   Medication Sig Dispense Refill     Ascorbic Acid (VITAMIN C PO) Take 500 mg by mouth daily       CYANOCOBALAMIN PO Take 500 mcg by mouth daily       dexamethasone (DECADRON) 4 MG tablet Take 40 mg by mouth daily (with breakfast). 50 tablet 0     furosemide (LASIX) 20 MG tablet Take 1 tablet (20 mg) by mouth daily 90 tablet 3     metoprolol succinate ER (TOPROL XL) 50 MG 24 hr tablet Take 2 tablets(100mg) by mouth every am and 1 tablet(50mg) every pm 270 tablet 3     Multiple Vitamins-Minerals (ICAPS AREDS FORMULA PO) Take 1 tablet by mouth daily Takes in addition to multivitamin with minerals       Nutritional Supplements (ENSURE COMPLETE PO) 1 drink a day       omeprazole (PRILOSEC) 40 MG DR capsule TAKE 1 CAPSULE(40 MG) BY MOUTH DAILY 90 capsule 3     sacubitril-valsartan (ENTRESTO) 24-26 MG per tablet Take 1/2 pill in the morning and a full pill at night. 180 tablet 3     Vitamin D, Cholecalciferol, 1000 UNITS TABS Take 2,000 Units by mouth daily        warfarin (COUMADIN) 5 MG tablet Take 1 tab daily or as directed per INR clinic 100 tablet 1       Physical Examination:  General: The patient is a pleasant male in no acute  "distress.  /69 (BP Location: Right arm, Patient Position: Chair, Cuff Size: Adult Regular)   Pulse 78   Temp 98.4  F (36.9  C) (Oral)   Ht 1.74 m (5' 8.5\")   Wt 73.8 kg (162 lb 11.2 oz)   SpO2 100%   BMI 24.38 kg/m    HEENT: EOMI, PERRL. Sclerae are anicteric. Oral mucosa is pink and moist with no lesions or thrush.   Lymph: Neck is supple with no lymphadenopathy in the cervical or supraclavicular areas.   Heart: Regular rate and rhythm.   Lungs: Clear to auscultation bilaterally.   GI: Bowel sounds present, soft, nontender with no palpable hepatosplenomegaly or masses.   Extremities: No lower extremity edema noted bilaterally.   Skin: No rashes, petechiae, or bruising noted on exposed skin.    Laboratory Data:  Results for YINKA GONZALEZ (MRN [id number]) as of 4/5/2019 16:20   Ref. Range 4/5/2019 15:10   WBC Latest Ref Range: 4.0 - 11.0 10e9/L 8.4   Hemoglobin Latest Ref Range: 13.3 - 17.7 g/dL 10.5 (L)   Hematocrit Latest Ref Range: 40.0 - 53.0 % 32.1 (L)   Platelet Count Latest Ref Range: 150 - 450 10e9/L 31 (LL)   RBC Count Latest Ref Range: 4.4 - 5.9 10e12/L 3.38 (L)   MCV Latest Ref Range: 78 - 100 fl 95   MCH Latest Ref Range: 26.5 - 33.0 pg 31.1   MCHC Latest Ref Range: 31.5 - 36.5 g/dL 32.7   RDW Latest Ref Range: 10.0 - 15.0 % 18.6 (H)   Diff Method Unknown Automated Method   % Neutrophils Latest Units: % 93.7   % Lymphocytes Latest Units: % 4.4   % Monocytes Latest Units: % 1.7   % Eosinophils Latest Units: % 0.1   % Basophils Latest Units: % 0.0   % Immature Granulocytes Latest Units: % 0.1   Nucleated RBCs Latest Ref Range: 0 /100 0   Absolute Neutrophil Latest Ref Range: 1.6 - 8.3 10e9/L 7.8   Absolute Lymphocytes Latest Ref Range: 0.8 - 5.3 10e9/L 0.4 (L)   Absolute Monocytes Latest Ref Range: 0.0 - 1.3 10e9/L 0.1   Absolute Eosinophils Latest Ref Range: 0.0 - 0.7 10e9/L 0.0   Absolute Basophils Latest Ref Range: 0.0 - 0.2 10e9/L 0.0   Abs Immature Granulocytes Latest Ref Range: 0 - " 0.4 10e9/L 0.0   Absolute Nucleated RBC Unknown 0.0       Assessment and Plan:    This is a 88-year-old male with    ITP  -Bone marrow biopsy did not show dysplasia or blasts  Patient instructed to take dexamethasone 40 mg daily.  He cannot tolerate 40 mg.  He is taking 20 mg p.o. Daily.  Labs reviewed- no response to steroids.  Hold steroids (no need to taper with dexamethasone)  -Spoke to Dr. Britton -we will try treatment with IVIG daily x 2 days.  -Schedule patient for IVIG infusionx 2 days next week   See me with IVIG infusion  -Patient and daughter instructed to go to ER in the event of bleeding fall or injury    ERIC Hill CNP  Hem/Onc   [facility]

## 2019-04-08 ENCOUNTER — TELEPHONE (OUTPATIENT)
Dept: CARDIOLOGY | Facility: CLINIC | Age: 84
End: 2019-04-08

## 2019-04-08 RX ORDER — DIPHENHYDRAMINE HCL 25 MG
50 CAPSULE ORAL ONCE
Status: CANCELLED | OUTPATIENT
Start: 2019-04-09

## 2019-04-08 RX ORDER — ACETAMINOPHEN 325 MG/1
650 TABLET ORAL ONCE
Status: CANCELLED
Start: 2019-04-09

## 2019-04-08 NOTE — PROGRESS NOTES
REASON FOR ASSESSMENT:  CHF Consult for 2 gm NA Diet Education    NUTRITION HISTORY:    Information obtained from:  Patient    Living situation:   Lives independently with wife in a house      Grocery shopping:  Wife - pt states she reads labels and is very aware of his nutrition needs    Meal preparation:  Wife   -Most things are baked. Indicated a lot of seasoning of food is done with olive oil and lemon    Breakfast:  Yogurt- Yoplait (low-fat)   Cereal- cheerios with skim milk  Sausage (2-3)   Boiled eggs     Lunch:  Beef sandwich   Vegetables   Baked potato     Dinner:   Beef (roast, ribs)   Salad (field greens)   Vegetables (corn, peas, mixed vegetables)  Baked Potato     Previous diet instructions:  None      CURRENT DIET:  Fluid restriction 2000 ml & Caffeine Free    NUTRITION DIAGNOSIS:  Food- and nutrition-related knowledge deficit R/t no previous CHF education AEB above history.     INTERVENTIONS:    Nutrition Prescription:  2g Na     Implementation:    Assessed learning needs, learning preferences, and willingness to learn    Nutrition Education (Content):  a) Provided handouts:  1) Tips for Low Na Diet  2) Label Reading  3) Low Na Foods/Drinks  4) Seasoning Your Food Without Salt  5) Low Na Recipe Booklet  b) Discussed rational for limiting Na for CHF and stressed importance of following 2 gm Na guidelines   c) Encouraged patient to keep a daily food record    Nutrition Education (Application):  a) Discussed current eating habits and recommended alternative food choices    Anticipated good compliance- stated his wife is very aware and will have her look through all the handouts when she arrives later in the day     Diet Education - refer to Education Flowsheet    Goals:    Patient verbalizes understanding of diet by stated his interest in seasoning food without salt & asking questions    All of the above goals met during the education session    Follow Up:    Provided RD contact information for future  questions.    Recommend Out-Patient Nutrition Referral, if further diet instructions are needed.      Maxine Huang   Dietetic Intern      left upper back/Left:

## 2019-04-08 NOTE — TELEPHONE ENCOUNTER
Ayush Pugh!    I will be seeing you for an echo cardio gram on April 11 but wanted to let you know two things.   Firstly, I have been a normal sinus rhythm for almost 2 weeks now. I am feeling the best I have felt in a long time, other than my balance still being off. I just wanted you to be aware!    Secondly Dr. Britton tried to boost my platelets with a week of high dose steroids: I was not able to tolerate the high dose but even after a week of low-dose there was no change. He has me scheduled for IVIG infusions on April 9 and 10th and I wanted to be sure that this will not affect my cardio status and the scheduled echo?  Do you agree with this plan? Please let me know and I look forward to seeing you soon! Dr. Jama Paul

## 2019-04-09 ENCOUNTER — HOSPITAL ENCOUNTER (OUTPATIENT)
Facility: CLINIC | Age: 84
Setting detail: SPECIMEN
Discharge: HOME | End: 2019-04-09
Attending: NURSE PRACTITIONER | Admitting: NURSE PRACTITIONER
Payer: MEDICARE

## 2019-04-09 ENCOUNTER — ONCOLOGY VISIT (OUTPATIENT)
Dept: ONCOLOGY | Facility: CLINIC | Age: 84
End: 2019-04-09
Attending: INTERNAL MEDICINE
Payer: MEDICARE

## 2019-04-09 ENCOUNTER — INFUSION THERAPY VISIT (OUTPATIENT)
Dept: INFUSION THERAPY | Facility: CLINIC | Age: 84
End: 2019-04-09
Attending: INTERNAL MEDICINE
Payer: MEDICARE

## 2019-04-09 ENCOUNTER — HOSPITAL ENCOUNTER (OUTPATIENT)
Facility: CLINIC | Age: 84
Setting detail: SPECIMEN
End: 2019-04-09
Attending: NURSE PRACTITIONER
Payer: MEDICARE

## 2019-04-09 VITALS
RESPIRATION RATE: 16 BRPM | TEMPERATURE: 97.5 F | DIASTOLIC BLOOD PRESSURE: 73 MMHG | SYSTOLIC BLOOD PRESSURE: 116 MMHG | HEART RATE: 68 BPM

## 2019-04-09 VITALS
HEIGHT: 69 IN | RESPIRATION RATE: 16 BRPM | DIASTOLIC BLOOD PRESSURE: 79 MMHG | TEMPERATURE: 98.7 F | BODY MASS INDEX: 24.1 KG/M2 | WEIGHT: 162.7 LBS | SYSTOLIC BLOOD PRESSURE: 123 MMHG | HEART RATE: 94 BPM

## 2019-04-09 DIAGNOSIS — D69.6 THROMBOCYTOPENIA (H): Primary | ICD-10-CM

## 2019-04-09 DIAGNOSIS — D69.3 IDIOPATHIC THROMBOCYTOPENIC PURPURA (H): ICD-10-CM

## 2019-04-09 LAB
ANISOCYTOSIS BLD QL SMEAR: SLIGHT
BASOPHILS # BLD AUTO: 0 10E9/L (ref 0–0.2)
BASOPHILS NFR BLD AUTO: 0 %
DIFFERENTIAL METHOD BLD: ABNORMAL
EOSINOPHIL # BLD AUTO: 0 10E9/L (ref 0–0.7)
EOSINOPHIL NFR BLD AUTO: 0.1 %
ERYTHROCYTE [DISTWIDTH] IN BLOOD BY AUTOMATED COUNT: 18.9 % (ref 10–15)
HCT VFR BLD AUTO: 34.4 % (ref 40–53)
HGB BLD-MCNC: 11.2 G/DL (ref 13.3–17.7)
IMM GRANULOCYTES # BLD: 0 10E9/L (ref 0–0.4)
IMM GRANULOCYTES NFR BLD: 0.3 %
LYMPHOCYTES # BLD AUTO: 1.3 10E9/L (ref 0.8–5.3)
LYMPHOCYTES NFR BLD AUTO: 16.2 %
MCH RBC QN AUTO: 31.4 PG (ref 26.5–33)
MCHC RBC AUTO-ENTMCNC: 32.6 G/DL (ref 31.5–36.5)
MCV RBC AUTO: 96 FL (ref 78–100)
MONOCYTES # BLD AUTO: 0.4 10E9/L (ref 0–1.3)
MONOCYTES NFR BLD AUTO: 4.7 %
NEUTROPHILS # BLD AUTO: 6.2 10E9/L (ref 1.6–8.3)
NEUTROPHILS NFR BLD AUTO: 78.7 %
NRBC # BLD AUTO: 0 10*3/UL
NRBC BLD AUTO-RTO: 0 /100
PLATELET # BLD AUTO: 33 10E9/L (ref 150–450)
PLATELET # BLD EST: ABNORMAL 10*3/UL
RBC # BLD AUTO: 3.57 10E12/L (ref 4.4–5.9)
WBC # BLD AUTO: 7.8 10E9/L (ref 4–11)

## 2019-04-09 PROCEDURE — 96375 TX/PRO/DX INJ NEW DRUG ADDON: CPT

## 2019-04-09 PROCEDURE — 25000128 H RX IP 250 OP 636: Performed by: NURSE PRACTITIONER

## 2019-04-09 PROCEDURE — 96365 THER/PROPH/DIAG IV INF INIT: CPT

## 2019-04-09 PROCEDURE — 85025 COMPLETE CBC W/AUTO DIFF WBC: CPT | Performed by: NURSE PRACTITIONER

## 2019-04-09 PROCEDURE — 96366 THER/PROPH/DIAG IV INF ADDON: CPT

## 2019-04-09 PROCEDURE — 99213 OFFICE O/P EST LOW 20 MIN: CPT | Performed by: NURSE PRACTITIONER

## 2019-04-09 PROCEDURE — 25000132 ZZH RX MED GY IP 250 OP 250 PS 637: Mod: GY | Performed by: NURSE PRACTITIONER

## 2019-04-09 PROCEDURE — 25800030 ZZH RX IP 258 OP 636: Performed by: NURSE PRACTITIONER

## 2019-04-09 PROCEDURE — A9270 NON-COVERED ITEM OR SERVICE: HCPCS | Mod: GY | Performed by: NURSE PRACTITIONER

## 2019-04-09 RX ORDER — ACETAMINOPHEN 325 MG/1
650 TABLET ORAL ONCE
Status: COMPLETED | OUTPATIENT
Start: 2019-04-09 | End: 2019-04-09

## 2019-04-09 RX ORDER — DIPHENHYDRAMINE HCL 25 MG
50 CAPSULE ORAL ONCE
Status: CANCELLED | OUTPATIENT
Start: 2019-04-09

## 2019-04-09 RX ORDER — ACETAMINOPHEN 325 MG/1
650 TABLET ORAL ONCE
Status: CANCELLED
Start: 2019-04-09

## 2019-04-09 RX ADMIN — HYDROCORTISONE SODIUM SUCCINATE 100 MG: 100 INJECTION, POWDER, FOR SOLUTION INTRAMUSCULAR; INTRAVENOUS at 12:47

## 2019-04-09 RX ADMIN — ACETAMINOPHEN 650 MG: 325 TABLET ORAL at 12:21

## 2019-04-09 RX ADMIN — HUMAN IMMUNOGLOBULIN G 75 G: 40 LIQUID INTRAVENOUS at 13:02

## 2019-04-09 RX ADMIN — DIPHENHYDRAMINE HYDROCHLORIDE 50 MG: 50 INJECTION INTRAMUSCULAR; INTRAVENOUS at 12:50

## 2019-04-09 RX ADMIN — SODIUM CHLORIDE 250 ML: 9 INJECTION, SOLUTION INTRAVENOUS at 12:22

## 2019-04-09 ASSESSMENT — PAIN SCALES - GENERAL: PAINLEVEL: NO PAIN (0)

## 2019-04-09 ASSESSMENT — MIFFLIN-ST. JEOR: SCORE: 1390.44

## 2019-04-09 NOTE — PROGRESS NOTES
"Oncology Rooming Note    April 9, 2019 11:58 AM   Jama Paul is a 88 year old male who presents for:    Chief Complaint   Patient presents with     Oncology Clinic Visit     Initial Vitals: /79   Pulse 94   Temp 98.7  F (37.1  C) (Oral)   Resp 16   Ht 1.74 m (5' 8.5\")   Wt 73.8 kg (162 lb 11.2 oz)   BMI 24.38 kg/m   Estimated body mass index is 24.38 kg/m  as calculated from the following:    Height as of this encounter: 1.74 m (5' 8.5\").    Weight as of this encounter: 73.8 kg (162 lb 11.2 oz). Body surface area is 1.89 meters squared.  No Pain (0) Comment: Data Unavailable   No LMP for male patient.  Allergies reviewed: No  Medications reviewed: Yes    Medications: Medication refills not needed today.  Pharmacy name entered into TranZfinity:    [facility] [id number] [place] [address] [address] PHARMACY [facility] [place] - [address] [address]    Clinical concerns:  doctor was notified.      Shama Sauceda, [facility]            "

## 2019-04-09 NOTE — LETTER
"    4/9/2019         RE: Jama Paul  [address]  [facility] [address]        Dear Colleague,    Thank you for referring your patient, Jama Paul, to the [facility]. Please see a copy of my visit note below.    Oncology Rooming Note    April 9, 2019 11:58 AM   Jama Paul is a 88 year old male who presents for:    Chief Complaint   Patient presents with     Oncology Clinic Visit     Initial Vitals: /79   Pulse 94   Temp 98.7  F (37.1  C) (Oral)   Resp 16   Ht 1.74 m (5' 8.5\")   Wt 73.8 kg (162 lb 11.2 oz)   BMI 24.38 kg/m    Estimated body mass index is 24.38 kg/m  as calculated from the following:    Height as of this encounter: 1.74 m (5' 8.5\").    Weight as of this encounter: 73.8 kg (162 lb 11.2 oz). Body surface area is 1.89 meters squared.  No Pain (0) Comment: Data Unavailable   No LMP for male patient.  Allergies reviewed: No  Medications reviewed: Yes    Medications: Medication refills not needed today.  Pharmacy name entered into [employer]:    [facility] [address] [place] [address] [address] [facility] [address] [address]    Clinical concerns:  doctor was notified.      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  Injury.  Denies fever chills sweats cough shortness of breath.  Denies nausea vomiting diarrhea      Review of Systems:  14 point ROS of systems including Constitutional, Eyes, Respiratory, Cardiovascular, Gastroenterology, Genitourinary, Integumentary, Muscularskeletal, Psychiatric were all negative except for pertinent positives noted in my HPI.      Current Outpatient Medications   Medication Sig Dispense Refill     " "Ascorbic Acid (VITAMIN C PO) Take 500 mg by mouth daily       CYANOCOBALAMIN PO Take 500 mcg by mouth daily       furosemide (LASIX) 20 MG tablet Take 1 tablet (20 mg) by mouth daily 90 tablet 3     metoprolol succinate ER (TOPROL XL) 50 MG 24 hr tablet Take 2 tablets(100mg) by mouth every am and 1 tablet(50mg) every pm 270 tablet 3     Multiple Vitamins-Minerals (ICAPS AREDS FORMULA PO) Take 1 tablet by mouth daily Takes in addition to multivitamin with minerals       Nutritional Supplements (ENSURE COMPLETE PO) 1 drink a day       omeprazole (PRILOSEC) 40 MG DR capsule TAKE 1 CAPSULE(40 MG) BY MOUTH DAILY 90 capsule 3     sacubitril-valsartan (ENTRESTO) 24-26 MG per tablet Take 1/2 pill in the morning and a full pill at night. 180 tablet 3     Vitamin D, Cholecalciferol, 1000 UNITS TABS Take 2,000 Units by mouth daily        warfarin (COUMADIN) 5 MG tablet Take 1 tab daily or as directed per INR clinic 100 tablet 1       Physical Examination:  General: The patient is a pleasant male in no acute distress.  /79   Pulse 94   Temp 98.7  F (37.1  C) (Oral)   Resp 16   Ht 1.74 m (5' 8.5\")   Wt 73.8 kg (162 lb 11.2 oz)   BMI 24.38 kg/m     HEENT: EOMI, PERRL. Sclerae are anicteric. Oral mucosa is pink and moist with no lesions or thrush.   Lymph: Neck is supple with no lymphadenopathy in the cervical or supraclavicular areas.   Heart: Regular rate and rhythm.   Lungs: Clear to auscultation bilaterally.   GI: Bowel sounds present, soft, nontender with no palpable hepatosplenomegaly or masses.   Extremities: No lower extremity edema noted bilaterally.   Skin: No rashes, petechiae, or bruising noted on exposed skin.    Laboratory Data:  Results for YINKA GONZALEZ (MRN [id number]) as of 4/5/2019 16:20   Ref. Range 4/5/2019 15:10   WBC Latest Ref Range: 4.0 - 11.0 10e9/L 8.4   Hemoglobin Latest Ref Range: 13.3 - 17.7 g/dL 10.5 (L)   Hematocrit Latest Ref Range: 40.0 - 53.0 % 32.1 (L)   Platelet Count Latest " Ref Range: 150 - 450 10e9/L 31 (LL)   RBC Count Latest Ref Range: 4.4 - 5.9 10e12/L 3.38 (L)   MCV Latest Ref Range: 78 - 100 fl 95   MCH Latest Ref Range: 26.5 - 33.0 pg 31.1   MCHC Latest Ref Range: 31.5 - 36.5 g/dL 32.7   RDW Latest Ref Range: 10.0 - 15.0 % 18.6 (H)   Diff Method Unknown Automated Method   % Neutrophils Latest Units: % 93.7   % Lymphocytes Latest Units: % 4.4   % Monocytes Latest Units: % 1.7   % Eosinophils Latest Units: % 0.1   % Basophils Latest Units: % 0.0   % Immature Granulocytes Latest Units: % 0.1   Nucleated RBCs Latest Ref Range: 0 /100 0   Absolute Neutrophil Latest Ref Range: 1.6 - 8.3 10e9/L 7.8   Absolute Lymphocytes Latest Ref Range: 0.8 - 5.3 10e9/L 0.4 (L)   Absolute Monocytes Latest Ref Range: 0.0 - 1.3 10e9/L 0.1   Absolute Eosinophils Latest Ref Range: 0.0 - 0.7 10e9/L 0.0   Absolute Basophils Latest Ref Range: 0.0 - 0.2 10e9/L 0.0   Abs Immature Granulocytes Latest Ref Range: 0 - 0.4 10e9/L 0.0   Absolute Nucleated RBC Unknown 0.0       Assessment and Plan:    This is a 88-year-old male with    ITP  -Bone marrow biopsy did not show dysplasia or blasts  No significant response to steroids  -Start IVIG today and give another dose tomorrow  -Patient and daughter instructed to go to ER in the event of bleeding fall or injury  Patient has follow-up with Dr. Britton on 04/11    ERIC Hill CNP  Hem/Onc   [facility]               Again, thank you for allowing me to participate in the care of your patient.        Sincerely,        ERIC Hill CNP

## 2019-04-09 NOTE — TELEPHONE ENCOUNTER
Good to hear that Dr Paul.    I see no cardiac contraindication regarding IVIG infusions and no effect on scheduled echo.    Looking forward to see you as well!    Thanks  Long

## 2019-04-09 NOTE — PROGRESS NOTES
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  Injury.  Denies fever chills sweats cough shortness of breath.  Denies nausea vomiting diarrhea      Review of Systems:  14 point ROS of systems including Constitutional, Eyes, Respiratory, Cardiovascular, Gastroenterology, Genitourinary, Integumentary, Muscularskeletal, Psychiatric were all negative except for pertinent positives noted in my HPI.      Current Outpatient Medications   Medication Sig Dispense Refill     Ascorbic Acid (VITAMIN C PO) Take 500 mg by mouth daily       CYANOCOBALAMIN PO Take 500 mcg by mouth daily       furosemide (LASIX) 20 MG tablet Take 1 tablet (20 mg) by mouth daily 90 tablet 3     metoprolol succinate ER (TOPROL XL) 50 MG 24 hr tablet Take 2 tablets(100mg) by mouth every am and 1 tablet(50mg) every pm 270 tablet 3     Multiple Vitamins-Minerals (ICAPS AREDS FORMULA PO) Take 1 tablet by mouth daily Takes in addition to multivitamin with minerals       Nutritional Supplements (ENSURE COMPLETE PO) 1 drink a day       omeprazole (PRILOSEC) 40 MG DR capsule TAKE 1 CAPSULE(40 MG) BY MOUTH DAILY 90 capsule 3     sacubitril-valsartan (ENTRESTO) 24-26 MG per tablet Take 1/2 pill in the morning and a full pill at night. 180 tablet 3     Vitamin D, Cholecalciferol, 1000 UNITS TABS Take 2,000 Units by mouth daily        warfarin (COUMADIN) 5 MG tablet Take 1 tab daily or as directed per INR clinic 100 tablet 1       Physical Examination:  General: The patient is a pleasant male in no acute distress.  /79   Pulse 94   Temp 98.7  F (37.1  C) (Oral)   Resp 16   Ht 1.74 m (5' 8.5\")   Wt 73.8 kg (162 lb 11.2 oz)   BMI 24.38 kg/m    HEENT: EOMI, PERRL. Sclerae are anicteric. Oral mucosa is pink and moist with no lesions or thrush. "   Lymph: Neck is supple with no lymphadenopathy in the cervical or supraclavicular areas.   Heart: Regular rate and rhythm.   Lungs: Clear to auscultation bilaterally.   GI: Bowel sounds present, soft, nontender with no palpable hepatosplenomegaly or masses.   Extremities: No lower extremity edema noted bilaterally.   Skin: No rashes, petechiae, or bruising noted on exposed skin.    Laboratory Data:  Results for YINKA GONZALEZ (MRN [id number]) as of 4/5/2019 16:20   Ref. Range 4/5/2019 15:10   WBC Latest Ref Range: 4.0 - 11.0 10e9/L 8.4   Hemoglobin Latest Ref Range: 13.3 - 17.7 g/dL 10.5 (L)   Hematocrit Latest Ref Range: 40.0 - 53.0 % 32.1 (L)   Platelet Count Latest Ref Range: 150 - 450 10e9/L 31 (LL)   RBC Count Latest Ref Range: 4.4 - 5.9 10e12/L 3.38 (L)   MCV Latest Ref Range: 78 - 100 fl 95   MCH Latest Ref Range: 26.5 - 33.0 pg 31.1   MCHC Latest Ref Range: 31.5 - 36.5 g/dL 32.7   RDW Latest Ref Range: 10.0 - 15.0 % 18.6 (H)   Diff Method Unknown Automated Method   % Neutrophils Latest Units: % 93.7   % Lymphocytes Latest Units: % 4.4   % Monocytes Latest Units: % 1.7   % Eosinophils Latest Units: % 0.1   % Basophils Latest Units: % 0.0   % Immature Granulocytes Latest Units: % 0.1   Nucleated RBCs Latest Ref Range: 0 /100 0   Absolute Neutrophil Latest Ref Range: 1.6 - 8.3 10e9/L 7.8   Absolute Lymphocytes Latest Ref Range: 0.8 - 5.3 10e9/L 0.4 (L)   Absolute Monocytes Latest Ref Range: 0.0 - 1.3 10e9/L 0.1   Absolute Eosinophils Latest Ref Range: 0.0 - 0.7 10e9/L 0.0   Absolute Basophils Latest Ref Range: 0.0 - 0.2 10e9/L 0.0   Abs Immature Granulocytes Latest Ref Range: 0 - 0.4 10e9/L 0.0   Absolute Nucleated RBC Unknown 0.0       Assessment and Plan:    This is a 88-year-old male with    ITP  -Bone marrow biopsy did not show dysplasia or blasts  No significant response to steroids  -Start IVIG today and give another dose tomorrow  -Patient and daughter instructed to go to ER in the event of bleeding  fall or injury  Patient has follow-up with Dr. Britton on 04/11    ERIC Hill CNP  Hem/Onc   [facility]

## 2019-04-09 NOTE — PROGRESS NOTES
Infusion Nursing Note:  Jama Paul presents today for IVIG.    Patient seen by provider today: Yes: Eric   present during visit today: Not Applicable.    Note: N/A.    Intravenous Access:  Labs drawn without difficulty.  Peripheral IV placed.    Treatment Conditions:  Not Applicable.      Post Infusion Assessment:  Patient tolerated infusion without incident.  Blood return noted pre and post infusion.  Site patent and intact, free from redness, edema or discomfort.  No evidence of extravasations.  Access discontinued per protocol.       Discharge Plan:   Discharge instructions reviewed with: Patient and Family.  Patient and/or family verbalized understanding of discharge instructions and all questions answered.  Patient discharged in stable condition accompanied by: self and daughter.  Departure Mode: Ambulatory.    Tracy Rizvi RN

## 2019-04-10 ENCOUNTER — INFUSION THERAPY VISIT (OUTPATIENT)
Dept: INFUSION THERAPY | Facility: CLINIC | Age: 84
End: 2019-04-10
Attending: INTERNAL MEDICINE
Payer: MEDICARE

## 2019-04-10 ENCOUNTER — HOSPITAL ENCOUNTER (OUTPATIENT)
Facility: CLINIC | Age: 84
Setting detail: SPECIMEN
End: 2019-04-10
Attending: INTERNAL MEDICINE
Payer: MEDICARE

## 2019-04-10 VITALS
BODY MASS INDEX: 24.72 KG/M2 | RESPIRATION RATE: 18 BRPM | DIASTOLIC BLOOD PRESSURE: 77 MMHG | OXYGEN SATURATION: 100 % | SYSTOLIC BLOOD PRESSURE: 122 MMHG | WEIGHT: 165 LBS | TEMPERATURE: 97.5 F | HEART RATE: 63 BPM

## 2019-04-10 DIAGNOSIS — D69.3 IDIOPATHIC THROMBOCYTOPENIC PURPURA (H): Primary | ICD-10-CM

## 2019-04-10 DIAGNOSIS — D69.6 THROMBOCYTOPENIA (H): ICD-10-CM

## 2019-04-10 DIAGNOSIS — I10 ESSENTIAL HYPERTENSION: Primary | ICD-10-CM

## 2019-04-10 DIAGNOSIS — I48.21 PERMANENT ATRIAL FIBRILLATION (H): Primary | ICD-10-CM

## 2019-04-10 PROCEDURE — 96365 THER/PROPH/DIAG IV INF INIT: CPT

## 2019-04-10 PROCEDURE — 96366 THER/PROPH/DIAG IV INF ADDON: CPT

## 2019-04-10 PROCEDURE — A9270 NON-COVERED ITEM OR SERVICE: HCPCS | Performed by: NURSE PRACTITIONER

## 2019-04-10 PROCEDURE — 96375 TX/PRO/DX INJ NEW DRUG ADDON: CPT

## 2019-04-10 PROCEDURE — 96367 TX/PROPH/DG ADDL SEQ IV INF: CPT

## 2019-04-10 PROCEDURE — 25800030 ZZH RX IP 258 OP 636: Performed by: NURSE PRACTITIONER

## 2019-04-10 PROCEDURE — 25000128 H RX IP 250 OP 636: Performed by: NURSE PRACTITIONER

## 2019-04-10 PROCEDURE — 25000132 ZZH RX MED GY IP 250 OP 250 PS 637: Performed by: NURSE PRACTITIONER

## 2019-04-10 RX ORDER — DIPHENHYDRAMINE HCL 25 MG
50 CAPSULE ORAL ONCE
Status: CANCELLED | OUTPATIENT
Start: 2019-04-10

## 2019-04-10 RX ORDER — ACETAMINOPHEN 325 MG/1
650 TABLET ORAL ONCE
Status: COMPLETED | OUTPATIENT
Start: 2019-04-10 | End: 2019-04-10

## 2019-04-10 RX ORDER — ACETAMINOPHEN 325 MG/1
650 TABLET ORAL ONCE
Status: CANCELLED
Start: 2019-04-10

## 2019-04-10 RX ADMIN — DIPHENHYDRAMINE HYDROCHLORIDE 50 MG: 50 INJECTION INTRAMUSCULAR; INTRAVENOUS at 11:33

## 2019-04-10 RX ADMIN — HUMAN IMMUNOGLOBULIN G 75 G: 40 LIQUID INTRAVENOUS at 11:54

## 2019-04-10 RX ADMIN — ACETAMINOPHEN 650 MG: 325 TABLET ORAL at 11:23

## 2019-04-10 RX ADMIN — HYDROCORTISONE SODIUM SUCCINATE 100 MG: 100 INJECTION, POWDER, FOR SOLUTION INTRAMUSCULAR; INTRAVENOUS at 11:31

## 2019-04-10 NOTE — PROGRESS NOTES
Infusion Nursing Note:  Jama Paul presents today for IVIG.    Patient seen by provider today: No   present during visit today: Not Applicable.    Note: N/A.    Intravenous Access:  Labs drawn without difficulty.    Treatment Conditions:  Not Applicable.      Post Infusion Assessment:  Patient tolerated infusion without incident.  Site patent and intact, free from redness, edema or discomfort.  No evidence of extravasations.  Access discontinued per protocol.       Discharge Plan:   AVS to patient via MYCHART.  Patient will return prn for next appointment.   Patient discharged in stable condition accompanied by: son.  Departure Mode: Ambulatory with walker.    Rah Denise RN

## 2019-04-11 ENCOUNTER — HOSPITAL ENCOUNTER (OUTPATIENT)
Dept: CARDIOLOGY | Facility: CLINIC | Age: 84
Discharge: HOME | End: 2019-04-11
Attending: INTERNAL MEDICINE | Admitting: INTERNAL MEDICINE
Payer: MEDICARE

## 2019-04-11 DIAGNOSIS — I10 ESSENTIAL HYPERTENSION: ICD-10-CM

## 2019-04-11 DIAGNOSIS — D69.6 THROMBOCYTOPENIA (H): ICD-10-CM

## 2019-04-11 DIAGNOSIS — I48.21 PERMANENT ATRIAL FIBRILLATION (H): ICD-10-CM

## 2019-04-11 DIAGNOSIS — I50.43 ACUTE ON CHRONIC COMBINED SYSTOLIC AND DIASTOLIC CHF (CONGESTIVE HEART FAILURE) (H): ICD-10-CM

## 2019-04-11 LAB
ANION GAP SERPL CALCULATED.3IONS-SCNC: 7.6 MMOL/L (ref 6–17)
BASOPHILS # BLD AUTO: 0 10E9/L (ref 0–0.2)
BASOPHILS NFR BLD AUTO: 0.1 %
BUN SERPL-MCNC: 25 MG/DL (ref 7–30)
CALCIUM SERPL-MCNC: 8.6 MG/DL (ref 8.5–10.5)
CHLORIDE SERPL-SCNC: 104 MMOL/L (ref 98–107)
CO2 SERPL-SCNC: 29 MMOL/L (ref 23–29)
CREAT SERPL-MCNC: 1.17 MG/DL (ref 0.7–1.3)
DIFFERENTIAL METHOD BLD: ABNORMAL
EOSINOPHIL # BLD AUTO: 0 10E9/L (ref 0–0.7)
EOSINOPHIL NFR BLD AUTO: 0 %
ERYTHROCYTE [DISTWIDTH] IN BLOOD BY AUTOMATED COUNT: 19.1 % (ref 10–15)
GFR SERPL CREATININE-BSD FRML MDRD: 59 ML/MIN/{1.73_M2}
GLUCOSE SERPL-MCNC: 90 MG/DL (ref 70–105)
HCT VFR BLD AUTO: 32 % (ref 40–53)
HGB BLD-MCNC: 10.1 G/DL (ref 13.3–17.7)
IMM GRANULOCYTES # BLD: 0 10E9/L (ref 0–0.4)
IMM GRANULOCYTES NFR BLD: 0.4 %
LYMPHOCYTES # BLD AUTO: 0.9 10E9/L (ref 0.8–5.3)
LYMPHOCYTES NFR BLD AUTO: 12.3 %
MCH RBC QN AUTO: 31.1 PG (ref 26.5–33)
MCHC RBC AUTO-ENTMCNC: 31.6 G/DL (ref 31.5–36.5)
MCV RBC AUTO: 99 FL (ref 78–100)
MONOCYTES # BLD AUTO: 0.6 10E9/L (ref 0–1.3)
MONOCYTES NFR BLD AUTO: 7.7 %
NEUTROPHILS # BLD AUTO: 5.9 10E9/L (ref 1.6–8.3)
NEUTROPHILS NFR BLD AUTO: 79.5 %
NRBC # BLD AUTO: 0 10*3/UL
NRBC BLD AUTO-RTO: 0 /100
PLATELET # BLD AUTO: 28 10E9/L (ref 150–450)
POTASSIUM SERPL-SCNC: 4.6 MMOL/L (ref 3.5–5.1)
RBC # BLD AUTO: 3.25 10E12/L (ref 4.4–5.9)
SODIUM SERPL-SCNC: 136 MMOL/L (ref 136–145)
WBC # BLD AUTO: 7.4 10E9/L (ref 4–11)

## 2019-04-11 PROCEDURE — 93306 TTE W/DOPPLER COMPLETE: CPT

## 2019-04-11 PROCEDURE — 93306 TTE W/DOPPLER COMPLETE: CPT | Mod: 26 | Performed by: INTERNAL MEDICINE

## 2019-04-11 PROCEDURE — 36415 COLL VENOUS BLD VENIPUNCTURE: CPT | Performed by: PHYSICIAN ASSISTANT

## 2019-04-11 PROCEDURE — 80048 BASIC METABOLIC PNL TOTAL CA: CPT | Performed by: PHYSICIAN ASSISTANT

## 2019-04-11 PROCEDURE — 85025 COMPLETE CBC W/AUTO DIFF WBC: CPT | Performed by: PHYSICIAN ASSISTANT

## 2019-04-12 ENCOUNTER — TELEPHONE (OUTPATIENT)
Dept: ONCOLOGY | Facility: CLINIC | Age: 84
End: 2019-04-12

## 2019-04-12 ENCOUNTER — HOSPITAL ENCOUNTER (EMERGENCY)
Facility: CLINIC | Age: 84
Discharge: HOME | End: 2019-04-12
Attending: EMERGENCY MEDICINE | Admitting: EMERGENCY MEDICINE
Payer: MEDICARE

## 2019-04-12 VITALS
WEIGHT: 160 LBS | SYSTOLIC BLOOD PRESSURE: 129 MMHG | OXYGEN SATURATION: 97 % | HEART RATE: 92 BPM | DIASTOLIC BLOOD PRESSURE: 90 MMHG | RESPIRATION RATE: 16 BRPM | TEMPERATURE: 99 F | HEIGHT: 66 IN | BODY MASS INDEX: 25.71 KG/M2

## 2019-04-12 DIAGNOSIS — D69.6 THROMBOCYTOPENIA (H): ICD-10-CM

## 2019-04-12 DIAGNOSIS — R53.1 WEAKNESS: ICD-10-CM

## 2019-04-12 LAB
ALBUMIN SERPL-MCNC: 3.1 G/DL (ref 3.4–5)
ALP SERPL-CCNC: 39 U/L (ref 40–150)
ALT SERPL W P-5'-P-CCNC: 44 U/L (ref 0–70)
ANION GAP SERPL CALCULATED.3IONS-SCNC: 4 MMOL/L (ref 3–14)
ANISOCYTOSIS BLD QL SMEAR: ABNORMAL
AST SERPL W P-5'-P-CCNC: 19 U/L (ref 0–45)
BASOPHILS # BLD AUTO: 0 10E9/L (ref 0–0.2)
BASOPHILS NFR BLD AUTO: 0 %
BILIRUB SERPL-MCNC: 1.3 MG/DL (ref 0.2–1.3)
BUN SERPL-MCNC: 19 MG/DL (ref 7–30)
CALCIUM SERPL-MCNC: 7.7 MG/DL (ref 8.5–10.1)
CHLORIDE SERPL-SCNC: 103 MMOL/L (ref 94–109)
CO2 SERPL-SCNC: 27 MMOL/L (ref 20–32)
CREAT SERPL-MCNC: 1.02 MG/DL (ref 0.66–1.25)
DIFFERENTIAL METHOD BLD: ABNORMAL
EOSINOPHIL # BLD AUTO: 0 10E9/L (ref 0–0.7)
EOSINOPHIL NFR BLD AUTO: 0 %
ERYTHROCYTE [DISTWIDTH] IN BLOOD BY AUTOMATED COUNT: 18.9 % (ref 10–15)
GFR SERPL CREATININE-BSD FRML MDRD: 65 ML/MIN/{1.73_M2}
GLUCOSE SERPL-MCNC: 104 MG/DL (ref 70–99)
HCT VFR BLD AUTO: 30.8 % (ref 40–53)
HGB BLD-MCNC: 10.2 G/DL (ref 13.3–17.7)
IMM GRANULOCYTES # BLD: 0 10E9/L (ref 0–0.4)
IMM GRANULOCYTES NFR BLD: 0.3 %
INTERPRETATION ECG - MUSE: NORMAL
LYMPHOCYTES # BLD AUTO: 1.4 10E9/L (ref 0.8–5.3)
LYMPHOCYTES NFR BLD AUTO: 15 %
MCH RBC QN AUTO: 31.8 PG (ref 26.5–33)
MCHC RBC AUTO-ENTMCNC: 33.1 G/DL (ref 31.5–36.5)
MCV RBC AUTO: 96 FL (ref 78–100)
MONOCYTES # BLD AUTO: 0.6 10E9/L (ref 0–1.3)
MONOCYTES NFR BLD AUTO: 6.2 %
NEUTROPHILS # BLD AUTO: 7.4 10E9/L (ref 1.6–8.3)
NEUTROPHILS NFR BLD AUTO: 78.5 %
NRBC # BLD AUTO: 0 10*3/UL
NRBC BLD AUTO-RTO: 0 /100
PLATELET # BLD AUTO: 24 10E9/L (ref 150–450)
PLATELET # BLD EST: ABNORMAL 10*3/UL
POTASSIUM SERPL-SCNC: 4.1 MMOL/L (ref 3.4–5.3)
PROT SERPL-MCNC: 7.5 G/DL (ref 6.8–8.8)
RBC # BLD AUTO: 3.21 10E12/L (ref 4.4–5.9)
SODIUM SERPL-SCNC: 134 MMOL/L (ref 133–144)
WBC # BLD AUTO: 9.5 10E9/L (ref 4–11)

## 2019-04-12 PROCEDURE — 93005 ELECTROCARDIOGRAM TRACING: CPT

## 2019-04-12 PROCEDURE — 80053 COMPREHEN METABOLIC PANEL: CPT | Performed by: EMERGENCY MEDICINE

## 2019-04-12 PROCEDURE — 85025 COMPLETE CBC W/AUTO DIFF WBC: CPT | Performed by: EMERGENCY MEDICINE

## 2019-04-12 PROCEDURE — 99284 EMERGENCY DEPT VISIT MOD MDM: CPT

## 2019-04-12 ASSESSMENT — MIFFLIN-ST. JEOR: SCORE: 1338.51

## 2019-04-12 NOTE — TELEPHONE ENCOUNTER
S-(situation): Pt was to return today for labs and follow-up with Eric Pierce CNP. Pt concerned about platelets at 28k.     B-(background): Essential thrombocytopenia.    A-(assessment): Reviewed message with Dr. Britton. Spoke to wife Paige, Dr. Britton recommends to go to the Emergency room if Pt continues to feel weak. He just completed 2nd IVIG infusion and can take 2 to 4 weeks feel improvement.      R-(recommendations): Reschedule with Eric Pierce CNP early next week as Dr. Britton's schedule is full all next week at all clinics. Pt's wife agrees with plan and will speak to her  as to recommendations.    GARRETT TerryN, RN, OCN  RN Cancer Care Coordinator  [facility]  [phone]

## 2019-04-12 NOTE — ED AVS SNAPSHOT
Emergency Department  [address] [place] [address]  Phone:  [phone]  Fax:  [phone]                                    Jama Paul   MRN: [id number]    Department:   Emergency Department   Date of Visit:  4/12/2019           After Visit Summary Signature Page    I have received my discharge instructions, and my questions have been answered. I have discussed any challenges I see with this plan with the nurse or doctor.    ..........................................................................................................................................  Patient/Patient Representative Signature      ..........................................................................................................................................  Patient Representative Print Name and Relationship to Patient    ..................................................               ................................................  Date                                   Time    ..........................................................................................................................................  Reviewed by Signature/Title    ...................................................              ..............................................  Date                                               Time          22EPIC Rev 08/18

## 2019-04-12 NOTE — ED PROVIDER NOTES
"  History     Chief Complaint:  Generalized Weakness    HPI   Jama Paul is a 88 year old male, with a history of CHF, who presents with increased weakness beginning last night. His wife was just hospitalized for hip surgery and he has been caring for her at home, throwing off his daily rhythm. He has not been able to go to the gym every day like normal and he hasn't been eating regularly. Patient endorses some generalized weakness and he was concerned since he couldn't find his pulse very well earlier today. He the notes he is now feeling back to baseline and volunteers \"I'm fine\". Family's overall concern is that he is not taking good care of himself, though they do think he can continue to manage as an outpatient.  He underwent echocardiogram yesterday and his provider stated his EF was normal. He had an IV Ig infusion yesterday for his thrombocytopenia that has been attributed to ITP.  Patient denies dysuria, cough and fever.    Cardiac Risk Factors   Sex: Male   Tobacco: Negative   Hypertension: Positive  Diabetes: Negative  Hyperlipidemia: Negative  Family History: Negative    Allergies:  No known drug allergies.    Medications:    Cyanocobalamin   Entresto  Lasix  Metoprolol   Priloesc     Past Medical History:    Basal cell carcinoma  CHF  Eye hemorrhage, left  HTN  Non-ischemic cardiomyopathy   Permanent A-Fib  Thrombocytopenia     Past Surgical History:    Bone marrow biopsy, bone specimen, needle/trocar  Carpal tunnel release  Explore groin   Herniorrhaphy inguinal   Mohs micrographic procedure  Phacoemulsification clear cornea with standard intraocular lens    Family History:    History reviewed. No pertinent family history.    Social History:  Presents to the ED with his daughter and son.   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  He trained at [employer].  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    Review of Systems   All other systems reviewed and are negative.      Physical Exam   Patient Vitals for the past 24 hrs:   BP Temp Temp src Pulse Heart Rate Resp SpO2 Height Weight   04/12/19 2037 -- -- -- -- -- -- 97 % -- --   04/12/19 2032 129/90 -- -- 92 -- -- -- -- --   04/12/19 1813 129/79 99  F (37.2  C) Oral -- 108 16 100 % 1.676 m (5' 6\") 72.6 kg (160 lb)       Physical Exam  General: Male sitting upright in room 12, appears and acts younger than age, family at bedside including daughter  HENT: mucous membranes moist, OP clear  CV: regular rate (no longer tachycardic), slightly irregular rhythm, mild symmetric lower extremity edema (chronic per pt)  Resp: normal effort, clear throughout, no crackles or wheezing  GI: abdomen soft and nontender, no guarding  MSK: no bony tenderness, no CVAT  Skin: appropriately warm and dry  Neuro: alert, clear speech, oriented, responds briskly and appropriate all questions and commands, no nuchal rigidity,  equal  Psych: Very pleasant, cooperative, recounts a story of taking care of Eloy Dillard about a year before Eloy's death        Emergency Department Course   ECG:  @ 2028  Indication: Weakness  Vent. Rate 84 bpm. MI interval * ms. QRS duration 90 ms. QT/QTc 374/441 ms. P-R-T axis * 26 -10.   Atrial fibrillation with a competing junctional pacemaker. Possible anterior infarct, age undetermined. Abnormal ECG.    Read @ 2030 by Dr. Julian.    Laboratory:  CBC:  WBC 9.5, HGB 10.2 (L), PLT 24 (LL), otherwise WNL  CMP: Calcium 7.7 (L), albumin 3.1 (L), alkphos 39 (L), otherwise WNL (Creatinine 1.02)    Emergency Department Course:  The patient arrived in triage where vitals were measured and recorded.   The patient was then escorted back to the emergency department.   The patient's medical records were reviewed.  " Nursing notes and vitals were reviewed.  1950: I performed an exam of the patient as documented above.  The above workup was undertaken.  2052: I rechecked the patient and discussed status of labs.  2145: I rechecked the patient and discussed the remainder of the labs.  Findings and plan explained to the Patient and son and daughter. Patient discharged home, status improved, with instructions regarding supportive care, medications, and reasons to return as well as the importance of close follow-up was reviewed.     Impression & Plan      Medical Decision Making:  A variety of potentially dangerous causes of his generalized weakness were considered, including cardiac arrhythmia, hypotension, anemia, and many others.  Hemoglobin is approximate his baseline, and thrombus cytopenia remains quite severe though not significantly changed from last check.  Given his absence of respiratory symptoms or urinary symptoms, he wished to defer chest x-ray or urinalysis.  He has family have been eager for discharge throughout his visit, wishing only for a general evaluation and family wished for me to reiterate to the patient the importance of getting regular food intake and taking care of his basic needs, which she seems to have understandably let slide in light of caring for his postoperative wife.  He was discharged home with advice to return for sudden worsening otherwise continue to follow closely through clinic.    Diagnosis:    ICD-10-CM    1. Weakness R53.1    2. Thrombocytopenia (H) D69.6        Disposition:  Discharged to home.         I, Dyan Cespedes, am serving as a scribe on 4/12/2019 at 7:50 PM to personally document services performed by Dr. Julian based on my observations and the provider's statements to me.    EMERGENCY DEPARTMENT      This record was created at least in part using electronic voice recognition software, so please excuse any typographical errors.       James Julian MD  04/13/19  3041

## 2019-04-15 ENCOUNTER — TELEPHONE (OUTPATIENT)
Dept: ONCOLOGY | Facility: CLINIC | Age: 84
End: 2019-04-15

## 2019-04-15 DIAGNOSIS — I48.91 ATRIAL FIBRILLATION (H): ICD-10-CM

## 2019-04-15 DIAGNOSIS — I34.0 MITRAL REGURGITATION: ICD-10-CM

## 2019-04-15 DIAGNOSIS — I07.1 TRICUSPID REGURGITATION: Primary | ICD-10-CM

## 2019-04-15 NOTE — TELEPHONE ENCOUNTER
I received a call from patient's daughter, Lorraine, stating that her father is feeling a bit overwhelmed and like the appointment today to be cancelled unless necessary to have it today but prefer in one week.    Eric BUCIO is aware and is ok with having the appointment delayed by one week.      I have called Lorraine and requested she return call.    Evie Mary RN

## 2019-04-18 ENCOUNTER — HOSPITAL ENCOUNTER (OUTPATIENT)
Facility: CLINIC | Age: 84
Setting detail: SPECIMEN
End: 2019-04-18
Attending: NURSE PRACTITIONER
Payer: MEDICARE

## 2019-04-23 ENCOUNTER — TELEPHONE (OUTPATIENT)
Dept: ONCOLOGY | Facility: CLINIC | Age: 84
End: 2019-04-23

## 2019-04-23 NOTE — TELEPHONE ENCOUNTER
Left voicemail message for patient requesting a return call regarding reschedule of appointment to Thursday per Eric Pierce request.

## 2019-04-23 NOTE — TELEPHONE ENCOUNTER
Patient with ITP who received IVIG on 04/09 and 04/10.  He was supposed to come to the clinic last week for CBC however he canceled.  He was supposed to come this week he canceled again.  I called patient and spoke to his wife-I told the wife the importance of rechecking his platelet count after the treatment.  She will bring the patient tomorrow to check for CBC.  Per patient's wife patient does not have bleeding o  I asked wife to take patient to the ER in the event of injury fall or bleeding    Plan to check CBC tomorrow morning

## 2019-04-24 ENCOUNTER — INFUSION THERAPY VISIT (OUTPATIENT)
Dept: INFUSION THERAPY | Facility: CLINIC | Age: 84
End: 2019-04-24
Attending: INTERNAL MEDICINE
Payer: MEDICARE

## 2019-04-24 ENCOUNTER — HOSPITAL ENCOUNTER (OUTPATIENT)
Facility: CLINIC | Age: 84
Setting detail: SPECIMEN
Discharge: HOME | End: 2019-04-24
Attending: INTERNAL MEDICINE | Admitting: NURSE PRACTITIONER
Payer: MEDICARE

## 2019-04-24 ENCOUNTER — PATIENT OUTREACH (OUTPATIENT)
Dept: ONCOLOGY | Facility: CLINIC | Age: 84
End: 2019-04-24

## 2019-04-24 DIAGNOSIS — D69.3 IDIOPATHIC THROMBOCYTOPENIC PURPURA (H): ICD-10-CM

## 2019-04-24 LAB
ANISOCYTOSIS BLD QL SMEAR: ABNORMAL
BASOPHILS # BLD AUTO: 0 10E9/L (ref 0–0.2)
BASOPHILS NFR BLD AUTO: 0.2 %
DIFFERENTIAL METHOD BLD: ABNORMAL
ELLIPTOCYTES BLD QL SMEAR: SLIGHT
EOSINOPHIL # BLD AUTO: 0 10E9/L (ref 0–0.7)
EOSINOPHIL NFR BLD AUTO: 0.4 %
ERYTHROCYTE [DISTWIDTH] IN BLOOD BY AUTOMATED COUNT: 19 % (ref 10–15)
HCT VFR BLD AUTO: 35.5 % (ref 40–53)
HGB BLD-MCNC: 11.3 G/DL (ref 13.3–17.7)
IMM GRANULOCYTES # BLD: 0 10E9/L (ref 0–0.4)
IMM GRANULOCYTES NFR BLD: 0.2 %
LYMPHOCYTES # BLD AUTO: 0.4 10E9/L (ref 0.8–5.3)
LYMPHOCYTES NFR BLD AUTO: 6.9 %
MCH RBC QN AUTO: 30.7 PG (ref 26.5–33)
MCHC RBC AUTO-ENTMCNC: 31.8 G/DL (ref 31.5–36.5)
MCV RBC AUTO: 97 FL (ref 78–100)
MONOCYTES # BLD AUTO: 0.7 10E9/L (ref 0–1.3)
MONOCYTES NFR BLD AUTO: 13.7 %
NEUTROPHILS # BLD AUTO: 4.1 10E9/L (ref 1.6–8.3)
NEUTROPHILS NFR BLD AUTO: 78.6 %
NRBC # BLD AUTO: 0 10*3/UL
NRBC BLD AUTO-RTO: 0 /100
PLATELET # BLD AUTO: 41 10E9/L (ref 150–450)
PLATELET # BLD EST: ABNORMAL 10*3/UL
RBC # BLD AUTO: 3.68 10E12/L (ref 4.4–5.9)
WBC # BLD AUTO: 5.2 10E9/L (ref 4–11)

## 2019-04-24 PROCEDURE — 85025 COMPLETE CBC W/AUTO DIFF WBC: CPT | Performed by: NURSE PRACTITIONER

## 2019-04-24 PROCEDURE — 36415 COLL VENOUS BLD VENIPUNCTURE: CPT

## 2019-04-24 NOTE — PROGRESS NOTES
Medical Assistant Note:  Jama Paul presents today for blood draw.    Patient seen by provider today: No.   present during visit today: Not Applicable.    Concerns: No Concerns.    Procedure:  Lab draw site: LAC, Needle type: BF, Gauge: 23g with gauze and coban.    Post Assessment:  Labs drawn without difficulty: Yes.    Discharge Plan:  Departure Mode: walker    Face to Face Time: 5.    Kathleen Keith

## 2019-04-24 NOTE — PROGRESS NOTES
Received critical lab results of platelets: 41K    Improved from 24K on 4-12-19    Pt has known ITP.    Immediately notified NP Eric.    Pt has been updated & scheduled to see his Hem/Onc  tomorrow.  Pt verbalized understanding.

## 2019-04-25 ENCOUNTER — HOSPITAL ENCOUNTER (OUTPATIENT)
Facility: CLINIC | Age: 84
Setting detail: SPECIMEN
End: 2019-04-25
Attending: INTERNAL MEDICINE
Payer: MEDICARE

## 2019-04-25 ENCOUNTER — ONCOLOGY VISIT (OUTPATIENT)
Dept: ONCOLOGY | Facility: CLINIC | Age: 84
End: 2019-04-25
Attending: INTERNAL MEDICINE
Payer: MEDICARE

## 2019-04-25 VITALS
SYSTOLIC BLOOD PRESSURE: 118 MMHG | HEART RATE: 78 BPM | DIASTOLIC BLOOD PRESSURE: 64 MMHG | RESPIRATION RATE: 16 BRPM | OXYGEN SATURATION: 98 % | WEIGHT: 156 LBS | HEIGHT: 66 IN | TEMPERATURE: 97.5 F | BODY MASS INDEX: 25.07 KG/M2

## 2019-04-25 DIAGNOSIS — D72.9 ABNORMAL WHITE BLOOD CELL (WBC): ICD-10-CM

## 2019-04-25 DIAGNOSIS — D69.6 THROMBOCYTOPENIA (H): ICD-10-CM

## 2019-04-25 DIAGNOSIS — D69.3 IDIOPATHIC THROMBOCYTOPENIC PURPURA (H): Primary | ICD-10-CM

## 2019-04-25 PROCEDURE — 99214 OFFICE O/P EST MOD 30 MIN: CPT | Performed by: INTERNAL MEDICINE

## 2019-04-25 PROCEDURE — G0463 HOSPITAL OUTPT CLINIC VISIT: HCPCS

## 2019-04-25 ASSESSMENT — MIFFLIN-ST. JEOR: SCORE: 1320.36

## 2019-04-25 NOTE — LETTER
4/25/2019         RE: Jama Paul  [address]  [facility] [address]        Dear Colleague,    Thank you for referring your patient, Jama Paul, to the [facility]. Please see a copy of my visit note below.    [facility]  HEMATOLOGY AND ONCOLOGY    FOLLOW-UP VISIT NOTE    PATIENT NAME: Jama Paul MRN # [id number]  DATE OF VISIT: Apr 25, 2019 YOB: 1931    REFERRING PROVIDER: Pa Britton MD  [address]  [place] [address]    DIAGNOSIS:   - ITP  - Possibly early myelodysplastic syndrome    TREATMENT SUMMARY:  -Referred for persistent thrombocytopenia during unrelated hospital admissions   - Bone marrow biopsy done on 3/4/19:   Hypercellular marrow for age with 80% cellularity; normal thrombocytes, no increase in blasts, no morphologic e/o MDS   Normal flowcytometry   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 These findings document the presence of an abnormal clonal process most frequently associated with myeloid disorders, including myelodysplastic syndrome.     CURRENT INTERVENTIONS:  Ongoing evaluation    SUBJECTIVE   Jama Paul is being followed for severe thrombocytopenia    Jama is here with his family (wife and son). He has tolerated IV IG well. He has no new complains. He is happy that his PLT counts have doubled.        PAST MEDICAL HISTORY     Past Medical History:   Diagnosis Date     Congestive heart failure (H)      Elevated PSA      Eye hemorrhage, left 02/2019     Non-ischemic cardiomyopathy (H)     2017, med treatment, echo done 4/18 nl ef, mod to severe tr     Permanent atrial fibrillation (H) 2011     Thrombocytopenia (H)      Unspecified essential hypertension          CURRENT OUTPATIENT MEDICATIONS     Current Outpatient Medications   Medication Sig     Ascorbic Acid  "(VITAMIN C PO) Take 500 mg by mouth daily     CYANOCOBALAMIN PO Take 500 mcg by mouth daily     furosemide (LASIX) 20 MG tablet Take 1 tablet (20 mg) by mouth daily     metoprolol succinate ER (TOPROL XL) 50 MG 24 hr tablet Take 2 tablets(100mg) by mouth every am and 1 tablet(50mg) every pm     Multiple Vitamins-Minerals (ICAPS AREDS FORMULA PO) Take 1 tablet by mouth daily Takes in addition to multivitamin with minerals     Nutritional Supplements (ENSURE COMPLETE PO) 1 drink a day     omeprazole (PRILOSEC) 40 MG DR capsule TAKE 1 CAPSULE(40 MG) BY MOUTH DAILY     sacubitril-valsartan (ENTRESTO) 24-26 MG per tablet Take 1/2 pill in the morning and a full pill at night.     Vitamin D, Cholecalciferol, 1000 UNITS TABS Take 2,000 Units by mouth daily      No current facility-administered medications for this visit.         ALLERGIES    No Known Allergies     REVIEW OF SYSTEMS   As above in the HPI, o/w complete 12-point ROS was negative.     PHYSICAL EXAM   /64   Pulse 78   Temp 97.5  F (36.4  C) (Oral)   Resp 16   Ht 1.676 m (5' 6\")   Wt 70.8 kg (156 lb)   SpO2 98%   BMI 25.18 kg/m     GEN: NAD  HEENT: PERRL, EOMI, no icterus, injection or pallor. Oropharynx is clear.  LYMPHATICs: no cervical or supraclavicular lymphadenopathy; no other abn lymphadenopathy  PULMONARY: clear with good air entry bilaterally  CARDIOVASCULAR: regular, no murmurs, rubs, or gallops  GASTROINTESTINAL: soft, non-tender, non-distended, normal bowel sounds, no hepatosplenomegaly by percussion or palpation  MUSCULOSKELTAL: warm, well perfused, no edema  NEURO: awake, alert and oriented to time place and person, cranial nerves intact - II - XII, no focal neurologic deficits  SKIN: no rashes     LABORATORY AND IMAGING STUDIES     Recent Labs   Lab Test 04/12/19  1925 04/11/19  1016 02/08/19  1510 01/15/19  1107 10/16/18  0958    136 137 135* 137   POTASSIUM 4.1 4.6 4.0 3.9 4.3   CHLORIDE 103 104 108 105 103   CO2 27 29 22 28 27 "   ANIONGAP 4 7.6 7 5.9* 11.3   BUN 19 25 25 21 22   CR 1.02 1.17 1.25 1.23 1.24   * 90 106* 103 99   BARON 7.7* 8.6 8.4* 9.2 8.7     Recent Labs   Lab Test 01/19/18  0533   MAG 2.2     Recent Labs   Lab Test 04/24/19  1346 04/12/19  1925 04/11/19  1016 04/09/19  1135 04/05/19  1510   WBC 5.2 9.5 7.4 7.8 8.4   HGB 11.3* 10.2* 10.1* 11.2* 10.5*   PLT 41* 24* 28* 33* 31*   MCV 97 96 99 96 95   NEUTROPHIL 78.6 78.5 79.5 78.7 93.7     Recent Labs   Lab Test 04/12/19 1925 02/08/19  1510 02/28/18  1024   BILITOTAL 1.3 0.6 0.7   ALKPHOS 39* 38* 41   ALT 44 15 32   AST 19 11 19   ALBUMIN 3.1* 3.7 3.4     TSH   Date Value Ref Range Status   02/05/2019 0.99 0.40 - 4.00 mU/L Final   01/19/2018 2.87 0.40 - 4.00 mU/L Final        ASSESSMENT AND PLAN   1. 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 Recent bleed behind retina with vision loss while on coumadin  4. ECOG PS 1    We did get a bone marrow biopsy for his severe thrombocytopenia. There were no blasts or any clear evidence of MDS/AML. Marrow was hypercellular marrow with 80% cellularity which is clearly not normal.     He had normal thrombocytes - PLT precursors suggesting that his production of PLT has not been affected and it could be peripheral destruction. I started him on dexamethasone 40 mg daily which he did not tolerate and we decreased the dose to 20 mg. He had no response to this. I have then tried 2 doses of IV Ig. He had mild improvement in his PLT counts to 41k from 21k. I am not happy with this response as usually we do tend to get a better response.      It is possible that his PLT counts are primarily affected by his MDS. I briefly reviewed management of MDS. We will follow his WBC counts. He has normal WBC and only mild anemia. I will start treatment for his MDS if his thrombocytopenia worsens/if his PLT count drop  below 20 k. I explained him to watch out for petechiae.     All questions for patient his wife and daughter were answered.     Over 25 min of direct face to face time spent with patient with more than 50% time spent in counseling and coordinating care.        Again, thank you for allowing me to participate in the care of your patient.        Sincerely,        Pa Britton MD

## 2019-04-25 NOTE — PROGRESS NOTES
[facility]  HEMATOLOGY AND ONCOLOGY    FOLLOW-UP VISIT NOTE    PATIENT NAME: Jama Paul MRN # [id number]  DATE OF VISIT: Apr 25, 2019 YOB: 1931    REFERRING PROVIDER: Pa Britton MD  [address]  [place] [address]    DIAGNOSIS:   - ITP  - Possibly early myelodysplastic syndrome    TREATMENT SUMMARY:  -Referred for persistent thrombocytopenia during unrelated hospital admissions   - Bone marrow biopsy done on 3/4/19:   Hypercellular marrow for age with 80% cellularity; normal thrombocytes, no increase in blasts, no morphologic e/o MDS   Normal flowcytometry   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 These findings document the presence of an abnormal clonal process most frequently associated with myeloid disorders, including myelodysplastic syndrome.     CURRENT INTERVENTIONS:  Ongoing evaluation    SUBJECTIVE   Jama Paul is being followed for severe thrombocytopenia    Jama is here with his family (wife and son). He has tolerated IV IG well. He has no new complains. He is happy that his PLT counts have doubled.        PAST MEDICAL HISTORY     Past Medical History:   Diagnosis Date     Congestive heart failure (H)      Elevated PSA      Eye hemorrhage, left 02/2019     Non-ischemic cardiomyopathy (H)     2017, med treatment, echo done 4/18 nl ef, mod to severe tr     Permanent atrial fibrillation (H) 2011     Thrombocytopenia (H)      Unspecified essential hypertension          CURRENT OUTPATIENT MEDICATIONS     Current Outpatient Medications   Medication Sig     Ascorbic Acid (VITAMIN C PO) Take 500 mg by mouth daily     CYANOCOBALAMIN PO Take 500 mcg by mouth daily     furosemide (LASIX) 20 MG tablet Take 1 tablet (20 mg) by mouth daily     metoprolol succinate ER (TOPROL XL) 50 MG 24 hr tablet Take 2 tablets(100mg) by mouth every am  "and 1 tablet(50mg) every pm     Multiple Vitamins-Minerals (ICAPS AREDS FORMULA PO) Take 1 tablet by mouth daily Takes in addition to multivitamin with minerals     Nutritional Supplements (ENSURE COMPLETE PO) 1 drink a day     omeprazole (PRILOSEC) 40 MG DR capsule TAKE 1 CAPSULE(40 MG) BY MOUTH DAILY     sacubitril-valsartan (ENTRESTO) 24-26 MG per tablet Take 1/2 pill in the morning and a full pill at night.     Vitamin D, Cholecalciferol, 1000 UNITS TABS Take 2,000 Units by mouth daily      No current facility-administered medications for this visit.         ALLERGIES    No Known Allergies     REVIEW OF SYSTEMS   As above in the HPI, o/w complete 12-point ROS was negative.     PHYSICAL EXAM   /64   Pulse 78   Temp 97.5  F (36.4  C) (Oral)   Resp 16   Ht 1.676 m (5' 6\")   Wt 70.8 kg (156 lb)   SpO2 98%   BMI 25.18 kg/m    GEN: NAD  HEENT: PERRL, EOMI, no icterus, injection or pallor. Oropharynx is clear.  LYMPHATICs: no cervical or supraclavicular lymphadenopathy; no other abn lymphadenopathy  PULMONARY: clear with good air entry bilaterally  CARDIOVASCULAR: regular, no murmurs, rubs, or gallops  GASTROINTESTINAL: soft, non-tender, non-distended, normal bowel sounds, no hepatosplenomegaly by percussion or palpation  MUSCULOSKELTAL: warm, well perfused, no edema  NEURO: awake, alert and oriented to time place and person, cranial nerves intact - II - XII, no focal neurologic deficits  SKIN: no rashes     LABORATORY AND IMAGING STUDIES     Recent Labs   Lab Test 04/12/19  1925 04/11/19  1016 02/08/19  1510 01/15/19  1107 10/16/18  0958    136 137 135* 137   POTASSIUM 4.1 4.6 4.0 3.9 4.3   CHLORIDE 103 104 108 105 103   CO2 27 29 22 28 27   ANIONGAP 4 7.6 7 5.9* 11.3   BUN 19 25 25 21 22   CR 1.02 1.17 1.25 1.23 1.24   * 90 106* 103 99   BARON 7.7* 8.6 8.4* 9.2 8.7     Recent Labs   Lab Test 01/19/18  0533   MAG 2.2     Recent Labs   Lab Test 04/24/19  1346 04/12/19  1925 04/11/19  1016 " 04/09/19  1135 04/05/19  1510   WBC 5.2 9.5 7.4 7.8 8.4   HGB 11.3* 10.2* 10.1* 11.2* 10.5*   PLT 41* 24* 28* 33* 31*   MCV 97 96 99 96 95   NEUTROPHIL 78.6 78.5 79.5 78.7 93.7     Recent Labs   Lab Test 04/12/19  1925 02/08/19  1510 02/28/18  1024   BILITOTAL 1.3 0.6 0.7   ALKPHOS 39* 38* 41   ALT 44 15 32   AST 19 11 19   ALBUMIN 3.1* 3.7 3.4     TSH   Date Value Ref Range Status   02/05/2019 0.99 0.40 - 4.00 mU/L Final   01/19/2018 2.87 0.40 - 4.00 mU/L Final        ASSESSMENT AND PLAN   1. 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 Recent bleed behind retina with vision loss while on coumadin  4. ECOG PS 1    We did get a bone marrow biopsy for his severe thrombocytopenia. There were no blasts or any clear evidence of MDS/AML. Marrow was hypercellular marrow with 80% cellularity which is clearly not normal.     He had normal thrombocytes - PLT precursors suggesting that his production of PLT has not been affected and it could be peripheral destruction. I started him on dexamethasone 40 mg daily which he did not tolerate and we decreased the dose to 20 mg. He had no response to this. I have then tried 2 doses of IV Ig. He had mild improvement in his PLT counts to 41k from 21k. I am not happy with this response as usually we do tend to get a better response.      It is possible that his PLT counts are primarily affected by his MDS. I briefly reviewed management of MDS. We will follow his WBC counts. He has normal WBC and only mild anemia. I will start treatment for his MDS if his thrombocytopenia worsens/if his PLT count drop below 20 k. I explained him to watch out for petechiae.     All questions for patient his wife and daughter were answered.     Over 25 min of direct face to face time spent with patient with more than 50% time spent in counseling and coordinating care.

## 2019-04-26 ENCOUNTER — HOSPITAL ENCOUNTER (OUTPATIENT)
Facility: CLINIC | Age: 84
Setting detail: SPECIMEN
End: 2019-04-26
Attending: INTERNAL MEDICINE
Payer: MEDICARE

## 2019-06-13 ENCOUNTER — ONCOLOGY VISIT (OUTPATIENT)
Dept: ONCOLOGY | Facility: CLINIC | Age: 84
End: 2019-06-13
Attending: INTERNAL MEDICINE
Payer: MEDICARE

## 2019-06-13 ENCOUNTER — HOSPITAL ENCOUNTER (OUTPATIENT)
Facility: CLINIC | Age: 84
Setting detail: SPECIMEN
Discharge: HOME | End: 2019-06-13
Attending: INTERNAL MEDICINE | Admitting: INTERNAL MEDICINE
Payer: MEDICARE

## 2019-06-13 ENCOUNTER — INFUSION THERAPY VISIT (OUTPATIENT)
Dept: INFUSION THERAPY | Facility: CLINIC | Age: 84
End: 2019-06-13
Attending: INTERNAL MEDICINE
Payer: MEDICARE

## 2019-06-13 VITALS
BODY MASS INDEX: 24.22 KG/M2 | SYSTOLIC BLOOD PRESSURE: 113 MMHG | OXYGEN SATURATION: 98 % | RESPIRATION RATE: 16 BRPM | TEMPERATURE: 97.7 F | WEIGHT: 159.8 LBS | HEIGHT: 68 IN | DIASTOLIC BLOOD PRESSURE: 60 MMHG | HEART RATE: 78 BPM

## 2019-06-13 DIAGNOSIS — D69.6 THROMBOCYTOPENIA (H): ICD-10-CM

## 2019-06-13 DIAGNOSIS — D69.3 IDIOPATHIC THROMBOCYTOPENIC PURPURA (H): Primary | ICD-10-CM

## 2019-06-13 LAB
ANISOCYTOSIS BLD QL SMEAR: ABNORMAL
BASOPHILS # BLD AUTO: 0 10E9/L (ref 0–0.2)
BASOPHILS NFR BLD AUTO: 0.2 %
DIFFERENTIAL METHOD BLD: ABNORMAL
ELLIPTOCYTES BLD QL SMEAR: SLIGHT
EOSINOPHIL # BLD AUTO: 0 10E9/L (ref 0–0.7)
EOSINOPHIL NFR BLD AUTO: 0.4 %
ERYTHROCYTE [DISTWIDTH] IN BLOOD BY AUTOMATED COUNT: 19.7 % (ref 10–15)
HCT VFR BLD AUTO: 31.9 % (ref 40–53)
HGB BLD-MCNC: 10.4 G/DL (ref 13.3–17.7)
IMM GRANULOCYTES # BLD: 0 10E9/L (ref 0–0.4)
IMM GRANULOCYTES NFR BLD: 0.2 %
LYMPHOCYTES # BLD AUTO: 0.5 10E9/L (ref 0.8–5.3)
LYMPHOCYTES NFR BLD AUTO: 8.4 %
MCH RBC QN AUTO: 30.7 PG (ref 26.5–33)
MCHC RBC AUTO-ENTMCNC: 32.6 G/DL (ref 31.5–36.5)
MCV RBC AUTO: 94 FL (ref 78–100)
MICROCYTES BLD QL SMEAR: PRESENT
MONOCYTES # BLD AUTO: 0.6 10E9/L (ref 0–1.3)
MONOCYTES NFR BLD AUTO: 10.8 %
NEUTROPHILS # BLD AUTO: 4.3 10E9/L (ref 1.6–8.3)
NEUTROPHILS NFR BLD AUTO: 80 %
NRBC # BLD AUTO: 0 10*3/UL
NRBC BLD AUTO-RTO: 0 /100
PLATELET # BLD AUTO: 33 10E9/L (ref 150–450)
PLATELET # BLD EST: ABNORMAL 10*3/UL
RBC # BLD AUTO: 3.39 10E12/L (ref 4.4–5.9)
RBC INCLUSIONS BLD: SLIGHT
WBC # BLD AUTO: 5.4 10E9/L (ref 4–11)

## 2019-06-13 PROCEDURE — G0463 HOSPITAL OUTPT CLINIC VISIT: HCPCS

## 2019-06-13 PROCEDURE — 99214 OFFICE O/P EST MOD 30 MIN: CPT | Performed by: INTERNAL MEDICINE

## 2019-06-13 PROCEDURE — 36415 COLL VENOUS BLD VENIPUNCTURE: CPT

## 2019-06-13 PROCEDURE — 85025 COMPLETE CBC W/AUTO DIFF WBC: CPT | Performed by: INTERNAL MEDICINE

## 2019-06-13 PROCEDURE — 81450 HL NEO GSAP 5-50DNA/DNA&RNA: CPT | Performed by: INTERNAL MEDICINE

## 2019-06-13 ASSESSMENT — MIFFLIN-ST. JEOR: SCORE: 1369.35

## 2019-06-13 ASSESSMENT — PAIN SCALES - GENERAL: PAINLEVEL: NO PAIN (0)

## 2019-06-13 NOTE — PROGRESS NOTES
Medical Assistant Note:  Jama Paul presents today for blood draw.    Patient seen by provider today: Yes: Dr Britton.   present during visit today: Not Applicable.    Concerns: No Concerns.    Procedure:  Lab draw site: LAC, Needle type: BF, Gauge: 23g with gauze and coban.    Post Assessment:  Labs drawn without difficulty: Yes.    Discharge Plan:  Departure Mode: walker.    Face to Face Time: 5.    Kathleen Keith

## 2019-06-13 NOTE — PROGRESS NOTES
"Oncology Rooming Note    June 13, 2019 2:59 PM   Jama Paul is a 88 year old male who presents for:    Chief Complaint   Patient presents with     Oncology Clinic Visit     Idiopathic thrombocytopenic purpura (H)     Initial Vitals: /60 (BP Location: Right arm, Patient Position: Sitting, Cuff Size: Adult Regular)   Pulse 78   Temp 97.7  F (36.5  C) (Oral)   Resp 16   Ht 1.727 m (5' 8\")   Wt 72.5 kg (159 lb 12.8 oz)   SpO2 98%   BMI 24.30 kg/m   Estimated body mass index is 24.3 kg/m  as calculated from the following:    Height as of this encounter: 1.727 m (5' 8\").    Weight as of this encounter: 72.5 kg (159 lb 12.8 oz). Body surface area is 1.87 meters squared.  No Pain (0) Comment: Data Unavailable   No LMP for male patient.  Allergies reviewed: Yes  Medications reviewed: Yes    Medications: Medication refills not needed today.  Pharmacy name entered into [employer]:    [id number]  [place], [place] - [address] [facility] [place] - [address] [place] [address]    Clinical concerns: no        Iris Sorenson MA              "

## 2019-06-13 NOTE — PROGRESS NOTES
[facility]  HEMATOLOGY AND ONCOLOGY    FOLLOW-UP VISIT NOTE    PATIENT NAME: Jama Paul MRN # [id number]  DATE OF VISIT: Jun 13, 2019 YOB: 1931    REFERRING PROVIDER: Pa Britton MD  [address]  [place] [address]    DIAGNOSIS:   - ITP  - Possibly early myelodysplastic syndrome    TREATMENT SUMMARY:  -Referred for persistent thrombocytopenia during unrelated hospital admissions   - Bone marrow biopsy done on 3/4/19:   Hypercellular marrow for age with 80% cellularity; normal thrombocytes, no increase in blasts, no morphologic e/o MDS   Normal flowcytometry   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 These findings document the presence of an abnormal clonal process most frequently associated with myeloid disorders, including myelodysplastic syndrome.     CURRENT INTERVENTIONS:  Ongoing evaluation    SUBJECTIVE   Jama Paul is being followed for severe thrombocytopenia    Jama is here with his family (wife and son). He has tolerated IV IG well. He has no new complains. He is happy that his PLT counts have doubled.        PAST MEDICAL HISTORY     Past Medical History:   Diagnosis Date     Congestive heart failure (H)      Elevated PSA      Eye hemorrhage, left 02/2019     Non-ischemic cardiomyopathy (H)     2017, med treatment, echo done 4/18 nl ef, mod to severe tr     Permanent atrial fibrillation (H) 2011     Thrombocytopenia (H)      Unspecified essential hypertension          CURRENT OUTPATIENT MEDICATIONS     Current Outpatient Medications   Medication Sig     Ascorbic Acid (VITAMIN C PO) Take 500 mg by mouth daily     CYANOCOBALAMIN PO Take 500 mcg by mouth daily     furosemide (LASIX) 20 MG tablet Take 1 tablet (20 mg) by mouth daily     metoprolol succinate ER (TOPROL XL) 50 MG 24 hr tablet Take 2 tablets(100mg) by mouth every am  "and 1 tablet(50mg) every pm     Multiple Vitamins-Minerals (ICAPS AREDS FORMULA PO) Take 1 tablet by mouth daily Takes in addition to multivitamin with minerals     Nutritional Supplements (ENSURE COMPLETE PO) 1 drink a day     omeprazole (PRILOSEC) 40 MG DR capsule TAKE 1 CAPSULE(40 MG) BY MOUTH DAILY     sacubitril-valsartan (ENTRESTO) 24-26 MG per tablet Take 1/2 pill in the morning and a full pill at night.     Vitamin D, Cholecalciferol, 1000 UNITS TABS Take 2,000 Units by mouth daily      No current facility-administered medications for this visit.         ALLERGIES    No Known Allergies     REVIEW OF SYSTEMS   As above in the HPI, o/w complete 12-point ROS was negative.     PHYSICAL EXAM   /60 (BP Location: Right arm, Patient Position: Sitting, Cuff Size: Adult Regular)   Pulse 78   Temp 97.7  F (36.5  C) (Oral)   Resp 16   Ht 1.727 m (5' 8\")   Wt 72.5 kg (159 lb 12.8 oz)   SpO2 98%   BMI 24.30 kg/m    GEN: NAD  HEENT: PERRL, EOMI, no icterus, injection or pallor. Oropharynx is clear.  LYMPHATICs: no cervical or supraclavicular lymphadenopathy; no other abn lymphadenopathy  PULMONARY: clear with good air entry bilaterally  CARDIOVASCULAR: regular, no murmurs, rubs, or gallops  GASTROINTESTINAL: soft, non-tender, non-distended, normal bowel sounds, no hepatosplenomegaly by percussion or palpation  MUSCULOSKELTAL: warm, well perfused, no edema  NEURO: awake, alert and oriented to time place and person, cranial nerves intact - II - XII, no focal neurologic deficits  SKIN: no rashes     LABORATORY AND IMAGING STUDIES     Recent Labs   Lab Test 04/12/19  1925 04/11/19  1016 02/08/19  1510 01/15/19  1107 10/16/18  0958    136 137 135* 137   POTASSIUM 4.1 4.6 4.0 3.9 4.3   CHLORIDE 103 104 108 105 103   CO2 27 29 22 28 27   ANIONGAP 4 7.6 7 5.9* 11.3   BUN 19 25 25 21 22   CR 1.02 1.17 1.25 1.23 1.24   * 90 106* 103 99   BARON 7.7* 8.6 8.4* 9.2 8.7     Recent Labs   Lab Test 01/19/18  0533 "   MAG 2.2     Recent Labs   Lab Test 06/13/19  1420 04/24/19  1346 04/12/19  1925 04/11/19  1016 04/09/19  1135   WBC 5.4 5.2 9.5 7.4 7.8   HGB 10.4* 11.3* 10.2* 10.1* 11.2*   PLT 33* 41* 24* 28* 33*   MCV 94 97 96 99 96   NEUTROPHIL 80.0 78.6 78.5 79.5 78.7     Recent Labs   Lab Test 04/12/19  1925 02/08/19  1510 02/28/18  1024   BILITOTAL 1.3 0.6 0.7   ALKPHOS 39* 38* 41   ALT 44 15 32   AST 19 11 19   ALBUMIN 3.1* 3.7 3.4     TSH   Date Value Ref Range Status   02/05/2019 0.99 0.40 - 4.00 mU/L Final   01/19/2018 2.87 0.40 - 4.00 mU/L Final      ASSESSMENT AND PLAN   1. 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 Recent bleed behind retina with vision loss while on coumadin  4. ECOG PS 1    We did get a bone marrow biopsy for his severe thrombocytopenia. There were no blasts or any clear evidence of MDS/AML. Marrow was hypercellular marrow with 80% cellularity which is clearly not normal.     He had normal thrombocytes - PLT precursors suggesting that his production of PLT has not been affected and it could be peripheral destruction. I started him on dexamethasone 40 mg daily which he did not tolerate and we decreased the dose to 20 mg. He had no response to this. I have then tried 2 doses of IV Ig. He had mild improvement in his PLT counts to 41k from 21k.      It is possible that his PLT counts are primarily affected by his MDS. Somehow extensive MDS panel was not done on the bone marrow biopsy and I have requested the same.     We will also have options of thrombopoietin receptor agonist which should help us increasing the PLT counts. He does not have any problems with the low PLT and so we do not have to act on this now. He does have significant ecchymosis in his left forearm. He did a lot of gardening yesterday. I would recommend protective clothing when possible like long  gloves for gardening - to prevent even minor trauma.      I will see him in 4-6 weeks with labs prior to visit.     All questions for patient his wife and daughter were answered.     Over 25 min of direct face to face time spent with patient with more than 50% time spent in counseling and coordinating care.

## 2019-06-13 NOTE — LETTER
"    6/13/2019         RE: Jama Paul  [address]  [facility] [address]        Dear Colleague,    Thank you for referring your patient, Jama Paul, to the [facility]. Please see a copy of my visit note below.    Oncology Rooming Note    June 13, 2019 2:59 PM   Jama Paul is a 88 year old male who presents for:    Chief Complaint   Patient presents with     Oncology Clinic Visit     Idiopathic thrombocytopenic purpura (H)     Initial Vitals: /60 (BP Location: Right arm, Patient Position: Sitting, Cuff Size: Adult Regular)   Pulse 78   Temp 97.7  F (36.5  C) (Oral)   Resp 16   Ht 1.727 m (5' 8\")   Wt 72.5 kg (159 lb 12.8 oz)   SpO2 98%   BMI 24.30 kg/m    Estimated body mass index is 24.3 kg/m  as calculated from the following:    Height as of this encounter: 1.727 m (5' 8\").    Weight as of this encounter: 72.5 kg (159 lb 12.8 oz). Body surface area is 1.87 meters squared.  No Pain (0) Comment: Data Unavailable   No LMP for male patient.  Allergies reviewed: Yes  Medications reviewed: Yes    Medications: Medication refills not needed today.  Pharmacy name entered into [employer]:    [facility] [address] [place] [address] [address] PHARMACY [place], [place] - [address] [address]    Clinical concerns: no        Iris Sorenson MA                [facility]  HEMATOLOGY AND ONCOLOGY    FOLLOW-UP VISIT NOTE    PATIENT NAME: Jama Paul MRN # [id number]  DATE OF VISIT: Jun 13, 2019 YOB: 1931    REFERRING PROVIDER: Pa Britton MD  [address] [address]    DIAGNOSIS:   - ITP  - Possibly early myelodysplastic syndrome    TREATMENT SUMMARY:  -Referred for persistent thrombocytopenia during unrelated hospital admissions   - Bone marrow biopsy done on 3/4/19:   Hypercellular marrow for age with 80% cellularity; normal thrombocytes, no increase in " blasts, no morphologic e/o MDS   Normal flowcytometry   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 These findings document the presence of an abnormal clonal process most frequently associated with myeloid disorders, including myelodysplastic syndrome.     CURRENT INTERVENTIONS:  Ongoing evaluation    SUBJECTIVE   Jama Paul is being followed for severe thrombocytopenia    Jama is here with his family (wife and son). He has tolerated IV IG well. He has no new complains. He is happy that his PLT counts have doubled.        PAST MEDICAL HISTORY     Past Medical History:   Diagnosis Date     Congestive heart failure (H)      Elevated PSA      Eye hemorrhage, left 02/2019     Non-ischemic cardiomyopathy (H)     2017, med treatment, echo done 4/18 nl ef, mod to severe tr     Permanent atrial fibrillation (H) 2011     Thrombocytopenia (H)      Unspecified essential hypertension          CURRENT OUTPATIENT MEDICATIONS     Current Outpatient Medications   Medication Sig     Ascorbic Acid (VITAMIN C PO) Take 500 mg by mouth daily     CYANOCOBALAMIN PO Take 500 mcg by mouth daily     furosemide (LASIX) 20 MG tablet Take 1 tablet (20 mg) by mouth daily     metoprolol succinate ER (TOPROL XL) 50 MG 24 hr tablet Take 2 tablets(100mg) by mouth every am and 1 tablet(50mg) every pm     Multiple Vitamins-Minerals (ICAPS AREDS FORMULA PO) Take 1 tablet by mouth daily Takes in addition to multivitamin with minerals     Nutritional Supplements (ENSURE COMPLETE PO) 1 drink a day     omeprazole (PRILOSEC) 40 MG DR capsule TAKE 1 CAPSULE(40 MG) BY MOUTH DAILY     sacubitril-valsartan (ENTRESTO) 24-26 MG per tablet Take 1/2 pill in the morning and a full pill at night.     Vitamin D, Cholecalciferol, 1000 UNITS TABS Take 2,000 Units by mouth daily      No current facility-administered medications for this visit.          "ALLERGIES    No Known Allergies     REVIEW OF SYSTEMS   As above in the HPI, o/w complete 12-point ROS was negative.     PHYSICAL EXAM   /60 (BP Location: Right arm, Patient Position: Sitting, Cuff Size: Adult Regular)   Pulse 78   Temp 97.7  F (36.5  C) (Oral)   Resp 16   Ht 1.727 m (5' 8\")   Wt 72.5 kg (159 lb 12.8 oz)   SpO2 98%   BMI 24.30 kg/m     GEN: NAD  HEENT: PERRL, EOMI, no icterus, injection or pallor. Oropharynx is clear.  LYMPHATICs: no cervical or supraclavicular lymphadenopathy; no other abn lymphadenopathy  PULMONARY: clear with good air entry bilaterally  CARDIOVASCULAR: regular, no murmurs, rubs, or gallops  GASTROINTESTINAL: soft, non-tender, non-distended, normal bowel sounds, no hepatosplenomegaly by percussion or palpation  MUSCULOSKELTAL: warm, well perfused, no edema  NEURO: awake, alert and oriented to time place and person, cranial nerves intact - II - XII, no focal neurologic deficits  SKIN: no rashes     LABORATORY AND IMAGING STUDIES     Recent Labs   Lab Test 04/12/19 1925 04/11/19  1016 02/08/19  1510 01/15/19  1107 10/16/18  0958    136 137 135* 137   POTASSIUM 4.1 4.6 4.0 3.9 4.3   CHLORIDE 103 104 108 105 103   CO2 27 29 22 28 27   ANIONGAP 4 7.6 7 5.9* 11.3   BUN 19 25 25 21 22   CR 1.02 1.17 1.25 1.23 1.24   * 90 106* 103 99   BARON 7.7* 8.6 8.4* 9.2 8.7     Recent Labs   Lab Test 01/19/18  0533   MAG 2.2     Recent Labs   Lab Test 06/13/19  1420 04/24/19  1346 04/12/19 1925 04/11/19  1016 04/09/19  1135   WBC 5.4 5.2 9.5 7.4 7.8   HGB 10.4* 11.3* 10.2* 10.1* 11.2*   PLT 33* 41* 24* 28* 33*   MCV 94 97 96 99 96   NEUTROPHIL 80.0 78.6 78.5 79.5 78.7     Recent Labs   Lab Test 04/12/19  1925 02/08/19  1510 02/28/18  1024   BILITOTAL 1.3 0.6 0.7   ALKPHOS 39* 38* 41   ALT 44 15 32   AST 19 11 19   ALBUMIN 3.1* 3.7 3.4     TSH   Date Value Ref Range Status   02/05/2019 0.99 0.40 - 4.00 mU/L Final   01/19/2018 2.87 0.40 - 4.00 mU/L Final      ASSESSMENT AND " PLAN   1. 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 Recent bleed behind retina with vision loss while on coumadin  4. ECOG PS 1    We did get a bone marrow biopsy for his severe thrombocytopenia. There were no blasts or any clear evidence of MDS/AML. Marrow was hypercellular marrow with 80% cellularity which is clearly not normal.     He had normal thrombocytes - PLT precursors suggesting that his production of PLT has not been affected and it could be peripheral destruction. I started him on dexamethasone 40 mg daily which he did not tolerate and we decreased the dose to 20 mg. He had no response to this. I have then tried 2 doses of IV Ig. He had mild improvement in his PLT counts to 41k from 21k.      It is possible that his PLT counts are primarily affected by his MDS. Somehow extensive MDS panel was not done on the bone marrow biopsy and I have requested the same.     We will also have options of thrombopoietin receptor agonist which should help us increasing the PLT counts. He does not have any problems with the low PLT and so we do not have to act on this now. He does have significant ecchymosis in his left forearm. He did a lot of gardening yesterday. I would recommend protective clothing when possible like long gloves for gardening - to prevent even minor trauma.      I will see him in 4-6 weeks with labs prior to visit.     All questions for patient his wife and daughter were answered.     Over 25 min of direct face to face time spent with patient with more than 50% time spent in counseling and coordinating care.        Again, thank you for allowing me to participate in the care of your patient.        Sincerely,        Pa Britton MD

## 2019-06-26 LAB — COPATH REPORT: NORMAL

## 2019-07-10 ENCOUNTER — INFUSION THERAPY VISIT (OUTPATIENT)
Dept: INFUSION THERAPY | Facility: CLINIC | Age: 84
End: 2019-07-10
Attending: INTERNAL MEDICINE
Payer: MEDICARE

## 2019-07-10 ENCOUNTER — HOSPITAL ENCOUNTER (OUTPATIENT)
Facility: CLINIC | Age: 84
Setting detail: SPECIMEN
Discharge: HOME | End: 2019-07-10
Attending: INTERNAL MEDICINE | Admitting: NURSE PRACTITIONER
Payer: MEDICARE

## 2019-07-10 DIAGNOSIS — D69.3 IDIOPATHIC THROMBOCYTOPENIC PURPURA (H): ICD-10-CM

## 2019-07-10 LAB
ANISOCYTOSIS BLD QL SMEAR: ABNORMAL
BASOPHILS # BLD AUTO: 0 10E9/L (ref 0–0.2)
BASOPHILS NFR BLD AUTO: 0.2 %
DACRYOCYTES BLD QL SMEAR: SLIGHT
DIFFERENTIAL METHOD BLD: ABNORMAL
EOSINOPHIL # BLD AUTO: 0 10E9/L (ref 0–0.7)
EOSINOPHIL NFR BLD AUTO: 0 %
ERYTHROCYTE [DISTWIDTH] IN BLOOD BY AUTOMATED COUNT: 20.6 % (ref 10–15)
HCT VFR BLD AUTO: 32.4 % (ref 40–53)
HGB BLD-MCNC: 10.5 G/DL (ref 13.3–17.7)
IMM GRANULOCYTES # BLD: 0 10E9/L (ref 0–0.4)
IMM GRANULOCYTES NFR BLD: 0.2 %
LYMPHOCYTES # BLD AUTO: 0.3 10E9/L (ref 0.8–5.3)
LYMPHOCYTES NFR BLD AUTO: 5.6 %
MCH RBC QN AUTO: 30.3 PG (ref 26.5–33)
MCHC RBC AUTO-ENTMCNC: 32.4 G/DL (ref 31.5–36.5)
MCV RBC AUTO: 93 FL (ref 78–100)
MICROCYTES BLD QL SMEAR: PRESENT
MONOCYTES # BLD AUTO: 0.7 10E9/L (ref 0–1.3)
MONOCYTES NFR BLD AUTO: 11.4 %
NEUTROPHILS # BLD AUTO: 5 10E9/L (ref 1.6–8.3)
NEUTROPHILS NFR BLD AUTO: 82.6 %
NRBC # BLD AUTO: 0 10*3/UL
NRBC BLD AUTO-RTO: 0 /100
OVALOCYTES BLD QL SMEAR: SLIGHT
PLATELET # BLD AUTO: 32 10E9/L (ref 150–450)
PLATELET # BLD EST: ABNORMAL 10*3/UL
RBC # BLD AUTO: 3.47 10E12/L (ref 4.4–5.9)
WBC # BLD AUTO: 6 10E9/L (ref 4–11)

## 2019-07-10 PROCEDURE — 85025 COMPLETE CBC W/AUTO DIFF WBC: CPT | Performed by: NURSE PRACTITIONER

## 2019-07-10 PROCEDURE — 36415 COLL VENOUS BLD VENIPUNCTURE: CPT

## 2019-07-10 NOTE — PROGRESS NOTES
Medical Assistant Note:  Jama Paul presents today for blood draw.    Patient seen by provider today: No.   present during visit today: Not Applicable.    Concerns: No Concerns.    Procedure:  Lab draw site: LAC, Needle type: BF, Gauge: 21.    Post Assessment:  Labs drawn without difficulty: Yes.    Discharge Plan:  Departure Mode: Ambulatory.    Face to Face Time: 5MIN.    Shama Sauceda

## 2019-07-11 ENCOUNTER — ONCOLOGY VISIT (OUTPATIENT)
Dept: ONCOLOGY | Facility: CLINIC | Age: 84
End: 2019-07-11
Attending: INTERNAL MEDICINE
Payer: MEDICARE

## 2019-07-11 ENCOUNTER — HOSPITAL ENCOUNTER (OUTPATIENT)
Facility: CLINIC | Age: 84
Setting detail: SPECIMEN
End: 2019-07-11
Attending: INTERNAL MEDICINE
Payer: MEDICARE

## 2019-07-11 VITALS
DIASTOLIC BLOOD PRESSURE: 76 MMHG | WEIGHT: 162.8 LBS | HEART RATE: 89 BPM | BODY MASS INDEX: 24.67 KG/M2 | TEMPERATURE: 97.6 F | HEIGHT: 68 IN | RESPIRATION RATE: 16 BRPM | OXYGEN SATURATION: 99 % | SYSTOLIC BLOOD PRESSURE: 121 MMHG

## 2019-07-11 DIAGNOSIS — D69.3 IDIOPATHIC THROMBOCYTOPENIC PURPURA (H): ICD-10-CM

## 2019-07-11 DIAGNOSIS — D69.6 THROMBOCYTOPENIA (H): Primary | ICD-10-CM

## 2019-07-11 PROCEDURE — G0463 HOSPITAL OUTPT CLINIC VISIT: HCPCS

## 2019-07-11 PROCEDURE — 99213 OFFICE O/P EST LOW 20 MIN: CPT | Performed by: INTERNAL MEDICINE

## 2019-07-11 ASSESSMENT — PAIN SCALES - GENERAL: PAINLEVEL: NO PAIN (0)

## 2019-07-11 ASSESSMENT — MIFFLIN-ST. JEOR: SCORE: 1382.96

## 2019-07-11 NOTE — LETTER
"    7/11/2019         RE: Jama Paul  [address]  [facility] [address]        Dear Colleague,    Thank you for referring your patient, Jama Paul, to the [facility]. Please see a copy of my visit note below.    Oncology Rooming Note    July 11, 2019 1:42 PM   Jama Paul is a 88 year old male who presents for:    Chief Complaint   Patient presents with     Oncology Clinic Visit     BCC (basal cell carcinoma), face     Initial Vitals: /76 (BP Location: Left arm, Patient Position: Sitting, Cuff Size: Adult Regular)   Pulse 89   Temp 97.6  F (36.4  C) (Oral)   Resp 16   Ht 1.727 m (5' 8\")   Wt 73.8 kg (162 lb 12.8 oz)   SpO2 99%   BMI 24.75 kg/m    Estimated body mass index is 24.75 kg/m  as calculated from the following:    Height as of this encounter: 1.727 m (5' 8\").    Weight as of this encounter: 73.8 kg (162 lb 12.8 oz). Body surface area is 1.88 meters squared.  No Pain (0) Comment: Data Unavailable   No LMP for male patient.  Allergies reviewed: Yes  Medications reviewed: Yes    Medications: Medication refills not needed today.  Pharmacy name entered into [employer]:    [facility] [address] [place] [address] [address] PHARMACY [facility] [address] [address]    Clinical concerns: no         Iris Sorenson MA                [facility]  HEMATOLOGY AND ONCOLOGY    FOLLOW-UP VISIT NOTE    PATIENT NAME: Jama Paul MRN # [id number]  DATE OF VISIT: Jul 11, 2019 YOB: 1931    REFERRING PROVIDER: Pa Britton MD  [address] [address]    DIAGNOSIS:   - ITP  - Possibly early myelodysplastic syndrome    TREATMENT SUMMARY:  -Referred for persistent thrombocytopenia during unrelated hospital admissions   - Bone marrow biopsy done on 3/4/19:   Hypercellular marrow for age with 80% cellularity; normal thrombocytes, no increase in blasts, " no morphologic e/o MDS   Normal flowcytometry   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 These findings document the presence of an abnormal clonal process most frequently associated with myeloid disorders, including myelodysplastic syndrome.     CURRENT INTERVENTIONS:  Observation    SUBJECTIVE   Jama Paul is being followed for severe thrombocytopenia    Jama is here with his family (wife and son). He has no new complains. He is happy that his PLT counts have been stable. He has no energy and sleeps for significant portion of the day. He is not bothered by this.       PAST MEDICAL HISTORY     Past Medical History:   Diagnosis Date     Congestive heart failure (H)      Elevated PSA      Eye hemorrhage, left 02/2019     Non-ischemic cardiomyopathy (H)     2017, med treatment, echo done 4/18 nl ef, mod to severe tr     Permanent atrial fibrillation (H) 2011     Thrombocytopenia (H)      Unspecified essential hypertension          CURRENT OUTPATIENT MEDICATIONS     Current Outpatient Medications   Medication Sig     Ascorbic Acid (VITAMIN C PO) Take 500 mg by mouth daily     furosemide (LASIX) 20 MG tablet Take 1 tablet (20 mg) by mouth daily     metoprolol succinate ER (TOPROL XL) 50 MG 24 hr tablet Take 2 tablets(100mg) by mouth every am and 1 tablet(50mg) every pm     Multiple Vitamins-Minerals (ICAPS AREDS FORMULA PO) Take 1 tablet by mouth daily Takes in addition to multivitamin with minerals     Nutritional Supplements (ENSURE COMPLETE PO) 1 drink a day     sacubitril-valsartan (ENTRESTO) 24-26 MG per tablet Take 1/2 pill in the morning and a full pill at night.     Vitamin D, Cholecalciferol, 1000 UNITS TABS Take 2,000 Units by mouth daily      CYANOCOBALAMIN PO Take 500 mcg by mouth daily     omeprazole (PRILOSEC) 40 MG DR capsule TAKE 1 CAPSULE(40 MG) BY MOUTH DAILY (Patient not taking:  "Reported on 7/11/2019)     No current facility-administered medications for this visit.         ALLERGIES    No Known Allergies     REVIEW OF SYSTEMS   As above in the HPI, o/w complete 12-point ROS was negative.     PHYSICAL EXAM   /76 (BP Location: Left arm, Patient Position: Sitting, Cuff Size: Adult Regular)   Pulse 89   Temp 97.6  F (36.4  C) (Oral)   Resp 16   Ht 1.727 m (5' 8\")   Wt 73.8 kg (162 lb 12.8 oz)   SpO2 99%   BMI 24.75 kg/m     GEN: NAD  HEENT: PERRL, EOMI, no icterus, injection or pallor. Oropharynx is clear.  LYMPHATICs: no cervical or supraclavicular lymphadenopathy; no other abn lymphadenopathy  PULMONARY: clear with good air entry bilaterally  CARDIOVASCULAR: regular, no murmurs, rubs, or gallops  GASTROINTESTINAL: soft, non-tender, non-distended, normal bowel sounds, no hepatosplenomegaly by percussion or palpation  MUSCULOSKELTAL: warm, well perfused, no edema  NEURO: awake, alert and oriented to time place and person, cranial nerves intact - II - XII, no focal neurologic deficits  SKIN: no rashes     LABORATORY AND IMAGING STUDIES     Recent Labs   Lab Test 04/12/19 1925 04/11/19  1016 02/08/19  1510 01/15/19  1107 10/16/18  0958    136 137 135* 137   POTASSIUM 4.1 4.6 4.0 3.9 4.3   CHLORIDE 103 104 108 105 103   CO2 27 29 22 28 27   ANIONGAP 4 7.6 7 5.9* 11.3   BUN 19 25 25 21 22   CR 1.02 1.17 1.25 1.23 1.24   * 90 106* 103 99   BARON 7.7* 8.6 8.4* 9.2 8.7     Recent Labs   Lab Test 01/19/18  0533   MAG 2.2     Recent Labs   Lab Test 07/10/19  1400 06/13/19  1420 04/24/19  1346 04/12/19 1925 04/11/19  1016   WBC 6.0 5.4 5.2 9.5 7.4   HGB 10.5* 10.4* 11.3* 10.2* 10.1*   PLT 32* 33* 41* 24* 28*   MCV 93 94 97 96 99   NEUTROPHIL 82.6 80.0 78.6 78.5 79.5     Recent Labs   Lab Test 04/12/19  1925 02/08/19  1510 02/28/18  1024   BILITOTAL 1.3 0.6 0.7   ALKPHOS 39* 38* 41   ALT 44 15 32   AST 19 11 19   ALBUMIN 3.1* 3.7 3.4     TSH   Date Value Ref Range Status "   02/05/2019 0.99 0.40 - 4.00 mU/L Final   01/19/2018 2.87 0.40 - 4.00 mU/L Final          ASSESSMENT AND PLAN   1. 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 Recent bleed behind retina with vision loss while on coumadin  4. ECOG PS 1    We did get a bone marrow biopsy for his severe thrombocytopenia. There were no blasts or any clear evidence of MDS/AML. Marrow was hypercellular marrow with 80% cellularity which is clearly not normal.     He had normal thrombocytes - PLT precursors suggesting that his production of PLT has not been affected and it could be peripheral destruction. I started him on dexamethasone 40 mg daily which he did not tolerate and we decreased the dose to 20 mg. He had no response to this. I have then tried 2 doses of IV Ig. He had mild improvement in his PLT counts to 41k from 21k. His PLT counts have been stable since the last visit and essentially unchanged.     It is possible that his PLT counts are primarily affected by his MDS.  We will also have options of thrombopoietin receptor agonist which should help us increasing the PLT counts. He does not have any problems with the low PLT and so we do not have to act on this now. He does have significant ecchymosis over both his forearm. I would recommend protective clothing when possible like long gloves for gardening - to prevent even minor trauma.      I will see him in 2 months with labs prior to visit to see Eric and in 4 months with labs to see me.     All questions for patient his wife and daughter were answered.            Again, thank you for allowing me to participate in the care of your patient.        Sincerely,        Pa Britton MD

## 2019-07-11 NOTE — PROGRESS NOTES
[facility]  HEMATOLOGY AND ONCOLOGY    FOLLOW-UP VISIT NOTE    PATIENT NAME: Jama Paul MRN # [id number]  DATE OF VISIT: Jul 11, 2019 YOB: 1931    REFERRING PROVIDER: Pa Britton MD  [address] [facility] [address]  [place] [address]    DIAGNOSIS:   - ITP  - Possibly early myelodysplastic syndrome    TREATMENT SUMMARY:  -Referred for persistent thrombocytopenia during unrelated hospital admissions   - Bone marrow biopsy done on 3/4/19:   Hypercellular marrow for age with 80% cellularity; normal thrombocytes, no increase in blasts, no morphologic e/o MDS   Normal flowcytometry   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 These findings document the presence of an abnormal clonal process most frequently associated with myeloid disorders, including myelodysplastic syndrome.     CURRENT INTERVENTIONS:  Observation    SUBJECTIVE   Jama Paul is being followed for severe thrombocytopenia    Jama is here with his family (wife and son). He has no new complains. He is happy that his PLT counts have been stable. He has no energy and sleeps for significant portion of the day. He is not bothered by this.       PAST MEDICAL HISTORY     Past Medical History:   Diagnosis Date     Congestive heart failure (H)      Elevated PSA      Eye hemorrhage, left 02/2019     Non-ischemic cardiomyopathy (H)     2017, med treatment, echo done 4/18 nl ef, mod to severe tr     Permanent atrial fibrillation (H) 2011     Thrombocytopenia (H)      Unspecified essential hypertension          CURRENT OUTPATIENT MEDICATIONS     Current Outpatient Medications   Medication Sig     Ascorbic Acid (VITAMIN C PO) Take 500 mg by mouth daily     furosemide (LASIX) 20 MG tablet Take 1 tablet (20 mg) by mouth daily     metoprolol succinate ER (TOPROL XL) 50 MG 24 hr tablet Take 2 tablets(100mg) by mouth  "every am and 1 tablet(50mg) every pm     Multiple Vitamins-Minerals (ICAPS AREDS FORMULA PO) Take 1 tablet by mouth daily Takes in addition to multivitamin with minerals     Nutritional Supplements (ENSURE COMPLETE PO) 1 drink a day     sacubitril-valsartan (ENTRESTO) 24-26 MG per tablet Take 1/2 pill in the morning and a full pill at night.     Vitamin D, Cholecalciferol, 1000 UNITS TABS Take 2,000 Units by mouth daily      CYANOCOBALAMIN PO Take 500 mcg by mouth daily     omeprazole (PRILOSEC) 40 MG DR capsule TAKE 1 CAPSULE(40 MG) BY MOUTH DAILY (Patient not taking: Reported on 7/11/2019)     No current facility-administered medications for this visit.         ALLERGIES    No Known Allergies     REVIEW OF SYSTEMS   As above in the HPI, o/w complete 12-point ROS was negative.     PHYSICAL EXAM   /76 (BP Location: Left arm, Patient Position: Sitting, Cuff Size: Adult Regular)   Pulse 89   Temp 97.6  F (36.4  C) (Oral)   Resp 16   Ht 1.727 m (5' 8\")   Wt 73.8 kg (162 lb 12.8 oz)   SpO2 99%   BMI 24.75 kg/m    GEN: NAD  HEENT: PERRL, EOMI, no icterus, injection or pallor. Oropharynx is clear.  LYMPHATICs: no cervical or supraclavicular lymphadenopathy; no other abn lymphadenopathy  PULMONARY: clear with good air entry bilaterally  CARDIOVASCULAR: regular, no murmurs, rubs, or gallops  GASTROINTESTINAL: soft, non-tender, non-distended, normal bowel sounds, no hepatosplenomegaly by percussion or palpation  MUSCULOSKELTAL: warm, well perfused, no edema  NEURO: awake, alert and oriented to time place and person, cranial nerves intact - II - XII, no focal neurologic deficits  SKIN: no rashes     LABORATORY AND IMAGING STUDIES     Recent Labs   Lab Test 04/12/19  1925 04/11/19  1016 02/08/19  1510 01/15/19  1107 10/16/18  0958    136 137 135* 137   POTASSIUM 4.1 4.6 4.0 3.9 4.3   CHLORIDE 103 104 108 105 103   CO2 27 29 22 28 27   ANIONGAP 4 7.6 7 5.9* 11.3   BUN 19 25 25 21 22   CR 1.02 1.17 1.25 1.23 " 1.24   * 90 106* 103 99   BARON 7.7* 8.6 8.4* 9.2 8.7     Recent Labs   Lab Test 01/19/18  0533   MAG 2.2     Recent Labs   Lab Test 07/10/19  1400 06/13/19  1420 04/24/19  1346 04/12/19  1925 04/11/19  1016   WBC 6.0 5.4 5.2 9.5 7.4   HGB 10.5* 10.4* 11.3* 10.2* 10.1*   PLT 32* 33* 41* 24* 28*   MCV 93 94 97 96 99   NEUTROPHIL 82.6 80.0 78.6 78.5 79.5     Recent Labs   Lab Test 04/12/19  1925 02/08/19  1510 02/28/18  1024   BILITOTAL 1.3 0.6 0.7   ALKPHOS 39* 38* 41   ALT 44 15 32   AST 19 11 19   ALBUMIN 3.1* 3.7 3.4     TSH   Date Value Ref Range Status   02/05/2019 0.99 0.40 - 4.00 mU/L Final   01/19/2018 2.87 0.40 - 4.00 mU/L Final          ASSESSMENT AND PLAN   1. 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 Recent bleed behind retina with vision loss while on coumadin  4. ECOG PS 1    We did get a bone marrow biopsy for his severe thrombocytopenia. There were no blasts or any clear evidence of MDS/AML. Marrow was hypercellular marrow with 80% cellularity which is clearly not normal.     He had normal thrombocytes - PLT precursors suggesting that his production of PLT has not been affected and it could be peripheral destruction. I started him on dexamethasone 40 mg daily which he did not tolerate and we decreased the dose to 20 mg. He had no response to this. I have then tried 2 doses of IV Ig. He had mild improvement in his PLT counts to 41k from 21k. His PLT counts have been stable since the last visit and essentially unchanged.     It is possible that his PLT counts are primarily affected by his MDS.  We will also have options of thrombopoietin receptor agonist which should help us increasing the PLT counts. He does not have any problems with the low PLT and so we do not have to act on this now. He does have significant ecchymosis over both his forearm. I would recommend  protective clothing when possible like long gloves for gardening - to prevent even minor trauma.      I will see him in 2 months with labs prior to visit to see Eric and in 4 months with labs to see me.     All questions for patient his wife and daughter were answered.

## 2019-07-11 NOTE — PROGRESS NOTES
"Oncology Rooming Note    July 11, 2019 1:42 PM   Jama Paul is a 88 year old male who presents for:    Chief Complaint   Patient presents with     Oncology Clinic Visit     BCC (basal cell carcinoma), face     Initial Vitals: /76 (BP Location: Left arm, Patient Position: Sitting, Cuff Size: Adult Regular)   Pulse 89   Temp 97.6  F (36.4  C) (Oral)   Resp 16   Ht 1.727 m (5' 8\")   Wt 73.8 kg (162 lb 12.8 oz)   SpO2 99%   BMI 24.75 kg/m   Estimated body mass index is 24.75 kg/m  as calculated from the following:    Height as of this encounter: 1.727 m (5' 8\").    Weight as of this encounter: 73.8 kg (162 lb 12.8 oz). Body surface area is 1.88 meters squared.  No Pain (0) Comment: Data Unavailable   No LMP for male patient.  Allergies reviewed: Yes  Medications reviewed: Yes    Medications: Medication refills not needed today.  Pharmacy name entered into [employer]:    [facility] [address] [facility] [place] - [address] [place] - [address] [place] [address]    Clinical concerns: no         Iris Sorenson MA              "

## 2019-07-17 ENCOUNTER — TRANSFERRED RECORDS (OUTPATIENT)
Dept: HEALTH INFORMATION MANAGEMENT | Facility: CLINIC | Age: 84
End: 2019-07-17

## 2019-09-02 ENCOUNTER — HOSPITAL ENCOUNTER (EMERGENCY)
Facility: CLINIC | Age: 84
Discharge: HOME | End: 2019-09-02
Admitting: PHYSICIAN ASSISTANT
Payer: MEDICARE

## 2019-09-02 VITALS
OXYGEN SATURATION: 96 % | BODY MASS INDEX: 21.67 KG/M2 | TEMPERATURE: 98.4 F | WEIGHT: 160 LBS | DIASTOLIC BLOOD PRESSURE: 72 MMHG | RESPIRATION RATE: 18 BRPM | HEIGHT: 72 IN | SYSTOLIC BLOOD PRESSURE: 110 MMHG

## 2019-09-02 DIAGNOSIS — S81.811A LACERATION OF RIGHT LOWER EXTREMITY, INITIAL ENCOUNTER: ICD-10-CM

## 2019-09-02 PROCEDURE — 99283 EMERGENCY DEPT VISIT LOW MDM: CPT | Mod: 25

## 2019-09-02 PROCEDURE — 12002 RPR S/N/AX/GEN/TRNK2.6-7.5CM: CPT

## 2019-09-02 PROCEDURE — 25000128 H RX IP 250 OP 636: Performed by: PHYSICIAN ASSISTANT

## 2019-09-02 PROCEDURE — 90471 IMMUNIZATION ADMIN: CPT

## 2019-09-02 PROCEDURE — 90715 TDAP VACCINE 7 YRS/> IM: CPT | Performed by: PHYSICIAN ASSISTANT

## 2019-09-02 RX ADMIN — CLOSTRIDIUM TETANI TOXOID ANTIGEN (FORMALDEHYDE INACTIVATED), CORYNEBACTERIUM DIPHTHERIAE TOXOID ANTIGEN (FORMALDEHYDE INACTIVATED), BORDETELLA PERTUSSIS TOXOID ANTIGEN (GLUTARALDEHYDE INACTIVATED), BORDETELLA PERTUSSIS FILAMENTOUS HEMAGGLUTININ ANTIGEN (FORMALDEHYDE INACTIVATED), BORDETELLA PERTUSSIS PERTACTIN ANTIGEN, AND BORDETELLA PERTUSSIS FIMBRIAE 2/3 ANTIGEN 0.5 ML: 5; 2; 2.5; 5; 3; 5 INJECTION, SUSPENSION INTRAMUSCULAR at 14:17

## 2019-09-02 ASSESSMENT — MIFFLIN-ST. JEOR: SCORE: 1433.76

## 2019-09-02 ASSESSMENT — ENCOUNTER SYMPTOMS: WOUND: 1

## 2019-09-02 NOTE — ED AVS SNAPSHOT
Emergency Department  [address] [place] [address]  Phone:  [phone]  Fax:  [phone]                                    Jama Paul   MRN: [id number]    Department:   Emergency Department   Date of Visit:  9/2/2019           After Visit Summary Signature Page    I have received my discharge instructions, and my questions have been answered. I have discussed any challenges I see with this plan with the nurse or doctor.    ..........................................................................................................................................  Patient/Patient Representative Signature      ..........................................................................................................................................  Patient Representative Print Name and Relationship to Patient    ..................................................               ................................................  Date                                   Time    ..........................................................................................................................................  Reviewed by Signature/Title    ...................................................              ..............................................  Date                                               Time          22EPIC Rev 08/18

## 2019-09-02 NOTE — ED PROVIDER NOTES
History     Chief Complaint:  Laceration    HPI   Jama Paul is a 88 year old male who presents with a laceration. The patient cut his right leg on a bed frame. This occurred this morning. Patient has been able to walk on the leg, and does not have significant pain to the wound. He did not clean it before coming in. Denies anticoagulation.  Of note, patient states that he thinks the wound bled so significantly because of his low platelet count--last CBC showed PLT count of 32 on 7/10/2019. He states that he is following with Dr. Britton for this.     Tdap: 2002    Allergies:  No Known Drug Allergies     Medications:    Lasix   Metoprolol succinate  Omeprazole   Entresto     Past Medical History:    Congestive heart failure  Elevated PSA   Non ischemic cardiomyopathy   Permament Atrial Fibrillation   Eye hemorrhage   Thrombocytopenia   Hypertension     Past Surgical History:    Abdomen surgery  Bone marrow biopsy   Carpal tunnel release   Explore groin   Herniorrhaphy inguinal   Mohs micrographic surgery   Phacoemulsification clear cornea with standard IOL    Family History:    History reviewed. No pertinent family history.      Social History:  The patient was accompanied to the ED by his wife.  Smoking Status: Never  Smokeless Tobacco: Never  Alcohol Use: No  Marital Status:        Review of Systems   Musculoskeletal: Negative for gait problem.   Skin: Positive for wound.   All other systems reviewed and are negative.    Physical Exam     Patient Vitals for the past 24 hrs:   BP Temp Temp src Heart Rate Resp SpO2 Height Weight   09/02/19 1306 110/72 98.4  F (36.9  C) Oral 67 20 96 % 1.829 m (6') 72.6 kg (160 lb)      Physical Exam  General: Well appearing, well nourished. Normal mood and affect.  Skin: Approximately 5 centimeter semi-circular laceration to the lateral left lower leg.  Superficial in nature, no visualized tendon, bony, nerve, vascular damage.  Bleeding controlled.  HEENT: Head:  Normocephalic, atraumatic, no visible masses.   Eyes: Conjunctiva clear.  Cardiac: Normal rate and regular rhythm, no murmur or gallop.   Lungs: Clear to auscultation.   Musculoskeletal: Normal gait and station.   Right foot: Dorsalis pedis and posterior tibial arteries intact. Normal capillary refill to all toes.  Toes are grossly normal. Normal sensation to the foot. Flexors and extensors to the toes are normal. Dorsiflexion and plantarflexion to the ankle/foot is normal.  Full flexion-extension of the right knee without difficulty.  Neurologic: Oriented x 3. GCS: 15.  Psychiatric: Intact recent and remote memory, judgment and insight, normal mood and affect.     Emergency Department Course     Procedures:    Laceration Repair        LACERATION:  A simple clean 5 cm laceration.      LOCATION:  Right shin      FUNCTION:  Distally sensation and circulation are intact.      ANESTHESIA:  Local using 0.5% Bupivicaine total of 2 mLs      PREPARATION:  Irrigation with Normal Saline and Sea Clens      DEBRIDEMENT:  no debridement      CLOSURE:  Wound was closed with One Layer.  Skin closed with 7 x 4.0 Ethylon using interrupted sutures.      Interventions:  1417 - Tdap 0.5mL IM     Emergency Department Course:  Past medical records, nursing notes, and vitals reviewed.    1407: I performed an exam of the patient as documented above. The wound was numbed as noted above.     1441: Patient rechecked and updated.  Wound repaired as noted above.     Findings and plan explained to the Patient and spouse. Patient discharged home with instructions regarding supportive care, medications, and reasons to return. The importance of close follow-up was reviewed.     Impression & Plan     Medical Decision Making:  Jama Paul is a 88 year old male who presented to the ED today for evaluation of a laceration.  Details of the patient's history can be noted in the HPI.  The wound was carefully explored and evaluated.  The laceration  was closed as noted in the procedure note above.  There was no evidence of muscular, tendon, bone, or nerve damage with this laceration.  There is no evidence of foreign body.  Possible complications such as infection and scarring were reviewed with the patient.  They will return to the ED for any signs of increased redness, streaking, drainage, fevers, new concerns.  They will apply bacitracin daily.  Additional suture care was discussed they will follow-up with her primary care provider or another medical facility and provided in the discharge paperwork. Suture removal in 10-14 days. Tetanus is up-to-date. All questions were answered prior to the patient's discharge.  They were in agreement with the treatment plan as stated above.    Discharge Diagnosis:    ICD-10-CM    1. Laceration of right lower extremity, initial encounter S81.811A        Disposition:  Discharged to home.     Scribe Disclosure:  Evonne GUERRERO, am serving as a scribe at 1:40 PM on 9/2/2019 to document services personally performed by Zari Jimenez PA-C based on my observations and the provider's statements to me.     Evonne Malone  9/2/2019    EMERGENCY DEPARTMENT    This was created at least in part with a voice recognition software. Mistakes/typos may be present.        Zari Jimenez PA  09/02/19 1522

## 2019-09-02 NOTE — DISCHARGE INSTRUCTIONS
Watch the area surrounding the wound for signs of infection which can include increased redness, drainage, fevers, or swelling. Inspect the area daily. No swimming or baths for the next 3-5 days, showering is ok. Go to primary in 10-14 days for stitches/staple removal. Apply antibacterial ointment such as bacitracin to the wound daily.  Tylenol and ibuprofen at home for pain control.    Discharge Instructions  Laceration (Cut)    You were seen today for a laceration (cut).  Your doctor examined your laceration for any problems such a buried foreign body (like glass, a splinter, or gravel), or injury to blood vessels, tendons, and nerves.  Your doctor may have also rinsed and/or scrubbed your laceration to help prevent an infection.  Your laceration may have been closed with glue, staples or sutures (stitches).      It may not be possible to find all problems with your laceration on the first visit, and we can't always prevent infections.  Antibiotics are only given when the benefit is more than the risk, and don't prevent all infections. Some lacerations are too high risk to close, and are left open to heal.  All lacerations, no matter how expertly repaired, will cause scarring.    Return to the Emergency Department right away if:  You have more redness, swelling, pain, drainage (pus), a bad smell, or red streaking from your laceration.    You have a fever of 101oF or more.  You have bleeding that you can t stop at home. If your cut starts to bleed, hold pressure on the bleeding area with a clean cloth or put pressure over the bandage.  If the bleeding doesn t stop after using constant pressure for 30 minutes, you should return to the Emergency Department for further treatment.  An area past the laceration is cool, pale, or blue compared with the other side, or has a slower return of color when squeezed.  Your dressing seems too tight or starts to get uncomfortable or painful.  You have loss of normal function or  "use of an area, such as being unable to straighten or bend a finger normally.  You have a numb area past the laceration.    Return to the Emergency Department or see your regular doctor if:  The laceration starts to come open.   You have something coming out of the cut or a feeling that there is something in the laceration.  Your wound will not heal, or keeps breaking open. There can always be glass, wood, dirt or other things in any wound.  They won t always show up, even on x-rays.  If a wound doesn t heal, this may be why, and it is important to follow-up with your regular doctor.    Home Care:  Take your dressing off in 12 hours, or as instructed by your doctor, to check your laceration. Remove the dressing sooner if it seems too tight or painful, or if it is getting numb, tingly, or pale past the dressing.  Gently wash your laceration 2 times a day with clean cloth and soap.   It is okay to shower, but do not let the laceration soak in water.    If your laceration was closed with wound adhesive or strips: pat it dry and leave it open to the air.   For all other repairs: after you wash your laceration, or at least 2 times a day, apply bacitracin or other antibiotic ointment to the laceration, then cover it with a Band-Aid  or gauze.  Keep the laceration clean. Wear gloves or other protective clothing if you are around dirt.    Follow-up:  You need to follow-up with your regular doctor in 10-14 days.  Your sutures or staples need to be removed in 10-14 days. Schedule an appointment with your regular doctor to have this done.    Scars:  To help minimize scarring:  Wear sunscreen over the healed laceration when out in the sun.  Massage the area regularly.  You may use Vitamin E oil.  Wait a year.  Most scars will start to fade within a year.    Probiotics: If you have been given an antibiotic, you may want to also take a probiotic pill or eat yogurt with live cultures. Probiotics have \"good bacteria\" to help your " intestines stay healthy. Studies have shown that probiotics help prevent diarrhea and other intestine problems (including C. diff infection) when you take antibiotics. You can buy these without a prescription in the pharmacy section of the store.     If you were given a prescription for medicine here today, be sure to read all of the information (including the package insert) that comes with your prescription.  This will include important information about the medicine, its side effects, and any warnings that you need to know about.  The pharmacist who fills the prescription can provide more information and answer questions you may have about the medicine.  If you have questions or concerns that the pharmacist cannot address, please call or return to the Emergency Department.     Opioid Medication Information    Pain medications are among the most commonly prescribed medicines, so we are including this information for all our patients. If you did not receive pain medication or get a prescription for pain medicine, you can ignore it.     You may have been given a prescription for an opioid (narcotic) pain medicine and/or have received a pain medicine while here in the Emergency Department. These medicines can make you drowsy or impaired. You must not drive, operate dangerous equipment, or engage in any other dangerous activities while taking these medications. If you drive while taking these medications, you could be arrested for DUI, or driving under the influence. Do not drink any alcohol while you are taking these medications.     Opioid pain medications can cause addiction. If you have a history of chemical dependency of any type, you are at a higher risk of becoming addicted to pain medications.  Only take these prescribed medications to treat your pain when all other options have been tried. Take it for as short a time and as few doses as possible. Store your pain pills in a secure place, as they are frequently  stolen and provide a dangerous opportunity for children or visitors in your house to start abusing these powerful medications. We will not replace any lost or stolen medicine.  As soon as your pain is better, you should flush all your remaining medication.     Many prescription pain medications contain Tylenol  (acetaminophen), including Vicodin , Tylenol #3 , Norco , Lortab , and Percocet .  You should not take any extra pills of Tylenol  if you are using these prescription medications or you can get very sick.  Do not ever take more than 3000 mg of acetaminophen in any 24 hour period.    All opioids tend to cause constipation. Drink plenty of water and eat foods that have a lot of fiber, such as fruits, vegetables, prune juice, apple juice and high fiber cereal.  Take a laxative if you don t move your bowels at least every other day. Miralax , Milk of Magnesia, Colace , or Senna  can be used to keep you regular.      Remember that you can always come back to the Emergency Department if you are not able to see your regular doctor in the amount of time listed above, if you get any new symptoms, or if there is anything that worries you.

## 2019-09-10 ENCOUNTER — HOSPITAL ENCOUNTER (EMERGENCY)
Facility: CLINIC | Age: 84
Discharge: HOME | End: 2019-09-10
Attending: EMERGENCY MEDICINE | Admitting: EMERGENCY MEDICINE
Payer: MEDICARE

## 2019-09-10 VITALS
HEIGHT: 72 IN | RESPIRATION RATE: 18 BRPM | TEMPERATURE: 97.9 F | OXYGEN SATURATION: 97 % | DIASTOLIC BLOOD PRESSURE: 68 MMHG | BODY MASS INDEX: 21.67 KG/M2 | WEIGHT: 160 LBS | SYSTOLIC BLOOD PRESSURE: 120 MMHG

## 2019-09-10 DIAGNOSIS — Z51.89 VISIT FOR WOUND CHECK: ICD-10-CM

## 2019-09-10 PROCEDURE — 99283 EMERGENCY DEPT VISIT LOW MDM: CPT

## 2019-09-10 ASSESSMENT — ENCOUNTER SYMPTOMS: WOUND: 1

## 2019-09-10 ASSESSMENT — MIFFLIN-ST. JEOR: SCORE: 1433.76

## 2019-09-10 NOTE — ED AVS SNAPSHOT
Emergency Department  [address] [place] [address]  Phone:  [phone]  Fax:  [phone]                                    Jama Paul   MRN: [id number]    Department:   Emergency Department   Date of Visit:  9/10/2019           After Visit Summary Signature Page    I have received my discharge instructions, and my questions have been answered. I have discussed any challenges I see with this plan with the nurse or doctor.    ..........................................................................................................................................  Patient/Patient Representative Signature      ..........................................................................................................................................  Patient Representative Print Name and Relationship to Patient    ..................................................               ................................................  Date                                   Time    ..........................................................................................................................................  Reviewed by Signature/Title    ...................................................              ..............................................  Date                                               Time          22EPIC Rev 08/18

## 2019-09-11 NOTE — ED PROVIDER NOTES
History     Chief Complaint:  Wound check     HPI   Jama Paul is a 88 year old male who presents with a wound check. The patient suffered a laceration to his right lower leg 8 days ago and had 7 stitches placed. Today, the wound was cleaned and the patient and family noted that some of the skin overlying the wound was missing and it began to ooze blood and clear liquid, which was different than yesterday. He has also had some increased swelling in the right lower leg for the past few days. He presents for a wound check. The wound is not painful.    Allergies:  No known drug allergies     Medications:    Lasix   Metoprolol succinate  Omeprazole   Entresto      Past Medical History:    Congestive heart failure  Elevated PSA   Non ischemic cardiomyopathy   Permament Atrial Fibrillation   Eye hemorrhage   Thrombocytopenia   Hypertension      Past Surgical History:    Abdomen surgery  Bone marrow biopsy   Carpal tunnel release   Explore groin   Herniorrhaphy inguinal   Mohs micrographic surgery   Phacoemulsification clear cornea with standard IOL     Family History:    History reviewed. No pertinent family history.       Social History:  The patient was accompanied to the ED by his wife.  Smoking Status: Never  Smokeless Tobacco: Never  Alcohol Use: No  Marital Status:        Review of Systems   Cardiovascular: Positive for leg swelling.   Skin: Positive for wound.   All other systems reviewed and are negative.      Physical Exam     Patient Vitals for the past 24 hrs:   BP Temp Temp src Heart Rate Resp SpO2 Height Weight   09/10/19 2103 120/68 97.9  F (36.6  C) Oral 76 18 97 % 1.829 m (6') 72.6 kg (160 lb)      Physical Exam  Constitutional: Alert, attentive, GCS 15  HENT:    Nose: Nose normal.    Mouth/Throat: Oropharynx is clear, mucous membranes are moist.  Eyes: EOM are normal, anicteric, conjugate gaze  CV: regular rate and rhythm; no murmurs  Chest: Effort normal and breath sounds clear without  wheezing or rales, symmetric bilaterally   GI:  non tender. No distension. No guarding or rebound.    MSK: no tenderness to palpation of BLE. 2+ lower extremity edema, right slightly greater anteriorly.  Neurological: Alert, attentive, moving all extremities equally.   Skin: Skin is warm and dry. Semi-circular right lower extremity laceration, margins are intact, small amount of skin maceration without active bleeding. No tenderness.     Emergency Department Course     Emergency Department Course:  Past medical records, nursing notes, and vitals reviewed.  2112: I performed an exam of the patient as documented above. Clinical findings and plan explained to the Patient and family. Patient discharged home with instructions regarding supportive care, medications, and reasons to return as well as the importance of close follow-up were reviewed.      Impression & Plan      Medical Decision Making:  Jama Paul is a 88 year old male presenting for evaluation of right lower extremity laceration. Patient was seen in the emergency department several days prior and had primary closure. Please see prior ED note for further details. He presents today out of concern for small oozing. On my exam, patient does not have any active bleeding. Skin overlying his laceration is mildly macerated and this was gently debrided with sterile gauze. I see no evidence of dehiscence or signs of infection at this time. He does have some lower extremity edema, though this is largely symmetric except for the anterior portion of his right lower leg which is consistent with his trauma. I do not suspect DVT. Imaging deferred.Wound care was reviewed, dressing with Adaptic and Kerlix. I recommended conservative management with elevation, dressing changes daily, and follow up in wound clinic as previously planned.     Diagnosis:    ICD-10-CM    1. Visit for wound check Z51.89      Disposition:  Discharged.     Martínez Whatley  9/10/2019    EMERGENCY  DEPARTMENT    Jama Cespedes MD, MD   [employer]  1:24 AM 09/11/19     Scribe Disclosure:  I, Martínez Chacorta, am serving as a scribe at 9:12 PM on 9/10/2019 to document services personally performed by Jama Cespedes MD based on my observations and the provider's statements to me.        Jama Cespedes MD  09/11/19 0124

## 2019-09-11 NOTE — DISCHARGE INSTRUCTIONS
You should see the wound clinic as you are planning.  I recommend dressing changes twice a day using the petroleum soaked gauze with soft fluffy dry gauze above it and a gentle wrap.  It is okay to apply a thin layer of bacitracin but you should keep the wound uncovered for period of time every day to help to keep it from getting too macerated.  You should return the emergency department should you have increasing pain, redness, swelling or drainage coming from the wound as these could be signs of an infection.

## 2019-09-16 ENCOUNTER — HOSPITAL ENCOUNTER (OUTPATIENT)
Dept: WOUND CARE | Facility: CLINIC | Age: 84
Discharge: HOME | End: 2019-09-16
Attending: SURGERY | Admitting: SURGERY
Payer: MEDICARE

## 2019-09-16 VITALS
HEART RATE: 103 BPM | RESPIRATION RATE: 16 BRPM | DIASTOLIC BLOOD PRESSURE: 66 MMHG | TEMPERATURE: 96.6 F | SYSTOLIC BLOOD PRESSURE: 107 MMHG

## 2019-09-16 DIAGNOSIS — L97.912 ULCER OF RIGHT LOWER EXTREMITY WITH FAT LAYER EXPOSED (H): ICD-10-CM

## 2019-09-16 PROCEDURE — 99213 OFFICE O/P EST LOW 20 MIN: CPT | Mod: 25 | Performed by: SURGERY

## 2019-09-16 PROCEDURE — 11042 DBRDMT SUBQ TIS 1ST 20SQCM/<: CPT

## 2019-09-16 PROCEDURE — G0463 HOSPITAL OUTPT CLINIC VISIT: HCPCS | Mod: 25

## 2019-09-16 PROCEDURE — 11042 DBRDMT SUBQ TIS 1ST 20SQCM/<: CPT | Performed by: SURGERY

## 2019-09-16 NOTE — PROGRESS NOTES
Patient arrived for wound care visit. Certified Wound Care Nurse time spent evaluating patient record, completed a full evaluation and documented wound(s) & lorene-wound skin; provided recommendation based on treatment plan. Applied dressing, reviewed discharge instructions, patient education, and discussed plan of care with appropriate medical team staff members and patient and/or family members.

## 2019-09-16 NOTE — PROGRESS NOTES
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  Medical record reviewed.  Daughter and wife are present.  History of thrombocytopenia with platelet count averaging approximately 30-40,000 no history of leukemia or malignancy, thrombus cytopenia is idiopathic.  No recent history of bleeding.  Denies pain, fevers chills or night sweats.  Denies significant edema of the lower externally's.  Sutures were placed at the time of the laceration.  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)     Thrombocytopenia (H)     Eye hemorrhage, left     Non-ischemic cardiomyopathy (H)     Permanent atrial fibrillation (H)     Idiopathic thrombocytopenic purpura (H)     Ulcer of right lower extremity with fat layer exposed (H)     Past Medical History:   Diagnosis Date     Congestive heart failure (H)      Elevated PSA      Eye hemorrhage, left 02/2019     Non-ischemic cardiomyopathy (H)     2017, med treatment, echo done 4/18 nl ef, mod to severe tr     Permanent atrial fibrillation (H) 2011     Thrombocytopenia (H)      Unspecified essential hypertension      Exam:  /66   Pulse 103   Temp 96.6  F (35.9  C) (Temporal)   Resp 16   Wound (used by OP [facility] only) 09/16/19 0819 Right anterior;lower leg trauma (Active)   Length (cm) 2.3 9/16/2019  8:00 AM   Width (cm) 3 9/16/2019  8:00 AM   Depth (cm) 0.1 9/16/2019  8:00 AM   Wound (cm^2) 6.9 cm^2 9/16/2019  8:00 AM   Wound Volume (cm^3) 0.69 cm^3 9/16/2019  8:00 AM   Dressing Appearance moist drainage 9/16/2019  8:00 AM   Drainage Characteristics/Odor serosanguineous 9/16/2019  8:00 AM   Drainage Amount moderate 9/16/2019  8:00 AM   Thickness/Stage full  thickness 9/16/2019  8:00 AM   Base slough;necrotic 9/16/2019  8:00 AM   Black (%), Wound Tissue Color 100 9/16/2019  8:00 AM   Periwound intact 9/16/2019  8:00 AM   Periwound Temperature warm 9/16/2019  8:00 AM   Periwound Skin Turgor soft 9/16/2019  8:00 AM   Edges open 9/16/2019  8:00 AM   Care, Wound debrided 9/16/2019  8:00 AM     On physical examination 88-year-old male, appears somewhat frail, conversant, alert and oriented x3.  Easily palpable bilateral pedal pulses.  Ulceration, traumatic, right anterior tibial margin, dehisced traumatic ulceration, sutures removed from the inferior margin.  Wound bed is fibrinous with biofilm, no periwound cellulitis, no undermining, no bone or tendon exposure.    Procedure:   Patient was determined to be capable of making their own medical decisions and informed consent was obtained. Topical anesthetic of 4% lidocaine was applied, debridement was performed using a currette blade down to and including subcutaneous tissue, biofilm, necrotic tissue, no associated undermining bleeding controlled with light pressure. Patient tolerated procedure well.    Impression: Traumatically induced right anterior tibial wound    Plan: We will dress the wounds with pleurogel on Hydrofera Blue change every other day..  Zinc to periwound margin.  Edema wear at night and can wear during the day additionally to help with edema management.  Utilization of Gigi for nutritional supplement, maximize protein intake, discussed with patient and family.  Patient will return to the clinic in 1 weeks time    Lisandro Hernandez MD on 9/16/2019 at 9:15 AM

## 2019-09-16 NOTE — DISCHARGE INSTRUCTIONS
[facility]  [address] [facility] [address], [place] [address]  Appointment Phone [phone] Nurse Advisors [phone]    Jama Luis Humberto      2/13/1931  Please add in 1 packet of Gigi Supplement TWICE a day until your next appointment     Please add in Vitamin D3 Vitamin 5,000 international units per day.    Wound Dressing Change:Right Lower Leg  Cleanse wound and surrounding skin with: soap and water  Apply Plurogel to wound base or on hydrofera blue dressing  Cover wound with roll gauze and tape  Change dressing every other day.    Compression:   You have a compression wrap spandigrip D until you get EdemaWear to be worn at all times.   Please remove compression dressing if toes turn blue and/or tingle and can not be relieved by raising the leg for one hour.     A diet high in protein is important for wound healing, we recommend getting 60-80 grams of protein per day. Taking protein shakes or bars are a good way to get extra protein in your diet.     Good sources of protein:  Pork 26g per 3 oz  Whey protein powder - 24g per scoop (on average)  Greek yogurt - 23g per 8oz   Chicken or Turkey - 23g per 3oz  Fish - 20-25g per 3oz  Beef - 18-23g per 3oz  Navy beans - 20g per cup  Cottage cheese - 14g per 1/2 cup   Lentils - 13g per 1/4 cup  Beef jerky 13g per 1oz  2% milk - 8g per cup  Peanut butter - 8g per 2 tablespoons  Eggs - 6g per egg  Mixed nuts - 6g per 2oz         JOSUE Hernandez M.D.. September 16, 2019    Call us at [phone] if you have any questions about your wounds, have redness or swelling around your wound, have a fever of 101 or greater or if you have any other problems or concerns. We answer the phone Monday through Friday 8 am to 4 pm, please leave a message as we check the voicemail frequently throughout the day.     Follow up with Provider - 2 weeks     [email or web address] or call [phone] to place an order for 50 gram tube  Please order Edema  Wear from [employer] size medium

## 2019-09-23 ENCOUNTER — HOSPITAL ENCOUNTER (OUTPATIENT)
Dept: WOUND CARE | Facility: CLINIC | Age: 84
Discharge: HOME | End: 2019-09-23
Attending: SURGERY | Admitting: SURGERY
Payer: MEDICARE

## 2019-09-23 VITALS
TEMPERATURE: 97.1 F | SYSTOLIC BLOOD PRESSURE: 122 MMHG | DIASTOLIC BLOOD PRESSURE: 79 MMHG | RESPIRATION RATE: 16 BRPM | HEART RATE: 93 BPM

## 2019-09-23 DIAGNOSIS — L97.912 ULCER OF RIGHT LOWER EXTREMITY WITH FAT LAYER EXPOSED (H): ICD-10-CM

## 2019-09-23 PROCEDURE — 11042 DBRDMT SUBQ TIS 1ST 20SQCM/<: CPT

## 2019-09-23 PROCEDURE — A6209 FOAM DRSG <=16 SQ IN W/O BDR: HCPCS

## 2019-09-23 PROCEDURE — 11042 DBRDMT SUBQ TIS 1ST 20SQCM/<: CPT | Performed by: SURGERY

## 2019-09-23 NOTE — PROGRESS NOTES
[facility] [place] Progress Note    Subject: Jama Paul retired physician, ophthalmologist, traumatically induced right lower extremity ulceration.  Utilizing Pluragel and Hydrofera Blue on a daily basis, has edema wear though did not initiate use due our instructions not being clear.  Denies pain fever chills, no issues with current dressing regime and feels wound is progressing.    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)     Thrombocytopenia (H)     Eye hemorrhage, left     Non-ischemic cardiomyopathy (H)     Permanent atrial fibrillation (H)     Idiopathic thrombocytopenic purpura (H)     Ulcer of right lower extremity with fat layer exposed (H)     Past Medical History:   Diagnosis Date     Congestive heart failure (H)      Elevated PSA      Eye hemorrhage, left 02/2019     Non-ischemic cardiomyopathy (H)     2017, med treatment, echo done 4/18 nl ef, mod to severe tr     Permanent atrial fibrillation (H) 2011     Thrombocytopenia (H)      Unspecified essential hypertension      Exam:  /79   Pulse 93   Temp 97.1  F (36.2  C) (Temporal)   Resp 16   Wound (used by OP [facility] only) 09/16/19 0819 Right anterior;lower leg trauma (Active)   Length (cm) 1.7 9/23/2019  1:00 PM   Width (cm) 2.3 9/23/2019  1:00 PM   Depth (cm) 0.3 9/23/2019  1:00 PM   Wound (cm^2) 3.91 cm^2 9/23/2019  1:00 PM   Wound Volume (cm^3) 1.17 cm^3 9/23/2019  1:00 PM   Wound healing % 43.33 9/23/2019  1:00 PM   Dressing Appearance moist drainage 9/23/2019  1:00 PM   Drainage Characteristics/Odor serosanguineous 9/23/2019  1:00 PM   Drainage Amount moderate 9/23/2019  1:00 PM   Thickness/Stage full thickness 9/23/2019  1:00 PM   Base red/granulating 9/23/2019  1:00 PM   Black (%), Wound Tissue Color 100 9/23/2019  1:00  PM   Periwound intact 9/23/2019  1:00 PM   Periwound Temperature warm 9/23/2019  1:00 PM   Periwound Skin Turgor soft 9/23/2019  1:00 PM   Edges open 9/23/2019  1:00 PM   Care, Wound debrided 9/23/2019  1:00 PM     General appearance nondistressed, conversant, alert and oriented x3.  Palpable right dorsalis pedis pulse.  Wound bed at right anterior tibial margin 100% granulation without undermining, no appreciable depth, mild amount of biofilm which was debrided    Procedure:   Patient was determined to be capable of making their own medical decisions and informed consent was obtained. Topical anesthetic of 4% lidocaine was applied, debridement was performed using a #15 blade down to and including subcutaneous tissue biofilm.  Bleeding controlled with light pressure. Patient tolerated procedure well.    Impression: Traumatically induced right lower extremely ulceration responding well to current treatment    Plan: We will dress the wounds with pleura gel with Hydrofera Blue, use edema wear 24 hours a day.  Begin EpiCeram cream to damaged skin.  Patient will return to the clinic in 3 weeks time    Lisandro Hernandez MD on 9/23/2019 at 2:40 PM  No images are attached to the encounter.

## 2019-09-23 NOTE — DISCHARGE INSTRUCTIONS
[facility]  [address] [facility] [address] [address]  Appointment Phone [phone] Nurse Advisors [phone]    Jama Paul      2/13/1931    Please add in 1 packet of Giig Supplement TWICE a day until your next  Appointment    Please add in Vitamin D3 Vitamin 5,000 international units per day.  Wound Dressing Change:Right Lower Leg  Cleanse wound and surrounding skin with: soap and water  Apply Plurogel to wound base or on hydrofera blue dressing  Cover wound with roll gauze and tape  Change dressing every other day.  Compression:  You have EdemaWear to be worn at all times.  Please remove compression dressing if toes turn blue and/or tingle and can not be  relieved by raising the leg for one hour.     JOSUE Hernandez M.D.. September 23, 2019    Call us at [phone] if you have any questions about your wounds, have redness or swelling around your wound, have a fever of 101 or greater or if you have any other problems or concerns. We answer the phone Monday through Friday 8 am to 4 pm, please leave a message as we check the voicemail frequently throughout the day.     Follow up with Provider - 2 weeks

## 2019-10-04 ENCOUNTER — HEALTH MAINTENANCE LETTER (OUTPATIENT)
Age: 84
End: 2019-10-04

## 2019-10-14 ENCOUNTER — HOSPITAL ENCOUNTER (OUTPATIENT)
Dept: WOUND CARE | Facility: CLINIC | Age: 84
Discharge: HOME | End: 2019-10-14
Attending: SURGERY | Admitting: SURGERY
Payer: MEDICARE

## 2019-10-14 VITALS
TEMPERATURE: 96.9 F | RESPIRATION RATE: 16 BRPM | SYSTOLIC BLOOD PRESSURE: 101 MMHG | HEART RATE: 92 BPM | DIASTOLIC BLOOD PRESSURE: 77 MMHG

## 2019-10-14 DIAGNOSIS — L97.912 ULCER OF RIGHT LOWER EXTREMITY WITH FAT LAYER EXPOSED (H): ICD-10-CM

## 2019-10-14 PROCEDURE — G0463 HOSPITAL OUTPT CLINIC VISIT: HCPCS

## 2019-10-14 PROCEDURE — 99212 OFFICE O/P EST SF 10 MIN: CPT | Performed by: SURGERY

## 2019-10-14 RX ORDER — EMOLLIENT COMBINATION NO.32
1 EMULSION, EXTENDED RELEASE TOPICAL 2 TIMES DAILY
Qty: 225 G | Refills: 11 | Status: SHIPPED | OUTPATIENT
Start: 2019-10-14 | End: 2019-10-30

## 2019-10-14 NOTE — PROGRESS NOTES
Patient arrived for wound care visit. Certified Wound Care Nurse time spent evaluating patient record, completed a full evaluation and documented wound(s) & lorene-wound skin; provided recommendation based on treatment plan. Applied dressing, reviewed discharge instructions, patient education, and discussed plan of care with appropriate medical team staff members and patient and/or family members.     Patient given sample of epiceram lot [id number] exp 08/2021

## 2019-10-14 NOTE — DISCHARGE INSTRUCTIONS
[facility]  [address] [facility] [address] [place] [address]  Appointment Phone [phone] Nurse Advisors [phone]    Jama Luis Paul      2/13/1931  Your wound is Healed!! Dressings are no longer required.   Apply Epiceram to your leg daily or twice daily for skin health.  EpiCeram helps repair and heal the skin barrier through a unique mechanism of action not commonly found in other medications. It contains the skin s natural level of essential lipids: ceramides, cholesterol and free fatty acids, which are reduced in patients with eczema and atopic dermatitis.  EpiCeram is steroid-free, fragrance-free, noncomedogenic,paraben-free, and propylene glycol-free.   Available in a 90g tube and 225g airless pump  Please call [phone] to get this prescription    Compression:Apply Edemawear and wear daily for the 6 months  You have a compression socks or wraps that are supposed to be removed at night and put back on first thing in the morning.   Please remove compression dressing if toes turn blue and/or tingle and can not be relieved by raising the leg for one hour.      JOSUE Hernandez M.D.. October 14, 2019    Once the ulcer has healed, you will need to use compression stocking to help the pumping action in your veins. The stockings will also reduce swelling.  Home care  Follow these tips when caring for yourself at home:    Use any special compression dressings or stockings as directed.    Walk regularly. This helps the blood flow better in your legs.    Maintain a healthy weight. If you are overweight, talk with your provider about a weight loss program.    If you smoke, quit smoking. This will ease your symptoms and lower the chance that the disease will get worse. Join a stop-smoking program or ask your provider for help in quitting.    Check your feet and legs for skin breaks or color changes. Report these to your provider. This could be an early sign of an  ulcer.    When standing, shift your weight from one leg to the other.    When sitting for long periods, put your feet up. Move your feet and ankles often to get your calf muscles moving. Get up and walk from time to time.

## 2019-10-14 NOTE — PROGRESS NOTES
[facility] [place] Progress Note    Subject: Jama Paul returns for evaluation of traumatically induced right lower extremely ulceration which is closed.    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)     Thrombocytopenia (H)     Eye hemorrhage, left     Non-ischemic cardiomyopathy (H)     Permanent atrial fibrillation     Idiopathic thrombocytopenic purpura (H)     Ulcer of right lower extremity with fat layer exposed (H)     Past Medical History:   Diagnosis Date     Congestive heart failure (H)      Elevated PSA      Eye hemorrhage, left 02/2019     Non-ischemic cardiomyopathy (H)     2017, med treatment, echo done 4/18 nl ef, mod to severe tr     Permanent atrial fibrillation (H) 2011     Thrombocytopenia (H)      Unspecified essential hypertension      Exam:  /77   Pulse 92   Temp 96.9  F (36.1  C) (Temporal)   Resp 16      88-year-old male, alert, conversant, family present.  Wound is fully epithelialized and closed.  Palpable right pedal pulse.  Edema well controlled.        Impression: Closed right anterior tibial ulceration, traumatically induced    Plan: Application Epicerum twice daily to skin where EdemaWear for additional 6 months to consolidate healing.  Will return as needed.      Lisandro Hernandez MD on 10/14/2019 at 1:56 PM

## 2019-10-30 ENCOUNTER — APPOINTMENT (OUTPATIENT)
Dept: ULTRASOUND IMAGING | Facility: CLINIC | Age: 84
DRG: 286 | End: 2019-10-30
Attending: INTERNAL MEDICINE
Payer: MEDICARE

## 2019-10-30 ENCOUNTER — HOSPITAL ENCOUNTER (INPATIENT)
Facility: CLINIC | Age: 84
LOS: 18 days | Discharge: NURSING, REHAB OR LONG-TERM CARE | DRG: 286 | End: 2019-11-17
Attending: EMERGENCY MEDICINE | Admitting: INTERNAL MEDICINE
Payer: MEDICARE

## 2019-10-30 ENCOUNTER — APPOINTMENT (OUTPATIENT)
Dept: GENERAL RADIOLOGY | Facility: CLINIC | Age: 84
DRG: 286 | End: 2019-10-30
Attending: EMERGENCY MEDICINE
Payer: MEDICARE

## 2019-10-30 DIAGNOSIS — I34.0 NONRHEUMATIC MITRAL VALVE REGURGITATION: ICD-10-CM

## 2019-10-30 DIAGNOSIS — I07.1 SEVERE TRICUSPID REGURGITATION: ICD-10-CM

## 2019-10-30 DIAGNOSIS — I50.9 CONGESTIVE HEART FAILURE, UNSPECIFIED HF CHRONICITY, UNSPECIFIED HEART FAILURE TYPE (H): ICD-10-CM

## 2019-10-30 DIAGNOSIS — J90 PLEURAL EFFUSION: ICD-10-CM

## 2019-10-30 DIAGNOSIS — I07.1 SEVERE TRICUSPID REGURGITATION: Primary | ICD-10-CM

## 2019-10-30 DIAGNOSIS — J90 BILATERAL PLEURAL EFFUSION: ICD-10-CM

## 2019-10-30 LAB
ANION GAP SERPL CALCULATED.3IONS-SCNC: 4 MMOL/L (ref 3–14)
APPEARANCE FLD: NORMAL
BASOPHILS # BLD AUTO: 0 10E9/L (ref 0–0.2)
BASOPHILS NFR BLD AUTO: 0.1 %
BUN SERPL-MCNC: 19 MG/DL (ref 7–30)
CALCIUM SERPL-MCNC: 8.3 MG/DL (ref 8.5–10.1)
CHLORIDE SERPL-SCNC: 107 MMOL/L (ref 94–109)
CO2 SERPL-SCNC: 27 MMOL/L (ref 20–32)
COLOR FLD: YELLOW
CREAT SERPL-MCNC: 1.02 MG/DL (ref 0.66–1.25)
DACRYOCYTES BLD QL SMEAR: SLIGHT
DIFFERENTIAL METHOD BLD: ABNORMAL
EOSINOPHIL # BLD AUTO: 0 10E9/L (ref 0–0.7)
EOSINOPHIL NFR BLD AUTO: 0 %
ERYTHROCYTE [DISTWIDTH] IN BLOOD BY AUTOMATED COUNT: 23 % (ref 10–15)
GFR SERPL CREATININE-BSD FRML MDRD: 65 ML/MIN/{1.73_M2}
GLUCOSE FLD-MCNC: 109 MG/DL
GLUCOSE SERPL-MCNC: 88 MG/DL (ref 70–99)
GRAM STN SPEC: NORMAL
HCT VFR BLD AUTO: 32.5 % (ref 40–53)
HGB BLD-MCNC: 10.3 G/DL (ref 13.3–17.7)
IMM GRANULOCYTES # BLD: 0 10E9/L (ref 0–0.4)
IMM GRANULOCYTES NFR BLD: 0.3 %
INR PPP: 1.39 (ref 0.86–1.14)
INTERPRETATION ECG - MUSE: NORMAL
LDH FLD L TO P-CCNC: 47 U/L
LYMPHOCYTES # BLD AUTO: 0.6 10E9/L (ref 0.8–5.3)
LYMPHOCYTES NFR BLD AUTO: 7.9 %
LYMPHOCYTES NFR FLD MANUAL: 61 %
MCH RBC QN AUTO: 30.5 PG (ref 26.5–33)
MCHC RBC AUTO-ENTMCNC: 31.7 G/DL (ref 31.5–36.5)
MCV RBC AUTO: 96 FL (ref 78–100)
MONOCYTES # BLD AUTO: 0.5 10E9/L (ref 0–1.3)
MONOCYTES NFR BLD AUTO: 5.8 %
MONOS+MACROS NFR FLD MANUAL: 18 %
NEUTROPHILS # BLD AUTO: 6.7 10E9/L (ref 1.6–8.3)
NEUTROPHILS NFR BLD AUTO: 85.9 %
NEUTS BAND NFR FLD MANUAL: 21 %
NRBC # BLD AUTO: 0 10*3/UL
NRBC BLD AUTO-RTO: 0 /100
NT-PROBNP SERPL-MCNC: 6109 PG/ML (ref 0–1800)
OVALOCYTES BLD QL SMEAR: SLIGHT
PH FLD: 7 PH
PLATELET # BLD AUTO: 38 10E9/L (ref 150–450)
PLATELET # BLD EST: ABNORMAL 10*3/UL
POTASSIUM SERPL-SCNC: 4.2 MMOL/L (ref 3.4–5.3)
PROCALCITONIN SERPL-MCNC: <0.05 NG/ML
PROT FLD-MCNC: 2.5 G/DL
RBC # BLD AUTO: 3.38 10E12/L (ref 4.4–5.9)
SODIUM SERPL-SCNC: 138 MMOL/L (ref 133–144)
SPECIMEN SOURCE FLD: NORMAL
SPECIMEN SOURCE: NORMAL
TROPONIN I SERPL-MCNC: <0.015 UG/L (ref 0–0.04)
WBC # BLD AUTO: 7.8 10E9/L (ref 4–11)
WBC # FLD AUTO: 140 /UL

## 2019-10-30 PROCEDURE — 84157 ASSAY OF PROTEIN OTHER: CPT | Performed by: INTERNAL MEDICINE

## 2019-10-30 PROCEDURE — 0W9B3ZZ DRAINAGE OF LEFT PLEURAL CAVITY, PERCUTANEOUS APPROACH: ICD-10-PCS | Performed by: RADIOLOGY

## 2019-10-30 PROCEDURE — 88112 CYTOPATH CELL ENHANCE TECH: CPT | Performed by: INTERNAL MEDICINE

## 2019-10-30 PROCEDURE — 25000128 H RX IP 250 OP 636: Performed by: EMERGENCY MEDICINE

## 2019-10-30 PROCEDURE — 89051 BODY FLUID CELL COUNT: CPT | Performed by: INTERNAL MEDICINE

## 2019-10-30 PROCEDURE — 85610 PROTHROMBIN TIME: CPT | Performed by: EMERGENCY MEDICINE

## 2019-10-30 PROCEDURE — 96374 THER/PROPH/DIAG INJ IV PUSH: CPT

## 2019-10-30 PROCEDURE — 71046 X-RAY EXAM CHEST 2 VIEWS: CPT

## 2019-10-30 PROCEDURE — 83880 ASSAY OF NATRIURETIC PEPTIDE: CPT | Performed by: EMERGENCY MEDICINE

## 2019-10-30 PROCEDURE — 88305 TISSUE EXAM BY PATHOLOGIST: CPT | Performed by: INTERNAL MEDICINE

## 2019-10-30 PROCEDURE — 83986 ASSAY PH BODY FLUID NOS: CPT | Performed by: INTERNAL MEDICINE

## 2019-10-30 PROCEDURE — 00000155 ZZHCL STATISTIC H-CELL BLOCK W/STAIN: Performed by: INTERNAL MEDICINE

## 2019-10-30 PROCEDURE — P9047 ALBUMIN (HUMAN), 25%, 50ML: HCPCS | Performed by: INTERNAL MEDICINE

## 2019-10-30 PROCEDURE — 82945 GLUCOSE OTHER FLUID: CPT | Performed by: INTERNAL MEDICINE

## 2019-10-30 PROCEDURE — 00000102 ZZHCL STATISTIC CYTO WRIGHT STAIN TC: Performed by: INTERNAL MEDICINE

## 2019-10-30 PROCEDURE — 21000001 ZZH R&B HEART CARE

## 2019-10-30 PROCEDURE — 87205 SMEAR GRAM STAIN: CPT | Performed by: INTERNAL MEDICINE

## 2019-10-30 PROCEDURE — 88305 TISSUE EXAM BY PATHOLOGIST: CPT | Mod: 26 | Performed by: INTERNAL MEDICINE

## 2019-10-30 PROCEDURE — 25000132 ZZH RX MED GY IP 250 OP 250 PS 637: Mod: GY | Performed by: INTERNAL MEDICINE

## 2019-10-30 PROCEDURE — 83615 LACTATE (LD) (LDH) ENZYME: CPT | Performed by: INTERNAL MEDICINE

## 2019-10-30 PROCEDURE — 80048 BASIC METABOLIC PNL TOTAL CA: CPT | Performed by: EMERGENCY MEDICINE

## 2019-10-30 PROCEDURE — 87015 SPECIMEN INFECT AGNT CONCNTJ: CPT | Performed by: INTERNAL MEDICINE

## 2019-10-30 PROCEDURE — 99285 EMERGENCY DEPT VISIT HI MDM: CPT | Mod: 25

## 2019-10-30 PROCEDURE — 0W993ZZ DRAINAGE OF RIGHT PLEURAL CAVITY, PERCUTANEOUS APPROACH: ICD-10-PCS | Performed by: RADIOLOGY

## 2019-10-30 PROCEDURE — 25000125 ZZHC RX 250: Performed by: INTERNAL MEDICINE

## 2019-10-30 PROCEDURE — 84484 ASSAY OF TROPONIN QUANT: CPT | Performed by: EMERGENCY MEDICINE

## 2019-10-30 PROCEDURE — 25000128 H RX IP 250 OP 636: Performed by: INTERNAL MEDICINE

## 2019-10-30 PROCEDURE — 88112 CYTOPATH CELL ENHANCE TECH: CPT | Mod: 26 | Performed by: INTERNAL MEDICINE

## 2019-10-30 PROCEDURE — 99223 1ST HOSP IP/OBS HIGH 75: CPT | Mod: AI | Performed by: INTERNAL MEDICINE

## 2019-10-30 PROCEDURE — 87070 CULTURE OTHR SPECIMN AEROBIC: CPT | Performed by: INTERNAL MEDICINE

## 2019-10-30 PROCEDURE — 93005 ELECTROCARDIOGRAM TRACING: CPT

## 2019-10-30 PROCEDURE — 84145 PROCALCITONIN (PCT): CPT | Performed by: EMERGENCY MEDICINE

## 2019-10-30 PROCEDURE — 27210190 US THORACENTESIS BILATERAL

## 2019-10-30 PROCEDURE — 85025 COMPLETE CBC W/AUTO DIFF WBC: CPT | Performed by: EMERGENCY MEDICINE

## 2019-10-30 RX ORDER — NICOTINE POLACRILEX 4 MG
15-30 LOZENGE BUCCAL
Status: DISCONTINUED | OUTPATIENT
Start: 2019-10-30 | End: 2019-11-08

## 2019-10-30 RX ORDER — ALBUMIN (HUMAN) 12.5 G/50ML
25 SOLUTION INTRAVENOUS EVERY 8 HOURS
Status: COMPLETED | OUTPATIENT
Start: 2019-10-30 | End: 2019-10-31

## 2019-10-30 RX ORDER — POTASSIUM CHLORIDE 1500 MG/1
20-40 TABLET, EXTENDED RELEASE ORAL
Status: DISCONTINUED | OUTPATIENT
Start: 2019-10-30 | End: 2019-11-17 | Stop reason: HOSPADM

## 2019-10-30 RX ORDER — METOPROLOL SUCCINATE 50 MG/1
50 TABLET, EXTENDED RELEASE ORAL EVERY EVENING
COMMUNITY
End: 2019-12-24 | Stop reason: DRUGHIGH

## 2019-10-30 RX ORDER — MAGNESIUM SULFATE HEPTAHYDRATE 40 MG/ML
4 INJECTION, SOLUTION INTRAVENOUS EVERY 4 HOURS PRN
Status: DISCONTINUED | OUTPATIENT
Start: 2019-10-30 | End: 2019-11-17 | Stop reason: HOSPADM

## 2019-10-30 RX ORDER — LIDOCAINE HYDROCHLORIDE 10 MG/ML
10 INJECTION, SOLUTION EPIDURAL; INFILTRATION; INTRACAUDAL; PERINEURAL ONCE
Status: COMPLETED | OUTPATIENT
Start: 2019-10-30 | End: 2019-10-30

## 2019-10-30 RX ORDER — LIDOCAINE 40 MG/G
CREAM TOPICAL
Status: DISCONTINUED | OUTPATIENT
Start: 2019-10-30 | End: 2019-11-15

## 2019-10-30 RX ORDER — EMOLLIENT COMBINATION NO.32
EMULSION, EXTENDED RELEASE TOPICAL DAILY
COMMUNITY
End: 2019-11-22

## 2019-10-30 RX ORDER — ACETAMINOPHEN 325 MG/1
650 TABLET ORAL EVERY 4 HOURS PRN
Status: DISCONTINUED | OUTPATIENT
Start: 2019-10-30 | End: 2019-11-15

## 2019-10-30 RX ORDER — POTASSIUM CHLORIDE 1.5 G/1.58G
20-40 POWDER, FOR SOLUTION ORAL
Status: DISCONTINUED | OUTPATIENT
Start: 2019-10-30 | End: 2019-11-17 | Stop reason: HOSPADM

## 2019-10-30 RX ORDER — ONDANSETRON 4 MG/1
4 TABLET, ORALLY DISINTEGRATING ORAL EVERY 6 HOURS PRN
Status: DISCONTINUED | OUTPATIENT
Start: 2019-10-30 | End: 2019-11-17 | Stop reason: HOSPADM

## 2019-10-30 RX ORDER — METOPROLOL SUCCINATE 50 MG/1
50 TABLET, EXTENDED RELEASE ORAL 2 TIMES DAILY
COMMUNITY
End: 2020-02-07

## 2019-10-30 RX ORDER — MIRTAZAPINE 15 MG/1
15 TABLET, FILM COATED ORAL AT BEDTIME
COMMUNITY
Start: 2021-01-01

## 2019-10-30 RX ORDER — METOPROLOL SUCCINATE 50 MG/1
50 TABLET, EXTENDED RELEASE ORAL EVERY EVENING
Status: DISCONTINUED | OUTPATIENT
Start: 2019-10-30 | End: 2019-11-01

## 2019-10-30 RX ORDER — FUROSEMIDE 10 MG/ML
40 INJECTION INTRAMUSCULAR; INTRAVENOUS ONCE
Status: COMPLETED | OUTPATIENT
Start: 2019-10-30 | End: 2019-10-30

## 2019-10-30 RX ORDER — PROCHLORPERAZINE 25 MG
12.5 SUPPOSITORY, RECTAL RECTAL EVERY 12 HOURS PRN
Status: DISCONTINUED | OUTPATIENT
Start: 2019-10-30 | End: 2019-11-12

## 2019-10-30 RX ORDER — OXYCODONE HYDROCHLORIDE 5 MG/1
5-10 TABLET ORAL
Status: DISCONTINUED | OUTPATIENT
Start: 2019-10-30 | End: 2019-11-12

## 2019-10-30 RX ORDER — POLYETHYLENE GLYCOL 3350 17 G/17G
17 POWDER, FOR SOLUTION ORAL DAILY PRN
Status: DISCONTINUED | OUTPATIENT
Start: 2019-10-30 | End: 2019-11-17 | Stop reason: HOSPADM

## 2019-10-30 RX ORDER — ONDANSETRON 2 MG/ML
4 INJECTION INTRAMUSCULAR; INTRAVENOUS EVERY 6 HOURS PRN
Status: DISCONTINUED | OUTPATIENT
Start: 2019-10-30 | End: 2019-11-17 | Stop reason: HOSPADM

## 2019-10-30 RX ORDER — POTASSIUM CL/LIDO/0.9 % NACL 10MEQ/0.1L
10 INTRAVENOUS SOLUTION, PIGGYBACK (ML) INTRAVENOUS
Status: DISCONTINUED | OUTPATIENT
Start: 2019-10-30 | End: 2019-11-17 | Stop reason: HOSPADM

## 2019-10-30 RX ORDER — AMOXICILLIN 250 MG
2 CAPSULE ORAL 2 TIMES DAILY PRN
Status: DISCONTINUED | OUTPATIENT
Start: 2019-10-30 | End: 2019-11-17 | Stop reason: HOSPADM

## 2019-10-30 RX ORDER — POTASSIUM CHLORIDE 29.8 MG/ML
20 INJECTION INTRAVENOUS
Status: DISCONTINUED | OUTPATIENT
Start: 2019-10-30 | End: 2019-11-17 | Stop reason: HOSPADM

## 2019-10-30 RX ORDER — FUROSEMIDE 10 MG/ML
40 INJECTION INTRAMUSCULAR; INTRAVENOUS
Status: DISCONTINUED | OUTPATIENT
Start: 2019-10-30 | End: 2019-11-01

## 2019-10-30 RX ORDER — METOCLOPRAMIDE HYDROCHLORIDE 5 MG/ML
5 INJECTION INTRAMUSCULAR; INTRAVENOUS EVERY 6 HOURS PRN
Status: DISCONTINUED | OUTPATIENT
Start: 2019-10-30 | End: 2019-11-12

## 2019-10-30 RX ORDER — METOCLOPRAMIDE 5 MG/1
5 TABLET ORAL EVERY 6 HOURS PRN
Status: DISCONTINUED | OUTPATIENT
Start: 2019-10-30 | End: 2019-11-12

## 2019-10-30 RX ORDER — DEXTROSE MONOHYDRATE 25 G/50ML
25-50 INJECTION, SOLUTION INTRAVENOUS
Status: DISCONTINUED | OUTPATIENT
Start: 2019-10-30 | End: 2019-11-08

## 2019-10-30 RX ORDER — PROCHLORPERAZINE MALEATE 5 MG
5 TABLET ORAL EVERY 6 HOURS PRN
Status: DISCONTINUED | OUTPATIENT
Start: 2019-10-30 | End: 2019-11-12

## 2019-10-30 RX ORDER — METOPROLOL SUCCINATE 50 MG/1
100 TABLET, EXTENDED RELEASE ORAL EVERY MORNING
Status: DISCONTINUED | OUTPATIENT
Start: 2019-10-31 | End: 2019-11-02

## 2019-10-30 RX ORDER — POTASSIUM CHLORIDE 7.45 MG/ML
10 INJECTION INTRAVENOUS
Status: DISCONTINUED | OUTPATIENT
Start: 2019-10-30 | End: 2019-11-17 | Stop reason: HOSPADM

## 2019-10-30 RX ORDER — AMOXICILLIN 250 MG
1 CAPSULE ORAL 2 TIMES DAILY PRN
Status: DISCONTINUED | OUTPATIENT
Start: 2019-10-30 | End: 2019-11-17 | Stop reason: HOSPADM

## 2019-10-30 RX ORDER — NALOXONE HYDROCHLORIDE 0.4 MG/ML
.1-.4 INJECTION, SOLUTION INTRAMUSCULAR; INTRAVENOUS; SUBCUTANEOUS
Status: DISCONTINUED | OUTPATIENT
Start: 2019-10-30 | End: 2019-11-02

## 2019-10-30 RX ADMIN — FUROSEMIDE 40 MG: 10 INJECTION, SOLUTION INTRAVENOUS at 14:19

## 2019-10-30 RX ADMIN — METOPROLOL SUCCINATE 50 MG: 50 TABLET, EXTENDED RELEASE ORAL at 20:40

## 2019-10-30 RX ADMIN — Medication 1 MG: at 20:40

## 2019-10-30 RX ADMIN — SACUBITRIL AND VALSARTAN 1 TABLET: 24; 26 TABLET, FILM COATED ORAL at 20:40

## 2019-10-30 RX ADMIN — ALBUMIN HUMAN 25 G: 0.25 SOLUTION INTRAVENOUS at 17:40

## 2019-10-30 RX ADMIN — LIDOCAINE HYDROCHLORIDE 5 ML: 10 INJECTION, SOLUTION EPIDURAL; INFILTRATION; INTRACAUDAL; PERINEURAL at 16:01

## 2019-10-30 RX ADMIN — FUROSEMIDE 40 MG: 10 INJECTION, SOLUTION INTRAVENOUS at 17:27

## 2019-10-30 ASSESSMENT — MIFFLIN-ST. JEOR: SCORE: 1460.97

## 2019-10-30 ASSESSMENT — ACTIVITIES OF DAILY LIVING (ADL): ADLS_ACUITY_SCORE: 19

## 2019-10-30 ASSESSMENT — ENCOUNTER SYMPTOMS
FEVER: 0
RHINORRHEA: 1
COUGH: 1
DYSURIA: 0
DIARRHEA: 1
MYALGIAS: 1
SHORTNESS OF BREATH: 1
PALPITATIONS: 1
UNEXPECTED WEIGHT CHANGE: 1

## 2019-10-30 NOTE — H&P
Admitted:     10/30/2019      HISTORY OF PRESENT ILLNESS:  This is an 88-year-old male with a history of hypertension, atrial fibrillation, nonischemic cardiomyopathy, congestive heart failure, idiopathic thrombocytopenic purpura and hiatal hernia, who comes to the ER with a complaint of worsening shortness of breath.      According to the patient, for the last 1 week, he was not feeling too good.  He has been feeling chills, having shortness of breath which is getting worse and when he woke up this morning it was hard to breathe, he had a wet cough and was unable to even put his shoes on because of his dyspnea.  He thinks that he gained about 5 pounds in the last 1 week.  Some orthopnea and he is only able to walk a few steps without getting short of breath this morning.  The patient has some chills but no fever.  Denies any headache, dizziness or lightheadedness.  No chest pain.  No abdominal pain, back pain, dysuria, hematuria, constipation or diarrhea at this time.  The rest of the review of system is negative.      ASSESSMENT AND PLAN:   1.  Acute on chronic diastolic congestive heart failure with worsening bilateral pleural effusion:  This is an 88-year-old male with a history of nonischemic cardiomyopathy.  His EF, although improved to 55%-60% on his last echo that was done in 01/2019, but has the right ventricle severely dilated and moderate to severe tricuspid regurgitation and mild to moderate mitral regurgitation.  He now presents with bilateral pleural effusion, which I think enlarged on both sides, but worse on the right.  At this time, we will admit him, start him on IV Lasix 40 mg b.i.d., intake and output charting and daily weights.  Consult Radiology for ultrasound-guided thoracentesis bilaterally and send fluid for analysis.   2.  Hypertension has been in good control on current medication.  Continue metoprolol at this time.   3.  History of nonischemic cardiomyopathy:  He is on metoprolol and  aspirin.  I will continue with that.     4.  Idiopathic thrombocytopenic purpura, followed by Hematology.  Platelets are stable at this time around 38,000.   5.  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  Continue aspirin and metoprolol for rate control.   6.  Deep venous thrombosis prophylaxis with SCDs.      CODE STATUS:  I had a detailed discussion with the patient regarding his code status and he wanted to be DNR/DNI at this time.      The case was discussed with the ER physician and the nursing staff taking care of the patient.         ANDREA PERALTA MD             D: 10/30/2019   T: 10/30/2019   MT: MOOKIE      Name:     YINKA GONZALEZ   MRN:      [id number]        Account:      [id number]   :      1931        Admitted:     10/30/2019                   Document: [id number]

## 2019-10-30 NOTE — ED NOTES
DATE:  10/30/2019   TIME OF RECEIPT FROM LAB:  1207  LAB TEST:  PLT  LAB VALUE:  38  RESULTS GIVEN WITH READ-BACK TO (PROVIDER):  BRIAN  TIME LAB VALUE REPORTED TO PROVIDER:   1206

## 2019-10-30 NOTE — PROCEDURES
RADIOLOGY POST PROCEDURE NOTE    Patient name: Jama Paul  MRN: [id number]  : 1931    Pre-procedure diagnosis: bilateral pleural effusions.   Post-procedure diagnosis: Same    Procedure Date/Time: 2019  4:12 PM  Procedure: bilateral thoracentesis.  Estimated blood loss: None  Specimen(s) collected with description: pleural fluid.     The patient tolerated the procedure well with no immediate complications.  Significant findings:none    See imaging dictation for procedural details.    Provider name: Janice Coleman DO  Assistant(s):None

## 2019-10-30 NOTE — ED NOTES
"[facility]  ED Nurse Handoff Report    ED Chief complaint: Shortness of Breath (Lives in a Senior Living facility, has been feeling more short of breath last few days, mostly with exertion.)      ED Diagnosis:   Final diagnoses:   Congestive heart failure, unspecified HF chronicity, unspecified heart failure type (H)   Pleural effusion       Code Status: Full Code    Allergies: No Known Allergies    Activity level - Baseline/Home:  Stand with Assist  Activity Level - Current:   Stand with Assist of 2    Patient's Preferred language: English   Needed?: No    Isolation: No  Infection: Not Applicable  Bariatric?: No    Vital Signs:   Vitals:    10/30/19 1130 10/30/19 1147 10/30/19 1250 10/30/19 1251   BP:   125/80    Pulse:   103    Temp:       TempSrc:       SpO2: 99% 99%  98%   Weight:       Height:           Cardiac Rhythm: ,        Pain level:      Is this patient confused?: No   Does this patient have a guardian?  No         If yes, is there guardianship documents in the Epic \"Code/ACP\" activity?  N/A         Guardian Notified?  N/A  [place] - Suicide Severity Rating Scale Completed?  Yes  If yes, what color did the patient score?  White    Patient Report: Initial Complaint: Pt. Comes in for eval after feeling \"crumy\" for a week. Labored breathing with shortness of breath.  Focused Assessment: Pt. Alert and oriented. Cough. Labored respirations with shortness of breath reported. [id number]% on RA. 2lpm NC applied for comfort. Weight gain.  Tests Performed: labs, xray  Abnormal Results:   Results for orders placed or performed during the hospital encounter of 10/30/19   XR Chest 2 Views    Narrative    CHEST TWO VIEWS    10/30/2019 12:01 PM     HISTORY: Shortness of breath    COMPARISON: 2/28/2018.      Impression    IMPRESSION: Increasing moderate right pleural effusion. Right and left  lung base atelectasis or airspace disease. Small left pleural fluid.  Stable cardiac silhouette. "   CBC with platelets differential   Result Value Ref Range    WBC 7.8 4.0 - 11.0 10e9/L    RBC Count 3.38 (L) 4.4 - 5.9 10e12/L    Hemoglobin 10.3 (L) 13.3 - 17.7 g/dL    Hematocrit 32.5 (L) 40.0 - 53.0 %    MCV 96 78 - 100 fl    MCH 30.5 26.5 - 33.0 pg    MCHC 31.7 31.5 - 36.5 g/dL    RDW 23.0 (H) 10.0 - 15.0 %    Platelet Count 38 (LL) 150 - 450 10e9/L    Diff Method Automated Method     % Neutrophils 85.9 %    % Lymphocytes 7.9 %    % Monocytes 5.8 %    % Eosinophils 0.0 %    % Basophils 0.1 %    % Immature Granulocytes 0.3 %    Nucleated RBCs 0 0 /100    Absolute Neutrophil 6.7 1.6 - 8.3 10e9/L    Absolute Lymphocytes 0.6 (L) 0.8 - 5.3 10e9/L    Absolute Monocytes 0.5 0.0 - 1.3 10e9/L    Absolute Eosinophils 0.0 0.0 - 0.7 10e9/L    Absolute Basophils 0.0 0.0 - 0.2 10e9/L    Abs Immature Granulocytes 0.0 0 - 0.4 10e9/L    Absolute Nucleated RBC 0.0     Teardrop Cells Slight     Ovalocytes Slight     Platelet Estimate       Automated count confirmed.  Platelet morphology is normal.   Basic metabolic panel   Result Value Ref Range    Sodium 138 133 - 144 mmol/L    Potassium 4.2 3.4 - 5.3 mmol/L    Chloride 107 94 - 109 mmol/L    Carbon Dioxide 27 20 - 32 mmol/L    Anion Gap 4 3 - 14 mmol/L    Glucose 88 70 - 99 mg/dL    Urea Nitrogen 19 7 - 30 mg/dL    Creatinine 1.02 0.66 - 1.25 mg/dL    GFR Estimate 65 >60 mL/min/[1.73_m2]    GFR Estimate If Black 75 >60 mL/min/[1.73_m2]    Calcium 8.3 (L) 8.5 - 10.1 mg/dL   Troponin I   Result Value Ref Range    Troponin I ES <0.015 0.000 - 0.045 ug/L   Nt probnp inpatient (BNP)   Result Value Ref Range    N-Terminal Pro BNP Inpatient 6,109 (H) 0 - 1,800 pg/mL   Procalcitonin   Result Value Ref Range    Procalcitonin <0.05 ng/ml   EKG 12-lead, tracing only   Result Value Ref Range    Interpretation ECG Click View Image link to view waveform and result      Treatments provided: TBD    Family Comments: Daughter and son-in-law in the room    OBS brochure/video discussed/provided to  patient/family: N/A              Name of person given brochure if not patient: NA              Relationship to patient: NA    ED Medications:   Medications   furosemide (LASIX) injection 40 mg (has no administration in time range)       Drips infusing?:  No    For the majority of the shift this patient was Green.   Interventions performed were Updated on plan of care, nutrition.    Severe Sepsis OR Septic Shock Diagnosis Present: No    To be done/followed up on inpatient unit:  Continue to monitor    ED NURSE PHONE NUMBER: [phone]

## 2019-10-30 NOTE — ED PROVIDER NOTES
History     Chief Complaint:  Shortness of breath     HPI  Jama Paul is a 88 year old male with a history of atrial fibrillation, congestive heart failure, among others who presents with shortness of breath. For the past week, the patient started feeling unwell with shortness of breath, chills, cough, and rhinorrhea. He has no chest pain. Here, he reports shaking, enuresis, diarrhea one week ago, weight gain (6 lbs per daughter), and troubles standing up. He denies any duysuria, fever, or leg swelling.     Echocardiogram 4/11/2019:   Interpretation Summary:  The visual ejection fraction is estimated at 55-60%.  Left ventricular systolic function is normal.  The right ventricle is severely dilated.  There is mild to moderate (1-2+) mitral regurgitation.  There is mod-severe to severe (3-4+) tricuspid regurgitation.  The ascending aorta is Mildly dilated.  The degree of mitral and trcuspid regurgitation has worsened since 2018. There is no pleural effusion now.    Allergies:  The patient has no known drug allergies.    Medications:    Ascorbic Acid (VITAMIN C PO)  CYANOCOBALAMIN PO  Dermatological Products, Misc. (EPICERAM) EMUL  furosemide (LASIX) 20 MG tablet  metoprolol succinate ER (TOPROL XL) 50 MG 24 hr tablet  Multiple Vitamins-Minerals (ICAPS AREDS FORMULA PO)  Nutritional Supplements (ENSURE COMPLETE PO)  omeprazole (PRILOSEC) 40 MG DR capsule  order for DME  sacubitril-valsartan (ENTRESTO) 24-26 MG per tablet  Vitamin D, Cholecalciferol, 1000 UNITS TABS    Past Medical History:    Congestive heart failure   Elevated PSA   Eye hemorrhage, left   Non-ischemic cardiomyopathy   Permanent atrial fibrillation   Thrombocytopenia   Hypertension    Basal cell carcinoma   Cervical radiculopathy   Acute and chronic systolic and diastolic heart failure   Idiopathy thrombocytopenic purpura    Past Surgical History:    Bowel obstruction   Bone marrow biopsy   Carpal tunnel release   Explore groin   Herniorrhaphy    MOHS micrographic procedure   IOL   Septic olecranon bursitis     Family History:    Heart disease     Social History:  Marital Status:     Smoker:   Never   Smokeless:   Never   Alcohol:   No   Drugs:   No   Arrives with:   Family     Review of Systems   Constitutional: Positive for unexpected weight change. Negative for fever.   HENT: Positive for rhinorrhea.    Respiratory: Positive for cough and shortness of breath.    Cardiovascular: Positive for palpitations. Negative for chest pain and leg swelling.   Gastrointestinal: Positive for diarrhea.   Genitourinary: Positive for enuresis. Negative for dysuria.   Musculoskeletal: Positive for myalgias.   All other systems reviewed and are negative.    Physical Exam     Patient Vitals for the past 24 hrs:   BP Temp Temp src Pulse Heart Rate SpO2 Height Weight   10/30/19 1251 -- -- -- -- -- 98 % -- --   10/30/19 1250 125/80 -- -- 103 -- -- -- --   10/30/19 1147 -- -- -- -- -- 99 % -- --   10/30/19 1130 -- -- -- -- -- 99 % -- --   10/30/19 1115 -- -- -- -- -- 99 % -- --   10/30/19 1044 125/78 97.9  F (36.6  C) Oral -- 77 95 % 1.829 m (6') 75.3 kg (166 lb)     Physical Exam  General: Resting comfortably on the gurney  Eyes:  The pupils are equal and round    Conjunctivae and sclerae are normal  ENT:    Moist mucous membranes  Neck:  Normal range of motion  CV:  Irregular rate and rhythm    Skin warm and well perfused   Resp:  Decreased breath sounds on right side    Slight tachypnea    No rales    No wheezing   GI:  Abdomen is soft, there is no rigidity    No distension    No rebound tenderness     No abdominal tenderness  MS:  No leg swelling  Skin:  Right anterior tibia with healing wound. No evidence of erythema, drainage or tenderness  Neuro:   Awake, alert.      Speech is normal and fluent.    Face is symmetric.     Moves all extremities equally  Psych: Normal affect.  Appropriate interactions.      Emergency Department Course   ECG:  Indication: shortness  of breath   Time: 1053  Vent. Rate 90 bpm. WY interval *. QRS duration 86. QT/QTc 368/450. P-R-T axis * 79 -15. Atrial fibrillation. Low voltage QRS. Cannot rule out anterior infarct, age undetermined. Abnormal ECG. Read time: 1058    Imaging:  Radiographic findings were communicated with the patient and family who voiced understanding of the findings.    XR Chest 2 views:   Increasing moderate right pleural effusion. Right and left  lung base atelectasis or airspace disease. Small left pleural fluid.  Stable cardiac silhouette. As per radiology.     Laboratory:  CBC: WBC: 7.8, HGB: 10.3, PLT: 38  BMP: Calcium 8.3, o/w WNL (Creatinine: 1.02)  1134 Troponin: <0.015   Nt Probnp inpatient (BNP): 6,109  Procalcitonin: <0.05    Emergency Department Course:  1057 I performed an exam of the patient as documented above.   1240 I rechecked the patient and discussed the results of their workup thus far.   1350 I consulted with Dr. Benedict of the hospitalist services. They are in agreement to accept the patient for admission.    Findings and plan explained to the Patient and daughter who consents to admission. Discussed the patient with Dr. Benedict, who will admit the patient to a [facility] bed for further monitoring, evaluation, and treatment.    Impression & Plan    Medical Decision Making:  Jama Paul is a 88 year old years old male who presents for shortness of breath. Patient with oxygen saturations at low 90s. In atrial fibrillation which he has a hx of. Labs with elevated BNP. Platelets similar to baseline. Chest xray showed bilateral pleural effusions. Seems unlikely pneumonia given nl procalcitonin, nl WBC and no obvious pneumonia on chest xray. Suspect CHF exacerbation with pleural effusion, weight gain. Likely will need pleural effusion drainage. Given IV lasix and discussed with hospitalist for admission.     Diagnosis:    ICD-10-CM    1. Congestive heart failure, unspecified HF chronicity, unspecified heart failure  type (H) I50.9    2. Pleural effusion J90      Disposition:  Admitted to [facility] bed.  Scribe Disclosure:  I, Edward Ramos, am serving as a scribe on 10/30/2019 at 10:57 AM to personally document services performed by Ashley Morse MD based on my observations and the provider's statements to me.      Ashley Morse MD  10/30/19 2040

## 2019-10-30 NOTE — PHARMACY-ADMISSION MEDICATION HISTORY
Pharmacy Medication History  Admission medication history interview status for the 10/30/2019  admission is complete. See EPIC admission navigator for prior to admission medications     Medication history sources: Patient and Pharmacy (Double check all Rx medications with patient's retail pharmacy - [facility])  Medication history source reliability: Good  Adherence assessment: Good    Significant changes made to the medication list:  Patient was just started on mirtazepine 15mg at bedtime yesterday (10/29/2019)      Medication reconciliation completed by provider prior to medication history? Yes    Time spent in this activity: 25 minutes      Prior to Admission medications    Medication Sig Last Dose Taking? Auth Provider   Ascorbic Acid (VITAMIN C PO) Take 500 mg by mouth daily 10/29/2019 at Unknown time Yes Unknown, Entered By History   CYANOCOBALAMIN PO Take 500 mcg by mouth daily 10/29/2019 at Unknown time Yes Unknown, Entered By History   Dermatological Products, Misc. (EPICERAM) EMUL Externally apply topically daily Apply to right leg. 10/29/2019 at Unknown time Yes Unknown, Entered By History   furosemide (LASIX) 20 MG tablet Take 1 tablet (20 mg) by mouth daily 10/30/2019 at am Yes Jazlyn Cr PA-C   metoprolol succinate ER (TOPROL-XL) 50 MG 24 hr tablet Take 100 mg by mouth every morning 10/30/2019 at am Yes Unknown, Entered By History   metoprolol succinate ER (TOPROL-XL) 50 MG 24 hr tablet Take 50 mg by mouth every evening 10/29/2019 at pm Yes Unknown, Entered By History   mirtazapine (REMERON) 15 MG tablet Take 15 mg by mouth At Bedtime Patient started medication for the first time yesterday 10/29/2019. 10/29/2019 at pm Yes Unknown, Entered By History   Multiple Vitamins-Minerals (ICAPS AREDS FORMULA PO) Take 1 tablet by mouth daily Takes in addition to multivitamin with minerals 10/29/2019 at Unknown time Yes Unknown, Entered By History   sacubitril-valsartan (ENTRESTO) 24-26 MG per tablet  Take 0.5 tablets by mouth every morning 10/30/2019 at am Yes Unknown, Entered By History   sacubitril-valsartan (ENTRESTO) 24-26 MG per tablet Take 1 tablet by mouth every evening 10/29/2019 at pm Yes Unknown, Entered By History   Vitamin D, Cholecalciferol, 1000 UNITS TABS Take 2,000 Units by mouth daily  10/29/2019 at Unknown time Yes Reported, Patient   order for DME [facility] Phone [phone] Fax [phone]    Primary Dressing Hydrofera blue ready transfer    Qty 2 sheets  Secondary Dressing 4x4 gauze loaf Qty 1  Secondary Dressing 4' roll gauze Qty 30  Secondary Dressing 2' tape Qty  1  Length of Need: 1 month  Frequency of dressing change: daily   Lisandro Hernandez MD

## 2019-10-30 NOTE — H&P
[facility]    History and Physical  Hospitalist       Date of Admission:  10/30/2019    Assessment & Plan     This is an 88-year-old male with a history of hypertension, atrial fibrillation, nonischemic cardiomyopathy, congestive heart failure, idiopathic thrombocytopenic purpura and hiatal hernia, who comes to the ER with a complaint of worsening shortness of breath.       ASSESSMENT AND PLAN:   1.  Acute on chronic diastolic congestive heart failure with worsening bilateral pleural effusion:  This is an 88-year-old male with a history of nonischemic cardiomyopathy.  His EF, although improved to 55%-60% on his last echo that was done in 01/2019, but has the right ventricle severely dilated and moderate to severe tricuspid regurgitation and mild to moderate mitral regurgitation.  He now presents with bilateral pleural effusion, which I think enlarged on both sides, but worse on the right.  At this time, we will admit him, start him on IV Lasix 40 mg b.i.d., intake and output charting and daily weights.  Consult Radiology for ultrasound-guided thoracentesis bilaterally and send fluid for analysis.   2.  Hypertension has been in good control on current medication.  Continue metoprolol at this time.   3.  History of nonischemic cardiomyopathy:  He is on metoprolol and aspirin.  I will continue with that.     4.  Idiopathic thrombocytopenic purpura, followed by Hematology.  Platelets are stable at this time around 38,000.   5.  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  Continue aspirin and metoprolol for rate control.   6.  Deep venous thrombosis prophylaxis with SCDs.      CODE STATUS:  I had a detailed discussion with the patient regarding his code status and he wanted to be DNR/DNI at this time.      The case was discussed with the ER physician and the nursing staff taking care of the patient.         ANDREA PERALTA MD          DVT Prophylaxis: Pneumatic Compression Devices  Code Status: DNR / DNI    Disposition: Expected discharge in 2 days once stable.    Pato Benedict MD    Primary Care Physician   Mariusz Shields    Chief Complaint   SOB    History is obtained from the patient    History of Present Illness   Admitted:     10/30/2019      HISTORY OF PRESENT ILLNESS:  This is an 88-year-old male with a history of hypertension, atrial fibrillation, nonischemic cardiomyopathy, congestive heart failure, idiopathic thrombocytopenic purpura and hiatal hernia, who comes to the ER with a complaint of worsening shortness of breath.      According to the patient, for the last 1 week, he was not feeling too good.  He has been feeling chills, having shortness of breath which is getting worse and when he woke up this morning it was hard to breathe, he had a wet cough and was unable to even put his shoes on because of his dyspnea.  He thinks that he gained about 5 pounds in the last 1 week.  Some orthopnea and he is only able to walk a few steps without getting short of breath this morning.  The patient has some chills but no fever.  Denies any headache, dizziness or lightheadedness.  No chest pain.  No abdominal pain, back pain, dysuria, hematuria, constipation or diarrhea at this time.  The rest of the review of system is negative.       Past Medical History    I have reviewed this patient's medical history and updated it with pertinent information if needed.   Past Medical History:   Diagnosis Date     Congestive heart failure (H)      Elevated PSA      Eye hemorrhage, left 02/2019     Non-ischemic cardiomyopathy (H)     2017, med treatment, echo done 4/18 nl ef, mod to severe tr     Permanent atrial fibrillation 2011     Thrombocytopenia (H)      Unspecified essential hypertension        Past Surgical History   I have reviewed this patient's surgical history and updated it with pertinent information if needed.  Past Surgical History:   Procedure  Laterality Date     ABDOMEN SURGERY      bowel obstruction     BONE MARROW BIOPSY, BONE SPECIMEN, NEEDLE/TROCAR N/A 3/4/2019    Procedure: BIOPSY BONE MARROW;  Surgeon: Josey Vargas MD;  Location:  GI     CARPAL TUNNEL RELEASE RT/LT  2014     EXPLORE GROIN  4/30/2014    Procedure: EXPLORE GROIN;  Surgeon: Sam Aguirre MD;  Location:  OR     HERNIORRHAPHY INGUINAL  4/30/2014    Procedure: HERNIORRHAPHY INGUINAL;  Surgeon: Sam Aguirre MD;  Location:  OR     MOHS MICROGRAPHIC PROCEDURE       PHACOEMULSIFICATION CLEAR CORNEA WITH STANDARD INTRAOCULAR LENS IMPLANT Right 9/8/2015    Procedure: PHACOEMULSIFICATION CLEAR CORNEA WITH STANDARD INTRAOCULAR LENS IMPLANT;  Surgeon: Gil Shannon MD;  Location:  EC     septic olecranon bursitis[         Prior to Admission Medications   Prior to Admission Medications   Prescriptions Last Dose Informant Patient Reported? Taking?   Ascorbic Acid (VITAMIN C PO) 10/29/2019 at Unknown time Self Yes Yes   Sig: Take 500 mg by mouth daily   CYANOCOBALAMIN PO 10/29/2019 at Unknown time Self Yes Yes   Sig: Take 500 mcg by mouth daily   Dermatological Products, Misc. (EPICERAM) EMUL 10/29/2019 at Unknown time  Yes Yes   Sig: Externally apply topically daily Apply to right leg.   Multiple Vitamins-Minerals (ICAPS AREDS FORMULA PO) 10/29/2019 at Unknown time Self Yes Yes   Sig: Take 1 tablet by mouth daily Takes in addition to multivitamin with minerals   Vitamin D, Cholecalciferol, 1000 UNITS TABS 10/29/2019 at Unknown time Self Yes Yes   Sig: Take 2,000 Units by mouth daily    furosemide (LASIX) 20 MG tablet 10/30/2019 at am  No Yes   Sig: Take 1 tablet (20 mg) by mouth daily   metoprolol succinate ER (TOPROL-XL) 50 MG 24 hr tablet 10/30/2019 at am  Yes Yes   Sig: Take 100 mg by mouth every morning   metoprolol succinate ER (TOPROL-XL) 50 MG 24 hr tablet 10/29/2019 at pm  Yes Yes   Sig: Take 50 mg by mouth every evening   order for DME   No No   Sig:  [facility] Phone [phone] Fax [phone]    Primary Dressing Hydrofera blue ready transfer    Qty 2 sheets  Secondary Dressing 4x4 gauze loaf Qty 1  Secondary Dressing 4' roll gauze Qty 30  Secondary Dressing 2' tape Qty  1  Length of Need: 1 month  Frequency of dressing change: daily   sacubitril-valsartan (ENTRESTO) 24-26 MG per tablet 10/30/2019 at am  Yes Yes   Sig: Take 0.5 tablets by mouth every morning   sacubitril-valsartan (ENTRESTO) 24-26 MG per tablet 10/29/2019 at pm  Yes Yes   Sig: Take 1 tablet by mouth every evening      Facility-Administered Medications: None     Allergies   No Known Allergies    Social History   I have reviewed this patient's social history and updated it with pertinent information if needed. Jama Paul  reports that he has never smoked. He has never used smokeless tobacco. He reports that he does not drink alcohol or use drugs.    Family History   I have reviewed this patient's family history and updated it with pertinent information if needed.   Family History   Problem Relation Age of Onset     Heart Disease No family hx of        Review of Systems   CONSTITUTIONAL:  positive for  fatigue and malaise  EYES:  negative  HEENT:  negative  RESPIRATORY:  positive for  dyspnea  CARDIOVASCULAR:  positive for  dyspnea, orthopnea, edema  GASTROINTESTINAL:  negative  GENITOURINARY:  negative  INTEGUMENT/BREAST:  negative  HEMATOLOGIC/LYMPHATIC:  negative  ALLERGIC/IMMUNOLOGIC:  negative  ENDOCRINE:  negative  MUSCULOSKELETAL:  negative  NEUROLOGICAL:  negative  BEHAVIOR/PSYCH:  negative    Physical Exam   Temp: 97.9  F (36.6  C) Temp src: Oral BP: 126/87 Pulse: (!) 49 Heart Rate: 77   SpO2: 92 % O2 Device: None (Room air)    Vital Signs with Ranges  Temp:  [97.9  F (36.6  C)] 97.9  F (36.6  C)  Pulse:  [] 49  Heart Rate:  [77] 77  BP: (125-126)/(78-87) 126/87  SpO2:  [92 %-99 %] 92 %  166 lbs 0 oz    Constitutional: Awake, alert, cooperative, no apparent  distress.  Eyes: Conjunctiva and pupils examined and normal.  HEENT: Moist mucous membranes, normal dentition.  Respiratory: decrease air entry at the bases bilaterally, occasional crackles, no wheezing.  Dull to percussion both side at the lower chest.  Cardiovascular: Irregularly rhythm, normal S1 and S2, and no murmur noted.  GI: Soft, non-distended, non-tender, normal bowel sounds.  Lymph/Hematologic: No anterior cervical or supraclavicular adenopathy.  Skin: No rashes, no cyanosis, no edema.  Musculoskeletal: No joint swelling, erythema or tenderness.  Neurologic: Cranial nerves 2-12 intact, normal strength and sensation.  Psychiatric: Alert, oriented to person, place and time, no obvious anxiety or depression.    Data   Data reviewed today:  I personally reviewed the EKG tracing showing Afib, rate 90, low voltage .  Recent Labs   Lab 10/30/19  1134   WBC 7.8   HGB 10.3*   MCV 96   PLT 38*      POTASSIUM 4.2   CHLORIDE 107   CO2 27   BUN 19   CR 1.02   ANIONGAP 4   BARON 8.3*   GLC 88   TROPI <0.015       Recent Results (from the past 24 hour(s))   XR Chest 2 Views    Narrative    CHEST TWO VIEWS    10/30/2019 12:01 PM     HISTORY: Shortness of breath    COMPARISON: 2/28/2018.      Impression    IMPRESSION: Increasing moderate right pleural effusion. Right and left  lung base atelectasis or airspace disease. Small left pleural fluid.  Stable cardiac silhouette.    GAETANO WEBER MD

## 2019-10-31 ENCOUNTER — APPOINTMENT (OUTPATIENT)
Dept: GENERAL RADIOLOGY | Facility: CLINIC | Age: 84
DRG: 286 | End: 2019-10-31
Attending: INTERNAL MEDICINE
Payer: MEDICARE

## 2019-10-31 ENCOUNTER — APPOINTMENT (OUTPATIENT)
Dept: OCCUPATIONAL THERAPY | Facility: CLINIC | Age: 84
DRG: 286 | End: 2019-10-31
Attending: INTERNAL MEDICINE
Payer: MEDICARE

## 2019-10-31 ENCOUNTER — APPOINTMENT (OUTPATIENT)
Dept: CARDIOLOGY | Facility: CLINIC | Age: 84
DRG: 286 | End: 2019-10-31
Attending: INTERNAL MEDICINE
Payer: MEDICARE

## 2019-10-31 LAB
ALBUMIN SERPL-MCNC: 3.2 G/DL (ref 3.4–5)
ALP SERPL-CCNC: 36 U/L (ref 40–150)
ALT SERPL W P-5'-P-CCNC: 18 U/L (ref 0–70)
ANION GAP SERPL CALCULATED.3IONS-SCNC: 4 MMOL/L (ref 3–14)
AST SERPL W P-5'-P-CCNC: 11 U/L (ref 0–45)
BILIRUB SERPL-MCNC: 1.6 MG/DL (ref 0.2–1.3)
BUN SERPL-MCNC: 22 MG/DL (ref 7–30)
CALCIUM SERPL-MCNC: 7.9 MG/DL (ref 8.5–10.1)
CHLORIDE SERPL-SCNC: 103 MMOL/L (ref 94–109)
CO2 SERPL-SCNC: 28 MMOL/L (ref 20–32)
CREAT SERPL-MCNC: 1.13 MG/DL (ref 0.66–1.25)
ERYTHROCYTE [DISTWIDTH] IN BLOOD BY AUTOMATED COUNT: 23 % (ref 10–15)
GFR SERPL CREATININE-BSD FRML MDRD: 57 ML/MIN/{1.73_M2}
GLUCOSE SERPL-MCNC: 109 MG/DL (ref 70–99)
HCT VFR BLD AUTO: 31.8 % (ref 40–53)
HGB BLD-MCNC: 9.7 G/DL (ref 13.3–17.7)
MAGNESIUM SERPL-MCNC: 2.2 MG/DL (ref 1.6–2.3)
MCH RBC QN AUTO: 29.8 PG (ref 26.5–33)
MCHC RBC AUTO-ENTMCNC: 30.5 G/DL (ref 31.5–36.5)
MCV RBC AUTO: 98 FL (ref 78–100)
PLATELET # BLD AUTO: 29 10E9/L (ref 150–450)
POTASSIUM SERPL-SCNC: 3.9 MMOL/L (ref 3.4–5.3)
PROT SERPL-MCNC: 5.8 G/DL (ref 6.8–8.8)
RBC # BLD AUTO: 3.26 10E12/L (ref 4.4–5.9)
SODIUM SERPL-SCNC: 135 MMOL/L (ref 133–144)
WBC # BLD AUTO: 10.1 10E9/L (ref 4–11)

## 2019-10-31 PROCEDURE — 97165 OT EVAL LOW COMPLEX 30 MIN: CPT | Mod: GO

## 2019-10-31 PROCEDURE — 25000132 ZZH RX MED GY IP 250 OP 250 PS 637: Mod: GY | Performed by: INTERNAL MEDICINE

## 2019-10-31 PROCEDURE — 40000901 ZZH STATISTIC WOC PT EDUCATION, 0-15 MIN

## 2019-10-31 PROCEDURE — 97530 THERAPEUTIC ACTIVITIES: CPT | Mod: GO

## 2019-10-31 PROCEDURE — 85027 COMPLETE CBC AUTOMATED: CPT | Performed by: INTERNAL MEDICINE

## 2019-10-31 PROCEDURE — 99221 1ST HOSP IP/OBS SF/LOW 40: CPT | Performed by: INTERNAL MEDICINE

## 2019-10-31 PROCEDURE — 99223 1ST HOSP IP/OBS HIGH 75: CPT | Performed by: NURSE PRACTITIONER

## 2019-10-31 PROCEDURE — 80053 COMPREHEN METABOLIC PANEL: CPT | Performed by: INTERNAL MEDICINE

## 2019-10-31 PROCEDURE — 40000264 ECHOCARDIOGRAM COMPLETE

## 2019-10-31 PROCEDURE — 21000001 ZZH R&B HEART CARE

## 2019-10-31 PROCEDURE — 71045 X-RAY EXAM CHEST 1 VIEW: CPT

## 2019-10-31 PROCEDURE — P9047 ALBUMIN (HUMAN), 25%, 50ML: HCPCS | Performed by: INTERNAL MEDICINE

## 2019-10-31 PROCEDURE — G0463 HOSPITAL OUTPT CLINIC VISIT: HCPCS

## 2019-10-31 PROCEDURE — 25500064 ZZH RX 255 OP 636: Performed by: INTERNAL MEDICINE

## 2019-10-31 PROCEDURE — 36415 COLL VENOUS BLD VENIPUNCTURE: CPT | Performed by: INTERNAL MEDICINE

## 2019-10-31 PROCEDURE — 93306 TTE W/DOPPLER COMPLETE: CPT | Mod: 26 | Performed by: INTERNAL MEDICINE

## 2019-10-31 PROCEDURE — 71045 X-RAY EXAM CHEST 1 VIEW: CPT | Mod: 76

## 2019-10-31 PROCEDURE — 83735 ASSAY OF MAGNESIUM: CPT | Performed by: INTERNAL MEDICINE

## 2019-10-31 PROCEDURE — 25000128 H RX IP 250 OP 636: Performed by: INTERNAL MEDICINE

## 2019-10-31 PROCEDURE — 99232 SBSQ HOSP IP/OBS MODERATE 35: CPT | Performed by: INTERNAL MEDICINE

## 2019-10-31 RX ADMIN — ALBUMIN HUMAN 25 G: 0.25 SOLUTION INTRAVENOUS at 02:10

## 2019-10-31 RX ADMIN — HUMAN ALBUMIN MICROSPHERES AND PERFLUTREN 9 ML: 10; .22 INJECTION, SOLUTION INTRAVENOUS at 08:52

## 2019-10-31 RX ADMIN — Medication 1 MG: at 21:21

## 2019-10-31 RX ADMIN — FUROSEMIDE 40 MG: 10 INJECTION, SOLUTION INTRAVENOUS at 16:18

## 2019-10-31 RX ADMIN — SACUBITRIL AND VALSARTAN 1 TABLET: 24; 26 TABLET, FILM COATED ORAL at 21:21

## 2019-10-31 RX ADMIN — METOPROLOL SUCCINATE 100 MG: 100 TABLET, EXTENDED RELEASE ORAL at 10:25

## 2019-10-31 RX ADMIN — Medication 1 HALF-TAB: at 10:25

## 2019-10-31 RX ADMIN — FUROSEMIDE 40 MG: 10 INJECTION, SOLUTION INTRAVENOUS at 10:24

## 2019-10-31 RX ADMIN — METOPROLOL SUCCINATE 50 MG: 50 TABLET, EXTENDED RELEASE ORAL at 21:24

## 2019-10-31 ASSESSMENT — ACTIVITIES OF DAILY LIVING (ADL)
ADLS_ACUITY_SCORE: 20
ADLS_ACUITY_SCORE: 22
ADLS_ACUITY_SCORE: 21
ADLS_ACUITY_SCORE: 18

## 2019-10-31 ASSESSMENT — MIFFLIN-ST. JEOR: SCORE: 1376.49

## 2019-10-31 NOTE — PROGRESS NOTES
[facility]  WO Nurse Inpatient Wound Assessment   Reason for consultation: Evaluate and treat   traumatically induced right lower extremely ulceration Assessment  wound due to: Trauma. Pervious full thickness wound from hitting bed frame, followed by Dr Benjamin/Mary @ [facility]. Wound 95% re-epithelialized.     Treatment Plan   RLE wound: change dressing 2x/weeko on Thursday/Monday  and Prn  1. Clean MicroKlenz  2. mepilex border to wound to pad/protect       Orders In Epic. At discharge pt should resume tx plan as outlined by [facility].   WOC Nurse follow-up plan: weekly if pt remains in hospital  Nursing to notify the Provider(s) and re-consult the WO Nurse if wound(s) deteriorates or new skin concern.    Patient History  According to provider note(s):    This is an 88-year-old male with a history of hypertension, atrial fibrillation, nonischemic cardiomyopathy, congestive heart failure, idiopathic thrombocytopenic purpura and hiatal hernia, who comes to the ER with a complaint of worsening shortness of breath.        Objective Data   Active Diet Order:  Combination Diet Low Saturated Fat Na <2400mg Diet, No Caffeine Diet    Output:   I/O last 3 completed shifts:  In: 660 [P.O.:660]  Out: -     Risk Assessment:   Sensory Perception: 3-->slightly limited  Moisture: 3-->occasionally moist  Activity: 3-->walks occasionally  Mobility: 3-->slightly limited  Nutrition: 3-->adequate  Friction and Shear: 3-->no apparent problem  Nader Score: 18                          Labs:   Recent Labs   Lab 10/31/19  0524 10/30/19  1134   ALBUMIN 3.2*  --    HGB 9.7* 10.3*   INR  --  1.39*   WBC 10.1 7.8       Physical Exam  Skin inspection  Wound Location:  RLE wound  Wound History: traumatic wound from bedframe @ home.   Measurements  2.5cm x 2.5cm x 95% epithelialized bed  Palpation of the wound bed: jaren  Periwound skin: thin skin, intact, ecchymotic  Temperature: normal  Drainage: scant serous  Odor: none  Pain:  Denies    Interventions  Current support surface: atmos air  Visual inspection/assessment of wound(s) completed  Wound Care: cleaned MicroKlenz                        Mepilex border to area  Supplies: in floor stock/room  Education provided: discussed with patient  Discussed plan of care with Nursing / Patient    Adriana Bowman, WOC RN

## 2019-10-31 NOTE — PROGRESS NOTES
[facility]    Hospitalist Progress Note    Brief Summary:  This is an 88-year-old male with a history of hypertension, atrial fibrillation, nonischemic cardiomyopathy, congestive heart failure, idiopathic thrombocytopenic purpura and hiatal hernia, who comes to the ER with a complaint of worsening shortness of breath.     Assessment & Plan       1.  Acute on chronic diastolic congestive heart failure with worsening bilateral pleural effusion:  This is an 88-year-old male with a history of nonischemic cardiomyopathy.  His EF, although improved to 55%-60% on his last echo that was done in 01/2019, but has the right ventricle severely dilated and moderate to severe tricuspid regurgitation and mild to moderate mitral regurgitation.  He now presents with bilateral pleural effusion, which I think large on both sides, but worse on the right. He is S/P bilateral Thoracentesis under U/S guidance with about 2.2 liter out from right and 800 ml out from left. Now breathing is back to his baseline level and not SOB at this time. He is on IV lasix 40 mg bid will continue with that, Echo result pending but as per cardiology has mild pericardial effusion.  Awaiting Cardiology evaluation.   Pleural fluid is transudate and not infected.     2.  Hypertension has been in good control on current medication.  Continue metoprolol at this time.     3.  History of nonischemic cardiomyopathy:  He is on metoprolol, Entresto and aspirin.  I will continue with that.       4.  Idiopathic thrombocytopenic purpura, followed by Hematology.  Platelets are stable at this time around 38,000.     5.  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  Continue aspirin and metoprolol for rate control.     6.  Deep venous thrombosis prophylaxis with SCDs.     7. Iatrogenic Small apical Pneumothorax: on repeat xray this morning has right atelectasis because of large  pleural effusion which is now improve, but has small apical pneumothorax, not symptomatic at this time, will repeat the xray this afternoon and tomorrow morning to make sure is not expanding. If remain stable then need conservative management.          The case was discussed with Dr Chapman of cardiology and the nursing staff taking care of the patient.         Incentive spirometry   Repeat Xray chest this afternoon.  Continue IV lasix 40 mg bid.   PT and OT   Follow echo result, awaiting cardiology recommendations.  If remain stable and stable pneumothorax possible home tomorrow.          DVT Prophylaxis: Pneumatic Compression Devices  Code Status: DNR/DNI    Disposition: Expected discharge in 1 days if remain stable. .    Pato Benedict MD  Text Page  (7am - 6pm)    Interval History   Feeling better this morning and breathing easily and denies any SOB or chest pain at this time. No fever, chills, headache dizziness  At this time.    No other significant event overnight.     -Data reviewed today: I reviewed all new labs and imaging results over the last 24 hours. I personally reviewed the chest CT image(s) showing small apical pnumothorax, improve pleural effusion. atelectasis on right side. .    Physical Exam   Temp: 98.6  F (37  C) Temp src: Oral BP: 103/53 Pulse: (!) 49 Heart Rate: 90 Resp: 18 SpO2: 95 % O2 Device: Nasal cannula Oxygen Delivery: 2 LPM  Vitals:    10/30/19 1044 10/31/19 0619   Weight: 75.3 kg (166 lb) 66.8 kg (147 lb 6 oz)     Vital Signs with Ranges  Temp:  [96  F (35.6  C)-98.6  F (37  C)] 98.6  F (37  C)  Pulse:  [] 49  Heart Rate:  [] 90  Resp:  [16-18] 18  BP: ()/(51-87) 103/53  SpO2:  [92 %-100 %] 95 %  I/O last 3 completed shifts:  In: 240 [P.O.:240]  Out: -     Constitutional: awake, alert, cooperative, no apparent distress, and appears stated age  Eyes: Lids and lashes normal, pupils equal, round and reactive to light, extra ocular muscles intact, sclera clear, conjunctiva  normal  Respiratory: improve air entry bilaterally, coarse sounds with some basal crackles, no wheezing  Cardiovascular: Normal apical impulse, regular rate and rhythm, normal S1 and S2, no S3 or S4, and no murmur noted  GI: No scars, normal bowel sounds, soft, non-distended, non-tender, no masses palpated, no hepatosplenomegally  Skin: no bruising or bleeding  Musculoskeletal: no lower extremity pitting edema present, wound on right shin healing well.  Neurologic: no focal deficit.     Medications       furosemide  40 mg Intravenous BID     metoprolol succinate ER  100 mg Oral QAM     metoprolol succinate ER  50 mg Oral QPM     sacubitril-valsartan  1 tablet Oral QPM     sacubitril-valsartan  1 half-tab Oral QAM     sodium chloride (PF)  3 mL Intracatheter Q8H       Data   Recent Labs   Lab 10/31/19  0524 10/30/19  1134   WBC 10.1 7.8   HGB 9.7* 10.3*   MCV 98 96   PLT 29* 38*   INR  --  1.39*    138   POTASSIUM 3.9 4.2   CHLORIDE 103 107   CO2 28 27   BUN 22 19   CR 1.13 1.02   ANIONGAP 4 4   BARON 7.9* 8.3*   * 88   ALBUMIN 3.2*  --    PROTTOTAL 5.8*  --    BILITOTAL 1.6*  --    ALKPHOS 36*  --    ALT 18  --    AST 11  --    TROPI  --  <0.015       Recent Results (from the past 24 hour(s))   XR Chest 2 Views    Narrative    CHEST TWO VIEWS    10/30/2019 12:01 PM     HISTORY: Shortness of breath    COMPARISON: 2/28/2018.      Impression    IMPRESSION: Increasing moderate right pleural effusion. Right and left  lung base atelectasis or airspace disease. Small left pleural fluid.  Stable cardiac silhouette.    GAETANO WEBER MD   US Thoracentesis Bilateral    Narrative    ULTRASOUND GUIDED THORACENTESIS  10/30/2019 4:31 PM     HISTORY: bilateral pleural effusion    FINDINGS: Limited ultrasound was performed to evaluate for the  presence and best approach for drainage of a pleural effusion. An  image is archived. Written and oral informed consent was obtained. A  pause for the cause procedure to verify the  correct patient and  correct procedure.     The skin overlying the right chest posteriorly was prepped and draped  in the usual sterile fashion. The subcutaneous tissues were  anesthetized with 5 mL of 1% lidocaine. Under direct ultrasound  guidance a catheter was advanced into the pleural space and 2200 mL of   josefina colored fluid was drained. The catheter was removed and a  sterile dressing was applied.     The skin overlying the left chest posteriorly was prepped and draped  in the usual sterile fashion. The subcutaneous tissues were  anesthetized with 5 mL of 1% lidocaine. Under direct ultrasound  guidance a catheter was advanced into the pleural space and 800 mL of   josefina colored fluid was drained. The catheter was removed and a  sterile dressing was applied.     Patient was monitored by nurse under my direct supervision throughout  the exam. Ultrasound images were permanently stored.  There were no  immediate complications. Patient left the ultrasound suite in  satisfactory condition.      Impression    IMPRESSION: Technically successful bilateral thoracentesis without  immediate complications.    THANH TINEO DO   XR Chest Port 1 View    Narrative    CHEST ONE VIEW PORTABLE  10/31/2019 6:15 AM     HISTORY: Pleural effusion bilateral.    COMPARISON: 10/30/2019.      Impression    IMPRESSION: Smaller right pleural effusion. Consolidation persists at  the right mid to lower lung that may be pneumonia or other airspace  disease. Small right apex pneumothorax is suggested, newly seen.  Recommend close clinical follow-up. Left lung base atelectasis or  airspace disease. Stable enlarged cardiac silhouette.    I communicated this result to Daniel, nursing support staff caring for  this patient on 10/31/2019 at 0810 hours.

## 2019-10-31 NOTE — PROGRESS NOTES
10/31/19 0900   Quick Adds   Type of Visit Initial Occupational Therapy Evaluation   Living Environment   Lives With spouse   Living Arrangements independent living facility   Home Accessibility no concerns   Living Environment Comment Pt has a tub shower w/ a bench and grab bar   Self-Care   Usual Activity Tolerance good   Current Activity Tolerance fair   Regular Exercise Yes   Equipment Currently Used at Home walker, rolling  (4WW)   Activity/Exercise/Self-Care Comment Pt uses exercise room in facility 90 min/day   Functional Level   Ambulation 1-->assistive equipment   Transferring 1-->assistive equipment   Toileting 1-->assistive equipment   Bathing 1-->assistive equipment   Dressing 0-->independent   Eating 0-->independent   Communication 0-->understands/communicates without difficulty   Swallowing 0-->swallows foods/liquids without difficulty   Cognition 0 - no cognition issues reported   Fall history within last six months no   Prior Functional Level Comment Pt uses 4WW for all mobility. Pt mod I w/ ADL's, sets up own meds. Pt goes to dining room for meals, has A for cleaning. Pt and wife share laundry   General Information   Onset of Illness/Injury or Date of Surgery - Date 10/30/19   Referring Physician Pato Benedict MD   Patient/Family Goals Statement return home   Additional Occupational Profile Info/Pertinent History of Current Problem Pt admitted w/ worsening SOB. Pt found to have bilateral pleural effusions, s/p thoracentesis   Precautions/Limitations fall precautions   General Observations Pt is [facility]   Cognitive Status Examination   Orientation orientation to person, place and time   Level of Consciousness alert   Follows Commands (Cognition) WNL   Memory intact   Attention No deficits were identified   Visual Perception   Visual Perception Comments No vision in L eye, R eye normal, wears glasses   Sensory Examination   Sensory Comments denies numbness/tingling   Pain Assessment   Patient  Currently in Pain No   Range of Motion (ROM)   ROM Comment BUE ROM WFL   Hand Strength   Hand Strength Comments WFL for age   Mobility   Bed Mobility Comments SBA   Transfer Skill: Sit to Stand   Level of [place]: Sit/Stand minimum assist (75% patients effort)   Balance   Balance Quick Add Sitting balance: dynamic   Sitting Balance: Dynamic unable to balance   Bathing   Level of [place] moderate assist (50% patients effort)   Upper Body Dressing   Level of [place]: Dress Upper Body stand-by assist   Lower Body Dressing   Level of [place]: Dress Lower Body maximum assist (25% patients effort)   Toileting   Level of [place]: Toilet moderate assist (50% patients effort)   Grooming   Physical Assist/Nonphysical Assist: Grooming set-up required  (seated)   Eating/Self Feeding   Level of [place]: Eating independent   Activities of Daily Living Analysis   Impairments Contributing to Impaired Activities of Daily Living balance impaired;strength decreased   General Therapy Interventions   Planned Therapy Interventions ADL retraining;strengthening;transfer training;progressive activity/exercise   Clinical Impression   Criteria for Skilled Therapeutic Interventions Met yes, treatment indicated   OT Diagnosis Decreased ind/safety w/ ADL and IADLs   Influenced by the following impairments Decreased strength, functional act tolerance, balance   Assessment of Occupational Performance 1-3 Performance Deficits   Identified Performance Deficits Decreased ind and safety w/ functional transfers, functional mobility, bathing, toileting, dressing, g/h   Clinical Decision Making (Complexity) Low complexity   Therapy Frequency Daily   Predicted Duration of Therapy Intervention (days/wks) 5 days   Anticipated Discharge Disposition Transitional Care Facility   Risks and Benefits of Treatment have been explained. Yes   Patient, Family & other staff in agreement with plan of care Yes   [facility] AM-PAC TM  "\"6 Clicks\"   2016, Trustees of [facility], under license to [employer].  All rights reserved.   6 Clicks Short Forms Daily Activity Inpatient Short Form   [facility] AM-PAC  \"6 Clicks\" Daily Activity Inpatient Short Form   1. Putting on and taking off regular lower body clothing? 2 - A Lot   2. Bathing (including washing, rinsing, drying)? 2 - A Lot   3. Toileting, which includes using toilet, bedpan or urinal? 2 - A Lot   4. Putting on and taking off regular upper body clothing? 3 - A Little   5. Taking care of personal grooming such as brushing teeth? 3 - A Little   6. Eating meals? 4 - None   Daily Activity Raw Score (Score out of 24.Lower scores equate to lower levels of function) 16   Total Evaluation Time   Total Evaluation Time (Minutes) 10     "

## 2019-10-31 NOTE — PLAN OF CARE
Discharge Planner OT   Patient plan for discharge: return to ILF  Current status: OT eval/tx initiated today. Pt typically mod I w/ all ADL's and has A for meals and cleaning. Pt uses 4WW for mobility.  Pt and wife share laundry tasks. Pt typically exercises 90 min/day.    Pt currently SBA for bed mobility. Min A for sit>stand but only able to stand w/ support x  10 sec, unsteady needed to return to sitting. Pt unable to don socks at EOB. Pt fatigued, required return to supine, VC's for repositioning  Barriers to return to prior living situation: level of A, impaired strength, balance, functional act tolerance  Recommendations for discharge: TCU  Rationale for recommendations: Pt well below functional baseline and will benefit from continued therapy at TCU to improve ind w/ ADLs, transfers, and mobility prior to returning to ILF.        Entered by: Paola Cochran 10/31/2019 9:57 AM

## 2019-10-31 NOTE — CONSULTS
[facility]  Palliative Care Consultation Note    Patient: Jama Paul  Date of Admission:  10/30/2019    Requesting Clinician / Team: Lawanda MOCTEZUMA/Cardiology   Reason for consult: Goals of care    Recommendations:    Pt continues on restorative pathway    He is interested in seeking out a surgical opinion for his TR. If he is deemed not a good candidate, he will likely seek out additional opinions     He anticipates his hematologist to heavily weigh into the decision of a surgical procedure, if things move in that direction     He continues to find great benefit in treatments available in the hospital, and thus not at a point where he does not want to return for tune ups     He hopes to maintain his present level of quality of life, function, and independence if possible. If things shift, he understands he will have to reestablish a new norm    He is welcome to follow with Dr. Madonna Crooks, palliative care, in the HF clinic if desired. Follow up in 3 months     These recommendations have been discussed with bedside MATTY Sanchez and Lawanda MOCTEZUMA.    Gisella REYES, [facility]  Palliative Medicine   Pager [phone]      Thank you for the opportunity to participate in the care of this patient and family. Our team: does not plan on following further, however do not hesitate to call or re-consult if we can be of further assistance to the patient/family.     During regular M-F work hours -- if you are not sure who specifically to contact -- please contact us at [phone].    After regular work hours and on weekends/holidays, you can call our answering service at [phone]. Also, who's on call for us is available in [facility] Smart Web.     Attestation:  Total time on the floor involved in the patient's care: 70 minutes  Total time spent in counseling/care coordination: >50%    Assessments:  Jama Paul is a 88 year old male with nonischemic cardiomyopathy with EF down to 30-35% which normalized  with medical management, RV dilation, severe TR, permanent a.fib not on current anticoagulation due to recent occular hemorrhage, HTN, OLIVA, and ITP who presents with 1 week of progressive weakness and SOB. He is found to have bilateral pleural effusions, s/p thoracentesis with 2.2 liters from his right and 800ml from his left. He is receiving diuresis per cardiology's direction. He will be evaluated by CT Surgery for tricuspid clipping.     Today, the patient was seen for:  Goals of care     Visited with Jama, along with wife Paige, sister in law Gabby, and family member Jarocho. Pt reports living a very active lifestyle in his independent apartment. He dresses and bathes himself, is ambulatory, goes to the dining red 2x per day for meals, runs errands with his wife, reads and watches TV. He enjoys his high level of functioning, but recognizes that this may change moving forward. He is ultimately ok with changes, and we acknowledge that people can reestablish a new norm and still feel happy. There can be some loss and grief, however.     Jama has a good understanding of his heart issues. He remains very hopeful that he can continue to seek active treatment for his condition that will help him live and feel well. He already feels better this hospitalization, and sees ongoing benefit to returning to the hospital for tune ups.    In regard to the possible option for surgery, Jama is definitely motivated to seek this option out. If he is deemed a candidate, he feels confident he would move forward, with the assistance of his hematologist (in regard to his ITP). If he is not a candidate, he fully intends to seek out additional opinions.     We talk about following with palliative care clinic to help check in to ensure he is living and feeling the best he can, and that his function and independence can be maintained as best as possible. He likes this idea.    Prognosis, Goals, & Planning:      Functional Status just prior  to hospitalization: 0 (Fully active, able to carry on all activities without restriction)      Prognosis, Goals, and/or Advance Care Planning were addressed today: Yes        Summary/Comments:       Patient's decision making preferences: shared with support from loved ones          Patient has decision-making capacity today for complex decisions: Yes            I have concerns about the patient/family's health literacy today: No           Patient has a completed Health Care Directive: No.       Code status: DNR/DNI    Coping, Meaning, & Spirituality:   Mood, coping, and/or meaning in the context of serious illness were addressed today: Yes  Summary/Comments: Pt currently enjoys a highly functional, independent life. He is very happy with this. He hopes to maintain this, but recognizes that if modifications are needed to this, he will adapt      Social:     Living situation: In an independent apartment at [facility] with his wife Paige    Watts family / caregivers: Wife Paige, NORMA Pierre, and family member Jarocho present today     Occupational history: Ophthamalogist     History of Present Illness:  History gathered today from: patient, family/loved ones, medical chart, medical team members    Jama Paul is a 88 year old male with nonischemic cardiomyopathy with EF down to 30-35% which normalized with medical management, RV dilation, severe TR, permanent a.fib not on current anticoagulation due to recent occular hemorrhage, HTN, OLIVA, and ITP who presents with 1 week of progressive weakness and SOB. He is found to have bilateral pleural effusions, s/p thoracentesis with 2.2 liters from his right and 800ml from his left. He is receiving diuresis per cardiology's direction. He will be evaluated by CT Surgery for tricuspid clipping.     Key Palliative Symptom Data:  We are not helping to manage these symptoms currently in this patient.    ROS:  Comprehensive ROS is reviewed and is negative except as here &  per HPI: N/A     Past Medical History:  Past Medical History:   Diagnosis Date     Congestive heart failure (H)      Elevated PSA      Eye hemorrhage, left 02/2019     Non-ischemic cardiomyopathy (H)     2017, med treatment, echo done 4/18 nl ef, mod to severe tr     Permanent atrial fibrillation 2011     Thrombocytopenia (H)      Unspecified essential hypertension         Past Surgical History:  Past Surgical History:   Procedure Laterality Date     ABDOMEN SURGERY      bowel obstruction     BONE MARROW BIOPSY, BONE SPECIMEN, NEEDLE/TROCAR N/A 3/4/2019    Procedure: BIOPSY BONE MARROW;  Surgeon: Josey Vargas MD;  Location:  GI     CARPAL TUNNEL RELEASE RT/LT  2014     EXPLORE GROIN  4/30/2014    Procedure: EXPLORE GROIN;  Surgeon: Sam Aguirre MD;  Location:  OR     HERNIORRHAPHY INGUINAL  4/30/2014    Procedure: HERNIORRHAPHY INGUINAL;  Surgeon: Sam Aguirre MD;  Location:  OR     MOHS MICROGRAPHIC PROCEDURE       PHACOEMULSIFICATION CLEAR CORNEA WITH STANDARD INTRAOCULAR LENS IMPLANT Right 9/8/2015    Procedure: PHACOEMULSIFICATION CLEAR CORNEA WITH STANDARD INTRAOCULAR LENS IMPLANT;  Surgeon: Gil Shannon MD;  Location:  EC     septic olecranon bursitis[           Family History:  Family History   Problem Relation Age of Onset     Heart Disease No family hx of         Allergies:  No Known Allergies     Medications:  I have reviewed this patient's medication profile and medications from this hospitalization.   Noted scheduled meds are:  Lasix 40mg IV BID    Noted PRN meds are:  N/A    Physical Exam:  Vital Signs: Temp: 98.6  F (37  C) Temp src: Oral BP: 103/53  Heart Rate: 90 Resp: 18 SpO2: 95 % O2 Device: Nasal cannula Oxygen Delivery: 2 LPM  Weight: 147 lbs 6 oz  CONSTITUTIONAL: Chronically ill elderly man seen sitting up in the chair in NAD, A&Ox3 although slightly drowsy at the conclusion of our meeting. He is calm and cooperative. Family present   HEENT:  NCAT  RESPIRATORY: NL respiratory effort on 2L via NC  NEUROLOGIC: Appropriately responsive during interview  PSYCH: Affect congruent, engaged     Data reviewed:  Recent imaging reviewed, my comments on pertinents:   10/31 Echo EF 55-60%, severe right ventricle dilation, moderate to severe tricuspid regurgitation   10/31 CXR improved effusions, small right apex PTX  10/30 Bilateral thoracentesis with 2200cc removed from the right pleural space, 800cc from the left pleural space     Recent lab data reviewed, my comments on pertinents:   Na 135  Creat 1.13  Albumin 3.2  Platelet 29

## 2019-10-31 NOTE — PLAN OF CARE
VSS on RA.  Tele: Afib CVR.  Denies pain or shortness of breath.  Up with 1-2 belt and walker.  Pt A&O but forgetful.  CV surgery consult in AM.  Dressing on leg changed today by WOC.  Blanchable redness on spine, mepilex applied.  Call light within reach.   Will continue to monitor.

## 2019-10-31 NOTE — PLAN OF CARE
PT: Consult received. Chart reviewed. Pt currently busy with bedside procedure. Unable to see for PT evaluation at this time.

## 2019-10-31 NOTE — CONSULTS
[facility]    Cardiology Consultation     Date of Admission:  10/30/2019  Date of Consult (When I saw the patient): 10/31/19  Requesting Physician: Dr. Restrepo  Primary care physician: Mariusz Shields    Primary cardiologist: Dr. Pugh  Staff Cardiologist:  Dr. Chapman     Reason for consult/ Chief Complaint: TR, severe RV dilatation    History is obtained from the patient.     History of Present Illness   Jama Paul is a 88 year old male with PMH significant for nonischemic cardiomyopathy felt to be likely tachycardia induced with an EF as low as 30 to 35% to 2017 that recovered to normal with medical management, RV dilatation and severe TR, permanent A. fib on rate control previously on Coumadin but recent ocular hemorrhage and perineal bleed and now off, who presented with 1 week history of progressive weakness and shortness of breath.  He is noted that intermittently this week he cannot breathe when he lies down.  He also has become more weak and more dyspneic on exertion.  He did not notice any pedal edema.  He also did not note any weight gain.  He was taking his Lasix as directed and he is still urinating.  He states that over all during the last year there is been tough given his treatment for ITP has been difficult.    Code Status    DNR/DNI    Past Medical History    1.  History of nonischemic cardiomyopathy felt to be tachycardia induced with an EF of 30 to 35% in December 2017 and recovered to normal on last echocardiogram.  2.  Moderate severe tricuspid regurgitation number   3.  permanent atrial fibrillation, no longer on anticoagulation given recent ocular hemorrhage and perineal bleed  4.  ITP  5.  Untreated sleep apnea  6.  Severer RV dilatation    Past Surgical History   I have reviewed this patient's surgical history and updated it with pertinent information if needed.  Past Surgical History:   Procedure Laterality Date     ABDOMEN SURGERY      bowel obstruction     BONE MARROW  BIOPSY, BONE SPECIMEN, NEEDLE/TROCAR N/A 3/4/2019    Procedure: BIOPSY BONE MARROW;  Surgeon: Josey Vargas MD;  Location:  GI     CARPAL TUNNEL RELEASE RT/LT  2014     EXPLORE GROIN  4/30/2014    Procedure: EXPLORE GROIN;  Surgeon: Sam Aguirre MD;  Location:  OR     HERNIORRHAPHY INGUINAL  4/30/2014    Procedure: HERNIORRHAPHY INGUINAL;  Surgeon: Sam Aguirre MD;  Location:  OR     MOHS MICROGRAPHIC PROCEDURE       PHACOEMULSIFICATION CLEAR CORNEA WITH STANDARD INTRAOCULAR LENS IMPLANT Right 9/8/2015    Procedure: PHACOEMULSIFICATION CLEAR CORNEA WITH STANDARD INTRAOCULAR LENS IMPLANT;  Surgeon: Gil Shannon MD;  Location:  EC     septic olecranon bursitis[         Prior to Admission Medications   Prior to Admission Medications   Prescriptions Last Dose Informant Patient Reported? Taking?   Ascorbic Acid (VITAMIN C PO) 10/29/2019 at Unknown time Self Yes Yes   Sig: Take 500 mg by mouth daily   CYANOCOBALAMIN PO 10/29/2019 at Unknown time Self Yes Yes   Sig: Take 500 mcg by mouth daily   Dermatological Products, Misc. (EPICERAM) EMUL 10/29/2019 at Unknown time  Yes Yes   Sig: Externally apply topically daily Apply to right leg.   Multiple Vitamins-Minerals (ICAPS AREDS FORMULA PO) 10/29/2019 at Unknown time Self Yes Yes   Sig: Take 1 tablet by mouth daily Takes in addition to multivitamin with minerals   Vitamin D, Cholecalciferol, 1000 UNITS TABS 10/29/2019 at Unknown time Self Yes Yes   Sig: Take 2,000 Units by mouth daily    furosemide (LASIX) 20 MG tablet 10/30/2019 at am Pharmacy No Yes   Sig: Take 1 tablet (20 mg) by mouth daily   metoprolol succinate ER (TOPROL-XL) 50 MG 24 hr tablet 10/30/2019 at am Pharmacy Yes Yes   Sig: Take 100 mg by mouth every morning   metoprolol succinate ER (TOPROL-XL) 50 MG 24 hr tablet 10/29/2019 at pm Pharmacy Yes Yes   Sig: Take 50 mg by mouth every evening   mirtazapine (REMERON) 15 MG tablet 10/29/2019 at pm Pharmacy Yes Yes   Sig: Take 15  mg by mouth At Bedtime Patient started medication for the first time yesterday 10/29/2019.   order for DME   No No   Sig: [facility] Phone [phone] Fax [phone]    Primary Dressing Hydrofera blue ready transfer    Qty 2 sheets  Secondary Dressing 4x4 gauze loaf Qty 1  Secondary Dressing 4' roll gauze Qty 30  Secondary Dressing 2' tape Qty  1  Length of Need: 1 month  Frequency of dressing change: daily   sacubitril-valsartan (ENTRESTO) 24-26 MG per tablet 10/30/2019 at am Pharmacy Yes Yes   Sig: Take 0.5 tablets by mouth every morning   sacubitril-valsartan (ENTRESTO) 24-26 MG per tablet 10/29/2019 at pm Pharmacy Yes Yes   Sig: Take 1 tablet by mouth every evening      Facility-Administered Medications: None     Allergies   No Known Allergies    Social History   I have reviewed this patient's social history and updated it with pertinent information if needed. Jama Paul  reports that he has never smoked. He has never used smokeless tobacco. He reports that he does not drink alcohol or use drugs.  He is a retired ophthalmologist.  He lives at [facility] in a senior apartment.    Family History   Family history reviewed with patient and is noncontributory.    Review of Systems   The 5 point Review of Systems is negative other than noted in the HPI or here.     Physical Exam   Temp: 98.6  F (37  C) Temp src: Oral BP: 94/56 Pulse: (!) 49 Heart Rate: 97 Resp: 16 SpO2: 96 % O2 Device: Nasal cannula Oxygen Delivery: 2 LPM  Vital Signs with Ranges  Temp:  [96  F (35.6  C)-98.6  F (37  C)] 98.6  F (37  C)  Pulse:  [] 49  Heart Rate:  [] 97  Resp:  [16-18] 16  BP: ()/(51-87) 94/56  SpO2:  [92 %-100 %] 96 %  147 lbs 6 oz  Elderly frail-appearing gentleman in no acute distress.  Oxygen in place per nasal cannula.  He can speak in complete sentences without dyspnea.  His heart is irregularly irregular with a 2 out of 6 systolic murmur heard best at lower left sternal border.  His  lungs are markedly diminished in the course in bilateral lower lobes right worse than left reasonable airflow in the middle lobes and no rales at that point but overall markedly diminished.  He has no peripheral edema.  Possible areas of skin tears on his leg being treated by nursing.    Data   Most Recent 3 CBC's:  Recent Labs   Lab Test 10/31/19  0524 10/30/19  1134 07/10/19  1400   WBC 10.1 7.8 6.0   HGB 9.7* 10.3* 10.5*   MCV 98 96 93   PLT 29* 38* 32*     Most Recent 3 BMP's:  Recent Labs   Lab Test 10/31/19  0524 10/30/19  1134 04/12/19  1925    138 134   POTASSIUM 3.9 4.2 4.1   CHLORIDE 103 107 103   CO2 28 27 27   BUN 22 19 19   CR 1.13 1.02 1.02   ANIONGAP 4 4 4   BARON 7.9* 8.3* 7.7*   * 88 104*     Most Recent 3 Troponin's:  Recent Labs   Lab Test 10/30/19  1134 02/28/18  1024 01/19/18  1150   TROPI <0.015 <0.015 0.029       Echo:  The visual ejection fraction is estimated at 55-60%.  Left ventricular systolic function is normal.  The right ventricle is severely dilated.  There is mild to moderate (1-2+) mitral regurgitation.  There is mod-severe to severe (3-4+) tricuspid regurgitation.  The ascending aorta is Mildly dilated.  The degree of mitral and trcuspid regurgitation has worsened since 2018. There is no pleural effusion now.    ECG: afib, twi in III, avF    MRI with stress: 5/17  1. The LV is normal in cavity size and wall thickness. The global systolic function is moderately reduced.  The LVEF is 42%. There is moderate global hypokinesis of the left ventricle.  2. The RV is mildly dilated. The global systolic function is mildly reduced. The RVEF is 44%.   3. There is severe biatrial enlargement.  4. There is moderate to severe tricuspid regurgitation.   5. Regadenoson stress perfusion imaging is negative for ischemia.   6. Late gadolinium enhancement imaging shows no MI, fibrosis or infiltrative disease.   7. Bilateral pleural effusions are noted.    Assessment & Plan   [facility]  Humberto is a 88 year old male with PMH significant for nonischemic cardiomyopathy felt to be likely tachycardia induced with an EF as low as 30 to 35% to 2017 that recovered to normal with medical management, RV dilatation and severe TR, permanent A. fib on rate control previously on Coumadin but recent ocular hemorrhage and perineal bleed and now off, who presented with 1 week history of progressive weakness and shortness of breath and found to have some significant bilateral pleural effusions.  Echocardiogram shows worsening tricuspid regurgitation and ongoing worsening dilatation of the RV which has been noted for several years.  Unfortunately with his ITP, and age, surgery would be higher risk.  Although at this point, the patient would like to talk to the surgeon to discuss this result indicates he may be would prefer dying on the table.  This could be considered for minimally invasive tricuspid ring.  Alternatively he could be considered for 1 of the clinical trials that is ongoing for tricuspid clipping.  There is one in [facility].  Although, I suspect he would be declined given his ITP.    For medical management we will continue with IV diuresis and likely switch him to a p.o. meds is better absorbed like Bumex or torsemide on discharge as well as spironolactone given his right-sided failure.  His heart rates are controlled in A. fib and he is been taken off Coumadin given his spontaneous bleeds.     He has been offered a watchman device and has not gone that direction in part due to risk of anticoagulation post procedure with his ITP.  At this point we will continue him off Coumadin and he is aware of his risk of stroke but really has no other option.  Evaluated for watchman, pt declined off coumadin due to hemorrahge    Thank you for this consultation.  This patient was discussed with Dr. Chapman.  Final recommendations are pending his documentation. We will continue to follow along with you.      Lawanda Carrera PA-C  9:54 AM 10/31/2019   [facility]  Pager: [phone]

## 2019-10-31 NOTE — PLAN OF CARE
A/O x 3 with forgetfulness and occasional confusion, up to the chair with 1-2 assist, gait belt and walker, patient admitted this after with severe shortness of breath, XR showed bilateral pleural effusion right greater than left, patient had ultrasound guided thoracentesis bilaterally, total of 3 Liters was taken out, dressing bilateral upper back CDI, no drainage, patient on oxygen at 3 L NC, incontinent of bladder, large urine incontinence x 2 this evening, refused external catheter, continue to monitor.

## 2019-10-31 NOTE — PLAN OF CARE
VSS. No of pain of pain. Pt incontinent of urine, check and change. Pt sating well on 2L per NC. SOB when lying flat. Pt increasingly confused overnight, calm & cooperative with cares.

## 2019-11-01 ENCOUNTER — APPOINTMENT (OUTPATIENT)
Dept: CARDIOLOGY | Facility: CLINIC | Age: 84
DRG: 286 | End: 2019-11-01
Attending: SURGERY
Payer: MEDICARE

## 2019-11-01 ENCOUNTER — APPOINTMENT (OUTPATIENT)
Dept: GENERAL RADIOLOGY | Facility: CLINIC | Age: 84
DRG: 286 | End: 2019-11-01
Attending: INTERNAL MEDICINE
Payer: MEDICARE

## 2019-11-01 ENCOUNTER — APPOINTMENT (OUTPATIENT)
Dept: OCCUPATIONAL THERAPY | Facility: CLINIC | Age: 84
DRG: 286 | End: 2019-11-01
Payer: MEDICARE

## 2019-11-01 LAB
ANION GAP SERPL CALCULATED.3IONS-SCNC: 3 MMOL/L (ref 3–14)
BUN SERPL-MCNC: 33 MG/DL (ref 7–30)
CALCIUM SERPL-MCNC: 7.9 MG/DL (ref 8.5–10.1)
CHLORIDE SERPL-SCNC: 100 MMOL/L (ref 94–109)
CO2 SERPL-SCNC: 31 MMOL/L (ref 20–32)
COPATH REPORT: NORMAL
CREAT SERPL-MCNC: 1.2 MG/DL (ref 0.66–1.25)
GFR SERPL CREATININE-BSD FRML MDRD: 53 ML/MIN/{1.73_M2}
GLUCOSE SERPL-MCNC: 111 MG/DL (ref 70–99)
LACTATE BLD-SCNC: 2.6 MMOL/L (ref 0.7–2)
POTASSIUM SERPL-SCNC: 3.7 MMOL/L (ref 3.4–5.3)
SODIUM SERPL-SCNC: 134 MMOL/L (ref 133–144)

## 2019-11-01 PROCEDURE — 71045 X-RAY EXAM CHEST 1 VIEW: CPT

## 2019-11-01 PROCEDURE — 40000986 XR CHEST PORT 1 VW

## 2019-11-01 PROCEDURE — 71275 CT ANGIOGRAPHY CHEST: CPT | Mod: 26 | Performed by: INTERNAL MEDICINE

## 2019-11-01 PROCEDURE — 74174 CTA ABD&PLVS W/CONTRAST: CPT

## 2019-11-01 PROCEDURE — 25000128 H RX IP 250 OP 636: Performed by: SURGERY

## 2019-11-01 PROCEDURE — 21000000 ZZH R&B IMCU HEART CARE

## 2019-11-01 PROCEDURE — 40000257 ZZH STATISTIC CONSULT NO CHARGE VASC ACCESS

## 2019-11-01 PROCEDURE — 36569 INSJ PICC 5 YR+ W/O IMAGING: CPT

## 2019-11-01 PROCEDURE — 97535 SELF CARE MNGMENT TRAINING: CPT | Mod: GO | Performed by: OCCUPATIONAL THERAPIST

## 2019-11-01 PROCEDURE — 36415 COLL VENOUS BLD VENIPUNCTURE: CPT | Performed by: INTERNAL MEDICINE

## 2019-11-01 PROCEDURE — 25000132 ZZH RX MED GY IP 250 OP 250 PS 637: Mod: GY | Performed by: INTERNAL MEDICINE

## 2019-11-01 PROCEDURE — 86900 BLOOD TYPING SEROLOGIC ABO: CPT | Performed by: SURGERY

## 2019-11-01 PROCEDURE — 74174 CTA ABD&PLVS W/CONTRAST: CPT | Mod: 26 | Performed by: INTERNAL MEDICINE

## 2019-11-01 PROCEDURE — 36415 COLL VENOUS BLD VENIPUNCTURE: CPT | Performed by: SURGERY

## 2019-11-01 PROCEDURE — 40000141 ZZH STATISTIC PERIPHERAL IV START W/O US GUIDANCE

## 2019-11-01 PROCEDURE — 25000128 H RX IP 250 OP 636: Performed by: PHYSICIAN ASSISTANT

## 2019-11-01 PROCEDURE — 99232 SBSQ HOSP IP/OBS MODERATE 35: CPT | Performed by: INTERNAL MEDICINE

## 2019-11-01 PROCEDURE — 25800030 ZZH RX IP 258 OP 636: Performed by: SURGERY

## 2019-11-01 PROCEDURE — 86850 RBC ANTIBODY SCREEN: CPT | Performed by: SURGERY

## 2019-11-01 PROCEDURE — 36415 COLL VENOUS BLD VENIPUNCTURE: CPT | Performed by: PHYSICIAN ASSISTANT

## 2019-11-01 PROCEDURE — 25800030 ZZH RX IP 258 OP 636: Performed by: INTERNAL MEDICINE

## 2019-11-01 PROCEDURE — 99221 1ST HOSP IP/OBS SF/LOW 40: CPT | Performed by: INTERNAL MEDICINE

## 2019-11-01 PROCEDURE — 25000125 ZZHC RX 250: Performed by: INTERNAL MEDICINE

## 2019-11-01 PROCEDURE — 80048 BASIC METABOLIC PNL TOTAL CA: CPT | Performed by: PHYSICIAN ASSISTANT

## 2019-11-01 PROCEDURE — 99232 SBSQ HOSP IP/OBS MODERATE 35: CPT | Performed by: NURSE PRACTITIONER

## 2019-11-01 PROCEDURE — 82805 BLOOD GASES W/O2 SATURATION: CPT | Performed by: INTERNAL MEDICINE

## 2019-11-01 PROCEDURE — 25000128 H RX IP 250 OP 636: Performed by: INTERNAL MEDICINE

## 2019-11-01 PROCEDURE — 27210222 ZZH KIT SHRLOCK 6FR POWER PICC

## 2019-11-01 PROCEDURE — 25000125 ZZHC RX 250: Performed by: PHYSICIAN ASSISTANT

## 2019-11-01 PROCEDURE — 83605 ASSAY OF LACTIC ACID: CPT | Performed by: INTERNAL MEDICINE

## 2019-11-01 PROCEDURE — 99291 CRITICAL CARE FIRST HOUR: CPT | Mod: 25 | Performed by: INTERNAL MEDICINE

## 2019-11-01 PROCEDURE — 86901 BLOOD TYPING SEROLOGIC RH(D): CPT | Performed by: SURGERY

## 2019-11-01 RX ORDER — FUROSEMIDE 10 MG/ML
20 INJECTION INTRAMUSCULAR; INTRAVENOUS DAILY
Status: DISCONTINUED | OUTPATIENT
Start: 2019-11-02 | End: 2019-11-01

## 2019-11-01 RX ORDER — METOPROLOL TARTRATE 1 MG/ML
2.5 INJECTION, SOLUTION INTRAVENOUS EVERY 4 HOURS PRN
Status: DISCONTINUED | OUTPATIENT
Start: 2019-11-01 | End: 2019-11-12

## 2019-11-01 RX ORDER — IOPAMIDOL 755 MG/ML
500 INJECTION, SOLUTION INTRAVASCULAR ONCE
Status: COMPLETED | OUTPATIENT
Start: 2019-11-01 | End: 2019-11-01

## 2019-11-01 RX ORDER — DOBUTAMINE HYDROCHLORIDE 200 MG/100ML
2.5-2 INJECTION INTRAVENOUS CONTINUOUS
Status: DISCONTINUED | OUTPATIENT
Start: 2019-11-01 | End: 2019-11-02

## 2019-11-01 RX ORDER — FUROSEMIDE 10 MG/ML
20 INJECTION INTRAMUSCULAR; INTRAVENOUS
Status: DISCONTINUED | OUTPATIENT
Start: 2019-11-01 | End: 2019-11-01

## 2019-11-01 RX ORDER — NITROGLYCERIN 0.4 MG/1
0.4 TABLET SUBLINGUAL EVERY 5 MIN PRN
Status: DISCONTINUED | OUTPATIENT
Start: 2019-11-01 | End: 2019-11-17 | Stop reason: HOSPADM

## 2019-11-01 RX ORDER — LIDOCAINE 40 MG/G
CREAM TOPICAL
Status: DISCONTINUED | OUTPATIENT
Start: 2019-11-01 | End: 2019-11-01

## 2019-11-01 RX ADMIN — IOPAMIDOL 100 ML: 755 INJECTION, SOLUTION INTRAVENOUS at 12:43

## 2019-11-01 RX ADMIN — SODIUM CHLORIDE 100 ML: 9 INJECTION, SOLUTION INTRAVENOUS at 12:43

## 2019-11-01 RX ADMIN — LIDOCAINE HYDROCHLORIDE 0.5 ML: 10 INJECTION, SOLUTION INFILTRATION; PERINEURAL at 18:45

## 2019-11-01 RX ADMIN — SODIUM CHLORIDE 1000 MG: 9 INJECTION, SOLUTION INTRAVENOUS at 17:23

## 2019-11-01 RX ADMIN — Medication 1 MG: at 22:54

## 2019-11-01 RX ADMIN — BUMETANIDE 0.25 MG/HR: 0.25 INJECTION INTRAMUSCULAR; INTRAVENOUS at 16:15

## 2019-11-01 RX ADMIN — FUROSEMIDE 20 MG: 10 INJECTION, SOLUTION INTRAVENOUS at 09:17

## 2019-11-01 RX ADMIN — DOBUTAMINE IN DEXTROSE 2.5 MCG/KG/MIN: 200 INJECTION, SOLUTION INTRAVENOUS at 16:15

## 2019-11-01 ASSESSMENT — ACTIVITIES OF DAILY LIVING (ADL)
RETIRED_EATING: 0-->INDEPENDENT
FALL_HISTORY_WITHIN_LAST_SIX_MONTHS: NO
BATHING: 2-->ASSISTIVE PERSON
ADLS_ACUITY_SCORE: 20
ADLS_ACUITY_SCORE: 20
TOILETING: 3-->ASSISTIVE EQUIPMENT AND PERSON
AMBULATION: 3-->ASSISTIVE EQUIPMENT AND PERSON
COGNITION: 0 - NO COGNITION ISSUES REPORTED
TRANSFERRING: 3-->ASSISTIVE EQUIPMENT AND PERSON
WHICH_OF_THE_ABOVE_FUNCTIONAL_RISKS_HAD_A_RECENT_ONSET_OR_CHANGE?: AMBULATION
SWALLOWING: 0-->SWALLOWS FOODS/LIQUIDS WITHOUT DIFFICULTY
RETIRED_COMMUNICATION: 0-->UNDERSTANDS/COMMUNICATES WITHOUT DIFFICULTY
ADLS_ACUITY_SCORE: 21
ADLS_ACUITY_SCORE: 20
DRESS: 2-->ASSISTIVE PERSON

## 2019-11-01 ASSESSMENT — MIFFLIN-ST. JEOR: SCORE: 1381.59

## 2019-11-01 NOTE — PROGRESS NOTES
SW:  D: SW received call from patient's wife regarding patient's discharge this morning. Patient's RN confirmed patient is not being discharged. SW updated patient's wife.   P: SW will continue to follow.       INDERJIT Martin

## 2019-11-01 NOTE — PROVIDER NOTIFICATION
Paged Kimberly Quintanilla: questions regarding pts pneumo.  pt does NOT have pigtail drain orders

## 2019-11-01 NOTE — PROGRESS NOTES
Thoracic Surgery:    Reviewed chart- d/w Dr. Harris--patient currently down in IR for radiology studies and placement of right pigtail catheter to treat iatrogenic R PTX. Cardiac surgery planning to take patient to surgery for tricuspid repair on 11/05. Agree with plan.  Nothing further to add. Will sign off.    Donna Sal PA-C with Dr. Sukhdeep [facility]  Cell [phone]

## 2019-11-01 NOTE — PLAN OF CARE
Discharge Planner OT   Patient plan for discharge: Not stated  Current status: Pt required Min A for supine to EOB. Vitals appropriate when upright, O2 97% on 2 LPM. Educated on adaptive equipment for LE dressing. Donned socks with Min A and use of sock aid. Stood with Mod A of 2; brief changed with total assist while standing with a walker. Took steps to chair with mod-max A, tries to sit too soon and needs cues and assist to get all the way to chair before sitting. Mod A to stand again to be weighed. Up in chair at end of session with call light, chair alarm.  Barriers to return to prior living situation: level of assist for ADL and mobility  Recommendations for discharge: TCU  Rationale for recommendations: Pt requiring A of 1-2 for basic transfers; not appropriate for discharge home at this time. Per cards, plan is for more testing and an angio on Monday. Rehab to continue as patient tolerates.       Entered by: Evelyn Mansfield 11/01/2019 10:53 AM

## 2019-11-01 NOTE — PLAN OF CARE
PT: Attempted x2 this afternoon, on first attempt pt eating lunch, on second attempt pt had just switched rooms and was requesting to rest.

## 2019-11-01 NOTE — PROGRESS NOTES
[facility]    Hospitalist Progress Note    Brief Summary:  This is an 88-year-old male with a history of hypertension, atrial fibrillation, nonischemic cardiomyopathy, congestive heart failure, idiopathic thrombocytopenic purpura and hiatal hernia, who comes to the ER with a complaint of worsening shortness of breath.     Assessment & Plan       1.  Acute on chronic diastolic congestive heart failure with worsening bilateral pleural effusion:  This is an 88-year-old male with a history of nonischemic cardiomyopathy.  His EF, although improved to 55%-60% on his last echo that was done in 01/2019, but has the right ventricle severely dilated and moderate to severe tricuspid regurgitation and mild to moderate mitral regurgitation.  He now presents with bilateral pleural effusion, which I think large on both sides, but worse on the right. He is S/P bilateral Thoracentesis under U/S guidance with about 2.2 liter out from right and 800 ml out from left. Now breathing is back to his baseline level and not SOB at this time. He is on IV lasix 40 mg bid will continue with that, Echo result pending but as per cardiology has mild pericardial effusion.  Awaiting Cardiology evaluation.   Pleural fluid is transudate and not infected.     2.  Hypertension has been in good control on current medication.  Continue metoprolol at this time.     3.  History of nonischemic cardiomyopathy:  He is on metoprolol, Entresto and aspirin.  I will continue with that.       4.  Idiopathic thrombocytopenic purpura, followed by Hematology.  Platelets are stable at this time around 38,000.     5.  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  Continue aspirin and metoprolol for rate control.     6.  Deep venous thrombosis prophylaxis with SCDs.     7. Iatrogenic  apical Pneumothorax: increase in size today, will consult thoracic surgery to evaluate her. No  chest pain or SOB at this time.     8. Severe Tricuspid Regurgitation: CV surgery consulted, and they are working him up for repair. Possible repair next week.         The case was discussed with  the nursing staff taking care of the patient.         Decrease IV lasix to 20 mg daily  Thoracic surgery consult.         DVT Prophylaxis: Pneumatic Compression Devices  Code Status: DNR/DNI    Disposition: Expected discharge in 1 days if remain stable. .    Pato Benedict MD  Text Page  (7am - 6pm)    Interval History   No chest pain or SOB at this time, no fever, chills, nausea, vomiting, headache or dizziness at this time.     No other significant event overnight.     -Data reviewed today: I reviewed all new labs and imaging results over the last 24 hours. I personally reviewed the chest CT image(s) showing small apical pnumothorax, improve pleural effusion. atelectasis on right side. .    Physical Exam   Temp: 97.6  F (36.4  C) Temp src: Oral BP: 105/67  Heart Rate: 99 Resp: 18 SpO2: 97 % O2 Device: Nasal cannula Oxygen Delivery: 2 LPM  Vitals:    10/30/19 1044 10/31/19 0619 11/01/19 1041   Weight: 75.3 kg (166 lb) 66.8 kg (147 lb 6 oz) 67.4 kg (148 lb 8 oz)     Vital Signs with Ranges  Temp:  [97.6  F (36.4  C)-98.4  F (36.9  C)] 97.6  F (36.4  C)  Heart Rate:  [83-99] 99  Resp:  [18] 18  BP: ()/(44-67) 105/67  SpO2:  [92 %-97 %] 97 %  I/O last 3 completed shifts:  In: 810 [P.O.:810]  Out: -     Constitutional: awake, alert, cooperative, no apparent distress, and appears stated age  Eyes: Lids and lashes normal, pupils equal, round and reactive to light, extra ocular muscles intact, sclera clear, conjunctiva normal  Respiratory: improve air entry bilaterally, coarse sounds with some basal crackles, no wheezing  Cardiovascular: Normal apical impulse, regular rate and rhythm, normal S1 and S2, no S3 or S4, and no murmur noted  GI: No scars, normal bowel sounds, soft, non-distended, non-tender, no masses palpated,  no hepatosplenomegally  Skin: no bruising or bleeding  Musculoskeletal: no lower extremity pitting edema present, wound on right shin healing well.  Neurologic: no focal deficit.     Medications       [START ON 11/2/2019] furosemide  20 mg Intravenous Daily     metoprolol succinate ER  100 mg Oral QAM     sodium chloride (PF)  3 mL Intracatheter Q8H       Data   Recent Labs   Lab 11/01/19  0638 10/31/19  0524 10/30/19  1134   WBC  --  10.1 7.8   HGB  --  9.7* 10.3*   MCV  --  98 96   PLT  --  29* 38*   INR  --   --  1.39*    135 138   POTASSIUM 3.7 3.9 4.2   CHLORIDE 100 103 107   CO2 31 28 27   BUN 33* 22 19   CR 1.20 1.13 1.02   ANIONGAP 3 4 4   BARON 7.9* 7.9* 8.3*   * 109* 88   ALBUMIN  --  3.2*  --    PROTTOTAL  --  5.8*  --    BILITOTAL  --  1.6*  --    ALKPHOS  --  36*  --    ALT  --  18  --    AST  --  11  --    TROPI  --   --  <0.015       Recent Results (from the past 24 hour(s))   XR Chest Port 1 View    Narrative    CHEST PORTABLE ONE VIEW   10/31/2019 12:44 PM     HISTORY: Followup iatrogenic pneumothorax.    COMPARISON: Chest x-ray 10/31/2019 at 0615 hours.      Impression    IMPRESSION: Small right apical medial pneumothorax is likely stable,  partially obscured by the overlying mandible and soft tissues. Dense  opacification at the right lower lung appears stable and is  indeterminant. Stable left lung as well. Stable cardiac silhouette  appears enlarged.    GAETANO WEBER MD   XR Chest Port 1 View    Narrative    XR CHEST PORT 1 VW 11/1/2019 8:30 AM    HISTORY: f/u Pneumothorax, CHF and pleural effusion    COMPARISON: 10/31/2019    FINDINGS: Right pneumothorax has increased in volume in comparison  with yesterday. Moderate right effusion appears similar.  Consolidation/opacity in the lower right lung appears similar as well.  Left lung is clear.      Impression    IMPRESSION: Increased volume of right pneumothorax.    SHELBY YOUNGBLOOD MD

## 2019-11-01 NOTE — PROGRESS NOTES
Chest tube request from CV surgery today. Reviewed with IR Dr Janice Coleman. Pneumothorax is too small to place a CT into per CTA. The patient could have a hydropneumothorax from chronic fluid in right base which is more evident post thoracentesis.     Discussed with Fabienne MOCTEZUMA today and will cancel the order.     Thanks [facility] Interventional Radiology CNP ([phone]) (phone [phone])

## 2019-11-01 NOTE — PLAN OF CARE
Patient Name: Jama Paul   Room#: 0260/0260-01                 Admit Date: 10/30/2019              Primary Diagnosis:   1. Congestive heart failure, unspecified HF chronicity, unspecified heart failure type (H)    2. Pleural effusion                   Inpatient Status: Cardiac              Procedures: Bilateral thoracentesis 3L removed 10/30              Drips: N/A              Drains & Devices: None              Rhythm: Afib              Activity Level: up with Ax2 gait belt and walker              Oxygen needs:  2L NC              Anticipated D/C Date: 2-3  days  Aggression Color: Green                 Acuity Ratin              Concerns:  Up A2 GB&W. A/ox4, forgetful. VSS 2L HS. LS coarse RLL. RODGERS. Denies CP, dizziness, nausea or HA. Tele: Afib CVR. Blanchable redness along spine, covered w/mepilex x2, CDI. RLE wound covered w/sleeve, CDI. Tolerating cardiac diet. Plan for CV surg consult today.

## 2019-11-01 NOTE — PROGRESS NOTES
[facility]  Cardiology Progress Note  Date of Service: 11/01/2019  Primary Cardiologist: Dr. Pugh    Assessment & Plan   Jama Paul is a 88 year old male with PMH significant for nonischemic cardiomyopathy felt to be likely tachycardia induced with an EF as low as 30 to 35% to 2017 that recovered to normal with medical management, RV dilatation and severe TR, permanent A. fib on rate control previously on Coumadin but recent ocular hemorrhage and perineal bleed and now off admitted on 10/30/2019 with progressive sob and found to have bilateral pleural effusions    Assessment:  1.  Acute on chronic heart failure with preserved EF, but with severe tricuspid regurgitation, and bilateral pleural effusions.   -status post bilateral thoracenteses with right-sided pneumothorax that is increasing.  Unfortunately he continues to have right effusion is fairly significant and potentially could represent trapped lung.   - appreciate CV surgery consult, willing to do surgery, pt realizes this is higher risk   - admit wt 166, wt 1-/31 147- no current wt, I/o not available due to incontence     2.  Permanent atrial fibrillation with rate control approach no longer on anticoagulation given history of spontaneous ocular hemorrhage and perineal bleeding lesion in incontinent.    3.  ITP, followed closely with heme, making surgery more risky.  Hematology will need to be involved preoperatively.      Plan:   1. Hold entresto and am toprol for now given hypotension, will switch to 100 mg toprol at hs  2. Continue IV lasix for now  3. Likely R& L heart cath Monday am, cannot lie flat today, has pneumo.  Discussed with CV surgery, KATIE will be intraop  4. Will need hem involved preop  5. Will continue to follow with you.      Lawanda Carrera PA-C  [facility]  Pager: [phone]     Interval History   States he's more optimistic today, would die on the table if need be.  Hopeful valve will.  Still sob.  Can't lie flat,  weak overall.  But states he feels ok.      Physical Exam   Temp: 97.6  F (36.4  C) Temp src: Oral BP: 91/44  Heart Rate: 90 Resp: 18 SpO2: 95 % O2 Device: None (Room air) Oxygen Delivery: 2 LPM  Vitals:    10/30/19 1044 10/31/19 0619   Weight: 75.3 kg (166 lb) 66.8 kg (147 lb 6 oz)     Elderly frail-appearing gentleman in no acute distress.  Oxygen in place per nasal cannula.  He can speak in complete sentences without dyspnea.  His heart is irregularly irregular with a 2 out of 6 systolic murmur heard best at lower left sternal border.  His lungs are markedly diminished- RLL is absent to middle lobe, L lung diminished.  No rales or rhonchi today.  He has no peripheral edema.  Possible areas of skin tears on his leg being treated by nursing.    Medications       furosemide  20 mg Intravenous BID     metoprolol succinate ER  100 mg Oral QAM     metoprolol succinate ER  50 mg Oral QPM     sacubitril-valsartan  1 half-tab Oral BID     sodium chloride (PF)  3 mL Intracatheter Q8H       Data   Most Recent 3 CBC's:  Recent Labs   Lab Test 10/31/19  0524 10/30/19  1134 07/10/19  1400   WBC 10.1 7.8 6.0   HGB 9.7* 10.3* 10.5*   MCV 98 96 93   PLT 29* 38* 32*     Most Recent 3 BMP's:  Recent Labs   Lab Test 11/01/19  0638 10/31/19  0524 10/30/19  1134    135 138   POTASSIUM 3.7 3.9 4.2   CHLORIDE 100 103 107   CO2 31 28 27   BUN 33* 22 19   CR 1.20 1.13 1.02   ANIONGAP 3 4 4   BARON 7.9* 7.9* 8.3*   * 109* 88     Most Recent 3 Troponin's:  Recent Labs   Lab Test 10/30/19  1134 02/28/18  1024 01/19/18  1150   TROPI <0.015 <0.015 0.029     Last 24 hours labs reviewed   cho:  The visual ejection fraction is estimated at 55-60%.  Left ventricular systolic function is normal.  The right ventricle is severely dilated.  There is mild to moderate (1-2+) mitral regurgitation.  There is mod-severe to severe (3-4+) tricuspid regurgitation.  The ascending aorta is Mildly dilated.  The degree of mitral and trcuspid  regurgitation has worsened since 2018. There is no pleural effusion now.     ECG: afib, twi in III, avF     MRI with stress: 5/17  1. The LV is normal in cavity size and wall thickness. The global systolic function is moderately reduced.  The LVEF is 42%. There is moderate global hypokinesis of the left ventricle.  2. The RV is mildly dilated. The global systolic function is mildly reduced. The RVEF is 44%.   3. There is severe biatrial enlargement.  4. There is moderate to severe tricuspid regurgitation.   5. Regadenoson stress perfusion imaging is negative for ischemia.   6. Late gadolinium enhancement imaging shows no MI, fibrosis or infiltrative disease.   7. Bilateral pleural effusions are noted.

## 2019-11-01 NOTE — PLAN OF CARE
A&Ox4 w/ VSS ex soft BP's (baseline for patient). Denies pain. Slept for most of evening. Continue plan of care.

## 2019-11-01 NOTE — PROGRESS NOTES
Patient seen.  Full consult to follow.    88-year-old gentleman with chronic thrombocytopenia.  Most likely he has ITP.  MDS cannot be ruled out based on the last bone marrow biopsy.  Patient has severe tricuspid regurgitation and will be having procedure done next week.    Plan:  -Start IV Solu-Medrol 1 g every 24 hours for 3 days.  His platelets should improve. If platelet does not improve, it will go against ITP and we will think more in terms of MDS and try platelet transfusion.  He may also need bone marrow biopsy if platelet does not improve.  -Monitor CBC.  Oncology will continue to follow him.

## 2019-11-01 NOTE — PROGRESS NOTES
Patient seen  Discussed right pneumothorax with patient  Discussed that the definitive treatment for that was to have a chest tube placed to evacuate the air and to leave it in place until the air leak has resolved.   Patient verbalized understanding  Orders placed     Fabienne Garcia PA-C

## 2019-11-01 NOTE — CONSULTS
Cardiovascular Surgery Consultation     Asked to see this patient in consultation for possible surgical treatment. Primary care physician is Dr. Shields and cardiologist is Dr. Restrepo.         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 On 1L NC, stable  - recommend right and left heart cath  - CT aortic TAVR study to evaluate peripheral access  - will plan for minimally invasive tricuspid repair on Tues 11/5 with Dr. Harris  - discussed plan of care with pt and his nephew at bedside.  They asked appropriate questions and they were both satisfied with explanations and surgical plan.    Thank you for including us in the care of this kind patient. Do not hesitate to contact us with any questions.    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  He was evaluated with chest xray which showed bilateral pleural effusions and underwent bilateral thoracentesis with improvement of his dyspnea.  He denied any chest pain.  He has good functional status and was doing well prior to onset of symptoms.  He does have a history ITP and 10 year history of afib.              Past Medical History:     Past Medical History:   Diagnosis Date     Congestive heart failure (H)      Elevated PSA      Eye hemorrhage, left 02/2019     Non-ischemic cardiomyopathy (H)     2017, med treatment, echo done 4/18 nl ef, mod to severe tr     Permanent atrial fibrillation 2011     Thrombocytopenia (H)      Unspecified essential hypertension               Past Surgical History:     Past Surgical History:   Procedure Laterality Date     ABDOMEN SURGERY      bowel obstruction     BONE MARROW BIOPSY, BONE SPECIMEN, NEEDLE/TROCAR N/A 3/4/2019    Procedure: BIOPSY BONE MARROW;  Surgeon: Josey Vargas MD;  Location:  GI     CARPAL TUNNEL RELEASE RT/LT  2014     EXPLORE GROIN  4/30/2014    Procedure: EXPLORE GROIN;  Surgeon: Sam Aguirre MD;  Location:  OR     HERNIORRHAPHY INGUINAL  4/30/2014    Procedure: HERNIORRHAPHY INGUINAL;  Surgeon: Sam Aguirre MD;  Location:  OR     MOHS MICROGRAPHIC PROCEDURE       PHACOEMULSIFICATION CLEAR CORNEA WITH STANDARD INTRAOCULAR LENS IMPLANT Right 9/8/2015    Procedure: PHACOEMULSIFICATION CLEAR CORNEA WITH STANDARD INTRAOCULAR LENS IMPLANT;  Surgeon: Gil Shannon MD;  Location:  EC     septic olecranon bursitis[                 Social History:     Social History     Tobacco Use     Smoking status: Never Smoker     Smokeless tobacco: Never Used   Substance Use Topics     Alcohol use: No             Family History:     Family History   Problem Relation Age of Onset     Heart Disease No family hx of              Immunizations:     Immunization History   Administered Date(s) Administered     FLU 6-35 months 10/05/2009     Flu, Unspecified 11/01/2012, 10/15/2014     HepA-Adult 10/08/2002     HepB-Adult 10/08/2002, 11/05/2002     Influenza (High Dose) 3 valent vaccine 11/04/2014, 10/29/2015, 09/16/2016, 10/07/2018     Influenza (IIV3) PF 10/17/2002, 11/01/2012, 11/27/2012, 01/10/2014, 10/15/2014     Pneumo Conj 13-V (2010&after) 10/29/2015     Pneumococcal 23 valent 06/30/2014     TDAP Vaccine (Adacel) 09/02/2019     Td (Adult), Adsorbed 11/05/2002     Typhoid IM 10/17/2002     Zoster vaccine, live 11/01/2012, 11/27/2012             Allergies:   No Known Allergies          Medications:     No current outpatient medications on file.             Review of Systems:   Stated in HPI            Physical Exam:     B/P: 91/44, P: 49,    Wt Readings from Last 2 Encounters:   10/31/19 66.8 kg (147 lb 6 oz)   09/10/19 72.6 kg (160 lb)     General: NAD  CV: irregularly irregular rhythm  Pulm: breathing comfortably on 1L NC  GI: soft, non disteded  MS: moves all extremities  Neuro: no gross neurologic deficits noted         Data:        Lab Results   Component Value Date     11/01/2019    Lab Results   Component Value Date    CHLORIDE 100 11/01/2019    Lab Results   Component Value Date    BUN 33 11/01/2019      Lab Results   Component Value Date    POTASSIUM 3.7 11/01/2019    Lab Results   Component Value Date    CO2 31 11/01/2019    Lab Results   Component Value Date    CR 1.20 11/01/2019        Lab Results   Component Value Date    WBC 10.1 10/31/2019    HGB 9.7 (L) 10/31/2019    HCT 31.8 (L) 10/31/2019    MCV 98 10/31/2019    PLT 29 (LL) 10/31/2019     Lab Results   Component Value Date    INR 1.39 (H) 10/30/2019

## 2019-11-01 NOTE — PROGRESS NOTES
SPIRITUAL HEALTH SERVICES Progress Note  [facility] [id number]    Visited pt per referral from colleague. Pt and the wife were in the room.    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 Pt and the wife mentioned that they go to [employer] [place] and they have many people from [employer] praying for them.  asked pt if he still remember one of his pt who is now our staff . Pt couldn't remember but would like  to say hi to his old pt.     Pt asked  to pray for his healing and the wife's brother who just had a surgery.  prayed with pt and wife. [facility] will continue be available when there is a need.       Bert Lockett  Chaplain Resident

## 2019-11-02 ENCOUNTER — APPOINTMENT (OUTPATIENT)
Dept: GENERAL RADIOLOGY | Facility: CLINIC | Age: 84
DRG: 286 | End: 2019-11-02
Attending: PHYSICIAN ASSISTANT
Payer: MEDICARE

## 2019-11-02 LAB
ANION GAP SERPL CALCULATED.3IONS-SCNC: 8 MMOL/L (ref 3–14)
BASE EXCESS BLDV CALC-SCNC: 2.4 MMOL/L
BASOPHILS # BLD AUTO: 0 10E9/L (ref 0–0.2)
BASOPHILS NFR BLD AUTO: 0 %
BUN SERPL-MCNC: 43 MG/DL (ref 7–30)
CALCIUM SERPL-MCNC: 7.8 MG/DL (ref 8.5–10.1)
CHLORIDE SERPL-SCNC: 98 MMOL/L (ref 94–109)
CO2 SERPL-SCNC: 27 MMOL/L (ref 20–32)
CREAT SERPL-MCNC: 1.39 MG/DL (ref 0.66–1.25)
DIFFERENTIAL METHOD BLD: ABNORMAL
EOSINOPHIL # BLD AUTO: 0 10E9/L (ref 0–0.7)
EOSINOPHIL NFR BLD AUTO: 0 %
ERYTHROCYTE [DISTWIDTH] IN BLOOD BY AUTOMATED COUNT: 22.2 % (ref 10–15)
GFR SERPL CREATININE-BSD FRML MDRD: 45 ML/MIN/{1.73_M2}
GLUCOSE BLDC GLUCOMTR-MCNC: 204 MG/DL (ref 70–99)
GLUCOSE BLDC GLUCOMTR-MCNC: 229 MG/DL (ref 70–99)
GLUCOSE BLDC GLUCOMTR-MCNC: 266 MG/DL (ref 70–99)
GLUCOSE BLDC GLUCOMTR-MCNC: 357 MG/DL (ref 70–99)
GLUCOSE SERPL-MCNC: 216 MG/DL (ref 70–99)
HCO3 BLDV-SCNC: 27 MMOL/L (ref 21–28)
HCT VFR BLD AUTO: 29.1 % (ref 40–53)
HGB BLD-MCNC: 9.5 G/DL (ref 13.3–17.7)
IMM GRANULOCYTES # BLD: 0 10E9/L (ref 0–0.4)
IMM GRANULOCYTES NFR BLD: 0.3 %
LYMPHOCYTES # BLD AUTO: 0.4 10E9/L (ref 0.8–5.3)
LYMPHOCYTES NFR BLD AUTO: 2.3 %
MCH RBC QN AUTO: 30.5 PG (ref 26.5–33)
MCHC RBC AUTO-ENTMCNC: 32.6 G/DL (ref 31.5–36.5)
MCV RBC AUTO: 94 FL (ref 78–100)
MONOCYTES # BLD AUTO: 0.3 10E9/L (ref 0–1.3)
MONOCYTES NFR BLD AUTO: 1.6 %
NEUTROPHILS # BLD AUTO: 15.2 10E9/L (ref 1.6–8.3)
NEUTROPHILS NFR BLD AUTO: 95.8 %
NRBC # BLD AUTO: 0 10*3/UL
NRBC BLD AUTO-RTO: 0 /100
OXYHGB MFR BLDV: 69 %
PCO2 BLDV: 39 MM HG (ref 40–50)
PH BLDV: 7.44 PH (ref 7.32–7.43)
PLATELET # BLD AUTO: 40 10E9/L (ref 150–450)
PO2 BLDV: 40 MM HG (ref 25–47)
POTASSIUM SERPL-SCNC: 3.8 MMOL/L (ref 3.4–5.3)
RBC # BLD AUTO: 3.11 10E12/L (ref 4.4–5.9)
SODIUM SERPL-SCNC: 133 MMOL/L (ref 133–144)
WBC # BLD AUTO: 15.8 10E9/L (ref 4–11)

## 2019-11-02 PROCEDURE — 25800030 ZZH RX IP 258 OP 636: Performed by: NURSE PRACTITIONER

## 2019-11-02 PROCEDURE — 99233 SBSQ HOSP IP/OBS HIGH 50: CPT | Performed by: HOSPITALIST

## 2019-11-02 PROCEDURE — 93010 ELECTROCARDIOGRAM REPORT: CPT | Performed by: INTERNAL MEDICINE

## 2019-11-02 PROCEDURE — 99231 SBSQ HOSP IP/OBS SF/LOW 25: CPT | Performed by: INTERNAL MEDICINE

## 2019-11-02 PROCEDURE — 25000131 ZZH RX MED GY IP 250 OP 636 PS 637: Mod: GY | Performed by: HOSPITALIST

## 2019-11-02 PROCEDURE — 25000125 ZZHC RX 250: Performed by: SURGERY

## 2019-11-02 PROCEDURE — 00000146 ZZHCL STATISTIC GLUCOSE BY METER IP

## 2019-11-02 PROCEDURE — 25000128 H RX IP 250 OP 636: Performed by: PHYSICIAN ASSISTANT

## 2019-11-02 PROCEDURE — 99291 CRITICAL CARE FIRST HOUR: CPT | Performed by: INTERNAL MEDICINE

## 2019-11-02 PROCEDURE — 71045 X-RAY EXAM CHEST 1 VIEW: CPT

## 2019-11-02 PROCEDURE — 87040 BLOOD CULTURE FOR BACTERIA: CPT | Performed by: INTERNAL MEDICINE

## 2019-11-02 PROCEDURE — 20000003 ZZH R&B ICU

## 2019-11-02 PROCEDURE — 25000125 ZZHC RX 250: Performed by: NURSE PRACTITIONER

## 2019-11-02 PROCEDURE — 93005 ELECTROCARDIOGRAM TRACING: CPT

## 2019-11-02 PROCEDURE — 80048 BASIC METABOLIC PNL TOTAL CA: CPT | Performed by: NURSE PRACTITIONER

## 2019-11-02 PROCEDURE — 25000132 ZZH RX MED GY IP 250 OP 250 PS 637: Mod: GY | Performed by: NURSE PRACTITIONER

## 2019-11-02 PROCEDURE — 40000239 ZZH STATISTIC VAT ROUNDS

## 2019-11-02 PROCEDURE — 99291 CRITICAL CARE FIRST HOUR: CPT | Performed by: SURGERY

## 2019-11-02 PROCEDURE — 99233 SBSQ HOSP IP/OBS HIGH 50: CPT | Performed by: INTERNAL MEDICINE

## 2019-11-02 PROCEDURE — 85025 COMPLETE CBC W/AUTO DIFF WBC: CPT | Performed by: HOSPITALIST

## 2019-11-02 PROCEDURE — 25000128 H RX IP 250 OP 636: Performed by: NURSE PRACTITIONER

## 2019-11-02 RX ORDER — METOPROLOL TARTRATE 25 MG/1
25 TABLET, FILM COATED ORAL 2 TIMES DAILY
Status: DISCONTINUED | OUTPATIENT
Start: 2019-11-03 | End: 2019-11-03

## 2019-11-02 RX ORDER — BISACODYL 10 MG
10 SUPPOSITORY, RECTAL RECTAL DAILY PRN
Status: DISCONTINUED | OUTPATIENT
Start: 2019-11-02 | End: 2019-11-17 | Stop reason: HOSPADM

## 2019-11-02 RX ORDER — NALOXONE HYDROCHLORIDE 0.4 MG/ML
.1-.4 INJECTION, SOLUTION INTRAMUSCULAR; INTRAVENOUS; SUBCUTANEOUS
Status: DISCONTINUED | OUTPATIENT
Start: 2019-11-02 | End: 2019-11-15

## 2019-11-02 RX ORDER — NOREPINEPHRINE BITARTRATE 0.06 MG/ML
0.03-0.4 INJECTION, SOLUTION INTRAVENOUS CONTINUOUS
Status: DISCONTINUED | OUTPATIENT
Start: 2019-11-02 | End: 2019-11-04

## 2019-11-02 RX ORDER — DOBUTAMINE HYDROCHLORIDE 200 MG/100ML
2.5-2 INJECTION INTRAVENOUS CONTINUOUS
Status: DISCONTINUED | OUTPATIENT
Start: 2019-11-02 | End: 2019-11-02

## 2019-11-02 RX ORDER — METOPROLOL TARTRATE 1 MG/ML
5 INJECTION, SOLUTION INTRAVENOUS ONCE
Status: COMPLETED | OUTPATIENT
Start: 2019-11-02 | End: 2019-11-02

## 2019-11-02 RX ADMIN — METOPROLOL TARTRATE 5 MG: 5 INJECTION INTRAVENOUS at 05:25

## 2019-11-02 RX ADMIN — SODIUM CHLORIDE 1000 MG: 9 INJECTION, SOLUTION INTRAVENOUS at 17:06

## 2019-11-02 RX ADMIN — NOREPINEPHRINE BITARTRATE 0.03 MCG/KG/MIN: 1 INJECTION INTRAVENOUS at 05:01

## 2019-11-02 RX ADMIN — Medication 1 MG: at 13:51

## 2019-11-02 RX ADMIN — INSULIN ASPART 1 UNITS: 100 INJECTION, SOLUTION INTRAVENOUS; SUBCUTANEOUS at 17:06

## 2019-11-02 RX ADMIN — METOPROLOL SUCCINATE 100 MG: 100 TABLET, EXTENDED RELEASE ORAL at 09:03

## 2019-11-02 RX ADMIN — INSULIN ASPART 1.5 UNITS: 100 INJECTION, SOLUTION INTRAVENOUS; SUBCUTANEOUS at 22:12

## 2019-11-02 RX ADMIN — Medication 1 MG: at 21:52

## 2019-11-02 RX ADMIN — DOBUTAMINE IN DEXTROSE 15 MCG/KG/MIN: 200 INJECTION, SOLUTION INTRAVENOUS at 00:27

## 2019-11-02 ASSESSMENT — MIFFLIN-ST. JEOR: SCORE: 1399

## 2019-11-02 ASSESSMENT — ACTIVITIES OF DAILY LIVING (ADL)
ADLS_ACUITY_SCORE: 22

## 2019-11-02 NOTE — PROVIDER NOTIFICATION
MD Notification    Notified Person: MD    Notified Person Name: Dr. Vora    Notification Date/Time:11/1/19 at 1800    Notification Interaction: Spoke with physician    Purpose of Notification:  PICC line ordered to be placed in patient with platelet count of 38  Orders Received: OK to place PICC    Comments:

## 2019-11-02 NOTE — PROGRESS NOTES
Cardiology Miscellaneous note:    I was called about patient by the CCU nurse. I did not physically see the patient.     He is an 87 y/o gentlman with a history of NICM (LVEF 55-60%) with severe TR, RV dilation, moderate MR, severe biatrial enlargement, and atrial fibrillation. He was admitted given worsening dyspnea on exertion and orthopnea. He was found to have bilateral pleural effusions which were tapped. He also had pulmonary edema. Earlier today, he was started on a dobutamine drip (currently 20 mcg/kg/min) in addition to a bumex drip (currently 1 ml/hr). He is yet to have a right heart cath. Urine output has not been tracked as patient is reported to have refused catheterization in the setting of being incontinent.     I was called about tachycardia in the 120s in the setting of ongoing dobutamine use. There was also concern about his SBP being less than 120, although his MAPS are >65. His dobutamine has been progressively uptitrated since this afternoon from 2.5 mcg/kg/min to 20 mcg/kg/min. His home metorpolol succinate (100mg AM, 50 mg PM) has been held. I suspect his tachycardia is secondary to dobutamine increase and metoprolol being withheld. He doesn't seem to have had a RHC prior to initiation of ionotropes. He has a PICC in place but the location is yet to be verified. It is unclear to me why he is on dobutamine, I have asked for his PICC to be reassed and an SVO2 obtained. I have also asked that his dobutamine be decreased to 15 mcg/kg/min. It is unclear what benefit he is deriving from it at this time. If his SVO2 remains above 65-70%, then we can donwtitrate the dobutamine further in 2.5 decrements and see how his heart rate and BP respond.    If he decomensates, he should be moved to the ICU for closer monitoring. The hospitalist should assess him prior to this decidion being finalized.

## 2019-11-02 NOTE — PROGRESS NOTES
[facility] brief note:    Was asked by nursing to evaluate pt's PICC line as cardiology requesting venous blood gas from PICC to determine SVO2.  PICC placement difficult to determine per hospitalist and radiologist.  After curbside discussion with Dr. Blanco, intensivist, confirmed PICC line ok to use at this time.  Will place order ok to use RUE PICC.      Nursing has also updated cardiology as pt requiring maximum inotrope; however, not able to achieve goal of SBP > 120 and pt's HR increasing up to 120s-130s while on max dose of dobutamine.  Noted Dr. Simental's update regarding pt's clinical status and recommended plan of care.  Will have nursing obtain VBG from PICC at this time, titrate down on dobutamine with goal of decreasing pt's HR.  After discussion with intensivist, could trial PO vs IV metoprolol for HR control while decreasing dobutamine.      Upon arrival at pt's bedside to update with overall plan of care, after titration of dobutamine to 15, pt's SBP now 80s with MAPs mid-60s.  Pt reports no chest pain, SOB, N/V.  Does not appear ill at this time, awake, in bed, watching TV in no apparent distress.        Pt's SVO2 was 69.  Pt's last , MAP 82, .  Anticipate pt able to remain on CCU at this time.  Will update cardiology for any additional recommendations.      Addendum: 0030: After discussion with Dr. Simental, cardiology, updated regarding pt's SBP now 90s, HR 100s-110s with dobutamine at 15.  Will place Bumex infusion on hold at this time.  If pt's MAP remains < 60 consistently, pt will likely need transfer to ICU for addition of levophed.  If pt's HR remains consistently elevated > 120, may need to consider amiodarone, per cardiology.  Requested for nursing to update should pt's MAP remain < 60 for transfer orders to ICU.       Addendum: 7857: Was paged by nursing as pt's MAP has remained consistently < 60.  After previous discussion with cardiology, should pt's MAP remain < 65 pt  will likely require transfer to ICU for additional vasopressor support.  After discussion with Dr. Blanco, intensivist, will transfer pt to ICU for initiation of vasopressor support in addition to inotrope.  Greatly appreciate expertise and further recommendations and management by intensivist.  Will formally consult intensivist, order levophed to assist with maintaining MAP > 65, and transfer pt to ICU at this time.      ERIC Davis, CNP  [facility]      I spent 20 min at the pt's bedside, discussing with consulting services to determine pt's updated plan of care.

## 2019-11-02 NOTE — PROGRESS NOTES
Verbal report given to Saranya in ICU, pt transferred up on monitor with flying squad and all belongings.

## 2019-11-02 NOTE — PROGRESS NOTES
Pt noted to have right picc line placed on 11-1.  Repeat chest film on 11-2 at 0544 shows picc tip in the SVC per Dr. Edelmira Elam.  Total cath out is 2cm.

## 2019-11-02 NOTE — PROGRESS NOTES
[facility]    Hospitalist Progress Note    Brief Summary:  This is an 88-year-old male with a history of hypertension, atrial fibrillation, nonischemic cardiomyopathy, congestive heart failure, moderate to severe tricuspid regurgitation and mild to moderate mitral regurgitation, idiopathic thrombocytopenic purpura and hiatal hernia, who comes to the ER with a complaint of worsening shortness of breath. Found to have bilateral pleural effusions.     Assessment & Plan   Acute on chronic diastolic congestive heart failure with worsening bilateral pleural effusion, severely dilated RV.  Moderate to severe tricuspid regurgitation and mild to moderate mitral regurgitation ([facility] consulted).   Bilateral Pleural effusions S/P bilateral Thoracentesis under U/S guidance with about 2.2 liter out from right and 800 ml out from left.   Mild pericardial effusion.  Atrial fib  Cardiogenic shock requiring pressor (transferred to the unit last night.  - plan per cards/intensivist.      Right PTX (iatrogenic): improving per CXR. Stable sob.  - CTS on board.  - Monitor resp status.  - Supportive care.    Thrombocytopenia: presumed ITP. Seen by hemonc, started on high dose solmedrol. PLT improved to 40 {from 29).  - Cont steroids.  - Monitor PLT.      Hypertension: on pressors for shock now.         DVT Prophylaxis: Pneumatic Compression Devices  Code Status: Full Code    Disposition: per clinical course.    Aidan Messer MD    Interval History   No change in sob. Denies chest pain/ no abd pain/n/v. .    Physical Exam   Temp: 98.2  F (36.8  C) Temp src: Axillary BP: 121/71 Pulse: 99 Heart Rate: 104 Resp: 16 SpO2: 95 % O2 Device: Nasal cannula Oxygen Delivery: 2 LPM  Vitals:    10/31/19 0619 11/01/19 1041 11/02/19 0500   Weight: 66.8 kg (147 lb 6 oz) 67.4 kg (148 lb 8 oz) 69.1 kg (152 lb 5.4 oz)     Vital Signs with Ranges  Temp:  [98  F (36.7  C)-98.2  F (36.8  C)] 98.2  F (36.8  C)  Pulse:  [] 99  Heart Rate:   [] 104  Resp:  [15-31] 16  BP: ()/(40-75) 121/71  SpO2:  [88 %-98 %] 95 %  I/O last 3 completed shifts:  In: 411.52 [I.V.:411.52]  Out: 650 [Urine:650]    Constitutional: awake, alert, cooperative, no apparent distress, and appears stated age  Eyes: Lids and lashes normal, pupils equal, round and reactive to light, extra ocular muscles intact, sclera clear, conjunctiva normal  Respiratory: improve air entry bilaterally, coarse sounds with some basal crackles, no wheezing  Cardiovascular: Normal apical impulse, regular rate and rhythm, normal S1 and S2, no S3 or S4, and no murmur noted  GI: No scars, normal bowel sounds, soft, non-distended, non-tender, no masses palpated, no hepatosplenomegally  Skin: no bruising or bleeding  Musculoskeletal: no lower extremity pitting edema present, wound on right shin healing well.  Neurologic: no focal deficit.     Medications     DOBUTamine Stopped (11/02/19 1022)     norepinephrine 0.12 mcg/kg/min (11/02/19 0807)       influenza Vac Split High-Dose  0.5 mL Intramuscular Prior to discharge     methylPREDNISolone  1,000 mg Intravenous Q24H     metoprolol succinate ER  100 mg Oral QAM     sodium chloride (PF)  3 mL Intracatheter Q8H       Data   Recent Labs   Lab 11/02/19  0635 11/01/19  0638 10/31/19  0524 10/30/19  1134   WBC  --   --  10.1 7.8   HGB  --   --  9.7* 10.3*   MCV  --   --  98 96   PLT  --   --  29* 38*   INR  --   --   --  1.39*    134 135 138   POTASSIUM 3.8 3.7 3.9 4.2   CHLORIDE 98 100 103 107   CO2 27 31 28 27   BUN 43* 33* 22 19   CR 1.39* 1.20 1.13 1.02   ANIONGAP 8 3 4 4   BARON 7.8* 7.9* 7.9* 8.3*   * 111* 109* 88   ALBUMIN  --   --  3.2*  --    PROTTOTAL  --   --  5.8*  --    BILITOTAL  --   --  1.6*  --    ALKPHOS  --   --  36*  --    ALT  --   --  18  --    AST  --   --  11  --    TROPI  --   --   --  <0.015       Recent Results (from the past 24 hour(s))   Radiologist Consult For Cardiology    Narrative    RADIOLOGIST CONSULT FOR  CARDIOLOGY   11/1/2019 4:47 PM     HISTORY: Preoperative tricuspid valve.    TECHNIQUE: CTA chest, abdomen and pelvis 100 mL Isovue-370 IV  contrast. Technique and protocol were ordered per the cardiology  service.    COMPARISON: X-ray from same day    FINDINGS: This report is tailored to the evaluation of soft tissues.  Please see cardiology report for vascular findings. Visualized thyroid  is normal. Tiny right apical pneumothorax, similar when compared to  x-ray from same day. Moderate right pleural effusion and small left  pleural effusion. Consolidation is noted in the right lower lobe.  Emphysematous changes are noted throughout both lungs. Heart is  enlarged. Right renal cyst. The spleen, left kidney, bilateral adrenal  glands, gallbladder and pancreas show no focal abnormality. No  intrahepatic or extrahepatic biliary dilatation. No intraperitoneal  free air or free fluid. The bladder is distended. Moderate amount of  stool is noted throughout the colon. No abdominal or pelvic  lymphadenopathy.    Bones: No suspicious bony lesions.      Impression    IMPRESSION:  1. Small right pneumothorax, unchanged when compared to the x-ray from  same day.  2. Consolidation in the right lower lobe and right middle lobe, likely  infectious. Recommend follow-up to ensure resolution.  3. Moderate right pleural effusion and small left pleural effusion.  4. Cardiomegaly.  5. Distended bladder.   XR Chest Port 1 View    Narrative    CHEST ONE VIEW PORTABLE   11/1/2019 7:44 PM     HISTORY: PICC placement.    COMPARISON: Chest radiograph performed earlier today at 8:24 AM.      Impression    IMPRESSION: Right PICC has been placed, with tip not well visualized,  but likely in the right atrium. Previously described patchy opacities  at the right lung base having improved slightly. Previously noted  right pneumothorax is non visible on the current exam. Opacity at the  left lung base is unchanged, and may be related to atelectasis.  Heart  size appears stable. Pulmonary vascularity is within normal limits.    ANDREEA DIXON MD   XR Chest Port 1 View    Narrative    CHEST ONE VIEW PORTABLE   11/2/2019 5:44 AM     HISTORY: bilateral pleural effusions, right pneumothorax.    COMPARISON: 11/1/2019 1940 hours and and 0820 hours      Impression    IMPRESSION: Previously seen pneumothorax is difficult to identify  because of numerous superimposed rib shadows but question of residual  small apical pneumothorax which is smaller than on earlier film of  11/1/2019 at 0820 hours. Right PICC line with tip projecting over the  SVC. Opacity at the mid and lower right chest and lower left chest not  significantly changed since 11/1/2019 at 1940 hours consistent with  small effusions and bibasilar atelectasis. Right upper and left mid  and upper lungs clear with normal caliber pulmonary vessels.    LETITIA STOVER MD

## 2019-11-02 NOTE — PROGRESS NOTES
Hospitalist x-cover    CXR for PICC reviewed and also discussed with radiologist. Difficult to see the tip of the PICC and possibly in the right atrium. Will pull back ~3cm and repeat CXR to verify location.      Rick Ambrosio MD  Hospitalist

## 2019-11-02 NOTE — CONSULTS
Consult Date:  11/01/2019      This consult has been requested by Dr. Harris for thrombocytopenia.      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  He has been seen by Dr. Britton in Hematology/Oncology Clinic and has had multiple investigations done.  Some of them are summarized below.   1.  He has progressive thrombocytopenia since 2012.  On 04/18/2012, platelet of 129.   2.  Multiple investigations were done in 02/2019.  Normal vitamin B12, folate and SPEP.   3.  Bone marrow biopsy was done on 03/04/2019.  Pathology revealed hypercellular bone marrow with increased trilineage hematopoiesis. Cellularity of 80%. There was adequate megakaryopoiesis.    There were some dysplastic changes but not enough to call it MDS.  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      The patient presented to the emergency room on 10/30/2019 with worsening weakness and shortness of breath.  Multiple investigations were done.   -Normal WBC. Hemoglobin of 10.3 with platelet of 38.   -BMP is normal.   -Echocardiogram revealed severe tricuspid regurgitation.      The patient has been seen by Cardiothoracic Surgery.  The patient is being evaluated for tricuspid valve repair.      The patient has bilateral pleural effusion.  Thoracocentesis was done on 10/30/2019.  There is a pneumothorax.      REVIEW OF SYSTEMS:  The patient feels weak.  His shortness of breath is better since thoracocentesis.  No chest pain.      No headache.  No dizziness.  No abdominal pain, nausea or vomiting.  Appetite is good.  No bleeding from any site.  No fever or chills.      ALLERGIES:  NONE.      MEDICATIONS:  Reviewed.      PAST MEDICAL HISTORY:   1.  Chronic thrombocytopenia.   2.  Atrial fibrillation.   3.  CHF.    4.  Hypertension.   5.  Cardiomyopathy.   6.  Hernia repair.   7.  Carpal tunnel surgery.   8.  Surgery for bowel obstruction.   9.  Elevated PSA.      SOCIAL HISTORY:   -No history of smoking.    -Social alcohol use.      PHYSICAL EXAMINATION:   GENERAL:  He was alert and oriented x 3.   VITAL SIGNS:  Reviewed.    The rest of the systems not examined.      LABORATORY DATA:  Reviewed.      ASSESSMENT:   1.  An 88-year-old gentleman with chronic thrombocytopenia.  The patient carries a diagnosis of ITP.  I reviewed the previous bone marrow.  I think he is developing myelodysplastic syndrome.   2.  Normocytic anemia.   3.  Multiple other medical problems including atrial fibrillation and hypertension.      RECOMMENDATIONS:   1.  I discussed with the patient regarding thrombocytopenia.  It is chronic.  He does not have any bleeding complication.      The patient will be having cardiac procedure done next week.  Because of that, we need to get his platelet improved.      I discussed regarding his chronic thrombocytopenia.  Previous investigations were all reviewed.  I reviewed his bone marrow. It is hypercellular.  There is cytogenetics abnormality.  Most likely patient is developing myelodysplastic syndrome.  ITP cannot be ruled out.      At this time, I am going to treat him for presumed ITP.  We will give him IV Solu-Medrol 1 gram a day for the next few days.  If his platelet does not improve, it will go against ITP.  Then we will think it is because of MDS.  We will give transfusion of platelets.  If his platelet improves with transfusion, then we will know it is MDS and not ITP.      It will be good to have a repeat bone marrow biopsy.  As patient is scheduled for cardiac surgery early next week, we will have to delay the bone marrow biopsy until later.     2.  The patient at this time is clinically stable.  No bleeding or bruising complications.  Oncology will continue to follow him. Case discussed with Dr. Benedict.      Thanks for the consult.      Total time spent 45 minutes, more than 50% of the time was spent in counseling and coordination of care.          KAE HOUSTON MD             D: 11/01/2019    T: 2019   MT: DARREN      Name:     YINKA GONZALEZ   MRN:      [id number]        Account:       [id number]   :      1931           Consult Date:  2019      Document: [id number]       cc: Rich Harris MD

## 2019-11-02 NOTE — PROGRESS NOTES
CV Surgery    Patient now in ICU on pressors. Surgeon aware. Surgery on hold until work up can be completed.     Will continue to follow along closely.    Saranya Fonseca PA-C  CV Surgery

## 2019-11-02 NOTE — PROGRESS NOTES
Sepsis Evaluation Progress Note    I was called to see Jama Paul due to abnormal vital signs triggering the Sepsis SIRS screening alert. He is not known to have an infection.     Physical Exam   Vital Signs:  Temp: 98  F (36.7  C) Temp src: Oral BP: 106/56 Pulse: 101 Heart Rate: 100 Resp: 22 SpO2: 97 % O2 Device: Nasal cannula Oxygen Delivery: 2 LPM    Lab:  Lactic Acid   Date Value Ref Range Status   01/19/2018 2.1 (H) 0.7 - 2.0 mmol/L Final     Lactate for Sepsis Protocol   Date Value Ref Range Status   11/01/2019 2.6 (H) 0.7 - 2.0 mmol/L Final       The patient is at baseline mental status.     The rest of their physical exam is significant--> not examined    Assessment & Plan   NO EVIDENCE OF SEPSIS at this time.  Vital sign, physical exam, and lab findings are likely due to dobumtamine drip use and also chf.    Disposition: The patient will remain on the current unit. We will continue to monitor this patient closely.  Rick Ambrosio MD    Sepsis Criteria   Sepsis: 2+ SIRS criteria due to infection  Severe Sepsis: Sepsis AND 1+ new sign of acute organ dysfunction (Note: lactate >2 is organ dysfunction)  Septic Shock: Sepsis AND hypotension despite volume resuscitation with 30 ml/kg crystalloid

## 2019-11-02 NOTE — PLAN OF CARE
Neuro- A&Ox4, forgetful  Most Recent Vitals- Temp: 98  F (36.7  C) Temp src: Oral BP: (!) 89/43 Pulse: 121 Heart Rate: 120 Resp: 20 SpO2: 97 % O2 Device: Nasal cannula Oxygen Delivery: 2 LPM  Tele/Cardiac- A fib RVR, denies CP, BP's soft  Resp- continues on 2L via NC, gets RODGERS, LS have audible crackles   Activity- A-2 with GB + walker  Pain- denies  Drips- Dobutamine @ 15mcg/hr, bumex placed on hold  Drains/Tubes- PICC right upper arm, PIV x2 left arm  Skin- spine, left elbow, and heels reddened and blanchable, wound on right shin  GI/- incontinent of urine, attempted to use condom catheter-unsuccessful, evans catheter inserted and patent  Aggression Color- Green  Plan- Tricuspid valve workup, possible dental CT today.  Misc- Pt's BP remained soft throughout shift despite being on dobutamine, MAP and pressures continued to drop requiring pt to be transferred up to ICU for BP support and closer monitoring.     Fabienne Alonzo, RN

## 2019-11-02 NOTE — PLAN OF CARE
Pt is on 0.1 of Norepi to maintain MAP of 65 or greater. Dobutamine has been off since 1300. A-fib CVR/RVR. Lung sounds clear, RA. Pt has trouble taking deep breaths. Mccullough in place, good UOP. Started on low dose sliding scale insulin for elevated sugars. Neurologically intact.Wife and patient updated bedside.

## 2019-11-02 NOTE — CONSULTS
Critical Care  Note      11/02/2019    Name: Jama Paul MRN#: [id number]   Age: 88 year old YOB: 1931     Hsptl Day# 3  ICU DAY #1               Problem List:   Active Problems:    Pleural effusion    Bilateral pleural effusion    1. Shock, likely cardiogenic in nature coupled with potential volume depletion due to Lasix and consequence of severe underlying valvular heart disease. At present we will titrate down off of Levophed, try and minimize IV fluids adjust meds appropriately and monitor only. He has borderline compensated acute respiratory failure as a consequence of CHF.   2. Valvular heart disease severe- especially severeTR and RV dysfunction, and moderate MR. As per cardiology, he may yet benefit from a cath and will monitor expectantly only at present.   3. Congestive heart failure, compensated on 2 liters NC oxygen at present.   4. HTN  5. ITP  6. Atrial fibrillaton  7. History of bleeding episode in the past 3 months resulting in loss of eye sight in left eye, difficulty ambulating requiring a walker, and urinary incontinence.   Overall acute and critical. I spent 40 minutes of critical care time assessing and managing his shock state and hypotension, and his acute respiratory failure.          Summary/Hospital Course:           Assessment and plan :     Jama Paul IS a 88 year old male admitted on 10/30/2019 for CHF and hypotension.    I have personally reviewed the daily labs, imaging studies, cultures and discussed the case with referring physician and consulting physicians.     My assessment and plan by system for this patient is as follows:    Neurology/Psychiatry:   1. Alert and cogent;     Cardiovascular:   1.Hemodynamics - we are titrating Levophed to MAP >/= 65    Pulmonary/Ventilator Management:   1. Compensated at present on NC oxygen    GI and Nutrition :   1. PO's as tolerated     Renal/Fluids/Electrolytes:   1. Creatinine continues to rise slowly (1.02 to 1.39)  and will monitor only on current regimen.     Infectious Disease:   1. No issues at present but will send surveillance cultures     Endocrine:   1. Treat with glucose as needed     Hematology/Oncology:   1. Monitor only.      IV/Access:   1. Venous access -   2. Arterial access -   3.  Plan  - central access required and necessary      ICU Prophylaxis:   1. DVT: Hep Subq/ LMWH/mechanical  2. VAP: HOB 30 degrees, chlorhexidine rinse  3. Stress Ulcer: PPI/H2 blocker  4. Restraints: Nonviolent soft two point restraints required and necessary for patient safety and continued cares and good effect as patient continues to pull at necessary lines, tubes despite education and distraction. Will readdress daily.   5. Wound care  -   6. Feeding -  PO's as tolerated   7. Family Update: deferred   8. Disposition -  ICU care         Key goals for next 24 hours:   1. Titrate down levophed  2. Pulmonary hygiene   3.               Medical History:     Past Medical History:   Diagnosis Date     Congestive heart failure (H)      Elevated PSA      Eye hemorrhage, left 02/2019     Non-ischemic cardiomyopathy (H)     2017, med treatment, echo done 4/18 nl ef, mod to severe tr     Permanent atrial fibrillation 2011     Thrombocytopenia (H)      Unspecified essential hypertension      Past Surgical History:   Procedure Laterality Date     ABDOMEN SURGERY      bowel obstruction     BONE MARROW BIOPSY, BONE SPECIMEN, NEEDLE/TROCAR N/A 3/4/2019    Procedure: BIOPSY BONE MARROW;  Surgeon: Josey Vargas MD;  Location:  GI     CARPAL TUNNEL RELEASE RT/LT  2014     EXPLORE GROIN  4/30/2014    Procedure: EXPLORE GROIN;  Surgeon: Sam Aguirre MD;  Location:  OR     HERNIORRHAPHY INGUINAL  4/30/2014    Procedure: HERNIORRHAPHY INGUINAL;  Surgeon: Sam Aguirre MD;  Location:  OR     MOHS MICROGRAPHIC PROCEDURE       PHACOEMULSIFICATION CLEAR CORNEA WITH STANDARD INTRAOCULAR LENS IMPLANT Right 9/8/2015    Procedure:  PHACOEMULSIFICATION CLEAR CORNEA WITH STANDARD INTRAOCULAR LENS IMPLANT;  Surgeon: Gil Shannon MD;  Location:  EC     septic olecranon bursitis[       Social History     Socioeconomic History     Marital status:      Spouse name: Not on file     Number of children: 3     Years of education: Not on file     Highest education level: Not on file   Occupational History     Not on file   Social Needs     Financial resource strain: Not on file     Food insecurity:     Worry: Not on file     Inability: Not on file     Transportation needs:     Medical: Not on file     Non-medical: Not on file   Tobacco Use     Smoking status: Never Smoker     Smokeless tobacco: Never Used   Substance and Sexual Activity     Alcohol use: No     Drug use: No     Sexual activity: Not on file   Lifestyle     Physical activity:     Days per week: Not on file     Minutes per session: Not on file     Stress: Not on file   Relationships     Social connections:     Talks on phone: Not on file     Gets together: Not on file     Attends Alevism service: Not on file     Active member of club or organization: Not on file     Attends meetings of clubs or organizations: Not on file     Relationship status: Not on file     Intimate partner violence:     Fear of current or ex partner: Not on file     Emotionally abused: Not on file     Physically abused: Not on file     Forced sexual activity: Not on file   Other Topics Concern     Parent/sibling w/ CABG, MI or angioplasty before 65F 55M? No   Social History Narrative     Not on file      No Known Allergies           Key Medications:       influenza Vac Split High-Dose  0.5 mL Intramuscular Prior to discharge     methylPREDNISolone  1,000 mg Intravenous Q24H     [START ON 11/3/2019] metoprolol tartrate  25 mg Oral BID     sodium chloride (PF)  3 mL Intracatheter Q8H       norepinephrine 0.12 mcg/kg/min (11/02/19 0807)        Home Meds  No current facility-administered medications on  file prior to encounter.   Ascorbic Acid (VITAMIN C PO), Take 500 mg by mouth daily  CYANOCOBALAMIN PO, Take 500 mcg by mouth daily  Dermatological Products, Misc. (EPICERAM) EMUL, Externally apply topically daily Apply to right leg.  furosemide (LASIX) 20 MG tablet, Take 1 tablet (20 mg) by mouth daily  metoprolol succinate ER (TOPROL-XL) 50 MG 24 hr tablet, Take 100 mg by mouth every morning  metoprolol succinate ER (TOPROL-XL) 50 MG 24 hr tablet, Take 50 mg by mouth every evening  mirtazapine (REMERON) 15 MG tablet, Take 15 mg by mouth At Bedtime Patient started medication for the first time yesterday 10/29/2019.  Multiple Vitamins-Minerals (ICAPS AREDS FORMULA PO), Take 1 tablet by mouth daily Takes in addition to multivitamin with minerals  sacubitril-valsartan (ENTRESTO) 24-26 MG per tablet, Take 0.5 tablets by mouth every morning  sacubitril-valsartan (ENTRESTO) 24-26 MG per tablet, Take 1 tablet by mouth every evening  Vitamin D, Cholecalciferol, 1000 UNITS TABS, Take 2,000 Units by mouth daily   order for [facility] Order Phone [phone] Fax [phone]    Primary Dressing Hydrofera blue ready transfer    Qty 2 sheets  Secondary Dressing 4x4 gauze loaf Qty 1  Secondary Dressing 4' roll gauze Qty 30  Secondary Dressing 2' tape Qty  1  Length of Need: 1 month  Frequency of dressing change: daily               Physical Examination:   Temp:  [98  F (36.7  C)-98.2  F (36.8  C)] 98.2  F (36.8  C)  Pulse:  [] 93  Heart Rate:  [] 94  Resp:  [15-31] 25  BP: ()/(40-75) 107/68  SpO2:  [88 %-98 %] 97 %    Intake/Output Summary (Last 24 hours) at 11/2/2019 1336  Last data filed at 11/2/2019 1200  Gross per 24 hour   Intake 508.64 ml   Output 1125 ml   Net -616.36 ml     Wt Readings from Last 4 Encounters:   11/02/19 69.1 kg (152 lb 5.4 oz)   09/10/19 72.6 kg (160 lb)   09/02/19 72.6 kg (160 lb)   07/11/19 73.8 kg (162 lb 12.8 oz)     BP - Mean:  [50-85] 84  Resp: 25    No lab results found  in last 7 days.    GEN: no acute distress   HEENT: head ncat, sclera anicteric, OP patent, trachea midline   PULM: unlabored decreased breath sounds at bases and otherwise clear   CV/COR: RRR S1S2 no gallop,  No rub, no murmur audible; distant heart sounds  ABD: soft nontender, hypoactive bowel sounds, no mass  EXT:  Mild edema   warm  NEURO: grossly intact; moves extremities well to command; albeit weka  SKIN: no obvious rash; no cyanosis or mottling  LINES: clean, dry intact         Data:   All data and imaging reviewed     ROUTINE ICU LABS (Last four results)  CMP  Recent Labs   Lab 11/02/19  0635 11/01/19  0638 10/31/19  0524 10/30/19  1134    134 135 138   POTASSIUM 3.8 3.7 3.9 4.2   CHLORIDE 98 100 103 107   CO2 27 31 28 27   ANIONGAP 8 3 4 4   * 111* 109* 88   BUN 43* 33* 22 19   CR 1.39* 1.20 1.13 1.02   GFRESTIMATED 45* 53* 57* 65   GFRESTBLACK 52* 62 67 75   BARON 7.8* 7.9* 7.9* 8.3*   MAG  --   --  2.2  --    PROTTOTAL  --   --  5.8*  --    ALBUMIN  --   --  3.2*  --    BILITOTAL  --   --  1.6*  --    ALKPHOS  --   --  36*  --    AST  --   --  11  --    ALT  --   --  18  --      CBC  Recent Labs   Lab 11/02/19  1219 10/31/19  0524 10/30/19  1134   WBC 15.8* 10.1 7.8   RBC 3.11* 3.26* 3.38*   HGB 9.5* 9.7* 10.3*   HCT 29.1* 31.8* 32.5*   MCV 94 98 96   MCH 30.5 29.8 30.5   MCHC 32.6 30.5* 31.7   RDW 22.2* 23.0* 23.0*   PLT 40* 29* 38*     INR  Recent Labs   Lab 10/30/19  1134   INR 1.39*     Arterial Blood GasNo lab results found in last 7 days.    All cultures:  Recent Labs   Lab 10/30/19  1610   CULT Culture negative monitoring continues     Recent Results (from the past 24 hour(s))   CTA Chest Abdomen Pelvis w Contrast    Narrative    Procedure: CTA CHEST ABDOMEN PELVIS W CONTRAST  Indication: Tricuspid valve disease  Examination Date: 11/2/2019 12:32 PM  Ordering Physician: Dr Harris     TECHNIQUE: After obtaining informed consent, the patient was  positioned in the scanner gantry and an IV  was started using an 18  gauge IV in the right antecubital fossa. Utilizing 100 cc [id number].  Multi-slice computed tomography was performed with a Siemens Dual  Source Flash scanner without incident. The total radiation exposure  was calculated to be 564DLP and 7.90mSv, The angiogram was performed  in the Flash mode with care dose at 100 kVp. Images were reconstructed  and analyzed on a [employer] Workstation. Scan protocol was optimized  to minimize radiation exposure.     IMPRESSIONS:    1. The aortic root is normal in size. The ascending aorta, arch, and  descending aorta are normal in diameter. There is no dissection seen.  2. The aortic arch is left sided. There is bovine branching of the  arch vessels. There is no coarctation seen.  3. The ascending aorta is mildly calcified, aortic arch is  mildly  calcified, the descending thoracic aorta is mildly calcified. The  suprarenal abdominal aorta is mildly calcified; infrarenal abdominal  aorta is mildly calcified  4. Widely patent origins of celiac trunk, superior mesenteric artery  and inferior mesenteric artery.  Single bilateral renal arteries with  widely patent origins.   5. There is no acute aortic pathology, such as dissection, intramural  hematoma, or contained rupture.  6. Bilateral pleural effusions are noted.   7. Please review Radiology report for incidental noncardiac findings  that will follow separately.    Bi-orthogonal luminal aortic dimensions are:    3. AORTA MEASUREMENTS    Aortic root measures 38.7 mm x 36.3 mm at the level of sinuses of  Valsalva.  The sinotubular junction measures 32.5 mm x 30.9 mm.   Proximal ascending aorta measures 35.4 mm x 34.7 mm, at the level of  the main pulmonary artery.  Distal ascending aorta measures 34.1 mm x 31.8 mm, proximal to the  takeoff of the brachiocephalic artery.  Mid aortic arch measures 29.6 mm x 27.9 mm, proximal to the takeoff of  the left subclavian artery.  Proximal descending thoracic aortic  measures 27.2 mm x 24.8 mm, distal  to the takeoff of the left subclavian artery.  Mid descending thoracic aorta measures 25.7 mm x 25.6 mm, at the level  of the pulmonary veins.  Distal thoracic aorta measures 26.4 mm x 24.4 mm, at the level of the  diaphragm.  Distal abdominal aorta proximal to the bifurcation: 19.2 mm x 16.6 mm  Supra-renal abdominal aorta measures 22.9 mm x 19.8 mm    4. ILIOFEMORAL MEASUREMENTS:    RIGHT:  1.  Right common iliac artery: 9.68 mm x 9.41 mm   2.  Right external iliac artery: 9.68 mm x 0.41 mm   3.  Right common femoral artery: 10.4 mm x 9.55 mm   4.  Tortuosity: mild   5.  Calcification: moderate     LEFT:  1.  Left common iliac artery: 11.1 mm x  10.1 mm  2.  Left external iliac artery: 11.9 mm x 1.99 mm  3.  Left common femoral artery: 8.48 mm x 7.14 mm  4.  Tortuosity: mild   5.  Calcification: moderate     BILAL MD [place]   Radiologist Consult For Cardiology    Narrative    RADIOLOGIST CONSULT FOR CARDIOLOGY   11/1/2019 4:47 PM     HISTORY: Preoperative tricuspid valve.    TECHNIQUE: CTA chest, abdomen and pelvis 100 mL [id number] IV  contrast. Technique and protocol were ordered per the cardiology  service.    COMPARISON: X-ray from same day    FINDINGS: This report is tailored to the evaluation of soft tissues.  Please see cardiology report for vascular findings. Visualized thyroid  is normal. Tiny right apical pneumothorax, similar when compared to  x-ray from same day. Moderate right pleural effusion and small left  pleural effusion. Consolidation is noted in the right lower lobe.  Emphysematous changes are noted throughout both lungs. Heart is  enlarged. Right renal cyst. The spleen, left kidney, bilateral adrenal  glands, gallbladder and pancreas show no focal abnormality. No  intrahepatic or extrahepatic biliary dilatation. No intraperitoneal  free air or free fluid. The bladder is distended. Moderate amount of  stool is noted throughout the colon. No abdominal or  pelvic  lymphadenopathy.    Bones: No suspicious bony lesions.      Impression    IMPRESSION:  1. Small right pneumothorax, unchanged when compared to the x-ray from  same day.  2. Consolidation in the right lower lobe and right middle lobe, likely  infectious. Recommend follow-up to ensure resolution.  3. Moderate right pleural effusion and small left pleural effusion.  4. Cardiomegaly.  5. Distended bladder.    THANH TINEO DO   XR Chest Port 1 View    Narrative    CHEST ONE VIEW PORTABLE   11/1/2019 7:44 PM     HISTORY: PICC placement.    COMPARISON: Chest radiograph performed earlier today at 8:24 AM.      Impression    IMPRESSION: Right PICC has been placed, with tip not well visualized,  but likely in the right atrium. Previously described patchy opacities  at the right lung base having improved slightly. Previously noted  right pneumothorax is non visible on the current exam. Opacity at the  left lung base is unchanged, and may be related to atelectasis. Heart  size appears stable. Pulmonary vascularity is within normal limits.    ANDREEA DIXON MD   XR Chest Port 1 View    Narrative    CHEST ONE VIEW PORTABLE   11/2/2019 5:44 AM     HISTORY: bilateral pleural effusions, right pneumothorax.    COMPARISON: 11/1/2019 1940 hours and and 0820 hours      Impression    IMPRESSION: Previously seen pneumothorax is difficult to identify  because of numerous superimposed rib shadows but question of residual  small apical pneumothorax which is smaller than on earlier film of  11/1/2019 at 0820 hours. Right PICC line with tip projecting over the  SVC. Opacity at the mid and lower right chest and lower left chest not  significantly changed since 11/1/2019 at 1940 hours consistent with  small effusions and bibasilar atelectasis. Right upper and left mid  and upper lungs clear with normal caliber pulmonary vessels.    MD Dulce KENYON MD    Billing: This patient is critically ill: Yes. Total  critical care time today 40 min.

## 2019-11-02 NOTE — CONSULTS
[facility] Intensive Care Unit  History and Physical  November 2, 2019  Jama Paul MRN# [id number]   Age: 88 year old YOB: 1931     Date of Admission: 10/30/2019    Primary care provider: Mariusz Shields          Assessment and plan :   Jama Paul is a 88 year old male admitted on 10/30/2019 for   Chief Complaint   Patient presents with     Shortness of Breath     Lives in a Senior Living facility, has been feeling more short of breath last few days, mostly with exertion.   . My assessment and plan for this patient is as follows:    1.Neurology:   PRN Tylenol  PRN oxycodone      2. Cardiovascular:  Cardiogenic shock:    Requiring increasing amounts of levophed to maintain MAP >65   Titrate dobutamine/levophed  Atrial Fibrillation   Currently with RVR   Will give one time IV Metoprolol   Restart home PO metoprolol   If continues with RVR; will start amiodarone  Severe TR   Cardiology following   CVTS planning on valve replacement this hospital stay.   Continue supportive care  Chronic normocytic anemia with thrombocytopenia   Heme/Onc following   No acute interventions   Continue to monitor   Transfuse as needed  Diastolic CHF with preserved EF on echo   Left ventricular systolic function is normal. The visual ejection fraction is   estimated at 55-60%.   The right ventricle is severely dilated. The right ventricular systolic function is normal.   Mild to moderate (1-2+) mitral regurgitation.   Mod-severe to severe (3-4+) tricuspid regurgitation.   The right ventricular systolic pressure is approximated at 46mmHg plus the right atrial  pressure, suggesting elevated pulmonary artery pressures.   Trivial pericardial effusion and a pleural effusion present.   Rhythm appears to be atrial fibrillation.      This study was compared to a prior TTE from 4/11/2019. Biventricular function is stable.  The severity of mitral insufficiency and tricuspid insufficiency remains stable. There  is  now a trivial pericardial effusion, and a pleural effusion is present. The rhythm appears to  be atrial fibrillation on this present study, however it was also irregularly irregular on the  prior study.    3. Pulmonary/Ventilator Management:   No ventilator needs at this time  On RA  R PTX noted; minimal with no evidence of repritory distress; appear resolved on current CXR   CVTS following   Conservative mgt    Monitor    4. GI and Nutrition : Acute abdominal issue present  No. Active GI bleeding present: No. Liver function abnormality present No. Significant constipation issue: No. Significant diarrhea No.  Other GI problem: No.   Reasons to postpone or  Contraindication to enteral nutrition No - continue current diet    5. Renal/Fluids/Electrolytes: .Assessment: CARA  present (rapid recent change in kidney function: rise in serum creatinine r?0.3 mg/dl or ? 50% or urine output <0.5 ml/kg/hr for more than 6 hours)?: NO. Is most recent renal function and/or urine output trend improving: not changed. Rare Major electrolytes or A/B abnormalities present needing immediate intervention No: Is U/A abnormal: No. Plan: monitor renal function, I/O, electrolytes, A/B status and treat/replace  electrolytes as needed.     6. Infectious Disease:    No leukocytosis   Elevated Lactate and normal ScVO2; likely etiology secondary to low CO   No indication for Abx at this time   Monitor    7. Endocrine: monitor Glucose    8. Hematology/Oncology:  Chronic normocytic anemia: supportive care; monitor  Chronic thrombocytopenia:    ?ITP   Heme/onc following: continue IV solu-medrol    10. IV/Access:    PICC line placed   OK to use at this time    11. Prophylaxis:    Bowel care       13. Top 3 Key Goals for the next 24 hours :    Wean dobutamine  Continue to improve CO  stabilize for valve repair    14. Billing: total time spend providing critical care was 35 min secondary to worsening cardiogenic shock in increasing pressor  needs.  Greater than 50% of today's  visit was in counseling and coordination of care.           Chief Complaint/ Reason for ICU Admission and HPI     Admitted for :   Chief Complaint   Patient presents with     Shortness of Breath     Lives in a Senior Living facility, has been feeling more short of breath last few days, mostly with exertion.     History obtained from Chart and Patient.  History of Present Illness:   The patient presented to the emergency room on 10/30/2019 with worsening weakness and shortness of breath.  Multiple investigations were done and was found to have Severe TR.      He was found to have worsening CO with evidence of cardiogenic shock.  He was started on dobutamine.  Overnight he was requiring increasing amounts of inotropes with worsening Afib with RVR.             pt's MAP has remained consistently < 60.  After previous discussion with cardiology, should pt's MAP remain < 65 pt will likely require transfer to ICU for additional vasopressor support.    ICU was consulted and accepted patient to start levophed and titrate dobutamine.              Past Medical History:     Past Medical History:   Diagnosis Date     Congestive heart failure (H)      Elevated PSA      Eye hemorrhage, left 02/2019     Non-ischemic cardiomyopathy (H)     2017, med treatment, echo done 4/18 nl ef, mod to severe tr     Permanent atrial fibrillation 2011     Thrombocytopenia (H)      Unspecified essential hypertension             Past Surgical History:     Past Surgical History:   Procedure Laterality Date     ABDOMEN SURGERY      bowel obstruction     BONE MARROW BIOPSY, BONE SPECIMEN, NEEDLE/TROCAR N/A 3/4/2019    Procedure: BIOPSY BONE MARROW;  Surgeon: Josey Vargas MD;  Location:  GI     CARPAL TUNNEL RELEASE RT/LT  2014     EXPLORE GROIN  4/30/2014    Procedure: EXPLORE GROIN;  Surgeon: Sam Aguirre MD;  Location:  OR     HERNIORRHAPHY INGUINAL  4/30/2014    Procedure: HERNIORRHAPHY  INGUINAL;  Surgeon: Sam Aguirre MD;  Location:  OR     MOHS MICROGRAPHIC PROCEDURE       PHACOEMULSIFICATION CLEAR CORNEA WITH STANDARD INTRAOCULAR LENS IMPLANT Right 9/8/2015    Procedure: PHACOEMULSIFICATION CLEAR CORNEA WITH STANDARD INTRAOCULAR LENS IMPLANT;  Surgeon: Gil Shannon MD;  Location:  EC     septic olecranon bursitis[              Social History:     Social History     Socioeconomic History     Marital status:      Spouse name: Not on file     Number of children: 3     Years of education: Not on file     Highest education level: Not on file   Occupational History     Not on file   Social Needs     Financial resource strain: Not on file     Food insecurity:     Worry: Not on file     Inability: Not on file     Transportation needs:     Medical: Not on file     Non-medical: Not on file   Tobacco Use     Smoking status: Never Smoker     Smokeless tobacco: Never Used   Substance and Sexual Activity     Alcohol use: No     Drug use: No     Sexual activity: Not on file   Lifestyle     Physical activity:     Days per week: Not on file     Minutes per session: Not on file     Stress: Not on file   Relationships     Social connections:     Talks on phone: Not on file     Gets together: Not on file     Attends Caodaism service: Not on file     Active member of club or organization: Not on file     Attends meetings of clubs or organizations: Not on file     Relationship status: Not on file     Intimate partner violence:     Fear of current or ex partner: Not on file     Emotionally abused: Not on file     Physically abused: Not on file     Forced sexual activity: Not on file   Other Topics Concern     Parent/sibling w/ CABG, MI or angioplasty before 65F 55M? No   Social History Narrative     Not on file     Smoking: No.   History   Smoking Status     Never Smoker   Smokeless Tobacco     Never Used     Alcohol: No  Social History    Substance and Sexual Activity      Alcohol use:  No    Drug:  No   History   Drug Use No            Family History:     Family History   Problem Relation Age of Onset     Heart Disease No family hx of             Allergies:   Please see allergy list which was reviewed this admission.  All allergies reviewed and addressed         Medications:     Current Facility-Administered Medications   Medication     acetaminophen (TYLENOL) tablet 650 mg     bisacodyl (DULCOLAX) Suppository 10 mg     glucose gel 15-30 g    Or     dextrose 50 % injection 25-50 mL    Or     glucagon injection 1 mg     DOBUTamine 500 mg in dextrose 5% 250 mL (adult std conc) premix     influenza Vac Split High-Dose (FLUZONE) injection 0.5 mL     lidocaine (LMX4) cream     lidocaine 1 % 0.1-1 mL     lidocaine 1 % 0.1-1 mL     magnesium sulfate 4 g in 100 mL sterile water (premade)     melatonin tablet 1 mg     methylPREDNISolone sodium succinate (solu-MEDROL) 1,000 mg in sodium chloride 0.9 % 250 mL intermittent infusion     metoclopramide (REGLAN) tablet 5 mg    Or     metoclopramide (REGLAN) injection 5 mg     metoprolol (LOPRESSOR) injection 2.5 mg     metoprolol succinate ER (TOPROL-XL) 24 hr tablet 100 mg     naloxone (NARCAN) injection 0.1-0.4 mg     naloxone (NARCAN) injection 0.1-0.4 mg     nitroGLYcerin (NITROSTAT) sublingual tablet 0.4 mg     norepinephrine (LEVOPHED) 16 mg in  mL infusion     ondansetron (ZOFRAN-ODT) ODT tab 4 mg    Or     ondansetron (ZOFRAN) injection 4 mg     oxyCODONE (ROXICODONE) tablet 5-10 mg     polyethylene glycol (MIRALAX/GLYCOLAX) Packet 17 g     potassium chloride (KLOR-CON) Packet 20-40 mEq     potassium chloride 10 mEq in 100 mL intermittent infusion with 10 mg lidocaine     potassium chloride 10 mEq in 100 mL sterile water intermittent infusion (premix)     potassium chloride 20 mEq in 50 mL intermittent infusion     potassium chloride ER (K-DUR/KLOR-CON M) CR tablet 20-40 mEq     prochlorperazine (COMPAZINE) injection 5 mg    Or     prochlorperazine  (COMPAZINE) tablet 5 mg    Or     prochlorperazine (COMPAZINE) Suppository 12.5 mg     senna-docusate (SENOKOT-S/PERICOLACE) 8.6-50 MG per tablet 1 tablet    Or     senna-docusate (SENOKOT-S/PERICOLACE) 8.6-50 MG per tablet 2 tablet     sodium chloride (PF) 0.9% PF flush 3 mL     sodium chloride (PF) 0.9% PF flush 3 mL            Review of Systems:   CONSTITUTIONAL: NEGATIVE for fever, chills, change in weight  ENT/MOUTH: NEGATIVE for ear, mouth and throat problems  RESP: NEGATIVE for significant cough; +sob  CV: NEGATIVE for chest pain, palpitations or peripheral edema  GI: NEGATIVE for nausea, abdominal pain, heartburn, or change in bowel habits  : negative for dysuria, hematuria, decreased urinary stream, erectile dysfunction  MUSCULOSKELETAL: NEGATIVE for significant arthralgias or myalgia  NEURO: NEGATIVE for weakness, dizziness or paresthesias  HEME/ALLERGY/IMMUNE: NEGATIVE for bleeding problems           Physical Exam:   Vitals were reviewed  Temp:  [97.6  F (36.4  C)-98  F (36.7  C)] 98  F (36.7  C)  Pulse:  [] 98  Heart Rate:  [] 100  Resp:  [15-31] 16  BP: ()/(40-75) 109/55  SpO2:  [88 %-98 %] 97 %    Intake/Output Summary (Last 24 hours) at 11/2/2019 0629  Last data filed at 11/2/2019 0600  Gross per 24 hour   Intake 411.52 ml   Output --   Net 411.52 ml     Constitutional:   awake, alert, cooperative, no apparent distress, and appears stated age     ENT:   Normocephalic, without obvious abnormality, atraumatic, sinuses nontender on palpation, external ears without lesions, oral pharynx with moist mucous membranes, tonsils without erythema or exudates, gums normal and good dentition.     Neck:   Supple, symmetrical, trachea midline, no adenopathy, thyroid symmetric, not enlarged and no tenderness, skin normal     Cardiovascular:   irregularly irregular rhythm and pulses 2 plus all extermities bilaterally     Chest / Breast:   Breasts symmetrical, skin without lesion(s), no nipple  retraction or dimpling, no nipple discharge, no masses palpated, no axillary or supraclavicular adenopathy         Abdomen:   soft, non-distended, non-tender, voluntary guarding absent and no masses palpated     Musculoskeletal:   There is no redness, warmth, or swelling of the joints.  Full range of motion noted.  Motor strength is 5 out of 5 all extremities bilaterally.  Tone is normal.     Neurologic:   Awake, alert, oriented to name, place and time.  Cranial nerves II-XII are grossly intact.        General:   Comfortable, no pain or respiratory distress   Neurologic:   Alert and oriented x 3   HEENT:   Head is atraumatic  No neck mass or lymphadenopathy  No central cyanosis present      Lungs:   Symmetrical chest shape and movements with each tidal breath   Cardiovascular:   Normal neck veins   Abdomen:   Distended:  No. Bowel sound present and normal: Yes . Soft: Yes . Tender: Yes , No. Rebound:tendeness or guarding present No  Hernia present: No   Extremities:   Clubbing present: No, Edema present: No, Acrocyanosis present: No, Mottling present: No, Normal capillary refill: Yes    Skin:   Warm, dry.  No rash on limited exam.    Lines/Drain:    Central line present: Yes   Arterial line present: No  External ventricular Drain present: No  Chest tube present: No  ANGEL present: No  PEG present: No           Ancillary Data:   All laboratory and imaging data reviewed.     ABG/vent data:   Resp: 16    No lab results found in last 7 days.     ROUTINE IP LABS (Last four results)  BMP  Recent Labs   Lab 11/01/19  0638 10/31/19  0524 10/30/19  1134    135 138   POTASSIUM 3.7 3.9 4.2   CHLORIDE 100 103 107   BARON 7.9* 7.9* 8.3*   CO2 31 28 27   BUN 33* 22 19   CR 1.20 1.13 1.02   * 109* 88     CBC  Recent Labs   Lab 10/31/19  0524 10/30/19  1134   WBC 10.1 7.8   RBC 3.26* 3.38*   HGB 9.7* 10.3*   HCT 31.8* 32.5*   MCV 98 96   MCH 29.8 30.5   MCHC 30.5* 31.7   RDW 23.0* 23.0*   PLT 29* 38*     INR  Recent Labs    Lab 10/30/19  1134   INR 1.39*       Other Lab Data:  Results for orders placed or performed during the hospital encounter of 10/30/19   CBC with platelets differential     Status: Abnormal   Result Value Ref Range    WBC 7.8 4.0 - 11.0 10e9/L    RBC Count 3.38 (L) 4.4 - 5.9 10e12/L    Hemoglobin 10.3 (L) 13.3 - 17.7 g/dL    Hematocrit 32.5 (L) 40.0 - 53.0 %    MCV 96 78 - 100 fl    MCH 30.5 26.5 - 33.0 pg    MCHC 31.7 31.5 - 36.5 g/dL    RDW 23.0 (H) 10.0 - 15.0 %    Platelet Count 38 (LL) 150 - 450 10e9/L    Diff Method Automated Method     % Neutrophils 85.9 %    % Lymphocytes 7.9 %    % Monocytes 5.8 %    % Eosinophils 0.0 %    % Basophils 0.1 %    % Immature Granulocytes 0.3 %    Nucleated RBCs 0 0 /100    Absolute Neutrophil 6.7 1.6 - 8.3 10e9/L    Absolute Lymphocytes 0.6 (L) 0.8 - 5.3 10e9/L    Absolute Monocytes 0.5 0.0 - 1.3 10e9/L    Absolute Eosinophils 0.0 0.0 - 0.7 10e9/L    Absolute Basophils 0.0 0.0 - 0.2 10e9/L    Abs Immature Granulocytes 0.0 0 - 0.4 10e9/L    Absolute Nucleated RBC 0.0     Teardrop Cells Slight     Ovalocytes Slight     Platelet Estimate       Automated count confirmed.  Platelet morphology is normal.   Basic metabolic panel     Status: Abnormal   Result Value Ref Range    Sodium 138 133 - 144 mmol/L    Potassium 4.2 3.4 - 5.3 mmol/L    Chloride 107 94 - 109 mmol/L    Carbon Dioxide 27 20 - 32 mmol/L    Anion Gap 4 3 - 14 mmol/L    Glucose 88 70 - 99 mg/dL    Urea Nitrogen 19 7 - 30 mg/dL    Creatinine 1.02 0.66 - 1.25 mg/dL    GFR Estimate 65 >60 mL/min/[1.73_m2]    GFR Estimate If Black 75 >60 mL/min/[1.73_m2]    Calcium 8.3 (L) 8.5 - 10.1 mg/dL   Troponin I     Status: None   Result Value Ref Range    Troponin I ES <0.015 0.000 - 0.045 ug/L   Nt probnp inpatient (BNP)     Status: Abnormal   Result Value Ref Range    N-Terminal Pro BNP Inpatient 6,109 (H) 0 - 1,800 pg/mL   Procalcitonin     Status: None   Result Value Ref Range    Procalcitonin <0.05 ng/ml   Cytology non gyn      Status: None   Result Value Ref Range    Copath Report       Patient Name: YINKA GONZALEZ  MR#: [id number]  Specimen #: [id number]  Collected: 10/30/2019  Received: 10/31/2019  Reported: 11/1/2019 09:36  Ordering Phy(s): ANDREA PERATLA    For improved result formatting, select 'View Enhanced Report Format' under   Linked Documents section.    SPECIMEN/STAIN PROCESS:  Pleural Fluid       Pap-Cyto x 1, Ibarra's stain-cyto x 1, Cell Block w/ H&E-Cyto x 1,   Cell Block, Level 2 x 1, Cell Block,  Level 3 x 1    ----------------------------------------------------------------  CYTOLOGIC INTERPRETATION:     Pleural Fluid:   Negative for malignancy  Specimen Adequacy: Satisfactory for evaluation.    I have personally reviewed all specimens and/or slides, including the   listed special stains, and used them  with my medical judgement to determine or confirm the final diagnosis.    Electronically signed out by:    Josey Vargas M.D.    CLINICAL HISTORY:  Pleural effusion.    ,    GROSS:  Pleural Fluid:  Received 90 ml of yellow, slightly cloudy fl uid, processed   as 1 Pap stained Autocyte,  1  Ibarra stained cytospin and one hematoxylin and eosin stained cell block.    MICROSCOPIC:  Examination of the cytospin preparations demonstrate a mildly cellular   specimen composed of small lymphocytes,  monocytes, and rare mesothelial cells. H&E stained sections from the   cellblock show similar findings.  Malignant cells are not identified.    CPT Codes:  A: 13935-YPEWZQF, 02011-CIQPZKE, 46360-HIK, HCB    COLLECTION SITE:  Client:  [facility]  Location:  [facility] (S)    The technical component of this testing was completed at the [facility], with the professional component performed   at the [facility], [address] [place] [phone] ([phone])       INR     Status: Abnormal   Result Value Ref Range    INR  1.39 (H) 0.86 - 1.14   Comprehensive metabolic panel     Status: Abnormal   Result Value Ref Range    Sodium 135 133 - 144 mmol/L    Potassium 3.9 3.4 - 5.3 mmol/L    Chloride 103 94 - 109 mmol/L    Carbon Dioxide 28 20 - 32 mmol/L    Anion Gap 4 3 - 14 mmol/L    Glucose 109 (H) 70 - 99 mg/dL    Urea Nitrogen 22 7 - 30 mg/dL    Creatinine 1.13 0.66 - 1.25 mg/dL    GFR Estimate 57 (L) >60 mL/min/[1.73_m2]    GFR Estimate If Black 67 >60 mL/min/[1.73_m2]    Calcium 7.9 (L) 8.5 - 10.1 mg/dL    Bilirubin Total 1.6 (H) 0.2 - 1.3 mg/dL    Albumin 3.2 (L) 3.4 - 5.0 g/dL    Protein Total 5.8 (L) 6.8 - 8.8 g/dL    Alkaline Phosphatase 36 (L) 40 - 150 U/L    ALT 18 0 - 70 U/L    AST 11 0 - 45 U/L   CBC with platelets     Status: Abnormal   Result Value Ref Range    WBC 10.1 4.0 - 11.0 10e9/L    RBC Count 3.26 (L) 4.4 - 5.9 10e12/L    Hemoglobin 9.7 (L) 13.3 - 17.7 g/dL    Hematocrit 31.8 (L) 40.0 - 53.0 %    MCV 98 78 - 100 fl    MCH 29.8 26.5 - 33.0 pg    MCHC 30.5 (L) 31.5 - 36.5 g/dL    RDW 23.0 (H) 10.0 - 15.0 %    Platelet Count 29 (LL) 150 - 450 10e9/L   Magnesium (AM Draw)     Status: None   Result Value Ref Range    Magnesium 2.2 1.6 - 2.3 mg/dL   Basic metabolic panel     Status: Abnormal   Result Value Ref Range    Sodium 134 133 - 144 mmol/L    Potassium 3.7 3.4 - 5.3 mmol/L    Chloride 100 94 - 109 mmol/L    Carbon Dioxide 31 20 - 32 mmol/L    Anion Gap 3 3 - 14 mmol/L    Glucose 111 (H) 70 - 99 mg/dL    Urea Nitrogen 33 (H) 7 - 30 mg/dL    Creatinine 1.20 0.66 - 1.25 mg/dL    GFR Estimate 53 (L) >60 mL/min/[1.73_m2]    GFR Estimate If Black 62 >60 mL/min/[1.73_m2]    Calcium 7.9 (L) 8.5 - 10.1 mg/dL   Lactic acid level STAT     Status: Abnormal   Result Value Ref Range    Lactate for Sepsis Protocol 2.6 (H) 0.7 - 2.0 mmol/L   Blood gas venous with oxyhemoglobin     Status: Abnormal   Result Value Ref Range    Ph Venous 7.44 (H) 7.32 - 7.43 pH    PCO2 Venous 39 (L) 40 - 50 mm Hg    PO2 Venous 40 25 - 47 mm Hg     Bicarbonate Venous 27 21 - 28 mmol/L    Oxyhemoglobin Venous 69 %    Base Excess Venous 2.4 mmol/L   Basic metabolic panel     Status: Abnormal   Result Value Ref Range    Sodium 133 133 - 144 mmol/L    Potassium 3.8 3.4 - 5.3 mmol/L    Chloride 98 94 - 109 mmol/L    Carbon Dioxide 27 20 - 32 mmol/L    Anion Gap 8 3 - 14 mmol/L    Glucose 216 (H) 70 - 99 mg/dL    Urea Nitrogen 43 (H) 7 - 30 mg/dL    Creatinine 1.39 (H) 0.66 - 1.25 mg/dL    GFR Estimate 45 (L) >60 mL/min/[1.73_m2]    GFR Estimate If Black 52 (L) >60 mL/min/[1.73_m2]    Calcium 7.8 (L) 8.5 - 10.1 mg/dL   Glucose by meter     Status: Abnormal   Result Value Ref Range    Glucose 204 (H) 70 - 99 mg/dL   EKG 12-lead, tracing only     Status: None   Result Value Ref Range    Interpretation ECG Click View Image link to view waveform and result    EKG 12-lead, tracing only     Status: None (Preliminary result)   Result Value Ref Range    Interpretation ECG Click View Image link to view waveform and result    ABO/Rh type and screen     Status: None   Result Value Ref Range    ABO O     RH(D) Pos     Antibody Screen Neg     Test Valid Only At [facility]        Specimen Expires 11/04/2019    Cell count with differential fluid     Status: None   Result Value Ref Range    Body Fluid Analysis Source Pleural fluid     % Neutrophils Fluid 21 %    % Lymphocytes Fluid 61 %    % Mono/Macro Fluid 18 %    Color Fluid Yellow     Appearance Fluid Slightly Cloudy     WBC Fluid 140 /uL   Fluid Culture Aerobic Bacterial     Status: None (Preliminary result)   Result Value Ref Range    Specimen Description Pleural fluid     Culture Micro Culture negative monitoring continues    Glucose fluid     Status: None   Result Value Ref Range    Glucose Fluid Source Pleural fluid     Glucose Fluid 109 mg/dL   Gram stain     Status: None   Result Value Ref Range    Specimen Description Pleural fluid     Gram Stain No organisms seen     Gram Stain       Moderate  WBC'S  seen  predominantly mononuclear cells      Gram Stain       Quantification of host cells and microbiological organisms was done on a cytocentrifuged   preparation.     Lactate dehydrogenase fluid     Status: None   Result Value Ref Range    LD Fluid Source Pleural fluid     Lactate Dehydrogenase Fluid 47 U/L   Protein fluid     Status: None   Result Value Ref Range    Protein Total Fluid Source Pleural fluid     Protein Total Fluid 2.5 g/dL   pH fluid     Status: None   Result Value Ref Range    pH Fluid Source Pleural fluid     Ph Fluid 7.0 pH     none    EKG results:   Performed today  I have reviewed this patient's EKG with the following interpretation:   Atrial fibrillation     All cardiac studies reviewed  All imaging studies reviewed by me.    Jama Kennedy,  November 2, 2019

## 2019-11-02 NOTE — PROGRESS NOTES
[facility]    Hematology/Oncology Progress Note      Today's Date: 11/02/19  Date of Admission:  10/30/2019  Reason for Consult: Thrombocytopenia.      ASSESSMENT/PLAN:  Jama Paul is a 88 year old male with the following issues:  1. 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 Britton.  -Prior bone marrow biopsy 3/4/2019 did not meet criteria for MDS.  At present time, thrombocytopenia is presumed to be ITP.  -Now on trial of high dose SoluMedrol for 3 days -- will follow platelet count -- if no improvement, then recommend platelet transfusion prior to upcoming invasive cardiac procedure to achieve >= 50,000 plts.    2. Acute on chronic diastolic congestive heart failure with severe tricuspid regurgitation  -S/p bilateral thoracentesis, with transudate, no evidence of infection.  -Now on vasopressor support and diuresis.  -Anticipate cardiac procedure in the coming week.    Casie Aguila MD  Hematology/Oncology  [facility]    INTERIM HISTORY:  Jama has no new complaints today. Eating breakfast.  Denies pain or bleeding issues.     MEDICATIONS:  Current Facility-Administered Medications   Medication     acetaminophen (TYLENOL) tablet 650 mg     bisacodyl (DULCOLAX) Suppository 10 mg     glucose gel 15-30 g    Or     dextrose 50 % injection 25-50 mL    Or     glucagon injection 1 mg     DOBUTamine 500 mg in dextrose 5% 250 mL (adult std conc) premix     influenza Vac Split High-Dose (FLUZONE) injection 0.5 mL     lidocaine (LMX4) cream     lidocaine 1 % 0.1-1 mL     lidocaine 1 % 0.1-1 mL     magnesium sulfate 4 g in 100 mL sterile water (premade)     melatonin tablet 1 mg     methylPREDNISolone sodium succinate (solu-MEDROL) 1,000 mg in sodium chloride 0.9 % 250 mL intermittent infusion     metoclopramide (REGLAN) tablet 5 mg    Or     metoclopramide (REGLAN) injection 5 mg     metoprolol (LOPRESSOR) injection 2.5 mg     metoprolol succinate ER (TOPROL-XL) 24 hr  tablet 100 mg     naloxone (NARCAN) injection 0.1-0.4 mg     naloxone (NARCAN) injection 0.1-0.4 mg     nitroGLYcerin (NITROSTAT) sublingual tablet 0.4 mg     norepinephrine (LEVOPHED) 16 mg in  mL infusion     ondansetron (ZOFRAN-ODT) ODT tab 4 mg    Or     ondansetron (ZOFRAN) injection 4 mg     oxyCODONE (ROXICODONE) tablet 5-10 mg     polyethylene glycol (MIRALAX/GLYCOLAX) Packet 17 g     potassium chloride (KLOR-CON) Packet 20-40 mEq     potassium chloride 10 mEq in 100 mL intermittent infusion with 10 mg lidocaine     potassium chloride 10 mEq in 100 mL sterile water intermittent infusion (premix)     potassium chloride 20 mEq in 50 mL intermittent infusion     potassium chloride ER (K-DUR/KLOR-CON M) CR tablet 20-40 mEq     prochlorperazine (COMPAZINE) injection 5 mg    Or     prochlorperazine (COMPAZINE) tablet 5 mg    Or     prochlorperazine (COMPAZINE) Suppository 12.5 mg     senna-docusate (SENOKOT-S/PERICOLACE) 8.6-50 MG per tablet 1 tablet    Or     senna-docusate (SENOKOT-S/PERICOLACE) 8.6-50 MG per tablet 2 tablet     sodium chloride (PF) 0.9% PF flush 3 mL     sodium chloride (PF) 0.9% PF flush 3 mL           ALLERGIES:  No Known Allergies      PHYSICAL EXAM:  Vital signs:  Temp: 98  F (36.7  C) Temp src: Oral BP: 109/55 Pulse: 98 Heart Rate: 100 Resp: 16 SpO2: 97 % O2 Device: Nasal cannula Oxygen Delivery: 2 LPM Height: 182.9 cm (6') Weight: 69.1 kg (152 lb 5.4 oz)  Estimated body mass index is 20.66 kg/m  as calculated from the following:    Height as of this encounter: 1.829 m (6').    Weight as of this encounter: 69.1 kg (152 lb 5.4 oz).    GENERAL/CONSTITUTIONAL: No acute distress.  EYES: Extraocular movements intact.  No scleral icterus.  GASTROINTESTINAL: No guarding or distention.  MUSCULOSKELETAL: Warm and well-perfused, no cyanosis, clubbing, or edema.  INTEGUMENTARY: No rashes or jaundice. Scattered ecchymoses.        LABS:  CBC RESULTS:   Recent Labs   Lab Test 10/31/19  1987   WBC  10.1   RBC 3.26*   HGB 9.7*   HCT 31.8*   MCV 98   MCH 29.8   MCHC 30.5*   RDW 23.0*   PLT 29*       Recent Labs   Lab Test 11/01/19  0638 10/31/19  0524    135   POTASSIUM 3.7 3.9   CHLORIDE 100 103   CO2 31 28   ANIONGAP 3 4   * 109*   BUN 33* 22   CR 1.20 1.13   BARON 7.9* 7.9*

## 2019-11-02 NOTE — PLAN OF CARE
Pt A&Ox4.  Pneumo increased but was too small for chest tube.  BPs began soft today-dobutamine drip started.  Bumex drip for pulmonary edema.  TAVR CT completed today.  Pt bladder scanned for over 500 but pt refused cath after being educated.  Blanchable redness on elbow and spine and mepilex in place.  Tricuspid surgery postponed until pt is more stable.  Wife updated about change in plan.  Call light within reach.

## 2019-11-02 NOTE — PROGRESS NOTES
"  [facility] Progress Note          Assessment and Plan:   Assessment:   1. HFpEF - symptomatic decompensation was rapid (one week) prior to admission suggesting he was previously \"compensated\".    Overnight became hypotensive - likely a result of rapid HR and vasodilation (dobutamine) and reduced preload (Bumex). Appreciate on-call intervention with discontinuation of both above medications,resumptionof beta blocker.  Stable now.  Long discussion this AM with : will need further evaluation with KATIE and right/left heart catheterization.  Unclear that mitral valve is an issue, more likely tricuspid. TV repair/TVR would be higher risk though RV function normal now. If he can return to prior \"compensated\"status, medical therapy may be more successful at maintaining quality of life.  Do not think he would tolerate TVR and MVR.    Intake/Output Summary (Last 24 hours) at 11/2/2019 1025  Last data filed at 11/2/2019 1000  Gross per 24 hour   Intake 490.1 ml   Output 875 ml   Net -384.9 ml     2. Atrial fibrillation- RVR earlier but now rate about 100 bpm. Not on anticoagulation.May be Watchman candidate if no surgery,TATYANA ligation if surgery.  3. Thrombocytopenia - 20K platelets.  4. MR -asabove.  5.TR - as above,dilated RV with normal function.        Plan:   No further medication changes today.  ICU today,possible transfer tomorrow to ccu.  KATIE Monday.  Right and left heart catheterization Monday if CVTS considering surgery.  Avoid vasodilation,further decrease in preload.  Wean levophed over next 24 hours to keep MAP>65 mmHg.  Up in chair.            Significant Problems:     Patient Active Problem List    Diagnosis Date Noted     Pleural effusion 10/30/2019     Priority: Medium     Bilateral pleural effusion 10/30/2019     Priority: Medium     Ulcer of right lower extremity with fat layer exposed (H) 09/16/2019     Priority: Medium     Idiopathic thrombocytopenic purpura (H) 04/05/2019     " Priority: Medium     Thrombocytopenia (H)      Priority: Medium     Non-ischemic cardiomyopathy (H)      Priority: Medium     2017, med treatment, echo done 4/18 nl ef, mod to severe tr       Eye hemorrhage, left 02/01/2019     Priority: Medium     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          Medications:   Scheduled:    influenza Vac Split High-Dose  0.5 mL Intramuscular Prior to discharge     methylPREDNISolone  1,000 mg Intravenous Q24H     metoprolol succinate ER  100 mg Oral QAM     sodium chloride (PF)  3 mL Intracatheter Q8H             Physical Exam:   All vitals have been reviewed  Temp:  [98  F (36.7  C)-98.2  F (36.8  C)] 98.2  F (36.8  C)  Pulse:  [] 99  Heart Rate:  [] 104  Resp:  [15-31] 16  BP: ()/(40-75) 121/71  SpO2:  [88 %-98 %] 95 %  Vitals:    10/30/19 1044 10/31/19 0619 11/01/19 1041 11/02/19 0500   Weight: 75.3 kg (166 lb) 66.8 kg (147 lb 6 oz) 67.4 kg (148 lb 8 oz) 69.1 kg (152 lb 5.4 oz)     I/O last 3 completed shifts:  In: 411.52 [I.V.:411.52]  Out: 650 [Urine:650]    Neck:   No JVD - does have an inspiratory rise.     Lungs:   Clear without wheezes, rhonchi but decreased BS at bases.     Cardiovascular:   S1, S2, iregularly irregular, 2-3 holosystolic murmur.     Ext's: no edema.          Data:   Last Basic Metabolic Panel:  Lab Results   Component Value Date     11/02/2019      Lab Results   Component Value Date    POTASSIUM 3.8  11/02/2019     Lab Results   Component Value Date    CHLORIDE 98 11/02/2019     Lab Results   Component Value Date    BARON 7.8 11/02/2019     Lab Results   Component Value Date    CO2 27 11/02/2019     Lab Results   Component Value Date    BUN 43 11/02/2019     Lab Results   Component Value Date    CR 1.39 11/02/2019     Lab Results   Component Value Date     11/02/2019          Lab Results   Component Value Date    WBC 10.1 10/31/2019    HGB 9.7 (L) 10/31/2019    HCT 31.8 (L) 10/31/2019    MCV 98 10/31/2019    PLT 29 (LL) 10/31/2019     Lab Results   Component Value Date    INR 1.39 (H) 10/30/2019     Lab Results   Component Value Date    TROPI <0.015 10/30/2019    TROPI <0.015 02/28/2018    TROPI 0.029 01/19/2018     Echo (this week).  Left ventricular systolic function is normal. The visual ejection fraction is  estimated at 55-60%.  The right ventricle is severely dilated. The right ventricular systolic  function is normal.  Mild to moderate (1-2+) mitral regurgitation.  Mod-severe to severe (3-4+) tricuspid regurgitation.  The right ventricular systolic pressure is approximated at 46mmHg plus the  right atrial pressure, suggesting elevated pulmonary artery pressures.  Trivial pericardial effusion and a pleural effusion present.  Rhythm appears to be atrial fibrillation.     This study was compared to a prior TTE from 4/11/2019. Biventricular function  is stable. The severity of mitral insufficiency and tricuspid insufficiency  remains stable. There is now a trivial pericardial effusion, and a pleural  effusion is present. The rhythm appears to be atrial fibrillation on this  present study, however it was also irregularly irregular on the prior study.    Right heart catheterization (1/2018):  Catheterization results:  1-hemodynamics  -Right atrial pressure-   -/7/(6)  -Right ventricle-28/2, 7  -Pulmonary artery pressure 30/12 (19)  -Pulmonary capillary wedge pressure     -/13 (11)  -Pulmonary artery oxygen  saturation 69% with aortic oxygen saturation  96%  -Estimated [place] cardiac output/index 6.37/3.38     Comment  This gentleman's filling pressures are generally excellent for him.  Pulmonary vascular resistance is normal. Further recommendations will  be per the cardiology service    Erin Chavez MD  11/2/2019 (> 30 minutes bedside care, discussion,service coordination.

## 2019-11-03 ENCOUNTER — APPOINTMENT (OUTPATIENT)
Dept: GENERAL RADIOLOGY | Facility: CLINIC | Age: 84
DRG: 286 | End: 2019-11-03
Attending: NURSE PRACTITIONER
Payer: MEDICARE

## 2019-11-03 ENCOUNTER — APPOINTMENT (OUTPATIENT)
Dept: OCCUPATIONAL THERAPY | Facility: CLINIC | Age: 84
DRG: 286 | End: 2019-11-03
Payer: MEDICARE

## 2019-11-03 LAB
ALBUMIN UR-MCNC: 30 MG/DL
ANION GAP SERPL CALCULATED.3IONS-SCNC: 7 MMOL/L (ref 3–14)
APPEARANCE UR: ABNORMAL
BILIRUB UR QL STRIP: NEGATIVE
BUN SERPL-MCNC: 49 MG/DL (ref 7–30)
CALCIUM SERPL-MCNC: 7.9 MG/DL (ref 8.5–10.1)
CHLORIDE SERPL-SCNC: 99 MMOL/L (ref 94–109)
CO2 SERPL-SCNC: 28 MMOL/L (ref 20–32)
COLOR UR AUTO: YELLOW
CREAT SERPL-MCNC: 1.2 MG/DL (ref 0.66–1.25)
ERYTHROCYTE [DISTWIDTH] IN BLOOD BY AUTOMATED COUNT: 21.7 % (ref 10–15)
GFR SERPL CREATININE-BSD FRML MDRD: 53 ML/MIN/{1.73_M2}
GLUCOSE BLDC GLUCOMTR-MCNC: 150 MG/DL (ref 70–99)
GLUCOSE BLDC GLUCOMTR-MCNC: 200 MG/DL (ref 70–99)
GLUCOSE BLDC GLUCOMTR-MCNC: 266 MG/DL (ref 70–99)
GLUCOSE BLDC GLUCOMTR-MCNC: 274 MG/DL (ref 70–99)
GLUCOSE SERPL-MCNC: 163 MG/DL (ref 70–99)
GLUCOSE UR STRIP-MCNC: NEGATIVE MG/DL
HCT VFR BLD AUTO: 30.3 % (ref 40–53)
HGB BLD-MCNC: 9.8 G/DL (ref 13.3–17.7)
HGB UR QL STRIP: ABNORMAL
HYALINE CASTS #/AREA URNS LPF: 1 /LPF (ref 0–2)
KETONES UR STRIP-MCNC: NEGATIVE MG/DL
LACTATE BLD-SCNC: 2.5 MMOL/L (ref 0.7–2)
LEUKOCYTE ESTERASE UR QL STRIP: ABNORMAL
MAGNESIUM SERPL-MCNC: 2.6 MG/DL (ref 1.6–2.3)
MCH RBC QN AUTO: 30.2 PG (ref 26.5–33)
MCHC RBC AUTO-ENTMCNC: 32.3 G/DL (ref 31.5–36.5)
MCV RBC AUTO: 93 FL (ref 78–100)
MUCOUS THREADS #/AREA URNS LPF: PRESENT /LPF
NITRATE UR QL: NEGATIVE
PH UR STRIP: 5.5 PH (ref 5–7)
PHOSPHATE SERPL-MCNC: 3.7 MG/DL (ref 2.5–4.5)
PLATELET # BLD AUTO: 40 10E9/L (ref 150–450)
POTASSIUM SERPL-SCNC: 3.9 MMOL/L (ref 3.4–5.3)
RBC # BLD AUTO: 3.25 10E12/L (ref 4.4–5.9)
RBC #/AREA URNS AUTO: 175 /HPF (ref 0–2)
SODIUM SERPL-SCNC: 134 MMOL/L (ref 133–144)
SOURCE: ABNORMAL
SP GR UR STRIP: 1.03 (ref 1–1.03)
UROBILINOGEN UR STRIP-MCNC: NORMAL MG/DL (ref 0–2)
WBC # BLD AUTO: 15.4 10E9/L (ref 4–11)
WBC #/AREA URNS AUTO: 75 /HPF (ref 0–5)
WBC CLUMPS #/AREA URNS HPF: PRESENT /HPF

## 2019-11-03 PROCEDURE — 81001 URINALYSIS AUTO W/SCOPE: CPT | Performed by: NURSE PRACTITIONER

## 2019-11-03 PROCEDURE — 25800030 ZZH RX IP 258 OP 636: Performed by: NURSE PRACTITIONER

## 2019-11-03 PROCEDURE — 87186 SC STD MICRODIL/AGAR DIL: CPT | Performed by: INTERNAL MEDICINE

## 2019-11-03 PROCEDURE — 80048 BASIC METABOLIC PNL TOTAL CA: CPT | Performed by: NURSE PRACTITIONER

## 2019-11-03 PROCEDURE — 85027 COMPLETE CBC AUTOMATED: CPT | Performed by: NURSE PRACTITIONER

## 2019-11-03 PROCEDURE — 87086 URINE CULTURE/COLONY COUNT: CPT | Performed by: INTERNAL MEDICINE

## 2019-11-03 PROCEDURE — 97535 SELF CARE MNGMENT TRAINING: CPT | Mod: GO

## 2019-11-03 PROCEDURE — 99291 CRITICAL CARE FIRST HOUR: CPT | Performed by: INTERNAL MEDICINE

## 2019-11-03 PROCEDURE — 71045 X-RAY EXAM CHEST 1 VIEW: CPT

## 2019-11-03 PROCEDURE — 87640 STAPH A DNA AMP PROBE: CPT | Performed by: NURSE PRACTITIONER

## 2019-11-03 PROCEDURE — 99231 SBSQ HOSP IP/OBS SF/LOW 25: CPT | Performed by: INTERNAL MEDICINE

## 2019-11-03 PROCEDURE — 87641 MR-STAPH DNA AMP PROBE: CPT | Performed by: NURSE PRACTITIONER

## 2019-11-03 PROCEDURE — 25000128 H RX IP 250 OP 636: Performed by: INTERNAL MEDICINE

## 2019-11-03 PROCEDURE — 25000128 H RX IP 250 OP 636: Performed by: NURSE PRACTITIONER

## 2019-11-03 PROCEDURE — 36415 COLL VENOUS BLD VENIPUNCTURE: CPT | Performed by: INTERNAL MEDICINE

## 2019-11-03 PROCEDURE — 87040 BLOOD CULTURE FOR BACTERIA: CPT | Performed by: INTERNAL MEDICINE

## 2019-11-03 PROCEDURE — 21000001 ZZH R&B HEART CARE

## 2019-11-03 PROCEDURE — 87088 URINE BACTERIA CULTURE: CPT | Performed by: INTERNAL MEDICINE

## 2019-11-03 PROCEDURE — 99233 SBSQ HOSP IP/OBS HIGH 50: CPT | Performed by: HOSPITALIST

## 2019-11-03 PROCEDURE — 84100 ASSAY OF PHOSPHORUS: CPT | Performed by: NURSE PRACTITIONER

## 2019-11-03 PROCEDURE — 83735 ASSAY OF MAGNESIUM: CPT | Performed by: NURSE PRACTITIONER

## 2019-11-03 PROCEDURE — 40000239 ZZH STATISTIC VAT ROUNDS

## 2019-11-03 PROCEDURE — 00000146 ZZHCL STATISTIC GLUCOSE BY METER IP

## 2019-11-03 PROCEDURE — 25000132 ZZH RX MED GY IP 250 OP 250 PS 637: Mod: GY | Performed by: INTERNAL MEDICINE

## 2019-11-03 PROCEDURE — 25000132 ZZH RX MED GY IP 250 OP 250 PS 637: Mod: GY | Performed by: NURSE PRACTITIONER

## 2019-11-03 PROCEDURE — 99233 SBSQ HOSP IP/OBS HIGH 50: CPT | Performed by: INTERNAL MEDICINE

## 2019-11-03 PROCEDURE — 83605 ASSAY OF LACTIC ACID: CPT | Performed by: PHYSICIAN ASSISTANT

## 2019-11-03 RX ORDER — METOPROLOL TARTRATE 25 MG/1
25 TABLET, FILM COATED ORAL 2 TIMES DAILY
Status: DISCONTINUED | OUTPATIENT
Start: 2019-11-04 | End: 2019-11-04

## 2019-11-03 RX ORDER — CEFTRIAXONE 1 G/1
1 INJECTION, POWDER, FOR SOLUTION INTRAMUSCULAR; INTRAVENOUS EVERY 24 HOURS
Status: DISCONTINUED | OUTPATIENT
Start: 2019-11-03 | End: 2019-11-06

## 2019-11-03 RX ADMIN — CEFTRIAXONE SODIUM 1 G: 1 INJECTION, POWDER, FOR SOLUTION INTRAMUSCULAR; INTRAVENOUS at 21:29

## 2019-11-03 RX ADMIN — INSULIN ASPART 0.5 UNITS: 100 INJECTION, SOLUTION INTRAVENOUS; SUBCUTANEOUS at 07:59

## 2019-11-03 RX ADMIN — Medication 1 MG: at 13:55

## 2019-11-03 RX ADMIN — MELATONIN 5 MG TABLET 5 MG: at 22:55

## 2019-11-03 RX ADMIN — SODIUM CHLORIDE 1000 MG: 9 INJECTION, SOLUTION INTRAVENOUS at 17:57

## 2019-11-03 RX ADMIN — INSULIN ASPART 1 UNITS: 100 INJECTION, SOLUTION INTRAVENOUS; SUBCUTANEOUS at 17:32

## 2019-11-03 RX ADMIN — INSULIN ASPART 0.5 UNITS: 100 INJECTION, SOLUTION INTRAVENOUS; SUBCUTANEOUS at 22:29

## 2019-11-03 RX ADMIN — INSULIN ASPART 1 UNITS: 100 INJECTION, SOLUTION INTRAVENOUS; SUBCUTANEOUS at 13:00

## 2019-11-03 ASSESSMENT — ACTIVITIES OF DAILY LIVING (ADL)
ADLS_ACUITY_SCORE: 22

## 2019-11-03 ASSESSMENT — MIFFLIN-ST. JEOR: SCORE: 1394

## 2019-11-03 NOTE — PLAN OF CARE
Pt hemodynamically stable off pressors. A-fib CVR. Metoprolol held today per Itensivist. Will restart tomorrow. Lungs clear and diminished on RA. Mccullough in place, good UOP. Neurologically intact, [place]. Family at bedside and supportive. Updated throughout the day. Will transfer patient to CCU. Report called to MATTY Molina.

## 2019-11-03 NOTE — PROGRESS NOTES
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 Still on 0.04 norepi. Weaning as able. KATIE tomorrow. Dr. Harris will see patient tomorrow am to reassess surgical status.     Saranya Fonseca PA-C  CV Surgery

## 2019-11-03 NOTE — PLAN OF CARE
Discharge Planner OT   Patient plan for discharge: Home  Current status: While seated/standing pt completed LE dressing including don/doff socks with CGA and don/doff underwear with minimum assist and no AE. Pt completed sit<>Stand with minimum assist of 1. Pt demonstrated posterior lean and heavy reliance on the chair behind him during standing for LE dressing.   Barriers to return to prior living situation: level of assist for ADL and mobility; decreased independence in I/ADLs  Recommendations for discharge: TCU  Rationale for recommendations: Pt is below baseline in I/ADLs. Pt will benefit from skilled OT to address safety and independence in I/ADLs.        Entered by: Rosalee Manning 11/03/2019 12:24 PM

## 2019-11-03 NOTE — PROGRESS NOTES
[facility]    Hematology/Oncology Progress Note      Today's Date: 11/03/19  Date of Admission:  10/30/2019  Reason for Consult: Thrombocytopenia.      ASSESSMENT/PLAN:  Jama Paul is a 88 year old male with the following issues:  1. 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 Britton.  -Prior bone marrow biopsy 3/4/2019 did not meet criteria for MDS.  At present time, thrombocytopenia is presumed to be ITP.  -Now on trial of high dose SoluMedrol for 3 days (today is day 3) -- platelet count is slightly improved.  Will give additional 1-2 days of steroids --- switch over to prednisone at 1 mg/kg PO daily tomorrow 11/4.  Taper if no further improvement in platelet count.  -Recommend platelet transfusion prior to upcoming invasive cardiac procedure to achieve >= 50,000 plts if this level not yet reached with steroids.     2. Acute on chronic diastolic congestive heart failure with severe tricuspid regurgitation  -S/p bilateral thoracentesis, with transudate, no evidence of infection.  -Now on vasopressor support and diuresis.  -Note possible plans for KATIE tomorrow.    Hematology will continue to follow.      Casie Aguila MD  Hematology/Oncology  [facility]    INTERIM HISTORY:  Jama has no new complaints.  Denies bleeding issues.     MEDICATIONS:  Current Facility-Administered Medications   Medication     acetaminophen (TYLENOL) tablet 650 mg     bisacodyl (DULCOLAX) Suppository 10 mg     glucose gel 15-30 g    Or     dextrose 50 % injection 25-50 mL    Or     glucagon injection 1 mg     influenza Vac Split High-Dose (FLUZONE) injection 0.5 mL     insulin aspart (NovoPen ECHO/NovoLOG) cartridge     insulin aspart (NovoPen ECHO/NovoLOG) cartridge     lidocaine (LMX4) cream     lidocaine 1 % 0.1-1 mL     lidocaine 1 % 0.1-1 mL     magnesium sulfate 4 g in 100 mL sterile water (premade)     melatonin sublingual tablet 5 mg     melatonin tablet 1 mg      methylPREDNISolone sodium succinate (solu-MEDROL) 1,000 mg in sodium chloride 0.9 % 250 mL intermittent infusion     metoclopramide (REGLAN) tablet 5 mg    Or     metoclopramide (REGLAN) injection 5 mg     metoprolol (LOPRESSOR) injection 2.5 mg     [START ON 11/4/2019] metoprolol tartrate (LOPRESSOR) tablet 25 mg     naloxone (NARCAN) injection 0.1-0.4 mg     nitroGLYcerin (NITROSTAT) sublingual tablet 0.4 mg     norepinephrine (LEVOPHED) 16 mg in  mL infusion     ondansetron (ZOFRAN-ODT) ODT tab 4 mg    Or     ondansetron (ZOFRAN) injection 4 mg     oxyCODONE (ROXICODONE) tablet 5-10 mg     polyethylene glycol (MIRALAX/GLYCOLAX) Packet 17 g     potassium chloride (KLOR-CON) Packet 20-40 mEq     potassium chloride 10 mEq in 100 mL intermittent infusion with 10 mg lidocaine     potassium chloride 10 mEq in 100 mL sterile water intermittent infusion (premix)     potassium chloride 20 mEq in 50 mL intermittent infusion     potassium chloride ER (K-DUR/KLOR-CON M) CR tablet 20-40 mEq     [START ON 11/4/2019] predniSONE (DELTASONE) tablet 70 mg     prochlorperazine (COMPAZINE) injection 5 mg    Or     prochlorperazine (COMPAZINE) tablet 5 mg    Or     prochlorperazine (COMPAZINE) Suppository 12.5 mg     senna-docusate (SENOKOT-S/PERICOLACE) 8.6-50 MG per tablet 1 tablet    Or     senna-docusate (SENOKOT-S/PERICOLACE) 8.6-50 MG per tablet 2 tablet     sodium chloride (PF) 0.9% PF flush 3 mL     sodium chloride (PF) 0.9% PF flush 3 mL           ALLERGIES:  No Known Allergies      PHYSICAL EXAM:  Vital signs:  Temp: 97.8  F (36.6  C) Temp src: Oral BP: 103/75 Pulse: 90 Heart Rate: 82 Resp: 18 SpO2: 92 % O2 Device: None (Room air) Oxygen Delivery: 2 LPM Height: 182.9 cm (6') Weight: 68.6 kg (151 lb 3.8 oz)  Estimated body mass index is 20.51 kg/m  as calculated from the following:    Height as of this encounter: 1.829 m (6').    Weight as of this encounter: 68.6 kg (151 lb 3.8 oz).    GENERAL/CONSTITUTIONAL: No acute  distress.  EYES: No scleral icterus.  GASTROINTESTINAL: No guarding.  No distention.  MUSCULOSKELETAL: Warm and well-perfused, no cyanosis, clubbing, or edema.  INTEGUMENTARY: No rashes or jaundice. Scattered ecchymoses over the upper extremities.        LABS:  CBC RESULTS:   Recent Labs   Lab Test 11/03/19  0442   WBC 15.4*   RBC 3.25*   HGB 9.8*   HCT 30.3*   MCV 93   MCH 30.2   MCHC 32.3   RDW 21.7*   PLT 40*       Recent Labs   Lab Test 11/03/19  0442 11/02/19  0635    133   POTASSIUM 3.9 3.8   CHLORIDE 99 98   CO2 28 27   ANIONGAP 7 8   * 216*   BUN 49* 43*   CR 1.20 1.39*   BARON 7.9* 7.8*         PATHOLOGY:  None.    IMAGING:  CHEST ONE VIEW PORTABLE   11/3/2019 5:02 AM      HISTORY: bilateral pleural effusions, right pneumothorax     COMPARISON: 11/2/2019 0530 hours                                                                      IMPRESSION:   1. Increasing opacity at the right lung base consistent with  increasing atelectasis or effusion. No significant change in left  lower lobe atelectasis.  2. Right PICC line redemonstrated with tip projecting over distal SVC.  3. No pneumothorax identified.

## 2019-11-03 NOTE — PROGRESS NOTES
Intensivist note  I spoke with patient today and he feels better. He is still on levophed but able to titrate dose down slowly over time, encouraging.     I spoke with Dr. Aguila today and appreciate her input. He will complete his 3rd (and final) dose of solumedrol 1 gram IV today to try and improve platelet count (possible immunologic mechanism for thrombocytopenia). His current count is 40k and can support with platelet transfusion during procedures if necessary.     Assessment and Plan   1. Shock, likely cardiogenic in nature coupled with potential volume depletion due to Lasix and consequence of severe underlying valvular heart disease. At present we continues to titrate down off of Levophed, and is receiving very little in terms of other meds at present.   2. His acute respiratory failure as a consequence of CHF remains compensated, on 2 liters NC oxygen  3. Valvular heart disease severe- especially severeTR and RV dysfunction, and moderate MR. Per cardiology, he may yet benefit from a cath and will monitor expectantly only at present.   4. Congestive heart failure, compensated on 2 liters NC oxygen at present. We will resume lasix once shock resolves  5. HTN  6. ITP- as above. He had a bone marrow some months ago which may also be an MDS rather than ITP.  7. Atrial fibrillation- HR ok and monitor only at present.   8. History of bleeding episode in the past 3 months resulting in loss of eye sight in left eye, difficulty ambulating requiring a walker, and urinary incontinence.   Overall, he remains acute and critical. I spent 30 minutes of critical care time assessing and managing his acute shock, assessing and adjusting vasopressors and fluid management.     Physical exam  Well developed male NAD  BP 96/53   Pulse 92   Temp 98.1  F (36.7  C) (Oral)   Resp (!) 31   Ht 1.829 m (6')   Wt 68.6 kg (151 lb 3.8 oz)   SpO2 92%   BMI 20.51 kg/m    Lungs mainly clear with decreased breath sounds at bases  Heart  is RRR   Abdomen is soft and non-tender  Extremities are warm with mild edema  Skin shows no cyanosis or mottling   CNS moves all extremities well to command     Labs reviewed    jamia bailye  November 3, 2019

## 2019-11-03 NOTE — PROGRESS NOTES
[facility]    Hospitalist Progress Note    Brief Summary:  This is an 88-year-old male with a history of hypertension, atrial fibrillation, nonischemic cardiomyopathy, congestive heart failure, moderate to severe tricuspid regurgitation and mild to moderate mitral regurgitation, idiopathic thrombocytopenic purpura and hiatal hernia, who comes to the ER with a complaint of worsening shortness of breath. Found to have bilateral pleural effusions.     Assessment & Plan   Acute on chronic diastolic congestive heart failure with worsening bilateral pleural effusion, severely dilated RV.  Moderate to severe tricuspid regurgitation and mild to moderate mitral regurgitation ([facility] consulted).   Bilateral Pleural effusions S/P bilateral Thoracentesis under U/S guidance with about 2.2 liter out from right and 800 ml out from left.   Mild pericardial effusion.  Atrial fib  Cardiogenic/hypovolemic shock requiring pressor. Off pressors now.   - plan per cards/intensivist.      Right PTX (iatrogenic): improving per CXR. Stable sob.  - CTS on board.  - Monitor resp status.  - Supportive care.    Thrombocytopenia: presumed ITP. Seen by hemonc, started on high dose solmedrol. PLT improved to 29-- 40 --40 this am.  - Cont steroids.  - Monitor PLT.  - Further plan pe rhemonc.      Hypertension: ooff pressors now.  - Monitor.  - Prn meds for now.      DVT Prophylaxis: Pneumatic Compression Devices  Code Status: Full Code    Disposition: per clinical course.     Aidan Messer MD    Interval History   No change in sob. Sitting on a chair. Denies chest pain/ no abd pain/n/v. .    Physical Exam   Temp: 98.1  F (36.7  C) Temp src: Oral BP: (!) 88/49 Pulse: 80 Heart Rate: 89 Resp: 20 SpO2: 93 % O2 Device: None (Room air) Oxygen Delivery: 2 LPM  Vitals:    11/01/19 1041 11/02/19 0500 11/03/19 0400   Weight: 67.4 kg (148 lb 8 oz) 69.1 kg (152 lb 5.4 oz) 68.6 kg (151 lb 3.8 oz)     Vital Signs with Ranges  Temp:  [97.3  F (36.3   C)-98.1  F (36.7  C)] 98.1  F (36.7  C)  Pulse:  [] 80  Heart Rate:  [] 89  Resp:  [13-48] 20  BP: ()/(49-98) 88/49  SpO2:  [91 %-100 %] 93 %  I/O last 3 completed shifts:  In: 383.28 [P.O.:170; I.V.:213.28]  Out: 1855 [Urine:1855]    Constitutional: awake, alert, cooperative, no apparent distress, and appears stated age  Eyes: Lids and lashes normal, pupils equal, round and reactive to light, extra ocular muscles intact, sclera clear, conjunctiva normal  Respiratory: improve air entry bilaterally, coarse sounds with some basal crackles, no wheezing  Cardiovascular: Normal apical impulse, regular rate and rhythm, normal S1 and S2, no S3 or S4, and no murmur noted  GI: No scars, normal bowel sounds, soft, non-distended, non-tender, no masses palpated, no hepatosplenomegally  Skin: no bruising or bleeding  Musculoskeletal: no lower extremity pitting edema present, wound on right shin healing well.  Neurologic: no focal deficit.     Medications     norepinephrine Stopped (11/03/19 0900)       influenza Vac Split High-Dose  0.5 mL Intramuscular Prior to discharge     insulin aspart  0.5-2.5 Units Subcutaneous TID AC     insulin aspart  0.5-2.5 Units Subcutaneous At Bedtime     methylPREDNISolone  1,000 mg Intravenous Q24H     [START ON 11/4/2019] metoprolol tartrate  25 mg Oral BID     [START ON 11/4/2019] predniSONE  70 mg Oral Daily     sodium chloride (PF)  3 mL Intracatheter Q8H       Data   Recent Labs   Lab 11/03/19  0442 11/02/19  1219 11/02/19  0635 11/01/19  0638 10/31/19  0524 10/30/19  1134   WBC 15.4* 15.8*  --   --  10.1 7.8   HGB 9.8* 9.5*  --   --  9.7* 10.3*   MCV 93 94  --   --  98 96   PLT 40* 40*  --   --  29* 38*   INR  --   --   --   --   --  1.39*     --  133 134 135 138   POTASSIUM 3.9  --  3.8 3.7 3.9 4.2   CHLORIDE 99  --  98 100 103 107   CO2 28  --  27 31 28 27   BUN 49*  --  43* 33* 22 19   CR 1.20  --  1.39* 1.20 1.13 1.02   ANIONGAP 7  --  8 3 4 4   BARON 7.9*  --   7.8* 7.9* 7.9* 8.3*   *  --  216* 111* 109* 88   ALBUMIN  --   --   --   --  3.2*  --    PROTTOTAL  --   --   --   --  5.8*  --    BILITOTAL  --   --   --   --  1.6*  --    ALKPHOS  --   --   --   --  36*  --    ALT  --   --   --   --  18  --    AST  --   --   --   --  11  --    TROPI  --   --   --   --   --  <0.015       Recent Results (from the past 24 hour(s))   XR Chest Port 1 View    Narrative    CHEST ONE VIEW PORTABLE   11/3/2019 5:02 AM     HISTORY: bilateral pleural effusions, right pneumothorax    COMPARISON: 11/2/2019 0530 hours      Impression    IMPRESSION:   1. Increasing opacity at the right lung base consistent with  increasing atelectasis or effusion. No significant change in left  lower lobe atelectasis.  2. Right PICC line redemonstrated with tip projecting over distal SVC.  3. No pneumothorax identified.    LETITIA STOVER MD

## 2019-11-03 NOTE — PROGRESS NOTES
"  [facility] Progress Note          Assessment and Plan:   Assessment:   1. HFpEF - symptomatic decompensation was rapid (one week) prior to admission suggesting he was previously \"compensated\".    11/1 became hypotensive - likely a result of rapid HR and vasodilation (dobutamine) and reduced preload (Bumex).    Appreciated on-call intervention with discontinuation of both above medications,resumption of beta blocker.  Stable now.  Long discussion this AM with : will need further evaluation with KATIE and right/left heart catheterization.  Unclear that mitral valve is an issue, more likely tricuspid. TV repair/TVR would be higher risk though RV function normal now. If he can return to prior \"compensated\"status, medical therapy may be more successful at maintaining quality of life.  Do not think he would tolerate TVR and MVR.    Significant diuresis off diuretics and on vasopressor suggests he does not tolerate decrease in preload.    Intake/Output Summary (Last 24 hours) at 11/3/2019 1016  Last data filed at 11/3/2019 0815  Gross per 24 hour   Intake 315.33 ml   Output 1580 ml   Net -1264.67 ml       2. Atrial fibrillation- RVR earlier but now rate about 100 bpm. Not on anticoagulation.May be Watchman candidate if no surgery,TATYANA ligation if surgery.  He notes that his heart rate has been higher recently, minimal movement in his chair sends his HR into the 90's bpm.  3. Thrombocytopenia - 20K platelets.  4. MR -asabove.  5.TR - as above,dilated RV with normal function.        Plan:     ICU transfer today to CCU.  KATIE tomorrow - discussed with he and then his daughter and then, his wife.  Right and left heart catheterization later next week if CVTS considering surgery.  Avoid vasodilation, further decrease in preload.  Wean levophed today to keep MAP>65 mmHg.  Up in chair - doing better.            Significant Problems:     Patient Active Problem List    Diagnosis Date Noted     Pleural " effusion 10/30/2019     Priority: Medium     Bilateral pleural effusion 10/30/2019     Priority: Medium     Ulcer of right lower extremity with fat layer exposed (H) 09/16/2019     Priority: Medium     Idiopathic thrombocytopenic purpura (H) 04/05/2019     Priority: Medium     Thrombocytopenia (H)      Priority: Medium     Non-ischemic cardiomyopathy (H)      Priority: Medium     2017, med treatment, echo done 4/18 nl ef, mod to severe tr       Eye hemorrhage, left 02/01/2019     Priority: Medium     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 Feeling good without dyspnea.          Medications:   Scheduled:    influenza Vac Split High-Dose  0.5 mL Intramuscular Prior to discharge     insulin aspart  0.5-2.5 Units Subcutaneous TID AC     insulin aspart  0.5-2.5 Units Subcutaneous At Bedtime     methylPREDNISolone  1,000 mg Intravenous Q24H     [START ON 11/4/2019] metoprolol tartrate  25 mg Oral BID     [START ON 11/4/2019] predniSONE  70 mg Oral Daily     sodium chloride (PF)  3 mL Intracatheter Q8H             Physical Exam:   All vitals have been reviewed  Temp:  [97.3  F (36.3  C)-98.1  F (36.7  C)] 98.1  F (36.7  C)  Pulse:  [] 83  Heart Rate:  [] 90  Resp:  [13-48] 20  BP: ()/(50-98) 89/50  SpO2:  [92 %-100 %] 95 %  Vitals:    10/30/19 1044 10/31/19 0619 11/01/19 1041 11/02/19 0500   Weight: 75.3 kg (166 lb) 66.8 kg (147 lb 6 oz) 67.4 kg (148 lb 8 oz) 69.1 kg (152 lb 5.4 oz)    11/03/19 0400   Weight: 68.6 kg  (151 lb 3.8 oz)     I/O last 3 completed shifts:  In: 383.28 [P.O.:170; I.V.:213.28]  Out: 1855 [Urine:1855]    NAD, alert and oriented.    Skin: warm and dry.    Neck:   No JVD - does have an inspiratory rise to near jaw angle.     Lungs:   Clear without wheezes, rhonchi but decreased BS at bases.     Cardiovascular:   S1, S2, iregularly irregular, 2-3 holosystolic murmur at LSB.     Ext's: no edema.          Data:     Last Comprehensive Metabolic Panel:  Sodium   Date Value Ref Range Status   11/03/2019 134 133 - 144 mmol/L Final     Potassium   Date Value Ref Range Status   11/03/2019 3.9 3.4 - 5.3 mmol/L Final     Chloride   Date Value Ref Range Status   11/03/2019 99 94 - 109 mmol/L Final     Carbon Dioxide   Date Value Ref Range Status   11/03/2019 28 20 - 32 mmol/L Final     Anion Gap   Date Value Ref Range Status   11/03/2019 7 3 - 14 mmol/L Final     Glucose   Date Value Ref Range Status   11/03/2019 163 (H) 70 - 99 mg/dL Final     Urea Nitrogen   Date Value Ref Range Status   11/03/2019 49 (H) 7 - 30 mg/dL Final     Creatinine   Date Value Ref Range Status   11/03/2019 1.20 0.66 - 1.25 mg/dL Final     GFR Estimate   Date Value Ref Range Status   11/03/2019 53 (L) >60 mL/min/[1.73_m2] Final     Comment:     Non  GFR Calc  Starting 12/18/2018, serum creatinine based estimated GFR (eGFR) will be   calculated using the Chronic Kidney Disease Epidemiology Collaboration   (CKD-EPI) equation.       Calcium   Date Value Ref Range Status   11/03/2019 7.9 (L) 8.5 - 10.1 mg/dL Final       Lab Results   Component Value Date    WBC 15.4 (H) 11/03/2019    HGB 9.8 (L) 11/03/2019    HCT 30.3 (L) 11/03/2019    MCV 93 11/03/2019    PLT 40 (LL) 11/03/2019     Lab Results   Component Value Date    INR 1.39 (H) 10/30/2019     Lab Results   Component Value Date    TROPI <0.015 10/30/2019    TROPI <0.015 02/28/2018    TROPI 0.029 01/19/2018     Echo (this week).  Left ventricular systolic function is normal. The  visual ejection fraction is  estimated at 55-60%.  The right ventricle is severely dilated. The right ventricular systolic  function is normal.  Mild to moderate (1-2+) mitral regurgitation.  Mod-severe to severe (3-4+) tricuspid regurgitation.  The right ventricular systolic pressure is approximated at 46mmHg plus the  right atrial pressure, suggesting elevated pulmonary artery pressures.  Trivial pericardial effusion and a pleural effusion present.  Rhythm appears to be atrial fibrillation.     This study was compared to a prior TTE from 4/11/2019. Biventricular function  is stable. The severity of mitral insufficiency and tricuspid insufficiency  remains stable. There is now a trivial pericardial effusion, and a pleural  effusion is present. The rhythm appears to be atrial fibrillation on this  present study, however it was also irregularly irregular on the prior study.    Right heart catheterization (1/2018):  Catheterization results:  1-hemodynamics  -Right atrial pressure-   -/7/(6)  -Right ventricle-28/2, 7  -Pulmonary artery pressure 30/12 (19)  -Pulmonary capillary wedge pressure     -/13 (11)  -Pulmonary artery oxygen saturation 69% with aortic oxygen saturation  96%  -Estimated [place] cardiac output/index 6.37/3.38     Comment  This gentleman's filling pressures are generally excellent for him.  Pulmonary vascular resistance is normal. Further recommendations will  be per the cardiology service    Erin Chavez MD  11/3/2019  (Critical care time 45 minutes, coordination of care and discussion with patient and family).

## 2019-11-04 ENCOUNTER — APPOINTMENT (OUTPATIENT)
Dept: ULTRASOUND IMAGING | Facility: CLINIC | Age: 84
DRG: 286 | End: 2019-11-04
Attending: PHYSICIAN ASSISTANT
Payer: MEDICARE

## 2019-11-04 ENCOUNTER — APPOINTMENT (OUTPATIENT)
Dept: PHYSICAL THERAPY | Facility: CLINIC | Age: 84
DRG: 286 | End: 2019-11-04
Attending: NURSE PRACTITIONER
Payer: MEDICARE

## 2019-11-04 ENCOUNTER — APPOINTMENT (OUTPATIENT)
Dept: GENERAL RADIOLOGY | Facility: CLINIC | Age: 84
DRG: 286 | End: 2019-11-04
Attending: NURSE PRACTITIONER
Payer: MEDICARE

## 2019-11-04 LAB
ANION GAP SERPL CALCULATED.3IONS-SCNC: 7 MMOL/L (ref 3–14)
BACTERIA SPEC CULT: NO GROWTH
BUN SERPL-MCNC: 61 MG/DL (ref 7–30)
CALCIUM SERPL-MCNC: 7.7 MG/DL (ref 8.5–10.1)
CHLORIDE SERPL-SCNC: 102 MMOL/L (ref 94–109)
CO2 SERPL-SCNC: 27 MMOL/L (ref 20–32)
CREAT SERPL-MCNC: 1.29 MG/DL (ref 0.66–1.25)
ERYTHROCYTE [DISTWIDTH] IN BLOOD BY AUTOMATED COUNT: 21.8 % (ref 10–15)
GFR SERPL CREATININE-BSD FRML MDRD: 49 ML/MIN/{1.73_M2}
GLUCOSE BLDC GLUCOMTR-MCNC: 128 MG/DL (ref 70–99)
GLUCOSE BLDC GLUCOMTR-MCNC: 155 MG/DL (ref 70–99)
GLUCOSE BLDC GLUCOMTR-MCNC: 165 MG/DL (ref 70–99)
GLUCOSE BLDC GLUCOMTR-MCNC: 177 MG/DL (ref 70–99)
GLUCOSE BLDC GLUCOMTR-MCNC: 338 MG/DL (ref 70–99)
GLUCOSE BLDC GLUCOMTR-MCNC: 376 MG/DL (ref 70–99)
GLUCOSE SERPL-MCNC: 171 MG/DL (ref 70–99)
HCT VFR BLD AUTO: 28.6 % (ref 40–53)
HGB BLD-MCNC: 9.3 G/DL (ref 13.3–17.7)
MAGNESIUM SERPL-MCNC: 2.6 MG/DL (ref 1.6–2.3)
MCH RBC QN AUTO: 30.3 PG (ref 26.5–33)
MCHC RBC AUTO-ENTMCNC: 32.5 G/DL (ref 31.5–36.5)
MCV RBC AUTO: 93 FL (ref 78–100)
MRSA DNA SPEC QL NAA+PROBE: NEGATIVE
PHOSPHATE SERPL-MCNC: 4.2 MG/DL (ref 2.5–4.5)
PLATELET # BLD AUTO: 33 10E9/L (ref 150–450)
POTASSIUM SERPL-SCNC: 4.2 MMOL/L (ref 3.4–5.3)
RBC # BLD AUTO: 3.07 10E12/L (ref 4.4–5.9)
SODIUM SERPL-SCNC: 136 MMOL/L (ref 133–144)
SPECIMEN SOURCE: NORMAL
SPECIMEN SOURCE: NORMAL
WBC # BLD AUTO: 7.2 10E9/L (ref 4–11)

## 2019-11-04 PROCEDURE — 25000132 ZZH RX MED GY IP 250 OP 250 PS 637: Mod: GY | Performed by: INTERNAL MEDICINE

## 2019-11-04 PROCEDURE — 21000001 ZZH R&B HEART CARE

## 2019-11-04 PROCEDURE — 80048 BASIC METABOLIC PNL TOTAL CA: CPT | Performed by: NURSE PRACTITIONER

## 2019-11-04 PROCEDURE — 25000128 H RX IP 250 OP 636: Performed by: INTERNAL MEDICINE

## 2019-11-04 PROCEDURE — 84100 ASSAY OF PHOSPHORUS: CPT | Performed by: NURSE PRACTITIONER

## 2019-11-04 PROCEDURE — 71045 X-RAY EXAM CHEST 1 VIEW: CPT

## 2019-11-04 PROCEDURE — 00000146 ZZHCL STATISTIC GLUCOSE BY METER IP

## 2019-11-04 PROCEDURE — 97162 PT EVAL MOD COMPLEX 30 MIN: CPT | Mod: GP | Performed by: PHYSICAL THERAPIST

## 2019-11-04 PROCEDURE — 3E033XZ INTRODUCTION OF VASOPRESSOR INTO PERIPHERAL VEIN, PERCUTANEOUS APPROACH: ICD-10-PCS | Performed by: INTERNAL MEDICINE

## 2019-11-04 PROCEDURE — 99233 SBSQ HOSP IP/OBS HIGH 50: CPT | Performed by: INTERNAL MEDICINE

## 2019-11-04 PROCEDURE — 85027 COMPLETE CBC AUTOMATED: CPT | Performed by: NURSE PRACTITIONER

## 2019-11-04 PROCEDURE — 25000131 ZZH RX MED GY IP 250 OP 636 PS 637: Mod: GY | Performed by: INTERNAL MEDICINE

## 2019-11-04 PROCEDURE — 83735 ASSAY OF MAGNESIUM: CPT | Performed by: NURSE PRACTITIONER

## 2019-11-04 PROCEDURE — 25000125 ZZHC RX 250: Performed by: INTERNAL MEDICINE

## 2019-11-04 PROCEDURE — 40000857 ZZH STATISTIC TEE INCLUDES SEDATION

## 2019-11-04 PROCEDURE — 99233 SBSQ HOSP IP/OBS HIGH 50: CPT | Mod: 25 | Performed by: INTERNAL MEDICINE

## 2019-11-04 PROCEDURE — 97530 THERAPEUTIC ACTIVITIES: CPT | Mod: GP | Performed by: PHYSICAL THERAPIST

## 2019-11-04 PROCEDURE — 93880 EXTRACRANIAL BILAT STUDY: CPT

## 2019-11-04 RX ORDER — NALOXONE HYDROCHLORIDE 0.4 MG/ML
.1-.4 INJECTION, SOLUTION INTRAMUSCULAR; INTRAVENOUS; SUBCUTANEOUS
Status: DISCONTINUED | OUTPATIENT
Start: 2019-11-04 | End: 2019-11-05 | Stop reason: HOSPADM

## 2019-11-04 RX ORDER — METOPROLOL SUCCINATE 25 MG/1
25 TABLET, EXTENDED RELEASE ORAL 2 TIMES DAILY
Status: DISCONTINUED | OUTPATIENT
Start: 2019-11-04 | End: 2019-11-11

## 2019-11-04 RX ORDER — LIDOCAINE HYDROCHLORIDE 40 MG/ML
1.5 SOLUTION TOPICAL ONCE
Status: COMPLETED | OUTPATIENT
Start: 2019-11-04 | End: 2019-11-04

## 2019-11-04 RX ORDER — SODIUM CHLORIDE 9 MG/ML
INJECTION, SOLUTION INTRAVENOUS CONTINUOUS PRN
Status: DISCONTINUED | OUTPATIENT
Start: 2019-11-04 | End: 2019-11-05 | Stop reason: HOSPADM

## 2019-11-04 RX ORDER — GLYCOPYRROLATE 0.2 MG/ML
0.1 INJECTION, SOLUTION INTRAMUSCULAR; INTRAVENOUS ONCE
Status: COMPLETED | OUTPATIENT
Start: 2019-11-04 | End: 2019-11-04

## 2019-11-04 RX ORDER — FENTANYL CITRATE 50 UG/ML
25 INJECTION, SOLUTION INTRAMUSCULAR; INTRAVENOUS
Status: DISCONTINUED | OUTPATIENT
Start: 2019-11-04 | End: 2019-11-05 | Stop reason: HOSPADM

## 2019-11-04 RX ORDER — FLUMAZENIL 0.1 MG/ML
0.2 INJECTION, SOLUTION INTRAVENOUS
Status: DISCONTINUED | OUTPATIENT
Start: 2019-11-04 | End: 2019-11-05 | Stop reason: HOSPADM

## 2019-11-04 RX ORDER — FUROSEMIDE 20 MG
20 TABLET ORAL
Status: DISCONTINUED | OUTPATIENT
Start: 2019-11-05 | End: 2019-11-05

## 2019-11-04 RX ADMIN — INSULIN ASPART 1 UNITS: 100 INJECTION, SOLUTION INTRAVENOUS; SUBCUTANEOUS at 22:11

## 2019-11-04 RX ADMIN — MELATONIN 5 MG TABLET 5 MG: at 22:02

## 2019-11-04 RX ADMIN — TOPICAL ANESTHETIC 0.5 ML: 200 SPRAY DENTAL; PERIODONTAL at 14:55

## 2019-11-04 RX ADMIN — INSULIN ASPART 0.5 UNITS: 100 INJECTION, SOLUTION INTRAVENOUS; SUBCUTANEOUS at 18:27

## 2019-11-04 RX ADMIN — MELATONIN 5 MG TABLET 5 MG: at 09:33

## 2019-11-04 RX ADMIN — Medication 1 LOZENGE: at 22:00

## 2019-11-04 RX ADMIN — PREDNISONE 70 MG: 10 TABLET ORAL at 08:55

## 2019-11-04 RX ADMIN — TOPICAL ANESTHETIC 0.5 ML: 200 SPRAY DENTAL; PERIODONTAL at 14:50

## 2019-11-04 RX ADMIN — GLYCOPYRROLATE 0.1 MG: 0.2 INJECTION, SOLUTION INTRAMUSCULAR; INTRAVENOUS at 14:13

## 2019-11-04 RX ADMIN — METOPROLOL TARTRATE 25 MG: 25 TABLET ORAL at 08:55

## 2019-11-04 RX ADMIN — LIDOCAINE HYDROCHLORIDE 1.5 ML: 40 SOLUTION TOPICAL at 14:17

## 2019-11-04 RX ADMIN — MIDAZOLAM 0.5 MG: 1 INJECTION INTRAMUSCULAR; INTRAVENOUS at 15:08

## 2019-11-04 RX ADMIN — MIDAZOLAM 0.5 MG: 1 INJECTION INTRAMUSCULAR; INTRAVENOUS at 15:02

## 2019-11-04 RX ADMIN — FENTANYL CITRATE 25 MCG: 50 INJECTION, SOLUTION INTRAMUSCULAR; INTRAVENOUS at 15:04

## 2019-11-04 RX ADMIN — FENTANYL CITRATE 25 MCG: 50 INJECTION, SOLUTION INTRAMUSCULAR; INTRAVENOUS at 14:57

## 2019-11-04 RX ADMIN — MIDAZOLAM 1 MG: 1 INJECTION INTRAMUSCULAR; INTRAVENOUS at 14:58

## 2019-11-04 RX ADMIN — METOPROLOL SUCCINATE 25 MG: 25 TABLET, EXTENDED RELEASE ORAL at 22:00

## 2019-11-04 RX ADMIN — CEFTRIAXONE SODIUM 1 G: 1 INJECTION, POWDER, FOR SOLUTION INTRAMUSCULAR; INTRAVENOUS at 19:57

## 2019-11-04 ASSESSMENT — MIFFLIN-ST. JEOR: SCORE: 1400.64

## 2019-11-04 NOTE — PROCEDURES
Procedure note:    Unsuccessful KATIE procedure. Unable to intubate with KATIE probe due to transient hypoxia. With midazolam 1.5mg and fentanyl 25mcg Pt O2 sats dropped from 95-96 to 90 with incr work of breathing. 3 attempts made to pass probe. Unfortunately there was a small abrasion on his lower lip sustained during the last intubation attempt, with mild bleeding that was resolved with manual pressure. No blood at the posterior oropharynx visualized with suction.     Discussed case with inpatient cardiologist Dr. Erin Chavez. Plan for KATIE with Anesthesiology assistance with sedation and airway management tomorrow. Patient and his family were updated on the plan.     Hugo Jacobsen MD  [facility]  Cardiology  Pager:  [phone]  Text Page   November 4, 2019

## 2019-11-04 NOTE — PROVIDER NOTIFICATION
Message sent to KATIE cardiologist Dr Jacobsen regarding pt's platelet count results. Awaiting further orders.

## 2019-11-04 NOTE — PLAN OF CARE
VSS, denies pain, was unable to do KATIE today, small tear on lip from procedure but no bleeding noted now, pt denies pain. Will do tomorrow KATIE tomorrow with anesthesia per cardiology.  Repositioned frequently, blind in left eye.  Pt declined to get out of bed, tolerating diet.  Continue to monitor.

## 2019-11-04 NOTE — PLAN OF CARE
Cognition/behavior: Patient alert and oriented x4.   VS/O2: WDL on room air.  Mobility: Standby assistance with walker.  Pain: Denies  Resp. Status: Breathing unlabored, stable  CV: No chest pain, irregular rate in 70's  GI/: Mccullough catheter.  Skin: Red areas on back with Mepilex, UTV.   Lines: PICC right arm, peripheral IV left arm

## 2019-11-04 NOTE — PLAN OF CARE
Patient A&0x4. Forgetful. [place]. Blind in L eye. +2 edema in feet and ankles. Lungs diminished. On Ra. Denies pain.

## 2019-11-04 NOTE — PROGRESS NOTES
11/04/19 1127   Quick Adds   Type of Visit Initial PT Evaluation   Living Environment   Lives With spouse   Living Arrangements assisted living   Home Accessibility no concerns   Self-Care   Usual Activity Tolerance good   Current Activity Tolerance fair   Regular Exercise Yes   Activity/Exercise Type biking;other (see comments)  (elliptical in gym, stationary bike)   Exercise Amount/Frequency 3-5 times/wk   Equipment Currently Used at Home shower chair;walker, rolling   Activity/Exercise/Self-Care Comment independent dressing, has catheter   Functional Level Prior   Ambulation 1-->assistive equipment   Transferring 1-->assistive equipment   Toileting 1-->assistive equipment   Bathing 1-->assistive equipment   Communication 0-->understands/communicates without difficulty   Swallowing 0-->swallows foods/liquids without difficulty   Cognition 0 - no cognition issues reported   Fall history within last six months no   Which of the above functional risks had a recent onset or change? ambulation;transferring   Prior Functional Level Comment PT lives at [facility] and can increase assist as needed   General Information   Onset of Illness/Injury or Date of Surgery - Date 10/30/19   Referring Physician Dr. Ambrosio   Patient/Family Goals Statement Pt wants to return to [facility] and increase care as needed   Pertinent History of Current Problem (include personal factors and/or comorbidities that impact the POC) PT is an 88 year old male admitted with bilat pulmonary effusions, CHF, right leg wound.   Precautions/Limitations fall precautions   Weight-Bearing Status - LLE full weight-bearing   Weight-Bearing Status - RLE full weight-bearing   Cognitive Status Examination   Orientation orientation to person, place and time   Level of Consciousness alert   Follows Commands and Answers Questions 100% of the time   Personal Safety and Judgment intact   Memory intact   Pain Assessment   Patient Currently in Pain  "No   Integumentary/Edema   Integumentary/Edema Comments right leg wrapped, catheter,   Posture    Posture Comments kyphosis, forward head   Range of Motion (ROM)   ROM Comment Extremities WFL   Strength   Strength Comments generalized weakness, functional but decreased    Bed Mobility   Bed Mobility Comments supine to sit via rolling modA, sit to supine: modA and Josh   Transfer Skills   Transfer Comments sit to stand: Josh of 2 with FWW   Gait   Gait Comments Pt took side steps along bed with modA of one.   Balance   Balance Comments static standing requires FWW and modA of one   Sensory Examination   Sensory Perception no deficits were identified   Coordination   Coordination no deficits were identified   General Therapy Interventions   Planned Therapy Interventions balance training;bed mobility training;gait training;strengthening;transfer training;progressive activity/exercise   Clinical Impression   Criteria for Skilled Therapeutic Intervention yes, treatment indicated   PT Diagnosis Difficulty with transfers and gait   Influenced by the following impairments Decreased strength, decreased balance, decreased endurance,RODGERS,   Functional limitations due to impairments increased assist for functional mobility compared to baseline   Clinical Presentation Evolving/Changing   Clinical Presentation Rationale clinical judgement   Clinical Decision Making (Complexity) Moderate complexity   Therapy Frequency Daily   Predicted Duration of Therapy Intervention (days/wks) 2-3 days   Anticipated Discharge Disposition Home with Assist;Home with Home Therapy;Transitional Care Facility   Risk & Benefits of therapy have been explained Yes   Patient, Family & other staff in agreement with plan of care Yes   [facility] [employer] TM \"6 Clicks\"   2016, Trustees of [facility], under license to [employer].  All rights reserved.   6 Clicks Short Forms Basic Mobility Inpatient Short Form   [facility] AM-PAC  \"6 " "Clicks\" V.2 Basic Mobility Inpatient Short Form   1. Turning from your back to your side while in a flat bed without using bedrails? 3 - A Little   2. Moving from lying on your back to sitting on the side of a flat bed without using bedrails? 2 - A Lot   3. Moving to and from a bed to a chair (including a wheelchair)? 2 - A Lot   4. Standing up from a chair using your arms (e.g., wheelchair, or bedside chair)? 2 - A Lot   5. To walk in hospital room? 2 - A Lot   6. Climbing 3-5 steps with a railing? 2 - A Lot   Basic Mobility Raw Score (Score out of 24.Lower scores equate to lower levels of function) 13   Total Evaluation Time   Total Evaluation Time (Minutes) 15     "

## 2019-11-04 NOTE — PLAN OF CARE
Discharge Planner PT   Patient plan for discharge: Pt wants to go to [facility] with increased assist  Current status: Orders received. Chart reviewed. Evaluation completed and treatment initiated. Pt is a 88 year old male admitted for bilat pleural effusions, CHF. Pt lives with spouse in [facility] and uses a walker at baseline but does not need assist for mobility.  Barriers to return to prior living situation: If [facility] cannot increase level of care  Recommendations for discharge: TCU  Rationale for recommendations: Pt is below baseline and needs assist of 1-2 for mobility and has decreased endurance for activity and will need increased assist with ADLs. If pt's [facility] can increase level of care, then he can return, but pt may need TCU to increase mobility before returning to [facility].       Entered by: Lexis Salazar 11/04/2019 12:01 PM

## 2019-11-04 NOTE — PROGRESS NOTES
Sepsis Evaluation Progress Note    I was called to see Jama Paul due to elevated WBC firing for sepsis. He is not known to have an infection.     Physical Exam   Vital Signs:  Temp: (P) 97.8  F (36.6  C) Temp src: (P) Oral BP: 111/78 Pulse: 92 Heart Rate: 83 Resp: 23 SpO2: 95 % O2 Device: None (Room air)      Lab:  Lactic Acid   Date Value Ref Range Status   01/19/2018 2.1 (H) 0.7 - 2.0 mmol/L Final     Lactate for Sepsis Protocol   Date Value Ref Range Status   11/03/2019 2.5 (H) 0.7 - 2.0 mmol/L Final     Comment:     Significant value called to and read back by  AGUILAR SAGASTUME IN CICU AT 1820 BY DLW         The patient is at baseline mental status.     The rest of their physical exam is significant - not examined as no change in patient's clinical status.     Assessment & Plan   NO EVIDENCE OF SEPSIS at this time.  Vital sign, physical exam, and lab findings are likely due to elevated lactic acid is due to steroid use. .    Disposition: The patient will remain on the current unit. We will continue to monitor this patient closely.  Rick Ambrosio MD    Sepsis Criteria   Sepsis: 2+ SIRS criteria due to infection  Severe Sepsis: Sepsis AND 1+ new sign of acute organ dysfunction (Note: lactate >2 is organ dysfunction)  Septic Shock: Sepsis AND hypotension despite volume resuscitation with 30 ml/kg crystalloid

## 2019-11-04 NOTE — PLAN OF CARE
DATE & TIME: 11/3/19 8024-0122    COGNITION/BEHAVIOR: A&Ox4  Vital signs:  Temp: 97.8  F (36.6  C) Temp src: Oral BP: 112/81 Pulse: 89 Heart Rate: 115 Resp: 23 SpO2: 91 % via RA  TELEMETRY RHYTHM: A-Fib CVR  MOBILITY: Ax2.  Turn/repo in bed.  PAIN: Denies  DIET: Cardiac.  NPO since midnight for KATIE.  RESP: LS clear in apexes.  CV/PV: Apical pulse irregular.  SBP's run 80's with MAP's 50's-70's.  GI/: BS active.  Mccullough in place.  SKIN: Wound RLE with dressing in place.  Dressing change due Monday.  Dusky LE's.  LINES: RUE PICC, LUE with two PIV's SL'd.  Mccullough.  LAB/BG: Platelets down to 33 from 40; finished final dose of Solumedrol last night.  Creatinine up to 1.29 from 1.20. , 165.  TESTS/PROCEDURES: KATIE Monday  OTHER: Left eye blind with blown pupil.  Heart Failure Care Pathway  GOALS TO BE MET BEFORE DISCHARGE:    1. Decrease congestion and/or edema with diuretic therapy to achieve near      optimal volume status.            Dyspnea improved:  No dyspnea.            Edema improved:     Trace edema in ankles at worst.        Net I/O and Weights since admission:          10/05 0700 - 11/04 0659  In: 3045.81 [P.O.:2015; I.V.:1030.81]  Out: 3755 [Urine:3755]  Net: -709.19            Vitals:    10/30/19 1044 10/31/19 0619 11/01/19 1041 11/02/19 0500   Weight: 75.3 kg (166 lb) 66.8 kg (147 lb 6 oz) 67.4 kg (148 lb 8 oz) 69.1 kg (152 lb 5.4 oz)    11/03/19 0400 11/04/19 0407   Weight: 68.6 kg (151 lb 3.8 oz) 69.3 kg (152 lb 11.2 oz)       2.  O2 sats > 92% on RA or at prior home O2 therapy level.          Current oxygenation status:       SpO2: 91 %         O2 Device: None (Room air),            Able to wean O2 this shift to keep sats > 92%:  Stable on RA       Does patient use Home O2? No    3.  Tolerates ambulation and mobility near baseline: Ax2, not ambulatory at this time.        How many times did the patient ambulate with nursing staff this shift? 0    Please review the Heart Failure Care Pathway for  additional HF goal outcomes.    Rigo Tejeda, RN RN  11/4/2019

## 2019-11-04 NOTE — PROVIDER NOTIFICATION
MD Notification    Notified Person: MD    Notified Person Name:  Oledaryn    Notification Date/Time: 11/3/19 2014 / 2019    Notification Interaction: Paged / Phone    Purpose of Notification: Lactic at 1807 was 2.5    Orders Received: None.    Comments: Aware of WBC elevation, on steroids.  Note positive UA, awaiting UC.  Patient appears stable at this time.

## 2019-11-04 NOTE — PROGRESS NOTES
sPatient has been assessed for Home Oxygen needs. Oxygen readings:    *Pulse oximetry (SpO2) = 81% on room air at rest while awake.    *SpO2 improved to 94% on 2liters/minute at rest.    *SpO2 = 79% on room air during activity/with exercise.    *SpO2 improved to 95% on 2 liters/minute during activity/with exercise.

## 2019-11-04 NOTE — PRE-PROCEDURE
GENERAL PRE-PROCEDURE:   Procedure:  Transesophageal echocardiogram  Date/Time:  11/4/2019 2:54 PM    Verbal consent obtained?: Yes    Written consent obtained?: Yes    Risks and benefits: Risks, benefits and alternatives were discussed    Consent given by:  Patient  Patient states understanding of procedure being performed: Yes    Patient's understanding of procedure matches consent: Yes    Procedure consent matches procedure scheduled: Yes    Expected level of sedation:  Moderate  Appropriately NPO:  Yes  ASA Class:  Class 3- Severe systemic disease, definite functional limitations  Mallampati  :  Grade 2- soft palate, base of uvula, tonsillar pillars, and portion of posterior pharyngeal wall visible  Lungs:  Lungs clear with good breath sounds bilaterally  Heart:  Normal heart sounds and rate  History & Physical reviewed:  History and physical reviewed and no updates needed  Statement of review:  I have reviewed the lab findings, diagnostic data, medications, and the plan for sedation

## 2019-11-04 NOTE — PROGRESS NOTES
X-cover.     Ceftriaxone added due to abnormal UA from this morning. Culture pending at this time, will defer further care to [place] in AM.     Rick Ambrosio MD  Hospitalist

## 2019-11-04 NOTE — PROGRESS NOTES
[facility]    Hospitalist Progress Note    Brief Summary:  This is an 88-year-old male with a history of hypertension, atrial fibrillation, nonischemic cardiomyopathy, congestive heart failure, idiopathic thrombocytopenic purpura and hiatal hernia, who comes to the ER with a complaint of worsening shortness of breath.     Assessment & Plan       1.  Acute on chronic diastolic congestive heart failure with worsening bilateral pleural effusion:    Valvular heart disease   severe 3-4+TR   1-2+ MR   Cardiogenic shock needing levophed.  off of pressors now  This is an 88-year-old male with a history of nonischemic cardiomyopathy.  His EF, although improved to 55%-60% on his last echo that was done in 01/2019, but has the right ventricle severely dilated and moderate to severe tricuspid regurgitation and mild to moderate mitral regurgitation.  He now presents with bilateral pleural effusion, which I think large on both sides, but worse on the right. He is S/P bilateral Thoracentesis under U/S guidance with about 2.2 liter out from right and 800 ml out from left. Now breathing is back to his baseline level and not SOB at this time. Pleural fluid is transudate and not infected.  Cardiology consulted and are following   Was on IV lasix BID   Pt became hypotensive with diuresis so diuretics off now  SOB improved and feels well today   Plan on KATIE today to get better look at the valves  CV surgery consulted and are following. Pt is high risk for surgery but not prohibitive risk per CV surgery   Needs CT TAVR And KATIE prior to final surgical decision is made     As per hematology will follow platelet count -- if no improvement, then recommend platelet transfusion prior to upcoming invasive cardiac procedure to achieve >= 50,000 plts.        2. Iatrogenic right-sided pneumothorax status post a right pigtail catheter placement by IR;  Pneumothorax resolved after pigtail catheter placement    2.  Hypertension has  been in good control on current medication.  Continue metoprolol at this time.   enetresto on hold due to relative hypotension and slight  rise in Cr with diuresis     3.  History of nonischemic cardiomyopathy:  He is on metoprolol  Most recent Echo showed EF 55-60%    4.  Idiopathic thrombocytopenic purpura, followed by Hematology.   Given high-dose Solu-Medrol 1 g IV for 3 days  Currently on prednisone 70 mg p.o. daily per hematology  Platelet counts are stable at 30 K to 40 K.  Today at 33,000  As per hematology will follow platelet count -- if no improvement, then recommend platelet transfusion prior to upcoming invasive cardiac procedure to achieve >= 50,000 plts.    5.  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  Continue aspirin and metoprolol for rate control.     6. UTI:  Started on ceftriaxone   Urine culture>100K gram negative rods     6.  Deep venous thrombosis prophylaxis with SCDs.       8. Severe Tricuspid Regurgitation: CV surgery consulted, and they are working him up for repair.   Work-up as noted above      The case was discussed with  the nursing staff taking care of the patient and cardiology and patient today.               DVT Prophylaxis: Pneumatic Compression Devices  Code Status: Full Code    Disposition: Expected discharge in 3-4 days once surgical plan is decided    Merlyn Harrison MD   Page [phone](7AM-6PM)      Interval History   No chest pain or SOB at this time, no fever, chills, nausea, vomiting, headache or dizziness at this time.     No other significant event overnight.     -Data reviewed today: I reviewed all new labs and imaging results over the last 24 hours. I personally reviewed the chest CT image(s) showing small apical pnumothorax, improve pleural effusion. atelectasis on right side. .    Physical Exam   Temp: 97.6  F (36.4  C) Temp src: Oral BP: 107/64 Pulse: 76 Heart Rate: 88 Resp: 16 SpO2: 91 %  O2 Device: None (Room air)    Vitals:    11/02/19 0500 11/03/19 0400 11/04/19 0407   Weight: 69.1 kg (152 lb 5.4 oz) 68.6 kg (151 lb 3.8 oz) 69.3 kg (152 lb 11.2 oz)     Vital Signs with Ranges  Temp:  [97.6  F (36.4  C)-97.8  F (36.6  C)] 97.6  F (36.4  C)  Pulse:  [] 76  Heart Rate:  [] 88  Resp:  [9-32] 16  BP: ()/(35-81) 107/64  SpO2:  [86 %-95 %] 91 %  I/O last 3 completed shifts:  In: 1201.01 [P.O.:795; I.V.:406.01]  Out: 1250 [Urine:1250]    Constitutional: awake, alert, cooperative, no apparent distress, and appears stated age  Eyes: Lids and lashes normal, pupils equal, round and reactive to light, extra ocular muscles intact, sclera clear, conjunctiva normal  Respiratory: Clear to auscultation, no rales rhonchi or wheezing  Cardiovascular: Normal apical impulse, regular rate and rhythm, normal S1 and S2, no S3 or S4, and 3+ murmur noted  GI: No scars, normal bowel sounds, soft, non-distended, non-tender, no masses palpated, no hepatosplenomegally  Skin: no bruising or bleeding  Musculoskeletal: no lower extremity pitting edema present, wound on right shin healing well.  Neurologic: no focal deficit.     Medications       cefTRIAXone  1 g Intravenous Q24H     influenza Vac Split High-Dose  0.5 mL Intramuscular Prior to discharge     insulin aspart  0.5-2.5 Units Subcutaneous TID AC     insulin aspart  0.5-2.5 Units Subcutaneous At Bedtime     metoprolol tartrate  25 mg Oral BID     predniSONE  70 mg Oral Daily     sodium chloride (PF)  10 mL Intracatheter Q7 Days     sodium chloride (PF)  3 mL Intracatheter Q8H       Data   Recent Labs   Lab 11/04/19  0550 11/03/19  0442 11/02/19  1219 11/02/19  0635  10/31/19  0524 10/30/19  1134   WBC 7.2 15.4* 15.8*  --   --  10.1 7.8   HGB 9.3* 9.8* 9.5*  --   --  9.7* 10.3*   MCV 93 93 94  --   --  98 96   PLT 33* 40* 40*  --   --  29* 38*   INR  --   --   --   --   --   --  1.39*    134  --  133   < > 135 138   POTASSIUM 4.2 3.9  --  3.8   < >  3.9 4.2   CHLORIDE 102 99  --  98   < > 103 107   CO2 27 28  --  27   < > 28 27   BUN 61* 49*  --  43*   < > 22 19   CR 1.29* 1.20  --  1.39*   < > 1.13 1.02   ANIONGAP 7 7  --  8   < > 4 4   BARON 7.7* 7.9*  --  7.8*   < > 7.9* 8.3*   * 163*  --  216*   < > 109* 88   ALBUMIN  --   --   --   --   --  3.2*  --    PROTTOTAL  --   --   --   --   --  5.8*  --    BILITOTAL  --   --   --   --   --  1.6*  --    ALKPHOS  --   --   --   --   --  36*  --    ALT  --   --   --   --   --  18  --    AST  --   --   --   --   --  11  --    TROPI  --   --   --   --   --   --  <0.015    < > = values in this interval not displayed.       Recent Results (from the past 24 hour(s))   XR Chest Port 1 View    Narrative    CHEST ONE VIEW UPRIGHT November 4, 2019 5:55 AM     HISTORY: Bilateral pleural effusions, right pneumothorax.    COMPARISON: November 3, 2019.      Impression    IMPRESSION: No definite pneumothorax. PICC line unchanged. Slight  improvement in right base density. Similar left lower lobe atelectasis  and/or infiltrate.

## 2019-11-04 NOTE — PROGRESS NOTES
[facility] Progress Note     Primary Oncologist/Hematologist:  Dr. Moises Wise          Assessment and Plan:      1. Moderate thrombocytopenia with platelet counts historically ranging in the 30-45,000 range.  -Prior bone marrow biopsy was suggestive of an underlying myelodysplastic syndrome given the chromosome abnormalities but there were  no other recognizable features to suggest MDS.  Megakaryocyte production appeared normal and therefore the chief basis for the thrombocytopenia does not appear to be due to decreased production.    -The platelet count has not previously responded to trial of IVIG or corticosteroids.   - Antiphospholipid antibody testing did not reveal abnormality to suggest this as etiology.  - Has been followed with observation in the outpatient setting.  - has also not had significant response to steroids during this hospitalization. That, along with previous lack of response, makes it unlikely that he will respond at this point. Discussed with Dr. Wise who suggests that we discontinue steroids at this point.   - monitor platelets carefully.  - transfuse for bleeding or if invasive cardiac procedure is anticipated.     2. Valvular heart disease with CHF, a fib   - s/p bilateral thoracenteses.   - severe 3-4+ TR  - 1-2+ MR  - KATIE planned for today. It appears he is also being considered for right/left heart catheterization later this week.   - Is being followed by CV surgery for consideration of valvular repair, depending on workup above and assessment of risk.     Discussed with Dr. Wise who will see patient later today.     DNR/DNI per chart          Interval History:                 Review of Systems:   The 5 point Review of Systems is negative other than noted in the HPI             Medications:   Scheduled Medications    cefTRIAXone  1 g Intravenous Q24H     influenza Vac Split High-Dose  0.5 mL Intramuscular Prior to discharge     insulin aspart  0.5-2.5 Units  Subcutaneous TID AC     insulin aspart  0.5-2.5 Units Subcutaneous At Bedtime     metoprolol tartrate  25 mg Oral BID     predniSONE  70 mg Oral Daily     sodium chloride (PF)  10 mL Intracatheter Q7 Days     sodium chloride (PF)  3 mL Intracatheter Q8H     PRN Medications  acetaminophen, bisacodyl, glucose **OR** dextrose **OR** glucagon, lidocaine 4%, lidocaine (buffered or not buffered), lidocaine (buffered or not buffered), magnesium sulfate, melatonin, melatonin, metoclopramide **OR** metoclopramide, metoprolol, naloxone, nitroGLYcerin, ondansetron **OR** ondansetron, oxyCODONE, polyethylene glycol, potassium chloride, potassium chloride with lidocaine, potassium chloride, potassium chloride, potassium chloride, prochlorperazine **OR** prochlorperazine **OR** prochlorperazine, senna-docusate **OR** senna-docusate, sodium chloride (PF), sodium chloride (PF)               Physical Exam:   Vitals were reviewed  Blood pressure 93/61, pulse 76, temperature 97.6  F (36.4  C), temperature source Oral, resp. rate 16, height 1.829 m (6'), weight 69.3 kg (152 lb 11.2 oz), SpO2 91 %.  Wt Readings from Last 4 Encounters:   11/04/19 69.3 kg (152 lb 11.2 oz)   09/10/19 72.6 kg (160 lb)   09/02/19 72.6 kg (160 lb)   07/11/19 73.8 kg (162 lb 12.8 oz)       I/O last 3 completed shifts:  In: 1201.01 [P.O.:795; I.V.:406.01]  Out: 1250 [Urine:1250]                    Data:   All laboratory data and imaging studies reviewed.    CMP  Recent Labs   Lab 11/04/19  0550 11/03/19  0442 11/02/19  0635 11/01/19  0638 10/31/19  0524    134 133 134 135   POTASSIUM 4.2 3.9 3.8 3.7 3.9   CHLORIDE 102 99 98 100 103   CO2 27 28 27 31 28   ANIONGAP 7 7 8 3 4   * 163* 216* 111* 109*   BUN 61* 49* 43* 33* 22   CR 1.29* 1.20 1.39* 1.20 1.13   GFRESTIMATED 49* 53* 45* 53* 57*   GFRESTBLACK 57* 62 52* 62 67   BARON 7.7* 7.9* 7.8* 7.9* 7.9*   MAG 2.6* 2.6*  --   --  2.2   PHOS 4.2 3.7  --   --   --    PROTTOTAL  --   --   --   --  5.8*    ALBUMIN  --   --   --   --  3.2*   BILITOTAL  --   --   --   --  1.6*   ALKPHOS  --   --   --   --  36*   AST  --   --   --   --  11   ALT  --   --   --   --  18     CBC  Recent Labs   Lab 11/04/19  0550 11/03/19  0442 11/02/19  1219 10/31/19  0524   WBC 7.2 15.4* 15.8* 10.1   RBC 3.07* 3.25* 3.11* 3.26*   HGB 9.3* 9.8* 9.5* 9.7*   HCT 28.6* 30.3* 29.1* 31.8*   MCV 93 93 94 98   MCH 30.3 30.2 30.5 29.8   MCHC 32.5 32.3 32.6 30.5*   RDW 21.8* 21.7* 22.2* 23.0*   PLT 33* 40* 40* 29*     INR  Recent Labs   Lab 10/30/19  1134   INR 1.39*           Og Klein CNP  Nurse Practitioner  [facility]  [phone]

## 2019-11-04 NOTE — PROGRESS NOTES
[facility] Suit[facility] Procedure Nursing Note    Procedure: KATIE  Procedure started time: 1457  Procedure completed time: 1508  Concerns/abnormal assessment: unable to pass KATIE probe down pt's throat, procedure aborted, will attempt with general anesthesia tomorrow.  Plan: Remain inpatient room after recovery from sedation.    Pt is alert and oriented, procedure explained, all questions answered. Family at bedside. Pt denies difficulty swallowing, no sleep apnea. No dentures or loose teeth. NPO since yesterday.    1455- MD Sedation Assessment completed at this time.  1457- Time Out done at this time.    KATIE: Pt was stable in vital signs throughout. See KATIE Flowsheet. Total sedation given - 2 mg Versed & 50 mcg Fentanyl. Unable to pass probe. Dr Jacobsen spoke with pt & family post procedure.     Pt care transferred back to Alejandra STARR primary nurse. Resp even & unlabored upon transfer.

## 2019-11-05 ENCOUNTER — APPOINTMENT (OUTPATIENT)
Dept: CARDIOLOGY | Facility: CLINIC | Age: 84
DRG: 286 | End: 2019-11-05
Attending: INTERNAL MEDICINE
Payer: MEDICARE

## 2019-11-05 ENCOUNTER — APPOINTMENT (OUTPATIENT)
Dept: GENERAL RADIOLOGY | Facility: CLINIC | Age: 84
DRG: 286 | End: 2019-11-05
Attending: NURSE PRACTITIONER
Payer: MEDICARE

## 2019-11-05 ENCOUNTER — ANESTHESIA (OUTPATIENT)
Dept: SURGERY | Facility: CLINIC | Age: 84
DRG: 286 | End: 2019-11-05
Payer: MEDICARE

## 2019-11-05 ENCOUNTER — ANESTHESIA EVENT (OUTPATIENT)
Dept: SURGERY | Facility: CLINIC | Age: 84
DRG: 286 | End: 2019-11-05
Payer: MEDICARE

## 2019-11-05 ENCOUNTER — APPOINTMENT (OUTPATIENT)
Dept: PHYSICAL THERAPY | Facility: CLINIC | Age: 84
DRG: 286 | End: 2019-11-05
Payer: MEDICARE

## 2019-11-05 LAB
ABO + RH BLD: NORMAL
ABO + RH BLD: NORMAL
ANION GAP SERPL CALCULATED.3IONS-SCNC: 3 MMOL/L (ref 3–14)
BACTERIA SPEC CULT: ABNORMAL
BLD GP AB SCN SERPL QL: NORMAL
BLOOD BANK CMNT PATIENT-IMP: NORMAL
BLOOD BANK CMNT PATIENT-IMP: NORMAL
BUN SERPL-MCNC: 58 MG/DL (ref 7–30)
CALCIUM SERPL-MCNC: 7.8 MG/DL (ref 8.5–10.1)
CHLORIDE SERPL-SCNC: 101 MMOL/L (ref 94–109)
CO2 SERPL-SCNC: 30 MMOL/L (ref 20–32)
CREAT SERPL-MCNC: 1.13 MG/DL (ref 0.66–1.25)
ERYTHROCYTE [DISTWIDTH] IN BLOOD BY AUTOMATED COUNT: 21.8 % (ref 10–15)
GFR SERPL CREATININE-BSD FRML MDRD: 57 ML/MIN/{1.73_M2}
GLUCOSE BLDC GLUCOMTR-MCNC: 115 MG/DL (ref 70–99)
GLUCOSE BLDC GLUCOMTR-MCNC: 116 MG/DL (ref 70–99)
GLUCOSE BLDC GLUCOMTR-MCNC: 127 MG/DL (ref 70–99)
GLUCOSE BLDC GLUCOMTR-MCNC: 225 MG/DL (ref 70–99)
GLUCOSE BLDC GLUCOMTR-MCNC: 272 MG/DL (ref 70–99)
GLUCOSE SERPL-MCNC: 140 MG/DL (ref 70–99)
HCT VFR BLD AUTO: 29 % (ref 40–53)
HGB BLD-MCNC: 9.5 G/DL (ref 13.3–17.7)
INTERPRETATION ECG - MUSE: NORMAL
Lab: ABNORMAL
MCH RBC QN AUTO: 30.4 PG (ref 26.5–33)
MCHC RBC AUTO-ENTMCNC: 32.8 G/DL (ref 31.5–36.5)
MCV RBC AUTO: 93 FL (ref 78–100)
PLATELET # BLD AUTO: 34 10E9/L (ref 150–450)
POTASSIUM SERPL-SCNC: 4.2 MMOL/L (ref 3.4–5.3)
RBC # BLD AUTO: 3.13 10E12/L (ref 4.4–5.9)
SODIUM SERPL-SCNC: 134 MMOL/L (ref 133–144)
SPECIMEN EXP DATE BLD: NORMAL
SPECIMEN SOURCE: ABNORMAL
WBC # BLD AUTO: 9.1 10E9/L (ref 4–11)

## 2019-11-05 PROCEDURE — 25800030 ZZH RX IP 258 OP 636: Performed by: NURSE ANESTHETIST, CERTIFIED REGISTERED

## 2019-11-05 PROCEDURE — 99233 SBSQ HOSP IP/OBS HIGH 50: CPT | Performed by: INTERNAL MEDICINE

## 2019-11-05 PROCEDURE — 99232 SBSQ HOSP IP/OBS MODERATE 35: CPT | Performed by: INTERNAL MEDICINE

## 2019-11-05 PROCEDURE — 25000128 H RX IP 250 OP 636: Performed by: NURSE ANESTHETIST, CERTIFIED REGISTERED

## 2019-11-05 PROCEDURE — 25000125 ZZHC RX 250: Performed by: NURSE ANESTHETIST, CERTIFIED REGISTERED

## 2019-11-05 PROCEDURE — 25000132 ZZH RX MED GY IP 250 OP 250 PS 637: Mod: GY | Performed by: INTERNAL MEDICINE

## 2019-11-05 PROCEDURE — 37000008 ZZH ANESTHESIA TECHNICAL FEE, 1ST 30 MIN

## 2019-11-05 PROCEDURE — 25000128 H RX IP 250 OP 636: Performed by: INTERNAL MEDICINE

## 2019-11-05 PROCEDURE — 25000132 ZZH RX MED GY IP 250 OP 250 PS 637: Mod: GY | Performed by: NURSE PRACTITIONER

## 2019-11-05 PROCEDURE — 71045 X-RAY EXAM CHEST 1 VIEW: CPT

## 2019-11-05 PROCEDURE — 85027 COMPLETE CBC AUTOMATED: CPT | Performed by: NURSE PRACTITIONER

## 2019-11-05 PROCEDURE — 40000239 ZZH STATISTIC VAT ROUNDS

## 2019-11-05 PROCEDURE — 99207 ZZC NO BILLABLE SERVICE THIS VISIT: CPT | Performed by: INTERNAL MEDICINE

## 2019-11-05 PROCEDURE — 80048 BASIC METABOLIC PNL TOTAL CA: CPT | Performed by: NURSE PRACTITIONER

## 2019-11-05 PROCEDURE — 93325 DOPPLER ECHO COLOR FLOW MAPG: CPT | Mod: 74

## 2019-11-05 PROCEDURE — 37000009 ZZH ANESTHESIA TECHNICAL FEE, EACH ADDTL 15 MIN

## 2019-11-05 PROCEDURE — 71000014 ZZH RECOVERY PHASE 1 LEVEL 2 FIRST HR

## 2019-11-05 PROCEDURE — 21000001 ZZH R&B HEART CARE

## 2019-11-05 PROCEDURE — 00000146 ZZHCL STATISTIC GLUCOSE BY METER IP

## 2019-11-05 PROCEDURE — 97530 THERAPEUTIC ACTIVITIES: CPT | Mod: GP | Performed by: PHYSICAL THERAPIST

## 2019-11-05 RX ORDER — SODIUM CHLORIDE, SODIUM LACTATE, POTASSIUM CHLORIDE, CALCIUM CHLORIDE 600; 310; 30; 20 MG/100ML; MG/100ML; MG/100ML; MG/100ML
INJECTION, SOLUTION INTRAVENOUS CONTINUOUS PRN
Status: DISCONTINUED | OUTPATIENT
Start: 2019-11-05 | End: 2019-11-05

## 2019-11-05 RX ORDER — MIRTAZAPINE 15 MG/1
15 TABLET, FILM COATED ORAL AT BEDTIME
Status: DISCONTINUED | OUTPATIENT
Start: 2019-11-05 | End: 2019-11-17 | Stop reason: HOSPADM

## 2019-11-05 RX ORDER — FUROSEMIDE 10 MG/ML
10 INJECTION INTRAMUSCULAR; INTRAVENOUS EVERY 12 HOURS
Status: DISCONTINUED | OUTPATIENT
Start: 2019-11-06 | End: 2019-11-07

## 2019-11-05 RX ORDER — DIPHENHYDRAMINE HCL 25 MG
25 CAPSULE ORAL EVERY 6 HOURS PRN
Status: DISCONTINUED | OUTPATIENT
Start: 2019-11-05 | End: 2019-11-12

## 2019-11-05 RX ORDER — LIDOCAINE HYDROCHLORIDE 20 MG/ML
INJECTION, SOLUTION INFILTRATION; PERINEURAL PRN
Status: DISCONTINUED | OUTPATIENT
Start: 2019-11-05 | End: 2019-11-05

## 2019-11-05 RX ORDER — PROPOFOL 10 MG/ML
INJECTION, EMULSION INTRAVENOUS PRN
Status: DISCONTINUED | OUTPATIENT
Start: 2019-11-05 | End: 2019-11-05

## 2019-11-05 RX ORDER — DIPHENHYDRAMINE HYDROCHLORIDE 50 MG/ML
25 INJECTION INTRAMUSCULAR; INTRAVENOUS EVERY 6 HOURS PRN
Status: DISCONTINUED | OUTPATIENT
Start: 2019-11-05 | End: 2019-11-12

## 2019-11-05 RX ORDER — PROPOFOL 10 MG/ML
INJECTION, EMULSION INTRAVENOUS CONTINUOUS PRN
Status: DISCONTINUED | OUTPATIENT
Start: 2019-11-05 | End: 2019-11-05

## 2019-11-05 RX ADMIN — FUROSEMIDE 20 MG: 20 TABLET ORAL at 17:36

## 2019-11-05 RX ADMIN — PROPOFOL 100 MCG/KG/MIN: 10 INJECTION, EMULSION INTRAVENOUS at 15:36

## 2019-11-05 RX ADMIN — METOPROLOL SUCCINATE 25 MG: 25 TABLET, EXTENDED RELEASE ORAL at 09:26

## 2019-11-05 RX ADMIN — PROPOFOL 20 MG: 10 INJECTION, EMULSION INTRAVENOUS at 15:40

## 2019-11-05 RX ADMIN — DEXMEDETOMIDINE HYDROCHLORIDE 12 MCG: 100 INJECTION, SOLUTION INTRAVENOUS at 15:40

## 2019-11-05 RX ADMIN — MELATONIN 5 MG TABLET 5 MG: at 21:35

## 2019-11-05 RX ADMIN — MELATONIN 5 MG TABLET 5 MG: at 10:02

## 2019-11-05 RX ADMIN — PHENYLEPHRINE HYDROCHLORIDE 100 MCG: 10 INJECTION INTRAVENOUS at 15:52

## 2019-11-05 RX ADMIN — SODIUM CHLORIDE, POTASSIUM CHLORIDE, SODIUM LACTATE AND CALCIUM CHLORIDE: 600; 310; 30; 20 INJECTION, SOLUTION INTRAVENOUS at 15:20

## 2019-11-05 RX ADMIN — DEXMEDETOMIDINE HYDROCHLORIDE 8 MCG: 100 INJECTION, SOLUTION INTRAVENOUS at 15:35

## 2019-11-05 RX ADMIN — CEFTRIAXONE SODIUM 1 G: 1 INJECTION, POWDER, FOR SOLUTION INTRAMUSCULAR; INTRAVENOUS at 20:12

## 2019-11-05 RX ADMIN — PHENYLEPHRINE HYDROCHLORIDE 100 MCG: 10 INJECTION INTRAVENOUS at 15:58

## 2019-11-05 RX ADMIN — METOPROLOL SUCCINATE 25 MG: 25 TABLET, EXTENDED RELEASE ORAL at 20:12

## 2019-11-05 RX ADMIN — MIRTAZAPINE 15 MG: 15 TABLET, FILM COATED ORAL at 22:40

## 2019-11-05 RX ADMIN — LIDOCAINE HYDROCHLORIDE 100 MG: 20 INJECTION, SOLUTION INFILTRATION; PERINEURAL at 15:40

## 2019-11-05 RX ADMIN — TOPICAL ANESTHETIC 1 EACH: 200 SPRAY DENTAL; PERIODONTAL at 15:38

## 2019-11-05 RX ADMIN — PROPOFOL 20 MG: 10 INJECTION, EMULSION INTRAVENOUS at 15:38

## 2019-11-05 RX ADMIN — INSULIN ASPART 0.5 UNITS: 100 INJECTION, SOLUTION INTRAVENOUS; SUBCUTANEOUS at 21:30

## 2019-11-05 RX ADMIN — PROPOFOL 40 MG: 10 INJECTION, EMULSION INTRAVENOUS at 15:36

## 2019-11-05 RX ADMIN — FUROSEMIDE 20 MG: 20 TABLET ORAL at 10:24

## 2019-11-05 RX ADMIN — ACETAMINOPHEN 650 MG: 325 TABLET, FILM COATED ORAL at 17:42

## 2019-11-05 ASSESSMENT — MIFFLIN-ST. JEOR: SCORE: 1367

## 2019-11-05 ASSESSMENT — ENCOUNTER SYMPTOMS: DYSRHYTHMIAS: 1

## 2019-11-05 NOTE — ANESTHESIA CARE TRANSFER NOTE
Patient: Jama Paul    Procedure(s):  KATIE, CARDIOVERSION.    Diagnosis: * No pre-op diagnosis entered *  Diagnosis Additional Information: No value filed.    Anesthesia Type:   MAC     Note:  Airway :Nasal Cannula  Patient transferred to:PACU  Comments: Anesthesia Care Note    Patient: Jama Paul    Transferred to: PACU    Patient vital signs: Stable    Airway: NC    Monitors placed. VSS. PIV patent. No change in dentition. Report given to ARELY Vieira CRNA   11/5/2019  Handoff Report: Identifed the Patient, Identified the Reponsible Provider, Reviewed the pertinent medical history, Discussed the surgical course, Reviewed Intra-OP anesthesia mangement and issues during anesthesia, Set expectations for post-procedure period and Allowed opportunity for questions and acknowledgement of understandingpost-procedure handoff checklist not completed for medical reasons      Vitals: (Last set prior to Anesthesia Care Transfer)    CRNA VITALS  11/5/2019 1534 - 11/5/2019 1605      11/5/2019             Pulse:  94    SpO2:  96 %    Resp Rate (observed):  17    Resp Rate (set):  10                Electronically Signed By: ERIC Bryant CRNA  November 5, 2019  4:05 PM

## 2019-11-05 NOTE — PROGRESS NOTES
Chart reviewed and case discussed earlier today with NP. Note unsuccessful attempt at KATIE earlier today. He apparently had minor bleeding due to small abrasion on lower lip. The bleeding has since resolved. 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 These interventions were not successful and therefore prednisone has been discontinued. If other interventions are needed, platelet transfusion would be appropriate. Will recheck tomorrow.

## 2019-11-05 NOTE — PLAN OF CARE
[id number]: A&Ox4 but forgetful. [place]. L eye blind. VSS on RA. Denies pain, chest pain, or shortness of breath. LS diminished. Tele afib CVR. R PICC, triple lumen, SL. Mccullough catheter in place. On IV rocpehin. R leg ulcer dressing CDI. BLE +2 edema. Mepilex to coccyx and back. Bgm checks, evening check 376, unsure if pt had just had candy, rechecked at 2200 for. Up with strong 2 assist. No bleeding seen, platelets at 33. US carotids completed. Plan for KATIE with anesthesia tomorrow.

## 2019-11-05 NOTE — PLAN OF CARE
Discharge Planner PT   Patient plan for discharge: none stated   Current status: Supine to sit Sherlyn. Pt HR increasing to >140s with sitting EOB, /79, SaO2 mid80s to 90%. Pt tolerating sitting EOB well, not symptomatic. Sit to stand Sherlyn with 2WW. Pt side stepped along bed Sherlyn with 2WW. Stand pivot transfer MinAx2 with HHA.   Barriers to return to prior living situation: level of assist of [facility] cannot provide   Recommendations for discharge: TCU  Rationale for recommendations: Pt would benefit from ongoing therapy as pt is below baseline with all mobility, has decreased activity tolerance and decreased functional strength. Pt would be able to return to [facility] is they are able to provide necessary assist with all mobility.        Entered by: Jennifer Palomo 11/05/2019 9:30 AM

## 2019-11-05 NOTE — PROGRESS NOTES
[facility]    Hospitalist Progress Note    Brief Summary:  This is an 88-year-old male with a history of hypertension, atrial fibrillation, nonischemic cardiomyopathy, congestive heart failure, idiopathic thrombocytopenic purpura and hiatal hernia, who comes to the ER with a complaint of worsening shortness of breath.     Assessment & Plan       1.  Acute on chronic diastolic congestive heart failure with worsening bilateral pleural effusion:    Valvular heart disease   severe 3-4+TR   1-2+ MR   Cardiogenic shock needing levophed.  off of pressors now  This is an 88-year-old male with a history of nonischemic cardiomyopathy.  His EF, although improved to 55%-60% on his last echo that was done in 01/2019, but has the right ventricle severely dilated and moderate to severe tricuspid regurgitation and mild to moderate mitral regurgitation.  He now presents with bilateral pleural effusion, which I think large on both sides, but worse on the right. He is S/P bilateral Thoracentesis under U/S guidance with about 2.2 liter out from right and 800 ml out from left. Now breathing is back to his baseline level and not SOB at this time. Pleural fluid is transudate most likely from CHF   Cardiology consulted and are following   Was on IV lasix BID   Pt became hypotensive with diuresis so diuretics off now  SOB improved and feels well since last 48 hours    KATIE attempt was unsuccessful yesterday. To be done under anesthesia today   CV surgery consulted and are following. Pt is high risk for surgery but not prohibitive risk per CV surgery   Needs CT TAVR And KATIE prior to final surgical decision is made     As per hematology will follow platelet count --no improvement with IV solumedrol so prednisone was stopped. recommend platelet transfusion prior to upcoming invasive cardiac procedure to achieve >= 50,000 plts.        2. Iatrogenic right-sided pneumothorax status post a right pigtail catheter placement by  IR;  Pneumothorax resolved after pigtail catheter placement    2.  Hypertension has been in good control on current medication.  Continue metoprolol at this time.   enetresto on hold due to relative hypotension and slight  rise in Cr with diuresis     3.  History of nonischemic cardiomyopathy:  He is on metoprolol  Most recent Echo showed EF 55-60%    4.  Idiopathic thrombocytopenic purpura, followed by Hematology.   Given high-dose Solu-Medrol 1 g IV for 3 days  was on prednisone 70 mg p.o. daily which was stopped as no response  per hematology  Platelet counts are stable at 30 K to 40 K.  Today at 34,000  As per hematology recommend platelet transfusion prior to upcoming invasive cardiac procedure to achieve >= 50,000 plts.    5.  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  Continue aspirin and metoprolol for rate control.     6. UTI:  Started on ceftriaxone   Urine culture>100K citrobacter pansensitive   Switch to oral ceftin tomorrow     6.  Deep venous thrombosis prophylaxis with SCDs.       8. Severe Tricuspid Regurgitation: CV surgery consulted, and they are working him up for repair.   Work-up as noted above      The case was discussed with  the nursing staff taking care of the patient and patient today               DVT Prophylaxis: Pneumatic Compression Devices  Code Status: Full Code    Disposition: Expected discharge in 3-4 days once surgical plan is decided    Merlyn Harrison MD   Pager [phone](7AM-6PM)      Interval History   No chest pain or SOB at this time, no fever, chills, nausea, vomiting, headache or dizziness at this time.     No other significant event overnight.     -Data reviewed today: I reviewed all new labs and imaging results over the last 24 hours. I personally reviewed the chest CT image(s) showing small apical pnumothorax, improve pleural effusion. atelectasis on right side. .    Physical Exam   Temp: 98.5  F  (36.9  C) Temp src: Oral BP: 109/84 Pulse: 120 Heart Rate: 92 Resp: 12 SpO2: 96 % O2 Device: None (Room air) Oxygen Delivery: 2 LPM  Vitals:    11/03/19 0400 11/04/19 0407 11/05/19 0500   Weight: 68.6 kg (151 lb 3.8 oz) 69.3 kg (152 lb 11.2 oz) 65.9 kg (145 lb 4.5 oz)     Vital Signs with Ranges  Temp:  [97.3  F (36.3  C)-98.5  F (36.9  C)] 98.5  F (36.9  C)  Pulse:  [] 120  Heart Rate:  [] 92  Resp:  [9-22] 12  BP: ()/(53-84) 109/84  SpO2:  [91 %-97 %] 96 %  I/O last 3 completed shifts:  In: 556 [P.O.:440; I.V.:116]  Out: 925 [Urine:925]    Constitutional: awake, alert, cooperative, no apparent distress, and appears stated age  Eyes: Lids and lashes normal, pupils equal, round and reactive to light, extra ocular muscles intact, sclera clear, conjunctiva normal  Respiratory: Clear to auscultation, no rales rhonchi or wheezing  Cardiovascular: Normal apical impulse, regular rate and rhythm, normal S1 and S2, no S3 or S4, and 3+ murmur noted  GI: No scars, normal bowel sounds, soft, non-distended, non-tender, no masses palpated, no hepatosplenomegally  Skin: no bruising or bleeding  Musculoskeletal: no lower extremity pitting edema present, wound on right shin healing well.  Neurologic: no focal deficit.     Medications     - MEDICATION INSTRUCTIONS -       - MEDICATION INSTRUCTIONS -       sodium chloride         cefTRIAXone  1 g Intravenous Q24H     furosemide  20 mg Oral BID     influenza Vac Split High-Dose  0.5 mL Intramuscular Prior to discharge     insulin aspart  0.5-2.5 Units Subcutaneous TID AC     insulin aspart  0.5-2.5 Units Subcutaneous At Bedtime     metoprolol succinate ER  25 mg Oral BID     sodium chloride (PF)  10 mL Intracatheter Q7 Days     sodium chloride (PF)  3 mL Intravenous Q8H     sodium chloride (PF)  3 mL Intracatheter Q8H     sodium chloride (PF)  3 mL Intracatheter Q8H       Data   Recent Labs   Lab 11/05/19  0600 11/04/19  0550 11/03/19  0442  10/31/19  0533  10/30/19  1134   WBC 9.1 7.2 15.4*   < > 10.1 7.8   HGB 9.5* 9.3* 9.8*   < > 9.7* 10.3*   MCV 93 93 93   < > 98 96   PLT 34* 33* 40*   < > 29* 38*   INR  --   --   --   --   --  1.39*    136 134   < > 135 138   POTASSIUM 4.2 4.2 3.9   < > 3.9 4.2   CHLORIDE 101 102 99   < > 103 107   CO2 30 27 28   < > 28 27   BUN 58* 61* 49*   < > 22 19   CR 1.13 1.29* 1.20   < > 1.13 1.02   ANIONGAP 3 7 7   < > 4 4   BARON 7.8* 7.7* 7.9*   < > 7.9* 8.3*   * 171* 163*   < > 109* 88   ALBUMIN  --   --   --   --  3.2*  --    PROTTOTAL  --   --   --   --  5.8*  --    BILITOTAL  --   --   --   --  1.6*  --    ALKPHOS  --   --   --   --  36*  --    ALT  --   --   --   --  18  --    AST  --   --   --   --  11  --    TROPI  --   --   --   --   --  <0.015    < > = values in this interval not displayed.       Recent Results (from the past 24 hour(s))   US Carotid Bilateral    Narrative    BILATERAL CAROTID ULTRASOUND November 4, 2019 10:00 PM     HISTORY: Preoperative evaluation.    COMPARISON: None.    RIGHT CAROTID FINDINGS: There is minimal atherosclerotic plaque at the  carotid bifurcation and proximal internal carotid artery.  Right ICA PSV:  135  cm/sec.  Right ICA EDV:  43 cm/sec.  Right ICA/CCA PSV Ratio: 1.0.    These indicate less than 50% diameter stenosis of the right ICA.    Right Vertebral: Antegrade flow.   Right ECA: Antegrade flow.     LEFT CAROTID FINDINGS: There is minimal atherosclerotic plaque at the  carotid bifurcation and proximal internal carotid artery.  Left ICA PSV:  180  cm/sec.  Left ICA EDV:  56 cm/sec.  Left ICA/CCA PSV Ratio:  1.2.    These indicate less than 50% diameter stenosis of the left ICA.    Left Vertebral: Antegrade flow.   Left ECA: Antegrade flow.     Causes of Decreased Accuracy: Discrepant findings in the peak systolic  velocities in both internal carotid arteries are elevated suggesting  50-69% stenosis as estimation. However, direct visualization and ratio  suggest less than 50%  stenosis.      Impression    IMPRESSION:    1. Less than 50% diameter stenosis of the right internal carotid  artery relative to the distal internal carotid artery diameter.  2. Less than 50% diameter stenosis of the left internal carotid artery  relative to the distal internal carotid artery diameter.     JUANY COPPOLA MD   XR Chest Port 1 View    Narrative    CHEST PORTABLE ONE VIEW November 5, 2019 6:04 AM     HISTORY: Bilateral pleural effusions, right pneumothorax.    COMPARISON: Chest x-ray 11/4/2019.      Impression    IMPRESSION: Portable upright view of the chest is performed. Small  left pleural effusion and retrocardiac opacity appears stable to  slightly improved. Right mid to lower lung opacity could indicate  underlying infiltrate, consolidation or effusion which appears stable.  Right PICC line remains in good position. No evidence of pneumothorax.  Heart remains enlarged.    JASON GARZA MD

## 2019-11-05 NOTE — PROGRESS NOTES
"  [facility] Cardiology Progress Note          Assessment and Plan:   Assessment:   1. HFpEF - symptomatic decompensation was rapid (one week) prior to admission suggesting he was previously \"compensated\". Previously was exercising.    11/1 became hypotensive - likely a result of rapid HR and vasodilation (dobutamine) and reduced preload (Bumex).    With discontinuation of both above medications,resumption of beta blocker.  Has remained stable.  Will need further evaluation with KATIE, possiblywith  right/left heart catheterization.  Unclear that mitral valve is an issue, more likely tricuspid. TV repair/TVR would be higher risk though RV function normal now.   If he can return to prior \"compensated\"status, medical therapy may be more successful at maintaining quality of life. Do not think he would tolerate TVR and MVR.   Have suggested that PT now with period of recovery may be most efficacious course.    Significant continued diuresis off diuretics and on vasopressor suggests he does not tolerate decrease in preload.    KATIE was unsuccessful today; will try with propafol tomorrow.    Intake/Output Summary (Last 24 hours) at 11/4/2019 1823  Last data filed at 11/4/2019 1439  Gross per 24 hour   Intake 511 ml   Output 1025 ml   Net -514 ml         2. Atrial fibrillation- RVR earlier but now rate about 100 bpm. Not on anticoagulation.May be Watchman candidate if no surgery,TATYANA ligation if surgery.  He notes that his heart rate has been higher recently, minimal movement in his chair sends his HR into the 90's bpm.  3. Thrombocytopenia - 20K platelets.  4. MR -asabove.  5.TR - as above,dilated RV with normal function.        Plan:     Try again to perform KATIE tomorrow - discussed with he and his wife.  (Right and left heart catheterization later next week if CVTS considering surgery).  Avoid vasodilation, further decrease in preload.  Increase beta blocker for HR; add low dose diuretic back now.  Up in chair - " doing better.            Significant Problems:     Patient Active Problem List    Diagnosis Date Noted     Pleural effusion 10/30/2019     Priority: Medium     Bilateral pleural effusion 10/30/2019     Priority: Medium     Ulcer of right lower extremity with fat layer exposed (H) 09/16/2019     Priority: Medium     Idiopathic thrombocytopenic purpura (H) 04/05/2019     Priority: Medium     Thrombocytopenia (H)      Priority: Medium     Non-ischemic cardiomyopathy (H)      Priority: Medium     2017, med treatment, echo done 4/18 nl ef, mod to severe tr       Eye hemorrhage, left 02/01/2019     Priority: Medium     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 Feeling weak but without dyspnea. Legs are sore.          Medications:   Scheduled:    cefTRIAXone  1 g Intravenous Q24H     influenza Vac Split High-Dose  0.5 mL Intramuscular Prior to discharge     insulin aspart  0.5-2.5 Units Subcutaneous TID AC     insulin aspart  0.5-2.5 Units Subcutaneous At Bedtime     metoprolol tartrate  25 mg Oral BID     sodium chloride (PF)  10 mL Intracatheter Q7 Days     sodium chloride (PF)  3 mL Intracatheter Q8H     sodium chloride (PF)  3 mL Intracatheter Q8H             Physical Exam:   All vitals have been reviewed  Temp:  [97.3  F (36.3  C)-97.8  F (36.6  C)] 97.3  F (36.3  C)  Pulse:  [] 105  Heart Rate:  [] 96  Resp:  [9-23] 9  BP: ()/(49-82) 108/77  SpO2:  [91 %-97 %] 95 %  Vitals:    10/30/19 1044 10/31/19 0619 11/01/19  1041 11/02/19 0500   Weight: 75.3 kg (166 lb) 66.8 kg (147 lb 6 oz) 67.4 kg (148 lb 8 oz) 69.1 kg (152 lb 5.4 oz)    11/03/19 0400 11/04/19 0407   Weight: 68.6 kg (151 lb 3.8 oz) 69.3 kg (152 lb 11.2 oz)     I/O last 3 completed shifts:  In: 511 [P.O.:120; I.V.:391]  Out: 1025 [Urine:1025]    NAD, alert and oriented.    Skin: warm and dry.    Neck:   No JVD - does have an inspiratory rise to near jaw angle.     Lungs:   Clear without wheezes, rhonchi but decreased BS at bases.     Cardiovascular:   S1, S2, iregularly irregular, 2-3 holosystolic murmur at LSB.     Ext's: no edema.          Data:     Last Comprehensive Metabolic Panel:  Sodium   Date Value Ref Range Status   11/04/2019 136 133 - 144 mmol/L Final     Potassium   Date Value Ref Range Status   11/04/2019 4.2 3.4 - 5.3 mmol/L Final     Chloride   Date Value Ref Range Status   11/04/2019 102 94 - 109 mmol/L Final     Carbon Dioxide   Date Value Ref Range Status   11/04/2019 27 20 - 32 mmol/L Final     Anion Gap   Date Value Ref Range Status   11/04/2019 7 3 - 14 mmol/L Final     Glucose   Date Value Ref Range Status   11/04/2019 171 (H) 70 - 99 mg/dL Final     Urea Nitrogen   Date Value Ref Range Status   11/04/2019 61 (H) 7 - 30 mg/dL Final     Creatinine   Date Value Ref Range Status   11/04/2019 1.29 (H) 0.66 - 1.25 mg/dL Final     GFR Estimate   Date Value Ref Range Status   11/04/2019 49 (L) >60 mL/min/[1.73_m2] Final     Comment:     Non  GFR Calc  Starting 12/18/2018, serum creatinine based estimated GFR (eGFR) will be   calculated using the Chronic Kidney Disease Epidemiology Collaboration   (CKD-EPI) equation.       Calcium   Date Value Ref Range Status   11/04/2019 7.7 (L) 8.5 - 10.1 mg/dL Final       Lab Results   Component Value Date    WBC 7.2 11/04/2019    HGB 9.3 (L) 11/04/2019    HCT 28.6 (L) 11/04/2019    MCV 93 11/04/2019    PLT 33 (LL) 11/04/2019     Lab Results   Component Value Date    INR 1.39 (H) 10/30/2019     Lab  Results   Component Value Date    TROPI <0.015 10/30/2019    TROPI <0.015 02/28/2018    TROPI 0.029 01/19/2018     Echo (this week).  Left ventricular systolic function is normal. The visual ejection fraction is  estimated at 55-60%.  The right ventricle is severely dilated. The right ventricular systolic  function is normal.  Mild to moderate (1-2+) mitral regurgitation.  Mod-severe to severe (3-4+) tricuspid regurgitation.  The right ventricular systolic pressure is approximated at 46mmHg plus the  right atrial pressure, suggesting elevated pulmonary artery pressures.  Trivial pericardial effusion and a pleural effusion present.  Rhythm appears to be atrial fibrillation.     This study was compared to a prior TTE from 4/11/2019. Biventricular function  is stable. The severity of mitral insufficiency and tricuspid insufficiency  remains stable. There is now a trivial pericardial effusion, and a pleural  effusion is present. The rhythm appears to be atrial fibrillation on this  present study, however it was also irregularly irregular on the prior study.    Right heart catheterization (1/2018):  Catheterization results:  1-hemodynamics  -Right atrial pressure-   -/7/(6)  -Right ventricle-28/2, 7  -Pulmonary artery pressure 30/12 (19)  -Pulmonary capillary wedge pressure     -/13 (11)  -Pulmonary artery oxygen saturation 69% with aortic oxygen saturation  96%  -Estimated [place] cardiac output/index 6.37/3.38     Comment  This gentleman's filling pressures are generally excellent for him.  Pulmonary vascular resistance is normal. Further recommendations will  be per the cardiology service    Erin Chavez MD  11/3/2019  (Critical care time 45 minutes, coordination of care and discussion with patient and family).

## 2019-11-05 NOTE — PLAN OF CARE
Neuro- A&O  Most Recent Vitals- Temp: 98.5  F (36.9  C) Temp src: Oral BP: 109/68 Pulse: 84 Heart Rate: 93 Resp: 18 SpO2: 95 % O2 Device: None (Room air) Oxygen Delivery: 2 LPM  Tele/Cardiac- A-fib CVR  Resp- RA  Activity- Strong A/2  Pain- Denies  Drips- none  Drains/Tubes- R Picc-triple lumen, PIV, Mccullough  Skin- ecchymotic, blanchable redness on spine w/ mepilex in place  GI/- Mccullough, constipation  Aggression Color- Green  Plan- Re-attempt KATIE under sedation, CV surg consult, transfuse platelets to >50,000 if having invasive cardiac procedure, restart diuretics today      Vic Samuels RN

## 2019-11-05 NOTE — PRE-PROCEDURE
GENERAL PRE-PROCEDURE:   Procedure:  KATIE  Date/Time:  11/5/2019 3:20 PM    Written consent obtained?: Yes    Risks and benefits: Risks, benefits and alternatives were discussed    Consent given by:  Patient  Patient states understanding of procedure being performed: Yes    Patient's understanding of procedure matches consent: Yes    Procedure consent matches procedure scheduled: Yes    Appropriately NPO:  Yes  ASA Class:  Class 3- Severe systemic disease, definite functional limitations  Mallampati  :  Grade 2- soft palate, base of uvula, tonsillar pillars, and portion of posterior pharyngeal wall visible  Lungs:  Lungs clear with good breath sounds bilaterally  Heart:  Normal heart sounds and rate and a-fib  History & Physical reviewed:  History and physical reviewed and no updates needed  Statement of review:  I have reviewed the lab findings, diagnostic data, medications, and the plan for sedation

## 2019-11-05 NOTE — PROGRESS NOTES
[facility] Hematology Progress Note     Primary Oncologist/Hematologist:            Assessment and Plan:   1. Moderate thrombocytopenia with platelet counts historically ranging in the 30-45,000 range.  -Prior bone marrow biopsy was suggestive of an underlying myelodysplastic syndrome given the chromosome abnormalities but there were  no other recognizable features to suggest MDS.  Megakaryocyte production appeared normal and therefore the chief basis for the thrombocytopenia does not appear to be due to decreased production.    -The platelet count has not previously responded to trial of IVIG or corticosteroids.   - Antiphospholipid antibody testing did not reveal abnormality to suggest this as etiology.  - Has been followed with observation in the outpatient setting.  - has also not had significant response to steroids during this hospitalization. That, along with previous lack of response, makes it unlikely that he will respond at this point. Discussed with Dr. Wise who suggests that we discontinue steroids at this point.  Prednisone DCd on 11/4  - monitor platelets carefully.  - transfuse for bleeding or if invasive cardiac procedure is anticipated.      2. Valvular heart disease with CHF, a fib   - s/p bilateral thoracenteses.   - severe 3-4+ TR  - 1-2+ MR  - KATIE unable to be performed yesterday. Repeat attempt planned for today. It appears he is also being considered for right/left heart catheterization later this week.   - Is being followed by CV surgery for consideration of valvular repair, depending on workup above and assessment of risk.             Interval History:   Feeling better today. Up in chair for a few hours this morning.              Review of Systems:   The 5 point Review of Systems is negative other than noted in the HPI             Medications:   Scheduled Medications    cefTRIAXone  1 g Intravenous Q24H     furosemide  20 mg Oral BID     influenza Vac Split High-Dose  0.5 mL  Intramuscular Prior to discharge     insulin aspart  0.5-2.5 Units Subcutaneous TID AC     insulin aspart  0.5-2.5 Units Subcutaneous At Bedtime     metoprolol succinate ER  25 mg Oral BID     sodium chloride (PF)  10 mL Intracatheter Q7 Days     sodium chloride (PF)  3 mL Intravenous Q8H     sodium chloride (PF)  3 mL Intracatheter Q8H     sodium chloride (PF)  3 mL Intracatheter Q8H     PRN Medications  acetaminophen, sore throat lozenge, bisacodyl, glucose **OR** dextrose **OR** glucagon, fentaNYL, flumazenil, - MEDICATION INSTRUCTIONS -, HOLD MEDICATION, HOLD MEDICATION, HOLD MEDICATION, lidocaine 4%, lidocaine (buffered or not buffered), lidocaine (buffered or not buffered), magnesium sulfate, - MEDICATION INSTRUCTIONS -, melatonin, melatonin, metoclopramide **OR** metoclopramide, metoprolol, midazolam, naloxone, naloxone, nitroGLYcerin, ondansetron **OR** ondansetron, oxyCODONE, polyethylene glycol, potassium chloride, potassium chloride with lidocaine, potassium chloride, potassium chloride, potassium chloride, prochlorperazine **OR** prochlorperazine **OR** prochlorperazine, senna-docusate **OR** senna-docusate, sodium chloride (PF), sodium chloride (PF), sodium chloride (PF), sodium chloride (PF), sodium chloride               Physical Exam:   Vitals were reviewed  Blood pressure 109/84, pulse 120, temperature 98.5  F (36.9  C), temperature source Oral, resp. rate 12, height 1.829 m (6'), weight 65.9 kg (145 lb 4.5 oz), SpO2 96 %.  Wt Readings from Last 4 Encounters:   11/05/19 65.9 kg (145 lb 4.5 oz)   09/10/19 72.6 kg (160 lb)   09/02/19 72.6 kg (160 lb)   07/11/19 73.8 kg (162 lb 12.8 oz)       I/O last 3 completed shifts:  In: 556 [P.O.:440; I.V.:116]  Out: 925 [Urine:925]      Constitutional: Awake, alert, cooperative, no apparent distress   Lungs: Clear to auscultation bilaterally, no crackles or wheezing   Cardiovascular: Regular rate and rhythm, normal S1 and S2, and murmur noted   Abdomen: Normal  bowel sounds, soft, non-distended, non-tender   Skin: No rashes, no cyanosis, no edema   Other:               Data:   All laboratory data and imaging studies reviewed.    CMP  Recent Labs   Lab 11/05/19  0600 11/04/19  0550 11/03/19 0442 11/02/19  0635  10/31/19  0524    136 134 133   < > 135   POTASSIUM 4.2 4.2 3.9 3.8   < > 3.9   CHLORIDE 101 102 99 98   < > 103   CO2 30 27 28 27   < > 28   ANIONGAP 3 7 7 8   < > 4   * 171* 163* 216*   < > 109*   BUN 58* 61* 49* 43*   < > 22   CR 1.13 1.29* 1.20 1.39*   < > 1.13   GFRESTIMATED 57* 49* 53* 45*   < > 57*   GFRESTBLACK 67 57* 62 52*   < > 67   BARON 7.8* 7.7* 7.9* 7.8*   < > 7.9*   MAG  --  2.6* 2.6*  --   --  2.2   PHOS  --  4.2 3.7  --   --   --    PROTTOTAL  --   --   --   --   --  5.8*   ALBUMIN  --   --   --   --   --  3.2*   BILITOTAL  --   --   --   --   --  1.6*   ALKPHOS  --   --   --   --   --  36*   AST  --   --   --   --   --  11   ALT  --   --   --   --   --  18    < > = values in this interval not displayed.     CBC  Recent Labs   Lab 11/05/19  0600 11/04/19  0550 11/03/19 0442 11/02/19  1219   WBC 9.1 7.2 15.4* 15.8*   RBC 3.13* 3.07* 3.25* 3.11*   HGB 9.5* 9.3* 9.8* 9.5*   HCT 29.0* 28.6* 30.3* 29.1*   MCV 93 93 93 94   MCH 30.4 30.3 30.2 30.5   MCHC 32.8 32.5 32.3 32.6   RDW 21.8* 21.8* 21.7* 22.2*   PLT 34* 33* 40* 40*     INR  Recent Labs   Lab 10/30/19  1134   INR 1.39*           Og Klein CNP  Nurse Practitioner  [facility]  [phone]

## 2019-11-05 NOTE — ANESTHESIA PREPROCEDURE EVALUATION
Anesthesia Pre-Procedure Evaluation    Patient: Jama Paul   MRN: [id number] : 1931          Preoperative Diagnosis: Atrial fibrillation  Procedure(s):  KATIE, CARDIOVERSION.    Past Medical History:   Diagnosis Date     Congestive heart failure (H)      Elevated PSA      Eye hemorrhage, left 2019     Non-ischemic cardiomyopathy (H)     2017, med treatment, echo done  nl ef, mod to severe tr     Permanent atrial fibrillation      Thrombocytopenia (H)      Unspecified essential hypertension      Past Surgical History:   Procedure Laterality Date     ABDOMEN SURGERY      bowel obstruction     BONE MARROW BIOPSY, BONE SPECIMEN, NEEDLE/TROCAR N/A 3/4/2019    Procedure: BIOPSY BONE MARROW;  Surgeon: Josey Vargas MD;  Location:  GI     CARPAL TUNNEL RELEASE RT/LT       EXPLORE GROIN  2014    Procedure: EXPLORE GROIN;  Surgeon: Sam Aguirre MD;  Location:  OR     HERNIORRHAPHY INGUINAL  2014    Procedure: HERNIORRHAPHY INGUINAL;  Surgeon: Sam Aguirre MD;  Location:  OR     MOHS MICROGRAPHIC PROCEDURE       PHACOEMULSIFICATION CLEAR CORNEA WITH STANDARD INTRAOCULAR LENS IMPLANT Right 2015    Procedure: PHACOEMULSIFICATION CLEAR CORNEA WITH STANDARD INTRAOCULAR LENS IMPLANT;  Surgeon: Gil Shannon MD;  Location:  EC     septic olecranon bursitis[         Anesthesia Evaluation     .             ROS/MED HX    ENT/Pulmonary:     (+)other ENT- OS blindess, , . .    Neurologic:       Cardiovascular: Comment: Chronic Afib. On Coumadin. Retinal bleed. Plt count 29. Coumadin stopped. Worked up for watchman's procedure. Need BMBx prior.     (+) hypertension----. Taking blood thinners : Instructions Given to patient: recently stopped coumadin 2/2 low PLTs. CHF etiology: non-ischemic cardiomyopathy Last EF: 70% . . :. dysrhythmias a-fib, .       METS/Exercise Tolerance:     Hematologic:     (+) Anemia, Other Hematologic Disorder-Chronic Low PLTs 20-40        Musculoskeletal:         GI/Hepatic:         Renal/Genitourinary:         Endo:  - neg endo ROS       Psychiatric:         Infectious Disease:  - neg infectious disease ROS       Malignancy:         Other:                          Physical Exam  Normal systems: cardiovascular, pulmonary and dental    Airway   Mallampati: II  TM distance: >3 FB  Neck ROM: full    Dental     Cardiovascular       Pulmonary             Lab Results   Component Value Date    WBC 9.1 11/05/2019    HGB 9.5 (L) 11/05/2019    HCT 29.0 (L) 11/05/2019    PLT 34 (LL) 11/05/2019     11/05/2019    POTASSIUM 4.2 11/05/2019    CHLORIDE 101 11/05/2019    CO2 30 11/05/2019    BUN 58 (H) 11/05/2019    CR 1.13 11/05/2019     (H) 11/05/2019    BARON 7.8 (L) 11/05/2019    PHOS 4.2 11/04/2019    MAG 2.6 (H) 11/04/2019    ALBUMIN 3.2 (L) 10/31/2019    PROTTOTAL 5.8 (L) 10/31/2019    ALT 18 10/31/2019    AST 11 10/31/2019    ALKPHOS 36 (L) 10/31/2019    BILITOTAL 1.6 (H) 10/31/2019    LIPASE 76 04/30/2014    ABRIL 40 01/20/2018    INR 1.39 (H) 10/30/2019    TSH 0.99 02/05/2019       Preop Vitals  BP Readings from Last 3 Encounters:   11/05/19 106/62   10/14/19 101/77   09/23/19 122/79    Pulse Readings from Last 3 Encounters:   11/05/19 85   10/14/19 92   09/23/19 93      Resp Readings from Last 3 Encounters:   11/05/19 12   10/14/19 16   09/23/19 16    SpO2 Readings from Last 3 Encounters:   11/05/19 97%   09/10/19 97%   09/02/19 96%      Temp Readings from Last 1 Encounters:   11/05/19 36.9  C (98.5  F) (Oral)    Ht Readings from Last 1 Encounters:   10/30/19 1.829 m (6')      Wt Readings from Last 1 Encounters:   11/05/19 65.9 kg (145 lb 4.5 oz)    Estimated body mass index is 19.7 kg/m  as calculated from the following:    Height as of this encounter: 1.829 m (6').    Weight as of this encounter: 65.9 kg (145 lb 4.5 oz).       Anesthesia Plan      History & Physical Review  History and physical reviewed and following examination; no interval  change.    ASA Status:  3 .        Plan for MAC Reason for MAC:  Difficulty with conscious sedation (QS)         Postoperative Care      Consents  Anesthetic plan, risks, benefits and alternatives discussed with:  Patient..                 Tree Cuellar, DO

## 2019-11-05 NOTE — OR NURSING
ELMER called - ok to go to floor   Wife now in room with patient     Earlier times -1540- Procedure begun -KATIE  1546- unsucessful KATIE - Dr Snyder did go talk with family

## 2019-11-06 ENCOUNTER — APPOINTMENT (OUTPATIENT)
Dept: OCCUPATIONAL THERAPY | Facility: CLINIC | Age: 84
DRG: 286 | End: 2019-11-06
Payer: MEDICARE

## 2019-11-06 LAB
ANION GAP SERPL CALCULATED.3IONS-SCNC: 4 MMOL/L (ref 3–14)
BUN SERPL-MCNC: 54 MG/DL (ref 7–30)
CALCIUM SERPL-MCNC: 7.4 MG/DL (ref 8.5–10.1)
CHLORIDE SERPL-SCNC: 103 MMOL/L (ref 94–109)
CO2 SERPL-SCNC: 30 MMOL/L (ref 20–32)
CREAT SERPL-MCNC: 1.14 MG/DL (ref 0.66–1.25)
GFR SERPL CREATININE-BSD FRML MDRD: 57 ML/MIN/{1.73_M2}
GLUCOSE BLDC GLUCOMTR-MCNC: 84 MG/DL (ref 70–99)
GLUCOSE BLDC GLUCOMTR-MCNC: 97 MG/DL (ref 70–99)
GLUCOSE SERPL-MCNC: 96 MG/DL (ref 70–99)
LACTATE BLD-SCNC: 0.9 MMOL/L (ref 0.7–2)
PLATELET # BLD AUTO: 38 10E9/L (ref 150–450)
POTASSIUM SERPL-SCNC: 4.5 MMOL/L (ref 3.4–5.3)
SODIUM SERPL-SCNC: 137 MMOL/L (ref 133–144)
UPPER GI ENDOSCOPY: NORMAL

## 2019-11-06 PROCEDURE — 25000132 ZZH RX MED GY IP 250 OP 250 PS 637: Mod: GY | Performed by: INTERNAL MEDICINE

## 2019-11-06 PROCEDURE — 97535 SELF CARE MNGMENT TRAINING: CPT | Mod: GO

## 2019-11-06 PROCEDURE — 40000239 ZZH STATISTIC VAT ROUNDS

## 2019-11-06 PROCEDURE — 00000146 ZZHCL STATISTIC GLUCOSE BY METER IP

## 2019-11-06 PROCEDURE — 25000132 ZZH RX MED GY IP 250 OP 250 PS 637: Mod: GY | Performed by: NURSE PRACTITIONER

## 2019-11-06 PROCEDURE — 80048 BASIC METABOLIC PNL TOTAL CA: CPT | Performed by: NURSE PRACTITIONER

## 2019-11-06 PROCEDURE — 97530 THERAPEUTIC ACTIVITIES: CPT | Mod: GO

## 2019-11-06 PROCEDURE — 85049 AUTOMATED PLATELET COUNT: CPT | Performed by: NURSE PRACTITIONER

## 2019-11-06 PROCEDURE — 25000128 H RX IP 250 OP 636: Performed by: INTERNAL MEDICINE

## 2019-11-06 PROCEDURE — 43235 EGD DIAGNOSTIC BRUSH WASH: CPT | Performed by: INTERNAL MEDICINE

## 2019-11-06 PROCEDURE — G0500 MOD SEDAT ENDO SERVICE >5YRS: HCPCS | Performed by: INTERNAL MEDICINE

## 2019-11-06 PROCEDURE — 40000257 ZZH STATISTIC CONSULT NO CHARGE VASC ACCESS

## 2019-11-06 PROCEDURE — 0DJ08ZZ INSPECTION OF UPPER INTESTINAL TRACT, VIA NATURAL OR ARTIFICIAL OPENING ENDOSCOPIC: ICD-10-PCS | Performed by: INTERNAL MEDICINE

## 2019-11-06 PROCEDURE — 83605 ASSAY OF LACTIC ACID: CPT | Performed by: INTERNAL MEDICINE

## 2019-11-06 PROCEDURE — 25000125 ZZHC RX 250: Performed by: INTERNAL MEDICINE

## 2019-11-06 PROCEDURE — 99232 SBSQ HOSP IP/OBS MODERATE 35: CPT | Performed by: INTERNAL MEDICINE

## 2019-11-06 PROCEDURE — 21000001 ZZH R&B HEART CARE

## 2019-11-06 PROCEDURE — G0463 HOSPITAL OUTPT CLINIC VISIT: HCPCS

## 2019-11-06 RX ORDER — CEFUROXIME AXETIL 250 MG/1
250 TABLET ORAL EVERY 12 HOURS SCHEDULED
Status: COMPLETED | OUTPATIENT
Start: 2019-11-06 | End: 2019-11-10

## 2019-11-06 RX ORDER — FENTANYL CITRATE 50 UG/ML
INJECTION, SOLUTION INTRAMUSCULAR; INTRAVENOUS PRN
Status: DISCONTINUED | OUTPATIENT
Start: 2019-11-06 | End: 2019-11-06 | Stop reason: HOSPADM

## 2019-11-06 RX ADMIN — FUROSEMIDE 10 MG: 10 INJECTION, SOLUTION INTRAVENOUS at 20:07

## 2019-11-06 RX ADMIN — FUROSEMIDE 10 MG: 10 INJECTION, SOLUTION INTRAVENOUS at 08:38

## 2019-11-06 RX ADMIN — METOPROLOL SUCCINATE 25 MG: 25 TABLET, EXTENDED RELEASE ORAL at 20:07

## 2019-11-06 RX ADMIN — MIRTAZAPINE 15 MG: 15 TABLET, FILM COATED ORAL at 20:07

## 2019-11-06 RX ADMIN — MELATONIN 5 MG TABLET 5 MG: at 20:08

## 2019-11-06 RX ADMIN — METOPROLOL SUCCINATE 25 MG: 25 TABLET, EXTENDED RELEASE ORAL at 08:38

## 2019-11-06 RX ADMIN — Medication 1 MG: at 20:06

## 2019-11-06 RX ADMIN — CEFUROXIME AXETIL 250 MG: 250 TABLET ORAL at 20:06

## 2019-11-06 NOTE — PLAN OF CARE
Pt stable over night, VSS, no c/o Chest pain or sob.  Pt slept well after sleep aid given.  Pt in Afib with CVR.  Pt is NPO for possible GI evaluation today.   No changes in Pt's conditions, will continue to monitor.

## 2019-11-06 NOTE — PROGRESS NOTES
CV Surgery    No major events, unable to perform KATIE however. Will need full preop workup to assess his surgical candidacy (KATIE, [facility]) and to see if severe TR is his etiology of clinical picture. If cardiology feels best to wait/treat medically, will sign off. Please call if any additional questions or if anything changes.    Rich Harris MD

## 2019-11-06 NOTE — CONSULTS
"CLINICAL NUTRITION SERVICES  -  ASSESSMENT NOTE      Recommendations Ordered by Registered Dietitian (RD):   Continue 2 gram sodium diet    MALNUTRITION:  % Weight Loss:  Weight loss does not meet criteria for malnutrition, weight changes suspected related to fluid shifts.   % Intake:  No decreased intake noted  Subcutaneous Fat Loss:  Orbital region severe depletion and Upper arm region severe depletion - suspect related to sarcopenia   Muscle Loss:  Temporal region moderate-severe depletion, Acromion bone region moderate-severe depletion, Patellar region severe depletion and Posterior calf region severe depletion - consistent with aging process  Fluid Retention:  Mild edema in lower extremities noted in flowsheet 11/6, not nutritionally related    Malnutrition Diagnosis: Patient does not meet two of the above criteria necessary for diagnosing malnutrition        REASON FOR ASSESSMENT  Jama Paul is a 88 year old male seen by Registered Dietitian for LOS    Pt has h/o hypertension, atrial fibrillation, nonischemic cardiomyopathy, congestive heart failure, idiopathic thrombocytopenic purpura and hiatal hernia, who came to the ER with a complaint of worsening shortness of breath. Pt found to have valvular heart disease w/ CHF.      NUTRITION HISTORY  - Information obtained from pt, EMR  - NKFA  - Pt reports eating 3 meals per day PTA, and his wife confirms he has always had a \"big appetite.\"    - Pt did not provide examples of previous meals, but he and wife both state he was eating well.         CURRENT NUTRITION ORDERS  Diet Order:     2000 mg Sodium (soft foods after EGD)       Current Intake/Tolerance:  - Per [employer], pt has been ordering 3 quality meals per day. His appetite is recorded as good and intake has been 100% per flowsheets. Overall, pt appears to be eating well. Wife confirms pt has been eating 100% of meals TID when he is not NPO.         NUTRITION FOCUSED PHYSICAL ASSESSMENT FOR " "DIAGNOSING MALNUTRITION)  Completed:  Yes Full assessment                Observed:    Muscle and fat loss observed. Findings consistent with aging.     Obtained from Chart/Interdisciplinary Team:  Per Hospitalist 11/5: Pt is S/P bilateral Thoracentesis under U/S guidance with about 2.2 liter out from right and 800 ml out from left. Now breathing is back to his baseline level and not SOB at this time. Pleural fluid is transudate most likely from CHF   WOC 11/6: RLE wounds due to trauma (hitting bed frame); Wounds basically re-epithelialized with just thin slivers of purple dry scabs remaining, no erythema or drainage noted.   11/6 pt underwent EGD  Per GI 11/5, pt denies any hx of dysphagia, odynophagia  Attempted KATIE 2x, both unsuccessful (11/4 & 11/5)    ANTHROPOMETRICS  Height: 6' 0\"  Weight: 65.9 kg (145 lbs)   Body mass index is 19.7 kg/m .  Weight Status:  Normal BMI  IBW: 80.9 kg (178 lbs)  % IBW: 81%  Weight History:     Wt Readings from Last 10 Encounters:   11/05/19 65.9 kg (145 lb 4.5 oz)   09/10/19 72.6 kg (160 lb)   09/02/19 72.6 kg (160 lb)   07/11/19 73.8 kg (162 lb 12.8 oz)   06/13/19 72.5 kg (159 lb 12.8 oz)   04/25/19 70.8 kg (156 lb)   04/12/19 72.6 kg (160 lb)   04/10/19 74.8 kg (165 lb)   04/09/19 73.8 kg (162 lb 11.2 oz)   04/05/19 73.8 kg (162 lb 11.2 oz)     Vitals:    10/30/19 1044 10/31/19 0619 11/01/19 1041 11/02/19 0500   Weight: 75.3 kg (166 lb) 66.8 kg (147 lb 6 oz) 67.4 kg (148 lb 8 oz) 69.1 kg (152 lb 5.4 oz)    11/03/19 0400 11/04/19 0407 11/05/19 0500   Weight: 68.6 kg (151 lb 3.8 oz) 69.3 kg (152 lb 11.2 oz) 65.9 kg (145 lb 4.5 oz)     Pt has experienced ~9% weight loss in 2 months. Suspect weight loss due to pt fluid status.     LABS  Labs reviewed    MEDICATIONS  Medications reviewed:  - Pt receiving LASIX  - Remeron 15 mg    ASSESSED NUTRITION NEEDS PER APPROVED PRACTICE GUIDELINES:    Dosing Weight 65.9 kg (actual weight)   Estimated Energy Needs: 1648 - 1977 kcals (25-30 " Kcal/Kg)  Justification: maintenance  Estimated Protein Needs: 79 - 99 grams protein (1.2-1.5 g pro/Kg)  Justification: preservation of lean body mass  Estimated Fluid Needs: 1648 - 1977 mL (1 mL/Kcal)  Justification: maintenance or per MD    MALNUTRITION:  % Weight Loss:  Weight loss does not meet criteria for malnutrition, weight changes suspected related to fluid shifts.   % Intake:  No decreased intake noted  Subcutaneous Fat Loss:  Orbital region severe depletion and Upper arm region severe depletion - suspect related to sarcopenia   Muscle Loss:  Temporal region moderate-severe depletion, Acromion bone region moderate-severe depletion, Patellar region severe depletion and Posterior calf region severe depletion - consistent with aging process  Fluid Retention:  Mild edema in lower extremities noted in flowsheet 11/6, not nutritionally related    Malnutrition Diagnosis: Patient does not meet two of the above criteria necessary for diagnosing malnutrition    NUTRITION DIAGNOSIS:  No nutrition diagnosis identified at this time.     NUTRITION INTERVENTIONS  Recommendations / Nutrition Prescription  Continue 2 gram sodium diet       Implementation  Nutrition education: Provided education on the importance of including a protein source at each meal for muscle maintenance. Pt is eating well and verbalized understanding.          Nutrition Goals  Pt to consume 75 - 100% of meals TID, with at least 1 source of protein per meal.         MONITORING AND EVALUATION:  Progress towards goals will be monitored and evaluated per protocol and Practice Guidelines        Madonna Jimenez  Dietetic Intern

## 2019-11-06 NOTE — PROGRESS NOTES
Hospitalist Cross Cover  11/5/2019    Called about patient requesting additional sleep aid. Home meds reviewed. Resumed PTA remeron 15 mg in the evening. Added prn benadryl po/IV.    Charley Starks MD

## 2019-11-06 NOTE — PROGRESS NOTES
[facility] Nurse Inpatient Wound Assessment   Reason for consultation: Evaluate and treat   traumatically induced right lower extremely ulceration Assessment  RLE wounds due to: Trauma. Previous full thickness wound from hitting bed frame, followed by Dr Benjamin/Mary @ [facility]. Wounds basically re-epithelialized with just thin slivers of purple dry scabs remaining, no erythema or drainage noted.  Will try leaving open to air so can apply moisturizer to the very dry skin on legs, and continue pt's Edemawear stockinette.      Coccyx and upper spine:  Blanchable erythema over bony prominences.  Skin remains intact but is very thin and fragile and wrinkly.  Pt has curvature to spine which increases pressure on this area.  Nursing has been covering with Mepilex Sacrals for prevention.  Pt needs diligent PIP measures.      Bilateral heels:  Reddened, but intact and blanchable.  Per nursing, heels have been getting redder over past couple of days.  Will order Prevalon boots for prevention.     Treatment Plan  Skin care to BLE:  Daily:    1.  Cleanse with mild skin cleanser (ie bath wipes or Taran perineal lotion), then apply Sween 24 cream.    2.  Leave RLE scabs open to air, unless they start to re-open or drain - then cover with Mepilex or PolyMem and change every 3 days and prn.  Continue pt's Edemawear stocking.    3.  Ensure heels are floating at all times - trial the use of Prevalon boots when pt in bed.      Pressure Injury Prevention (PIP) Plan:  If patient is declining pressure injury prevention interventions: Explore reason why and address patient's concerns and Educate on pressure injury risk and prevention intervention(s)  Mattress: Follow bed algorithm, reassess daily and order specialty mattress, if indicated.  HOB: Maintain at or below 30 degrees, unless contraindicated  Repositioning in bed: Every 1-2 hours , Left/right positioning; avoid supine and Raise foot of bed prior to raising head of  bed, to reduce patient sliding down (shear)  Heels: Keep elevated off mattress, Pillows under calves and Heel lift boots  Protective Dressing: Sacral Mepilex for prevention (#[id number]),  especially for the agitated patient - to coccyx and to spine - change prn  Positioning Equipment: None  Chair positioning: use pillow or Chair cushion (#[id number])  and Assist patient to reposition hourly; also use a pillow behind pt's back to protect spine  If patient has a buttock pressure injury, or high risk for PI use chair cushion or SPS.  Moisture Management: Perineal cleansing /protection: Follow Incontinence Protocol, Avoid brief in bed, Clean and dry skin folds with bathing  and Moisturize dry skin  Under Devices: Inspect skin under all medical devices during skin inspection , Ensure tubes are stabilized without tension and Ensure patient is not lying on medical devices or equipment when repositioned  Ask provider to discontinue device when no longer needed.    Orders In Epic. At discharge pt should resume tx plan as outlined by [facility].   WOC Nurse follow-up plan: weekly if pt remains in hospital  Nursing to notify the Provider(s) and re-consult the WOC Nurse if wound(s) deteriorates or new skin concern.    Patient History  According to provider note(s):    This is an 88-year-old male with a history of hypertension, atrial fibrillation, nonischemic cardiomyopathy, congestive heart failure, idiopathic thrombocytopenic purpura and hiatal hernia, who comes to the ER with a complaint of worsening shortness of breath.      Objective Data   Active Diet Order:  Orders Placed This Encounter      2 Gram Sodium Diet Other - please comment    Output:   I/O last 3 completed shifts:  In: 423 [I.V.:423]  Out: 1375 [Urine:1375]    Risk Assessment:   Sensory Perception: 3-->slightly limited  Moisture: 3-->occasionally moist  Activity: 2-->chairfast  Mobility: 3-->slightly limited  Nutrition: 2-->probably inadequate  Friction and Shear:  1-->problem  Nader Score: 14                          Labs:   Recent Labs   Lab 11/05/19  0600  10/31/19  0524   ALBUMIN  --   --  3.2*   HGB 9.5*   < > 9.7*   WBC 9.1   < > 10.1    < > = values in this interval not displayed.       Physical Exam  Skin inspection  Wound Location:  RLE wound  Wound History: traumatic wound from bedframe @ home.   Measurements  2.5 x 0.5 x 0cm  Palpation of the wound bed: normal  Periwound skin: thin skin, intact, ecchymotic  Temperature: normal  Drainage: none  Odor: none  Pain: Denies    Coccyx and upper spinal curve bony areas:  Reddened but blanchable; intact but very thin and fragile.  Per nursing, pt has frequent smears of fecal incontinence.  Pt needs heavy assist for repositioning and prefers to lie on his back.      Interventions  Current support surface: atmos air  Visual inspection/assessment of wound(s) completed  Wound Care: cleaned and moisturized skin, recovered coccyx and spine with Mepilex  Supplies: in floor stock/room  Education provided: reviewed PIP measures and rationale with pt  Discussed plan of care with Nursing / Patient

## 2019-11-06 NOTE — PROGRESS NOTES
"  [facility] Cardiology Progress Note          Assessment and Plan:   Assessment:   1. HFpEF - symptomatic decompensation was rapid (one week) prior to admission suggesting he was previously \"compensated\". Previously was exercising.    11/1 became hypotensive - likely a result of rapid HR and vasodilation (dobutamine) and reduced preload (Bumex).    With discontinuation of both above medications,resumption of beta blocker.  Has remained stable.    Will need further evaluation with KATIE (have been unsuccessful X 2), possibly with right/left heart catheterization.  Unclear that mitral valve is an issue, more likely tricuspid. TV repair/TVR would be higher risk though RV function normal now.     If he can return to prior \"compensated\"status, medical therapy may be more successful at maintaining quality of life.   Do not think he would tolerate TVR and MVR (possible MitraClip, TriClip candidate).   PT now with period of recovery may be most efficacious course.    Significant continued diuresis off diuretics and on vasopressor suggests he does not tolerate decrease in preload.    Reviewed chest x-ray done today: There is still evidence of failure with a significant right infiltrate.      Intake/Output Summary (Last 24 hours) at 11/5/2019 2001  Last data filed at 11/5/2019 1650  Gross per 24 hour   Intake 563 ml   Output 1025 ml   Net -462 ml           2. Atrial fibrillation- RVR earlier but now rate about 100 bpm. Not on anticoagulation. May be Watchman candidate if no surgery,TATYANA ligation if surgery.  He notes that his heart rate has been higher recently, minimal movement in his chair sends his HR into the 90's bpm.  3. Thrombocytopenia - 34K platelets.  4. MR -as above.  5.TR - as above,dilated RV with normal function.        Plan:     1.Tried again to perform KATIE with propafol and Anesthesiology but unable,- possible \"pouch\", will ask [facility] to assist; likely to benefit from barium swallow and swallow " evaluation.  (Daughter noted that he has been choking lately with eating).  2. Avoid vasodilation, further decrease in preload.  3. Increase beta blocker for HR; add low dose diuretic back now - minimal output with oral so will give low dose IV.              Significant Problems:     Patient Active Problem List    Diagnosis Date Noted     Pleural effusion 10/30/2019     Priority: Medium     Bilateral pleural effusion 10/30/2019     Priority: Medium     Ulcer of right lower extremity with fat layer exposed (H) 09/16/2019     Priority: Medium     Idiopathic thrombocytopenic purpura (H) 04/05/2019     Priority: Medium     Thrombocytopenia (H)      Priority: Medium     Non-ischemic cardiomyopathy (H)      Priority: Medium     2017, med treatment, echo done 4/18 nl ef, mod to severe tr       Eye hemorrhage, left 02/01/2019     Priority: Medium     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 Tired, noted that not eating 2 days in a row has been difficult.          Medications:   Scheduled:    cefTRIAXone  1 g Intravenous Q24H     furosemide  20 mg Oral BID     influenza Vac Split High-Dose  0.5 mL Intramuscular Prior to discharge     insulin aspart  0.5-2.5 Units Subcutaneous TID AC     insulin aspart  0.5-2.5 Units Subcutaneous At Bedtime     metoprolol succinate ER  25 mg Oral BID     sodium chloride (PF)  10 mL Intracatheter Q7 Days     sodium chloride (PF)  3 mL Intravenous Q8H     sodium chloride (PF)  3 mL Intracatheter Q8H              Physical Exam:   All vitals have been reviewed  Temp:  [96.5  F (35.8  C)-98.5  F (36.9  C)] 96.5  F (35.8  C)  Pulse:  [] 112  Heart Rate:  [] 98  Resp:  [12-22] 17  BP: ()/(59-87) 108/80  SpO2:  [90 %-98 %] 92 %  Vitals:    10/30/19 1044 10/31/19 0619 11/01/19 1041 11/02/19 0500   Weight: 75.3 kg (166 lb) 66.8 kg (147 lb 6 oz) 67.4 kg (148 lb 8 oz) 69.1 kg (152 lb 5.4 oz)    11/03/19 0400 11/04/19 0407 11/05/19 0500   Weight: 68.6 kg (151 lb 3.8 oz) 69.3 kg (152 lb 11.2 oz) 65.9 kg (145 lb 4.5 oz)     I/O last 3 completed shifts:  In: 579 [P.O.:440; I.V.:139]  Out: 1025 [Urine:1025]    NAD, alert and oriented. Hoarse tonight.    Skin: warm and dry.    Neck:   No JVD - does have an inspiratory rise to near jaw angle.     Lungs:   Clear without wheezes, rhonchi but decreased BS at bases.     Cardiovascular:   S1, S2, iregularly irregular, 2-3 holosystolic murmur at LSB.     Ext's: no edema.          Data:     Last Comprehensive Metabolic Panel:  Sodium   Date Value Ref Range Status   11/05/2019 134 133 - 144 mmol/L Final     Potassium   Date Value Ref Range Status   11/05/2019 4.2 3.4 - 5.3 mmol/L Final     Chloride   Date Value Ref Range Status   11/05/2019 101 94 - 109 mmol/L Final     Carbon Dioxide   Date Value Ref Range Status   11/05/2019 30 20 - 32 mmol/L Final     Anion Gap   Date Value Ref Range Status   11/05/2019 3 3 - 14 mmol/L Final     Glucose   Date Value Ref Range Status   11/05/2019 140 (H) 70 - 99 mg/dL Final     Urea Nitrogen   Date Value Ref Range Status   11/05/2019 58 (H) 7 - 30 mg/dL Final     Creatinine   Date Value Ref Range Status   11/05/2019 1.13 0.66 - 1.25 mg/dL Final     GFR Estimate   Date Value Ref Range Status   11/05/2019 57 (L) >60 mL/min/[1.73_m2] Final     Comment:     Non  GFR Calc  Starting 12/18/2018, serum creatinine based estimated GFR (eGFR) will be   calculated using the Chronic Kidney Disease Epidemiology Collaboration   (CKD-EPI)  equation.       Calcium   Date Value Ref Range Status   11/05/2019 7.8 (L) 8.5 - 10.1 mg/dL Final       Lab Results   Component Value Date    WBC 9.1 11/05/2019    HGB 9.5 (L) 11/05/2019    HCT 29.0 (L) 11/05/2019    MCV 93 11/05/2019    PLT 34 (LL) 11/05/2019     Lab Results   Component Value Date    INR 1.39 (H) 10/30/2019     Lab Results   Component Value Date    TROPI <0.015 10/30/2019    TROPI <0.015 02/28/2018    TROPI 0.029 01/19/2018     Echo (this week).  Left ventricular systolic function is normal. The visual ejection fraction is  estimated at 55-60%.  The right ventricle is severely dilated. The right ventricular systolic  function is normal.  Mild to moderate (1-2+) mitral regurgitation.  Mod-severe to severe (3-4+) tricuspid regurgitation.  The right ventricular systolic pressure is approximated at 46mmHg plus the  right atrial pressure, suggesting elevated pulmonary artery pressures.  Trivial pericardial effusion and a pleural effusion present.  Rhythm appears to be atrial fibrillation.     This study was compared to a prior TTE from 4/11/2019. Biventricular function  is stable. The severity of mitral insufficiency and tricuspid insufficiency  remains stable. There is now a trivial pericardial effusion, and a pleural  effusion is present. The rhythm appears to be atrial fibrillation on this  present study, however it was also irregularly irregular on the prior study.    Right heart catheterization (1/2018):  Catheterization results:  1-hemodynamics  -Right atrial pressure-   -/7/(6)  -Right ventricle-28/2, 7  -Pulmonary artery pressure 30/12 (19)  -Pulmonary capillary wedge pressure     -/13 (11)  -Pulmonary artery oxygen saturation 69% with aortic oxygen saturation  96%  -Estimated [place] cardiac output/index 6.37/3.38     Comment  This gentleman's filling pressures are generally excellent for him.  Pulmonary vascular resistance is normal. Further recommendations will  be per the cardiology  service    Erin Chavez MD  11/5/2019  (Critical care time 45 minutes, coordination of care and discussion with patient and family).

## 2019-11-06 NOTE — PROGRESS NOTES
"  [facility] Cardiology Progress Note          Assessment and Plan:   Assessment:   HFpEF   -symptomatic decompensation was rapid (one week) prior to admission suggesting he was previously \"compensated\". Previously was exercising.  -11/1 became hypotensive - likely a result of rapid HR and vasodilation (dobutamine) and reduced preload (Bumex).    -With discontinuation of both above medications,resumption of beta blocker. Has remained stable.  -Significant continued diuresis off diuretics and on vasopressor suggests he does not tolerate decrease in preload.  -negative 2 L, weights unreliable but appear down  -decreased O2 needs but still on 2L  -small L pleural effusion  Plan  Needs further evaluation-KATIE (have been unsuccessful X 2 possible pouch)  -UGI done today was unremarkable)  -may consider right/left heart catheterization.  -Unclear that mitral valve is an issue, more likely tricuspid. TV repair/TVR would be higher risk though RV function normal now. (possible MitraClip, TriClip candidate).  -PT now with period of recovery may be most efficacious course. If he can return to prior \"compensated\"status, medical therapy may be more successful at maintaining quality of life.   -avoid vasodilators and decreased in preload  -BB, low dose diuretic      2. Atrial fibrillation  - Rate   -platelets 38 K, previous occular hemorrhage and perineal bleed not on AC  - May be Watchman candidate if no surgery,TATYANA ligation if surgery.  He notes that his heart rate has been higher recently, minimal movement in his chair sends his HR into the 90's bpm.    3. Thrombocytopenia - 38K platelets. Hemoc saw today, stopped steroids  4. MR -as above.  5.TR - as above,dilated RV with normal function.                     Significant Problems:     Patient Active Problem List    Diagnosis Date Noted     Pleural effusion 10/30/2019     Priority: Medium     Bilateral pleural effusion 10/30/2019     Priority: Medium     Ulcer of " right lower extremity with fat layer exposed (H) 09/16/2019     Priority: Medium     Idiopathic thrombocytopenic purpura (H) 04/05/2019     Priority: Medium     Thrombocytopenia (H)      Priority: Medium     Non-ischemic cardiomyopathy (H)      Priority: Medium     2017, med treatment, echo done 4/18 nl ef, mod to severe tr       Eye hemorrhage, left 02/01/2019     Priority: Medium     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 Using less O2. Wife present          Medications:   Scheduled:    cefTRIAXone  1 g Intravenous Q24H     furosemide  10 mg Intravenous Q12H     influenza Vac Split High-Dose  0.5 mL Intramuscular Prior to discharge     insulin aspart  0.5-2.5 Units Subcutaneous TID AC     insulin aspart  0.5-2.5 Units Subcutaneous At Bedtime     metoprolol succinate ER  25 mg Oral BID     mirtazapine  15 mg Oral At Bedtime     sodium chloride (PF)  10 mL Intracatheter Q7 Days     sodium chloride (PF)  3 mL Intravenous Q8H     sodium chloride (PF)  3 mL Intracatheter Q8H             Physical Exam:   All vitals have been reviewed  Temp:  [96.5  F (35.8  C)-98.5  F (36.9  C)] 96.5  F (35.8  C)  Pulse:  [] 111  Heart Rate:  [] 104  Resp:  [12-35] 20  BP: ()/(54-87) 97/66  SpO2:  [89 %-99 %] 94 %  Vitals:    10/30/19 1044 10/31/19 0619 11/01/19 1041 11/02/19 0500   Weight: 75.3 kg (166 lb) 66.8 kg (147 lb 6 oz) 67.4 kg (148 lb 8 oz) 69.1 kg (152 lb 5.4 oz)    11/03/19 0400 11/04/19 0407 11/05/19 0500   Weight:  68.6 kg (151 lb 3.8 oz) 69.3 kg (152 lb 11.2 oz) 65.9 kg (145 lb 4.5 oz)     I/O last 3 completed shifts:  In: 423 [I.V.:423]  Out: 1375 [Urine:1375]    NAD, alert and oriented. Hoarse tonight.    Skin: warm and dry.    Neck:   No JVD - does have an inspiratory rise to near jaw angle.     Lungs:   Clear without wheezes, rhonchi but decreased BS at bases.     Cardiovascular:   S1, S2, iregularly irregular, 2-3 holosystolic murmur at LSB.     Ext's: no edema.          Data:     Last Comprehensive Metabolic Panel:  Sodium   Date Value Ref Range Status   11/06/2019 137 133 - 144 mmol/L Final     Potassium   Date Value Ref Range Status   11/06/2019 4.5 3.4 - 5.3 mmol/L Final     Chloride   Date Value Ref Range Status   11/06/2019 103 94 - 109 mmol/L Final     Carbon Dioxide   Date Value Ref Range Status   11/06/2019 30 20 - 32 mmol/L Final     Anion Gap   Date Value Ref Range Status   11/06/2019 4 3 - 14 mmol/L Final     Glucose   Date Value Ref Range Status   11/06/2019 96 70 - 99 mg/dL Final     Urea Nitrogen   Date Value Ref Range Status   11/06/2019 54 (H) 7 - 30 mg/dL Final     Creatinine   Date Value Ref Range Status   11/06/2019 1.14 0.66 - 1.25 mg/dL Final     GFR Estimate   Date Value Ref Range Status   11/06/2019 57 (L) >60 mL/min/[1.73_m2] Final     Comment:     Non  GFR Calc  Starting 12/18/2018, serum creatinine based estimated GFR (eGFR) will be   calculated using the Chronic Kidney Disease Epidemiology Collaboration   (CKD-EPI) equation.       Calcium   Date Value Ref Range Status   11/06/2019 7.4 (L) 8.5 - 10.1 mg/dL Final       Lab Results   Component Value Date    WBC 9.1 11/05/2019    HGB 9.5 (L) 11/05/2019    HCT 29.0 (L) 11/05/2019    MCV 93 11/05/2019    PLT 38 (LL) 11/06/2019     Lab Results   Component Value Date    INR 1.39 (H) 10/30/2019     Lab Results   Component Value Date    TROPI <0.015 10/30/2019    TROPI <0.015 02/28/2018    TROPI 0.029 01/19/2018     Echo (this  week).  Left ventricular systolic function is normal. The visual ejection fraction is  estimated at 55-60%.  The right ventricle is severely dilated. The right ventricular systolic  function is normal.  Mild to moderate (1-2+) mitral regurgitation.  Mod-severe to severe (3-4+) tricuspid regurgitation.  The right ventricular systolic pressure is approximated at 46mmHg plus the  right atrial pressure, suggesting elevated pulmonary artery pressures.  Trivial pericardial effusion and a pleural effusion present.  Rhythm appears to be atrial fibrillation.     This study was compared to a prior TTE from 4/11/2019. Biventricular function  is stable. The severity of mitral insufficiency and tricuspid insufficiency  remains stable. There is now a trivial pericardial effusion, and a pleural  effusion is present. The rhythm appears to be atrial fibrillation on this  present study, however it was also irregularly irregular on the prior study.    Right heart catheterization (1/2018):  Catheterization results:  1-hemodynamics  -Right atrial pressure-   -/7/(6)  -Right ventricle-28/2, 7  -Pulmonary artery pressure 30/12 (19)  -Pulmonary capillary wedge pressure     -/13 (11)  -Pulmonary artery oxygen saturation 69% with aortic oxygen saturation  96%  -Estimated [place] cardiac output/index 6.37/3.38     Comment  This gentleman's filling pressures are generally excellent for him.  Pulmonary vascular resistance is normal. Further recommendations will  be per the cardiology service    ERIC Juares CNP  11/5/2019  (Critical care time 45 minutes, coordination of care and discussion with patient and family).

## 2019-11-06 NOTE — PLAN OF CARE
Discharge Planner OT   Patient plan for discharge: None stated.  Current status: MOD A of 2 to transfer to chair, very weak in BLEs. Difficulty extending knees in stance. Set up for seated ADL.   Barriers to return to prior living situation: Increased level of A with ADL and mobility.  Recommendations for discharge: TCU.   Rationale for recommendations: Not at baseline.        Entered by: Janice Zelaya 11/06/2019 8:00 AM

## 2019-11-06 NOTE — PROGRESS NOTES
Per bedside nurse request, called Dr Landaverde in Endoscopy to request any post procedural orders if necessary. Per MD, ok for pt to resume previous diet as tolerated. Bedside nurse notified.

## 2019-11-06 NOTE — PLAN OF CARE
Patient had EGD this morning, denies pain, tolerated lunch, up in the chair with 2 assist walker and gait belt. Lactic was normal. Family at bedside.

## 2019-11-06 NOTE — PROGRESS NOTES
[facility]    Medicine Progress Note - Hospitalist Service       Date of Admission:  10/30/2019  Assessment & Plan   Jama Paul is an 88 year-old male with a history of hypertension, atrial fibrillation, nonischemic cardiomyopathy, congestive heart failure, idiopathic thrombocytopenic purpura and hiatal hernia, who is admitted 10/30/2019 with worsening shortness of breath.     Cardiogenic shock   Acute on chronic diastolic congestive heart failure with bilateral pleural effusion s/p bilateral thoracentesis  Mild to moderate mitral regurgitation  Moderate-severe to severe tricuspid regurgitation  History of nonischemic cardiomyopathy  Presents with progressive dyspnea, found to have bilateral pleural effusions.  Underwent bilateral thoracentesis as well as aggressive diuresis, subsequently with hypotension requiring norepinephrine.  Pleural fluid analysis consistent with transudate, consistent with CHF. History of nonischemic cardiomyopathy, although improved to 55%-60% on last echocardiogram 1/2019, echocardiogram this admission with EF 55 to 60%, severely dilated RV, valvular abnormalities as above.  - CV surgery consulted, following for possible surgical intervention on valve  - KATIE x2 attempted, unable to pass scope.  GI consulted for EGD with no clear reason for inability to complete KATIE, discussed with GI provider, available for further discussion if any questions from cardiology  - Cardiology following, continues on low dose IV diuretics  - Continue metoprolol XL  - Palliative care consulted earlier in admission, patient continues to desire restorative pathway  - Remains off of Entresto, resume when indicated per cardiology     Idiopathic thrombocytopenic purpura  Followed by ALAN.  Treated with high-dose steroids without response and platelets, steroids discontinued.  - Platelets stable  - As per hematology recommend platelet transfusion prior to upcoming invasive cardiac procedure to  achieve >= 50,000 plts.     Iatrogenic right-sided pneumothorax status post a right pigtail catheter placement by IR  Secondary to thoracentesis. Pneumothorax resolved after pigtail catheter placement     Hypertension  - Blood pressure controlled, trends towards softer side  - Continue metoprolol XL, diuresis    Atrial fibrillation  Not on any anticoagulation because of ITP and hemorrhage in the left eye in the past.  - Continue metoprolol XL   - Telemetry      Urinary tract infection, citrobacter  Urinalysis abnormal, urine culture with >100K citrobacter, pansensitive.  - Transition to cefuroxime to complete 7 day course    Steroid induced hyperglycemia  Noted during high-dose steroids.  Required low doses of insulin.  Last hemoglobin A1c unknown though blood sugars have now normalized off of steroids.  - Discontinue insulin orders, routine blood sugar checks     Diet: Room Service  2 Gram Sodium Diet Other - please comment    DVT Prophylaxis: Pneumatic Compression Devices  Mccullough Catheter: in place, indication: Strict 1-2 Hour I&O  Code Status: Full Code      Disposition Plan   Expected discharge: Unclear. Pending final determination of cardiac procedures, transition to oral diuretics.   Entered: Rasta Gaytan MD 11/06/2019, 12:15 PM       The patient's care was discussed with the Patient and Patient's wife.    Rasta Gaytan MD  Hospitalist Service  [facility]    ______________________________________________________________________    Interval History   No acute events overnight.  Underwent EGD.  Noted by daughter previously per notes to have coughing with eating, reviewed with patient and his wife and they report that this was not a preceding problem for him prior to this hospitalization, has only been noted here immediately around his endoscopic procedures.  Discussed with RN, no issues today.  Patient denies any chest pain, shortness of breath, nausea, vomiting.    Data reviewed today: I  reviewed all medications, new labs and imaging results over the last 24 hours. I personally reviewed no images or EKG's today.    Physical Exam   Vital Signs: Temp: 96.5  F (35.8  C) Temp src: Axillary BP: 112/68 Pulse: 104 Heart Rate: 90 Resp: 22 SpO2: 93 % O2 Device: Nasal cannula Oxygen Delivery: 2 LPM  Weight: 145 lbs 4.53 oz    Constitutional: NAD  Respiratory: Diminished at bilateral bases, otherwise clear with normal effort at rest   Cardiovascular: Irregularly irregular with borderline rate   GI: Soft, non-tender, non-distended   Skin/Integumen: Warm, dry  Other:      Data   Recent Labs   Lab 19  0545 19  0600 19  0550 19  0442  10/31/19  0524   WBC  --  9.1 7.2 15.4*   < > 10.1   HGB  --  9.5* 9.3* 9.8*   < > 9.7*   MCV  --  93 93 93   < > 98   PLT 38* 34* 33* 40*   < > 29*    134 136 134   < > 135   POTASSIUM 4.5 4.2 4.2 3.9   < > 3.9   CHLORIDE 103 101 102 99   < > 103   CO2 30 30 27 28   < > 28   BUN 54* 58* 61* 49*   < > 22   CR 1.14 1.13 1.29* 1.20   < > 1.13   ANIONGAP 4 3 7 7   < > 4   BARON 7.4* 7.8* 7.7* 7.9*   < > 7.9*   GLC 96 140* 171* 163*   < > 109*   ALBUMIN  --   --   --   --   --  3.2*   PROTTOTAL  --   --   --   --   --  5.8*   BILITOTAL  --   --   --   --   --  1.6*   ALKPHOS  --   --   --   --   --  36*   ALT  --   --   --   --   --  18   AST  --   --   --   --   --  11    < > = values in this interval not displayed.       Recent Results (from the past 24 hour(s))   Transesophageal Echocardiogram    Narrative    [id number]  [id number]  [id number]  [id number]^NASEEM^MIYA^CODY           [facility]  Echocardiography Laboratory  [address] [place] [id number]        Name: YINKA GONZALEZ  MRN: [id number]  : 1931  Study Date: 2019 03:10 PM  Age: 88 yrs  Gender: Male  Patient Location: [facility]  Reason For Study: Tricuspid Regurgitation  Ordering Physician: MIYA GUSMAN  Referring Physician: MIYA GUSMAN  Performed By: PJ Molina  Smith     BSA: 1.9 m2  Height: 72 in  Weight: 145 lb  HR: 85  BP: 130/87 mmHg  _____________________________________________________________________________  __        Procedure  Limited portable KATIE Adult.  _____________________________________________________________________________  __        Interpretation Summary     _____________________________________________________________________________  __        KATIE  Consent to the procedure was obtained prior to sedation. Prior to the exam,  the oral cavity was checked and no overcrowding was noted. Despite Propofol  sedation and direct visualization with laryngoscope, the probe was met with  resistance. Procedure was aborted for patient safety.  _____________________________________________________________________________  __                                Report approved by: Tremayne Lind 11/05/2019 04:23 PM                    _____________________________________________________________________________  __        Medications     - MEDICATION INSTRUCTIONS -       - MEDICATION INSTRUCTIONS -         cefTRIAXone  1 g Intravenous Q24H     furosemide  10 mg Intravenous Q12H     influenza Vac Split High-Dose  0.5 mL Intramuscular Prior to discharge     insulin aspart  0.5-2.5 Units Subcutaneous TID AC     insulin aspart  0.5-2.5 Units Subcutaneous At Bedtime     metoprolol succinate ER  25 mg Oral BID     mirtazapine  15 mg Oral At Bedtime     sodium chloride (PF)  10 mL Intracatheter Q7 Days     sodium chloride (PF)  3 mL Intravenous Q8H     sodium chloride (PF)  3 mL Intracatheter Q8H

## 2019-11-06 NOTE — PLAN OF CARE
Neuro- A&O x 4  Most Recent Vitals- Temp: 96.5  F Temp src: axillary BP: 104/60 Pulse: 83 Heart Rate: 84 Resp: 17 SpO2: 96 % O2 Device: NC  Oxygen Delivery: 2 LPM  Tele/Cardiac- A-fib CVR  Resp- LS CL   Activity- Strong A/2  Pain- Denies  Drips- none  Drains/Tubes- R Picc-triple lumen, PIV, Mccullough  Skin- ecchymotic, blanchable redness on spine w/ mepilex in place  GI/- Mccullough, constipation  Aggression Color- Green  Plan- attempt KATIE under sedation failed        Saranya Sousa RN

## 2019-11-06 NOTE — PROGRESS NOTES
Progress Note     Primary Oncologist/Hematologist: Dr. Wise           Assessment and Plan:     1. Moderate thrombocytopenia with platelet counts historically ranging in the 30-45,000 range.  -Prior bone marrow biopsy was suggestive of an underlying myelodysplastic syndrome given the chromosome abnormalities but there were  no other recognizable features to suggest MDS.  Megakaryocyte production appeared normal and therefore the chief basis for the thrombocytopenia does not appear to be due to decreased production.    -The platelet count has not previously responded to trial of IVIG or corticosteroids.   - Antiphospholipid antibody testing did not reveal abnormality to suggest this as etiology.  - Has been followed with observation in the outpatient setting.  - has also not had significant response to steroids during this hospitalization. That, along with previous lack of response, makes it unlikely that he will respond at this point. Discussed with Dr. Wise who suggests that we discontinue steroids at this point.  Prednisone DCd on 11/4  - monitor platelets carefully  - transfuse for bleeding or if invasive cardiac procedure is anticipated     2. Valvular heart disease with CHF, a fib   - s/p bilateral thoracenteses.   - severe 3-4+ TR  - 1-2+ MR  - KATIE has been challenging and GI consulted for EGD today  - Continue cares per Cardiology  - I tried to talk with Jama and his wife about the complexities of his situation. They are still interested in pursuing aggressive restorative cares even at the risk of additional complications.      Bharat REYES, CNP  [facility]  [phone]; [phone] (cell)        Interval History:     Breathing still labored. Wanting aggressive care.              Review of Systems:     The 5 point Review of Systems is negative other than noted in the HPI             Medications:   Scheduled Medications    cefTRIAXone  1 g Intravenous Q24H     furosemide  10 mg  Intravenous Q12H     influenza Vac Split High-Dose  0.5 mL Intramuscular Prior to discharge     insulin aspart  0.5-2.5 Units Subcutaneous TID AC     insulin aspart  0.5-2.5 Units Subcutaneous At Bedtime     metoprolol succinate ER  25 mg Oral BID     mirtazapine  15 mg Oral At Bedtime     sodium chloride (PF)  10 mL Intracatheter Q7 Days     sodium chloride (PF)  3 mL Intravenous Q8H     sodium chloride (PF)  3 mL Intracatheter Q8H     PRN Medications  acetaminophen, sore throat lozenge, bisacodyl, glucose **OR** dextrose **OR** glucagon, diphenhydrAMINE **OR** diphenhydrAMINE, - MEDICATION INSTRUCTIONS -, HOLD MEDICATION, HOLD MEDICATION, HOLD MEDICATION, lidocaine 4%, lidocaine (buffered or not buffered), lidocaine (buffered or not buffered), magnesium sulfate, - MEDICATION INSTRUCTIONS -, melatonin, melatonin, metoclopramide **OR** metoclopramide, metoprolol, naloxone, nitroGLYcerin, ondansetron **OR** ondansetron, oxyCODONE, polyethylene glycol, potassium chloride, potassium chloride with lidocaine, potassium chloride, potassium chloride, potassium chloride, prochlorperazine **OR** prochlorperazine **OR** prochlorperazine, senna-docusate **OR** senna-docusate, sodium chloride (PF), sodium chloride (PF), sodium chloride (PF)               Physical Exam:   Vitals were reviewed  Blood pressure 112/68, pulse 104, temperature 96.5  F (35.8  C), temperature source Axillary, resp. rate 22, height 1.829 m (6'), weight 65.9 kg (145 lb 4.5 oz), SpO2 93 %.  Wt Readings from Last 4 Encounters:   11/05/19 65.9 kg (145 lb 4.5 oz)   09/10/19 72.6 kg (160 lb)   09/02/19 72.6 kg (160 lb)   07/11/19 73.8 kg (162 lb 12.8 oz)       I/O last 3 completed shifts:  In: 423 [I.V.:423]  Out: 1375 [Urine:1375]      Constitutional: Awake, alert, cooperative, no apparent distress     Lungs: NC oxygen. Mildly labored, but appears comfortable. Diminished breath sounds   Cardiovascular: 3+ murmur at left sternal border   Abdomen: Normal bowel  sounds, soft, non-distended, non-tender   Skin: No rashes, no cyanosis, no edema   Other:               Data:   All laboratory data and imaging studies reviewed.    CMP  Recent Labs   Lab 1945 19  0550 11/03/19  0442  10/31/19  0524    134 136 134   < > 135   POTASSIUM 4.5 4.2 4.2 3.9   < > 3.9   CHLORIDE 103 101 102 99   < > 103   CO2 30 30 27 28   < > 28   ANIONGAP 4 3 7 7   < > 4   GLC 96 140* 171* 163*   < > 109*   BUN 54* 58* 61* 49*   < > 22   CR 1.14 1.13 1.29* 1.20   < > 1.13   GFRESTIMATED 57* 57* 49* 53*   < > 57*   GFRESTBLACK 66 67 57* 62   < > 67   BARON 7.4* 7.8* 7.7* 7.9*   < > 7.9*   MAG  --   --  2.6* 2.6*  --  2.2   PHOS  --   --  4.2 3.7  --   --    PROTTOTAL  --   --   --   --   --  5.8*   ALBUMIN  --   --   --   --   --  3.2*   BILITOTAL  --   --   --   --   --  1.6*   ALKPHOS  --   --   --   --   --  36*   AST  --   --   --   --   --  11   ALT  --   --   --   --   --  18    < > = values in this interval not displayed.     CBC  Recent Labs   Lab 1950 19  1219   WBC  --  9.1 7.2 15.4* 15.8*   RBC  --  3.13* 3.07* 3.25* 3.11*   HGB  --  9.5* 9.3* 9.8* 9.5*   HCT  --  29.0* 28.6* 30.3* 29.1*   MCV  --  93 93 93 94   MCH  --  30.4 30.3 30.2 30.5   MCHC  --  32.8 32.5 32.3 32.6   RDW  --  21.8* 21.8* 21.7* 22.2*   PLT 38* 34* 33* 40* 40*     Data   Results for orders placed or performed during the hospital encounter of 10/30/19 (from the past 24 hour(s))   Transesophageal Echocardiogram    Narrative    [id number]  [id number]  [id number]  [id number]^NASEEM^MIYA^CODY           [facility]  Echocardiography Laboratory  [address] [place] [address]        Name: YINKA GONZALEZ  MRN: [id number]  : 1931  Study Date: 2019 03:10 PM  Age: 88 yrs  Gender: Male  Patient Location: [facility]  Reason For Study: Tricuspid Regurgitation  Ordering Physician: MIYA GUSMAN  Referring  Physician: MIYA GUSMAN  Performed By: PJ Smith     BSA: 1.9 m2  Height: 72 in  Weight: 145 lb  HR: 85  BP: 130/87 mmHg  _____________________________________________________________________________  __        Procedure  Limited portable KATIE Adult.  _____________________________________________________________________________  __        Interpretation Summary     _____________________________________________________________________________  __        KATIE  Consent to the procedure was obtained prior to sedation. Prior to the exam,  the oral cavity was checked and no overcrowding was noted. Despite Propofol  sedation and direct visualization with laryngoscope, the probe was met with  resistance. Procedure was aborted for patient safety.  _____________________________________________________________________________  __                                Report approved by: Tremayne Lind 11/05/2019 04:23 PM                    _____________________________________________________________________________  __      UPPER GI ENDOSCOPY   Result Value Ref Range    Upper GI Endoscopy       [facility] Endoscopy Department  _______________________________________________________________________________  Patient Name: Jama Humberto             Procedure Date: 11/6/2019 10:18 AM  MRN: [id number]                       Account Number: [id number]  YOB: 1931              Admit Type: Inpatient  Age: 88                               Room: 2  Note Status: Finalized                Attending MD: Marixa Aguila MD  Total Sedation Time:                  Instrument Name: 404 GIF- Gastroscope  _______________________________________________________________________________     Procedure:                Upper GI endoscopy  Indications:              Rule out stricture or diverticulum of the                             esophagus, patient has failed KATIE twich with both                              conscious sedation and MAC.  Providers:                Marixa Aguila MD, Sharon Jarrett RN  Referring MD:               Medicines:                M idazolam 1 mg IV, Fentanyl 25 micrograms IV  Complications:            No immediate complications.  _______________________________________________________________________________  Procedure:                Pre-Anesthesia Assessment:                            - Prior to the procedure, a History and Physical                             was performed, and patient medications and                             allergies were reviewed. The patient is competent.                             The risks and benefits of the procedure and the                             sedation options and risks were discussed with the                             patient. All questions were answered and informed                             consent was obtained. Patient identification and                             proposed procedure were verified by the physician                             in the pre-procedure area. Mental Status                             Examination: alert and oriented. Airway                              Examination: normal oropharyngeal airway and neck                             mobility. Respiratory Examination: clear to                             auscultation. CV Examination: normal. Prophylactic                             Antibiotics: The patient does not require                             prophylactic antibiotics. Prior Anticoagulants: The                             patient has taken no previous anticoagulant or                             antiplatelet agents. ASA Grade Assessment: II - A                             patient with mild systemic disease. After reviewing                             the risks and benefits, the patient was deemed in                             satisfactory condition to undergo the procedure.                             The  anesthesia plan was to use moderate sedation /                             analgesia (conscious sedation). Immediately prior                             to administration of medications, the patient was                              re-assessed for adequacy to receive sedatives. The                             heart rate, respiratory rate, oxygen saturations,                             blood pressure, adequacy of pulmonary ventilation,                             and response to care were monitored throughout the                             procedure. The physical status of the patient was                             re-assessed after the procedure.                            After obtaining informed consent, the endoscope was                             passed under direct vision. Throughout the                             procedure, the patient's blood pressure, pulse, and                             oxygen saturations were monitored continuously. The                             Endoscope was introduced through the mouth, and                             advanced to the second part of duodenum. The upper                             GI endoscopy was accomplished without difficulty.                              The patient tolerated the procedure well.                                                                                   Findings:       The examined esophagus was normal. Due to concern of unable to pass KATIE        scope, multiple intubations were performed at both left and right        piriform fossa and no anatomical defect seen in the esopagus.       The entire examined stomach was normal.       The first portion of the duodenum and second portion of the duodenum        were normal.                                                                                   Impression:               - Normal esophagus. No anatomical defect.                            - Normal stomach.                            -  Normal first portion of the duodenum and second                             portion of the duodenum.                            - No specimens collected.  Recommendation:           - Patient has a contact number available for                             emergencies.  e signs and symptoms of potential                             delayed complications were discussed with the                             patient. Return to normal activities tomorrow.                             Written discharge instructions were provided to the                             patient.                            - Resume regular diet.                            - Continue present medications.                            - At this time it is hard to  why KATIE was                             unsucessful, patient did go through both conscious                             sedation and MAC. Patient did have a strong gag                             reflux during the procedure. We gave minimal                             sedation this time due to patient's poor                             cardiac-pulmonary status and with the help of                             patient's voluntary movement we were able to repeat                             multiple esophageal intubation. May co nsider this                             method with KATIE probe. Over the balloon dilation                             was not attempted given thrombocytopenia but the                             diameter of esophagus should be able to contain KATIE                             scope on average is 13 mm in diameter.                                                                                   Procedure Code(s):       --- Professional ---       65917, Esophagogastroduodenoscopy, flexible, transoral; diagnostic,        including collection of specimen(s) by brushing or washing, when        performed (separate procedure)  Diagnosis Code(s):       --- Professional ---        K22.2, Esophageal obstruction       R13.10, Dysphagia, unspecified    CPT copyright 2018 [employer]. All rights reserved.    The codes documented in this report are preliminary and upon  review may   be revised to meet current compliance requirements.    Electronic Signature by Dr. Marixa Aguila   __________________  Marixa Aguila MD  11/6/2019 11:12:08 AM  I was physically present for the entire viewing portion of the exam.  Marixa Aguila MD  Number of Addenda: 0    Note Initiated On: 11/6/2019 10:18 AM  MRN:                      [id number]  Procedure Date:           11/6/2019 10:18:18 AM  Total Procedure Duration: 0 hours 7 minutes 20 seconds   Estimated Blood Loss:       Scope In: 10:36:14 AM  Scope Out: 10:43:34 AM

## 2019-11-06 NOTE — PROGRESS NOTES
SPIRITUAL HEALTH SERVICES Progress Note  [facility]    Initial visit per pt request. SH attempted to visit pt twice this morning. Pt had been moved to endoscopy. SH will follow-up at a later time.      Ebony Sen   Intern

## 2019-11-06 NOTE — ANESTHESIA POSTPROCEDURE EVALUATION
Patient: Jama Paul    Procedure(s):  KATIE, CARDIOVERSION.    Diagnosis: Atrial Fibrillation    Anesthesia Type:  MAC    Note:  Anesthesia Post Evaluation    Patient location during evaluation: PACU  Patient participation: Able to fully participate in evaluation  Level of consciousness: awake and alert  Pain management: adequate  Airway patency: patent  Cardiovascular status: acceptable and hemodynamically stable  Respiratory status: acceptable and unassisted  Hydration status: acceptable  PONV: none             Last vitals:  Vitals:    11/05/19 1800 11/05/19 1830 11/05/19 1900   BP: 108/80 107/76 106/68   Pulse: 86 101 112   Resp: 18 15 20   Temp:      SpO2: 97% 98% 98%         Electronically Signed By: Tree Cuellar DO  November 5, 2019  7:39 PM

## 2019-11-06 NOTE — PLAN OF CARE
PT: Pt had just gotten back into bed upon therapist arrival, reports having been sitting up in the chair for a while today.   Pt wanting to rest in bed at this time, reports wanting to work with therapy tomorrow morning. Educated pt on plan for therapy and performing LE exercises while sitting in chair throughout day.

## 2019-11-06 NOTE — CONSULTS
[facility]    Gastroenterology Consultation    Date of Admission:  10/30/2019    History of Present Illness   Jama Paul is a 88 year old male who presents with SOB found to have pleural effusion, CHF exacerbation and worsening of his valvular disease. Long hospital course which he initially needed pressors due to cardiogenic shock, s/p thoracentesis complicated by pneumothorax s/p pigtail placement. Pt is in discussion with CV surgery about valve replacement surgery which need KATIE evaluation for the RV function but unsuccessful KATIE twice with first one with conscious sedation and the 2nd one yesterday with MAC (but per family w/o tracheal intubation), hence GI is consulted.    When seen, pt denies any hx of dysphagia, odynophagia. He denies any sx of reflux and no smoking or drinking hx. He has never had any EGD done before and no Fhx of esophageal cancer.     Assessment & Plan   Jama Paul is a 88 year old male who was admitted on 10/30/2019. I was asked to see the patient for rule out esophageal anatomical defect due to unsuccessful KATIE intubation.  -will proceed with EGD exam  -please keep NPO after MN    Active Problems:    Pleural effusion    Bilateral pleural effusion      Marixa Aguila MD  (Juan Alberto)  [facility]  Office: [phone]  Cell: [phone], please feel free to call the cell    Code Status    Full Code      Primary Care Physician   Mariusz Aguila MD  (Juan Alberto)  [facility]  Office: [phone]  Cell: [phone], please feel free to call the cell      Past Medical History   I have reviewed this patient's medical history and updated it with pertinent information if needed.   Past Medical History:   Diagnosis Date     Congestive heart failure (H)      Elevated PSA      Eye hemorrhage, left 02/2019     Non-ischemic cardiomyopathy (H)     2017, med treatment, echo done 4/18 nl ef, mod to severe tr      Permanent atrial fibrillation 2011     Thrombocytopenia (H)      Unspecified essential hypertension        Past Surgical History   I have reviewed this patient's surgical history and updated it with pertinent information if needed.  Past Surgical History:   Procedure Laterality Date     ABDOMEN SURGERY      bowel obstruction     BONE MARROW BIOPSY, BONE SPECIMEN, NEEDLE/TROCAR N/A 3/4/2019    Procedure: BIOPSY BONE MARROW;  Surgeon: Josey Vargas MD;  Location:  GI     CARPAL TUNNEL RELEASE RT/LT  2014     EXPLORE GROIN  4/30/2014    Procedure: EXPLORE GROIN;  Surgeon: Sam Aguirre MD;  Location:  OR     HERNIORRHAPHY INGUINAL  4/30/2014    Procedure: HERNIORRHAPHY INGUINAL;  Surgeon: Sam Aguirre MD;  Location:  OR     MOHS MICROGRAPHIC PROCEDURE       PHACOEMULSIFICATION CLEAR CORNEA WITH STANDARD INTRAOCULAR LENS IMPLANT Right 9/8/2015    Procedure: PHACOEMULSIFICATION CLEAR CORNEA WITH STANDARD INTRAOCULAR LENS IMPLANT;  Surgeon: Gil Shannon MD;  Location:  EC     septic olecranon bursitis[         Prior to Admission Medications   Prior to Admission Medications   Prescriptions Last Dose Informant Patient Reported? Taking?   Ascorbic Acid (VITAMIN C PO) 10/29/2019 at Unknown time Self Yes Yes   Sig: Take 500 mg by mouth daily   CYANOCOBALAMIN PO 10/29/2019 at Unknown time Self Yes Yes   Sig: Take 500 mcg by mouth daily   Dermatological Products, Misc. (EPICERAM) EMUL 10/29/2019 at Unknown time  Yes Yes   Sig: Externally apply topically daily Apply to right leg.   Multiple Vitamins-Minerals (ICAPS AREDS FORMULA PO) 10/29/2019 at Unknown time Self Yes Yes   Sig: Take 1 tablet by mouth daily Takes in addition to multivitamin with minerals   Vitamin D, Cholecalciferol, 1000 UNITS TABS 10/29/2019 at Unknown time Self Yes Yes   Sig: Take 2,000 Units by mouth daily    furosemide (LASIX) 20 MG tablet 10/30/2019 at am Pharmacy No Yes   Sig: Take 1 tablet (20 mg) by mouth daily    metoprolol succinate ER (TOPROL-XL) 50 MG 24 hr tablet 10/30/2019 at am Pharmacy Yes Yes   Sig: Take 100 mg by mouth every morning   metoprolol succinate ER (TOPROL-XL) 50 MG 24 hr tablet 10/29/2019 at pm Pharmacy Yes Yes   Sig: Take 50 mg by mouth every evening   mirtazapine (REMERON) 15 MG tablet 10/29/2019 at pm Pharmacy Yes Yes   Sig: Take 15 mg by mouth At Bedtime Patient started medication for the first time yesterday 10/29/2019.   order for DME   No No   Sig: [facility] Phone [phone] Fax [phone]    Primary Dressing Hydrofera blue ready transfer    Qty 2 sheets  Secondary Dressing 4x4 gauze loaf Qty 1  Secondary Dressing 4' roll gauze Qty 30  Secondary Dressing 2' tape Qty  1  Length of Need: 1 month  Frequency of dressing change: daily   sacubitril-valsartan (ENTRESTO) 24-26 MG per tablet 10/30/2019 at am Pharmacy Yes Yes   Sig: Take 0.5 tablets by mouth every morning   sacubitril-valsartan (ENTRESTO) 24-26 MG per tablet 10/29/2019 at pm Pharmacy Yes Yes   Sig: Take 1 tablet by mouth every evening      Facility-Administered Medications: None     Allergies   No Known Allergies    Social History   I have reviewed this patient's social history and updated it with pertinent information if needed. Jama Paul  reports that he has never smoked. He has never used smokeless tobacco. He reports that he does not drink alcohol or use drugs.    Family History   I have reviewed this patient's family history and updated it with pertinent information if needed.   Family History   Problem Relation Age of Onset     Heart Disease No family hx of        Review of Systems   The 10 point Review of Systems is negative other than noted in the HPI or here.     Physical Exam   Temp: 96.5  F (35.8  C) Temp src: Axillary BP: 98/67 Pulse: 103 Heart Rate: 113 Resp: 18 SpO2: 90 % O2 Device: Nasal cannula Oxygen Delivery: 1/2 LPM  Vital Signs with Ranges  Temp:  [96.5  F (35.8  C)-98.5  F (36.9  C)] 96.5  F (35.8   C)  Pulse:  [] 103  Heart Rate:  [] 113  Resp:  [12-35] 18  BP: ()/(54-87) 98/67  SpO2:  [89 %-99 %] 90 %  145 lbs 4.53 oz    Exam:  Constitutional: healthy, alert and no distress  Head: Normocephalic. No masses, lesions, tenderness or abnormalities  Neck: Neck supple. No adenopathy. Thyroid symmetric, normal size,, Carotids without bruits.  ENT: ENT exam normal, no neck nodes or sinus tenderness  Cardiovascular: negative, PMI normal. No lifts, heaves, or thrills. RRR. No murmurs, clicks gallops or rub  Respiratory: negative, Percussion normal. Good diaphragmatic excursion. Lungs clear but decreased in breath sound on the Rt side  Gastrointestinal: Abdomen soft, non-tender. BS normal. No masses, organomegaly  : Deferred  Musculoskeletal: extremities normal- no gross deformities noted, gait normal and normal muscle tone  Skin: no suspicious lesions or rashes  Neurologic: Gait normal. Reflexes normal and symmetric. Sensation grossly WNL.  Psychiatric: mentation appears normal and affect normal/bright  Hematologic/Lymphatic/Immunologic: Normal cervical lymph nodes     Data   Results for orders placed or performed during the hospital encounter of 10/30/19 (from the past 24 hour(s))   Glucose by meter   Result Value Ref Range    Glucose 115 (H) 70 - 99 mg/dL   Transesophageal Echocardiogram    Narrative    [id number]  [id number]  [id number]  [id number]^NASEEM^MIYA^D           [facility]  Echocardiography Laboratory  [address] [place] [address]        Name: YINKA GONZALEZ  MRN: [id number]  : 1931  Study Date: 2019 03:10 PM  Age: 88 yrs  Gender: Male  Patient Location: [facility]  Reason For Study: Tricuspid Regurgitation  Ordering Physician: MIYA GUSMAN  Referring Physician: MIYA GUSMAN  Performed By: KEERTHI Smith     BSA: 1.9 m2  Height: 72 in  Weight: 145 lb  HR: 85  BP: 130/87  mmHg  _____________________________________________________________________________  __        Procedure  Limited portable KATIE Adult.  _____________________________________________________________________________  __        Interpretation Summary     _____________________________________________________________________________  __        KATIE  Consent to the procedure was obtained prior to sedation. Prior to the exam,  the oral cavity was checked and no overcrowding was noted. Despite Propofol  sedation and direct visualization with laryngoscope, the probe was met with  resistance. Procedure was aborted for patient safety.  _____________________________________________________________________________  __                                Report approved by: Tremayne Lind 11/05/2019 04:23 PM                    _____________________________________________________________________________  __      Glucose by meter   Result Value Ref Range    Glucose 116 (H) 70 - 99 mg/dL   Gastroenterology IP Consult: unable to pass KATIE scope X 2; needed for evaluation - recommendations?; Consultant may enter orders: Yes; Patient to be seen: Routine - within 24 hours; Requested Clinic/Group: Dr. Ontiveros; Requesting provider? Attendin...    Marixa Villarreal MD     11/5/2019 10:39 PM  GI aware of the consult, will see the pt first thing in the   morning.    In the meantime, please keep pt NPO after MN in case of   endoscopy.      Marixa Aguila  Cell:   [facility]   Glucose by meter   Result Value Ref Range    Glucose 272 (H) 70 - 99 mg/dL   Basic metabolic panel   Result Value Ref Range    Sodium 137 133 - 144 mmol/L    Potassium 4.5 3.4 - 5.3 mmol/L    Chloride 103 94 - 109 mmol/L    Carbon Dioxide 30 20 - 32 mmol/L    Anion Gap 4 3 - 14 mmol/L    Glucose 96 70 - 99 mg/dL    Urea Nitrogen 54 (H) 7 - 30 mg/dL    Creatinine 1.14 0.66 - 1.25 mg/dL    GFR Estimate 57 (L) >60 mL/min/[1.73_m2]    GFR Estimate  If Black 66 >60 mL/min/[1.73_m2]    Calcium 7.4 (L) 8.5 - 10.1 mg/dL   Platelet count   Result Value Ref Range    Platelet Count 38 (LL) 150 - 450 10e9/L   Glucose by meter   Result Value Ref Range    Glucose 97 70 - 99 mg/dL   UPPER GI ENDOSCOPY   Result Value Ref Range    Upper GI Endoscopy       [facility] Endoscopy Department  _______________________________________________________________________________  Patient Name: Jama Paul             Procedure Date: 11/6/2019 10:18 AM  MRN: [id number]                       Account Number: [id number]  YOB: 1931              Admit Type: Inpatient  Age: 88                               Room: 2  Note Status: Finalized                Attending MD: Marixa Aguila MD  Total Sedation Time:                  Instrument Name: 404 GIF- Gastroscope  _______________________________________________________________________________     Procedure:                Upper GI endoscopy  Indications:              Rule out stricture or diverticulum of the                             esophagus, patient has failed KATIE twich with both                             conscious sedation and MAC.  Providers:                Marixa Aguila MD, Sharon Jarrett RN  Referring MD:               Medicines:                M idazolam 1 mg IV, Fentanyl 25 micrograms IV  Complications:            No immediate complications.  _______________________________________________________________________________  Procedure:                Pre-Anesthesia Assessment:                            - Prior to the procedure, a History and Physical                             was performed, and patient medications and                             allergies were reviewed. The patient is competent.                             The risks and benefits of the procedure and the                             sedation options and risks were discussed with the                              patient. All questions were answered and informed                             consent was obtained. Patient identification and                             proposed procedure were verified by the physician                             in the pre-procedure area. Mental Status                             Examination: alert and oriented. Airway                              Examination: normal oropharyngeal airway and neck                             mobility. Respiratory Examination: clear to                             auscultation. CV Examination: normal. Prophylactic                             Antibiotics: The patient does not require                             prophylactic antibiotics. Prior Anticoagulants: The                             patient has taken no previous anticoagulant or                             antiplatelet agents. ASA Grade Assessment: II - A                             patient with mild systemic disease. After reviewing                             the risks and benefits, the patient was deemed in                             satisfactory condition to undergo the procedure.                             The anesthesia plan was to use moderate sedation /                             analgesia (conscious sedation). Immediately prior                             to administration of medications, the patient was                              re-assessed for adequacy to receive sedatives. The                             heart rate, respiratory rate, oxygen saturations,                             blood pressure, adequacy of pulmonary ventilation,                             and response to care were monitored throughout the                             procedure. The physical status of the patient was                             re-assessed after the procedure.                            After obtaining informed consent, the endoscope was                             passed under direct vision. Throughout the                              procedure, the patient's blood pressure, pulse, and                             oxygen saturations were monitored continuously. The                             Endoscope was introduced through the mouth, and                             advanced to the second part of duodenum. The upper                             GI endoscopy was accomplished without difficulty.                              The patient tolerated the procedure well.                                                                                   Findings:       The examined esophagus was normal. Due to concern of unable to pass KATIE        scope, multiple intubations were performed at both left and right        piriform fossa and no anatomical defect seen in the esopagus.       The entire examined stomach was normal.       The first portion of the duodenum and second portion of the duodenum        were normal.                                                                                   Impression:               - Normal esophagus. No anatomical defect.                            - Normal stomach.                            - Normal first portion of the duodenum and second                             portion of the duodenum.                            - No specimens collected.  Recommendation:           - Patient has a contact number available for                             emergencies. [facility] signs and symptoms of potential                             delayed complications were discussed with the                             patient. Return to normal activities tomorrow.                             Written discharge instructions were provided to the                             patient.                            - Resume regular diet.                            - Continue present medications.                            - At this time it is hard to  why KATIE was                             unsucessful, patient did go  through both conscious                             sedation and MAC. Patient did have a strong gag                             reflux during the procedure. We gave minimal                             sedation this time due to patient's poor                             cardiac-pulmonary status and with the help of                             patient's voluntary movement we were able to repeat                             multiple esophageal intubation. May co nsider this                             method with KATIE probe. Over the balloon dilation                             was not attempted given thrombocytopenia but the                             diameter of esophagus should be able to contain KATIE                             scope on average is 13 mm in diameter.                                                                                   Procedure Code(s):       --- Professional ---       80671, Esophagogastroduodenoscopy, flexible, transoral; diagnostic,        including collection of specimen(s) by brushing or washing, when        performed (separate procedure)  Diagnosis Code(s):       --- Professional ---       K22.2, Esophageal obstruction       R13.10, Dysphagia, unspecified    CPT copyright 2018 [employer]. All rights reserved.    The codes documented in this report are preliminary and upon  review may   be revised to meet current compliance requirements.    Electronic Signature by Dr. Marixa Aguila   __________________  Marixa Aguila MD  11/6/2019 11:12:08 AM  I was physically present for the entire viewing portion of the exam.  Marixa Aguila MD  Number of Addenda: 0    Note Initiated On: 11/6/2019 10:18 AM  MRN:                      [id number]  Procedure Date:           11/6/2019 10:18:18 AM  Total Procedure Duration: 0 hours 7 minutes 20 seconds   Estimated Blood Loss:       Scope In: 10:36:14 AM  Scope Out: 10:43:34 AM

## 2019-11-07 ENCOUNTER — APPOINTMENT (OUTPATIENT)
Dept: PHYSICAL THERAPY | Facility: CLINIC | Age: 84
DRG: 286 | End: 2019-11-07
Payer: MEDICARE

## 2019-11-07 LAB
ANION GAP SERPL CALCULATED.3IONS-SCNC: 2 MMOL/L (ref 3–14)
BUN SERPL-MCNC: 43 MG/DL (ref 7–30)
CALCIUM SERPL-MCNC: 7.4 MG/DL (ref 8.5–10.1)
CHLORIDE SERPL-SCNC: 103 MMOL/L (ref 94–109)
CO2 SERPL-SCNC: 30 MMOL/L (ref 20–32)
CREAT SERPL-MCNC: 0.94 MG/DL (ref 0.66–1.25)
ERYTHROCYTE [DISTWIDTH] IN BLOOD BY AUTOMATED COUNT: 21.7 % (ref 10–15)
GFR SERPL CREATININE-BSD FRML MDRD: 72 ML/MIN/{1.73_M2}
GLUCOSE SERPL-MCNC: 110 MG/DL (ref 70–99)
HCT VFR BLD AUTO: 29.5 % (ref 40–53)
HGB BLD-MCNC: 9.3 G/DL (ref 13.3–17.7)
MCH RBC QN AUTO: 29.8 PG (ref 26.5–33)
MCHC RBC AUTO-ENTMCNC: 31.5 G/DL (ref 31.5–36.5)
MCV RBC AUTO: 95 FL (ref 78–100)
PLATELET # BLD AUTO: 33 10E9/L (ref 150–450)
POTASSIUM SERPL-SCNC: 4.9 MMOL/L (ref 3.4–5.3)
RBC # BLD AUTO: 3.12 10E12/L (ref 4.4–5.9)
SODIUM SERPL-SCNC: 135 MMOL/L (ref 133–144)
WBC # BLD AUTO: 8.8 10E9/L (ref 4–11)

## 2019-11-07 PROCEDURE — 99233 SBSQ HOSP IP/OBS HIGH 50: CPT | Performed by: INTERNAL MEDICINE

## 2019-11-07 PROCEDURE — 85027 COMPLETE CBC AUTOMATED: CPT | Performed by: NURSE PRACTITIONER

## 2019-11-07 PROCEDURE — 80048 BASIC METABOLIC PNL TOTAL CA: CPT | Performed by: NURSE PRACTITIONER

## 2019-11-07 PROCEDURE — 25000132 ZZH RX MED GY IP 250 OP 250 PS 637: Mod: GY | Performed by: INTERNAL MEDICINE

## 2019-11-07 PROCEDURE — 21000001 ZZH R&B HEART CARE

## 2019-11-07 PROCEDURE — 40000239 ZZH STATISTIC VAT ROUNDS

## 2019-11-07 PROCEDURE — 97110 THERAPEUTIC EXERCISES: CPT | Mod: GP | Performed by: PHYSICAL THERAPIST

## 2019-11-07 PROCEDURE — 25000128 H RX IP 250 OP 636: Performed by: INTERNAL MEDICINE

## 2019-11-07 PROCEDURE — 99207 ZZC CDG-MDM COMPONENT: MEETS MODERATE - UP CODED: CPT | Performed by: INTERNAL MEDICINE

## 2019-11-07 PROCEDURE — 97116 GAIT TRAINING THERAPY: CPT | Mod: GP | Performed by: PHYSICAL THERAPIST

## 2019-11-07 PROCEDURE — 99232 SBSQ HOSP IP/OBS MODERATE 35: CPT | Performed by: INTERNAL MEDICINE

## 2019-11-07 RX ADMIN — MELATONIN 5 MG TABLET 5 MG: at 00:18

## 2019-11-07 RX ADMIN — MELATONIN 5 MG TABLET 5 MG: at 21:45

## 2019-11-07 RX ADMIN — CEFUROXIME AXETIL 250 MG: 250 TABLET ORAL at 20:10

## 2019-11-07 RX ADMIN — FUROSEMIDE 10 MG: 10 INJECTION, SOLUTION INTRAVENOUS at 08:59

## 2019-11-07 RX ADMIN — METOPROLOL SUCCINATE 25 MG: 25 TABLET, EXTENDED RELEASE ORAL at 20:10

## 2019-11-07 RX ADMIN — CEFUROXIME AXETIL 250 MG: 250 TABLET ORAL at 08:57

## 2019-11-07 RX ADMIN — Medication 1 LOZENGE: at 09:57

## 2019-11-07 RX ADMIN — MIRTAZAPINE 15 MG: 15 TABLET, FILM COATED ORAL at 21:45

## 2019-11-07 RX ADMIN — METOPROLOL SUCCINATE 25 MG: 25 TABLET, EXTENDED RELEASE ORAL at 08:57

## 2019-11-07 ASSESSMENT — MIFFLIN-ST. JEOR: SCORE: 1343

## 2019-11-07 NOTE — PROGRESS NOTES
Progress Note     Primary Oncologist/Hematologist:  Dr. Wise          Assessment and Plan:     1. Moderate thrombocytopenia with platelet counts historically ranging in the 30-45,000 range.  - Prior bone marrow biopsy was suggestive of an underlying myelodysplastic syndrome given the chromosome abnormalities but there were  no other recognizable features to suggest MDS.  Megakaryocyte production appeared normal and therefore the chief basis for the thrombocytopenia does not appear to be due to decreased production.    - The platelet count has not previously responded to trial of IVIG or corticosteroids.   - Antiphospholipid antibody testing did not reveal abnormality to suggest this as etiology.  - Has been followed with observation in the outpatient setting.  - has also not had significant response to steroids during this hospitalization. That, along with previous lack of response, makes it unlikely that he will respond at this point. Discussed with Dr. Wise who suggests that we discontinue steroids at this point.  Prednisone DCd on 11/4  - monitor platelets carefully  - transfuse for bleeding or if invasive cardiac procedure is anticipated     2. Valvular heart disease with CHF, a fib   - s/p bilateral thoracenteses.   - severe 3-4+ TR  - 1-2+ MR  - KATIE has been challenging and GI consulted for EGD today  - I talked with Jama and his wife about the complexities of his situation. He and his wife have been interested in aggressive cares, but his children are more interested in maximizing medical care. I think that surgical interventions would carry significant risk.  - Care conference planned with Cardiology today    Bharat REYES, CNP  [facility]  [phone]; [phone] (cell)        Interval History:     Reviewed his blood issues in detail with family.              Review of Systems:     The 5 point Review of Systems is negative other than noted in the HPI             Medications:    Scheduled Medications    cefuroxime  250 mg Oral Q12H ESTEFANIA     furosemide  10 mg Intravenous Q12H     influenza Vac Split High-Dose  0.5 mL Intramuscular Prior to discharge     metoprolol succinate ER  25 mg Oral BID     mirtazapine  15 mg Oral At Bedtime     sodium chloride (PF)  10 mL Intracatheter Q7 Days     sodium chloride (PF)  3 mL Intravenous Q8H     PRN Medications  acetaminophen, sore throat lozenge, bisacodyl, glucose **OR** dextrose **OR** glucagon, diphenhydrAMINE **OR** diphenhydrAMINE, - MEDICATION INSTRUCTIONS -, HOLD MEDICATION, HOLD MEDICATION, HOLD MEDICATION, lidocaine 4%, lidocaine (buffered or not buffered), magnesium sulfate, - MEDICATION INSTRUCTIONS -, melatonin, melatonin, metoclopramide **OR** metoclopramide, metoprolol, naloxone, nitroGLYcerin, ondansetron **OR** ondansetron, oxyCODONE, polyethylene glycol, potassium chloride, potassium chloride with lidocaine, potassium chloride, potassium chloride, potassium chloride, prochlorperazine **OR** prochlorperazine **OR** prochlorperazine, senna-docusate **OR** senna-docusate, sodium chloride (PF)               Physical Exam:   Vitals were reviewed  Blood pressure 108/64, pulse 101, temperature 97.8  F (36.6  C), temperature source Oral, resp. rate 14, height 1.829 m (6'), weight 63.5 kg (139 lb 15.9 oz), SpO2 94 %.  Wt Readings from Last 4 Encounters:   11/07/19 63.5 kg (139 lb 15.9 oz)   09/10/19 72.6 kg (160 lb)   09/02/19 72.6 kg (160 lb)   07/11/19 73.8 kg (162 lb 12.8 oz)     I/O last 3 completed shifts:  In: 630 [P.O.:600; I.V.:30]  Out: 1800 [Urine:1800]    Constitutional: Awake, alert, cooperative, no apparent distress           Data:   All laboratory data and imaging studies reviewed.    CMP  Recent Labs   Lab 11/07/19  0530 11/06/19  0545 11/05/19  0600 11/04/19  0550 11/03/19  0442    137 134 136 134   POTASSIUM 4.9 4.5 4.2 4.2 3.9   CHLORIDE 103 103 101 102 99   CO2 30 30 30 27 28   ANIONGAP 2* 4 3 7 7   * 96 140*  171* 163*   BUN 43* 54* 58* 61* 49*   CR 0.94 1.14 1.13 1.29* 1.20   GFRESTIMATED 72 57* 57* 49* 53*   GFRESTBLACK 83 66 67 57* 62   BARON 7.4* 7.4* 7.8* 7.7* 7.9*   MAG  --   --   --  2.6* 2.6*   PHOS  --   --   --  4.2 3.7     CBC  Recent Labs   Lab 11/07/19  0530 11/06/19  0545 11/05/19  0600 11/04/19  0550 11/03/19  0442   WBC 8.8  --  9.1 7.2 15.4*   RBC 3.12*  --  3.13* 3.07* 3.25*   HGB 9.3*  --  9.5* 9.3* 9.8*   HCT 29.5*  --  29.0* 28.6* 30.3*   MCV 95  --  93 93 93   MCH 29.8  --  30.4 30.3 30.2   MCHC 31.5  --  32.8 32.5 32.3   RDW 21.7*  --  21.8* 21.8* 21.7*   PLT 33* 38* 34* 33* 40*

## 2019-11-07 NOTE — PROVIDER NOTIFICATION
MD Notification    Notified Person: MD    Notified Person Name: White    Notification Date/Time: 11/07/19 0630    Notification Interaction: Text page    Purpose of Notification: Critical lab    Orders Received:    Comments: Pts platelets dropped from 33 to 38

## 2019-11-07 NOTE — PLAN OF CARE
VSS. Monitor remains Atrial fib with CVR. Pt. Denies pain. Stable O2 sats with O2 at 2L/NC.   Spouse at bedside. Continue to monitor.

## 2019-11-07 NOTE — PROGRESS NOTES
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 PMH significant for nonischemic cardiomyopathy felt to be likely tachycardia induced with an EF as low as 30 to 35% to 2017 that recovered to normal with medical management, RV dilatation and severe TR, permanent A. fib on rate control previously on Coumadin but recent ocular hemorrhage and perineal bleed and now off, who presented with 1 week history of progressive weakness and shortness of breath.  On admit 10/30 he noted that intermittent orthopnea, weakness and dyspnea on exertion.  He did not notice any pedal edema.  He also did not note any weight gain.  He was taking his Lasix as directed and he is still urinating.  He states that over all during the last year there is been tough given his treatment for ITP has been difficult.    HFpEF   -echo 10/30 showed 1-2+ MR and 2-3+ TR, RVSP 46 plus RAP. Dilated RV w nml function  -symptomatic decompensation was rapid and resulted in ICU stay (one week) prior to admission suggesting he was previously \"compensated\". Previously was exercising.  -11/1 became hypotensive - likely a result of rapid HR and vasodilation (dobutamine) and reduced preload (Bumex).    -With discontinuation of both above medications,resumption of beta blocker. Has remained stable.  -Significant continued diuresis off diuretics and on vasopressor suggests he does not tolerate decrease in preload.  -negative 3.2 L, weights unreliable but appear significantly down  -off O2 today  -small L pleural effusion  Plan  -Needs further evaluation-KATIE unsuccessful X 2  -UGI done, unremarkable  -Family care conference with wife, son and daughter. Discussed where he has been and where he is. Today he is hoarse and fatigued, but appearing better. Agreed that throat needs time to heal before we attempt another KATIE.   -Will gently diuresis him, let his throat rest and give him time to " recover. If he is feeling better on Monday, would attempt KATIE under sedation with GI for further eval of TV and MV. Will get a TTE today.   -Discussed different treatment options based on KATIE findings including conservative mgmt vs MitraClip or Triclip. Discussed that in current state of health he would be high risk for surgery, family/patient would like more conservative treatment if possible.  -BB, low dose diuretic (good renal function)  -Cardiac rehab      2. Atrial fibrillation  - Rate   -platelets 38 K, previous occular hemorrhage and perineal bleed not on AC  - May be Watchman candidate if no surgery, TATYANA ligation if surgery.  He notes that his heart rate has been higher recently, minimal movement in his chair sends his HR into the 90's bpm.    3. Thrombocytopenia - 38K platelets. Hemoc saw, stopped steroids                       Significant Problems:     Patient Active Problem List    Diagnosis Date Noted     Pleural effusion 10/30/2019     Priority: Medium     Bilateral pleural effusion 10/30/2019     Priority: Medium     Ulcer of right lower extremity with fat layer exposed (H) 09/16/2019     Priority: Medium     Idiopathic thrombocytopenic purpura (H) 04/05/2019     Priority: Medium     Thrombocytopenia (H)      Priority: Medium     Non-ischemic cardiomyopathy (H)      Priority: Medium     2017, med treatment, echo done 4/18 nl ef, mod to severe tr       Eye hemorrhage, left 02/01/2019     Priority: Medium     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           Medications:   Scheduled:    cefuroxime  250 mg Oral Q12H ESTEFANIA     furosemide  10 mg Intravenous Q12H     influenza Vac Split High-Dose  0.5 mL Intramuscular Prior to discharge     metoprolol succinate ER  25 mg Oral BID     mirtazapine  15 mg Oral At Bedtime     sodium chloride (PF)  10 mL Intracatheter Q7 Days     sodium chloride (PF)  3 mL Intravenous Q8H             Physical Exam:   All vitals have been reviewed  Temp:  [96.5  F (35.8  C)-98.1  F (36.7  C)] 97.8  F (36.6  C)  Pulse:  [101-104] 101  Heart Rate:  [] 79  Resp:  [12-22] 14  BP: ()/(53-68) 108/64  SpO2:  [93 %-99 %] 94 %  Vitals:    10/30/19 1044 10/31/19 0619 11/01/19 1041 11/02/19 0500   Weight: 75.3 kg (166 lb) 66.8 kg (147 lb 6 oz) 67.4 kg (148 lb 8 oz) 69.1 kg (152 lb 5.4 oz)    11/03/19 0400 11/04/19 0407 11/05/19 0500 11/07/19 0500   Weight: 68.6 kg (151 lb 3.8 oz) 69.3 kg (152 lb 11.2 oz) 65.9 kg (145 lb 4.5 oz) 63.5 kg (139 lb 15.9 oz)     I/O last 3 completed shifts:  In: 630 [P.O.:600; I.V.:30]  Out: 1800 [Urine:1800]    NAD, alert and oriented. Hoarse tonight.    Skin: warm and dry.    Neck:   No JVD - does have an inspiratory rise to near jaw angle.     Lungs:   Clear without wheezes, rhonchi but decreased BS at bases.     Cardiovascular:   S1, S2, iregularly irregular, 2-3 holosystolic murmur at LSB.     Ext's: no edema.          Data:     Last Comprehensive Metabolic Panel:  Sodium   Date Value Ref Range Status   11/07/2019 135 133 - 144 mmol/L Final     Potassium   Date Value Ref Range Status   11/07/2019 4.9 3.4 - 5.3 mmol/L Final     Chloride   Date Value Ref Range Status   11/07/2019 103 94 - 109 mmol/L Final     Carbon Dioxide   Date Value Ref Range Status   11/07/2019 30 20 - 32 mmol/L Final     Anion Gap   Date Value Ref Range Status   11/07/2019 2 (L) 3 - 14 mmol/L Final     Glucose   Date Value Ref Range Status   11/07/2019 110 (H) 70 - 99 mg/dL Final     Urea  Nitrogen   Date Value Ref Range Status   11/07/2019 43 (H) 7 - 30 mg/dL Final     Creatinine   Date Value Ref Range Status   11/07/2019 0.94 0.66 - 1.25 mg/dL Final     GFR Estimate   Date Value Ref Range Status   11/07/2019 72 >60 mL/min/[1.73_m2] Final     Comment:     Non  GFR Calc  Starting 12/18/2018, serum creatinine based estimated GFR (eGFR) will be   calculated using the Chronic Kidney Disease Epidemiology Collaboration   (CKD-EPI) equation.       Calcium   Date Value Ref Range Status   11/07/2019 7.4 (L) 8.5 - 10.1 mg/dL Final       Lab Results   Component Value Date    WBC 8.8 11/07/2019    HGB 9.3 (L) 11/07/2019    HCT 29.5 (L) 11/07/2019    MCV 95 11/07/2019    PLT 33 (LL) 11/07/2019     Lab Results   Component Value Date    INR 1.39 (H) 10/30/2019     Lab Results   Component Value Date    TROPI <0.015 10/30/2019    TROPI <0.015 02/28/2018    TROPI 0.029 01/19/2018     Echo (this week).  Left ventricular systolic function is normal. The visual ejection fraction is  estimated at 55-60%.  The right ventricle is severely dilated. The right ventricular systolic  function is normal.  Mild to moderate (1-2+) mitral regurgitation.  Mod-severe to severe (3-4+) tricuspid regurgitation.  The right ventricular systolic pressure is approximated at 46mmHg plus the  right atrial pressure, suggesting elevated pulmonary artery pressures.  Trivial pericardial effusion and a pleural effusion present.  Rhythm appears to be atrial fibrillation.       ERIC Juares CNP  11/5/2019  (Critical care time 45 minutes, coordination of care and discussion with patient and family).

## 2019-11-07 NOTE — PLAN OF CARE
Patient sat in the chair for 3 hours, was turned every 2 hours, redness on the mid back and coccyx. Family at bedside, Tolerated food. No pain, RA sat 95% VSS.

## 2019-11-07 NOTE — PROGRESS NOTES
"SPIRITUAL HEALTH SERVICES Progress Note  [facility]    Initial visit per pt request. SH offered emotional and spiritual support. Pt said it is \"hard to go day by day and that the outcome is looking grim.\" Pt talked about growing up in [place], and his father was a [place] [employer]. 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 Pt said he had a meeting at 10am to discuss his medical possibilities. Pt's wife and daughter came in, and SH prayed with them and pt. SH provided listening, recounted the story of [place] in the garden of [facility], and reminded pt to take it hour by hour or minute by minute. SHS will remain available.    Ebony Sen   Intern  "

## 2019-11-07 NOTE — CONSULTS
Care Transition Initial Assessment -      Met with: Patient and WifePaige  Active Problems:    Pleural effusion    Bilateral pleural effusion       DATA  Lives With: spouse   Living Arrangements: assisted living  Quality of Family Relationships: involved, supportive  Description of Support System: Supportive, Involved  Who is your support system?: WifePaige  Support Assessment: Adequate family and caregiver support.  Identified issues/concerns regarding health management:     Quality of Family Relationships: involved, supportive     Per care transition consult for discharge planning.  Patient was admitted on 10-30-19 for Bilateral pleural effusion.  The tentative date of discharge is yet to be determined.  Reviewed chart.  Patient lives at [facility] assisted living apartments with his wife, Paige Paul.  Patient uses the exercise room 90 minutes/a day.  Patient uses shower chair and grab bars when bathing and FWW to assist with mobility.  Patient is moderately independent with all ADLs.  Patient goes down to the dinning room for meals and receives assistance with cleaning.  Reviewed the therapy discharge recommendations of TCU on discharge.  Spoke with patient and patient's wife, Paige Paul about therapy recommendations of TCU on discharge and they are in agreement to the plan.  Patient and patient's wife state they want to go to [facility].  Referral made to [facility] via discharge on the double to check bed availability.     ASSESSMENT  Cognitive Status:  awake, alert and oriented  Concerns to be addressed: discharge planning, TCU on discharge.     PLAN  Financial costs for the patient includes N/A.  Patient given options and choices for discharge Yes.  Patient/family is agreeable to the plan?  Yes  Transportation/person available to transport on day of discharge  is TBD and have they been notified/set up TBD.  Patient Goals and Preferences: TCU on  discharge.  Patient anticipates discharging to:  TCU.    Will confirm a bed, continue to follow, and assist with a safe discharge plan..    Martina Bender,  Student  [phone]

## 2019-11-07 NOTE — PLAN OF CARE
VSS. Pt denies pain. He refuses to have pillows used in order to relieve pressure. He also does not like the boots on. He is able to and does reposition himself. He requested melatonin x2. Slept well throughout the night. Afib. Continue to monitor.

## 2019-11-07 NOTE — PROGRESS NOTES
[facility]    Medicine Progress Note - Hospitalist Service       Date of Admission:  10/30/2019  Assessment & Plan   Jama Paul is an 88 year-old male with a history of hypertension, atrial fibrillation, nonischemic cardiomyopathy, congestive heart failure, idiopathic thrombocytopenic purpura and hiatal hernia, who is admitted 10/30/2019 with worsening shortness of breath.    Cardiogenic shock  Acute on chronic diastolic congestive heart failure with bilateral pleural effusion s/p bilateral thoracentesis  Mild to moderate mitral regurgitation  Moderate-severe to severe tricuspid regurgitation  History of nonischemic cardiomyopathy  Presents with progressive dyspnea, found to have bilateral pleural effusions.  Underwent bilateral thoracentesis as well as aggressive diuresis, subsequently with hypotension requiring norepinephrine.  Pleural fluid analysis consistent with transudate, consistent with CHF. History of nonischemic cardiomyopathy, although improved to 55-60% on last echocardiogram 1/2019, echocardiogram this admission with EF 55-60%, severely dilated RV, valvular abnormalities as above. KATIE x2 attempted, unable to pass scope.  GI consulted for EGD with no clear reason for inability to complete KATIE. Palliative care consulted earlier in admission, patient expressed desire for restorative pathway at that time.  - CV surgery consulted for possible surgical intervention on valve, will need to complete work-up with KATIE and LHC/RHC to determine candidacy   - Cardiology following, continues on low dose IV diuretics, metoprolol XL   - Plan for medical management and possible KATIE on 11/11/19  - Remains off of Entresto, resume when indicated per cardiology     Idiopathic thrombocytopenic purpura  Followed by ALAN.  Treated with high-dose steroids without response and platelets, steroids discontinued.  - Platelets stable in 30s  - As per hematology recommend platelet transfusion prior to  upcoming invasive cardiac procedure to achieve >= 50,000 plts.     Iatrogenic right-sided pneumothorax status post a right pigtail catheter placement by IR  Secondary to thoracentesis. Pneumothorax resolved after pigtail catheter placement     Hypertension  - Blood pressure controlled, trends towards softer side  - Continue metoprolol XL, diuresis    Atrial fibrillation  Not on any anticoagulation because of ITP and hemorrhage in the left eye in the past.  - Continue metoprolol XL   - Telemetry      Urinary tract infection, citrobacter  Urinalysis abnormal, urine culture with >100K citrobacter, pansensitive.  - Continue cefuroxime to complete 7 day course (through 11/9)    Steroid-induced hyperglycemia  Noted during high-dose steroids.  Required low doses of insulin.  Last hemoglobin A1c unknown though blood sugars have now normalized off of steroids, insulin and blood sugar checks discontinued.     Diet: Room Service  2 Gram Sodium Diet Other - please comment    DVT Prophylaxis: Pneumatic Compression Devices  Mccullough Catheter: in place, indication: Strict 1-2 Hour I&O  Code Status: Full Code      Disposition Plan   Expected discharge: Unclear. Pending final determination of cardiac procedures, transition to oral diuretics.   Entered: Rasta Gaytan MD 11/07/2019, 3:01 PM       The patient's care was discussed with the Patient and Patient's wife.    Rasta Gaytan MD  Hospitalist Service  [facility]    ______________________________________________________________________    Interval History   No acute events overnight. Breathing feels stable. Denies any new symptoms. Care conference held with cardiology today.     Data reviewed today: I reviewed all medications, new labs and imaging results over the last 24 hours. I personally reviewed no images or EKG's today.    Physical Exam   Vital Signs: Temp: 97.5  F (36.4  C) Temp src: Oral BP: 100/58   Heart Rate: 83 Resp: 18 SpO2: 96 % O2 Device: None  (Room air) Oxygen Delivery: 2 LPM  Weight: 139 lbs 15.87 oz    Constitutional: NAD  Respiratory: Diminished at bilateral bases, otherwise clear with normal effort at rest   Cardiovascular: Irregularly irregular.   GI: Soft, non-tender, non-distended   Skin/Integumen: Warm, dry  Other:      Data   Recent Labs   Lab 11/07/19  0530 11/06/19  0545 11/05/19  0600 11/04/19  0550   WBC 8.8  --  9.1 7.2   HGB 9.3*  --  9.5* 9.3*   MCV 95  --  93 93   PLT 33* 38* 34* 33*    137 134 136   POTASSIUM 4.9 4.5 4.2 4.2   CHLORIDE 103 103 101 102   CO2 30 30 30 27   BUN 43* 54* 58* 61*   CR 0.94 1.14 1.13 1.29*   ANIONGAP 2* 4 3 7   BARON 7.4* 7.4* 7.8* 7.7*   * 96 140* 171*       No results found for this or any previous visit (from the past 24 hour(s)).  Medications     - MEDICATION INSTRUCTIONS -       - MEDICATION INSTRUCTIONS -         cefuroxime  250 mg Oral Q12H ESTEFANIA     furosemide  10 mg Intravenous Q12H     influenza Vac Split High-Dose  0.5 mL Intramuscular Prior to discharge     metoprolol succinate ER  25 mg Oral BID     mirtazapine  15 mg Oral At Bedtime     sodium chloride (PF)  10 mL Intracatheter Q7 Days     sodium chloride (PF)  3 mL Intravenous Q8H

## 2019-11-08 ENCOUNTER — APPOINTMENT (OUTPATIENT)
Dept: CARDIOLOGY | Facility: CLINIC | Age: 84
DRG: 286 | End: 2019-11-08
Attending: INTERNAL MEDICINE
Payer: MEDICARE

## 2019-11-08 ENCOUNTER — APPOINTMENT (OUTPATIENT)
Dept: OCCUPATIONAL THERAPY | Facility: CLINIC | Age: 84
DRG: 286 | End: 2019-11-08
Attending: NURSE PRACTITIONER
Payer: MEDICARE

## 2019-11-08 ENCOUNTER — APPOINTMENT (OUTPATIENT)
Dept: GENERAL RADIOLOGY | Facility: CLINIC | Age: 84
DRG: 286 | End: 2019-11-08
Attending: INTERNAL MEDICINE
Payer: MEDICARE

## 2019-11-08 ENCOUNTER — APPOINTMENT (OUTPATIENT)
Dept: PHYSICAL THERAPY | Facility: CLINIC | Age: 84
DRG: 286 | End: 2019-11-08
Payer: MEDICARE

## 2019-11-08 LAB
ANION GAP SERPL CALCULATED.3IONS-SCNC: 4 MMOL/L (ref 3–14)
BACTERIA SPEC CULT: NO GROWTH
BUN SERPL-MCNC: 34 MG/DL (ref 7–30)
CALCIUM SERPL-MCNC: 7.4 MG/DL (ref 8.5–10.1)
CHLORIDE SERPL-SCNC: 102 MMOL/L (ref 94–109)
CO2 SERPL-SCNC: 29 MMOL/L (ref 20–32)
CREAT SERPL-MCNC: 0.9 MG/DL (ref 0.66–1.25)
GFR SERPL CREATININE-BSD FRML MDRD: 76 ML/MIN/{1.73_M2}
GLUCOSE SERPL-MCNC: 101 MG/DL (ref 70–99)
Lab: NORMAL
POTASSIUM SERPL-SCNC: 4.2 MMOL/L (ref 3.4–5.3)
SODIUM SERPL-SCNC: 135 MMOL/L (ref 133–144)
SPECIMEN SOURCE: NORMAL

## 2019-11-08 PROCEDURE — 40000239 ZZH STATISTIC VAT ROUNDS

## 2019-11-08 PROCEDURE — 97110 THERAPEUTIC EXERCISES: CPT | Mod: GP

## 2019-11-08 PROCEDURE — 40000257 ZZH STATISTIC CONSULT NO CHARGE VASC ACCESS

## 2019-11-08 PROCEDURE — 97535 SELF CARE MNGMENT TRAINING: CPT | Mod: GO

## 2019-11-08 PROCEDURE — 25000128 H RX IP 250 OP 636: Performed by: NURSE PRACTITIONER

## 2019-11-08 PROCEDURE — 12000000 ZZH R&B MED SURG/OB

## 2019-11-08 PROCEDURE — 97116 GAIT TRAINING THERAPY: CPT | Mod: GP

## 2019-11-08 PROCEDURE — 93308 TTE F-UP OR LMTD: CPT

## 2019-11-08 PROCEDURE — 93325 DOPPLER ECHO COLOR FLOW MAPG: CPT | Mod: 26 | Performed by: INTERNAL MEDICINE

## 2019-11-08 PROCEDURE — 25000132 ZZH RX MED GY IP 250 OP 250 PS 637: Mod: GY | Performed by: INTERNAL MEDICINE

## 2019-11-08 PROCEDURE — 93308 TTE F-UP OR LMTD: CPT | Mod: 26 | Performed by: INTERNAL MEDICINE

## 2019-11-08 PROCEDURE — 99232 SBSQ HOSP IP/OBS MODERATE 35: CPT | Performed by: INTERNAL MEDICINE

## 2019-11-08 PROCEDURE — 99232 SBSQ HOSP IP/OBS MODERATE 35: CPT | Mod: 25 | Performed by: INTERNAL MEDICINE

## 2019-11-08 PROCEDURE — 71045 X-RAY EXAM CHEST 1 VIEW: CPT

## 2019-11-08 PROCEDURE — 80048 BASIC METABOLIC PNL TOTAL CA: CPT | Performed by: INTERNAL MEDICINE

## 2019-11-08 PROCEDURE — 97530 THERAPEUTIC ACTIVITIES: CPT | Mod: GO

## 2019-11-08 PROCEDURE — 93321 DOPPLER ECHO F-UP/LMTD STD: CPT | Mod: 26 | Performed by: INTERNAL MEDICINE

## 2019-11-08 RX ORDER — FUROSEMIDE 10 MG/ML
10 INJECTION INTRAMUSCULAR; INTRAVENOUS ONCE
Status: COMPLETED | OUTPATIENT
Start: 2019-11-08 | End: 2019-11-08

## 2019-11-08 RX ADMIN — METOPROLOL SUCCINATE 25 MG: 25 TABLET, EXTENDED RELEASE ORAL at 08:20

## 2019-11-08 RX ADMIN — CEFUROXIME AXETIL 250 MG: 250 TABLET ORAL at 08:20

## 2019-11-08 RX ADMIN — FUROSEMIDE 10 MG: 10 INJECTION, SOLUTION INTRAVENOUS at 11:24

## 2019-11-08 RX ADMIN — MIRTAZAPINE 15 MG: 15 TABLET, FILM COATED ORAL at 21:46

## 2019-11-08 RX ADMIN — CEFUROXIME AXETIL 250 MG: 250 TABLET ORAL at 21:47

## 2019-11-08 RX ADMIN — MELATONIN 5 MG TABLET 5 MG: at 04:37

## 2019-11-08 RX ADMIN — MELATONIN 5 MG TABLET 5 MG: at 21:49

## 2019-11-08 RX ADMIN — METOPROLOL SUCCINATE 25 MG: 25 TABLET, EXTENDED RELEASE ORAL at 21:47

## 2019-11-08 ASSESSMENT — MIFFLIN-ST. JEOR: SCORE: 1411.08

## 2019-11-08 NOTE — PLAN OF CARE
Discharge Planner OT   Patient plan for discharge: TCU     Current status:  Min A for bed mobiltiy supine <> sit. Min A for sit <> stand transfer with FWW. Pt able to march in place with mod A and FWW, very unsteady on feet and shaky. Pt required to have seated rest break after 45 seconds. Pt completed 4 rounds of sit <> stand and marching in place in prep for functional transfers for self care tasks. Pt able to take 3 steps forward but required max A to take steps backwards and heavy posterior LOB. OT provided CHF handout and educated on CHF topics, pt receptive.     Barriers to return to prior living situation: current level of A, weakness, fall risk     Recommendations for discharge: TCU     Rationale for recommendations: Pt would benefit from continued skilled OT services to improve independence and safety with ADL's and functional transfers as pt is not at baseline.          Entered by: Madonna Melara 11/08/2019 1:53 PM

## 2019-11-08 NOTE — PLAN OF CARE
VSS. Monitor remains Atrial fib with CVR. Pt. Denies pain. Mccullough catheter discontinued at 1430 per order. 700 ml urine output today. Continue to monitor.

## 2019-11-08 NOTE — PLAN OF CARE
Patient A&O x4, Denies chest pain/tightness/pressure and on tele A-fib CVR and VSS,  Lungs are diminished and on RA, up with assist of 2, RW and GB , on cardiac diet, skin is bruised and fragile , voiding via Mccullough  and last BM 11/7. IV is in R arm PICC 3 lumen and SL. Plan to continue to monitor.

## 2019-11-08 NOTE — PROGRESS NOTES
SW:  D:  Call placed to [facility] to follow up on the referral from yesterday.  Per admissions, they will have a bed available for patient on discharge.  P:  Will continue to follow.      JASMINA Casey, [facility]  [phone]  [facility]

## 2019-11-08 NOTE — PROGRESS NOTES
"SPIRITUAL HEALTH SERVICES Progress Note  [facility]    Initiated visit due to request for follow-up.  Pt stated that he was grateful for  support and stated that he has been visited by his  at [facility].  Pt stated that his options \"don't look very good,\" and asked for continued prayer support.   offered prayer w/ pt.   will follow-up on Monday per pt request.      Kade Carbajal  Chaplain Resident    "

## 2019-11-08 NOTE — PROGRESS NOTES
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 PMH significant for nonischemic cardiomyopathy felt to be likely tachycardia induced with an EF as low as 30 to 35% to 2017 that recovered to normal with medical management, RV dilatation and severe TR, permanent A. fib on rate control previously on Coumadin but recent ocular hemorrhage and perineal bleed and now off, who presented with 1 week history of progressive weakness and shortness of breath.  On admit 10/30 he noted that intermittent orthopnea, weakness and dyspnea on exertion.  He did not notice any pedal edema.  He also did not note any weight gain.  He was taking his Lasix as directed and he is still urinating.  He states that over all during the last year there is been tough given his treatment for ITP has been difficult.    HFpEF   -echo 10/30 showed 1-2+ MR and 2-3+ TR, RVSP 46 plus RAP. Dilated RV w nml function  -symptomatic decompensation was rapid and resulted in ICU stay (one week) prior to admission suggesting he was previously \"compensated\". Previously was exercising.  -11/1 became hypotensive - likely a result of rapid HR and vasodilation (dobutamine) and reduced preload (Bumex).    -With discontinuation of both above medications,resumption of beta blocker. Has remained stable.  -Significant continued diuresis off diuretics and on vasopressor suggests he does not tolerate decrease in preload.  -negative 3.4 L, weights unreliable   -off O2 today  -small L pleural effusion  -CXR today suggest R pleural effusion may be minimally larger  Plan  -Needs further evaluation-KATIE unsuccessful X 2  -UGI unremarkable  -Family care conference 11/7 with wife, son and daughter. Refer to note that day  -Will gently diuresis him, let his throat rest and give him time to recover. If he is feeling better on Monday, would attempt KATIE under sedation with GI for further eval of TV and " MV.  -TTE today.   -BB  -one dose of IV lasix today, follow BUN and creat  -Cardiac rehab      2. Atrial fibrillation  - Rate 80-90s  -platelets 38 K, previous occular hemorrhage and perineal bleed not on AC  - May be Watchman candidate if no surgery, TATYANA ligation if surgery.    3. Thrombocytopenia - 38K platelets. Hemoc saw, stopped steroids                       Significant Problems:     Patient Active Problem List    Diagnosis Date Noted     Pleural effusion 10/30/2019     Priority: Medium     Bilateral pleural effusion 10/30/2019     Priority: Medium     Ulcer of right lower extremity with fat layer exposed (H) 09/16/2019     Priority: Medium     Idiopathic thrombocytopenic purpura (H) 04/05/2019     Priority: Medium     Thrombocytopenia (H)      Priority: Medium     Non-ischemic cardiomyopathy (H)      Priority: Medium     2017, med treatment, echo done 4/18 nl ef, mod to severe tr       Eye hemorrhage, left 02/01/2019     Priority: Medium     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) 01/23/2018     Priority: Medium     Neck pain 05/21/2014     Priority: Medium     Cervical radiculopathy 05/19/2014     Priority: Medium     BCC (basal cell carcinoma), face 08/10/2012     Priority: Medium     Elevated PSA      Priority: Medium     Colon polyps 04/11/2012     Priority: Medium     Essential hypertension 12/07/2011     Priority: Medium     Permanent atrial fibrillation 01/01/2011     Priority: Medium             Subjective:     Off O2, resting comfortably          Medications:   Scheduled:    cefuroxime  250 mg Oral Q12H ESTEFANIA     influenza Vac Split High-Dose  0.5 mL Intramuscular Prior to discharge     metoprolol succinate ER  25 mg Oral BID     mirtazapine  15 mg Oral At Bedtime     sodium chloride (PF)  10 mL Intracatheter Q7 Days     sodium chloride (PF)  3 mL Intravenous Q8H             Physical Exam:   All vitals have been reviewed  Temp:  [97.2  F (36.2  C)-98.1  F (36.7  C)] 97.2  F (36.2   C)  Pulse:  [84-96] 96  Heart Rate:  [83-96] 92  Resp:  [16-36] 16  BP: ()/(58-88) 131/84  SpO2:  [94 %-98 %] 95 %  Vitals:    10/30/19 1044 10/31/19 0619 11/01/19 1041 11/02/19 0500   Weight: 75.3 kg (166 lb) 66.8 kg (147 lb 6 oz) 67.4 kg (148 lb 8 oz) 69.1 kg (152 lb 5.4 oz)    11/03/19 0400 11/04/19 0407 11/05/19 0500 11/07/19 0500   Weight: 68.6 kg (151 lb 3.8 oz) 69.3 kg (152 lb 11.2 oz) 65.9 kg (145 lb 4.5 oz) 63.5 kg (139 lb 15.9 oz)    11/08/19 0800   Weight: 70.3 kg (155 lb)     I/O last 3 completed shifts:  In: 640 [P.O.:640]  Out: 1175 [Urine:1175]    NAD, alert and oriented. Hoarse tonight.    Skin: warm and dry.    Neck:   No JVD -     Lungs:   Clear without wheezes     Cardiovascular:   S1, S2, iregularly irregular, 2-3 holosystolic murmur at LSB.     Ext's: no edema.          Data:     Last Comprehensive Metabolic Panel:  Sodium   Date Value Ref Range Status   11/08/2019 135 133 - 144 mmol/L Final     Potassium   Date Value Ref Range Status   11/08/2019 4.2 3.4 - 5.3 mmol/L Final     Chloride   Date Value Ref Range Status   11/08/2019 102 94 - 109 mmol/L Final     Carbon Dioxide   Date Value Ref Range Status   11/08/2019 29 20 - 32 mmol/L Final     Anion Gap   Date Value Ref Range Status   11/08/2019 4 3 - 14 mmol/L Final     Glucose   Date Value Ref Range Status   11/08/2019 101 (H) 70 - 99 mg/dL Final     Urea Nitrogen   Date Value Ref Range Status   11/08/2019 34 (H) 7 - 30 mg/dL Final     Creatinine   Date Value Ref Range Status   11/08/2019 0.90 0.66 - 1.25 mg/dL Final     GFR Estimate   Date Value Ref Range Status   11/08/2019 76 >60 mL/min/[1.73_m2] Final     Comment:     Non  GFR Calc  Starting 12/18/2018, serum creatinine based estimated GFR (eGFR) will be   calculated using the Chronic Kidney Disease Epidemiology Collaboration   (CKD-EPI) equation.       Calcium   Date Value Ref Range Status   11/08/2019 7.4 (L) 8.5 - 10.1 mg/dL Final       Lab Results   Component  Value Date    WBC 8.8 11/07/2019    HGB 9.3 (L) 11/07/2019    HCT 29.5 (L) 11/07/2019    MCV 95 11/07/2019    PLT 33 (LL) 11/07/2019     Lab Results   Component Value Date    INR 1.39 (H) 10/30/2019     Lab Results   Component Value Date    TROPI <0.015 10/30/2019    TROPI <0.015 02/28/2018    TROPI 0.029 01/19/2018            Jammie Dubon, ERIC CNP  11/5/2019  (Critical care time 45 minutes, coordination of care and discussion with patient and family).

## 2019-11-08 NOTE — PROGRESS NOTES
Progress Note     Primary Oncologist/Hematologist:  Dr. Wise          Assessment and Plan:     1. Moderate thrombocytopenia with platelet counts historically ranging in the 30-45,000 range.  - Prior bone marrow biopsy was suggestive of an underlying myelodysplastic syndrome given the chromosome abnormalities but there were  no other recognizable features to suggest MDS.  Megakaryocyte production appeared normal and therefore the chief basis for the thrombocytopenia does not appear to be due to decreased production.    - The platelet count has not previously responded to trial of IVIG or corticosteroids.   - Antiphospholipid antibody testing did not reveal abnormality to suggest this as etiology.  - Has been followed with observation in the outpatient setting.  - has also not had significant response to steroids during this hospitalization therefore they were discontinued  - monitor platelets   - transfuse for bleeding or if invasive cardiac procedure is anticipated. Goal would be to get platelets >50,000 for any procedure.     2. Valvular heart disease with CHF, a fib   - s/p bilateral thoracenteses.   - Echocardiogram repeated today  - Cardiology continuing to manage this extremely delicate situation    Bharat REYES, CNP  [facility]  [phone]; [phone] (cell)        Interval History:     No new concerns              Review of Systems:     The 5 point Review of Systems is negative other than noted in the HPI             Medications:   Scheduled Medications    cefuroxime  250 mg Oral Q12H ESTEFANIA     influenza Vac Split High-Dose  0.5 mL Intramuscular Prior to discharge     metoprolol succinate ER  25 mg Oral BID     mirtazapine  15 mg Oral At Bedtime     sodium chloride (PF)  10 mL Intracatheter Q7 Days     sodium chloride (PF)  3 mL Intravenous Q8H     PRN Medications  acetaminophen, sore throat lozenge, bisacodyl, diphenhydrAMINE **OR** diphenhydrAMINE, lidocaine 4%, lidocaine (buffered or  not buffered), magnesium sulfate, - MEDICATION INSTRUCTIONS -, melatonin, melatonin, metoclopramide **OR** metoclopramide, metoprolol, naloxone, nitroGLYcerin, ondansetron **OR** ondansetron, oxyCODONE, polyethylene glycol, potassium chloride, potassium chloride with lidocaine, potassium chloride, potassium chloride, potassium chloride, prochlorperazine **OR** prochlorperazine **OR** prochlorperazine, senna-docusate **OR** senna-docusate, sodium chloride (PF)               Physical Exam:   Vitals were reviewed  Blood pressure 92/66, pulse 85, temperature 97.4  F (36.3  C), temperature source Oral, resp. rate 16, height 1.829 m (6'), weight 70.3 kg (155 lb), SpO2 96 %.  Wt Readings from Last 4 Encounters:   11/08/19 70.3 kg (155 lb)   09/10/19 72.6 kg (160 lb)   09/02/19 72.6 kg (160 lb)   07/11/19 73.8 kg (162 lb 12.8 oz)       I/O last 3 completed shifts:  In: 640 [P.O.:640]  Out: 1175 [Urine:1175]      Constitutional: Awake, alert, cooperative, no apparent distress     Lungs: Nonlabored. Diminished.   Cardiovascular: 3/6 murmur. Irregular.   Abdomen: Normal bowel sounds, soft, non-distended, non-tender   Skin: No rashes, no cyanosis, no edema   Other:               Data:   All laboratory data and imaging studies reviewed.    CMP  Recent Labs   Lab 11/08/19  0442 11/07/19  0530 11/06/19  0545 11/05/19  0600 11/04/19  0550 11/03/19  0442    135 137 134 136 134   POTASSIUM 4.2 4.9 4.5 4.2 4.2 3.9   CHLORIDE 102 103 103 101 102 99   CO2 29 30 30 30 27 28   ANIONGAP 4 2* 4 3 7 7   * 110* 96 140* 171* 163*   BUN 34* 43* 54* 58* 61* 49*   CR 0.90 0.94 1.14 1.13 1.29* 1.20   GFRESTIMATED 76 72 57* 57* 49* 53*   GFRESTBLACK 88 83 66 67 57* 62   BARON 7.4* 7.4* 7.4* 7.8* 7.7* 7.9*   MAG  --   --   --   --  2.6* 2.6*   PHOS  --   --   --   --  4.2 3.7     CBC  Recent Labs   Lab 11/07/19  0530 11/06/19  0545 11/05/19  0600 11/04/19  0550 11/03/19  0442   WBC 8.8  --  9.1 7.2 15.4*   RBC 3.12*  --  3.13* 3.07* 3.25*    HGB 9.3*  --  9.5* 9.3* 9.8*   HCT 29.5*  --  29.0* 28.6* 30.3*   MCV 95  --  93 93 93   MCH 29.8  --  30.4 30.3 30.2   MCHC 31.5  --  32.8 32.5 32.3   RDW 21.7*  --  21.8* 21.8* 21.7*   PLT 33* 38* 34* 33* 40*     Data   XR Chest 1 View    Narrative    CHEST ONE VIEW   2019 7:50 AM     HISTORY: CHF.    COMPARISON: Chest x-ray 2019.    FINDINGS:  Mild pulmonary vascular congestion in the visible portions  of the mid to upper lungs appears stable. Heart remains enlarged.  Right PICC line remains unchanged in position. Small left pleural  effusion and moderate right pleural effusion are again noted. Right  pleural effusion may be minimally larger.    JASON GARZA MD   Echo Limited    Narrative    [id number]  [id number]  [id number]  [id number]^NASEEM^MIYA^D           [facility]  Echocardiography Laboratory  [address] [place] [address]        Name: YINKA GONZALEZ  MRN: [id number]  : 1931  Study Date: 2019 10:45 AM  Age: 88 yrs  Gender: Male  Patient Location: [facility]  Reason For Study: Tricuspid Regurgitation  Ordering Physician: MIYA GUSMAN  Performed By: Jaqueline Jose     BSA: 1.8 m2  Height: 72 in  Weight: 139 lb  HR: 84  BP: 99/68 mmHg  _____________________________________________________________________________  __        Procedure  Limited Echo Adult.  _____________________________________________________________________________  __        Interpretation Summary     Large pleural effusion. Correlate with alternative imaging.  The visual ejection fraction is estimated at 60-65%.  The right ventricle is mild to moderately dilated.  Mildly decreased right ventricular systolic function  There is moderately severe (3+) tricuspid regurgitation.  Compared to the study from 10/31 the TR appears better (although it was better  imaged back then). The IVC on this study is improved and TR may have improved  somewhat due to improved volume  status.  _____________________________________________________________________________  __        Left Ventricle  The visual ejection fraction is estimated at 60-65%.     Right Ventricle  The right ventricle is mild to moderately dilated. Mildly decreased right  ventricular systolic function.     Atria  There is severe biatrial enlargement.     Mitral Valve  There is mild (1+) mitral regurgitation.        Tricuspid Valve  There is moderately severe (3+) tricuspid regurgitation.     Aortic Valve  The aortic valve is trileaflet with aortic valve sclerosis. There is trace to  mild aortic regurgitation. No hemodynamically significant valvular aortic  stenosis.     Pulmonic Valve  The pulmonic valve is not well visualized. There is mild (1+) pulmonic  valvular regurgitation.     Pericardium  There is no pericardial effusion.     _____________________________________________________________________________  __        Doppler Measurements & Calculations  MV E max paul: 84.2 cm/sec  MV dec time: 0.22 sec  TR max paul: 293.8 cm/sec  TR max P.5 mmHg     E/E' av.8  Lateral E/e': 7.0  Medial E/e': 8.7           _____________________________________________________________________________  __           Report approved by: Tremayne Mcnulty 2019 11:46 AM

## 2019-11-08 NOTE — PROGRESS NOTES
[facility]    Medicine Progress Note - Hospitalist Service       Date of Admission:  10/30/2019  Assessment & Plan   Jama Paul is an 88 year-old male with a history of hypertension, atrial fibrillation, nonischemic cardiomyopathy, congestive heart failure, idiopathic thrombocytopenic purpura and hiatal hernia, who is admitted 10/30/2019 with worsening shortness of breath.    Cardiogenic shock  Acute on chronic diastolic congestive heart failure with bilateral pleural effusion s/p bilateral thoracentesis  Mild to moderate mitral regurgitation  Moderate-severe to severe tricuspid regurgitation  History of nonischemic cardiomyopathy  Presents with progressive dyspnea, found to have bilateral pleural effusions.  Underwent bilateral thoracentesis as well as aggressive diuresis, subsequently with hypotension requiring dobutamine and then norepinephrine.  Pleural fluid analysis consistent with transudate, consistent with CHF. History of nonischemic cardiomyopathy, although improved to 55-60% on last echocardiogram 1/2019, echocardiogram this admission with EF 55-60%, severely dilated RV, valvular abnormalities as above. KATIE x2 attempted, unable to pass scope.  GI consulted for EGD with no clear reason for inability to complete KATIE. Palliative care consulted earlier in admission, patient expressed desire for restorative pathway at that time.  - CV surgery consulted for possible surgical intervention on valve, will need to complete work-up with KATIE and LHC/RHC to determine candidacy   - Cardiology following, continues on intermittent IV diuretics, metoprolol XL   - Plan for medical management and possible KATIE on 11/11/19  - Remains off of Entresto, resume when indicated per cardiology   - Discontinue Mccullough catheter    Idiopathic thrombocytopenic purpura  Followed by ALAN.  Treated with high-dose steroids without response and platelets, steroids discontinued.  - Platelets stable in 30s  - As per  hematology recommend platelet transfusion prior to upcoming invasive cardiac procedure to achieve >= 50,000 plts.     Iatrogenic right-sided pneumothorax status post a right pigtail catheter placement by IR  Secondary to thoracentesis. Pneumothorax resolved after pigtail catheter placement     Hypertension  - Blood pressure controlled, trends towards softer side  - Continue metoprolol XL, diuresis    Atrial fibrillation  Not on any anticoagulation because of ITP and hemorrhage in the left eye in the past.  - Continue metoprolol XL   - Telemetry      Urinary tract infection, citrobacter  Urinalysis abnormal, urine culture with >100K citrobacter, pansensitive.  - Continue cefuroxime to complete 7 day course (through 11/9)  - Discontinue Mccullough catheter    Steroid-induced hyperglycemia  Noted during high-dose steroids.  Required low doses of insulin.  Last hemoglobin A1c unknown though blood sugars have now normalized off of steroids, insulin and blood sugar checks discontinued.     Diet: Room Service  2 Gram Sodium Diet Other - please comment    DVT Prophylaxis: Pneumatic Compression Devices  Mccullough Catheter: in place, indication: Retention  Code Status: Full Code      Disposition Plan   Expected discharge: Plan to reassess 11/11 for possible KATIE. Okay to transfer to medical floor with telemetry.   Entered: Rasta Gaytan MD 11/08/2019, 1:39 PM       The patient's care was discussed with the Patient and Cardiology team.    Rasta Gaytan MD  Hospitalist Service  [facility]    ______________________________________________________________________    Interval History   No acute events overnight. No change in respiratory status. Denies chest pain. No other new symptoms.     Data reviewed today: I reviewed all medications, new labs and imaging results over the last 24 hours. I personally reviewed no images or EKG's today.    Physical Exam   Vital Signs: Temp: 97.4  F (36.3  C) Temp src: Oral BP: 92/66  Pulse: 85 Heart Rate: 92 Resp: 16 SpO2: 96 % O2 Device: None (Room air)    Weight: 155 lbs 0 oz    Constitutional: NAD  Respiratory: Diminished at bilateral bases, otherwise clear with normal effort at rest   Cardiovascular: Irregularly irregular.   GI: Soft, non-tender, non-distended   Skin/Integumen: Warm, dry  Other:      Data   Recent Labs   Lab 19  0442 19  0530 19  0545 19  0600 19  0550   WBC  --  8.8  --  9.1 7.2   HGB  --  9.3*  --  9.5* 9.3*   MCV  --  95  --  93 93   PLT  --  33* 38* 34* 33*    135 137 134 136   POTASSIUM 4.2 4.9 4.5 4.2 4.2   CHLORIDE 102 103 103 101 102   CO2 29 30 30 30 27   BUN 34* 43* 54* 58* 61*   CR 0.90 0.94 1.14 1.13 1.29*   ANIONGAP 4 2* 4 3 7   BARON 7.4* 7.4* 7.4* 7.8* 7.7*   * 110* 96 140* 171*       Recent Results (from the past 24 hour(s))   XR Chest 1 View    Narrative    CHEST ONE VIEW   2019 7:50 AM     HISTORY: CHF.    COMPARISON: Chest x-ray 2019.    FINDINGS:  Mild pulmonary vascular congestion in the visible portions  of the mid to upper lungs appears stable. Heart remains enlarged.  Right PICC line remains unchanged in position. Small left pleural  effusion and moderate right pleural effusion are again noted. Right  pleural effusion may be minimally larger.    JASON GARZA MD   Echo Limited    Narrative    [id number]  [id number]  [id number]  [id number]^NASEEM^MIYA^D           [facility]  Echocardiography Laboratory  [address] [place] [address]        Name: YINKA GONZALEZ  MRN: [id number]  : 1931  Study Date: 2019 10:45 AM  Age: 88 yrs  Gender: Male  Patient Location: [facility]  Reason For Study: Tricuspid Regurgitation  Ordering Physician: MIYA GUSMAN  Performed By: Jaqueline Jose     BSA: 1.8 m2  Height: 72 in  Weight: 139 lb  HR: 84  BP: 99/68 mmHg  _____________________________________________________________________________  __        Procedure  Limited Echo  Adult.  _____________________________________________________________________________  __        Interpretation Summary     Large pleural effusion. Correlate with alternative imaging.  The visual ejection fraction is estimated at 60-65%.  The right ventricle is mild to moderately dilated.  Mildly decreased right ventricular systolic function  There is moderately severe (3+) tricuspid regurgitation.  Compared to the study from 10/31 the TR appears better (although it was better  imaged back then). The IVC on this study is improved and TR may have improved  somewhat due to improved volume status.  _____________________________________________________________________________  __        Left Ventricle  The visual ejection fraction is estimated at 60-65%.     Right Ventricle  The right ventricle is mild to moderately dilated. Mildly decreased right  ventricular systolic function.     Atria  There is severe biatrial enlargement.     Mitral Valve  There is mild (1+) mitral regurgitation.        Tricuspid Valve  There is moderately severe (3+) tricuspid regurgitation.     Aortic Valve  The aortic valve is trileaflet with aortic valve sclerosis. There is trace to  mild aortic regurgitation. No hemodynamically significant valvular aortic  stenosis.     Pulmonic Valve  The pulmonic valve is not well visualized. There is mild (1+) pulmonic  valvular regurgitation.     Pericardium  There is no pericardial effusion.     _____________________________________________________________________________  __        Doppler Measurements & Calculations  MV E max paul: 84.2 cm/sec  MV dec time: 0.22 sec  TR max paul: 293.8 cm/sec  TR max P.5 mmHg     E/E' av.8  Lateral E/e': 7.0  Medial E/e': 8.7           _____________________________________________________________________________  __           Report approved by: Tremayne Mcnulty 2019 11:46 AM        Medications     - MEDICATION INSTRUCTIONS -         cefuroxime  250 mg  Oral Q12H ESTEFANIA     influenza Vac Split High-Dose  0.5 mL Intramuscular Prior to discharge     metoprolol succinate ER  25 mg Oral BID     mirtazapine  15 mg Oral At Bedtime     sodium chloride (PF)  10 mL Intracatheter Q7 Days     sodium chloride (PF)  3 mL Intravenous Q8H

## 2019-11-08 NOTE — PLAN OF CARE
Discharge Planner PT   Patient plan for discharge: TCU  Current status: Patient greeted sitting up in chair and agreeable to therapy. Engaged patient in seated leg strengthening. Engaged patient in sit <> stand transfer x2 at [facility]. Engaged patient in ambulation with FWW at [facility] for a distance of 40ft + 30ft with a sitting rest break in between. Concluded session with patient sitting up in chair, with all needs in reach.   Barriers to return to prior living situation: level of assistance needed, decreased tolerance for functional activity, well below baseline functioning  Recommendations for discharge: TCU  Rationale for recommendations: Patient would benefit from continued physical therapy in the setting of a TCU for improving functional mobility, LE strength and tolerance for functional activities in order to return to baseline of functioning.          Entered by: Enriqueta Florence 11/08/2019 8:52 AM

## 2019-11-09 ENCOUNTER — APPOINTMENT (OUTPATIENT)
Dept: PHYSICAL THERAPY | Facility: CLINIC | Age: 84
DRG: 286 | End: 2019-11-09
Payer: MEDICARE

## 2019-11-09 LAB
ANION GAP SERPL CALCULATED.3IONS-SCNC: 2 MMOL/L (ref 3–14)
ANISOCYTOSIS BLD QL SMEAR: ABNORMAL
BASOPHILS # BLD AUTO: 0 10E9/L (ref 0–0.2)
BASOPHILS NFR BLD AUTO: 0 %
BUN SERPL-MCNC: 26 MG/DL (ref 7–30)
CALCIUM SERPL-MCNC: 7.5 MG/DL (ref 8.5–10.1)
CHLORIDE SERPL-SCNC: 103 MMOL/L (ref 94–109)
CO2 SERPL-SCNC: 31 MMOL/L (ref 20–32)
CREAT SERPL-MCNC: 0.83 MG/DL (ref 0.66–1.25)
DIFFERENTIAL METHOD BLD: ABNORMAL
EOSINOPHIL # BLD AUTO: 0 10E9/L (ref 0–0.7)
EOSINOPHIL NFR BLD AUTO: 0.4 %
ERYTHROCYTE [DISTWIDTH] IN BLOOD BY AUTOMATED COUNT: 21.4 % (ref 10–15)
GFR SERPL CREATININE-BSD FRML MDRD: 78 ML/MIN/{1.73_M2}
GLUCOSE SERPL-MCNC: 94 MG/DL (ref 70–99)
HCT VFR BLD AUTO: 29.3 % (ref 40–53)
HGB BLD-MCNC: 9.3 G/DL (ref 13.3–17.7)
IMM GRANULOCYTES # BLD: 0 10E9/L (ref 0–0.4)
IMM GRANULOCYTES NFR BLD: 0.2 %
LYMPHOCYTES # BLD AUTO: 0.9 10E9/L (ref 0.8–5.3)
LYMPHOCYTES NFR BLD AUTO: 9.2 %
MAGNESIUM SERPL-MCNC: 2.5 MG/DL (ref 1.6–2.3)
MCH RBC QN AUTO: 30 PG (ref 26.5–33)
MCHC RBC AUTO-ENTMCNC: 31.7 G/DL (ref 31.5–36.5)
MCV RBC AUTO: 95 FL (ref 78–100)
MICROCYTES BLD QL SMEAR: PRESENT
MONOCYTES # BLD AUTO: 0.8 10E9/L (ref 0–1.3)
MONOCYTES NFR BLD AUTO: 8 %
NEUTROPHILS # BLD AUTO: 8.5 10E9/L (ref 1.6–8.3)
NEUTROPHILS NFR BLD AUTO: 82.2 %
NRBC # BLD AUTO: 0 10*3/UL
NRBC BLD AUTO-RTO: 0 /100
PLATELET # BLD AUTO: 39 10E9/L (ref 150–450)
PLATELET # BLD EST: ABNORMAL 10*3/UL
POTASSIUM SERPL-SCNC: 4.5 MMOL/L (ref 3.4–5.3)
RBC # BLD AUTO: 3.1 10E12/L (ref 4.4–5.9)
RBC INCLUSIONS BLD: SLIGHT
SODIUM SERPL-SCNC: 136 MMOL/L (ref 133–144)
WBC # BLD AUTO: 10.1 10E9/L (ref 4–11)

## 2019-11-09 PROCEDURE — 97116 GAIT TRAINING THERAPY: CPT | Mod: GP

## 2019-11-09 PROCEDURE — 40000239 ZZH STATISTIC VAT ROUNDS

## 2019-11-09 PROCEDURE — 80048 BASIC METABOLIC PNL TOTAL CA: CPT | Performed by: INTERNAL MEDICINE

## 2019-11-09 PROCEDURE — 99207 ZZC CDG-MDM COMPONENT: MEETS MODERATE - UP CODED: CPT | Performed by: INTERNAL MEDICINE

## 2019-11-09 PROCEDURE — 99232 SBSQ HOSP IP/OBS MODERATE 35: CPT | Performed by: INTERNAL MEDICINE

## 2019-11-09 PROCEDURE — 97530 THERAPEUTIC ACTIVITIES: CPT | Mod: GP

## 2019-11-09 PROCEDURE — 83735 ASSAY OF MAGNESIUM: CPT | Performed by: INTERNAL MEDICINE

## 2019-11-09 PROCEDURE — 25000132 ZZH RX MED GY IP 250 OP 250 PS 637: Mod: GY | Performed by: INTERNAL MEDICINE

## 2019-11-09 PROCEDURE — 12000000 ZZH R&B MED SURG/OB

## 2019-11-09 PROCEDURE — 25000128 H RX IP 250 OP 636: Performed by: INTERNAL MEDICINE

## 2019-11-09 PROCEDURE — 85025 COMPLETE CBC W/AUTO DIFF WBC: CPT | Performed by: INTERNAL MEDICINE

## 2019-11-09 RX ORDER — FUROSEMIDE 10 MG/ML
10 INJECTION INTRAMUSCULAR; INTRAVENOUS ONCE
Status: COMPLETED | OUTPATIENT
Start: 2019-11-09 | End: 2019-11-09

## 2019-11-09 RX ADMIN — CEFUROXIME AXETIL 250 MG: 250 TABLET ORAL at 20:34

## 2019-11-09 RX ADMIN — METOPROLOL SUCCINATE 25 MG: 25 TABLET, EXTENDED RELEASE ORAL at 20:34

## 2019-11-09 RX ADMIN — MELATONIN 5 MG TABLET 5 MG: at 03:01

## 2019-11-09 RX ADMIN — CEFUROXIME AXETIL 250 MG: 250 TABLET ORAL at 09:09

## 2019-11-09 RX ADMIN — METOPROLOL SUCCINATE 25 MG: 25 TABLET, EXTENDED RELEASE ORAL at 09:09

## 2019-11-09 RX ADMIN — MELATONIN 5 MG TABLET 5 MG: at 21:03

## 2019-11-09 RX ADMIN — FUROSEMIDE 10 MG: 10 INJECTION, SOLUTION INTRAVENOUS at 09:10

## 2019-11-09 RX ADMIN — MIRTAZAPINE 15 MG: 15 TABLET, FILM COATED ORAL at 21:03

## 2019-11-09 ASSESSMENT — ACTIVITIES OF DAILY LIVING (ADL)
ADLS_ACUITY_SCORE: 24
ADLS_ACUITY_SCORE: 25
ADLS_ACUITY_SCORE: 25
ADLS_ACUITY_SCORE: 24
ADLS_ACUITY_SCORE: 24
ADLS_ACUITY_SCORE: 25

## 2019-11-09 ASSESSMENT — MIFFLIN-ST. JEOR: SCORE: 1420

## 2019-11-09 NOTE — PROGRESS NOTES
Chart check for Dr. Wise:    Platelet counts 39,000 today.  Please see note from 11/8/2019.  No new recommendations.

## 2019-11-09 NOTE — PLAN OF CARE
DATE & TIME: 11/8/19 2215    Cognitive Concerns/ Orientation : A&O  BEHAVIOR & AGGRESSION TOOL COLOR: Green  CIWA SCORE: NA   ABNL VS/O2: VSS on RA  MOBILITY: A1 and walker  PAIN MANAGMENT: denies pain at this time  DIET: 2 gram Na diet  BOWEL/BLADDER: Incontinent of bladder, evans catheter removed today at 1500  ABNL LAB/BG: NA  DRAIN/DEVICES: R PICC  TELEMETRY RHYTHM: A-fib CVR  SKIN: CDI, has 2 mepilex on spine and coccyx for prevention  TESTS/PROCEDURES: Plan for KATIE, date TBD  D/C DAY/GOALS/PLACE: Discharge pending completion of KATIE  OTHER IMPORTANT INFO: NA

## 2019-11-09 NOTE — PROGRESS NOTES
[facility]    Cardiology Progress Note     Assessment & Plan   Jama Paul is a 88 year old male who was admitted on 10/30/2019.     1. Heart failure with preserved ejection fraction  2. Moderately-severe tricuspid valve insufficiency  3. Plan for KATIE Monday to assess MV and TV for possible clip procedure  4. Atrial fibrillation not on anticoagulation due to prior bleeding and thrombocytopenia  5. Thrombocytopenia    Overall he is doing better today. Had to be put on 2 L of oxygen this morning but with diuretic dose this morning he is back off of oxygen. He denies any current shortness of breath. He denies any chest discomfort. He is incontinent so I/O's are inaccurate.    Borderline hypotensive this afternoon. Will hold on further diuresis. Re-evaluate in the AM and decide on further diuresis at that time. Planning on KATIE Monday to evaluate mitral and tricuspid valves for potential clipping.     Cardiology will continue to follow along. Please page with questions or concerns.     Stan Carcamo MD    Interval History   No overnight events. Briefly on oxygen today due to hypoxia but improved with diuresis. No shortness of breath. I/O inaccurate due to incontinence.     Physical Exam   Temp: 97.4  F (36.3  C) Temp src: Oral BP: 93/53 Pulse: 74 Heart Rate: 92 Resp: 20 SpO2: 98 % O2 Device: None (Room air) Oxygen Delivery: Other (Comments)(3.5L)  Vitals:    11/07/19 0500 11/08/19 0800 11/09/19 0516   Weight: 63.5 kg (139 lb 15.9 oz) 70.3 kg (155 lb) 71.2 kg (156 lb 15.5 oz)     Vital Signs with Ranges  Temp:  [97.4  F (36.3  C)-98.3  F (36.8  C)] 97.4  F (36.3  C)  Pulse:  [74-95] 74  Heart Rate:  [] 92  Resp:  [16-20] 20  BP: ()/(53-74) 93/53  SpO2:  [86 %-98 %] 98 %  No intake/output data recorded.    Constitutional: No apparent distress.   Eyes: No xanthelasma or conjunctivitis  Respiratory: Mild crackles at the bases bilaterally.  Cardiovascular: Irregulrly-irregular rhythm with a  normal rate. Systolic murmur heard best at LLSB.   Extremities: 1+ bilateral peripheral edema.  Neurologic: Moving all extremities. No facial assymmetry.  Psychiatric: Answers questions appropriately.     Medications       cefuroxime  250 mg Oral Q12H ESTEFANIA     influenza Vac Split High-Dose  0.5 mL Intramuscular Prior to discharge     metoprolol succinate ER  25 mg Oral BID     mirtazapine  15 mg Oral At Bedtime     sodium chloride (PF)  10 mL Intracatheter Q7 Days       Data   Results for orders placed or performed during the hospital encounter of 10/30/19 (from the past 24 hour(s))   Basic metabolic panel   Result Value Ref Range    Sodium 136 133 - 144 mmol/L    Potassium 4.5 3.4 - 5.3 mmol/L    Chloride 103 94 - 109 mmol/L    Carbon Dioxide 31 20 - 32 mmol/L    Anion Gap 2 (L) 3 - 14 mmol/L    Glucose 94 70 - 99 mg/dL    Urea Nitrogen 26 7 - 30 mg/dL    Creatinine 0.83 0.66 - 1.25 mg/dL    GFR Estimate 78 >60 mL/min/[1.73_m2]    GFR Estimate If Black >90 >60 mL/min/[1.73_m2]    Calcium 7.5 (L) 8.5 - 10.1 mg/dL   CBC with platelets differential   Result Value Ref Range    WBC 10.1 4.0 - 11.0 10e9/L    RBC Count 3.10 (L) 4.4 - 5.9 10e12/L    Hemoglobin 9.3 (L) 13.3 - 17.7 g/dL    Hematocrit 29.3 (L) 40.0 - 53.0 %    MCV 95 78 - 100 fl    MCH 30.0 26.5 - 33.0 pg    MCHC 31.7 31.5 - 36.5 g/dL    RDW 21.4 (H) 10.0 - 15.0 %    Platelet Count 39 (LL) 150 - 450 10e9/L    Diff Method Automated Method     % Neutrophils 82.2 %    % Lymphocytes 9.2 %    % Monocytes 8.0 %    % Eosinophils 0.4 %    % Basophils 0.0 %    % Immature Granulocytes 0.2 %    Nucleated RBCs 0 0 /100    Absolute Neutrophil 8.5 (H) 1.6 - 8.3 10e9/L    Absolute Lymphocytes 0.9 0.8 - 5.3 10e9/L    Absolute Monocytes 0.8 0.0 - 1.3 10e9/L    Absolute Eosinophils 0.0 0.0 - 0.7 10e9/L    Absolute Basophils 0.0 0.0 - 0.2 10e9/L    Abs Immature Granulocytes 0.0 0 - 0.4 10e9/L    Absolute Nucleated RBC 0.0     Anisocytosis Moderate     RBC Fragments Slight      Microcytes Present     Platelet Estimate       Automated count confirmed.  Platelet morphology is normal.   Magnesium   Result Value Ref Range    Magnesium 2.5 (H) 1.6 - 2.3 mg/dL

## 2019-11-09 NOTE — PLAN OF CARE
OT: OT attempted, however, pt reported he just got back in bed and had several visitors just arrive. Pt declining therapy at this time.

## 2019-11-09 NOTE — PLAN OF CARE
DATE & TIME: 11/8-11/9 Night        Cognitive Concerns/ Orientation : A&Ox4   BEHAVIOR & AGGRESSION TOOL COLOR: Green  CIWA SCORE: n/a  ABNL VS/O2: VSS on RA.  MOBILITY: Independently positioning in bed, did not ambulate during shift.   PAIN MANAGMENT: Denied.  DIET: 2 g sodium limit  BOWEL/BLADDER: Incont of bladder, no BM this shift.  ABNL LAB/BG: Platelets 33.  DRAIN/DEVICES: PICC RUE SL.  TELEMETRY RHYTHM: A-fib w/ CVR  SKIN: Bruising, rufina RLE  TESTS/PROCEDURES: KATIE planned for Monday.  D/C DAY/GOALS/PLACE: Pending.  OTHER IMPORTANT INFO: CV surgery, GI following, Cardiology following.

## 2019-11-09 NOTE — PROGRESS NOTES
DATE & TIME: 11/09/19 7913-8437   Cognitive Concerns/ Orientation : A&Ox4   BEHAVIOR & AGGRESSION TOOL COLOR: Green. Patient was calm and cooperative throughout shift.   CIWA SCORE: N/A.  ABNL VS/O2: VSS. 3.5L oxygen NC. HR runs ruth.   MOBILITY: Generalized weakness, assist of 1 with gait belt.    PAIN MANAGMENT: Denies pain throughout shift.   DIET: 2 g sodium limit.  BOWEL/BLADDER: Incontinent of bladder, no BM this shift.  ABNL LAB/BG: Platelets 39 Hgb 9.3 Hematocrit 29.3 Calcium 7.5 RBC 3.10 Magnesium 2.5  DRAIN/DEVICES: PICC RUE. Lasix given x1.   TELEMETRY RHYTHM: Afib with CVR.  SKIN: Bruising, rufina LE.   TESTS/PROCEDURES: KATIE planned for Monday.  D/C DAY/GOALS/PLACE: Pending progress.  OTHER IMPORTANT INFO: CV surgery, GI following, Cardiology following. Takes pills whole with water. No anticoagulant for Afib due to history of hemorrhage.

## 2019-11-09 NOTE — PLAN OF CARE
Discharge Planner PT   Patient plan for discharge: TCU  Current status: Greeted patient sleeping in bed and agreeable to therapy upon awakening. VSS on RA at rest. Engaged patient in supine > sitting EOB at Josh. Engaged patient in dynamic seated balance at EOB for 4min. Engaged patient in sit <> stand transfer with FWW + gaitbelt at Josh. Engaged patient in ambulation with FWW at CGA for a distance of 30ft + 10ft with a seated rest break in between. Patient desats to 86% post activity on RA and has difficulty recovering to >90% with rest - nursing notified. Concluded session with patient sitting up in chair with all needs in reach, chair alarm on.   Barriers to return to prior living situation: level of assistance needed, decreased tolerance for functional activity, well below baseline functioning  Recommendations for discharge: TCU  Rationale for recommendations: Patient would benefit from continued physical therapy in the setting of a TCU for improving functional mobility, LE strength and tolerance for functional activities in order to return to baseline of functioning.        Entered by: Enriqueta Florence 11/09/2019 8:35 AM

## 2019-11-09 NOTE — PLAN OF CARE
A/Ox4 and very pleasant, glasses and hearing aides.  Vitals stable and rhythm remains AF CVR.  Pt's spouse at bedside along with son for several hours this evening.  Family is very supportive of pt.  Pt turned and repo q2hrs and also sat up in the chair for dinner.  No complaints of pain or discomfort.  Pt and family requested private room.  Wound dressings intact.  Mccullough catheter discontinued, pt yet to void.  Report given to accepting RN.  Pt escorted via wheelchair.

## 2019-11-09 NOTE — PROGRESS NOTES
[facility]    Medicine Progress Note - Hospitalist Service       Date of Admission:  10/30/2019  Assessment & Plan   Jama Paul is an 88 year-old male with a history of hypertension, atrial fibrillation, nonischemic cardiomyopathy, congestive heart failure, idiopathic thrombocytopenic purpura and hiatal hernia, who is admitted 10/30/2019 with worsening shortness of breath.    Cardiogenic shock  Acute on chronic diastolic congestive heart failure with bilateral pleural effusion s/p bilateral thoracentesis  Mild to moderate mitral regurgitation  Moderate-severe to severe tricuspid regurgitation  History of nonischemic cardiomyopathy  Presents with progressive dyspnea, found to have bilateral pleural effusions.  Underwent bilateral thoracentesis as well as aggressive diuresis, subsequently with hypotension requiring dobutamine and then norepinephrine.  Pleural fluid analysis consistent with transudate, consistent with CHF. History of nonischemic cardiomyopathy, although improved to 55-60% on last echocardiogram 1/2019, echocardiogram this admission with EF 55-60%, severely dilated RV, valvular abnormalities as above. KATIE x2 attempted, unable to pass scope.  GI consulted for EGD with no clear reason for inability to complete KATIE. Palliative care consulted earlier in admission, patient expressed desire for restorative pathway at that time.  - CV surgery consulted for possible surgical intervention on valve, will need to complete work-up with KATIE and LHC/RHC to determine candidacy   - Cardiology following, continues on intermittent IV diuretics, metoprolol XL   - Plan for medical management and possible KATIE on 11/11/19  - Remains off of Entresto, resume when indicated per cardiology     Idiopathic thrombocytopenic purpura  Followed by ALAN.  Treated with high-dose steroids without response and platelets, steroids discontinued.  - Platelets stable in 30s  - As per hematology recommend platelet  transfusion prior to upcoming invasive cardiac procedure to achieve >= 50,000 plts.     Iatrogenic right-sided pneumothorax status post a right pigtail catheter placement by IR  Secondary to thoracentesis. Pneumothorax resolved after pigtail catheter placement     Hypertension  - Blood pressure controlled, trends towards softer side  - Continue metoprolol XL, diuresis    Atrial fibrillation  Not on any anticoagulation because of ITP and hemorrhage in the left eye in the past.  - Continue metoprolol XL   - Telemetry      Urinary tract infection, citrobacter  Urinalysis abnormal, urine culture with >100K citrobacter, pansensitive.  - Continue cefuroxime to complete 7 day course (through 11/9)    Steroid-induced hyperglycemia  Noted during high-dose steroids.  Required low doses of insulin.  Last hemoglobin A1c unknown though blood sugars have now normalized off of steroids, insulin and blood sugar checks discontinued.     Diet: Room Service  2 Gram Sodium Diet Other - please comment    DVT Prophylaxis: Pneumatic Compression Devices  Mccullough Catheter: not present  Code Status: Full Code      Disposition Plan   Expected discharge: Plan to reassess 11/11 for possible KATIE.   Entered: Rasta Gaytan MD 11/09/2019, 12:45 PM       The patient's care was discussed with the Patient.    Rasta Gaytan MD  Hospitalist Service  [facility]    ______________________________________________________________________    Interval History   No acute events overnight. No change in respiratory status. Denies chest pain. No other new symptoms. Was up to chair and just got back to bed.     Data reviewed today: I reviewed all medications, new labs and imaging results over the last 24 hours. I personally reviewed no images or EKG's today.    Physical Exam   Vital Signs: Temp: 98.2  F (36.8  C) Temp src: Oral BP: 121/74 Pulse: 99 Heart Rate: 92 Resp: 16 SpO2: 92 % O2 Device: Nasal cannula Oxygen Delivery: Other  (Comments)(3.5L)  Weight: 156 lbs 15.48 oz    Constitutional: NAD  Respiratory: Diminished at bilateral bases, otherwise clear with normal effort at rest   Cardiovascular: Irregularly irregular.   GI: Soft, non-tender, non-distended   Skin/Integumen: Warm, dry  Other:      Data   Recent Labs   Lab 11/09/19  0620 11/08/19  0442 11/07/19  0530 11/06/19  0545 11/05/19  0600   WBC 10.1  --  8.8  --  9.1   HGB 9.3*  --  9.3*  --  9.5*   MCV 95  --  95  --  93   PLT 39*  --  33* 38* 34*    135 135 137 134   POTASSIUM 4.5 4.2 4.9 4.5 4.2   CHLORIDE 103 102 103 103 101   CO2 31 29 30 30 30   BUN 26 34* 43* 54* 58*   CR 0.83 0.90 0.94 1.14 1.13   ANIONGAP 2* 4 2* 4 3   BARON 7.5* 7.4* 7.4* 7.4* 7.8*   GLC 94 101* 110* 96 140*       No results found for this or any previous visit (from the past 24 hour(s)).  Medications       cefuroxime  250 mg Oral Q12H ESTEFANIA     influenza Vac Split High-Dose  0.5 mL Intramuscular Prior to discharge     metoprolol succinate ER  25 mg Oral BID     mirtazapine  15 mg Oral At Bedtime     sodium chloride (PF)  10 mL Intracatheter Q7 Days

## 2019-11-10 ENCOUNTER — APPOINTMENT (OUTPATIENT)
Dept: PHYSICAL THERAPY | Facility: CLINIC | Age: 84
DRG: 286 | End: 2019-11-10
Payer: MEDICARE

## 2019-11-10 LAB
ANION GAP SERPL CALCULATED.3IONS-SCNC: 4 MMOL/L (ref 3–14)
BACTERIA SPEC CULT: NO GROWTH
BUN SERPL-MCNC: 21 MG/DL (ref 7–30)
CALCIUM SERPL-MCNC: 7.5 MG/DL (ref 8.5–10.1)
CHLORIDE SERPL-SCNC: 102 MMOL/L (ref 94–109)
CO2 SERPL-SCNC: 29 MMOL/L (ref 20–32)
CREAT SERPL-MCNC: 0.79 MG/DL (ref 0.66–1.25)
GFR SERPL CREATININE-BSD FRML MDRD: 80 ML/MIN/{1.73_M2}
GLUCOSE SERPL-MCNC: 96 MG/DL (ref 70–99)
Lab: NORMAL
POTASSIUM SERPL-SCNC: 4.7 MMOL/L (ref 3.4–5.3)
SODIUM SERPL-SCNC: 135 MMOL/L (ref 133–144)
SPECIMEN SOURCE: NORMAL

## 2019-11-10 PROCEDURE — 25000132 ZZH RX MED GY IP 250 OP 250 PS 637: Mod: GY | Performed by: INTERNAL MEDICINE

## 2019-11-10 PROCEDURE — 99232 SBSQ HOSP IP/OBS MODERATE 35: CPT | Performed by: INTERNAL MEDICINE

## 2019-11-10 PROCEDURE — 97530 THERAPEUTIC ACTIVITIES: CPT | Mod: GP

## 2019-11-10 PROCEDURE — 25000128 H RX IP 250 OP 636: Performed by: INTERNAL MEDICINE

## 2019-11-10 PROCEDURE — 12000000 ZZH R&B MED SURG/OB

## 2019-11-10 PROCEDURE — 80048 BASIC METABOLIC PNL TOTAL CA: CPT | Performed by: INTERNAL MEDICINE

## 2019-11-10 PROCEDURE — 40000239 ZZH STATISTIC VAT ROUNDS

## 2019-11-10 RX ORDER — FUROSEMIDE 10 MG/ML
10 INJECTION INTRAMUSCULAR; INTRAVENOUS ONCE
Status: COMPLETED | OUTPATIENT
Start: 2019-11-10 | End: 2019-11-10

## 2019-11-10 RX ADMIN — MELATONIN 5 MG TABLET 5 MG: at 03:22

## 2019-11-10 RX ADMIN — MIRTAZAPINE 15 MG: 15 TABLET, FILM COATED ORAL at 21:57

## 2019-11-10 RX ADMIN — MELATONIN 5 MG TABLET 5 MG: at 21:57

## 2019-11-10 RX ADMIN — CEFUROXIME AXETIL 250 MG: 250 TABLET ORAL at 08:42

## 2019-11-10 RX ADMIN — METOPROLOL SUCCINATE 25 MG: 25 TABLET, EXTENDED RELEASE ORAL at 08:42

## 2019-11-10 RX ADMIN — FUROSEMIDE 10 MG: 10 INJECTION, SOLUTION INTRAVENOUS at 17:38

## 2019-11-10 RX ADMIN — METOPROLOL SUCCINATE 25 MG: 25 TABLET, EXTENDED RELEASE ORAL at 21:57

## 2019-11-10 ASSESSMENT — MIFFLIN-ST. JEOR: SCORE: 1418.33

## 2019-11-10 ASSESSMENT — ACTIVITIES OF DAILY LIVING (ADL)
ADLS_ACUITY_SCORE: 26
ADLS_ACUITY_SCORE: 26
ADLS_ACUITY_SCORE: 25
ADLS_ACUITY_SCORE: 25
ADLS_ACUITY_SCORE: 26
ADLS_ACUITY_SCORE: 25

## 2019-11-10 NOTE — PLAN OF CARE
Discharge Planner PT   Patient plan for discharge: Rehab  Current status: Greeted patient resting in bed and agreeable to therapy with some encouragement. Monitored BP in supine, sitting EOB & standing - see FSVS. Patient is orthostatic when transitioning from sitting to standing, but not symptomatic. Determined appropriate for in room mobilization. Engaged patient in supine <> EOB with HOB flat at [facility]. Engaged patient in sit <> stand transfer with FWW with patient requiring Josh. Engaged patient in ambulation from bed to hallway with FWW at Josh for stability & walker management. Overall patient demonstrates increased unsteadiness as compared to previous session. Concluded session with patient sitting up in chair with visitor at bedside.   Barriers to return to prior living situation: level of assistance needed, decreased tolerance for functional activity, well below baseline functioning  Recommendations for discharge: TCU  Rationale for recommendations: Patient would benefit from continued physical therapy in the setting of a TCU for improving functional mobility, LE strength and tolerance for functional activities in order to return to baseline of functioning.        Entered by: Enriqueta Florence 11/10/2019 4:41 PM

## 2019-11-10 NOTE — PROGRESS NOTES
[facility]    Medicine Progress Note - Hospitalist Service       Date of Admission:  10/30/2019  Assessment & Plan     Jama Paul is an 88 year-old male with a history of hypertension, atrial fibrillation, nonischemic cardiomyopathy, congestive heart failure, idiopathic thrombocytopenic purpura and hiatal hernia, who is admitted 10/30/2019 with worsening shortness of breath.     Cardiogenic shock  Acute on chronic diastolic congestive heart failure with bilateral pleural effusion s/p bilateral thoracentesis  Mild to moderate mitral regurgitation  Moderate-severe to severe tricuspid regurgitation  History of nonischemic cardiomyopathy  Presents with progressive dyspnea, found to have bilateral pleural effusions.  Underwent bilateral thoracentesis as well as aggressive diuresis, subsequently with hypotension requiring dobutamine and then norepinephrine.  Pleural fluid analysis consistent with transudate, consistent with CHF. History of nonischemic cardiomyopathy, although improved to 55-60% on last echocardiogram 1/2019, echocardiogram this admission with EF 55-60%, severely dilated RV, valvular abnormalities as above. KATIE x2 attempted, unable to pass scope.  GI consulted for EGD with no clear reason for inability to complete KATIE.  - Palliative care consulted earlier in admission, patient expressed desire for restorative pathway at that time.  - CV surgery consulted for possible surgical intervention on valve, will need to complete work-up with KATIE and LHC/RHC to determine candidacy for mitral and tricuspid repair  - Cardiology following, continues on intermittent IV diuretics, metoprolol XL   - Plan for medical management and KATIE on 11/11/19 with EGD by GI  - Remains off of Entresto, resume when indicated per cardiology      Idiopathic thrombocytopenic purpura  Followed by ALAN.  Treated with high-dose steroids without response and platelets, steroids discontinued.  - Platelets stable in  30s  - As per hematology recommend platelet transfusion prior to upcoming invasive cardiac procedure to achieve >= 50,000 plts.     Iatrogenic right-sided pneumothorax status post a right pigtail catheter placement by IR  Secondary to thoracentesis. Pneumothorax resolved after pigtail catheter placement  -Currently on room air, no acute complaints     Hypertension  - Blood pressure controlled, trends towards softer side  - Continue metoprolol XL with holding parameters, cardiology managing intermittent diuresis     Atrial fibrillation  Not on any anticoagulation because of ITP and hemorrhage in the left eye in the past.  - Continue metoprolol XL   - Telemetry, currently with controlled ventricular rate     Urinary tract infection, citrobacter  Urinalysis abnormal, urine culture with >100K citrobacter, pansensitive.  -Status post cefuroxime to complete 7 day course(completed 11/9)     Steroid-induced hyperglycemia  Noted during high-dose steroids.  Required low doses of insulin.  Last hemoglobin A1c unknown though blood sugars have now normalized off of steroids, insulin and blood sugar checks discontinued.        Diet: Room Service  2 Gram Sodium Diet Other - please comment    DVT Prophylaxis: Pneumatic Compression Devices  Mccullough Catheter: not present  Code Status: Full Code      Disposition Plan   Expected discharge: To be determined, once All cardiac work-up completed and cleared by cardiology.  Entered: Mariola Tapia MD 11/10/2019, 9:12 AM       The patient's care was discussed with the Bedside Nurse and Patient.    Mariola Tapia MD  Hospitalist Service  [facility]    ______________________________________________________________________    Interval History   Patient without any new complaints.  Awaiting procedures for tomorrow.  No new nursing concerns.    Data reviewed today: I reviewed all medications, new labs and imaging results over the last 24 hours. I personally reviewed no images or  EKG's today.    Physical Exam   Vital Signs: Temp: 97.6  F (36.4  C) Temp src: Oral BP: 98/72 Pulse: 98 Heart Rate: 94 Resp: 20 SpO2: 96 % O2 Device: None (Room air)    Weight: 156 lbs 9.6 oz  Exam:  Constitutional: Awake, alert and no distress. Appears comfortable  Head: Normocephalic. No masses, lesions, tenderness or abnormalities  ENT: ENT exam normal, no neck nodes or sinus tenderness  Cardiovascular: Irregular but rate controlled.  2+ murmurs, no rubs or JVD  Respiratory: Normal WOB,b/l equal air entry, no wheezes or crackles   Gastrointestinal: Abdomen soft, non-tender. BS normal. No masses, organomegaly  : Deferred  Extremities : Minimal bilateral edema , no clubbing or cyanosis      Data   Recent Labs   Lab 11/10/19  0625 11/09/19  0620 11/08/19  0442 11/07/19  0530 11/06/19  0545 11/05/19  0600   WBC  --  10.1  --  8.8  --  9.1   HGB  --  9.3*  --  9.3*  --  9.5*   MCV  --  95  --  95  --  93   PLT  --  39*  --  33* 38* 34*    136 135 135 137 134   POTASSIUM 4.7 4.5 4.2 4.9 4.5 4.2   CHLORIDE 102 103 102 103 103 101   CO2 29 31 29 30 30 30   BUN 21 26 34* 43* 54* 58*   CR 0.79 0.83 0.90 0.94 1.14 1.13   ANIONGAP 4 2* 4 2* 4 3   BARON 7.5* 7.5* 7.4* 7.4* 7.4* 7.8*   GLC 96 94 101* 110* 96 140*     No results found for this or any previous visit (from the past 24 hour(s)).  Medications       furosemide  10 mg Intravenous Once     influenza Vac Split High-Dose  0.5 mL Intramuscular Prior to discharge     metoprolol succinate ER  25 mg Oral BID     mirtazapine  15 mg Oral At Bedtime     sodium chloride (PF)  10 mL Intracatheter Q7 Days

## 2019-11-10 NOTE — PROGRESS NOTES
[facility]  Gastroenterology Progress Note     Jama Paul MRN# [id number]   YOB: 1931 Age: 88 year old          Assessment and Plan:     Pleural effusion    Bilateral pleural effusion  Very pleasant 88-year-old gentleman with history of congestive heart failure plan for KATIE to assess mitral and tricuspid valves patient has significant TR insufficiency known history of atrial fibrillation history of pleural effusions patient has been attempted KATIE twice which was not successful due to inability to pass the scope down.  Patient had endoscopy which was fairly unremarkable.  Plan is to proceed with upper GI evaluation again and KATIE at the same time tomorrow.  Agree with the above-mentioned plan.  I will recommend the patient to be scheduled for KATIE after 1 PM for my schedule.  I will coordinate the patient's procedure.  Plan and findings were discussed in detail with the patient and floor nurse to coordinate times.  Keep patient n.p.o. tomorrow.            Congestive heart failure, unspecified HF chronicity, unspecified heart failure type (H)  Pleural effusion      Interval History:   doing well; no cp, sob, n/v/d, or abd pain.              Review of Systems:   C: NEGATIVE for fever, chills, change in weight  E/M: NEGATIVE for ear, mouth and throat problems  R: NEGATIVE for significant cough or SOB  CV: NEGATIVE for chest pain, palpitations or peripheral edema             Medications:   I have reviewed this patient's current medications    furosemide  10 mg Intravenous Once     influenza Vac Split High-Dose  0.5 mL Intramuscular Prior to discharge     metoprolol succinate ER  25 mg Oral BID     mirtazapine  15 mg Oral At Bedtime     sodium chloride (PF)  10 mL Intracatheter Q7 Days                  Physical Exam:   Vitals were reviewed  Vital Signs with Ranges  Temp:  [97  F (36.1  C)-97.9  F (36.6  C)] 97.6  F (36.4  C)  Pulse:  [74-98] 98  Heart Rate:  [85-94] 94  Resp:  [18-20]  20  BP: ()/(52-72) 98/72  SpO2:  [92 %-98 %] 96 %  I/O last 3 completed shifts:  In: 240 [P.O.:240]  Out: -   Constitutional: healthy, alert and no distress   Cardiovascular: negative, PMI normal. No lifts, heaves, or thrills. RRR. No murmurs, clicks gallops or rub  Respiratory: negative, Percussion normal. Good diaphragmatic excursion. Lungs clear  Head: Normocephalic. No masses, lesions, tenderness or abnormalities  Neck: Neck supple. No adenopathy. Thyroid symmetric, normal size,, Carotids without bruits.  Abdomen: Abdomen soft, non-tender. BS normal. No masses, organomegaly  SKIN: no suspicious lesions or rashes           Data:   I reviewed the patient's new clinical lab test results.   Recent Labs   Lab Test 11/09/19  0620 11/07/19  0530 11/06/19  0545 11/05/19  0600  10/30/19  1134  02/12/19 01/25/19   WBC 10.1 8.8  --  9.1   < > 7.8   < >  --    < >  --    HGB 9.3* 9.3*  --  9.5*   < > 10.3*   < >  --    < >  --    MCV 95 95  --  93   < > 96   < >  --    < >  --    PLT 39* 33* 38* 34*   < > 38*   < >  --    < >  --    INR  --   --   --   --   --  1.39*  --  1.9*  --  2.6*    < > = values in this interval not displayed.     Recent Labs   Lab Test 11/10/19  0625 11/09/19 0620 11/08/19 0442   POTASSIUM 4.7 4.5 4.2   CHLORIDE 102 103 102   CO2 29 31 29   BUN 21 26 34*   ANIONGAP 4 2* 4     Recent Labs   Lab Test 11/03/19  0442 10/31/19  0524 04/12/19  1925 02/08/19  1510  12/07/17 2000 11/17/17  2110  04/30/14  1620 04/30/14  1540   ALBUMIN  --  3.2* 3.1* 3.7   < >  --   --    < > 3.5 Unsatisfactory specimen - hemolyzed   Charge credited  CALLED ER AT 1620 FOR NEW SPECIMEN     BILITOTAL  --  1.6* 1.3 0.6   < >  --   --    < > 1.1 Unsatisfactory specimen - hemolyzed   Charge credited  CALLED ER AT 1620 FOR NEW SPECIMEN     ALT  --  18 44 15   < >  --   --    < > 26 Unsatisfactory specimen - hemolyzed   Charge credited  CALLED ER AT 1620 FOR NEW SPECIMEN     AST  --  11 19 11   < >  --   --    < > 23  Unsatisfactory specimen - hemolyzed   Charge credited  CALLED ER AT 1620 FOR NEW SPECIMEN     PROTEIN 30*  --   --   --   --  Negative 30*   < >  --   --    LIPASE  --   --   --   --   --   --   --   --  76 Unsatisfactory specimen - hemolyzed   Charge credited  CALLED ER AT 1620 FOR NEW SPECIMEN      < > = values in this interval not displayed.       I reviewed the patient's new imaging results.    All laboratory data reviewed  All imaging studies reviewed by me.    John Ontiveros MD,  11/10/2019  [facility]  Office : [phone]  Cell: [phone]

## 2019-11-10 NOTE — PLAN OF CARE
[id number]: Patient alert and oriented, forgetful, [place], pleasant. VSS on room air. Tele afib. Denies pain. LS clear, dim at bases, RODGERS. Patient up with assist of 1, GB, walker. Tolerating 2 gm Na diet, voiding adequately, no BM this shift. Plan for KATIE Monday, discharge plan pending.

## 2019-11-10 NOTE — PROGRESS NOTES
Progress Note     Primary Oncologist/Hematologist:  Dr. Wise          Assessment and Plan:     1. Moderate thrombocytopenia with platelet counts historically ranging in the 30-45,000 range.  - Prior bone marrow biopsy was suggestive of an underlying myelodysplastic syndrome given the chromosome abnormalities but there were  no other recognizable features to suggest MDS.  Megakaryocyte production appeared normal and therefore the chief basis for the thrombocytopenia does not appear to be due to decreased production.    - The platelet count has not previously responded to trial of IVIG or corticosteroids.   - Antiphospholipid antibody testing did not reveal abnormality to suggest this as etiology.  - Has been followed with observation in the outpatient setting.  - has also not had significant response to steroids during this hospitalization therefore they were discontinued  - platelet counts 39,000 today  - daily CBC  - scheduled for KATIE tomorrow   - holding platelet transfusion at this time per Dr. Carcamo's input.  Cardiology will arrange for platelet transfusion tomorrow morning if needed.         2. Valvular heart disease with CHF, a fib   - scheduled for KATIE tomorrow  - plan per cardiology    Updated his family in the room.        Interval History:     No new concerns.                Review of Systems:     The 5 point Review of Systems is negative other than noted in the HPI             Medications:   Scheduled Medications    furosemide  10 mg Intravenous Once     influenza Vac Split High-Dose  0.5 mL Intramuscular Prior to discharge     metoprolol succinate ER  25 mg Oral BID     mirtazapine  15 mg Oral At Bedtime     sodium chloride (PF)  10 mL Intracatheter Q7 Days     PRN Medications  acetaminophen, sore throat lozenge, bisacodyl, diphenhydrAMINE **OR** diphenhydrAMINE, lidocaine 4%, lidocaine (buffered or not buffered), magnesium sulfate, melatonin, melatonin, metoclopramide **OR** metoclopramide,  metoprolol, naloxone, nitroGLYcerin, ondansetron **OR** ondansetron, oxyCODONE, polyethylene glycol, potassium chloride, potassium chloride with lidocaine, potassium chloride, potassium chloride, potassium chloride, prochlorperazine **OR** prochlorperazine **OR** prochlorperazine, senna-docusate **OR** senna-docusate, sodium chloride (PF)               Physical Exam:   Vitals were reviewed  Blood pressure (!) 87/60, pulse 98, temperature 97.6  F (36.4  C), temperature source Oral, resp. rate 20, height 1.829 m (6'), weight 71 kg (156 lb 9.6 oz), SpO2 96 %.  Wt Readings from Last 4 Encounters:   11/10/19 71 kg (156 lb 9.6 oz)   09/10/19 72.6 kg (160 lb)   09/02/19 72.6 kg (160 lb)   07/11/19 73.8 kg (162 lb 12.8 oz)       I/O last 3 completed shifts:  In: 240 [P.O.:240]  Out: -       Constitutional: Awake, alert, cooperative, no apparent distress     Lungs: Nonlabored. Diminished.   Cardiovascular: 3/6 murmur. Irregular.   Abdomen: Normal bowel sounds, soft, non-distended, non-tender   Skin: No rashes, no cyanosis, no edema   Other:               Data:   All laboratory data and imaging studies reviewed.    CMP  Recent Labs   Lab 11/10/19  0625 11/09/19  0620 11/08/19  0442 11/07/19  0530  11/04/19  0550    136 135 135   < > 136   POTASSIUM 4.7 4.5 4.2 4.9   < > 4.2   CHLORIDE 102 103 102 103   < > 102   CO2 29 31 29 30   < > 27   ANIONGAP 4 2* 4 2*   < > 7   GLC 96 94 101* 110*   < > 171*   BUN 21 26 34* 43*   < > 61*   CR 0.79 0.83 0.90 0.94   < > 1.29*   GFRESTIMATED 80 78 76 72   < > 49*   GFRESTBLACK >90 >90 88 83   < > 57*   BARON 7.5* 7.5* 7.4* 7.4*   < > 7.7*   MAG  --  2.5*  --   --   --  2.6*   PHOS  --   --   --   --   --  4.2    < > = values in this interval not displayed.     CBC  Recent Labs   Lab 11/09/19  0620 11/07/19  0530 11/06/19  0545 11/05/19  0600 11/04/19  0550   WBC 10.1 8.8  --  9.1 7.2   RBC 3.10* 3.12*  --  3.13* 3.07*   HGB 9.3* 9.3*  --  9.5* 9.3*   HCT 29.3* 29.5*  --  29.0* 28.6*    MCV 95 95  --  93 93   MCH 30.0 29.8  --  30.4 30.3   MCHC 31.7 31.5  --  32.8 32.5   RDW 21.4* 21.7*  --  21.8* 21.8*   PLT 39* 33* 38* 34* 33*     Data   XR Chest 1 View    Narrative    CHEST ONE VIEW   2019 7:50 AM     HISTORY: CHF.    COMPARISON: Chest x-ray 2019.    FINDINGS:  Mild pulmonary vascular congestion in the visible portions  of the mid to upper lungs appears stable. Heart remains enlarged.  Right PICC line remains unchanged in position. Small left pleural  effusion and moderate right pleural effusion are again noted. Right  pleural effusion may be minimally larger.    JASON GARZA MD   Echo Limited    Narrative    [id number]  [id number]  [id number]  [id number]^NASEEM^MIYA^D           [facility]  Echocardiography Laboratory  [address] [place] [address]        Name: YINKA GONZALEZ  MRN: [id number]  : 1931  Study Date: 2019 10:45 AM  Age: 88 yrs  Gender: Male  Patient Location: [facility]  Reason For Study: Tricuspid Regurgitation  Ordering Physician: MIYA GUSMAN  Performed By: Jaqueline Jose     BSA: 1.8 m2  Height: 72 in  Weight: 139 lb  HR: 84  BP: 99/68 mmHg  _____________________________________________________________________________  __        Procedure  Limited Echo Adult.  _____________________________________________________________________________  __        Interpretation Summary     Large pleural effusion. Correlate with alternative imaging.  The visual ejection fraction is estimated at 60-65%.  The right ventricle is mild to moderately dilated.  Mildly decreased right ventricular systolic function  There is moderately severe (3+) tricuspid regurgitation.  Compared to the study from 10/31 the TR appears better (although it was better  imaged back then). The IVC on this study is improved and TR may have improved  somewhat due to improved volume status.  _____________________________________________________________________________  __         Left Ventricle  The visual ejection fraction is estimated at 60-65%.     Right Ventricle  The right ventricle is mild to moderately dilated. Mildly decreased right  ventricular systolic function.     Atria  There is severe biatrial enlargement.     Mitral Valve  There is mild (1+) mitral regurgitation.        Tricuspid Valve  There is moderately severe (3+) tricuspid regurgitation.     Aortic Valve  The aortic valve is trileaflet with aortic valve sclerosis. There is trace to  mild aortic regurgitation. No hemodynamically significant valvular aortic  stenosis.     Pulmonic Valve  The pulmonic valve is not well visualized. There is mild (1+) pulmonic  valvular regurgitation.     Pericardium  There is no pericardial effusion.     _____________________________________________________________________________  __        Doppler Measurements & Calculations  MV E max paul: 84.2 cm/sec  MV dec time: 0.22 sec  TR max paul: 293.8 cm/sec  TR max P.5 mmHg     E/E' av.8  Lateral E/e': 7.0  Medial E/e': 8.7           _____________________________________________________________________________  __           Report approved by: Tremayne Mcnulty 2019 11:46 AM          Bora Gardner MD

## 2019-11-10 NOTE — PROVIDER NOTIFICATION
IV lasix 10mg ordered.   BP 91/62.   Cardiologist Dr. Carcamo notified.   Hold per MD until SBP above 100.   Will monitor.

## 2019-11-10 NOTE — PLAN OF CARE
DATE & TIME: 11/9-11/10 Night        Cognitive Concerns/ Orientation : A&Ox4, [place], forgetful  BEHAVIOR & AGGRESSION TOOL COLOR: Green  CIWA SCORE: n/a  ABNL VS/O2: VSS on RA, can be tachycardic.  MOBILITY: Independently positioning in bed, AX1 GB, walker  PAIN MANAGMENT: Denied.  DIET: 2 g sodium limit  BOWEL/BLADDER: Incont of bladder, no BM this shift.  ABNL LAB/BG: Platelets 39, Hg 9.3  DRAIN/DEVICES: PICC RUE SL.  TELEMETRY RHYTHM: A-fib w/ CVR  SKIN: Bruising, rufina BLE, Blanchable redness on spine, foam dressing changed.   TESTS/PROCEDURES: KATIE planned for Monday.  D/C DAY/GOALS/PLACE: Pending.  OTHER IMPORTANT INFO: CV surgery, GI following, Cardiology following.

## 2019-11-10 NOTE — PROGRESS NOTES
DATE & TIME: 11/10/19 7669-5876   Cognitive Concerns/ Orientation : A&Ox4   BEHAVIOR & AGGRESSION TOOL COLOR: Green. Patient was calm and cooperative throughout shift.   CIWA SCORE: N/A.  ABNL VS/O2: RA, stable. BPs SBP below 100 (holding IV lasix for above 100)  MOBILITY: Generalized weakness, assist of 1 with gait belt.    PAIN MANAGMENT: Denies pain throughout shift.   DIET: 2 g sodium limit.  BOWEL/BLADDER: Incontinent of bladder, no BM this shift.  ABNL LAB/BG: Platelets low, stable (f/u by Adam for that)  DRAIN/DEVICES: PICC RUE..   TELEMETRY RHYTHM: Afib with CVR.  SKIN: Bruising, rufina LE.   TESTS/PROCEDURES: KATIE planned for Monday with GI assist (had failed TEEX2 2/2 to diff with advancing scope)  D/C DAY/GOALS/PLACE: NPO at MN, and pend KATIE results  OTHER IMPORTANT INFO: GI following, Cardiology following. Takes pills whole with water. No anticoagulant for Afib due to history of hemorrhage.

## 2019-11-11 ENCOUNTER — APPOINTMENT (OUTPATIENT)
Dept: GENERAL RADIOLOGY | Facility: CLINIC | Age: 84
DRG: 286 | End: 2019-11-11
Attending: NURSE PRACTITIONER
Payer: MEDICARE

## 2019-11-11 ENCOUNTER — APPOINTMENT (OUTPATIENT)
Dept: OCCUPATIONAL THERAPY | Facility: CLINIC | Age: 84
DRG: 286 | End: 2019-11-11
Payer: MEDICARE

## 2019-11-11 ENCOUNTER — ANESTHESIA (OUTPATIENT)
Dept: SURGERY | Facility: CLINIC | Age: 84
End: 2019-11-11

## 2019-11-11 ENCOUNTER — APPOINTMENT (OUTPATIENT)
Dept: CARDIOLOGY | Facility: CLINIC | Age: 84
DRG: 286 | End: 2019-11-11
Attending: INTERNAL MEDICINE
Payer: MEDICARE

## 2019-11-11 ENCOUNTER — ANESTHESIA EVENT (OUTPATIENT)
Dept: GASTROENTEROLOGY | Facility: CLINIC | Age: 84
DRG: 286 | End: 2019-11-11
Payer: MEDICARE

## 2019-11-11 ENCOUNTER — ANESTHESIA (OUTPATIENT)
Dept: GASTROENTEROLOGY | Facility: CLINIC | Age: 84
DRG: 286 | End: 2019-11-11
Payer: MEDICARE

## 2019-11-11 LAB
ALBUMIN SERPL-MCNC: 2.5 G/DL (ref 3.4–5)
ALP SERPL-CCNC: 47 U/L (ref 40–150)
ALT SERPL W P-5'-P-CCNC: 20 U/L (ref 0–70)
ANION GAP SERPL CALCULATED.3IONS-SCNC: 2 MMOL/L (ref 3–14)
ANION GAP SERPL CALCULATED.3IONS-SCNC: 3 MMOL/L (ref 3–14)
ANISOCYTOSIS BLD QL SMEAR: ABNORMAL
ANISOCYTOSIS BLD QL SMEAR: ABNORMAL
AST SERPL W P-5'-P-CCNC: 9 U/L (ref 0–45)
BASOPHILS # BLD AUTO: 0 10E9/L (ref 0–0.2)
BASOPHILS # BLD AUTO: 0 10E9/L (ref 0–0.2)
BASOPHILS NFR BLD AUTO: 0 %
BASOPHILS NFR BLD AUTO: 0 %
BILIRUB SERPL-MCNC: 0.6 MG/DL (ref 0.2–1.3)
BUN SERPL-MCNC: 17 MG/DL (ref 7–30)
BUN SERPL-MCNC: 20 MG/DL (ref 7–30)
CALCIUM SERPL-MCNC: 7.2 MG/DL (ref 8.5–10.1)
CALCIUM SERPL-MCNC: 7.6 MG/DL (ref 8.5–10.1)
CHLORIDE SERPL-SCNC: 102 MMOL/L (ref 94–109)
CHLORIDE SERPL-SCNC: 105 MMOL/L (ref 94–109)
CO2 SERPL-SCNC: 29 MMOL/L (ref 20–32)
CO2 SERPL-SCNC: 30 MMOL/L (ref 20–32)
CREAT SERPL-MCNC: 0.83 MG/DL (ref 0.66–1.25)
CREAT SERPL-MCNC: 0.88 MG/DL (ref 0.66–1.25)
DIFFERENTIAL METHOD BLD: ABNORMAL
DIFFERENTIAL METHOD BLD: ABNORMAL
ELLIPTOCYTES BLD QL SMEAR: SLIGHT
EOSINOPHIL # BLD AUTO: 0 10E9/L (ref 0–0.7)
EOSINOPHIL # BLD AUTO: 0 10E9/L (ref 0–0.7)
EOSINOPHIL NFR BLD AUTO: 0.1 %
EOSINOPHIL NFR BLD AUTO: 0.2 %
ERYTHROCYTE [DISTWIDTH] IN BLOOD BY AUTOMATED COUNT: 21.5 % (ref 10–15)
ERYTHROCYTE [DISTWIDTH] IN BLOOD BY AUTOMATED COUNT: 21.5 % (ref 10–15)
GFR SERPL CREATININE-BSD FRML MDRD: 76 ML/MIN/{1.73_M2}
GFR SERPL CREATININE-BSD FRML MDRD: 78 ML/MIN/{1.73_M2}
GLUCOSE BLDC GLUCOMTR-MCNC: 78 MG/DL (ref 70–99)
GLUCOSE SERPL-MCNC: 102 MG/DL (ref 70–99)
GLUCOSE SERPL-MCNC: 91 MG/DL (ref 70–99)
HCT VFR BLD AUTO: 30.2 % (ref 40–53)
HCT VFR BLD AUTO: 30.3 % (ref 40–53)
HGB BLD-MCNC: 9.6 G/DL (ref 13.3–17.7)
HGB BLD-MCNC: 9.7 G/DL (ref 13.3–17.7)
HYPOCHROMIA BLD QL: PRESENT
IMM GRANULOCYTES # BLD: 0 10E9/L (ref 0–0.4)
IMM GRANULOCYTES # BLD: 0 10E9/L (ref 0–0.4)
IMM GRANULOCYTES NFR BLD: 0.1 %
IMM GRANULOCYTES NFR BLD: 0.2 %
LYMPHOCYTES # BLD AUTO: 1.2 10E9/L (ref 0.8–5.3)
LYMPHOCYTES # BLD AUTO: 1.3 10E9/L (ref 0.8–5.3)
LYMPHOCYTES NFR BLD AUTO: 12 %
LYMPHOCYTES NFR BLD AUTO: 12.2 %
MAGNESIUM SERPL-MCNC: 2.5 MG/DL (ref 1.6–2.3)
MCH RBC QN AUTO: 30 PG (ref 26.5–33)
MCH RBC QN AUTO: 30.4 PG (ref 26.5–33)
MCHC RBC AUTO-ENTMCNC: 31.7 G/DL (ref 31.5–36.5)
MCHC RBC AUTO-ENTMCNC: 32.1 G/DL (ref 31.5–36.5)
MCV RBC AUTO: 95 FL (ref 78–100)
MCV RBC AUTO: 95 FL (ref 78–100)
MONOCYTES # BLD AUTO: 0.9 10E9/L (ref 0–1.3)
MONOCYTES # BLD AUTO: 1.2 10E9/L (ref 0–1.3)
MONOCYTES NFR BLD AUTO: 11.5 %
MONOCYTES NFR BLD AUTO: 8.9 %
NEUTROPHILS # BLD AUTO: 7.7 10E9/L (ref 1.6–8.3)
NEUTROPHILS # BLD AUTO: 8 10E9/L (ref 1.6–8.3)
NEUTROPHILS NFR BLD AUTO: 76.2 %
NEUTROPHILS NFR BLD AUTO: 78.6 %
NRBC # BLD AUTO: 0 10*3/UL
NRBC # BLD AUTO: 0 10*3/UL
NRBC BLD AUTO-RTO: 0 /100
NRBC BLD AUTO-RTO: 0 /100
PHOSPHATE SERPL-MCNC: 3.2 MG/DL (ref 2.5–4.5)
PLATELET # BLD AUTO: 49 10E9/L (ref 150–450)
PLATELET # BLD AUTO: 57 10E9/L (ref 150–450)
PLATELET # BLD EST: ABNORMAL 10*3/UL
PLATELET # BLD EST: ABNORMAL 10*3/UL
POTASSIUM SERPL-SCNC: 4.3 MMOL/L (ref 3.4–5.3)
POTASSIUM SERPL-SCNC: 4.8 MMOL/L (ref 3.4–5.3)
PROT SERPL-MCNC: 5.4 G/DL (ref 6.8–8.8)
RBC # BLD AUTO: 3.19 10E12/L (ref 4.4–5.9)
RBC # BLD AUTO: 3.2 10E12/L (ref 4.4–5.9)
RBC INCLUSIONS BLD: SLIGHT
RBC INCLUSIONS BLD: SLIGHT
SODIUM SERPL-SCNC: 134 MMOL/L (ref 133–144)
SODIUM SERPL-SCNC: 137 MMOL/L (ref 133–144)
TROPONIN I SERPL-MCNC: 0.02 UG/L (ref 0–0.04)
UPPER GI ENDOSCOPY: NORMAL
WBC # BLD AUTO: 10.5 10E9/L (ref 4–11)
WBC # BLD AUTO: 9.8 10E9/L (ref 4–11)

## 2019-11-11 PROCEDURE — 25000125 ZZHC RX 250: Performed by: NURSE ANESTHETIST, CERTIFIED REGISTERED

## 2019-11-11 PROCEDURE — 84100 ASSAY OF PHOSPHORUS: CPT | Performed by: NURSE PRACTITIONER

## 2019-11-11 PROCEDURE — 40000239 ZZH STATISTIC VAT ROUNDS

## 2019-11-11 PROCEDURE — 25000128 H RX IP 250 OP 636: Performed by: NURSE ANESTHETIST, CERTIFIED REGISTERED

## 2019-11-11 PROCEDURE — 80053 COMPREHEN METABOLIC PANEL: CPT | Performed by: NURSE PRACTITIONER

## 2019-11-11 PROCEDURE — 40000857 ZZH STATISTIC TEE INCLUDES SEDATION

## 2019-11-11 PROCEDURE — 93010 ELECTROCARDIOGRAM REPORT: CPT | Performed by: INTERNAL MEDICINE

## 2019-11-11 PROCEDURE — 76376 3D RENDER W/INTRP POSTPROCES: CPT

## 2019-11-11 PROCEDURE — 85025 COMPLETE CBC W/AUTO DIFF WBC: CPT | Performed by: INTERNAL MEDICINE

## 2019-11-11 PROCEDURE — 40000235 ZZH STATISTIC TELEMETRY

## 2019-11-11 PROCEDURE — 85025 COMPLETE CBC W/AUTO DIFF WBC: CPT | Performed by: NURSE PRACTITIONER

## 2019-11-11 PROCEDURE — 97535 SELF CARE MNGMENT TRAINING: CPT | Mod: GO

## 2019-11-11 PROCEDURE — 43249 ESOPH EGD DILATION <30 MM: CPT | Performed by: INTERNAL MEDICINE

## 2019-11-11 PROCEDURE — 80048 BASIC METABOLIC PNL TOTAL CA: CPT | Performed by: INTERNAL MEDICINE

## 2019-11-11 PROCEDURE — 99291 CRITICAL CARE FIRST HOUR: CPT | Performed by: NURSE PRACTITIONER

## 2019-11-11 PROCEDURE — 25000125 ZZHC RX 250: Performed by: INTERNAL MEDICINE

## 2019-11-11 PROCEDURE — 25000132 ZZH RX MED GY IP 250 OP 250 PS 637: Mod: GY | Performed by: INTERNAL MEDICINE

## 2019-11-11 PROCEDURE — 99233 SBSQ HOSP IP/OBS HIGH 50: CPT | Mod: 25 | Performed by: INTERNAL MEDICINE

## 2019-11-11 PROCEDURE — 93005 ELECTROCARDIOGRAM TRACING: CPT

## 2019-11-11 PROCEDURE — 00000146 ZZHCL STATISTIC GLUCOSE BY METER IP

## 2019-11-11 PROCEDURE — 37000009 ZZH ANESTHESIA TECHNICAL FEE, EACH ADDTL 15 MIN: Performed by: INTERNAL MEDICINE

## 2019-11-11 PROCEDURE — 0D758ZZ DILATION OF ESOPHAGUS, VIA NATURAL OR ARTIFICIAL OPENING ENDOSCOPIC: ICD-10-PCS | Performed by: INTERNAL MEDICINE

## 2019-11-11 PROCEDURE — 36415 COLL VENOUS BLD VENIPUNCTURE: CPT | Performed by: INTERNAL MEDICINE

## 2019-11-11 PROCEDURE — 25800030 ZZH RX IP 258 OP 636: Performed by: NURSE ANESTHETIST, CERTIFIED REGISTERED

## 2019-11-11 PROCEDURE — 40000010 ZZH STATISTIC ANES STAT CODE-CRNA PER MINUTE: Performed by: INTERNAL MEDICINE

## 2019-11-11 PROCEDURE — 99233 SBSQ HOSP IP/OBS HIGH 50: CPT | Performed by: INTERNAL MEDICINE

## 2019-11-11 PROCEDURE — 37000008 ZZH ANESTHESIA TECHNICAL FEE, 1ST 30 MIN: Performed by: INTERNAL MEDICINE

## 2019-11-11 PROCEDURE — 25800030 ZZH RX IP 258 OP 636: Performed by: NURSE PRACTITIONER

## 2019-11-11 PROCEDURE — 97530 THERAPEUTIC ACTIVITIES: CPT | Mod: GO

## 2019-11-11 PROCEDURE — 71045 X-RAY EXAM CHEST 1 VIEW: CPT

## 2019-11-11 PROCEDURE — 93325 DOPPLER ECHO COLOR FLOW MAPG: CPT | Mod: 26 | Performed by: INTERNAL MEDICINE

## 2019-11-11 PROCEDURE — 25000128 H RX IP 250 OP 636: Performed by: NURSE PRACTITIONER

## 2019-11-11 PROCEDURE — 83735 ASSAY OF MAGNESIUM: CPT | Performed by: NURSE PRACTITIONER

## 2019-11-11 PROCEDURE — 84484 ASSAY OF TROPONIN QUANT: CPT | Performed by: NURSE PRACTITIONER

## 2019-11-11 PROCEDURE — 93312 ECHO TRANSESOPHAGEAL: CPT | Mod: 26 | Performed by: INTERNAL MEDICINE

## 2019-11-11 PROCEDURE — 93320 DOPPLER ECHO COMPLETE: CPT | Mod: 26 | Performed by: INTERNAL MEDICINE

## 2019-11-11 PROCEDURE — 20000003 ZZH R&B ICU

## 2019-11-11 RX ORDER — SODIUM CHLORIDE 9 MG/ML
INJECTION, SOLUTION INTRAVENOUS CONTINUOUS PRN
Status: DISCONTINUED | OUTPATIENT
Start: 2019-11-11 | End: 2019-11-11

## 2019-11-11 RX ORDER — SODIUM CHLORIDE 9 MG/ML
INJECTION, SOLUTION INTRAVENOUS CONTINUOUS
Status: DISCONTINUED | OUTPATIENT
Start: 2019-11-11 | End: 2019-11-12

## 2019-11-11 RX ORDER — FLUMAZENIL 0.1 MG/ML
0.2 INJECTION, SOLUTION INTRAVENOUS
Status: DISCONTINUED | OUTPATIENT
Start: 2019-11-11 | End: 2019-11-11

## 2019-11-11 RX ORDER — NALOXONE HYDROCHLORIDE 0.4 MG/ML
.1-.4 INJECTION, SOLUTION INTRAMUSCULAR; INTRAVENOUS; SUBCUTANEOUS
Status: DISCONTINUED | OUTPATIENT
Start: 2019-11-11 | End: 2019-11-11

## 2019-11-11 RX ORDER — PROPOFOL 10 MG/ML
INJECTION, EMULSION INTRAVENOUS PRN
Status: DISCONTINUED | OUTPATIENT
Start: 2019-11-11 | End: 2019-11-11

## 2019-11-11 RX ORDER — GLYCOPYRROLATE 0.2 MG/ML
0.1 INJECTION, SOLUTION INTRAMUSCULAR; INTRAVENOUS ONCE
Status: DISCONTINUED | OUTPATIENT
Start: 2019-11-11 | End: 2019-11-11

## 2019-11-11 RX ORDER — LIDOCAINE 40 MG/G
CREAM TOPICAL
Status: DISCONTINUED | OUTPATIENT
Start: 2019-11-11 | End: 2019-11-11

## 2019-11-11 RX ORDER — LIDOCAINE HYDROCHLORIDE 40 MG/ML
1.5 INJECTION, SOLUTION RETROBULBAR ONCE
Status: DISCONTINUED | OUTPATIENT
Start: 2019-11-11 | End: 2019-11-17 | Stop reason: HOSPADM

## 2019-11-11 RX ORDER — FENTANYL CITRATE 50 UG/ML
25 INJECTION, SOLUTION INTRAMUSCULAR; INTRAVENOUS
Status: DISCONTINUED | OUTPATIENT
Start: 2019-11-11 | End: 2019-11-11

## 2019-11-11 RX ORDER — PHENYLEPHRINE HCL IN 0.9% NACL 50MG/250ML
0.5-6 PLASTIC BAG, INJECTION (ML) INTRAVENOUS CONTINUOUS
Status: DISCONTINUED | OUTPATIENT
Start: 2019-11-11 | End: 2019-11-12

## 2019-11-11 RX ORDER — GLYCOPYRROLATE 0.2 MG/ML
0.1 INJECTION, SOLUTION INTRAMUSCULAR; INTRAVENOUS ONCE
Status: COMPLETED | OUTPATIENT
Start: 2019-11-11 | End: 2019-11-11

## 2019-11-11 RX ORDER — ONDANSETRON 2 MG/ML
INJECTION INTRAMUSCULAR; INTRAVENOUS PRN
Status: DISCONTINUED | OUTPATIENT
Start: 2019-11-11 | End: 2019-11-11

## 2019-11-11 RX ORDER — METOPROLOL SUCCINATE 25 MG/1
25 TABLET, EXTENDED RELEASE ORAL 2 TIMES DAILY
Status: DISCONTINUED | OUTPATIENT
Start: 2019-11-12 | End: 2019-11-13

## 2019-11-11 RX ORDER — PROPOFOL 10 MG/ML
INJECTION, EMULSION INTRAVENOUS CONTINUOUS PRN
Status: DISCONTINUED | OUTPATIENT
Start: 2019-11-11 | End: 2019-11-11

## 2019-11-11 RX ORDER — SODIUM CHLORIDE, SODIUM LACTATE, POTASSIUM CHLORIDE, CALCIUM CHLORIDE 600; 310; 30; 20 MG/100ML; MG/100ML; MG/100ML; MG/100ML
INJECTION, SOLUTION INTRAVENOUS CONTINUOUS PRN
Status: DISCONTINUED | OUTPATIENT
Start: 2019-11-11 | End: 2019-11-11

## 2019-11-11 RX ORDER — LIDOCAINE HYDROCHLORIDE 40 MG/ML
1.5 SOLUTION TOPICAL ONCE
Status: COMPLETED | OUTPATIENT
Start: 2019-11-11 | End: 2019-11-11

## 2019-11-11 RX ADMIN — PROPOFOL 50 MCG/KG/MIN: 10 INJECTION, EMULSION INTRAVENOUS at 13:52

## 2019-11-11 RX ADMIN — MELATONIN 5 MG TABLET 5 MG: at 21:35

## 2019-11-11 RX ADMIN — GLYCOPYRROLATE 0.1 MG: 0.2 INJECTION, SOLUTION INTRAMUSCULAR; INTRAVENOUS at 13:28

## 2019-11-11 RX ADMIN — PHENYLEPHRINE HYDROCHLORIDE 150 MCG: 10 INJECTION INTRAVENOUS at 14:12

## 2019-11-11 RX ADMIN — PHENYLEPHRINE HYDROCHLORIDE 200 MCG: 10 INJECTION INTRAVENOUS at 14:27

## 2019-11-11 RX ADMIN — PROPOFOL 20 MG: 10 INJECTION, EMULSION INTRAVENOUS at 14:30

## 2019-11-11 RX ADMIN — PHENYLEPHRINE HYDROCHLORIDE 100 MCG: 10 INJECTION INTRAVENOUS at 14:02

## 2019-11-11 RX ADMIN — DEXMEDETOMIDINE HYDROCHLORIDE 4 MCG: 100 INJECTION, SOLUTION INTRAVENOUS at 13:52

## 2019-11-11 RX ADMIN — METOPROLOL SUCCINATE 25 MG: 25 TABLET, EXTENDED RELEASE ORAL at 09:19

## 2019-11-11 RX ADMIN — PHENYLEPHRINE HYDROCHLORIDE 200 MCG: 10 INJECTION INTRAVENOUS at 14:19

## 2019-11-11 RX ADMIN — Medication 0.5 MCG/KG/MIN: at 16:11

## 2019-11-11 RX ADMIN — ONDANSETRON 4 MG: 2 INJECTION INTRAMUSCULAR; INTRAVENOUS at 13:52

## 2019-11-11 RX ADMIN — LIDOCAINE HYDROCHLORIDE 1.5 ML: 40 SOLUTION TOPICAL at 13:26

## 2019-11-11 RX ADMIN — PHENYLEPHRINE HYDROCHLORIDE 150 MCG: 10 INJECTION INTRAVENOUS at 14:14

## 2019-11-11 RX ADMIN — SODIUM CHLORIDE, SODIUM LACTATE, POTASSIUM CHLORIDE, CALCIUM CHLORIDE: 600; 310; 30; 20 INJECTION, SOLUTION INTRAVENOUS at 13:52

## 2019-11-11 RX ADMIN — CALCIUM GLUCONATE 1 G: 98 INJECTION, SOLUTION INTRAVENOUS at 17:52

## 2019-11-11 RX ADMIN — PHENYLEPHRINE HYDROCHLORIDE 100 MCG: 10 INJECTION INTRAVENOUS at 14:09

## 2019-11-11 RX ADMIN — TOPICAL ANESTHETIC 1 ML: 200 SPRAY DENTAL; PERIODONTAL at 13:46

## 2019-11-11 RX ADMIN — DEXMEDETOMIDINE HYDROCHLORIDE 8 MCG: 100 INJECTION, SOLUTION INTRAVENOUS at 13:58

## 2019-11-11 RX ADMIN — PHENYLEPHRINE HYDROCHLORIDE 100 MCG: 10 INJECTION INTRAVENOUS at 14:23

## 2019-11-11 RX ADMIN — DEXMEDETOMIDINE HYDROCHLORIDE 4 MCG: 100 INJECTION, SOLUTION INTRAVENOUS at 14:01

## 2019-11-11 RX ADMIN — PHENYLEPHRINE HYDROCHLORIDE 150 MCG: 10 INJECTION INTRAVENOUS at 14:17

## 2019-11-11 RX ADMIN — PHENYLEPHRINE HYDROCHLORIDE 200 MCG: 10 INJECTION INTRAVENOUS at 14:20

## 2019-11-11 RX ADMIN — MIRTAZAPINE 15 MG: 15 TABLET, FILM COATED ORAL at 21:35

## 2019-11-11 RX ADMIN — PHENYLEPHRINE HYDROCHLORIDE 200 MCG: 10 INJECTION INTRAVENOUS at 14:38

## 2019-11-11 RX ADMIN — PHENYLEPHRINE HYDROCHLORIDE 100 MCG: 10 INJECTION INTRAVENOUS at 14:07

## 2019-11-11 ASSESSMENT — ACTIVITIES OF DAILY LIVING (ADL)
ADLS_ACUITY_SCORE: 25

## 2019-11-11 ASSESSMENT — LIFESTYLE VARIABLES: TOBACCO_USE: 0

## 2019-11-11 ASSESSMENT — MIFFLIN-ST. JEOR: SCORE: 1412.89

## 2019-11-11 ASSESSMENT — ENCOUNTER SYMPTOMS: DYSRHYTHMIAS: 1

## 2019-11-11 NOTE — ANESTHESIA POSTPROCEDURE EVALUATION
Patient: Jama Paul    Procedure(s):  TRANSESOPHAGEAL ECHO (KATIE) DR. SALINAS  POSSIBLE ESOPHAGOGASTRODUODENOSCOPY (EGD)    Diagnosis:* No pre-op diagnosis entered *  Diagnosis Additional Information: No value filed.    Anesthesia Type:  MAC    Note:  Anesthesia Post Evaluation    Patient location during evaluation: PACU  Patient participation: Able to fully participate in evaluation  Level of consciousness: awake and alert  Pain management: adequate  Airway patency: patent  Cardiovascular status: acceptable  Respiratory status: acceptable and unassisted  Hydration status: acceptable  PONV: none             Last vitals:  Vitals:    11/11/19 1312 11/11/19 1443 11/11/19 1450   BP: 111/81 (!) 66/48 110/78   Pulse:  120 121   Resp: 16 12 12   Temp:      SpO2: 96% 91%          Electronically Signed By: José Miguel Denney MD  November 11, 2019  3:01 PM

## 2019-11-11 NOTE — PROGRESS NOTES
[facility]  Hospitalist Progress Note  Name: Jama Paul    MRN: [id number]  Physician:  Neri Burns DO, FHM (Text Page)    Summary of Stay:  Jama Paul is an 88 year-old male with a history of hypertension, atrial fibrillation, nonischemic cardiomyopathy, congestive heart failure, idiopathic thrombocytopenic purpura and hiatal hernia, who is admitted 10/30/2019 with worsening shortness of breath.  He was found to have effusions.  Underwent bilateral thoracentesis as well as aggressive diuresis, subsequently with hypotension requiring dobutamine and then norepinephrine.  Pleural fluid analysis consistent with transudate, consistent with CHF. History of nonischemic cardiomyopathy, although improved to 55-60% on last echocardiogram 1/2019, echocardiogram this admission with EF 55-60%, severely dilated RV, valvular abnormalities as above.  KATIE x2 attempted, unable to pass scope.  GI consulted for EGD with no clear reason for inability to complete KATIE.  Repeat KATIE with GI assistance planned 11/11.    Assessment & Plan    Cardiogenic shock episode  Acute on chronic diastolic congestive heart failure with bilateral pleural effusion s/p bilateral thoracentesis with fluid suggestive of CHF  Mild to moderate mitral regurgitation  Moderate-severe to severe tricuspid regurgitation  History of nonischemic cardiomyopathy:  -  KATIE to assess heart/valves better today.  Cardiology attempting to determine if he is a candidate for mitral/tricuspid clipping.  - Remains off of Entresto, resume when indicated per cardiology   -  Hold on extra diuretics for now, procedure imminent. Has been NPO this AM.    Idiopathic thrombocytopenic purpura:  Followed by ALAN.  Treated with high-dose steroids without response and platelets, steroids discontinued.  - Platelets stable in 30s-40s range  - As per hematology recommend platelet transfusion prior to invasive cardiac procedure if one performed in order to  achieve >= 50,000 plts.     Iatrogenic right-sided pneumothorax status post a right pigtail catheter placement by IR  Secondary to thoracentesis earlier in stay. Pneumothorax resolved after pigtail catheter placement  -Currently on room air, no acute complaints.     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  - Continue metoprolol XL   - Telemetry, currently with controlled ventricular rate     Urinary tract infection, citrobacter  Urinalysis abnormal, urine culture with >100K citrobacter, pansensitive.  -Status post cefuroxime to complete 7 day course(completed 11/9)     Steroid-induced hyperglycemia:  Noted during high-dose steroids.  Required low doses of insulin.  Last hemoglobin A1c unknown though blood sugars have now normalized off of steroids, insulin and blood sugar checks have since been discontinued.          Diet: Room Service  NPO per Anesthesia Guidelines for Procedure/Surgery Except for: Meds    DVT Prophylaxis: ambulation  Mccullough Catheter: not present  Code Status: Full Code      Disposition Plan   Unclear, depends on procedure and status next couple days.  Expect 2+ more day hospital stay.     Entered: eNri Burns 11/11/2019, 11:47 AM       ADDENDUM 1720:  Saw patient following his procedure when he was sent to the ICU.  After the procedure he had hypotension.  He received some modest IVF but developed some crackles lungs. IVF have now been TKO'd.  He has a very small window of tolerance between dehydration/volume overload with his valvular disease.  He is currently on low dose phenylephrine.   SBP now around 100 and RN will be weaning phenylephrine.  Repeat labs and EKG pending.  He has no pain, sob, nausea.  He is hungry and wants to eat something.  He reports  feeling his normal self.  He passed a bedside swallow screen.  I did discuss his case with Dr. Ontiveros (GI) as he did some mild dilation.  He felt it was fine for the patient to start po intake from the procedure standpoint.  I will start clears this evening.  We can advance diet to low sodium in the AM.  Family has questions about the valvle plan and cardiology reportedly will be coming this evening to update them on the findings/recommendations with the KATIE.  This evenings metoprolol BID dose will be held given the current hypotension/pressor need.      Interval History   Assumed care, history reviewed.  Saw Mr. Nguyen just prior to him leaving for his planned procedure.  He felt well.  SOB was improved.  No new pain.  Was comfortable with plan for procedure.    -Data reviewed today: I reviewed all new labs and imaging reports over the last 24 hours. I personally reviewed no images or EKG's today.    Physical Exam   Temp: 97.5  F (36.4  C) Temp src: Oral BP: 114/78 Pulse: 97 Heart Rate: 89 Resp: 20 SpO2: 92 % O2 Device: None (Room air)    Vitals:    11/09/19 0516 11/10/19 0623 11/11/19 0647   Weight: 71.2 kg (156 lb 15.5 oz) 71 kg (156 lb 9.6 oz) 70.5 kg (155 lb 6.4 oz)     Vital Signs with Ranges  Temp:  [97.4  F (36.3  C)-97.5  F (36.4  C)] 97.5  F (36.4  C)  Pulse:  [97] 97  Heart Rate:  [82-89] 89  Resp:  [18-20] 20  BP: ()/(60-88) 114/78  SpO2:  [92 %-94 %] 92 %  No intake/output data recorded.    GEN:  Alert, oriented x 3, appears comfortable, no overt distress.  Was up in the chair today.  HEENT:  Normocephalic/atraumatic, no scleral icterus, no nasal discharge, mouth moist.  CV:  Regular rate and rhythm, distant with soft murmur.  LUNGS:  Clear to auscultation upper with mildly decreased breath sounds bases.  No wheezes/retractions.  Symmetric chest rise on inhalation noted.  ABD:  Active bowel sounds, soft, non-tender/non-distended.  No rebound/guarding/rigidity.  EXT:  Trace edema.  No cyanosis.  No  acute joint synovitis noted.  SKIN:  Dry to touch, no exanthems noted in the visualized areas.    Medications     - MEDICATION INSTRUCTIONS -       - MEDICATION INSTRUCTIONS -         influenza Vac Split High-Dose  0.5 mL Intramuscular Prior to discharge     metoprolol succinate ER  25 mg Oral BID     mirtazapine  15 mg Oral At Bedtime     sodium chloride (PF)  10 mL Intracatheter Q7 Days     sodium chloride (PF)  3 mL Intravenous Q8H     Data     Recent Labs   Lab 11/11/19  0907 11/09/19  0620 11/07/19  0530   WBC 9.8 10.1 8.8   HGB 9.7* 9.3* 9.3*   HCT 30.2* 29.3* 29.5*   MCV 95 95 95   PLT 49* 39* 33*       Recent Labs   Lab 11/11/19  0907 11/10/19  0625 11/09/19  0620    135 136   POTASSIUM 4.3 4.7 4.5   CHLORIDE 102 102 103   CO2 30 29 31   ANIONGAP 2* 4 2*   GLC 91 96 94   BUN 20 21 26   CR 0.88 0.79 0.83   GFRESTIMATED 76 80 78   GFRESTBLACK 89 >90 >90   BARON 7.6* 7.5* 7.5*   MAG  --   --  2.5*       No results found for this or any previous visit (from the past 24 hour(s)).

## 2019-11-11 NOTE — PLAN OF CARE
Discharge Planner OT   Patient plan for discharge: did not state  Current status: pt seen in AM. Required Min A with supine to sit, CGA with sit-stand, Min A with amb using walker and unsteady on feet but no LOB. Able to complete light grooming task standing at sink with set-up and CGA for balance safety. Patient very motivated requesting to get up more as feels getting weaker. Instructed him in B U/LE exercises he can do on own throughout day in bed or in chair and encouraged him to ask nursing to assist with walks throughout day. Pt in agreement with those recommendations.   Barriers to return to prior living situation: current level of assistance, impaired balance and endurance for activity  Recommendations for discharge: TCU  Rationale for recommendations: pt will benefit from continued daily therapies to advance safety and independence with mobilities and ADLs prior to return home to current living situation       Entered by: Chris Young 11/11/2019 10:43 AM

## 2019-11-11 NOTE — PROGRESS NOTES
Care Suites Procedure Nursing Note    1315 A/O. Pt denies difficulty swallowing or sleep apnea. No dentures or loose teeth. NPO x since MN.    Procedure: KATIE under MAC sedation.  Anesthesia team present.  Procedure started time: 1355  Procedure completed time: 1434  Concerns/abnormal assessment: Dr. Ontiveros attempted to insert KATIE probe and unsuccessful.  EGD probe inserted and found stricture/bend in esophagus.  Balloon dilation performed by Dr. Ontiveros and EGD tube removed, then KATIE probe reinserted with success. Dr. Berry took over and performed KATIE.  Post KATIE patient with persistent hypotension and tachycardia, placed in trendelenburg position and requiring multiple bumps of RADAMES given by CRNA and MDA (Dr. Denney and Dr. Coleman).  Patient denies dizziness or lightheadedness.  Dr. Berry notified and transfer order to CCU and order for radames gtt entered in [facility]. CCU does not care for patients on Radames gtts.   RRT called.  RRT arrived and updated.    Plan transfer to ICU for further cares.     1620 Pt transferred to ICU room 351 per flying william ivory and Saranya Pascual NP. Detailed report called to Talya STARR.  Also called and notified Estella Holbrook RN on station [id number] to update.  She states she will see that the wife gets notified and his belongings get transferred to his new room.

## 2019-11-11 NOTE — PRE-PROCEDURE
GENERAL PRE-PROCEDURE:   Procedure:  KATIE    Verbal consent obtained?: Yes    Written consent obtained?: Yes    Risks and benefits: Risks, benefits and alternatives were discussed    DC Plan: Appropriate discharge home plan in place for patients who are going home after procedure   Consent given by:  Patient  Patient states understanding of procedure being performed: Yes    Patient's understanding of procedure matches consent: Yes    Procedure consent matches procedure scheduled: Yes    Expected level of sedation:  Deep  Appropriately NPO:  Yes  ASA Class:  Class 3- Severe systemic disease, definite functional limitations  Mallampati  :  Grade 2- soft palate, base of uvula, tonsillar pillars, and portion of posterior pharyngeal wall visible  Lungs:  Lungs clear with good breath sounds bilaterally  Heart:  Normal heart sounds and rate and systolic murmur  History & Physical reviewed:  History and physical reviewed and no updates needed  Statement of review:  I have reviewed the lab findings, diagnostic data, medications, and the plan for sedation

## 2019-11-11 NOTE — PROGRESS NOTES
SPIRITUAL HEALTH SERVICES Progress Note  [facility]    Follow-up visit per pt request. Pt asked SH to pray for him, specifically for a test he has at 13:00 today. He is hoping it goes well. SH prayed with pt and will be available for follow-up on Wednesday.      Ebony Sen   Intern

## 2019-11-11 NOTE — ANESTHESIA CARE TRANSFER NOTE
Patient: Jama aPul    Procedure(s):  TRANSESOPHAGEAL ECHO (KATIE) DR. SALINAS  POSSIBLE ESOPHAGOGASTRODUODENOSCOPY (EGD)    Diagnosis: * No pre-op diagnosis entered *  Diagnosis Additional Information: No value filed.    Anesthesia Type:   MAC     Note:  Airway :Face Mask  Patient transferred to:PACU  Handoff Report: Identifed the Patient, Identified the Reponsible Provider, Reviewed the pertinent medical history, Discussed the surgical course, Reviewed Intra-OP anesthesia mangement and issues during anesthesia, Set expectations for post-procedure period and Allowed opportunity for questions and acknowledgement of understanding      Vitals: (Last set prior to Anesthesia Care Transfer)    CRNA VITALS  11/11/2019 1414 - 11/11/2019 1457      11/11/2019             Ht Rate:  125    Resp Rate (set):  10                Electronically Signed By: ERIC Roman CRNA  November 11, 2019  2:57 PM

## 2019-11-11 NOTE — ANESTHESIA PREPROCEDURE EVALUATION
Anesthesia Pre-Procedure Evaluation    Patient: Jama Paul   MRN: [id number] : 1931          Preoperative Diagnosis: * No pre-op diagnosis entered *    Procedure(s):  ESOPHAGOGASTRODUODENOSCOPY (EGD)    Past Medical History:   Diagnosis Date     Congestive heart failure (H)      Elevated PSA      Eye hemorrhage, left 2019     Non-ischemic cardiomyopathy (H)     2017, med treatment, echo done  nl ef, mod to severe tr     Permanent atrial fibrillation      Thrombocytopenia (H)      Unspecified essential hypertension      Past Surgical History:   Procedure Laterality Date     ABDOMEN SURGERY      bowel obstruction     BONE MARROW BIOPSY, BONE SPECIMEN, NEEDLE/TROCAR N/A 3/4/2019    Procedure: BIOPSY BONE MARROW;  Surgeon: Josey Vargas MD;  Location:  GI     CARPAL TUNNEL RELEASE RT/LT       ESOPHAGOSCOPY, GASTROSCOPY, DUODENOSCOPY (EGD), COMBINED N/A 2019    Procedure: ESOPHAGOGASTRODUODENOSCOPY (EGD);  Surgeon: Marixa Aguila MD;  Location:  GI     EXPLORE GROIN  2014    Procedure: EXPLORE GROIN;  Surgeon: Sam Aguirre MD;  Location:  OR     HERNIORRHAPHY INGUINAL  2014    Procedure: HERNIORRHAPHY INGUINAL;  Surgeon: Sam Aguirre MD;  Location:  OR     MOHS MICROGRAPHIC PROCEDURE       PHACOEMULSIFICATION CLEAR CORNEA WITH STANDARD INTRAOCULAR LENS IMPLANT Right 2015    Procedure: PHACOEMULSIFICATION CLEAR CORNEA WITH STANDARD INTRAOCULAR LENS IMPLANT;  Surgeon: Gil Shannon MD;  Location:  EC     septic olecranon bursitis[         Anesthesia Evaluation     . Pt has had prior anesthetic.     No history of anesthetic complications          ROS/MED HX    ENT/Pulmonary:     (+), . Other pulmonary disease pleural effusion, on O2.   (-) tobacco use and sleep apnea   Neurologic:      (-) CVA   Cardiovascular:     (+) hypertension----. : . CHF . . :. dysrhythmias a-fib, valvular problems/murmurs (TR) type: MR . Previous cardiac testing  Echodate:10/2019results:Large pleural effusion. Correlate with alternative imaging.  The visual ejection fraction is estimated at 60-65%.  The right ventricle is mild to moderately dilated.  Mildly decreased right ventricular systolic function  There is moderately severe (3+) tricuspid regurgitation.  Compared to the study from 10/31 the TR appears better (although it was better  imaged back then). The IVC on this study is improved and TR may have improved  somewhat due to improved volume statusdate: results: date: results: date: results:          METS/Exercise Tolerance:     Hematologic:     (+) Anemia, Other Hematologic Disorder-thrombocytopenia      Musculoskeletal:         GI/Hepatic:        (-) GERD   Renal/Genitourinary:      (-) renal disease   Endo:         Psychiatric:         Infectious Disease:         Malignancy:         Other:                          Physical Exam      Airway   Mallampati: II  TM distance: >3 FB  Neck ROM: full    Dental   (+) caps    Cardiovascular   Rhythm and rate: irregular      Pulmonary (+) decreased breath sounds               Lab Results   Component Value Date    WBC 9.8 11/11/2019    HGB 9.7 (L) 11/11/2019    HCT 30.2 (L) 11/11/2019    PLT 49 (LL) 11/11/2019     11/11/2019    POTASSIUM 4.3 11/11/2019    CHLORIDE 102 11/11/2019    CO2 30 11/11/2019    BUN 20 11/11/2019    CR 0.88 11/11/2019    GLC 91 11/11/2019    BARON 7.6 (L) 11/11/2019    PHOS 4.2 11/04/2019    MAG 2.5 (H) 11/09/2019    ALBUMIN 3.2 (L) 10/31/2019    PROTTOTAL 5.8 (L) 10/31/2019    ALT 18 10/31/2019    AST 11 10/31/2019    ALKPHOS 36 (L) 10/31/2019    BILITOTAL 1.6 (H) 10/31/2019    LIPASE 76 04/30/2014    ABRIL 40 01/20/2018    INR 1.39 (H) 10/30/2019    TSH 0.99 02/05/2019       Preop Vitals  BP Readings from Last 3 Encounters:   11/11/19 114/78   10/14/19 101/77   09/23/19 122/79    Pulse Readings from Last 3 Encounters:   11/10/19 97   10/14/19 92   09/23/19 93      Resp Readings from Last 3  Encounters:   11/10/19 20   10/14/19 16   09/23/19 16    SpO2 Readings from Last 3 Encounters:   11/11/19 92%   09/10/19 97%   09/02/19 96%      Temp Readings from Last 1 Encounters:   11/11/19 36.4  C (97.5  F) (Oral)    Ht Readings from Last 1 Encounters:   10/30/19 1.829 m (6')      Wt Readings from Last 1 Encounters:   11/11/19 70.5 kg (155 lb 6.4 oz)    Estimated body mass index is 21.08 kg/m  as calculated from the following:    Height as of this encounter: 1.829 m (6').    Weight as of this encounter: 70.5 kg (155 lb 6.4 oz).       Anesthesia Plan      History & Physical Review  History and physical reviewed and following examination; no interval change.    ASA Status:  3 .    NPO Status:  > 8 hours    Plan for MAC Reason for MAC:  Deep or markedly invasive procedure (G8)  PONV prophylaxis:  Ondansetron (or other 5HT-3)       Postoperative Care  Postoperative pain management:  Multi-modal analgesia.      Consents  Anesthetic plan, risks, benefits and alternatives discussed with:  Patient..                 José Miguel Denney MD

## 2019-11-11 NOTE — PLAN OF CARE
DATE & TIME: 11/11/19 AM shift  Cognitive Concerns/ Orientation : A&Ox4. Forgetful. [employer] hearing aids. L eye blind.   BEHAVIOR & AGGRESSION TOOL COLOR: Green. Calm and cooperative  CIWA SCORE: N/A.  ABNL VS/O2: VSS on RA  MOBILITY: Generalized weakness, assist of 1 with gait belt.    PAIN MANAGMENT: Denies pain. C/o of feeling weak.   DIET: NPO because of KATIE today  BOWEL/BLADDER: Incontinent of bladder, no BM this shift.   ABNL LAB/BG: Hg 9.7. Platelet count 49  DRAIN/DEVICES: PICC RUE, blood return noted from red port.   TELEMETRY RHYTHM: Afib with RVR, BBB  SKIN: Bruising, rufina LE.   TESTS/PROCEDURES: KATIE this afternoon with GI assist (had failed TEEX2 2/2 to diff with advancing scope)  D/C DAY/GOALS/PLACE: Discharge pending. TCU recommended by therapy.  OTHER IMPORTANT INFO: GI following, Cardiology following. Takes pills whole with water. No anticoagulant for Afib due to history of hemorrhage.

## 2019-11-11 NOTE — CODE/RAPID RESPONSE
[facility]    RRT Note  11/11/2019   Time Called: 1545    Code Status: Full Code    I was called to evaluate Jama Paul for acute onset intra and post procedure hypotension, who is a 88 year old male who was admitted on 10/30/2019 for dyspnea, found to have mod MR and TR, HFpEF. PMH includes hypertension, atrial fibrillation, nonischemic cardiomyopathy, congestive heart failure, idiopathic thrombocytopenic purpura and hiatal hernia. The patient has been requiring diuresis over the past few days, monitored by cardiology.     Per report from Dr. Coleman ([facility]), the patient required intra and postprocedure radames synephrine, despite 2 hours post sedation - BP was still 80s/50s. A RRT was called to facilitate additional work up, and transfer to the ICU.  The patient was in endoscopy for KATIE and required placement of the probe by interventional GI due to anatomical abnormality and stricture in the esophagus.  There were no procedural complications, between [id number], the patient received 16 mcg dexmedetomidine, 160 mg propofol.     Assessment & Plan     Acute onset intra and post procedure hypotension: On my initial evaluation, the patient is comfortably resting, lying on his back and slightly shifted to the left.  He is alert, oriented and at his baseline mentation.  He appears in no distress, skin is warm and dry, he denies any chest pain, pressure, dyspnea, intractable pain or new pain.     The intraprocedure anesthesia record indicates the patient received 1650 mcg of Radames-Synephrine intraprocedure, 800 cc in crystalloid, and another 500 mcg Radames-Synephrine post procedure for BP  60-80s/50s. Baseline pressure 100/70s.  On my evaluation, the patient does not appear to be in any respiratory distress, however he is requiring 6 L nasal cannula and has been on room air preprocedure.  He has been noted to desaturate with exertion since admission.  He is not orthopneic, there is some fine bibasilar  crackles.  Patient is chronic A. fib but not anticoagulated due to prior bleeding and thrombocytopenia.  Patient has a history of MDS and is followed by oncology.    Interventions:  --phenylephrine infusion   --Calcium gluconate ordered  --EKG  --pCXR  --repeat labs, CBC, and CMP, mag, phos, cortisol    The conclusion of this RRT, the patient was transferred to room 351 with rapid response team.  His blood pressure is now stabilized on a low-dose Radames-Synephrine drip, the patient remains asymptomatic.  I was able to update the intensivist and NP in the ICU, the patient's wife and son and the hospitalist primary rounder. Discussed with and defer further cares to ICU and Hospitalist teams.        ERIC Hong [facility]  Hospitalist Service - House HARRY  Pager: [phone] (7a - 6p)      Physical Exam   Vital Signs with Ranges:  Temp:  [97.5  F (36.4  C)] 97.5  F (36.4  C)  Pulse:  [] 115  Heart Rate:  [] 120  Resp:  [12-24] 18  BP: ()/(34-88) 97/75  SpO2:  [89 %-97 %] 89 %  I/O last 3 completed shifts:  In: 450 [I.V.:450]  Out: -     Orthostatic:  Lying Orthostatic BP: 114/78   Sitting Orthostatic BP: 126/71   Standing Orthostatic BP: 102/57     Physical Exam  Constitutional:       General: He is not in acute distress.     Appearance: He is ill-appearing. He is not toxic-appearing or diaphoretic.   HENT:      Mouth/Throat:      Mouth: Mucous membranes are dry.   Eyes:      Pupils: Pupils are equal, round, and reactive to light.   Cardiovascular:      Rate and Rhythm: Tachycardia present. Rhythm irregular.      Heart sounds: Murmur present.   Pulmonary:      Effort: Pulmonary effort is normal. No respiratory distress.      Breath sounds: No stridor. No wheezing or rhonchi.   Abdominal:      General: Abdomen is flat. There is no distension.      Palpations: Abdomen is soft.      Tenderness: There is no tenderness.   Musculoskeletal: Normal range of motion.   Skin:     General: Skin is warm and dry.    Neurological:      General: No focal deficit present.      Mental Status: He is alert.      Cranial Nerves: No cranial nerve deficit.      Motor: No weakness.   Psychiatric:         Mood and Affect: Mood normal.       Data     IMAGING: (X-ray/CT/MRI)   Recent Results (from the past 24 hour(s))   Transesophageal Echocardiogram    Narrative    [id number]  [facility]  [id number]  [id number]^NASEEM^MIYA^D           [facility]  Echocardiography Laboratory  [facility] [place] [address]        Name: YINKA GONZALEZ  MRN: [id number]  : 1931  Study Date: 2019 01:15 PM  Age: 88 yrs  Gender: Male  Patient Location: [facility]  Reason For Study: Tricuspid Regurgitation  Ordering Physician: MIYA GUSMAN  Referring Physician: KATHARINE JANE  Performed By: Natanael Morocho RDCS     BSA: 1.9 m2  Height: 72 in  Weight: 157 lb  HR: 97  BP: 114/78 mmHg  _____________________________________________________________________________  __        Procedure  Complete KATIE Adult. KATIE Probe serial #[id number] was used during the procedure. 3D  image acquisition, reconstruction, and real-time interpretation was performed.  _____________________________________________________________________________  __        Interpretation Summary     There is severe (4+) tricuspid regurgitation.  The tricuspid valve leaflets appear structurally normal but they are not  coapting due to a severely dilated tricuspid annulus (6.78 cm) from a  massively dilated right atrium.  There is systolic blunting and partial reversal in the right sided pulmonary  veins.  There is mod-severe to severe (3-4+) mitral regurgitation.  There is mild to moderate (1-2+) pulmonic valvular regurgitation.  The rhythm was rapid atrial fibrillation.  The visual ejection fraction is estimated at 60-65%.  Left ventricular systolic function is normal.  The right ventricle is normal in size and  function.  _____________________________________________________________________________  __        KATIE  The patient was brought to the endoscopy suite. He was administered propofol  infusion by nurse anesthesiologist. Dr Ontiveros the gastroenterologist attemped  to intubate the patient with the KATIE probe but was unable to do so. He then  intubated the esophagus with an endoscope and saw a ridge in the proximal  esophagus which was impeding intubation with the KATIE probe. He then dilated  this ridge with a balloon and then with ease was able to intubate with the KATIE  probe. Please Dr Ontiveros's note for the EGD procedure. Post procedure the  patient was hypotensive and tachycardic with atrial fibrillation. It was felt  by the attending anesthesiologist that this was most likely due to Precedex  and felt that temporarily the patient should be placed on a neosynephrine drip  and transferred to CCU. The transesophageal probe insertion was technically  difficult.     Left Ventricle  The left ventricle is normal in size. Left ventricular systolic function is  normal. The visual ejection fraction is estimated at 60-65%.     Right Ventricle  The right ventricle is normal in size and function.     Atria  The left atrium is moderately dilated. No left atrial mass or thrombus  visualized. The right atrium is severely dilated. No evidence of right atrial  mass/thrombus. Intact atrial septum. A contrast injection (Bubble Study) was  performed that was negative for flow across the interatrial septum. No  thrombus is detected in the left atrial appendage. Reverberations noted in LA  appendage.        Mitral Valve  The mitral valve leaflets are moderately thickened. There is mod-severe to  severe (3-4+) mitral regurgitation. There is systolic blunting and partial  reversal in the right sided pulmonary veins.     Tricuspid Valve  The tricuspid valve leaflets appear structurally normal but they are not  coapting due to a severely dilated  tricuspid annulus (6.78 cm) from a  massively dilated right atrium. There is severe (4+) tricuspid regurgitation.     Aortic Valve  The aortic valve is trileaflet. There is trace aortic regurgitation. No  hemodynamically significant valvular aortic stenosis.     Pulmonic Valve  There is mild to moderate (1-2+) pulmonic valvular regurgitation. Right  ventricular diastolic pressure is approximated at 15mmHg plus the right atrial  pressure. Normal pulmonic valve velocity.     Vessels  Mild aortic root dilatation. The ascending aorta is Borderline dilated. Mild  atherosclerotic plaque(s) in the descending aorta.        Pericardial/Pleural  There is no pericardial effusion. Moderate left pleural effusion.     Rhythm  The rhythm was rapid atrial fibrillation.  _____________________________________________________________________________  __                                Report approved by: Tremayne Edmondson 11/11/2019 04:06 PM                    _____________________________________________________________________________  __          CBC with Diff:  Recent Labs   Lab Test 11/11/19  0907  10/30/19  1134   WBC 9.8   < > 7.8   HGB 9.7*   < > 10.3*   MCV 95   < > 96   PLT 49*   < > 38*   INR  --   --  1.39*    < > = values in this interval not displayed.      Comprehensive Metabolic Panel:  Recent Labs   Lab 11/11/19  0907 11/09/19  0620      < > 136   POTASSIUM 4.3   < > 4.5   CHLORIDE 102   < > 103   CO2 30   < > 31   ANIONGAP 2*   < > 2*   GLC 91   < > 94   BUN 20   < > 26   CR 0.88   < > 0.83   GFRESTIMATED 76   < > 78   GFRESTBLACK 89   < > >90   BARON 7.6*   < > 7.5*   MAG  --   --  2.5*    < > = values in this interval not displayed.     Time Spent on this Encounter     I spent 60 minutes (1545 - 1645) of critical care time on the unit/floor managing the care of Jama Paul. Upon evaluation, this patient had a high probability of imminent or life-threatening deterioration due to acute hemodynamic  changes requiring vasopressor support which required my direct attention, intervention, and personal management. 100% of my time was spent at the bedside counseling the patient and/or coordinating care regarding services listed in this note.

## 2019-11-11 NOTE — PROGRESS NOTES
[facility] Progress Note     Primary Oncologist/Hematologist:  Dr. Wise          Assessment and Plan:   1. Moderate thrombocytopenia with platelet counts historically ranging in the 30-45,000 range.  - Prior bone marrow biopsy was suggestive of an underlying myelodysplastic syndrome given the chromosome abnormalities but there were  no other recognizable features to suggest MDS.  Megakaryocyte production appeared normal and therefore the chief basis for the thrombocytopenia does not appear to be due to decreased production.    - The platelet count has not previously responded to trial of IVIG or corticosteroids.   - Antiphospholipid antibody testing did not reveal abnormality to suggest this as etiology.  - Has been followed with observation in the outpatient setting.  - has also not had significant response to steroids during this hospitalization therefore they were discontinued  - platelet counts 49,000 today  - daily CBC  - scheduled for KATIE tomorrow   - holding platelet transfusion at this time per Dr. Carcamo's input.  Cardiology will arrange for platelet transfusion today if needed.         2. Valvular heart disease with CHF, a fib   - scheduled for KATIE today  - plan per cardiology           Interval History:   Doing well. Sitting in chair.              Review of Systems:   The 5 point Review of Systems is negative other than noted in the HPI             Medications:   Scheduled Medications    benzocaine 20%  1-4 spray Mouth/Throat Once     glycopyrrolate  0.1 mg Intravenous Once     influenza Vac Split High-Dose  0.5 mL Intramuscular Prior to discharge     metoprolol succinate ER  25 mg Oral BID     mirtazapine  15 mg Oral At Bedtime     sodium chloride (PF)  10 mL Intracatheter Q7 Days     sodium chloride (PF)  3 mL Intravenous Q8H     sodium chloride (PF)  3 mL Intracatheter Q8H     PRN Medications  acetaminophen, sore throat lozenge, bisacodyl, diphenhydrAMINE **OR** diphenhydrAMINE,  fentaNYL, flumazenil, - MEDICATION INSTRUCTIONS -, HOLD MEDICATION, HOLD MEDICATION, HOLD MEDICATION, lidocaine 4%, lidocaine 4%, lidocaine (buffered or not buffered), lidocaine (buffered or not buffered), magnesium sulfate, - MEDICATION INSTRUCTIONS -, melatonin, melatonin, metoclopramide **OR** metoclopramide, metoprolol, midazolam, naloxone, naloxone, nitroGLYcerin, ondansetron **OR** ondansetron, oxyCODONE, polyethylene glycol, potassium chloride, potassium chloride with lidocaine, potassium chloride, potassium chloride, potassium chloride, prochlorperazine **OR** prochlorperazine **OR** prochlorperazine, senna-docusate **OR** senna-docusate, sodium chloride (PF), sodium chloride (PF), sodium chloride (PF), sodium chloride (PF), sodium chloride               Physical Exam:   Vitals were reviewed  Blood pressure 114/78, pulse 97, temperature 97.5  F (36.4  C), temperature source Oral, resp. rate 20, height 1.829 m (6'), weight 70.5 kg (155 lb 6.4 oz), SpO2 92 %.  Wt Readings from Last 4 Encounters:   11/11/19 70.5 kg (155 lb 6.4 oz)   09/10/19 72.6 kg (160 lb)   09/02/19 72.6 kg (160 lb)   07/11/19 73.8 kg (162 lb 12.8 oz)       No intake/output data recorded.               Data:   All laboratory data and imaging studies reviewed.    CMP  Recent Labs   Lab 11/11/19  0907 11/10/19  0625 11/09/19  0620 11/08/19  0442    135 136 135   POTASSIUM 4.3 4.7 4.5 4.2   CHLORIDE 102 102 103 102   CO2 30 29 31 29   ANIONGAP 2* 4 2* 4   GLC 91 96 94 101*   BUN 20 21 26 34*   CR 0.88 0.79 0.83 0.90   GFRESTIMATED 76 80 78 76   GFRESTBLACK 89 >90 >90 88   ABRON 7.6* 7.5* 7.5* 7.4*   MAG  --   --  2.5*  --      CBC  Recent Labs   Lab 11/11/19  0907 11/09/19  0620 11/07/19  0530 11/06/19  0545 11/05/19  0600   WBC 9.8 10.1 8.8  --  9.1   RBC 3.19* 3.10* 3.12*  --  3.13*   HGB 9.7* 9.3* 9.3*  --  9.5*   HCT 30.2* 29.3* 29.5*  --  29.0*   MCV 95 95 95  --  93   MCH 30.4 30.0 29.8  --  30.4   MCHC 32.1 31.7 31.5  --  32.8   RDW  21.5* 21.4* 21.7*  --  21.8*   PLT 49* 39* 33* 38* 34*     INRNo lab results found in last 7 days.        Og Klein CNP  Nurse Practitioner  [facility]  [phone]

## 2019-11-11 NOTE — CONSULTS
88 year old male who was admitted for dyspnea found to have moderate MR and TR, being evaluated for a KATIE when patient became hypotensive due to sedation and required fluids and neosynephrine boluses to maintain blood pressure. He is now in the ICU requiring a neosynephrine drip currently at 0.3 mcg/kg/min. Patient is alert and oriented.  His condition is tenuous but looks like he is getting better with his hemodynamics. His oxgyen requirements have not increased though he seems to have crackles on auscultation.      Hospitalist has seen the patient and we will continue to follow in case his pressor requirements increase or he has positive troponins.     Currently he remains under hospitalist care.

## 2019-11-11 NOTE — PLAN OF CARE
PT: Attempted to see patient for PT session. Pt states he had a bad night and really wants to rest before his procedure at 11. Requests PT return this afternoon.

## 2019-11-11 NOTE — PROGRESS NOTES
DATE & TIME: 0546-5478 11/10/19    Cognitive Concerns/ Orientation : A/Ox4   BEHAVIOR & AGGRESSION TOOL COLOR: green   CIWA SCORE:    ABNL VS/O2: VSS. RA  MOBILITY: Ax1  PAIN MANAGMENT: denies   DIET:  2g Na; NPO @00  BOWEL/BLADDER: incontinent of bladder   ABNL LAB/BG:   DRAIN/DEVICES: Rt PICC   TELEMETRY RHYTHM: A fib CVR with BBB  SKIN: bruised, rufina LE, cool skin   TESTS/PROCEDURES: KATIE and EGD tomorrow 11/11/19 @ 1pm. (Cardiology to arrange platelet transfusion tomorrow if needed per notes)   D/C DAY/GOALS/PLACE: Discharge once cleared by cardiology   OTHER IMPORTANT INFO:

## 2019-11-12 ENCOUNTER — APPOINTMENT (OUTPATIENT)
Dept: PHYSICAL THERAPY | Facility: CLINIC | Age: 84
DRG: 286 | End: 2019-11-12
Payer: MEDICARE

## 2019-11-12 ENCOUNTER — APPOINTMENT (OUTPATIENT)
Dept: OCCUPATIONAL THERAPY | Facility: CLINIC | Age: 84
DRG: 286 | End: 2019-11-12
Payer: MEDICARE

## 2019-11-12 LAB
ANION GAP SERPL CALCULATED.3IONS-SCNC: 1 MMOL/L (ref 3–14)
BUN SERPL-MCNC: 18 MG/DL (ref 7–30)
CALCIUM SERPL-MCNC: 7.5 MG/DL (ref 8.5–10.1)
CHLORIDE SERPL-SCNC: 103 MMOL/L (ref 94–109)
CO2 SERPL-SCNC: 30 MMOL/L (ref 20–32)
CORTIS SERPL-MCNC: 12.4 UG/DL (ref 4–22)
CREAT SERPL-MCNC: 0.84 MG/DL (ref 0.66–1.25)
GFR SERPL CREATININE-BSD FRML MDRD: 78 ML/MIN/{1.73_M2}
GLUCOSE SERPL-MCNC: 87 MG/DL (ref 70–99)
POTASSIUM SERPL-SCNC: 4.8 MMOL/L (ref 3.4–5.3)
SODIUM SERPL-SCNC: 134 MMOL/L (ref 133–144)

## 2019-11-12 PROCEDURE — 80048 BASIC METABOLIC PNL TOTAL CA: CPT | Performed by: INTERNAL MEDICINE

## 2019-11-12 PROCEDURE — 97530 THERAPEUTIC ACTIVITIES: CPT | Mod: GP | Performed by: PHYSICAL THERAPIST

## 2019-11-12 PROCEDURE — 25000128 H RX IP 250 OP 636: Performed by: INTERNAL MEDICINE

## 2019-11-12 PROCEDURE — 99233 SBSQ HOSP IP/OBS HIGH 50: CPT | Mod: 25 | Performed by: INTERNAL MEDICINE

## 2019-11-12 PROCEDURE — 97530 THERAPEUTIC ACTIVITIES: CPT | Mod: GO

## 2019-11-12 PROCEDURE — 21000001 ZZH R&B HEART CARE

## 2019-11-12 PROCEDURE — 99233 SBSQ HOSP IP/OBS HIGH 50: CPT | Performed by: INTERNAL MEDICINE

## 2019-11-12 PROCEDURE — 82533 TOTAL CORTISOL: CPT | Performed by: INTERNAL MEDICINE

## 2019-11-12 PROCEDURE — 25000132 ZZH RX MED GY IP 250 OP 250 PS 637: Mod: GY | Performed by: INTERNAL MEDICINE

## 2019-11-12 PROCEDURE — 25800030 ZZH RX IP 258 OP 636: Performed by: INTERNAL MEDICINE

## 2019-11-12 PROCEDURE — 97110 THERAPEUTIC EXERCISES: CPT | Mod: GP | Performed by: PHYSICAL THERAPIST

## 2019-11-12 PROCEDURE — 93010 ELECTROCARDIOGRAM REPORT: CPT | Performed by: INTERNAL MEDICINE

## 2019-11-12 PROCEDURE — 93005 ELECTROCARDIOGRAM TRACING: CPT

## 2019-11-12 PROCEDURE — 40000239 ZZH STATISTIC VAT ROUNDS

## 2019-11-12 RX ADMIN — MIRTAZAPINE 15 MG: 15 TABLET, FILM COATED ORAL at 21:27

## 2019-11-12 RX ADMIN — SODIUM CHLORIDE: 9 INJECTION, SOLUTION INTRAVENOUS at 00:44

## 2019-11-12 RX ADMIN — MELATONIN 5 MG TABLET 5 MG: at 21:27

## 2019-11-12 RX ADMIN — FUROSEMIDE 5 MG/HR: 10 INJECTION, SOLUTION INTRAVENOUS at 16:31

## 2019-11-12 ASSESSMENT — ACTIVITIES OF DAILY LIVING (ADL)
ADLS_ACUITY_SCORE: 26
ADLS_ACUITY_SCORE: 24
ADLS_ACUITY_SCORE: 24

## 2019-11-12 ASSESSMENT — MIFFLIN-ST. JEOR: SCORE: 1432

## 2019-11-12 NOTE — CONSULTS
Structural Heart Consult:    Refer to dictation.    Recommend maximal medical therapy  Re-evaluate degree of MR thurs/fri with TTE and MRI  Consider MV/TV clip depending upon these results    Tommy Lopes

## 2019-11-12 NOTE — PLAN OF CARE
Discharge Planner PT   Patient plan for discharge: None stated   Current status: Min A 1 sit>stand w/2WW. Good tolerance of standing exercise (2 min at a time) CGA for safety as pt is unsteady.  CGA x2 during static standing for pericares/depend change. Pt ambulated 4x4 steps forward/backward w/min A 1 and 2WW. Instability/tremulous LE's during ambulation. Decreased awareness of position of 2WW with mobility and constant cueing provided for safety and proper use. Stand to sit min A 1 w/2WW for control upon descent.  BP readings good throughout session with systolics 91 to begin and 104 with activity. HR Max 128 with standing activity.  Barriers to return to prior living situation: Current level of A, impaired activity tolerance, impaired balance and instability, lacks insight to balance/weakness deficit, fall risk.  Recommendations for discharge: TCU   Rationale for recommendations: Pt is currently below baseline with functional mobility and requires assist. Pt will continue to benefit from skilled PT intervention to improve balance, independence with functional mobility, improve LE strength, and gait with use of 2WW.        Entered by: Dyan Ram 11/12/2019 3:41 PM

## 2019-11-12 NOTE — PROGRESS NOTES
[facility]  Gastroenterology Progress Note     Jama Paul MRN# [id number]   YOB: 1931 Age: 88 year old          Assessment and Plan:     Pleural effusion    Bilateral pleural effusion  No new complaints since yesterday patient is overall feeling fairly well patient is planned to have upper GI endoscopy and KATIE.  Patient agree and understand plan risk-benefit were discussed in detail with patient.            Congestive heart failure, unspecified HF chronicity, unspecified heart failure type (H)  Pleural effusion      Interval History:   doing well; no cp, sob, n/v/d, or abd pain.              Review of Systems:   C: NEGATIVE for fever, chills, change in weight  E/M: NEGATIVE for ear, mouth and throat problems  R: NEGATIVE for significant cough or SOB  CV: NEGATIVE for chest pain, palpitations or peripheral edema             Medications:   I have reviewed this patient's current medications    calcium gluconate  1 g Intravenous Once     influenza Vac Split High-Dose  0.5 mL Intramuscular Prior to discharge     lidocaine  1.5 mL Other Once     [Held by provider] metoprolol succinate ER  25 mg Oral BID     mirtazapine  15 mg Oral At Bedtime     sodium chloride (PF)  10 mL Intracatheter Q7 Days     sodium chloride (PF)  3 mL Intravenous Q8H                  Physical Exam:   Vitals were reviewed  Vital Signs with Ranges  Temp:  [96.2  F (35.7  C)-97.5  F (36.4  C)] 96.2  F (35.7  C)  Pulse:  [] 95  Heart Rate:  [] 101  Resp:  [12-24] 22  BP: ()/(34-88) 107/76  SpO2:  [41 %-97 %] 93 %  I/O last 3 completed shifts:  In: 450 [I.V.:450]  Out: -   Constitutional: healthy, alert and no distress   Cardiovascular: negative, PMI normal. No lifts, heaves, or thrills. RRR. No murmurs, clicks gallops or rub  Respiratory: negative, Percussion normal. Good diaphragmatic excursion. Lungs clear  Head: Normocephalic. No masses, lesions, tenderness or abnormalities  Neck: Neck supple.  No adenopathy. Thyroid symmetric, normal size,, Carotids without bruits.  Abdomen: Abdomen soft, non-tender. BS normal. No masses, organomegaly  SKIN: no suspicious lesions or rashes           Data:   I reviewed the patient's new clinical lab test results.   Recent Labs   Lab Test 11/11/19 1735 11/11/19 0907 11/09/19  0620  10/30/19  1134  02/12/19 01/25/19   WBC 10.5 9.8 10.1   < > 7.8   < >  --    < >  --    HGB 9.6* 9.7* 9.3*   < > 10.3*   < >  --    < >  --    MCV 95 95 95   < > 96   < >  --    < >  --    PLT 57* 49* 39*   < > 38*   < >  --    < >  --    INR  --   --   --   --  1.39*  --  1.9*  --  2.6*    < > = values in this interval not displayed.     Recent Labs   Lab Test 11/11/19 1735 11/11/19 0907 11/10/19  0625   POTASSIUM 4.8 4.3 4.7   CHLORIDE 105 102 102   CO2 29 30 29   BUN 17 20 21   ANIONGAP 3 2* 4     Recent Labs   Lab Test 11/11/19 1735 11/03/19  0442 10/31/19  0524 04/12/19  1925  12/07/17 2000 11/17/17  2110  04/30/14  1620 04/30/14  1540   ALBUMIN 2.5*  --  3.2* 3.1*   < >  --   --    < > 3.5 Unsatisfactory specimen - hemolyzed   Charge credited  CALLED ER AT 1620 FOR NEW SPECIMEN     BILITOTAL 0.6  --  1.6* 1.3   < >  --   --    < > 1.1 Unsatisfactory specimen - hemolyzed   Charge credited  CALLED ER AT 1620 FOR NEW SPECIMEN     ALT 20  --  18 44   < >  --   --    < > 26 Unsatisfactory specimen - hemolyzed   Charge credited  CALLED ER AT 1620 FOR NEW SPECIMEN     AST 9  --  11 19   < >  --   --    < > 23 Unsatisfactory specimen - hemolyzed   Charge credited  CALLED ER AT 1620 FOR NEW SPECIMEN     PROTEIN  --  30*  --   --   --  Negative 30*   < >  --   --    LIPASE  --   --   --   --   --   --   --   --  76 Unsatisfactory specimen - hemolyzed   Charge credited  CALLED ER AT 1620 FOR NEW SPECIMEN      < > = values in this interval not displayed.       I reviewed the patient's new imaging results.    All laboratory data reviewed  All imaging studies reviewed by me.    John Ontiveros,  MD,  11/11/2019  [facility]  Office : [phone]  Cell: [phone]

## 2019-11-12 NOTE — PROGRESS NOTES
[facility] Hematology Progress Note     Primary Oncologist/Hematologist:  Dr. Wise          Assessment and Plan:   1. Moderate thrombocytopenia with platelet counts historically ranging in the 30-45,000 range.  - Prior bone marrow biopsy was suggestive of an underlying myelodysplastic syndrome given the chromosome abnormalities but there were  no other recognizable features to suggest MDS.  Megakaryocyte production appeared normal and therefore the chief basis for the thrombocytopenia does not appear to be due to decreased production.    - The platelet count has not previously responded to trial of IVIG or corticosteroids.   - Antiphospholipid antibody testing did not reveal abnormality to suggest this as etiology.  - Has been followed with observation in the outpatient setting.  - has also not had significant response to steroids during this hospitalization therefore they were discontinued  - platelet counts 57,000 yesterday  - daily CBC  - consider platelet transfusion for bleeding or invasive cardiac procedure.        2. Valvular heart disease with CHF, a fib   - KATIE yesterday.  ICU post-procedure for hypotension.  - Severe tricuspid regurgitation. Moderate-severe mitral regurgitation.  - Lasix drip has been started.  Plan is to repeat echo after diuresis.   - plan per cardiology           Interval History:                 Review of Systems:   The 5 point Review of Systems is negative other than noted in the HPI             Medications:   Scheduled Medications    influenza Vac Split High-Dose  0.5 mL Intramuscular Prior to discharge     lidocaine  1.5 mL Other Once     [Held by provider] metoprolol succinate ER  25 mg Oral BID     mirtazapine  15 mg Oral At Bedtime     sodium chloride (PF)  10 mL Intracatheter Q7 Days     sodium chloride (PF)  3 mL Intravenous Q8H     PRN Medications  acetaminophen, sore throat lozenge, bisacodyl, diphenhydrAMINE **OR** diphenhydrAMINE, - MEDICATION INSTRUCTIONS -, HOLD  MEDICATION, HOLD MEDICATION, HOLD MEDICATION, lidocaine 4%, lidocaine (buffered or not buffered), magnesium sulfate, - MEDICATION INSTRUCTIONS -, melatonin, melatonin, metoclopramide **OR** metoclopramide, metoprolol, naloxone, nitroGLYcerin, ondansetron **OR** ondansetron, oxyCODONE, polyethylene glycol, potassium chloride, potassium chloride with lidocaine, potassium chloride, potassium chloride, potassium chloride, prochlorperazine **OR** prochlorperazine **OR** prochlorperazine, senna-docusate **OR** senna-docusate, sodium chloride (PF), sodium chloride (PF)               Physical Exam:   Vitals were reviewed  Blood pressure 100/64, pulse 112, temperature 97.5  F (36.4  C), temperature source Oral, resp. rate (!) 45, height 1.829 m (6'), weight 72.4 kg (159 lb 9.8 oz), SpO2 94 %.  Wt Readings from Last 4 Encounters:   11/12/19 72.4 kg (159 lb 9.8 oz)   09/10/19 72.6 kg (160 lb)   09/02/19 72.6 kg (160 lb)   07/11/19 73.8 kg (162 lb 12.8 oz)       I/O last 3 completed shifts:  In: 1155.06 [P.O.:860; I.V.:295.06]  Out: -                     Data:   All laboratory data and imaging studies reviewed.    CMP  Recent Labs   Lab 11/12/19  0430 11/11/19  1735 11/11/19  0907 11/10/19  0625 11/09/19  0620    137 134 135 136   POTASSIUM 4.8 4.8 4.3 4.7 4.5   CHLORIDE 103 105 102 102 103   CO2 30 29 30 29 31   ANIONGAP 1* 3 2* 4 2*   GLC 87 102* 91 96 94   BUN 18 17 20 21 26   CR 0.84 0.83 0.88 0.79 0.83   GFRESTIMATED 78 78 76 80 78   GFRESTBLACK >90 >90 89 >90 >90   BARON 7.5* 7.2* 7.6* 7.5* 7.5*   MAG  --  2.5*  --   --  2.5*   PHOS  --  3.2  --   --   --    PROTTOTAL  --  5.4*  --   --   --    ALBUMIN  --  2.5*  --   --   --    BILITOTAL  --  0.6  --   --   --    ALKPHOS  --  47  --   --   --    AST  --  9  --   --   --    ALT  --  20  --   --   --      CBC  Recent Labs   Lab 11/11/19  1735 11/11/19  0907 11/09/19  0620 11/07/19  0530   WBC 10.5 9.8 10.1 8.8   RBC 3.20* 3.19* 3.10* 3.12*   HGB 9.6* 9.7* 9.3* 9.3*    HCT 30.3* 30.2* 29.3* 29.5*   MCV 95 95 95 95   MCH 30.0 30.4 30.0 29.8   MCHC 31.7 32.1 31.7 31.5   RDW 21.5* 21.5* 21.4* 21.7*   PLT 57* 49* 39* 33*     INRNo lab results found in last 7 days.        Og Klein CNP  Nurse Practitioner  [facility]  [phone]

## 2019-11-12 NOTE — CONSULTS
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  He has been followed in our clinic by Dr. Pugh.  Previous echos before admission on 10/31 showed mild to moderate RV dysfunction, significant 3+ tricuspid regurgitation, preserved LV function, LVH and mild mitral regurgitation.  He had been actually quite stable and very functional for his age on medical therapy.  He has known history of chronic atrial fibrillation, history of thrombocytopenia, history of mild pulmonary hypertension, bilateral pleural effusions.  He is now admitted with acute heart failure.  Further assessments of that, transesophageal echocardiogram was performed by Dr. Berry on 11/11.  Image quality is poor.  However, he describes 3-4+ mitral regurgitation.  There is no description as to the etiology of the mitral regurgitation.  He describes 4+ tricuspid regurgitation due to coaptation abnormality.  There is 1 to 2+ pulmonic insufficiency.  EF of 65% with a right ventricle being normal size and function.  He was consulted on by Dr. Harris for both mitral and tricuspid valve replacement and he felt that this was a prohibitive risk, and therefore they consulted the structural heart team.  He denies any history of chest pain, chest pressure, fever, chills, new rashes or lesions.  Chest x-ray shows small right pleural effusion, otherwise unremarkable.  EKG shows atrial fibrillation with a controlled ventricular response.  He has never had a cardiac catheterization.        PHYSICAL EXAMINATION:    RESPIRATORY:  Breath sounds are decreased bilaterally, right greater than left.     CARDIOVASCULAR:  Irregular rate and rhythm, S1, S2 with a grade 3/6 apical systolic murmur.  Holosystolic.  JVP is elevated at 12 cm.   ABDOMEN:  Benign.   EXTREMITIES:  Without clubbing or cyanosis.  There is trace lower extremity edema.    NEUROLOGIC:  Unremarkable.      ASSESSMENT AND PLAN:  Overall, the patient is a complex 88-year-old gentleman who presents with acute on chronic systolic heart failure, chronic atrial fibrillation, appears to be bivalvular both mitral and tricuspid valve regurgitation.  The worsening etiology of the patient's mitral regurgitation over a couple week period of time is completely unclear.  There do not appear to be any active signs of endocarditis.  Possibilities include secondary to volume overload, secondary to ischemic heart disease, secondary to torn or prolapsed cord or flail leaflet.  Truly, the imaging quality is not sufficient to determine which of these is the case.  I would recommend we maximally medicalize his volume status afterload reduction and reassessment the degree of mitral regurgitation with both MRI and transthoracic echocardiogram in 2-3 days period of time.  If the regurgitation still appears severe, but we are unable to determine the etiology and Dr. Kristyn Vora who consulted me would prefer us to attempt mitral valve repair and possible tricuspid valve repair at the [facility] given the fact that even if it did not image quality is as poor as current studies, this would not be amenable to therapy.  On any account, the further care will be dictated upon by the findings noninvasively of the degree of regurgitation of both mitral and tricuspid valves.  I also discussed with the patient that it is even possible that we could have successful mitral valve repair.  However, the patient still feels poor due to the severe tricuspid regurgitation.  He would require both left and right heart catheterization prior to consideration of transcatheter mitral [id number] leaflet repair with a MitraClip system.         CHRIS KENNEDY MD [facility]             D: 2019   T: 2019   MT: ALISSA      Name:     YINKA GONZALEZ   MRN:      [id number]        Account:       [id number]   :      1931            Consult Date:  11/12/2019      Document: [id number]       cc: Mariusz Shields MD

## 2019-11-12 NOTE — PLAN OF CARE
Discharge Planner OT   Patient plan for discharge: did not state  Current status: Pt sitting in a chair at OT arrival. Patient reluctantly agreeable to participate in therapy needing verbal encouragement for participate. Patient completed sit <> stand x2 with min A for the first stand, following education of hand placement patient needed CGA for the second stand with a break in between. When standing patient needed CGA and FWW to stand for balance. Patient noted to have soiled depends and patient total A for toilet hygiene. Patient incontinent with bowel and baldder. Patient needed mod A to doff brief. Patient stood for a total of ~10 minutes.     Barriers to return to prior living situation: decreased activity tolerance, falls risk, decreased strength   Recommendations for discharge: TCU  Rationale for recommendations: Patient would benefit from TCU due to decreased activity tolerance impacting (I) with ADLs. TCU to help patient return to PLOF.        Entered by: Lexis Stevens 11/12/2019 11:56 AM

## 2019-11-12 NOTE — PROGRESS NOTES
[facility]  Hospitalist Progress Note  Name: Jama Paul    MRN: [id number]  Physician:  Neri Burns DO, FHM (Text Page)    Summary of Stay:  Jama Paul is an 88 year-old male with a history of hypertension, atrial fibrillation, nonischemic cardiomyopathy, congestive heart failure, idiopathic thrombocytopenic purpura and hiatal hernia, who is admitted 10/30/2019 with worsening shortness of breath.  He was found to have effusions.  Underwent bilateral thoracentesis as well as aggressive diuresis, subsequently with hypotension requiring dobutamine and then norepinephrine.  Pleural fluid analysis consistent with transudate, consistent with CHF. History of nonischemic cardiomyopathy, although improved to 55-60% on last echocardiogram 1/2019, echocardiogram this admission with EF 55-60%, severely dilated RV, valvular abnormalities as above.  KATIE x2 attempted, unable to pass scope.  GI consulted for EGD with no clear reason for inability to complete KATIE.  Repeat KATIE with GI assistance 11/1.  Following the procedure he had hypotension and required a phenylephrine IV drip in the ICU.    Assessment & Plan    Post-op hypotension:  -  Suspect multifactorial but largely related to his tenuous cardiac status with his valvular disease.  BP improved this AM.  Recently off phenylephrine for a trial.  Patient feels well.  -  Continue to hold beta blocker this AM with recent low BP's.  Hold on more IVF as he was bolused around the time of his BP decline last evening and developed crackles and worsened hypoxia.  IVF were held and he has since improved with O2 weaned to 2 L.    Difficulty passing KATIE scope:  -  GI performed a small dilation during the KATIE that allowed the scope to pass.  See procedure note.  Dr. Ontiveros felt he could advance a diet.    Cardiogenic shock episode earlier in stay  Acute on chronic diastolic congestive heart failure with bilateral pleural effusion s/p bilateral thoracentesis  with fluid suggestive of CHF  Mitral regurgitation and significant tricuspid regurgitation  History of nonischemic cardiomyopathy:    -  KATIE yesterday, cardiology and CV surgery discussing options with patient/family today.  - Remains off of Entresto, resume when indicated per cardiology     Idiopathic thrombocytopenic purpura:  Followed by ALAN.  Treated with high-dose steroids without response and platelets, steroids discontinued.  - Platelets stable in 40s range  - As per hematology recommend platelet transfusion prior to invasive cardiac procedure if one performed in order to achieve >= 50,000 plts.     Iatrogenic right-sided pneumothorax status post a right pigtail catheter placement by IR  Secondary to thoracentesis earlier in stay. Pneumothorax resolved after pigtail catheter placement  -Was on room air but post procedure with fluid bolus developed some volume overload and required 5 liters.  Now back down to 2 liters and continuing to wean.  Patient without acute SOB complaints today.  If resp status/hypoxia again worsens would obtain new CXR.    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  - Holding metoprolol XL this AM with hypotension  - Telemetry, currently with controlled ventricular rate     Urinary tract infection, citrobacter  Urinalysis abnormal, urine culture with >100K citrobacter, pansensitive.  -Status post cefuroxime to complete 7 day course(completed 11/9)     Steroid-induced hyperglycemia:  Noted during high-dose steroids.  Required low doses of insulin.  Last hemoglobin A1c unknown though blood sugars have now normalized off of steroids, insulin and blood sugar checks have since been discontinued.          Diet: Room Service  2 Gram Sodium Diet    DVT Prophylaxis: ambulation  Mccullough Catheter: not present  Code Status: Full Code      Disposition Plan   Unclear, depends on procedure and status next couple days.  Expect 2+ more  day hospital stay.     Entered: Neri Burns 11/12/2019, 12:43 PM       Interval History   Mr. Nguyen feels better this AM.  No chest pain, SOB, nausea.  He wants to eat more this AM and requested I advance his diet.    -Data reviewed today: I reviewed all new labs and imaging reports over the last 24 hours. I personally reviewed no images or EKG's today.    Physical Exam   Temp: 97.5  F (36.4  C) Temp src: Oral BP: 114/75 Pulse: 90 Heart Rate: 87 Resp: 20 SpO2: 96 % O2 Device: Nasal cannula Oxygen Delivery: 2 LPM  Vitals:    11/10/19 0623 11/11/19 0647 11/12/19 0400   Weight: 71 kg (156 lb 9.6 oz) 70.5 kg (155 lb 6.4 oz) 72.4 kg (159 lb 9.8 oz)     Vital Signs with Ranges  Temp:  [96.2  F (35.7  C)-98.1  F (36.7  C)] 97.5  F (36.4  C)  Pulse:  [] 90  Heart Rate:  [] 87  Resp:  [11-32] 20  BP: ()/(34-81) 114/75  SpO2:  [41 %-100 %] 96 %  I/O last 3 completed shifts:  In: 652.46 [I.V.:652.46]  Out: -     GEN:  Alert, oriented x 3, appears comfortable, no overt distress.   HEENT:  Normocephalic/atraumatic, no scleral icterus, no nasal discharge, mouth moist.  CV:  Regular rate and rhythm, distant with soft murmur.  LUNGS:  Clear to auscultation upper with mildly decreased breath sounds bases.  Mild base crackles noted.  No wheezes/retractions.  Symmetric chest rise on inhalation noted.  ABD:  Active bowel sounds, soft, non-tender/non-distended.  No rebound/guarding/rigidity.  EXT:  Trace edema.  No cyanosis.  No acute joint synovitis noted.  SKIN:  Dry to touch, no exanthems noted in the visualized areas.    Medications     - MEDICATION INSTRUCTIONS -       - MEDICATION INSTRUCTIONS -       phenylephrine Stopped (11/12/19 0800)     sodium chloride 10 mL/hr at 11/12/19 0730       influenza Vac Split High-Dose  0.5 mL Intramuscular Prior to discharge     lidocaine  1.5 mL Other Once     [Held by provider] metoprolol succinate ER  25 mg Oral BID     mirtazapine  15 mg Oral At Bedtime     sodium  chloride (PF)  10 mL Intracatheter Q7 Days     sodium chloride (PF)  3 mL Intravenous Q8H     Data     Recent Labs   Lab 19  1735 19  0907 19  0620   WBC 10.5 9.8 10.1   HGB 9.6* 9.7* 9.3*   HCT 30.3* 30.2* 29.3*   MCV 95 95 95   PLT 57* 49* 39*       Recent Labs   Lab 19  0430 19  1735 19  0907  19  0620    137 134   < > 136   POTASSIUM 4.8 4.8 4.3   < > 4.5   CHLORIDE 103 105 102   < > 103   CO2 30 29 30   < > 31   ANIONGAP 1* 3 2*   < > 2*   GLC 87 102* 91   < > 94   BUN 18 17 20   < > 26   CR 0.84 0.83 0.88   < > 0.83   GFRESTIMATED 78 78 76   < > 78   GFRESTBLACK >90 >90 89   < > >90   BARON 7.5* 7.2* 7.6*   < > 7.5*   MAG  --  2.5*  --   --  2.5*   PHOS  --  3.2  --   --   --    PROTTOTAL  --  5.4*  --   --   --    ALBUMIN  --  2.5*  --   --   --    BILITOTAL  --  0.6  --   --   --    ALKPHOS  --  47  --   --   --    AST  --  9  --   --   --    ALT  --  20  --   --   --     < > = values in this interval not displayed.       Recent Results (from the past 24 hour(s))   Transesophageal Echocardiogram    Narrative    [id number]  Counts include 234 beds at the [facility]  [id number]  [id number]^NASEEM^MIYA^D           [facility]  Echocardiography Laboratory  [address] [place] [address]        Name: YINKA GONZALEZ  MRN: [id number]  : 1931  Study Date: 2019 01:15 PM  Age: 88 yrs  Gender: Male  Patient Location: [facility]  Reason For Study: Tricuspid Regurgitation  Ordering Physician: MIYA GUSMAN  Referring Physician: KATHARINE JANE  Performed By: Natanael Morocho RDCS     BSA: 1.9 m2  Height: 72 in  Weight: 157 lb  HR: 97  BP: 114/78 mmHg  _____________________________________________________________________________  __        Procedure  Complete KATIE Adult. KATIE Probe serial #[id number] was used during the procedure. 3D  image acquisition, reconstruction, and real-time interpretation was  performed.  _____________________________________________________________________________  __        Interpretation Summary     There is severe (4+) tricuspid regurgitation.  The tricuspid valve leaflets appear structurally normal but they are not  coapting due to a severely dilated tricuspid annulus (6.78 cm) from a  massively dilated right atrium.  There is systolic blunting and partial reversal in the right sided pulmonary  veins.  There is mod-severe to severe (3-4+) mitral regurgitation.  There is mild to moderate (1-2+) pulmonic valvular regurgitation.  The rhythm was rapid atrial fibrillation.  The visual ejection fraction is estimated at 60-65%.  Left ventricular systolic function is normal.  The right ventricle is normal in size and function.  _____________________________________________________________________________  __        KATIE  The patient was brought to the endoscopy suite. He was administered propofol  infusion by nurse anesthesiologist. Dr Ontiveros the gastroenterologist attemped  to intubate the patient with the KATIE probe but was unable to do so. He then  intubated the esophagus with an endoscope and saw a ridge in the proximal  esophagus which was impeding intubation with the KATIE probe. He then dilated  this ridge with a balloon and then with ease was able to intubate with the KATIE  probe. Please Dr Ontiveros's note for the EGD procedure. Post procedure the  patient was hypotensive and tachycardic with atrial fibrillation. It was felt  by the attending anesthesiologist that this was most likely due to Precedex  and felt that temporarily the patient should be placed on a neosynephrine drip  and transferred to CCU. The transesophageal probe insertion was technically  difficult.     Left Ventricle  The left ventricle is normal in size. Left ventricular systolic function is  normal. The visual ejection fraction is estimated at 60-65%.     Right Ventricle  The right ventricle is normal in size and  function.     Atria  The left atrium is moderately dilated. No left atrial mass or thrombus  visualized. The right atrium is severely dilated. No evidence of right atrial  mass/thrombus. Intact atrial septum. A contrast injection (Bubble Study) was  performed that was negative for flow across the interatrial septum. No  thrombus is detected in the left atrial appendage. Reverberations noted in LA  appendage.        Mitral Valve  The mitral valve leaflets are moderately thickened. There is mod-severe to  severe (3-4+) mitral regurgitation. There is systolic blunting and partial  reversal in the right sided pulmonary veins.     Tricuspid Valve  The tricuspid valve leaflets appear structurally normal but they are not  coapting due to a severely dilated tricuspid annulus (6.78 cm) from a  massively dilated right atrium. There is severe (4+) tricuspid regurgitation.     Aortic Valve  The aortic valve is trileaflet. There is trace aortic regurgitation. No  hemodynamically significant valvular aortic stenosis.     Pulmonic Valve  There is mild to moderate (1-2+) pulmonic valvular regurgitation. Right  ventricular diastolic pressure is approximated at 15mmHg plus the right atrial  pressure. Normal pulmonic valve velocity.     Vessels  Mild aortic root dilatation. The ascending aorta is Borderline dilated. Mild  atherosclerotic plaque(s) in the descending aorta.        Pericardial/Pleural  There is no pericardial effusion. Moderate left pleural effusion.     Rhythm  The rhythm was rapid atrial fibrillation.  _____________________________________________________________________________  __                                Report approved by: Tremayne Edmondson 11/11/2019 04:06 PM                    _____________________________________________________________________________  __      XR Chest Port 1 View    Narrative    CHEST ONE VIEW PORTABLE   11/11/2019 5:05 PM     HISTORY:  Increasing oxygen  requirements.    COMPARISON: 11/8/2019.      Impression    IMPRESSION: Shallow inspiration. Right PICC line is in place, with tip  not visible. A small right pleural effusion has a similar appearance  to the previous exam. Mild opacity at both lung bases is also  unchanged, and could be related to atelectasis or pneumonia. No  pneumothorax is identified. The cardiac silhouette is obscured.    ANDREEA DIXON MD

## 2019-11-12 NOTE — PROGRESS NOTES
[facility]    Cardiology Progress Note     Assessment & Plan   Jama Paul is a 88 year old male who was admitted on 10/30/2019.   1.  Heart failure with preserved ejection fraction  2.  Severe tricuspid regurgitation  3.  Moderate-severe mitral regurgitation   4.  Chronic atrial fibrillation not on anticoagulation due to thrombocytopenia and history of bleeding  5.  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  Mild--Moderate pulmonary hypertension  7.  Bilateral pulmonary effusion status post thoracentesis  8.  Hypotension after administration of Precedex for KATIE    The patient underwent trans-esophageal echocardiogram today for further assessment of mitral and tricuspid valve anatomy.  The AKTIE showed severely dilated tricuspid annulus with poor leaflet coaptation giving rise to severe functional tricuspid regurgitation.  There was also moderate to severe functional mitral regurgitation due to mitral annular dilation.  Ejection fraction was 55 to 60% in the setting of moderate-severe MR.  He was seen by CT surgery on 11/1 and was deemed to be high but not prohibitive risk for tricuspid valve replacement/repair.  However, this was prior to the finding of moderate to severe mitral regurgitation.  Patient recently underwent a TAVR protocol CT to initiate surgical work-up.    Recommendations-    1.  We will discuss with CV surgery tomorrow given the new finding of mod-severe MR on KATIE in addition to severe TR.  He will probably require intervention on mitral valve which will further increase his perioperative mortality is likely an extremely poor candidate for CT surgery. However, it is possible that the MR is dynamic and will improve somewhat with adequate diuresis. But I am not sure to what extent.   2.  In the interim we will continue to diurese him.  3.  Continue to hold off anticoagulation due to ITP and thrombocytopenia   4.  The patient was transferred to ICU for initiation of phenylephrine for  management of hypertension which developed after he was given Precedex for KATIE.  Phenylephrine currently being down titrated.    Sandeep Cronin MD  Text Page (7am - 5pm, M-F)    Interval History   Status post KATIE.  Probe was passed by GI who also performed a dilation of ridge in the esophagus.  Patient tolerated the procedure well.  Time of my encounter he was sipping liquids without any difficulty.  Became hypotensive after administration of Precedex.  Was transferred to ICU for initiation of phenylephrine which is being titrated at this time.    Physical Exam   Temp: 96.2  F (35.7  C) Temp src: Axillary BP: 101/74 Pulse: 109 Heart Rate: 107 Resp: 17 SpO2: 94 % O2 Device: Nasal cannula Oxygen Delivery: 4 LPM  Vitals:    11/09/19 0516 11/10/19 0623 11/11/19 0647   Weight: 71.2 kg (156 lb 15.5 oz) 71 kg (156 lb 9.6 oz) 70.5 kg (155 lb 6.4 oz)     Vital Signs with Ranges  Temp:  [96.2  F (35.7  C)-97.5  F (36.4  C)] 96.2  F (35.7  C)  Pulse:  [] 109  Heart Rate:  [] 107  Resp:  [12-26] 17  BP: ()/(34-81) 101/74  SpO2:  [41 %-97 %] 94 %  I/O last 3 completed shifts:  In: 450 [I.V.:450]  Out: -   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)     Thrombocytopenia (H)     Eye hemorrhage, left     Non-ischemic cardiomyopathy (H)     Permanent atrial fibrillation     Idiopathic thrombocytopenic purpura (H)     Ulcer of right lower extremity with fat layer exposed (H)     Pleural effusion     Bilateral pleural effusion       Constitutional: No apparent distress.   Eyes: No xanthelasma or conjunctivitis  Respiratory: Mild crackles at the bases bilaterally.  Cardiovascular: Irregulrly-irregular rhythm with a normal rate. Systolic murmur heard best at LLSB.   Extremities: Trivial bilateral peripheral  edema.  Neurologic: Moving all extremities. No facial assymmetry.  Psychiatric: Answers questions appropriately.     Medications     - MEDICATION INSTRUCTIONS -       - MEDICATION INSTRUCTIONS -       phenylephrine Stopped (11/11/19 1859)     sodium chloride 10 mL/hr at 11/11/19 1709       influenza Vac Split High-Dose  0.5 mL Intramuscular Prior to discharge     lidocaine  1.5 mL Other Once     [Held by provider] metoprolol succinate ER  25 mg Oral BID     mirtazapine  15 mg Oral At Bedtime     sodium chloride (PF)  10 mL Intracatheter Q7 Days     sodium chloride (PF)  3 mL Intravenous Q8H       Data   Results for orders placed or performed during the hospital encounter of 10/30/19 (from the past 24 hour(s))   Basic metabolic panel   Result Value Ref Range    Sodium 134 133 - 144 mmol/L    Potassium 4.3 3.4 - 5.3 mmol/L    Chloride 102 94 - 109 mmol/L    Carbon Dioxide 30 20 - 32 mmol/L    Anion Gap 2 (L) 3 - 14 mmol/L    Glucose 91 70 - 99 mg/dL    Urea Nitrogen 20 7 - 30 mg/dL    Creatinine 0.88 0.66 - 1.25 mg/dL    GFR Estimate 76 >60 mL/min/[1.73_m2]    GFR Estimate If Black 89 >60 mL/min/[1.73_m2]    Calcium 7.6 (L) 8.5 - 10.1 mg/dL   CBC with platelets differential   Result Value Ref Range    WBC 9.8 4.0 - 11.0 10e9/L    RBC Count 3.19 (L) 4.4 - 5.9 10e12/L    Hemoglobin 9.7 (L) 13.3 - 17.7 g/dL    Hematocrit 30.2 (L) 40.0 - 53.0 %    MCV 95 78 - 100 fl    MCH 30.4 26.5 - 33.0 pg    MCHC 32.1 31.5 - 36.5 g/dL    RDW 21.5 (H) 10.0 - 15.0 %    Platelet Count 49 (LL) 150 - 450 10e9/L    Diff Method Automated Method     % Neutrophils 78.6 %    % Lymphocytes 12.2 %    % Monocytes 8.9 %    % Eosinophils 0.1 %    % Basophils 0.0 %    % Immature Granulocytes 0.2 %    Nucleated RBCs 0 0 /100    Absolute Neutrophil 7.7 1.6 - 8.3 10e9/L    Absolute Lymphocytes 1.2 0.8 - 5.3 10e9/L    Absolute Monocytes 0.9 0.0 - 1.3 10e9/L    Absolute Eosinophils 0.0 0.0 - 0.7 10e9/L    Absolute Basophils 0.0 0.0 - 0.2 10e9/L    Abs  Immature Granulocytes 0.0 0 - 0.4 10e9/L    Absolute Nucleated RBC 0.0     Anisocytosis Moderate     RBC Fragments Slight     Elliptocytes Slight     Hypochromasia Present     Platelet Estimate       Automated count confirmed.  Platelet morphology is normal.   Transesophageal Echocardiogram    [facility]    [id number]  [facility]  [id number]  [id number]^NASEEM^MIYA^D           [facility]  Echocardiography Laboratory  [address] [place] [address]        Name: YINKA GONZALEZ  MRN: [id number]  : 1931  Study Date: 2019 01:15 PM  Age: 88 yrs  Gender: Male  Patient Location: [facility]  Reason For Study: Tricuspid Regurgitation  Ordering Physician: IMYA GUSMAN  Referring Physician: KATHARINE JANE  Performed By: Natanael Morocho RDCS     BSA: 1.9 m2  Height: 72 in  Weight: 157 lb  HR: 97  BP: 114/78 mmHg  _____________________________________________________________________________  __        Procedure  Complete KATIE Adult. KATIE Probe serial #[id number] was used during the procedure. 3D  image acquisition, reconstruction, and real-time interpretation was performed.  _____________________________________________________________________________  __        Interpretation Summary     There is severe (4+) tricuspid regurgitation.  The tricuspid valve leaflets appear structurally normal but they are not  coapting due to a severely dilated tricuspid annulus (6.78 cm) from a  massively dilated right atrium.  There is systolic blunting and partial reversal in the right sided pulmonary  veins.  There is mod-severe to severe (3-4+) mitral regurgitation.  There is mild to moderate (1-2+) pulmonic valvular regurgitation.  The rhythm was rapid atrial fibrillation.  The visual ejection fraction is estimated at 60-65%.  Left ventricular systolic function is normal.  The right ventricle is normal in size and function.  _____________________________________________________________________________  __         KATIE  The patient was brought to the endoscopy suite. He was administered propofol  infusion by nurse anesthesiologist. Dr Ontiveros the gastroenterologist attemped  to intubate the patient with the KATIE probe but was unable to do so. He then  intubated the esophagus with an endoscope and saw a ridge in the proximal  esophagus which was impeding intubation with the KATIE probe. He then dilated  this ridge with a balloon and then with ease was able to intubate with the KATIE  probe. Please Dr Otniveros's note for the EGD procedure. Post procedure the  patient was hypotensive and tachycardic with atrial fibrillation. It was felt  by the attending anesthesiologist that this was most likely due to Precedex  and felt that temporarily the patient should be placed on a neosynephrine drip  and transferred to CCU. The transesophageal probe insertion was technically  difficult.     Left Ventricle  The left ventricle is normal in size. Left ventricular systolic function is  normal. The visual ejection fraction is estimated at 60-65%.     Right Ventricle  The right ventricle is normal in size and function.     Atria  The left atrium is moderately dilated. No left atrial mass or thrombus  visualized. The right atrium is severely dilated. No evidence of right atrial  mass/thrombus. Intact atrial septum. A contrast injection (Bubble Study) was  performed that was negative for flow across the interatrial septum. No  thrombus is detected in the left atrial appendage. Reverberations noted in LA  appendage.        Mitral Valve  The mitral valve leaflets are moderately thickened. There is mod-severe to  severe (3-4+) mitral regurgitation. There is systolic blunting and partial  reversal in the right sided pulmonary veins.     Tricuspid Valve  The tricuspid valve leaflets appear structurally normal but they are not  coapting due to a severely dilated tricuspid annulus (6.78 cm) from a  massively dilated right atrium. There is severe (4+)  tricuspid regurgitation.     Aortic Valve  The aortic valve is trileaflet. There is trace aortic regurgitation. No  hemodynamically significant valvular aortic stenosis.     Pulmonic Valve  There is mild to moderate (1-2+) pulmonic valvular regurgitation. Right  ventricular diastolic pressure is approximated at 15mmHg plus the right atrial  pressure. Normal pulmonic valve velocity.     Vessels  Mild aortic root dilatation. The ascending aorta is Borderline dilated. Mild  atherosclerotic plaque(s) in the descending aorta.        Pericardial/Pleural  There is no pericardial effusion. Moderate left pleural effusion.     Rhythm  The rhythm was rapid atrial fibrillation.  _____________________________________________________________________________  __                                Report approved by: Tremayne Edmondson 11/11/2019 04:06 PM                    _____________________________________________________________________________  __      UPPER GI ENDOSCOPY   Result Value Ref Range    Upper GI Endoscopy       [facility] Endoscopy Department  _______________________________________________________________________________  Patient Name: Jama Paul             Procedure Date: 11/11/2019 3:10 PM  MRN: [id number]                       Account Number: [id number]  YOB: 1931              Admit Type: Inpatient  Age: 88                               Room: special procedure room #1  Note Status: Finalized                Attending MD: John Ontiveros MD  Total Sedation Time:                  Instrument Name: 401 GIF- Gastroscope  _______________________________________________________________________________     Procedure:                Upper GI endoscopy  Indications:              Functional Dyspepsia, Dysphagia  Providers:                John Ontiveros MD, Mirela Roland RN  Referring MD:               Medicines:                Monitored Anesthesia Care  Complications:             No immediate complications.  ______________________________________________ _________________________________  Procedure:                Pre-Anesthesia Assessment:                            - Prior to the procedure, a History and Physical                             was performed, and patient medications and                             allergies were reviewed. The patient is competent.                             The risks and benefits of the procedure and the                             sedation options and risks were discussed with the                             patient. All questions were answered and informed                             consent was obtained. Patient identification and                             proposed procedure were verified by the physician                             in the pre-procedure area. Mental Status                             Examination: alert and oriented. Airway                             Examination: normal oropharyngeal airway and neck                             mobility. Respiratory Examination: clear to                              auscultation. CV Examination: normal. Prophylactic                             Antibiotics: The patient does not require                             prophylactic antibiotics. Prior Anticoagulants: The                             patient has taken no previous anticoagulant or                             antiplatelet agents. ASA Grade Assessment: II - A                             patient with mild systemic disease. After reviewing                             the risks and benefits, the patient was deemed in                             satisfactory condition to undergo the procedure.                             The anesthesia plan was to use moderate sedation /                             analgesia (conscious sedation). Immediately prior                             to administration of medications, the patient was                              re-assessed for adequacy to receive sedatives. The                             heart rate, respiratory rate, oxygen saturations,                              blood pressure, adequacy of pulmonary ventilation,                             and response to care were monitored throughout the                             procedure. The physical status of the patient was                             re-assessed after the procedure.                            After obtaining informed consent, the endoscope was                             passed under direct vision. Throughout the                             procedure, the patient's blood pressure, pulse, and                             oxygen saturations were monitored continuously. The                             Colonoscope was introduced through the mouth, and                             advanced to the second part of duodenum. The upper                             GI endoscopy was accomplished without difficulty.                             The patient tolerated the procedure well.                                                                                   Findings :       One benign-appearing, intrinsic mild stenosis was found at the        cricopharyngeus. The stenosis was traversed. A TTS dilator was passed        through the scope. Dilation with a 15-16.5-18 mm balloon dilator was        performed to 18 mm.       No other significant abnormalities were identified in a careful        examination of the esophagus.       The entire examined stomach was normal.       The cardia and gastric fundus were normal on retroflexion.       The duodenal bulb, first portion of the duodenum and second portion of        the duodenum were normal.                                                                                   Impression:               - Benign-appearing esophageal stenosis. Dilated.                            - Normal stomach.                            -  Normal duodenal bulb, first portion of the                             duodenum and second portion of the duodenum.                            - No specimens collected.  Recommendation:            - Return patient to hospital bethea.                                                                                   Procedure Code(s):       --- Professional ---       60698, Esophagogastroduodenoscopy, flexible, transoral; with        transendoscopic balloon dilation of esophagus (less than 30 mm diameter)  Diagnosis Code(s):       --- Professional ---       K22.2, Esophageal obstruction       K30, Functional dyspepsia       R13.10, Dysphagia, unspecified    CPT copyright 2018 [employer]. All rights reserved.    The codes documented in this report are preliminary and upon  review may   be revised to meet current compliance requirements.    Electronically Signed by John Samuel  __________________  John Ontiveros MD  11/11/2019 3:32:39 PM  I was physically present for the entire viewing portion of the exam.  John Ontiveros MD  Number of Addenda: 0    Note Initiated On: 11/11/2019 3:10 PM  MRN:                      [id number]  Procedure Date:           11 /11/2019 3:10:06 PM  Estimated Blood Loss:          Glucose by meter   Result Value Ref Range    Glucose 78 70 - 99 mg/dL   XR Chest Port 1 View    Narrative    CHEST ONE VIEW PORTABLE   11/11/2019 5:05 PM     HISTORY:  Increasing oxygen requirements.    COMPARISON: 11/8/2019.      Impression    IMPRESSION: Shallow inspiration. Right PICC line is in place, with tip  not visible. A small right pleural effusion has a similar appearance  to the previous exam. Mild opacity at both lung bases is also  unchanged, and could be related to atelectasis or pneumonia. No  pneumothorax is identified. The cardiac silhouette is obscured.    ANDREEA DIXON MD   EKG 12-lead, tracing only   Result Value Ref Range    Interpretation ECG Click View Image link to  view waveform and result    Magnesium (1200)   Result Value Ref Range    Magnesium 2.5 (H) 1.6 - 2.3 mg/dL   Phosphorus   Result Value Ref Range    Phosphorus 3.2 2.5 - 4.5 mg/dL   CBC with platelets differential   Result Value Ref Range    WBC 10.5 4.0 - 11.0 10e9/L    RBC Count 3.20 (L) 4.4 - 5.9 10e12/L    Hemoglobin 9.6 (L) 13.3 - 17.7 g/dL    Hematocrit 30.3 (L) 40.0 - 53.0 %    MCV 95 78 - 100 fl    MCH 30.0 26.5 - 33.0 pg    MCHC 31.7 31.5 - 36.5 g/dL    RDW 21.5 (H) 10.0 - 15.0 %    Platelet Count 57 (L) 150 - 450 10e9/L    Diff Method Automated Method     % Neutrophils 76.2 %    % Lymphocytes 12.0 %    % Monocytes 11.5 %    % Eosinophils 0.2 %    % Basophils 0.0 %    % Immature Granulocytes 0.1 %    Nucleated RBCs 0 0 /100    Absolute Neutrophil 8.0 1.6 - 8.3 10e9/L    Absolute Lymphocytes 1.3 0.8 - 5.3 10e9/L    Absolute Monocytes 1.2 0.0 - 1.3 10e9/L    Absolute Eosinophils 0.0 0.0 - 0.7 10e9/L    Absolute Basophils 0.0 0.0 - 0.2 10e9/L    Abs Immature Granulocytes 0.0 0 - 0.4 10e9/L    Absolute Nucleated RBC 0.0     Anisocytosis Moderate     RBC Fragments Slight     Platelet Estimate       Automated count confirmed.  Giant platelets are present.   Comprehensive metabolic panel   Result Value Ref Range    Sodium 137 133 - 144 mmol/L    Potassium 4.8 3.4 - 5.3 mmol/L    Chloride 105 94 - 109 mmol/L    Carbon Dioxide 29 20 - 32 mmol/L    Anion Gap 3 3 - 14 mmol/L    Glucose 102 (H) 70 - 99 mg/dL    Urea Nitrogen 17 7 - 30 mg/dL    Creatinine 0.83 0.66 - 1.25 mg/dL    GFR Estimate 78 >60 mL/min/[1.73_m2]    GFR Estimate If Black >90 >60 mL/min/[1.73_m2]    Calcium 7.2 (L) 8.5 - 10.1 mg/dL    Bilirubin Total 0.6 0.2 - 1.3 mg/dL    Albumin 2.5 (L) 3.4 - 5.0 g/dL    Protein Total 5.4 (L) 6.8 - 8.8 g/dL    Alkaline Phosphatase 47 40 - 150 U/L    ALT 20 0 - 70 U/L    AST 9 0 - 45 U/L   Troponin I   Result Value Ref Range    Troponin I ES 0.019 0.000 - 0.045 ug/L

## 2019-11-12 NOTE — PROGRESS NOTES
Pt is alert and oriented. Remains on low dose Radames gtt for MAP >65. Incontinent of urine and feces. No complaints of pain. VSS this shift.

## 2019-11-12 NOTE — PROGRESS NOTES
[facility]    Cardiology Progress Note     Assessment & Plan   Jama Paul is a 88 year old male who was admitted on 10/30/2019.     1.  Heart failure with preserved ejection fraction  2.  Severe tricuspid regurgitation  3.  Moderate-severe mitral regurgitation   4.  Chronic atrial fibrillation not on anticoagulation due to thrombocytopenia and history of bleeding  5.  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  Mild--Moderate pulmonary hypertension  7.  Bilateral pulmonary effusion status post thoracentesis  8.  Hypotension after administration of Precedex for KATIE    Recommendations-    1.  Discontinue phenylephrine.  Transfer to CCU.  2.  Start Lasix drip@ 5mg/hr.  Monitor ins and outs, daily weights.  3.  Continue to hold anticoagulation due to thrombocytopenia   4.  We will repeat transthoracic echocardiogram to assess MR in a few days after adequate diuresis    Sandeep Cronin MD  Text Page (7am - 5pm, M-F)    Interval History   Patient is currently on phenylephrine which is being uptitrated.  Blood pressure borderline but stable.  Seen by structural cardiology for possible mitral clip.    Physical Exam   Temp: 97.5  F (36.4  C) Temp src: Oral BP: 92/61 Pulse: 94 Heart Rate: 94 Resp: 23 SpO2: 96 % O2 Device: None (Room air) Oxygen Delivery: 1 LPM  Vitals:    11/10/19 0623 11/11/19 0647 11/12/19 0400   Weight: 71 kg (156 lb 9.6 oz) 70.5 kg (155 lb 6.4 oz) 72.4 kg (159 lb 9.8 oz)     Vital Signs with Ranges  Temp:  [96.2  F (35.7  C)-98.1  F (36.7  C)] 97.5  F (36.4  C)  Pulse:  [] 94  Heart Rate:  [] 94  Resp:  [11-32] 23  BP: ()/(43-80) 92/61  SpO2:  [41 %-100 %] 96 %  I/O last 3 completed shifts:  In: 1155.06 [P.O.:860; I.V.:295.06]  Out: -   Patient Active Problem List   Diagnosis     Essential hypertension     Colon polyps     Elevated PSA     BCC (basal cell carcinoma), face     Cervical radiculopathy     Neck pain     Acute on chronic combined systolic and diastolic  hrt fail (H)     Thrombocytopenia (H)     Eye hemorrhage, left     Non-ischemic cardiomyopathy (H)     Permanent atrial fibrillation     Idiopathic thrombocytopenic purpura (H)     Ulcer of right lower extremity with fat layer exposed (H)     Pleural effusion     Bilateral pleural effusion       Constitutional: No apparent distress.   Eyes: No xanthelasma or conjunctivitis  Respiratory: Mild crackles at the bases bilaterally.  Cardiovascular: Irregulrly-irregular rhythm with a normal rate. Systolic murmur heard best at LLSB.   Extremities: Trivial bilateral peripheral edema.  Neurologic: Moving all extremities. No facial assymmetry.  Psychiatric: Answers questions appropriately.     Medications     - MEDICATION INSTRUCTIONS -       - MEDICATION INSTRUCTIONS -       phenylephrine Stopped (11/12/19 1504)     sodium chloride 10 mL/hr at 11/12/19 0730       influenza Vac Split High-Dose  0.5 mL Intramuscular Prior to discharge     lidocaine  1.5 mL Other Once     [Held by provider] metoprolol succinate ER  25 mg Oral BID     mirtazapine  15 mg Oral At Bedtime     sodium chloride (PF)  10 mL Intracatheter Q7 Days     sodium chloride (PF)  3 mL Intravenous Q8H       Data   Results for orders placed or performed during the hospital encounter of 10/30/19 (from the past 24 hour(s))   Glucose by meter   Result Value Ref Range    Glucose 78 70 - 99 mg/dL   XR Chest Port 1 View    Narrative    CHEST ONE VIEW PORTABLE   11/11/2019 5:05 PM     HISTORY:  Increasing oxygen requirements.    COMPARISON: 11/8/2019.      Impression    IMPRESSION: Shallow inspiration. Right PICC line is in place, with tip  not visible. A small right pleural effusion has a similar appearance  to the previous exam. Mild opacity at both lung bases is also  unchanged, and could be related to atelectasis or pneumonia. No  pneumothorax is identified. The cardiac silhouette is obscured.    ANDREEA DIXON MD   EKG 12-lead, tracing only   Result Value Ref  Range    Interpretation ECG Click View Image link to view waveform and result    Magnesium (1200)   Result Value Ref Range    Magnesium 2.5 (H) 1.6 - 2.3 mg/dL   Phosphorus   Result Value Ref Range    Phosphorus 3.2 2.5 - 4.5 mg/dL   CBC with platelets differential   Result Value Ref Range    WBC 10.5 4.0 - 11.0 10e9/L    RBC Count 3.20 (L) 4.4 - 5.9 10e12/L    Hemoglobin 9.6 (L) 13.3 - 17.7 g/dL    Hematocrit 30.3 (L) 40.0 - 53.0 %    MCV 95 78 - 100 fl    MCH 30.0 26.5 - 33.0 pg    MCHC 31.7 31.5 - 36.5 g/dL    RDW 21.5 (H) 10.0 - 15.0 %    Platelet Count 57 (L) 150 - 450 10e9/L    Diff Method Automated Method     % Neutrophils 76.2 %    % Lymphocytes 12.0 %    % Monocytes 11.5 %    % Eosinophils 0.2 %    % Basophils 0.0 %    % Immature Granulocytes 0.1 %    Nucleated RBCs 0 0 /100    Absolute Neutrophil 8.0 1.6 - 8.3 10e9/L    Absolute Lymphocytes 1.3 0.8 - 5.3 10e9/L    Absolute Monocytes 1.2 0.0 - 1.3 10e9/L    Absolute Eosinophils 0.0 0.0 - 0.7 10e9/L    Absolute Basophils 0.0 0.0 - 0.2 10e9/L    Abs Immature Granulocytes 0.0 0 - 0.4 10e9/L    Absolute Nucleated RBC 0.0     Anisocytosis Moderate     RBC Fragments Slight     Platelet Estimate       Automated count confirmed.  Giant platelets are present.   Comprehensive metabolic panel   Result Value Ref Range    Sodium 137 133 - 144 mmol/L    Potassium 4.8 3.4 - 5.3 mmol/L    Chloride 105 94 - 109 mmol/L    Carbon Dioxide 29 20 - 32 mmol/L    Anion Gap 3 3 - 14 mmol/L    Glucose 102 (H) 70 - 99 mg/dL    Urea Nitrogen 17 7 - 30 mg/dL    Creatinine 0.83 0.66 - 1.25 mg/dL    GFR Estimate 78 >60 mL/min/[1.73_m2]    GFR Estimate If Black >90 >60 mL/min/[1.73_m2]    Calcium 7.2 (L) 8.5 - 10.1 mg/dL    Bilirubin Total 0.6 0.2 - 1.3 mg/dL    Albumin 2.5 (L) 3.4 - 5.0 g/dL    Protein Total 5.4 (L) 6.8 - 8.8 g/dL    Alkaline Phosphatase 47 40 - 150 U/L    ALT 20 0 - 70 U/L    AST 9 0 - 45 U/L   Troponin I   Result Value Ref Range    Troponin I ES 0.019 0.000 - 0.045  ug/L   Cortisol   Result Value Ref Range    Cortisol Serum 12.4 4 - 22 ug/dL   Basic metabolic panel   Result Value Ref Range    Sodium 134 133 - 144 mmol/L    Potassium 4.8 3.4 - 5.3 mmol/L    Chloride 103 94 - 109 mmol/L    Carbon Dioxide 30 20 - 32 mmol/L    Anion Gap 1 (L) 3 - 14 mmol/L    Glucose 87 70 - 99 mg/dL    Urea Nitrogen 18 7 - 30 mg/dL    Creatinine 0.84 0.66 - 1.25 mg/dL    GFR Estimate 78 >60 mL/min/[1.73_m2]    GFR Estimate If Black >90 >60 mL/min/[1.73_m2]    Calcium 7.5 (L) 8.5 - 10.1 mg/dL   EKG 12-lead, tracing only   Result Value Ref Range    Interpretation ECG Click View Image link to view waveform and result

## 2019-11-13 ENCOUNTER — APPOINTMENT (OUTPATIENT)
Dept: OCCUPATIONAL THERAPY | Facility: CLINIC | Age: 84
DRG: 286 | End: 2019-11-13
Payer: MEDICARE

## 2019-11-13 ENCOUNTER — APPOINTMENT (OUTPATIENT)
Dept: PHYSICAL THERAPY | Facility: CLINIC | Age: 84
DRG: 286 | End: 2019-11-13
Payer: MEDICARE

## 2019-11-13 ENCOUNTER — APPOINTMENT (OUTPATIENT)
Dept: GENERAL RADIOLOGY | Facility: CLINIC | Age: 84
DRG: 286 | End: 2019-11-13
Attending: NURSE PRACTITIONER
Payer: MEDICARE

## 2019-11-13 LAB
ANION GAP SERPL CALCULATED.3IONS-SCNC: 4 MMOL/L (ref 3–14)
BUN SERPL-MCNC: 18 MG/DL (ref 7–30)
CALCIUM SERPL-MCNC: 7.4 MG/DL (ref 8.5–10.1)
CHLORIDE SERPL-SCNC: 102 MMOL/L (ref 94–109)
CO2 SERPL-SCNC: 31 MMOL/L (ref 20–32)
CREAT SERPL-MCNC: 0.91 MG/DL (ref 0.66–1.25)
ERYTHROCYTE [DISTWIDTH] IN BLOOD BY AUTOMATED COUNT: 21.7 % (ref 10–15)
GFR SERPL CREATININE-BSD FRML MDRD: 75 ML/MIN/{1.73_M2}
GLUCOSE SERPL-MCNC: 94 MG/DL (ref 70–99)
HCT VFR BLD AUTO: 28.2 % (ref 40–53)
HGB BLD-MCNC: 8.9 G/DL (ref 13.3–17.7)
INTERPRETATION ECG - MUSE: NORMAL
INTERPRETATION ECG - MUSE: NORMAL
LACTATE BLD-SCNC: 2.2 MMOL/L (ref 0.7–2)
MCH RBC QN AUTO: 29.8 PG (ref 26.5–33)
MCHC RBC AUTO-ENTMCNC: 31.6 G/DL (ref 31.5–36.5)
MCV RBC AUTO: 94 FL (ref 78–100)
PLATELET # BLD AUTO: 59 10E9/L (ref 150–450)
POTASSIUM SERPL-SCNC: 4.2 MMOL/L (ref 3.4–5.3)
RBC # BLD AUTO: 2.99 10E12/L (ref 4.4–5.9)
SODIUM SERPL-SCNC: 137 MMOL/L (ref 133–144)
WBC # BLD AUTO: 7.1 10E9/L (ref 4–11)

## 2019-11-13 PROCEDURE — 99232 SBSQ HOSP IP/OBS MODERATE 35: CPT | Performed by: INTERNAL MEDICINE

## 2019-11-13 PROCEDURE — 97535 SELF CARE MNGMENT TRAINING: CPT | Mod: GO | Performed by: OCCUPATIONAL THERAPIST

## 2019-11-13 PROCEDURE — 85027 COMPLETE CBC AUTOMATED: CPT | Performed by: INTERNAL MEDICINE

## 2019-11-13 PROCEDURE — 40000239 ZZH STATISTIC VAT ROUNDS

## 2019-11-13 PROCEDURE — 83605 ASSAY OF LACTIC ACID: CPT | Performed by: INTERNAL MEDICINE

## 2019-11-13 PROCEDURE — 25000132 ZZH RX MED GY IP 250 OP 250 PS 637: Mod: GY | Performed by: INTERNAL MEDICINE

## 2019-11-13 PROCEDURE — 25800030 ZZH RX IP 258 OP 636: Performed by: INTERNAL MEDICINE

## 2019-11-13 PROCEDURE — 71046 X-RAY EXAM CHEST 2 VIEWS: CPT

## 2019-11-13 PROCEDURE — 97110 THERAPEUTIC EXERCISES: CPT | Mod: GP | Performed by: PHYSICAL THERAPIST

## 2019-11-13 PROCEDURE — 25000128 H RX IP 250 OP 636: Performed by: INTERNAL MEDICINE

## 2019-11-13 PROCEDURE — G0463 HOSPITAL OUTPT CLINIC VISIT: HCPCS | Performed by: NURSE PRACTITIONER

## 2019-11-13 PROCEDURE — 21000001 ZZH R&B HEART CARE

## 2019-11-13 PROCEDURE — 80048 BASIC METABOLIC PNL TOTAL CA: CPT | Performed by: INTERNAL MEDICINE

## 2019-11-13 RX ORDER — METOPROLOL SUCCINATE 25 MG/1
25 TABLET, EXTENDED RELEASE ORAL 2 TIMES DAILY
Status: DISCONTINUED | OUTPATIENT
Start: 2019-11-13 | End: 2019-11-17 | Stop reason: HOSPADM

## 2019-11-13 RX ORDER — DIGOXIN 125 MCG
250 TABLET ORAL ONCE
Status: COMPLETED | OUTPATIENT
Start: 2019-11-13 | End: 2019-11-13

## 2019-11-13 RX ORDER — DIGOXIN 125 MCG
125 TABLET ORAL DAILY
Status: DISCONTINUED | OUTPATIENT
Start: 2019-11-14 | End: 2019-11-17 | Stop reason: HOSPADM

## 2019-11-13 RX ADMIN — FUROSEMIDE 5 MG/HR: 10 INJECTION, SOLUTION INTRAVENOUS at 10:33

## 2019-11-13 RX ADMIN — MELATONIN 5 MG TABLET 5 MG: at 03:59

## 2019-11-13 RX ADMIN — MELATONIN 5 MG TABLET 5 MG: at 23:09

## 2019-11-13 RX ADMIN — MIRTAZAPINE 15 MG: 15 TABLET, FILM COATED ORAL at 23:09

## 2019-11-13 RX ADMIN — METOPROLOL SUCCINATE 25 MG: 25 TABLET, EXTENDED RELEASE ORAL at 21:54

## 2019-11-13 RX ADMIN — DIGOXIN 250 MCG: 0.12 TABLET ORAL at 19:09

## 2019-11-13 ASSESSMENT — MIFFLIN-ST. JEOR: SCORE: 1412

## 2019-11-13 NOTE — PLAN OF CARE
Discharge Planner OT   Patient plan for discharge: TCU at [facility]  Current status: Pt completed bed mobility with Min A. Stood with Min A and WW. BP taken in sitting and standing-see vitals flow sheet; 80's systolic but asymptomatic. Sat EOB to complete grooming and hygienes tasks (I)ly after set up (washed face, combed hair, brushed teeth). Transferred EOB to chair with WW and Min A. Once up in chair,  systolic. -137 during activity.  Barriers to return to prior living situation: falls risk, level of assist with ADL and mobility  Recommendations for discharge: TCU  Rationale for recommendations: Pt will benefit from inpatient rehab to progress level of safety and (I) with ADL, IADL and mobility.       Entered by: Evelyn Mansfield 11/13/2019 11:17 AM

## 2019-11-13 NOTE — PLAN OF CARE
Discharge Planner PT   Patient plan for discharge: None stated   Current status: Pt supine in bed upon arrival. Required encouragement for participation. Performed supine exercises in bed. Resting HR upon arrival: 110 bpm. HR w/supine exercises: up to 144 bpm. Given multiple rest breaks between exercises. Elevated HR and arrival of imaging prevented further therapy.   Barriers to return to prior living situation: Level of assist, HR variability, decreased activity tolerance, impaired balance and coordination  Recommendations for discharge: TCU  Rationale for recommendations: Pt will continue to benefit from skilled PT intervention to improve activity tolerance, LE strength, static and dynamic balance and ease and independence with transfers and functional mobility.        Entered by: Dyan Ram 11/13/2019 9:36 AM

## 2019-11-13 NOTE — PROGRESS NOTES
SPIRITUAL HEALTH SERVICES Progress Note  [facility]    Follow-up visit. SH follow-up to see if any spiritual or emotional is needed for both pt and pt's wife. Pt's wife said that he is getting a few days of rest and that there might be a procedure done on Fri. Both pt and pt's wife said that prayer would be helpful. SH prayed with pt and let them know that [facility] will remain available.    Ebony Sen   Intern

## 2019-11-13 NOTE — PROGRESS NOTES
[facility] Nurse Inpatient Skin Assessment     Assessment of:   Bilateral heels, Spine, coccyx        Data:   Patient History:   Per MD notes, Jama Paul is a 88 year old male who was admitted on 10/30/2019 for worsening shortness of breath and found to have acute decompensated heart failure with preserved ejection fraction. Hx of  Severe tricuspid regurgitation,  Moderate-severe mitral regurgitation, chronic atrial fibrillation not on anticoagulation due to thrombocytopenia and history of bleeding.      Moisture Management: Adult brief    Current Diet / Nutrition:       Orders Placed This Encounter        2 Gram Sodium Diet                Nader Assessment and sub scores:   No data recorded       Labs:   Recent Labs   Lab Test 11/13/19  0600 11/11/19  1735  10/30/19  1134   ALBUMIN  --  2.5*   < >  --    HGB 8.9* 9.6*   < > 10.3*   RBC 2.99* 3.20*   < > 3.38*   WBC 7.1 10.5   < > 7.8   PLT 59* 57*   < > 38*   INR  --   --   --  1.39*    < > = values in this interval not displayed.          Skin Assessment (location):   Bilateral heels, spine, coccyx  History:  Unknown    Skin: Bilateral heels, spine and coccyx, mild erythema, easily blanchable.      Color: normal and consistent with surrounding tissue    Temperature  normal     Pain:  none          Intervention:     Patient's chart evaluated.      Cares performed: Mepilex applied    Orders  Written    Supplies  floor stock    Discussed plan of care with Patient and Nurse Azul          Assessment:     Bilateral heels, spine and coccyx with mild erythema that is easily blanchable. Discussed with pt to re-position self in bed and in chair 2-3 x an hour, pt understands risk of pressure ulcer and is agreeable. To float heels on pillows. Pulsate bed ordered./PIP precautions.        Plan:   Nursing to notify the Provider(s) and re-consult the [facility] Nurse if skin deteriorates.    Skin care plan to Mid-spine and coccyx: cleanse area with  Microklenz, pat dry. Apply Mepilex sacral to spine and coccyx, for protection, change every 3 days.        WOC Nurse will return: will sign off. Re-consult if needed.

## 2019-11-13 NOTE — PROGRESS NOTES
Chart Check Heme/Onc:    1. Moderate thrombocytopenia with platelet counts historically ranging in the 30-45,000 range.  - Prior bone marrow biopsy was suggestive of an underlying myelodysplastic syndrome given the chromosome abnormalities but there were  no other recognizable features to suggest MDS.  Megakaryocyte production appeared normal and therefore the chief basis for the thrombocytopenia does not appear to be due to decreased production.    - The platelet count has not previously responded to trial of IVIG or corticosteroids.   - Antiphospholipid antibody testing did not reveal abnormality to suggest this as etiology.  - Has been followed with observation in the outpatient setting.  - has also not had significant response to steroids during this hospitalization therefore they were discontinued  - platelet counts 59,000  - daily CBC  - consider platelet transfusion for bleeding or invasive cardiac procedure.       2. Valvular heart disease with CHF, a fib   - Severe tricuspid regurgitation. Moderate-severe mitral regurgitation.  - Echo 11/11/2019  - plan per Cardiology    Records reviewed. No new recommendations.     Bharat REYES, CNP

## 2019-11-13 NOTE — PLAN OF CARE
SBP > 80 if asymptomatic ok per cardiology.  Patient tolerated SBP in 80s without issue.  Good urine output with lasix drip.  Patient transferred to CCU.  Patient tolerated 2 gram sodium diet well.

## 2019-11-13 NOTE — PROGRESS NOTES
[facility]  Hospitalist Progress Note  Name: Jama Paul    MRN: [id number]  Physician:  Neri Burns DO, FHM (Text Page)    Summary of Stay:  Jama Paul is an 88 year-old male with a history of hypertension, atrial fibrillation, nonischemic cardiomyopathy, congestive heart failure, idiopathic thrombocytopenic purpura and hiatal hernia, who is admitted 10/30/2019 with worsening shortness of breath.  He was found to have effusions.  Underwent bilateral thoracentesis as well as aggressive diuresis, subsequently with hypotension requiring dobutamine and then norepinephrine.  Pleural fluid analysis consistent with transudate, consistent with CHF. History of nonischemic cardiomyopathy, although improved to 55-60% on last echocardiogram 1/2019, echocardiogram this admission with EF 55-60%, severely dilated RV, valvular abnormalities as above.  KATIE x2 attempted, unable to pass scope.  GI consulted for EGD with no clear reason for inability to complete KATIE.  Repeat KATIE with GI assistance 11/1.  Following the procedure he had hypotension and required a phenylephrine IV drip in the ICU.    Assessment & Plan    Post-op hypotension: Now resolved at this time.  Off phenylephrine at this time.  -  Suspect multifactorial but largely related to his tenuous cardiac status with his valvular disease and Precedex infusion.  BP improved this AM.  Recently off phenylephrine for a trial.  Patient feels well.  -  Continue to hold beta blocker this AM with recent low BP's.  Hold on more IVF as he was bolused around the time of his BP decline last evening and developed crackles and worsened hypoxia.  IVF were held and he has since improved with O2 weaned to 2 L.    Difficulty passing KATIE scope:  -  GI performed a small dilation during the KATIE that allowed the scope to pass.  See procedure note.  Dr. Ontiveros felt he could advance a diet.    Cardiogenic shock episode earlier in stay  Acute on chronic diastolic  congestive heart failure with bilateral pleural effusion s/p bilateral thoracentesis with fluid suggestive of CHF  Mitral regurgitation and significant tricuspid regurgitation  History of nonischemic cardiomyopathy:    -  KATIE  cardiology and CV surgery consulted and following management as per them.  - Remains off of Entresto, resume when indicated per cardiology     Idiopathic thrombocytopenic purpura:  Followed by ALAN.  Treated with high-dose steroids without response and platelets, steroids discontinued.  - Platelets stable in 40s range  - As per hematology recommend platelet transfusion prior to invasive cardiac procedure if one performed in order to achieve >= 50,000 plts.     Iatrogenic right-sided pneumothorax status post a right pigtail catheter placement by IR  Secondary to thoracentesis earlier in stay. Pneumothorax resolved after pigtail catheter placement  -Was on room air but post procedure with fluid bolus developed some volume overload and required 5 liters.  Now back down to 2 liters and continuing to wean.  Patient without acute SOB complaints today.  If resp status/hypoxia again worsens would obtain new CXR.    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  - Holding metoprolol XL this AM with hypotension  - Telemetry, currently with controlled ventricular rate     Urinary tract infection, citrobacter  Urinalysis abnormal, urine culture with >100K citrobacter, pansensitive.  -Status post cefuroxime to complete 7 day course(completed 11/9)     Steroid-induced hyperglycemia: Resolved after stopping the prednisone.  Noted during high-dose steroids.  Required low doses of insulin.  Last hemoglobin A1c unknown though blood sugars have now normalized off of steroids, insulin and blood sugar checks have since been discontinued.    Mildly elevated lactic acid of 2.2  This tachycardia is because of rebound as he is not taking his metoprolol.  I  will restart his metoprolol.  No sign and symptom of sepsis or septic shock at this time.  He is on diuresis with IV Lasix for CHF.  Will avoid any IV #boluses at this time.  If his blood pressure remains stable we will restart his metoprolol to prevent tachycardia.          Diet: Room Service  2 Gram Sodium Diet  Snacks/Supplements Adult: Other; offer any supplement w/ meals; With Meals    DVT Prophylaxis: ambulation  Mccullough Catheter: not present  Code Status: Full Code      Disposition Plan   Unclear, depends on procedure and status next couple days.  Expect 2+ more day hospital stay.     Entered: Pato JOVANIKarla Benedict 11/13/2019, 11:54 AM       Interval History   Mr. Nguyen feels better this AM.  No chest pain, SOB, nausea.  He wants to eat more this AM and requested I advance his diet.    -Data reviewed today: I reviewed all new labs and imaging reports over the last 24 hours. I personally reviewed no images or EKG's today.    Physical Exam   Temp: 98  F (36.7  C) Temp src: Oral BP: 116/73 Pulse: 123 Heart Rate: 111 Resp: 23 SpO2: 98 % O2 Device: Nasal cannula Oxygen Delivery: 1 LPM  Vitals:    11/11/19 0647 11/12/19 0400 11/13/19 0500   Weight: 70.5 kg (155 lb 6.4 oz) 72.4 kg (159 lb 9.8 oz) 70.4 kg (155 lb 3.3 oz)     Vital Signs with Ranges  Temp:  [98  F (36.7  C)-98.1  F (36.7  C)] 98  F (36.7  C)  Pulse:  [] 123  Heart Rate:  [] 111  Resp:  [12-40] 23  BP: ()/(43-79) 116/73  SpO2:  [91 %-98 %] 98 %  I/O last 3 completed shifts:  In: 1515.02 [P.O.:1390; I.V.:125.02]  Out: 1350 [Urine:1350]    GEN:  Alert, oriented x 3, appears comfortable, no overt distress.   HEENT:  Normocephalic/atraumatic, no scleral icterus, no nasal discharge, mouth moist.  CV:  Regular rate and rhythm, distant with soft murmur.  LUNGS:  Clear to auscultation upper with mildly decreased breath sounds bases.  Mild base crackles noted.  No wheezes/retractions.  Symmetric chest rise on inhalation noted.  ABD:  Active bowel  sounds, soft, non-tender/non-distended.  No rebound/guarding/rigidity.  EXT:  Trace edema.  No cyanosis.  No acute joint synovitis noted.  SKIN:  Dry to touch, no exanthems noted in the visualized areas.    Medications     furosemide (LASIX) infusion ADULT STANDARD 5 mg/hr (11/13/19 1033)       influenza Vac Split High-Dose  0.5 mL Intramuscular Prior to discharge     lidocaine  1.5 mL Other Once     [Held by provider] metoprolol succinate ER  25 mg Oral BID     mirtazapine  15 mg Oral At Bedtime     sodium chloride (PF)  10 mL Intracatheter Q7 Days     Data     Recent Labs   Lab 11/13/19  0600 11/11/19  1735 11/11/19  0907   WBC 7.1 10.5 9.8   HGB 8.9* 9.6* 9.7*   HCT 28.2* 30.3* 30.2*   MCV 94 95 95   PLT 59* 57* 49*       Recent Labs   Lab 11/13/19  0600 11/12/19  0430 11/11/19  1735  11/09/19  0620    134 137   < > 136   POTASSIUM 4.2 4.8 4.8   < > 4.5   CHLORIDE 102 103 105   < > 103   CO2 31 30 29   < > 31   ANIONGAP 4 1* 3   < > 2*   GLC 94 87 102*   < > 94   BUN 18 18 17   < > 26   CR 0.91 0.84 0.83   < > 0.83   GFRESTIMATED 75 78 78   < > 78   GFRESTBLACK 87 >90 >90   < > >90   BARON 7.4* 7.5* 7.2*   < > 7.5*   MAG  --   --  2.5*  --  2.5*   PHOS  --   --  3.2  --   --    PROTTOTAL  --   --  5.4*  --   --    ALBUMIN  --   --  2.5*  --   --    BILITOTAL  --   --  0.6  --   --    ALKPHOS  --   --  47  --   --    AST  --   --  9  --   --    ALT  --   --  20  --   --     < > = values in this interval not displayed.       Recent Results (from the past 24 hour(s))   XR Chest 2 Views    Narrative    CHEST TWO VIEWS  11/13/2019 9:40 AM     HISTORY: Assessment of pleural effusion.    COMPARISON: November 11, 2019      Impression    IMPRESSION: Stable right effusion and associated atelectasis and/or  infiltrate, overall moderate to large in size. Minimal left pleural  effusion and associated atelectasis. Upper lungs clear. PICC line  stable. The cardiac silhouette is obscured. Pulmonary vasculature  is  unremarkable.     YINKA DESAI MD

## 2019-11-13 NOTE — PLAN OF CARE
Heart Failure Care Pathway  GOALS TO BE MET BEFORE DISCHARGE:    1. Decrease congestion and/or edema with diuretic therapy to achieve near      optimal volume status.            Dyspnea improved:  yes            Edema improved:     No, please explain: BLE edema +2/+3         Net I/O and Weights since admission:          10/14 0700 - 11/13 0659  In: 7942.29 [P.O.:5565; I.V.:2377.29]  Out: 56993 [Urine:44429]  Net: -2587.71            Vitals:    10/30/19 1044 10/31/19 0619 11/01/19 1041 11/02/19 0500   Weight: 75.3 kg (166 lb) 66.8 kg (147 lb 6 oz) 67.4 kg (148 lb 8 oz) 69.1 kg (152 lb 5.4 oz)    11/03/19 0400 11/04/19 0407 11/05/19 0500 11/07/19 0500   Weight: 68.6 kg (151 lb 3.8 oz) 69.3 kg (152 lb 11.2 oz) 65.9 kg (145 lb 4.5 oz) 63.5 kg (139 lb 15.9 oz)    11/08/19 0800 11/09/19 0516 11/10/19 0623 11/11/19 0647   Weight: 70.3 kg (155 lb) 71.2 kg (156 lb 15.5 oz) 71 kg (156 lb 9.6 oz) 70.5 kg (155 lb 6.4 oz)    11/12/19 0400   Weight: 72.4 kg (159 lb 9.8 oz)       2.  O2 sats > 92% on RA or at prior home O2 therapy level.          Current oxygenation status:       SpO2: 94 %         O2 Device: Nasal cannula,  Oxygen Delivery: 1 LPM         Able to wean O2 this shift to keep sats > 92%:  Yes       Does patient use Home O2? No    3.  Tolerates ambulation and mobility near baseline: pt not ambulating on night shift         How many times did the patient ambulate with nursing staff this shift? 0    Please review the Heart Failure Care Pathway for additional HF goal outcomes.    Anila Hankins RN RN  11/13/2019      Pt [place]. A&O x4. VSS on room air, except intermittent low BP's, pt asymptomatic with SBP in 80's.Tele A fib CVR. Denies CP/SOB/pain. Up with mod assist. Blanchable redness to spine/coccyx, new mepilex's placed. Lasix gtt infusing at 5mg/hr, pt intermittently incont. Purewick in placed. Plan to continue to diuresis.

## 2019-11-13 NOTE — PROVIDER NOTIFICATION
"Paged Dr. Benedict, \" ELVII lactic fired and came back 2.2, please advise. Thanks\" waiting for orders or call back.    "

## 2019-11-13 NOTE — PROGRESS NOTES
"CLINICAL NUTRITION SERVICES - REASSESSMENT NOTE    Malnutrition: (11/6)   % Weight Loss:  Weight loss does not meet criteria for malnutrition, weight changes suspected related to fluid shifts.   % Intake:  No decreased intake noted  Subcutaneous Fat Loss:  Orbital region severe depletion and Upper arm region severe depletion - suspect related to sarcopenia   Muscle Loss:  Temporal region moderate-severe depletion, Acromion bone region moderate-severe depletion, Patellar region severe depletion and Posterior calf region severe depletion - consistent with aging process  Fluid Retention:  Mild edema in lower extremities noted in flowsheet 11/6, not nutritionally related     Malnutrition Diagnosis: Patient does not meet two of the above criteria necessary for diagnosing malnutrition     EVALUATION OF PROGRESS TOWARD GOALS   Diet: 2g Na  Room service w/ assist     Intake/Tolerance:  Pt still with fair-good appetite, however he reports it has been \"going down\". This morning he has eggs, Setswana toast, applesauce, hot chocolate on his tray. He is a little confused because he would have liked oatmeal in addition to what he got, but he wasn't able to due to sodium content. Pt educated that Setswana toast was the highest in Na on his tray. Documented intakes %, with the occasional 50% intake recorded. Per Review of meals, pt is ordering adequate calories and protein.     ASSESSED NUTRITION NEEDS:  Dosing Weight 65.9 kg (actual weight)   Estimated Energy Needs: 1648 - 1977 kcals (25-30 Kcal/Kg)  Justification: maintenance  Estimated Protein Needs: 79 - 99 grams protein (1.2-1.5 g pro/Kg)  Justification: preservation of lean body mass  Estimated Fluid Needs: 1648 - 1977 mL (1 mL/Kcal)  Justification: maintenance or per MD    NEW FINDINGS:   - Cardiology --> continue diuresis. ?R & L Heart Cath, ?Mitral valve repair & tricuspid valve repair   - Skin --> blanchable redness to spine/coccyx  - Weight 70.4 kg today, fluctuations " related to fluid status.     Previous Goals:   Pt to consume 75 - 100% of meals TID, with at least 1 source of protein per meal.   Evaluation: Met    Previous Nutrition Diagnosis:   No nutrition diagnosis identified at this time.   Evaluation: Completed - see below.     MALNUTRITION  % Weight Loss:  Weight loss does not meet criteria for malnutrition - suspect fluid related   % Intake:  No decreased intake noted  Subcutaneous Fat Loss:  Orbital region severe depletion and Upper arm region severe depletion - suspect related to sarcopenia   Muscle Loss:  Temporal region moderate-severe depletion, Acromion bone region moderate-severe depletion, Patellar region severe depletion and Posterior calf region severe depletion - consistent with aging process in setting of chronic illness  Fluid Retention:  Moderate 2-3+    Malnutrition Diagnosis: Patient does not meet two of the above criteria necessary for diagnosing malnutrition    CURRENT NUTRITION DIAGNOSIS  Increased nutrient needs (calories/protein) related to increased energy expenditure with chronic cardiac disease as evidenced by pt continues to eat well, however e/o fat and muscle loss with protein needs estimated at 79+ g daily.     INTERVENTIONS  Recommendations / Nutrition Prescription  Continue regular diet    PRN high protein supplement w/ meals    Continue room service w/ assist     Implementation  Nutrition Education: Encouraged pt to order at least 1 high protein food with each meal. Reviewed sources, rationale given.     Goals  Pt to continue to consume % of meals TID, each with at least 1 high protein source.       MONITORING AND EVALUATION:  Progress towards goals will be monitored and evaluated per protocol and Practice Guidelines    Merry Joy RD,   [facility] [place], 66, 55, [facility]   Pager: [phone]  Weekend Pager: [phone]

## 2019-11-13 NOTE — PROGRESS NOTES
[facility]    Cardiology Progress Note    Primary Cardiologist: Dr. Pugh    Date of Admission:  10/30/2019  Service date: 11/13/2019    Summary:  Mr. Jama Paul is a very pleasant 88 year old male who was admitted on 10/30/2019 for worsening shortness of breath and found to have acute decompensated heart failure with preserved ejection fraction.     Assessment & Plan   1.  Heart failure with preserved ejection fraction  - Diuresing well with IV lasix gtt infusing at 5 mg/hr.   - Net -3.1L output yesterday and wt is down 4 lbs to 155 lbs today.  - Renal function and electrolytes stable.  2.  Severe tricuspid regurgitation  3.  Moderate-severe mitral regurgitation   4.  Chronic atrial fibrillation not on anticoagulation due to thrombocytopenia and history of bleeding  - PTA on metoprolol succinate 100 mg in the morning and 50 mg in the evening, which had been on hold due to hypotension.  - Rates fluctuating higher today to the 120s-130s at times.  - BP improving. Metoprolol restarted at 25 mg BID. Will titrate as BP allows.  5.  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  Mild--Moderate pulmonary hypertension  7.  Bilateral pulmonary effusion status post thoracentesis  8.  Hypotension after administration of Precedex for KATIE  9.  UTI    Plan:   1. Continue diuresis with IV lasix gtt. Will decrease rate to 2.5 mg/hr given brisk response.   2. Plan for a repeat transthoracic echocardiogram to reassess MR in 1-2 days after adequate diuresis.  3. Further plan pending findings on repeat echo. Would need a left and right heart cath prior to consideration of MitraClip procedure if deemed appropriate.  4. Continue to hold anticoagulation due to ITP and prior hemorrhage in the left eye.     Interval History   Mr. Paul reports feeling generally well today. He feels his breathing is about the same, maybe slightly improved. He denies chest pain, palpitations, or orthopnea.    Thank you for the opportunity  to participate in this pleasant patient's care.     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)     Thrombocytopenia (H)     Eye hemorrhage, left     Non-ischemic cardiomyopathy (H)     Permanent atrial fibrillation     Idiopathic thrombocytopenic purpura (H)     Ulcer of right lower extremity with fat layer exposed (H)     Pleural effusion     Bilateral pleural effusion     Physical Exam   Temp: 98  F (36.7  C) Temp src: Oral BP: 116/73 Pulse: 123 Heart Rate: 111 Resp: 23 SpO2: 98 % O2 Device: Nasal cannula Oxygen Delivery: 1 LPM  Vitals:    11/11/19 0647 11/12/19 0400 11/13/19 0500   Weight: 70.5 kg (155 lb 6.4 oz) 72.4 kg (159 lb 9.8 oz) 70.4 kg (155 lb 3.3 oz)     Vital Signs with Ranges  Temp:  [98  F (36.7  C)-98.1  F (36.7  C)] 98  F (36.7  C)  Pulse:  [] 123  Heart Rate:  [] 111  Resp:  [12-40] 23  BP: ()/(43-79) 116/73  SpO2:  [91 %-98 %] 98 %  I/O last 3 completed shifts:  In: 1515.02 [P.O.:1390; I.V.:125.02]  Out: 1350 [Urine:1350]    Constitutional: Frail appearing elderly gentleman in no apparent distress.  Eyes: Pupils equal, round. Sclerae anicteric.   HEENT: Normocephalic, atraumatic.   Respiratory: Breathing non-labored. Lungs diminished in the bases bilaterally, right greater than left  Cardiovascular: Irregular rate and rhythm, normal S1 and S2. 2/6 systolic murmur best heard at the LLSB.  Extremities: 1+ lower extremity edema bilaterally.  Neurologic: Alert, cooperative. No gross focal deficits.  Psychiatric: Affect and appropriate. Answering questions appropriately.    Medications     furosemide (LASIX) infusion ADULT STANDARD 5 mg/hr (11/13/19 1033)       influenza Vac Split High-Dose  0.5 mL Intramuscular Prior to discharge      lidocaine  1.5 mL Other Once     [Held by provider] metoprolol succinate ER  25 mg Oral BID     mirtazapine  15 mg Oral At Bedtime     sodium chloride (PF)  10 mL Intracatheter Q7 Days     Data   Results for orders placed or performed during the hospital encounter of 10/30/19 (from the past 24 hour(s))   Basic metabolic panel   Result Value Ref Range    Sodium 137 133 - 144 mmol/L    Potassium 4.2 3.4 - 5.3 mmol/L    Chloride 102 94 - 109 mmol/L    Carbon Dioxide 31 20 - 32 mmol/L    Anion Gap 4 3 - 14 mmol/L    Glucose 94 70 - 99 mg/dL    Urea Nitrogen 18 7 - 30 mg/dL    Creatinine 0.91 0.66 - 1.25 mg/dL    GFR Estimate 75 >60 mL/min/[1.73_m2]    GFR Estimate If Black 87 >60 mL/min/[1.73_m2]    Calcium 7.4 (L) 8.5 - 10.1 mg/dL   CBC with platelets   Result Value Ref Range    WBC 7.1 4.0 - 11.0 10e9/L    RBC Count 2.99 (L) 4.4 - 5.9 10e12/L    Hemoglobin 8.9 (L) 13.3 - 17.7 g/dL    Hematocrit 28.2 (L) 40.0 - 53.0 %    MCV 94 78 - 100 fl    MCH 29.8 26.5 - 33.0 pg    MCHC 31.6 31.5 - 36.5 g/dL    RDW 21.7 (H) 10.0 - 15.0 %    Platelet Count 59 (L) 150 - 450 10e9/L   XR Chest 2 Views    Narrative    CHEST TWO VIEWS  11/13/2019 9:40 AM     HISTORY: Assessment of pleural effusion.    COMPARISON: November 11, 2019      Impression    IMPRESSION: Stable right effusion and associated atelectasis and/or  infiltrate, overall moderate to large in size. Minimal left pleural  effusion and associated atelectasis. Upper lungs clear. PICC line  stable. The cardiac silhouette is obscured. Pulmonary vasculature is  unremarkable.     YINKA DESAI MD   Lactic acid level STAT   Result Value Ref Range    Lactate for Sepsis Protocol 2.2 (H) 0.7 - 2.0 mmol/L

## 2019-11-14 ENCOUNTER — APPOINTMENT (OUTPATIENT)
Dept: GENERAL RADIOLOGY | Facility: CLINIC | Age: 84
DRG: 286 | End: 2019-11-14
Attending: RADIOLOGY
Payer: MEDICARE

## 2019-11-14 ENCOUNTER — APPOINTMENT (OUTPATIENT)
Dept: CARDIOLOGY | Facility: CLINIC | Age: 84
DRG: 286 | End: 2019-11-14
Attending: NURSE PRACTITIONER
Payer: MEDICARE

## 2019-11-14 ENCOUNTER — APPOINTMENT (OUTPATIENT)
Dept: ULTRASOUND IMAGING | Facility: CLINIC | Age: 84
DRG: 286 | End: 2019-11-14
Attending: INTERNAL MEDICINE
Payer: MEDICARE

## 2019-11-14 ENCOUNTER — APPOINTMENT (OUTPATIENT)
Dept: GENERAL RADIOLOGY | Facility: CLINIC | Age: 84
DRG: 286 | End: 2019-11-14
Attending: INTERNAL MEDICINE
Payer: MEDICARE

## 2019-11-14 LAB
ALBUMIN SERPL-MCNC: 2.5 G/DL (ref 3.4–5)
ANION GAP SERPL CALCULATED.3IONS-SCNC: 3 MMOL/L (ref 3–14)
BASOPHILS # BLD AUTO: 0 10E9/L (ref 0–0.2)
BASOPHILS NFR BLD AUTO: 0 %
BUN SERPL-MCNC: 20 MG/DL (ref 7–30)
CALCIUM SERPL-MCNC: 7.8 MG/DL (ref 8.5–10.1)
CHLORIDE SERPL-SCNC: 100 MMOL/L (ref 94–109)
CO2 SERPL-SCNC: 32 MMOL/L (ref 20–32)
CREAT SERPL-MCNC: 0.94 MG/DL (ref 0.66–1.25)
DACRYOCYTES BLD QL SMEAR: SLIGHT
DIFFERENTIAL METHOD BLD: ABNORMAL
EOSINOPHIL # BLD AUTO: 0 10E9/L (ref 0–0.7)
EOSINOPHIL NFR BLD AUTO: 0.1 %
ERYTHROCYTE [DISTWIDTH] IN BLOOD BY AUTOMATED COUNT: 21.6 % (ref 10–15)
GFR SERPL CREATININE-BSD FRML MDRD: 72 ML/MIN/{1.73_M2}
GLUCOSE SERPL-MCNC: 93 MG/DL (ref 70–99)
HCT VFR BLD AUTO: 29.5 % (ref 40–53)
HGB BLD-MCNC: 9.4 G/DL (ref 13.3–17.7)
HYPOCHROMIA BLD QL: PRESENT
IMM GRANULOCYTES # BLD: 0 10E9/L (ref 0–0.4)
IMM GRANULOCYTES NFR BLD: 0.3 %
LACTATE BLD-SCNC: 1.4 MMOL/L (ref 0.7–2)
LYMPHOCYTES # BLD AUTO: 0.7 10E9/L (ref 0.8–5.3)
LYMPHOCYTES NFR BLD AUTO: 10 %
MCH RBC QN AUTO: 30.1 PG (ref 26.5–33)
MCHC RBC AUTO-ENTMCNC: 31.9 G/DL (ref 31.5–36.5)
MCV RBC AUTO: 95 FL (ref 78–100)
MONOCYTES # BLD AUTO: 0.6 10E9/L (ref 0–1.3)
MONOCYTES NFR BLD AUTO: 7.6 %
NEUTROPHILS # BLD AUTO: 6 10E9/L (ref 1.6–8.3)
NEUTROPHILS NFR BLD AUTO: 82 %
NRBC # BLD AUTO: 0 10*3/UL
NRBC BLD AUTO-RTO: 0 /100
PHOSPHATE SERPL-MCNC: 3.1 MG/DL (ref 2.5–4.5)
PLATELET # BLD AUTO: 71 10E9/L (ref 150–450)
PLATELET # BLD EST: ABNORMAL 10*3/UL
POTASSIUM SERPL-SCNC: 3.8 MMOL/L (ref 3.4–5.3)
RBC # BLD AUTO: 3.12 10E12/L (ref 4.4–5.9)
RBC INCLUSIONS BLD: SLIGHT
SODIUM SERPL-SCNC: 135 MMOL/L (ref 133–144)
WBC # BLD AUTO: 7.3 10E9/L (ref 4–11)

## 2019-11-14 PROCEDURE — 80069 RENAL FUNCTION PANEL: CPT | Performed by: INTERNAL MEDICINE

## 2019-11-14 PROCEDURE — 21000001 ZZH R&B HEART CARE

## 2019-11-14 PROCEDURE — 25000132 ZZH RX MED GY IP 250 OP 250 PS 637: Mod: GY | Performed by: INTERNAL MEDICINE

## 2019-11-14 PROCEDURE — 25000125 ZZHC RX 250: Performed by: INTERNAL MEDICINE

## 2019-11-14 PROCEDURE — 85025 COMPLETE CBC W/AUTO DIFF WBC: CPT | Performed by: INTERNAL MEDICINE

## 2019-11-14 PROCEDURE — 71045 X-RAY EXAM CHEST 1 VIEW: CPT

## 2019-11-14 PROCEDURE — 99232 SBSQ HOSP IP/OBS MODERATE 35: CPT | Performed by: INTERNAL MEDICINE

## 2019-11-14 PROCEDURE — 93325 DOPPLER ECHO COLOR FLOW MAPG: CPT | Mod: 26 | Performed by: INTERNAL MEDICINE

## 2019-11-14 PROCEDURE — 27210190 US THORACENTESIS PORTABLE

## 2019-11-14 PROCEDURE — 93308 TTE F-UP OR LMTD: CPT | Mod: 26 | Performed by: INTERNAL MEDICINE

## 2019-11-14 PROCEDURE — 76376 3D RENDER W/INTRP POSTPROCES: CPT

## 2019-11-14 PROCEDURE — 40000986 XR CHEST 1 VW

## 2019-11-14 PROCEDURE — 87070 CULTURE OTHR SPECIMN AEROBIC: CPT | Performed by: INTERNAL MEDICINE

## 2019-11-14 PROCEDURE — 83605 ASSAY OF LACTIC ACID: CPT | Performed by: INTERNAL MEDICINE

## 2019-11-14 PROCEDURE — 93321 DOPPLER ECHO F-UP/LMTD STD: CPT | Mod: 26 | Performed by: INTERNAL MEDICINE

## 2019-11-14 PROCEDURE — 99232 SBSQ HOSP IP/OBS MODERATE 35: CPT | Mod: 25 | Performed by: INTERNAL MEDICINE

## 2019-11-14 PROCEDURE — 40000239 ZZH STATISTIC VAT ROUNDS

## 2019-11-14 PROCEDURE — 40000863 ZZH STATISTIC RADIOLOGY XRAY, US, CT, MAR, NM

## 2019-11-14 RX ORDER — NICOTINE POLACRILEX 4 MG
15-30 LOZENGE BUCCAL
Status: DISCONTINUED | OUTPATIENT
Start: 2019-11-14 | End: 2019-11-17 | Stop reason: HOSPADM

## 2019-11-14 RX ORDER — DEXTROSE MONOHYDRATE 25 G/50ML
25-50 INJECTION, SOLUTION INTRAVENOUS
Status: DISCONTINUED | OUTPATIENT
Start: 2019-11-14 | End: 2019-11-17 | Stop reason: HOSPADM

## 2019-11-14 RX ORDER — TORSEMIDE 20 MG/1
20 TABLET ORAL DAILY
Status: DISCONTINUED | OUTPATIENT
Start: 2019-11-15 | End: 2019-11-17

## 2019-11-14 RX ORDER — LIDOCAINE HYDROCHLORIDE 10 MG/ML
10 INJECTION, SOLUTION EPIDURAL; INFILTRATION; INTRACAUDAL; PERINEURAL ONCE
Status: COMPLETED | OUTPATIENT
Start: 2019-11-14 | End: 2019-11-14

## 2019-11-14 RX ADMIN — LIDOCAINE HYDROCHLORIDE 10 ML: 10 INJECTION, SOLUTION EPIDURAL; INFILTRATION; INTRACAUDAL; PERINEURAL at 13:33

## 2019-11-14 RX ADMIN — MELATONIN 5 MG TABLET 5 MG: at 02:49

## 2019-11-14 RX ADMIN — MIRTAZAPINE 15 MG: 15 TABLET, FILM COATED ORAL at 21:39

## 2019-11-14 RX ADMIN — DIGOXIN 125 MCG: 0.12 TABLET ORAL at 09:13

## 2019-11-14 RX ADMIN — METOPROLOL SUCCINATE 25 MG: 25 TABLET, EXTENDED RELEASE ORAL at 21:39

## 2019-11-14 RX ADMIN — MELATONIN 5 MG TABLET 5 MG: at 21:39

## 2019-11-14 RX ADMIN — METOPROLOL SUCCINATE 25 MG: 25 TABLET, EXTENDED RELEASE ORAL at 09:13

## 2019-11-14 ASSESSMENT — MIFFLIN-ST. JEOR: SCORE: 1387.03

## 2019-11-14 NOTE — PROVIDER NOTIFICATION
Talked with Rio Guzman regarding SBP in 80s but asymptomatic no new orders continue to monitor BP.

## 2019-11-14 NOTE — PLAN OF CARE
Heart Failure Care Pathway  GOALS TO BE MET BEFORE DISCHARGE:    1. Decrease congestion and/or edema with diuretic therapy to achieve near      optimal volume status.            Dyspnea improved:  No, please explain: RODGERS             Edema improved:     Yes, +1/+2         Net I/O and Weights since admission:          10/15 0700 - 11/14 0659  In: 8662.29 [P.O.:6285; I.V.:2377.29]  Out: 02517 [Urine:61236]  Net: -3542.71            Vitals:    10/30/19 1044 10/31/19 0619 11/01/19 1041 11/02/19 0500   Weight: 75.3 kg (166 lb) 66.8 kg (147 lb 6 oz) 67.4 kg (148 lb 8 oz) 69.1 kg (152 lb 5.4 oz)    11/03/19 0400 11/04/19 0407 11/05/19 0500 11/07/19 0500   Weight: 68.6 kg (151 lb 3.8 oz) 69.3 kg (152 lb 11.2 oz) 65.9 kg (145 lb 4.5 oz) 63.5 kg (139 lb 15.9 oz)    11/08/19 0800 11/09/19 0516 11/10/19 0623 11/11/19 0647   Weight: 70.3 kg (155 lb) 71.2 kg (156 lb 15.5 oz) 71 kg (156 lb 9.6 oz) 70.5 kg (155 lb 6.4 oz)    11/12/19 0400 11/13/19 0500   Weight: 72.4 kg (159 lb 9.8 oz) 70.4 kg (155 lb 3.3 oz)       2.  O2 sats > 92% on RA or at prior home O2 therapy level.          Current oxygenation status:       SpO2: 93 %         O2 Device: None (Room air),            Able to wean O2 this shift to keep sats > 92%:  pt not requiring O2        Does patient use Home O2? No    3.  Tolerates ambulation and mobility near baseline: Yes        How many times did the patient ambulate with nursing staff this shift? 1    Please review the Heart Failure Care Pathway for additional HF goal outcomes.    Anila Hankins RN RN  11/14/2019      Pt [facility]. A&O x4. VSS on room air. Tele A fib CVR. Denies CP/pain. Up with mod assist. Blanchable redness to spine/coccyx, new mepilex's placed. Pt able to T/R independently with frequent reminders. Lasix gtt infusing at 2.5 mg/hr, pt incont. Purewick in placed. Plan to continue to diuresis.

## 2019-11-14 NOTE — PLAN OF CARE
Tele: Afib CVR/RVR. More tachycardic as the day went on. Metoprolol was restarted but held for not meeting parameters. Lasix gtt at 2.5mg/hr. Purewick in place. Incontinent of urine and stool. Up to chair for lunch. Pulsate mattress in place this evening. Will continue to monitor.

## 2019-11-14 NOTE — PLAN OF CARE
VSS on RA exceot low BP.  Tele: Afib CVR.  Denies pain or shortness of breath.  Thora on the R today with 2.5 L out. Dressing CDI.  Encouraged pt to use IS every hour.  NPO at midnight for R&L heart cath tomorrow.  Incontinent of bowel and bladder.  Lasix drip stopped today.  Call light within reach.  Will continue to monitor.

## 2019-11-14 NOTE — PROGRESS NOTES
RADIOLOGY PROCEDURE NOTE  Patient name: Jama Paul  MRN: [id number]  : 1931    Pre-procedure diagnosis: Right pleural effusion  Post-procedure diagnosis: Same    Procedure Date/Time: 2019  1:53 PM  Procedure: Thoracentesis  Estimated blood loss: None  Specimen(s) collected with description: Pleural fluid.  The patient tolerated the procedure well with no immediate complications.    See imaging dictation for procedural details and findings.    Provider name: Jama Meraz MD  Assistant(s):None

## 2019-11-14 NOTE — PROGRESS NOTES
[facility]    Cardiology Progress Note    Primary Cardiologist: Dr. Pugh    Date of Admission:  10/30/2019  Service date: 11/14/2019    Summary:  Mr. Jama Paul is a very pleasant 88 year old male who was admitted on 10/30/2019 for worsening shortness of breath and found to have acute decompensated heart failure with preserved ejection fraction.     Assessment & Plan   1.  Heart failure with preserved ejection fraction  - Minimal output yesterday due to higher PO intake.   - Wt down to 149 lbs today.  - Renal function and electrolytes stable.  2.  Severe tricuspid regurgitation  3.  Moderate to severe mitral regurgitation   4.  Bilateral pulmonary effusion status post thoracentesis 10/31/19  - Iatrogenic right-sided pneumothorax status post right pigtail catheter placement by IR with resolution.  - Re-accumulation of right pleural effusion noted on CXR yesterday.  5.  Chronic atrial fibrillation not on anticoagulation due to thrombocytopenia and history of bleeding  - PTA on metoprolol succinate 100 mg in the morning and 50 mg in the evening, which had been on hold due to hypotension.  - Rates fluctuating higher today to the 120s-130s at times.  - BP improving. Metoprolol restarted at 25 mg BID. Will titrate as BP allows.  6.  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  Mild--Moderate pulmonary hypertension  8.  Bilateral pulmonary effusion status post thoracentesis  9.  Hypotension after administration of Precedex for KATIE  10. UTI, treated with 7 day course of cefuroxime    Plan:   1. Reinforce strict 1.5 L fluid restriction and increase IV lasix gtt back to 5 mg/hr for more aggressive diuresis.   2. Thoracentesis today for reaccumulation of right pleural effusion on CXR yesterday. Will check a limited echo this afternoon following the thoracentesis.   3. Likely right and left heart cath tomorrow for consideration of MitraClip.  4. Continue to hold anticoagulation due to ITP and prior  hemorrhage in the left eye.     Interval History    No acute events overnight. He feels his breathing is about the same, maybe slightly improved and he is currently sitting in the chair at the bedside comfortably on room air. He denies chest pain, palpitations, or orthopnea.     Thank you for the opportunity to participate in this pleasant patient's care.     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)     Thrombocytopenia (H)     Eye hemorrhage, left     Non-ischemic cardiomyopathy (H)     Permanent atrial fibrillation     Idiopathic thrombocytopenic purpura (H)     Ulcer of right lower extremity with fat layer exposed (H)     Pleural effusion     Bilateral pleural effusion     Physical Exam   Temp: 98  F (36.7  C) Temp src: Oral BP: 103/56 Pulse: 90 Heart Rate: 98 Resp: 19 SpO2: 93 % O2 Device: None (Room air)    Vitals:    11/12/19 0400 11/13/19 0500 11/14/19 0650   Weight: 72.4 kg (159 lb 9.8 oz) 70.4 kg (155 lb 3.3 oz) 67.9 kg (149 lb 11.2 oz)     Vital Signs with Ranges  Temp:  [98  F (36.7  C)-98.1  F (36.7  C)] 98  F (36.7  C)  Pulse:  [] 90  Heart Rate:  [] 98  Resp:  [13-33] 19  BP: ()/(50-78) 103/56  SpO2:  [93 %-98 %] 93 %  I/O last 3 completed shifts:  In: 720 [P.O.:720]  Out: 1125 [Urine:1125]    Constitutional: Frail appearing elderly gentleman sitting comfortably in the chair at the bedise and in no acute distress.  Eyes: Pupils equal, round. Sclerae anicteric.   HEENT: Normocephalic, atraumatic.   Respiratory: Breathing non-labored. Lungs diminished in the bases bilaterally, right greater than left  Cardiovascular: Irregular rate and rhythm, normal S1 and S2. 2/6 systolic murmur best heard at the LLSB.  Extremities: Moves all  extremities well and symmetrically. No lower extremity edema bilaterally.  Neurologic: Alert, cooperative. No gross focal deficits.  Psychiatric: Affect and appropriate. Answering questions appropriately.    Medications     furosemide (LASIX) infusion ADULT STANDARD 2.5 mg/hr (11/14/19 0914)     - MEDICATION INSTRUCTIONS -         digoxin  125 mcg Oral Daily     influenza Vac Split High-Dose  0.5 mL Intramuscular Prior to discharge     lidocaine  1.5 mL Other Once     metoprolol succinate ER  25 mg Oral BID     mirtazapine  15 mg Oral At Bedtime     sodium chloride (PF)  10 mL Intracatheter Q7 Days     Data   Results for orders placed or performed during the hospital encounter of 10/30/19 (from the past 24 hour(s))   Lactic acid level STAT   Result Value Ref Range    Lactate for Sepsis Protocol 2.2 (H) 0.7 - 2.0 mmol/L   Renal panel   Result Value Ref Range    Sodium 135 133 - 144 mmol/L    Potassium 3.8 3.4 - 5.3 mmol/L    Chloride 100 94 - 109 mmol/L    Carbon Dioxide 32 20 - 32 mmol/L    Anion Gap 3 3 - 14 mmol/L    Glucose 93 70 - 99 mg/dL    Urea Nitrogen 20 7 - 30 mg/dL    Creatinine 0.94 0.66 - 1.25 mg/dL    GFR Estimate 72 >60 mL/min/[1.73_m2]    GFR Estimate If Black 83 >60 mL/min/[1.73_m2]    Calcium 7.8 (L) 8.5 - 10.1 mg/dL    Phosphorus 3.1 2.5 - 4.5 mg/dL    Albumin 2.5 (L) 3.4 - 5.0 g/dL   CBC with platelets differential   Result Value Ref Range    WBC 7.3 4.0 - 11.0 10e9/L    RBC Count 3.12 (L) 4.4 - 5.9 10e12/L    Hemoglobin 9.4 (L) 13.3 - 17.7 g/dL    Hematocrit 29.5 (L) 40.0 - 53.0 %    MCV 95 78 - 100 fl    MCH 30.1 26.5 - 33.0 pg    MCHC 31.9 31.5 - 36.5 g/dL    RDW 21.6 (H) 10.0 - 15.0 %    Platelet Count 71 (L) 150 - 450 10e9/L    Diff Method Automated Method     % Neutrophils 82.0 %    % Lymphocytes 10.0 %    % Monocytes 7.6 %    % Eosinophils 0.1 %    % Basophils 0.0 %    % Immature Granulocytes 0.3 %    Nucleated RBCs 0 0 /100    Absolute Neutrophil 6.0 1.6 - 8.3 10e9/L    Absolute  Lymphocytes 0.7 (L) 0.8 - 5.3 10e9/L    Absolute Monocytes 0.6 0.0 - 1.3 10e9/L    Absolute Eosinophils 0.0 0.0 - 0.7 10e9/L    Absolute Basophils 0.0 0.0 - 0.2 10e9/L    Abs Immature Granulocytes 0.0 0 - 0.4 10e9/L    Absolute Nucleated RBC 0.0     RBC Fragments Slight     Teardrop Cells Slight     Hypochromasia Present     Platelet Estimate       Automated count confirmed.  Platelet morphology is normal.   XR Chest Port 1 View    Narrative    CHEST ONE VIEW UPRIGHT 11/14/2019 8:45 AM     HISTORY: CHF, pleural effusion.    COMPARISON: November 13, 2019      Impression    IMPRESSION: Increased hazy density in the upper lung on the right may  indicate an increased effusion compared to previous. Continued  effusion with associated atelectasis and/or infiltrate at the right  base. Retrocardiac atelectasis and/or infiltrate also present. No  definite left effusion today.

## 2019-11-14 NOTE — PROGRESS NOTES
Hematology chart check    1. Moderate thrombocytopenia with platelet counts historically ranging in the 30-45,000 range.  - Prior bone marrow biopsy was suggestive of an underlying myelodysplastic syndrome given the chromosome abnormalities but there were  no other recognizable features to suggest MDS.  Megakaryocyte production appeared normal and therefore the chief basis for the thrombocytopenia does not appear to be due to decreased production.    - The platelet count has not previously responded to trial of IVIG or corticosteroids.   - Antiphospholipid antibody testing did not reveal abnormality to suggest this as etiology.  - Has been followed with observation in the outpatient setting.  - has also not had significant response to steroids during this hospitalization therefore they were discontinued  - platelet counts 71,000  - daily CBC  - consider platelet transfusion for bleeding or invasive cardiac procedure.       2. Valvular heart disease with CHF, a fib   - Severe tricuspid regurgitation. Moderate-severe mitral regurgitation.  - Echo 11/11/2019  - plan per Cardiology     Records reviewed. No new recommendations.     Og Klein  Nurse Practitioner  [facility]

## 2019-11-15 ENCOUNTER — SURGERY (OUTPATIENT)
Age: 84
End: 2019-11-15
Payer: MEDICARE

## 2019-11-15 ENCOUNTER — ANESTHESIA (OUTPATIENT)
Dept: CARDIOLOGY | Facility: CLINIC | Age: 84
End: 2019-11-15

## 2019-11-15 PROBLEM — I07.1 SEVERE TRICUSPID REGURGITATION: Status: ACTIVE | Noted: 2019-10-30

## 2019-11-15 LAB
ANION GAP SERPL CALCULATED.3IONS-SCNC: 4 MMOL/L (ref 3–14)
APTT PPP: 36 SEC (ref 22–37)
BUN SERPL-MCNC: 24 MG/DL (ref 7–30)
CALCIUM SERPL-MCNC: 7.5 MG/DL (ref 8.5–10.1)
CATH EF ESTIMATED: 60 %
CHLORIDE SERPL-SCNC: 100 MMOL/L (ref 94–109)
CO2 BLDCOV-SCNC: 27 MMOL/L (ref 21–28)
CO2 BLDCOV-SCNC: 28 MMOL/L (ref 21–28)
CO2 SERPL-SCNC: 32 MMOL/L (ref 20–32)
CREAT SERPL-MCNC: 0.88 MG/DL (ref 0.66–1.25)
ERYTHROCYTE [DISTWIDTH] IN BLOOD BY AUTOMATED COUNT: 21.4 % (ref 10–15)
GFR SERPL CREATININE-BSD FRML MDRD: 76 ML/MIN/{1.73_M2}
GLUCOSE SERPL-MCNC: 86 MG/DL (ref 70–99)
HCT VFR BLD AUTO: 31.3 % (ref 40–53)
HGB BLD-MCNC: 10.1 G/DL (ref 13.3–17.7)
INR PPP: 1.46 (ref 0.86–1.14)
MCH RBC QN AUTO: 30.3 PG (ref 26.5–33)
MCHC RBC AUTO-ENTMCNC: 32.3 G/DL (ref 31.5–36.5)
MCV RBC AUTO: 94 FL (ref 78–100)
PCO2 BLDV: 38 MM HG (ref 40–50)
PCO2 BLDV: 43 MM HG (ref 40–50)
PH BLDV: 7.42 PH (ref 7.32–7.43)
PH BLDV: 7.45 PH (ref 7.32–7.43)
PLATELET # BLD AUTO: 72 10E9/L (ref 150–450)
PO2 BLDV: 33 MM HG (ref 25–47)
PO2 BLDV: 71 MM HG (ref 25–47)
POTASSIUM SERPL-SCNC: 3.6 MMOL/L (ref 3.4–5.3)
RBC # BLD AUTO: 3.33 10E12/L (ref 4.4–5.9)
SAO2 % BLDV FROM PO2: 65 %
SAO2 % BLDV FROM PO2: 95 %
SODIUM SERPL-SCNC: 136 MMOL/L (ref 133–144)
WBC # BLD AUTO: 11.9 10E9/L (ref 4–11)

## 2019-11-15 PROCEDURE — 99232 SBSQ HOSP IP/OBS MODERATE 35: CPT | Mod: 25 | Performed by: INTERNAL MEDICINE

## 2019-11-15 PROCEDURE — 25000128 H RX IP 250 OP 636: Performed by: INTERNAL MEDICINE

## 2019-11-15 PROCEDURE — 93460 R&L HRT ART/VENTRICLE ANGIO: CPT | Mod: 26 | Performed by: INTERNAL MEDICINE

## 2019-11-15 PROCEDURE — 99152 MOD SED SAME PHYS/QHP 5/>YRS: CPT | Performed by: INTERNAL MEDICINE

## 2019-11-15 PROCEDURE — 85027 COMPLETE CBC AUTOMATED: CPT | Performed by: NURSE PRACTITIONER

## 2019-11-15 PROCEDURE — B2111ZZ FLUOROSCOPY OF MULTIPLE CORONARY ARTERIES USING LOW OSMOLAR CONTRAST: ICD-10-PCS | Performed by: INTERNAL MEDICINE

## 2019-11-15 PROCEDURE — 93005 ELECTROCARDIOGRAM TRACING: CPT

## 2019-11-15 PROCEDURE — 80048 BASIC METABOLIC PNL TOTAL CA: CPT | Performed by: INTERNAL MEDICINE

## 2019-11-15 PROCEDURE — B2161ZZ FLUOROSCOPY OF RIGHT AND LEFT HEART USING LOW OSMOLAR CONTRAST: ICD-10-PCS | Performed by: INTERNAL MEDICINE

## 2019-11-15 PROCEDURE — 93460 R&L HRT ART/VENTRICLE ANGIO: CPT | Performed by: INTERNAL MEDICINE

## 2019-11-15 PROCEDURE — 99153 MOD SED SAME PHYS/QHP EA: CPT | Performed by: INTERNAL MEDICINE

## 2019-11-15 PROCEDURE — 40000257 ZZH STATISTIC CONSULT NO CHARGE VASC ACCESS

## 2019-11-15 PROCEDURE — 25000132 ZZH RX MED GY IP 250 OP 250 PS 637: Mod: GY | Performed by: NURSE PRACTITIONER

## 2019-11-15 PROCEDURE — 27210794 ZZH OR GENERAL SUPPLY STERILE: Performed by: INTERNAL MEDICINE

## 2019-11-15 PROCEDURE — 93010 ELECTROCARDIOGRAM REPORT: CPT | Performed by: INTERNAL MEDICINE

## 2019-11-15 PROCEDURE — 4A023N8 MEASUREMENT OF CARDIAC SAMPLING AND PRESSURE, BILATERAL, PERCUTANEOUS APPROACH: ICD-10-PCS | Performed by: INTERNAL MEDICINE

## 2019-11-15 PROCEDURE — 25000132 ZZH RX MED GY IP 250 OP 250 PS 637: Mod: GY | Performed by: INTERNAL MEDICINE

## 2019-11-15 PROCEDURE — 25000125 ZZHC RX 250: Performed by: INTERNAL MEDICINE

## 2019-11-15 PROCEDURE — 25800030 ZZH RX IP 258 OP 636: Performed by: NURSE PRACTITIONER

## 2019-11-15 PROCEDURE — 99232 SBSQ HOSP IP/OBS MODERATE 35: CPT | Performed by: INTERNAL MEDICINE

## 2019-11-15 PROCEDURE — C1769 GUIDE WIRE: HCPCS | Performed by: INTERNAL MEDICINE

## 2019-11-15 PROCEDURE — 21000001 ZZH R&B HEART CARE

## 2019-11-15 PROCEDURE — 82803 BLOOD GASES ANY COMBINATION: CPT

## 2019-11-15 PROCEDURE — 85730 THROMBOPLASTIN TIME PARTIAL: CPT | Performed by: NURSE PRACTITIONER

## 2019-11-15 PROCEDURE — 85610 PROTHROMBIN TIME: CPT | Performed by: NURSE PRACTITIONER

## 2019-11-15 RX ORDER — LIDOCAINE 40 MG/G
CREAM TOPICAL
Status: DISCONTINUED | OUTPATIENT
Start: 2019-11-15 | End: 2019-11-15 | Stop reason: HOSPADM

## 2019-11-15 RX ORDER — ASPIRIN 81 MG/1
81 TABLET ORAL DAILY
Status: DISCONTINUED | OUTPATIENT
Start: 2019-11-16 | End: 2019-11-15

## 2019-11-15 RX ORDER — POTASSIUM CHLORIDE 1500 MG/1
20 TABLET, EXTENDED RELEASE ORAL
Status: CANCELLED | OUTPATIENT
Start: 2019-11-15

## 2019-11-15 RX ORDER — SODIUM CHLORIDE 9 MG/ML
INJECTION, SOLUTION INTRAVENOUS CONTINUOUS
Status: CANCELLED | OUTPATIENT
Start: 2019-11-15

## 2019-11-15 RX ORDER — ATROPINE SULFATE 0.1 MG/ML
0.5 INJECTION INTRAVENOUS EVERY 5 MIN PRN
Status: ACTIVE | OUTPATIENT
Start: 2019-11-15 | End: 2019-11-16

## 2019-11-15 RX ORDER — SODIUM CHLORIDE 9 MG/ML
INJECTION, SOLUTION INTRAVENOUS CONTINUOUS
Status: DISCONTINUED | OUTPATIENT
Start: 2019-11-15 | End: 2019-11-15 | Stop reason: HOSPADM

## 2019-11-15 RX ORDER — FLUMAZENIL 0.1 MG/ML
0.2 INJECTION, SOLUTION INTRAVENOUS
Status: ACTIVE | OUTPATIENT
Start: 2019-11-15 | End: 2019-11-16

## 2019-11-15 RX ORDER — POTASSIUM CHLORIDE 1500 MG/1
20 TABLET, EXTENDED RELEASE ORAL
Status: COMPLETED | OUTPATIENT
Start: 2019-11-15 | End: 2019-11-15

## 2019-11-15 RX ORDER — NALOXONE HYDROCHLORIDE 0.4 MG/ML
.1-.4 INJECTION, SOLUTION INTRAMUSCULAR; INTRAVENOUS; SUBCUTANEOUS
Status: DISCONTINUED | OUTPATIENT
Start: 2019-11-15 | End: 2019-11-17 | Stop reason: HOSPADM

## 2019-11-15 RX ORDER — ACETAMINOPHEN 325 MG/1
650 TABLET ORAL EVERY 4 HOURS PRN
Status: DISCONTINUED | OUTPATIENT
Start: 2019-11-15 | End: 2019-11-17 | Stop reason: HOSPADM

## 2019-11-15 RX ORDER — FENTANYL CITRATE 50 UG/ML
INJECTION, SOLUTION INTRAMUSCULAR; INTRAVENOUS
Status: DISCONTINUED | OUTPATIENT
Start: 2019-11-15 | End: 2019-11-15 | Stop reason: HOSPADM

## 2019-11-15 RX ORDER — SODIUM CHLORIDE 9 MG/ML
INJECTION, SOLUTION INTRAVENOUS CONTINUOUS
Status: DISCONTINUED | OUTPATIENT
Start: 2019-11-15 | End: 2019-11-17 | Stop reason: HOSPADM

## 2019-11-15 RX ORDER — FENTANYL CITRATE 50 UG/ML
25-50 INJECTION, SOLUTION INTRAMUSCULAR; INTRAVENOUS
Status: ACTIVE | OUTPATIENT
Start: 2019-11-15 | End: 2019-11-16

## 2019-11-15 RX ORDER — NALOXONE HYDROCHLORIDE 0.4 MG/ML
.2-.4 INJECTION, SOLUTION INTRAMUSCULAR; INTRAVENOUS; SUBCUTANEOUS
Status: ACTIVE | OUTPATIENT
Start: 2019-11-15 | End: 2019-11-16

## 2019-11-15 RX ORDER — ASPIRIN 81 MG/1
81 TABLET ORAL DAILY
Status: COMPLETED | OUTPATIENT
Start: 2019-11-15 | End: 2019-11-15

## 2019-11-15 RX ADMIN — FENTANYL CITRATE 25 MCG: 50 INJECTION, SOLUTION INTRAMUSCULAR; INTRAVENOUS at 18:00

## 2019-11-15 RX ADMIN — MELATONIN 5 MG TABLET 5 MG: at 21:24

## 2019-11-15 RX ADMIN — ASPIRIN 81 MG: 81 TABLET, COATED ORAL at 14:24

## 2019-11-15 RX ADMIN — METOPROLOL SUCCINATE 25 MG: 25 TABLET, EXTENDED RELEASE ORAL at 09:40

## 2019-11-15 RX ADMIN — TORSEMIDE 20 MG: 20 TABLET ORAL at 09:41

## 2019-11-15 RX ADMIN — DIGOXIN 125 MCG: 0.12 TABLET ORAL at 09:41

## 2019-11-15 RX ADMIN — MELATONIN 5 MG TABLET 5 MG: at 01:49

## 2019-11-15 RX ADMIN — MIRTAZAPINE 15 MG: 15 TABLET, FILM COATED ORAL at 21:06

## 2019-11-15 RX ADMIN — MIDAZOLAM 0.5 MG: 1 INJECTION INTRAMUSCULAR; INTRAVENOUS at 17:54

## 2019-11-15 RX ADMIN — POTASSIUM CHLORIDE 20 MEQ: 1500 TABLET, EXTENDED RELEASE ORAL at 10:57

## 2019-11-15 RX ADMIN — METOPROLOL SUCCINATE 25 MG: 25 TABLET, EXTENDED RELEASE ORAL at 21:06

## 2019-11-15 RX ADMIN — SODIUM CHLORIDE 20 ML: 9 INJECTION, SOLUTION INTRAVENOUS at 10:57

## 2019-11-15 RX ADMIN — LIDOCAINE HYDROCHLORIDE 7 ML: 10 INJECTION, SOLUTION EPIDURAL; INFILTRATION; INTRACAUDAL; PERINEURAL at 17:55

## 2019-11-15 RX ADMIN — MIDAZOLAM 0.5 MG: 1 INJECTION INTRAMUSCULAR; INTRAVENOUS at 17:59

## 2019-11-15 RX ADMIN — FENTANYL CITRATE 25 MCG: 50 INJECTION, SOLUTION INTRAMUSCULAR; INTRAVENOUS at 17:54

## 2019-11-15 ASSESSMENT — MIFFLIN-ST. JEOR: SCORE: 1382.5

## 2019-11-15 NOTE — PROGRESS NOTES
Heart Failure Care Pathway  GOALS TO BE MET BEFORE DISCHARGE:    1. Decrease congestion and/or edema with diuretic therapy to achieve near      optimal volume status.            Dyspnea improved:  Yes            Edema improved:     No, please explain: BLE edema         Net I/O and Weights since admission:          10/16 0700 - 11/15 0659  In: 9429.75 [P.O.:6865; I.V.:2564.75]  Out: 16583 [Urine:51531; Drains:2650]  Net: -5425.25            Vitals:    10/30/19 1044 10/31/19 0619 11/01/19 1041 11/02/19 0500   Weight: 75.3 kg (166 lb) 66.8 kg (147 lb 6 oz) 67.4 kg (148 lb 8 oz) 69.1 kg (152 lb 5.4 oz)    11/03/19 0400 11/04/19 0407 11/05/19 0500 11/07/19 0500   Weight: 68.6 kg (151 lb 3.8 oz) 69.3 kg (152 lb 11.2 oz) 65.9 kg (145 lb 4.5 oz) 63.5 kg (139 lb 15.9 oz)    11/08/19 0800 11/09/19 0516 11/10/19 0623 11/11/19 0647   Weight: 70.3 kg (155 lb) 71.2 kg (156 lb 15.5 oz) 71 kg (156 lb 9.6 oz) 70.5 kg (155 lb 6.4 oz)    11/12/19 0400 11/13/19 0500 11/14/19 0650   Weight: 72.4 kg (159 lb 9.8 oz) 70.4 kg (155 lb 3.3 oz) 67.9 kg (149 lb 11.2 oz)       2.  O2 sats > 92% on RA or at prior home O2 therapy level.          Current oxygenation status:       SpO2: 98 %         O2 Device: None (Room air),            Able to wean O2 this shift to keep sats > 92%: no O2 required        Does patient use Home O2? No    3.  Tolerates ambulation and mobility near baseline: No, please explain: weak         How many times did the patient ambulate with nursing staff this shift? 1    Please review the Heart Failure Care Pathway for additional HF goal outcomes.    Anila Hankins RN RN  11/15/2019     A&O x4. VSS on room air. Tele a fib CVR. Denies CP/SOB/pain. Up A1 w/ walker. Blanchable redness to spine/coccyx, new mepilex's placed. Incont of B/B. Pt able to T/R independently with frequent reminders. Plan for possible R/L heart cath today, will continue to monitor.

## 2019-11-15 NOTE — PROVIDER NOTIFICATION
MD Notification    Notified Person: NP    Notified Person Name:  Rio Guzman     Notification Date/Time:  11/15, 1350    Notification Interaction: direct conversation     Purpose of Notification:  Aspirin clarification     Orders Received:  Ok to give asa 81 x 1 dose prior to cath lab, also run fluids at TKO-- not 150/hr on pre cath lab order set.     Comments:

## 2019-11-15 NOTE — PROGRESS NOTES
[facility]  Hospitalist Progress Note  Name: Jama Paul    MRN: [id number]  Physician:  Neri Burns DO, FHM (Text Page)    Summary of Stay:  Jama Paul is an 88 year-old male with a history of hypertension, atrial fibrillation, nonischemic cardiomyopathy, congestive heart failure, idiopathic thrombocytopenic purpura and hiatal hernia, who is admitted 10/30/2019 with worsening shortness of breath.  He was found to have effusions.  Underwent bilateral thoracentesis as well as aggressive diuresis, subsequently with hypotension requiring dobutamine and then norepinephrine.  Pleural fluid analysis consistent with transudate, consistent with CHF. History of nonischemic cardiomyopathy, although improved to 55-60% on last echocardiogram 1/2019, echocardiogram this admission with EF 55-60%, severely dilated RV, valvular abnormalities as above.  KATIE x2 attempted, unable to pass scope.  GI consulted for EGD with no clear reason for inability to complete KATIE.  Repeat KATIE with GI assistance 11/1.  Following the procedure he had hypotension and required a phenylephrine IV drip in the ICU.    Assessment & Plan    Post-op hypotension: Now resolved at this time.  Off phenylephrine at this time.  -  Suspect multifactorial but largely related to his tenuous cardiac status with his valvular disease and Precedex infusion.  BP improved now.  Recently off phenylephrine for a trial.  Patient feels well.  -    Hold on more IVF as he was bolused around the time of his BP decline last evening and developed crackles and worsened hypoxia.  IVF were held and he has since improved with O2 weaned to 2 L.    Difficulty passing KATIE scope:  -  GI performed a small dilation during the KATIE that allowed the scope to pass.  See procedure note.  Dr. Ontiveros felt he could advance a diet.    Cardiogenic shock episode earlier in stay: Resolved   Acute on chronic diastolic congestive heart failure with bilateral pleural  effusion s/p bilateral thoracentesis with fluid suggestive of CHF  Mitral regurgitation and significant tricuspid regurgitation  History of nonischemic cardiomyopathy:    -  KATIE  cardiology and CV surgery consulted and following management as per them.  - Remains off of Entresto, resume when indicated per cardiology   Metoprolol 25 mg bid resumed on 11/13   Xray shows right pleural effusion, will ask radiologist to do U/S guided therapeutic Thoracentesis on 11/14,  Xray shows almost resolution of pleural effusion on right side.   He was on IV Lasix drip, now much better and IV lasix drip switch to oral Torsemide.   Repeat TTE shows improvement in MR, only mild to moderate.   Still have Severe TR, Cardiology is planning to do Right and Left Heart cath today for the work up of his   TR repair or replacement.     Idiopathic thrombocytopenic purpura:  Followed by ALAN.  Treated with high-dose steroids without response and platelets, steroids discontinued.  - Platelets stable in 40s range  - As per hematology recommend platelet transfusion prior to invasive cardiac procedure if one performed in order to achieve >= 50,000 plts. Patient current platelets are more than 70,000     Iatrogenic right-sided pneumothorax status post a right pigtail catheter placement by IR  Secondary to thoracentesis earlier in stay. Pneumothorax resolved after pigtail catheter placement  -Was on room air but post procedure with fluid bolus developed some volume overload and required 5 liters.  Now back down to 2 liters and continuing to wean.  Patient without acute SOB complaints today.  If resp status/hypoxia again worsens would obtain new CXR. No more Pneumothorax.     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  - Holding metoprolol XL this AM with hypotension  - Telemetry, currently with controlled ventricular rate     Urinary tract infection, citrobacter  Urinalysis abnormal,  urine culture with >100K citrobacter, pansensitive.  -Status post cefuroxime to complete 7 day course(completed 11/9)     Steroid-induced hyperglycemia: Resolved after stopping the prednisone.  Noted during high-dose steroids.  Required low doses of insulin.  Last hemoglobin A1c unknown though blood sugars have now normalized off of steroids, insulin and blood sugar checks have since been discontinued.    Mildly elevated lactic acid of 2.2  This tachycardia is because of rebound as he is not taking his metoprolol.  I will restart his metoprolol.  No sign and symptom of sepsis or septic shock at this time.  He is on diuresis with IV Lasix for CHF.  Will avoid any IV #boluses at this time.  If his blood pressure remains stable we will restart his metoprolol to prevent tachycardia.      Overall stable  Agree with stopping IV lasix drip at this time.  Repeat TTE shows improvement in MR, will not need any intervention to his mitral valve at this time  And right heart catheterization for the work-up of tricuspid valve repair versus replacement.          Diet: Room Service  2 Gram Sodium Diet  Snacks/Supplements Adult: Other; offer any supplement w/ meals; With Meals  Fluid restriction 1500 ML FLUID  NPO for Medical/Clinical Reasons Except for: Meds    DVT Prophylaxis: ambulation  Mccullough Catheter: not present  Code Status: Full Code      Disposition Plan   Unclear, depends on procedure and status next couple days.  Expect 2+ more day hospital stay.     Entered: Pato Benedict 11/15/2019, 12:47 PM       Interval History   She feeling well this morning denies any chest pain shortness of breath fever chills nausea vomiting dizziness or lightheadedness at this time.    No other significant event overnight    -Data reviewed today: I reviewed all new labs and imaging reports over the last 24 hours. I personally reviewed no images or EKG's today.    Physical Exam   Temp: 98.1  F (36.7  C) Temp src: Oral BP: 115/69 Pulse: 105 Heart  Rate: 108 Resp: 19 SpO2: 94 % O2 Device: None (Room air)    Vitals:    11/13/19 0500 11/14/19 0650 11/15/19 0630   Weight: 70.4 kg (155 lb 3.3 oz) 67.9 kg (149 lb 11.2 oz) 67.4 kg (148 lb 11.2 oz)     Vital Signs with Ranges  Temp:  [96.7  F (35.9  C)-98.8  F (37.1  C)] 98.1  F (36.7  C)  Pulse:  [] 105  Heart Rate:  [] 108  Resp:  [16-32] 19  BP: ()/(43-69) 115/69  SpO2:  [94 %-100 %] 94 %  I/O last 3 completed shifts:  In: 767.46 [P.O.:580; I.V.:187.46]  Out: 2650 [Drains:2650]    GEN:  Alert, oriented x 3, appears comfortable, no overt distress.   HEENT:  Normocephalic/atraumatic, no scleral icterus, no nasal discharge, mouth moist.  CV:  Regular rate and rhythm, distant with soft murmur.  LUNGS:  Clear to auscultation, good air entry bilaterally   ABD:  Active bowel sounds, soft, non-tender/non-distended.  No rebound/guarding/rigidity.  EXT:  Trace edema.  No cyanosis.  No acute joint synovitis noted.  SKIN:  Dry to touch, no exanthems noted in the visualized areas.    Medications     - MEDICATION INSTRUCTIONS -       - MEDICATION INSTRUCTIONS -       sodium chloride 20 mL (11/15/19 1057)       [START ON 11/16/2019] aspirin  81 mg Oral Daily     digoxin  125 mcg Oral Daily     influenza Vac Split High-Dose  0.5 mL Intramuscular Prior to discharge     lidocaine  1.5 mL Other Once     metoprolol succinate ER  25 mg Oral BID     mirtazapine  15 mg Oral At Bedtime     sodium chloride (PF)  10 mL Intracatheter Q7 Days     torsemide  20 mg Oral Daily     Data     Recent Labs   Lab 11/15/19  1050 11/14/19  0735 11/13/19  0600   WBC 11.9* 7.3 7.1   HGB 10.1* 9.4* 8.9*   HCT 31.3* 29.5* 28.2*   MCV 94 95 94   PLT 72* 71* 59*       Recent Labs   Lab 11/15/19  0610 11/14/19  0735 11/13/19  0600  11/11/19  1735  11/09/19  0620    135 137   < > 137   < > 136   POTASSIUM 3.6 3.8 4.2   < > 4.8   < > 4.5   CHLORIDE 100 100 102   < > 105   < > 103   CO2 32 32 31   < > 29   < > 31   ANIONGAP 4 3 4   < > 3    < > 2*   GLC 86 93 94   < > 102*   < > 94   BUN 24 20 18   < > 17   < > 26   CR 0.88 0.94 0.91   < > 0.83   < > 0.83   GFRESTIMATED 76 72 75   < > 78   < > 78   GFRESTBLACK 88 83 87   < > >90   < > >90   BARON 7.5* 7.8* 7.4*   < > 7.2*   < > 7.5*   MAG  --   --   --   --  2.5*  --  2.5*   PHOS  --  3.1  --   --  3.2  --   --    PROTTOTAL  --   --   --   --  5.4*  --   --    ALBUMIN  --  2.5*  --   --  2.5*  --   --    BILITOTAL  --   --   --   --  0.6  --   --    ALKPHOS  --   --   --   --  47  --   --    AST  --   --   --   --  9  --   --    ALT  --   --   --   --  20  --   --     < > = values in this interval not displayed.       Recent Results (from the past 24 hour(s))   XR Chest 1 View    Narrative    CHEST ONE VIEW   2019 2:08 PM     HISTORY: Right thoracentesis.    COMPARISON: Chest x-ray 2019 at 0833 hours.      Impression    IMPRESSION: Portable view of the chest in the upright position is  performed following right thoracentesis procedure. Minimal residual  right pleural fluid is noted after the thoracentesis. No evidence of  pneumothorax. Trace amount of left pleural fluid and retrocardiac  opacity is again noted and unchanged. Right PICC line terminates near  the SVC right atrium junction in good position.    JASON GARZA MD   Echocardiogram Limited    Narrative    [id number]  [id number]  [id number]  [id number]^RUBY^KIKE           [facility]  Echocardiography Laboratory  [address] [place] [address]        Name: YINKA GONZALEZ  MRN: [id number]  : 1931  Study Date: 2019 03:51 PM  Age: 88 yrs  Gender: Male  Patient Location: [facility]  Reason For Study: Heart Failure, Tricuspid Regurgitation, Mitral Regurgitation  Ordering Physician: KIKE BELTRAN  Referring Physician: Mariusz Shields  Performed By: Jarocho Dacosta RDCS     BSA: 1.9 m2  Height: 72 in  Weight: 149 lb  HR: 84  BP: 87/57  mmHg  _____________________________________________________________________________  __        Procedure  Limited Portable Echo Adult. 3D image acquisition, reconstruction, and real-  time interpretation was performed.  _____________________________________________________________________________  __        Interpretation Summary     There is mod-severe to severe (3-4+) tricuspid regurgitation.  There is mild to moderate (1-2+) mitral regurgitation.  The visual ejection fraction is estimated at 60-65%.  Left ventricular systolic function is normal.  The right ventricle is mild to moderately dilated.  The right ventricular systolic function is normal.  _____________________________________________________________________________  __        Left Ventricle  Left ventricular hypertrophy is noted by two-dimensional echocardiography. The  visual ejection fraction is estimated at 60-65%. Left ventricular systolic  function is normal.     Right Ventricle  The right ventricle is mild to moderately dilated. The right ventricular  systolic function is normal.     Atria  The left atrium is moderate to severely dilated. The right atrium is severely  dilated. Right atrial catheter. There is no color Doppler evidence of an  atrial shunt.     Mitral Valve  The mitral valve leaflets appear thickened, but open well. There is mild to  moderate (1-2+) mitral regurgitation.        Tricuspid Valve  There is mod-severe to severe (3-4+) tricuspid regurgitation. The right  ventricular systolic pressure is approximated at 38.6 mmHg plus the right  atrial pressure. Right ventricular systolic pressure is elevated, consistent  with mild to moderate pulmonary hypertension.     Aortic Valve  There is trace aortic regurgitation.     Pulmonic Valve  There is mild (1+) pulmonic valvular regurgitation.     Vessels  The inferior vena cava was normal in size with preserved respiratory  variability.     Pericardium  Moderate left pleural effusion.      _____________________________________________________________________________  __        Doppler Measurements & Calculations  TR max paul: 310.8 cm/sec  TR max P.6 mmHg              _____________________________________________________________________________  __           Report approved by: Tremayne Edmondson 2019 04:39 PM

## 2019-11-15 NOTE — PRE-PROCEDURE
Seen and examined and risk/benefit discussed  Lung dec bs with rales  Cor afib and syst murmur  (epic failure of pre procedure note-this is to take its place)

## 2019-11-15 NOTE — PROGRESS NOTES
[facility]    Cardiology Progress Note    Primary Cardiologist: Dr. Pugh    Date of Admission:  10/30/2019  Service date: 11/15/2019    Summary:  Mr. Jama Paul is a very pleasant 88 year old male who was admitted on 10/30/2019 for worsening shortness of breath and found to have acute decompensated heart failure with preserved ejection fraction.     Assessment & Plan   1.  Acute on chronic heart failure with preserved ejection fraction  - Wt down to 148 lbs today.  - Transitioned to torsemide 20 mg PO once daily.   - Renal function and electrolytes stable.  2.  Mod-severe to severe tricuspid regurgitation  3.  Mild to moderate mitral regurgitation   - Significantly improved on echo yesterday following diuresis.   4.  Bilateral pulmonary effusion status post thoracentesis 10/31/19  - Iatrogenic right-sided pneumothorax status post right pigtail catheter placement by IR with resolution.  - Status post repeat right thoracentesis with 2.5 L out yesterday.  5.  Chronic atrial fibrillation not on anticoagulation due to thrombocytopenia and history of bleeding  - PTA on metoprolol succinate 100 mg in the morning and 50 mg in the evening, which had been on hold due to hypotension.  - Metoprolol restarted at 25 mg BID. Will titrate as BP allows.  6.  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  Mild--Moderate pulmonary hypertension  8.  Bilateral pulmonary effusion status post thoracentesis  9.  Hypotension after administration of Precedex for KATIE  10. UTI, treated with 7 day course of cefuroxime    Plan:   1. Right and left heart cath today for workup for possible tricuspid valve repair.  2. Continue on PO torsemide 20 mg once daily.  3. Continue to hold anticoagulation due to ITP.   4. Will discuss with Dr. Cronin for consideration of platelet transfusion prior to LHC/RHC with platelet count 71,000 yesterday.    Interval History    No acute events overnight. BP soft at times but stable. He feels his  breathing is significantly improved following his thoracentesis yesterday. He is currently sitting in the chair at the bedside comfortably on room air. He denies chest pain, palpitations, or orthopnea, or PND.    Risks and benefits of left and right heart cath were discussed today including, bleeding, bruising, infection, allergic reaction, kidney damage (including need for dialysis), stroke, heart attack, vascular damage, emergency open heart surgery, up to and including death.  Patient indicates understanding and is agreeable to proceed. Consent was signed and all questions were answered.     Thank you for the opportunity to participate in this pleasant patient's care.     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)     Thrombocytopenia (H)     Eye hemorrhage, left     Non-ischemic cardiomyopathy (H)     Permanent atrial fibrillation     Idiopathic thrombocytopenic purpura (H)     Ulcer of right lower extremity with fat layer exposed (H)     Pleural effusion     Bilateral pleural effusion     Physical Exam   Temp: 98.1  F (36.7  C) Temp src: Oral BP: 100/66 Pulse: 84 Heart Rate: 90 Resp: 16 SpO2: 97 % O2 Device: None (Room air)    Vitals:    11/13/19 0500 11/14/19 0650 11/15/19 0630   Weight: 70.4 kg (155 lb 3.3 oz) 67.9 kg (149 lb 11.2 oz) 67.4 kg (148 lb 11.2 oz)     Vital Signs with Ranges  Temp:  [96.7  F (35.9  C)-98.8  F (37.1  C)] 98.1  F (36.7  C)  Pulse:  [81-90] 84  Heart Rate:  [] 90  Resp:  [16-32] 16  BP: ()/(43-68) 100/66  SpO2:  [94 %-100 %] 97 %  I/O last 3 completed shifts:  In: 767.46 [P.O.:580; I.V.:187.46]  Out: 2650 [Drains:2650]    Constitutional: Frail appearing elderly gentleman sitting comfortably in the chair at the bedise and  in no acute distress. His wife is at the bedside.  Eyes: Pupils equal, round. Sclerae anicteric.   HEENT: Normocephalic, atraumatic.   Respiratory: Breathing non-labored. Lungs clear to auscultation bilaterally, no wheezes, rhonchi, or rales.  Cardiovascular: Irregular rate and rhythm, normal S1 and S2. 2/6 systolic murmur best heard at the LLSB.  Extremities: Moves all extremities well and symmetrically. No lower extremity edema bilaterally.  Neurologic: Alert, cooperative. No gross focal deficits.  Psychiatric: Affect and appropriate. Answering questions appropriately.    Medications     - MEDICATION INSTRUCTIONS -         digoxin  125 mcg Oral Daily     influenza Vac Split High-Dose  0.5 mL Intramuscular Prior to discharge     lidocaine  1.5 mL Other Once     metoprolol succinate ER  25 mg Oral BID     mirtazapine  15 mg Oral At Bedtime     sodium chloride (PF)  10 mL Intracatheter Q7 Days     torsemide  20 mg Oral Daily     Data   Results for orders placed or performed during the hospital encounter of 10/30/19 (from the past 24 hour(s))   XR Chest 1 View    Narrative    CHEST ONE VIEW   11/14/2019 2:08 PM     HISTORY: Right thoracentesis.    COMPARISON: Chest x-ray 11/14/2019 at 0833 hours.      Impression    IMPRESSION: Portable view of the chest in the upright position is  performed following right thoracentesis procedure. Minimal residual  right pleural fluid is noted after the thoracentesis. No evidence of  pneumothorax. Trace amount of left pleural fluid and retrocardiac  opacity is again noted and unchanged. Right PICC line terminates near  the SVC right atrium junction in good position.    JASON GARZA MD   Lactic acid level STAT   Result Value Ref Range    Lactate for Sepsis Protocol 1.4 0.7 - 2.0 mmol/L   Echocardiogram Limited    Narrative    [id number]  [id number]  [id number]  [id number]^RUBY^KIKE           [facility]  Echocardiography Laboratory  [address] [place] [address]  [id number]        Name: YINKA GONZALEZ  MRN: [id number]  : 1931  Study Date: 2019 03:51 PM  Age: 88 yrs  Gender: Male  Patient Location: [facility]  Reason For Study: Heart Failure, Tricuspid Regurgitation, Mitral Regurgitation  Ordering Physician: KIKE BELTRAN  Referring Physician: Mariusz Shields  Performed By: Jarocho Dacosta RDCS     BSA: 1.9 m2  Height: 72 in  Weight: 149 lb  HR: 84  BP: 87/57 mmHg  _____________________________________________________________________________  __        Procedure  Limited Portable Echo Adult. 3D image acquisition, reconstruction, and real-  time interpretation was performed.  _____________________________________________________________________________  __        Interpretation Summary     There is mod-severe to severe (3-4+) tricuspid regurgitation.  There is mild to moderate (1-2+) mitral regurgitation.  The visual ejection fraction is estimated at 60-65%.  Left ventricular systolic function is normal.  The right ventricle is mild to moderately dilated.  The right ventricular systolic function is normal.  _____________________________________________________________________________  __        Left Ventricle  Left ventricular hypertrophy is noted by two-dimensional echocardiography. The  visual ejection fraction is estimated at 60-65%. Left ventricular systolic  function is normal.     Right Ventricle  The right ventricle is mild to moderately dilated. The right ventricular  systolic function is normal.     Atria  The left atrium is moderate to severely dilated. The right atrium is severely  dilated. Right atrial catheter. There is no color Doppler evidence of an  atrial shunt.     Mitral Valve  The mitral valve leaflets appear thickened, but open well. There is mild to  moderate (1-2+) mitral regurgitation.        Tricuspid Valve  There is mod-severe to severe (3-4+) tricuspid regurgitation. The right  ventricular systolic pressure is approximated at 38.6 mmHg  plus the right  atrial pressure. Right ventricular systolic pressure is elevated, consistent  with mild to moderate pulmonary hypertension.     Aortic Valve  There is trace aortic regurgitation.     Pulmonic Valve  There is mild (1+) pulmonic valvular regurgitation.     Vessels  The inferior vena cava was normal in size with preserved respiratory  variability.     Pericardium  Moderate left pleural effusion.     _____________________________________________________________________________  __        Doppler Measurements & Calculations  TR max paul: 310.8 cm/sec  TR max P.6 mmHg              _____________________________________________________________________________  __           Report approved by: Tremayne Edmondson 2019 04:39 PM      Basic metabolic panel   Result Value Ref Range    Sodium 136 133 - 144 mmol/L    Potassium 3.6 3.4 - 5.3 mmol/L    Chloride 100 94 - 109 mmol/L    Carbon Dioxide 32 20 - 32 mmol/L    Anion Gap 4 3 - 14 mmol/L    Glucose 86 70 - 99 mg/dL    Urea Nitrogen 24 7 - 30 mg/dL    Creatinine 0.88 0.66 - 1.25 mg/dL    GFR Estimate 76 >60 mL/min/[1.73_m2]    GFR Estimate If Black 88 >60 mL/min/[1.73_m2]    Calcium 7.5 (L) 8.5 - 10.1 mg/dL

## 2019-11-15 NOTE — PLAN OF CARE
VSS, no c/o pain, awaiting R/L heart cath this afternoon, NPO all day, blanchable coccyx and spine both with mepilex in place, incontinent of both bowel and bladder, continue to monitor.

## 2019-11-15 NOTE — PROGRESS NOTES
Hematology chart check    1. Moderate thrombocytopenia with platelet counts historically ranging in the 30-45,000 range.  - Prior bone marrow biopsy was suggestive of an underlying myelodysplastic syndrome given the chromosome abnormalities but there were  no other recognizable features to suggest MDS.  Megakaryocyte production appeared normal and therefore the chief basis for the thrombocytopenia does not appear to be due to decreased production.    - The platelet count has not previously responded to trial of IVIG or corticosteroids.   - Antiphospholipid antibody testing did not reveal abnormality to suggest this as etiology.  - Has been followed with observation in the outpatient setting.  - has also not had significant response to steroids during this hospitalization therefore they were discontinued  - platelet counts 71,000 on 11/14/19  - daily CBC  - consider platelet transfusion for bleeding or invasive cardiac procedure.       2. Valvular heart disease with CHF, a fib   - Severe tricuspid regurgitation. Moderate-severe mitral regurgitation.  - Echo 11/11/2019  - plan per Cardiology     Records reviewed. No new recommendations.

## 2019-11-16 ENCOUNTER — APPOINTMENT (OUTPATIENT)
Dept: PHYSICAL THERAPY | Facility: CLINIC | Age: 84
DRG: 286 | End: 2019-11-16
Payer: MEDICARE

## 2019-11-16 LAB
ALBUMIN SERPL-MCNC: 2.3 G/DL (ref 3.4–5)
ANION GAP SERPL CALCULATED.3IONS-SCNC: 1 MMOL/L (ref 3–14)
ANISOCYTOSIS BLD QL SMEAR: ABNORMAL
BASOPHILS # BLD AUTO: 0 10E9/L (ref 0–0.2)
BASOPHILS NFR BLD AUTO: 0.1 %
BUN SERPL-MCNC: 25 MG/DL (ref 7–30)
CALCIUM SERPL-MCNC: 7.3 MG/DL (ref 8.5–10.1)
CHLORIDE SERPL-SCNC: 103 MMOL/L (ref 94–109)
CO2 SERPL-SCNC: 32 MMOL/L (ref 20–32)
CREAT SERPL-MCNC: 0.89 MG/DL (ref 0.66–1.25)
DIFFERENTIAL METHOD BLD: ABNORMAL
ELLIPTOCYTES BLD QL SMEAR: SLIGHT
EOSINOPHIL # BLD AUTO: 0 10E9/L (ref 0–0.7)
EOSINOPHIL NFR BLD AUTO: 0.3 %
ERYTHROCYTE [DISTWIDTH] IN BLOOD BY AUTOMATED COUNT: 21.5 % (ref 10–15)
GFR SERPL CREATININE-BSD FRML MDRD: 76 ML/MIN/{1.73_M2}
GLUCOSE SERPL-MCNC: 83 MG/DL (ref 70–99)
HCT VFR BLD AUTO: 29.2 % (ref 40–53)
HGB BLD-MCNC: 9.2 G/DL (ref 13.3–17.7)
IMM GRANULOCYTES # BLD: 0 10E9/L (ref 0–0.4)
IMM GRANULOCYTES NFR BLD: 0.1 %
LYMPHOCYTES # BLD AUTO: 0.6 10E9/L (ref 0.8–5.3)
LYMPHOCYTES NFR BLD AUTO: 8.3 %
MCH RBC QN AUTO: 29.8 PG (ref 26.5–33)
MCHC RBC AUTO-ENTMCNC: 31.5 G/DL (ref 31.5–36.5)
MCV RBC AUTO: 95 FL (ref 78–100)
MONOCYTES # BLD AUTO: 0.6 10E9/L (ref 0–1.3)
MONOCYTES NFR BLD AUTO: 7.9 %
NEUTROPHILS # BLD AUTO: 6.1 10E9/L (ref 1.6–8.3)
NEUTROPHILS NFR BLD AUTO: 83.3 %
NRBC # BLD AUTO: 0 10*3/UL
NRBC BLD AUTO-RTO: 0 /100
PHOSPHATE SERPL-MCNC: 3.5 MG/DL (ref 2.5–4.5)
PLATELET # BLD AUTO: 76 10E9/L (ref 150–450)
PLATELET # BLD EST: ABNORMAL 10*3/UL
POTASSIUM SERPL-SCNC: 4.2 MMOL/L (ref 3.4–5.3)
RBC # BLD AUTO: 3.09 10E12/L (ref 4.4–5.9)
RBC INCLUSIONS BLD: SLIGHT
SODIUM SERPL-SCNC: 136 MMOL/L (ref 133–144)
WBC # BLD AUTO: 7.4 10E9/L (ref 4–11)

## 2019-11-16 PROCEDURE — 40000239 ZZH STATISTIC VAT ROUNDS

## 2019-11-16 PROCEDURE — 25000132 ZZH RX MED GY IP 250 OP 250 PS 637: Mod: GY | Performed by: INTERNAL MEDICINE

## 2019-11-16 PROCEDURE — 99232 SBSQ HOSP IP/OBS MODERATE 35: CPT | Performed by: INTERNAL MEDICINE

## 2019-11-16 PROCEDURE — 85025 COMPLETE CBC W/AUTO DIFF WBC: CPT | Performed by: INTERNAL MEDICINE

## 2019-11-16 PROCEDURE — 21000001 ZZH R&B HEART CARE

## 2019-11-16 PROCEDURE — 80069 RENAL FUNCTION PANEL: CPT | Performed by: INTERNAL MEDICINE

## 2019-11-16 PROCEDURE — 97530 THERAPEUTIC ACTIVITIES: CPT | Mod: GP

## 2019-11-16 RX ADMIN — MIRTAZAPINE 15 MG: 15 TABLET, FILM COATED ORAL at 21:01

## 2019-11-16 RX ADMIN — MELATONIN 5 MG TABLET 5 MG: at 01:02

## 2019-11-16 RX ADMIN — MELATONIN 5 MG TABLET 5 MG: at 21:00

## 2019-11-16 RX ADMIN — DIGOXIN 125 MCG: 0.12 TABLET ORAL at 09:39

## 2019-11-16 RX ADMIN — METOPROLOL SUCCINATE 25 MG: 25 TABLET, EXTENDED RELEASE ORAL at 21:01

## 2019-11-16 RX ADMIN — TORSEMIDE 20 MG: 20 TABLET ORAL at 09:40

## 2019-11-16 ASSESSMENT — MIFFLIN-ST. JEOR: SCORE: 1380

## 2019-11-16 NOTE — PROGRESS NOTES
SW:  D:  Per hospitlalist note, patient is stable for discharge tomorrow.  discharge plan is to the TCU at [facility].  Patient has left a message for their admission coordinator at [phone].  Requested a call back this afternoon or tomorrow morning.  Also called the TCU at [facility].  Staff believes Yovanny admission coordinator does carry his phone on the w/e. Writer did alert staff of the goal to admit patient tomorrow to their TCU.

## 2019-11-16 NOTE — PLAN OF CARE
A&OX4, hard of hearing, BP soft, denies SOB/CP, on room air. Tele-AFib CVR,  on room air, assist of 2 with care, able to turn self in bed. Pt incontinent and refusing help to get cleaned. Right groin site C/D/I, CMS intact. Melatonin given, slept well. PICC line on the right chest flushing well.

## 2019-11-16 NOTE — PLAN OF CARE
A&O.  VSS. Tele Afib, HR 80s to 100s.  2 gram Na diet, 1.5 L fluid restriction, NPO since midnight for procedure today. Up with assist of 1 and walker. Denies pain. Incontinent of urine, diaper changed @ 1700. Patient left for cath lab around 1740.

## 2019-11-16 NOTE — PLAN OF CARE
OT: tx session attempted however pt just returned to bed and requesting to rest. Declined OT at this time.

## 2019-11-16 NOTE — PROGRESS NOTES
HEMATOLOGY CC:    Hematology chart check    1. Moderate thrombocytopenia with platelet counts historically ranging in the 30-45,000 range.  - Prior bone marrow biopsy was suggestive of an underlying myelodysplastic syndrome given the chromosome abnormalities but there were  no other recognizable features to suggest MDS.  Megakaryocyte production appeared normal and therefore the chief basis for the thrombocytopenia does not appear to be due to decreased production.    - The platelet count has not previously responded to trial of IVIG or corticosteroids.   - Antiphospholipid antibody testing did not reveal abnormality to suggest this as etiology.  - Has been followed with observation in the outpatient setting.  - has also not had significant response to steroids during this hospitalization therefore they were discontinued  - platelet counts 71,000 on 11/14/19  - daily CBC  - consider platelet transfusion for bleeding or invasive cardiac procedure.       2. Valvular heart disease with CHF, a fib   - Severe tricuspid regurgitation. Moderate-severe mitral regurgitation.  - Echo 11/11/2019  - plan per Cardiology     Records reviewed. No new recommendations. HB at 9.2, PLT 96

## 2019-11-16 NOTE — PROGRESS NOTES
[facility]    Cardiology Progress Note     Assessment & Plan   Jama Paul is a 88 year old male who was admitted on 10/30/2019.     1.  Bilateral pulmonary effusion status post thoracentesis  2.  Severe tricuspid regurgitation  3.  Mitral regurgitation- dynamic, previously mod-severe, now only mild after diuresis  4.  Chronic atrial fibrillation not on anticoagulation due to thrombocytopenia and history of bleeding  5.  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  Mild pulmonary hypertension- mPAP of 25 RHC on 11/15/2019  7.  Hypotension after administration of Precedex for KATIE- resolved     Recommendations-   1.  Normal Left and right sided pressures on RHC consistent with euvolemic status. PAP also normal. Transitioned to po diuretics. Lasix Drip dcd. No CAD on LHC. He will remain stable as long as he is euvolemic which will be challenging given the nature of his disease.  Need outpatient follow-up in core clinic at the time of discharge.  Can also be considered for CardioMEMS for closer monitoring.   3.  Continue to hold anticoagulation due to thrombocytopenia.  Platelets stable.  4.  CXR improved with thoracentesis.  Repeat if SOB.   5.  Repeat TTE shows sig improvement in the MR which is at most mild. TR has also improved, although still hemodynamically significant. No need for mitral clip. Will discuss with Dr. Cervantes regarding TV repair. This can be done on out pt basis as he is stable.   6.  PT for discharge planning. Will likely need TCU.     Sandeep Cronin MD  Text Page (7am - 5pm, M-F)    Interval History   Over all doing well. No complains. Had LHC/RHC yesterday. Site normal.     Physical Exam   Temp: 97.7  F (36.5  C) Temp src: Axillary BP: 119/76 Pulse: 86 Heart Rate: 95 Resp: 16 SpO2: 99 % O2 Device: None (Room air) Oxygen Delivery: 2 LPM  Vitals:    11/14/19 0650 11/15/19 0630 11/16/19 0627   Weight: 67.9 kg (149 lb 11.2 oz) 67.4 kg (148 lb 11.2 oz) 67.2 kg (148 lb 2.4 oz)      Vital Signs with Ranges  Temp:  [97.6  F (36.4  C)-98.6  F (37  C)] 97.7  F (36.5  C)  Pulse:  [] 86  Heart Rate:  [] 95  Resp:  [13-74] 16  BP: ()/(45-76) 119/76  SpO2:  [94 %-100 %] 99 %  I/O last 3 completed shifts:  In: 360 [P.O.:360]  Out: -      Constitutional: Frail appearing elderly gentleman in no acute distress.  Eyes: Pupils equal, round. Sclerae anicteric.   HEENT: Normocephalic, atraumatic.   Respiratory: Breathing non-labored. Lungs clear to auscultation bilaterally, no wheezes, rhonchi, or rales.  Cardiovascular: Irregular rate and rhythm, normal S1 and S2. 2/6 systolic murmur best heard at the LLSB.  Extremities: Moves all extremities well and symmetrically. No lower extremity edema bilaterally.  Neurologic: Alert, cooperative. No gross focal deficits.  Psychiatric: Affect and appropriate. Answering questions appropriately.    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    Medications     sodium chloride Stopped (11/15/19 2300)       digoxin  125 mcg Oral Daily     influenza Vac Split High-Dose  0.5 mL Intramuscular Prior to discharge     lidocaine  1.5 mL Other Once     metoprolol succinate ER  25 mg Oral BID     mirtazapine  15 mg Oral At Bedtime     sodium chloride (PF)  10 mL Intracatheter Q7 Days     torsemide  20 mg Oral Daily       Data   Results for orders placed or  performed during the hospital encounter of 10/30/19 (from the past 24 hour(s))   ISTAT gases venous POCT   Result Value Ref Range    Ph Venous 7.42 7.32 - 7.43 pH    PCO2 Venous 43 40 - 50 mm Hg    PO2 Venous 33 25 - 47 mm Hg    Bicarbonate Venous 28 21 - 28 mmol/L    O2 Sat Venous 65 %   ISTAT gases venous POCT   Result Value Ref Range    Ph Venous 7.45 (H) 7.32 - 7.43 pH    PCO2 Venous 38 (L) 40 - 50 mm Hg    PO2 Venous 71 (H) 25 - 47 mm Hg    Bicarbonate Venous 27 21 - 28 mmol/L    O2 Sat Venous 95 %   Cardiac Catheterization   Result Value Ref Range    Cath EF Estimated 60 %    Narrative      Ramus lesion is 15% stenosed.    Prox LAD lesion is 20% stenosed.    Mid RCA lesion is 20% stenosed.    The ejection fraction is greater than 55% by visual estimate.    Right sided filling pressures are normal.    Normal PA pressures.    Left sided filling pressures are normal.    Left ventricular filling pressures are normal .    Normal cardiac output level.     1. Minimal CAD  2. Low systemic BP  3. Right heart study suggests normal right heart pressures, no pulmonary   hypertension, essentially normal PVR and CO     CBC with platelets differential   Result Value Ref Range    WBC 7.4 4.0 - 11.0 10e9/L    RBC Count 3.09 (L) 4.4 - 5.9 10e12/L    Hemoglobin 9.2 (L) 13.3 - 17.7 g/dL    Hematocrit 29.2 (L) 40.0 - 53.0 %    MCV 95 78 - 100 fl    MCH 29.8 26.5 - 33.0 pg    MCHC 31.5 31.5 - 36.5 g/dL    RDW 21.5 (H) 10.0 - 15.0 %    Platelet Count 76 (L) 150 - 450 10e9/L    Diff Method Automated Method     % Neutrophils 83.3 %    % Lymphocytes 8.3 %    % Monocytes 7.9 %    % Eosinophils 0.3 %    % Basophils 0.1 %    % Immature Granulocytes 0.1 %    Nucleated RBCs 0 0 /100    Absolute Neutrophil 6.1 1.6 - 8.3 10e9/L    Absolute Lymphocytes 0.6 (L) 0.8 - 5.3 10e9/L    Absolute Monocytes 0.6 0.0 - 1.3 10e9/L    Absolute Eosinophils 0.0 0.0 - 0.7 10e9/L    Absolute Basophils 0.0 0.0 - 0.2 10e9/L    Abs Immature Granulocytes 0.0 0 -  0.4 10e9/L    Absolute Nucleated RBC 0.0     Anisocytosis Moderate     RBC Fragments Slight     Elliptocytes Slight     Platelet Estimate       Automated count confirmed.  Platelet morphology is normal.   Renal panel   Result Value Ref Range    Sodium 136 133 - 144 mmol/L    Potassium 4.2 3.4 - 5.3 mmol/L    Chloride 103 94 - 109 mmol/L    Carbon Dioxide 32 20 - 32 mmol/L    Anion Gap 1 (L) 3 - 14 mmol/L    Glucose 83 70 - 99 mg/dL    Urea Nitrogen 25 7 - 30 mg/dL    Creatinine 0.89 0.66 - 1.25 mg/dL    GFR Estimate 76 >60 mL/min/[1.73_m2]    GFR Estimate If Black 88 >60 mL/min/[1.73_m2]    Calcium 7.3 (L) 8.5 - 10.1 mg/dL    Phosphorus 3.5 2.5 - 4.5 mg/dL    Albumin 2.3 (L) 3.4 - 5.0 g/dL

## 2019-11-16 NOTE — PLAN OF CARE
Discharge Planner PT   Patient plan for discharge: Return to [facility]  Current status: Pt is min assist for sit to stand transfers, able to ambulate forward and backward in room with FWW and min to mod assist of 1, increased unsteadiness with backwards ambulation. Vital signs stable throughout, see vital signs flow sheet. Pt also kamar standing exercises.  Barriers to return to prior living situation: Decreased strength, decreased balance, decreased activity tolerance  Recommendations for discharge: TCU  Rationale for recommendations: Pt would benefit from continued daily therapies to further increase strength, balance, and activity level.       Entered by: Emmie Hamilton 11/16/2019 1:02 PM

## 2019-11-16 NOTE — PROGRESS NOTES
{  [facility]  Hospitalist Progress Note  Name: Jama Paul    MRN: [id number]  Physician:  Neri Burns DO, FHM (Text Page)    Summary of Stay:  Jama Paul is an 88 year-old male with a history of hypertension, atrial fibrillation, nonischemic cardiomyopathy, congestive heart failure, idiopathic thrombocytopenic purpura and hiatal hernia, who is admitted 10/30/2019 with worsening shortness of breath.  He was found to have effusions.  Underwent bilateral thoracentesis as well as aggressive diuresis, subsequently with hypotension requiring dobutamine and then norepinephrine.  Pleural fluid analysis consistent with transudate, consistent with CHF. History of nonischemic cardiomyopathy, although improved to 55-60% on last echocardiogram 1/2019, echocardiogram this admission with EF 55-60%, severely dilated RV, valvular abnormalities as above.  KATIE x2 attempted, unable to pass scope.  GI consulted for EGD with no clear reason for inability to complete KATIE.  Repeat KATIE with GI assistance 11/1.  Following the procedure he had hypotension and required a phenylephrine IV drip in the ICU.    Assessment & Plan    Post-op hypotension: Now resolved at this time.  Off phenylephrine at this time.  -  Suspect multifactorial but largely related to his tenuous cardiac status with his valvular disease and Precedex infusion.  BP improved now.  Recently off phenylephrine for a trial.  Patient feels well.  -    Hold on more IVF as he was bolused around the time of his BP decline last evening and developed crackles and worsened hypoxia.  IVF were held and he has since improved with O2 weaned to 2 L.    Difficulty passing KATIE scope:  -  GI performed a small dilation during the KATIE that allowed the scope to pass.  See procedure note.  Dr. Ontiveros felt he could advance a diet.    Cardiogenic shock episode earlier in stay: Resolved   Acute on chronic diastolic congestive heart failure with bilateral pleural  effusion s/p bilateral thoracentesis with fluid suggestive of CHF  Mitral regurgitation and significant tricuspid regurgitation  History of nonischemic cardiomyopathy:    -  KATIE  cardiology and CV surgery consulted and following management as per them.  - Remains off of Entresto, resume when indicated per cardiology   Metoprolol 25 mg bid resumed on 11/13   Xray shows right pleural effusion, will ask radiologist to do U/S guided therapeutic Thoracentesis on 11/14,  Xray shows almost resolution of pleural effusion on right side.   He was on IV Lasix drip, now much better and IV lasix drip switch to oral Torsemide.   Repeat TTE shows improvement in MR, only mild to moderate.   Still have Severe TR, Cardiology is planning to do Right and Left Heart cath today for the work up of his   TR repair or replacement.     Idiopathic thrombocytopenic purpura:  Followed by ALAN.  Treated with high-dose steroids without response and platelets, steroids discontinued.  - Platelets stable in 40s range  - As per hematology recommend platelet transfusion prior to invasive cardiac procedure if one performed in order to achieve >= 50,000 plts. Patient current platelets are more than 70,000     Iatrogenic right-sided pneumothorax status post a right pigtail catheter placement by IR  Secondary to thoracentesis earlier in stay. Pneumothorax resolved after pigtail catheter placement  -Was on room air but post procedure with fluid bolus developed some volume overload and required 5 liters.  Now back down to 2 liters and continuing to wean.  Patient without acute SOB complaints today.  If resp status/hypoxia again worsens would obtain new CXR. No more Pneumothorax.     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  - Holding metoprolol XL this AM with hypotension  - Telemetry, currently with controlled ventricular rate     Urinary tract infection, citrobacter  Urinalysis abnormal,  urine culture with >100K citrobacter, pansensitive.  -Status post cefuroxime to complete 7 day course(completed 11/9)     Steroid-induced hyperglycemia: Resolved after stopping the prednisone.  Noted during high-dose steroids.  Required low doses of insulin.  Last hemoglobin A1c unknown though blood sugars have now normalized off of steroids, insulin and blood sugar checks have since been discontinued.    Mildly elevated lactic acid of 2.2 now resolved  This tachycardia is because of rebound as he is not taking his metoprolol.   Tachycardia resolved after starting the metoprolol.      Left heart cath and right heart cath looks good.  Euvolemic at this time.  Continue to keep him on torsemide.  Cardiology and cardiovascular surgery will decide about the tricuspid valve repair and the timing  At this time if he remains stable he will be ready to be discharged tomorrow.  Awaiting placement at this time.       Diet: Room Service  Snacks/Supplements Adult: Other; offer any supplement w/ meals; With Meals  Fluid restriction 1500 ML FLUID  Advance Diet as Tolerated: Regular Diet Adult; 2 gm NA Diet    DVT Prophylaxis: ambulation  Mccullough Catheter: not present  Code Status: Full Code      Disposition Plan Awaiting placement can discharge tomorrow if have the bed available     Entered: Pato JOVANIKarla Benedict 11/16/2019, 12:13 PM       Interval History   Doing much better this morning, denies any chest pain shortness of breath orthopnea PND palpitation no fever chills or cough.    No other significant event overnight    -Data reviewed today: I reviewed all new labs and imaging reports over the last 24 hours. I personally reviewed no images or EKG's today.    Physical Exam   Temp: 97.7  F (36.5  C) Temp src: Axillary BP: 119/76 Pulse: 86 Heart Rate: 95 Resp: 16 SpO2: 99 % O2 Device: None (Room air) Oxygen Delivery: 2 LPM  Vitals:    11/14/19 0650 11/15/19 0630 11/16/19 0627   Weight: 67.9 kg (149 lb 11.2 oz) 67.4 kg (148 lb 11.2 oz) 67.2  kg (148 lb 2.4 oz)     Vital Signs with Ranges  Temp:  [97.6  F (36.4  C)-98.6  F (37  C)] 97.7  F (36.5  C)  Pulse:  [73-87] 86  Heart Rate:  [69-98] 95  Resp:  [13-74] 16  BP: ()/(45-76) 119/76  SpO2:  [94 %-100 %] 99 %  I/O last 3 completed shifts:  In: 360 [P.O.:360]  Out: -     GEN:  Alert, oriented x 3, appears comfortable, no overt distress.   HEENT:  Normocephalic/atraumatic, no scleral icterus, no nasal discharge, mouth moist.  CV:  Regular rate and rhythm, distant with soft murmur.  LUNGS:  Clear to auscultation, good air entry bilaterally   ABD:  Active bowel sounds, soft, non-tender/non-distended.  No rebound/guarding/rigidity.  EXT:  Trace edema.  No cyanosis.  No acute joint synovitis noted.  SKIN:  Dry to touch, no exanthems noted in the visualized areas.    Medications     sodium chloride Stopped (11/15/19 2300)       digoxin  125 mcg Oral Daily     influenza Vac Split High-Dose  0.5 mL Intramuscular Prior to discharge     lidocaine  1.5 mL Other Once     metoprolol succinate ER  25 mg Oral BID     mirtazapine  15 mg Oral At Bedtime     sodium chloride (PF)  10 mL Intracatheter Q7 Days     torsemide  20 mg Oral Daily     Data     Recent Labs   Lab 11/16/19  0644 11/15/19  1050 11/14/19  0735   WBC 7.4 11.9* 7.3   HGB 9.2* 10.1* 9.4*   HCT 29.2* 31.3* 29.5*   MCV 95 94 95   PLT 76* 72* 71*       Recent Labs   Lab 11/16/19  0644 11/15/19  0610 11/14/19  0735  11/11/19  1735    136 135   < > 137   POTASSIUM 4.2 3.6 3.8   < > 4.8   CHLORIDE 103 100 100   < > 105   CO2 32 32 32   < > 29   ANIONGAP 1* 4 3   < > 3   GLC 83 86 93   < > 102*   BUN 25 24 20   < > 17   CR 0.89 0.88 0.94   < > 0.83   GFRESTIMATED 76 76 72   < > 78   GFRESTBLACK 88 88 83   < > >90   BARON 7.3* 7.5* 7.8*   < > 7.2*   MAG  --   --   --   --  2.5*   PHOS 3.5  --  3.1  --  3.2   PROTTOTAL  --   --   --   --  5.4*   ALBUMIN 2.3*  --  2.5*  --  2.5*   BILITOTAL  --   --   --   --  0.6   ALKPHOS  --   --   --   --  47   AST   --   --   --   --  9   ALT  --   --   --   --  20    < > = values in this interval not displayed.       Recent Results (from the past 24 hour(s))   Cardiac Catheterization   Result Value    Cath EF Estimated 60    Narrative      Ramus lesion is 15% stenosed.    Prox LAD lesion is 20% stenosed.    Mid RCA lesion is 20% stenosed.    The ejection fraction is greater than 55% by visual estimate.    Right sided filling pressures are normal.    Normal PA pressures.    Left sided filling pressures are normal.    Left ventricular filling pressures are normal .    Normal cardiac output level.     1. Minimal CAD  2. Low systemic BP  3. Right heart study suggests normal right heart pressures, no pulmonary   hypertension, essentially normal PVR and CO

## 2019-11-17 VITALS
HEART RATE: 87 BPM | OXYGEN SATURATION: 100 % | WEIGHT: 134.92 LBS | DIASTOLIC BLOOD PRESSURE: 68 MMHG | RESPIRATION RATE: 16 BRPM | SYSTOLIC BLOOD PRESSURE: 100 MMHG | BODY MASS INDEX: 18.27 KG/M2 | HEIGHT: 72 IN | TEMPERATURE: 97.5 F

## 2019-11-17 LAB
ANION GAP SERPL CALCULATED.3IONS-SCNC: 2 MMOL/L (ref 3–14)
BUN SERPL-MCNC: 26 MG/DL (ref 7–30)
CALCIUM SERPL-MCNC: 7.3 MG/DL (ref 8.5–10.1)
CHLORIDE SERPL-SCNC: 103 MMOL/L (ref 94–109)
CO2 SERPL-SCNC: 31 MMOL/L (ref 20–32)
CREAT SERPL-MCNC: 0.97 MG/DL (ref 0.66–1.25)
GFR SERPL CREATININE-BSD FRML MDRD: 69 ML/MIN/{1.73_M2}
GLUCOSE SERPL-MCNC: 91 MG/DL (ref 70–99)
POTASSIUM SERPL-SCNC: 4.1 MMOL/L (ref 3.4–5.3)
SODIUM SERPL-SCNC: 136 MMOL/L (ref 133–144)

## 2019-11-17 PROCEDURE — 99239 HOSP IP/OBS DSCHRG MGMT >30: CPT | Performed by: INTERNAL MEDICINE

## 2019-11-17 PROCEDURE — 99232 SBSQ HOSP IP/OBS MODERATE 35: CPT | Performed by: INTERNAL MEDICINE

## 2019-11-17 PROCEDURE — 25000132 ZZH RX MED GY IP 250 OP 250 PS 637: Mod: GY | Performed by: INTERNAL MEDICINE

## 2019-11-17 PROCEDURE — 40000239 ZZH STATISTIC VAT ROUNDS

## 2019-11-17 PROCEDURE — 80048 BASIC METABOLIC PNL TOTAL CA: CPT | Performed by: INTERNAL MEDICINE

## 2019-11-17 PROCEDURE — 40000257 ZZH STATISTIC CONSULT NO CHARGE VASC ACCESS

## 2019-11-17 RX ORDER — DIGOXIN 125 MCG
125 TABLET ORAL DAILY
Qty: 30 TABLET | Refills: 0 | Status: SHIPPED | OUTPATIENT
Start: 2019-11-17

## 2019-11-17 RX ORDER — TORSEMIDE 20 MG/1
20 TABLET ORAL DAILY
Qty: 30 TABLET | Refills: 0 | Status: SHIPPED | OUTPATIENT
Start: 2019-11-17 | End: 2019-11-17

## 2019-11-17 RX ORDER — TORSEMIDE 10 MG/1
10 TABLET ORAL DAILY
Status: DISCONTINUED | OUTPATIENT
Start: 2019-11-18 | End: 2019-11-17 | Stop reason: HOSPADM

## 2019-11-17 RX ORDER — TORSEMIDE 10 MG/1
10 TABLET ORAL DAILY
DISCHARGE
Start: 2019-11-18 | End: 2020-02-27

## 2019-11-17 RX ADMIN — METOPROLOL SUCCINATE 25 MG: 25 TABLET, EXTENDED RELEASE ORAL at 10:40

## 2019-11-17 RX ADMIN — DIGOXIN 125 MCG: 0.12 TABLET ORAL at 10:40

## 2019-11-17 RX ADMIN — TORSEMIDE 20 MG: 20 TABLET ORAL at 10:41

## 2019-11-17 ASSESSMENT — MIFFLIN-ST. JEOR: SCORE: 1320

## 2019-11-17 NOTE — PLAN OF CARE
Heart Failure Care Pathway  GOALS TO BE MET BEFORE DISCHARGE:    1. Decrease congestion and/or edema with diuretic therapy to achieve near      optimal volume status.            Dyspnea improved:  Yes            Edema improved:     Yes        Net I/O and Weights since admission:          10/18 1500 - 11/17 1459  In: 87442.75 [P.O.:8225; I.V.:2564.75]  Out: 93744 [Urine:76055; Drains:2650]  Net: -4065.25            Vitals:    10/30/19 1044 10/31/19 0619 11/01/19 1041 11/02/19 0500   Weight: 75.3 kg (166 lb) 66.8 kg (147 lb 6 oz) 67.4 kg (148 lb 8 oz) 69.1 kg (152 lb 5.4 oz)    11/03/19 0400 11/04/19 0407 11/05/19 0500 11/07/19 0500   Weight: 68.6 kg (151 lb 3.8 oz) 69.3 kg (152 lb 11.2 oz) 65.9 kg (145 lb 4.5 oz) 63.5 kg (139 lb 15.9 oz)    11/08/19 0800 11/09/19 0516 11/10/19 0623 11/11/19 0647   Weight: 70.3 kg (155 lb) 71.2 kg (156 lb 15.5 oz) 71 kg (156 lb 9.6 oz) 70.5 kg (155 lb 6.4 oz)    11/12/19 0400 11/13/19 0500 11/14/19 0650 11/15/19 0630   Weight: 72.4 kg (159 lb 9.8 oz) 70.4 kg (155 lb 3.3 oz) 67.9 kg (149 lb 11.2 oz) 67.4 kg (148 lb 11.2 oz)    11/16/19 0627 11/17/19 0557   Weight: 67.2 kg (148 lb 2.4 oz) 61.2 kg (134 lb 14.7 oz)       2.  O2 sats > 92% on RA or at prior home O2 therapy level.          Current oxygenation status:       SpO2: 100 %         O2 Device: None (Room air),            Able to wean O2 this shift to keep sats > 92%:  NA       Does patient use Home O2? No    3.  Tolerates ambulation and mobility near baseline: Yes        How many times did the patient ambulate with nursing staff this shift? 1    Please review the Heart Failure Care Pathway for additional HF goal outcomes.    Emmie Jiménez RN RN  11/17/2019        Patient signed off my hospitalist and cards, discharge instructions explained to patient and daughter-verbalized understanding. Patient transported via medic to [facility], left floor at 1455.

## 2019-11-17 NOTE — PLAN OF CARE
Heart Failure Care Pathway  GOALS TO BE MET BEFORE DISCHARGE:    1. Decrease congestion and/or edema with diuretic therapy to achieve near      optimal volume status.            Dyspnea improved:  yes            Edema improved:     yes        Net I/O and Weights since admission:          10/18 0700 - 11/17 0659  In: 18364.75 [P.O.:7985; I.V.:2564.75]  Out: 79200 [Urine:78691; Drains:2650]  Net: -4305.25            Vitals:    10/30/19 1044 10/31/19 0619 11/01/19 1041 11/02/19 0500   Weight: 75.3 kg (166 lb) 66.8 kg (147 lb 6 oz) 67.4 kg (148 lb 8 oz) 69.1 kg (152 lb 5.4 oz)    11/03/19 0400 11/04/19 0407 11/05/19 0500 11/07/19 0500   Weight: 68.6 kg (151 lb 3.8 oz) 69.3 kg (152 lb 11.2 oz) 65.9 kg (145 lb 4.5 oz) 63.5 kg (139 lb 15.9 oz)    11/08/19 0800 11/09/19 0516 11/10/19 0623 11/11/19 0647   Weight: 70.3 kg (155 lb) 71.2 kg (156 lb 15.5 oz) 71 kg (156 lb 9.6 oz) 70.5 kg (155 lb 6.4 oz)    11/12/19 0400 11/13/19 0500 11/14/19 0650 11/15/19 0630   Weight: 72.4 kg (159 lb 9.8 oz) 70.4 kg (155 lb 3.3 oz) 67.9 kg (149 lb 11.2 oz) 67.4 kg (148 lb 11.2 oz)    11/16/19 0627   Weight: 67.2 kg (148 lb 2.4 oz)       2.  O2 sats > 92% on RA or at prior home O2 therapy level.          Current oxygenation status:       SpO2: 94 %         O2 Device: None (Room air),            Able to wean O2 this shift to keep sats > 92%:  yes       Does patient use Home O2? No     3.  Tolerates ambulation and mobility near baseline: yes        How many times did the patient ambulate with nursing staff this shift? 0    Please review the Heart Failure Care Pathway for additional HF goal outcomes.    Kim Cline, RN RN  11/17/2019    A&OX4, hard of hearing, VSS, denies SOB/CP, on room air. Tele-AFib CVR, up with assist of one with belt and walker, able to turn self in bed. Pt incontinent, Right groin site C/D/I, CMS intact. Melatonin given, slept well. PICC line on the right chest flushing well.

## 2019-11-17 NOTE — PLAN OF CARE
Heart Failure Care Pathway  GOALS TO BE MET BEFORE DISCHARGE:    1. Decrease congestion and/or edema with diuretic therapy to achieve near      optimal volume status.            Dyspnea improved:  Yes            Edema improved:     Yes        Net I/O and Weights since admission:          10/17 2300 - 11/16 2259  In: 9999.75 [P.O.:7435; I.V.:2564.75]  Out: 93317 [Urine:68525; Drains:2650]  Net: -4855.25            Vitals:    10/30/19 1044 10/31/19 0619 11/01/19 1041 11/02/19 0500   Weight: 75.3 kg (166 lb) 66.8 kg (147 lb 6 oz) 67.4 kg (148 lb 8 oz) 69.1 kg (152 lb 5.4 oz)    11/03/19 0400 11/04/19 0407 11/05/19 0500 11/07/19 0500   Weight: 68.6 kg (151 lb 3.8 oz) 69.3 kg (152 lb 11.2 oz) 65.9 kg (145 lb 4.5 oz) 63.5 kg (139 lb 15.9 oz)    11/08/19 0800 11/09/19 0516 11/10/19 0623 11/11/19 0647   Weight: 70.3 kg (155 lb) 71.2 kg (156 lb 15.5 oz) 71 kg (156 lb 9.6 oz) 70.5 kg (155 lb 6.4 oz)    11/12/19 0400 11/13/19 0500 11/14/19 0650 11/15/19 0630   Weight: 72.4 kg (159 lb 9.8 oz) 70.4 kg (155 lb 3.3 oz) 67.9 kg (149 lb 11.2 oz) 67.4 kg (148 lb 11.2 oz)    11/16/19 0627   Weight: 67.2 kg (148 lb 2.4 oz)       2.  O2 sats > 92% on RA or at prior home O2 therapy level.          Current oxygenation status:       SpO2: 95 %         O2 Device: None (Room air),            Able to wean O2 this shift to keep sats > 92%:  NA       Does patient use Home O2? No    3.  Tolerates ambulation and mobility near baseline: Yes        How many times did the patient ambulate with nursing staff this shift? 2-with cardiac rehab    Please review the Heart Failure Care Pathway for additional HF goal outcomes.    Emmie Jiménez RN RN  11/16/2019     Patient is alert and oriented, assist of one with walker and gait belt. Denies chest pain and shortness of breath. Continues on PO demadex with good output. Plan for discharge to [facility] 11/17.

## 2019-11-17 NOTE — PROGRESS NOTES
SW:  D:  Received discharge orders for patient.  Bed available at [facility] foe today.  Patient was asking if they have a private room available and for transport to be arranged.  Explained that this would be private pay and patient is In agreement.  Call placed to [facility] to update them as to patient's discharge and to inquire about the private room.  Per Yovanny, they have no private rooms available, but they will place patient on the waiting list for a private room.  Call placed to [facility] to arrange for wheelchair transport at 14:30 today.  Patient informed of the plan and in agreement to the plan.  Call placed to update [facility] and faxed the orders.      JASMINA Casey, [facility] HospitalSW  [phone]  [facility]

## 2019-11-17 NOTE — PROGRESS NOTES
[facility]    Cardiology Progress Note     Assessment & Plan   Jama Paul is a 88 year old male who was admitted on 10/30/2019.     1.  Bilateral pulmonary effusion status post left thoracentesis  2.  Severe tricuspid regurgitation  3.  Mitral regurgitation- dynamic, previously mod-severe, now only mild after diuresis  4.  Chronic atrial fibrillation not on anticoagulation due to thrombocytopenia and history of bleeding  5.  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  Mild pulmonary hypertension- mPAP of 25 RHC on 11/15/2019  7.  Hypotension after administration of Precedex for KATIE- resolved     Recommendations-   1.  Normal Left and right sided pressures on RHC consistent with euvolemic status. PAP also normal. Transitioned to po diuretics. Lasix Drip dcd. No CAD on LHC. He will remain stable as long as he is euvolemic which will be challenging given the nature of his disease.  Need outpatient follow-up in core clinic at the time of discharge.  Can also be considered for CardioMEMS for closer monitoring.  Today look dehydrated. Will decrease Torsemide to 10 every day. Discussed with pt and wife and told them to titrate the dose based on his s/s. Pt understands. I think he will be able to manage diuretics, he is is a retired anesthesiologist.   3.  Continue to hold anticoagulation due to thrombocytopenia.  Platelets stable.  4.  CXR improved with thoracentesis.  Repeat if SOB.   5.  Repeat TTE shows sig improvement in the MR which is at most mild. TR has also improved, although still hemodynamically significant. No need for mitral clip. Will discuss with Dr. Cervantes regarding TV repair. This can be done on out pt basis as he is stable, inpatient if he is here on [place].   6. Stable for discharge from cards standpoint.     Sandeep Cronin MD  Text Page (7am - 5pm, M-F)    Interval History   Over all doing well. No complains. Had LHC/RHC yesterday. Site normal.     Physical Exam   Temp: (P) 97.6  F  (36.4  C) Temp src: (P) Oral BP: 100/68 Pulse: 87 Heart Rate: 87 Resp: 16 SpO2: 100 % O2 Device: None (Room air)    Vitals:    11/15/19 0630 11/16/19 0627 11/17/19 0557   Weight: 67.4 kg (148 lb 11.2 oz) 67.2 kg (148 lb 2.4 oz) 61.2 kg (134 lb 14.7 oz)     Vital Signs with Ranges  Temp:  [97.2  F (36.2  C)-98.3  F (36.8  C)] (P) 97.6  F (36.4  C)  Pulse:  [74-87] 87  Heart Rate:  [] 87  Resp:  [12-22] 16  BP: ()/(52-71) 100/68  SpO2:  [94 %-100 %] 100 %  I/O last 3 completed shifts:  In: 760 [P.O.:760]  Out: -      Constitutional: Frail appearing elderly gentleman in no acute distress.  Eyes: Pupils equal, round. Sclerae anicteric.   HEENT: Normocephalic, atraumatic.   Respiratory: Breathing non-labored. Lungs clear to auscultation bilaterally, no wheezes, rhonchi, or rales.  Cardiovascular: Irregular rate and rhythm, normal S1 and S2. 2/6 systolic murmur best heard at the LLSB.  Extremities: Moves all extremities well and symmetrically. No lower extremity edema bilaterally.  Neurologic: Alert, cooperative. No gross focal deficits.  Psychiatric: Affect and appropriate. Answering questions appropriately.    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    Medications     ACE/ARB/ARNI NOT PRESCRIBED       sodium chloride Stopped (11/15/19 2300)       digoxin  125 mcg Oral Daily     influenza Vac Split  High-Dose  0.5 mL Intramuscular Prior to discharge     lidocaine  1.5 mL Other Once     metoprolol succinate ER  25 mg Oral BID     mirtazapine  15 mg Oral At Bedtime     sodium chloride (PF)  10 mL Intracatheter Q7 Days     [START ON 11/18/2019] torsemide  10 mg Oral Daily       Data   Results for orders placed or performed during the hospital encounter of 10/30/19 (from the past 24 hour(s))   Basic metabolic panel   Result Value Ref Range    Sodium 136 133 - 144 mmol/L    Potassium 4.1 3.4 - 5.3 mmol/L    Chloride 103 94 - 109 mmol/L    Carbon Dioxide 31 20 - 32 mmol/L    Anion Gap 2 (L) 3 - 14 mmol/L    Glucose 91 70 - 99 mg/dL    Urea Nitrogen 26 7 - 30 mg/dL    Creatinine 0.97 0.66 - 1.25 mg/dL    GFR Estimate 69 >60 mL/min/[1.73_m2]    GFR Estimate If Black 80 >60 mL/min/[1.73_m2]    Calcium 7.3 (L) 8.5 - 10.1 mg/dL

## 2019-11-17 NOTE — PLAN OF CARE
Occupational Therapy Discharge Summary    Reason for therapy discharge:    Discharged to transitional care facility.    Progress towards therapy goal(s). See goals on Care Plan in [facility] electronic health record for goal details.  Goals not met.  Barriers to achieving goals:   discharge from facility.    Therapy recommendation(s):    Continued therapy is recommended.  Rationale/Recommendations:  pt would benefit from continued daily OT as he is below baseline.

## 2019-11-17 NOTE — DISCHARGE SUMMARY
[facility]    Discharge Summary  Hospitalist    Date of Admission:  10/30/2019  Date of Discharge:  11/17/2019  Discharging Provider: Pato Benedict MD  Date of Service (when I saw the patient): 11/17/19    Discharge Diagnoses   Please refer below    History of Present Illness   Jama Paul is an 88 year old male who presented with signs of breath    Hospital Course       Jama Paul is an 88 year-old male with a history of hypertension, atrial fibrillation, nonischemic cardiomyopathy, congestive heart failure, idiopathic thrombocytopenic purpura and hiatal hernia, who is admitted 10/30/2019 with worsening shortness of breath.  He was found to have pleural effusions.  Underwent bilateral thoracentesis as well as aggressive diuresis, subsequently with hypotension requiring dobutamine and then norepinephrine.  Pleural fluid analysis consistent with transudate, consistent with CHF. History of nonischemic cardiomyopathy, although improved to 55-60% on last echocardiogram 1/2019, echocardiogram this admission with EF 55-60%, severely dilated RV, valvular abnormalities as above.  KATIE x2 attempted, unable to pass scope.  GI consulted for EGD with no clear reason for inability to complete KATIE.  Repeat KATIE with GI assistance 11/1.  Following the procedure he had hypotension and required a phenylephrine IV drip in the ICU.  Patient hypotension is now resolved.  He was treated with aggressive diuresis with IV Lasix drip and is now euvolemic.  Patient echocardiogram done during the hospitalization shows severe tricuspid regurgitation and moderate to severe mitral regurgitation.  Cardiovascular surgery and cardiology was consulted.  Repeat echocardiogram after aggressive diuresis shows mild to moderate mitral regurgitation only plus persistent moderate to severe tricuspid regurgitation.    At this time the patient is doing well, he has repeat thoracentesis of the left side on 11/14/2019.  He is off IV  Lasix drip and on torsemide 20 mg daily.  He is doing well denies any chest pain, shortness of breath, headache, dizziness, lightheadedness, fever, chills or orthopnea.  At this time the patient will be discharged to TCU and will follow-up with cardiology and cardiovascular surgery as outpatient.        Final discharge diagnosis and hospital course      Post procedure hypotension: Now resolved at this time.  Off phenylephrine at this time.  -  Suspect multifactorial but largely related to his tenuous cardiac status with his valvular disease and Precedex infusion.  BP improved now.    Now off phenylephrine for a trial.  Patient feels well.  -    Hold on more IVF as he was bolused around the time of his BP decline last evening and developed crackles and worsened hypoxia.  IVF were held and he has since improved with O2 weaned to 2 L.     Difficulty passing KATIE scope:  -  GI performed a small dilation during the KATIE that allowed the scope to pass.  See procedure note.  Dr. Ontiveros felt he could advance a diet.     Cardiogenic shock episode earlier in stay: Resolved   Acute on chronic diastolic congestive heart failure with bilateral pleural effusion s/p bilateral thoracentesis with fluid suggestive of CHF  Mitral regurgitation and significant tricuspid regurgitation  History of nonischemic cardiomyopathy:     -  KATIE  cardiology and CV surgery consulted and following management as per them.  - Remains off of Entresto, resume when indicated per cardiology, at discharge Entresto was discontinued as the patient's blood pressure remains soft.  If needed can be restarted as an outpatient basis.  Metoprolol 25 mg bid resumed on 11/13   Xray shows right pleural effusion, will ask radiologist to do U/S guided therapeutic Thoracentesis on 11/14,  Xray shows almost resolution of pleural effusion on right side.   He was on IV Lasix drip, now much better and IV lasix drip switch to oral Torsemide.   Repeat TTE shows improvement in  MR, only mild to moderate.   Still have Severe TR, Cardiology is status post right and Left Heart cath  for the work up of his   TR repair or replacement.  Right and left heart catheterization are unremarkable.  No significant coronary artery disease     Idiopathic thrombocytopenic purpura:  Followed by ALAN.  Treated with high-dose steroids without response and platelets, steroids discontinued.  - Platelets stable in 40s range  - As per hematology recommend platelet transfusion prior to invasive cardiac procedure if one performed in order to achieve >= 50,000 plts. Patient current platelets are more than 70,000     Iatrogenic right-sided pneumothorax status post a right pigtail catheter placement by IR  Secondary to thoracentesis earlier in stay. Pneumothorax resolved after pigtail catheter placement  -Was on room air but post procedure with fluid bolus developed some volume overload and required 5 liters.  Now back down to 2 liters and continuing to wean.  Patient without acute SOB complaints today.  If resp status/hypoxia again worsens would obtain new CXR. No more Pneumothorax.      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  Rate controlled with metoprolol.  - Telemetry, currently with controlled ventricular rate     Urinary tract infection, citrobacter  Urinalysis abnormal, urine culture with >100K citrobacter, pansensitive.  -Status post cefuroxime to complete 7 day course(completed 11/9)     Steroid-induced hyperglycemia: Resolved after stopping the prednisone.  Noted during high-dose steroids.  Required low doses of insulin.  Last hemoglobin A1c unknown though blood sugars have now normalized off of steroids, insulin and blood sugar checks have since been discontinued.     Mildly elevated lactic acid of 2.2 now resolved  This tachycardia is because of rebound as he is not taking his metoprolol.   Tachycardia resolved after starting the  metoprolol.        Left  heart cath and right heart cath looks good.  Euvolemic at this time.  Continue to keep him on torsemide.  Cardiology and cardiovascular surgery will decide about the tricuspid valve repair and the timing  At this time if he remains stable he will be ready to be discharged to TCU at this time.       Pato Benedict MD, MD    Significant Results and Procedures   EGD  Impression:               - Benign-appearing esophageal stenosis. Dilated.                             - Normal stomach.                             - Normal duodenal bulb, first portion of the                             duodenum and second portion of the duodenum.                             - No specimens collected.     Right and left heart catheterization  Conclusion          Ramus lesion is 15% stenosed.    Prox LAD lesion is 20% stenosed.    Mid RCA lesion is 20% stenosed.    The ejection fraction is greater than 55% by visual estimate.    Right sided filling pressures are normal.    Normal PA pressures.    Left sided filling pressures are normal.    Left ventricular filling pressures are normal .    Normal cardiac output level.     1. Minimal CAD  2. Low systemic BP  3. Right heart study suggests normal right heart pressures, no pulmonary hypertension, essentially normal PVR and CO              Pending Results   These results will be followed up by PCP  Unresulted Labs Ordered in the Past 30 Days of this Admission     Date and Time Order Name Status Description    11/14/2019 0825 Fluid Culture Aerobic Bacterial Preliminary           Code Status   Full Code       Primary Care Physician   Mariusz Shields    Physical Exam   Temp: (P) 97.6  F (36.4  C) Temp src: (P) Oral BP: 100/68 Pulse: 87 Heart Rate: 87 Resp: 16 SpO2: 100 % O2 Device: None (Room air)    Vitals:    11/15/19 0630 11/16/19 0627 11/17/19 0557   Weight: 67.4 kg (148 lb 11.2 oz) 67.2 kg (148 lb 2.4 oz) 61.2 kg (134 lb 14.7 oz)     Vital Signs with Ranges  Temp:  [97.2   F (36.2  C)-98.3  F (36.8  C)] (P) 97.6  F (36.4  C)  Pulse:  [74-87] 87  Heart Rate:  [] 87  Resp:  [12-22] 16  BP: ()/(52-71) 100/68  SpO2:  [94 %-100 %] 100 %  I/O last 3 completed shifts:  In: 760 [P.O.:760]  Out: -     Constitutional: awake, alert, cooperative, no apparent distress, and appears stated age  Eyes: Lids and lashes normal, pupils equal, round and reactive to light, extra ocular muscles intact, sclera clear, conjunctiva normal  Respiratory: No increased work of breathing, good air exchange, clear to auscultation bilaterally, no crackles or wheezing  Cardiovascular: Normal apical impulse, regular rate and rhythm, normal S1 and S2, no S3 or S4, and no murmur noted  GI: No scars, normal bowel sounds, soft, non-distended, non-tender, no masses palpated, no hepatosplenomegally  Musculoskeletal: no lower extremity pitting edema present  Neurologic: No focal deficit    Discharge Disposition   Discharged to short-term care facility  Condition at discharge: Stable    Consultations This Hospital Stay   CARDIOLOGY IP CONSULT  PHYSICAL THERAPY ADULT IP CONSULT  OCCUPATIONAL THERAPY ADULT IP CONSULT  WOUND OSTOMY CONTINENCE NURSE  IP CONSULT  PALLIATIVE CARE ADULT IP CONSULT  CARDIOTHORACIC SURGERY IP CONSULT  THORACIC SURGERY IP CONSULT  HEMATOLOGY & ONCOLOGY IP CONSULT  THORACIC SURGERY IP CONSULT  HEMATOLOGY & ONCOLOGY IP CONSULT  VASCULAR ACCESS ADULT IP CONSULT  VASCULAR ACCESS ADULT IP CONSULT  INTENSIVIST IP CONSULT  GASTROENTEROLOGY IP CONSULT  CARE TRANSITION RN/SW IP CONSULT  CARDIAC REHAB IP CONSULT  CARDIAC REHAB IP CONSULT  GASTROENTEROLOGY IP CONSULT  PHARMACY IP CONSULT  PHARMACY IP CONSULT  PHYSICAL THERAPY ADULT IP CONSULT  OCCUPATIONAL THERAPY ADULT IP CONSULT  SMOKING CESSATION PROGRAM IP CONSULT    Time Spent on this Encounter   IPato MD, personally saw the patient today and spent greater than 30 minutes discharging this patient.    Discharge Orders      General info  for SNF    Length of Stay Estimate: Short Term Care: Estimated # of Days <30  Condition at Discharge: Stable  Level of care:skilled   Rehabilitation Potential: Good  Admission H&P remains valid and up-to-date: Yes  Recent Chemotherapy: N/A  Use Nursing Home Standing Orders: Yes     Mantoux instructions    Give two-step Mantoux (PPD) Per Facility Policy Yes     Reason for your hospital stay    CHF, Pleural effusion     Intake and output    Every shift     Daily weights    Call Provider for weight gain of more than 2 pounds per day or 5 pounds per week.     Follow Up and recommended labs and tests    Follow up with [facility] physician.  The following labs/tests are recommended: BMP.  Follow up with Cardiology as per there instruction     Activity - Up ad fernanda     Full Code     Physical Therapy Adult Consult    Evaluate and treat as clinically indicated.    Reason:  weakness     Occupational Therapy Adult Consult    Evaluate and treat as clinically indicated.    Reason:  weakness     Advance Diet as Tolerated    Follow this diet upon discharge: Orders Placed This Encounter      Room Service      Snacks/Supplements Adult: Other; offer any supplement w/ meals; With Meals      Fluid restriction 1500 ML FLUID      Advance Diet as Tolerated: Regular Diet Adult; 2 gm NA Diet     Discharge Medications   Current Discharge Medication List      START taking these medications    Details   digoxin (LANOXIN) 125 MCG tablet Take 1 tablet (125 mcg) by mouth daily  Qty: 30 tablet, Refills: 0    Comments: Further refill from his PCP  Associated Diagnoses: Congestive heart failure, unspecified HF chronicity, unspecified heart failure type (H)      torsemide (DEMADEX) 20 MG tablet Take 1 tablet (20 mg) by mouth daily  Qty: 30 tablet, Refills: 0    Comments: Further refill from his PCP  Associated Diagnoses: Congestive heart failure, unspecified HF chronicity, unspecified heart failure type (H); Bilateral pleural effusion          CONTINUE these medications which have NOT CHANGED    Details   Ascorbic Acid (VITAMIN C PO) Take 500 mg by mouth daily      CYANOCOBALAMIN PO Take 500 mcg by mouth daily      Dermatological Products, Misc. (EPICERAM) EMUL Externally apply topically daily Apply to right leg.      !! metoprolol succinate ER (TOPROL-XL) 50 MG 24 hr tablet Take 100 mg by mouth every morning      !! metoprolol succinate ER (TOPROL-XL) 50 MG 24 hr tablet Take 50 mg by mouth every evening      mirtazapine (REMERON) 15 MG tablet Take 15 mg by mouth At Bedtime Patient started medication for the first time yesterday 10/29/2019.      Multiple Vitamins-Minerals (ICAPS AREDS FORMULA PO) Take 1 tablet by mouth daily Takes in addition to multivitamin with minerals      !! sacubitril-valsartan (ENTRESTO) 24-26 MG per tablet Take 0.5 tablets by mouth every morning      !! sacubitril-valsartan (ENTRESTO) 24-26 MG per tablet Take 1 tablet by mouth every evening      Vitamin D, Cholecalciferol, 1000 UNITS TABS Take 2,000 Units by mouth daily       order for DME [facility] Order Phone [phone] Fax [phone]    Primary Dressing Hydrofera blue ready transfer    Qty 2 sheets  Secondary Dressing 4x4 gauze loaf Qty 1  Secondary Dressing 4' roll gauze Qty 30  Secondary Dressing 2' tape Qty  1  Length of Need: 1 month  Frequency of dressing change: daily  Qty: 30 days, Refills: 0    Associated Diagnoses: Ulcer of right lower extremity with fat layer exposed (H)       !! - Potential duplicate medications found. Please discuss with provider.      STOP taking these medications       furosemide (LASIX) 20 MG tablet Comments:   Reason for Stopping:             Allergies   No Known Allergies  Data   Most Recent 3 CBC's:  Recent Labs   Lab Test 11/16/19  0644 11/15/19  1050 11/14/19  0735   WBC 7.4 11.9* 7.3   HGB 9.2* 10.1* 9.4*   MCV 95 94 95   PLT 76* 72* 71*      Most Recent 3 BMP's:  Recent Labs   Lab Test 11/17/19  0559 11/16/19  0644 11/15/19  0610     136 136   POTASSIUM 4.1 4.2 3.6   CHLORIDE 103 103 100   CO2 31 32 32   BUN 26 25 24   CR 0.97 0.89 0.88   ANIONGAP 2* 1* 4   BARON 7.3* 7.3* 7.5*   GLC 91 83 86     Most Recent 2 LFT's:  Recent Labs   Lab Test 11/11/19  1735 10/31/19  0524   AST 9 11   ALT 20 18   ALKPHOS 47 36*   BILITOTAL 0.6 1.6*     Most Recent INR's and Anticoagulation Dosing History:  Anticoagulation Dose History     Recent Dosing and Labs Latest Ref Rng & Units 12/10/2018 12/27/2018 1/15/2019 1/25/2019 2/12/2019 10/30/2019 11/15/2019    INR 0.86 - 1.14 - - - - - 1.39(H) 1.46(H)    INR 0.86 - 1.14 2.8(A) 2.7(A) 2.2(A) 2.6(A) 1.9(A) - -        Most Recent 3 Troponin's:  Recent Labs   Lab Test 11/11/19  1735 10/30/19  1134 02/28/18  1024   TROPI 0.019 <0.015 <0.015     Most Recent Cholesterol Panel:  Recent Labs   Lab Test 04/18/12  0849   CHOL 127   LDL 55   HDL 56   TRIG 81     Most Recent 6 Bacteria Isolates From Any Culture (See EPIC Reports for Culture Details):  Recent Labs   Lab Test 11/14/19  1334 11/03/19  2324 11/03/19  0442 11/02/19  1219 10/30/19  1610 11/17/17  2110   CULT Culture negative monitoring continues No growth >100,000 colonies/mL  Citrobacter koseri  * No growth No growth 10,000 to 50,000 colonies/mL  Pseudomonas aeruginosa  *     Most Recent TSH, T4 and A1c Labs:  Recent Labs   Lab Test 02/05/19  1101   TSH 0.99     Results for orders placed or performed during the hospital encounter of 10/30/19   XR Chest 2 Views    Narrative    CHEST TWO VIEWS    10/30/2019 12:01 PM     HISTORY: Shortness of breath    COMPARISON: 2/28/2018.      Impression    IMPRESSION: Increasing moderate right pleural effusion. Right and left  lung base atelectasis or airspace disease. Small left pleural fluid.  Stable cardiac silhouette.    GAETANO WEBER MD   US Thoracentesis Bilateral    Narrative    ULTRASOUND GUIDED THORACENTESIS  10/30/2019 4:31 PM     HISTORY: bilateral pleural effusion    FINDINGS: Limited ultrasound was performed to  evaluate for the  presence and best approach for drainage of a pleural effusion. An  image is archived. Written and oral informed consent was obtained. A  pause for the cause procedure to verify the correct patient and  correct procedure.     The skin overlying the right chest posteriorly was prepped and draped  in the usual sterile fashion. The subcutaneous tissues were  anesthetized with 5 mL of 1% lidocaine. Under direct ultrasound  guidance a catheter was advanced into the pleural space and 2200 mL of   josefina colored fluid was drained. The catheter was removed and a  sterile dressing was applied.     The skin overlying the left chest posteriorly was prepped and draped  in the usual sterile fashion. The subcutaneous tissues were  anesthetized with 5 mL of 1% lidocaine. Under direct ultrasound  guidance a catheter was advanced into the pleural space and 800 mL of   josefina colored fluid was drained. The catheter was removed and a  sterile dressing was applied.     Patient was monitored by nurse under my direct supervision throughout  the exam. Ultrasound images were permanently stored.  There were no  immediate complications. Patient left the ultrasound suite in  satisfactory condition.      Impression    IMPRESSION: Technically successful bilateral thoracentesis without  immediate complications.    THANH TINEO DO   XR Chest Port 1 View    Narrative    CHEST ONE VIEW PORTABLE  10/31/2019 6:15 AM     HISTORY: Pleural effusion bilateral.    COMPARISON: 10/30/2019.      Impression    IMPRESSION: Smaller right pleural effusion. Consolidation persists at  the right mid to lower lung that may be pneumonia or other airspace  disease. Small right apex pneumothorax is suggested, newly seen.  Recommend close clinical follow-up. Left lung base atelectasis or  airspace disease. Stable enlarged cardiac silhouette.    I communicated this result to Daniel, nursing support staff caring for  this patient on 10/31/2019 at 0810  hours.    GAETANO WEBER MD   XR Chest Port 1 View    Narrative    CHEST PORTABLE ONE VIEW   10/31/2019 12:44 PM     HISTORY: Followup iatrogenic pneumothorax.    COMPARISON: Chest x-ray 10/31/2019 at 0615 hours.      Impression    IMPRESSION: Small right apical medial pneumothorax is likely stable,  partially obscured by the overlying mandible and soft tissues. Dense  opacification at the right lower lung appears stable and is  indeterminant. Stable left lung as well. Stable cardiac silhouette  appears enlarged.    GAETANO WEBER MD   XR Chest Port 1 View    Narrative    XR CHEST PORT 1 VW 11/1/2019 8:30 AM    HISTORY: f/u Pneumothorax, CHF and pleural effusion    COMPARISON: 10/31/2019    FINDINGS: Right pneumothorax has increased in volume in comparison  with yesterday. Moderate right effusion appears similar.  Consolidation/opacity in the lower right lung appears similar as well.  Left lung is clear.      Impression    IMPRESSION: Increased volume of right pneumothorax.    SHELBY YOUNGBLOOD MD   CTA Chest Abdomen Pelvis w Contrast    Narrative    Procedure: CTA CHEST ABDOMEN PELVIS W CONTRAST  Indication: Tricuspid valve disease  Examination Date: 11/2/2019 12:32 PM  Ordering Physician: Dr Harris     TECHNIQUE: After obtaining informed consent, the patient was  positioned in the scanner gantry and an IV was started using an 18  gauge IV in the right antecubital fossa. Utilizing 100 cc [id number].  Multi-slice computed tomography was performed with a Siemens Dual  Source Flash scanner without incident. The total radiation exposure  was calculated to be 564DLP and 7.90mSv, The angiogram was performed  in the Flash mode with care dose at 100 kVp. Images were reconstructed  and analyzed on a [employer] Workstation. Scan protocol was optimized  to minimize radiation exposure.     IMPRESSIONS:    1. The aortic root is normal in size. The ascending aorta, arch, and  descending aorta are normal in diameter. There is no dissection  seen.  2. The aortic arch is left sided. There is bovine branching of the  arch vessels. There is no coarctation seen.  3. The ascending aorta is mildly calcified, aortic arch is  mildly  calcified, the descending thoracic aorta is mildly calcified. The  suprarenal abdominal aorta is mildly calcified; infrarenal abdominal  aorta is mildly calcified  4. Widely patent origins of celiac trunk, superior mesenteric artery  and inferior mesenteric artery.  Single bilateral renal arteries with  widely patent origins.   5. There is no acute aortic pathology, such as dissection, intramural  hematoma, or contained rupture.  6. Bilateral pleural effusions are noted.   7. Please review Radiology report for incidental noncardiac findings  that will follow separately.    Bi-orthogonal luminal aortic dimensions are:    3. AORTA MEASUREMENTS    Aortic root measures 38.7 mm x 36.3 mm at the level of sinuses of  Valsalva.  The sinotubular junction measures 32.5 mm x 30.9 mm.   Proximal ascending aorta measures 35.4 mm x 34.7 mm, at the level of  the main pulmonary artery.  Distal ascending aorta measures 34.1 mm x 31.8 mm, proximal to the  takeoff of the brachiocephalic artery.  Mid aortic arch measures 29.6 mm x 27.9 mm, proximal to the takeoff of  the left subclavian artery.  Proximal descending thoracic aortic measures 27.2 mm x 24.8 mm, distal  to the takeoff of the left subclavian artery.  Mid descending thoracic aorta measures 25.7 mm x 25.6 mm, at the level  of the pulmonary veins.  Distal thoracic aorta measures 26.4 mm x 24.4 mm, at the level of the  diaphragm.  Distal abdominal aorta proximal to the bifurcation: 19.2 mm x 16.6 mm  Supra-renal abdominal aorta measures 22.9 mm x 19.8 mm    4. ILIOFEMORAL MEASUREMENTS:    RIGHT:  1.  Right common iliac artery: 9.68 mm x 9.41 mm   2.  Right external iliac artery: 9.68 mm x 0.41 mm   3.  Right common femoral artery: 10.4 mm x 9.55 mm   4.  Tortuosity: mild   5.  Calcification:  moderate     LEFT:  1.  Left common iliac artery: 11.1 mm x  10.1 mm  2.  Left external iliac artery: 11.9 mm x 1.99 mm  3.  Left common femoral artery: 8.48 mm x 7.14 mm  4.  Tortuosity: mild   5.  Calcification: moderate     BILAL MD CAROLINA   Radiologist Consult For Cardiology    Narrative    RADIOLOGIST CONSULT FOR CARDIOLOGY   11/1/2019 4:47 PM     HISTORY: Preoperative tricuspid valve.    TECHNIQUE: CTA chest, abdomen and pelvis 100 mL [id number] IV  contrast. Technique and protocol were ordered per the cardiology  service.    COMPARISON: X-ray from same day    FINDINGS: This report is tailored to the evaluation of soft tissues.  Please see cardiology report for vascular findings. Visualized thyroid  is normal. Tiny right apical pneumothorax, similar when compared to  x-ray from same day. Moderate right pleural effusion and small left  pleural effusion. Consolidation is noted in the right lower lobe.  Emphysematous changes are noted throughout both lungs. Heart is  enlarged. Right renal cyst. The spleen, left kidney, bilateral adrenal  glands, gallbladder and pancreas show no focal abnormality. No  intrahepatic or extrahepatic biliary dilatation. No intraperitoneal  free air or free fluid. The bladder is distended. Moderate amount of  stool is noted throughout the colon. No abdominal or pelvic  lymphadenopathy.    Bones: No suspicious bony lesions.      Impression    IMPRESSION:  1. Small right pneumothorax, unchanged when compared to the x-ray from  same day.  2. Consolidation in the right lower lobe and right middle lobe, likely  infectious. Recommend follow-up to ensure resolution.  3. Moderate right pleural effusion and small left pleural effusion.  4. Cardiomegaly.  5. Distended bladder.    THANH TINEO DO   XR Chest Port 1 View    Narrative    CHEST ONE VIEW PORTABLE   11/1/2019 7:44 PM     HISTORY: PICC placement.    COMPARISON: Chest radiograph performed earlier today at 8:24 AM.      Impression     IMPRESSION: Right PICC has been placed, with tip not well visualized,  but likely in the right atrium. Previously described patchy opacities  at the right lung base having improved slightly. Previously noted  right pneumothorax is non visible on the current exam. Opacity at the  left lung base is unchanged, and may be related to atelectasis. Heart  size appears stable. Pulmonary vascularity is within normal limits.    ANDREEA DIXON MD   XR Chest Port 1 View    Narrative    CHEST ONE VIEW PORTABLE   11/2/2019 5:44 AM     HISTORY: bilateral pleural effusions, right pneumothorax.    COMPARISON: 11/1/2019 1940 hours and and 0820 hours      Impression    IMPRESSION: Previously seen pneumothorax is difficult to identify  because of numerous superimposed rib shadows but question of residual  small apical pneumothorax which is smaller than on earlier film of  11/1/2019 at 0820 hours. Right PICC line with tip projecting over the  SVC. Opacity at the mid and lower right chest and lower left chest not  significantly changed since 11/1/2019 at 1940 hours consistent with  small effusions and bibasilar atelectasis. Right upper and left mid  and upper lungs clear with normal caliber pulmonary vessels.    LETITIA STOVER MD   XR Chest Port 1 View    Narrative    CHEST ONE VIEW PORTABLE   11/3/2019 5:02 AM     HISTORY: bilateral pleural effusions, right pneumothorax    COMPARISON: 11/2/2019 0530 hours      Impression    IMPRESSION:   1. Increasing opacity at the right lung base consistent with  increasing atelectasis or effusion. No significant change in left  lower lobe atelectasis.  2. Right PICC line redemonstrated with tip projecting over distal SVC.  3. No pneumothorax identified.    LETITIA STOVER MD   XR Chest Port 1 View    Narrative    CHEST ONE VIEW UPRIGHT November 4, 2019 5:55 AM     HISTORY: Bilateral pleural effusions, right pneumothorax.    COMPARISON: November 3, 2019.      Impression    IMPRESSION: No definite  pneumothorax. PICC line unchanged. Slight  improvement in right base density. Similar left lower lobe atelectasis  and/or infiltrate.    YINKA DESAI MD   US Carotid Bilateral    Narrative    BILATERAL CAROTID ULTRASOUND November 4, 2019 10:00 PM     HISTORY: Preoperative evaluation.    COMPARISON: None.    RIGHT CAROTID FINDINGS: There is minimal atherosclerotic plaque at the  carotid bifurcation and proximal internal carotid artery.  Right ICA PSV:  135  cm/sec.  Right ICA EDV:  43 cm/sec.  Right ICA/CCA PSV Ratio: 1.0.    These indicate less than 50% diameter stenosis of the right ICA.    Right Vertebral: Antegrade flow.   Right ECA: Antegrade flow.     LEFT CAROTID FINDINGS: There is minimal atherosclerotic plaque at the  carotid bifurcation and proximal internal carotid artery.  Left ICA PSV:  180  cm/sec.  Left ICA EDV:  56 cm/sec.  Left ICA/CCA PSV Ratio:  1.2.    These indicate less than 50% diameter stenosis of the left ICA.    Left Vertebral: Antegrade flow.   Left ECA: Antegrade flow.     Causes of Decreased Accuracy: Discrepant findings in the peak systolic  velocities in both internal carotid arteries are elevated suggesting  50-69% stenosis as estimation. However, direct visualization and ratio  suggest less than 50% stenosis.      Impression    IMPRESSION:    1. Less than 50% diameter stenosis of the right internal carotid  artery relative to the distal internal carotid artery diameter.  2. Less than 50% diameter stenosis of the left internal carotid artery  relative to the distal internal carotid artery diameter.     JUANY COPPOLA MD   XR Chest Port 1 View    Narrative    CHEST PORTABLE ONE VIEW November 5, 2019 6:04 AM     HISTORY: Bilateral pleural effusions, right pneumothorax.    COMPARISON: Chest x-ray 11/4/2019.      Impression    IMPRESSION: Portable upright view of the chest is performed. Small  left pleural effusion and retrocardiac opacity appears stable to  slightly improved. Right mid  to lower lung opacity could indicate  underlying infiltrate, consolidation or effusion which appears stable.  Right PICC line remains in good position. No evidence of pneumothorax.  Heart remains enlarged.    JASON GARZA MD   XR Chest 1 View    Narrative    CHEST ONE VIEW   11/8/2019 7:50 AM     HISTORY: CHF.    COMPARISON: Chest x-ray 11/5/2019.    FINDINGS:  Mild pulmonary vascular congestion in the visible portions  of the mid to upper lungs appears stable. Heart remains enlarged.  Right PICC line remains unchanged in position. Small left pleural  effusion and moderate right pleural effusion are again noted. Right  pleural effusion may be minimally larger.    JASON GARZA MD   XR Chest Port 1 View    Narrative    CHEST ONE VIEW PORTABLE   11/11/2019 5:05 PM     HISTORY:  Increasing oxygen requirements.    COMPARISON: 11/8/2019.      Impression    IMPRESSION: Shallow inspiration. Right PICC line is in place, with tip  not visible. A small right pleural effusion has a similar appearance  to the previous exam. Mild opacity at both lung bases is also  unchanged, and could be related to atelectasis or pneumonia. No  pneumothorax is identified. The cardiac silhouette is obscured.    ANDREEA DIXON MD   XR Chest 2 Views    Narrative    CHEST TWO VIEWS  11/13/2019 9:40 AM     HISTORY: Assessment of pleural effusion.    COMPARISON: November 11, 2019      Impression    IMPRESSION: Stable right effusion and associated atelectasis and/or  infiltrate, overall moderate to large in size. Minimal left pleural  effusion and associated atelectasis. Upper lungs clear. PICC line  stable. The cardiac silhouette is obscured. Pulmonary vasculature is  unremarkable.     YINKA DESAI MD   XR Chest Port 1 View    Narrative    CHEST ONE VIEW UPRIGHT 11/14/2019 8:45 AM     HISTORY: CHF, pleural effusion.    COMPARISON: November 13, 2019      Impression    IMPRESSION: Increased hazy density in the upper lung on the right may  indicate an  increased effusion compared to previous. Continued  effusion with associated atelectasis and/or infiltrate at the right  base. Retrocardiac atelectasis and/or infiltrate also present. No  definite left effusion today.    YINKA DESAI MD   XR Chest 1 View    Narrative    CHEST ONE VIEW   2019 2:08 PM     HISTORY: Right thoracentesis.    COMPARISON: Chest x-ray 2019 at 0833 hours.      Impression    IMPRESSION: Portable view of the chest in the upright position is  performed following right thoracentesis procedure. Minimal residual  right pleural fluid is noted after the thoracentesis. No evidence of  pneumothorax. Trace amount of left pleural fluid and retrocardiac  opacity is again noted and unchanged. Right PICC line terminates near  the SVC right atrium junction in good position.    JASON GARZA MD   Echocardiogram Complete    Narrative    [id number]  [id number]  [id number]  [id number]^FABIAN^ANDREA^YU           [facility]  Echocardiography Laboratory  [address] [place] [address]        Name: YINKA GONZALEZ  MRN: [id number]  : 1931  Study Date: 10/31/2019 08:22 AM  Age: 88 yrs  Gender: Male  Patient Location: [facility]  Reason For Study: CHF  Ordering Physician: ANDREA PERALTA  Referring Physician: Mariusz Shields  Performed By: Desi Espinoza     BSA: 2.0 m2  Height: 72 in  Weight: 166 lb  HR: 87  BP: 109/63 mmHg  _____________________________________________________________________________  __        Procedure  Complete Portable Echo Adult. Optison (NDC #[id number]) given intravenously.  _____________________________________________________________________________  __        Interpretation Summary     Left ventricular systolic function is normal. The visual ejection fraction is  estimated at 55-60%.  The right ventricle is severely dilated. The right ventricular systolic  function is normal.  Mild to moderate (1-2+) mitral regurgitation.  Mod-severe to severe (3-4+)  tricuspid regurgitation.  The right ventricular systolic pressure is approximated at 46mmHg plus the  right atrial pressure, suggesting elevated pulmonary artery pressures.  Trivial pericardial effusion and a pleural effusion present.  Rhythm appears to be atrial fibrillation.     This study was compared to a prior TTE from 4/11/2019. Biventricular function  is stable. The severity of mitral insufficiency and tricuspid insufficiency  remains stable. There is now a trivial pericardial effusion, and a pleural  effusion is present. The rhythm appears to be atrial fibrillation on this  present study, however it was also irregularly irregular on the prior study.  _____________________________________________________________________________  __        Left Ventricle  The left ventricle is normal in size. There is concentric remodeling present.  Left ventricular systolic function is normal. The visual ejection fraction is  estimated at 55-60%. Diastolic function not assessed due to arrhythmia. No  regional wall motion abnormalities noted.     Right Ventricle  The right ventricle is severely dilated. The right ventricular systolic  function is normal.     Atria  The left atrium is severely dilated. The right atrium is severely dilated.  There is no color Doppler evidence of an atrial shunt.     Mitral Valve  There is mild to moderate (1-2+) mitral regurgitation.        Tricuspid Valve  There is mod-severe to severe (3-4+) tricuspid regurgitation. The right  ventricular systolic pressure is approximated at 46mmHg plus the right atrial  pressure. Pulmonary hypertension.     Aortic Valve  The aortic valve is trileaflet. The aortic valve is trileaflet with aortic  valve sclerosis.     Pulmonic Valve  There is mild (1+) pulmonic valvular regurgitation.     Vessels  The aortic root is normal size. Normal size ascending aorta. Dilation of the  inferior vena cava is present with abnormal respiratory variation in diameter.  IVC  diameter >2.1 cm collapsing <50% with sniff suggests a high RA pressure  estimated at 15 mmHg or greater.     Pericardium  Trivial pericardial effusion.        Rhythm  Rhythm appears to be atrial fibrillation.  _____________________________________________________________________________  __  MMode/2D Measurements & Calculations  IVSd: 1.3 cm     LVIDd: 3.8 cm  LVIDs: 2.5 cm  LVPWd: 1.4 cm  FS: 34.4 %  LV mass(C)d: 173.9 grams  LV mass(C)dI: 88.4 grams/m2  Ao root diam: 3.5 cm  LA dimension: 5.3 cm  asc Aorta Diam: 3.7 cm  LA/Ao: 1.5  LA Volume (BP): 123.0 ml  LA Volume Index (BP): 62.4 ml/m2  RWT: 0.72           Doppler Measurements & Calculations  MV E max paul: 98.1 cm/sec  MV dec time: 0.15 sec  TR max paul: 339.0 cm/sec  TR max P.0 mmHg  E/E' avg: 10.2  Lateral E/e': 8.9  Medial E/e': 11.4           _____________________________________________________________________________  __           Report approved by: Tremayne Agrawal 10/31/2019 11:41 AM      Transesophageal Echocardiogram    Narrative    [id number]  [id number]  [id number]  [id number]^NASEEM^MIYA^CODY           [facility]  Echocardiography Laboratory  [address] [place] [id number]        Name: YINKA GONZALEZ  MRN: [id number]  : 1931  Study Date: 2019 03:10 PM  Age: 88 yrs  Gender: Male  Patient Location: [facility]  Reason For Study: Tricuspid Regurgitation  Ordering Physician: MIYA GUSMAN  Referring Physician: MIYA GUSMAN  Performed By: PJ Smith     BSA: 1.9 m2  Height: 72 in  Weight: 145 lb  HR: 85  BP: 130/87 mmHg  _____________________________________________________________________________  __        Procedure  Limited portable KATIE Adult.  _____________________________________________________________________________  __        Interpretation Summary     _____________________________________________________________________________  __        KATIE  Consent to the procedure was obtained prior to sedation.  Prior to the exam,  the oral cavity was checked and no overcrowding was noted. Despite Propofol  sedation and direct visualization with laryngoscope, the probe was met with  resistance. Procedure was aborted for patient safety.  _____________________________________________________________________________  __                                Report approved by: Tremayne Lind 2019 04:23 PM                    _____________________________________________________________________________  __      Echo Limited    Narrative    [id number]  [id number]  [id number]  [id number]^NASEEM^MIYA^D           [facility]  Echocardiography Laboratory  [address] [place] [address]        Name: YINKA GONZALEZ  MRN: [id number]  : 1931  Study Date: 2019 10:45 AM  Age: 88 yrs  Gender: Male  Patient Location: [facility]  Reason For Study: Tricuspid Regurgitation  Ordering Physician: MIYA GUSMAN  Performed By: Jaqueline Jose     BSA: 1.8 m2  Height: 72 in  Weight: 139 lb  HR: 84  BP: 99/68 mmHg  _____________________________________________________________________________  __        Procedure  Limited Echo Adult.  _____________________________________________________________________________  __        Interpretation Summary     Large pleural effusion. Correlate with alternative imaging.  The visual ejection fraction is estimated at 60-65%.  The right ventricle is mild to moderately dilated.  Mildly decreased right ventricular systolic function  There is moderately severe (3+) tricuspid regurgitation.  Compared to the study from 10/31 the TR appears better (although it was better  imaged back then). The IVC on this study is improved and TR may have improved  somewhat due to improved volume status.  _____________________________________________________________________________  __        Left Ventricle  The visual ejection fraction is estimated at 60-65%.     Right Ventricle  The right ventricle is  mild to moderately dilated. Mildly decreased right  ventricular systolic function.     Atria  There is severe biatrial enlargement.     Mitral Valve  There is mild (1+) mitral regurgitation.        Tricuspid Valve  There is moderately severe (3+) tricuspid regurgitation.     Aortic Valve  The aortic valve is trileaflet with aortic valve sclerosis. There is trace to  mild aortic regurgitation. No hemodynamically significant valvular aortic  stenosis.     Pulmonic Valve  The pulmonic valve is not well visualized. There is mild (1+) pulmonic  valvular regurgitation.     Pericardium  There is no pericardial effusion.     _____________________________________________________________________________  __        Doppler Measurements & Calculations  MV E max paul: 84.2 cm/sec  MV dec time: 0.22 sec  TR max paul: 293.8 cm/sec  TR max P.5 mmHg     E/E' av.8  Lateral E/e': 7.0  Medial E/e': 8.7           _____________________________________________________________________________  __           Report approved by: Tremayne Mcnulty 2019 11:46 AM      Transesophageal Echocardiogram    Narrative    [id number]  [facility]  [id number]  [id number]^NASEEM^MIYA^CODY           [facility]  Echocardiography Laboratory  [address] [place] [address]        Name: YINKA GONZALEZ  MRN: [id number]  : 1931  Study Date: 2019 01:15 PM  Age: 88 yrs  Gender: Male  Patient Location: [facility]  Reason For Study: Tricuspid Regurgitation  Ordering Physician: MIYA GUSMAN  Referring Physician: KATHARINE JANE  Performed By: Natanael Morocho RDCS     BSA: 1.9 m2  Height: 72 in  Weight: 157 lb  HR: 97  BP: 114/78 mmHg  _____________________________________________________________________________  __        Procedure  Complete KATIE Adult. KATIE Probe serial #[id number] was used during the procedure. 3D  image acquisition, reconstruction, and real-time interpretation was  performed.  _____________________________________________________________________________  __        Interpretation Summary     There is severe (4+) tricuspid regurgitation.  The tricuspid valve leaflets appear structurally normal but they are not  coapting due to a severely dilated tricuspid annulus (6.78 cm) from a  massively dilated right atrium.  There is systolic blunting and partial reversal in the right sided pulmonary  veins.  There is mod-severe to severe (3-4+) mitral regurgitation.  There is mild to moderate (1-2+) pulmonic valvular regurgitation.  The rhythm was rapid atrial fibrillation.  The visual ejection fraction is estimated at 60-65%.  Left ventricular systolic function is normal.  The right ventricle is normal in size and function.  _____________________________________________________________________________  __        KATIE  The patient was brought to the endoscopy suite. He was administered propofol  infusion by nurse anesthesiologist. Dr Ontiveros the gastroenterologist attemped  to intubate the patient with the KATIE probe but was unable to do so. He then  intubated the esophagus with an endoscope and saw a ridge in the proximal  esophagus which was impeding intubation with the KATIE probe. He then dilated  this ridge with a balloon and then with ease was able to intubate with the KATIE  probe. Please Dr Ontiveros's note for the EGD procedure. Post procedure the  patient was hypotensive and tachycardic with atrial fibrillation. It was felt  by the attending anesthesiologist that this was most likely due to Precedex  and felt that temporarily the patient should be placed on a neosynephrine drip  and transferred to CCU. The transesophageal probe insertion was technically  difficult.     Left Ventricle  The left ventricle is normal in size. Left ventricular systolic function is  normal. The visual ejection fraction is estimated at 60-65%.     Right Ventricle  The right ventricle is normal in size and  function.     Atria  The left atrium is moderately dilated. No left atrial mass or thrombus  visualized. The right atrium is severely dilated. No evidence of right atrial  mass/thrombus. Intact atrial septum. A contrast injection (Bubble Study) was  performed that was negative for flow across the interatrial septum. No  thrombus is detected in the left atrial appendage. Reverberations noted in LA  appendage.        Mitral Valve  The mitral valve leaflets are moderately thickened. There is mod-severe to  severe (3-4+) mitral regurgitation. There is systolic blunting and partial  reversal in the right sided pulmonary veins.     Tricuspid Valve  The tricuspid valve leaflets appear structurally normal but they are not  coapting due to a severely dilated tricuspid annulus (6.78 cm) from a  massively dilated right atrium. There is severe (4+) tricuspid regurgitation.     Aortic Valve  The aortic valve is trileaflet. There is trace aortic regurgitation. No  hemodynamically significant valvular aortic stenosis.     Pulmonic Valve  There is mild to moderate (1-2+) pulmonic valvular regurgitation. Right  ventricular diastolic pressure is approximated at 15mmHg plus the right atrial  pressure. Normal pulmonic valve velocity.     Vessels  Mild aortic root dilatation. The ascending aorta is Borderline dilated. Mild  atherosclerotic plaque(s) in the descending aorta.        Pericardial/Pleural  There is no pericardial effusion. Moderate left pleural effusion.     Rhythm  The rhythm was rapid atrial fibrillation.  _____________________________________________________________________________  __                                Report approved by: Tremayne Edmondson 11/11/2019 04:06 PM                    _____________________________________________________________________________  __      Echocardiogram Limited    Narrative    [id number]  [id number]  [id number]  [id number]^RUBY^[facility]  [facility]  Echocardiography Laboratory  [address] [place] [address]        Name: YINKA GONZALEZ  MRN: [id number]  : 1931  Study Date: 2019 03:51 PM  Age: 88 yrs  Gender: Male  Patient Location: [facility]  Reason For Study: Heart Failure, Tricuspid Regurgitation, Mitral Regurgitation  Ordering Physician: KIKE BELTRAN  Referring Physician: Mariusz Shields  Performed By: Jarocho Dacosta RDCS     BSA: 1.9 m2  Height: 72 in  Weight: 149 lb  HR: 84  BP: 87/57 mmHg  _____________________________________________________________________________  __        Procedure  Limited Portable Echo Adult. 3D image acquisition, reconstruction, and real-  time interpretation was performed.  _____________________________________________________________________________  __        Interpretation Summary     There is mod-severe to severe (3-4+) tricuspid regurgitation.  There is mild to moderate (1-2+) mitral regurgitation.  The visual ejection fraction is estimated at 60-65%.  Left ventricular systolic function is normal.  The right ventricle is mild to moderately dilated.  The right ventricular systolic function is normal.  _____________________________________________________________________________  __        Left Ventricle  Left ventricular hypertrophy is noted by two-dimensional echocardiography. The  visual ejection fraction is estimated at 60-65%. Left ventricular systolic  function is normal.     Right Ventricle  The right ventricle is mild to moderately dilated. The right ventricular  systolic function is normal.     Atria  The left atrium is moderate to severely dilated. The right atrium is severely  dilated. Right atrial catheter. There is no color Doppler evidence of an  atrial shunt.     Mitral Valve  The mitral valve leaflets appear thickened, but open well. There is mild to  moderate (1-2+) mitral regurgitation.        Tricuspid Valve  There is mod-severe to severe (3-4+) tricuspid  regurgitation. The right  ventricular systolic pressure is approximated at 38.6 mmHg plus the right  atrial pressure. Right ventricular systolic pressure is elevated, consistent  with mild to moderate pulmonary hypertension.     Aortic Valve  There is trace aortic regurgitation.     Pulmonic Valve  There is mild (1+) pulmonic valvular regurgitation.     Vessels  The inferior vena cava was normal in size with preserved respiratory  variability.     Pericardium  Moderate left pleural effusion.     _____________________________________________________________________________  __        Doppler Measurements & Calculations  TR max paul: 310.8 cm/sec  TR max P.6 mmHg              _____________________________________________________________________________  __           Report approved by: Tremayne Edmondson 2019 04:39 PM      Cardiac Catheterization     Value    Cath EF Estimated 60    Narrative      Ramus lesion is 15% stenosed.    Prox LAD lesion is 20% stenosed.    Mid RCA lesion is 20% stenosed.    The ejection fraction is greater than 55% by visual estimate.    Right sided filling pressures are normal.    Normal PA pressures.    Left sided filling pressures are normal.    Left ventricular filling pressures are normal .    Normal cardiac output level.     1. Minimal CAD  2. Low systemic BP  3. Right heart study suggests normal right heart pressures, no pulmonary   hypertension, essentially normal PVR and CO         Most Recent 3 CBC's:  Recent Labs   Lab Test 19  0644 11/15/19  1050 19  0735   WBC 7.4 11.9* 7.3   HGB 9.2* 10.1* 9.4*   MCV 95 94 95   PLT 76* 72* 71*     Most Recent 3 BMP's:  Recent Labs   Lab Test 19  0559 19  0644 11/15/19  0610    136 136   POTASSIUM 4.1 4.2 3.6   CHLORIDE 103 103 100   CO2 31 32 32   BUN 26 25 24   CR 0.97 0.89 0.88   ANIONGAP 2* 1* 4   BARON 7.3* 7.3* 7.5*   GLC 91 83 86

## 2019-11-17 NOTE — PLAN OF CARE
Physical Therapy Discharge Summary    Reason for therapy discharge:    Discharged to transitional care facility.    Progress towards therapy goal(s). See goals on Care Plan in [facility] electronic health record for goal details.  Goals not met.  Barriers to achieving goals:   discharge from facility.    Therapy recommendation(s):    Continued therapy is recommended.  Rationale/Recommendations:  Pt is below baseline level of function, would benefit from continued PT at TCU to maximize functional independence.

## 2019-11-17 NOTE — PROGRESS NOTES
HEMATOLOGY CC:     Hematology chart check    1. Moderate thrombocytopenia with platelet counts historically ranging in the 30-45,000 range.  - Prior bone marrow biopsy was suggestive of an underlying myelodysplastic syndrome given the chromosome abnormalities but there were  no other recognizable features to suggest MDS.  Megakaryocyte production appeared normal and therefore the chief basis for the thrombocytopenia does not appear to be due to decreased production.    - The platelet count has not previously responded to trial of IVIG or corticosteroids.   - Antiphospholipid antibody testing did not reveal abnormality to suggest this as etiology.  - Has been followed with observation in the outpatient setting.  - has also not had significant response to steroids during this hospitalization therefore they were discontinued  - platelet counts 71,000 on 11/14/19  - daily CBC  - consider platelet transfusion for bleeding or invasive cardiac procedure.       2. Valvular heart disease with CHF, a fib   - Severe tricuspid regurgitation. Moderate-severe mitral regurgitation.  - Echo 11/11/2019  - plan per Cardiology        No new CBC today

## 2019-11-17 NOTE — DISCHARGE INSTRUCTIONS
RLE wound from trauma: change dressing 2x/weeko on Thursday/Monday  and Prn  1. Clean MicroKlenz  2. mepilex border to wound to pad/protect  3. At discharge resume tx protocol outlined with [facility]  4. F/U Dr Benjamin or Dr Hernandez as scheduled      Patient will discharge to [facility] today, via [facility] wheelchair at 14:30.  [facility]'s phone number is [phone].

## 2019-11-18 ENCOUNTER — TELEPHONE (OUTPATIENT)
Dept: CARDIOLOGY | Facility: CLINIC | Age: 84
End: 2019-11-18

## 2019-11-18 ENCOUNTER — PATIENT OUTREACH (OUTPATIENT)
Dept: CARE COORDINATION | Facility: CLINIC | Age: 84
End: 2019-11-18

## 2019-11-18 VITALS
RESPIRATION RATE: 18 BRPM | TEMPERATURE: 98 F | WEIGHT: 146.4 LBS | SYSTOLIC BLOOD PRESSURE: 124 MMHG | OXYGEN SATURATION: 96 % | HEIGHT: 72 IN | HEART RATE: 64 BPM | BODY MASS INDEX: 19.83 KG/M2 | DIASTOLIC BLOOD PRESSURE: 62 MMHG

## 2019-11-18 DIAGNOSIS — I48.21 PERMANENT ATRIAL FIBRILLATION (H): ICD-10-CM

## 2019-11-18 DIAGNOSIS — I50.43 ACUTE ON CHRONIC COMBINED SYSTOLIC AND DIASTOLIC CHF (CONGESTIVE HEART FAILURE) (H): ICD-10-CM

## 2019-11-18 DIAGNOSIS — I07.1 SEVERE TRICUSPID REGURGITATION: ICD-10-CM

## 2019-11-18 DIAGNOSIS — I42.8 NON-ISCHEMIC CARDIOMYOPATHY (H): Primary | ICD-10-CM

## 2019-11-18 DIAGNOSIS — J90 PLEURAL EFFUSION: Primary | ICD-10-CM

## 2019-11-18 RX ORDER — EMOLLIENT COMBINATION NO.32
EMULSION, EXTENDED RELEASE TOPICAL
COMMUNITY
End: 2019-11-22

## 2019-11-18 ASSESSMENT — MIFFLIN-ST. JEOR: SCORE: 1372.07

## 2019-11-18 NOTE — TELEPHONE ENCOUNTER
Patient was evaluated by cardiology while inpatient for weakness and SOB. Pt was admitted on 10/30/19 with prolonged hospitalization. See Dr. Cronin's note and recommendations as below. CORE consult has not been ordered. Will route this note to Rio Guzman NP for clarification. RN will then call [facility] to confirm the follow up plan for patient with Care Coordinator. RN will confirm with Care Coordinator that patient is weighed daily and to bring list of daily weights/vitals, last progress note, MAR, active orders, and provider order sheet to appt. LYNDA Fang RN.          1.  Bilateral pulmonary effusion status post left thoracentesis  2.  Severe tricuspid regurgitation  3.  Mitral regurgitation- dynamic, previously mod-severe, now only mild after diuresis  4.  Chronic atrial fibrillation not on anticoagulation due to thrombocytopenia and history of bleeding  5.  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  Mild pulmonary hypertension- mPAP of 25 RHC on 11/15/2019  7.  Hypotension after administration of Precedex for KATIE- resolved     Recommendations-   1.  Normal Left and right sided pressures on RHC consistent with euvolemic status. PAP also normal. Transitioned to po diuretics. Lasix Drip dcd. No CAD on LHC. He will remain stable as long as he is euvolemic which will be challenging given the nature of his disease.  Need outpatient follow-up in core clinic at the time of discharge.  Can also be considered for CardioMEMS for closer monitoring.  Today look dehydrated. Will decrease Torsemide to 10 every day. Discussed with pt and wife and told them to titrate the dose based on his s/s. Pt understands. I think he will be able to manage diuretics, he is is a retired anesthesiologist.   3.  Continue to hold anticoagulation due to thrombocytopenia.  Platelets stable.  4.  CXR improved with thoracentesis.  Repeat if SOB.   5.  Repeat TTE shows sig improvement in the MR which is at most mild. TR has also  improved, although still hemodynamically significant. No need for mitral clip. Will discuss with Dr. Cervantes regarding TV repair. This can be done on out pt basis as he is stable, inpatient if he is here on [place].   6. Stable for discharge from cards standpoint.      Sandeep Cronin MD

## 2019-11-18 NOTE — PROGRESS NOTES
Clinic Care Coordination Contact  Care Coordination Transition Communication    Referral Source: IP Report    Clinical Data: Patient was hospitalized at [facility] from 10/30to 11/17/2019 with diagnosis of Pleural effusion /CHF    Transition to Facility:              Facility Name:[facility]               Contact name and phone number/fax: CC contact letter faxed to facility to notify when the patient is discharged from TCU     Plan: RN/SW Care Coordinator will await notification from facility staff informing RN/SW Care Coordinator of patient's discharge plans/needs. RN/SW Care Coordinator will review chart and outreach to facility staff every 4 weeks and as needed.     [facility]     Danita Arreola  RN Care Coordinator   [facility] / [facility]   Phone: [phone]  Email :  [email or web address]

## 2019-11-19 ENCOUNTER — NURSING HOME VISIT (OUTPATIENT)
Dept: GERIATRICS | Facility: CLINIC | Age: 84
End: 2019-11-19
Payer: MEDICARE

## 2019-11-19 VITALS
HEART RATE: 68 BPM | WEIGHT: 147 LBS | BODY MASS INDEX: 19.91 KG/M2 | TEMPERATURE: 98.1 F | OXYGEN SATURATION: 93 % | DIASTOLIC BLOOD PRESSURE: 90 MMHG | RESPIRATION RATE: 18 BRPM | SYSTOLIC BLOOD PRESSURE: 162 MMHG | HEIGHT: 72 IN

## 2019-11-19 DIAGNOSIS — I10 ESSENTIAL HYPERTENSION: ICD-10-CM

## 2019-11-19 DIAGNOSIS — I48.21 PERMANENT ATRIAL FIBRILLATION (H): ICD-10-CM

## 2019-11-19 DIAGNOSIS — Z98.890 STATUS POST THORACENTESIS: ICD-10-CM

## 2019-11-19 DIAGNOSIS — R53.81 PHYSICAL DECONDITIONING: ICD-10-CM

## 2019-11-19 DIAGNOSIS — I50.43 ACUTE ON CHRONIC COMBINED SYSTOLIC AND DIASTOLIC HRT FAIL (H): Primary | ICD-10-CM

## 2019-11-19 DIAGNOSIS — I42.8 NON-ISCHEMIC CARDIOMYOPATHY (H): ICD-10-CM

## 2019-11-19 DIAGNOSIS — I07.1 TRICUSPID VALVE INSUFFICIENCY, UNSPECIFIED ETIOLOGY: ICD-10-CM

## 2019-11-19 DIAGNOSIS — I34.0 MITRAL VALVE INSUFFICIENCY, UNSPECIFIED ETIOLOGY: ICD-10-CM

## 2019-11-19 DIAGNOSIS — D69.3 IDIOPATHIC THROMBOCYTOPENIC PURPURA (H): ICD-10-CM

## 2019-11-19 DIAGNOSIS — J90 BILATERAL PLEURAL EFFUSION: ICD-10-CM

## 2019-11-19 LAB
BACTERIA SPEC CULT: NO GROWTH
SPECIMEN SOURCE: NORMAL

## 2019-11-19 PROCEDURE — 99310 SBSQ NF CARE HIGH MDM 45: CPT | Performed by: NURSE PRACTITIONER

## 2019-11-19 ASSESSMENT — MIFFLIN-ST. JEOR: SCORE: 1374.79

## 2019-11-19 NOTE — PROGRESS NOTES
[facility]  PRIMARY CARE PROVIDER AND CLINIC:  Mariusz Shields MD, [address] / [place] [address]  Chief Complaint   Patient presents with     [facility]     [facility] Record Number:  [id number]  Place of Service where encounter took place:  [facility] [[id number]]    Jama Paul  is a 88 year old  (2/13/1931), admitted to the above facility from  [facility]. Hospital stay 10/30/19 through 11/17/19..  Admitted to this facility for  rehab, medical management and nursing care.    HPI:    HPI information obtained from: facility chart records, facility staff, patient report and [facility] chart review.   Brief Summary of Hospital Course: pt had a long hospital stay with evaluations for his TR. He has severe HF and worsening SOB so went in on 10/30/19.  He had bilateral pleural effusions and got bilateral thoracenteses, diuresed, on dobutamine and norepinephrine gtts at one point.    He EF is 55-60% but severely dilated RV and the TR and MR issues.  He had an EGD as they were not able to complete the KATIE--he became hypotensive and required fluids and phenylephrine .. he has another thoracentesis on left side on 11/4 and was deemed ready  to discharge to TCU.     TODAY DURING EXAM HIP and ROS:  No CP, SOB, Cough, dizziness, fevers, chills, HA, N/V, dysuria or Bowel Abnormalities. Appetite is good.  No pains      CODE STATUS/ADVANCE DIRECTIVES DISCUSSION:   CPR/Full code   Patient's living condition: lives with spouse  ALLERGIES: Patient has no known allergies.  PAST MEDICAL HISTORY:  has a past medical history of Congestive heart failure (H), Elevated PSA, Eye hemorrhage, left (02/2019), Non-ischemic cardiomyopathy (H), Permanent atrial fibrillation (2011), Thrombocytopenia (H), and Unspecified essential hypertension. He also has no past medical history of Malignant hyperthermia, PONV (postoperative nausea and vomiting), or Sleep  apnea.  PAST SURGICAL HISTORY:   has a past surgical history that includes septic olecranon bursitis[; Mohs micrographic procedure; Herniorrhaphy inguinal (4/30/2014); Explore groin (4/30/2014); Phacoemulsification clear cornea with standard intraocular lens implant (Right, 9/8/2015); carpal tunnel release rt/lt (2014); Bone marrow biopsy, bone specimen, needle/trocar (N/A, 3/4/2019); Abdomen surgery; Esophagoscopy, gastroscopy, duodenoscopy (EGD), combined (N/A, 11/6/2019); Heart Catheterization with Possible Intervention (N/A, 11/15/2019); and Left Ventriculogram (N/A, 11/15/2019).  FAMILY HISTORY: family history is not on file.  SOCIAL HISTORY:   reports that he has never smoked. He has never used smokeless tobacco. He reports that he does not drink alcohol or use drugs.    Post Discharge Medication Reconciliation Status: discharge medications reconciled and changed, per note/orders (see AVS)     Current Outpatient Medications   Medication Sig Dispense Refill     Dermatological Products, Misc. (EPICERAM) EMUL Apply to right leg topically one time a day for ulcer of right lower extremity with fat layer exposed       Dermatological Products, Misc. (EPICERAM) EMUL Externally apply topically daily Apply to right leg.       digoxin (LANOXIN) 125 MCG tablet Take 1 tablet (125 mcg) by mouth daily (Patient taking differently: Take 125 mcg by mouth daily HOLD if HR <55) 30 tablet 0     melatonin 5 MG tablet Take 5 mg by mouth At Bedtime       metoprolol succinate ER (TOPROL-XL) 50 MG 24 hr tablet Take 100 mg by mouth every morning       metoprolol succinate ER (TOPROL-XL) 50 MG 24 hr tablet Take 50 mg by mouth every evening HOLD if SBP <110 OR HR < 55. Call if held x 2 in a row symptomatic       mirtazapine (REMERON) 15 MG tablet Take 15 mg by mouth At Bedtime Patient started medication for the first time yesterday 10/29/2019.       Multiple Vitamins-Minerals (ICAPS AREDS FORMULA PO) Take 1 tablet by mouth daily Takes in  addition to multivitamin with minerals       Nutritional Supplements (NUTRITIONAL SUPPLEMENT PO) House Supplement with meals       OTHER MEDICAL SUPPLIES Fluid restriction: 1500 ml per day every shift       OTHER MEDICAL SUPPLIES ACE Wraps or support stockings (knee high) to bilateral extremities every morning and at bedtime for Edema on in the morning, OFF at night       torsemide (DEMADEX) 10 MG tablet Take 1 tablet (10 mg) by mouth daily       Vitamin D, Cholecalciferol, 1000 UNITS TABS Take 2,000 Units by mouth daily        order for DME [facility] Order Phone [phone] Fax [phone]    Primary Dressing Hydrofera blue ready transfer    Qty 2 sheets  Secondary Dressing 4x4 gauze loaf Qty 1  Secondary Dressing 4' roll gauze Qty 30  Secondary Dressing 2' tape Qty  1  Length of Need: 1 month  Frequency of dressing change: daily 30 days 0        ROS:  See above    Vitals:  /62   Pulse 64   Temp 98  F (36.7  C)   Resp 18   Ht 1.829 m (6')   Wt 66.4 kg (146 lb 6.4 oz)   SpO2 96%   BMI 19.86 kg/m    Exam:  GENERAL APPEARANCE:  Alert, in no distress, oriented, cooperative, ?FORGETFUL  ENT:  Mouth and posterior oropharynx normal, moist mucous membranes, [place]  EYES:  EOM, conjunctivae, lids, pupils and irises normal  NECK:  No adenopathy,masses or thyromegaly  RESP:  respiratory effort and palpation of chest normal, lungs clear to auscultation , no respiratory distress, crackles mildly in bases bilat  CV:  Palpation and auscultation of heart done ,RRR with irreg beats. Soft systolic murmur, no  rub, or gallop, no edema, +PVD changes in LE's +1 pedal pulses  ABDOMEN:  normal bowel sounds, soft, nontender, no hepatosplenomegaly or other masses  M/S:   CHIU equally, up with assist  SKIN:  Inspection of skin and subcutaneous tissue baseline, band aid left side chest from thoracentesis on 11/14  NEURO:   Cranial nerves 2-12 are normal tested and grossly at patient's baseline  PSYCH:  oriented X 3, ?memory  impaired     Lab/Diagnostic data:  Recent Labs   Lab Test 11/17/19  0559 11/16/19  0644    136   POTASSIUM 4.1 4.2   CHLORIDE 103 103   CO2 31 32   ANIONGAP 2* 1*   GLC 91 83   BUN 26 25   CR 0.97 0.89   BARON 7.3* 7.3*     CBC RESULTS:   Recent Labs   Lab Test 11/16/19  0644   WBC 7.4   RBC 3.09*   HGB 9.2*   HCT 29.2*   MCV 95   MCH 29.8   MCHC 31.5   RDW 21.5*   PLT 76*     ASSESSMENT / PLAN:    CV ISSUES:  (I50.43) 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 Has cares appts on 12/03. BMP on 11/22    (I10) Essential hypertension   (I48.21) Permanent atrial fibrillation  Comment/Plan: SBP mostly 110-130's was > 160 after up this afternoon and anxious.  Consider prn hydralazine if recurs,  Rate is controlled at 60-70's. Consider increase of BB also.    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  F/U Heme-Onc per their usual routine.  Per Dr Anil lee in hospital, he may be developing MDS    **Called and left msg on wife's phone--no answer**    Electronically signed by:  ERIC Mir CNP

## 2019-11-19 NOTE — TELEPHONE ENCOUNTER
Writer called [facility] and spoke with the charge RN. Verified with RN that pt is scheduled on 12/3/19 for CORE return with HARRY Almendarez on 12/3/19, with labs prior, at our [place] Office. Address provided. Instructed to send daily wt and BP diary with to f/u OV, as well as documentation as below. Verbalized understanding without further questions. LYNDA Fang RN.

## 2019-11-19 NOTE — Clinical Note
BOGDAN gould, I got this man in TCU at [facility].  He spiked BP up to > 160 very briefly then ok. Should I order some prn hydralazine if recurs or you have something else you want?  That would be my usual with this kind of ptTharshal Wilson reply to Dr Bradford and I as I am off tomorrow

## 2019-11-20 ENCOUNTER — NURSING HOME VISIT (OUTPATIENT)
Dept: GERIATRICS | Facility: CLINIC | Age: 84
End: 2019-11-20
Payer: MEDICARE

## 2019-11-20 ENCOUNTER — MYC MEDICAL ADVICE (OUTPATIENT)
Dept: GERIATRICS | Facility: CLINIC | Age: 84
End: 2019-11-20

## 2019-11-20 DIAGNOSIS — R53.81 PHYSICAL DECONDITIONING: ICD-10-CM

## 2019-11-20 DIAGNOSIS — J90 BILATERAL PLEURAL EFFUSION: ICD-10-CM

## 2019-11-20 DIAGNOSIS — I48.21 PERMANENT ATRIAL FIBRILLATION (H): ICD-10-CM

## 2019-11-20 DIAGNOSIS — F51.02 ADJUSTMENT INSOMNIA: ICD-10-CM

## 2019-11-20 DIAGNOSIS — R10.31 RLQ ABDOMINAL PAIN: ICD-10-CM

## 2019-11-20 DIAGNOSIS — I42.8 NON-ISCHEMIC CARDIOMYOPATHY (H): ICD-10-CM

## 2019-11-20 DIAGNOSIS — I50.43 ACUTE ON CHRONIC COMBINED SYSTOLIC AND DIASTOLIC HRT FAIL (H): Primary | ICD-10-CM

## 2019-11-20 DIAGNOSIS — D69.3 IDIOPATHIC THROMBOCYTOPENIC PURPURA (H): ICD-10-CM

## 2019-11-20 DIAGNOSIS — Z98.890 STATUS POST THORACENTESIS: ICD-10-CM

## 2019-11-20 DIAGNOSIS — I07.1 TRICUSPID VALVE INSUFFICIENCY, UNSPECIFIED ETIOLOGY: ICD-10-CM

## 2019-11-20 DIAGNOSIS — R53.1 GENERALIZED WEAKNESS: ICD-10-CM

## 2019-11-20 LAB — INTERPRETATION ECG - MUSE: NORMAL

## 2019-11-20 PROCEDURE — 99306 1ST NF CARE HIGH MDM 50: CPT | Performed by: INTERNAL MEDICINE

## 2019-11-20 NOTE — LETTER
"    11/20/2019        RE: Jama Paul  [id number]  [facility] [address]        Jama Paul is a 88 year old male seen November 20, 2019 at [facility] where he was admitted this month after [facility] hospitalization 10/30-11/17/19 for shortness of breath.  He was found to have bilateral pleural effusions and had thoracentesis x2 on the left (latter on 11/14) and one time on the right.    Pleural fluid c/w transudate.   ECHO cardiogram showed EF 55-60% with severely dilated RV, severe MR and TR.   Pt needed GI consult and esophageal dilatation to have KATIE done 11/1    Following this procedure he had hypotension requiring pressor support in the ICU.   He was diuresed with IV furosemide and repeat ECHO showed severe TR and moderate MR, with some improvement in both.   He was seen by Cardiology and CV surgery. He underwent left heart catheterization as workup for tricuspid repair or replacement, and this showed normal left and right sided pressures.     He remains on metoprolol and torsemide, but not on Entresto secondary to low bps.    Follow up with Dr Cervantes to consider TV repair is pending.       Pt has ITP, poss early myelodysplastic syndrome.   He had a BM biopsy in March 2019 that showed hypercellular marrow with 80% cellularity and normal thrombocytes.   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  He was anticoagulated with warfarin for atrial fib and had a bleed behind his retina, lost vision in his left eye; has not been anticoagulated since then.      He has seen Hem/Onc and been treated with prednisone and IVIG with some improvement.        Today patient is seen in his room, up to .  His wife Paige is present and I talked with his daughter Lorraine by phone.    States he is feeling unwell today, \"I feel dehydrated.\"   He also notes some discomfort left side \"along the iliac crest\"   " He has not fallen or had trauma, feels his incontinence briefs are too tight there and that is causing the pain.   No particularly worsening with weightbearing, although Physical therapist reports patient has not been walking to date.    Patient has been incontinent of stool, for 3 years by his report and for past month per daughter.  Etiology unclear, ?functional.      He has had longer standing urinary incontinence for which he wears briefs at home.    Has not called for nursing assistance to change him yet, which is not unusual per daughter.      He has RODGERS, and sleeps propped up on pillows.   Denies SOB at rest, no CP or palpitations.       Past Medical History:   Diagnosis Date     Congestive heart failure (H)      Elevated PSA      Eye hemorrhage, left 02/2019     Non-ischemic cardiomyopathy (H)     2017, med treatment, echo done 4/18 nl ef, mod to severe tr     Permanent atrial fibrillation 2011     Thrombocytopenia (H)      Unspecified essential hypertension    Cognitive impairment    Past Surgical History:   Procedure Laterality Date     ABDOMEN SURGERY      bowel obstruction     BONE MARROW BIOPSY, BONE SPECIMEN, NEEDLE/TROCAR N/A 3/4/2019    Procedure: BIOPSY BONE MARROW;  Surgeon: Josey Vargas MD;  Location:  GI     CARPAL TUNNEL RELEASE RT/LT  2014     CV HEART CATHETERIZATION WITH POSSIBLE INTERVENTION N/A 11/15/2019    Procedure: Left and right heart Catheterization with Possible Intervention;  Surgeon: Adolfo Paez MD;  Location:  HEART CARDIAC CATH LAB     CV LEFT VENTRICULOGRAM N/A 11/15/2019    Procedure: Left Ventriculogram;  Surgeon: Adolfo Paez MD;  Location:  HEART CARDIAC CATH LAB     ESOPHAGOSCOPY, GASTROSCOPY, DUODENOSCOPY (EGD), COMBINED N/A 11/6/2019    Procedure: ESOPHAGOGASTRODUODENOSCOPY (EGD);  Surgeon: Marixa Aguila MD;  Location:  GI     EXPLORE GROIN  4/30/2014    Procedure: EXPLORE GROIN;  Surgeon: Sam Aguirre MD;  Location:  OR      HERNIORRHAPHY INGUINAL  4/30/2014    Procedure: HERNIORRHAPHY INGUINAL;  Surgeon: Sam Aguirre MD;  Location:  OR     MOHS MICROGRAPHIC PROCEDURE       PHACOEMULSIFICATION CLEAR CORNEA WITH STANDARD INTRAOCULAR LENS IMPLANT Right 9/8/2015    Procedure: PHACOEMULSIFICATION CLEAR CORNEA WITH STANDARD INTRAOCULAR LENS IMPLANT;  Surgeon: Gil Shannon MD;  Location:  EC     septic olecranon bursitis[         Family History   Problem Relation Age of Onset     Heart Disease No family hx of        Social History     Tobacco Use     Smoking status: Never Smoker     Smokeless tobacco: Never Used   Substance Use Topics     Alcohol use: No      SH:  Lives with his wife, ekaterina at [facility].   They have a daughter Lorraine and son Jama.   Pt is a retired ophthalmologist, worked clinically until age 79        Review Of Systems  Skin: negative   Eyes: impaired vision, loss of vision left eye     Ears/Nose/Throat: hearing loss  Respiratory: dyspnea on exertion; no cough, or hemoptysis  Cardiovascular: as above  Gastrointestinal: fecal incontinence, no dysphagia.   Weight down since hospitalization   Wt Readings from Last 5 Encounters:   11/19/19 66.7 kg (147 lb)   11/18/19 66.4 kg (146 lb 6.4 oz)   11/17/19 61.2 kg (134 lb 14.7 oz)   09/10/19 72.6 kg (160 lb)   09/02/19 72.6 kg (160 lb)      Genitourinary: incontinence  Musculoskeletal: generalized weakness, WC bound with assist for transfers  Neurologic: SLUMS 24/30    Psychiatric: negative  Hematologic/Lymphatic/Immunologic: anemia and bleeding disorder  Endocrine: negative      GENERAL APPEARANCE: alert and no distress  BP (!) 162/90   Pulse 68   Temp 98.1  F (36.7  C)   Resp 18   Ht 1.829 m (6')   Wt 66.7 kg (147 lb)   SpO2 93%   BMI 19.94 kg/m      bp down to 90/50 after medications this morning.       HEENT: normocephalic, no lesion or abnormalities  NECK: no adenopathy, no asymmetry, masses, or scars and thyroid normal to palpation  RESP:  decreased BS 1/2 way up on right, 1/4 way up on left.  Few scattered crackles, decreased BS  CV: regular rate and rhythm, normal S1 S2  ABDOMEN:  soft, nontender, no HSM or masses and bowel sounds normal, very mild distention, no rebound or guarding.   MS: extremities with trace LE edema, wearing compression stockings.     SKIN: no suspicious lesions or rashes  NEURO: No focal findings, conversant with some confabulation      PSYCH: affect okay  LYMPHATICS: No cervical,  or supraclavicular nodes     Last Comprehensive Metabolic Panel:  Sodium   Date Value Ref Range Status   11/17/2019 136 133 - 144 mmol/L Final     Potassium   Date Value Ref Range Status   11/17/2019 4.1 3.4 - 5.3 mmol/L Final     Chloride   Date Value Ref Range Status   11/17/2019 103 94 - 109 mmol/L Final     Carbon Dioxide   Date Value Ref Range Status   11/17/2019 31 20 - 32 mmol/L Final     Anion Gap   Date Value Ref Range Status   11/17/2019 2 (L) 3 - 14 mmol/L Final     Glucose   Date Value Ref Range Status   11/17/2019 91 70 - 99 mg/dL Final     Urea Nitrogen   Date Value Ref Range Status   11/17/2019 26 7 - 30 mg/dL Final     Creatinine   Date Value Ref Range Status   11/17/2019 0.97 0.66 - 1.25 mg/dL Final     GFR Estimate   Date Value Ref Range Status   11/17/2019 69 >60 mL/min/[1.73_m2] Final     Comment:     Non  GFR Calc  Starting 12/18/2018, serum creatinine based estimated GFR (eGFR) will be   calculated using the Chronic Kidney Disease Epidemiology Collaboration   (CKD-EPI) equation.       Calcium   Date Value Ref Range Status   11/17/2019 7.3 (L) 8.5 - 10.1 mg/dL Final     Lab Results   Component Value Date    WBC 7.4 11/16/2019      HGB 9.2 11/16/2019      MCV 95 11/16/2019      PLT 76 11/16/2019     Lab Results   Component Value Date    AST 9 11/11/2019     Lab Results   Component Value Date    ALT 20 11/11/2019      ALBUMIN 2.3 11/16/2019     Lab Results   Component Value Date    PROTTOTAL 5.4 11/11/2019      Lab  Results   Component Value Date    ALKPHOS 47 11/11/2019     TSH   Date Value Ref Range Status   02/05/2019 0.99 0.40 - 4.00 mU/L Final     ECHO 11/14/19   Interpretation Summary  There is mod-severe to severe (3-4+) tricuspid regurgitation.  There is mild to moderate (1-2+) mitral regurgitation.  The visual ejection fraction is estimated at 60-65%.   Left ventricular systolic function is normal.  The right ventricle is mild to moderately dilated.   The right ventricular systolic function is normal.      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     Plan: discontinue fluid restriction.     Torsemide 10 mg/day    Recheck BMP in AM.       (I07.1) Tricuspid valve insufficiency, unspecified etiology  Comment: severe by ECHO and only slight improvement after diuresis      Plan: pt would like consideration for TV repair.   Needs to get stronger, improve functional status before he could tolerate such a procedure.      (J90) Bilateral pleural effusion  (Z98.890) Status post thoracentesis  Comment: today's exam c/w right sided pleural effusion  Plan: Will likely need repeat right thoracentesis.   Check CXR if any change in dyspnea        (I48.21) Permanent atrial fibrillation  Comment:   Pulse Readings from Last 4 Encounters:   11/19/19 68   11/18/19 64   11/17/19 87   10/14/19 92      BP Readings from Last 3 Encounters:   11/19/19 (!) 162/90   11/18/19 124/62   11/17/19 100/68      Plan: metoprolol for VR and bp control.   No anticoagulation given low plts and bleeding complications.        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     Plan: PHYSICAL THERAPY / OCCUPATIONAL THERAPY / Speech therapy for strengthening, balance, transfers, ADLs.   [place] and family report.        (F51.02) Adjustment insomnia  Comment: on PTA melatonin     Plan: with low Na and volume changes, will decrease mirtazapine to 7.5 mg HS, which should also help promote sleep.        (R10.31) RLQ abdominal pain  Comment: acute today, no remember trauma, did have cardiac catheterization    Pt initially asking for Vicodin, which daughter reports is very unusual for him as he usually won't take any analgesics.      Plan: will start with Acetaminophen 650 mg bid and q6 hours prn.   Follow exam and symptoms.         I spent 55 minutes with patient and wife, and talking with daughter Lorraine by phone, reviewing course with nursing staff and Physical Therapist, medication reconciliation, addressing advanced directives and goals of care, and ensuring appropriate follow up.    Reviewed medication changes and discontinuation of fluid restriction with patient and family.     Jeanette Hernández MD       Sincerely,        Jeanette Hernández MD

## 2019-11-20 NOTE — PROGRESS NOTES
"Jama Paul is a 88 year old male seen November 20, 2019 at [facility] where he was admitted this month after [facility] hospitalization 10/30-11/17/19 for shortness of breath.  He was found to have bilateral pleural effusions and had thoracentesis x2 on the left (latter on 11/14) and one time on the right.    Pleural fluid c/w transudate.   ECHO cardiogram showed EF 55-60% with severely dilated RV, severe MR and TR.   Pt needed GI consult and esophageal dilatation to have KATIE done 11/1    Following this procedure he had hypotension requiring pressor support in the ICU.   He was diuresed with IV furosemide and repeat ECHO showed severe TR and moderate MR, with some improvement in both.   He was seen by Cardiology and CV surgery. He underwent left heart catheterization as workup for tricuspid repair or replacement, and this showed normal left and right sided pressures.     He remains on metoprolol and torsemide, but not on Entresto secondary to low bps.    Follow up with Dr Cervantes to consider TV repair is pending.       Pt has ITP, poss early myelodysplastic syndrome.   He had a BM biopsy in March 2019 that showed hypercellular marrow with 80% cellularity and normal thrombocytes.   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  He was anticoagulated with warfarin for atrial fib and had a bleed behind his retina, lost vision in his left eye; has not been anticoagulated since then.      He has seen Hem/Onc and been treated with prednisone and IVIG with some improvement.        Today patient is seen in his room, up to .  His wife Paige is present and I talked with his daughter Lorraine by phone.    States he is feeling unwell today, \"I feel dehydrated.\"   He also notes some discomfort left side \"along the iliac crest\"   He has not fallen or had trauma, feels his incontinence briefs are too tight there and that is causing the " pain.   No particularly worsening with weightbearing, although Physical therapist reports patient has not been walking to date.    Patient has been incontinent of stool, for 3 years by his report and for past month per daughter.  Etiology unclear, ?functional.      He has had longer standing urinary incontinence for which he wears briefs at home.    Has not called for nursing assistance to change him yet, which is not unusual per daughter.      He has RODGERS, and sleeps propped up on pillows.   Denies SOB at rest, no CP or palpitations.       Past Medical History:   Diagnosis Date     Congestive heart failure (H)      Elevated PSA      Eye hemorrhage, left 02/2019     Non-ischemic cardiomyopathy (H)     2017, med treatment, echo done 4/18 nl ef, mod to severe tr     Permanent atrial fibrillation 2011     Thrombocytopenia (H)      Unspecified essential hypertension    Cognitive impairment    Past Surgical History:   Procedure Laterality Date     ABDOMEN SURGERY      bowel obstruction     BONE MARROW BIOPSY, BONE SPECIMEN, NEEDLE/TROCAR N/A 3/4/2019    Procedure: BIOPSY BONE MARROW;  Surgeon: Josey Vargas MD;  Location:  GI     CARPAL TUNNEL RELEASE RT/LT  2014     CV HEART CATHETERIZATION WITH POSSIBLE INTERVENTION N/A 11/15/2019    Procedure: Left and right heart Catheterization with Possible Intervention;  Surgeon: Adolfo Paez MD;  Location: [facility] CARDIAC CATH LAB     CV LEFT VENTRICULOGRAM N/A 11/15/2019    Procedure: Left Ventriculogram;  Surgeon: Adolfo Paez MD;  Location: [facility] CARDIAC CATH LAB     ESOPHAGOSCOPY, GASTROSCOPY, DUODENOSCOPY (EGD), COMBINED N/A 11/6/2019    Procedure: ESOPHAGOGASTRODUODENOSCOPY (EGD);  Surgeon: Marixa Aguila MD;  Location:  GI     EXPLORE GROIN  4/30/2014    Procedure: EXPLORE GROIN;  Surgeon: Sam Aguirre MD;  Location:  OR     HERNIORRHAPHY INGUINAL  4/30/2014    Procedure: HERNIORRHAPHY INGUINAL;  Surgeon: Sam Aguirre MD;  Location:  [facility] OR     MOHS MICROGRAPHIC PROCEDURE       PHACOEMULSIFICATION CLEAR CORNEA WITH STANDARD INTRAOCULAR LENS IMPLANT Right 9/8/2015    Procedure: PHACOEMULSIFICATION CLEAR CORNEA WITH STANDARD INTRAOCULAR LENS IMPLANT;  Surgeon: Gil Shannon MD;  Location:  EC     septic olecranon bursitis[         Family History   Problem Relation Age of Onset     Heart Disease No family hx of        Social History     Tobacco Use     Smoking status: Never Smoker     Smokeless tobacco: Never Used   Substance Use Topics     Alcohol use: No      SH:  Lives with his wife, ekaterina at [facility].   They have a daughter Lorraine and son Jama.   Pt is a retired ophthalmologist, worked clinically until age 79        Review Of Systems  Skin: negative   Eyes: impaired vision, loss of vision left eye     Ears/Nose/Throat: hearing loss  Respiratory: dyspnea on exertion; no cough, or hemoptysis  Cardiovascular: as above  Gastrointestinal: fecal incontinence, no dysphagia.   Weight down since hospitalization   Wt Readings from Last 5 Encounters:   11/19/19 66.7 kg (147 lb)   11/18/19 66.4 kg (146 lb 6.4 oz)   11/17/19 61.2 kg (134 lb 14.7 oz)   09/10/19 72.6 kg (160 lb)   09/02/19 72.6 kg (160 lb)      Genitourinary: incontinence  Musculoskeletal: generalized weakness, WC bound with assist for transfers  Neurologic: SLUMS 24/30    Psychiatric: negative  Hematologic/Lymphatic/Immunologic: anemia and bleeding disorder  Endocrine: negative      GENERAL APPEARANCE: alert and no distress  BP (!) 162/90   Pulse 68   Temp 98.1  F (36.7  C)   Resp 18   Ht 1.829 m (6')   Wt 66.7 kg (147 lb)   SpO2 93%   BMI 19.94 kg/m     bp down to 90/50 after medications this morning.       HEENT: normocephalic, no lesion or abnormalities  NECK: no adenopathy, no asymmetry, masses, or scars and thyroid normal to palpation  RESP: decreased BS 1/2 way up on right, 1/4 way up on left.  Few scattered crackles, decreased BS  CV: regular rate and  rhythm, normal S1 S2  ABDOMEN:  soft, nontender, no HSM or masses and bowel sounds normal, very mild distention, no rebound or guarding.   MS: extremities with trace LE edema, wearing compression stockings.     SKIN: no suspicious lesions or rashes  NEURO: No focal findings, conversant with some confabulation      PSYCH: affect okay  LYMPHATICS: No cervical,  or supraclavicular nodes     Last Comprehensive Metabolic Panel:  Sodium   Date Value Ref Range Status   11/17/2019 136 133 - 144 mmol/L Final     Potassium   Date Value Ref Range Status   11/17/2019 4.1 3.4 - 5.3 mmol/L Final     Chloride   Date Value Ref Range Status   11/17/2019 103 94 - 109 mmol/L Final     Carbon Dioxide   Date Value Ref Range Status   11/17/2019 31 20 - 32 mmol/L Final     Anion Gap   Date Value Ref Range Status   11/17/2019 2 (L) 3 - 14 mmol/L Final     Glucose   Date Value Ref Range Status   11/17/2019 91 70 - 99 mg/dL Final     Urea Nitrogen   Date Value Ref Range Status   11/17/2019 26 7 - 30 mg/dL Final     Creatinine   Date Value Ref Range Status   11/17/2019 0.97 0.66 - 1.25 mg/dL Final     GFR Estimate   Date Value Ref Range Status   11/17/2019 69 >60 mL/min/[1.73_m2] Final     Comment:     Non  GFR Calc  Starting 12/18/2018, serum creatinine based estimated GFR (eGFR) will be   calculated using the Chronic Kidney Disease Epidemiology Collaboration   (CKD-EPI) equation.       Calcium   Date Value Ref Range Status   11/17/2019 7.3 (L) 8.5 - 10.1 mg/dL Final     Lab Results   Component Value Date    WBC 7.4 11/16/2019      HGB 9.2 11/16/2019      MCV 95 11/16/2019      PLT 76 11/16/2019     Lab Results   Component Value Date    AST 9 11/11/2019     Lab Results   Component Value Date    ALT 20 11/11/2019      ALBUMIN 2.3 11/16/2019     Lab Results   Component Value Date    PROTTOTAL 5.4 11/11/2019      Lab Results   Component Value Date    ALKPHOS 47 11/11/2019     TSH   Date Value Ref Range Status   02/05/2019 0.99  0.40 - 4.00 mU/L Final     ECHO 11/14/19   Interpretation Summary  There is mod-severe to severe (3-4+) tricuspid regurgitation.  There is mild to moderate (1-2+) mitral regurgitation.  The visual ejection fraction is estimated at 60-65%.   Left ventricular systolic function is normal.  The right ventricle is mild to moderately dilated.   The right ventricular systolic function is normal.      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     Plan: discontinue fluid restriction.     Torsemide 10 mg/day    Recheck BMP in AM.       (I07.1) Tricuspid valve insufficiency, unspecified etiology  Comment: severe by ECHO and only slight improvement after diuresis      Plan: pt would like consideration for TV repair.   Needs to get stronger, improve functional status before he could tolerate such a procedure.      (J90) Bilateral pleural effusion  (Z98.890) Status post thoracentesis  Comment: today's exam c/w right sided pleural effusion  Plan: Will likely need repeat right thoracentesis.   Check CXR if any change in dyspnea        (I48.21) Permanent atrial fibrillation  Comment:   Pulse Readings from Last 4 Encounters:   11/19/19 68   11/18/19 64   11/17/19 87   10/14/19 92      BP Readings from Last 3 Encounters:   11/19/19 (!) 162/90   11/18/19 124/62   11/17/19 100/68      Plan: metoprolol for VR and bp control.   No anticoagulation given low plts and bleeding complications.        (D69.3) Idiopathic thrombocytopenic purpura (H)  Comment: varying plt counts, at times quite low but now 76k      Plan: recheck given significant change from prior readings    (R53.1) Generalized weakness  (R53.81) Physical deconditioning  Comment: after acute illness   Not currently ambulatory.     Plan: PHYSICAL THERAPY / OCCUPATIONAL THERAPY / Speech  therapy for strengthening, balance, transfers, ADLs.   [place] and family report.        (F51.02) Adjustment insomnia  Comment: on PTA melatonin     Plan: with low Na and volume changes, will decrease mirtazapine to 7.5 mg HS, which should also help promote sleep.        (R10.31) RLQ abdominal pain  Comment: acute today, no remember trauma, did have cardiac catheterization    Pt initially asking for Vicodin, which daughter reports is very unusual for him as he usually won't take any analgesics.      Plan: will start with Acetaminophen 650 mg bid and q6 hours prn.   Follow exam and symptoms.         I spent 55 minutes with patient and wife, and talking with daughter Lorraine by phone, reviewing course with nursing staff and Physical Therapist, medication reconciliation, addressing advanced directives and goals of care, and ensuring appropriate follow up.    Reviewed medication changes and discontinuation of fluid restriction with patient and family.     Jeanette Hernández MD

## 2019-11-21 ENCOUNTER — TRANSFERRED RECORDS (OUTPATIENT)
Dept: HEALTH INFORMATION MANAGEMENT | Facility: CLINIC | Age: 84
End: 2019-11-21

## 2019-11-21 LAB
ANION GAP SERPL CALCULATED.3IONS-SCNC: 7 MMOL/L (ref 7–16)
BUN SERPL-MCNC: 23 MG/DL (ref 7–26)
CALCIUM SERPL-MCNC: 7.7 MG/DL (ref 8.4–10.4)
CHLORIDE SERPLBLD-SCNC: 101 MMOL/L (ref 98–109)
CO2 SERPL-SCNC: 26 MMOL/L (ref 20–29)
CREAT SERPL-MCNC: 1.01 MG/DL (ref 0.73–1.18)
DIFFERENTIAL: ABNORMAL
ERYTHROCYTE [DISTWIDTH] IN BLOOD BY AUTOMATED COUNT: 20.6 % (ref 11.9–15.5)
GFR SERPL CREATININE-BSD FRML MDRD: >60 ML/MIN/1.73M2
GLUCOSE SERPL-MCNC: 144 MG/DL (ref 70–100)
HCT VFR BLD AUTO: 28.9 % (ref 38.8–50)
HEMOGLOBIN: 8.8 G/DL (ref 13.5–17.5)
MCH RBC QN AUTO: 29.9 PG (ref 27.6–33.3)
MCHC RBC AUTO-ENTMCNC: 30.4 G/DL (ref 31.5–35.2)
MCV RBC AUTO: 98.3 FL (ref 80–100)
PLATELET # BLD AUTO: 50 X10(9)/L (ref 150–450)
POTASSIUM SERPL-SCNC: 4 MMOL/L (ref 3.5–5.1)
RBC # BLD AUTO: 2.94 X10(12)/L (ref 4.32–5.72)
SODIUM SERPL-SCNC: 134 MMOL/L (ref 136–145)
WBC # BLD AUTO: 7.3 X10(9)/L (ref 3.5–10.5)

## 2019-11-21 RX ORDER — ACETAMINOPHEN 325 MG/1
650 TABLET ORAL EVERY 6 HOURS PRN
COMMUNITY
End: 2021-01-01 | Stop reason: DRUGHIGH

## 2019-11-22 ENCOUNTER — MYC MEDICAL ADVICE (OUTPATIENT)
Dept: GERIATRICS | Facility: CLINIC | Age: 84
End: 2019-11-22

## 2019-11-22 ENCOUNTER — NURSING HOME VISIT (OUTPATIENT)
Dept: GERIATRICS | Facility: CLINIC | Age: 84
End: 2019-11-22
Payer: MEDICARE

## 2019-11-22 VITALS
BODY MASS INDEX: 19.8 KG/M2 | HEART RATE: 66 BPM | RESPIRATION RATE: 16 BRPM | OXYGEN SATURATION: 97 % | SYSTOLIC BLOOD PRESSURE: 109 MMHG | DIASTOLIC BLOOD PRESSURE: 61 MMHG | WEIGHT: 146 LBS

## 2019-11-22 DIAGNOSIS — I10 ESSENTIAL HYPERTENSION: ICD-10-CM

## 2019-11-22 DIAGNOSIS — G47.00 INSOMNIA, UNSPECIFIED TYPE: ICD-10-CM

## 2019-11-22 DIAGNOSIS — I07.1 TRICUSPID VALVE INSUFFICIENCY, UNSPECIFIED ETIOLOGY: ICD-10-CM

## 2019-11-22 DIAGNOSIS — R32 BOWEL AND BLADDER INCONTINENCE: ICD-10-CM

## 2019-11-22 DIAGNOSIS — R15.9 BOWEL AND BLADDER INCONTINENCE: ICD-10-CM

## 2019-11-22 DIAGNOSIS — I34.0 MITRAL VALVE INSUFFICIENCY, UNSPECIFIED ETIOLOGY: ICD-10-CM

## 2019-11-22 DIAGNOSIS — I48.21 PERMANENT ATRIAL FIBRILLATION (H): ICD-10-CM

## 2019-11-22 DIAGNOSIS — R53.81 PHYSICAL DECONDITIONING: ICD-10-CM

## 2019-11-22 DIAGNOSIS — J90 BILATERAL PLEURAL EFFUSION: ICD-10-CM

## 2019-11-22 DIAGNOSIS — I50.43 ACUTE ON CHRONIC COMBINED SYSTOLIC AND DIASTOLIC HRT FAIL (H): Primary | ICD-10-CM

## 2019-11-22 PROCEDURE — 99309 SBSQ NF CARE MODERATE MDM 30: CPT | Performed by: NURSE PRACTITIONER

## 2019-11-22 NOTE — PROGRESS NOTES
[place] [facility]  [place] [facility] Record Number:  [id number]  Place of Service where encounter took place:  [facility] ([facility] [[id number]]  No chief complaint on file.      HPI:    Jama Paul  is a 88 year old (2/13/1931), who is being seen today for an episodic care visit.  HPI information obtained from: facility chart records, facility staff, patient report and [facility] chart review. Today's concern is:     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)  Mitral valve insufficiency, unspecified etiology  Tricuspid valve insufficiency, unspecified etiology  Bilateral pleural effusion  Essential hypertension  Permanent atrial fibrillation  Physical deconditioning  Insomnia, unspecified type  Bowel and bladder incontinence      TODAY DURING EXAM HIP and ROS:  No CP, SOB, Cough, dizziness, fevers, chills, HA, N/V, dysuria or Bowel Abnormalities--except incontinence. Appetite is good.  No pain. Sleeping poorly and does not like that staff awakens him ~~ 0300 to change his Depends.    Past Medical and Surgical History reviewed in Epic today.    MEDICATIONS:  Current Outpatient Medications   Medication Sig Dispense Refill     acetaminophen (TYLENOL) 325 MG tablet Take 650 mg by mouth 2 times daily Give 2 tablet by mouth two times a day for Pain give 2 tabs= 650 mg AND Give 2 tablet by mouth every 6 hours as needed for Pain give 2 tabs= 650 mg       digoxin (LANOXIN) 125 MCG tablet Take 1 tablet (125 mcg) by mouth daily (Patient taking differently: Take 125 mcg by mouth daily HOLD if HR <55) 30 tablet 0     melatonin 5 MG tablet Take 3 mg by mouth At Bedtime        metoprolol succinate ER (TOPROL-XL) 50 MG 24 hr tablet Take 100 mg by mouth every morning       metoprolol succinate ER (TOPROL-XL) 50 MG 24 hr tablet Take 50 mg by mouth every evening HOLD if SBP <110 OR HR < 55. Call if held x 2 in a row symptomatic       mirtazapine (REMERON) 15 MG tablet Take 7.5 mg by  mouth At Bedtime Patient started medication for the first time yesterday 10/29/2019.        Multiple Vitamins-Minerals (ICAPS AREDS FORMULA PO) Take 1 tablet by mouth daily Takes in addition to multivitamin with minerals       Nutritional Supplements (NUTRITIONAL SUPPLEMENT PO) House Supplement with meals       order for DME [facility] Order Phone [phone] Fax [phone]    Primary Dressing Hydrofera blue ready transfer    Qty 2 sheets  Secondary Dressing 4x4 gauze loaf Qty 1  Secondary Dressing 4' roll gauze Qty 30  Secondary Dressing 2' tape Qty  1  Length of Need: 1 month  Frequency of dressing change: daily 30 days 0     OTHER MEDICAL SUPPLIES ACE Wraps or support stockings (knee high) to bilateral extremities every morning and at bedtime for Edema on in the morning, OFF at night       torsemide (DEMADEX) 10 MG tablet Take 1 tablet (10 mg) by mouth daily       Vitamin D, Cholecalciferol, 1000 UNITS TABS Take 3,000 Units by mouth daily             REVIEW OF SYSTEMS:  See above under HPI    Objective:  /61   Pulse 66   Resp 16   Wt 66.2 kg (146 lb)   SpO2 97%   BMI 19.80 kg/m    Exam:  GENERAL APPEARANCE:  Alert, in no distress, oriented, cooperative, appropriate conversation. Remembers details of conversation with DR Hernández on 11/20.  He is nattily dressed.   ENT:  Mouth with moist mucous membranes, [place]  RESP:  respiratory effort  of chest normal, lungs clear to auscultation except very decreased BS 1/2 way up on right side , no respiratory distress,   CV:  Auscultation of heart done ,RRR with irreg beats. Soft systolic murmur, no  rub, or gallop, +<1 edema, +PVD changes in LE's +1 pedal pulses  ABDOMEN:  normal bowel sounds, soft, nontender   M/S:   CHIU equally, up with assist-seen in WC  SKIN:  Inspection of skin is at baseline.  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 Will likely need tapped before his appt with Jazlyn Loaiza PA-C at [facility] on 12/03/19.  I sent Email to her.  Still being considered for Tricupid Valve Repair. Has cares appts on 12/03. BMP on 11/26.  Weights:  146.4# on admit and 147.2# today    (I10) Essential hypertension   (I48.21) Permanent atrial fibrillation  Comment/Plan: SBP mostly 100-120's--occas <100 or > 140's.   Rate is controlled at 60-80's. Consider increase of BB if nec    (R53.81) Physical deconditioning  Comment/Plan: PT OT  He and wife live here at [facility] in [facility]--? If they can care for each other.  See Dr Hernández's note: per her d/w Dc, both parents have some Cog issues.     (G47.00) Insomnia, unspecified type  Comment/Plan: Remeron adjusted, on melatonin also will ask staff to try to let pt sleep longer, monitor    (R32,  R15.9) Bowel and bladder incontinence  Comment/Plan: not new per Dc report    **I left msg with update on Dc Lorraine's personal cell glendy and told her to call staff if she would like another call and I will call her Tuesday when I see her father.**         Electronically signed by:  ERIC Mir CNP

## 2019-11-25 ENCOUNTER — ANESTHESIA EVENT (OUTPATIENT)
Dept: SURGERY | Facility: CLINIC | Age: 84
End: 2019-11-25

## 2019-11-25 ENCOUNTER — ANESTHESIA EVENT (OUTPATIENT)
Dept: CARDIOLOGY | Facility: CLINIC | Age: 84
End: 2019-11-25

## 2019-11-25 ENCOUNTER — MYC MEDICAL ADVICE (OUTPATIENT)
Dept: GERIATRICS | Facility: CLINIC | Age: 84
End: 2019-11-25

## 2019-11-25 RX ORDER — NICOTINE POLACRILEX 4 MG
15-30 LOZENGE BUCCAL
Status: CANCELLED | OUTPATIENT
Start: 2019-11-25

## 2019-11-25 RX ORDER — DEXTROSE MONOHYDRATE 25 G/50ML
25-50 INJECTION, SOLUTION INTRAVENOUS
Status: CANCELLED | OUTPATIENT
Start: 2019-11-25

## 2019-11-25 ASSESSMENT — MIFFLIN-ST. JEOR: SCORE: 1377.51

## 2019-11-25 NOTE — PROGRESS NOTES
[place] [facility]  [place] [facility] Record Number:  [id number]  Place of Service where encounter took place:  [facility] ([facility]) [[id number]]  Chief Complaint   Patient presents with     Nursing Home Acute       HPI:    Jama Paul  is a 88 year old (2/13/1931), who is being seen today for an episodic care visit.  HPI information obtained from: facility chart records, facility staff, patient report and [facility] chart review. Today's concern is: called unit yest--pt and staff left he was SOB--Dc had sent msg to Dr Hernández also noting this.  Thoracentesis ordered for today at ~~1200 hrs.  When I saw pt, he was in NAD but feels SOB.  He also had he is not sleeping well with the change in Remeron and melatonin last week.     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 Appetite is good.  No pain. Sleeping poorly and does not like that staff awakens him ~~ 0300 to change his Depends--which they are still doing.  Feels RODGERS.    Past Medical and Surgical History reviewed in Epic today.    MEDICATIONS:  Current Outpatient Medications   Medication Sig Dispense Refill     acetaminophen (TYLENOL) 325 MG tablet Take 650 mg by mouth 2 times daily Give 2 tablet by mouth two times a day for Pain give 2 tabs= 650 mg AND Give 2 tablet by mouth every 6 hours as needed for Pain give 2 tabs= 650 mg       digoxin (LANOXIN) 125 MCG tablet Take 1 tablet (125 mcg) by mouth daily (Patient taking differently: Take 125 mcg by mouth daily HOLD if HR <55) 30 tablet 0     melatonin 5 MG tablet Take 3 mg by mouth At  Bedtime After 9pm. Please use overnight depends on pt to allow him to sleep 5 interruption during the night       metoprolol succinate ER (TOPROL-XL) 50 MG 24 hr tablet Take 100 mg by mouth every morning       metoprolol succinate ER (TOPROL-XL) 50 MG 24 hr tablet Take 50 mg by mouth every evening HOLD if SBP <110 OR HR < 55. Call if held x 2 in a row symptomatic       mirtazapine (REMERON) 15 MG tablet Take 15 mg by mouth At Bedtime Patient started medication for the first time yesterday 10/29/2019.        Multiple Vitamins-Minerals (ICAPS AREDS FORMULA PO) Take 1 tablet by mouth daily Takes in addition to multivitamin with minerals       torsemide (DEMADEX) 10 MG tablet Take 1 tablet (10 mg) by mouth daily       Vitamin D, Cholecalciferol, 1000 UNITS TABS Take 3,000 Units by mouth daily             REVIEW OF SYSTEMS:  See above under HPI    Objective:  /52   Pulse 71   Temp 98.4  F (36.9  C)   Resp 20   Ht 1.829 m (6')   Wt 67 kg (147 lb 9.6 oz)   SpO2 94%   BMI 20.02 kg/m    Exam:  GENERAL APPEARANCE:  Alert, oriented, cooperative, NAD.  ENT:  Mouth with moist mucous membranes, [place]  RESP:  respiratory effort  of chest normal, lungs clear to auscultation except very decreased ~~1/2 way up on right side , no respiratory distress,   CV:  Auscultation of heart done ,RRR with irreg beats. Soft systolic murmur, no  rub, or gallop, +trace pedal edema,   +1 pedal pulses  ABDOMEN:  normal bowel sounds, soft, nontender   M/S:   CHIU equally, up with assist-seen in WC  SKIN:  Inspection of skin is at baseline.  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 Also has appt with Jazlyn Loaiza PA-C at [facility] on 12/03/19.  I sent Email to her last week but she had not seen him in a while so agreed IR thoracentesis best for pt.   BMP on 11/26-lab draw pending--not yet done(*as of 1930 this evening but apparently were to come**)  Weights:  146-148# range.    (I10) Essential hypertension   (I48.21) Permanent atrial fibrillation  Comment/Plan: SBP mostly 100-120's. Rate is controlled at 60-80's.  No changes today.    (R53.81) Physical deconditioning  Comment/Plan: PT OT  He and wife live here at [facility] in [place] [place] [facility] [place], no LCD yet.     (G47.00) Insomnia, unspecified type  Comment/Plan: Remeron and melatonin increased back to earlier doses.  Also d/w staff--use \"Nighttime Depends\" so pt can sleep without interruption,  monitor     **I left msg with update on Dc Peters's personal cell phone and asked her to call facility if she wished a call back today. Otherwise I will try to call her next week at visit.  As did not see here at facility today though did see/meet wife and updated her with pt**         Electronically signed by:  ERIC Mir CNP          "

## 2019-11-26 ENCOUNTER — HOSPITAL ENCOUNTER (OUTPATIENT)
Facility: CLINIC | Age: 84
Discharge: HOME | End: 2019-11-26
Admitting: RADIOLOGY
Payer: MEDICARE

## 2019-11-26 ENCOUNTER — TRANSFERRED RECORDS (OUTPATIENT)
Dept: HEALTH INFORMATION MANAGEMENT | Facility: CLINIC | Age: 84
End: 2019-11-26

## 2019-11-26 ENCOUNTER — HOSPITAL ENCOUNTER (OUTPATIENT)
Dept: ULTRASOUND IMAGING | Facility: CLINIC | Age: 84
End: 2019-11-26
Attending: NURSE PRACTITIONER
Payer: MEDICARE

## 2019-11-26 ENCOUNTER — NURSING HOME VISIT (OUTPATIENT)
Dept: GERIATRICS | Facility: CLINIC | Age: 84
End: 2019-11-26
Payer: MEDICARE

## 2019-11-26 VITALS
BODY MASS INDEX: 19.99 KG/M2 | HEIGHT: 72 IN | HEART RATE: 71 BPM | DIASTOLIC BLOOD PRESSURE: 52 MMHG | TEMPERATURE: 98.4 F | WEIGHT: 147.6 LBS | RESPIRATION RATE: 20 BRPM | SYSTOLIC BLOOD PRESSURE: 100 MMHG | OXYGEN SATURATION: 94 %

## 2019-11-26 VITALS
OXYGEN SATURATION: 98 % | DIASTOLIC BLOOD PRESSURE: 51 MMHG | HEART RATE: 68 BPM | RESPIRATION RATE: 16 BRPM | TEMPERATURE: 95.9 F | SYSTOLIC BLOOD PRESSURE: 88 MMHG

## 2019-11-26 DIAGNOSIS — I07.1 TRICUSPID VALVE INSUFFICIENCY, UNSPECIFIED ETIOLOGY: ICD-10-CM

## 2019-11-26 DIAGNOSIS — I50.9 HEART FAILURE (H): ICD-10-CM

## 2019-11-26 DIAGNOSIS — I48.21 PERMANENT ATRIAL FIBRILLATION (H): ICD-10-CM

## 2019-11-26 DIAGNOSIS — I10 ESSENTIAL HYPERTENSION: ICD-10-CM

## 2019-11-26 DIAGNOSIS — I34.0 MITRAL VALVE INSUFFICIENCY, UNSPECIFIED ETIOLOGY: ICD-10-CM

## 2019-11-26 DIAGNOSIS — R06.02 SOB (SHORTNESS OF BREATH): ICD-10-CM

## 2019-11-26 DIAGNOSIS — R53.81 PHYSICAL DECONDITIONING: ICD-10-CM

## 2019-11-26 DIAGNOSIS — I50.43 ACUTE ON CHRONIC COMBINED SYSTOLIC AND DIASTOLIC HRT FAIL (H): Primary | ICD-10-CM

## 2019-11-26 DIAGNOSIS — G47.00 INSOMNIA, UNSPECIFIED TYPE: ICD-10-CM

## 2019-11-26 DIAGNOSIS — J90 BILATERAL PLEURAL EFFUSION: ICD-10-CM

## 2019-11-26 DIAGNOSIS — J90 PLEURAL EFFUSION: ICD-10-CM

## 2019-11-26 LAB
ANION GAP SERPL CALCULATED.3IONS-SCNC: 7 MMOL/L (ref 7–16)
BUN SERPL-MCNC: 28 MG/DL (ref 7–26)
CALCIUM SERPL-MCNC: 7.9 MG/DL (ref 8.4–10.4)
CHLORIDE SERPLBLD-SCNC: 100 MMOL/L (ref 98–109)
CO2 SERPL-SCNC: 26 MMOL/L (ref 20–29)
CREAT SERPL-MCNC: 1.1 MG/DL (ref 0.73–1.18)
DIFFERENTIAL: ABNORMAL
ERYTHROCYTE [DISTWIDTH] IN BLOOD BY AUTOMATED COUNT: 20.6 % (ref 11.9–15.5)
GFR SERPL CREATININE-BSD FRML MDRD: 60 ML/MIN/1.73M2
GLUCOSE SERPL-MCNC: 114 MG/DL (ref 70–100)
HCT VFR BLD AUTO: 30.9 % (ref 38.8–50)
HEMOGLOBIN: 9.3 G/DL (ref 13.5–17.5)
MCH RBC QN AUTO: 30 PG (ref 27.6–33.3)
MCHC RBC AUTO-ENTMCNC: 30.1 G/DL (ref 31.5–35.2)
MCV RBC AUTO: 99.7 FL (ref 80–100)
PLATELET # BLD AUTO: 53 X10(9)/L (ref 150–450)
POTASSIUM SERPL-SCNC: 4 MMOL/L (ref 3.5–5.1)
RBC # BLD AUTO: 3.1 X10(12)/L (ref 4.32–5.72)
SODIUM SERPL-SCNC: 133 MMOL/L (ref 136–145)
WBC # BLD AUTO: 7 X10(9)/L (ref 3.5–10.5)

## 2019-11-26 PROCEDURE — 32555 ASPIRATE PLEURA W/ IMAGING: CPT

## 2019-11-26 PROCEDURE — 99309 SBSQ NF CARE MODERATE MDM 30: CPT | Performed by: NURSE PRACTITIONER

## 2019-11-26 PROCEDURE — 25000125 ZZHC RX 250: Performed by: RADIOLOGY

## 2019-11-26 PROCEDURE — 40000863 ZZH STATISTIC RADIOLOGY XRAY, US, CT, MAR, NM

## 2019-11-26 RX ORDER — NICOTINE POLACRILEX 4 MG
15-30 LOZENGE BUCCAL
Status: DISCONTINUED | OUTPATIENT
Start: 2019-11-26 | End: 2019-11-26 | Stop reason: HOSPADM

## 2019-11-26 RX ORDER — DEXTROSE MONOHYDRATE 25 G/50ML
25-50 INJECTION, SOLUTION INTRAVENOUS
Status: DISCONTINUED | OUTPATIENT
Start: 2019-11-26 | End: 2019-11-26 | Stop reason: HOSPADM

## 2019-11-26 RX ORDER — LIDOCAINE HYDROCHLORIDE 10 MG/ML
10 INJECTION, SOLUTION EPIDURAL; INFILTRATION; INTRACAUDAL; PERINEURAL ONCE
Status: COMPLETED | OUTPATIENT
Start: 2019-11-26 | End: 2019-11-26

## 2019-11-26 RX ADMIN — LIDOCAINE HYDROCHLORIDE 10 ML: 10 INJECTION, SOLUTION EPIDURAL; INFILTRATION; INTRACAUDAL; PERINEURAL at 12:32

## 2019-11-26 NOTE — DISCHARGE INSTRUCTIONS
Thoracentesis Discharge Instructions     After you go home:      You may resume your normal diet    Care of Puncture Site:      For the first 48 hrs, check your puncture site every couple hours while you are awake     If there is a bandaid - you may remove it tomorrow morning    You may shower tomorrow    No tub baths, whirlpools or swimming until your puncture site has fully healed     Activity:      You may go back to normal activity in 24 hours    Wait 48 hours before lifting, straining, exercise or other strenuous activity    Medicines:      You may resume all your medications    For minor pain, you may take Acetaminophen (Tylenol) or Ibuprofen (Advil)            Call the provider who ordered this procedure if:      The site is red, swollen, hot or tender    Blood or fluid is draining from the site    You have chills or a fever greater than 101 F (38 C)    Shortness of breath    Pain that is getting worse    Any questions or concerns      Additional Information:      During this test, a needle will sometimes enter the lung. This can cause the lung to collapse - called a pneumothorax. Symptoms include severe chest pain, breathing problems or increased blood in your sputum (phlegm).     Call  911 or go to the Emergency Room if:      Severe chest pain or trouble breathing    Increased blood in your sputum (phlegm)    Bleeding that you cannot control      If you have questions call:        [facility] Radiology Dept @ [phone]      The provider who performed your procedure was _________________.

## 2019-11-26 NOTE — PROGRESS NOTES
[facility] Post-Procedure Note    Procedure: Thoracentesis  CS arrival time: return to CS at 1250  Accompanied by: Sobeida KINGSLEY Rn  Concerns/abnormal assessment after procedure: none  Plan: right sided thoracentesis complete.  Total of 2,200 ml removed from left pleural space.  Bloody drainage in color.  No CXR to be done per Dr Glaser.  Pt comfortable, no pain, band aid CDI.  Will cont to monitor.

## 2019-11-26 NOTE — PROCEDURES
[facility]    Procedure: Thoracentesis  Date/Time: 11/26/2019 12:51 PM  Performed by: Elias Glaser MD  Authorized by: Elias Glaser MD     UNIVERSAL PROTOCOL   Site Marked: Yes  Prior Images Obtained and Reviewed:  Yes  Required items: Required blood products, implants, devices and special equipment available    Patient identity confirmed:  Verbally with patient  NA - No sedation, light sedation, or local anesthesia  Confirmation Checklist:  Patient's identity using two indicators, procedure was appropriate and matched the consent or emergent situation, correct equipment/implants were available and relevant allergies  Time out: Immediately prior to the procedure a time out was called    Preparation: Patient was prepped and draped in usual sterile fashion       ANESTHESIA    Anesthesia: Local infiltration  Local Anesthetic:  Lidocaine 1% without epinephrine      SEDATION    Patient Sedated: No    PROCEDURE   Patient Tolerance:  Patient tolerated the procedure well with no immediate complications    Length of time physician/provider present for 1:1 monitoring during sedation: 0

## 2019-11-26 NOTE — PROGRESS NOTES
[facility] Admission Nursing Note    Reason for admission: Thoracentesis  CS arrival time: 11:18  Accompanied by: Paige wife and daughter  Name/phone of DC : Waiting in lobby  Medications held: None  Consent signed: Will do in radiology  Abnormal assessment/labs: None  If abnormal, provider notified: NA  Education/questions answered: Yes  Plan: Thoracentesis at 12:45

## 2019-12-03 ENCOUNTER — OFFICE VISIT (OUTPATIENT)
Dept: CARDIOLOGY | Facility: CLINIC | Age: 84
End: 2019-12-03
Attending: NURSE PRACTITIONER
Payer: MEDICARE

## 2019-12-03 ENCOUNTER — NURSING HOME VISIT (OUTPATIENT)
Dept: GERIATRICS | Facility: CLINIC | Age: 84
End: 2019-12-03
Payer: MEDICARE

## 2019-12-03 VITALS
HEART RATE: 92 BPM | BODY MASS INDEX: 20.02 KG/M2 | SYSTOLIC BLOOD PRESSURE: 95 MMHG | TEMPERATURE: 97.9 F | OXYGEN SATURATION: 96 % | WEIGHT: 147.6 LBS | RESPIRATION RATE: 16 BRPM | DIASTOLIC BLOOD PRESSURE: 55 MMHG

## 2019-12-03 VITALS
DIASTOLIC BLOOD PRESSURE: 66 MMHG | WEIGHT: 148 LBS | SYSTOLIC BLOOD PRESSURE: 105 MMHG | HEIGHT: 69 IN | BODY MASS INDEX: 21.92 KG/M2 | HEART RATE: 74 BPM

## 2019-12-03 DIAGNOSIS — I50.43 ACUTE ON CHRONIC COMBINED SYSTOLIC AND DIASTOLIC HRT FAIL (H): Primary | ICD-10-CM

## 2019-12-03 DIAGNOSIS — G47.00 INSOMNIA, UNSPECIFIED TYPE: ICD-10-CM

## 2019-12-03 DIAGNOSIS — R53.81 PHYSICAL DECONDITIONING: ICD-10-CM

## 2019-12-03 DIAGNOSIS — I34.0 MITRAL VALVE INSUFFICIENCY, UNSPECIFIED ETIOLOGY: ICD-10-CM

## 2019-12-03 DIAGNOSIS — I07.1 TRICUSPID VALVE INSUFFICIENCY, UNSPECIFIED ETIOLOGY: ICD-10-CM

## 2019-12-03 DIAGNOSIS — J90 BILATERAL PLEURAL EFFUSION: ICD-10-CM

## 2019-12-03 DIAGNOSIS — I10 ESSENTIAL HYPERTENSION: ICD-10-CM

## 2019-12-03 DIAGNOSIS — Z98.890 STATUS POST THORACENTESIS: ICD-10-CM

## 2019-12-03 DIAGNOSIS — I42.8 NON-ISCHEMIC CARDIOMYOPATHY (H): Primary | ICD-10-CM

## 2019-12-03 DIAGNOSIS — I42.8 NON-ISCHEMIC CARDIOMYOPATHY (H): ICD-10-CM

## 2019-12-03 LAB
ANION GAP SERPL CALCULATED.3IONS-SCNC: 10.5 MMOL/L (ref 6–17)
BUN SERPL-MCNC: 23 MG/DL (ref 7–30)
CALCIUM SERPL-MCNC: 8.5 MG/DL (ref 8.5–10.5)
CHLORIDE SERPL-SCNC: 101 MMOL/L (ref 98–107)
CO2 SERPL-SCNC: 27 MMOL/L (ref 23–29)
CREAT SERPL-MCNC: 1.27 MG/DL (ref 0.7–1.3)
GFR SERPL CREATININE-BSD FRML MDRD: 54 ML/MIN/{1.73_M2}
GLUCOSE SERPL-MCNC: 120 MG/DL (ref 70–105)
POTASSIUM SERPL-SCNC: 4.5 MMOL/L (ref 3.5–5.1)
SODIUM SERPL-SCNC: 134 MMOL/L (ref 136–145)

## 2019-12-03 PROCEDURE — 99214 OFFICE O/P EST MOD 30 MIN: CPT | Performed by: PHYSICIAN ASSISTANT

## 2019-12-03 PROCEDURE — 80048 BASIC METABOLIC PNL TOTAL CA: CPT | Performed by: INTERNAL MEDICINE

## 2019-12-03 PROCEDURE — 99309 SBSQ NF CARE MODERATE MDM 30: CPT | Performed by: NURSE PRACTITIONER

## 2019-12-03 PROCEDURE — 36415 COLL VENOUS BLD VENIPUNCTURE: CPT | Performed by: INTERNAL MEDICINE

## 2019-12-03 ASSESSMENT — MIFFLIN-ST. JEOR: SCORE: 1323.76

## 2019-12-03 NOTE — PROGRESS NOTES
[place] [facility]  [place] Medical Record Number:  [id number]  Place of Service where encounter took place:  [facility] ([facility] [[id number]]  Chief Complaint   Patient presents with     Nursing Home Acute       HPI:    Jama Paul  is a 88 year old (2/13/1931), who is being seen today for an episodic care visit.  HPI information obtained from: facility chart records, facility staff, patient report and [facility] chart review. Today's concern is: had thoracentesis on 11/26/19 and 2200cc removed.  Pt said felt and conts to feel better. Sleeping better also. going to appt today at [facility] and will get labs .     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 Appetite is good.  No pain.      Past Medical and Surgical History reviewed in Epic today.    MEDICATIONS:  Current Outpatient Medications   Medication Sig Dispense Refill     acetaminophen (TYLENOL) 325 MG tablet Take 650 mg by mouth 2 times daily every 6 hours as needed for Pain       digoxin (LANOXIN) 125 MCG tablet Take 1 tablet (125 mcg) by mouth daily (Patient taking differently: Take 125 mcg by mouth daily HOLD if HR <55) 30 tablet 0     melatonin 5 MG tablet Take 3 mg by mouth At Bedtime After 9pm. Please use overnight depends on pt to allow him to sleep 5 interruption during the night       metoprolol succinate ER (TOPROL-XL) 50 MG 24 hr tablet Take 100 mg by mouth every morning       metoprolol succinate ER (TOPROL-XL) 50 MG 24 hr tablet Take 50 mg by mouth every evening HOLD if SBP <110 OR HR < 55. Call if held x 2 in a row symptomatic        mirtazapine (REMERON) 15 MG tablet Take 15 mg by mouth At Bedtime Patient started medication for the first time yesterday 10/29/2019.        Multiple Vitamins-Minerals (ICAPS AREDS FORMULA PO) Take 1 tablet by mouth daily Takes in addition to multivitamin with minerals       torsemide (DEMADEX) 10 MG tablet Take 1 tablet (10 mg) by mouth daily       Vitamin D, Cholecalciferol, 1000 UNITS TABS Take 3,000 Units by mouth daily             REVIEW OF SYSTEMS:  See above under HPI    Objective:  BP 95/55   Pulse 92   Temp 97.9  F (36.6  C)   Resp 16   Wt 67 kg (147 lb 9.6 oz)   SpO2 96%   BMI 20.02 kg/m    Exam:  GENERAL APPEARANCE:  Alert, oriented, cooperative, NAD.  ENT:  Mouth with moist mucous membranes, [place]  RESP:  respiratory effort  of chest normal, lungs clear to auscultation with rare crackle in bases, no respiratory distress,   CV:  Auscultation of heart done ,RRR with irreg beats. Soft systolic murmur, no R or G, +trace pitting pedal edema,   +1 pedal pulses  ABDOMEN:  normal bowel sounds, soft, nontender   M/S:   CHIU equally, up with assist-seen in WC  SKIN:  Inspection of skin is at baseline.  NEURO:   Cranial nerves 2-12 are grossly intact and at patient's baseline  PSYCH:  oriented X 3, appropriated    Labs:    Recent Labs   Lab Test 11/26/19   *   POTASSIUM 4.0   CHLORIDE 100   CO2 26   ANIONGAP 7   *   BUN 28*   CR 1.10   BARON 7.9*         Recent Labs   Lab Test 11/21/19 11/17/19  0559   * 136   POTASSIUM 4.0 4.1   CHLORIDE 101 103   CO2 26 31   ANIONGAP 7 2*   * 91   BUN 23 26   CR 1.01 0.97   BARON 7.7* 7.3*     CBC RESULTS:   Recent Labs   Lab Test 11/21/19   WBC 7.3   RBC 2.94*   HGB 8.8*   HCT 28.9*   MCV 98.3   MCH 29.9   MCHC 30.4*   RDW 20.6*   PLT 50*   11/21/19: VITAMIN D==23       ASSESSMENT / PLAN:    CV ISSUES:  (I50.43) 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   Weights:  147-148# range.     (I10) Essential hypertension  Comment/Plan: mostly 110-120's occas <100, no changes today, monitor.     (R53.81) Physical deconditioning  Comment/Plan: PT OT  He and wife live here at [facility] in [place], no LCD yet.     (G47.00) Insomnia, unspecified type  Comment/Plan: Remeron and melatonin increased back to earlier doses, improved sleep, monitor.        Electronically signed by:  ERIC Mir CNP

## 2019-12-03 NOTE — PROGRESS NOTES
Cardiology Progress Note    Date of Service: 12/05/2019  Patient seen today in follow up of: [facility] follow up  Primary cardiologist: Dr. 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 He was found to have a cardiomyopathy at that time which was likely tachycardia mediated.  His atrial fibrillation was managed with a rate control strategy and with this his EF has normalized.  His medical therapy has included metoprolol XL and Entresto.  He has done fairly well from a congestive heart failure standpoint other than a hospitalization in January 2018 with heart failure after he stopped taking his Lasix accidentally.    He has been on warfarin since February of last year due to significant thrombocytopenia.  He saw Dr. Hermosillo to discuss a watchman at one point but this was never pursued.  He has been seen by Dr. 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    He has not been seen in cardiology clinic since this spring but recently had a long somewhat complicated hospital stay with congestive heart failure from 10/30 through 11/17. He notes he had been feeling very well until about a week prior to admission and then quickly became short of breath.  He was found to have bilateral pleural effusion and underwent thoracentesis x 2.  Pleural studies were transudate.He was started on a Bumex drip for aggressive diuresis and a dobutamine drip. He subsequently became hypotensive and tachycardic. The bumex and dobutamine were discontinued with improvement. His echocardiogram on admission showed a preserved LVEF, mild to  moderate mitral regurgitation and moderately severe to severe tricuspid regurgitation. The RV systolic function was normal but the RV appeared mild to moderately dilated.     CV surgery was consulted during his hospitalization to discuss possible tricuspid valve repair. Per the notes, he was felt to be a high risk candidate for surgery but not prohibitively so. Pre operative work up was recommended. There was difficulty completing a KATIE as the probe would not advance. This had to be attempted several times but ultimately was completed on 11/11. This showed severe tricuspid regurgitation. 3 - 4+ mitral regurgitation was noted.  After ongoing diuresis, a repeat echocardiogram was done on 11/14 which showed persistently severe tricuspid regurgitation although the mitral regurgitation appeared to be only mild to moderate. He did undergo a right and left heart catheterization on 11/15 which showed mild nonobstructive coronary disease.  PCWP was unable to be obtained but LVEDP was normal. Right sided pressures were normal and there was no pulmonary hypertension. Cardiac output was normal. Ultimately, it was decided he would follow up as an outpatient to discuss potential intervention to his tricuspid valve vs ongoing medical management.     He was transitioned to Torsemide 10 mg daily on discharge. Of note, he had some issues with his heart rate and his atrial fibrillation and was started on Digoxin. Entresto was stopped and not resumed at discharge due to hypotension.     He was discharged a TCU.  He has been followed closely by the providers there.  A week ago, he was noted to have evidence of a recurrent pleural effusion on exam.  He ultimately underwent a repeat left-sided thoracentesis on 11/26.  He had 2.2 L of fluid removed.  It appears per the notes he was reporting some shortness of breath although denies that he was ever having any shortness of breath to me today.  He is here for    He is here for [facility] clinic  follow-up with his wife and his son.  He feels he has overall been doing fairly well.  He denies any dyspnea on exertion, orthopnea, PND, abdominal or peripheral edema.  His weights have been stable between 145 and 147 pounds.  His blood pressure has been somewhat soft at times.  I note his blood pressure was in the 90 systolic yesterday although he denies any or syncope.  He denies chest discomfort or palpitations.     His wife and son do seem to note he has been making progress.  He seems to have more energy and is able to walk further.      ASSESSMENT/PLAN:  1.  Chronic heart failure with preserved LVEF.  With recent exacerbation.  2.  Bilateral pleural effusions status post thoracenteses.  3.  Moderately severe to severe tricuspid regurgitation.  4.  History of a tachycardia mediated cardiomyopathy with an EF as low as 35% in 2017 which has now normalized and remained stable.  5.  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  He is followed very closely by hematology.  6.  Hypertension.  7.  Permanent atrial fibrillation.  Maintained on a rate control strategy with metoprolol and now also on digoxin.  He is not on warfarin due to his history of a retinal hemorrhage and severe thrombocytopenia.    Jama is here today for [facility] clinic follow-up.  As noted above, he was discharged a few weeks ago after a complicated hospital stay.  He has required multiple thoracentesis now for recurrent pleural effusions.  He had an extensive evaluation for his valvular disease and severe tricuspid regurgitation including CV surgery evaluation.  His echo has shown some RV dilation but RV systolic function appeared normal.  Interestingly, he has not had significant symptoms of right sided heart failure even prior to his admission. Primarily, he has had recurrent pleural effusions and shortness of breath although it does not appear any alternative etiologies for his pleural effusions have been found.  His LVEF  has remained normal.     There was discussion of potentially sending him for minimally invasive mitral valve repair but at this point, this remains on hold.  He is scheduled to see Dr. pugh next month and I will defer to him whether or not to pursue further evaluation of either minimally invasive tricuspid repair or potentially a tricuspid clip trial.  For now, we will continue with medical management and see how he does over the next few weeks.  He remains on torsemide 10 mg daily. His weights have been stable at his facility, and I do not hear evidence of a recurrent pleural effusion on exam today.  Labs today show some mild worsening renal insufficiency and very mild hyponatremia with a sodium of 134.  His BUN is normal.  I think for now, it would be reasonable to continue his current torsemide unchanged.  We will continue to monitor his renal function closely with a repeat basic metabolic panel next week.  If he has any further issues with volume retention or shortness of breath, I would consider adding spironolactone.     Historically, for his cardiomyopathy and atrial fibrillation he has been maintained on metoprolol XL for rate control.  He was previously on Entresto 12-13 mg in the morning and 24-26 at bedtime.  This was discontinued in the hospital due to hypotension.  I did not resume this at this point as he has been having ongoing soft blood pressures at his facility.  I do not want to adjust his metoprolol dose as it like to ensure his heart rate stay well controlled with his atrial fibrillation.  In the future, if he is blood pressure stabilizes I would consider resuming low-dose Entresto.  Fortunately, his LVEF has remained normal.     I will plan to see Jama back for follow-up in about 2 weeks.  As noted above, he also has follow-up arranged with his primary cardiologist Dr. Pugh on 1/10/19.  In the meantime, I asked his family to contact us with any questions or concerns.    Orders this  Visit:  Orders Placed This Encounter   Procedures     Basic metabolic panel     Follow-Up with [facility] - [facility] visit     No orders of the defined types were placed in this encounter.    There are no discontinued medications.    CURRENT MEDICATIONS:  Current Outpatient Medications   Medication Sig Dispense Refill     acetaminophen (TYLENOL) 325 MG tablet Take 650 mg by mouth 2 times daily every 6 hours as needed for Pain       digoxin (LANOXIN) 125 MCG tablet Take 1 tablet (125 mcg) by mouth daily (Patient taking differently: Take 125 mcg by mouth daily HOLD if HR <55) 30 tablet 0     melatonin 5 MG tablet Take 3 mg by mouth At Bedtime After 9pm. Please use overnight depends on pt to allow him to sleep 5 interruption during the night       metoprolol succinate ER (TOPROL-XL) 50 MG 24 hr tablet Take 100 mg by mouth every morning       metoprolol succinate ER (TOPROL-XL) 50 MG 24 hr tablet Take 50 mg by mouth every evening HOLD if SBP <110 OR HR < 55. Call if held x 2 in a row symptomatic       mirtazapine (REMERON) 15 MG tablet Take 15 mg by mouth At Bedtime Patient started medication for the first time yesterday 10/29/2019.        Multiple Vitamins-Minerals (ICAPS AREDS FORMULA PO) Take 1 tablet by mouth daily Takes in addition to multivitamin with minerals       torsemide (DEMADEX) 10 MG tablet Take 1 tablet (10 mg) by mouth daily       Vitamin D, Cholecalciferol, 1000 UNITS TABS Take 3,000 Units by mouth daily        ALLERGIES  No Known Allergies  PAST MEDICAL HISTORY:  Past Medical History:   Diagnosis Date     Congestive heart failure (H)      Elevated PSA      Eye hemorrhage, left 02/2019     Non-ischemic cardiomyopathy (H)     2017, med treatment, echo done 4/18 nl ef, mod to severe tr     Permanent atrial fibrillation 2011     Thrombocytopenia (H)      Unspecified essential hypertension      PAST SURGICAL HISTORY:  Past Surgical History:   Procedure Laterality Date     ABDOMEN SURGERY      bowel obstruction      BONE MARROW BIOPSY, BONE SPECIMEN, NEEDLE/TROCAR N/A 3/4/2019    Procedure: BIOPSY BONE MARROW;  Surgeon: Josey Vargas MD;  Location:  GI     CARPAL TUNNEL RELEASE RT/LT  2014     CV HEART CATHETERIZATION WITH POSSIBLE INTERVENTION N/A 11/15/2019    Procedure: Left and right heart Catheterization with Possible Intervention;  Surgeon: Adolfo Paez MD;  Location:  HEART CARDIAC CATH LAB     CV LEFT VENTRICULOGRAM N/A 11/15/2019    Procedure: Left Ventriculogram;  Surgeon: Adolfo Paez MD;  Location:  HEART CARDIAC CATH LAB     ESOPHAGOSCOPY, GASTROSCOPY, DUODENOSCOPY (EGD), COMBINED N/A 11/6/2019    Procedure: ESOPHAGOGASTRODUODENOSCOPY (EGD);  Surgeon: Marixa Aguila MD;  Location:  GI     EXPLORE GROIN  4/30/2014    Procedure: EXPLORE GROIN;  Surgeon: Sam Aguirre MD;  Location:  OR     HERNIORRHAPHY INGUINAL  4/30/2014    Procedure: HERNIORRHAPHY INGUINAL;  Surgeon: Sam Aguirre MD;  Location:  OR     MOHS MICROGRAPHIC PROCEDURE       PHACOEMULSIFICATION CLEAR CORNEA WITH STANDARD INTRAOCULAR LENS IMPLANT Right 9/8/2015    Procedure: PHACOEMULSIFICATION CLEAR CORNEA WITH STANDARD INTRAOCULAR LENS IMPLANT;  Surgeon: Gil Shannon MD;  Location:  EC     septic olecranon bursitis[       FAMILY HISTORY:  Family History   Problem Relation Age of Onset     Heart Disease No family hx of      SOCIAL HISTORY:  Social History     Socioeconomic History     Marital status:      Spouse name: None     Number of children: 3     Years of education: None     Highest education level: None   Occupational History     None   Social Needs     Financial resource strain: None     Food insecurity:     Worry: None     Inability: None     Transportation needs:     Medical: None     Non-medical: None   Tobacco Use     Smoking status: Never Smoker     Smokeless tobacco: Never Used   Substance and Sexual Activity     Alcohol use: No     Drug use: No     Sexual activity: None  "  Lifestyle     Physical activity:     Days per week: None     Minutes per session: None     Stress: None   Relationships     Social connections:     Talks on phone: None     Gets together: None     Attends Mandaeism service: None     Active member of club or organization: None     Attends meetings of clubs or organizations: None     Relationship status: None     Intimate partner violence:     Fear of current or ex partner: None     Emotionally abused: None     Physically abused: None     Forced sexual activity: None   Other Topics Concern     Parent/sibling w/ CABG, MI or angioplasty before 65F 55M? No   Social History Narrative     None     Review of Systems:  Skin:  Negative bruising   Eyes:  Positive for glasses  ENT:  Negative    Respiratory:  Negative    Cardiovascular:  Negative fatigue  Gastroenterology: Negative    Genitourinary:  Positive for incontinence  Musculoskeletal:  Negative arthritis  Neurologic:  Negative    Psychiatric:  Negative    Heme/Lymph/Imm:  Negative    Endocrine:  Negative       Physical Exam:  Vitals: /66   Pulse 74   Ht 1.74 m (5' 8.5\")   Wt 67.1 kg (148 lb)   BMI 22.18 kg/m     Wt Readings from Last 4 Encounters:   12/03/19 67.1 kg (148 lb)   12/02/19 67 kg (147 lb 9.6 oz)   11/25/19 67 kg (147 lb 9.6 oz)   11/21/19 66.2 kg (146 lb)     GEN: well nourished, in no acute distress.  HEENT:  Pupils equal, round. Sclerae nonicteric.   C/V:  Regular rate and rhythm, no murmur, rub or gallop.    RESP: Respirations are unlabored.  Breath sounds were perhaps a bit diminished at the right base but were otherwise clear.  I did not appreciate any wheezes or rales.  GI: Abdomen soft, nontender.  EXTREM: no LE edema.  NEURO: Alert and oriented, cooperative.  SKIN: Warm and dry.     Recent Lab Results:  LIPID RESULTS:  Lab Results   Component Value Date    CHOL 127 04/18/2012    HDL 56 04/18/2012    LDL 55 04/18/2012    TRIG 81 04/18/2012    CHOLHDLRATIO 2.3 04/18/2012     LIVER ENZYME " RESULTS:  Lab Results   Component Value Date    AST 9 11/11/2019    ALT 20 11/11/2019     CBC RESULTS:  Lab Results   Component Value Date    WBC 7.0 11/26/2019    RBC 3.10 (A) 11/26/2019    HGB 9.3 (A) 11/26/2019    HCT 30.9 (A) 11/26/2019    MCV 99.7 11/26/2019    MCH 30.0 11/26/2019    MCHC 30.1 (A) 11/26/2019    RDW 20.6 (A) 11/26/2019    PLT 53 (A) 11/26/2019     BMP RESULTS:  Lab Results   Component Value Date     (L) 12/03/2019    POTASSIUM 4.5 12/03/2019    CHLORIDE 101 12/03/2019    CO2 27 12/03/2019    ANIONGAP 10.5 12/03/2019     (H) 12/03/2019    BUN 23 12/03/2019    CR 1.27 12/03/2019    GFRESTIMATED 54 (L) 12/03/2019    GFRESTBLACK 65 12/03/2019    BARON 8.5 12/03/2019      A1C RESULTS:  No results found for: A1C  INR RESULTS:  Lab Results   Component Value Date    INR 1.46 (H) 11/15/2019    INR 1.39 (H) 10/30/2019       New/Pertinent imaging results since last visit:  none    Jazlyn Cr PA-C  [facility]

## 2019-12-03 NOTE — LETTER
12/3/2019    Mariusz Shields MD  [address]  [facility] [address]    RE: Jama Paul       Dear Colleague,    I had the pleasure of seeing Jama Paul in the [facility].      Cardiology Progress Note    Date of Service: 12/05/2019  Patient seen today in follow up of: CORE follow up  Primary cardiologist: Dr. 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 He was found to have a cardiomyopathy at that time which was likely tachycardia mediated.  His atrial fibrillation was managed with a rate control strategy and with this his EF has normalized.  His medical therapy has included metoprolol XL and Entresto.  He has done fairly well from a congestive heart failure standpoint other than a hospitalization in January 2018 with heart failure after he stopped taking his Lasix accidentally.    He has been on warfarin since February of last year due to significant thrombocytopenia.  He saw Dr. Hermosillo to discuss a watchman at one point but this was never pursued.  He has been seen by Dr. 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    He has not been seen in cardiology clinic since this spring but recently had a long somewhat complicated hospital stay with congestive heart failure from 10/30 through 11/17. He notes he had been feeling very well until about a week prior to admission and then quickly became short of breath.  He was found to have bilateral pleural effusion and underwent thoracentesis x 2.  Pleural studies were transudate.He was started on a Bumex drip for  aggressive diuresis and a dobutamine drip. He subsequently became hypotensive and tachycardic. The bumex and dobutamine were discontinued with improvement. His echocardiogram on admission showed a preserved LVEF, mild to moderate mitral regurgitation and moderately severe to severe tricuspid regurgitation. The RV systolic function was normal but the RV appeared mild to moderately dilated.     CV surgery was consulted during his hospitalization to discuss possible tricuspid valve repair. Per the notes, he was felt to be a high risk candidate for surgery but not prohibitively so. Pre operative work up was recommended. There was difficulty completing a KATIE as the probe would not advance. This had to be attempted several times but ultimately was completed on 11/11. This showed severe tricuspid regurgitation. 3 - 4+ mitral regurgitation was noted.  After ongoing diuresis, a repeat echocardiogram was done on 11/14 which showed persistently severe tricuspid regurgitation although the mitral regurgitation appeared to be only mild to moderate. He did undergo a right and left heart catheterization on 11/15 which showed mild nonobstructive coronary disease.  PCWP was unable to be obtained but LVEDP was normal. Right sided pressures were normal and there was no pulmonary hypertension. Cardiac output was normal. Ultimately, it was decided he would follow up as an outpatient to discuss potential intervention to his tricuspid valve vs ongoing medical management.     He was transitioned to Torsemide 10 mg daily on discharge. Of note, he had some issues with his heart rate and his atrial fibrillation and was started on Digoxin. Entresto was stopped and not resumed at discharge due to hypotension.     He was discharged a TCU.  He has been followed closely by the providers there.  A week ago, he was noted to have evidence of a recurrent pleural effusion on exam.  He ultimately underwent a repeat left-sided thoracentesis on 11/26.  He  had 2.2 L of fluid removed.  It appears per the notes he was reporting some shortness of breath although denies that he was ever having any shortness of breath to me today.  He is here for    He is here for CORE clinic follow-up with his wife and his son.  He feels he has overall been doing fairly well.  He denies any dyspnea on exertion, orthopnea, PND, abdominal or peripheral edema.  His weights have been stable between 145 and 147 pounds.  His blood pressure has been somewhat soft at times.  I note his blood pressure was in the 90 systolic yesterday although he denies any or syncope.  He denies chest discomfort or palpitations.     His wife and son do seem to note he has been making progress.  He seems to have more energy and is able to walk further.      ASSESSMENT/PLAN:  1.  Chronic heart failure with preserved LVEF.  With recent exacerbation.  2.  Bilateral pleural effusions status post thoracenteses.  3.  Moderately severe to severe tricuspid regurgitation.  4.  History of a tachycardia mediated cardiomyopathy with an EF as low as 35% in 2017 which has now normalized and remained stable.  5.  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  He is followed very closely by hematology.  6.  Hypertension.  7.  Permanent atrial fibrillation.  Maintained on a rate control strategy with metoprolol and now also on digoxin.  He is not on warfarin due to his history of a retinal hemorrhage and severe thrombocytopenia.    Jama is here today for CORE clinic follow-up.  As noted above, he was discharged a few weeks ago after a complicated hospital stay.  He has required multiple thoracentesis now for recurrent pleural effusions.  He had an extensive evaluation for his valvular disease and severe tricuspid regurgitation including CV surgery evaluation.  His echo has shown some RV dilation but RV systolic function appeared normal.  Interestingly, he has not had significant symptoms of right sided heart  failure even prior to his admission. Primarily, he has had recurrent pleural effusions and shortness of breath although it does not appear any alternative etiologies for his pleural effusions have been found.  His LVEF has remained normal.     There was discussion of potentially sending him for minimally invasive mitral valve repair but at this point, this remains on hold.  He is scheduled to see Dr. baker next month and I will defer to him whether or not to pursue further evaluation of either minimally invasive tricuspid repair or potentially a tricuspid clip trial.  For now, we will continue with medical management and see how he does over the next few weeks.  He remains on torsemide 10 mg daily. His weights have been stable at his facility, and I do not hear evidence of a recurrent pleural effusion on exam today.  Labs today show some mild worsening renal insufficiency and very mild hyponatremia with a sodium of 134.  His BUN is normal.  I think for now, it would be reasonable to continue his current torsemide unchanged.  We will continue to monitor his renal function closely with a repeat basic metabolic panel next week.  If he has any further issues with volume retention or shortness of breath, I would consider adding spironolactone.     Historically, for his cardiomyopathy and atrial fibrillation he has been maintained on metoprolol XL for rate control.  He was previously on Entresto 12-13 mg in the morning and 24-26 at bedtime.  This was discontinued in the hospital due to hypotension.  I did not resume this at this point as he has been having ongoing soft blood pressures at his facility.  I do not want to adjust his metoprolol dose as it like to ensure his heart rate stay well controlled with his atrial fibrillation.  In the future, if he is blood pressure stabilizes I would consider resuming low-dose Entresto.  Fortunately, his LVEF has remained normal.     I will plan to see Jama back for follow-up in about  2 weeks.  As noted above, he also has follow-up arranged with his primary cardiologist Dr. Pugh on 1/10/19.  In the meantime, I asked his family to contact us with any questions or concerns.    Orders this Visit:  Orders Placed This Encounter   Procedures     Basic metabolic panel     Follow-Up with [facility] Clinic - HARRY visit     No orders of the defined types were placed in this encounter.    There are no discontinued medications.    CURRENT MEDICATIONS:  Current Outpatient Medications   Medication Sig Dispense Refill     acetaminophen (TYLENOL) 325 MG tablet Take 650 mg by mouth 2 times daily every 6 hours as needed for Pain       digoxin (LANOXIN) 125 MCG tablet Take 1 tablet (125 mcg) by mouth daily (Patient taking differently: Take 125 mcg by mouth daily HOLD if HR <55) 30 tablet 0     melatonin 5 MG tablet Take 3 mg by mouth At Bedtime After 9pm. Please use overnight depends on pt to allow him to sleep 5 interruption during the night       metoprolol succinate ER (TOPROL-XL) 50 MG 24 hr tablet Take 100 mg by mouth every morning       metoprolol succinate ER (TOPROL-XL) 50 MG 24 hr tablet Take 50 mg by mouth every evening HOLD if SBP <110 OR HR < 55. Call if held x 2 in a row symptomatic       mirtazapine (REMERON) 15 MG tablet Take 15 mg by mouth At Bedtime Patient started medication for the first time yesterday 10/29/2019.        Multiple Vitamins-Minerals (ICAPS AREDS FORMULA PO) Take 1 tablet by mouth daily Takes in addition to multivitamin with minerals       torsemide (DEMADEX) 10 MG tablet Take 1 tablet (10 mg) by mouth daily       Vitamin D, Cholecalciferol, 1000 UNITS TABS Take 3,000 Units by mouth daily        ALLERGIES  No Known Allergies  PAST MEDICAL HISTORY:  Past Medical History:   Diagnosis Date     Congestive heart failure (H)      Elevated PSA      Eye hemorrhage, left 02/2019     Non-ischemic cardiomyopathy (H)     2017, med treatment, echo done 4/18 nl ef, mod to severe tr     Permanent atrial  fibrillation 2011     Thrombocytopenia (H)      Unspecified essential hypertension      PAST SURGICAL HISTORY:  Past Surgical History:   Procedure Laterality Date     ABDOMEN SURGERY      bowel obstruction     BONE MARROW BIOPSY, BONE SPECIMEN, NEEDLE/TROCAR N/A 3/4/2019    Procedure: BIOPSY BONE MARROW;  Surgeon: Josey Vargas MD;  Location:  GI     CARPAL TUNNEL RELEASE RT/LT  2014     CV HEART CATHETERIZATION WITH POSSIBLE INTERVENTION N/A 11/15/2019    Procedure: Left and right heart Catheterization with Possible Intervention;  Surgeon: Adolfo Paez MD;  Location:  HEART CARDIAC CATH LAB     CV LEFT VENTRICULOGRAM N/A 11/15/2019    Procedure: Left Ventriculogram;  Surgeon: Adolfo Paez MD;  Location:  HEART CARDIAC CATH LAB     ESOPHAGOSCOPY, GASTROSCOPY, DUODENOSCOPY (EGD), COMBINED N/A 11/6/2019    Procedure: ESOPHAGOGASTRODUODENOSCOPY (EGD);  Surgeon: Marixa Aguila MD;  Location:  GI     EXPLORE GROIN  4/30/2014    Procedure: EXPLORE GROIN;  Surgeon: Sam Aguirre MD;  Location:  OR     HERNIORRHAPHY INGUINAL  4/30/2014    Procedure: HERNIORRHAPHY INGUINAL;  Surgeon: Sam Aguirre MD;  Location:  OR     MOHS MICROGRAPHIC PROCEDURE       PHACOEMULSIFICATION CLEAR CORNEA WITH STANDARD INTRAOCULAR LENS IMPLANT Right 9/8/2015    Procedure: PHACOEMULSIFICATION CLEAR CORNEA WITH STANDARD INTRAOCULAR LENS IMPLANT;  Surgeon: Gil Shannon MD;  Location:  EC     septic olecranon bursitis[       FAMILY HISTORY:  Family History   Problem Relation Age of Onset     Heart Disease No family hx of      SOCIAL HISTORY:  Social History     Socioeconomic History     Marital status:      Spouse name: None     Number of children: 3     Years of education: None     Highest education level: None   Occupational History     None   Social Needs     Financial resource strain: None     Food insecurity:     Worry: None     Inability: None     Transportation needs:     Medical:  "None     Non-medical: None   Tobacco Use     Smoking status: Never Smoker     Smokeless tobacco: Never Used   Substance and Sexual Activity     Alcohol use: No     Drug use: No     Sexual activity: None   Lifestyle     Physical activity:     Days per week: None     Minutes per session: None     Stress: None   Relationships     Social connections:     Talks on phone: None     Gets together: None     Attends Sabianist service: None     Active member of club or organization: None     Attends meetings of clubs or organizations: None     Relationship status: None     Intimate partner violence:     Fear of current or ex partner: None     Emotionally abused: None     Physically abused: None     Forced sexual activity: None   Other Topics Concern     Parent/sibling w/ CABG, MI or angioplasty before 65F 55M? No   Social History Narrative     None     Review of Systems:  Skin:  Negative bruising   Eyes:  Positive for glasses  ENT:  Negative    Respiratory:  Negative    Cardiovascular:  Negative fatigue  Gastroenterology: Negative    Genitourinary:  Positive for incontinence  Musculoskeletal:  Negative arthritis  Neurologic:  Negative    Psychiatric:  Negative    Heme/Lymph/Imm:  Negative    Endocrine:  Negative       Physical Exam:  Vitals: /66   Pulse 74   Ht 1.74 m (5' 8.5\")   Wt 67.1 kg (148 lb)   BMI 22.18 kg/m      Wt Readings from Last 4 Encounters:   12/03/19 67.1 kg (148 lb)   12/02/19 67 kg (147 lb 9.6 oz)   11/25/19 67 kg (147 lb 9.6 oz)   11/21/19 66.2 kg (146 lb)     GEN: well nourished, in no acute distress.  HEENT:  Pupils equal, round. Sclerae nonicteric.   C/V:  Regular rate and rhythm, no murmur, rub or gallop.    RESP: Respirations are unlabored.  Breath sounds were perhaps a bit diminished at the right base but were otherwise clear.  I did not appreciate any wheezes or rales.  GI: Abdomen soft, nontender.  EXTREM: no LE edema.  NEURO: Alert and oriented, cooperative.  SKIN: Warm and dry. "     Recent Lab Results:  LIPID RESULTS:  Lab Results   Component Value Date    CHOL 127 04/18/2012    HDL 56 04/18/2012    LDL 55 04/18/2012    TRIG 81 04/18/2012    CHOLHDLRATIO 2.3 04/18/2012     LIVER ENZYME RESULTS:  Lab Results   Component Value Date    AST 9 11/11/2019    ALT 20 11/11/2019     CBC RESULTS:  Lab Results   Component Value Date    WBC 7.0 11/26/2019    RBC 3.10 (A) 11/26/2019    HGB 9.3 (A) 11/26/2019    HCT 30.9 (A) 11/26/2019    MCV 99.7 11/26/2019    MCH 30.0 11/26/2019    MCHC 30.1 (A) 11/26/2019    RDW 20.6 (A) 11/26/2019    PLT 53 (A) 11/26/2019     BMP RESULTS:  Lab Results   Component Value Date     (L) 12/03/2019    POTASSIUM 4.5 12/03/2019    CHLORIDE 101 12/03/2019    CO2 27 12/03/2019    ANIONGAP 10.5 12/03/2019     (H) 12/03/2019    BUN 23 12/03/2019    CR 1.27 12/03/2019    GFRESTIMATED 54 (L) 12/03/2019    GFRESTBLACK 65 12/03/2019    BARON 8.5 12/03/2019      A1C RESULTS:  No results found for: A1C  INR RESULTS:  Lab Results   Component Value Date    INR 1.46 (H) 11/15/2019    INR 1.39 (H) 10/30/2019       New/Pertinent imaging results since last visit:  none        Thank you for allowing me to participate in the care of your patient.    Sincerely,     Jazlyn Cr PA-C     [facility]

## 2019-12-03 NOTE — PATIENT INSTRUCTIONS
Call CORE nurse for any questions or concerns Mon-Fri 8am-4pm:                                                 #[phone]                                       For concerns after hours:                                               #[phone]      1: Medication changes: no medication changes for now.  2: Plan from today: see me back in two weeks

## 2019-12-09 ENCOUNTER — NURSING HOME VISIT (OUTPATIENT)
Dept: GERIATRICS | Facility: CLINIC | Age: 84
End: 2019-12-09
Payer: MEDICARE

## 2019-12-09 VITALS
WEIGHT: 148 LBS | SYSTOLIC BLOOD PRESSURE: 128 MMHG | DIASTOLIC BLOOD PRESSURE: 80 MMHG | TEMPERATURE: 97.8 F | HEIGHT: 72 IN | BODY MASS INDEX: 20.05 KG/M2 | RESPIRATION RATE: 16 BRPM | HEART RATE: 82 BPM | OXYGEN SATURATION: 96 %

## 2019-12-09 DIAGNOSIS — I07.1 TRICUSPID VALVE INSUFFICIENCY, UNSPECIFIED ETIOLOGY: ICD-10-CM

## 2019-12-09 DIAGNOSIS — J90 PLEURAL EFFUSION: ICD-10-CM

## 2019-12-09 DIAGNOSIS — I10 ESSENTIAL HYPERTENSION: ICD-10-CM

## 2019-12-09 DIAGNOSIS — I34.0 MITRAL VALVE INSUFFICIENCY, UNSPECIFIED ETIOLOGY: ICD-10-CM

## 2019-12-09 DIAGNOSIS — I50.9 CONGESTIVE HEART FAILURE, UNSPECIFIED HF CHRONICITY, UNSPECIFIED HEART FAILURE TYPE (H): Primary | ICD-10-CM

## 2019-12-09 DIAGNOSIS — G47.00 INSOMNIA, UNSPECIFIED TYPE: ICD-10-CM

## 2019-12-09 DIAGNOSIS — R53.81 PHYSICAL DECONDITIONING: ICD-10-CM

## 2019-12-09 PROCEDURE — 99309 SBSQ NF CARE MODERATE MDM 30: CPT | Performed by: NURSE PRACTITIONER

## 2019-12-09 ASSESSMENT — MIFFLIN-ST. JEOR: SCORE: 1379.32

## 2019-12-09 NOTE — Clinical Note
HI Getting labs 12/11, will have them put into EPIC by Friday.He sees you next week, so thank you! B

## 2019-12-09 NOTE — PROGRESS NOTES
[facility]  [place] Medical Record Number:  [id number]  Place of Service where encounter took place:  [facility] [[id number]]  Chief Complaint   Patient presents with     Nursing Home Acute       HPI:    Jama Paul  is a 88 year old (2/13/1931), who is being seen today for an episodic care visit.  HPI information obtained from: facility chart records, facility staff, patient report and [facility] chart review. Today's concern is: feels good, no sob, wonders when will need another thoracentesis. Sleeping much better also.       Congestive heart failure, unspecified HF chronicity, unspecified heart failure type (H)  Mitral valve insufficiency, unspecified etiology  Tricuspid valve insufficiency, unspecified etiology  Pleural effusion  Essential hypertension  Physical deconditioning  Insomnia, unspecified type      TODAY DURING EXAM HIP and ROS:  No CP,  SOB, Cough, dizziness, fevers, chills, HA, N/V, dysuria or Bowel Abnormalities Appetite is good.  No pain.      Past Medical and Surgical History reviewed in Epic today.    MEDICATIONS:  Current Outpatient Medications   Medication Sig Dispense Refill     acetaminophen (TYLENOL) 325 MG tablet Take 650 mg by mouth 2 times daily every 6 hours as needed for Pain       digoxin (LANOXIN) 125 MCG tablet Take 1 tablet (125 mcg) by mouth daily (Patient taking differently: Take 125 mcg by mouth daily HOLD if HR <55) 30 tablet 0     melatonin 5 MG tablet Take 3 mg by mouth At Bedtime After 9pm. Please use overnight depends on pt to allow him to sleep 5 interruption during the night       metoprolol succinate ER (TOPROL-XL) 50 MG 24 hr tablet Take 50 mg by mouth 2 times daily HOLD if SBP <110 OR HR < 55. Call if held x 2 in a row symptomatic       metoprolol succinate ER (TOPROL-XL) 50 MG 24 hr tablet Take 50 mg by mouth every evening HOLD if SBP <110 OR HR < 55. Call if held x 2 in a row symptomatic       mirtazapine (REMERON)  15 MG tablet Take 15 mg by mouth At Bedtime Patient started medication for the first time yesterday 10/29/2019.        Multiple Vitamins-Minerals (ICAPS AREDS FORMULA PO) Take 1 tablet by mouth daily Takes in addition to multivitamin with minerals       torsemide (DEMADEX) 10 MG tablet Take 1 tablet (10 mg) by mouth daily       Vitamin D, Cholecalciferol, 1000 UNITS TABS Take 3,000 Units by mouth daily             REVIEW OF SYSTEMS:  See above under HPI    Objective:  /80   Pulse 82   Temp 97.8  F (36.6  C)   Resp 16   Ht 1.829 m (6')   Wt 67.1 kg (148 lb)   SpO2 96%   BMI 20.07 kg/m    Exam:  GENERAL APPEARANCE:  Alert, oriented, cooperative, NAD.  ENT:  Mouth with moist mucous membranes, [place]  RESP:  respiratory effort  of chest normal, lungs clear to auscultation with a little decreased on right lower 1/3, no respiratory distress,   CV:  Auscultation of heart done ,RRR. Soft systolic murmur, no Rub or Gallop, +trace pitting pedal edema,   +1 pedal pulses  ABDOMEN:  normal bowel sounds, soft, nontender   M/S:   CHIU equally, up with assist-seen in WC  SKIN:  Inspection of skin is at baseline.  NEURO:   Cranial nerves 2-12 are grossly intact and at patient's baseline  PSYCH:  oriented X 3, engaged.    Labs:    Recent Labs   Lab Test 12/03/19  1114 11/26/19   * 133*   POTASSIUM 4.5 4.0   CHLORIDE 101 100   CO2 27 26   ANIONGAP 10.5 7   * 114*   BUN 23 28*   CR 1.27 1.10   BARON 8.5 7.9*     CBC RESULTS:   Recent Labs   Lab Test 11/26/19   WBC 7.0   RBC 3.10*   HGB 9.3*   HCT 30.9*   MCV 99.7   MCH 30.0   MCHC 30.1*   RDW 20.6*   PLT 53*       Recent Labs   Lab Test 11/26/19   *   POTASSIUM 4.0   CHLORIDE 100   CO2 26   ANIONGAP 7   *   BUN 28*   CR 1.10   BARON 7.9*     ASSESSMENT / PLAN:  (I50.9) Congestive heart failure, unspecified HF chronicity, unspecified heart failure type (H)  (primary encounter diagnosis)   (I34.0) Mitral valve insufficiency, unspecified etiology   (I07.1)  Tricuspid valve insufficiency, unspecified etiology   (J90) Pleural effusion  Comment/Plan: steady wt though 148-149# last two days, feels good, likely will re-accumulate fluid again. Monitor, thoracentesis as needed. Sees CORE next week. BMP on 12/11    (I10) Essential hypertension  Comment/Plan: mostly 110-120's occas <100, no changes today, monitor.    (R53.81) Physical deconditioning  Comment/Plan: PT OT getting stronger,     (G47.00) Insomnia, unspecified type  Comment/Plan: sleeping well, no changes.    Total time with patient visit: 40 minutes including discussions about the POC and care coordination with the patient and family, wife. Greater than 50% of total time spent with counseling and coordinating care due to complexities of care.       Electronically signed by:  ERIC Mir CNP

## 2019-12-10 PROBLEM — I50.43: Status: RESOLVED | Noted: 2018-01-23 | Resolved: 2019-12-10

## 2019-12-11 ENCOUNTER — TRANSFERRED RECORDS (OUTPATIENT)
Dept: HEALTH INFORMATION MANAGEMENT | Facility: CLINIC | Age: 84
End: 2019-12-11

## 2019-12-11 LAB
ANION GAP SERPL CALCULATED.3IONS-SCNC: 10 MMOL/L (ref 7–16)
BUN SERPL-MCNC: 21 MG/DL (ref 7–26)
CALCIUM SERPL-MCNC: 8.3 MG/DL (ref 8.4–10.4)
CHLORIDE SERPLBLD-SCNC: 100 MMOL/L (ref 98–109)
CO2 SERPL-SCNC: 24 MMOL/L (ref 20–29)
CREAT SERPL-MCNC: 1.06 MG/DL (ref 0.73–1.18)
GFR SERPL CREATININE-BSD FRML MDRD: >60 ML/MIN/1.73M2
GLUCOSE SERPL-MCNC: 172 MG/DL (ref 70–100)
POTASSIUM SERPL-SCNC: 3.9 MMOL/L (ref 3.5–5.1)
SODIUM SERPL-SCNC: 134 MMOL/L (ref 136–145)

## 2019-12-18 ENCOUNTER — NURSING HOME VISIT (OUTPATIENT)
Dept: GERIATRICS | Facility: CLINIC | Age: 84
End: 2019-12-18
Payer: MEDICARE

## 2019-12-18 ENCOUNTER — OFFICE VISIT (OUTPATIENT)
Dept: CARDIOLOGY | Facility: CLINIC | Age: 84
End: 2019-12-18
Attending: PHYSICIAN ASSISTANT
Payer: MEDICARE

## 2019-12-18 VITALS
WEIGHT: 152.4 LBS | BODY MASS INDEX: 22.57 KG/M2 | HEIGHT: 69 IN | DIASTOLIC BLOOD PRESSURE: 70 MMHG | HEART RATE: 79 BPM | SYSTOLIC BLOOD PRESSURE: 127 MMHG

## 2019-12-18 VITALS
HEART RATE: 67 BPM | BODY MASS INDEX: 20.48 KG/M2 | RESPIRATION RATE: 18 BRPM | WEIGHT: 151 LBS | DIASTOLIC BLOOD PRESSURE: 54 MMHG | SYSTOLIC BLOOD PRESSURE: 117 MMHG

## 2019-12-18 DIAGNOSIS — I07.1 TRICUSPID VALVE INSUFFICIENCY, UNSPECIFIED ETIOLOGY: ICD-10-CM

## 2019-12-18 DIAGNOSIS — I42.8 NON-ISCHEMIC CARDIOMYOPATHY (H): ICD-10-CM

## 2019-12-18 DIAGNOSIS — J90 PLEURAL EFFUSION: ICD-10-CM

## 2019-12-18 DIAGNOSIS — I10 ESSENTIAL HYPERTENSION: ICD-10-CM

## 2019-12-18 DIAGNOSIS — I50.9 CONGESTIVE HEART FAILURE, UNSPECIFIED HF CHRONICITY, UNSPECIFIED HEART FAILURE TYPE (H): Primary | ICD-10-CM

## 2019-12-18 DIAGNOSIS — G47.00 INSOMNIA, UNSPECIFIED TYPE: ICD-10-CM

## 2019-12-18 DIAGNOSIS — R53.81 PHYSICAL DECONDITIONING: ICD-10-CM

## 2019-12-18 DIAGNOSIS — I34.0 MITRAL VALVE INSUFFICIENCY, UNSPECIFIED ETIOLOGY: ICD-10-CM

## 2019-12-18 LAB
ANION GAP SERPL CALCULATED.3IONS-SCNC: 8.1 MMOL/L (ref 6–17)
BUN SERPL-MCNC: 21 MG/DL (ref 7–30)
CALCIUM SERPL-MCNC: 9.1 MG/DL (ref 8.5–10.5)
CHLORIDE SERPL-SCNC: 100 MMOL/L (ref 98–107)
CO2 SERPL-SCNC: 32 MMOL/L (ref 23–29)
CREAT SERPL-MCNC: 1.27 MG/DL (ref 0.7–1.3)
GFR SERPL CREATININE-BSD FRML MDRD: 54 ML/MIN/{1.73_M2}
GLUCOSE SERPL-MCNC: 121 MG/DL (ref 70–105)
POTASSIUM SERPL-SCNC: 4.1 MMOL/L (ref 3.5–5.1)
SODIUM SERPL-SCNC: 136 MMOL/L (ref 136–145)

## 2019-12-18 PROCEDURE — 99309 SBSQ NF CARE MODERATE MDM 30: CPT | Performed by: NURSE PRACTITIONER

## 2019-12-18 PROCEDURE — 36415 COLL VENOUS BLD VENIPUNCTURE: CPT | Performed by: INTERNAL MEDICINE

## 2019-12-18 PROCEDURE — 80048 BASIC METABOLIC PNL TOTAL CA: CPT | Performed by: INTERNAL MEDICINE

## 2019-12-18 PROCEDURE — 99214 OFFICE O/P EST MOD 30 MIN: CPT | Performed by: PHYSICIAN ASSISTANT

## 2019-12-18 ASSESSMENT — MIFFLIN-ST. JEOR: SCORE: 1343.78

## 2019-12-18 NOTE — PATIENT INSTRUCTIONS
Call CORE nurse for any questions or concerns Mon-Fri 8am-4pm:                                                 #[phone]                                       For concerns after hours:                                               #[phone]      1: Medication changes: no medication changes today.   2: Plan from today: See Dr. Pugh as scheduled next month. We'll call you if we can cancel that we'll let you know.   3: Lab results: see attached;   Component      Latest Ref Rng & Units 12/18/2019   Sodium      136 - 145 mmol/L 136   Potassium      3.5 - 5.1 mmol/L 4.1   Chloride      98 - 107 mmol/L 100   Carbon Dioxide      23 - 29 mmol/L 32 (H)   Anion Gap      6 - 17 mmol/L 8.1   Glucose      70 - 105 mg/dL 121 (H)   Urea Nitrogen      7 - 30 mg/dL 21   Creatinine      0.70 - 1.30 mg/dL 1.27   GFR Estimate      >60 mL/min/1.73:m2 54 (L)   GFR Estimate If Black      >60 mL/min/1.73:m2 65   Calcium      8.5 - 10.5 mg/dL 9.1

## 2019-12-18 NOTE — LETTER
12/18/2019    Mariusz Shields MD  [address]  [facility] [address]    RE: Jama Paul       Dear Colleague,    I had the pleasure of seeing Jama Paul in the [facility].      Cardiology Progress Note    Date of Service: 12/18/2019  Patient seen today in follow up of: CORE follow up  Primary cardiologist: Dr. 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     In 2017 he presented with heart failure and atrial fibrillation and was found to have a tachycardia mediated cardiomyopathy.  His EF normalized with management of his atrial fibrillation.  He did well from a congestive heart failure standpoint after this other than a hospitalization in January 2018 with heart failure after he accidentally stopped taking his Lasix.     He has been on warfarin since February of last year due to significant thrombocytopenia.  He saw Dr. Hermosillo to discuss a watchman at one point but this was never pursued.  He has been seen by Dr. 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    He had a complicated hospitalization from from 10/30 through 11/17.  He was found to have bilateral pleural effusions and underwent thoracentesis x2.  Pleural fluid studies showed were consistent with transudate.  He had an echo which showed a preserved LVEF but a significant tricuspid regurgitation as well as mild to moderate mitral regurgitation.  CV surgery was involved to discuss possible tricuspid valve repair.  He was felt to be high risk but not prohibitively so.  He underwent a KATIE for further evaluation which had to be attempted multiple times but  ultimately showed severe tricuspid regurgitation and 3-4+ mitral regurgitation.  He was diuresed aggressively with IV diuretic drip.  He had a repeat echocardiogram after diuresis on 11/14 which showed persistently severe tricuspid regurgitation although his mitral regurgitation appeared to be only mild to moderate.  A right heart catheterization the next day showed a normal LVEDP.  Right-sided pressures were normal and there was no pulmonary hypertension.  Cardiac output was normal. Outpatient follow-up to discuss potential tricuspid valve surgery was recommended.  He was discharged on 10 mg of torsemide daily.    He had some issues with some rapid ventricular rates with his atrial fibrillation, and he was started on Digoxin. Entresto was stopped and was not resumed on discharge due to hypotension.     He has been at a TCU since discharge.  He had evidence of a recurrent pleural effusion and underwent a right-sided thoracentesis on 11/26 with over 2 L of fluid removed.  I saw him about 2 weeks ago and made no changes.  He is back today for routine follow-up.  He is here with his wife and daughter who is very involved in his care.  He continues to feel very well.  He has no complaints of shortness of breath, orthopnea, or PND.  He denies peripheral edema or abdominal bloating.  His weights have trended up a few pounds although his appetite has been better and he has been eating well at the facility.  His energy is better.  He is working 2 hours a day with rehab and feels well when doing this.    ASSESSMENT/PLAN:  Jama Maciel is a delightful 88-year-old male with a history of a cardio mediated cardiomyopathy diagnosed in 2017 with normalization of his EF with management of his atrial fibrillation, chronic atrial fibrillation, chronic thrombocytopenia, and recent admission for recurrent pleural effusions and severe tricuspid regurgitation who is here today for routine core clinic follow-up.      He fortunately has  been doing very well since his discharge.  He is clearly making progress with therapies and getting stronger.  We have been monitoring for recurrence of his pleural effusion.  He continues to have some decreased breath sounds on the right although this does not seem worse than when I saw him 2 weeks ago.  We discussed that potentially his pleural effusion may recur but we will continue to monitor for now.  Interestingly, despite his significant tricuspid regurgitation he has not had many right sided heart failure symptoms.  He remains on torsemide 10 mg daily.  His renal function and electrolytes are normal today on labs, and I would continue this unchanged. In the future, particularly if he has right sided symptoms, I would consider adding spironolactone.    He remains off Entresto as he had issues with hypotension during his hospitalization.  His blood pressure is better today and we could consider resuming this if BP remains stable.  I am not sure how his blood pressures have been running at his facility.  His weight is up about 4 pounds from his last visit but he does not appear volume overloaded, and I suspect this is likely caloric.  His heart rates with his atrial fibrillation seem reasonable today.    He has follow-up scheduled with Dr. Pugh in a few weeks.  There was discussion of potential tricuspid valve surgery in the hospital. I will defer to Dr. Pugh whether or not to pursue an outpatient surgical evaluation for this. In the meantime, he and his family will contact us with any concerns. I am pleased with the progress he has made after his recent discharge as is his family.     Orders this Visit:  No orders of the defined types were placed in this encounter.    No orders of the defined types were placed in this encounter.    There are no discontinued medications.    CURRENT MEDICATIONS:  Current Outpatient Medications   Medication Sig Dispense Refill     acetaminophen (TYLENOL) 325 MG tablet Take 650 mg  by mouth 2 times daily every 6 hours as needed for Pain       digoxin (LANOXIN) 125 MCG tablet Take 1 tablet (125 mcg) by mouth daily (Patient taking differently: Take 125 mcg by mouth daily HOLD if HR <55) 30 tablet 0     melatonin 5 MG tablet Take 3 mg by mouth At Bedtime After 9pm. Please use overnight depends on pt to allow him to sleep 5 interruption during the night       metoprolol succinate ER (TOPROL-XL) 50 MG 24 hr tablet Take 50 mg by mouth 2 times daily HOLD if SBP <110 OR HR < 55. Call if held x 2 in a row symptomatic       metoprolol succinate ER (TOPROL-XL) 50 MG 24 hr tablet Take 50 mg by mouth every evening HOLD if SBP <110 OR HR < 55. Call if held x 2 in a row symptomatic       mirtazapine (REMERON) 15 MG tablet Take 15 mg by mouth At Bedtime Patient started medication for the first time yesterday 10/29/2019.        Multiple Vitamins-Minerals (ICAPS AREDS FORMULA PO) Take 1 tablet by mouth daily Takes in addition to multivitamin with minerals       torsemide (DEMADEX) 10 MG tablet Take 1 tablet (10 mg) by mouth daily       Vitamin D, Cholecalciferol, 1000 UNITS TABS Take 3,000 Units by mouth daily        ALLERGIES  No Known Allergies  PAST MEDICAL HISTORY:  Past Medical History:   Diagnosis Date     Congestive heart failure (H)      Elevated PSA      Eye hemorrhage, left 02/2019     Non-ischemic cardiomyopathy (H)     2017, med treatment, echo done 4/18 nl ef, mod to severe tr     Permanent atrial fibrillation 2011     Thrombocytopenia (H)      Unspecified essential hypertension      PAST SURGICAL HISTORY:  Past Surgical History:   Procedure Laterality Date     ABDOMEN SURGERY      bowel obstruction     BONE MARROW BIOPSY, BONE SPECIMEN, NEEDLE/TROCAR N/A 3/4/2019    Procedure: BIOPSY BONE MARROW;  Surgeon: Josey Vargas MD;  Location:  GI     CARPAL TUNNEL RELEASE RT/LT  2014     CV HEART CATHETERIZATION WITH POSSIBLE INTERVENTION N/A 11/15/2019    Procedure: Left and right heart  Catheterization with Possible Intervention;  Surgeon: Adolfo Paez MD;  Location:  HEART CARDIAC CATH LAB     CV LEFT VENTRICULOGRAM N/A 11/15/2019    Procedure: Left Ventriculogram;  Surgeon: Adolfo Paez MD;  Location:  HEART CARDIAC CATH LAB     ESOPHAGOSCOPY, GASTROSCOPY, DUODENOSCOPY (EGD), COMBINED N/A 11/6/2019    Procedure: ESOPHAGOGASTRODUODENOSCOPY (EGD);  Surgeon: Marixa Aguila MD;  Location:  GI     EXPLORE GROIN  4/30/2014    Procedure: EXPLORE GROIN;  Surgeon: Sam Aguirre MD;  Location:  OR     HERNIORRHAPHY INGUINAL  4/30/2014    Procedure: HERNIORRHAPHY INGUINAL;  Surgeon: Sam Aguirre MD;  Location:  OR     MOHS MICROGRAPHIC PROCEDURE       PHACOEMULSIFICATION CLEAR CORNEA WITH STANDARD INTRAOCULAR LENS IMPLANT Right 9/8/2015    Procedure: PHACOEMULSIFICATION CLEAR CORNEA WITH STANDARD INTRAOCULAR LENS IMPLANT;  Surgeon: Gil Shannon MD;  Location:  EC     septic olecranon bursitis[       FAMILY HISTORY:  Family History   Problem Relation Age of Onset     Heart Disease No family hx of      SOCIAL HISTORY:  Social History     Socioeconomic History     Marital status:      Spouse name: Not on file     Number of children: 3     Years of education: Not on file     Highest education level: Not on file   Occupational History     Not on file   Social Needs     Financial resource strain: Not on file     Food insecurity:     Worry: Not on file     Inability: Not on file     Transportation needs:     Medical: Not on file     Non-medical: Not on file   Tobacco Use     Smoking status: Never Smoker     Smokeless tobacco: Never Used   Substance and Sexual Activity     Alcohol use: No     Drug use: No     Sexual activity: Not on file   Lifestyle     Physical activity:     Days per week: Not on file     Minutes per session: Not on file     Stress: Not on file   Relationships     Social connections:     Talks on phone: Not on file     Gets together: Not on file      Attends Gnosticism service: Not on file     Active member of club or organization: Not on file     Attends meetings of clubs or organizations: Not on file     Relationship status: Not on file     Intimate partner violence:     Fear of current or ex partner: Not on file     Emotionally abused: Not on file     Physically abused: Not on file     Forced sexual activity: Not on file   Other Topics Concern     Parent/sibling w/ CABG, MI or angioplasty before 65F 55M? No   Social History Narrative     Not on file     Review of Systems:  Skin:        Eyes:       ENT:       Respiratory:       Cardiovascular:       Gastroenterology:      Genitourinary:       Musculoskeletal:       Neurologic:       Psychiatric:       Heme/Lymph/Imm:       Endocrine:          Physical Exam:  Vitals: There were no vitals taken for this visit.   Wt Readings from Last 4 Encounters:   12/18/19 68.5 kg (151 lb)   12/09/19 67.1 kg (148 lb)   12/03/19 67.1 kg (148 lb)   12/02/19 67 kg (147 lb 9.6 oz)     GEN: well nourished, in no acute distress.  HEENT:  Pupils equal, round. Sclerae nonicteric.   C/V:  Irregularly irregular, rates controlled.  RESP: Respirations are unlabored. Breath sounds are diminished at the right base but are otherwise clear.   GI: Abdomen soft, nontender.  EXTREM: no LE edema.  NEURO: Alert and oriented, cooperative.  SKIN: Warm and dry.     Recent Lab Results:  LIPID RESULTS:  Lab Results   Component Value Date    CHOL 127 04/18/2012    HDL 56 04/18/2012    LDL 55 04/18/2012    TRIG 81 04/18/2012    CHOLHDLRATIO 2.3 04/18/2012     LIVER ENZYME RESULTS:  Lab Results   Component Value Date    AST 9 11/11/2019    ALT 20 11/11/2019     CBC RESULTS:  Lab Results   Component Value Date    WBC 7.0 11/26/2019    RBC 3.10 (A) 11/26/2019    HGB 9.3 (A) 11/26/2019    HCT 30.9 (A) 11/26/2019    MCV 99.7 11/26/2019    MCH 30.0 11/26/2019    MCHC 30.1 (A) 11/26/2019    RDW 20.6 (A) 11/26/2019    PLT 53 (A) 11/26/2019     BMP RESULTS:  Lab  Results   Component Value Date     (A) 12/11/2019    POTASSIUM 3.9 12/11/2019    CHLORIDE 100 12/11/2019    CO2 24 12/11/2019    ANIONGAP 10 12/11/2019     (A) 12/11/2019    BUN 21 12/11/2019    CR 1.06 12/11/2019    GFRESTIMATED >60 12/11/2019    GFRESTBLACK >60 12/11/2019    BARON 8.3 (A) 12/11/2019      A1C RESULTS:  No results found for: A1C  INR RESULTS:  Lab Results   Component Value Date    INR 1.46 (H) 11/15/2019    INR 1.39 (H) 10/30/2019       New/Pertinent imaging results since last visit:  None      Thank you for allowing me to participate in the care of your patient.    Sincerely,     Jazlyn Cr PA-C     [facility]

## 2019-12-18 NOTE — LETTER
12/18/2019    Mariusz Shields MD  [address]  [facility] [address]    RE: Jama Paul       Dear Colleague,    I had the pleasure of seeing Jama Paul in the [facility].      Cardiology Progress Note    Date of Service: 12/18/2019  Patient seen today in follow up of: CORE follow up  Primary cardiologist: Dr. 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     In 2017 he presented with heart failure and atrial fibrillation and was found to have a tachycardia mediated cardiomyopathy.  His EF normalized with management of his atrial fibrillation.  He did well from a congestive heart failure standpoint after this other than a hospitalization in January 2018 with heart failure after he accidentally stopped taking his Lasix.     He has been on warfarin since February of last year due to significant thrombocytopenia.  He saw Dr. Hermosillo to discuss a watchman at one point but this was never pursued.  He has been seen by Dr. 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    He had a complicated hospitalization from from 10/30 through 11/17.  He was found to have bilateral pleural effusions and underwent thoracentesis x2.  Pleural fluid studies showed were consistent with transudate.  He had an echo which showed a preserved LVEF but a significant tricuspid regurgitation as well as mild to moderate mitral regurgitation.  CV surgery was involved to discuss possible tricuspid valve repair.  He was felt to be high risk but not prohibitively so.  He underwent a KATIE for further evaluation which had to be attempted multiple times but  ultimately showed severe tricuspid regurgitation and 3-4+ mitral regurgitation.  He was diuresed aggressively with IV diuretic drip.  He had a repeat echocardiogram after diuresis on 11/14 which showed persistently severe tricuspid regurgitation although his mitral regurgitation appeared to be only mild to moderate.  A right heart catheterization the next day showed a normal LVEDP.  Right-sided pressures were normal and there was no pulmonary hypertension.  Cardiac output was normal. Outpatient follow-up to discuss potential tricuspid valve surgery was recommended.  He was discharged on 10 mg of torsemide daily.    He had some issues with some rapid ventricular rates with his atrial fibrillation, and he was started on Digoxin. Entresto was stopped and was not resumed on discharge due to hypotension.     He has been at a TCU since discharge.  He had evidence of a recurrent pleural effusion and underwent a right-sided thoracentesis on 11/26 with over 2 L of fluid removed.  I saw him about 2 weeks ago and made no changes.  He is back today for routine follow-up.  He is here with his wife and daughter who is very involved in his care.  He continues to feel very well.  He has no complaints of shortness of breath, orthopnea, or PND.  He denies peripheral edema or abdominal bloating.  His weights have trended up a few pounds although his appetite has been better and he has been eating well at the facility.  His energy is better.  He is working 2 hours a day with rehab and feels well when doing this.    ASSESSMENT/PLAN:  Jama Maciel is a delightful 88-year-old male with a history of a cardio mediated cardiomyopathy diagnosed in 2017 with normalization of his EF with management of his atrial fibrillation, chronic atrial fibrillation, chronic thrombocytopenia, and recent admission for recurrent pleural effusions and severe tricuspid regurgitation who is here today for routine core clinic follow-up.      He fortunately has  been doing very well since his discharge.  He is clearly making progress with therapies and getting stronger.  We have been monitoring for recurrence of his pleural effusion.  He continues to have some decreased breath sounds on the right although this does not seem worse than when I saw him 2 weeks ago.  We discussed that potentially his pleural effusion may recur but we will continue to monitor for now.  Interestingly, despite his significant tricuspid regurgitation he has not had many right sided heart failure symptoms.  He remains on torsemide 10 mg daily.  His renal function and electrolytes are normal today on labs, and I would continue this unchanged. In the future, particularly if he has right sided symptoms, I would consider adding spironolactone.    He remains off Entresto as he had issues with hypotension during his hospitalization.  His blood pressure is better today and we could consider resuming this if BP remains stable.  I am not sure how his blood pressures have been running at his facility.  His weight is up about 4 pounds from his last visit but he does not appear volume overloaded, and I suspect this is likely caloric.  His heart rates with his atrial fibrillation seem reasonable today.    He has follow-up scheduled with Dr. Pugh in a few weeks.  There was discussion of potential tricuspid valve surgery in the hospital. I will defer to Dr. Pugh whether or not to pursue an outpatient surgical evaluation for this. In the meantime, he and his family will contact us with any concerns. I am pleased with the progress he has made after his recent discharge as is his family.     Orders this Visit:  No orders of the defined types were placed in this encounter.    No orders of the defined types were placed in this encounter.    There are no discontinued medications.    CURRENT MEDICATIONS:  Current Outpatient Medications   Medication Sig Dispense Refill     acetaminophen (TYLENOL) 325 MG tablet Take 650 mg  by mouth 2 times daily every 6 hours as needed for Pain       digoxin (LANOXIN) 125 MCG tablet Take 1 tablet (125 mcg) by mouth daily (Patient taking differently: Take 125 mcg by mouth daily HOLD if HR <55) 30 tablet 0     melatonin 5 MG tablet Take 3 mg by mouth At Bedtime After 9pm. Please use overnight depends on pt to allow him to sleep 5 interruption during the night       metoprolol succinate ER (TOPROL-XL) 50 MG 24 hr tablet Take 50 mg by mouth 2 times daily HOLD if SBP <110 OR HR < 55. Call if held x 2 in a row symptomatic       metoprolol succinate ER (TOPROL-XL) 50 MG 24 hr tablet Take 50 mg by mouth every evening HOLD if SBP <110 OR HR < 55. Call if held x 2 in a row symptomatic       mirtazapine (REMERON) 15 MG tablet Take 15 mg by mouth At Bedtime Patient started medication for the first time yesterday 10/29/2019.        Multiple Vitamins-Minerals (ICAPS AREDS FORMULA PO) Take 1 tablet by mouth daily Takes in addition to multivitamin with minerals       torsemide (DEMADEX) 10 MG tablet Take 1 tablet (10 mg) by mouth daily       Vitamin D, Cholecalciferol, 1000 UNITS TABS Take 3,000 Units by mouth daily        ALLERGIES  No Known Allergies  PAST MEDICAL HISTORY:  Past Medical History:   Diagnosis Date     Congestive heart failure (H)      Elevated PSA      Eye hemorrhage, left 02/2019     Non-ischemic cardiomyopathy (H)     2017, med treatment, echo done 4/18 nl ef, mod to severe tr     Permanent atrial fibrillation 2011     Thrombocytopenia (H)      Unspecified essential hypertension      PAST SURGICAL HISTORY:  Past Surgical History:   Procedure Laterality Date     ABDOMEN SURGERY      bowel obstruction     BONE MARROW BIOPSY, BONE SPECIMEN, NEEDLE/TROCAR N/A 3/4/2019    Procedure: BIOPSY BONE MARROW;  Surgeon: Josey Vargas MD;  Location:  GI     CARPAL TUNNEL RELEASE RT/LT  2014     CV HEART CATHETERIZATION WITH POSSIBLE INTERVENTION N/A 11/15/2019    Procedure: Left and right heart  Catheterization with Possible Intervention;  Surgeon: Adolfo Paez MD;  Location:  HEART CARDIAC CATH LAB     CV LEFT VENTRICULOGRAM N/A 11/15/2019    Procedure: Left Ventriculogram;  Surgeon: Adolfo Paez MD;  Location:  HEART CARDIAC CATH LAB     ESOPHAGOSCOPY, GASTROSCOPY, DUODENOSCOPY (EGD), COMBINED N/A 11/6/2019    Procedure: ESOPHAGOGASTRODUODENOSCOPY (EGD);  Surgeon: Marixa Aguila MD;  Location:  GI     EXPLORE GROIN  4/30/2014    Procedure: EXPLORE GROIN;  Surgeon: Sam Aguirre MD;  Location:  OR     HERNIORRHAPHY INGUINAL  4/30/2014    Procedure: HERNIORRHAPHY INGUINAL;  Surgeon: Sam Aguirre MD;  Location:  OR     MOHS MICROGRAPHIC PROCEDURE       PHACOEMULSIFICATION CLEAR CORNEA WITH STANDARD INTRAOCULAR LENS IMPLANT Right 9/8/2015    Procedure: PHACOEMULSIFICATION CLEAR CORNEA WITH STANDARD INTRAOCULAR LENS IMPLANT;  Surgeon: Gil Shannon MD;  Location:  EC     septic olecranon bursitis[       FAMILY HISTORY:  Family History   Problem Relation Age of Onset     Heart Disease No family hx of      SOCIAL HISTORY:  Social History     Socioeconomic History     Marital status:      Spouse name: Not on file     Number of children: 3     Years of education: Not on file     Highest education level: Not on file   Occupational History     Not on file   Social Needs     Financial resource strain: Not on file     Food insecurity:     Worry: Not on file     Inability: Not on file     Transportation needs:     Medical: Not on file     Non-medical: Not on file   Tobacco Use     Smoking status: Never Smoker     Smokeless tobacco: Never Used   Substance and Sexual Activity     Alcohol use: No     Drug use: No     Sexual activity: Not on file   Lifestyle     Physical activity:     Days per week: Not on file     Minutes per session: Not on file     Stress: Not on file   Relationships     Social connections:     Talks on phone: Not on file     Gets together: Not on file      Attends Restorationist service: Not on file     Active member of club or organization: Not on file     Attends meetings of clubs or organizations: Not on file     Relationship status: Not on file     Intimate partner violence:     Fear of current or ex partner: Not on file     Emotionally abused: Not on file     Physically abused: Not on file     Forced sexual activity: Not on file   Other Topics Concern     Parent/sibling w/ CABG, MI or angioplasty before 65F 55M? No   Social History Narrative     Not on file     Review of Systems:  Skin:        Eyes:       ENT:       Respiratory:       Cardiovascular:       Gastroenterology:      Genitourinary:       Musculoskeletal:       Neurologic:       Psychiatric:       Heme/Lymph/Imm:       Endocrine:          Physical Exam:  Vitals: There were no vitals taken for this visit.   Wt Readings from Last 4 Encounters:   12/18/19 68.5 kg (151 lb)   12/09/19 67.1 kg (148 lb)   12/03/19 67.1 kg (148 lb)   12/02/19 67 kg (147 lb 9.6 oz)     GEN: well nourished, in no acute distress.  HEENT:  Pupils equal, round. Sclerae nonicteric.   C/V:  Irregularly irregular, rates controlled.  RESP: Respirations are unlabored. Breath sounds are diminished at the right base but are otherwise clear.   GI: Abdomen soft, nontender.  EXTREM: no LE edema.  NEURO: Alert and oriented, cooperative.  SKIN: Warm and dry.     Recent Lab Results:  LIPID RESULTS:  Lab Results   Component Value Date    CHOL 127 04/18/2012    HDL 56 04/18/2012    LDL 55 04/18/2012    TRIG 81 04/18/2012    CHOLHDLRATIO 2.3 04/18/2012     LIVER ENZYME RESULTS:  Lab Results   Component Value Date    AST 9 11/11/2019    ALT 20 11/11/2019     CBC RESULTS:  Lab Results   Component Value Date    WBC 7.0 11/26/2019    RBC 3.10 (A) 11/26/2019    HGB 9.3 (A) 11/26/2019    HCT 30.9 (A) 11/26/2019    MCV 99.7 11/26/2019    MCH 30.0 11/26/2019    MCHC 30.1 (A) 11/26/2019    RDW 20.6 (A) 11/26/2019    PLT 53 (A) 11/26/2019     BMP RESULTS:  Lab  Results   Component Value Date     (A) 12/11/2019    POTASSIUM 3.9 12/11/2019    CHLORIDE 100 12/11/2019    CO2 24 12/11/2019    ANIONGAP 10 12/11/2019     (A) 12/11/2019    BUN 21 12/11/2019    CR 1.06 12/11/2019    GFRESTIMATED >60 12/11/2019    GFRESTBLACK >60 12/11/2019    BARON 8.3 (A) 12/11/2019      A1C RESULTS:  No results found for: A1C  INR RESULTS:  Lab Results   Component Value Date    INR 1.46 (H) 11/15/2019    INR 1.39 (H) 10/30/2019       New/Pertinent imaging results since last visit:  None    Jazlyn Cr PA-C  [facility]    Thank you for allowing me to participate in the care of your patient.      Sincerely,     Jazlyn Cr PA-C     [facility]    cc:   Jazlyn Cr PA-C  [address]  [place] [id number]

## 2019-12-18 NOTE — PROGRESS NOTES
Cardiology Progress Note    Date of Service: 12/18/2019  Patient seen today in follow up of: CORE follow up  Primary cardiologist: Dr. 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     In 2017 he presented with heart failure and atrial fibrillation and was found to have a tachycardia mediated cardiomyopathy.  His EF normalized with management of his atrial fibrillation.  He did well from a congestive heart failure standpoint after this other than a hospitalization in January 2018 with heart failure after he accidentally stopped taking his Lasix.     He has been on warfarin since February of last year due to significant thrombocytopenia.  He saw Dr. Hermosillo to discuss a watchman at one point but this was never pursued.  He has been seen by Dr. 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    He had a complicated hospitalization from from 10/30 through 11/17.  He was found to have bilateral pleural effusions and underwent thoracentesis x2.  Pleural fluid studies showed were consistent with transudate.  He had an echo which showed a preserved LVEF but a significant tricuspid regurgitation as well as mild to moderate mitral regurgitation.  CV surgery was involved to discuss possible tricuspid valve repair.  He was felt to be high risk but not prohibitively so.  He underwent a KATIE for further evaluation which had to be attempted multiple times but ultimately showed severe tricuspid regurgitation and 3-4+ mitral regurgitation.  He was diuresed aggressively with IV diuretic drip.  He had a repeat echocardiogram after diuresis on 11/14 which showed persistently severe tricuspid  regurgitation although his mitral regurgitation appeared to be only mild to moderate.  A right heart catheterization the next day showed a normal LVEDP.  Right-sided pressures were normal and there was no pulmonary hypertension.  Cardiac output was normal. Outpatient follow-up to discuss potential tricuspid valve surgery was recommended.  He was discharged on 10 mg of torsemide daily.    He had some issues with some rapid ventricular rates with his atrial fibrillation, and he was started on Digoxin. Entresto was stopped and was not resumed on discharge due to hypotension.     He has been at a TCU since discharge.  He had evidence of a recurrent pleural effusion and underwent a right-sided thoracentesis on 11/26 with over 2 L of fluid removed.  I saw him about 2 weeks ago and made no changes.  He is back today for routine follow-up.  He is here with his wife and daughter who is very involved in his care.  He continues to feel very well.  He has no complaints of shortness of breath, orthopnea, or PND.  He denies peripheral edema or abdominal bloating.  His weights have trended up a few pounds although his appetite has been better and he has been eating well at the facility.  His energy is better.  He is working 2 hours a day with rehab and feels well when doing this.    ASSESSMENT/PLAN:  Jama Maciel is a delightful 88-year-old male with a history of a cardio mediated cardiomyopathy diagnosed in 2017 with normalization of his EF with management of his atrial fibrillation, chronic atrial fibrillation, chronic thrombocytopenia, and recent admission for recurrent pleural effusions and severe tricuspid regurgitation who is here today for routine core clinic follow-up.      He fortunately has been doing very well since his discharge.  He is clearly making progress with therapies and getting stronger.  We have been monitoring for recurrence of his pleural effusion.  He continues to have some decreased breath sounds on the  right although this does not seem worse than when I saw him 2 weeks ago.  We discussed that potentially his pleural effusion may recur but we will continue to monitor for now.  Interestingly, despite his significant tricuspid regurgitation he has not had many right sided heart failure symptoms.  He remains on torsemide 10 mg daily.  His renal function and electrolytes are normal today on labs, and I would continue this unchanged. In the future, particularly if he has right sided symptoms, I would consider adding spironolactone.    He remains off Entresto as he had issues with hypotension during his hospitalization.  His blood pressure is better today and we could consider resuming this if BP remains stable.  I am not sure how his blood pressures have been running at his facility.  His weight is up about 4 pounds from his last visit but he does not appear volume overloaded, and I suspect this is likely caloric.  His heart rates with his atrial fibrillation seem reasonable today.    He has follow-up scheduled with Dr. Pugh in a few weeks.  There was discussion of potential tricuspid valve surgery in the hospital. I will defer to Dr. Pugh whether or not to pursue an outpatient surgical evaluation for this. In the meantime, he and his family will contact us with any concerns. I am pleased with the progress he has made after his recent discharge as is his family.     Orders this Visit:  No orders of the defined types were placed in this encounter.    No orders of the defined types were placed in this encounter.    There are no discontinued medications.    CURRENT MEDICATIONS:  Current Outpatient Medications   Medication Sig Dispense Refill     acetaminophen (TYLENOL) 325 MG tablet Take 650 mg by mouth 2 times daily every 6 hours as needed for Pain       digoxin (LANOXIN) 125 MCG tablet Take 1 tablet (125 mcg) by mouth daily (Patient taking differently: Take 125 mcg by mouth daily HOLD if HR <55) 30 tablet 0     melatonin  5 MG tablet Take 3 mg by mouth At Bedtime After 9pm. Please use overnight depends on pt to allow him to sleep 5 interruption during the night       metoprolol succinate ER (TOPROL-XL) 50 MG 24 hr tablet Take 50 mg by mouth 2 times daily HOLD if SBP <110 OR HR < 55. Call if held x 2 in a row symptomatic       metoprolol succinate ER (TOPROL-XL) 50 MG 24 hr tablet Take 50 mg by mouth every evening HOLD if SBP <110 OR HR < 55. Call if held x 2 in a row symptomatic       mirtazapine (REMERON) 15 MG tablet Take 15 mg by mouth At Bedtime Patient started medication for the first time yesterday 10/29/2019.        Multiple Vitamins-Minerals (ICAPS AREDS FORMULA PO) Take 1 tablet by mouth daily Takes in addition to multivitamin with minerals       torsemide (DEMADEX) 10 MG tablet Take 1 tablet (10 mg) by mouth daily       Vitamin D, Cholecalciferol, 1000 UNITS TABS Take 3,000 Units by mouth daily        ALLERGIES  No Known Allergies  PAST MEDICAL HISTORY:  Past Medical History:   Diagnosis Date     Congestive heart failure (H)      Elevated PSA      Eye hemorrhage, left 02/2019     Non-ischemic cardiomyopathy (H)     2017, med treatment, echo done 4/18 nl ef, mod to severe tr     Permanent atrial fibrillation 2011     Thrombocytopenia (H)      Unspecified essential hypertension      PAST SURGICAL HISTORY:  Past Surgical History:   Procedure Laterality Date     ABDOMEN SURGERY      bowel obstruction     BONE MARROW BIOPSY, BONE SPECIMEN, NEEDLE/TROCAR N/A 3/4/2019    Procedure: BIOPSY BONE MARROW;  Surgeon: Josey Vargas MD;  Location:  GI     CARPAL TUNNEL RELEASE RT/LT  2014     CV HEART CATHETERIZATION WITH POSSIBLE INTERVENTION N/A 11/15/2019    Procedure: Left and right heart Catheterization with Possible Intervention;  Surgeon: Adolfo Paez MD;  Location:  HEART CARDIAC CATH LAB     CV LEFT VENTRICULOGRAM N/A 11/15/2019    Procedure: Left Ventriculogram;  Surgeon: Adolfo Paez MD;  Location:   HEART CARDIAC CATH LAB     ESOPHAGOSCOPY, GASTROSCOPY, DUODENOSCOPY (EGD), COMBINED N/A 11/6/2019    Procedure: ESOPHAGOGASTRODUODENOSCOPY (EGD);  Surgeon: Marixa Aguila MD;  Location:  GI     EXPLORE GROIN  4/30/2014    Procedure: EXPLORE GROIN;  Surgeon: Sam Aguirre MD;  Location:  OR     HERNIORRHAPHY INGUINAL  4/30/2014    Procedure: HERNIORRHAPHY INGUINAL;  Surgeon: Sam Aguirre MD;  Location:  OR     MOHS MICROGRAPHIC PROCEDURE       PHACOEMULSIFICATION CLEAR CORNEA WITH STANDARD INTRAOCULAR LENS IMPLANT Right 9/8/2015    Procedure: PHACOEMULSIFICATION CLEAR CORNEA WITH STANDARD INTRAOCULAR LENS IMPLANT;  Surgeon: iGl Shannon MD;  Location:  EC     septic olecranon bursitis[       FAMILY HISTORY:  Family History   Problem Relation Age of Onset     Heart Disease No family hx of      SOCIAL HISTORY:  Social History     Socioeconomic History     Marital status:      Spouse name: Not on file     Number of children: 3     Years of education: Not on file     Highest education level: Not on file   Occupational History     Not on file   Social Needs     Financial resource strain: Not on file     Food insecurity:     Worry: Not on file     Inability: Not on file     Transportation needs:     Medical: Not on file     Non-medical: Not on file   Tobacco Use     Smoking status: Never Smoker     Smokeless tobacco: Never Used   Substance and Sexual Activity     Alcohol use: No     Drug use: No     Sexual activity: Not on file   Lifestyle     Physical activity:     Days per week: Not on file     Minutes per session: Not on file     Stress: Not on file   Relationships     Social connections:     Talks on phone: Not on file     Gets together: Not on file     Attends Rastafarian service: Not on file     Active member of club or organization: Not on file     Attends meetings of clubs or organizations: Not on file     Relationship status: Not on file     Intimate partner violence:     Fear of  current or ex partner: Not on file     Emotionally abused: Not on file     Physically abused: Not on file     Forced sexual activity: Not on file   Other Topics Concern     Parent/sibling w/ CABG, MI or angioplasty before 65F 55M? No   Social History Narrative     Not on file     Review of Systems:  Skin:        Eyes:       ENT:       Respiratory:       Cardiovascular:       Gastroenterology:      Genitourinary:       Musculoskeletal:       Neurologic:       Psychiatric:       Heme/Lymph/Imm:       Endocrine:          Physical Exam:  Vitals: There were no vitals taken for this visit.   Wt Readings from Last 4 Encounters:   12/18/19 68.5 kg (151 lb)   12/09/19 67.1 kg (148 lb)   12/03/19 67.1 kg (148 lb)   12/02/19 67 kg (147 lb 9.6 oz)     GEN: well nourished, in no acute distress.  HEENT:  Pupils equal, round. Sclerae nonicteric.   C/V:  Irregularly irregular, rates controlled.  RESP: Respirations are unlabored. Breath sounds are diminished at the right base but are otherwise clear.   GI: Abdomen soft, nontender.  EXTREM: no LE edema.  NEURO: Alert and oriented, cooperative.  SKIN: Warm and dry.     Recent Lab Results:  LIPID RESULTS:  Lab Results   Component Value Date    CHOL 127 04/18/2012    HDL 56 04/18/2012    LDL 55 04/18/2012    TRIG 81 04/18/2012    CHOLHDLRATIO 2.3 04/18/2012     LIVER ENZYME RESULTS:  Lab Results   Component Value Date    AST 9 11/11/2019    ALT 20 11/11/2019     CBC RESULTS:  Lab Results   Component Value Date    WBC 7.0 11/26/2019    RBC 3.10 (A) 11/26/2019    HGB 9.3 (A) 11/26/2019    HCT 30.9 (A) 11/26/2019    MCV 99.7 11/26/2019    MCH 30.0 11/26/2019    MCHC 30.1 (A) 11/26/2019    RDW 20.6 (A) 11/26/2019    PLT 53 (A) 11/26/2019     BMP RESULTS:  Lab Results   Component Value Date     (A) 12/11/2019    POTASSIUM 3.9 12/11/2019    CHLORIDE 100 12/11/2019    CO2 24 12/11/2019    ANIONGAP 10 12/11/2019     (A) 12/11/2019    BUN 21 12/11/2019    CR 1.06 12/11/2019     GFRESTIMATED >60 12/11/2019    GFRESTBLACK >60 12/11/2019    BARON 8.3 (A) 12/11/2019      A1C RESULTS:  No results found for: A1C  INR RESULTS:  Lab Results   Component Value Date    INR 1.46 (H) 11/15/2019    INR 1.39 (H) 10/30/2019       New/Pertinent imaging results since last visit:  None    Jazlyn Cr PA-C  [facility]

## 2019-12-18 NOTE — PROGRESS NOTES
[place] [facility]  [place] [facility] Record Number:  [id number]  Place of Service where encounter took place:  [facility] ([facility]) [[id number]]  Chief Complaint   Patient presents with     Nursing Home Acute       HPI:    Jama Paul  is a 88 year old (2/13/1931), who is being seen today for an episodic care visit.  HPI information obtained from: facility chart records, facility staff, patient report and [facility] chart review. Today's concern is: feels good, no sob, goes to [facility] today.       Congestive heart failure, unspecified HF chronicity, unspecified heart failure type (H)  Mitral valve insufficiency, unspecified etiology  Tricuspid valve insufficiency, unspecified etiology  Pleural effusion  Essential hypertension  Physical deconditioning  Insomnia, unspecified type      TODAY DURING EXAM HIP and ROS:  No CP,  SOB, Cough, dizziness, fevers, chills, HA, N/V, dysuria or Bowel Abnormalities Appetite is good.  No pain.      Past Medical and Surgical History reviewed in Epic today.    MEDICATIONS:  Current Outpatient Medications   Medication Sig Dispense Refill     acetaminophen (TYLENOL) 325 MG tablet Take 650 mg by mouth 2 times daily every 6 hours as needed for Pain       digoxin (LANOXIN) 125 MCG tablet Take 1 tablet (125 mcg) by mouth daily (Patient taking differently: Take 125 mcg by mouth daily HOLD if HR <55) 30 tablet 0     melatonin 5 MG tablet Take 3 mg by mouth At Bedtime After 9pm. Please use overnight depends on pt to allow him to sleep 5 interruption during the night       metoprolol succinate ER (TOPROL-XL) 50 MG 24 hr tablet Take 50 mg by mouth 2 times daily HOLD if SBP <110 OR HR < 55. Call if held x 2 in a row symptomatic       metoprolol succinate ER (TOPROL-XL) 50 MG 24 hr tablet Take 50 mg by mouth every evening HOLD if SBP <110 OR HR < 55. Call if held x 2 in a row symptomatic       mirtazapine (REMERON) 15 MG tablet Take 15 mg by mouth At Bedtime Patient  started medication for the first time yesterday 10/29/2019.        Multiple Vitamins-Minerals (ICAPS AREDS FORMULA PO) Take 1 tablet by mouth daily Takes in addition to multivitamin with minerals       torsemide (DEMADEX) 10 MG tablet Take 1 tablet (10 mg) by mouth daily       Vitamin D, Cholecalciferol, 1000 UNITS TABS Take 3,000 Units by mouth daily             REVIEW OF SYSTEMS:  See above under HPI    Objective:  /54   Pulse 67   Resp 18   Wt 68.5 kg (151 lb)   BMI 20.48 kg/m    Exam:  GENERAL APPEARANCE:  Alert, oriented, cooperative, NAD.  ENT:  Mouth with moist mucous membranes, [place].  RESP:  respiratory effort  of chest normal, lungs clear to auscultation with slightly decreased on right lower 1/3, no respiratory distress.   CV:  Auscultation of heart done ,RRR. Soft systolic murmur, no Rub or Gallop,  No  edema,   +1 pedal pulses.  ABDOMEN:  normal bowel sounds, soft, nontender.   M/S:   CHIU equally, up with assist-seen in WC.  SKIN:  Inspection of skin is at baseline.  NEURO:   Cranial nerves 2-12 are grossly intact and at patient's baseline  PSYCH:  oriented X 3, engaged.    Labs:    Recent Labs   Lab Test 12/11/19 12/03/19  1114   * 134*   POTASSIUM 3.9 4.5   CHLORIDE 100 101   CO2 24 27   ANIONGAP 10 10.5   * 120*   BUN 21 23   CR 1.06 1.27   BARON 8.3* 8.5       Recent Labs   Lab Test 11/26/19   *   POTASSIUM 4.0   CHLORIDE 100   CO2 26   ANIONGAP 7   *   BUN 28*   CR 1.10   BARON 7.9*     CBC RESULTS:   Recent Labs   Lab Test 11/26/19   WBC 7.0   RBC 3.10*   HGB 9.3*   HCT 30.9*   MCV 99.7   MCH 30.0   MCHC 30.1*   RDW 20.6*   PLT 53*   CBC RESULTS:   Recent Labs   Lab Test 11/26/19   WBC 7.0   RBC 3.10*   HGB 9.3*   HCT 30.9*   MCV 99.7   MCH 30.0   MCHC 30.1*   RDW 20.6*   PLT 53*         ASSESSMENT / PLAN:  (I50.9) Congestive heart failure, unspecified HF chronicity, unspecified heart failure type (H)  (primary encounter diagnosis)   (I34.0) Mitral valve  "insufficiency, unspecified etiology   (I07.1) Tricuspid valve insufficiency, unspecified etiology   (J90) Pleural effusion  Comment/Plan: steady in the # range. Came in at ~~147 and has been eating well. Does not appear to be fluid related, did get one extra dose Demadex on Friday as \"felt\" fluid up but had not other symptoms. Had good past few days.   Sees [facility] today.    (I10) Essential hypertension  Comment/Plan: mostly 105-mid 120's occas <100, no changes today, monitor.    (R53.81) Physical deconditioning  Comment/Plan: PT OT getting stronger, wonders when can go to apt    (G47.00) Insomnia, unspecified type  Comment/Plan: sleeping well, no changes.    Total time with patient visit: 36 minutes including discussions about the POC and care coordination with the patient and family, wife. Greater than 50% of total time spent with counseling and coordinating care due to complexities of care.       Electronically signed by:  ERIC Mir CNP             "

## 2019-12-24 ENCOUNTER — NURSING HOME VISIT (OUTPATIENT)
Dept: GERIATRICS | Facility: CLINIC | Age: 84
End: 2019-12-24
Payer: MEDICARE

## 2019-12-24 VITALS
TEMPERATURE: 98 F | DIASTOLIC BLOOD PRESSURE: 68 MMHG | RESPIRATION RATE: 18 BRPM | HEART RATE: 84 BPM | WEIGHT: 151.4 LBS | SYSTOLIC BLOOD PRESSURE: 110 MMHG | BODY MASS INDEX: 20.51 KG/M2 | OXYGEN SATURATION: 96 % | HEIGHT: 72 IN

## 2019-12-24 DIAGNOSIS — I34.0 MITRAL VALVE INSUFFICIENCY, UNSPECIFIED ETIOLOGY: ICD-10-CM

## 2019-12-24 DIAGNOSIS — I07.1 TRICUSPID VALVE INSUFFICIENCY, UNSPECIFIED ETIOLOGY: ICD-10-CM

## 2019-12-24 DIAGNOSIS — I10 ESSENTIAL HYPERTENSION: ICD-10-CM

## 2019-12-24 DIAGNOSIS — R53.81 PHYSICAL DECONDITIONING: ICD-10-CM

## 2019-12-24 DIAGNOSIS — I42.8 NON-ISCHEMIC CARDIOMYOPATHY (H): ICD-10-CM

## 2019-12-24 DIAGNOSIS — I50.9 CONGESTIVE HEART FAILURE, UNSPECIFIED HF CHRONICITY, UNSPECIFIED HEART FAILURE TYPE (H): Primary | ICD-10-CM

## 2019-12-24 DIAGNOSIS — N18.30 CKD (CHRONIC KIDNEY DISEASE) STAGE 3, GFR 30-59 ML/MIN (H): ICD-10-CM

## 2019-12-24 PROCEDURE — 99309 SBSQ NF CARE MODERATE MDM 30: CPT | Performed by: NURSE PRACTITIONER

## 2019-12-24 ASSESSMENT — MIFFLIN-ST. JEOR: SCORE: 1394.75

## 2019-12-24 NOTE — Clinical Note
Elizabeth, Thanks for your note--he looked good today also. I am gone 12/27-01/06. If you need anyone, the faciiltyw ill know how to reach the ON call NP. Also I cut and pasted some BPs into my note for you--sorry they did not send them.Fide Oliver to you!Cheli

## 2019-12-24 NOTE — PROGRESS NOTES
[facility] [facility]  [facility] Medical Record Number:  [id number]  Place of Service where encounter took place:  [facility] ([facility]) [[id number]]  Chief Complaint   Patient presents with     Nursing Home Acute       HPI:    Jama Paul  is a 88 year old (2/13/1931), who is being seen today for an episodic care visit.  HPI information obtained from: facility chart records, facility staff, patient report and [facility] chart review. Today's concern is: feels good, no sob, saw [facility] last week--no changes by them       Congestive heart failure, unspecified HF chronicity, unspecified heart failure type (H)  Mitral valve insufficiency, unspecified etiology  Tricuspid valve insufficiency, unspecified etiology  Non-ischemic cardiomyopathy (H)  CKD (chronic kidney disease) stage 3, GFR 30-59 ml/min (H)  Essential hypertension  Physical deconditioning      TODAY DURING EXAM HIP and ROS:  No CP,  SOB, Cough, dizziness, fevers, chills, HA, N/V, dysuria or Bowel Abnormalities Appetite is good.  No pain.      Past Medical and Surgical History reviewed in Epic today.    MEDICATIONS:  Current Outpatient Medications   Medication Sig Dispense Refill     acetaminophen (TYLENOL) 325 MG tablet Take 650 mg by mouth 2 times daily every 6 hours as needed for Pain       digoxin (LANOXIN) 125 MCG tablet Take 1 tablet (125 mcg) by mouth daily (Patient taking differently: Take 125 mcg by mouth daily HOLD if HR <55) 30 tablet 0     melatonin 5 MG tablet Take 3 mg by mouth At Bedtime After 9pm. Please use overnight depends on pt to allow him to sleep 5 interruption during the night       metoprolol succinate ER (TOPROL-XL) 50 MG 24 hr tablet Take 50 mg by mouth 2 times daily HOLD if SBP <110 OR HR < 55. Call if held x 2 in a row symptomatic       mirtazapine (REMERON) 15 MG tablet Take 15 mg by mouth At Bedtime Patient started medication for the first time yesterday 10/29/2019.        Multiple Vitamins-Minerals  (ICAPS AREDS FORMULA PO) Take 1 tablet by mouth daily Takes in addition to multivitamin with minerals       torsemide (DEMADEX) 10 MG tablet Take 1 tablet (10 mg) by mouth daily       Vitamin D, Cholecalciferol, 1000 UNITS TABS Take 3,000 Units by mouth daily             REVIEW OF SYSTEMS:  See above under HPI    Objective:  /68   Pulse 84   Temp 98  F (36.7  C)   Resp 18   Ht 1.829 m (6')   Wt 68.7 kg (151 lb 6.4 oz)   SpO2 96%   BMI 20.53 kg/m    Exam:  GENERAL APPEARANCE:  Alert, oriented, cooperative, NAD.  ENT:  Mouth with moist mucous membranes, [place].  RESP:  respiratory effort  of chest normal, lungs clear to auscultation,  no respiratory distress.   CV:  Auscultation of heart done ,RRR. Soft systolic murmur.  No Rub or Gallop,  Trace pedal  edema,   +1 pedal pulses.  ABDOMEN:  normal bowel sounds, soft, nontender.   M/S:   CHIU equally, up with assist-seen in WC.  SKIN:  Inspection of skin is at baseline.  NEURO:   Cranial nerves 2-12 are grossly intact and at patient's baseline  PSYCH:  oriented X 3, engaged.    Labs:    Recent Labs   Lab Test 12/18/19  1314 12/11/19    134*   POTASSIUM 4.1 3.9   CHLORIDE 100 100   CO2 32* 24   ANIONGAP 8.1 10   * 172*   BUN 21 21   CR 1.27 1.06   BARON 9.1 8.3*       Recent Labs   Lab Test 12/03/19  1114   *   POTASSIUM 4.5   CHLORIDE 101   CO2 27   ANIONGAP 10.5   *   BUN 23   CR 1.27   BARON 8.5       Recent Labs   Lab Test 11/26/19   *   POTASSIUM 4.0   CHLORIDE 100   CO2 26   ANIONGAP 7   *   BUN 28*   CR 1.10   BARON 7.9*         CBC RESULTS:   Recent Labs   Lab Test 11/26/19   WBC 7.0   RBC 3.10*   HGB 9.3*   HCT 30.9*   MCV 99.7   MCH 30.0   MCHC 30.1*   RDW 20.6*   PLT 53*         ASSESSMENT / PLAN:  (I50.9) Congestive heart failure, unspecified HF chronicity, unspecified heart failure type (H)  (primary encounter diagnosis)   (I34.0) Mitral valve insufficiency, unspecified etiology   (I07.1) Tricuspid valve  "insufficiency, unspecified etiology   (I42.8) Non-ischemic cardiomyopathy (H)  Comment/Plan: steady in the 150-151# range. appears euvolemic though said felt \"weak\" yest and thought he was \"dehydrated\", feels very good this am and overall the   past few days.   Sees Dr Pugh on 1/10/2020     (N18.3) CKD (chronic kidney disease) stage 3, GFR 30-59 ml/min (H)  Comment/Plan: steady kidney function, will check labs on 12/30.    (I10) Essential hypertension  Comment/Plan: mostly 105-mid 120's occas, no dizziness, feels good,  monitor.    (R53.81) Physical deconditioning  Comment/Plan: PT OT getting stronger, wonders when can go to apt but no LCD yet.         Electronically signed by:  ERIC Mir CNP               12/24/2019 08:25 139 / 90 mmHg Sitting l/arm    12/23/2019 19:59 110 / 68 mmHg Sitting l/arm    12/23/2019 08:15 122 / 85 mmHg Sitting l/arm    12/22/2019 20:04 113 / 61 mmHg Sitting l/arm    12/22/2019 08:35 118 / 75 mmHg     12/21/2019 16:36 105 / 59 mmHg     12/21/2019 08:56 135 / 78 mmHg     12/21/2019 01:24 106 / 67 mmHg Lying l/arm    12/20/2019 08:16 93 / 51 mmHg Sitting l/arm    12/19/2019 16:30 123 / 77 mmHg     12/19/2019 08:58 142 / 77 mmHg     12/18/2019 09:57 117 / 54 mmHg Sitting r/arm    12/17/2019 16:57 90 / 60 mmHg Sitting l/arm    12/17/2019 08:10 125 / 82 mmHg Sitting r/arm    12/16/2019 16:53 101 / 56 mmHg Sitting l/arm    12/16/2019 08:11 127 / 75 mmHg Sitting l/arm    12/15/2019 21:16 99 / 60 mmHg Sitting l/arm    12/15/2019 08:50 122 / 85 mmHg Sitting r/arm    12/14/2019 23:38 96 / 60 mmHg Sitting r/arm    12/14/2019 07:52 114 / 71 mmHg      12/13/2019 08:45 130 / 76 mmHg Sitting r/arm    12/12/2019 18:06 116 / 57 mmHg Sitting r/arm    12/12/2019 08:36 120 / 90 mmHg Sitting r/arm    12/11/2019 11:53 115 / 75 mmHg Sitting r/arm    12/11/2019 08:21 115 / 75 mmHg Sitting l/arm    12/10/2019 21:46 110 / 67 mmHg        "

## 2020-01-01 ENCOUNTER — NURSING HOME VISIT (OUTPATIENT)
Dept: GERIATRICS | Facility: CLINIC | Age: 85
End: 2020-01-01
Payer: MEDICARE

## 2020-01-01 ENCOUNTER — TRANSFERRED RECORDS (OUTPATIENT)
Dept: HEALTH INFORMATION MANAGEMENT | Facility: CLINIC | Age: 85
End: 2020-01-01

## 2020-01-01 VITALS
WEIGHT: 147.2 LBS | DIASTOLIC BLOOD PRESSURE: 67 MMHG | OXYGEN SATURATION: 97 % | HEIGHT: 72 IN | RESPIRATION RATE: 16 BRPM | SYSTOLIC BLOOD PRESSURE: 115 MMHG | TEMPERATURE: 97.4 F | HEART RATE: 69 BPM | BODY MASS INDEX: 19.94 KG/M2

## 2020-01-01 VITALS
OXYGEN SATURATION: 99 % | RESPIRATION RATE: 16 BRPM | BODY MASS INDEX: 19.67 KG/M2 | SYSTOLIC BLOOD PRESSURE: 92 MMHG | DIASTOLIC BLOOD PRESSURE: 55 MMHG | WEIGHT: 145.2 LBS | HEART RATE: 68 BPM | HEIGHT: 72 IN | TEMPERATURE: 98.3 F

## 2020-01-01 DIAGNOSIS — I34.0 MITRAL VALVE INSUFFICIENCY, UNSPECIFIED ETIOLOGY: ICD-10-CM

## 2020-01-01 DIAGNOSIS — L98.9 SKIN LESION OF FACE: ICD-10-CM

## 2020-01-01 DIAGNOSIS — I42.8 NON-ISCHEMIC CARDIOMYOPATHY (H): ICD-10-CM

## 2020-01-01 DIAGNOSIS — I95.1 ORTHOSTATIC HYPOTENSION: Primary | ICD-10-CM

## 2020-01-01 DIAGNOSIS — I48.21 PERMANENT ATRIAL FIBRILLATION (H): ICD-10-CM

## 2020-01-01 DIAGNOSIS — L21.0 CRADLE CAP: ICD-10-CM

## 2020-01-01 DIAGNOSIS — D75.89 BICYTOPENIA: ICD-10-CM

## 2020-01-01 DIAGNOSIS — I50.32 CHRONIC DIASTOLIC CONGESTIVE HEART FAILURE (H): ICD-10-CM

## 2020-01-01 DIAGNOSIS — D46.Z OTHER MYELODYSPLASTIC SYNDROMES (H): ICD-10-CM

## 2020-01-01 LAB
ANION GAP SERPL CALCULATED.3IONS-SCNC: 7 MMOL/L (ref 7–16)
BUN SERPL-MCNC: 31 MG/DL (ref 7–26)
CALCIUM SERPL-MCNC: 8.5 MG/DL (ref 8.4–10.4)
CHLORIDE SERPLBLD-SCNC: 104 MMOL/L (ref 98–109)
CO2 SERPL-SCNC: 29 MMOL/L (ref 20–29)
CREAT SERPL-MCNC: 1.19 MG/DL (ref 0.73–1.18)
DIFFERENTIAL: ABNORMAL
ERYTHROCYTE [DISTWIDTH] IN BLOOD BY AUTOMATED COUNT: 17.5 % (ref 11.9–15.5)
GFR SERPL CREATININE-BSD FRML MDRD: 54 ML/MIN/1.73M2
GLUCOSE SERPL-MCNC: 87 MG/DL (ref 70–100)
HCT VFR BLD AUTO: 32.9 % (ref 38.8–50)
HEMOGLOBIN: 10.3 G/DL (ref 13.5–17.5)
MCH RBC QN AUTO: 31.7 PG (ref 27.6–33.3)
MCHC RBC AUTO-ENTMCNC: 31.3 G/DL (ref 31.5–35.2)
MCV RBC AUTO: 101.2 FL (ref 80–100)
PLATELET # BLD AUTO: 41 10^9/L (ref 150–450)
POTASSIUM SERPL-SCNC: 4.2 MMOL/L (ref 3.5–5.1)
RBC # BLD AUTO: 3.25 10^12/L (ref 4.32–5.72)
SODIUM SERPL-SCNC: 140 MMOL/L (ref 136–145)
WBC # BLD AUTO: 4.3 10^9/L (ref 3.5–10.5)

## 2020-01-01 PROCEDURE — 99308 SBSQ NF CARE LOW MDM 20: CPT | Performed by: NURSE PRACTITIONER

## 2020-01-01 PROCEDURE — 99309 SBSQ NF CARE MODERATE MDM 30: CPT | Performed by: NURSE PRACTITIONER

## 2020-01-01 RX ORDER — SPIRONOLACTONE 25 MG
12.5 TABLET ORAL DAILY
COMMUNITY
Start: 2020-01-01 | End: 2021-01-01

## 2020-01-01 ASSESSMENT — MIFFLIN-ST. JEOR
SCORE: 1370.69
SCORE: 1361.62

## 2020-01-03 ENCOUNTER — TELEPHONE (OUTPATIENT)
Dept: GERIATRICS | Facility: CLINIC | Age: 85
End: 2020-01-03

## 2020-01-03 ENCOUNTER — TRANSFERRED RECORDS (OUTPATIENT)
Dept: HEALTH INFORMATION MANAGEMENT | Facility: CLINIC | Age: 85
End: 2020-01-03

## 2020-01-03 LAB
ANION GAP SERPL CALCULATED.3IONS-SCNC: 9 MMOL/L (ref 7–16)
BUN SERPL-MCNC: 21 MG/DL (ref 7–26)
CALCIUM SERPL-MCNC: 8.4 MG/DL (ref 8.4–10.4)
CHLORIDE SERPLBLD-SCNC: 100 MMOL/L (ref 98–109)
CO2 SERPL-SCNC: 26 MMOL/L (ref 20–29)
CREAT SERPL-MCNC: 1.03 MG/DL (ref 0.73–1.18)
DIFFERENTIAL: ABNORMAL
ERYTHROCYTE [DISTWIDTH] IN BLOOD BY AUTOMATED COUNT: 20.1 % (ref 11.9–15.5)
GFR SERPL CREATININE-BSD FRML MDRD: >60 ML/MIN/1.73M2
GLUCOSE SERPL-MCNC: 69 MG/DL (ref 70–100)
HCT VFR BLD AUTO: 32.4 % (ref 38.8–50)
HEMOGLOBIN: 9.8 G/DL (ref 13.5–17.5)
MCH RBC QN AUTO: 30 PG (ref 27.6–33.3)
MCHC RBC AUTO-ENTMCNC: 30.2 G/DL (ref 31.5–35.2)
MCV RBC AUTO: 99.1 FL (ref 80–100)
PLATELET # BLD AUTO: 51 X10(9)/L (ref 150–450)
POTASSIUM SERPL-SCNC: 4.1 MMOL/L (ref 3.5–5.1)
RBC # BLD AUTO: 3.27 X10(12)/L (ref 4.32–5.72)
SODIUM SERPL-SCNC: 135 MMOL/L (ref 136–145)
WBC # BLD AUTO: 7.4 X10(9)/L (ref 3.5–10.5)

## 2020-01-03 NOTE — TELEPHONE ENCOUNTER
[place] [facility] TELEPHONE ENCOUNTER    Chief Complaint   Patient presents with     CXR       Jama Paul is a 88 year old  (2/13/1931),Nurse called today to report: earlier today we ordered CXR and other labs in the event Jama and his son chose to keep him at the TCU vs hospital.  Gladly he stayed at the TCU.  Labs are still pending.  CXR does reveal a developing lower lobe infiltrate.  The albuterol neb did help and he also feels more comfortable on the oxygen.    ASSESSMENT/PLAN  Pneumonia    Start Augmentin 500/125 mg TID x 7 days    Albuterol 2.5mg/3 mL 1 vial / neb TID x 5 days + ongoing q4 hours PRN    Continue the supplemental oxygen through the w/e    ERIC Nicole CNP

## 2020-01-03 NOTE — TELEPHONE ENCOUNTER
[facility] TELEPHONE ENCOUNTER    Chief Complaint   Patient presents with     dyspnea requesting to go to hospital     son will come in so wait to send to hospital       Jama Paul is a 88 year old  (2/13/1931),Nurse called today to report: patient requesting to go to hospital due to dyspnea in the setting of CHF, HX Pleural Effusion.  Afebrile, fine crackles, O2 sat on RA 95%.  Nurse recommended he would need ambulance patient refused wants son to take him in.  Son was notified by nursing and they/we are to wait until son arrives to make the decision to hospitalize.    ASSESSMENT/PLAN  Likely CHF exacerbation    STAT Albuterol 2.5 mg/3 mL give 1 vial per neb now    STAT additional Torsemide 10 mg po    Start supplemental oxygen @ 1.5 L/NC to help heart not work so hard    STAT CBC AND BMP    STAT CXR    If son wants him to go to hospital, send him in and cancel labs and Xray.    ERIC Nicole CNP

## 2020-01-07 ASSESSMENT — MIFFLIN-ST. JEOR: SCORE: 1413.8

## 2020-01-08 ENCOUNTER — NURSING HOME VISIT (OUTPATIENT)
Dept: GERIATRICS | Facility: CLINIC | Age: 85
End: 2020-01-08
Payer: MEDICARE

## 2020-01-08 VITALS
HEART RATE: 97 BPM | WEIGHT: 156.4 LBS | OXYGEN SATURATION: 95 % | SYSTOLIC BLOOD PRESSURE: 134 MMHG | TEMPERATURE: 98.2 F | RESPIRATION RATE: 18 BRPM | DIASTOLIC BLOOD PRESSURE: 81 MMHG | BODY MASS INDEX: 21.18 KG/M2 | HEIGHT: 72 IN

## 2020-01-08 DIAGNOSIS — R91.8 INFILTRATE OF LOWER LOBE OF RIGHT LUNG PRESENT ON IMAGING STUDY: Primary | ICD-10-CM

## 2020-01-08 DIAGNOSIS — R53.81 PHYSICAL DECONDITIONING: ICD-10-CM

## 2020-01-08 DIAGNOSIS — I34.0 MITRAL VALVE INSUFFICIENCY, UNSPECIFIED ETIOLOGY: ICD-10-CM

## 2020-01-08 DIAGNOSIS — I10 ESSENTIAL HYPERTENSION: ICD-10-CM

## 2020-01-08 DIAGNOSIS — I07.1 TRICUSPID VALVE INSUFFICIENCY, UNSPECIFIED ETIOLOGY: ICD-10-CM

## 2020-01-08 DIAGNOSIS — I42.8 NON-ISCHEMIC CARDIOMYOPATHY (H): ICD-10-CM

## 2020-01-08 DIAGNOSIS — N18.30 CKD (CHRONIC KIDNEY DISEASE) STAGE 3, GFR 30-59 ML/MIN (H): ICD-10-CM

## 2020-01-08 DIAGNOSIS — I50.9 CONGESTIVE HEART FAILURE, UNSPECIFIED HF CHRONICITY, UNSPECIFIED HEART FAILURE TYPE (H): ICD-10-CM

## 2020-01-08 PROCEDURE — 99309 SBSQ NF CARE MODERATE MDM 30: CPT | Performed by: NURSE PRACTITIONER

## 2020-01-08 RX ORDER — ALBUTEROL SULFATE 0.83 MG/ML
2.5 SOLUTION RESPIRATORY (INHALATION) EVERY 4 HOURS PRN
Status: ON HOLD | COMMUNITY
Start: 2020-01-03 | End: 2020-01-15

## 2020-01-08 RX ORDER — AMOXICILLIN AND CLAVULANATE POTASSIUM 500; 125 MG/1; MG/1
1 TABLET, FILM COATED ORAL 3 TIMES DAILY
Status: ON HOLD | COMMUNITY
Start: 2020-01-07 | End: 2020-01-15

## 2020-01-08 ASSESSMENT — MIFFLIN-ST. JEOR: SCORE: 1417.43

## 2020-01-08 NOTE — PROGRESS NOTES
[facility]  [place] Medical Record Number:  [id number]  Place of Service where encounter took place:  [facility] ([facility]) [[id number]]  Chief Complaint   Patient presents with     Nursing Home Acute       HPI:    Jama Palu  is a 88 year old (2/13/1931), who is being seen today for an episodic care visit.  HPI information obtained from: facility chart records, facility staff, patient report and [facility] chart review. Today's concern is: pt felt poorly with cough on 1/3/19--CXR showed RLL infiltrate and small to mid sized right pleural effusion, started on Abx, nebs and prn O2.  He feels better,  Still a bit weak.       Infiltrate of lower lobe of right lung present on imaging study  Congestive heart failure, unspecified HF chronicity, unspecified heart failure type (H)  Mitral valve insufficiency, unspecified etiology  Tricuspid valve insufficiency, unspecified etiology  Non-ischemic cardiomyopathy (H)  Essential hypertension  CKD (chronic kidney disease) stage 3, GFR 30-59 ml/min (H)  Physical deconditioning      TODAY DURING EXAM HIP and ROS:  No CP,  SOB, Cough, dizziness, fevers, chills, HA, N/V, dysuria or Bowel Abnormalities Appetite is good.  No pain.      Past Medical and Surgical History reviewed in Epic today.    MEDICATIONS:  Current Outpatient Medications   Medication Sig Dispense Refill     acetaminophen (TYLENOL) 325 MG tablet Take 650 mg by mouth 2 times daily every 6 hours as needed for Pain       albuterol (PROVENTIL) (2.5 MG/3ML) 0.083% neb solution Take 2.5 mg by nebulization every 4 hours as needed for shortness of breath / dyspnea or wheezing 1 vial inhale orally via nebulizer every 4 hours as needed for SOB, WHEEZING for 10 Days AND 1 vial inhale orally via nebulizer three times a day for SOB, WHEEZING for 10 Days       amoxicillin-clavulanate (AUGMENTIN) 500-125 MG tablet Take 1 tablet by mouth 3 times daily Give 1 tablet by mouth three times a day  for PNEUMONIA until 01/11/2020 23:59       digoxin (LANOXIN) 125 MCG tablet Take 1 tablet (125 mcg) by mouth daily (Patient taking differently: Take 125 mcg by mouth daily HOLD if HR <55) 30 tablet 0     melatonin 5 MG tablet Take 3 mg by mouth At Bedtime After 9pm. Please use overnight depends on pt to allow him to sleep 5 interruption during the night       metoprolol succinate ER (TOPROL-XL) 50 MG 24 hr tablet Take 50 mg by mouth 2 times daily HOLD if SBP <110 OR HR < 55. Call if held x 2 in a row symptomatic       mirtazapine (REMERON) 15 MG tablet Take 15 mg by mouth At Bedtime Patient started medication for the first time yesterday 10/29/2019.        Multiple Vitamins-Minerals (ICAPS AREDS FORMULA PO) Take 1 tablet by mouth daily Takes in addition to multivitamin with minerals       torsemide (DEMADEX) 10 MG tablet Take 1 tablet (10 mg) by mouth daily       UNABLE TO FIND incentive spirometer every hour while awake       Vitamin D, Cholecalciferol, 1000 UNITS TABS Take 3,000 Units by mouth daily           REVIEW OF SYSTEMS:  See above under HPI    Objective:  /81   Pulse 97   Temp 98.2  F (36.8  C)   Resp 18   Ht 1.829 m (6')   Wt 70.9 kg (156 lb 6.4 oz)   SpO2 95%   BMI 21.21 kg/m    Exam:  GENERAL APPEARANCE:  Alert, oriented, cooperative, NAD.  ENT:  Mouth with moist mucous membranes, [place].  RESP:  respiratory effort  of chest normal, lungs clear to auscultation,  no respiratory distress.   CV:  Auscultation of heart done ,RRR. Very soft systolic murmur.  No Rub or Gallop,  Trace pedal bilat  edema,   +1 pedal pulses.  ABDOMEN:  normal bowel sounds, soft, nontender.   M/S:   CHIU equally, up with assist-seen in WC.  SKIN:  Inspection of skin is at baseline but limited as pt fully dressed  NEURO:   Cranial nerves 2-12 are grossly intact and at patient's baseline  PSYCH:  oriented X 3, engaged.    Labs:    Recent Labs   Lab Test 12/18/19  1314 12/11/19    134*   POTASSIUM 4.1 3.9   CHLORIDE  100 100   CO2 32* 24   ANIONGAP 8.1 10   * 172*   BUN 21 21   CR 1.27 1.06   BARON 9.1 8.3*       Recent Labs   Lab Test 12/03/19  1114   *   POTASSIUM 4.5   CHLORIDE 101   CO2 27   ANIONGAP 10.5   *   BUN 23   CR 1.27   BARON 8.5       Recent Labs   Lab Test 11/26/19   *   POTASSIUM 4.0   CHLORIDE 100   CO2 26   ANIONGAP 7   *   BUN 28*   CR 1.10   BARON 7.9*         CBC RESULTS:   Recent Labs   Lab Test 11/26/19   WBC 7.0   RBC 3.10*   HGB 9.3*   HCT 30.9*   MCV 99.7   MCH 30.0   MCHC 30.1*   RDW 20.6*   PLT 53*       ASSESSMENT / PLAN:  (R91.8) Infiltrate of lower lobe of right lung present on imaging study  (primary encounter diagnosis)  Comment/Plan: feeling better, cont Abx thru 1/11, has nebs also. Has not needed O2 but prn in room.     (I50.9) Congestive heart failure, unspecified HF chronicity, unspecified heart failure type (H)  (primary encounter diagnosis)   (I34.0) Mitral valve insufficiency, unspecified etiology   (I07.1) Tricuspid valve insufficiency, unspecified etiology   (I42.8) Non-ischemic cardiomyopathy (H)  Comment/Plan:154# to157# to 156# this past week. **See BP range below**. Had appt w/  Dr Pugh on  01/10/2020, but d/t pneumonia, daughter changed to 2/4/20.     (I10) Essential hypertension  Comment/Plan: mostly 105-mid 130's occas, no dizziness,  monitor.      (N18.3) CKD (chronic kidney disease) stage 3, GFR 30-59 ml/min (H)  Comment/Plan: steady kidney function, will check labs on 01/15    (R53.81) Physical deconditioning  Comment/Plan: PT OT, has gotten weaker due to pneumonia, now getting stronger,  no LCD yet.       **msg with update left on daughter's personal cell phone**    Electronically signed by:  ERIC Mir CNP

## 2020-01-08 NOTE — Clinical Note
Hi,   I saw Dr Paul today--my partners were covered when I was away.  I have uploaded the CXR to EPIC results. Also I cut and pasted the latest wts and BP's into my note.  Please review.  DO we need to adjust CV meds at all? Please give me your thoughts. I can drop by the rehab unit again on Friday I [place] [facility] [place] her VM also.Thanks Cheli

## 2020-01-10 ENCOUNTER — NURSING HOME VISIT (OUTPATIENT)
Dept: GERIATRICS | Facility: CLINIC | Age: 85
End: 2020-01-10
Payer: MEDICARE

## 2020-01-10 VITALS
TEMPERATURE: 98 F | HEART RATE: 66 BPM | RESPIRATION RATE: 18 BRPM | HEIGHT: 72 IN | DIASTOLIC BLOOD PRESSURE: 60 MMHG | BODY MASS INDEX: 21.21 KG/M2 | OXYGEN SATURATION: 97 % | SYSTOLIC BLOOD PRESSURE: 109 MMHG

## 2020-01-10 DIAGNOSIS — I07.1 TRICUSPID VALVE INSUFFICIENCY, UNSPECIFIED ETIOLOGY: ICD-10-CM

## 2020-01-10 DIAGNOSIS — I50.9 CONGESTIVE HEART FAILURE, UNSPECIFIED HF CHRONICITY, UNSPECIFIED HEART FAILURE TYPE (H): ICD-10-CM

## 2020-01-10 DIAGNOSIS — I34.0 MITRAL VALVE INSUFFICIENCY, UNSPECIFIED ETIOLOGY: ICD-10-CM

## 2020-01-10 DIAGNOSIS — R53.81 PHYSICAL DECONDITIONING: ICD-10-CM

## 2020-01-10 DIAGNOSIS — I10 ESSENTIAL HYPERTENSION: ICD-10-CM

## 2020-01-10 DIAGNOSIS — R91.8 INFILTRATE OF LOWER LOBE OF RIGHT LUNG PRESENT ON IMAGING STUDY: Primary | ICD-10-CM

## 2020-01-10 DIAGNOSIS — N18.30 CKD (CHRONIC KIDNEY DISEASE) STAGE 3, GFR 30-59 ML/MIN (H): ICD-10-CM

## 2020-01-10 PROCEDURE — 99207 ZZC CDG-MDM COMPONENT: MEETS MODERATE - UP CODED: CPT | Performed by: NURSE PRACTITIONER

## 2020-01-10 PROCEDURE — 99309 SBSQ NF CARE MODERATE MDM 30: CPT | Performed by: NURSE PRACTITIONER

## 2020-01-10 NOTE — PROGRESS NOTES
"[facility]  [place] Medical Record Number:  [id number]  Place of Service where encounter took place:  [facility] ([facility]) [[id number]]  Chief Complaint   Patient presents with     Nursing Home Acute       HPI:    Jama Paul  is a 88 year old (2/13/1931), who is being seen today for an episodic care visit.  HPI information obtained from: facility chart records, facility staff, patient report and [facility] chart review. Today's concern is: pt felt poorly yesterday, felt too tired for PT much of day, felt \"something was off\".  His wt was up ~~2-2 # in a week.  Today says he feels fine(stronger, more energy. Got an extra dose Demadex last evening.        Infiltrate of lower lobe of right lung present on imaging study  Congestive heart failure, unspecified HF chronicity, unspecified heart failure type (H)  Mitral valve insufficiency, unspecified etiology  Tricuspid valve insufficiency, unspecified etiology  Essential hypertension  CKD (chronic kidney disease) stage 3, GFR 30-59 ml/min (H)  Physical deconditioning      TODAY DURING EXAM HIP and ROS:  No CP,  SOB, Cough, dizziness, fevers, chills, HA, N/V, dysuria or Bowel Abnormalities Appetite is good.  No pain.      Past Medical and Surgical History reviewed in Epic today.    MEDICATIONS:  Current Outpatient Medications   Medication Sig Dispense Refill     acetaminophen (TYLENOL) 325 MG tablet Take 650 mg by mouth 2 times daily every 6 hours as needed for Pain       albuterol (PROVENTIL) (2.5 MG/3ML) 0.083% neb solution Take 2.5 mg by nebulization every 4 hours as needed for shortness of breath / dyspnea or wheezing 1 vial inhale orally via nebulizer every 4 hours as needed for SOB, WHEEZING for 10 Days AND 1 vial inhale orally via nebulizer three times a day for SOB, WHEEZING for 10 Days       amoxicillin-clavulanate (AUGMENTIN) 500-125 MG tablet Take 1 tablet by mouth 3 times daily Give 1 tablet by mouth three times a day " for PNEUMONIA until 01/11/2020 23:59       digoxin (LANOXIN) 125 MCG tablet Take 1 tablet (125 mcg) by mouth daily (Patient taking differently: Take 125 mcg by mouth daily HOLD if HR <55) 30 tablet 0     melatonin 5 MG tablet Take 3 mg by mouth At Bedtime After 9pm. Please use overnight depends on pt to allow him to sleep 5 interruption during the night       metoprolol succinate ER (TOPROL-XL) 50 MG 24 hr tablet Take 50 mg by mouth 2 times daily HOLD if SBP <110 OR HR < 55. Call if held x 2 in a row symptomatic       mirtazapine (REMERON) 15 MG tablet Take 15 mg by mouth At Bedtime Patient started medication for the first time yesterday 10/29/2019.        Multiple Vitamins-Minerals (ICAPS AREDS FORMULA PO) Take 1 tablet by mouth daily Takes in addition to multivitamin with minerals       torsemide (DEMADEX) 10 MG tablet Take 1 tablet (10 mg) by mouth daily       UNABLE TO FIND incentive spirometer every hour while awake       Vitamin D, Cholecalciferol, 1000 UNITS TABS Take 3,000 Units by mouth daily           REVIEW OF SYSTEMS:  See above under HPI    Objective:  /60   Pulse 66   Temp 98  F (36.7  C)   Resp 18   Ht 1.829 m (6')   SpO2 97%   BMI 21.21 kg/m    Exam:  GENERAL APPEARANCE:  Alert, oriented, cooperative, NAD.  ENT:  Mouth with moist mucous membranes, [place].  RESP:  respiratory effort  of chest normal, lungs clear to auscultation with occas crack, decreased mildly in bases Rt > Lt,  no respiratory distress.   CV:  Auscultation of heart done ,RRR. Soft systolic murmur.  No Rub or Gallop, trace pedal and ankle edema,   +1 pedal pulses.  ABDOMEN:  normal bowel sounds, soft, nontender.   M/S:   CHIU equally, up with assist-seen in recliner  SKIN:  Inspection of skin is at baseline but limited as pt fully dressed  NEURO:   Cranial nerves 2-12 are grossly intact and at patient's baseline  PSYCH:  oriented X 3, engaged in conversation.    Labs:    Recent Labs   Lab Test 12/18/19  1314 12/11/19     134*   POTASSIUM 4.1 3.9   CHLORIDE 100 100   CO2 32* 24   ANIONGAP 8.1 10   * 172*   BUN 21 21   CR 1.27 1.06   BARON 9.1 8.3*       Recent Labs   Lab Test 12/03/19  1114   *   POTASSIUM 4.5   CHLORIDE 101   CO2 27   ANIONGAP 10.5   *   BUN 23   CR 1.27   BARON 8.5       Recent Labs   Lab Test 11/26/19   *   POTASSIUM 4.0   CHLORIDE 100   CO2 26   ANIONGAP 7   *   BUN 28*   CR 1.10   BARON 7.9*         CBC RESULTS:   Recent Labs   Lab Test 11/26/19   WBC 7.0   RBC 3.10*   HGB 9.3*   HCT 30.9*   MCV 99.7   MCH 30.0   MCHC 30.1*   RDW 20.6*   PLT 53*       ASSESSMENT / PLAN:  (R91.8) Infiltrate of lower lobe of right lung present on imaging study  (primary encounter diagnosis)  Comment/Plan: feeling better, cont Abx thru 1/11, has nebs also.      (I50.9) Congestive heart failure, unspecified HF chronicity, unspecified heart failure type (H)  (primary encounter diagnosis)  (I34.0) Mitral valve insufficiency, unspecified etiology  (I07.1) Tricuspid valve insufficiency, unspecified etiology  Comment/Plan: 152.6# today (got extra dose of Demadex yest), from 154# to 157# to 156# this past week.  Had appt w/  Dr Pugh on  01/10/2020,   Appreciate input from [facility], will consider Spironolactone as they suggested if needed in future.     (I10) Essential hypertension  Comment/Plan: mostly 105-mid 130's occas,  monitor.      (N18.3) CKD (chronic kidney disease) stage 3, GFR 30-59 ml/min (H)  Comment/Plan: steady kidney function, will check labs on 01/15 with Hgb.    (R53.81) Physical deconditioning  Comment/Plan: PT OT,  getting stronger,  no LCD yet.         Electronically signed by:  ERIC Mir CNP

## 2020-01-11 ENCOUNTER — APPOINTMENT (OUTPATIENT)
Dept: GENERAL RADIOLOGY | Facility: CLINIC | Age: 85
DRG: 291 | End: 2020-01-11
Attending: EMERGENCY MEDICINE
Payer: MEDICARE

## 2020-01-11 ENCOUNTER — APPOINTMENT (OUTPATIENT)
Dept: CT IMAGING | Facility: CLINIC | Age: 85
DRG: 291 | End: 2020-01-11
Attending: INTERNAL MEDICINE
Payer: MEDICARE

## 2020-01-11 ENCOUNTER — HOSPITAL ENCOUNTER (INPATIENT)
Facility: CLINIC | Age: 85
LOS: 4 days | Discharge: NURSING, REHAB OR LONG-TERM CARE | DRG: 291 | End: 2020-01-15
Attending: EMERGENCY MEDICINE | Admitting: INTERNAL MEDICINE
Payer: MEDICARE

## 2020-01-11 DIAGNOSIS — J18.9 HOSPITAL-ACQUIRED PNEUMONIA: ICD-10-CM

## 2020-01-11 DIAGNOSIS — N28.9 RENAL INSUFFICIENCY: ICD-10-CM
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
## 2020-01-11 DIAGNOSIS — I50.9 CONGESTIVE HEART FAILURE, UNSPECIFIED HF CHRONICITY, UNSPECIFIED HEART FAILURE TYPE (H): Primary | ICD-10-CM

## 2020-01-11 DIAGNOSIS — J90 BILATERAL PLEURAL EFFUSION: ICD-10-CM

## 2020-01-11 DIAGNOSIS — D69.6 THROMBOCYTOPENIA (H): ICD-10-CM

## 2020-01-11 LAB
ANION GAP SERPL CALCULATED.3IONS-SCNC: 6 MMOL/L (ref 3–14)
ANISOCYTOSIS BLD QL SMEAR: ABNORMAL
BASOPHILS # BLD AUTO: 0 10E9/L (ref 0–0.2)
BASOPHILS NFR BLD AUTO: 0.1 %
BUN SERPL-MCNC: 28 MG/DL (ref 7–30)
CALCIUM SERPL-MCNC: 8.3 MG/DL (ref 8.5–10.1)
CHLORIDE SERPL-SCNC: 102 MMOL/L (ref 94–109)
CO2 BLDCOV-SCNC: 26 MMOL/L (ref 21–28)
CO2 SERPL-SCNC: 26 MMOL/L (ref 20–32)
CREAT SERPL-MCNC: 1.04 MG/DL (ref 0.66–1.25)
DIFFERENTIAL METHOD BLD: ABNORMAL
ELLIPTOCYTES BLD QL SMEAR: SLIGHT
EOSINOPHIL # BLD AUTO: 0 10E9/L (ref 0–0.7)
EOSINOPHIL NFR BLD AUTO: 0 %
ERYTHROCYTE [DISTWIDTH] IN BLOOD BY AUTOMATED COUNT: 20.4 % (ref 10–15)
FLUAV+FLUBV AG SPEC QL: NEGATIVE
FLUAV+FLUBV AG SPEC QL: NEGATIVE
GFR SERPL CREATININE-BSD FRML MDRD: 63 ML/MIN/{1.73_M2}
GLUCOSE SERPL-MCNC: 114 MG/DL (ref 70–99)
HCT VFR BLD AUTO: 31.4 % (ref 40–53)
HGB BLD-MCNC: 10.1 G/DL (ref 13.3–17.7)
HYPOCHROMIA BLD QL: PRESENT
IMM GRANULOCYTES # BLD: 0 10E9/L (ref 0–0.4)
IMM GRANULOCYTES NFR BLD: 0.1 %
INTERPRETATION ECG - MUSE: NORMAL
LACTATE BLD-SCNC: 1.1 MMOL/L (ref 0.7–2.1)
LYMPHOCYTES # BLD AUTO: 0.4 10E9/L (ref 0.8–5.3)
LYMPHOCYTES NFR BLD AUTO: 3.3 %
MCH RBC QN AUTO: 30.4 PG (ref 26.5–33)
MCHC RBC AUTO-ENTMCNC: 32.2 G/DL (ref 31.5–36.5)
MCV RBC AUTO: 95 FL (ref 78–100)
MONOCYTES # BLD AUTO: 1.3 10E9/L (ref 0–1.3)
MONOCYTES NFR BLD AUTO: 12.1 %
NEUTROPHILS # BLD AUTO: 8.8 10E9/L (ref 1.6–8.3)
NEUTROPHILS NFR BLD AUTO: 84.4 %
NT-PROBNP SERPL-MCNC: 4695 PG/ML (ref 0–1800)
PCO2 BLDV: 39 MM HG (ref 40–50)
PH BLDV: 7.43 PH (ref 7.32–7.43)
PLATELET # BLD AUTO: 41 10E9/L (ref 150–450)
PLATELET # BLD EST: ABNORMAL 10*3/UL
PO2 BLDV: 24 MM HG (ref 25–47)
POTASSIUM SERPL-SCNC: 4.2 MMOL/L (ref 3.4–5.3)
PROCALCITONIN SERPL-MCNC: <0.05 NG/ML
RBC # BLD AUTO: 3.32 10E12/L (ref 4.4–5.9)
SAO2 % BLDV FROM PO2: 44 %
SODIUM SERPL-SCNC: 134 MMOL/L (ref 133–144)
SPECIMEN SOURCE: NORMAL
TOXIC GRANULES BLD QL SMEAR: PRESENT
WBC # BLD AUTO: 10.5 10E9/L (ref 4–11)

## 2020-01-11 PROCEDURE — 71250 CT THORAX DX C-: CPT

## 2020-01-11 PROCEDURE — 25000132 ZZH RX MED GY IP 250 OP 250 PS 637: Mod: GY | Performed by: INTERNAL MEDICINE

## 2020-01-11 PROCEDURE — 99285 EMERGENCY DEPT VISIT HI MDM: CPT | Mod: 25

## 2020-01-11 PROCEDURE — 93005 ELECTROCARDIOGRAM TRACING: CPT

## 2020-01-11 PROCEDURE — 12000000 ZZH R&B MED SURG/OB

## 2020-01-11 PROCEDURE — 85025 COMPLETE CBC W/AUTO DIFF WBC: CPT | Performed by: EMERGENCY MEDICINE

## 2020-01-11 PROCEDURE — 83880 ASSAY OF NATRIURETIC PEPTIDE: CPT | Performed by: EMERGENCY MEDICINE

## 2020-01-11 PROCEDURE — 80048 BASIC METABOLIC PNL TOTAL CA: CPT | Performed by: EMERGENCY MEDICINE

## 2020-01-11 PROCEDURE — 99223 1ST HOSP IP/OBS HIGH 75: CPT | Mod: AI | Performed by: INTERNAL MEDICINE

## 2020-01-11 PROCEDURE — 25800030 ZZH RX IP 258 OP 636: Performed by: EMERGENCY MEDICINE

## 2020-01-11 PROCEDURE — 84145 PROCALCITONIN (PCT): CPT | Performed by: EMERGENCY MEDICINE

## 2020-01-11 PROCEDURE — 96365 THER/PROPH/DIAG IV INF INIT: CPT

## 2020-01-11 PROCEDURE — 82803 BLOOD GASES ANY COMBINATION: CPT

## 2020-01-11 PROCEDURE — 87040 BLOOD CULTURE FOR BACTERIA: CPT | Performed by: EMERGENCY MEDICINE

## 2020-01-11 PROCEDURE — 25000128 H RX IP 250 OP 636: Performed by: EMERGENCY MEDICINE

## 2020-01-11 PROCEDURE — 87804 INFLUENZA ASSAY W/OPTIC: CPT | Performed by: EMERGENCY MEDICINE

## 2020-01-11 PROCEDURE — 83605 ASSAY OF LACTIC ACID: CPT

## 2020-01-11 PROCEDURE — 96367 TX/PROPH/DG ADDL SEQ IV INF: CPT

## 2020-01-11 PROCEDURE — 71046 X-RAY EXAM CHEST 2 VIEWS: CPT

## 2020-01-11 RX ORDER — PROCHLORPERAZINE MALEATE 5 MG
5 TABLET ORAL EVERY 6 HOURS PRN
Status: DISCONTINUED | OUTPATIENT
Start: 2020-01-11 | End: 2020-01-15 | Stop reason: HOSPADM

## 2020-01-11 RX ORDER — LIDOCAINE 40 MG/G
CREAM TOPICAL
Status: DISCONTINUED | OUTPATIENT
Start: 2020-01-11 | End: 2020-01-15 | Stop reason: HOSPADM

## 2020-01-11 RX ORDER — LANOLIN ALCOHOL/MO/W.PET/CERES
6 CREAM (GRAM) TOPICAL AT BEDTIME
COMMUNITY
End: 2021-01-01 | Stop reason: ALTCHOICE

## 2020-01-11 RX ORDER — PIPERACILLIN SODIUM, TAZOBACTAM SODIUM 3; .375 G/15ML; G/15ML
3.38 INJECTION, POWDER, LYOPHILIZED, FOR SOLUTION INTRAVENOUS ONCE
Status: COMPLETED | OUTPATIENT
Start: 2020-01-11 | End: 2020-01-11

## 2020-01-11 RX ORDER — METOCLOPRAMIDE HYDROCHLORIDE 5 MG/ML
5 INJECTION INTRAMUSCULAR; INTRAVENOUS EVERY 6 HOURS PRN
Status: DISCONTINUED | OUTPATIENT
Start: 2020-01-11 | End: 2020-01-15 | Stop reason: HOSPADM

## 2020-01-11 RX ORDER — METOPROLOL SUCCINATE 50 MG/1
50 TABLET, EXTENDED RELEASE ORAL 2 TIMES DAILY
Status: DISCONTINUED | OUTPATIENT
Start: 2020-01-11 | End: 2020-01-15 | Stop reason: HOSPADM

## 2020-01-11 RX ORDER — ALBUTEROL SULFATE 0.83 MG/ML
2.5 SOLUTION RESPIRATORY (INHALATION) EVERY 4 HOURS PRN
Status: DISCONTINUED | OUTPATIENT
Start: 2020-01-11 | End: 2020-01-15 | Stop reason: HOSPADM

## 2020-01-11 RX ORDER — POTASSIUM CHLORIDE 1500 MG/1
20-40 TABLET, EXTENDED RELEASE ORAL
Status: DISCONTINUED | OUTPATIENT
Start: 2020-01-11 | End: 2020-01-15 | Stop reason: HOSPADM

## 2020-01-11 RX ORDER — ONDANSETRON 2 MG/ML
4 INJECTION INTRAMUSCULAR; INTRAVENOUS EVERY 6 HOURS PRN
Status: DISCONTINUED | OUTPATIENT
Start: 2020-01-11 | End: 2020-01-15 | Stop reason: HOSPADM

## 2020-01-11 RX ORDER — POTASSIUM CHLORIDE 7.45 MG/ML
10 INJECTION INTRAVENOUS
Status: DISCONTINUED | OUTPATIENT
Start: 2020-01-11 | End: 2020-01-15 | Stop reason: HOSPADM

## 2020-01-11 RX ORDER — METOCLOPRAMIDE 5 MG/1
5 TABLET ORAL EVERY 6 HOURS PRN
Status: DISCONTINUED | OUTPATIENT
Start: 2020-01-11 | End: 2020-01-15 | Stop reason: HOSPADM

## 2020-01-11 RX ORDER — VIT C/E/ZN/COPPR/LUTEIN/ZEAXAN 60 MG-6 MG
1 CAPSULE ORAL DAILY
Status: DISCONTINUED | OUTPATIENT
Start: 2020-01-12 | End: 2020-01-15 | Stop reason: HOSPADM

## 2020-01-11 RX ORDER — ONDANSETRON 4 MG/1
4 TABLET, ORALLY DISINTEGRATING ORAL EVERY 6 HOURS PRN
Status: DISCONTINUED | OUTPATIENT
Start: 2020-01-11 | End: 2020-01-15 | Stop reason: HOSPADM

## 2020-01-11 RX ORDER — OXYCODONE HYDROCHLORIDE 5 MG/1
5-10 TABLET ORAL
Status: DISCONTINUED | OUTPATIENT
Start: 2020-01-11 | End: 2020-01-15 | Stop reason: HOSPADM

## 2020-01-11 RX ORDER — POTASSIUM CL/LIDO/0.9 % NACL 10MEQ/0.1L
10 INTRAVENOUS SOLUTION, PIGGYBACK (ML) INTRAVENOUS
Status: DISCONTINUED | OUTPATIENT
Start: 2020-01-11 | End: 2020-01-15 | Stop reason: HOSPADM

## 2020-01-11 RX ORDER — POLYETHYLENE GLYCOL 3350 17 G/17G
17 POWDER, FOR SOLUTION ORAL DAILY PRN
Status: DISCONTINUED | OUTPATIENT
Start: 2020-01-11 | End: 2020-01-15 | Stop reason: HOSPADM

## 2020-01-11 RX ORDER — FUROSEMIDE 10 MG/ML
40 INJECTION INTRAMUSCULAR; INTRAVENOUS
Status: DISCONTINUED | OUTPATIENT
Start: 2020-01-12 | End: 2020-01-15

## 2020-01-11 RX ORDER — AZITHROMYCIN 500 MG/1
500 INJECTION, POWDER, LYOPHILIZED, FOR SOLUTION INTRAVENOUS ONCE
Status: DISCONTINUED | OUTPATIENT
Start: 2020-01-11 | End: 2020-01-11

## 2020-01-11 RX ORDER — PIPERACILLIN SODIUM, TAZOBACTAM SODIUM 3; .375 G/15ML; G/15ML
3.38 INJECTION, POWDER, LYOPHILIZED, FOR SOLUTION INTRAVENOUS EVERY 6 HOURS
Status: DISCONTINUED | OUTPATIENT
Start: 2020-01-12 | End: 2020-01-14

## 2020-01-11 RX ORDER — POTASSIUM CHLORIDE 29.8 MG/ML
20 INJECTION INTRAVENOUS
Status: DISCONTINUED | OUTPATIENT
Start: 2020-01-11 | End: 2020-01-15 | Stop reason: HOSPADM

## 2020-01-11 RX ORDER — PROCHLORPERAZINE 25 MG
12.5 SUPPOSITORY, RECTAL RECTAL EVERY 12 HOURS PRN
Status: DISCONTINUED | OUTPATIENT
Start: 2020-01-11 | End: 2020-01-15 | Stop reason: HOSPADM

## 2020-01-11 RX ORDER — ACETAMINOPHEN 325 MG/1
650 TABLET ORAL EVERY 4 HOURS PRN
Status: DISCONTINUED | OUTPATIENT
Start: 2020-01-11 | End: 2020-01-15 | Stop reason: HOSPADM

## 2020-01-11 RX ORDER — NALOXONE HYDROCHLORIDE 0.4 MG/ML
.1-.4 INJECTION, SOLUTION INTRAMUSCULAR; INTRAVENOUS; SUBCUTANEOUS
Status: DISCONTINUED | OUTPATIENT
Start: 2020-01-11 | End: 2020-01-15 | Stop reason: HOSPADM

## 2020-01-11 RX ORDER — CEFTRIAXONE 2 G/1
2 INJECTION, POWDER, FOR SOLUTION INTRAMUSCULAR; INTRAVENOUS ONCE
Status: DISCONTINUED | OUTPATIENT
Start: 2020-01-11 | End: 2020-01-11

## 2020-01-11 RX ORDER — MAGNESIUM SULFATE HEPTAHYDRATE 40 MG/ML
4 INJECTION, SOLUTION INTRAVENOUS EVERY 4 HOURS PRN
Status: DISCONTINUED | OUTPATIENT
Start: 2020-01-11 | End: 2020-01-15 | Stop reason: HOSPADM

## 2020-01-11 RX ORDER — DIGOXIN 125 MCG
125 TABLET ORAL DAILY
Status: DISCONTINUED | OUTPATIENT
Start: 2020-01-12 | End: 2020-01-15 | Stop reason: HOSPADM

## 2020-01-11 RX ORDER — ACETAMINOPHEN 325 MG/1
650 TABLET ORAL 2 TIMES DAILY
Status: DISCONTINUED | OUTPATIENT
Start: 2020-01-11 | End: 2020-01-15 | Stop reason: HOSPADM

## 2020-01-11 RX ORDER — LANOLIN ALCOHOL/MO/W.PET/CERES
3 CREAM (GRAM) TOPICAL AT BEDTIME
Status: DISCONTINUED | OUTPATIENT
Start: 2020-01-11 | End: 2020-01-15 | Stop reason: HOSPADM

## 2020-01-11 RX ORDER — POTASSIUM CHLORIDE 1.5 G/1.58G
20-40 POWDER, FOR SOLUTION ORAL
Status: DISCONTINUED | OUTPATIENT
Start: 2020-01-11 | End: 2020-01-15 | Stop reason: HOSPADM

## 2020-01-11 RX ORDER — MIRTAZAPINE 15 MG/1
15 TABLET, FILM COATED ORAL AT BEDTIME
Status: DISCONTINUED | OUTPATIENT
Start: 2020-01-11 | End: 2020-01-15 | Stop reason: HOSPADM

## 2020-01-11 RX ADMIN — SODIUM CHLORIDE 1000 ML: 9 INJECTION, SOLUTION INTRAVENOUS at 21:37

## 2020-01-11 RX ADMIN — MIRTAZAPINE 15 MG: 15 TABLET, FILM COATED ORAL at 23:32

## 2020-01-11 RX ADMIN — PIPERACILLIN AND TAZOBACTAM 3.38 G: 3; .375 INJECTION, POWDER, LYOPHILIZED, FOR SOLUTION INTRAVENOUS at 21:37

## 2020-01-11 RX ADMIN — VANCOMYCIN HYDROCHLORIDE 1750 MG: 5 INJECTION, POWDER, LYOPHILIZED, FOR SOLUTION INTRAVENOUS at 22:17

## 2020-01-11 RX ADMIN — MELATONIN 3 MG: 3 TAB ORAL at 23:33

## 2020-01-11 ASSESSMENT — ENCOUNTER SYMPTOMS
FEVER: 1
DIARRHEA: 0

## 2020-01-12 ENCOUNTER — APPOINTMENT (OUTPATIENT)
Dept: PHYSICAL THERAPY | Facility: CLINIC | Age: 85
DRG: 291 | End: 2020-01-12
Attending: INTERNAL MEDICINE
Payer: MEDICARE

## 2020-01-12 LAB
ALBUMIN SERPL-MCNC: 2.9 G/DL (ref 3.4–5)
ALP SERPL-CCNC: 39 U/L (ref 40–150)
ALT SERPL W P-5'-P-CCNC: 15 U/L (ref 0–70)
ANION GAP SERPL CALCULATED.3IONS-SCNC: 4 MMOL/L (ref 3–14)
ANISOCYTOSIS BLD QL SMEAR: ABNORMAL
AST SERPL W P-5'-P-CCNC: 10 U/L (ref 0–45)
BASOPHILS # BLD AUTO: 0 10E9/L (ref 0–0.2)
BASOPHILS NFR BLD AUTO: 0.1 %
BILIRUB SERPL-MCNC: 1.3 MG/DL (ref 0.2–1.3)
BUN SERPL-MCNC: 26 MG/DL (ref 7–30)
CALCIUM SERPL-MCNC: 8.1 MG/DL (ref 8.5–10.1)
CHLORIDE SERPL-SCNC: 106 MMOL/L (ref 94–109)
CO2 SERPL-SCNC: 27 MMOL/L (ref 20–32)
CREAT SERPL-MCNC: 1 MG/DL (ref 0.66–1.25)
DIFFERENTIAL METHOD BLD: ABNORMAL
EOSINOPHIL # BLD AUTO: 0 10E9/L (ref 0–0.7)
EOSINOPHIL NFR BLD AUTO: 0.1 %
ERYTHROCYTE [DISTWIDTH] IN BLOOD BY AUTOMATED COUNT: 20.4 % (ref 10–15)
GFR SERPL CREATININE-BSD FRML MDRD: 67 ML/MIN/{1.73_M2}
GLUCOSE SERPL-MCNC: 101 MG/DL (ref 70–99)
HCT VFR BLD AUTO: 31.4 % (ref 40–53)
HGB BLD-MCNC: 9.8 G/DL (ref 13.3–17.7)
HYPOCHROMIA BLD QL: PRESENT
IMM GRANULOCYTES # BLD: 0 10E9/L (ref 0–0.4)
IMM GRANULOCYTES NFR BLD: 0.2 %
INR PPP: 1.64 (ref 0.86–1.14)
LDH SERPL L TO P-CCNC: 95 U/L (ref 85–227)
LYMPHOCYTES # BLD AUTO: 0.3 10E9/L (ref 0.8–5.3)
LYMPHOCYTES NFR BLD AUTO: 3.7 %
MCH RBC QN AUTO: 29.7 PG (ref 26.5–33)
MCHC RBC AUTO-ENTMCNC: 31.2 G/DL (ref 31.5–36.5)
MCV RBC AUTO: 95 FL (ref 78–100)
MICROCYTES BLD QL SMEAR: PRESENT
MONOCYTES # BLD AUTO: 1.5 10E9/L (ref 0–1.3)
MONOCYTES NFR BLD AUTO: 16.4 %
MRSA DNA SPEC QL NAA+PROBE: NEGATIVE
NEUTROPHILS # BLD AUTO: 7.4 10E9/L (ref 1.6–8.3)
NEUTROPHILS NFR BLD AUTO: 79.5 %
PLATELET # BLD AUTO: 33 10E9/L (ref 150–450)
PLATELET # BLD EST: ABNORMAL 10*3/UL
POTASSIUM SERPL-SCNC: 3.8 MMOL/L (ref 3.4–5.3)
PROT SERPL-MCNC: 6 G/DL (ref 6.8–8.8)
RBC # BLD AUTO: 3.3 10E12/L (ref 4.4–5.9)
SODIUM SERPL-SCNC: 137 MMOL/L (ref 133–144)
SPECIMEN SOURCE: NORMAL
WBC # BLD AUTO: 9.3 10E9/L (ref 4–11)

## 2020-01-12 PROCEDURE — 25000128 H RX IP 250 OP 636: Performed by: HOSPITALIST

## 2020-01-12 PROCEDURE — 36415 COLL VENOUS BLD VENIPUNCTURE: CPT | Performed by: INTERNAL MEDICINE

## 2020-01-12 PROCEDURE — 25000125 ZZHC RX 250: Performed by: HOSPITALIST

## 2020-01-12 PROCEDURE — 12000000 ZZH R&B MED SURG/OB

## 2020-01-12 PROCEDURE — 85025 COMPLETE CBC W/AUTO DIFF WBC: CPT | Performed by: INTERNAL MEDICINE

## 2020-01-12 PROCEDURE — 85610 PROTHROMBIN TIME: CPT | Performed by: INTERNAL MEDICINE

## 2020-01-12 PROCEDURE — 97530 THERAPEUTIC ACTIVITIES: CPT | Mod: GP

## 2020-01-12 PROCEDURE — 99233 SBSQ HOSP IP/OBS HIGH 50: CPT | Performed by: HOSPITALIST

## 2020-01-12 PROCEDURE — 87640 STAPH A DNA AMP PROBE: CPT | Performed by: INTERNAL MEDICINE

## 2020-01-12 PROCEDURE — 97161 PT EVAL LOW COMPLEX 20 MIN: CPT | Mod: GP

## 2020-01-12 PROCEDURE — 25000128 H RX IP 250 OP 636: Performed by: INTERNAL MEDICINE

## 2020-01-12 PROCEDURE — 80053 COMPREHEN METABOLIC PANEL: CPT | Performed by: INTERNAL MEDICINE

## 2020-01-12 PROCEDURE — 83615 LACTATE (LD) (LDH) ENZYME: CPT | Performed by: INTERNAL MEDICINE

## 2020-01-12 PROCEDURE — 25000132 ZZH RX MED GY IP 250 OP 250 PS 637: Mod: GY | Performed by: INTERNAL MEDICINE

## 2020-01-12 PROCEDURE — 87641 MR-STAPH DNA AMP PROBE: CPT | Performed by: INTERNAL MEDICINE

## 2020-01-12 RX ADMIN — PIPERACILLIN AND TAZOBACTAM 3.38 G: 3; .375 INJECTION, POWDER, LYOPHILIZED, FOR SOLUTION INTRAVENOUS at 02:53

## 2020-01-12 RX ADMIN — PIPERACILLIN AND TAZOBACTAM 3.38 G: 3; .375 INJECTION, POWDER, LYOPHILIZED, FOR SOLUTION INTRAVENOUS at 21:06

## 2020-01-12 RX ADMIN — DIGOXIN 125 MCG: 125 TABLET ORAL at 08:21

## 2020-01-12 RX ADMIN — FUROSEMIDE 40 MG: 10 INJECTION, SOLUTION INTRAMUSCULAR; INTRAVENOUS at 08:21

## 2020-01-12 RX ADMIN — MIRTAZAPINE 15 MG: 15 TABLET, FILM COATED ORAL at 21:05

## 2020-01-12 RX ADMIN — PIPERACILLIN AND TAZOBACTAM 3.38 G: 3; .375 INJECTION, POWDER, LYOPHILIZED, FOR SOLUTION INTRAVENOUS at 16:06

## 2020-01-12 RX ADMIN — METOPROLOL SUCCINATE 50 MG: 50 TABLET, EXTENDED RELEASE ORAL at 21:05

## 2020-01-12 RX ADMIN — PIPERACILLIN AND TAZOBACTAM 3.38 G: 3; .375 INJECTION, POWDER, LYOPHILIZED, FOR SOLUTION INTRAVENOUS at 08:22

## 2020-01-12 RX ADMIN — Medication 1 CAPSULE: at 08:21

## 2020-01-12 RX ADMIN — FUROSEMIDE 40 MG: 10 INJECTION, SOLUTION INTRAMUSCULAR; INTRAVENOUS at 16:06

## 2020-01-12 RX ADMIN — PHYTONADIONE 10 MG: 10 INJECTION, EMULSION INTRAMUSCULAR; INTRAVENOUS; SUBCUTANEOUS at 11:12

## 2020-01-12 RX ADMIN — METOPROLOL SUCCINATE 50 MG: 50 TABLET, EXTENDED RELEASE ORAL at 08:21

## 2020-01-12 RX ADMIN — MELATONIN 3 MG: 3 TAB ORAL at 21:05

## 2020-01-12 ASSESSMENT — ACTIVITIES OF DAILY LIVING (ADL)
ADLS_ACUITY_SCORE: 26
ADLS_ACUITY_SCORE: 28
ADLS_ACUITY_SCORE: 26
ADLS_ACUITY_SCORE: 28

## 2020-01-12 NOTE — PROGRESS NOTES
01/12/20 1507   Quick Adds   Type of Visit Initial PT Evaluation   Living Environment   Lives With spouse   Living Arrangements residential facility   Home Accessibility no concerns   Transportation Anticipated family or friend will provide   Living Environment Comment Pt living at a SNF with spouse and no stairs.   Self-Care   Usual Activity Tolerance good   Current Activity Tolerance poor   Regular Exercise Yes   Activity/Exercise Type other (see comments)  (2 hrs skilled therapy daily at SNF)   Equipment Currently Used at Home shower chair;raised toilet;walker, rolling   Activity/Exercise/Self-Care Comment Prior to pneumonia, patient was IND with dressing and bathing, has needed assist for the past 2 weeks   Functional Level Prior   Ambulation 3-->assistive equipment and person   Transferring 3-->assistive equipment and person   Toileting 3-->assistive equipment and person   Bathing 3-->assistive equipment and person   Communication 0-->understands/communicates without difficulty   Fall history within last six months no   Which of the above functional risks had a recent onset or change? ambulation;transferring;toileting;dressing;bathing   Prior Functional Level Comment Pt lives at [facility] and can increase assist as needed   General Information   Onset of Illness/Injury or Date of Surgery - Date 01/11/20   Referring Physician Pato Benedict MD   Patient/Family Goals Statement Back to [facility]   Pertinent History of Current Problem (include personal factors and/or comorbidities that impact the POC) Pt is an 88 year old year old male admitted 1/11 for R lower lobe pneumonia, R pleural effusion. PMHx signficant for CKD, CHF, HTN, cardiomyopathy. Pt previously living with wife at a skilled nursing facility ([facility]) where he received 2 hrs PT/OT daily. Prior level of mobility including ambulating with FWW and Ax1, assist with ADLs as needed.    Precautions/Limitations fall  "precautions;oxygen therapy device and L/min   Cognitive Status Examination   Orientation orientation to person, place and time   Level of Consciousness alert   Follows Commands and Answers Questions 100% of the time   Personal Safety and Judgment intact   Pain Assessment   Patient Currently in Pain No   Posture    Posture Forward head position;Protracted shoulders;Kyphosis   Range of Motion (ROM)   ROM Comment B LE ROM WFL   Strength   Strength Comments B LE strength impaired at 3+/5   Bed Mobility   Bed Mobility Comments supine to/from sit with SBA, rolling IND   Transfer Skills   Transfer Comments sit<>stand with Josh, FWW   Gait   Gait Comments amb 2 ft with modA and FWW   Balance   Balance Comments seated balance impaired requiring SBA for safety, standing balance impaired and requires FWW   General Therapy Interventions   Planned Therapy Interventions balance training;bed mobility training;strengthening;transfer training;gait training   Clinical Impression   Criteria for Skilled Therapeutic Intervention yes, treatment indicated   PT Diagnosis impaired gait   Influenced by the following impairments current medical status, weakness, fatigue, O2 needs   Functional limitations due to impairments fall risk, increased assist required for mobility, low activity tolerance   Clinical Presentation Stable/Uncomplicated   Clinical Presentation Rationale clinical judgment   Clinical Decision Making (Complexity) Low complexity   Therapy Frequency 4x/week   Predicted Duration of Therapy Intervention (days/wks) 4-7 days   Anticipated Discharge Disposition Transitional Care Facility  (return home to [facility])   Risk & Benefits of therapy have been explained Yes   Patient, Family & other staff in agreement with plan of care Yes   [facility] AM-PAC TM \"6 Clicks\"   2016, Trustees of [facility], under license to [employer].  All rights reserved.   6 Clicks Short Forms Basic Mobility Inpatient Short Form " "  [facility] AM-PAC  \"6 Clicks\" V.2 Basic Mobility Inpatient Short Form   1. Turning from your back to your side while in a flat bed without using bedrails? 4 - None   2. Moving from lying on your back to sitting on the side of a flat bed without using bedrails? 3 - A Little   3. Moving to and from a bed to a chair (including a wheelchair)? 2 - A Lot   4. Standing up from a chair using your arms (e.g., wheelchair, or bedside chair)? 3 - A Little   5. To walk in hospital room? 1 - Total   6. Climbing 3-5 steps with a railing? 1 - Total   Basic Mobility Raw Score (Score out of 24.Lower scores equate to lower levels of function) 14   Total Evaluation Time   Total Evaluation Time (Minutes) 12     "

## 2020-01-12 NOTE — H&P
[facility]    History and Physical  Hospitalist       Date of Admission:  1/11/2020    Assessment & Plan      This is an 88-year-old male well known to me because of his prior admissions with history of hypertension, atrial fibrillation, nonischemic cardiomyopathy, congestive heart failure, idiopathic thrombocytopenic purpura, recurrent right-sided pleural effusion, mild to moderate mitral regurgitation and moderate to severe tricuspid regurgitation, was in a care facility, recently moved to their TCU because of worsening shortness of breath, and now sent here because of shortness of breath not improving and possibly had some fever today.      ASSESSMENT AND PLAN:   1.  Possible healthcare-associated pneumonia:  This is an 88-year-old male with multiple comorbidities, recent admission in the hospital in November, now presented with fever, shortness of breath.  Chest x-ray does suggest some sort of multi-segment airspace opacity suspicious for pneumonia, but I think from my examination, he is more likely to be recurrent pleural effusion and compressive atelectasis, but cannot completely rule out the pneumonia at this time.  I will go ahead and check his procalcitonin and do the CT scan of chest without contrast.  If no pneumonia in the CT scan and procalcitonin comes back low, we can discontinue the antibiotics.  For now, given his risk factors, we will put him on vancomycin and Zosyn, as he already completed 1-week course of Augmentin, and he was in the TCU and was in the hospital in November, so we will treat him for possible healthcare-associated pneumonia.  We will send MRSA screen.  If it does come back negative, we can discontinue vancomycin.  Try to obtain sputum from Gram stain culture, and blood cultures already obtained in the ER.   2.  Right-sided pleural effusion:  The patient most likely has recurrent right-sided pleural effusion.  He will benefit from thoracentesis, as he had in the  past.  I will order the ultrasound-guided thoracentesis.  That can be done if not Sunday then most likely on Monday and send the fluid for analysis.   3.  Acute on chronic diastolic congestive heart failure in a patient with significant severe tricuspid regurgitation and mild to moderate mitral regurgitation:  He is on torsemide 20 mg at home.  I will hold the torsemide for now and start him on Lasix 40 mg IV b.i.d. as he has bilateral crackles, lower extremity edema and mildly elevated JVD.   4.  Atrial fibrillation, rate controlled at this time.  He is on digoxin, metoprolol.  I will continue with that.     5.  Idiopathic thrombocytic purpura:  The patient's platelets are 41,000.  Stable.  No active bleeding.   6.  Chronic kidney disease, stage III, stable.   7.  Hypertension:  The patient had an issue with the blood pressure in the past.  His blood pressure was on the lower side before.  I will hold the metoprolol and Lasix with holding parameters.   7.  Deep venous thrombosis prophylaxis with SCDs given thrombocytopenia.      CODE STATUS:  I had a detailed discussion with the patient regarding his code status, and he wanted to be full code at this time.      The case was discussed with the ER physician, patient and the patient's family, and all questions were answered.         PATO BENEDICT MD         DVT Prophylaxis: Pneumatic Compression Devices  Code Status: Full Code    Disposition: Expected discharge in 2 days once stable.    Pato Benedict MD    Primary Care Physician   Mariusz Shields    Chief Complaint   SOB, ? Fever     History is obtained from the patient    History of Present Illness   Admitted:     01/11/2020      HISTORY OF PRESENT ILLNESS:  This is an 88-year-old male well known to me because of his prior admissions with history of hypertension, atrial fibrillation, nonischemic cardiomyopathy, congestive heart failure, idiopathic thrombocytopenic purpura, recurrent right-sided pleural effusion, mild to  moderate mitral regurgitation and moderate to severe tricuspid regurgitation, was in a care facility, recently moved to their TCU because of worsening shortness of breath, and now sent here because of shortness of breath not improving and possibly had some fever today.      According to the patient, he is not feeling too good for the last 1 week, when he has started getting shortness of breath and coughing and feeling weak and tired.  He is trying to drink a lot of water, 6-8 glasses of water daily and has been on torsemide and recently increased the dose as per the patient, but changed the medications.  He does not remember what medication.  He has been started on Augmentin, and he just finished the course of Augmentin today, a 7-day course since 01/03.  He spiked fever today.  The patient does not remember, but the wife told me that they have been told that he had a fever today, but does not know how high was the fever.  Given his symptoms are not getting better, started having fever, he was sent to the ER for evaluation.      In the ER, he had a chest x-ray done, which was suggestive of right-sided infiltrate, so the Hospitalist Service was consulted to admit the patient.     Past Medical History    I have reviewed this patient's medical history and updated it with pertinent information if needed.   Past Medical History:   Diagnosis Date     Congestive heart failure (H)      Elevated PSA      Eye hemorrhage, left 02/2019     Non-ischemic cardiomyopathy (H)     2017, med treatment, echo done 4/18 nl ef, mod to severe tr     Permanent atrial fibrillation 2011     Thrombocytopenia (H)      Unspecified essential hypertension        Past Surgical History   I have reviewed this patient's surgical history and updated it with pertinent information if needed.  Past Surgical History:   Procedure Laterality Date     ABDOMEN SURGERY      bowel obstruction     BONE MARROW BIOPSY, BONE SPECIMEN, NEEDLE/TROCAR N/A 3/4/2019     Procedure: BIOPSY BONE MARROW;  Surgeon: Josey Vargas MD;  Location:  GI     CARPAL TUNNEL RELEASE RT/LT  2014     CV HEART CATHETERIZATION WITH POSSIBLE INTERVENTION N/A 11/15/2019    Procedure: Left and right heart Catheterization with Possible Intervention;  Surgeon: Adolfo Paez MD;  Location:  HEART CARDIAC CATH LAB     CV LEFT VENTRICULOGRAM N/A 11/15/2019    Procedure: Left Ventriculogram;  Surgeon: Adolfo Paez MD;  Location:  HEART CARDIAC CATH LAB     ESOPHAGOSCOPY, GASTROSCOPY, DUODENOSCOPY (EGD), COMBINED N/A 11/6/2019    Procedure: ESOPHAGOGASTRODUODENOSCOPY (EGD);  Surgeon: Marixa Aguila MD;  Location:  GI     EXPLORE GROIN  4/30/2014    Procedure: EXPLORE GROIN;  Surgeon: Sam Aguirre MD;  Location:  OR     HERNIORRHAPHY INGUINAL  4/30/2014    Procedure: HERNIORRHAPHY INGUINAL;  Surgeon: Sam Aguirre MD;  Location:  OR     MOHS MICROGRAPHIC PROCEDURE       PHACOEMULSIFICATION CLEAR CORNEA WITH STANDARD INTRAOCULAR LENS IMPLANT Right 9/8/2015    Procedure: PHACOEMULSIFICATION CLEAR CORNEA WITH STANDARD INTRAOCULAR LENS IMPLANT;  Surgeon: Gil Shannon MD;  Location:  EC     septic olecranon bursitis[         Prior to Admission Medications   Prior to Admission Medications   Prescriptions Last Dose Informant Patient Reported? Taking?   Multiple Vitamins-Minerals (ICAPS AREDS FORMULA PO)  Self Yes No   Sig: Take 1 tablet by mouth daily Takes in addition to multivitamin with minerals   UNABLE TO FIND   Yes No   Sig: incentive spirometer every hour while awake   Vitamin D, Cholecalciferol, 1000 UNITS TABS  Self Yes No   Sig: Take 3,000 Units by mouth daily    acetaminophen (TYLENOL) 325 MG tablet   Yes No   Sig: Take 650 mg by mouth 2 times daily every 6 hours as needed for Pain   albuterol (PROVENTIL) (2.5 MG/3ML) 0.083% neb solution   Yes No   Sig: Take 2.5 mg by nebulization every 4 hours as needed for shortness of breath / dyspnea or wheezing 1  vial inhale orally via nebulizer every 4 hours as needed for SOB, WHEEZING for 10 Days AND 1 vial inhale orally via nebulizer three times a day for SOB, WHEEZING for 10 Days   amoxicillin-clavulanate (AUGMENTIN) 500-125 MG tablet   Yes No   Sig: Take 1 tablet by mouth 3 times daily Give 1 tablet by mouth three times a day for PNEUMONIA until 01/11/2020 23:59   digoxin (LANOXIN) 125 MCG tablet   No No   Sig: Take 1 tablet (125 mcg) by mouth daily   Patient taking differently: Take 125 mcg by mouth daily HOLD if HR <55   melatonin 5 MG tablet   Yes No   Sig: Take 3 mg by mouth At Bedtime After 9pm. Please use overnight depends on pt to allow him to sleep 5 interruption during the night   metoprolol succinate ER (TOPROL-XL) 50 MG 24 hr tablet  Pharmacy Yes No   Sig: Take 50 mg by mouth 2 times daily HOLD if SBP <110 OR HR < 55. Call if held x 2 in a row symptomatic   mirtazapine (REMERON) 15 MG tablet  Pharmacy Yes No   Sig: Take 15 mg by mouth At Bedtime Patient started medication for the first time yesterday 10/29/2019.    torsemide (DEMADEX) 10 MG tablet   No No   Sig: Take 1 tablet (10 mg) by mouth daily      Facility-Administered Medications: None     Allergies   No Known Allergies    Social History   I have reviewed this patient's social history and updated it with pertinent information if needed. Jama Paul  reports that he has never smoked. He has never used smokeless tobacco. He reports that he does not drink alcohol or use drugs.    Family History   I have reviewed this patient's family history and updated it with pertinent information if needed.   Family History   Problem Relation Age of Onset     Heart Disease No family hx of        Review of Systems   CONSTITUTIONAL:  positive for  fevers, fatigue and malaise  EYES:  negative  HEENT:  negative  RESPIRATORY:  positive for  dyspnea, chest pain and pleuritic pain  CARDIOVASCULAR:  positive for  dyspnea, fatigue, edema  GASTROINTESTINAL:  Negative  except for incontinence   GENITOURINARY:  Negative except for incontinence   INTEGUMENT/BREAST:  negative  HEMATOLOGIC/LYMPHATIC:  negative  ALLERGIC/IMMUNOLOGIC:  negative  ENDOCRINE:  negative  MUSCULOSKELETAL:  negative  NEUROLOGICAL:  negative  BEHAVIOR/PSYCH:  negative    Physical Exam   Temp: 98.3  F (36.8  C)(Simultaneous filing. User may not have seen previous data.) Temp src: Oral(Simultaneous filing. User may not have seen previous data.) BP: 96/67 Pulse: 79 Heart Rate: 85 Resp: 24 SpO2: 91 % O2 Device: None (Room air) Oxygen Delivery: 4 LPM(Simultaneous filing. User may not have seen previous data.)  Vital Signs with Ranges  Temp:  [98.3  F (36.8  C)] 98.3  F (36.8  C)  Pulse:  [79-91] 79  Heart Rate:  [85-91] 85  Resp:  [20-24] 24  BP: ()/(67-72) 96/67  SpO2:  [91 %-92 %] 91 %  156 lbs 0 oz    Constitutional: Awake, alert, cooperative, no apparent distress.  Eyes: Conjunctiva and pupils examined and normal.  HEENT: Moist mucous membranes, normal dentition.  Respiratory: decrease air entry at the right base and dull to percussion, bilateral crackles but no wheezing.  Cardiovascular: Irregularly irregular rhythm, normal S1 and S2, and grade 1 systolic murmur .  GI: Soft, non-distended, non-tender, normal bowel sounds.  Lymph/Hematologic: No anterior cervical or supraclavicular adenopathy.  Skin: No rashes, no cyanosis, 1+ edema.  Musculoskeletal: No joint swelling, erythema or tenderness.  Neurologic: Cranial nerves 2-12 intact, normal strength and sensation.  Psychiatric: Alert, oriented to person, place and time, no obvious anxiety or depression.    Data   Data reviewed today:  I personally reviewed the EKG tracing showing Afib with rate of 91, no acute ischemic changes, unchanged as compare to previous EKG.    Recent Labs   Lab 01/11/20 1925   WBC 10.5   HGB 10.1*   MCV 95   PLT 41*      POTASSIUM 4.2   CHLORIDE 102   CO2 26   BUN 28   CR 1.04   ANIONGAP 6   BARON 8.3*   *       Recent  Results (from the past 24 hour(s))   Chest XR,  PA & LAT    Narrative    EXAM: XR CHEST 2 VW  LOCATION: [facility]  DATE/TIME: 1/11/2020 8:27 PM    INDICATION: Cough, fever, shortness of breath  COMPARISON: None.      Impression    IMPRESSION: Multisegment airspace opacity at the right base suspicious for pneumonia. Small bilateral pleural effusions with atelectasis at the bases. No pneumothorax. Normal heart size. Prominent kyphosis.

## 2020-01-12 NOTE — PLAN OF CARE
A/OX3,forgetful at times.Denies pain.LS diminished.Up with 2 assist/walker.Incontinence of bladder. On IV antibiotics.NPO from midnight for thoracentesis tomorrow.Redness blanchable noted on coccyx, mepilex applied,turned and repositioned Q2 hrs.Mepilex applied on mid back,redness blanchable  Noted.Will continue to monitor.

## 2020-01-12 NOTE — PLAN OF CARE
Discharge Planner PT   Patient plan for discharge: back to SNF  Current status: PT orders received, chart reviewed, eval completed and treatment initiated. Pt is an 88 year old year old male admitted 1/11 for R lower lobe pneumonia, R pleural effusion. PMHx signficant for CKD, CHF, HTN, cardiomyopathy. Pt previously living with wife at a skilled nursing facility ([facility]) where he received 2 hrs PT/OT daily. Prior level of mobility including ambulating with FWW and Ax1, assist with ADLs as needed.     Currently, pt rolling in bed independently and transitioning supine<>sit requiring SBA with additional time. Sit<>stand with Josh, additional time, verbal cues for UE placement, use of FWW. Amb 2 ft fwd/bwd requiring modA, FWW, verbal cues for upright posture, and distance limited by concern for safety. SpO2 stable at 98% on 2L O2, 94% at rest on RA, significant desat to 85% with activity noted on RA requiring 2L O2 and verbal cues for PLB to recover to 92%.    Pt supine in bed with 2L O2 via nc, needs within reach, alarm on, and spouse in room upon PT exit.  Barriers to return to prior living situation: medical status  Recommendations for discharge: return to SNF  Rationale for recommendations: Patient is significantly below baseline activity tolerance, LE strength, and level of independence with mobility. He will require further skilled PT services at time of discharge which he was receiving prior to hospital admission. Recommend Ax2 for ambulating short distances with nursing staff (1 physical assist, 1 wheelchair follow) due to high fall risk.        Entered by: Racheal Hadley 01/12/2020 2:59 PM

## 2020-01-12 NOTE — ED NOTES
[facility]  ED Nurse Handoff Report    ED Chief complaint: Shortness of Breath and Fever      ED Diagnosis:   Final diagnoses:   None       Code Status: as per hospitalist    Allergies: No Known Allergies    Patient Story: yesterday he finished a course of antibiotics for pneumonia. Today developed sudden difficulty breathing at 1800, nursing home staff noticed a fever of 101.  Focused Assessment:  Brought in on stretcher, A&Ox4, at arrival respirations labored on 4L of O2 via NC. Skin dry, warm, color wnl. Occasional dry cough noted, diminished lower lobes breaths sounds.   Hx. Of A-fib, CHF, HTN     Treatments and/or interventions provided: CXR done, will start on IV antibiotics   Labs Ordered and Resulted from Time of ED Arrival Up to the Time of Departure from the ED   CBC WITH PLATELETS DIFFERENTIAL - Abnormal; Notable for the following components:       Result Value    RBC Count 3.32 (*)     Hemoglobin 10.1 (*)     Hematocrit 31.4 (*)     RDW 20.4 (*)     Platelet Count 41 (*)     Absolute Neutrophil 8.8 (*)     Absolute Lymphocytes 0.4 (*)     All other components within normal limits   BASIC METABOLIC PANEL - Abnormal; Notable for the following components:    Glucose 114 (*)     Calcium 8.3 (*)     All other components within normal limits   ISTAT  GASES LACTATE ROOSEVELT POCT - Abnormal; Notable for the following components:    PCO2 Venous 39 (*)     PO2 Venous 24 (*)     All other components within normal limits   ISTAT CG4 GASES LACTATE ROOSEVELT NURSING POCT   NURSING DRAW AND HOLD   BLOOD CULTURE   BLOOD CULTURE   INFLUENZA A/B ANTIGEN     Patient's response to treatments and/or interventions: pt sats 92-94% on RA. Pt stable     To be done/followed up on inpatient unit:  continue iv antibiotics    Does this patient have any cognitive concerns?: n/a    Activity level - Baseline/Home:  Stand with assist x2  Activity Level - Current:   Stand with assist x2    Patient's Preferred language:  English   Needed?: No    Isolation: None  Infection: Not Applicable  Other   Bariatric?: No    Vital Signs:   Vitals:    01/11/20 1912 01/11/20 2100   BP: 103/72 96/67   Pulse: 91 79   Resp: 20 24   Temp: 98.3  F (36.8  C)    TempSrc: Oral    SpO2: 92% 91%   Weight: 70.8 kg (156 lb)        Cardiac Rhythm:     Was the PSS-3 completed:   Yes  What interventions are required if any?               Family Comments: wife and daughter at bedside  OBS brochure/video discussed/provided to patient/family: N/A                For the majority of the shift this patient's behavior was Green.   Behavioral interventions performed were n/a.    ED NURSE PHONE NUMBER: [phone]

## 2020-01-12 NOTE — H&P
Admitted:     01/11/2020      HISTORY OF PRESENT ILLNESS:  This is an 88-year-old male well known to me because of his prior admissions with history of hypertension, atrial fibrillation, nonischemic cardiomyopathy, congestive heart failure, idiopathic thrombocytopenic purpura, recurrent right-sided pleural effusion, mild to moderate mitral regurgitation and moderate to severe tricuspid regurgitation, was in a care facility, recently moved to their TCU because of worsening shortness of breath, and now sent here because of shortness of breath not improving and possibly had some fever today.      According to the patient, he is not feeling too good for the last 1 week, when he has started getting shortness of breath and coughing and feeling weak and tired.  He is trying to drink a lot of water, 6-8 glasses of water daily and has been on torsemide and recently increased the dose as per the patient, but changed the medications.  He does not remember what medication.  He has been started on Augmentin, and he just finished the course of Augmentin today, a 7-day course since 01/03.  He spiked fever today.  The patient does not remember, but the wife told me that they have been told that he had a fever today, but does not know how high was the fever.  Given his symptoms are not getting better, started having fever, he was sent to the ER for evaluation.      In the ER, he had a chest x-ray done, which was suggestive of right-sided infiltrate, so the Hospitalist Service was consulted to admit the patient.      ASSESSMENT AND PLAN:   1.  Possible healthcare-associated pneumonia:  This is an 88-year-old male with multiple comorbidities, recent admission in the hospital in November, now presented with fever, shortness of breath.  Chest x-ray does suggest some sort of multi-segment airspace opacity suspicious for pneumonia, but I think from my examination, he is more likely to be recurrent pleural effusion and compressive  atelectasis, but cannot completely rule out the pneumonia at this time.  I will go ahead and check his procalcitonin and do the CT scan of chest without contrast.  If no pneumonia in the CT scan and procalcitonin comes back low, we can discontinue the antibiotics.  For now, given his risk factors, we will put him on vancomycin and Zosyn, as he already completed 1-week course of Augmentin, and he was in the TCU and was in the hospital in November, so we will treat him for possible healthcare-associated pneumonia.  We will send MRSA screen.  If it does come back negative, we can discontinue vancomycin.  Try to obtain sputum from Gram stain culture, and blood cultures already obtained in the ER.   2.  Right-sided pleural effusion:  The patient most likely has recurrent right-sided pleural effusion.  He will benefit from thoracentesis, as he had in the past.  I will order the ultrasound-guided thoracentesis.  That can be done if not Sunday then most likely on Monday and send the fluid for analysis.   3.  Acute on chronic diastolic congestive heart failure in a patient with significant severe tricuspid regurgitation and mild to moderate mitral regurgitation:  He is on torsemide 20 mg at home.  I will hold the torsemide for now and start him on Lasix 40 mg IV b.i.d. as he has bilateral crackles, lower extremity edema and mildly elevated JVD.   4.  Atrial fibrillation, rate controlled at this time.  He is on digoxin, metoprolol.  I will continue with that.     5.  Idiopathic thrombocytic purpura:  The patient's platelets are 41,000.  Stable.  No active bleeding.   6.  Chronic kidney disease, stage III, stable.   7.  Hypertension:  The patient had an issue with the blood pressure in the past.  His blood pressure was on the lower side before.  I will hold the metoprolol and Lasix with holding parameters.   7.  Deep venous thrombosis prophylaxis with SCDs given thrombocytopenia.      CODE STATUS:  I had a detailed discussion  with the patient regarding his code status, and he wanted to be full code at this time.      The case was discussed with the ER physician, patient and the patient's family, and all questions were answered.         ANDREA PERALTA MD             D: 2020   T: 2020   MT: BIA      Name:     YINKA GONZALEZ   MRN:      [id number]        Account:      [id number]   :      [id number]        Admitted:     2020                   Document: [id number]       cc: Mariusz Shields MD

## 2020-01-12 NOTE — ED TRIAGE NOTES
Pt from [facility] with difficulty breathing since 6pm and fever of 101. Staff gave him tylenol 650mg. Recently diagnosed with pneumonia, finished oral antibiotics yesterday.  Hx of a-fib, CHF, HTN

## 2020-01-12 NOTE — PROGRESS NOTES
.RECEIVING UNIT ED HANDOFF REVIEW    ED Nurse Handoff Report was reviewed by: Cl Davies RN on January 11, 2020 at 10:27 PM

## 2020-01-12 NOTE — PHARMACY-ADMISSION MEDICATION HISTORY
Pharmacy Medication History  Admission medication history interview status for the 1/11/2020  admission is complete. See EPIC admission navigator for prior to admission medications     Medication history sources: Patient  Medication history source reliability: Good  Adherence assessment: Good    Significant changes made to the medication list:  none      Additional medication history information:   Bedtime is 2100    Medication reconciliation completed by provider prior to medication history? Yes    Time spent in this activity: 15min      Prior to Admission medications    Medication Sig Last Dose Taking? Auth Provider   albuterol (PROVENTIL) (2.5 MG/3ML) 0.083% neb solution Take 2.5 mg by nebulization every 4 hours as needed for shortness of breath / dyspnea or wheezing 1 vial inhale orally via nebulizer every 4 hours as needed for SOB, WHEEZING for 10 Days AND 1 vial inhale orally via nebulizer three times a day for SOB, WHEEZING for 10 Days Past Month at Unknown time Yes Reported, Patient   amoxicillin-clavulanate (AUGMENTIN) 500-125 MG tablet Take 1 tablet by mouth 3 times daily Give 1 tablet by mouth three times a day for PNEUMONIA until 01/11/2020 23:59 1/11/2020 at done today Yes Reported, Patient   digoxin (LANOXIN) 125 MCG tablet Take 1 tablet (125 mcg) by mouth daily  Patient taking differently: Take 125 mcg by mouth daily HOLD if HR <55 1/11/2020 at am Yes Pato Benedict MD   melatonin 3 MG tablet Take 3 mg by mouth At Bedtime After 9pm. Please use overnight depends on pt to allow him to sleep without interruption during the night 1/10/2020 at Unknown time Yes Unknown, Entered By History   metoprolol succinate ER (TOPROL-XL) 50 MG 24 hr tablet Take 50 mg by mouth 2 times daily HOLD if SBP <110 OR HR < 55. Call if held x 2 in a row symptomatic 1/11/2020 at am Yes Unknown, Entered By History   mirtazapine (REMERON) 15 MG tablet Take 15 mg by mouth At Bedtime Patient started medication for the first time  yesterday 10/29/2019.  1/10/2020 at hs Yes Unknown, Entered By History   Multiple Vitamins-Minerals (ICAPS AREDS FORMULA PO) Take 1 tablet by mouth daily Takes in addition to multivitamin with minerals 1/11/2020 at am Yes Unknown, Entered By History   torsemide (DEMADEX) 10 MG tablet Take 1 tablet (10 mg) by mouth daily 1/11/2020 at am Yes Pato Benedict MD   Vitamin D, Cholecalciferol, 1000 UNITS TABS Take 3,000 Units by mouth daily  Past Month at Unknown time Yes Reported, Patient   acetaminophen (TYLENOL) 325 MG tablet Take 650 mg by mouth 2 times daily every 6 hours as needed for Pain prn  Reported, Patient   UNABLE TO FIND incentive spirometer every hour while awake   Reported, Patient

## 2020-01-12 NOTE — PLAN OF CARE
A&O. VSS ex soft bp. Tele a fib CVR. LS dim. Nonproductive cough. Low fat, <2400mg sodium, no caffine diet. MRSA nasal swab collected, sent to lab. Incontinent.

## 2020-01-12 NOTE — PROGRESS NOTES
[facility]  Medicine Progress Note - Hospitalist Service       Date of Admission:  1/11/2020  Assessment & Plan   Jama Paul is an 88-year-old male with history of HTN, Chronic atrial fibrillation, nonischemic cardiomyopathy, chronic thrombocytopenia, recurrent right-sided pleural effusion, mild to moderate mitral regurgitation and moderate to severe tricuspid regurgitation, was sent to ER from the TCU for evaluation of worsening shortness of breath and possible fever.     Large Right sided pleural effusion, recurrent  Possible HCAP  Recent hospitalization 10/30-11/17, now presented with fever, shortness of breath.  Chest x-ray suspicious for pneumonia, but could be pleural effusion and compressive atelectasis.  WBC count normal and procalcitonin negative.  CT chest shows large multiloculated right-sided pleural effusion.  Had prior right-sided effusions needing thoracentesis x2 in previous hospitalization >2L drained each time.  Noted transudative at that point.  -IV vancomycin and Zosyn was restarted on admission, discontinue vancomycin.  If no sputum, or blood cultures remain negative, discontinued Zosyn as well in next 24-48 hours.  -I believe his dyspnea is more due to recurrent right-sided pleural effusion of unclear etiology.  He needs thoracentesis.  Given his <50k platelet count, will transfuse with platelets and perform thoracentesis.  Not in respiratory distress and hypoxia is not severe so and tomorrow.  He may need thoracic surgery evaluation given loculated fluid.  Send fluid for analysis including cytology, though this was negative in the past.  -I will get right upper quadrant ultrasound as well for evaluation.  Patient has elevated INR and chronic unexplained thrombocytopenia.  Evaluate for cirrhosis as well.  Meanwhile vitamin K 10 mg p.o. daily x2 since thoracentesis planned.  -PTA diuretic changed see below     Acute on chronic diastolic congestive heart failure  Severe  tricuspid regurgitation and mild to moderate mitral regurgitation  Chronic atrial fibrillation   HTN  PTA is on torsemide 10 mg p.o. daily.  Torsemide held and started on IV Lasix on admission  -Continue Lasix 40 mg IV twice daily  -Continue to monitor intake and output, daily weight and follow BMP.   -Patient has extensive work-up including KATIE and has been evaluated by CV surgery in the past, deemed too high risk for trial valve replacement surgery. And he is following with cardiology to consider MV/TV clip.  -If his dyspnea does not improve after thoracentesis and diuresis, will consider cardiology consult.  -Rate controlled, PTA digoxin and metoprolol continued.  Given his chronic thrombocytopenia, he is not on anticoagulation.     Chronic thrombocytopenia:  The patient's platelets are 41,000.  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  He was evaluated by heme-onc.  He did not respond to IV steroid and thought he is he has MDS  -Slight drop in platelet count noted, no sign of bleeding.  Since thoracentesis planned tomorrow, will transfuse with platelets in the morning.    Chronic kidney disease, stage III, stable.   -Monitor BMP while diuresing.        Diet: Combination Diet Low Saturated Fat Na <2400mg Diet, No Caffeine Diet    DVT Prophylaxis: Pneumatic Compression Devices  Mccullough Catheter: not present  Code Status: Full Code      Disposition Plan   Expected discharge: 2+ days, recommended to TBD once pleural effusion tapped and respiratory status improves.  Entered: Alpa Myrick MD 01/12/2020, 3:22 PM       The patient's care was discussed with the Bedside Nurse and Patient.    Alpa Myrick MD  Hospitalist Service  [facility]    ______________________________________________________________________    Interval History   Patient reports he feels better after being on supplemental oxygen.  Denies cough.  Afebrile.  No nausea or diarrhea.  Feels tired and dyspnea worsens with minimal activity.  No  hematochezia or melena.    Data reviewed today: I reviewed all medications, new labs and imaging results over the last 24 hours. I personally reviewed CT chest noncontrast report was reviewed, loculated pleural effusion on right, moderate to large, with associated atelectasis versus pneumonia.    Physical Exam   Vital Signs: Temp: 98.9  F (37.2  C) Temp src: Oral BP: 106/62 Pulse: 92 Heart Rate: 69 Resp: 16 SpO2: 90 % O2 Device: None (Room air) Oxygen Delivery: 4 LPM(Simultaneous filing. User may not have seen previous data.)  Weight: 152 lbs 0 oz    General: AAOx3, very pleasant, does not appear to be in distress.  HEENT: PERRLA EOMI. Mucosa moist.   Lungs: Very diminished to absent breath sounds posteriorly especially on right side.  No increased work of breathing.  CVS: S1S2 irregular, no tachycardia, soft systolic murmur noted.   Abdomen: Soft, NT, ND. BS heard.  MSK: trace edema, no deformities.  Compression stockings in place.  Neuro: AAOX3. CN 2-12 normal. Strength symmetrical.  Skin: No rash.  Appears pale    Data   Recent Labs   Lab 01/12/20  0637 01/11/20  1925   WBC 9.3 10.5   HGB 9.8* 10.1*   MCV 95 95   PLT 33* 41*   INR 1.64*  --     134   POTASSIUM 3.8 4.2   CHLORIDE 106 102   CO2 27 26   BUN 26 28   CR 1.00 1.04   ANIONGAP 4 6   BARON 8.1* 8.3*   * 114*   ALBUMIN 2.9*  --    PROTTOTAL 6.0*  --    BILITOTAL 1.3  --    ALKPHOS 39*  --    ALT 15  --    AST 10  --      Recent Results (from the past 24 hour(s))   Chest XR,  PA & LAT    Narrative    EXAM: XR CHEST 2 VW  LOCATION: [facility]  DATE/TIME: 1/11/2020 8:27 PM    INDICATION: Cough, fever, shortness of breath  COMPARISON: None.      Impression    IMPRESSION: Multisegment airspace opacity at the right base suspicious for pneumonia. Small bilateral pleural effusions with atelectasis at the bases. No pneumothorax. Normal heart size. Prominent kyphosis.   CT Chest w/o Contrast    Narrative    EXAM: CT CHEST WITHOUT  CONTRAST  LOCATION: [facility]  DATE/TIME: 1/11/2020 10:14 PM    INDICATION: Shortness of breath. Pleural effusion versus pneumonia.  COMPARISON: 12/08/2017.    TECHNIQUE: CT chest without IV contrast. Multiplanar reformats were obtained. Dose reduction techniques were used.  CONTRAST: None.    FINDINGS:    LUNGS AND PLEURA: Mild emphysematous changes in the lungs. Moderate to large loculated-appearing right pleural effusion and small left pleural effusion. A few patchy opacities are present in bilateral lung bases, adjacent to the effusions.    MEDIASTINUM/AXILLAE: Atherosclerotic calcification in the thoracic aorta and coronary arteries. Mild cardiomegaly. Small hiatal hernia.    UPPER ABDOMEN: Partial visualization of borderline splenomegaly. Partial visualization of a 5 cm cyst in the upper pole of the right kidney.      Impression    IMPRESSION:   1. Moderate to large sized loculated-appearing right pleural effusion and small left pleural effusion.  2. A few patchy opacities in bilateral lung bases, adjacent to the effusions. These most likely represent atelectasis, but pneumonia cannot be excluded.     Medications       acetaminophen  650 mg Oral BID     digoxin  125 mcg Oral Daily     furosemide  40 mg Intravenous BID     melatonin  3 mg Oral At Bedtime     metoprolol succinate ER  50 mg Oral BID     mirtazapine  15 mg Oral At Bedtime     multivitamin  with lutein  1 capsule Oral Daily     phytonadione  10 mg Oral Daily     piperacillin-tazobactam  3.375 g Intravenous Q6H     sodium chloride (PF)  3 mL Intracatheter Q8H

## 2020-01-12 NOTE — PROGRESS NOTES
RECEIVING UNIT ED HANDOFF REVIEW    ED Nurse Handoff Report was reviewed by: Anais Olmedo RN on January 11, 2020 at 9:56 PM

## 2020-01-12 NOTE — PHARMACY-VANCOMYCIN DOSING SERVICE
Pharmacy Vancomycin Initial Note  Date of Service 2020  Patient's  1931  88 year old, male    Indication: [facility] Pneumonia    Current estimated CrCl = Estimated Creatinine Clearance: 49.2 mL/min (based on SCr of 1.04 mg/dL).    Creatinine for last 3 days  2020:  7:25 PM Creatinine 1.04 mg/dL    Recent Vancomycin Level(s) for last 3 days  No results found for requested labs within last 72 hours.      Vancomycin IV Administrations (past 72 hours)                   vancomycin (VANCOCIN) 1,750 mg in sodium chloride 0.9 % 500 mL intermittent infusion (mg) 1,750 mg New Bag 20 2217                Nephrotoxins and other renal medications (From now, onward)    Start     Dose/Rate Route Frequency Ordered Stop    20 1800  vancomycin 1500 mg in 0.9% NaCl 250 ml intermittent infusion 1,500 mg      1,500 mg  over 90 Minutes Intravenous EVERY 24 HOURS 20 2306      20 0800  furosemide (LASIX) injection 40 mg      40 mg  over 1-2 Minutes Intravenous 2 TIMES DAILY. 20 0300  piperacillin-tazobactam (ZOSYN) 3.375 g vial to attach to  mL bag     Note to Pharmacy:  Pharmacy can adjust dose based on renal function.    3.375 g  over 30 Minutes Intravenous EVERY 6 HOURS 20 2134  vancomycin (VANCOCIN) 1,750 mg in sodium chloride 0.9 % 500 mL intermittent infusion      1,750 mg  over 2 Hours Intravenous ONCE 20 2133            Contrast Orders - past 72 hours (72h ago, onward)    None                Plan:  1.  Start vancomycin  1500 mg IV q24h.   2.  Goal Trough Level: 15-20 mg/L   3.  Pharmacy will check trough levels as appropriate in 1-3 Days.    4. Serum creatinine levels will be ordered daily for the first week of therapy and at least twice weekly for subsequent weeks.    5. [place] method utilized to dose vancomycin therapy: Method 1    Raymundo Chi [facility]

## 2020-01-12 NOTE — ED PROVIDER NOTES
History     Chief Complaint:  Shortness of Breath and Fever       HPI   Jama Paul is a 88 year old male with a history of congestive heart failure, atrial fibrillation, and cardiomyopathy who presents with shortness of breath and fever. The patient developed a cough 8 days ago and had a chest xray which showed right lower lobe pneumonia as well as small to moderate sized right pleural effusion. He was started on Augmentin which he finished this morning. He states that he is having chest pain when breathing today along with a fever of 101 since 1800. Staff gave the patient 650 mg tylenol at home. The patient reports being bowel and bladder incontinent at baseline. He denies any diarrhea.     Allergies:  No Known Allergies     Medications:    digoxin   metoprolol succinate ER   mirtazapine   torsemide     Past Medical History:    Congestive heart failure    Elevated PSA   Eye hemorrhage  Non-ischemic cardiomyopathy   Permanent atrial fibrillation   Thrombocytopenia    Unspecified essential hypertension     Past Surgical History:    Bowel obstruction surgery   Bone marrow biopsy   Carpal tunnel release   Heart catheterization   CV left ventriculogram   Herniorrhaphy inguinal   Explore groin     Family History:    Family history reviewed. No pertinent family history.       Social History:  The patient was accompanied to the ED by family member.  Smoking Status: Never Smoker  Smokeless Tobacco: Never Used  Alcohol Use: No  Drug Use: No  PCP: Mariusz Shields     Review of Systems   Constitutional: Positive for fever.   Cardiovascular: Positive for chest pain.   Gastrointestinal: Negative for diarrhea.   All other systems reviewed and are negative.    Physical Exam     Patient Vitals for the past 24 hrs:   BP Temp Temp src Pulse Heart Rate Resp SpO2 Weight   01/11/20 2100 96/67 -- -- 79 85 24 91 % --   01/11/20 1912 103/72 98.3  F (36.8  C) Oral 91 91 20 92 % 70.8 kg (156 lb)        Physical Exam  Nursing  note and vitals reviewed.  Constitutional:  Appears well-developed and well-nourished. Productive sounding cough, no respiratory distress   HENT:   Head:    Atraumatic.   Mouth/Throat:   Oropharynx is clear and moist. No oropharyngeal exudate.   Eyes:    Pupils are equal, round, and reactive to light.   Neck:    Normal range of motion. Neck supple.      No tracheal deviation present. No thyromegaly present.   Cardiovascular:  Normal rate, regular rhythm,  2/6 murmur   Pulmonary/Chest: Bibasilar crackles   Abdominal:   Soft. Bowel sounds are normal. Exhibits no distension and      no mass. There is no tenderness.      There is no rebound and no guarding.   Musculoskeletal:  Exhibits no edema.   Lymphadenopathy:  No cervical adenopathy.   Neurological:   Alert and oriented to person, place, and time.   Skin:    Skin is warm and dry. No rash noted. No pallor.      Emergency Department Course   ECG:  ECG taken at 2035, ECG read at 2042  Atrial fibrillation   Abnormal ECG  Rate 91 bpm. NH interval * ms. QRS duration 88 ms. QT/QTc 376/462 ms. P-R-T axes * 10 -3.     Imaging:  Radiology findings were communicated with the patient and family who voiced understanding of the findings.    Chest XR,  PA & LAT  Final Result  IMPRESSION: Multisegment airspace opacity at the right base suspicious for pneumonia. Small bilateral pleural effusions with atelectasis at the bases. No pneumothorax. Normal heart size. Prominent kyphosis.  Reading per radiology     Laboratory:  Laboratory findings were communicated with the patient and family who voiced understanding of the findings.    Blood Culture x2: Pending     CBC:  WBC 10.5, HGB 10.1 (L), PLT 41 (LL), o/w WNL     Influenza A/B antigen: negative      BMP: Glucose 114 (H), calcium 8.3 (L), o/w WNL (Creatinine: 1.04)     ISTAT gases lactate vishnu POCT: pH: 7.43, PCO2: 39 (L), PO2: 24 (L), Bicarbonate: 26, O2 Sat: 44, Lactic acid: 1.1     Nt probnp inpatient: 4695 (H)    Procalcitonin:  <0.05     Interventions:  2137 0.9% sodium chloride BOLUS 1000 mL IV   2137 Zosyn 3.375 g IV   Vancomycin 1750 mg IV    Emergency Department Course:  Past medical records, nursing notes, and vitals reviewed.    1950 I performed an exam of the patient as documented above.    IV was inserted and blood was drawn for laboratory testing, results above.     The patient was sent for a chest xray while in the emergency department, results above.       2124 I spoke with Dr. Benedict of the Hospitalist service from [facility] regarding patient's presentation, findings, and plan of care.      2129 Patient rechecked and family updated.       Findings and plan explained to the Patient and family who consents to admission. Discussed the patient with Dr. Benedict, who will admit the patient to a medical telemetry bed for further monitoring, evaluation, and treatment.        Impression & Plan     Medical Decision Making:  I found the patient to have acute hospital acquired pneumonia in the right lower lung with small bilateral pleural effusions, chronic renal insufficiency, chronic thrombocytopenia, and anemia. He was therefore given zosyn and vancomycin IV, IV fluids for soft blood pressures and admitted to the medical telemetry floor in the care of the hospitalist Dr. Benedict. There was no sign of myocardial infarction or pulmonary embolism.     Discharge Diagnosis:    ICD-10-CM    1. Hospital-acquired pneumonia J18.9 Nt probnp inpatient    Y95 Nt probnp inpatient     Procalcitonin     Procalcitonin     CANCELED: Nt probnp inpatient     CANCELED: Procalcitonin   2. Bilateral pleural effusion J90    3. Thrombocytopenia (H) D69.6    4. Renal insufficiency N28.9        Disposition:  The patient is admitted into the care of Dr. Benedict.     Scribe Disclosure:  Amrit GUERRERO, am serving as a scribe at 7:50 PM on 1/11/2020 to document services personally performed by Desirae Uribe MD based on my observations and the provider's statements to me.       1/11/2020   Desirae Uribe MD [place], Cheri Lee, MD  01/11/20 3135

## 2020-01-13 ENCOUNTER — APPOINTMENT (OUTPATIENT)
Dept: ULTRASOUND IMAGING | Facility: CLINIC | Age: 85
DRG: 291 | End: 2020-01-13
Attending: INTERNAL MEDICINE
Payer: MEDICARE

## 2020-01-13 ENCOUNTER — APPOINTMENT (OUTPATIENT)
Dept: ULTRASOUND IMAGING | Facility: CLINIC | Age: 85
DRG: 291 | End: 2020-01-13
Attending: HOSPITALIST
Payer: MEDICARE

## 2020-01-13 LAB
ANION GAP SERPL CALCULATED.3IONS-SCNC: 6 MMOL/L (ref 3–14)
APPEARANCE FLD: NORMAL
BASOPHILS # BLD AUTO: 0 10E9/L (ref 0–0.2)
BASOPHILS NFR BLD AUTO: 0.2 %
BLD PROD TYP BPU: NORMAL
BLD PROD TYP BPU: NORMAL
BLD UNIT ID BPU: 0
BLOOD PRODUCT CODE: NORMAL
BPU ID: NORMAL
BUN SERPL-MCNC: 27 MG/DL (ref 7–30)
CALCIUM SERPL-MCNC: 8.1 MG/DL (ref 8.5–10.1)
CHLORIDE SERPL-SCNC: 104 MMOL/L (ref 94–109)
CO2 SERPL-SCNC: 26 MMOL/L (ref 20–32)
COLOR FLD: YELLOW
CREAT SERPL-MCNC: 1.12 MG/DL (ref 0.66–1.25)
DIFFERENTIAL METHOD BLD: ABNORMAL
EOSINOPHIL # BLD AUTO: 0 10E9/L (ref 0–0.7)
EOSINOPHIL NFR BLD AUTO: 0.2 %
ERYTHROCYTE [DISTWIDTH] IN BLOOD BY AUTOMATED COUNT: 20.2 % (ref 10–15)
GFR SERPL CREATININE-BSD FRML MDRD: 58 ML/MIN/{1.73_M2}
GLUCOSE FLD-MCNC: 94 MG/DL
GLUCOSE SERPL-MCNC: 93 MG/DL (ref 70–99)
GRAM STN SPEC: NORMAL
HCT VFR BLD AUTO: 29.2 % (ref 40–53)
HGB BLD-MCNC: 9.4 G/DL (ref 13.3–17.7)
IMM GRANULOCYTES # BLD: 0 10E9/L (ref 0–0.4)
IMM GRANULOCYTES NFR BLD: 0.2 %
INR PPP: 1.52 (ref 0.86–1.14)
LDH FLD L TO P-CCNC: 98 U/L
LYMPHOCYTES # BLD AUTO: 0.9 10E9/L (ref 0.8–5.3)
LYMPHOCYTES NFR BLD AUTO: 12.9 %
LYMPHOCYTES NFR FLD MANUAL: 69 %
MCH RBC QN AUTO: 30.4 PG (ref 26.5–33)
MCHC RBC AUTO-ENTMCNC: 32.2 G/DL (ref 31.5–36.5)
MCV RBC AUTO: 95 FL (ref 78–100)
MONOCYTES # BLD AUTO: 0.5 10E9/L (ref 0–1.3)
MONOCYTES NFR BLD AUTO: 7.9 %
MONOS+MACROS NFR FLD MANUAL: 25 %
NEUTROPHILS # BLD AUTO: 5.2 10E9/L (ref 1.6–8.3)
NEUTROPHILS NFR BLD AUTO: 78.6 %
NEUTS BAND NFR FLD MANUAL: 3 %
NRBC # BLD AUTO: 0 10*3/UL
NRBC BLD AUTO-RTO: 0 /100
NUM BPU REQUESTED: 1
OTHER CELLS FLD MANUAL: 3 %
PH FLD: 7.5 PH
PLATELET # BLD AUTO: 30 10E9/L (ref 150–450)
PLATELET # BLD AUTO: 41 10E9/L (ref 150–450)
POTASSIUM SERPL-SCNC: 3.5 MMOL/L (ref 3.4–5.3)
PROT FLD-MCNC: 3.3 G/DL
RBC # BLD AUTO: 3.09 10E12/L (ref 4.4–5.9)
SODIUM SERPL-SCNC: 136 MMOL/L (ref 133–144)
SPECIMEN SOURCE FLD: NORMAL
SPECIMEN SOURCE: NORMAL
TRANSFUSION STATUS PATIENT QL: NORMAL
TRANSFUSION STATUS PATIENT QL: NORMAL
WBC # BLD AUTO: 6.6 10E9/L (ref 4–11)
WBC # FLD AUTO: 172 /UL

## 2020-01-13 PROCEDURE — 25000128 H RX IP 250 OP 636: Performed by: HOSPITALIST

## 2020-01-13 PROCEDURE — 25000125 ZZHC RX 250: Performed by: HOSPITALIST

## 2020-01-13 PROCEDURE — 25000132 ZZH RX MED GY IP 250 OP 250 PS 637: Mod: GY | Performed by: INTERNAL MEDICINE

## 2020-01-13 PROCEDURE — 00000155 ZZHCL STATISTIC H-CELL BLOCK W/STAIN: Performed by: INTERNAL MEDICINE

## 2020-01-13 PROCEDURE — 27210190 US THORACENTESIS

## 2020-01-13 PROCEDURE — 76705 ECHO EXAM OF ABDOMEN: CPT

## 2020-01-13 PROCEDURE — 88305 TISSUE EXAM BY PATHOLOGIST: CPT | Performed by: INTERNAL MEDICINE

## 2020-01-13 PROCEDURE — 40000863 ZZH STATISTIC RADIOLOGY XRAY, US, CT, MAR, NM

## 2020-01-13 PROCEDURE — 36415 COLL VENOUS BLD VENIPUNCTURE: CPT | Performed by: HOSPITALIST

## 2020-01-13 PROCEDURE — 88305 TISSUE EXAM BY PATHOLOGIST: CPT | Mod: 26 | Performed by: INTERNAL MEDICINE

## 2020-01-13 PROCEDURE — 83615 LACTATE (LD) (LDH) ENZYME: CPT | Performed by: INTERNAL MEDICINE

## 2020-01-13 PROCEDURE — 85025 COMPLETE CBC W/AUTO DIFF WBC: CPT | Performed by: HOSPITALIST

## 2020-01-13 PROCEDURE — 25000125 ZZHC RX 250: Performed by: RADIOLOGY

## 2020-01-13 PROCEDURE — 00000102 ZZHCL STATISTIC CYTO WRIGHT STAIN TC: Performed by: INTERNAL MEDICINE

## 2020-01-13 PROCEDURE — 87075 CULTR BACTERIA EXCEPT BLOOD: CPT | Performed by: INTERNAL MEDICINE

## 2020-01-13 PROCEDURE — 99233 SBSQ HOSP IP/OBS HIGH 50: CPT | Performed by: HOSPITALIST

## 2020-01-13 PROCEDURE — 87015 SPECIMEN INFECT AGNT CONCNTJ: CPT | Performed by: INTERNAL MEDICINE

## 2020-01-13 PROCEDURE — 89051 BODY FLUID CELL COUNT: CPT | Performed by: INTERNAL MEDICINE

## 2020-01-13 PROCEDURE — 0W993ZZ DRAINAGE OF RIGHT PLEURAL CAVITY, PERCUTANEOUS APPROACH: ICD-10-PCS | Performed by: RADIOLOGY

## 2020-01-13 PROCEDURE — 87205 SMEAR GRAM STAIN: CPT | Performed by: INTERNAL MEDICINE

## 2020-01-13 PROCEDURE — 80048 BASIC METABOLIC PNL TOTAL CA: CPT | Performed by: HOSPITALIST

## 2020-01-13 PROCEDURE — 84157 ASSAY OF PROTEIN OTHER: CPT | Performed by: INTERNAL MEDICINE

## 2020-01-13 PROCEDURE — 83986 ASSAY PH BODY FLUID NOS: CPT | Performed by: INTERNAL MEDICINE

## 2020-01-13 PROCEDURE — 88112 CYTOPATH CELL ENHANCE TECH: CPT | Performed by: INTERNAL MEDICINE

## 2020-01-13 PROCEDURE — 12000000 ZZH R&B MED SURG/OB

## 2020-01-13 PROCEDURE — P9037 PLATE PHERES LEUKOREDU IRRAD: HCPCS | Performed by: HOSPITALIST

## 2020-01-13 PROCEDURE — 85610 PROTHROMBIN TIME: CPT | Performed by: HOSPITALIST

## 2020-01-13 PROCEDURE — 82945 GLUCOSE OTHER FLUID: CPT | Performed by: INTERNAL MEDICINE

## 2020-01-13 PROCEDURE — 87070 CULTURE OTHR SPECIMN AEROBIC: CPT | Performed by: INTERNAL MEDICINE

## 2020-01-13 PROCEDURE — 88112 CYTOPATH CELL ENHANCE TECH: CPT | Mod: 26 | Performed by: INTERNAL MEDICINE

## 2020-01-13 PROCEDURE — 85049 AUTOMATED PLATELET COUNT: CPT | Performed by: HOSPITALIST

## 2020-01-13 PROCEDURE — 99207 ZZC CDG-MDM COMPONENT: MEETS MODERATE - UP CODED: CPT | Performed by: HOSPITALIST

## 2020-01-13 PROCEDURE — 25000128 H RX IP 250 OP 636: Performed by: INTERNAL MEDICINE

## 2020-01-13 RX ORDER — LIDOCAINE HYDROCHLORIDE 10 MG/ML
10 INJECTION, SOLUTION EPIDURAL; INFILTRATION; INTRACAUDAL; PERINEURAL ONCE
Status: COMPLETED | OUTPATIENT
Start: 2020-01-13 | End: 2020-01-13

## 2020-01-13 RX ADMIN — LIDOCAINE HYDROCHLORIDE 10 ML: 10 INJECTION, SOLUTION EPIDURAL; INFILTRATION; INTRACAUDAL; PERINEURAL at 12:40

## 2020-01-13 RX ADMIN — MIRTAZAPINE 15 MG: 15 TABLET, FILM COATED ORAL at 21:12

## 2020-01-13 RX ADMIN — MELATONIN 3 MG: 3 TAB ORAL at 21:12

## 2020-01-13 RX ADMIN — FUROSEMIDE 40 MG: 10 INJECTION, SOLUTION INTRAMUSCULAR; INTRAVENOUS at 08:21

## 2020-01-13 RX ADMIN — PIPERACILLIN AND TAZOBACTAM 3.38 G: 3; .375 INJECTION, POWDER, LYOPHILIZED, FOR SOLUTION INTRAVENOUS at 21:12

## 2020-01-13 RX ADMIN — FUROSEMIDE 40 MG: 10 INJECTION, SOLUTION INTRAMUSCULAR; INTRAVENOUS at 15:12

## 2020-01-13 RX ADMIN — METOPROLOL SUCCINATE 50 MG: 50 TABLET, EXTENDED RELEASE ORAL at 08:20

## 2020-01-13 RX ADMIN — PIPERACILLIN AND TAZOBACTAM 3.38 G: 3; .375 INJECTION, POWDER, LYOPHILIZED, FOR SOLUTION INTRAVENOUS at 15:12

## 2020-01-13 RX ADMIN — DIGOXIN 125 MCG: 125 TABLET ORAL at 08:20

## 2020-01-13 RX ADMIN — PHYTONADIONE 10 MG: 10 INJECTION, EMULSION INTRAMUSCULAR; INTRAVENOUS; SUBCUTANEOUS at 08:31

## 2020-01-13 RX ADMIN — PIPERACILLIN AND TAZOBACTAM 3.38 G: 3; .375 INJECTION, POWDER, LYOPHILIZED, FOR SOLUTION INTRAVENOUS at 03:19

## 2020-01-13 RX ADMIN — PIPERACILLIN AND TAZOBACTAM 3.38 G: 3; .375 INJECTION, POWDER, LYOPHILIZED, FOR SOLUTION INTRAVENOUS at 08:20

## 2020-01-13 ASSESSMENT — ACTIVITIES OF DAILY LIVING (ADL)
SWALLOWING: 0-->SWALLOWS FOODS/LIQUIDS WITHOUT DIFFICULTY
COGNITION: 0 - NO COGNITION ISSUES REPORTED
RETIRED_EATING: 0-->INDEPENDENT
ADLS_ACUITY_SCORE: 28
ADLS_ACUITY_SCORE: 26
ADLS_ACUITY_SCORE: 26
ADLS_ACUITY_SCORE: 28
DRESS: 0-->INDEPENDENT

## 2020-01-13 NOTE — PROGRESS NOTES
Call to Dr Coleman and OK to proceed with Thoracentesis after recheck of platelet count  Of 41,000.  US staff aware and will call pt down for procedure.

## 2020-01-13 NOTE — PLAN OF CARE
A&O. VSS ex soft bp. Tele a fib CVR. LS dim. Denies pain. Nonproductive cough. NPO. A2 walker/GB. Incontinent. Plan for thoracentesis today.

## 2020-01-13 NOTE — PROGRESS NOTES
[facility]  Medicine Progress Note - Hospitalist Service       Date of Admission:  1/11/2020  Assessment & Plan   Jama Paul is an 88-year-old male with history of HTN, Chronic atrial fibrillation, nonischemic cardiomyopathy, chronic thrombocytopenia, recurrent right-sided pleural effusion, mild to moderate mitral regurgitation and moderate to severe tricuspid regurgitation, was sent to ER from the TCU for evaluation of worsening shortness of breath and possible fever.     Large Right sided pleural effusion, recurrent  Possible HCAP  Recent hospitalization 10/30-11/17, now presented with fever, shortness of breath.  Chest x-ray suspicious for pneumonia, but could be pleural effusion and compressive atelectasis.  WBC count normal and procalcitonin negative.  CT chest shows large multiloculated right-sided pleural effusion.  Had prior right-sided effusions needing thoracentesis x2 in previous hospitalization >2L drained each time.  Noted transudative at that point.  -IV vancomycin and Zosyn was restarted on admission, discontinue vancomycin. If no sputum, blood cultures remain negative, afebrile and pleural effusion is transudative, discontinue Zosyn as well in next 24 hours. MRSA nares engative  -I believe his dyspnea is more due to recurrent right-sided pleural effusion of unclear etiology. He needs thoracentesis. Given his <50k platelet count, transfusing platelets and perform thoracentesis later today when count is>50. He may need thoracic surgery evaluation given loculated fluid if unable to tap.  Send fluid for analysis including cytology, though this was negative in the past.  -I will get right upper quadrant ultrasound as well for evaluation. Patient has elevated INR and chronic unexplained thrombocytopenia. Evaluate for cirrhosis as well.  Meanwhile vitamin K 10 mg p.o. daily x2 since thoracentesis planned. INR 1.52 today.  -PTA diuretic changed see below     Acute on chronic diastolic  congestive heart failure  Severe tricuspid regurgitation and mild to moderate mitral regurgitation  Chronic atrial fibrillation   HTN  PTA is on torsemide 10 mg p.o. daily.  Torsemide held and started on IV Lasix on admission  -Continue Lasix 40 mg IV twice daily  -Continue to monitor intake and output, daily weight and follow BMP.   -Patient has extensive work-up including KATIE and has been evaluated by CV surgery in the past, deemed too high risk for valve replacement surgery. And he is following with cardiology to consider MV/TV clip.  -If his dyspnea does not improve after thoracentesis and diuresis, will consider cardiology consult.  -Rate controlled, PTA digoxin and metoprolol continued.  Given his chronic thrombocytopenia, he is not on anticoagulation.     Chronic thrombocytopenia:  The patient's platelets are 41,000.  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  He was evaluated by Dr Ma (from heme-onc) 2 months ago. 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  Since thoracentesis plan, transfusing with platelets. If count does not come up appropriately , will consult Hematology.     Chronic kidney disease, stage III, stable.   -Monitor BMP while diuresing.      Diet: NPO for Medical/Clinical Reasons Except for: Meds, Other; Specify: NPO after MN for US abd tomorrow, can resume diet after US.    DVT Prophylaxis: Pneumatic Compression Devices  Mccullough Catheter: not present  Code Status: Full Code      Disposition Plan   Expected discharge: 2 - 3 days, recommended to TBD once pleural effusion tapped and respiratory status improves. Counts stable.   Entered: Alpa Myrick MD 01/13/2020, 9:13 AM       The patient's care was discussed with the Bedside Nurse and Patient.    Alpa Myrick MD  Hospitalist Service  [facility]    ______________________________________________________________________    Interval History    Denies dyspnea as long as he is resting. On room air. No fever. Cultures remain negative.        Data reviewed today: I reviewed all medications, new labs and imaging results over the last 24 hours. I personally reviewed no imaging tody.    Physical Exam   Vital Signs: Temp: 98.1  F (36.7  C) Temp src: Oral BP: 101/61 Pulse: 63 Heart Rate: 87 Resp: 16 SpO2: 93 % O2 Device: None (Room air)    Weight: 151 lbs 0 oz    General: AAOx3, very pleasant, does not appear to be in distress.  HEENT: PERRLA EOMI. Mucosa moist.   Lungs: Very diminished to absent breath sounds posteriorly especially on right side.  No increased work of breathing. On room air.  CVS: S1S2 irregular, no tachycardia, soft systolic murmur noted.   Abdomen: Soft, NT, ND. BS heard.  MSK: trace edema, no deformities.  Compression stockings in place.  Neuro: AAOX3. CN 2-12 normal. Strength symmetrical.  Skin: No rash.  Appears pale.    Data   Recent Labs   Lab 01/13/20  0640 01/12/20  0637 01/11/20  1925   WBC 6.6 9.3 10.5   HGB 9.4* 9.8* 10.1*   MCV 95 95 95   PLT 30* 33* 41*   INR 1.52* 1.64*  --     137 134   POTASSIUM 3.5 3.8 4.2   CHLORIDE 104 106 102   CO2 26 27 26   BUN 27 26 28   CR 1.12 1.00 1.04   ANIONGAP 6 4 6   BARON 8.1* 8.1* 8.3*   GLC 93 101* 114*   ALBUMIN  --  2.9*  --    PROTTOTAL  --  6.0*  --    BILITOTAL  --  1.3  --    ALKPHOS  --  39*  --    ALT  --  15  --    AST  --  10  --      No results found for this or any previous visit (from the past 24 hour(s)).  Medications       acetaminophen  650 mg Oral BID     digoxin  125 mcg Oral Daily     furosemide  40 mg Intravenous BID     melatonin  3 mg Oral At Bedtime     metoprolol succinate ER  50 mg Oral BID     mirtazapine  15 mg Oral At Bedtime     multivitamin  with lutein  1 capsule Oral Daily     piperacillin-tazobactam  3.375 g Intravenous Q6H     sodium chloride (PF)  3 mL Intracatheter Q8H

## 2020-01-13 NOTE — PROCEDURES
[facility]    Procedure: Right thoracentesis  Date/Time: 1/13/2020 12:45 PM  Performed by: Janice Coleman DO  Authorized by: Janice Coleman DO     UNIVERSAL PROTOCOL   Site Marked: NA  Prior Images Obtained and Reviewed:  Yes  Required items: Required blood products, implants, devices and special equipment available    Patient identity confirmed:  Verbally with patient, arm band, provided demographic data and hospital-assigned identification number  Patient was reevaluated immediately before administering moderate or deep sedation or anesthesia  Confirmation Checklist:  Patient's identity using two indicators, relevant allergies, procedure was appropriate and matched the consent or emergent situation and correct equipment/implants were available  Time out: Immediately prior to the procedure a time out was called    Universal Protocol: the Joint Commission Universal Protocol was followed    Preparation: Patient was prepped and draped in usual sterile fashion           ANESTHESIA    Anesthesia: Local infiltration  Local Anesthetic:  Lidocaine 1% without epinephrine      SEDATION    Patient Sedated: No    Sedation Type:  Moderate (conscious) sedation  Sedation:  Fentanyl and midazolam  See dictated procedure note for full details.  Findings: Right thoracentesis, tolerated well    Specimens: none    Complications: None    Condition: Stable    Plan: Repeat as needed.     PROCEDURE   Patient Tolerance:  Patient tolerated the procedure well with no immediate complications    Length of time physician/provider present for 1:1 monitoring during sedation: 0

## 2020-01-13 NOTE — CONSULTS
SW:  D:  Per social work consult for discharge planning.  Patient was admitted on 1-11-20  With CHF exacerbation.  The tentative date of discharge is yet to be determined.  Reviewed chart.  Patient was admitted from [facility].  Upon patient's last discharge patient was sent to the TCU at [facility].  Unsure if patient is still at the TCU or if he has since returned back to his apartment.  Initial referral sent to [facility], via discharge on the double, to check the bed hold status and to begin the discharge planning process.  P:  Will continue to follow.      JASMINA Casey, [facility]  Lead   [phone]  [facility]

## 2020-01-13 NOTE — PLAN OF CARE
Discharge Planner OT   Patient plan for discharge: TCU  Current status: Pt is an 88 year old year old male admitted 1/11 for R lower lobe pneumonia, R pleural effusion. PMHx signficant for CKD, CHF, HTN, cardiomyopathy. Pt previously living with wife at a skilled nursing facility ([facility]) where he received 2 hrs PT/OT daily. Prior level of mobility including ambulating with FWW and Ax1, assist with ADLs as needed.  Barriers to return to prior living situation: Defer to PT  Recommendations for discharge: Defer to PT  Rationale for recommendations: Pt has no IP OT needs. Orders complete       Entered by: Maxine Meadows 01/13/2020 1:51 PM

## 2020-01-13 NOTE — PROGRESS NOTES
US guided right thoracentesis completed per Dr Coleman. VSS. Drained 2200 ml yellow fluid. Site D/I. Back to floor. Report called to RN.

## 2020-01-14 ENCOUNTER — APPOINTMENT (OUTPATIENT)
Dept: CARDIOLOGY | Facility: CLINIC | Age: 85
DRG: 291 | End: 2020-01-14
Attending: PHYSICIAN ASSISTANT
Payer: MEDICARE

## 2020-01-14 ENCOUNTER — APPOINTMENT (OUTPATIENT)
Dept: PHYSICAL THERAPY | Facility: CLINIC | Age: 85
DRG: 291 | End: 2020-01-14
Payer: MEDICARE

## 2020-01-14 LAB
ACANTHOCYTES BLD QL SMEAR: SLIGHT
ANION GAP SERPL CALCULATED.3IONS-SCNC: 5 MMOL/L (ref 3–14)
ANISOCYTOSIS BLD QL SMEAR: ABNORMAL
BASOPHILS # BLD AUTO: 0 10E9/L (ref 0–0.2)
BASOPHILS NFR BLD AUTO: 0.3 %
BUN SERPL-MCNC: 27 MG/DL (ref 7–30)
CALCIUM SERPL-MCNC: 8 MG/DL (ref 8.5–10.1)
CHLORIDE SERPL-SCNC: 102 MMOL/L (ref 94–109)
CO2 SERPL-SCNC: 29 MMOL/L (ref 20–32)
CREAT SERPL-MCNC: 1.21 MG/DL (ref 0.66–1.25)
DIFFERENTIAL METHOD BLD: ABNORMAL
EOSINOPHIL # BLD AUTO: 0 10E9/L (ref 0–0.7)
EOSINOPHIL NFR BLD AUTO: 0.1 %
ERYTHROCYTE [DISTWIDTH] IN BLOOD BY AUTOMATED COUNT: 19.7 % (ref 10–15)
GFR SERPL CREATININE-BSD FRML MDRD: 53 ML/MIN/{1.73_M2}
GLUCOSE SERPL-MCNC: 96 MG/DL (ref 70–99)
HCT VFR BLD AUTO: 30.1 % (ref 40–53)
HGB BLD-MCNC: 9.6 G/DL (ref 13.3–17.7)
IMM GRANULOCYTES # BLD: 0 10E9/L (ref 0–0.4)
IMM GRANULOCYTES NFR BLD: 0 %
INR PPP: 1.46 (ref 0.86–1.14)
LDH SERPL L TO P-CCNC: 107 U/L (ref 85–227)
LYMPHOCYTES # BLD AUTO: 0.3 10E9/L (ref 0.8–5.3)
LYMPHOCYTES NFR BLD AUTO: 4.7 %
MCH RBC QN AUTO: 30.1 PG (ref 26.5–33)
MCHC RBC AUTO-ENTMCNC: 31.9 G/DL (ref 31.5–36.5)
MCV RBC AUTO: 94 FL (ref 78–100)
MONOCYTES # BLD AUTO: 0.9 10E9/L (ref 0–1.3)
MONOCYTES NFR BLD AUTO: 11.7 %
NEUTROPHILS # BLD AUTO: 6 10E9/L (ref 1.6–8.3)
NEUTROPHILS NFR BLD AUTO: 83.2 %
NRBC # BLD AUTO: 0 10*3/UL
NRBC BLD AUTO-RTO: 0 /100
OVALOCYTES BLD QL SMEAR: ABNORMAL
PLATELET # BLD AUTO: 43 10E9/L (ref 150–450)
PLATELET # BLD EST: ABNORMAL 10*3/UL
POTASSIUM SERPL-SCNC: 3.1 MMOL/L (ref 3.4–5.3)
POTASSIUM SERPL-SCNC: 3.9 MMOL/L (ref 3.4–5.3)
PROT SERPL-MCNC: 6.6 G/DL (ref 6.8–8.8)
RBC # BLD AUTO: 3.19 10E12/L (ref 4.4–5.9)
SODIUM SERPL-SCNC: 136 MMOL/L (ref 133–144)
WBC # BLD AUTO: 7.3 10E9/L (ref 4–11)

## 2020-01-14 PROCEDURE — 93325 DOPPLER ECHO COLOR FLOW MAPG: CPT | Mod: 26 | Performed by: INTERNAL MEDICINE

## 2020-01-14 PROCEDURE — 80048 BASIC METABOLIC PNL TOTAL CA: CPT | Performed by: HOSPITALIST

## 2020-01-14 PROCEDURE — 93321 DOPPLER ECHO F-UP/LMTD STD: CPT | Mod: 26 | Performed by: INTERNAL MEDICINE

## 2020-01-14 PROCEDURE — 25000128 H RX IP 250 OP 636: Performed by: INTERNAL MEDICINE

## 2020-01-14 PROCEDURE — 12000000 ZZH R&B MED SURG/OB

## 2020-01-14 PROCEDURE — 85025 COMPLETE CBC W/AUTO DIFF WBC: CPT | Performed by: HOSPITALIST

## 2020-01-14 PROCEDURE — 36415 COLL VENOUS BLD VENIPUNCTURE: CPT | Performed by: INTERNAL MEDICINE

## 2020-01-14 PROCEDURE — 84132 ASSAY OF SERUM POTASSIUM: CPT | Performed by: INTERNAL MEDICINE

## 2020-01-14 PROCEDURE — 99232 SBSQ HOSP IP/OBS MODERATE 35: CPT | Performed by: INTERNAL MEDICINE

## 2020-01-14 PROCEDURE — 93325 DOPPLER ECHO COLOR FLOW MAPG: CPT

## 2020-01-14 PROCEDURE — 83615 LACTATE (LD) (LDH) ENZYME: CPT | Performed by: INTERNAL MEDICINE

## 2020-01-14 PROCEDURE — 84155 ASSAY OF PROTEIN SERUM: CPT | Performed by: INTERNAL MEDICINE

## 2020-01-14 PROCEDURE — 97530 THERAPEUTIC ACTIVITIES: CPT | Mod: GP | Performed by: PHYSICAL THERAPY ASSISTANT

## 2020-01-14 PROCEDURE — 99222 1ST HOSP IP/OBS MODERATE 55: CPT | Performed by: INTERNAL MEDICINE

## 2020-01-14 PROCEDURE — 25000132 ZZH RX MED GY IP 250 OP 250 PS 637: Mod: GY | Performed by: INTERNAL MEDICINE

## 2020-01-14 PROCEDURE — 93308 TTE F-UP OR LMTD: CPT | Mod: 26 | Performed by: INTERNAL MEDICINE

## 2020-01-14 PROCEDURE — 85610 PROTHROMBIN TIME: CPT | Performed by: HOSPITALIST

## 2020-01-14 PROCEDURE — 36415 COLL VENOUS BLD VENIPUNCTURE: CPT | Performed by: HOSPITALIST

## 2020-01-14 RX ADMIN — FUROSEMIDE 40 MG: 10 INJECTION, SOLUTION INTRAMUSCULAR; INTRAVENOUS at 09:29

## 2020-01-14 RX ADMIN — METOPROLOL SUCCINATE 50 MG: 50 TABLET, EXTENDED RELEASE ORAL at 21:42

## 2020-01-14 RX ADMIN — PIPERACILLIN AND TAZOBACTAM 3.38 G: 3; .375 INJECTION, POWDER, LYOPHILIZED, FOR SOLUTION INTRAVENOUS at 03:13

## 2020-01-14 RX ADMIN — MELATONIN 3 MG: 3 TAB ORAL at 21:42

## 2020-01-14 RX ADMIN — POTASSIUM CHLORIDE 20 MEQ: 1500 TABLET, EXTENDED RELEASE ORAL at 12:29

## 2020-01-14 RX ADMIN — METOPROLOL SUCCINATE 50 MG: 50 TABLET, EXTENDED RELEASE ORAL at 09:33

## 2020-01-14 RX ADMIN — PIPERACILLIN AND TAZOBACTAM 3.38 G: 3; .375 INJECTION, POWDER, LYOPHILIZED, FOR SOLUTION INTRAVENOUS at 09:33

## 2020-01-14 RX ADMIN — POTASSIUM CHLORIDE 40 MEQ: 1500 TABLET, EXTENDED RELEASE ORAL at 10:32

## 2020-01-14 RX ADMIN — DIGOXIN 125 MCG: 125 TABLET ORAL at 09:33

## 2020-01-14 RX ADMIN — MIRTAZAPINE 15 MG: 15 TABLET, FILM COATED ORAL at 21:42

## 2020-01-14 RX ADMIN — FUROSEMIDE 40 MG: 10 INJECTION, SOLUTION INTRAMUSCULAR; INTRAVENOUS at 16:51

## 2020-01-14 RX ADMIN — Medication 1 CAPSULE: at 09:33

## 2020-01-14 ASSESSMENT — ACTIVITIES OF DAILY LIVING (ADL)
ADLS_ACUITY_SCORE: 28
ADLS_ACUITY_SCORE: 28
ADLS_ACUITY_SCORE: 26
ADLS_ACUITY_SCORE: 28

## 2020-01-14 NOTE — PLAN OF CARE
Pt is A&O x4, [place]. Up w/ assist of 2. Denies chest pain or SOB or pain in general. On RA. On tele. Incontinent of urine, External catheter in place. Thoracenteses done today. Transfused platelet x1 and no need to transfuse the second unit per Radiologist for procedure, MD aware. Redness blanchable on spine and coccyx, applied mepilex and  repositioned frequently. Will continue to monitor.

## 2020-01-14 NOTE — PLAN OF CARE
.Date/Time 1/14/20   nights     Trauma/Ortho/Medical (Choose one)  medical     Diagnosis: HCAP (healthcare-associated pneumonia)  POD#: NA  Mental Status: A&Ox4 [place]  Activity/dangle A2 walker/gb  Diet: NPO for procedure  Pain: denies  Mccullough/Voiding: incontinent, condom catheter  Tele/Restraints/Iso: Tele: afib CVR  02/LDA:PIV SL  D/C Date: pending   Other Info: Abd US today to assess for poss cirrhosis. Plat 41. IV Zosyn. LS diminished.

## 2020-01-14 NOTE — PROGRESS NOTES
SPIRITUAL HEALTH SERVICES Progress Note  [facility]    Initial visit per pt request.  Pt shared details about this hospitalization and its interventions, and expresses hope for further improvement in his symptoms.  Pt said he is motivated to engage rehab at [facility] to work on strength and mobility.  Pt speaks gratefully about family and [employer] support and cites no current concerns.  SH affirmed pt's motivation and gratitude, and provided emotional/spiritual support and prayer.                                                                                                                                            Raymundo Moncada M.Div., [facility]  Staff   Pager [phone]

## 2020-01-14 NOTE — CONSULTS
[facility]    Cardiology Consultation    Date of Admission:  1/11/2020  Primary cardiologist: Dr. Pugh    Assessment & Plan   Jama Paul is a 88 year old male who was admitted on 1/11/2020. I was asked to see the patient for severe TR and recurrent pleural effusion.    Summary:   1.  Recurrent right sided pleural effusion. Hx of recurrent pleural effusions requiring thoracentesis in October, November x 2 and now s/p another thoracentesis on 1/13 with 2.2 L of fluid removed. By Lights criteria, this appears to be exudative - ? parapneumonic effusion related to his recent RLL pneumonia. Symptomatically improved after thoracentesis.  He has been on antibiotics and has also been diuresed this admission. Weight is down from admission.   2.  Severe tricuspid regurgitation. Seen by CV surgery during his prior hospitalization. Note to be high, but not prohibitive risk, for TV surgery. He was to see Dr. Pugh as an outpatient to re-evaluate intervention to this. Interestingly, he has had more issues with recurrent pleural effusions and not necessarily with significant peripheral edema.  3.  Hx of a nonischemic tachycardia mediated cardiomyopathy in 2017 with normalization of his EF.  -  Previously on Entresto but this was discontinued during his prior hospitalization due to hypotension. He remains off this at this point.   4.  Chronic atrial fibrillation. Rate controlled on Metoprolol XL and digoxin 125 mcg. He is not anticoagulated due to chronic thrombocytopenia.  5.  Chronic thrombocytopenia.   6.  Recent RLL pneumonia.     Plan:  1.  Repeat limited echocardiogram.  2.  He appears to have diuresed well. Will follow up on echo results but can likely change to oral diuretics starting tomorrow. I would resume Torsemide and consider adding spironolactone.   3.  Outpatient follow up to discuss possible intervention to TV as previously discussed.     Jazlyn Cr PA-C    Reason for Consult   Reason  for consult: I was asked by Dr. Harrison to evaluate this patient for recurrent pleural effusion, severe TR.    Primary Care Physician   Mariusz Shields    Chief Complaint   Shortness of breath    History is obtained from the patient    History of Present Illness   Jama Paul is a 88 year old male who presents with shortness of breath. he has a history of a nonischemic cardiomyopathy, likely tachycardia mediated, with an EF of 30 - 35% in the 2017 now normal, chronic atrial fibrillation currently not on anticoagulation due to history of thrombocytopenia and a retinal bleed, hypertension, severe tricuspid regurgitation and recurrent right sided pleural effusions who was admitted on 1/11/20 with shortness of breath.     He is known to me from [facility]. He was hospitalized at the end of October for over two weeks with bilateral pleural effusions required thoracentesis x 2. Studies suggested transudative effusion. His EF was normal but echo showed significant tricuspid regurgitation. CV surgery was consulted who felt he was high risk for surgery but not necessarily prohibitively so. A KATIE was done which suggested significant mitral regurgitation although after aggressive diuresis his mitral regurgitation significant improved on repeat echocardiogram. He underwent a right heart cath that admission which showed a normal LVEDP. Weight at that time was 148 lbs.     He was discharged to rehab and required a repeat right sided right sided thoracentesis on 11/26. I saw him in follow up in clinic, last on 12/18 and he was doing well at that time on Torsemide 10 mg daily. His weight had increased a few pounds but he appeared euvolemic on exam and I suspected this was caloric as his appetite had improved.     He was seen at his facility about two weeks ago due to worsening shortness of breath. CXR suggested a developing right lower low infiltrate and he was started on augmentin. He was scheduled to see Dr. Pugh on 1/10 but had  to cancel this as he was not feeling well. It appears he did improve but then in the last few days he had some worsening weakness and weight gain unit(s) pto 156 lbs. He was given an extra dose of Torsemide on 1/9 and ultimately presented to [facility] on 1/11 with shortness of breath.    Work up was notable for a normal WBC. Platelet count was low which is chronic issue for him. Renal function was stable. NTproBNP was 4600. He was started on IV lasix for diuresis and underwent a right sided thoracentesis on 1/13 with 2.2 L of fluid removed. He was started on IV antibiotics on admission for possible HCAP but these have now been stopped.     Cardiology was consulted for further recommendations given his severe TR and recurrent pleural effusions. He is feeling much better after his thoracentesis yesterday. He denies significant dyspnea. No orthopnea or PND. No peripheral edema. He appears to have diuresed well although I&Os are not accurate.      Past Medical History    Past Medical History:   Diagnosis Date     Congestive heart failure (H)      Elevated PSA      Eye hemorrhage, left 02/2019     Non-ischemic cardiomyopathy (H)     2017, med treatment, echo done 4/18 nl ef, mod to severe tr     Permanent atrial fibrillation 2011     Thrombocytopenia (H)      Unspecified essential hypertension        Past Surgical History   Past Surgical History:   Procedure Laterality Date     ABDOMEN SURGERY      bowel obstruction     BONE MARROW BIOPSY, BONE SPECIMEN, NEEDLE/TROCAR N/A 3/4/2019    Procedure: BIOPSY BONE MARROW;  Surgeon: Josey Vargas MD;  Location:  GI     CARPAL TUNNEL RELEASE RT/LT  2014     CV HEART CATHETERIZATION WITH POSSIBLE INTERVENTION N/A 11/15/2019    Procedure: Left and right heart Catheterization with Possible Intervention;  Surgeon: Adolfo Paez MD;  Location:  HEART CARDIAC CATH LAB     CV LEFT VENTRICULOGRAM N/A 11/15/2019    Procedure: Left Ventriculogram;  Surgeon: Adolfo Paez  MD;  Location:  HEART CARDIAC CATH LAB     ESOPHAGOSCOPY, GASTROSCOPY, DUODENOSCOPY (EGD), COMBINED N/A 11/6/2019    Procedure: ESOPHAGOGASTRODUODENOSCOPY (EGD);  Surgeon: Marixa Aguila MD;  Location:  GI     EXPLORE GROIN  4/30/2014    Procedure: EXPLORE GROIN;  Surgeon: Sam Aguirre MD;  Location:  OR     HERNIORRHAPHY INGUINAL  4/30/2014    Procedure: HERNIORRHAPHY INGUINAL;  Surgeon: Sam Aguirre MD;  Location:  OR     MOHS MICROGRAPHIC PROCEDURE       PHACOEMULSIFICATION CLEAR CORNEA WITH STANDARD INTRAOCULAR LENS IMPLANT Right 9/8/2015    Procedure: PHACOEMULSIFICATION CLEAR CORNEA WITH STANDARD INTRAOCULAR LENS IMPLANT;  Surgeon: Gil Shannon MD;  Location:  EC     septic olecranon bursitis[         Prior to Admission Medications   Prior to Admission Medications   Prescriptions Last Dose Informant Patient Reported? Taking?   Multiple Vitamins-Minerals (ICAPS AREDS FORMULA PO) 1/11/2020 at am Self Yes Yes   Sig: Take 1 tablet by mouth daily Takes in addition to multivitamin with minerals   UNABLE TO FIND   Yes No   Sig: incentive spirometer every hour while awake   Vitamin D, Cholecalciferol, 1000 UNITS TABS Past Month at Unknown time Self Yes Yes   Sig: Take 3,000 Units by mouth daily    acetaminophen (TYLENOL) 325 MG tablet prn  Yes No   Sig: Take 650 mg by mouth 2 times daily every 6 hours as needed for Pain   albuterol (PROVENTIL) (2.5 MG/3ML) 0.083% neb solution Past Month at Unknown time  Yes Yes   Sig: Take 2.5 mg by nebulization every 4 hours as needed for shortness of breath / dyspnea or wheezing 1 vial inhale orally via nebulizer every 4 hours as needed for SOB, WHEEZING for 10 Days AND 1 vial inhale orally via nebulizer three times a day for SOB, WHEEZING for 10 Days   amoxicillin-clavulanate (AUGMENTIN) 500-125 MG tablet 1/11/2020 at done today  Yes Yes   Sig: Take 1 tablet by mouth 3 times daily Give 1 tablet by mouth three times a day for PNEUMONIA until 01/11/2020  23:59   digoxin (LANOXIN) 125 MCG tablet 1/11/2020 at am  No Yes   Sig: Take 1 tablet (125 mcg) by mouth daily   Patient taking differently: Take 125 mcg by mouth daily HOLD if HR <55   melatonin 3 MG tablet 1/10/2020 at Unknown time  Yes Yes   Sig: Take 3 mg by mouth At Bedtime After 9pm. Please use overnight depends on pt to allow him to sleep without interruption during the night   metoprolol succinate ER (TOPROL-XL) 50 MG 24 hr tablet 1/11/2020 at am Pharmacy Yes Yes   Sig: Take 50 mg by mouth 2 times daily HOLD if SBP <110 OR HR < 55. Call if held x 2 in a row symptomatic   mirtazapine (REMERON) 15 MG tablet 1/10/2020 at  Pharmacy Yes Yes   Sig: Take 15 mg by mouth At Bedtime Patient started medication for the first time yesterday 10/29/2019.    torsemide (DEMADEX) 10 MG tablet 1/11/2020 at am  No Yes   Sig: Take 1 tablet (10 mg) by mouth daily      Facility-Administered Medications: None     Allergies   No Known Allergies    Social History   [facility]     Family History   Family History   Problem Relation Age of Onset     Heart Disease No family hx of        Review of Systems   The 10 point Review of Systems is negative other than noted in the HPI or here.     Physical Exam   Temp: 98  F (36.7  C) Temp src: Oral BP: 104/62 Pulse: 66 Heart Rate: 60 Resp: 18 SpO2: 93 % O2 Device: None (Room air)    Vital Signs with Ranges  Temp:  [97.4  F (36.3  C)-98  F (36.7  C)] 98  F (36.7  C)  Pulse:  [65-66] 66  Heart Rate:  [56-60] 60  Resp:  [16-18] 18  BP: ()/(40-66) 104/62  SpO2:  [93 %] 93 %  141 lbs 8 oz    Constitutional: Alert, no acute distress.  Eyes: sclera anicteric  Respiratory: No respiratory distress. Breath sounds with a few bibasilar crackles. Breath sounds are mildly diminished at the right base. No wheeze.   Cardiovascular: IRR, Normal S1, S2. Soft systolic murmur  GI: abdomen soft.  Skin: warm and dry.   Musculoskeletal: No LE edema bilaterally. [place]  Neurologic:  alert and oriented x 3.  Psychiatric: affect appropriate.     Data   -Data reviewed today: All pertinent laboratory and imaging results from this encounter were reviewed. I personally reviewed the EKG tracing showing atrial fibrillation.  Recent Labs   Lab 01/14/20  0704 01/13/20  0946 01/13/20  0640 01/12/20  0637   WBC 7.3  --  6.6 9.3   HGB 9.6*  --  9.4* 9.8*   MCV 94  --  95 95   PLT 43* 41* 30* 33*   INR 1.46*  --  1.52* 1.64*     --  136 137   POTASSIUM 3.1*  --  3.5 3.8   CHLORIDE 102  --  104 106   CO2 29  --  26 27   BUN 27  --  27 26   CR 1.21  --  1.12 1.00   ANIONGAP 5  --  6 4   BARON 8.0*  --  8.1* 8.1*   GLC 96  --  93 101*   ALBUMIN  --   --   --  2.9*   PROTTOTAL  --   --   --  6.0*   BILITOTAL  --   --   --  1.3   ALKPHOS  --   --   --  39*   ALT  --   --   --  15   AST  --   --   --  10         Imaging:  No results found for this or any previous visit (from the past 24 hour(s)).

## 2020-01-14 NOTE — PROGRESS NOTES
SW:  D:  Received a call back from [facility] [facility].  Per Yovanny, patient is from the TCU and can return there on discharge.  P:  Will continue to follow.      JASMINA Casey, [facility]  Lead   [phone]  [facility]

## 2020-01-14 NOTE — PLAN OF CARE
Discharge Planner PT   Patient plan for discharge: back to TCU  Current status: Pt performed supine to sit transfer with min assist.  Pt was largely incontinent of urine.  Nurse notified. Pt transferred sit to/from stand transfer with min assist.  Pt unsteady in standing while being cleaned up and required mod assist to maintain standing.  Pt performed gait training x 25 ft using wheeled walker with mod assist of 1 for balance and CGA of 2nd person for safety as pt is very unsteady and amb at quick pace.  Barriers to return to prior living situation: None  Recommendations for discharge: return to TCU per plan established by the PT.   Rationale for recommendations: Patient is significantly below baseline activity tolerance, LE strength, and level of independence with mobility. He will require further skilled PT services at time of discharge which he was receiving prior to hospital admission. Recommend Ax2 for ambulating short distances with nursing staff (1 physical assist, 1 wheelchair follow) due to high fall risk.        Entered by: Janice Salazar 01/14/2020 10:35 AM

## 2020-01-14 NOTE — PROGRESS NOTES
[facility]    Hospitalist Progress Note    Date of Service (when I saw the patient): 01/14/2020    Assessment & Plan   Jama Paul is a 88 year old male who was admitted on 1/11/2020.  This is an 88-year-old male well known to me because of his prior admissions with history of hypertension, atrial fibrillation, nonischemic cardiomyopathy, congestive heart failure, idiopathic thrombocytopenic purpura, recurrent right-sided pleural effusion, mild to moderate mitral regurgitation and moderate to severe tricuspid regurgitation, was in a care facility, recently moved to their TCU because of worsening shortness of breath, and now sent here because of shortness of breath  And found to have recurrent large right pleural effusion     1. Acute on chronic  diastolic CHF exacerbation   Valvular heart disease with severe TR and mild to moderate MR :  REcurrent large right pleural effusion s/p toracentesis :    S/p thoracentesis 2200ml of josefina colored fluid removed by right thoracentesis   Cardiology consult placed as he was closely followed by cardiology   This is less likley from pneumonia as procalcitonin  is <0.05   Zosyn was stopped today   Started on lasix 40mg IV BID for diuresis on admission   4.  Atrial fibrillation, rate controlled at this time.  He is on digoxin, metoprolol.  I will continue with that.     5.  Idiopathic thrombocytic purpura:  The patient's platelets are 41,000.  Stable.  No active bleeding.   6.  Chronic kidney disease, stage III, stable.   7.  Hypertension:  The patient had an issue with the blood pressure in the past.  His blood pressure was on the lower side before.  I will hold the metoprolol and Lasix with holding parameters.   7.  Deep venous thrombosis prophylaxis with SCDs given thrombocytopenia.      CODE STATUS:  I had a detailed discussion with the patient regarding his code status, and he wanted to be full code at this time.           Code Status: Full  Code    Disposition: Expected discharge in 2-3days when OK with cardiology     Merlyn Harrison MD  [phone] (P)      Interval History   Feels lot better since admission. Denies SOB today . Wants to go back to TCU     -Data reviewed today: I reviewed all new labs and imaging results over the last 24 hours. I personally reviewed no images or EKG's today.    Physical Exam   Temp: 98  F (36.7  C) Temp src: Oral BP: 104/62 Pulse: 66 Heart Rate: 60 Resp: 18 SpO2: 93 % O2 Device: None (Room air)    Vitals:    01/12/20 0611 01/13/20 0617 01/14/20 0648   Weight: 68.9 kg (152 lb) 68.5 kg (151 lb) 64.2 kg (141 lb 8 oz)     Vital Signs with Ranges  Temp:  [97.4  F (36.3  C)-98  F (36.7  C)] 98  F (36.7  C)  Pulse:  [65-66] 66  Heart Rate:  [56-60] 60  Resp:  [16-18] 18  BP: ()/(40-66) 104/62  SpO2:  [93 %] 93 %  I/O last 3 completed shifts:  In: 800 [P.O.:800]  Out: 2550 [Urine:350; Other:2200]    Constitutional: Awake, alert, cooperative, no apparent distress  Respiratory: Clear to auscultation bilaterally, no crackles or wheezing  Cardiovascular: Regular rate and rhythm, normal S1 and S2, and 2+ murmur noted  GI: Normal bowel sounds, soft, non-distended, non-tender  Skin/Integumen: No rashes, no cyanosis, no edema  Other:     Medications     - MEDICATION INSTRUCTIONS -         acetaminophen  650 mg Oral BID     digoxin  125 mcg Oral Daily     furosemide  40 mg Intravenous BID     melatonin  3 mg Oral At Bedtime     metoprolol succinate ER  50 mg Oral BID     mirtazapine  15 mg Oral At Bedtime     multivitamin  with lutein  1 capsule Oral Daily     sodium chloride (PF)  3 mL Intracatheter Q8H     sodium chloride (PF)  3 mL Intracatheter Q8H       Data   Recent Labs   Lab 01/14/20  0704 01/13/20  0946 01/13/20  0640 01/12/20  0637   WBC 7.3  --  6.6 9.3   HGB 9.6*  --  9.4* 9.8*   MCV 94  --  95 95   PLT 43* 41* 30* 33*   INR 1.46*  --  1.52* 1.64*     --  136 137   POTASSIUM 3.1*  --  3.5 3.8   CHLORIDE 102  --   104 106   CO2 29  --  26 27   BUN 27  --  27 26   CR 1.21  --  1.12 1.00   ANIONGAP 5  --  6 4   BARON 8.0*  --  8.1* 8.1*   GLC 96  --  93 101*   ALBUMIN  --   --   --  2.9*   PROTTOTAL  --   --   --  6.0*   BILITOTAL  --   --   --  1.3   ALKPHOS  --   --   --  39*   ALT  --   --   --  15   AST  --   --   --  10       No results found for this or any previous visit (from the past 24 hour(s)).

## 2020-01-14 NOTE — PLAN OF CARE
[id number]. A&O x 4, [place]. hypotensive, otherwise VSS on room air. Metoprolol dose held this PM due to low BP. Assist of 2 with gait belt and walker. regular diet, tolerating well. PIV saline locked. Thoracentesis site CDI. Denies SOB or pain. Declines schedule tylenol. Incontinent of B&B, condom cath in place. BS active. Continue plan of care.

## 2020-01-15 ENCOUNTER — APPOINTMENT (OUTPATIENT)
Dept: ULTRASOUND IMAGING | Facility: CLINIC | Age: 85
DRG: 291 | End: 2020-01-15
Attending: INTERNAL MEDICINE
Payer: MEDICARE

## 2020-01-15 VITALS
DIASTOLIC BLOOD PRESSURE: 71 MMHG | BODY MASS INDEX: 19.19 KG/M2 | RESPIRATION RATE: 16 BRPM | HEART RATE: 66 BPM | TEMPERATURE: 98 F | SYSTOLIC BLOOD PRESSURE: 118 MMHG | OXYGEN SATURATION: 92 % | WEIGHT: 141.5 LBS

## 2020-01-15 LAB
ANION GAP SERPL CALCULATED.3IONS-SCNC: 4 MMOL/L (ref 3–14)
APPEARANCE FLD: NORMAL
BUN SERPL-MCNC: 26 MG/DL (ref 7–30)
CALCIUM SERPL-MCNC: 8.1 MG/DL (ref 8.5–10.1)
CHLORIDE SERPL-SCNC: 104 MMOL/L (ref 94–109)
CO2 SERPL-SCNC: 30 MMOL/L (ref 20–32)
COLOR FLD: YELLOW
COPATH REPORT: NORMAL
CREAT SERPL-MCNC: 1.07 MG/DL (ref 0.66–1.25)
ERYTHROCYTE [DISTWIDTH] IN BLOOD BY AUTOMATED COUNT: 19.6 % (ref 10–15)
GFR SERPL CREATININE-BSD FRML MDRD: 61 ML/MIN/{1.73_M2}
GLUCOSE FLD-MCNC: 88 MG/DL
GLUCOSE SERPL-MCNC: 93 MG/DL (ref 70–99)
GRAM STN SPEC: NORMAL
HCT VFR BLD AUTO: 29.8 % (ref 40–53)
HGB BLD-MCNC: 9.6 G/DL (ref 13.3–17.7)
LDH FLD L TO P-CCNC: 119 U/L
MCH RBC QN AUTO: 30.3 PG (ref 26.5–33)
MCHC RBC AUTO-ENTMCNC: 32.2 G/DL (ref 31.5–36.5)
MCV RBC AUTO: 94 FL (ref 78–100)
PLATELET # BLD AUTO: 46 10E9/L (ref 150–450)
POTASSIUM SERPL-SCNC: 3.6 MMOL/L (ref 3.4–5.3)
PROT FLD-MCNC: 3 G/DL
RBC # BLD AUTO: 3.17 10E12/L (ref 4.4–5.9)
SODIUM SERPL-SCNC: 138 MMOL/L (ref 133–144)
SPECIMEN SOURCE FLD: NORMAL
SPECIMEN SOURCE: NORMAL
WBC # BLD AUTO: 5.8 10E9/L (ref 4–11)
WBC # FLD AUTO: NORMAL /UL

## 2020-01-15 PROCEDURE — 82945 GLUCOSE OTHER FLUID: CPT | Performed by: INTERNAL MEDICINE

## 2020-01-15 PROCEDURE — 25000132 ZZH RX MED GY IP 250 OP 250 PS 637: Mod: GY | Performed by: PHYSICIAN ASSISTANT

## 2020-01-15 PROCEDURE — 84157 ASSAY OF PROTEIN OTHER: CPT | Performed by: INTERNAL MEDICINE

## 2020-01-15 PROCEDURE — 25000132 ZZH RX MED GY IP 250 OP 250 PS 637: Mod: GY | Performed by: INTERNAL MEDICINE

## 2020-01-15 PROCEDURE — 85027 COMPLETE CBC AUTOMATED: CPT | Performed by: INTERNAL MEDICINE

## 2020-01-15 PROCEDURE — 32555 ASPIRATE PLEURA W/ IMAGING: CPT

## 2020-01-15 PROCEDURE — 83615 LACTATE (LD) (LDH) ENZYME: CPT | Performed by: INTERNAL MEDICINE

## 2020-01-15 PROCEDURE — 0W9B3ZZ DRAINAGE OF LEFT PLEURAL CAVITY, PERCUTANEOUS APPROACH: ICD-10-PCS | Performed by: RADIOLOGY

## 2020-01-15 PROCEDURE — 40000863 ZZH STATISTIC RADIOLOGY XRAY, US, CT, MAR, NM

## 2020-01-15 PROCEDURE — 99232 SBSQ HOSP IP/OBS MODERATE 35: CPT | Performed by: INTERNAL MEDICINE

## 2020-01-15 PROCEDURE — 99239 HOSP IP/OBS DSCHRG MGMT >30: CPT | Performed by: INTERNAL MEDICINE

## 2020-01-15 PROCEDURE — 89051 BODY FLUID CELL COUNT: CPT | Performed by: INTERNAL MEDICINE

## 2020-01-15 PROCEDURE — 80048 BASIC METABOLIC PNL TOTAL CA: CPT | Performed by: INTERNAL MEDICINE

## 2020-01-15 PROCEDURE — 87205 SMEAR GRAM STAIN: CPT | Performed by: INTERNAL MEDICINE

## 2020-01-15 PROCEDURE — 87015 SPECIMEN INFECT AGNT CONCNTJ: CPT | Performed by: INTERNAL MEDICINE

## 2020-01-15 PROCEDURE — 87070 CULTURE OTHR SPECIMN AEROBIC: CPT | Performed by: INTERNAL MEDICINE

## 2020-01-15 PROCEDURE — 36415 COLL VENOUS BLD VENIPUNCTURE: CPT | Performed by: INTERNAL MEDICINE

## 2020-01-15 PROCEDURE — 25000125 ZZHC RX 250: Performed by: INTERNAL MEDICINE

## 2020-01-15 RX ORDER — LIDOCAINE HYDROCHLORIDE 10 MG/ML
10 INJECTION, SOLUTION EPIDURAL; INFILTRATION; INTRACAUDAL; PERINEURAL ONCE
Status: COMPLETED | OUTPATIENT
Start: 2020-01-15 | End: 2020-01-15

## 2020-01-15 RX ORDER — SPIRONOLACTONE 25 MG/1
25 TABLET ORAL DAILY
Qty: 30 TABLET | Refills: 0 | DISCHARGE
Start: 2020-01-15 | End: 2020-02-05

## 2020-01-15 RX ORDER — SPIRONOLACTONE 25 MG/1
25 TABLET ORAL DAILY
Status: DISCONTINUED | OUTPATIENT
Start: 2020-01-15 | End: 2020-01-15 | Stop reason: HOSPADM

## 2020-01-15 RX ORDER — TORSEMIDE 10 MG/1
10 TABLET ORAL DAILY
Status: DISCONTINUED | OUTPATIENT
Start: 2020-01-15 | End: 2020-01-15 | Stop reason: HOSPADM

## 2020-01-15 RX ADMIN — METOPROLOL SUCCINATE 50 MG: 50 TABLET, EXTENDED RELEASE ORAL at 09:53

## 2020-01-15 RX ADMIN — DIGOXIN 125 MCG: 125 TABLET ORAL at 09:53

## 2020-01-15 RX ADMIN — Medication 1 CAPSULE: at 09:53

## 2020-01-15 RX ADMIN — SPIRONOLACTONE 25 MG: 25 TABLET ORAL at 12:25

## 2020-01-15 RX ADMIN — TORSEMIDE 10 MG: 10 TABLET ORAL at 11:13

## 2020-01-15 RX ADMIN — LIDOCAINE HYDROCHLORIDE 10 ML: 10 INJECTION, SOLUTION EPIDURAL; INFILTRATION; INTRACAUDAL; PERINEURAL at 08:48

## 2020-01-15 ASSESSMENT — MIFFLIN-ST. JEOR: SCORE: 1378.42

## 2020-01-15 ASSESSMENT — ACTIVITIES OF DAILY LIVING (ADL)
ADLS_ACUITY_SCORE: 26

## 2020-01-15 NOTE — DISCHARGE SUMMARY
[facility]  Discharge Summary        Jama Paul MRN# [id number]   YOB: 1931 Age: 88 year old     Date of Admission:  1/11/2020  Date of Discharge:  1/15/2020  Admitting Physician:  Pato Benedict MD  Discharge Physician: Merlyn Harrison MD  Discharging Service: Hospitalist     Primary Provider: Mariusz Shields  Primary Care Physician Phone Number: [phone]         Discharge Diagnoses/Problem Oriented Hospital Course (Providers):    Jama Paul was admitted on 1/11/2020 by Pato Benedict MD and I would refer you to their history and physical.  The following problems were addressed during his hospitalization:  Assessment & Plan     Jama Paul is a 88 year old male who was admitted on 1/11/2020.  This is an 88-year-old male well known to me because of his prior admissions with history of hypertension, atrial fibrillation, nonischemic cardiomyopathy, congestive heart failure, idiopathic thrombocytopenic purpura, recurrent right-sided pleural effusion, mild to moderate mitral regurgitation and moderate to severe tricuspid regurgitation, was in a care facility, recently moved to their TCU because of worsening shortness of breath, and now sent here because of shortness of breath  And found to have recurrent large right pleural effusion      1. Acute on chronic  diastolic CHF exacerbation   Valvular heart disease with severe TR and mild to moderate MR :  REcurrent large right pleural effusion s/p toracentesis :  Moderate left pleural effusion s/p thoracentesis      S/p thoracentesis 2200ml of josefina colored fluid removed by right thoracentesis   Cardiology consult placed and they recommended continued medical management for now  Added spironolactone 25mg po daily to torsemide 10mg po daily   This is less likley from pneumonia as procalcitonin  is <0.05   Zosyn was stopped on 1/14   Started on lasix 40mg IV BID for diuresis on admission   Pleural fluid culture  remain negative   4.  Atrial fibrillation, rate controlled at this time.  He is on digoxin, metoprolol.  I will continue with that.     5.  Idiopathic thrombocytic purpura:  The patient's platelets are 41,000.  Stable.  No active bleeding.   6.  Chronic kidney disease, stage III, stable.   7.  Hypertension:  The patient had an issue with the blood pressure in the past.  His blood pressure was on the lower side before.  I will hold the metoprolol and Lasix with holding parameters.   7.  Deep venous thrombosis prophylaxis with SCDs given thrombocytopenia.      CODE STATUS:  I had a detailed discussion with the patient regarding his code status, and he wanted to be full code at this time.               Code Status: Full Code     Disposition:  to TCU today      Merlyn Harrison MD  [phone] (Pager )            Code Status:      Full Code         Important Results:      See below          Pending Results:        Unresulted Labs Ordered in the Past 30 Days of this Admission     Date and Time Order Name Status Description    1/14/2020 1914 Gram stain In process     1/14/2020 1914 Fluid Culture Aerobic Bacterial In process     1/11/2020 2248 Anaerobic bacterial culture Preliminary     1/11/2020 2248 Cytology non gyn In process     1/11/2020 2248 Fluid Culture Aerobic Bacterial Preliminary     1/11/2020 2013 Blood culture Preliminary     1/11/2020 2013 Blood culture Preliminary                Discharge Instructions and Follow-Up:      Follow-up Appointments     Follow Up and recommended labs and tests      Follow up with longterm physician.  The following labs/tests are   recommended: recheck BMP in 1week .                  Discharge Disposition:      Discharged to short-term care facility         Discharge Medications:        Current Discharge Medication List      START taking these medications    Details   spironolactone (ALDACTONE) 25 MG tablet Take 1 tablet (25 mg) by mouth daily  Qty: 30 tablet, Refills: 0     Associated Diagnoses: Congestive heart failure, unspecified HF chronicity, unspecified heart failure type (H)         CONTINUE these medications which have NOT CHANGED    Details   digoxin (LANOXIN) 125 MCG tablet Take 1 tablet (125 mcg) by mouth daily  Qty: 30 tablet, Refills: 0    Comments: Further refill from his PCP  Associated Diagnoses: Congestive heart failure, unspecified HF chronicity, unspecified heart failure type (H)      melatonin 3 MG tablet Take 3 mg by mouth At Bedtime After 9pm. Please use overnight depends on pt to allow him to sleep without interruption during the night      metoprolol succinate ER (TOPROL-XL) 50 MG 24 hr tablet Take 50 mg by mouth 2 times daily HOLD if SBP <110 OR HR < 55. Call if held x 2 in a row symptomatic      mirtazapine (REMERON) 15 MG tablet Take 15 mg by mouth At Bedtime Patient started medication for the first time yesterday 10/29/2019.       Multiple Vitamins-Minerals (ICAPS AREDS FORMULA PO) Take 1 tablet by mouth daily Takes in addition to multivitamin with minerals      torsemide (DEMADEX) 10 MG tablet Take 1 tablet (10 mg) by mouth daily      Vitamin D, Cholecalciferol, 1000 UNITS TABS Take 3,000 Units by mouth daily       acetaminophen (TYLENOL) 325 MG tablet Take 650 mg by mouth 2 times daily every 6 hours as needed for Pain      UNABLE TO FIND incentive spirometer every hour while awake         STOP taking these medications       albuterol (PROVENTIL) (2.5 MG/3ML) 0.083% neb solution Comments:   Reason for Stopping:         amoxicillin-clavulanate (AUGMENTIN) 500-125 MG tablet Comments:   Reason for Stopping:                    Allergies:       No Known Allergies         Consultations This Hospital Stay:      Consultation during this admission received from cardiology         Condition and Physical Exam on Discharge:      Discharge condition: Stable   Discharge vitals: Blood pressure 118/71, pulse 66, temperature 98  F (36.7  C), temperature source Oral, resp.  rate 16, weight 64.2 kg (141 lb 8 oz), SpO2 92 %.     Constitutional: Alert and awake    Lungs: CTA   Cardiovascular: Irregular, 2+ murmur    Abdomen: Soft, NT, ND, BS+   Skin: Warm and dry. No edema    Other:            Discharge Orders for Skilled Facility (from Discharge Orders):        After Care Instructions     Activity - Up ad fernanda      Advance Diet as Tolerated      Follow this diet upon discharge: 2 gram sodium diet per day         General info for SNF      Length of Stay Estimate: Short Term Care: Estimated # of Days <30  Condition at Discharge: Stable  Level of care:skilled   Rehabilitation Potential: Good  Admission H&P remains valid and up-to-date: Yes  Recent Chemotherapy: N/A  Use Nursing Home Standing Orders: Yes         Mantoux instructions      Give two-step Mantoux (PPD) Per Facility Policy No (if no explain) pt from TCU                    Rehab orders for Skilled Facility (from Discharge Orders):      Referrals     Future Labs/Procedures    Occupational Therapy Adult Consult     Comments:    Evaluate and treat as clinically indicated.    Reason:  weakness    Physical Therapy Adult Consult     Comments:    Evaluate and treat as clinically indicated.    Reason:  weakness             Discharge Time:      Greater than 30 minutes.        Image Results From This Hospital Stay (For Non-EPIC Providers):        Results for orders placed or performed during the hospital encounter of 01/11/20   Chest XR,  PA & LAT    Narrative    EXAM: XR CHEST 2 VW  LOCATION: [facility]  DATE/TIME: 1/11/2020 8:27 PM    INDICATION: Cough, fever, shortness of breath  COMPARISON: None.      Impression    IMPRESSION: Multisegment airspace opacity at the right base suspicious for pneumonia. Small bilateral pleural effusions with atelectasis at the bases. No pneumothorax. Normal heart size. Prominent kyphosis.   CT Chest w/o Contrast    Narrative    EXAM: CT CHEST WITHOUT CONTRAST  [facility]  DATE/TIME: 1/11/2020 10:14 PM    INDICATION: Shortness of breath. Pleural effusion versus pneumonia.  COMPARISON: 12/08/2017.    TECHNIQUE: CT chest without IV contrast. Multiplanar reformats were obtained. Dose reduction techniques were used.  CONTRAST: None.    FINDINGS:    LUNGS AND PLEURA: Mild emphysematous changes in the lungs. Moderate to large loculated-appearing right pleural effusion and small left pleural effusion. A few patchy opacities are present in bilateral lung bases, adjacent to the effusions.    MEDIASTINUM/AXILLAE: Atherosclerotic calcification in the thoracic aorta and coronary arteries. Mild cardiomegaly. Small hiatal hernia.    UPPER ABDOMEN: Partial visualization of borderline splenomegaly. Partial visualization of a 5 cm cyst in the upper pole of the right kidney.      Impression    IMPRESSION:   1. Moderate to large sized loculated-appearing right pleural effusion and small left pleural effusion.  2. A few patchy opacities in bilateral lung bases, adjacent to the effusions. These most likely represent atelectasis, but pneumonia cannot be excluded.   US Abdomen Limited    Narrative    US ABDOMEN LIMITED 1/13/2020 11:27 AM     HISTORY: to eval for cirrhosis, Rt sided effusion recurrent, elevated  INR, thrombocytopenia.  TECHNIQUE: Limited abdominal ultrasound.  COMPARISON: CT 11/1/2019    FINDINGS:    GALLBLADDER: Large gallstone in the gallbladder. No signs of  cholecystitis.    BILE DUCTS: There is no biliary dilatation. The common duct measures  6mm.    LIVER: Benign hepatic cysts. Otherwise normal hepatic echotexture.  Smooth hepatic contour.    RIGHT KIDNEY: No hydronephrosis.    PANCREAS: The visualized portions of the pancreas are normal.    No ascites.      Impression    IMPRESSION:  1.  No ultrasound evidence of cirrhosis.    LIV GIL MD   US Thoracentesis    Narrative    ULTRASOUND GUIDED THORACENTESIS  1/13/2020 1:06 PM     HISTORY: Pleural effusion, right  side    FINDINGS: Limited ultrasound was performed to evaluate for the  presence and best approach for drainage of a pleural effusion. An  image is archived. Written and oral informed consent was obtained. A  pause for the cause procedure to verify the correct patient and  correct procedure.     The skin overlying the Right chest posteriorly was prepped and draped  in the usual sterile fashion. The subcutaneous tissues were  anesthetized with 10 mL 1% lidocaine. Under direct ultrasound guidance  a catheter was advanced into the pleural space and 2200 mL of  josefina  colored fluid was drained. The catheter was removed and a sterile  dressing was applied.     Patient was monitored by nurse under my direct supervision throughout  the exam. Ultrasound images were permanently stored.  There were no  immediate complications. Patient left the ultrasound suite in  satisfactory condition.      Impression    IMPRESSION: Technically successful thoracentesis without immediate  complications.    THANH TINEO, DO   US Thoracentesis    Narrative    EXAM:  1. LEFT THORACENTESIS  2. ULTRASOUND GUIDANCE  LOCATION: [facility]  DATE/TIME: 1/15/2020 9:05 AM    INDICATION: Ascites.    PROCEDURE: Informed consent obtained. Time out performed. The chest  was prepped and draped in a sterile fashion. 10 mL of 1% lidocaine was  infused into local soft tissues. A 5 Irish catheter system was  introduced into the left pleural fluid under ultrasound guidance.    0.55 liters of clear fluid were removed and sent to lab if requested.    Patient tolerated procedure well.    Ultrasound images have been permanently captured for documentation.      Impression    IMPRESSION:  1.  Status post ultrasound-guided left thoracentesis.    Reference CPT Code: 45182    LIV GIL MD   Echocardiogram Limited    Narrative    [id number]  [id number]  [id number]  [id number]^LIEGL^LINDA^[facility]  Echocardiography  Laboratory  [address] [place] [address]        Name: YINKA GONZALEZ  MRN: [id number]  : 1931  Study Date: 2020 04:26 PM  Age: 88 yrs  Gender: Male  Patient Location: [facility]  Reason For Study: CHF  Ordering Physician: LINDA ZELAYA  Referring Physician: KATHARINE JANE  Performed By: Natanael Morocho RDCS     BSA: 1.8 m2  Height: 72 in  Weight: 142 lb  HR: 85  BP: 104/62 mmHg  _____________________________________________________________________________  __        Procedure  Limited Portable Echo Adult.  _____________________________________________________________________________  __        Interpretation Summary     Left ventricular size, global systolic function, and wall motion are normal,  estimated LVEF 55-60%.  Right ventricular global function is normal.  There is moderately severe (3+) tricuspid regurgitation.  There is mild (1+) mitral regurgitation.  The inferior vena cava was normal in size with preserved respiratory  variability.     This study was compared to a prior TTE from 2019, there has been no  significant change.  _____________________________________________________________________________  __        Left Ventricle  The left ventricle is normal in size. There is mild to moderate concentric  left ventricular hypertrophy. Left ventricular systolic function is normal.  The visual ejection fraction is estimated at 55-60%. Normal left ventricular  wall motion.     Right Ventricle  The right ventricle is mildly dilated. The right ventricular systolic function  is normal.     Atria  The left atrium is moderate to severely dilated. The right atrium is severely  dilated.     Mitral Valve  There is mild (1+) mitral regurgitation.        Tricuspid Valve  There is moderately severe (3+) tricuspid regurgitation. The right ventricular  systolic pressure is approximated at 34mmHg plus the right atrial pressure.     Aortic Valve  The aortic valve is trileaflet. There is trace  aortic regurgitation.     Pulmonic Valve  There is trace to mild pulmonic valvular regurgitation.     Vessels  The inferior vena cava was normal in size with preserved respiratory  variability.     Pericardium  Trivial pericardial effusion. Large left pleural effusion.     _____________________________________________________________________________  __        Doppler Measurements & Calculations  PI end-d paul: 166.8 cm/sec     TR max paul: 293.1 cm/sec  TR max P.4 mmHg           _____________________________________________________________________________  __           Report approved by: Tremayne Agrawal 2020 05:09 PM                Most Recent Lab Results In EPIC (For Non-EPIC Providers):    Most Recent 3 CBC's:  Recent Labs   Lab Test 01/15/20  0643 20  0704 20  0946 20  0640   WBC 5.8 7.3  --  6.6   HGB 9.6* 9.6*  --  9.4*   MCV 94 94  --  95   PLT 46* 43* 41* 30*      Most Recent 3 BMP's:  Recent Labs   Lab Test 01/15/20  0643 20  2037 20  0704 20  0640     --  136 136   POTASSIUM 3.6 3.9 3.1* 3.5   CHLORIDE 104  --  102 104   CO2 30  --  29 26   BUN 26  --  27 27   CR 1.07  --  1.21 1.12   ANIONGAP 4  --  5 6   BARON 8.1*  --  8.0* 8.1*   GLC 93  --  96 93     Most Recent 3 Troponin's:  Recent Labs   Lab Test 19  1735 10/30/19  1134 18  1024   TROPI 0.019 <0.015 <0.015     Most Recent 3 INR's:  Recent Labs   Lab Test 20  0704 20  0640 20  0637   INR 1.46* 1.52* 1.64*     Most Recent 2 LFT's:  Recent Labs   Lab Test 20  0637 19  1735   AST 10 9   ALT 15 20   ALKPHOS 39* 47   BILITOTAL 1.3 0.6     Most Recent Cholesterol Panel:  Recent Labs   Lab Test 12  0849   CHOL 127   LDL 55   HDL 56   TRIG 81     Most Recent 6 Bacteria Isolates From Any Culture (See EPIC Reports for Culture Details):  Recent Labs   Lab Test 20  1248 20  2039 20  1925 19  1334 19  2324 19  0442   CULT Culture  negative monitoring continues  Culture negative monitoring continues No growth after 4 days No growth after 4 days No growth No growth >100,000 colonies/mL  Citrobacter koseri  *     Most Recent TSH, T4 and HgbA1c:  Recent Labs   Lab Test 02/05/19  1101   TSH 0.99

## 2020-01-15 NOTE — PLAN OF CARE
Pt A+Ox4. VSS on RA. Denies Pain. Moving well IND in bed with SBA/A1 OOB. Pt can be incontinent of urine at times. Tele: A-fib with CVR. Denies SOB. Will continue to monitor.

## 2020-01-15 NOTE — CONSULTS
CV Surgery    Patient seen by Dr. Harris from previous inpatient stay. Medical management per Cardiology for now. Follow up as outpatient with Cardiology as planned. If Cardiology deems patient to be a surgical candidate at that time, we will see him in our clinic.    BERNIE Giles, CCRN-CSC  CV Surgery  Pager: [phone]

## 2020-01-15 NOTE — PROGRESS NOTES
D: SW following for discharge planning.   I: Pt will discharge today back to [facility] TCU via son at 1400. Orders faxed and facility updated. Pt in agreement.   P: Discharge.     JASMINA Alatorre, [facility]  [phone]  [facility]

## 2020-01-15 NOTE — PLAN OF CARE
A/OX4,[place].Denies pain.Up with 1 assist/walker.Incontinence at times.Thoracentesis site CDI.Denies SOB.Redness blanchable on coccyx and back bone.Turned and repositioned Q2hrs.Tele.Pt is discharging to [facility] at 1400 today.

## 2020-01-15 NOTE — PROGRESS NOTES
[facility]  Cardiology Progress Note    Outpatient cardiologist: Dr. 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  Interval History   He reports feeling well. Underwent left sided thoracentesis today with 0.5 L of fluid removed. Breathing is improved. No peripheral edema.   Assessment & Plan   1.  Recurrent right sided pleural effusion. Hx of recurrent pleural effusions requiring thoracentesis in October, November x 2 and now s/p another thoracentesis on 1/13 with 2.2 L of fluid removed. By Lights criteria, this appears to be exudative - ? parapneumonic effusion related to his recent RLL pneumonia. Symptomatically improved after thoracentesis.  He has been on antibiotics and has also been diuresed this admission. Now s/p L sided thoracentesis this morning. Unclear why he has had recurrent pleural effusions. A limited echocardiogram yesterday showed good LV and RV function.   2.  Severe tricuspid regurgitation. Seen by CV surgery during his prior hospitalization. Note to be high, but not prohibitive risk, for TV surgery. Repeat echocardiogram here shows moderately severe, but no severe, tricuspid regurgitation.   3.  Hx of a nonischemic tachycardia mediated cardiomyopathy in 2017 with normalization of his EF.  -  Previously on Entresto but this was discontinued during his prior hospitalization due to hypotension. He remains off this at this point.   4.  Chronic atrial fibrillation. Rate controlled on Metoprolol XL and digoxin 125 mcg. He is not anticoagulated due to chronic  thrombocytopenia.  5.  Chronic thrombocytopenia.   6.  Recent RLL pneumonia.     Plan:  1.  Will resume his oral Torsemide 10 mg daily and add spironolactone 25 mg daily.  2.  He should have a BMP in a few days at his facility to re-evalute his renal function.  3.  Arranged for CORE follow up next week to evaluate how he is tolerating his diuretics.   4.  Discharge today. Please call with any questions.     Jazlyn Cr PA-C    Physical Exam   Temp: 98  F (36.7  C) Temp src: Oral BP: 118/71   Heart Rate: 86 Resp: 16 SpO2: 92 % O2 Device: None (Room air)    Vitals:    01/11/20 1912 01/12/20 0611 01/13/20 0617 01/14/20 0648   Weight: 70.8 kg (156 lb) 68.9 kg (152 lb) 68.5 kg (151 lb) 64.2 kg (141 lb 8 oz)     Vital Signs with Ranges  Temp:  [97.4  F (36.3  C)-98.4  F (36.9  C)] 98  F (36.7  C)  Heart Rate:  [62-98] 86  Resp:  [16-18] 16  BP: ()/(53-71) 118/71  SpO2:  [92 %-97 %] 92 %  01/10 0700 - 01/15 0659  In: 2260 [P.O.:1160]  Out: 2550 [Urine:350]  Net: -290  Constitutional: NAD.   Respiratory: breath sounds are diminished at the bases a bit but otherwise clear.  Cardiovascular: IRR. S1, S2. systolic murmur  GI: soft, BS+  Skin: warm, no rashes  Musculoskeletal: Moving all extremities. No edema.  Neurologic: Alert, oriented x 3  Neuropsychiatric: Normal affect   Data   Results for orders placed or performed during the hospital encounter of 01/11/20 (from the past 24 hour(s))   Cardiovascular Surgery IP Consult: Patient to be seen: Routine - within 24 hours; pt with severe TR and ITP with recurrent right sided pleural effusion from CHF; Consultant may enter orders: Yes; Requesting provider? Hospitalist (if different from...    Narrative    Joy Rodriguez NP     1/15/2020  9:34 AM  CV Surgery    Patient seen by Dr. Harris from previous inpatient stay. Medical   management per Cardiology for now. Follow up as outpatient with   Cardiology as planned. If Cardiology deems patient to be a   surgical  candidate at that time, we will see him in our clinic.    BERNIE Giles, CCRN-CSC  CV Surgery  Pager: [phone]     Echocardiogram Limited    Narrative    [id number]  [id number]  [id number]^NATHALIE^LINDA^CLAIR           [facility]  Echocardiography Laboratory  [address] [place] [address]        Name: YINKA GONZALEZ  MRN: [id number]  : 1931  Study Date: 2020 04:26 PM  Age: 88 yrs  Gender: Male  Patient [facility]  Reason For Study: CHF  Ordering Physician: LINDA ZELAYA  Referring Physician: KATHARINE JANE  Performed By: Natanael Morocho RDCS     BSA: 1.8 m2  Height: 72 in  Weight: 142 lb  HR: 85  BP: 104/62 mmHg  _____________________________________________________________________________  __        Procedure  Limited Portable Echo Adult.  _____________________________________________________________________________  __        Interpretation Summary     Left ventricular size, global systolic function, and wall motion are normal,  estimated LVEF 55-60%.  Right ventricular global function is normal.  There is moderately severe (3+) tricuspid regurgitation.  There is mild (1+) mitral regurgitation.  The inferior vena cava was normal in size with preserved respiratory  variability.     This study was compared to a prior TTE from 2019, there has been no  significant change.  _____________________________________________________________________________  __        Left Ventricle  The left ventricle is normal in size. There is mild to moderate concentric  left ventricular hypertrophy. Left ventricular systolic function is normal.  The visual ejection fraction is estimated at 55-60%. Normal left ventricular  wall motion.     Right Ventricle  The right ventricle is mildly dilated. The right ventricular systolic function  is normal.     Atria  The left atrium is moderate to severely dilated. The right atrium is severely  dilated.     Mitral Valve  There  is mild (1+) mitral regurgitation.        Tricuspid Valve  There is moderately severe (3+) tricuspid regurgitation. The right ventricular  systolic pressure is approximated at 34mmHg plus the right atrial pressure.     Aortic Valve  The aortic valve is trileaflet. There is trace aortic regurgitation.     Pulmonic Valve  There is trace to mild pulmonic valvular regurgitation.     Vessels  The inferior vena cava was normal in size with preserved respiratory  variability.     Pericardium  Trivial pericardial effusion. Large left pleural effusion.     _____________________________________________________________________________  __        Doppler Measurements & Calculations  PI end-d paul: 166.8 cm/sec     TR max paul: 293.1 cm/sec  TR max P.4 mmHg           _____________________________________________________________________________  __           Report approved by: Tremayne Agrawal 2020 05:09 PM      Lactate Dehydrogenase   Result Value Ref Range    Lactate Dehydrogenase 107 85 - 227 U/L   Protein total   Result Value Ref Range    Protein Total 6.6 (L) 6.8 - 8.8 g/dL   Potassium   Result Value Ref Range    Potassium 3.9 3.4 - 5.3 mmol/L   Basic metabolic panel   Result Value Ref Range    Sodium 138 133 - 144 mmol/L    Potassium 3.6 3.4 - 5.3 mmol/L    Chloride 104 94 - 109 mmol/L    Carbon Dioxide 30 20 - 32 mmol/L    Anion Gap 4 3 - 14 mmol/L    Glucose 93 70 - 99 mg/dL    Urea Nitrogen 26 7 - 30 mg/dL    Creatinine 1.07 0.66 - 1.25 mg/dL    GFR Estimate 61 >60 mL/min/[1.73_m2]    GFR Estimate If Black 71 >60 mL/min/[1.73_m2]    Calcium 8.1 (L) 8.5 - 10.1 mg/dL   CBC with platelets   Result Value Ref Range    WBC 5.8 4.0 - 11.0 10e9/L    RBC Count 3.17 (L) 4.4 - 5.9 10e12/L    Hemoglobin 9.6 (L) 13.3 - 17.7 g/dL    Hematocrit 29.8 (L) 40.0 - 53.0 %    MCV 94 78 - 100 fl    MCH 30.3 26.5 - 33.0 pg    MCHC 32.2 31.5 - 36.5 g/dL    RDW 19.6 (H) 10.0 - 15.0 %    Platelet Count 46 (LL) 150 - 450 10e9/L   Cell  count with differential fluid   Result Value Ref Range    Body Fluid Analysis Source Pleural fluid     Color Fluid Yellow     Appearance Fluid Slightly Cloudy     WBC Fluid Fluid contains clot(s), count is invalid /uL   Glucose fluid   Result Value Ref Range    Glucose Fluid Source Pleural fluid     Glucose Fluid 88 mg/dL   Lactate dehydrogenase fluid   Result Value Ref Range    LD Fluid Source Pleural fluid     Lactate Dehydrogenase Fluid 119 U/L   Protein fluid   Result Value Ref Range    Protein Total Fluid Source Pleural fluid     Protein Total Fluid 3.0 g/dL   US Thoracentesis    Narrative    EXAM:  1. LEFT THORACENTESIS  2. ULTRASOUND GUIDANCE  LOCATION: [facility]  DATE/TIME: 1/15/2020 9:05 AM    INDICATION: Ascites.    PROCEDURE: Informed consent obtained. Time out performed. The chest  was prepped and draped in a sterile fashion. 10 mL of 1% lidocaine was  infused into local soft tissues. A 5 Sinhala catheter system was  introduced into the left pleural fluid under ultrasound guidance.    0.55 liters of clear fluid were removed and sent to lab if requested.    Patient tolerated procedure well.    Ultrasound images have been permanently captured for documentation.      Impression    IMPRESSION:  1.  Status post ultrasound-guided left thoracentesis.    Reference CPT Code: 30663    LIV GIL MD       Echocardiogram 1/14/20:  Interpretation Summary     Left ventricular size, global systolic function, and wall motion are normal,  estimated LVEF 55-60%.  Right ventricular global function is normal.  There is moderately severe (3+) tricuspid regurgitation.  There is mild (1+) mitral regurgitation.  The inferior vena cava was normal in size with preserved respiratory  variability.     This study was compared to a prior TTE from 11/14/2019, there has been no  significant change.    Medications     - MEDICATION INSTRUCTIONS -         acetaminophen  650 mg Oral BID     digoxin  125 mcg Oral Daily      melatonin  3 mg Oral At Bedtime     metoprolol succinate ER  50 mg Oral BID     mirtazapine  15 mg Oral At Bedtime     multivitamin  with lutein  1 capsule Oral Daily     sodium chloride (PF)  3 mL Intracatheter Q8H     spironolactone  25 mg Oral Daily     torsemide  10 mg Oral Daily

## 2020-01-16 ENCOUNTER — TELEPHONE (OUTPATIENT)
Dept: CARDIOLOGY | Facility: CLINIC | Age: 85
End: 2020-01-16

## 2020-01-16 ENCOUNTER — PATIENT OUTREACH (OUTPATIENT)
Dept: CARE COORDINATION | Facility: CLINIC | Age: 85
End: 2020-01-16

## 2020-01-16 ENCOUNTER — NURSING HOME VISIT (OUTPATIENT)
Dept: GERIATRICS | Facility: CLINIC | Age: 85
End: 2020-01-16
Payer: MEDICARE

## 2020-01-16 VITALS
SYSTOLIC BLOOD PRESSURE: 124 MMHG | OXYGEN SATURATION: 97 % | DIASTOLIC BLOOD PRESSURE: 80 MMHG | WEIGHT: 147.2 LBS | HEIGHT: 72 IN | HEART RATE: 65 BPM | TEMPERATURE: 97.9 F | BODY MASS INDEX: 19.94 KG/M2 | RESPIRATION RATE: 16 BRPM

## 2020-01-16 DIAGNOSIS — I07.1 TRICUSPID VALVE INSUFFICIENCY, UNSPECIFIED ETIOLOGY: ICD-10-CM

## 2020-01-16 DIAGNOSIS — J90 BILATERAL PLEURAL EFFUSION: ICD-10-CM

## 2020-01-16 DIAGNOSIS — I48.21 PERMANENT ATRIAL FIBRILLATION (H): ICD-10-CM

## 2020-01-16 DIAGNOSIS — I38 ACUTE ON CHRONIC DIASTOLIC HEART FAILURE DUE TO VALVULAR DISEASE (H): Primary | ICD-10-CM

## 2020-01-16 DIAGNOSIS — R53.81 PHYSICAL DECONDITIONING: ICD-10-CM

## 2020-01-16 DIAGNOSIS — D69.49 CHRONIC THROMBOCYTOPENIC PURPURA (H): ICD-10-CM

## 2020-01-16 DIAGNOSIS — N18.30 CKD (CHRONIC KIDNEY DISEASE) STAGE 3, GFR 30-59 ML/MIN (H): ICD-10-CM

## 2020-01-16 DIAGNOSIS — I50.33 ACUTE ON CHRONIC DIASTOLIC HEART FAILURE DUE TO VALVULAR DISEASE (H): Primary | ICD-10-CM

## 2020-01-16 DIAGNOSIS — I10 ESSENTIAL HYPERTENSION: ICD-10-CM

## 2020-01-16 PROBLEM — I34.0 NONRHEUMATIC MITRAL (VALVE) INSUFFICIENCY: Status: ACTIVE | Noted: 2019-11-19

## 2020-01-16 PROCEDURE — 99207 ZZC CDG-MDM COMPONENT: MEETS LOW - DOWN CODED: CPT | Performed by: NURSE PRACTITIONER

## 2020-01-16 PROCEDURE — 99309 SBSQ NF CARE MODERATE MDM 30: CPT | Performed by: NURSE PRACTITIONER

## 2020-01-16 ASSESSMENT — MIFFLIN-ST. JEOR: SCORE: 1375.69

## 2020-01-16 NOTE — Clinical Note
HI, Wow, this is the best Jama has looked.   He turned quickly as felt crummy Mid week but said good on last Friday am when I stopped by.He sees you 1/21--he has labs scheduled here on 1/22.  Do you all plan to get them at your visit?  If so I will cancel them on 1/22.  If he looks good over wkd, Dr Hernández will see Monday, possibly I will discharge him 1/22 ..... we can hope.Thanks Cheli

## 2020-01-16 NOTE — PROGRESS NOTES
Clinic Care Coordination Contact  Care Coordination Transition Communication    Referral Source: IP Report    Clinical Data: Patient was hospitalized at [facility] from 1/11/2020 to 1/15/2020 with diagnosis of Acute on chronic  diastolic CHF exacerbation   Valvular heart disease with severe TR and mild to moderate MR :  REcurrent large right pleural effusion s/p toracentesis :  Moderate left pleural effusion s/p thoracentesis.     Transition to Facility:              Facility Name: [facility]              Contact name and phone number/fax: [phone]/ [phone]    Plan: RN/SW Care Coordinator will await notification from facility staff informing RN/SW Care Coordinator of patient's discharge plans/needs. RN/SW Care Coordinator will review chart and outreach to facility staff every 4 weeks and as needed. Fax sent to [facility] with my contact information requesting notification upon discharge.    JASMINA Mratinez, [facility]  Clinic Care Coordinator  [facility]  [phone]  [email or web address]

## 2020-01-16 NOTE — TELEPHONE ENCOUNTER
Patient was evaluated by cardiology while inpatient for SOB and recurrent arianna pleural effusions of unknown etiology. 1/13/20: Right thoracentesis completed. IV Lasix diuresis this admission. Discharged on PTA Demadex. Aldactone added. RN called [facility] to confirm the follow up plan for patient with charge RN. RN confirmed with charge RN that patient has a scheduled F/U appt on 1/21/20 with ICONOGRAFICO HARRY, for CORE return. RN confirmed with charge RN that patient is weighed daily and to bring list of daily weights/vitals, last progress note, MAR, active orders, and provider order sheet to appt. LYNDA Fang RN.

## 2020-01-16 NOTE — PROGRESS NOTES
[facility]  PRIMARY CARE PROVIDER AND CLINIC:  Mariusz Shields MD, [address] / [place] [address]  Chief Complaint   Patient presents with     Hospital F/U     ED f/u     [facility] Medical Record Number:  [id number]  Place of Service where encounter took place:  [facility] ([facility]) [[id number]]    Jama Paul  is a 88 year old  (2/13/1931), admitted to the above facility from  [facility]. Hospital stay 1/11/20 through 1/15/20..  Admitted to this facility for  rehab, medical management and nursing care.       Acute on chronic diastolic heart failure due to valvular disease (H)  Tricuspid valve insufficiency, unspecified etiology  Bilateral pleural effusion  Permanent atrial fibrillation  Essential hypertension  CKD (chronic kidney disease) stage 3, GFR 30-59 ml/min (H)  Chronic thrombocytopenic purpura (H)  Physical deconditioning      HPI:    HPI information obtained from: facility chart records, facility staff, patient report and [facility] chart review.     Brief Summary of Hospital Course: pt was readmitted to the hospital on 1/11/20 after acute SOB.  He was found to have large pleural effusions Right > Left and both were tapped for total of 2200 cc Fluid.. cardiology saw and also added spironolactone to his regimen but otherwise made no changes.   pro calcitonin was <0.05 so it was thought likely not to be a pneumonia.  Pt's platelets run low but have been stable.   An Eco was done--showed good LV and RV function which was a vast improvement from 2017. It appears his TR is severe and he may benefit from a TVR but is a high but not prohibitive risk. He was seen today in room    TODAY DURING EXAM HPI and ROS:  No CP, SOB, Cough, dizziness, fevers, chills, HA, N/V, dysuria or Bowel Abnormalities. Appetite is good.  No pain. Says this is the best he has felt. Lots of energy, hopes to discharge soon(* of note, therapies also thought this was the  strongest he has looked since coming to TCU*).  He is incont at baseline but wears depends.      CODE STATUS/ADVANCE DIRECTIVES DISCUSSION:   CPR/Full code   Patient's living condition: lives with spouse  ALLERGIES: Patient has no known allergies.  PAST MEDICAL HISTORY:  has a past medical history of Congestive heart failure (H), Elevated PSA, Eye hemorrhage, left (02/2019), Non-ischemic cardiomyopathy (H), Permanent atrial fibrillation (2011), Thrombocytopenia (H), and Unspecified essential hypertension. He also has no past medical history of Malignant hyperthermia, PONV (postoperative nausea and vomiting), or Sleep apnea.  PAST SURGICAL HISTORY:   has a past surgical history that includes septic olecranon bursitis[; Mohs micrographic procedure; Herniorrhaphy inguinal (4/30/2014); Explore groin (4/30/2014); Phacoemulsification clear cornea with standard intraocular lens implant (Right, 9/8/2015); carpal tunnel release rt/lt (2014); Bone marrow biopsy, bone specimen, needle/trocar (N/A, 3/4/2019); Abdomen surgery; Esophagoscopy, gastroscopy, duodenoscopy (EGD), combined (N/A, 11/6/2019); Heart Catheterization with Possible Intervention (N/A, 11/15/2019); and Left Ventriculogram (N/A, 11/15/2019).  FAMILY HISTORY: family history is not on file.  SOCIAL HISTORY:   reports that he has never smoked. He has never used smokeless tobacco. He reports that he does not drink alcohol or use drugs.    Post Discharge Medication Reconciliation Status: discharge medications reconciled and changed, per note/orders (see AVS)     Current Outpatient Medications   Medication Sig Dispense Refill     acetaminophen (TYLENOL) 325 MG tablet Take 650 mg by mouth 2 times daily every 6 hours as needed for Pain       digoxin (LANOXIN) 125 MCG tablet Take 1 tablet (125 mcg) by mouth daily (Patient taking differently: Take 125 mcg by mouth daily HOLD if HR <55) 30 tablet 0     melatonin 3 MG tablet Take 3 mg by mouth At Bedtime After 9pm. Please  use overnight depends on pt to allow him to sleep without interruption during the night       metoprolol succinate ER (TOPROL-XL) 50 MG 24 hr tablet Take 50 mg by mouth 2 times daily HOLD if SBP <110 OR HR < 55. Call if held x 2 in a row symptomatic       mirtazapine (REMERON) 15 MG tablet Take 15 mg by mouth At Bedtime Patient started medication for the first time yesterday 10/29/2019.        Multiple Vitamins-Minerals (ICAPS AREDS FORMULA PO) Take 1 tablet by mouth daily Takes in addition to multivitamin with minerals       OTHER MEDICAL SUPPLIES CORE PROTOCOL ; DAILY WEIGHTS IN AM.       spironolactone (ALDACTONE) 25 MG tablet Take 1 tablet (25 mg) by mouth daily 30 tablet 0     torsemide (DEMADEX) 10 MG tablet Take 1 tablet (10 mg) by mouth daily       Vitamin D, Cholecalciferol, 1000 UNITS TABS Take 3,000 Units by mouth daily           ROS:  See above under HPI    Vitals:  /80   Pulse 65   Temp 97.9  F (36.6  C)   Resp 16   Ht 1.829 m (6')   Wt 66.8 kg (147 lb 3.2 oz)   SpO2 97%   BMI 19.96 kg/m    Exam:  GENERAL APPEARANCE:  Alert, oriented, cooperative, NAD.  ENT:  Mouth with moist mucous membranes, [place].  EYES:  EOM, conjunctivae, lids, pupils and irises normal  NECK:  No adenopathy,masses or thyromegaly  RESP:  respiratory effort  of chest normal, lungs clear to auscultation with occas crackles in bases Rt > Lt, good air exchange in bases, no respiratory distress.   CV:  Auscultation of heart done ,RRR. Soft systolic murmur.  No Rub or Gallop, trace pedal edema,   +1 pedal pulses.  ABDOMEN:  normal bowel sounds, soft, nontender.   M/S:   CHIU equally, up with assist-seen in recliner  SKIN:  Inspection of skin is at baseline but limited as pt fully dressed  NEURO:   Cranial nerves 2-12 are grossly intact and at patient's baseline  PSYCH:  oriented X 3, engaged in conversation.    Lab/Diagnostic data:  Recent Labs   Lab Test 01/15/20  0643 01/14/20 2037 01/14/20  0704     --  136   POTASSIUM  3.6 3.9 3.1*   CHLORIDE 104  --  102   CO2 30  --  29   ANIONGAP 4  --  5   GLC 93  --  96   BUN 26  --  27   CR 1.07  --  1.21   BARON 8.1*  --  8.0*     CBC RESULTS:   Recent Labs   Lab Test 01/15/20  0643   WBC 5.8   RBC 3.17*   HGB 9.6*   HCT 29.8*   MCV 94   MCH 30.3   MCHC 32.2   RDW 19.6*   PLT 46*     Echo 1/14/20: see below      ASSESSMENT / PLAN:  (I50.33,  I38) Acute on chronic diastolic heart failure due to valvular disease (H)  (primary encounter diagnosis)  (I07.1) Tricuspid valve insufficiency, unspecified etiology  (J90) Bilateral pleural effusion  Comment/Plan: much improved, cont with current meds--new to spironolactone--labs scheduled for 1/22 but sees [facility] on 1/21. Will ask them if they want to draw labs at appt instead. CORE daily wts, protocol.    (I48.21) Permanent atrial fibrillation  (I10) Essential hypertension  Comment/Plan: cont current meds, rate is 60-70's  And SBP mostly low 100's to mid 120's, occas < or >, no changes to current regimen.    (N18.3) CKD (chronic kidney disease) stage 3, GFR 30-59 ml/min (H)  Comment/Plan: check BMP on 1/22 or at [facility]ORE 1/21     (D69.49) Chronic thrombocytopenic purpura (H)  Comment/Plan: CBC next week    (R53.81) Physical deconditioning  Comment/Plan: PT OT, Therapies think he is doing well, hopefully discharge next week      Total time with patient visit: 60 minutes including discussions about the POC and care coordination with the patient and staff.  This included discussion of pertinent diagnostic results,  risk and benefits of current and new treatments . All questions answered and there is agreement   on the Care Plan.  Greater than 50% of total time spent with counseling and coordinating care due to the above and the  complexities of care.     For new admissions and first visits:  The medications orders that arrived with pt were verified for correctness and comprehensive orders for the new admission to facility were done and discussed with  staff.        Electronically signed by:  ERIC Mir CNP

## 2020-01-16 NOTE — LETTER
[facility]   To:   [facility]          Please give to facility    From:   KIRIT Martinez Care Coordinator [facility]     Patient Name:  Jama Paul YOB: 1931   Admit date: 1/15/2020      *Information Needed:  Please contact me when the patient will discharge (or if they will move to long term care)- include the discharge date, disposition, and main diagnosis   - If the patient is discharged with home care services, please provide the name of the agency    Also- Please inform me if a care conference is being held.   Phone, Fax or Email with information       Thank You,   RICHIE Martinez, MSW  Clinic Care Coordinator  [facility]  Ph: [phone]  [email or web address]

## 2020-01-16 NOTE — PLAN OF CARE
Physical Therapy Discharge Summary    Reason for therapy discharge:    Discharged to transitional care facility.    Progress towards therapy goal(s). See goals on Care Plan in [facility] electronic health record for goal details.  Goals not met.  Barriers to achieving goals:   discharge from facility.    Therapy recommendation(s):    Continued therapy is recommended.  Rationale/Recommendations:  pt will benefit from continued skilled PT to maximize his level of safety with mobility to increase his level fo independence. . **Pt not seen by discharging therapist on this date, note written based on previous treating therapist's notes and recommendations.

## 2020-01-17 LAB
BACTERIA SPEC CULT: NO GROWTH
BACTERIA SPEC CULT: NO GROWTH
Lab: NORMAL
Lab: NORMAL
SPECIMEN SOURCE: NORMAL
SPECIMEN SOURCE: NORMAL

## 2020-01-18 LAB
BACTERIA SPEC CULT: NO GROWTH
SPECIMEN SOURCE: NORMAL

## 2020-01-20 ENCOUNTER — NURSING HOME VISIT (OUTPATIENT)
Dept: GERIATRICS | Facility: CLINIC | Age: 85
End: 2020-01-20
Payer: MEDICARE

## 2020-01-20 ENCOUNTER — TRANSFERRED RECORDS (OUTPATIENT)
Dept: HEALTH INFORMATION MANAGEMENT | Facility: CLINIC | Age: 85
End: 2020-01-20

## 2020-01-20 VITALS
HEART RATE: 94 BPM | HEIGHT: 72 IN | WEIGHT: 150.8 LBS | RESPIRATION RATE: 20 BRPM | SYSTOLIC BLOOD PRESSURE: 123 MMHG | BODY MASS INDEX: 20.42 KG/M2 | OXYGEN SATURATION: 96 % | DIASTOLIC BLOOD PRESSURE: 75 MMHG | TEMPERATURE: 97.7 F

## 2020-01-20 DIAGNOSIS — D69.49 CHRONIC THROMBOCYTOPENIC PURPURA (H): ICD-10-CM

## 2020-01-20 DIAGNOSIS — R53.81 PHYSICAL DECONDITIONING: ICD-10-CM

## 2020-01-20 DIAGNOSIS — J90 BILATERAL PLEURAL EFFUSION: Primary | ICD-10-CM

## 2020-01-20 DIAGNOSIS — I07.1 TRICUSPID VALVE INSUFFICIENCY, UNSPECIFIED ETIOLOGY: ICD-10-CM

## 2020-01-20 DIAGNOSIS — I38 ACUTE ON CHRONIC DIASTOLIC HEART FAILURE DUE TO VALVULAR DISEASE (H): ICD-10-CM

## 2020-01-20 DIAGNOSIS — I50.33 ACUTE ON CHRONIC DIASTOLIC HEART FAILURE DUE TO VALVULAR DISEASE (H): ICD-10-CM

## 2020-01-20 DIAGNOSIS — I48.21 PERMANENT ATRIAL FIBRILLATION (H): ICD-10-CM

## 2020-01-20 LAB
ANION GAP SERPL CALCULATED.3IONS-SCNC: 8 MMOL/L (ref 7–16)
BACTERIA SPEC CULT: NO GROWTH
BACTERIA SPEC CULT: NORMAL
BUN SERPL-MCNC: 16 MG/DL (ref 7–26)
CALCIUM SERPL-MCNC: 8.6 MG/DL (ref 8.4–10.4)
CHLORIDE SERPLBLD-SCNC: 101 MMOL/L (ref 98–109)
CO2 SERPL-SCNC: 26 MMOL/L (ref 20–29)
CREAT SERPL-MCNC: 0.94 MG/DL (ref 0.73–1.18)
GFR SERPL CREATININE-BSD FRML MDRD: >60 ML/MIN/1.73M2
GLUCOSE SERPL-MCNC: 90 MG/DL (ref 70–100)
Lab: NORMAL
POTASSIUM SERPL-SCNC: 4 MMOL/L (ref 3.5–5.1)
SODIUM SERPL-SCNC: 135 MMOL/L (ref 136–145)
SPECIMEN SOURCE: NORMAL
SPECIMEN SOURCE: NORMAL

## 2020-01-20 PROCEDURE — 99309 SBSQ NF CARE MODERATE MDM 30: CPT | Performed by: INTERNAL MEDICINE

## 2020-01-20 ASSESSMENT — MIFFLIN-ST. JEOR: SCORE: 1392.02

## 2020-01-20 NOTE — PROGRESS NOTES
Jama Paul is a 88 year old male seen January 20, 2020 at [facility] where he was re-admitted this month after [facility] hospitalization 1/11-15 for recurrent large right pleural effusion.   He was treated with IV Lasix, had thoracentesis removing 2200 mLs of fluid and spironolactone was added to his daily torsemide.   Today patient is seen on the unit up to .   He reports feeling well, no current dyspnea and is working with therapies.   His wife is also present, they have hopes he will be able to return to their apartment soon.        Pt initially had [facility] hospitalization 10/30-11/17/19 for shortness of breath.  He was found to have bilateral pleural effusions and had thoracentesis x2 on the left (latter on 11/14) and one time on the right.    Pleural fluid c/w transudate.   ECHO cardiogram showed EF 55-60% with severely dilated RV, severe MR and TR.   Pt needed GI consult and esophageal dilatation to have KATIE done 11/1    Following this procedure he had hypotension requiring pressor support in the ICU.   He was diuresed with IV furosemide and repeat ECHO showed severe TR and moderate MR, with some improvement in both.   He was seen by Cardiology and CV surgery. He underwent left heart catheterization as workup for tricuspid repair or replacement, and this showed normal left and right sided pressures.     He remains on metoprolol and torsemide, but not on Entresto secondary to low bps.    Follow up with Dr Cervantes to consider TV repair is pending.       Pt has ITP, poss early myelodysplastic syndrome.   He had a BM biopsy in March 2019 that showed hypercellular marrow with 80% cellularity and normal thrombocytes.   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  He was anticoagulated with warfarin for atrial fib and had a bleed behind his retina, lost vision in his left eye; has not been anticoagulated since then.       He has seen Hem/Onc and been treated with prednisone and IVIG with some improvement.         Patient has been incontinent of stool, for 3 years by his report.  Etiology unclear, ?functional.      He has had longer standing urinary incontinence for which he wears briefs at home.      Past Medical History:   Diagnosis Date     Congestive heart failure (H)      Elevated PSA      Eye hemorrhage, left 02/2019     Non-ischemic cardiomyopathy (H)     2017, med treatment, echo done 4/18 nl ef, mod to severe tr     Permanent atrial fibrillation 2011     Thrombocytopenia (H)      Unspecified essential hypertension    Cognitive impairment    Past Surgical History:   Procedure Laterality Date     ABDOMEN SURGERY      bowel obstruction     BONE MARROW BIOPSY, BONE SPECIMEN, NEEDLE/TROCAR N/A 3/4/2019    Procedure: BIOPSY BONE MARROW;  Surgeon: Josey Vargas MD;  Location:  GI     CARPAL TUNNEL RELEASE RT/LT  2014     CV HEART CATHETERIZATION WITH POSSIBLE INTERVENTION N/A 11/15/2019    Procedure: Left and right heart Catheterization with Possible Intervention;  Surgeon: Adolfo Paez MD;  Location:  HEART CARDIAC CATH LAB     CV LEFT VENTRICULOGRAM N/A 11/15/2019    Procedure: Left Ventriculogram;  Surgeon: Adolfo Paez MD;  Location:  HEART CARDIAC CATH LAB     ESOPHAGOSCOPY, GASTROSCOPY, DUODENOSCOPY (EGD), COMBINED N/A 11/6/2019    Procedure: ESOPHAGOGASTRODUODENOSCOPY (EGD);  Surgeon: Marixa Aguila MD;  Location:  GI     EXPLORE GROIN  4/30/2014    Procedure: EXPLORE GROIN;  Surgeon: Sam Aguirre MD;  Location:  OR     HERNIORRHAPHY INGUINAL  4/30/2014    Procedure: HERNIORRHAPHY INGUINAL;  Surgeon: Sam Aguirre MD;  Location:  OR     MOHS MICROGRAPHIC PROCEDURE       PHACOEMULSIFICATION CLEAR CORNEA WITH STANDARD INTRAOCULAR LENS IMPLANT Right 9/8/2015    Procedure: PHACOEMULSIFICATION CLEAR CORNEA WITH STANDARD INTRAOCULAR LENS IMPLANT;  Surgeon: Gil Shannon MD;   Location:  EC     septic olecranon bursitis[       SH:  Lives with his wife, ekaterina at [facility].   They have a daughter Lorraine and son Jama.   Pt is a retired ophthalmologist, worked clinically until age 79      Non smoker    Review Of Systems  Eyes: impaired vision, loss of vision left eye     Ears/Nose/Throat: hearing loss  Gastrointestinal: fecal incontinence  Wt Readings from Last 5 Encounters:   01/20/20 68.4 kg (150 lb 12.8 oz)   01/16/20 66.8 kg (147 lb 3.2 oz)   01/14/20 64.2 kg (141 lb 8 oz)   01/08/20 70.9 kg (156 lb 6.4 oz)   12/24/19 68.7 kg (151 lb 6.4 oz)     Musculoskeletal: generalized weakness, WC bound with assist for transfers  Neurologic: SLUMS 24/30       GENERAL APPEARANCE: alert and no distress  /75   Pulse 94   Temp 97.7  F (36.5  C)   Resp 20   Ht 1.829 m (6')   Wt 68.4 kg (150 lb 12.8 oz)   SpO2 96%   BMI 20.45 kg/m     +kyphosis  Lungs with decreased BS, few scattered crackles  Heart RRR s1s2 distant  Abd soft, NT, no distention, +BS  Ext without edema, wearing compression stockings  Up to WC, conversational and appropriate.      Last Comprehensive Metabolic Panel:  Sodium   Date Value Ref Range Status   01/20/2020 135 (A) 136 - 145 mmol/L Final     Potassium   Date Value Ref Range Status   01/20/2020 4.0 3.5 - 5.1 mmol/L Final     Chloride   Date Value Ref Range Status   01/20/2020 101 98 - 109 mmol/L Final     Carbon Dioxide   Date Value Ref Range Status   01/20/2020 26 20 - 29 mmol/L Final     Anion Gap   Date Value Ref Range Status   01/20/2020 8 7 - 16 mmol/L Final     Glucose   Date Value Ref Range Status   01/20/2020 90 70 - 100 mg/dL Final     Urea Nitrogen   Date Value Ref Range Status   01/20/2020 16 7 - 26 mg/dL Final     Creatinine   Date Value Ref Range Status   01/20/2020 0.94 0.73 - 1.18 mg/dL Final     GFR Estimate   Date Value Ref Range Status   01/20/2020 >60 >60 ml/min/1.73m2 Final     Calcium   Date Value Ref Range Status   01/20/2020 8.6 8.4 -  10.4 mg/dL Final     Lab Results   Component Value Date    WBC 5.8 01/15/2020      HGB 9.6 01/15/2020      MCV 94 01/15/2020      PLT 46 01/15/2020     ECHO 1/11/20  Interpretation Summary  Left ventricular size, global systolic function, and wall motion are normal, estimated LVEF 55-60%.  Right ventricular global function is normal.  There is moderately severe (3+) tricuspid regurgitation. There is mild (1+) mitral regurgitation.  The inferior vena cava was normal in size with preserved respiratory variability.  This study was compared to a prior TTE from 11/14/2019, there has been no significant change.      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       (I07.1) Tricuspid valve insufficiency, unspecified etiology  Comment: severe by ECHO and only slight improvement after diuresis      Plan: pt would like consideration for tricuspid valve repair.  Cardiology follow up as scheduled.      (I48.21) Permanent atrial fibrillation  Comment:   Pulse Readings from Last 4 Encounters:   01/20/20 94   01/16/20 65   01/14/20 66   01/10/20 66      BP Readings from Last 3 Encounters:   01/20/20 123/75   01/16/20 124/80   01/15/20 118/71      Plan: digoxin 0.125 mg/day and metoprolol 50 mg bid for VR and bp control.     No anticoagulation given low plts and bleeding complications.        (D69.3) Idiopathic thrombocytopenic purpura (H)  Comment: varying plt counts, at times quite low     Plan: recheck prn    (R53.1) Generalized weakness  (R53.81) Physical deconditioning  Comment: after acute illness   Not currently ambulatory.     Plan: PHYSICAL THERAPY / OCCUPATIONAL THERAPY / Speech therapy for strengthening, balance,  transfers, ADLs.   [place] apartment with [place] wife and family report.           Jeanette Henrández MD

## 2020-01-20 NOTE — LETTER
1/20/2020        RE: Jama Paul  [address] [place] [address]  [facility] [id number]        Jama Paul is a 88 year old male seen January 20, 2020 at [facility] where he was re-admitted this month after [facility] hospitalization 1/11-15 for recurrent large right pleural effusion.   He was treated with IV Lasix, had thoracentesis removing 2200 mLs of fluid and spironolactone was added to his daily torsemide.   Today patient is seen on the unit up to .   He reports feeling well, no current dyspnea and is working with therapies.   His wife is also present, they have hopes he will be able to return to their apartment soon.        Pt initially had [facility] hospitalization 10/30-11/17/19 for shortness of breath.  He was found to have bilateral pleural effusions and had thoracentesis x2 on the left (latter on 11/14) and one time on the right.    Pleural fluid c/w transudate.   ECHO cardiogram showed EF 55-60% with severely dilated RV, severe MR and TR.   Pt needed GI consult and esophageal dilatation to have KATEI done 11/1    Following this procedure he had hypotension requiring pressor support in the ICU.   He was diuresed with IV furosemide and repeat ECHO showed severe TR and moderate MR, with some improvement in both.   He was seen by Cardiology and CV surgery. He underwent left heart catheterization as workup for tricuspid repair or replacement, and this showed normal left and right sided pressures.     He remains on metoprolol and torsemide, but not on Entresto secondary to low bps.    Follow up with Dr Cervantes to consider TV repair is pending.       Pt has ITP, poss early myelodysplastic syndrome.   He had a BM biopsy in March 2019 that showed hypercellular marrow with 80% cellularity and normal thrombocytes.   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  He was anticoagulated with warfarin for atrial fib and  had a bleed behind his retina, lost vision in his left eye; has not been anticoagulated since then.      He has seen Hem/Onc and been treated with prednisone and IVIG with some improvement.         Patient has been incontinent of stool, for 3 years by his report.  Etiology unclear, ?functional.      He has had longer standing urinary incontinence for which he wears briefs at home.      Past Medical History:   Diagnosis Date     Congestive heart failure (H)      Elevated PSA      Eye hemorrhage, left 02/2019     Non-ischemic cardiomyopathy (H)     2017, med treatment, echo done 4/18 nl ef, mod to severe tr     Permanent atrial fibrillation 2011     Thrombocytopenia (H)      Unspecified essential hypertension    Cognitive impairment    Past Surgical History:   Procedure Laterality Date     ABDOMEN SURGERY      bowel obstruction     BONE MARROW BIOPSY, BONE SPECIMEN, NEEDLE/TROCAR N/A 3/4/2019    Procedure: BIOPSY BONE MARROW;  Surgeon: Josey Vargas MD;  Location:  GI     CARPAL TUNNEL RELEASE RT/LT  2014     CV HEART CATHETERIZATION WITH POSSIBLE INTERVENTION N/A 11/15/2019    Procedure: Left and right heart Catheterization with Possible Intervention;  Surgeon: Adolfo Paez MD;  Location:  HEART CARDIAC CATH LAB     CV LEFT VENTRICULOGRAM N/A 11/15/2019    Procedure: Left Ventriculogram;  Surgeon: Adolfo Paez MD;  Location:  HEART CARDIAC CATH LAB     ESOPHAGOSCOPY, GASTROSCOPY, DUODENOSCOPY (EGD), COMBINED N/A 11/6/2019    Procedure: ESOPHAGOGASTRODUODENOSCOPY (EGD);  Surgeon: Marixa Aguila MD;  Location:  GI     EXPLORE GROIN  4/30/2014    Procedure: EXPLORE GROIN;  Surgeon: Sam Aguirre MD;  Location:  OR     HERNIORRHAPHY INGUINAL  4/30/2014    Procedure: HERNIORRHAPHY INGUINAL;  Surgeon: Sam Aguirre MD;  Location:  OR     MOHS MICROGRAPHIC PROCEDURE       PHACOEMULSIFICATION CLEAR CORNEA WITH STANDARD INTRAOCULAR LENS IMPLANT Right 9/8/2015    Procedure:  PHACOEMULSIFICATION CLEAR CORNEA WITH STANDARD INTRAOCULAR LENS IMPLANT;  Surgeon: Gil Shannon MD;  Location:  EC     septic olecranon bursitis[       SH:  Lives with his wife, ekaterina at [facility].   They have a daughter Lorraine and son Jama.   Pt is a retired ophthalmologist, worked clinically until age 79      Non smoker    Review Of Systems  Eyes: impaired vision, loss of vision left eye     Ears/Nose/Throat: hearing loss  Gastrointestinal: fecal incontinence  Wt Readings from Last 5 Encounters:   01/20/20 68.4 kg (150 lb 12.8 oz)   01/16/20 66.8 kg (147 lb 3.2 oz)   01/14/20 64.2 kg (141 lb 8 oz)   01/08/20 70.9 kg (156 lb 6.4 oz)   12/24/19 68.7 kg (151 lb 6.4 oz)     Musculoskeletal: generalized weakness, WC bound with assist for transfers  Neurologic: SLUMS 24/30       GENERAL APPEARANCE: alert and no distress  /75   Pulse 94   Temp 97.7  F (36.5  C)   Resp 20   Ht 1.829 m (6')   Wt 68.4 kg (150 lb 12.8 oz)   SpO2 96%   BMI 20.45 kg/m      +kyphosis  Lungs with decreased BS, few scattered crackles  Heart RRR s1s2 distant  Abd soft, NT, no distention, +BS  Ext without edema, wearing compression stockings  Up to WC, conversational and appropriate.      Last Comprehensive Metabolic Panel:  Sodium   Date Value Ref Range Status   01/20/2020 135 (A) 136 - 145 mmol/L Final     Potassium   Date Value Ref Range Status   01/20/2020 4.0 3.5 - 5.1 mmol/L Final     Chloride   Date Value Ref Range Status   01/20/2020 101 98 - 109 mmol/L Final     Carbon Dioxide   Date Value Ref Range Status   01/20/2020 26 20 - 29 mmol/L Final     Anion Gap   Date Value Ref Range Status   01/20/2020 8 7 - 16 mmol/L Final     Glucose   Date Value Ref Range Status   01/20/2020 90 70 - 100 mg/dL Final     Urea Nitrogen   Date Value Ref Range Status   01/20/2020 16 7 - 26 mg/dL Final     Creatinine   Date Value Ref Range Status   01/20/2020 0.94 0.73 - 1.18 mg/dL Final     GFR Estimate   Date Value Ref Range  Status   01/20/2020 >60 >60 ml/min/1.73m2 Final     Calcium   Date Value Ref Range Status   01/20/2020 8.6 8.4 - 10.4 mg/dL Final     Lab Results   Component Value Date    WBC 5.8 01/15/2020      HGB 9.6 01/15/2020      MCV 94 01/15/2020      PLT 46 01/15/2020     ECHO 1/11/20  Interpretation Summary  Left ventricular size, global systolic function, and wall motion are normal, estimated LVEF 55-60%.  Right ventricular global function is normal.  There is moderately severe (3+) tricuspid regurgitation. There is mild (1+) mitral regurgitation.  The inferior vena cava was normal in size with preserved respiratory variability.  This study was compared to a prior TTE from 11/14/2019, there has been no significant change.      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       (I07.1) Tricuspid valve insufficiency, unspecified etiology  Comment: severe by ECHO and only slight improvement after diuresis      Plan: pt would like consideration for tricuspid valve repair.  Cardiology follow up as scheduled.      (I48.21) Permanent atrial fibrillation  Comment:   Pulse Readings from Last 4 Encounters:   01/20/20 94   01/16/20 65   01/14/20 66   01/10/20 66      BP Readings from Last 3 Encounters:   01/20/20 123/75   01/16/20 124/80   01/15/20 118/71      Plan: digoxin 0.125 mg/day and metoprolol 50 mg bid for VR and bp control.     No anticoagulation given low plts and bleeding complications.        (D69.3) Idiopathic thrombocytopenic purpura (H)  Comment: varying plt counts, at times quite low     Plan: recheck prn    (R53.1) Generalized weakness  (R53.81) Physical deconditioning  Comment: after acute illness   Not currently  ambulatory.     Plan: PHYSICAL THERAPY / OCCUPATIONAL THERAPY / Speech therapy for strengthening, balance, transfers, ADLs.   [place] and family report.           Jeanette Hernández MD       Sincerely,        Jeanette Hernández MD

## 2020-01-21 ENCOUNTER — OFFICE VISIT (OUTPATIENT)
Dept: CARDIOLOGY | Facility: CLINIC | Age: 85
End: 2020-01-21
Payer: MEDICARE

## 2020-01-21 VITALS
WEIGHT: 152 LBS | SYSTOLIC BLOOD PRESSURE: 109 MMHG | DIASTOLIC BLOOD PRESSURE: 64 MMHG | HEIGHT: 69 IN | OXYGEN SATURATION: 96 % | BODY MASS INDEX: 22.51 KG/M2 | HEART RATE: 72 BPM

## 2020-01-21 DIAGNOSIS — I50.33 ACUTE ON CHRONIC DIASTOLIC CONGESTIVE HEART FAILURE (H): Primary | ICD-10-CM

## 2020-01-21 PROCEDURE — 99214 OFFICE O/P EST MOD 30 MIN: CPT | Performed by: PHYSICIAN ASSISTANT

## 2020-01-21 ASSESSMENT — MIFFLIN-ST. JEOR
SCORE: 1341.91
SCORE: 1388.4

## 2020-01-21 NOTE — LETTER
1/21/2020    Mariusz Shields MD  [address]  [facility] [address]    RE: Jama Paul       Dear Colleague,    I had the pleasure of seeing Jama Paul in the [facility].      Cardiology Progress Note    Date of Service: 01/21/2020  Patient seen today in follow up of: post hospital, CORE follow up  Primary cardiologist: Dr. 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     Please seem by previous note from 12/18 for full details regarding his medical history.  He presented in 2017 with atrial fibrillation and was found to have a tachycardia mediated cardiomyopathy.  His EF normalized with management of his atrial fibrillation.  He did well from a heart failure standpoint other than a hospitalization in January 2018 after accidentally stopping his Lasix.    He recently had a complicated hospitalization in early November after presenting with shortness of breath and bilateral pleural effusions.  He underwent thoracentesis x2.  Fluid was consistent with transudate.  He had an echo which suggested significant tricuspid regurgitation.  CV surgery was consulted about potential tricuspid valve repair.  He was felt to be high risk but not prohibitively so.  He ultimately underwent a KATIE which showed severe tricuspid regurgitation as well as 3-4+ mitral regurgitation.  He had a repeat limited echo after aggressive diuresis and a right heart cath confirming he was euvolemic which showed persistent severe tricuspid regurgitation but improvement in his mitral regurgitation to only mild to  moderate.  He was discharged to a TCU on 10 mg of torsemide daily.  Of note, he was started on digoxin during this hospitalization as he had some rapid ventricular rates with his atrial fibrillation.  Entresto was stopped due to hypotension which has not been resumed at this point.    He did require repeat thoracentesis on 11/26.  I last saw him in clinic about a month ago when he was overall doing well.  Unfortunately, after that he developed worsening shortness of breath and a fever.  Chest x-ray was consistent with pneumonia and he was started on antibiotics.  He continued to have shortness of breath and ultimately presented to [facility] on 1/11.  He underwent a right-sided thoracentesis with 2.2 L removed.  He was initially treated with IV Zosyn for possible pneumonia although this was stopped several days later.  Fluid analysis did show elevated protein and LDH.  Prior to discharge, he also underwent a left-sided thoracentesis.  After his thoracentesis, he has significant symptomatic improvement.  He had a repeat echocardiogram done which showed normal LV and RV function.  His tricuspid regurgitation appeared to only be moderately severe.  He was seen by myself and Dr. Guillory in the hospital and we elected to continue torsemide 10 mg daily and add spironolactone 25 mg daily.    He is back today for post hospital and core clinic follow-up with his wife and son, Jama.  He also has a daughter Lorraine was very involved in his care.  Since his discharge, he has been doing very well.  He plans to return to [facility] early next week.  He denies any dyspnea on exertion.  No orthopnea or PND.  His weights have been stable in the high 140s/low 150s.  He denies peripheral edema or abdominal distention.    ASSESSMENT/PLAN:  Jama Maciel is a very pleasant 88-year-old male with a history of a tachycardia induced cardiomyopathy with normalization of his EF, chronic atrial fibrillation not on  anticoagulation, hypertension, recurrent pleural effusions, and significant tricuspid regurgitation who is here today for CORE clinic follow-up.  As noted above, he has required several thoracenteses over the last several months.  He has been found to have significant tricuspid regurgitation although at this point is unclear how much this is contributing to his recurrent effusions.  He did recently have a pneumonia and potentially his recent effusion may have been related to this.  Regardless, he is now doing well after his hospitalization and bilateral thoracenteses.  He is currently on torsemide 10 mg daily in addition to spironolactone 25 mg daily.  Fortunately, he appears to be tolerating this.  His blood pressure is stable in clinic today and labs show a stable creatinine.  His potassium is normal.  He has very mild hyponatremia with a sodium of 135 which we will monitor.  At this point, we discussed and will continue to follow him expectantly.  I think in the future if needed we could certainly be a bit more aggressive with his diuretic as he tolerated fairly aggressive diuretic doses while in the hospital.  We will aim for a goal weight in the high 140s or low 150s for him.      His atrial fibrillation appears to be well controlled at this point.  He has remained off of Entresto since his hospitalization in November.  As noted above, his LV function remains normal.  At this point, given his history of hypotension I will defer restarting this for now and ultimately will leave that up to Dr. Pugh if he like him to resume it.  There was discussion of potentially sending him to CV surgery for evaluation of tricuspid valve surgery although on his most recent echocardiogram this appeared to be less severe.    I am pleased and was doing well after his recent hospitalization although we will have to see how the next few weeks ago.  He is planning to leave for rehab and go back to [facility].  They do weigh  him daily there.  I discussed with his wife careful monitoring of his salt as well as calling for any weight gain or worsening dyspnea.    Orders this Visit:  No orders of the defined types were placed in this encounter.    No orders of the defined types were placed in this encounter.    There are no discontinued medications.    CURRENT MEDICATIONS:  Current Outpatient Medications   Medication Sig Dispense Refill     acetaminophen (TYLENOL) 325 MG tablet Take 650 mg by mouth 2 times daily every 6 hours as needed for Pain       digoxin (LANOXIN) 125 MCG tablet Take 1 tablet (125 mcg) by mouth daily (Patient taking differently: Take 125 mcg by mouth daily HOLD if HR <55) 30 tablet 0     melatonin 3 MG tablet Take 3 mg by mouth At Bedtime After 9pm. Please use overnight depends on pt to allow him to sleep without interruption during the night       metoprolol succinate ER (TOPROL-XL) 50 MG 24 hr tablet Take 50 mg by mouth 2 times daily HOLD if SBP <110 OR HR < 55. Call if held x 2 in a row symptomatic       mirtazapine (REMERON) 15 MG tablet Take 15 mg by mouth At Bedtime Patient started medication for the first time yesterday 10/29/2019.        Multiple Vitamins-Minerals (ICAPS AREDS FORMULA PO) Take 1 tablet by mouth daily Takes in addition to multivitamin with minerals       spironolactone (ALDACTONE) 25 MG tablet Take 1 tablet (25 mg) by mouth daily 30 tablet 0     torsemide (DEMADEX) 10 MG tablet Take 1 tablet (10 mg) by mouth daily       Vitamin D, Cholecalciferol, 1000 UNITS TABS Take 3,000 Units by mouth daily        OTHER MEDICAL SUPPLIES CORE PROTOCOL ; DAILY WEIGHTS IN AM.       ALLERGIES  No Known Allergies  PAST MEDICAL HISTORY:  Past Medical History:   Diagnosis Date     Congestive heart failure (H)      Elevated PSA      Eye hemorrhage, left 02/2019     Non-ischemic cardiomyopathy (H)     2017, med treatment, echo done 4/18 nl ef, mod to severe tr     Permanent atrial fibrillation 2011      Thrombocytopenia (H)      Unspecified essential hypertension      PAST SURGICAL HISTORY:  Past Surgical History:   Procedure Laterality Date     ABDOMEN SURGERY      bowel obstruction     BONE MARROW BIOPSY, BONE SPECIMEN, NEEDLE/TROCAR N/A 3/4/2019    Procedure: BIOPSY BONE MARROW;  Surgeon: Josey Vargas MD;  Location:  GI     CARPAL TUNNEL RELEASE RT/LT  2014     CV HEART CATHETERIZATION WITH POSSIBLE INTERVENTION N/A 11/15/2019    Procedure: Left and right heart Catheterization with Possible Intervention;  Surgeon: Adolfo Paez MD;  Location:  HEART CARDIAC CATH LAB     CV LEFT VENTRICULOGRAM N/A 11/15/2019    Procedure: Left Ventriculogram;  Surgeon: Adolfo Paez MD;  Location:  HEART CARDIAC CATH LAB     ESOPHAGOSCOPY, GASTROSCOPY, DUODENOSCOPY (EGD), COMBINED N/A 11/6/2019    Procedure: ESOPHAGOGASTRODUODENOSCOPY (EGD);  Surgeon: Marixa Aguila MD;  Location:  GI     EXPLORE GROIN  4/30/2014    Procedure: EXPLORE GROIN;  Surgeon: Sam Aguirre MD;  Location:  OR     HERNIORRHAPHY INGUINAL  4/30/2014    Procedure: HERNIORRHAPHY INGUINAL;  Surgeon: Sam Aguirre MD;  Location:  OR     MOHS MICROGRAPHIC PROCEDURE       PHACOEMULSIFICATION CLEAR CORNEA WITH STANDARD INTRAOCULAR LENS IMPLANT Right 9/8/2015    Procedure: PHACOEMULSIFICATION CLEAR CORNEA WITH STANDARD INTRAOCULAR LENS IMPLANT;  Surgeon: Gil Shannon MD;  Location:  EC     septic olecranon bursitis[       FAMILY HISTORY:  Family History   Problem Relation Age of Onset     Heart Disease No family hx of      SOCIAL HISTORY:  Social History     Socioeconomic History     Marital status:      Spouse name: None     Number of children: 3     Years of education: None     Highest education level: None   Occupational History     None   Social Needs     Financial resource strain: None     Food insecurity:     Worry: None     Inability: None     Transportation needs:     Medical: None     Non-medical:  "None   Tobacco Use     Smoking status: Never Smoker     Smokeless tobacco: Never Used   Substance and Sexual Activity     Alcohol use: No     Drug use: No     Sexual activity: None   Lifestyle     Physical activity:     Days per week: None     Minutes per session: None     Stress: None   Relationships     Social connections:     Talks on phone: None     Gets together: None     Attends [employer] service: None     Active member of club or organization: None     Attends meetings of clubs or organizations: None     Relationship status: None     Intimate partner violence:     Fear of current or ex partner: None     Emotionally abused: None     Physically abused: None     Forced sexual activity: None   Other Topics Concern     Parent/sibling w/ CABG, MI or angioplasty before 65F 55M? No   Social History Narrative     None     Review of Systems:  Skin:  Negative bruising   Eyes:  Positive for glasses  ENT:  Negative    Respiratory:  Negative    Cardiovascular:  Negative    Gastroenterology: Negative    Genitourinary:  Positive for incontinence  Musculoskeletal:  Negative arthritis  Neurologic:  Negative    Psychiatric:  Negative    Heme/Lymph/Imm:  Negative    Endocrine:  Negative       Physical Exam:  Vitals: /64   Pulse 72   Ht 1.74 m (5' 8.5\")   Wt 68.9 kg (152 lb)   SpO2 96%   BMI 22.78 kg/m      Wt Readings from Last 4 Encounters:   01/21/20 68.9 kg (152 lb)   01/20/20 68.4 kg (150 lb 12.8 oz)   01/16/20 66.8 kg (147 lb 3.2 oz)   01/14/20 64.2 kg (141 lb 8 oz)     GEN: well nourished, in no acute distress.  HEENT:  Pupils equal, round. Sclerae nonicteric.   C/V:  Irregularly irregular   RESP: Respirations are unlabored. Clear to auscultation bilaterally without wheezing, rales, or rhonchi.  GI: Abdomen soft, nontender.  EXTREM: No LE edema.  NEURO: Alert and oriented, cooperative.  SKIN: Warm and dry.     Recent Lab Results:  LIPID RESULTS:  Lab Results   Component Value Date    CHOL 127 04/18/2012    HDL " 56 04/18/2012    LDL 55 04/18/2012    TRIG 81 04/18/2012    CHOLHDLRATIO 2.3 04/18/2012     LIVER ENZYME RESULTS:  Lab Results   Component Value Date    AST 10 01/12/2020    ALT 15 01/12/2020     CBC RESULTS:  Lab Results   Component Value Date    WBC 5.8 01/15/2020    RBC 3.17 (L) 01/15/2020    HGB 9.6 (L) 01/15/2020    HCT 29.8 (L) 01/15/2020    MCV 94 01/15/2020    MCH 30.3 01/15/2020    MCHC 32.2 01/15/2020    RDW 19.6 (H) 01/15/2020    PLT 46 (LL) 01/15/2020     BMP RESULTS:  Lab Results   Component Value Date     (A) 01/20/2020    POTASSIUM 4.0 01/20/2020    CHLORIDE 101 01/20/2020    CO2 26 01/20/2020    ANIONGAP 8 01/20/2020    GLC 90 01/20/2020    BUN 16 01/20/2020    CR 0.94 01/20/2020    GFRESTIMATED >60 01/20/2020    GFRESTBLACK >60 01/20/2020    BARON 8.6 01/20/2020      A1C RESULTS:  No results found for: A1C  INR RESULTS:  Lab Results   Component Value Date    INR 1.46 (H) 01/14/2020    INR 1.52 (H) 01/13/2020       New/Pertinent imaging results since last visit:  None        Thank you for allowing me to participate in the care of your patient.    Sincerely,     Jazlyn Cr PA-C     [facility]

## 2020-01-21 NOTE — PATIENT INSTRUCTIONS
Call CORE nurse for any questions or concerns Mon-Fri 8am-4pm:                                                 #[phone]                                       For concerns after hours:                                               #[phone]      1: Medication changes: no medication changes for now.  2: Plan from today: Blood work next time before you see Dr. Pugh in two weeks.  3: Lab results: see attached; your labs from yesterday looks stable. Your kidneys seem to be tolerating the addition of spironolactone.

## 2020-01-21 NOTE — PROGRESS NOTES
[place] [facility] DISCHARGE SUMMARY    PATIENT'S NAME: Jama Paul  YOB: 1931    PRIMARY CARE PROVIDER AND CLINIC RESPONSIBLE AFTER TRANSFER: Mariusz Shields     CODE STATUS: Full Code    TRANSFERRING PROVIDERS: Cheli Johnson, ERIC HAMMOND, Dr. Jeanette Hernández,      DATE OF SNF ADMISSION:  January / 15 / 2020    DATE OF SNF DISCHARGE (including anticipating DC): January / 24 / 2020    DISCHARGE DISPOSITION: [facility] Provider    Nursing Facility: [facility] stay 1/11/20 to 1/15/20.     Condition on Discharge:  Improving.    Function:  Ambulate: Gait is [id number]  with front wheel walker  Transfers: Indep  Cognitive Scores: BIMS 15/15    Physical Function: Ambulating 250 SBA ft with FWW  Equipment: walker  DME: Walker    DISCHARGE DIAGNOSIS:      Acute on chronic diastolic heart failure due to valvular disease (H)  Tricuspid valve insufficiency, unspecified etiology  Bilateral pleural effusion  Permanent atrial fibrillation  Essential hypertension  CKD (chronic kidney disease) stage 3, GFR 30-59 ml/min (H)  Chronic thrombocytopenic purpura (H)  Physical deconditioning        HOSPITAL COURSE: pt was readmitted to the hospital on 1/11/20 after acute SOB.  He was found to have large pleural effusions Right > Left and both were tapped for total of 2200 cc Fluid.. cardiology saw and also added spironolactone to his regimen but otherwise made no changes.   pro calcitonin was <0.05 so it was thought likely not to be a pneumonia.  Pt's platelets run low but have been stable.   An Eco was done--showed good LV and RV function which was a vast improvement from 2017. It appears his TR is severe and he may benefit from a TVR but is a high but not prohibitive risk. Please see the earlier Hospital notes and clinic notes in EPIC.    TCU/SNF COURSE: pt had a total of 3 thoracenteses  Since originally being admitted to TCU on 11/17/19.  He also  "a infiltrate which was treated in early January which may have precipitated the most recent pleural effusions.   He was started on spironolactone last week, he states feels the best he has felt. He is anxious about going home though and is not sure he is ready but says he will try for this Friday, as concerned about all the \"winter\"illnesses around at this time.      PAST MEDICAL HISTORY:  Past Medical History:   Diagnosis Date     Congestive heart failure (H)      Elevated PSA      Eye hemorrhage, left 02/2019     Non-ischemic cardiomyopathy (H)     2017, med treatment, echo done 4/18 nl ef, mod to severe tr     Permanent atrial fibrillation 2011     Thrombocytopenia (H)      Unspecified essential hypertension        DISCHARGE MEDICATIONS:  Current Outpatient Medications   Medication Sig Dispense Refill     acetaminophen (TYLENOL) 325 MG tablet Take 650 mg by mouth 2 times daily every 6 hours as needed for Pain       digoxin (LANOXIN) 125 MCG tablet Take 1 tablet (125 mcg) by mouth daily (Patient taking differently: Take 125 mcg by mouth daily HOLD if HR <55) 30 tablet 0     melatonin 3 MG tablet Take 3 mg by mouth At Bedtime After 9pm. Please use overnight depends on pt to allow him to sleep without interruption during the night       metoprolol succinate ER (TOPROL-XL) 50 MG 24 hr tablet Take 50 mg by mouth 2 times daily HOLD if SBP <110 OR HR < 55. Call if held x 2 in a row symptomatic       mirtazapine (REMERON) 15 MG tablet Take 15 mg by mouth At Bedtime Patient started medication for the first time yesterday 10/29/2019.        Multiple Vitamins-Minerals (ICAPS AREDS FORMULA PO) Take 1 tablet by mouth daily Takes in addition to multivitamin with minerals       OTHER MEDICAL SUPPLIES CORE PROTOCOL ; DAILY WEIGHTS IN AM.       spironolactone (ALDACTONE) 25 MG tablet Take 1 tablet (25 mg) by mouth daily 30 tablet 0     torsemide (DEMADEX) 10 MG tablet Take 1 tablet (10 mg) by mouth daily       Vitamin D, " Cholecalciferol, 1000 UNITS TABS Take 3,000 Units by mouth daily          MEDICATION CHANGES/RATIONALE:    see discharge  Orders    /77   Pulse 81   Temp 98.6  F (37  C)   Resp 18   Ht 1.829 m (6')   Wt 68.9 kg (152 lb)   SpO2 96%   BMI 20.61 kg/m    TODAY DURING EXAM/ROS:  No CP, SOB, Cough, dizziness, fevers, chills, HA, N/V, dysuria or Bowel Abnormalities. Appetite is good.  No pain.    PHYSICAL EXAM Today:  A & O x 3, NAD. Lungs CTA except a few scattered crackles in bases, non labored. RRR, S1/S2 w/o murmur,gallop or rub.   Trace bilat edema.  Abdomen soft, nontender, +BT'S. No focal neurological deficits. CHIU and up with walker.  .       SNF LABS:  Recent Labs   Lab Test 01/20/20 01/15/20  0643   * 138   POTASSIUM 4.0 3.6   CHLORIDE 101 104   CO2 26 30   ANIONGAP 8 4   GLC 90 93   BUN 16 26   CR 0.94 1.07   BARON 8.6 8.1*     ]CBC RESULTS:   Recent Labs   Lab Test 01/15/20  0643   WBC 5.8   RBC 3.17*   HGB 9.6*   HCT 29.8*   MCV 94   MCH 30.3   MCHC 32.2   RDW 19.6*   PLT 46*             DISCHARGE PLAN:  Occupational Therapy, Physical Therapy and From:  No home care out patient OT/PT Follow-up with PCP in 7 days: 7 days.    Current [place] or other scheduled appointments:  Future Appointments   Date Time Provider Department Center   2/4/2020  9:00 AM [facility]   2/4/2020 10:00 AM [facility]       MTM referral needed and placed by this provider: none    Pending labs: BMP on 1/29 or 1/30--see orders     Discharge Treatments:CORE protocol--see discharge orders       TOTAL DISCHARGE TIME:   Greater than 30 minutes    ERIC Mir CNP

## 2020-01-21 NOTE — PROGRESS NOTES
Cardiology Progress Note    Date of Service: 01/21/2020  Patient seen today in follow up of: post hospital, CORE follow up  Primary cardiologist: Dr. 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     Please seem by previous note from 12/18 for full details regarding his medical history.  He presented in 2017 with atrial fibrillation and was found to have a tachycardia mediated cardiomyopathy.  His EF normalized with management of his atrial fibrillation.  He did well from a heart failure standpoint other than a hospitalization in January 2018 after accidentally stopping his Lasix.    He recently had a complicated hospitalization in early November after presenting with shortness of breath and bilateral pleural effusions.  He underwent thoracentesis x2.  Fluid was consistent with transudate.  He had an echo which suggested significant tricuspid regurgitation.  CV surgery was consulted about potential tricuspid valve repair.  He was felt to be high risk but not prohibitively so.  He ultimately underwent a KATIE which showed severe tricuspid regurgitation as well as 3-4+ mitral regurgitation.  He had a repeat limited echo after aggressive diuresis and a right heart cath confirming he was euvolemic which showed persistent severe tricuspid regurgitation but improvement in his mitral regurgitation to only mild to moderate.  He was discharged to a TCU on 10 mg of torsemide daily.  Of note, he was started on digoxin during this hospitalization as he had some rapid ventricular rates with his atrial fibrillation.  Entresto was stopped  due to hypotension which has not been resumed at this point.    He did require repeat thoracentesis on 11/26.  I last saw him in clinic about a month ago when he was overall doing well.  Unfortunately, after that he developed worsening shortness of breath and a fever.  Chest x-ray was consistent with pneumonia and he was started on antibiotics.  He continued to have shortness of breath and ultimately presented to [facility] on 1/11.  He underwent a right-sided thoracentesis with 2.2 L removed.  He was initially treated with IV Zosyn for possible pneumonia although this was stopped several days later.  Fluid analysis did show elevated protein and LDH.  Prior to discharge, he also underwent a left-sided thoracentesis.  After his thoracentesis, he has significant symptomatic improvement.  He had a repeat echocardiogram done which showed normal LV and RV function.  His tricuspid regurgitation appeared to only be moderately severe.  He was seen by myself and Dr. Guillory in the hospital and we elected to continue torsemide 10 mg daily and add spironolactone 25 mg daily.    He is back today for post hospital and core clinic follow-up with his wife and son, Jama.  He also has a daughter Lorraine was very involved in his care.  Since his discharge, he has been doing very well.  He plans to return to [facility] early next week.  He denies any dyspnea on exertion.  No orthopnea or PND.  His weights have been stable in the high 140s/low 150s.  He denies peripheral edema or abdominal distention.    ASSESSMENT/PLAN:  Jama Maciel is a very pleasant 88-year-old male with a history of a tachycardia induced cardiomyopathy with normalization of his EF, chronic atrial fibrillation not on anticoagulation, hypertension, recurrent pleural effusions, and significant tricuspid regurgitation who is here today for CORE clinic follow-up.  As noted above, he has required several thoracenteses over the last several months.   He has been found to have significant tricuspid regurgitation although at this point is unclear how much this is contributing to his recurrent effusions.  He did recently have a pneumonia and potentially his recent effusion may have been related to this.  Regardless, he is now doing well after his hospitalization and bilateral thoracenteses.  He is currently on torsemide 10 mg daily in addition to spironolactone 25 mg daily.  Fortunately, he appears to be tolerating this.  His blood pressure is stable in clinic today and labs show a stable creatinine.  His potassium is normal.  He has very mild hyponatremia with a sodium of 135 which we will monitor.  At this point, we discussed and will continue to follow him expectantly.  I think in the future if needed we could certainly be a bit more aggressive with his diuretic as he tolerated fairly aggressive diuretic doses while in the hospital.  We will aim for a goal weight in the high 140s or low 150s for him.      His atrial fibrillation appears to be well controlled at this point.  He has remained off of Entresto since his hospitalization in November.  As noted above, his LV function remains normal.  At this point, given his history of hypotension I will defer restarting this for now and ultimately will leave that up to Dr. Pugh if he like him to resume it.  There was discussion of potentially sending him to CV surgery for evaluation of tricuspid valve surgery although on his most recent echocardiogram this appeared to be less severe.    I am pleased and was doing well after his recent hospitalization although we will have to see how the next few weeks ago.  He is planning to leave for rehab and go back to [facility].  They do weigh him daily there.  I discussed with his wife careful monitoring of his salt as well as calling for any weight gain or worsening dyspnea.    Orders this Visit:  No orders of the defined types were placed in this encounter.    No orders  of the defined types were placed in this encounter.    There are no discontinued medications.    CURRENT MEDICATIONS:  Current Outpatient Medications   Medication Sig Dispense Refill     acetaminophen (TYLENOL) 325 MG tablet Take 650 mg by mouth 2 times daily every 6 hours as needed for Pain       digoxin (LANOXIN) 125 MCG tablet Take 1 tablet (125 mcg) by mouth daily (Patient taking differently: Take 125 mcg by mouth daily HOLD if HR <55) 30 tablet 0     melatonin 3 MG tablet Take 3 mg by mouth At Bedtime After 9pm. Please use overnight depends on pt to allow him to sleep without interruption during the night       metoprolol succinate ER (TOPROL-XL) 50 MG 24 hr tablet Take 50 mg by mouth 2 times daily HOLD if SBP <110 OR HR < 55. Call if held x 2 in a row symptomatic       mirtazapine (REMERON) 15 MG tablet Take 15 mg by mouth At Bedtime Patient started medication for the first time yesterday 10/29/2019.        Multiple Vitamins-Minerals (ICAPS AREDS FORMULA PO) Take 1 tablet by mouth daily Takes in addition to multivitamin with minerals       spironolactone (ALDACTONE) 25 MG tablet Take 1 tablet (25 mg) by mouth daily 30 tablet 0     torsemide (DEMADEX) 10 MG tablet Take 1 tablet (10 mg) by mouth daily       Vitamin D, Cholecalciferol, 1000 UNITS TABS Take 3,000 Units by mouth daily        OTHER MEDICAL SUPPLIES CORE PROTOCOL ; DAILY WEIGHTS IN AM.       ALLERGIES  No Known Allergies  PAST MEDICAL HISTORY:  Past Medical History:   Diagnosis Date     Congestive heart failure (H)      Elevated PSA      Eye hemorrhage, left 02/2019     Non-ischemic cardiomyopathy (H)     2017, med treatment, echo done 4/18 nl ef, mod to severe tr     Permanent atrial fibrillation 2011     Thrombocytopenia (H)      Unspecified essential hypertension      PAST SURGICAL HISTORY:  Past Surgical History:   Procedure Laterality Date     ABDOMEN SURGERY      bowel obstruction     BONE MARROW BIOPSY, BONE SPECIMEN, NEEDLE/TROCAR N/A  3/4/2019    Procedure: BIOPSY BONE MARROW;  Surgeon: Josey Vargas MD;  Location:  GI     CARPAL TUNNEL RELEASE RT/LT  2014     CV HEART CATHETERIZATION WITH POSSIBLE INTERVENTION N/A 11/15/2019    Procedure: Left and right heart Catheterization with Possible Intervention;  Surgeon: Adolfo Paez MD;  Location:  HEART CARDIAC CATH LAB     CV LEFT VENTRICULOGRAM N/A 11/15/2019    Procedure: Left Ventriculogram;  Surgeon: Adolfo Paez MD;  Location:  HEART CARDIAC CATH LAB     ESOPHAGOSCOPY, GASTROSCOPY, DUODENOSCOPY (EGD), COMBINED N/A 11/6/2019    Procedure: ESOPHAGOGASTRODUODENOSCOPY (EGD);  Surgeon: Marixa Aguila MD;  Location:  GI     EXPLORE GROIN  4/30/2014    Procedure: EXPLORE GROIN;  Surgeon: Sam Aguirre MD;  Location:  OR     HERNIORRHAPHY INGUINAL  4/30/2014    Procedure: HERNIORRHAPHY INGUINAL;  Surgeon: Sam Aguirre MD;  Location:  OR     MOHS MICROGRAPHIC PROCEDURE       PHACOEMULSIFICATION CLEAR CORNEA WITH STANDARD INTRAOCULAR LENS IMPLANT Right 9/8/2015    Procedure: PHACOEMULSIFICATION CLEAR CORNEA WITH STANDARD INTRAOCULAR LENS IMPLANT;  Surgeon: Gil Shannon MD;  Location:  EC     septic olecranon bursitis[       FAMILY HISTORY:  Family History   Problem Relation Age of Onset     Heart Disease No family hx of      SOCIAL HISTORY:  Social History     Socioeconomic History     Marital status:      Spouse name: None     Number of children: 3     Years of education: None     Highest education level: None   Occupational History     None   Social Needs     Financial resource strain: None     Food insecurity:     Worry: None     Inability: None     Transportation needs:     Medical: None     Non-medical: None   Tobacco Use     Smoking status: Never Smoker     Smokeless tobacco: Never Used   Substance and Sexual Activity     Alcohol use: No     Drug use: No     Sexual activity: None   Lifestyle     Physical activity:     Days per week: None  "    Minutes per session: None     Stress: None   Relationships     Social connections:     Talks on phone: None     Gets together: None     Attends Mosque service: None     Active member of club or organization: None     Attends meetings of clubs or organizations: None     Relationship status: None     Intimate partner violence:     Fear of current or ex partner: None     Emotionally abused: None     Physically abused: None     Forced sexual activity: None   Other Topics Concern     Parent/sibling w/ CABG, MI or angioplasty before 65F 55M? No   Social History Narrative     None     Review of Systems:  Skin:  Negative bruising   Eyes:  Positive for glasses  ENT:  Negative    Respiratory:  Negative    Cardiovascular:  Negative    Gastroenterology: Negative    Genitourinary:  Positive for incontinence  Musculoskeletal:  Negative arthritis  Neurologic:  Negative    Psychiatric:  Negative    Heme/Lymph/Imm:  Negative    Endocrine:  Negative       Physical Exam:  Vitals: /64   Pulse 72   Ht 1.74 m (5' 8.5\")   Wt 68.9 kg (152 lb)   SpO2 96%   BMI 22.78 kg/m     Wt Readings from Last 4 Encounters:   01/21/20 68.9 kg (152 lb)   01/20/20 68.4 kg (150 lb 12.8 oz)   01/16/20 66.8 kg (147 lb 3.2 oz)   01/14/20 64.2 kg (141 lb 8 oz)     GEN: well nourished, in no acute distress.  HEENT:  Pupils equal, round. Sclerae nonicteric.   C/V:  Irregularly irregular   RESP: Respirations are unlabored. Clear to auscultation bilaterally without wheezing, rales, or rhonchi.  GI: Abdomen soft, nontender.  EXTREM: No LE edema.  NEURO: Alert and oriented, cooperative.  SKIN: Warm and dry.     Recent Lab Results:  LIPID RESULTS:  Lab Results   Component Value Date    CHOL 127 04/18/2012    HDL 56 04/18/2012    LDL 55 04/18/2012    TRIG 81 04/18/2012    CHOLHDLRATIO 2.3 04/18/2012     LIVER ENZYME RESULTS:  Lab Results   Component Value Date    AST 10 01/12/2020    ALT 15 01/12/2020     CBC RESULTS:  Lab Results   Component Value " Date    WBC 5.8 01/15/2020    RBC 3.17 (L) 01/15/2020    HGB 9.6 (L) 01/15/2020    HCT 29.8 (L) 01/15/2020    MCV 94 01/15/2020    MCH 30.3 01/15/2020    MCHC 32.2 01/15/2020    RDW 19.6 (H) 01/15/2020    PLT 46 (LL) 01/15/2020     BMP RESULTS:  Lab Results   Component Value Date     (A) 01/20/2020    POTASSIUM 4.0 01/20/2020    CHLORIDE 101 01/20/2020    CO2 26 01/20/2020    ANIONGAP 8 01/20/2020    GLC 90 01/20/2020    BUN 16 01/20/2020    CR 0.94 01/20/2020    GFRESTIMATED >60 01/20/2020    GFRESTBLACK >60 01/20/2020    BARON 8.6 01/20/2020      A1C RESULTS:  No results found for: A1C  INR RESULTS:  Lab Results   Component Value Date    INR 1.46 (H) 01/14/2020    INR 1.52 (H) 01/13/2020       New/Pertinent imaging results since last visit:  None    Jazlyn Cr PA-C  [facility]

## 2020-01-22 ENCOUNTER — DISCHARGE SUMMARY NURSING HOME (OUTPATIENT)
Dept: GERIATRICS | Facility: CLINIC | Age: 85
End: 2020-01-22
Payer: MEDICARE

## 2020-01-22 VITALS
WEIGHT: 152 LBS | HEIGHT: 72 IN | BODY MASS INDEX: 20.59 KG/M2 | HEART RATE: 81 BPM | DIASTOLIC BLOOD PRESSURE: 77 MMHG | OXYGEN SATURATION: 96 % | SYSTOLIC BLOOD PRESSURE: 133 MMHG | RESPIRATION RATE: 18 BRPM | TEMPERATURE: 98.6 F

## 2020-01-22 DIAGNOSIS — N18.30 CKD (CHRONIC KIDNEY DISEASE) STAGE 3, GFR 30-59 ML/MIN (H): ICD-10-CM

## 2020-01-22 DIAGNOSIS — I07.1 TRICUSPID VALVE INSUFFICIENCY, UNSPECIFIED ETIOLOGY: ICD-10-CM

## 2020-01-22 DIAGNOSIS — I50.33 ACUTE ON CHRONIC DIASTOLIC HEART FAILURE DUE TO VALVULAR DISEASE (H): Primary | ICD-10-CM

## 2020-01-22 DIAGNOSIS — D69.49 CHRONIC THROMBOCYTOPENIC PURPURA (H): ICD-10-CM

## 2020-01-22 DIAGNOSIS — I48.21 PERMANENT ATRIAL FIBRILLATION (H): ICD-10-CM

## 2020-01-22 DIAGNOSIS — J90 BILATERAL PLEURAL EFFUSION: ICD-10-CM

## 2020-01-22 DIAGNOSIS — R53.81 PHYSICAL DECONDITIONING: ICD-10-CM

## 2020-01-22 DIAGNOSIS — I38 ACUTE ON CHRONIC DIASTOLIC HEART FAILURE DUE TO VALVULAR DISEASE (H): Primary | ICD-10-CM

## 2020-01-22 DIAGNOSIS — I10 ESSENTIAL HYPERTENSION: ICD-10-CM

## 2020-01-22 PROCEDURE — 99316 NF DSCHRG MGMT 30 MIN+: CPT | Performed by: NURSE PRACTITIONER

## 2020-01-22 ASSESSMENT — MIFFLIN-ST. JEOR: SCORE: 1397.47

## 2020-01-22 NOTE — PATIENT INSTRUCTIONS
[facility] Discharge Orders    Name: Jama Paul  : 1931  Planned Discharge Date: tentatively 2000  Discharged to: previous IL here at [facility]    MEDICAL FOLLOW UP  Follow up with PCP in 1-2 weeks --pt to make appt  Follow up with [facility] CORE appts on       FUTURE LABS: BMP due  or  with results to PCP or NP if still in TCU    ORDER CHANGES:  New med is spironolactone since hospital stay    DISCHARGE MEDICATIONS:  The patient s pharmacy is authorized to dispense a 30-day supply of medications. Refill requests should be directed to the primary provider, Mariusz Shields .   No narcotics are prescribed at time of discharge.   Current Outpatient Medications   Medication Sig Dispense Refill     acetaminophen (TYLENOL) 325 MG tablet Take 650 mg by mouth 2 times daily every 6 hours as needed for Pain       digoxin (LANOXIN) 125 MCG tablet Take 1 tablet (125 mcg) by mouth daily (Patient taking differently: Take 125 mcg by mouth daily HOLD if HR <55) 30 tablet 0     melatonin 3 MG tablet Take 3 mg by mouth At Bedtime After 9pm. Please use overnight depends on pt to allow him to sleep without interruption during the night       metoprolol succinate ER (TOPROL-XL) 50 MG 24 hr tablet Take 50 mg by mouth 2 times daily HOLD if SBP <110 OR HR < 55. Call if held x 2 in a row symptomatic       mirtazapine (REMERON) 15 MG tablet Take 15 mg by mouth At Bedtime Patient started medication for the first time yesterday 10/29/2019.        Multiple Vitamins-Minerals (ICAPS AREDS FORMULA PO) Take 1 tablet by mouth daily Takes in addition to multivitamin with minerals       OTHER MEDICAL SUPPLIES CORE PROTOCOL ; DAILY WEIGHTS IN AM.       spironolactone (ALDACTONE) 25 MG tablet Take 1 tablet (25 mg) by mouth daily 30 tablet 0     torsemide (DEMADEX) 10 MG tablet Take 1 tablet (10 mg) by mouth daily       Vitamin D, Cholecalciferol, 1000 UNITS TABS Take 3,000 Units by mouth daily           SERVICES:  Out patient:  physical therapy, occupational therapy, here at [facility]    ADDITIONAL INSTRUCTIONS:  Weigh yourself daily in the morning and keep a record. Call your primary clinic: a) if you are more short of breath, or b) if your weight changes more than 3 pounds in one day or more than 5 pounds in one week.     ERIC Mir CNP  This document was electronically signed on January 22, 2020

## 2020-01-22 NOTE — Clinical Note
HI THis is the best pt has looked but he is anxious about discharge.  I hope he goes on 1/24 but we will see.Thanks Carolann crawford

## 2020-01-28 ASSESSMENT — MIFFLIN-ST. JEOR: SCORE: 1376.6

## 2020-01-28 NOTE — PROGRESS NOTES
"[facility]  [place] Medical Record Number:  [id number]  Place of Service where encounter took place:  [facility] [[id number]]  Chief Complaint   Patient presents with     Nursing Home Acute       HPI:    Jama Paul  is a 88 year old (2/13/1931), who is being seen today for an episodic care visit.  HPI information obtained from: facility chart records, facility staff, patient report and [facility] chart review. Today's concern is:pt feels much better, is nervous about going home \"I am incontinent\" but acknowledges this is not new and \"we will figure it out\". Otherwise feels good       Physical deconditioning  Chronic diastolic heart failure (H)  Tricuspid valve insufficiency, unspecified etiology  Bilateral pleural effusion  Permanent atrial fibrillation  Essential hypertension  CKD (chronic kidney disease) stage 3, GFR 30-59 ml/min (H)  Anemia in other chronic diseases classified elsewhere  Chronic thrombocytopenic purpura (H)      TODAY DURING EXAM HIP and ROS:  No CP,  SOB, Cough, dizziness, fevers, chills, HA, N/V, dysuria or Bowel Abnormalities Appetite is good.  No pain.      Past Medical and Surgical History reviewed in Epic today.    MEDICATIONS:  Current Outpatient Medications   Medication Sig Dispense Refill     acetaminophen (TYLENOL) 325 MG tablet Take 650 mg by mouth 2 times daily every 6 hours as needed for Pain       digoxin (LANOXIN) 125 MCG tablet Take 1 tablet (125 mcg) by mouth daily (Patient taking differently: Take 125 mcg by mouth daily HOLD if HR <55) 30 tablet 0     melatonin 3 MG tablet Take 3 mg by mouth At Bedtime After 9pm. Please use overnight depends on pt to allow him to sleep without interruption during the night       metoprolol succinate ER (TOPROL-XL) 50 MG 24 hr tablet Take 50 mg by mouth 2 times daily HOLD if SBP <110 OR HR < 55. Call if held x 2 in a row symptomatic       mirtazapine (REMERON) 15 MG tablet Take 15 mg by mouth " At Bedtime Patient started medication for the first time yesterday 10/29/2019.        Multiple Vitamins-Minerals (ICAPS AREDS FORMULA PO) Take 1 tablet by mouth daily Takes in addition to multivitamin with minerals       OTHER MEDICAL SUPPLIES Incentive spirometer every hour while awake  every shift While awake       OTHER MEDICAL SUPPLIES TEDS stockings on in AM and off at HS  every day and evening shift       OTHER MEDICAL SUPPLIES CORE PROTOCOL ; DAILY WEIGHTS IN AM.       spironolactone (ALDACTONE) 25 MG tablet Take 1 tablet (25 mg) by mouth daily 30 tablet 0     torsemide (DEMADEX) 10 MG tablet Take 1 tablet (10 mg) by mouth daily       Vitamin D, Cholecalciferol, 1000 UNITS TABS Take 3,000 Units by mouth daily           REVIEW OF SYSTEMS:  See above under HPI    Objective:  /74   Pulse 87   Temp 97.5  F (36.4  C)   Resp 18   Ht 1.829 m (6')   Wt 66.9 kg (147 lb 6.4 oz)   SpO2 99%   BMI 19.99 kg/m    Exam:  GENERAL APPEARANCE:  Alert, oriented, cooperative, NAD.  ENT:  Mouth with moist mucous membranes, [place].  RESP:  respiratory effort  of chest normal, lungs clear to auscultation with occas crackle scattered, mildly in bases Rt > Lt,  no respiratory distress.   CV:  Auscultation of heart done ,IRRR. Soft systolic murmur.  No Rub or Gallop,scant ankle edema,   +1 pedal pulses.  ABDOMEN:  normal bowel sounds, soft, nontender.   M/S:   CHIU equally, up with assist-seen in WC  SKIN:  Inspection of skin is at baseline but limited as pt fully dressed  NEURO:   Cranial nerves 2-12 are grossly intact and at patient's baseline  PSYCH:  oriented X 3, engaged in conversation.    Labs:    Recent Labs   Lab Test 01/29/20 01/20/20    135*   POTASSIUM 4.4 4.0   CHLORIDE 104 101   CO2 26 26   ANIONGAP 6* 8   GLC 81 90   BUN 21 16   CR 1.04 0.94   BARON 8.2* 8.6             CBC RESULTS:   Recent Labs   Lab Test 01/15/20  0643   WBC 5.8   RBC 3.17*   HGB 9.6*   HCT 29.8*   MCV 94   MCH 30.3   MCHC 32.2   RDW  19.6*   PLT 46*         ASSESSMENT / PLAN:  (R53.81) Physical deconditioning  (primary encounter diagnosis)  Comment/Plan: OT done tomorrow, CC with family on Monday- I encouraged pt to get more help with ADLs if rec'd by therapies and SW at conference. They want to go home(wife and he) but there are concerns including the incontinence.    (I50.32) Chronic diastolic heart failure (H)   (I07.1) Tricuspid valve insufficiency, unspecified etiology   (J90) Bilateral pleural effusion  Comment/Plan:  Weight 147.4# today, was 150# on 1/22 and 150.8# on 1/20. Compensated but risk of pleural effusions, etc.  Cont current POC, sees Dr Pugh in CORE on 2.4.20    (I48.21) Permanent atrial fibrillation   (I10) Essential hypertension  Comment/Plan: SBP running 110-120's except in pms sometimes . Rate is controlled mostly in 70-90's, cont same POC and monitor.    (N18.3) CKD (chronic kidney disease) stage 3, GFR 30-59 ml/min (H)  Comment/Plan: reasonable, will check at Cards appt next week    (D63.8) Anemia in other chronic diseases classified elsewhere   (D69.49) Chronic thrombocytopenic purpura (H)  Comment/Plan: I emailed the Cards clinic (Azeb Fortune) asking to add CBS with plts to labs on 2/4        Electronically signed by:  ERIC Mir CNP

## 2020-01-29 ENCOUNTER — TRANSFERRED RECORDS (OUTPATIENT)
Dept: HEALTH INFORMATION MANAGEMENT | Facility: CLINIC | Age: 85
End: 2020-01-29

## 2020-01-29 ENCOUNTER — NURSING HOME VISIT (OUTPATIENT)
Dept: GERIATRICS | Facility: CLINIC | Age: 85
End: 2020-01-29
Payer: MEDICARE

## 2020-01-29 VITALS
OXYGEN SATURATION: 99 % | SYSTOLIC BLOOD PRESSURE: 118 MMHG | HEIGHT: 72 IN | RESPIRATION RATE: 18 BRPM | DIASTOLIC BLOOD PRESSURE: 74 MMHG | TEMPERATURE: 97.5 F | WEIGHT: 147.4 LBS | BODY MASS INDEX: 19.96 KG/M2 | HEART RATE: 87 BPM

## 2020-01-29 DIAGNOSIS — D63.8 ANEMIA IN OTHER CHRONIC DISEASES CLASSIFIED ELSEWHERE: ICD-10-CM

## 2020-01-29 DIAGNOSIS — I48.21 PERMANENT ATRIAL FIBRILLATION (H): ICD-10-CM

## 2020-01-29 DIAGNOSIS — R53.81 PHYSICAL DECONDITIONING: Primary | ICD-10-CM

## 2020-01-29 DIAGNOSIS — I07.1 TRICUSPID VALVE INSUFFICIENCY, UNSPECIFIED ETIOLOGY: ICD-10-CM

## 2020-01-29 DIAGNOSIS — I50.32 CHRONIC DIASTOLIC HEART FAILURE (H): ICD-10-CM

## 2020-01-29 DIAGNOSIS — D69.49 CHRONIC THROMBOCYTOPENIC PURPURA (H): ICD-10-CM

## 2020-01-29 DIAGNOSIS — J90 BILATERAL PLEURAL EFFUSION: ICD-10-CM

## 2020-01-29 DIAGNOSIS — N18.30 CKD (CHRONIC KIDNEY DISEASE) STAGE 3, GFR 30-59 ML/MIN (H): ICD-10-CM

## 2020-01-29 DIAGNOSIS — I10 ESSENTIAL HYPERTENSION: ICD-10-CM

## 2020-01-29 PROBLEM — I50.33 ACUTE ON CHRONIC DIASTOLIC HEART FAILURE DUE TO VALVULAR DISEASE (H): Status: ACTIVE | Noted: 2020-01-29

## 2020-01-29 PROBLEM — I38 ACUTE ON CHRONIC DIASTOLIC HEART FAILURE DUE TO VALVULAR DISEASE (H): Status: ACTIVE | Noted: 2020-01-29

## 2020-01-29 LAB
ANION GAP SERPL CALCULATED.3IONS-SCNC: 6 MMOL/L (ref 7–16)
BUN SERPL-MCNC: 21 MG/DL (ref 7–26)
CALCIUM SERPL-MCNC: 8.2 MG/DL (ref 8.4–10.4)
CHLORIDE SERPLBLD-SCNC: 104 MMOL/L (ref 98–109)
CO2 SERPL-SCNC: 26 MMOL/L (ref 20–29)
CREAT SERPL-MCNC: 1.04 MG/DL (ref 0.73–1.18)
GFR SERPL CREATININE-BSD FRML MDRD: >60 ML/MIN/1.73M2
GLUCOSE SERPL-MCNC: 81 MG/DL (ref 70–100)
POTASSIUM SERPL-SCNC: 4.4 MMOL/L (ref 3.5–5.1)
SODIUM SERPL-SCNC: 136 MMOL/L (ref 136–145)

## 2020-01-29 PROCEDURE — 99309 SBSQ NF CARE MODERATE MDM 30: CPT | Performed by: NURSE PRACTITIONER

## 2020-01-29 ASSESSMENT — MIFFLIN-ST. JEOR: SCORE: 1376.6

## 2020-01-30 ENCOUNTER — TELEPHONE (OUTPATIENT)
Dept: CARDIOLOGY | Facility: CLINIC | Age: 85
End: 2020-01-30

## 2020-01-30 DIAGNOSIS — D64.9 ANEMIA: Primary | ICD-10-CM

## 2020-01-30 NOTE — TELEPHONE ENCOUNTER
----- Message from Azeb Fortune sent at 1/30/2020  8:04 AM CST -----  Regarding: FW: labs  Hello,      Can you add a lab order so I can add it onto the appt ha/ Lexy on 2/4/20, thanks!        Azeb  ----- Message -----  From: Cheli Johnson APRN CNP  Sent: 1/29/2020   7:02 PM CST  To: Azeb Fortune  Subject: labs                                             HI I see you will have labs drawn on 2/4 prior to appt with DR Pugh.   I am the NP in TCU for Dr Sandhu,   would you add a CBC with platelets so we don't have to draw here?   Trying to avoid sticks.   He has anemia and low plts    Thanks Cheli [facility]

## 2020-01-30 NOTE — TELEPHONE ENCOUNTER
CBC order placed per Cheli Johnson NP to be drawn 2/4 at visit with Dr. Nohelia Scales, RN 8:08 AM 01/30/20

## 2020-02-03 ASSESSMENT — MIFFLIN-ST. JEOR: SCORE: 1361.18

## 2020-02-04 ENCOUNTER — DISCHARGE SUMMARY NURSING HOME (OUTPATIENT)
Dept: GERIATRICS | Facility: CLINIC | Age: 85
End: 2020-02-04
Payer: MEDICARE

## 2020-02-04 ENCOUNTER — OFFICE VISIT (OUTPATIENT)
Dept: CARDIOLOGY | Facility: CLINIC | Age: 85
End: 2020-02-04
Attending: PHYSICIAN ASSISTANT
Payer: MEDICARE

## 2020-02-04 VITALS
BODY MASS INDEX: 19.5 KG/M2 | DIASTOLIC BLOOD PRESSURE: 72 MMHG | OXYGEN SATURATION: 97 % | RESPIRATION RATE: 18 BRPM | HEIGHT: 72 IN | WEIGHT: 144 LBS | HEART RATE: 68 BPM | SYSTOLIC BLOOD PRESSURE: 115 MMHG

## 2020-02-04 VITALS
HEIGHT: 69 IN | DIASTOLIC BLOOD PRESSURE: 69 MMHG | BODY MASS INDEX: 22.07 KG/M2 | HEART RATE: 92 BPM | SYSTOLIC BLOOD PRESSURE: 130 MMHG | WEIGHT: 149 LBS

## 2020-02-04 DIAGNOSIS — J90 BILATERAL PLEURAL EFFUSION: ICD-10-CM

## 2020-02-04 DIAGNOSIS — D69.3 IDIOPATHIC THROMBOCYTOPENIC PURPURA (H): ICD-10-CM

## 2020-02-04 DIAGNOSIS — I34.0 MITRAL VALVE INSUFFICIENCY, UNSPECIFIED ETIOLOGY: ICD-10-CM

## 2020-02-04 DIAGNOSIS — D64.9 ANEMIA: ICD-10-CM

## 2020-02-04 DIAGNOSIS — I50.33 ACUTE ON CHRONIC DIASTOLIC HEART FAILURE DUE TO VALVULAR DISEASE (H): Primary | ICD-10-CM

## 2020-02-04 DIAGNOSIS — I48.21 PERMANENT ATRIAL FIBRILLATION (H): ICD-10-CM

## 2020-02-04 DIAGNOSIS — I07.1 TRICUSPID VALVE INSUFFICIENCY, UNSPECIFIED ETIOLOGY: ICD-10-CM

## 2020-02-04 DIAGNOSIS — R53.81 PHYSICAL DECONDITIONING: ICD-10-CM

## 2020-02-04 DIAGNOSIS — I42.8 NON-ISCHEMIC CARDIOMYOPATHY (H): ICD-10-CM

## 2020-02-04 DIAGNOSIS — I50.810 RIGHT-SIDED CONGESTIVE HEART FAILURE, UNSPECIFIED HF CHRONICITY (H): Primary | ICD-10-CM

## 2020-02-04 DIAGNOSIS — I10 ESSENTIAL HYPERTENSION: ICD-10-CM

## 2020-02-04 DIAGNOSIS — Z98.890 STATUS POST THORACENTESIS: ICD-10-CM

## 2020-02-04 DIAGNOSIS — I50.33 ACUTE ON CHRONIC DIASTOLIC CONGESTIVE HEART FAILURE (H): ICD-10-CM

## 2020-02-04 DIAGNOSIS — I07.1 TRICUSPID REGURGITATION: ICD-10-CM

## 2020-02-04 DIAGNOSIS — I38 ACUTE ON CHRONIC DIASTOLIC HEART FAILURE DUE TO VALVULAR DISEASE (H): Primary | ICD-10-CM

## 2020-02-04 DIAGNOSIS — I48.91 ATRIAL FIBRILLATION (H): ICD-10-CM

## 2020-02-04 DIAGNOSIS — I34.0 MITRAL REGURGITATION: ICD-10-CM

## 2020-02-04 LAB
ANION GAP SERPL CALCULATED.3IONS-SCNC: 9.3 MMOL/L (ref 6–17)
BASOPHILS # BLD AUTO: 0 10E9/L (ref 0–0.2)
BASOPHILS NFR BLD AUTO: 0.3 %
BUN SERPL-MCNC: 24 MG/DL (ref 7–30)
CALCIUM SERPL-MCNC: 8.8 MG/DL (ref 8.5–10.5)
CHLORIDE SERPL-SCNC: 99 MMOL/L (ref 98–107)
CO2 SERPL-SCNC: 31 MMOL/L (ref 23–29)
CREAT SERPL-MCNC: 1.13 MG/DL (ref 0.7–1.3)
DIFFERENTIAL METHOD BLD: ABNORMAL
EOSINOPHIL # BLD AUTO: 0 10E9/L (ref 0–0.7)
EOSINOPHIL NFR BLD AUTO: 0.4 %
ERYTHROCYTE [DISTWIDTH] IN BLOOD BY AUTOMATED COUNT: 19.6 % (ref 10–15)
GFR SERPL CREATININE-BSD FRML MDRD: 61 ML/MIN/{1.73_M2}
GLUCOSE SERPL-MCNC: 102 MG/DL (ref 70–105)
HCT VFR BLD AUTO: 33.5 % (ref 40–53)
HGB BLD-MCNC: 10.6 G/DL (ref 13.3–17.7)
IMM GRANULOCYTES # BLD: 0 10E9/L (ref 0–0.4)
IMM GRANULOCYTES NFR BLD: 0 %
LYMPHOCYTES # BLD AUTO: 0.6 10E9/L (ref 0.8–5.3)
LYMPHOCYTES NFR BLD AUTO: 8.2 %
MCH RBC QN AUTO: 29.7 PG (ref 26.5–33)
MCHC RBC AUTO-ENTMCNC: 31.6 G/DL (ref 31.5–36.5)
MCV RBC AUTO: 94 FL (ref 78–100)
MONOCYTES # BLD AUTO: 0.9 10E9/L (ref 0–1.3)
MONOCYTES NFR BLD AUTO: 13.5 %
NEUTROPHILS # BLD AUTO: 5.4 10E9/L (ref 1.6–8.3)
NEUTROPHILS NFR BLD AUTO: 77.6 %
NRBC # BLD AUTO: 0 10*3/UL
NRBC BLD AUTO-RTO: 0 /100
PLATELET # BLD AUTO: 55 10E9/L (ref 150–450)
POTASSIUM SERPL-SCNC: 4.3 MMOL/L (ref 3.5–5.1)
RBC # BLD AUTO: 3.57 10E12/L (ref 4.4–5.9)
SODIUM SERPL-SCNC: 135 MMOL/L (ref 136–145)
WBC # BLD AUTO: 7 10E9/L (ref 4–11)

## 2020-02-04 PROCEDURE — 80048 BASIC METABOLIC PNL TOTAL CA: CPT | Performed by: INTERNAL MEDICINE

## 2020-02-04 PROCEDURE — 99316 NF DSCHRG MGMT 30 MIN+: CPT | Performed by: NURSE PRACTITIONER

## 2020-02-04 PROCEDURE — 85025 COMPLETE CBC W/AUTO DIFF WBC: CPT | Performed by: PHYSICIAN ASSISTANT

## 2020-02-04 PROCEDURE — 99214 OFFICE O/P EST MOD 30 MIN: CPT | Performed by: INTERNAL MEDICINE

## 2020-02-04 PROCEDURE — 36415 COLL VENOUS BLD VENIPUNCTURE: CPT | Performed by: INTERNAL MEDICINE

## 2020-02-04 ASSESSMENT — MIFFLIN-ST. JEOR
SCORE: 1356.18
SCORE: 1328.3

## 2020-02-04 NOTE — PATIENT INSTRUCTIONS
[facility] Discharge Orders    Name: Jama Paul  : 1931  Planned Discharge Date: 2020  Discharged to: previous independent home    MEDICAL FOLLOW UP  Follow up with PCP in 1-2 weeks --pt to call PCP  Follow up with Specialists f/u core per their recs and cards      FUTURE LABS: No labs orders/due    ORDER CHANGES:  No changes    DISCHARGE MEDICATIONS:  The patient s pharmacy is authorized to dispense a 30-day supply of medications. Refill requests should be directed to the primary provider, Mariusz Shields .   No narcotics are prescribed at time of discharge.   Current Outpatient Medications   Medication Sig Dispense Refill     acetaminophen (TYLENOL) 325 MG tablet Take 650 mg by mouth 2 times daily every 6 hours as needed for Pain       digoxin (LANOXIN) 125 MCG tablet Take 1 tablet (125 mcg) by mouth daily (Patient taking differently: Take 125 mcg by mouth daily HOLD if HR <55) 30 tablet 0     melatonin 3 MG tablet Take 3 mg by mouth At Bedtime After 9pm. Please use overnight depends on pt to allow him to sleep without interruption during the night       metoprolol succinate ER (TOPROL-XL) 50 MG 24 hr tablet Take 50 mg by mouth 2 times daily HOLD if SBP <110 OR HR < 55. Call if held x 2 in a row symptomatic       mirtazapine (REMERON) 15 MG tablet Take 15 mg by mouth At Bedtime Patient started medication for the first time yesterday 10/29/2019.        Multiple Vitamins-Minerals (ICAPS AREDS FORMULA PO) Take 1 tablet by mouth daily Takes in addition to multivitamin with minerals       OTHER MEDICAL SUPPLIES CORE PROTOCOL ; DAILY WEIGHTS IN AM.       spironolactone (ALDACTONE) 25 MG tablet Take 1 tablet (25 mg) by mouth daily 30 tablet 0     torsemide (DEMADEX) 10 MG tablet Take 1 tablet (10 mg) by mouth daily       Vitamin D, Cholecalciferol, 1000 UNITS TABS Take 3,000 Units by mouth daily          SERVICES:  Out patient:  physical therapy and occupational therapy and A  with bathing  ADDITIONAL INSTRUCTIONS:  CORE instructions as before    ERIC Mir CNP  This document was electronically signed on February 4, 2020

## 2020-02-04 NOTE — LETTER
2/4/2020    Mariusz Shields MD  [address]  [facility] [address]    RE: Jama Smith Humberto       Dear Colleague,    I had the pleasure of seeing Jama Smith Humberto in the [facility].    HPI and Plan:   See dictation(#[id number])    Orders Placed This Encounter   Procedures     Follow-Up with [facility] - [facility] visit       No orders of the defined types were placed in this encounter.      There are no discontinued medications.      Encounter Diagnoses   Name Primary?     Atrial fibrillation (H)      Mitral regurgitation      Tricuspid regurgitation      Right-sided congestive heart failure, unspecified HF chronicity (H) Yes       CURRENT MEDICATIONS:  Current Outpatient Medications   Medication Sig Dispense Refill     acetaminophen (TYLENOL) 325 MG tablet Take 650 mg by mouth 2 times daily every 6 hours as needed for Pain       digoxin (LANOXIN) 125 MCG tablet Take 1 tablet (125 mcg) by mouth daily (Patient taking differently: Take 125 mcg by mouth daily HOLD if HR <55) 30 tablet 0     melatonin 3 MG tablet Take 3 mg by mouth At Bedtime After 9pm. Please use overnight depends on pt to allow him to sleep without interruption during the night       metoprolol succinate ER (TOPROL-XL) 50 MG 24 hr tablet Take 50 mg by mouth 2 times daily HOLD if SBP <110 OR HR < 55. Call if held x 2 in a row symptomatic       mirtazapine (REMERON) 15 MG tablet Take 15 mg by mouth At Bedtime Patient started medication for the first time yesterday 10/29/2019.        Multiple Vitamins-Minerals (ICAPS AREDS FORMULA PO) Take 1 tablet by mouth daily Takes in addition to multivitamin with minerals       OTHER MEDICAL SUPPLIES Incentive spirometer every hour while awake  every shift While awake       OTHER MEDICAL SUPPLIES TEDS stockings on in AM and off at HS  every day and evening shift       OTHER MEDICAL SUPPLIES CORE PROTOCOL ; DAILY WEIGHTS IN AM.       spironolactone (ALDACTONE) 25 MG tablet Take 1 tablet  (25 mg) by mouth daily 30 tablet 0     torsemide (DEMADEX) 10 MG tablet Take 1 tablet (10 mg) by mouth daily       Vitamin D, Cholecalciferol, 1000 UNITS TABS Take 3,000 Units by mouth daily          ALLERGIES   No Known Allergies    PAST MEDICAL HISTORY:  Past Medical History:   Diagnosis Date     Congestive heart failure (H)      Elevated PSA      Eye hemorrhage, left 02/2019     Non-ischemic cardiomyopathy (H)     2017, med treatment, echo done 4/18 nl ef, mod to severe tr     Permanent atrial fibrillation 2011     Thrombocytopenia (H)      Unspecified essential hypertension        PAST SURGICAL HISTORY:  Past Surgical History:   Procedure Laterality Date     ABDOMEN SURGERY      bowel obstruction     BONE MARROW BIOPSY, BONE SPECIMEN, NEEDLE/TROCAR N/A 3/4/2019    Procedure: BIOPSY BONE MARROW;  Surgeon: Josey Vargas MD;  Location:  GI     CARPAL TUNNEL RELEASE RT/LT  2014     CV HEART CATHETERIZATION WITH POSSIBLE INTERVENTION N/A 11/15/2019    Procedure: Left and right heart Catheterization with Possible Intervention;  Surgeon: Adolfo Paez MD;  Location:  HEART CARDIAC CATH LAB     CV LEFT VENTRICULOGRAM N/A 11/15/2019    Procedure: Left Ventriculogram;  Surgeon: Adolfo Paez MD;  Location:  HEART CARDIAC CATH LAB     ESOPHAGOSCOPY, GASTROSCOPY, DUODENOSCOPY (EGD), COMBINED N/A 11/6/2019    Procedure: ESOPHAGOGASTRODUODENOSCOPY (EGD);  Surgeon: Marixa Aguila MD;  Location:  GI     EXPLORE GROIN  4/30/2014    Procedure: EXPLORE GROIN;  Surgeon: Sam Aguirre MD;  Location:  OR     HERNIORRHAPHY INGUINAL  4/30/2014    Procedure: HERNIORRHAPHY INGUINAL;  Surgeon: Sam Aguirre MD;  Location:  OR     MOHS MICROGRAPHIC PROCEDURE       PHACOEMULSIFICATION CLEAR CORNEA WITH STANDARD INTRAOCULAR LENS IMPLANT Right 9/8/2015    Procedure: PHACOEMULSIFICATION CLEAR CORNEA WITH STANDARD INTRAOCULAR LENS IMPLANT;  Surgeon: Gil Shannon MD;  Location: [facility]  "olecranon bursitis[         FAMILY HISTORY:  Family History   Problem Relation Age of Onset     Heart Disease No family hx of        SOCIAL HISTORY:  Social History     Socioeconomic History     Marital status:      Spouse name: None     Number of children: 3     Years of education: None     Highest education level: None   Occupational History     None   Social Needs     Financial resource strain: None     Food insecurity:     Worry: None     Inability: None     Transportation needs:     Medical: None     Non-medical: None   Tobacco Use     Smoking status: Never Smoker     Smokeless tobacco: Never Used   Substance and Sexual Activity     Alcohol use: No     Drug use: No     Sexual activity: None   Lifestyle     Physical activity:     Days per week: None     Minutes per session: None     Stress: None   Relationships     Social connections:     Talks on phone: None     Gets together: None     Attends Sikh service: None     Active member of club or organization: None     Attends meetings of clubs or organizations: None     Relationship status: None     Intimate partner violence:     Fear of current or ex partner: None     Emotionally abused: None     Physically abused: None     Forced sexual activity: None   Other Topics Concern     Parent/sibling w/ CABG, MI or angioplasty before 65F 55M? No   Social History Narrative     None       Review of Systems:  Skin:  Negative       Eyes:  Positive for glasses loss of vision in left eye  ENT:  Negative      Respiratory:  Negative       Cardiovascular:  Negative   wears compression socks on both legs  Gastroenterology: Negative      Genitourinary:  Positive for incontinence at night  Musculoskeletal:  Negative      Neurologic:  Negative      Psychiatric:  Negative      Heme/Lymph/Imm:  Negative      Endocrine:  Negative        Physical Exam:  Vitals: /69   Pulse 92   Ht 1.74 m (5' 8.5\")   Wt 67.6 kg (149 lb)   BMI 22.33 kg/m       Constitutional:       "     Skin:             Head:           Eyes:           Lymph:      ENT:           Neck:           Respiratory:            Cardiac:                                                           GI:           Extremities and Muscular Skeletal:                 Neurological:           Psych:             CC  Lawanda Carrera PA-C  [address]  [place] [address]                    Thank you for allowing me to participate in the care of your patient.      Sincerely,     Long Pugh MD     [facility]    cc:   Lawanda Carrera PA-C  [address]  [place] [address]

## 2020-02-04 NOTE — PROGRESS NOTES
HPI and Plan:   See dictation(#[id number])    Orders Placed This Encounter   Procedures     Follow-Up with [facility] - [facility] visit       No orders of the defined types were placed in this encounter.      There are no discontinued medications.      Encounter Diagnoses   Name Primary?     Atrial fibrillation (H)      Mitral regurgitation      Tricuspid regurgitation      Right-sided congestive heart failure, unspecified HF chronicity (H) Yes       CURRENT MEDICATIONS:  Current Outpatient Medications   Medication Sig Dispense Refill     acetaminophen (TYLENOL) 325 MG tablet Take 650 mg by mouth 2 times daily every 6 hours as needed for Pain       digoxin (LANOXIN) 125 MCG tablet Take 1 tablet (125 mcg) by mouth daily (Patient taking differently: Take 125 mcg by mouth daily HOLD if HR <55) 30 tablet 0     melatonin 3 MG tablet Take 3 mg by mouth At Bedtime After 9pm. Please use overnight depends on pt to allow him to sleep without interruption during the night       metoprolol succinate ER (TOPROL-XL) 50 MG 24 hr tablet Take 50 mg by mouth 2 times daily HOLD if SBP <110 OR HR < 55. Call if held x 2 in a row symptomatic       mirtazapine (REMERON) 15 MG tablet Take 15 mg by mouth At Bedtime Patient started medication for the first time yesterday 10/29/2019.        Multiple Vitamins-Minerals (ICAPS AREDS FORMULA PO) Take 1 tablet by mouth daily Takes in addition to multivitamin with minerals       OTHER MEDICAL SUPPLIES Incentive spirometer every hour while awake  every shift While awake       OTHER MEDICAL SUPPLIES TEDS stockings on in AM and off at HS  every day and evening shift       OTHER MEDICAL SUPPLIES CORE PROTOCOL ; DAILY WEIGHTS IN AM.       spironolactone (ALDACTONE) 25 MG tablet Take 1 tablet (25 mg) by mouth daily 30 tablet 0     torsemide (DEMADEX) 10 MG tablet Take 1 tablet (10 mg) by mouth daily       Vitamin D, Cholecalciferol, 1000 UNITS TABS Take 3,000 Units by mouth daily          ALLERGIES   No Known  Allergies    PAST MEDICAL HISTORY:  Past Medical History:   Diagnosis Date     Congestive heart failure (H)      Elevated PSA      Eye hemorrhage, left 02/2019     Non-ischemic cardiomyopathy (H)     2017, med treatment, echo done 4/18 nl ef, mod to severe tr     Permanent atrial fibrillation 2011     Thrombocytopenia (H)      Unspecified essential hypertension        PAST SURGICAL HISTORY:  Past Surgical History:   Procedure Laterality Date     ABDOMEN SURGERY      bowel obstruction     BONE MARROW BIOPSY, BONE SPECIMEN, NEEDLE/TROCAR N/A 3/4/2019    Procedure: BIOPSY BONE MARROW;  Surgeon: Josey Vargas MD;  Location:  GI     CARPAL TUNNEL RELEASE RT/LT  2014     CV HEART CATHETERIZATION WITH POSSIBLE INTERVENTION N/A 11/15/2019    Procedure: Left and right heart Catheterization with Possible Intervention;  Surgeon: Adolfo Paez MD;  Location:  HEART CARDIAC CATH LAB     CV LEFT VENTRICULOGRAM N/A 11/15/2019    Procedure: Left Ventriculogram;  Surgeon: Adolfo Paez MD;  Location:  HEART CARDIAC CATH LAB     ESOPHAGOSCOPY, GASTROSCOPY, DUODENOSCOPY (EGD), COMBINED N/A 11/6/2019    Procedure: ESOPHAGOGASTRODUODENOSCOPY (EGD);  Surgeon: Marixa Aguila MD;  Location:  GI     EXPLORE GROIN  4/30/2014    Procedure: EXPLORE GROIN;  Surgeon: Sam Aguirre MD;  Location:  OR     HERNIORRHAPHY INGUINAL  4/30/2014    Procedure: HERNIORRHAPHY INGUINAL;  Surgeon: Sam Aguirre MD;  Location:  OR     MOHS MICROGRAPHIC PROCEDURE       PHACOEMULSIFICATION CLEAR CORNEA WITH STANDARD INTRAOCULAR LENS IMPLANT Right 9/8/2015    Procedure: PHACOEMULSIFICATION CLEAR CORNEA WITH STANDARD INTRAOCULAR LENS IMPLANT;  Surgeon: Gil Shannon MD;  Location:  [facility]     septic olecranon bursitis[         FAMILY HISTORY:  Family History   Problem Relation Age of Onset     Heart Disease No family hx of        SOCIAL HISTORY:  Social History     Socioeconomic History     Marital status:       "Spouse name: None     Number of children: 3     Years of education: None     Highest education level: None   Occupational History     None   Social Needs     Financial resource strain: None     Food insecurity:     Worry: None     Inability: None     Transportation needs:     Medical: None     Non-medical: None   Tobacco Use     Smoking status: Never Smoker     Smokeless tobacco: Never Used   Substance and Sexual Activity     Alcohol use: No     Drug use: No     Sexual activity: None   Lifestyle     Physical activity:     Days per week: None     Minutes per session: None     Stress: None   Relationships     Social connections:     Talks on phone: None     Gets together: None     Attends Voodoo service: None     Active member of club or organization: None     Attends meetings of clubs or organizations: None     Relationship status: None     Intimate partner violence:     Fear of current or ex partner: None     Emotionally abused: None     Physically abused: None     Forced sexual activity: None   Other Topics Concern     Parent/sibling w/ CABG, MI or angioplasty before 65F 55M? No   Social History Narrative     None       Review of Systems:  Skin:  Negative       Eyes:  Positive for glasses loss of vision in left eye  ENT:  Negative      Respiratory:  Negative       Cardiovascular:  Negative   wears compression socks on both legs  Gastroenterology: Negative      Genitourinary:  Positive for incontinence at night  Musculoskeletal:  Negative      Neurologic:  Negative      Psychiatric:  Negative      Heme/Lymph/Imm:  Negative      Endocrine:  Negative        Physical Exam:  Vitals: /69   Pulse 92   Ht 1.74 m (5' 8.5\")   Wt 67.6 kg (149 lb)   BMI 22.33 kg/m      Constitutional:           Skin:             Head:           Eyes:           Lymph:      ENT:           Neck:           Respiratory:            Cardiac:                                                           GI:           Extremities and Muscular " Skeletal:                 Neurological:           Psych:             CC  Lawanda Carrera, PAKATHY  [address]  [place] [address]

## 2020-02-04 NOTE — PROGRESS NOTES
Service Date: 02/04/2020      OFFICE PROGRESS NOTE       REASON FOR CLINIC VISIT:  Follow up tricuspid regurgitation.      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  Today, the patient is coming for followup.  I saw the patient last time about a year ago.  He had a very eventful course, in terms of his health, over the last few months.  He had been admitted at least twice in the last 2-3 months because of shortness of breath and was found to have severe tricuspid regurgitation with a pleural effusion.  He underwent thoracentesis.  He had extensive workup including KATIE that showed severe tricuspid regurgitation, likely due to annular dilatation.  The valve itself looks fine.  LV systolic function was normal.  He had left and right heart catheterization that showed minimal coronary artery disease and normal PA pressures and pulmonary vascular resistance and normal right-sided pressure.  He has since then been managed on diuretics.  In fact, he was also seen by Dr. Harris from Cardiothoracic Surgery and by several of my colleagues while he was in the hospital.  Overall, the consensus was that a traditional tricuspid valve replacement surgery would be very high risk and medical management was recommended.  Today, the patient is coming to the clinic accompanied by his wife and son.  The patient tells me he feels the best so far.  He is doing exercise on a regular basis in the rehab at the assisted living.  Breathing is stable.  No orthopnea, PND.  No palpitation, no chest discomfort.  Kidney function today shows creatinine stable at  1.13, potassium 4.3, sodium 135.  He is presently on metoprolol succinate 50 mg b.i.d., spironolactone 25 mg daily, Demadex 10 mg daily, digoxin 125 mcg daily.  He had a limited echocardiogram done about 3 weeks ago that showed normal LV function, RV systolic function normal, 3+ tricuspid regurgitation.  Overall, echocardiogram looks similar to the previous echocardiogram.  Overall, it was felt that the tricuspid regurgitation was likely due to annular dilatation in the setting of severely dilated RA.      PHYSICAL EXAMINATION:   VITAL SIGNS:  Blood pressure 130/69, heart rate 92 and irregular, weight 149 pounds, BMI 22.33.   GENERAL:  The patient appears pleasant, comfortable.   NECK:  JVP appears normal, negative hepatojugular reflux.   CARDIOVASCULAR SYSTEM:  There is grade 3/6 systolic murmur heard best over the left low sternal border.  No S3, S4, rub or gallop.   RESPIRATORY SYSTEM:  Clear to auscultation bilaterally.   GASTROINTESTINAL SYSTEM:  Abdomen soft, nontender.   EXTREMITIES:  No pitting pedal edema.   NEUROLOGICAL:  Alert, oriented.   PSYCHIATRIC:  Normal affect.   SKIN:  No obvious rash.   HEENT:  Pallor noted.      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  Right and left heart catheterization showed minimal coronary artery disease and normal PA pressure, RA pressure and PVR and he was euvolemic.  Repeat echocardiogram shows significant tricuspid regurgitation.  I had a long discussion with the patient and his family regarding his recent clinical course and the issue of what seems like predominantly isolated severe tricuspid  regurgitation.  I agree with the fact that he will be quite high risk for traditional open heart tricuspid valve surgery.  I did discuss with one of my colleagues, Dr. José Luis Perez at [facility], and they are part of the trial ongoing for using MitraClip for tricuspid regurgitation.  Dr. Perez was very gracious enough to have his team call the patient and try to see if he was a candidate for the trial.  I also briefly discussed with patient and his family regarding my conversation with Dr. Perez and the possibility that he may be a candidate for the trial, although he may be randomized to medical arm versus interventional arm.  I expect that patient will get a call from [facility] to schedule an appointment for possibility of enrolling in the tricuspid regurgitation clinic trial.  I recommend continuing close followup with us.  I am setting up a followup in about 6-7 weeks with Jazlyn in [facility].  To be noted, he will stay off anticoagulation due to history of retinal bleed and significant thrombocytopenia and in fact, he had evaluation by Electrophysiology who found him not to be a good candidate overall for Watchman procedure.      Sixty minutes of total time was spent with more than 50% counseling and coordination of care.         DEIRDRE SPENCER MD             D: 2020   T: 2020   MT: ELIJAH      Name:     YINKA GONZALEZ   MRN:      [id number]        Account:      [id number]   :      [id number]           Service Date: 2020      Document: [id number]

## 2020-02-04 NOTE — LETTER
2/4/2020      Mariusz Shields MD  [address]  [facility] [address]      RE: Jama Paul       Dear Colleague,    I had the pleasure of seeing Jama Paul in the [facility].    Service Date: 02/04/2020      OFFICE PROGRESS NOTE       REASON FOR CLINIC VISIT:  Follow up tricuspid regurgitation.      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  Today, the patient is coming for followup.  I saw the patient last time about a year ago.  He had a very eventful course, in terms of his health, over the last few months.  He had been admitted at least twice in the last 2-3 months because of shortness of breath and was found to have severe tricuspid regurgitation with a pleural effusion.  He underwent thoracentesis.  He had extensive workup including KATIE that showed severe tricuspid regurgitation, likely due to annular dilatation.  The valve itself looks fine.  LV systolic function was normal.  He had left and right heart catheterization that showed minimal coronary artery disease and normal PA pressures and pulmonary vascular resistance and normal right-sided pressure.  He has since then been managed on diuretics.  In fact, he was also seen by Dr. Harris from Cardiothoracic Surgery and by several of my colleagues while he was in the hospital.  Overall, the consensus was that a traditional tricuspid valve replacement surgery would be very high risk and medical management was recommended.  Today, the patient is coming to the clinic accompanied by his wife and son.  The patient tells me he feels  the best so far.  He is doing exercise on a regular basis in the rehab at the assisted living.  Breathing is stable.  No orthopnea, PND.  No palpitation, no chest discomfort.  Kidney function today shows creatinine stable at 1.13, potassium 4.3, sodium 135.  He is presently on metoprolol succinate 50 mg b.i.d., spironolactone 25 mg daily, Demadex 10 mg daily, digoxin 125 mcg daily.  He had a limited echocardiogram done about 3 weeks ago that showed normal LV function, RV systolic function normal, 3+ tricuspid regurgitation.  Overall, echocardiogram looks similar to the previous echocardiogram.  Overall, it was felt that the tricuspid regurgitation was likely due to annular dilatation in the setting of severely dilated RA.      PHYSICAL EXAMINATION:   VITAL SIGNS:  Blood pressure 130/69, heart rate 92 and irregular, weight 149 pounds, BMI 22.33.   GENERAL:  The patient appears pleasant, comfortable.   NECK:  JVP appears normal, negative hepatojugular reflux.   CARDIOVASCULAR SYSTEM:  There is grade 3/6 systolic murmur heard best over the left low sternal border.  No S3, S4, rub or gallop.   RESPIRATORY SYSTEM:  Clear to auscultation bilaterally.   GASTROINTESTINAL SYSTEM:  Abdomen soft, nontender.   EXTREMITIES:  No pitting pedal edema.   NEUROLOGICAL:  Alert, oriented.   PSYCHIATRIC:  Normal affect.   SKIN:  No obvious rash.   HEENT:  Pallor noted.      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  Right and left heart catheterization showed minimal coronary artery disease and normal PA pressure, RA pressure and PVR and he was euvolemic.  Repeat  echocardiogram shows significant tricuspid regurgitation.  I had a long discussion with the patient and his family regarding his recent clinical course and the issue of what seems like predominantly isolated severe tricuspid regurgitation.  I agree with the fact that he will be quite high risk for traditional open heart tricuspid valve surgery.  I did discuss with one of my colleagues, Dr. José Luis Perez at [facility], and they are part of the trial ongoing for using MitraClip for tricuspid regurgitation.  Dr. Perez was very gracious enough to have his team call the patient and try to see if he was a candidate for the trial.  I also briefly discussed with patient and his family regarding my conversation with Dr. Perez and the possibility that he may be a candidate for the trial, although he may be randomized to medical arm versus interventional arm.  I expect that patient will get a call from [facility] to schedule an appointment for possibility of enrolling in the tricuspid regurgitation clinic trial.  I recommend continuing close followup with us.  I am setting up a followup in about 6-7 weeks with Jazlyn in [facility].  To be noted, he will stay off anticoagulation due to history of retinal bleed and significant thrombocytopenia and in fact, he had evaluation by Electrophysiology who found him not to be a good candidate overall for Watchman procedure.      Sixty minutes of total time was spent with more than 50% counseling and coordination of care.         DEIRDRE SPENCER MD             D: 2020   T: 2020   MT: ELIJAH      Name:     YINKA GONZALEZ   MRN:      [id number]        Account:      [id number]   :      1931           Service Date: 2020      Document: [id number]           Outpatient Encounter Medications as of 2020   Medication Sig Dispense Refill     acetaminophen (TYLENOL) 325 MG tablet Take 650 mg by mouth every 6 hours as needed for Pain        digoxin (LANOXIN) 125 MCG tablet Take 1 tablet (125 mcg) by mouth daily (Patient taking differently: Take 125 mcg by mouth daily HOLD if HR <55) 30 tablet 0     melatonin 3 MG tablet Take 3 mg by mouth At Bedtime After 9pm. Please use overnight depends on pt to allow him to sleep without interruption during the night       mirtazapine (REMERON) 15 MG tablet Take 15 mg by mouth At Bedtime Patient started medication for the first time yesterday 10/29/2019.        Multiple Vitamins-Minerals (ICAPS AREDS FORMULA PO) Take 1 tablet by mouth daily Takes in addition to multivitamin with minerals       OTHER MEDICAL SUPPLIES CORE PROTOCOL ; DAILY WEIGHTS IN AM.       Vitamin D, Cholecalciferol, 1000 UNITS TABS Take 3,000 Units by mouth daily        [DISCONTINUED] metoprolol succinate ER (TOPROL-XL) 50 MG 24 hr tablet Take 50 mg by mouth 2 times daily HOLD if SBP <110 OR HR < 55. Call if held x 2 in a row symptomatic       [DISCONTINUED] OTHER MEDICAL SUPPLIES Incentive spirometer every hour while awake  every shift While awake       [DISCONTINUED] OTHER MEDICAL SUPPLIES TEDS stockings on in AM and off at HS  every day and evening shift       [DISCONTINUED] spironolactone (ALDACTONE) 25 MG tablet Take 1 tablet (25 mg) by mouth daily 30 tablet 0     [DISCONTINUED] torsemide (DEMADEX) 10 MG tablet Take 1 tablet (10 mg) by mouth daily       No facility-administered encounter medications on file as of 2/4/2020.        Again, thank you for allowing me to participate in the care of your patient.      Sincerely,    Long Pugh MD     [facility]

## 2020-02-04 NOTE — LETTER
2/4/2020      Mariusz Shields MD  [address]  [facility] [address]      RE: Jama Paul       Dear Colleague,    I had the pleasure of seeing Jama Paul in the [facility].    Service Date: 02/04/2020      OFFICE PROGRESS NOTE       REASON FOR CLINIC VISIT:  Follow up tricuspid regurgitation.      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  Today, the patient is coming for followup.  I saw the patient last time about a year ago.  He had a very eventful course, in terms of his health, over the last few months.  He had been admitted at least twice in the last 2-3 months because of shortness of breath and was found to have severe tricuspid regurgitation with a pleural effusion.  He underwent thoracentesis.  He had extensive workup including KATIE that showed severe tricuspid regurgitation, likely due to annular dilatation.  The valve itself looks fine.  LV systolic function was normal.  He had left and right heart catheterization that showed minimal coronary artery disease and normal PA pressures and pulmonary vascular resistance and normal right-sided pressure.  He has since then been managed on diuretics.  In fact, he was also seen by Dr. Harris from Cardiothoracic Surgery and by several of my colleagues while he was in the hospital.  Overall, the consensus was that a traditional tricuspid valve replacement surgery would be very high risk and medical management was recommended.  Today, the patient is coming to the clinic accompanied by his wife and son.  The patient tells me he feels  the best so far.  He is doing exercise on a regular basis in the rehab at the assisted living.  Breathing is stable.  No orthopnea, PND.  No palpitation, no chest discomfort.  Kidney function today shows creatinine stable at 1.13, potassium 4.3, sodium 135.  He is presently on metoprolol succinate 50 mg b.i.d., spironolactone 25 mg daily, Demadex 10 mg daily, digoxin 125 mcg daily.  He had a limited echocardiogram done about 3 weeks ago that showed normal LV function, RV systolic function normal, 3+ tricuspid regurgitation.  Overall, echocardiogram looks similar to the previous echocardiogram.  Overall, it was felt that the tricuspid regurgitation was likely due to annular dilatation in the setting of severely dilated RA.      PHYSICAL EXAMINATION:   VITAL SIGNS:  Blood pressure 130/69, heart rate 92 and irregular, weight 149 pounds, BMI 22.33.   GENERAL:  The patient appears pleasant, comfortable.   NECK:  JVP appears normal, negative hepatojugular reflux.   CARDIOVASCULAR SYSTEM:  There is grade 3/6 systolic murmur heard best over the left low sternal border.  No S3, S4, rub or gallop.   RESPIRATORY SYSTEM:  Clear to auscultation bilaterally.   GASTROINTESTINAL SYSTEM:  Abdomen soft, nontender.   EXTREMITIES:  No pitting pedal edema.   NEUROLOGICAL:  Alert, oriented.   PSYCHIATRIC:  Normal affect.   SKIN:  No obvious rash.   HEENT:  Pallor noted.      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  Right and left heart catheterization showed minimal coronary artery disease and normal PA pressure, RA pressure and PVR and he was euvolemic.  Repeat  echocardiogram shows significant tricuspid regurgitation.  I had a long discussion with the patient and his family regarding his recent clinical course and the issue of what seems like predominantly isolated severe tricuspid regurgitation.  I agree with the fact that he will be quite high risk for traditional open heart tricuspid valve surgery.  I did discuss with one of my colleagues, Dr. José Luis Perez at [facility], and they are part of the trial ongoing for using MitraClip for tricuspid regurgitation.  Dr. Perez was very gracious enough to have his team call the patient and try to see if he was a candidate for the trial.  I also briefly discussed with patient and his family regarding my conversation with Dr. Perez and the possibility that he may be a candidate for the trial, although he may be randomized to medical *** versus interventional ***.  I expect that patient will get a call from [facility] to schedule an appointment for possibility of enrolling in the tricuspid regurgitation clinic trial.  I recommend continuing close followup with us.  I am setting up a followup in about 6-7 weeks with Jazlyn in [facility].  To be noted, he will stay off anticoagulation due to history of retinal bleed and significant thrombocytopenia and in fact, he had evaluation by Electrophysiology who found him not to be a good candidate overall for Watchman procedure.      Sixty minutes of total time was spent with more than 50% counseling and coordination of care.         DEIRDRE SPENCER MD             D: 2020   T: 2020   MT: ELIJAH      Name:     YINKA GONZALEZ   MRN:      [id number]        Account:      [id number]   :      1931           Service Date: 2020      Document: [id number]         Outpatient Encounter Medications as of 2020   Medication Sig Dispense Refill     acetaminophen (TYLENOL) 325 MG tablet Take 650 mg by mouth 2 times daily every 6 hours as needed  for Pain       digoxin (LANOXIN) 125 MCG tablet Take 1 tablet (125 mcg) by mouth daily (Patient taking differently: Take 125 mcg by mouth daily HOLD if HR <55) 30 tablet 0     melatonin 3 MG tablet Take 3 mg by mouth At Bedtime After 9pm. Please use overnight depends on pt to allow him to sleep without interruption during the night       mirtazapine (REMERON) 15 MG tablet Take 15 mg by mouth At Bedtime Patient started medication for the first time yesterday 10/29/2019.        Multiple Vitamins-Minerals (ICAPS AREDS FORMULA PO) Take 1 tablet by mouth daily Takes in addition to multivitamin with minerals       OTHER MEDICAL SUPPLIES CORE PROTOCOL ; DAILY WEIGHTS IN AM.       torsemide (DEMADEX) 10 MG tablet Take 1 tablet (10 mg) by mouth daily       Vitamin D, Cholecalciferol, 1000 UNITS TABS Take 3,000 Units by mouth daily        [DISCONTINUED] metoprolol succinate ER (TOPROL-XL) 50 MG 24 hr tablet Take 50 mg by mouth 2 times daily HOLD if SBP <110 OR HR < 55. Call if held x 2 in a row symptomatic       [DISCONTINUED] OTHER MEDICAL SUPPLIES Incentive spirometer every hour while awake  every shift While awake       [DISCONTINUED] OTHER MEDICAL SUPPLIES TEDS stockings on in AM and off at HS  every day and evening shift       [DISCONTINUED] spironolactone (ALDACTONE) 25 MG tablet Take 1 tablet (25 mg) by mouth daily 30 tablet 0     No facility-administered encounter medications on file as of 2/4/2020.        Again, thank you for allowing me to participate in the care of your patient.      Sincerely,    Long Pugh MD     [facility]

## 2020-02-04 NOTE — PROGRESS NOTES
[facility] DISCHARGE SUMMARY    PATIENT'S NAME: Jama Paul  YOB: 1931    PRIMARY CARE PROVIDER AND CLINIC RESPONSIBLE AFTER TRANSFER: Mariusz Shields     CODE STATUS: FULL CODE    TRANSFERRING PROVIDERS: ERIC Mir CNP, Dr. Jeanette Hernández MD,      DATE OF SNF ADMISSION:  November / 17 / 2019    DATE OF SNF DISCHARGE (including anticipating DC): February / 04 / 2020    DISCHARGE DISPOSITION: [facility] Provider    Nursing Facility: [facility] stay 10/30/19 to 11/17/19.     Condition on Discharge:  Stable.    Function:  Ambulates: Indep, Transfers: Indep  Cognitive Scores: BIMS 15    Physical Function: Ambulating Independently ft with no aides  Equipment: walker  DME: Walker    DISCHARGE DIAGNOSIS:      Acute on chronic diastolic heart failure due to valvular disease (H)  Mitral valve insufficiency, unspecified etiology  Tricuspid valve insufficiency, unspecified etiology  Bilateral pleural effusion  Status post thoracentesis  Non-ischemic cardiomyopathy (H)  Essential hypertension  Permanent atrial fibrillation  Physical deconditioning  Idiopathic thrombocytopenic purpura (H)        HOSPITAL COURSE: see on the discharge from 1/22/20    TCU/SNF COURSE: SEE DISCHARGE SUMMARY FROM 1/22/2020. There have been no changes since then.  He went to see Dr SPENCER today and no changes. They are referring him to [facility] for a possibly valve clip procedure.      PAST MEDICAL HISTORY:  Past Medical History:   Diagnosis Date     Congestive heart failure (H)      Elevated PSA      Eye hemorrhage, left 02/2019     Non-ischemic cardiomyopathy (H)     2017, med treatment, echo done 4/18 nl ef, mod to severe tr     Permanent atrial fibrillation 2011     Thrombocytopenia (H)      Unspecified essential hypertension        DISCHARGE MEDICATIONS:  Current Outpatient Medications   Medication Sig Dispense Refill     acetaminophen  (TYLENOL) 325 MG tablet Take 650 mg by mouth 2 times daily every 6 hours as needed for Pain       digoxin (LANOXIN) 125 MCG tablet Take 1 tablet (125 mcg) by mouth daily (Patient taking differently: Take 125 mcg by mouth daily HOLD if HR <55) 30 tablet 0     melatonin 3 MG tablet Take 3 mg by mouth At Bedtime After 9pm. Please use overnight depends on pt to allow him to sleep without interruption during the night       metoprolol succinate ER (TOPROL-XL) 50 MG 24 hr tablet Take 50 mg by mouth 2 times daily HOLD if SBP <110 OR HR < 55. Call if held x 2 in a row symptomatic       mirtazapine (REMERON) 15 MG tablet Take 15 mg by mouth At Bedtime Patient started medication for the first time yesterday 10/29/2019.        Multiple Vitamins-Minerals (ICAPS AREDS FORMULA PO) Take 1 tablet by mouth daily Takes in addition to multivitamin with minerals       OTHER MEDICAL SUPPLIES CORE PROTOCOL ; DAILY WEIGHTS IN AM.       spironolactone (ALDACTONE) 25 MG tablet Take 1 tablet (25 mg) by mouth daily 30 tablet 0     torsemide (DEMADEX) 10 MG tablet Take 1 tablet (10 mg) by mouth daily       Vitamin D, Cholecalciferol, 1000 UNITS TABS Take 3,000 Units by mouth daily          MEDICATION CHANGES/RATIONALE:    see orders--no changes  /72   Pulse 68   Resp 18   Ht 1.829 m (6')   Wt 65.3 kg (144 lb)   SpO2 97%   BMI 19.53 kg/m      TODAY DURING EXAM/ROS:  No CP, SOB, Cough, dizziness, fevers, chills, HA, N/V, dysuria or Bowel Abnormalities. Appetite is good.  No pain.      PHYSICAL EXAM Today:  A & O x 3, NAD. Lungs CTA, non labored. RRR, S1/S2 w/O murmur,gallop or rub.   trace edema.  Abdomen soft, nontender, +BT'S. No focal neurological deficits. CHIU and up alone.   .       SNF LABS:  Recent Labs   Lab Test 02/04/20  0845 01/29/20   * 136   POTASSIUM 4.3 4.4   CHLORIDE 99 104   CO2 31* 26   ANIONGAP 9.3 6*    81   BUN 24 21   CR 1.13 1.04   BARON 8.8 8.2*             DISCHARGE PLAN:  Occupational Therapy,  Physical Therapy, From:  [facility] for shower assistance Follow-up with PCP in 7 days: 7 days.    Current [place] or other scheduled appointments:  Future Appointments   Date Time Provider Department Center   2/4/2020  9:00 AM [facility]ENN[facility]   2/4/2020 10:00 AM Long Pugh MD [facility]   2/19/2020 11:00 AM [facility] referral needed and placed by this provider: none    Pending labs: none     Discharge Treatments:cont CORE cares, wts monitoring per their orders and regimen       TOTAL DISCHARGE TIME:   Greater than 30 minutes    ERIC Mir CNP

## 2020-02-05 DIAGNOSIS — I50.9 CONGESTIVE HEART FAILURE, UNSPECIFIED HF CHRONICITY, UNSPECIFIED HEART FAILURE TYPE (H): ICD-10-CM

## 2020-02-05 RX ORDER — SPIRONOLACTONE 25 MG/1
25 TABLET ORAL DAILY
Qty: 90 TABLET | Refills: 3 | Status: SHIPPED | OUTPATIENT
Start: 2020-02-05 | End: 2020-01-01

## 2020-02-05 RX ORDER — SPIRONOLACTONE 25 MG/1
25 TABLET ORAL DAILY
Qty: 30 TABLET | Refills: 4 | Status: SHIPPED | OUTPATIENT
Start: 2020-02-05 | End: 2020-02-05

## 2020-02-05 NOTE — TELEPHONE ENCOUNTER
Patient's spouse called in requesting refill for spironolactone 25 MG which was ordered by hospitalist upon his discharge last month. I ran this by Amalia and she is fine with refilling it. I will send over a 30 day supply with 4 reiflls

## 2020-02-07 DIAGNOSIS — I48.91 ATRIAL FIBRILLATION (H): Primary | ICD-10-CM

## 2020-02-07 RX ORDER — METOPROLOL SUCCINATE 50 MG/1
50 TABLET, EXTENDED RELEASE ORAL 2 TIMES DAILY
Qty: 180 TABLET | Refills: 3 | Status: ON HOLD | OUTPATIENT
Start: 2020-02-07 | End: 2020-02-29

## 2020-02-08 ENCOUNTER — HEALTH MAINTENANCE LETTER (OUTPATIENT)
Age: 85
End: 2020-02-08

## 2020-02-11 ENCOUNTER — MEDICAL CORRESPONDENCE (OUTPATIENT)
Dept: HEALTH INFORMATION MANAGEMENT | Facility: CLINIC | Age: 85
End: 2020-02-11

## 2020-02-24 ENCOUNTER — PATIENT OUTREACH (OUTPATIENT)
Dept: CARE COORDINATION | Facility: CLINIC | Age: 85
End: 2020-02-24

## 2020-02-24 NOTE — PROGRESS NOTES
Clinic Care Coordination Contact  [facility]/Voicemail       Clinical Data: Care Coordinator Outreach    Outreach attempted x 1.  Left message on patient's voicemail with call back information and requested return call.    Plan: Care Coordinator will try to reach patient again in 3-5 business days.    JASMINA Martinez, [facility]  Clinic Care Coordinator  [facility]  [phone]  [email or web address]

## 2020-02-27 ENCOUNTER — APPOINTMENT (OUTPATIENT)
Dept: GENERAL RADIOLOGY | Facility: CLINIC | Age: 85
DRG: 292 | End: 2020-02-27
Attending: EMERGENCY MEDICINE
Payer: MEDICARE

## 2020-02-27 ENCOUNTER — HOSPITAL ENCOUNTER (INPATIENT)
Facility: CLINIC | Age: 85
LOS: 3 days | Discharge: NURSING, REHAB OR LONG-TERM CARE | DRG: 292 | End: 2020-03-01
Attending: EMERGENCY MEDICINE | Admitting: INTERNAL MEDICINE
Payer: MEDICARE

## 2020-02-27 DIAGNOSIS — I48.91 ATRIAL FIBRILLATION (H): ICD-10-CM

## 2020-02-27 DIAGNOSIS — A49.8 CITROBACTER INFECTION: Primary | ICD-10-CM

## 2020-02-27 DIAGNOSIS — M62.81 GENERALIZED MUSCLE WEAKNESS: ICD-10-CM

## 2020-02-27 DIAGNOSIS — J90 RECURRENT PLEURAL EFFUSION ON RIGHT: ICD-10-CM

## 2020-02-27 DIAGNOSIS — N30.00 ACUTE CYSTITIS WITHOUT HEMATURIA: ICD-10-CM

## 2020-02-27 DIAGNOSIS — I48.0 PAROXYSMAL ATRIAL FIBRILLATION (H): ICD-10-CM

## 2020-02-27 PROBLEM — R53.1 WEAKNESS: Status: ACTIVE | Noted: 2020-02-27

## 2020-02-27 LAB
ALBUMIN UR-MCNC: 10 MG/DL
ANION GAP SERPL CALCULATED.3IONS-SCNC: 5 MMOL/L (ref 3–14)
APPEARANCE UR: ABNORMAL
BASOPHILS # BLD AUTO: 0 10E9/L (ref 0–0.2)
BASOPHILS NFR BLD AUTO: 0.1 %
BILIRUB UR QL STRIP: NEGATIVE
BUN SERPL-MCNC: 17 MG/DL (ref 7–30)
CALCIUM SERPL-MCNC: 8.4 MG/DL (ref 8.5–10.1)
CHLORIDE SERPL-SCNC: 106 MMOL/L (ref 94–109)
CO2 SERPL-SCNC: 25 MMOL/L (ref 20–32)
COLOR UR AUTO: YELLOW
CREAT SERPL-MCNC: 0.99 MG/DL (ref 0.66–1.25)
DIFFERENTIAL METHOD BLD: ABNORMAL
EOSINOPHIL # BLD AUTO: 0 10E9/L (ref 0–0.7)
EOSINOPHIL NFR BLD AUTO: 0 %
ERYTHROCYTE [DISTWIDTH] IN BLOOD BY AUTOMATED COUNT: 21.2 % (ref 10–15)
FLUAV+FLUBV AG SPEC QL: NEGATIVE
FLUAV+FLUBV AG SPEC QL: NEGATIVE
GFR SERPL CREATININE-BSD FRML MDRD: 67 ML/MIN/{1.73_M2}
GLUCOSE SERPL-MCNC: 84 MG/DL (ref 70–99)
GLUCOSE UR STRIP-MCNC: NEGATIVE MG/DL
HCT VFR BLD AUTO: 29.8 % (ref 40–53)
HGB BLD-MCNC: 9.5 G/DL (ref 13.3–17.7)
HGB UR QL STRIP: ABNORMAL
IMM GRANULOCYTES # BLD: 0 10E9/L (ref 0–0.4)
IMM GRANULOCYTES NFR BLD: 0.2 %
INTERPRETATION ECG - MUSE: NORMAL
KETONES UR STRIP-MCNC: NEGATIVE MG/DL
LACTATE BLD-SCNC: 1.6 MMOL/L (ref 0.7–2)
LDH SERPL L TO P-CCNC: 270 U/L (ref 85–227)
LEUKOCYTE ESTERASE UR QL STRIP: ABNORMAL
LYMPHOCYTES # BLD AUTO: 1 10E9/L (ref 0.8–5.3)
LYMPHOCYTES NFR BLD AUTO: 5.7 %
MCH RBC QN AUTO: 29.9 PG (ref 26.5–33)
MCHC RBC AUTO-ENTMCNC: 31.9 G/DL (ref 31.5–36.5)
MCV RBC AUTO: 94 FL (ref 78–100)
MONOCYTES # BLD AUTO: 0.6 10E9/L (ref 0–1.3)
MONOCYTES NFR BLD AUTO: 3.5 %
MUCOUS THREADS #/AREA URNS LPF: PRESENT /LPF
NEUTROPHILS # BLD AUTO: 15.9 10E9/L (ref 1.6–8.3)
NEUTROPHILS NFR BLD AUTO: 90.5 %
NITRATE UR QL: NEGATIVE
NRBC # BLD AUTO: 0 10*3/UL
NRBC BLD AUTO-RTO: 0 /100
PH UR STRIP: 5.5 PH (ref 5–7)
PLATELET # BLD AUTO: 43 10E9/L (ref 150–450)
POTASSIUM SERPL-SCNC: 4.6 MMOL/L (ref 3.4–5.3)
PROT SERPL-MCNC: 7.1 G/DL (ref 6.8–8.8)
RBC # BLD AUTO: 3.18 10E12/L (ref 4.4–5.9)
RBC #/AREA URNS AUTO: 4 /HPF (ref 0–2)
SODIUM SERPL-SCNC: 136 MMOL/L (ref 133–144)
SOURCE: ABNORMAL
SP GR UR STRIP: 1.02 (ref 1–1.03)
SPECIMEN SOURCE: NORMAL
TROPONIN I SERPL-MCNC: <0.015 UG/L (ref 0–0.04)
UROBILINOGEN UR STRIP-MCNC: 4 MG/DL (ref 0–2)
WBC # BLD AUTO: 17.6 10E9/L (ref 4–11)
WBC #/AREA URNS AUTO: 69 /HPF (ref 0–5)

## 2020-02-27 PROCEDURE — 81001 URINALYSIS AUTO W/SCOPE: CPT | Performed by: EMERGENCY MEDICINE

## 2020-02-27 PROCEDURE — 25000128 H RX IP 250 OP 636: Performed by: EMERGENCY MEDICINE

## 2020-02-27 PROCEDURE — 87186 SC STD MICRODIL/AGAR DIL: CPT | Performed by: EMERGENCY MEDICINE

## 2020-02-27 PROCEDURE — 87088 URINE BACTERIA CULTURE: CPT | Performed by: EMERGENCY MEDICINE

## 2020-02-27 PROCEDURE — 87086 URINE CULTURE/COLONY COUNT: CPT | Performed by: EMERGENCY MEDICINE

## 2020-02-27 PROCEDURE — 99223 1ST HOSP IP/OBS HIGH 75: CPT | Mod: AI | Performed by: INTERNAL MEDICINE

## 2020-02-27 PROCEDURE — 84484 ASSAY OF TROPONIN QUANT: CPT | Performed by: EMERGENCY MEDICINE

## 2020-02-27 PROCEDURE — 12000000 ZZH R&B MED SURG/OB

## 2020-02-27 PROCEDURE — 99291 CRITICAL CARE FIRST HOUR: CPT

## 2020-02-27 PROCEDURE — 25000132 ZZH RX MED GY IP 250 OP 250 PS 637: Mod: GY | Performed by: INTERNAL MEDICINE

## 2020-02-27 PROCEDURE — 96374 THER/PROPH/DIAG INJ IV PUSH: CPT

## 2020-02-27 PROCEDURE — 71046 X-RAY EXAM CHEST 2 VIEWS: CPT

## 2020-02-27 PROCEDURE — 83615 LACTATE (LD) (LDH) ENZYME: CPT | Performed by: EMERGENCY MEDICINE

## 2020-02-27 PROCEDURE — 83605 ASSAY OF LACTIC ACID: CPT | Performed by: EMERGENCY MEDICINE

## 2020-02-27 PROCEDURE — 84155 ASSAY OF PROTEIN SERUM: CPT | Performed by: EMERGENCY MEDICINE

## 2020-02-27 PROCEDURE — 80048 BASIC METABOLIC PNL TOTAL CA: CPT | Performed by: EMERGENCY MEDICINE

## 2020-02-27 PROCEDURE — 85025 COMPLETE CBC W/AUTO DIFF WBC: CPT | Performed by: EMERGENCY MEDICINE

## 2020-02-27 PROCEDURE — 87804 INFLUENZA ASSAY W/OPTIC: CPT | Performed by: EMERGENCY MEDICINE

## 2020-02-27 RX ORDER — MIRTAZAPINE 15 MG/1
15 TABLET, FILM COATED ORAL AT BEDTIME
Status: DISCONTINUED | OUTPATIENT
Start: 2020-02-27 | End: 2020-03-01 | Stop reason: HOSPADM

## 2020-02-27 RX ORDER — ACETAMINOPHEN 325 MG/1
650 TABLET ORAL 2 TIMES DAILY
Status: DISCONTINUED | OUTPATIENT
Start: 2020-02-27 | End: 2020-03-01 | Stop reason: HOSPADM

## 2020-02-27 RX ORDER — NALOXONE HYDROCHLORIDE 0.4 MG/ML
.1-.4 INJECTION, SOLUTION INTRAMUSCULAR; INTRAVENOUS; SUBCUTANEOUS
Status: DISCONTINUED | OUTPATIENT
Start: 2020-02-27 | End: 2020-03-01 | Stop reason: HOSPADM

## 2020-02-27 RX ORDER — ONDANSETRON 2 MG/ML
4 INJECTION INTRAMUSCULAR; INTRAVENOUS EVERY 6 HOURS PRN
Status: DISCONTINUED | OUTPATIENT
Start: 2020-02-27 | End: 2020-03-01 | Stop reason: HOSPADM

## 2020-02-27 RX ORDER — CEFTRIAXONE 1 G/1
1 INJECTION, POWDER, FOR SOLUTION INTRAMUSCULAR; INTRAVENOUS ONCE
Status: COMPLETED | OUTPATIENT
Start: 2020-02-27 | End: 2020-02-27

## 2020-02-27 RX ORDER — DIGOXIN 125 MCG
125 TABLET ORAL DAILY
Status: DISCONTINUED | OUTPATIENT
Start: 2020-02-28 | End: 2020-03-01 | Stop reason: HOSPADM

## 2020-02-27 RX ORDER — LIDOCAINE 40 MG/G
CREAM TOPICAL
Status: DISCONTINUED | OUTPATIENT
Start: 2020-02-27 | End: 2020-03-01 | Stop reason: HOSPADM

## 2020-02-27 RX ORDER — ONDANSETRON 4 MG/1
4 TABLET, ORALLY DISINTEGRATING ORAL EVERY 6 HOURS PRN
Status: DISCONTINUED | OUTPATIENT
Start: 2020-02-27 | End: 2020-03-01 | Stop reason: HOSPADM

## 2020-02-27 RX ORDER — CEFTRIAXONE 1 G/1
1 INJECTION, POWDER, FOR SOLUTION INTRAMUSCULAR; INTRAVENOUS EVERY 24 HOURS
Status: DISCONTINUED | OUTPATIENT
Start: 2020-02-28 | End: 2020-02-28

## 2020-02-27 RX ORDER — LIDOCAINE HYDROCHLORIDE 20 MG/ML
10 JELLY TOPICAL ONCE
Status: DISCONTINUED | OUTPATIENT
Start: 2020-02-27 | End: 2020-03-01 | Stop reason: HOSPADM

## 2020-02-27 RX ORDER — MULTIVIT-MIN/IRON/FOLIC ACID/K 18-600-40
3000 CAPSULE ORAL DAILY
Status: DISCONTINUED | OUTPATIENT
Start: 2020-02-27 | End: 2020-02-27

## 2020-02-27 RX ORDER — VIT C/E/ZN/COPPR/LUTEIN/ZEAXAN 60 MG-6 MG
CAPSULE ORAL DAILY
Status: DISCONTINUED | OUTPATIENT
Start: 2020-02-28 | End: 2020-03-01 | Stop reason: HOSPADM

## 2020-02-27 RX ORDER — METOPROLOL SUCCINATE 50 MG/1
50 TABLET, EXTENDED RELEASE ORAL 2 TIMES DAILY
Status: DISCONTINUED | OUTPATIENT
Start: 2020-02-27 | End: 2020-02-29

## 2020-02-27 RX ORDER — ACETAMINOPHEN 325 MG/1
650 TABLET ORAL EVERY 4 HOURS PRN
Status: DISCONTINUED | OUTPATIENT
Start: 2020-02-27 | End: 2020-03-01 | Stop reason: HOSPADM

## 2020-02-27 RX ORDER — MULTIVITAMIN,THERAPEUTIC
1 TABLET ORAL DAILY
COMMUNITY

## 2020-02-27 RX ORDER — MULTIVITAMIN,THERAPEUTIC
1 TABLET ORAL DAILY
Status: DISCONTINUED | OUTPATIENT
Start: 2020-02-28 | End: 2020-03-01 | Stop reason: HOSPADM

## 2020-02-27 RX ADMIN — METOPROLOL SUCCINATE 50 MG: 50 TABLET, EXTENDED RELEASE ORAL at 20:45

## 2020-02-27 RX ADMIN — CEFTRIAXONE 1 G: 1 INJECTION, POWDER, FOR SOLUTION INTRAMUSCULAR; INTRAVENOUS at 15:06

## 2020-02-27 RX ADMIN — MIRTAZAPINE 15 MG: 15 TABLET, FILM COATED ORAL at 21:16

## 2020-02-27 ASSESSMENT — ENCOUNTER SYMPTOMS
WEAKNESS: 1
FEVER: 0
ROS GI COMMENTS: NO CHANGES IN BOWEL MOVEMENTS
SHORTNESS OF BREATH: 0
COUGH: 0
RHINORRHEA: 0

## 2020-02-27 ASSESSMENT — ACTIVITIES OF DAILY LIVING (ADL): ADLS_ACUITY_SCORE: 22

## 2020-02-27 ASSESSMENT — MIFFLIN-ST. JEOR: SCORE: 1428.76

## 2020-02-27 NOTE — ED PROVIDER NOTES
History     Chief Complaint:  Generalized Weakness    HPI   Jama Paul is a 89 year old male with a history of CKD, CHF, atrial fibrillation, hypertension, among others, who presents with generalized weakness. The patient reports that he was hospitalized in early January for a CHF exacerbation and since his discharge has been doing overall well and feeling great and even participating in cardiac rehab. The patient felt well yesterday but this morning awoke with acute weakness to the point that he could hardly get out of bed. The patient denies any other symptoms including shortness of breath, fever, runny nose, cough, congestion, changes in bowel movements, urinary symptoms, or any neurologic symptoms.     Allergies:  No known drug allergies.       Medications:    Lanoxin   Melatonin   Toprol XL   Remeron   Spironolactone   Demadex    Past Medical History:    Congestive heart failure   Elevated PSA   Eye hemorrhage, left   Ulcer of right lower extremity   Tricuspid regurgitation   Pleural effusion   Insomnia   CKD   Anemia   Non-ischemic cardiomyopathy   Permanent atrial fibrillation   Colon polyps  Nonrheumatic mitral (valve) insufficiency  Basal cell carcinoma   Cervical radiculopathy   Thrombocytopenia    hypertension     Past Surgical History:    Bowel obstructions   Bone marrow biopsy   Carpal tunnel release  CV heart catherization with possibly intervention   CV left ventrilogram  EGD  Explore groin   MOHS   phacoemulsification clear cornea with standard intraocular lens implant   Hernia repair inguinal     Family History:    Family history reviewed. No pertinent family history.     Social History:  The patient was accompanied to the ED by EMS.  Smoking Status: Never Smoker  Smokeless Tobacco: Never Used  Alcohol Use: Negative   Drug Use: Negative  PCP: Mariusz Shields   Marital Status:        Review of Systems   Constitutional: Negative for fever.   HENT: Negative for congestion and  rhinorrhea.    Respiratory: Negative for cough and shortness of breath.    Gastrointestinal:        No changes in bowel movements   Genitourinary:        No urinary symptoms   Neurological: Positive for weakness.        No other neurologic symptoms   All other systems reviewed and are negative.     Physical Exam     Patient Vitals for the past 24 hrs:   BP Temp Temp src Pulse Heart Rate Resp SpO2 Height Weight   02/27/20 1400 116/72 -- -- 79 84 22 96 % -- --   02/27/20 1330 116/67 -- -- 80 81 11 (!) 89 % -- --   02/27/20 1315 118/87 -- -- -- 84 23 95 % -- --   02/27/20 1204 137/74 98.4  F (36.9  C) Oral 94 -- 16 95 % 1.829 m (6') 72.6 kg (160 lb)      Physical Exam  General: Very weak appearing elderly man resting supine in no other distress.      Eye:  Pupils are equal, round, and reactive.  Extraocular movements intact.    ENT:  No rhinorrhea.  Moist mucus membranes.  Normal tongue and tonsil.    Cardiac:  Regular rate and rhythm.  No murmurs, gallops, or rubs.    Pulmonary:  Clear to auscultation bilaterally.  No wheezes, rales, or rhonchi.    Abdomen:  Positive bowel sounds.  Abdomen is soft and non-distended, without focal tenderness.    Musculoskeletal:  Normal movement of all extremities without evidence for deficit.    Skin:  Warm and dry without rashes.    Neurologic:  Non-focal exam without asymmetric weakness or numbness.     Psychiatric:  Normal affect with appropriate interaction with examiner.      Emergency Department Course     Imaging:  Radiology findings were communicated with the patient who voiced understanding of the findings.    Chest XR,  PA & LAT  Cardiomegaly and bilateral pleural effusions, right  greater than left.   FRANCISCO J VIDAL MD  Reading per radiology      Laboratory:  Laboratory findings were communicated with the patient who voiced understanding of the findings.    Influenza A/B antigen: Negative     UA: Blood trace (A), Protein Albumin 10 (A), Urobilinogen 4.0 (H), Leukocyte  "Esterase large (A), WBC 69 (H), RBC 4 (H), Mucous present (A), o/w WNL.   Urine Culture: Pending     CBC: WBC 17.6 (H), HGB 9.5 (L), PLT 43 (LL)  BMP: Calcium 8.4 (L), o/w WNL (Creatinine 0.99)    Lactic Acid (Resulted: 1222): 1.6     Interventions:  1506 Rocephin 1 g IV     Emergency Department Course:    1207 IV was inserted and blood was drawn for laboratory testing, results above.     1245 Nursing notes and vitals reviewed. I performed an exam of the patient as documented above.     1247 The patient was sent for a XR while in the emergency department, results above.      1313 A catheter was inserted and urine sample was obtained. This was sent to laboratory for testing, results above. A nasal swab sample was obtained for laboratory testing as documented above.     1543 I spoke with Dr. Lewis of the hospitalist service from [facility] regarding patient's presentation, findings, and plan of care.      Prior to admission, I personally reviewed the results with the patient and all related questions were answered. The patient verbalized understanding and is amenable to plan.     Impression & Plan      Medical Decision Making:  Jama Paul is a 89 year old male who presents to the emergency department today for evaluation of weakness that began today.  This gentleman has a recent admission for large pleural effusions which were drained.  He has gone through transitional care and has been feeling improved, recently moved back into assisted living with his wife.  However, he describes waking this morning feeling too weak to get out of bed.  It a very challenging time getting dressed and getting himself here.  He denies any associated shortness of breath or fever.  He denies a cough, vomiting, diarrhea, or dysuria.  Again, he simply states that \"I am weak all over.\"    On exam, he is a delightful gentleman of clear mind.  He provides an excellent history.  His physical exam is surprisingly unremarkable.  " His vital signs are normal.  I do feel as though he is slightly tachypneic though he denies feeling overtly short of breath.  He shows no focal neurologic deficits but is very weak.    Laboratory investigation was pursued, showing a new elevation of his white blood cell count of 17,000.  His electrolytes are reassuring.  His lactate is normal.  His flu is negative.  His urinalysis does show significant white blood cells and leukocyte esterase and I have to wonder if there is some element of a urinary tract infection here.  Chest x-ray is also concerning and that it appears as though his entire pleural effusion has returned.  There could be a superimposed pneumonia, though he denies any cough or increasing shortness of breath.    With this, the patient is too weak to go home.  He needs further work-up.  I did culture his urine and have started antibiotics that will cover for both pulmonary and urologic sources.  I spoke with Dr. Lewis of the hospitalist service who admit the patient under inpatient status for further work-up.    Diagnosis:    ICD-10-CM    1. Generalized muscle weakness M62.81 Urine Culture Aerobic Bacterial   2. Acute cystitis without hematuria N30.00    3. Recurrent pleural effusion on right J90      Disposition:   The patient is admitted into the care of Dr. Lewis.     Scribe Disclosure:  I, Orla Severson, am serving as a scribe at 12:30 PM on 2/27/2020 to document services personally performed by Trierweiler, Chad A, MD based on my observations and the provider's statements to me.    EMERGENCY DEPARTMENT       Trierweiler, Chad A, MD  02/27/20 1932

## 2020-02-27 NOTE — ED NOTES
[facility]  ED Nurse Handoff Report    ED Chief complaint: Generalized Weakness      ED Diagnosis:   Final diagnoses:   Generalized muscle weakness   Acute cystitis without hematuria   Recurrent pleural effusion on right       Code Status: To be discussed on admit      Allergies: No Known Allergies    Patient Story: Patient states he just moved back to his independent apartment from TCU yesterday and was so weak this morning it took him all morning to get dressed.   Focused Assessment:    Neuro: WDL   Cards: WDL   Pulm: WDL   GI: WDL   : Incontinent   Skin: WDL   Generalized weakness in bilateral legs    Treatments and/or interventions provided: Antibiotics   Patient's response to treatments and/or interventions: none    To be done/followed up on inpatient unit:  None    Does this patient have any cognitive concerns?: No    Activity level - Baseline/Home:  Independent and Walker  Activity Level - Current:   Unknown    Patient's Preferred language: English   Needed?: No    Isolation: None  Infection: Not Applicable  Bariatric?: No    Vital Signs:   Vitals:    02/27/20 1204 02/27/20 1315 02/27/20 1330 02/27/20 1400   BP: 137/74 118/87 116/67 116/72   Pulse: 94  80 79   Resp: 16 23 11 22   Temp: 98.4  F (36.9  C)      TempSrc: Oral      SpO2: 95% 95% (!) 89% 96%   Weight: 72.6 kg (160 lb)      Height: 1.829 m (6')          Cardiac Rhythm:     Was the PSS-3 completed:   Yes  What interventions are required if any?               Family Comments: wife, daughter, son at bedside  OBS brochure/video discussed/provided to patient/family: N/A              Name of person given brochure if not patient:               Relationship to patient:     For the majority of the shift this patient's behavior was Green.   Behavioral interventions performed were none.    ED NURSE PHONE NUMBER: *[phone]

## 2020-02-27 NOTE — PHARMACY-ADMISSION MEDICATION HISTORY
Pharmacy Medication History  Admission medication history interview status for the 2/27/2020  admission is complete. See EPIC admission navigator for prior to admission medications     Medication history sources: Patient, Surescripts and Care Everywhere  Medication history source reliability: Moderate  Adherence assessment: Moderate    Significant changes made to the medication list:  Deleted torsemide, pt says he is not taking. There is a fill for 7 tablets earlier this month, but this would be done now.      Additional medication history information:   Pt states he takes metoprolol once a day in the morning, but it is prescribed bid as of 2/7/2020.    Medication reconciliation completed by provider prior to medication history? No    Time spent in this activity: 15 minutes      Prior to Admission medications    Medication Sig Last Dose Taking? Auth Provider   digoxin (LANOXIN) 125 MCG tablet Take 1 tablet (125 mcg) by mouth daily  Patient taking differently: Take 125 mcg by mouth daily HOLD if HR <55 2/27/2020 at am Yes Pato Benedict MD   melatonin 3 MG tablet Take 3 mg by mouth At Bedtime After 9pm. Please use overnight depends on pt to allow him to sleep without interruption during the night 2/26/2020 at pm Yes Unknown, Entered By History   metoprolol succinate ER (TOPROL-XL) 50 MG 24 hr tablet Take 1 tablet (50 mg) by mouth 2 times daily HOLD if SBP <110 OR HR < 55. Call if held x 2 in a row symptomatic 2/27/2020 at am Yes [facility] MD   mirtazapine (REMERON) 15 MG tablet Take 15 mg by mouth At Bedtime Patient started medication for the first time yesterday 10/29/2019.  2/26/2020 at pm Yes Unknown, Entered By History   Multiple Vitamins-Minerals (ICAPS AREDS FORMULA PO) Take 1 tablet by mouth daily Takes in addition to multivitamin with minerals 2/27/2020 at am Yes Unknown, Entered By History   multivitamin, therapeutic (THERA-VIT) TABS tablet Take 1 tablet by mouth daily 2/27/2020 at am Yes Unknown,  Entered By History   spironolactone (ALDACTONE) 25 MG tablet Take 1 tablet (25 mg) by mouth daily 2/27/2020 at am Yes Jazlyn Cr PA-C   Vitamin D, Cholecalciferol, 1000 UNITS TABS Take 3,000 Units by mouth daily  2/27/2020 at am Yes Reported, Patient   acetaminophen (TYLENOL) 325 MG tablet Take 650 mg by mouth 2 times daily every 6 hours as needed for Pain prn  Reported, Patient   OTHER MEDICAL SUPPLIES CORE PROTOCOL ; DAILY WEIGHTS IN AM.   Reported, Patient

## 2020-02-27 NOTE — ED NOTES
Bed: ED05  Expected date:   Expected time:   Means of arrival:   Comments:  tab - 531 - 89 M weakness fever eta 1152

## 2020-02-27 NOTE — ED NOTES
DATE:  2/27/2020   TIME OF RECEIPT FROM LAB:  12:47 PM  LAB TEST:  Plt  LAB VALUE:  43  RESULTS GIVEN WITH READ-BACK TO (PROVIDER):  Trierweiler, Chad A, MD  TIME LAB VALUE REPORTED TO PROVIDER:   12:47 PM

## 2020-02-27 NOTE — PROGRESS NOTES
RECEIVING UNIT ED HANDOFF REVIEW    ED Nurse Handoff Report was reviewed by: Rose Zelaya RN on February 27, 2020 at 4:49 PM

## 2020-02-27 NOTE — ED TRIAGE NOTES
"Patient reports he was too weak this morning to \"do anything.\" stated it took him all morning to get dressed and it was all the energy he had.   "

## 2020-02-28 ENCOUNTER — APPOINTMENT (OUTPATIENT)
Dept: ULTRASOUND IMAGING | Facility: CLINIC | Age: 85
DRG: 292 | End: 2020-02-28
Attending: INTERNAL MEDICINE
Payer: MEDICARE

## 2020-02-28 LAB
ANION GAP SERPL CALCULATED.3IONS-SCNC: 5 MMOL/L (ref 3–14)
APPEARANCE FLD: NORMAL
BUN SERPL-MCNC: 21 MG/DL (ref 7–30)
CALCIUM SERPL-MCNC: 8.1 MG/DL (ref 8.5–10.1)
CHLORIDE SERPL-SCNC: 105 MMOL/L (ref 94–109)
CO2 SERPL-SCNC: 25 MMOL/L (ref 20–32)
COLOR FLD: NORMAL
CREAT SERPL-MCNC: 1.04 MG/DL (ref 0.66–1.25)
ERYTHROCYTE [DISTWIDTH] IN BLOOD BY AUTOMATED COUNT: 21.5 % (ref 10–15)
GFR SERPL CREATININE-BSD FRML MDRD: 63 ML/MIN/{1.73_M2}
GLUCOSE FLD-MCNC: 79 MG/DL
GLUCOSE SERPL-MCNC: 84 MG/DL (ref 70–99)
GRAM STN SPEC: NORMAL
GRAM STN SPEC: NORMAL
HCT VFR BLD AUTO: 27 % (ref 40–53)
HGB BLD-MCNC: 8.5 G/DL (ref 13.3–17.7)
INR PPP: 1.74 (ref 0.86–1.14)
LDH FLD L TO P-CCNC: 105 U/L
LYMPHOCYTES NFR FLD MANUAL: 90 %
MCH RBC QN AUTO: 29.2 PG (ref 26.5–33)
MCHC RBC AUTO-ENTMCNC: 31.5 G/DL (ref 31.5–36.5)
MCV RBC AUTO: 93 FL (ref 78–100)
MONOS+MACROS NFR FLD MANUAL: 3 %
NEUTS BAND NFR FLD MANUAL: 5 %
OTHER CELLS FLD MANUAL: 2 %
PH FLD: 8 PH
PLATELET # BLD AUTO: 39 10E9/L (ref 150–450)
PLATELET # BLD AUTO: 42 10E9/L (ref 150–450)
POTASSIUM SERPL-SCNC: 4.3 MMOL/L (ref 3.4–5.3)
PROCALCITONIN SERPL-MCNC: 0.17 NG/ML
PROT FLD-MCNC: 2.6 G/DL
RBC # BLD AUTO: 2.91 10E12/L (ref 4.4–5.9)
SODIUM SERPL-SCNC: 135 MMOL/L (ref 133–144)
SPECIMEN SOURCE FLD: NORMAL
SPECIMEN SOURCE: NORMAL
WBC # BLD AUTO: 11.1 10E9/L (ref 4–11)
WBC # FLD AUTO: 269 /UL

## 2020-02-28 PROCEDURE — 80048 BASIC METABOLIC PNL TOTAL CA: CPT | Performed by: INTERNAL MEDICINE

## 2020-02-28 PROCEDURE — 32555 ASPIRATE PLEURA W/ IMAGING: CPT

## 2020-02-28 PROCEDURE — 88305 TISSUE EXAM BY PATHOLOGIST: CPT | Performed by: INTERNAL MEDICINE

## 2020-02-28 PROCEDURE — 25000132 ZZH RX MED GY IP 250 OP 250 PS 637: Mod: GY | Performed by: INTERNAL MEDICINE

## 2020-02-28 PROCEDURE — 89051 BODY FLUID CELL COUNT: CPT | Performed by: INTERNAL MEDICINE

## 2020-02-28 PROCEDURE — 88112 CYTOPATH CELL ENHANCE TECH: CPT | Performed by: INTERNAL MEDICINE

## 2020-02-28 PROCEDURE — 85049 AUTOMATED PLATELET COUNT: CPT | Performed by: INTERNAL MEDICINE

## 2020-02-28 PROCEDURE — 85610 PROTHROMBIN TIME: CPT | Performed by: PHYSICIAN ASSISTANT

## 2020-02-28 PROCEDURE — 00000155 ZZHCL STATISTIC H-CELL BLOCK W/STAIN: Performed by: INTERNAL MEDICINE

## 2020-02-28 PROCEDURE — 12000000 ZZH R&B MED SURG/OB

## 2020-02-28 PROCEDURE — 0W993ZZ DRAINAGE OF RIGHT PLEURAL CAVITY, PERCUTANEOUS APPROACH: ICD-10-PCS | Performed by: RADIOLOGY

## 2020-02-28 PROCEDURE — 82945 GLUCOSE OTHER FLUID: CPT | Performed by: INTERNAL MEDICINE

## 2020-02-28 PROCEDURE — 88305 TISSUE EXAM BY PATHOLOGIST: CPT | Mod: 26 | Performed by: INTERNAL MEDICINE

## 2020-02-28 PROCEDURE — 00000102 ZZHCL STATISTIC CYTO WRIGHT STAIN TC: Performed by: INTERNAL MEDICINE

## 2020-02-28 PROCEDURE — 25000128 H RX IP 250 OP 636: Performed by: INTERNAL MEDICINE

## 2020-02-28 PROCEDURE — 25000125 ZZHC RX 250: Performed by: RADIOLOGY

## 2020-02-28 PROCEDURE — 87070 CULTURE OTHR SPECIMN AEROBIC: CPT | Performed by: INTERNAL MEDICINE

## 2020-02-28 PROCEDURE — 40000863 ZZH STATISTIC RADIOLOGY XRAY, US, CT, MAR, NM

## 2020-02-28 PROCEDURE — 36415 COLL VENOUS BLD VENIPUNCTURE: CPT | Performed by: PHYSICIAN ASSISTANT

## 2020-02-28 PROCEDURE — 84157 ASSAY OF PROTEIN OTHER: CPT | Performed by: INTERNAL MEDICINE

## 2020-02-28 PROCEDURE — 83986 ASSAY PH BODY FLUID NOS: CPT | Performed by: INTERNAL MEDICINE

## 2020-02-28 PROCEDURE — 85027 COMPLETE CBC AUTOMATED: CPT | Performed by: INTERNAL MEDICINE

## 2020-02-28 PROCEDURE — 88112 CYTOPATH CELL ENHANCE TECH: CPT | Mod: 26 | Performed by: INTERNAL MEDICINE

## 2020-02-28 PROCEDURE — 83615 LACTATE (LD) (LDH) ENZYME: CPT | Performed by: INTERNAL MEDICINE

## 2020-02-28 PROCEDURE — 84145 PROCALCITONIN (PCT): CPT | Performed by: INTERNAL MEDICINE

## 2020-02-28 PROCEDURE — 99233 SBSQ HOSP IP/OBS HIGH 50: CPT | Performed by: INTERNAL MEDICINE

## 2020-02-28 PROCEDURE — 36415 COLL VENOUS BLD VENIPUNCTURE: CPT | Performed by: INTERNAL MEDICINE

## 2020-02-28 PROCEDURE — 87205 SMEAR GRAM STAIN: CPT | Performed by: INTERNAL MEDICINE

## 2020-02-28 RX ORDER — DEXTROSE MONOHYDRATE 25 G/50ML
25-50 INJECTION, SOLUTION INTRAVENOUS
Status: DISCONTINUED | OUTPATIENT
Start: 2020-02-28 | End: 2020-03-01 | Stop reason: HOSPADM

## 2020-02-28 RX ORDER — LIDOCAINE HYDROCHLORIDE 10 MG/ML
10 INJECTION, SOLUTION EPIDURAL; INFILTRATION; INTRACAUDAL; PERINEURAL ONCE
Status: COMPLETED | OUTPATIENT
Start: 2020-02-28 | End: 2020-02-28

## 2020-02-28 RX ORDER — NICOTINE POLACRILEX 4 MG
15-30 LOZENGE BUCCAL
Status: DISCONTINUED | OUTPATIENT
Start: 2020-02-28 | End: 2020-03-01 | Stop reason: HOSPADM

## 2020-02-28 RX ORDER — LEVOFLOXACIN 5 MG/ML
500 INJECTION, SOLUTION INTRAVENOUS EVERY 24 HOURS
Status: DISCONTINUED | OUTPATIENT
Start: 2020-02-28 | End: 2020-02-29

## 2020-02-28 RX ADMIN — LIDOCAINE HYDROCHLORIDE 10 ML: 10 INJECTION, SOLUTION EPIDURAL; INFILTRATION; INTRACAUDAL; PERINEURAL at 13:33

## 2020-02-28 RX ADMIN — MIRTAZAPINE 15 MG: 15 TABLET, FILM COATED ORAL at 21:54

## 2020-02-28 RX ADMIN — THERA TABS 1 TABLET: TAB at 08:56

## 2020-02-28 RX ADMIN — METOPROLOL SUCCINATE 50 MG: 50 TABLET, EXTENDED RELEASE ORAL at 08:56

## 2020-02-28 RX ADMIN — DIGOXIN 125 MCG: 0.12 TABLET ORAL at 08:57

## 2020-02-28 RX ADMIN — ACETAMINOPHEN 650 MG: 325 TABLET, FILM COATED ORAL at 08:57

## 2020-02-28 RX ADMIN — LEVOFLOXACIN 500 MG: 5 INJECTION, SOLUTION INTRAVENOUS at 14:14

## 2020-02-28 RX ADMIN — Medication 1 CAPSULE: at 08:56

## 2020-02-28 RX ADMIN — METOPROLOL SUCCINATE 50 MG: 50 TABLET, EXTENDED RELEASE ORAL at 21:54

## 2020-02-28 RX ADMIN — Medication 1 MG: at 21:54

## 2020-02-28 RX ADMIN — MELATONIN 3000 UNITS: at 08:56

## 2020-02-28 ASSESSMENT — ACTIVITIES OF DAILY LIVING (ADL)
ADLS_ACUITY_SCORE: 22

## 2020-02-28 ASSESSMENT — MIFFLIN-ST. JEOR: SCORE: 1420.14

## 2020-02-28 NOTE — PROVIDER NOTIFICATION
MD Notification    Notified Person: MD    Notified Person Name: Dr Marie    Notification Date/Time:2/28/20 @1122    Notification Interaction: web paged    Purpose of Notification:platelet 39    Orders Received: not at this time    Comments:

## 2020-02-28 NOTE — PROGRESS NOTES
Clinic Care Coordination Contact    Situation: Patient chart reviewed by care coordinator.    Background: Due for CC outreach.    Assessment: Pt was admitted to [facility] 2/27/2020 for generalized weakness.    Plan/Recommendations: CC SW will follow up with pt upon discharge to discuss overall wellbeing and any medical concerns/needs.    JASMINA Martinez, [facility]  Clinic Care Coordinator  [facility]  [phone]  [email or web address]

## 2020-02-28 NOTE — PROGRESS NOTES
Admission    Patient arrives to room 628 via cart from ED.  Care plan note: see note    Inpatient nursing criteria listed below were met:    PCD's Documented: Yes  Skin issues/needs documented :Yes  Isolation education started/completed NA  Patient allergies verified with patient: Yes  Verified completion of [place] Risk Assessment Tool:  Yes  Verified completion of Guardianship screening tool: Yes  Fall Prevention: Care plan updated, Education given and documented Yes  Care Plan initiated: Yes  Home medications documented in belongings flowsheet: NA  Patient belongings documented in belongings flowsheet: Yes  Reminder note (belongings/ medications) placed in discharge instructions:NA  Admission profile/ required documentation complete: Yes  Bedside Report Letter given and explained to patient Yes

## 2020-02-28 NOTE — PROGRESS NOTES
OT: Orders received, chart reviewed. Noting plan for US thoracentesis d/t recurrent pleural effusions. Plan to hold OT until after procedure to allow for improved activity tolerance and accurate assessment of function/potential rehab needs. Will continue to follow.

## 2020-02-28 NOTE — H&P
Admitted:     02/27/2020      PRIMARY CARE PHYSICIAN:  Mariusz Shields MD      CHIEF COMPLAINT:  Generalized weakness.      HISTORY OF PRESENT ILLNESS:  Jama Paul is a very pleasant 89-year-old  with history of chronic kidney disease, heart failure, AFib and hypertension, who was recently admitted to [facility] from 01/11 through 01/15 with acute on chronic diastolic heart failure and mild left pleural effusion, who underwent bilateral thoracentesis, who now presents to [facility] with generalized weakness.      The patient has been at [facility] and has been doing reasonably well.  The patient had a normal day yesterday but woke up this morning with profound generalized weakness.  Denies any chest pain or shortness of breath.  The patient has been working with physical therapy well.  He uses a walker.  Denies any fevers, chills, sweats, runny nose, cough, congestion or urinary hesitancy, urgency or frequency.  The patient, however, has urinary incontinence.  Denies any focal neurologic complaints.  The patient was brought to [facility] for further assessment.      In the emergency room, the patient was seen by Dr. Chad Trierweiler.  The patient was afebrile with stable vital signs, normal oxygen saturations.  Blood work revealed normal electrolytes, normal kidney function.  Lactic acid was 1.7.  LDH was elevated at 270.  Troponin is negative.  Calcium 8.4.  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  Urinalysis from a catheterized specimen was abnormal.  He had 69 white cells, 4 red cells.  Influenza A and B were negative.  A 2-view chest x-ray showed cardiomegaly, bilateral pleural effusions, right greater than left, as like he had previously.  A 12-lead ECG shows underlying AFib with a heart rate of 103.  The patient was given IV  ceftriaxone for possible UTI and is being admitted for further treatment.      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      PAST SURGICAL HISTORY:  Bowel obstruction, bone marrow biopsy, carpal tunnel release, left heart catheterization, EGD, groin exploration, Mohs surgery, cataract surgery and inguinal hernia repair.      FAMILY HISTORY:  Reviewed and negative.      SOCIAL HISTORY:  No tobacco, no alcohol.  He is  and lives with his wife in [facility].  He is full code.      ALLERGIES:  NO KNOWN DRUG ALLERGIES.      CURRENT MEDICATIONS:  Include:   1.  Tylenol.   2.  Digoxin.   3.  Melatonin.   4.  Metoprolol extended release.   5.  Remeron.   6.  Multivitamin.   7.  Spironolactone.   8.  Vitamin D.      REVIEW OF SYSTEMS:  A 10-point review of systems was reviewed and as dictated in history of present illness.  The patient denies any fevers, cough, congestion, runny nose or shortness of breath.  Denies any diarrhea or urinary symptoms, but has chronic urinary incontinence.  Denies any focal neurologic complaints, but complains of diffuse weakness.  Otherwise, 10-point systems reviewed.      PHYSICAL EXAMINATION:   VITAL SIGNS:  Temperature 98.9, heart rate 86, respirations 18, blood pressure 121/66, sats 95% on room air.   GENERAL:  The patient is a very pleasant 89-year-old  gentleman who appears younger than stated age.   HEENT:  Pupils equal.  Sclerae are anicteric.  Oropharynx without lesions.  Mucous membranes are moist.   NECK:  No JVP elevation.   LUNGS:  He has diminished breath sounds at the bases  bilaterally.   CARDIOVASCULAR:  S1, S2 irregularly irregular, borderline tachycardic.   ABDOMEN:  Soft, nontender, normoactive bowel sounds.   MUSCULOSKELETAL:  No edema.   NEUROLOGIC:  The patient is grossly nonfocal.  Cranial nerves grossly intact.   SKIN:  Warm, dry, well perfused.   PSYCHIATRIC:  The patient is stable.      LABORATORY DATA:  As dictated in history of present illness.      ASSESSMENT:  Yinka Gonzalez comes in with weakness and a suspected UTI, who also has recurrent bilateral pleural effusion, being admitted for further treatment.      PLAN:   1.  Suspected urinary tract infection.  The patient has elevated white count in his urine.  The patient will be treated with IV ceftriaxone.  Blood and urine cultures have been obtained.  He has no CVA tenderness or abdominal pain.  He is normally urinary incontinent.   2.  Recurrent bilateral pleural effusions.  The patient denies any kind of chest pain.  On his last admission, the patient had over 2 liters taken out from his right lung and 0.5 liter from his left lung.  We will get an ultrasound thoracentesis of his right lung for therapeutic purposes.   3.  Atrial fibrillation.  The patient will be continued on digoxin.  He is not on any blood thinning medications.  His heart rate is overall controlled.  We will continue the patient on metoprolol as well.   4.  History of congestive heart failure.  We will hold the patient's spironolactone in the setting of anticipated thoracentesis.   5.  Chronic kidney disease.  We will monitor his I's and O's and his creatinine daily.   6.  Deconditioning.  The patient was seen by PT, OT.      CODE STATUS:  Full.         MARIUSZ JAMES MD             D: 2020   T: 2020   MT: CAMILLE      Name:     YINKA GONZALEZ   MRN:      [id number]        Account:      [id number]   :      1931        Admitted:     2020                   Document: [id number]       cc: Mariusz Shields MD

## 2020-02-28 NOTE — PLAN OF CARE
PT: Orders received, chart reviewed. Noting plan for US thoracentesis d/t recurrent pleural effusions. Plan to hold PT until after procedure to allow for improved activity tolerance and accurate assessment of mobility/potential rehab needs. Will continue to follow.

## 2020-02-28 NOTE — PLAN OF CARE
DATE & TIME: 2/27/20 1810-4219                Cognitive Concerns/ Orientation : A&Ox3-4, can be disoriented to time   BEHAVIOR & AGGRESSION TOOL COLOR: Green  CIWA SCORE: NA    ABNL VS/O2: VSS on room air  MOBILITY: Ax1 with gait and walker  PAIN MANAGMENT: Denied, refused scheduled tylenol  DIET: Regular  BOWEL/BLADDER: Incontinent x1 of urine.   ABNL LAB/BG: WBC 17.6, WBC in urine  DRAIN/DEVICES: R PIV SL  TELEMETRY RHYTHM:   SKIN: bruised, scab, red bottom and mepilex applied  TESTS/PROCEDURES: Chest xray showed pleural effusion with R greater than left, Urine cultures sent, high WBC amount and final results pending.    D/C DAY/GOALS/PLACE: Pending results and improvement   OTHER IMPORTANT INFO: Possible penumonia or UTI. Will start antibiotics tomorrow.

## 2020-02-28 NOTE — PROGRESS NOTES
[facility]    Hospitalist Progress Note    Assessment & Plan   Jama Paul is a 89 year old male who was admitted on 2/27/2020.  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    Weakness  UTI   --- Secondary to gram-negative rods which is non-lactose , possibly Proteus versus Pseudomonas.  ---Changed antibiotics to Levaquin, blood cultures are not done on admission .  ---PT/OT to evaluate patient stop  Recurrent bilateral pleural effusions  ---Plan for thoracentesis today of the right lung for therapeutic purposes possibly secondary to CHF  Atrial fibrillation  --Continue on digoxin he is not on any anticoagulation, continue his metoprolol  CHF  --Hold spironolactone for now, restart once his fluid status is improved  Chronic kidney disease stage 3  --Monitor creatinine  Generalized deconditioning  --Might need TCU briefly, he lives in assisted living with his wife, he is a retired ophthalmologist by profession.  DVT Prophylaxis: SCDs  Code Status: Full Code     Disposition: Expected discharge in 1 to 2 days to TCU versus home    Nemo Marie MD  Text Page   (7am to 6pm)    Interval History   Patient is still feeling tired, better than yesterday, denies any nausea, vomiting, denies any recent urinary infection, denies any dysuria, urine cultures are pending, we discussed about PT/OT evaluation.    -Data reviewed today: I reviewed all new labs and imaging results over the last 24 hours.     Physical Exam   Temp: 97.4  F (36.3  C) Temp src: Oral BP: 92/56 Pulse: 94 Heart Rate: 59 Resp: 18 SpO2: 100 % O2 Device: None (Room air)    Vitals:    02/27/20 1204 02/28/20 0600   Weight: 72.6 kg (160 lb) 71.7 kg (158 lb 1.6 oz)     Vital Signs with Ranges  Temp:  [97.4  F (36.3  C)-98.9  F (37.2  C)] 97.4  F (36.3  C)  Pulse:  [86-94] 94  Heart Rate:  [59-94] 59  Resp:  [18-52] 18  BP: ()/(54-72) 92/56  SpO2:  [92 %-100 %] 100 %  No intake/output data  recorded.    Constitutional:Awake, alert, cooperative, no apparent distress  Respiratory: bilateral basilar crackles noted  Cardiovascular: Regular rate and rhythm, normal S1 and S2, and no murmur noted  GI: Normal bowel sounds, soft, non-distended, non-tender  Skin/Integumen: No rashes, no cyanosis, no edema  Neuro : moving all 4 extremities, no focal deficit noted     Medications     - MEDICATION INSTRUCTIONS -       - MEDICATION INSTRUCTIONS -         acetaminophen  650 mg Oral BID     digoxin  125 mcg Oral Daily     levofloxacin  500 mg Intravenous Q24H     lidocaine  10 mL Urethral Once     metoprolol succinate ER  50 mg Oral BID     mirtazapine  15 mg Oral At Bedtime     multivitamin  with lutein   Oral Daily     multivitamin, therapeutic  1 tablet Oral Daily     sodium chloride (PF)  3 mL Intracatheter Q8H     Vitamin D3  3,000 Units Oral Daily       Data   Recent Labs   Lab 02/28/20  1042 02/28/20  1002 02/28/20  0842 02/27/20  1207   WBC  --   --  11.1* 17.6*   HGB  --   --  8.5* 9.5*   MCV  --   --  93 94   PLT 39*  --  42* 43*   INR  --  1.74*  --   --    NA  --   --  135 136   POTASSIUM  --   --  4.3 4.6   CHLORIDE  --   --  105 106   CO2  --   --  25 25   BUN  --   --  21 17   CR  --   --  1.04 0.99   ANIONGAP  --   --  5 5   BARON  --   --  8.1* 8.4*   GLC  --   --  84 84   PROTTOTAL  --   --   --  7.1   TROPI  --   --   --  <0.015     Recent Labs   Lab 02/28/20  0842 02/27/20  1207   GLC 84 84       Imaging:   No results found for this or any previous visit (from the past 24 hour(s)).

## 2020-02-28 NOTE — PROGRESS NOTES
Care Suites Procedure Nursing Note    Patient Information  Name: Jama Paul  Age: 89 year old    Procedure  Procedure: right thoracentesis  Procedure start time: 1335  Procedure complete time: 1343  Concerns/abnormal assessment: 259 ml of clear to bloody fluid removed from right pleural space.  If abnormal assessment, provider notified: N/A  Plan/Other: Return to station 66 for stabilization and further treatment.    Cheli Trotter RN

## 2020-02-28 NOTE — PROGRESS NOTES
Reported Plt of 39 to Dr. Escobedo.  Patient has known ITP.  Oxygen level in low 90's. Will proceed with the thoracentesis at 1300.

## 2020-02-28 NOTE — PROGRESS NOTES
"SP[facility]ITUAL HE[facility] Progress Note  [facility]    Initiated visit due to pt request.  Pt was alone in room.  Pt shared that he has a very strong spirituality, stating that his father was a [place] Hindu .  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  Pt states that his spirituality has helped him cope with setbacks, including this most recent hospitalization, stating \"I don't know how I could make it without God.\"  Pt named family as a source of support, stating that his wife is also very spiritual, and that his son (1 of 4 children) attends [employer] with him.  Pt states that he wants to get better and go back to [place], naming \"walking\" as an important activity.   offered emotional support, reflective conversation, and prayer.  SH remains available for f/u upon pt request.      Kade Carbajal  Chaplain Resident    "

## 2020-02-28 NOTE — PLAN OF CARE
DATE & TIME: 2/27 from 2300 0730    Cognitive Concerns/ Orientation : A&O x 4,    BEHAVIOR & AGGRESSION TOOL COLOR: Green  CIWA SCORE: N/A   ABNL VS/O2: VSS on RA  MOBILITY: Up x 2 assist with walker/GB  PAIN MANAGMENT: Denied   DIET: Regular  BOWEL/BLADDER: Incontinent to bladder, No BM during this shift.   ABNL LAB/BG: WBC 17.6, Hgb 9.5, Plt 48, Ca 8.4  DRAIN/DEVICES: PIV saline lock  TELEMETRY RHYTHM: N/A  SKIN: Scattered bruises and scabs. Blanchable redness on coccyx area, Meplex dressing in placed, CDI  TESTS/PROCEDURES: US thoracentesis today. CXR showed pleural effusion with right greater than left  D/C DAY/GOALS/PLACE: Discharge pending in progress  OTHER IMPORTANT INFO:

## 2020-02-29 ENCOUNTER — APPOINTMENT (OUTPATIENT)
Dept: PHYSICAL THERAPY | Facility: CLINIC | Age: 85
DRG: 292 | End: 2020-02-29
Attending: INTERNAL MEDICINE
Payer: MEDICARE

## 2020-02-29 ENCOUNTER — APPOINTMENT (OUTPATIENT)
Dept: OCCUPATIONAL THERAPY | Facility: CLINIC | Age: 85
DRG: 292 | End: 2020-02-29
Attending: INTERNAL MEDICINE
Payer: MEDICARE

## 2020-02-29 LAB
BACTERIA SPEC CULT: ABNORMAL
Lab: ABNORMAL
SPECIMEN SOURCE: ABNORMAL

## 2020-02-29 PROCEDURE — 99239 HOSP IP/OBS DSCHRG MGMT >30: CPT | Performed by: HOSPITALIST

## 2020-02-29 PROCEDURE — 25000132 ZZH RX MED GY IP 250 OP 250 PS 637: Mod: GY | Performed by: INTERNAL MEDICINE

## 2020-02-29 PROCEDURE — 12000000 ZZH R&B MED SURG/OB

## 2020-02-29 PROCEDURE — 97161 PT EVAL LOW COMPLEX 20 MIN: CPT | Mod: GP

## 2020-02-29 PROCEDURE — 97165 OT EVAL LOW COMPLEX 30 MIN: CPT | Mod: GO

## 2020-02-29 PROCEDURE — 97116 GAIT TRAINING THERAPY: CPT | Mod: GP

## 2020-02-29 PROCEDURE — 97530 THERAPEUTIC ACTIVITIES: CPT | Mod: GP

## 2020-02-29 PROCEDURE — 25000128 H RX IP 250 OP 636: Performed by: INTERNAL MEDICINE

## 2020-02-29 RX ORDER — METOPROLOL SUCCINATE 50 MG/1
50 TABLET, EXTENDED RELEASE ORAL DAILY
Qty: 180 TABLET | Refills: 3
Start: 2020-02-29 | End: 2020-08-19

## 2020-02-29 RX ORDER — SULFAMETHOXAZOLE/TRIMETHOPRIM 800-160 MG
1 TABLET ORAL 2 TIMES DAILY
Qty: 14 TABLET | Refills: 0 | Status: SHIPPED | OUTPATIENT
Start: 2020-02-29 | End: 2020-02-29

## 2020-02-29 RX ORDER — SULFAMETHOXAZOLE/TRIMETHOPRIM 800-160 MG
1 TABLET ORAL 2 TIMES DAILY
Qty: 14 TABLET | Refills: 0 | DISCHARGE
Start: 2020-02-29 | End: 2020-03-10

## 2020-02-29 RX ORDER — METOPROLOL SUCCINATE 50 MG/1
50 TABLET, EXTENDED RELEASE ORAL DAILY
Status: DISCONTINUED | OUTPATIENT
Start: 2020-03-01 | End: 2020-03-01 | Stop reason: HOSPADM

## 2020-02-29 RX ORDER — LEVOFLOXACIN 5 MG/ML
250 INJECTION, SOLUTION INTRAVENOUS EVERY 24 HOURS
Status: DISCONTINUED | OUTPATIENT
Start: 2020-03-01 | End: 2020-03-01

## 2020-02-29 RX ADMIN — LEVOFLOXACIN 500 MG: 5 INJECTION, SOLUTION INTRAVENOUS at 12:40

## 2020-02-29 RX ADMIN — Medication 1 MG: at 21:54

## 2020-02-29 RX ADMIN — Medication 1 CAPSULE: at 09:02

## 2020-02-29 RX ADMIN — MIRTAZAPINE 15 MG: 15 TABLET, FILM COATED ORAL at 21:54

## 2020-02-29 RX ADMIN — THERA TABS 1 TABLET: TAB at 09:01

## 2020-02-29 RX ADMIN — METOPROLOL SUCCINATE 50 MG: 50 TABLET, EXTENDED RELEASE ORAL at 09:02

## 2020-02-29 RX ADMIN — DIGOXIN 125 MCG: 0.12 TABLET ORAL at 09:01

## 2020-02-29 RX ADMIN — MELATONIN 3000 UNITS: at 09:01

## 2020-02-29 ASSESSMENT — ACTIVITIES OF DAILY LIVING (ADL)
ADLS_ACUITY_SCORE: 22
ADLS_ACUITY_SCORE: 22
ADLS_ACUITY_SCORE: 25
ADLS_ACUITY_SCORE: 25
ADLS_ACUITY_SCORE: 22
ADLS_ACUITY_SCORE: 25

## 2020-02-29 NOTE — PLAN OF CARE
DATE & TIME:2/28/2020                   Cognitive Concerns/ Orientation : Alert and Oriented x4   BEHAVIOR & AGGRESSION TOOL COLOR: Green   CIWA SCORE: NA    ABNL VS/O2: VSS on RA   MOBILITY: Assist of one with GB and Walker   PAIN MANAGMENT: Denies   DIET: Reg   BOWEL/BLADDER: Continent with some dribbling.    ABNL LAB/BG: NA  DRAIN/DEVICES: PIV SL  TELEMETRY RHYTHM: NA  SKIN: Intact, dressing over thoracentesis site and and on back and coccyx for blanchable redness.  Scabs to bilateral lower extremities.   TESTS/PROCEDURES: Thoracentesis done today    D/C DAY/GOALS/PLACE: No timetable noted, possibly back to TCU for strengthening.  [place].    OTHER IMPORTANT INFO: NA

## 2020-02-29 NOTE — PROGRESS NOTES
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  - baseline Hb around 9-10 and platelet around 40s--50s  - this admission Hb 9.5----8.5; Platelet 43---39  - has been evaluated by Dr Ma from oncology in past; has triend IV Steroids in past; some concern for MDS  - counts stable  - patient has follow up appointment with Dr Wise next week  - repeat counts on follow up    # Recurrent bilateral pleural effusions; possibly secondary to CHF  - s/p thoracentesis 2/28, fluid cultures with no growth so far; fluid analysis likely transudative (using protein and LDH levels from the day before)  - cytology pending    # Atrial fibrillation  --Continue on digoxin he is not on any anticoagulation, continue his metoprolol    # CHF with preserved EF  # Moderately severe TR, mild MR  -- resume PTA spironolactone on discharge; BP  on softer side; decreadse PTA Toprol XL to daily (from BID)  - clinically euvolumic  - ECHO from 1/14/20 with LVEF 55-60%, moderately severe (3+) tricuspid regurgitation, mild (1+) mitral regurgitation; had Cath 11/2019 with minimal CAD    DVT Prophylaxis: SCDs  Code Status: Full Code     Disposition: medically stable for discharge but nursing noted patient to be unsteady; patient plans to return to TCU side of [facility], no bed available today  Planned discharge for 3/1/20; discharge summary and orders completed.     >35 minutes spent today including time spent for discharge orders, summary >50 % facetime in counselling regarding care plan    Elia Kothari MD      Interval History   - had thoracentesis 2/28; Urine cultures growing Citrobacter; reports feeling much better and wants to go home    -Data reviewed today: I reviewed all new labs and imaging results over the last 24 hours.     Physical Exam   Temp: 97.2  F (36.2  C) Temp src: Oral BP: 114/53   Heart Rate: 61 Resp: 18 SpO2: 96 % O2 Device: None (Room air)    Vitals:    02/27/20 1204 02/28/20 0600   Weight: 72.6 kg (160 lb) 71.7 kg (158 lb 1.6 oz)     Vital Signs with Ranges  Temp:  [97.2  F (36.2  C)-98  F (36.7  C)] 97.2  F (36.2  C)  Heart Rate:  [59-78] 61  Resp:  [16-18] 18  BP: ()/(53-63) 114/53  SpO2:  [90 %-100 %] 96 %  I/O last 3 completed shifts:  In: 400 [P.O.:300; I.V.:100]  Out: -     Constitutional:Awake, alert, cooperative, no apparent distress  Respiratory: bilateral clear  Cardiovascular: Regular rate and rhythm, normal S1 and S2, and no murmur noted  GI: Normal bowel sounds, soft, non-distended, non-tender  Skin/Integumen: No rashes, no cyanosis, no edema  Neuro : moving all 4 extremities, no focal deficit noted     Medications     - MEDICATION INSTRUCTIONS -       - MEDICATION INSTRUCTIONS -         acetaminophen  650 mg Oral BID     digoxin  125 mcg Oral Daily     levofloxacin  500 mg Intravenous Q24H     lidocaine   10 mL Urethral Once     metoprolol succinate ER  50 mg Oral BID     mirtazapine  15 mg Oral At Bedtime     multivitamin  with lutein   Oral Daily     multivitamin, therapeutic  1 tablet Oral Daily     sodium chloride (PF)  3 mL Intracatheter Q8H     Vitamin D3  3,000 Units Oral Daily       Data   Recent Labs   Lab 02/28/20  1042 02/28/20  1002 02/28/20  0842 02/27/20  1207   WBC  --   --  11.1* 17.6*   HGB  --   --  8.5* 9.5*   MCV  --   --  93 94   PLT 39*  --  42* 43*   INR  --  1.74*  --   --    NA  --   --  135 136   POTASSIUM  --   --  4.3 4.6   CHLORIDE  --   --  105 106   CO2  --   --  25 25   BUN  --   --  21 17   CR  --   --  1.04 0.99   ANIONGAP  --   --  5 5   BARON  --   --  8.1* 8.4*   GLC  --   --  84 84   PROTTOTAL  --   --   --  7.1   TROPI  --   --   --  <0.015     Recent Labs   Lab 02/28/20  0842 02/27/20  1207   GLC 84 84       Imaging:   Recent Results (from the past 24 hour(s))   US Thoracentesis    Narrative    EXAM:  1. RIGHT THORACENTESIS  2. ULTRASOUND GUIDANCE  LOCATION: [facility]  DATE/TIME: 2/28/2020 1:52 PM    INDICATION: Pleural effusion.    PROCEDURE: Informed consent obtained. Time out performed. The abdomen  was prepped and draped in a sterile fashion. 10 mL of 1% lidocaine was  infused into local soft tissues. A 5 [place] catheter system was  introduced into the pleural effusion under ultrasound guidance.    0.25 liters of clear fluid were removed and sent to lab if requested.    Patient tolerated procedure well.    Ultrasound images have been permanently captured for documentation.      Impression    IMPRESSION:  Status post ultrasound-guided right thoracentesis.    Note that the majority of the right pleural fluid is complex and  loculated, and cannot be aspirated with a catheter.    LIV GIL MD

## 2020-02-29 NOTE — PLAN OF CARE
DATE & TIME: 2/28/2020                        Cognitive Concerns/ Orientation : A&O x 4, [place], arianna HA charging at bedside this evening.   BEHAVIOR & AGGRESSION TOOL COLOR: Green  CIWA SCORE: N/A   ABNL VS/O2: VSS x BP soft on RA.  MOBILITY: Up x 1 assist with walker/GB, amb to BR x2, chair all day, needs enc to shift weight.  PAIN MANAGMENT: Denies  DIET: Regular  BOWEL/BLADDER: Occ inc uring/dribbling, BM x1 this shift.   ABNL LAB/BG: WBC improved at 11.1. Plt 38 (MD aware)  DRAIN/DEVICES: PIV SL  TELEMETRY RHYTHM: N/A  SKIN: Scattered bruises and scabs. Blanchable redness on coccyx and mid-spine, Mepilex on. R back drsg CDI following thoracentesis.   TESTS/PROCEDURES: R thoracentsis, 259 ml removed.   D/C DAY/GOALS/PLACE: Discharge pending in progress  OTHER IMPORTANT INFO: PT/OT following, came from [facility]. UC pending, changed from rocephin to [facility]. Wife visited. TEDS off at HS per pt report. Is a retired MD.

## 2020-02-29 NOTE — PROGRESS NOTES
02/29/20 1000   Quick Adds   Type of Visit Initial Occupational Therapy Evaluation   Living Environment   Lives With spouse   Living Arrangements independent living facility   Home Accessibility no concerns   Transportation Anticipated family or friend will provide   Living Environment Comment Pt lives w/spouse in an ILF. Pt does not drive, but spouse does or children provide transportation.    Self-Care   Usual Activity Tolerance good   Current Activity Tolerance moderate   Regular Exercise Yes   Activity/Exercise Type biking;strength training   Exercise Amount/Frequency daily;1 hr   Equipment Currently Used at Home walker, rolling   Functional Level   Ambulation 1-->assistive equipment   Transferring 1-->assistive equipment   Toileting 1-->assistive equipment  (grab bars, RTS)   Bathing 3-->assistive equipment and person  (walk in, grab bars, shower chair. assist for bathing)   Dressing 2-->assistive person  (assist for compression socks, otherwise IND)   Eating 0-->independent   Communication 0-->understands/communicates without difficulty   Swallowing 0-->swallows foods/liquids without difficulty   Cognition 0 - no cognition issues reported   Fall history within last six months yes   Number of times patient has fallen within last six months 2   Which of the above functional risks had a recent onset or change? ambulation;transferring   Prior Functional Level Comment Pt recieves assist w/bathing, and compression socks, otherwise is IND w/basic ADL   General Information   Onset of Illness/Injury or Date of Surgery - Date 02/27/20   Referring Physician Mariusz Lewis MD   Patient/Family Goals Statement Return home   Additional Occupational Profile Info/Pertinent History of Current Problem Pt is 89-year-old  with history of chronic kidney disease, heart failure, AFib and hypertension, who was recently admitted to [facility] from 01/11 through 01/15 with acute on chronic diastolic  heart failure and mild left pleural effusion, who underwent bilateral thoracentesis, who now presents to [facility] with generalized weakness, finding of UTI   Precautions/Limitations fall precautions   Cognitive Status Examination   Orientation orientation to person, place and time   Level of Consciousness alert   Visual Perception   Visual Perception Wears glasses   Sensory Examination   Sensory Quick Adds No deficits were identified   Pain Assessment   Patient Currently in Pain Yes, see Vital Sign flowsheet   Range of Motion (ROM)   ROM Quick Adds No deficits were identified   Strength   Strength Comments WFL   Hand Strength   Hand Strength Comments weak  strength in BUE   Transfer Skill: Sit to Stand   Level of [place]: Sit/Stand stand-by assist   Physical Assist/Nonphysical Assist: Sit/Stand supervision   Transfer Skill: Sit to Stand full weight-bearing   Assistive Device for Transfer: Sit/Stand rolling walker   Grooming   Level of [place]: Grooming stand-by assist   Physical Assist/Nonphysical Assist: Grooming supervision   Instrumental Activities of Daily Living (IADL)   Previous Responsibilities laundry;finances;medication management   Activities of Daily Living Analysis   Impairments Contributing to Impaired Activities of Daily Living strength decreased   General Therapy Interventions   Planned Therapy Interventions ADL retraining   Clinical Impression   Criteria for Skilled Therapeutic Interventions Met evaluation only   OT Diagnosis Impaired functional mobility   Influenced by the following impairments decreased strength   Assessment of Occupational Performance 1-3 Performance Deficits   Identified Performance Deficits functional mobility   Clinical Decision Making (Complexity) Low complexity   Therapy Frequency Daily   Predicted Duration of Therapy Intervention (days/wks) Eval only   Anticipated Discharge Disposition Home with Assist   Risks and Benefits of Treatment have  been explained. Yes   Patient, Family & other staff in agreement with plan of care Yes   Total Evaluation Time   Total Evaluation Time (Minutes) 10

## 2020-02-29 NOTE — CONSULTS
SW  Care Transition Initial Assessment - SW     Met with: Patient  Active Problems:    Weakness       DATA  Lives With: spouse   Living Arrangements: independent living facility  This writer met with pt as well as talked with bedside RN and both agree that pt would benefit from TCU prior to returning to his independent apartment. Pt stated that he lives in an apartment on the [facility] and that he prefers to discharge to their TCU. [facility] contacted and Yovanny in admissions stated that he has a rehab bed available on 3/1. This writer notified pt and pt stated that he is agreeable to discharge to TCU. Pt stated that his son will transport. This writer contacted [facility] and left a msg stating that pt would like to accept the bed. Pt.'s son plans to transport around 12:30 on 3/1.    ASSESSMENT  Cognitive Status:  awake, alert and oriented  Concerns to be addressed: Pt appears agreeable to plan for TCU.     PLAN  Patient given options and choices for discharge: Yes, but pt resides on the [facility] so this is where he would prefer to do rehab.  Patient/family is agreeable to the plan?  Yes: Pt stated that he will call his family and update them.  Transportation/person available to transport on day of discharge  is pt.'s son and have they been notified/set up: Yes  Patient anticipates discharging to:  [facility] TCU .

## 2020-02-29 NOTE — PLAN OF CARE
"Discharge Planner PT   Patient plan for discharge: TCU at [facility] before returning to independent living unit  Current status: PT orders received, eval completed, treatment initiated. Pt is 89 y.o. M admitted 2/27 with generalized weakness & UTI, recurrent bilateral pleural effusions. PMH significant for recent admission 1/11-1/15 with acute on chronic heart failure & L pleural effusion. Pt lives with spouse in independent living unit. Services received include assist with compression socks and 2 meals/day. Pt ambulates mod ind with 4WW at baseline.    Currently, pt received seated in chair, reluctantly agreeable to PT, reporting he just woke up & feels \"foggy.\" Performed sit>stand from recliner with SBA. Ambulated 1x20' in room with FWW and [place], with moderate reliance on UE support and heavy steps with min clearance, flexed posture & flexed knees, with 2 instances of minor buckling. Ambulated forward/back 5' at chair x2 with emphasis on improved clearance. Pt reports he ambulated better with OT earlier but feels off due to just waking up. Performed 5x sit<>stand with SBA with FWW. At end of session, pt feeling more alert, ambulated 80' with FWW and CGA with improved clearance & step length but continues to have minor buckling x3 instances- recovered with support of walker & CGA for safety. Edu pt on seated exercises to perform in room on his own. Pt seated in chair upon departure, all needs in reach.     Barriers to return to prior living situation: current level of assist, lives alone, fall risk  Recommendations for discharge: TCU  Rationale for recommendations: Patient will benefit from continued skilled therapies in TCU to progress safety and independence with mobility toward baseline  reduce risk of falls.          Entered by: Deisy Miguel 02/29/2020 4:35 PM       "

## 2020-03-01 VITALS
TEMPERATURE: 97.4 F | SYSTOLIC BLOOD PRESSURE: 114 MMHG | DIASTOLIC BLOOD PRESSURE: 64 MMHG | WEIGHT: 150 LBS | HEIGHT: 72 IN | OXYGEN SATURATION: 96 % | RESPIRATION RATE: 18 BRPM | BODY MASS INDEX: 20.32 KG/M2 | HEART RATE: 82 BPM

## 2020-03-01 PROCEDURE — 25000132 ZZH RX MED GY IP 250 OP 250 PS 637: Mod: GY | Performed by: INTERNAL MEDICINE

## 2020-03-01 PROCEDURE — 99233 SBSQ HOSP IP/OBS HIGH 50: CPT | Performed by: INTERNAL MEDICINE

## 2020-03-01 PROCEDURE — 25000132 ZZH RX MED GY IP 250 OP 250 PS 637: Mod: GY | Performed by: HOSPITALIST

## 2020-03-01 RX ORDER — SULFAMETHOXAZOLE/TRIMETHOPRIM 800-160 MG
1 TABLET ORAL 2 TIMES DAILY
Status: DISCONTINUED | OUTPATIENT
Start: 2020-03-01 | End: 2020-03-01 | Stop reason: HOSPADM

## 2020-03-01 RX ADMIN — THERA TABS 1 TABLET: TAB at 09:04

## 2020-03-01 RX ADMIN — METOPROLOL SUCCINATE 50 MG: 50 TABLET, EXTENDED RELEASE ORAL at 09:04

## 2020-03-01 RX ADMIN — MELATONIN 3000 UNITS: at 09:04

## 2020-03-01 RX ADMIN — DIGOXIN 125 MCG: 0.12 TABLET ORAL at 09:04

## 2020-03-01 RX ADMIN — SULFAMETHOXAZOLE AND TRIMETHOPRIM 1 TABLET: 800; 160 TABLET ORAL at 11:06

## 2020-03-01 RX ADMIN — Medication 1 CAPSULE: at 09:04

## 2020-03-01 ASSESSMENT — ACTIVITIES OF DAILY LIVING (ADL)
ADLS_ACUITY_SCORE: 25

## 2020-03-01 ASSESSMENT — MIFFLIN-ST. JEOR: SCORE: 1383.4

## 2020-03-01 NOTE — PLAN OF CARE
DATE & TIME: 3/1/2020       Cognitive Concerns/ Orientation : Alert and Oriented x4, forgetful    BEHAVIOR & AGGRESSION TOOL COLOR: Green   CIWA SCORE: NA    ABNL VS/O2: VSS on RA, BP soft SBP in 90's   MOBILITY: 1A, independent in bed mobility.   PAIN MANAGMENT: Denies   DIET: Reg   BOWEL/BLADDER: Incontinent of urine, dribbles   ABNL LAB/BG: NA  DRAIN/DEVICES: PIV SL   TELEMETRY RHYTHM: NA  SKIN: Dressings for protection to back and coccyx.   TESTS/PROCEDURES: NA  D/C DAY/GOALS/PLACE: Pending a bed available at [facility]; possibly tomorrow.   OTHER IMPORTANT INFO: NA

## 2020-03-01 NOTE — PLAN OF CARE
A&Ox4, sl [place], arianna HA in place. Up with assist of 1 with GB and walker, very unsteady gait, chair x2, amb to BR x1. VSS. Denies pain. [place] diet, good appetite. Blanchable redness to mid back and coccyx, mepliex on. Inc urine, dribbling freq. No BM.  Changed to PO bactrim, first dose given prior to leaving.     Discharge    Patient discharged to [facility] TCU with son and wife.       Listed belongings gathered and returned to patient. Yes, vest, slippers, arianna h/a and  sent with pt.   Care Plan and Patient education resolved: Yes  Prescriptions if needed, hard copies sent with patient  NA  Home and hospital acquired medications returned to patient: NA  Medication Bin checked and emptied on discharge Yes  Follow up appointment made for patient: No

## 2020-03-01 NOTE — PLAN OF CARE
DATE & TIME:2/29/2020                  Cognitive Concerns/ Orientation : Alert and Oriented x4, arianna HA in place.  BEHAVIOR & AGGRESSION TOOL COLOR: Green   CIWA SCORE: NA    ABNL VS/O2: VSS on RA   MOBILITY: Assist of 1 with GB/Walker, very unsteady gait  PAIN MANAGMENT: Denies   DIET: Reg   BOWEL/BLADDER: Incontinent of bladder, continuous dribbling. No BM this shift.  ABNL LAB/BG: NA  DRAIN/DEVICES: R PIV SL  TELEMETRY RHYTHM: NA  SKIN: Intact, R thoracentesis site FRANTZ, mepilex on back and coccyx for blanchable redness.  Scabs to bilateral lower extremities, TEDS on.   TESTS/PROCEDURES: NA  D/C DAY/GOALS/PLACE: Pt requests to return to [facility] [place]/TCU bed avail tomorrow, SW involved.   OTHER IMPORTANT INFO: LS dim R>L. Pt reports feeling much improved.

## 2020-03-01 NOTE — PROGRESS NOTES
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, 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  - baseline Hb around 9-10 and platelet around 40s--50s  - this admission Hb 9.5----8.5; Platelet 43---39  - has been evaluated by Dr Ma from oncology in past; has triend IV Steroids in past; some concern for MDS  - counts stable  - patient has follow up appointment with Dr Wise next week  - repeat counts on follow up    # Recurrent bilateral pleural effusions; possibly secondary to CHF  - s/p thoracentesis 2/28, fluid cultures with no growth so far; fluid analysis likely transudative (using protein and LDH levels from the day before)  - cytology pending    # Atrial fibrillation  --Continue on digoxin he is not on any anticoagulation, continue his metoprolol    # CHF with preserved EF  # Moderately severe TR, mild MR  -- resume PTA spironolactone on discharge; BP on  softer side; decreadse PTA Toprol XL to daily (from BID)  - clinically euvolumic  - ECHO from 1/14/20 with LVEF 55-60%, moderately severe (3+) tricuspid regurgitation, mild (1+) mitral regurgitation; had Cath 11/2019 with minimal CAD    DVT Prophylaxis: SCDs  Code Status: Full Code     Disposition: medically stable for discharge but nursing noted patient to be unsteady; patient plans to return to TCU side of [facility], no bed available today      TAYLOR Yepez MD  [facility] Medicine      Interval History   - had thoracentesis 2/28; Urine cultures growing Citrobacter; reports feeling much better and wants to go home    -Data reviewed today: I reviewed all new labs and imaging results over the last 24 hours.     Physical Exam   Temp: 97.4  F (36.3  C) Temp src: Oral BP: 114/64 Pulse: 82 Heart Rate: 74 Resp: 18 SpO2: 96 % O2 Device: None (Room air)    Vitals:    02/27/20 1204 02/28/20 0600 03/01/20 0622   Weight: 72.6 kg (160 lb) 71.7 kg (158 lb 1.6 oz) 68 kg (150 lb)     Vital Signs with Ranges  Temp:  [97.4  F (36.3  C)-97.7  F (36.5  C)] 97.4  F (36.3  C)  Pulse:  [66-82] 82  Heart Rate:  [66-74] 74  Resp:  [16-18] 18  BP: ()/(45-64) 114/64  SpO2:  [96 %-97 %] 96 %  I/O last 3 completed shifts:  In: 360 [P.O.:360]  Out: -     Constitutional:Awake, alert, cooperative, no apparent distress  Respiratory: bilateral clear  Cardiovascular: Regular rate and rhythm, normal S1 and S2, and no murmur noted  GI: Normal bowel sounds, soft, non-distended, non-tender  Skin/Integumen: No rashes, no cyanosis, no edema  Neuro : moving all 4 extremities, no focal deficit noted     Medications     - MEDICATION INSTRUCTIONS -       - MEDICATION INSTRUCTIONS -         acetaminophen  650 mg Oral BID     digoxin  125 mcg Oral Daily     lidocaine  10 mL Urethral Once     metoprolol succinate ER  50 mg Oral Daily     mirtazapine  15 mg Oral At Bedtime     multivitamin  with lutein   Oral Daily     multivitamin, therapeutic  1  tablet Oral Daily     sodium chloride (PF)  3 mL Intracatheter Q8H     sulfamethoxazole-trimethoprim  1 tablet Oral BID     Vitamin D3  3,000 Units Oral Daily       Data   Recent Labs   Lab 02/28/20  1042 02/28/20  1002 02/28/20  0842 02/27/20  1207   WBC  --   --  11.1* 17.6*   HGB  --   --  8.5* 9.5*   MCV  --   --  93 94   PLT 39*  --  42* 43*   INR  --  1.74*  --   --    NA  --   --  135 136   POTASSIUM  --   --  4.3 4.6   CHLORIDE  --   --  105 106   CO2  --   --  25 25   BUN  --   --  21 17   CR  --   --  1.04 0.99   ANIONGAP  --   --  5 5   BARON  --   --  8.1* 8.4*   GLC  --   --  84 84   PROTTOTAL  --   --   --  7.1   TROPI  --   --   --  <0.015     Recent Labs   Lab 02/28/20  0842 02/27/20  1207   GLC 84 84       Imaging:   No results found for this or any previous visit (from the past 24 hour(s)).

## 2020-03-02 ENCOUNTER — PATIENT OUTREACH (OUTPATIENT)
Dept: CARE COORDINATION | Facility: CLINIC | Age: 85
End: 2020-03-02

## 2020-03-02 ENCOUNTER — TRANSFERRED RECORDS (OUTPATIENT)
Dept: HEALTH INFORMATION MANAGEMENT | Facility: CLINIC | Age: 85
End: 2020-03-02

## 2020-03-02 ASSESSMENT — MIFFLIN-ST. JEOR: SCORE: 1406.08

## 2020-03-02 NOTE — PROGRESS NOTES
Clinic Care Coordination Contact  Care Coordination Transition Communication    Referral Source: IP Report    Clinical Data: Patient was hospitalized at [facility] from 2/27/2020 to 3/1/2020 with diagnosis of Generalized weakness likely sec to Citrobacter UTI but also probably contribiuted by anemia and recurrent pleural effusion.     Transition to Facility:              Facility Name: [facility]              Contact name and phone number/fax: [phone]/ [phone]    Plan: RN/SW Care Coordinator will await notification from facility staff informing RN/SW Care Coordinator of patient's discharge plans/needs. RN/SW Care Coordinator will review chart and outreach to facility staff every 4 weeks and as needed. Fax sent to [facility] with my contact information requesting notification upon discharge.    JASMINA Martinez, [facility]  Clinic Care Coordinator  [facility]  [phone]  [email or web address]

## 2020-03-02 NOTE — LETTER
[facility]   To:   [facility]          Please give to facility    From:   KIRIT Martinez Care Coordinator [facility]     Patient Name:  Jama Paul YOB: 1931   Admit date: 3/1/2020      *Information Needed:  Please contact me when the patient will discharge (or if they will move to long term care)- include the discharge date, disposition, and main diagnosis   - If the patient is discharged with home care services, please provide the name of the agency    Also- Please inform me if a care conference is being held.   Phone, Fax or Email with information       Thank You,   RICHIE Martinez, MSW  Clinic Care Coordinator  [facility]  Ph: [phone]  [email or web address]

## 2020-03-02 NOTE — PROGRESS NOTES
[place] [facility]  PRIMARY CARE PROVIDER AND CLINIC:  Mariusz Shields MD, [address] / [place] [address]  Chief Complaint   Patient presents with     Establish Care     [facility] Medical Record Number:  [id number]  Place of Service where encounter took place:  [facility] [[id number]]    Jama Paul  is a 89 year old  (2/13/1931), admitted to the above facility from  [facility]. Hospital stay 2/27/20 through 3/1/20..  Admitted to this facility for  rehab, medical management and nursing care.     Acute cystitis without hematuria  Other myelodysplastic syndromes (H)  Bicytopenia  Non-ischemic cardiomyopathy (H)  Nonrheumatic tricuspid valve regurgitation  Acute on chronic heart failure with preserved ejection fraction (H)  Bilateral pleural effusion  Permanent atrial fibrillation  Physical deconditioning      HPI:    HPI information obtained from: facility chart records, facility staff, patient report and [facility] chart review.     Brief Summary of Hospital Course: Pt was admitted from home with increasing weakness and was found to have a Citrobacter UTI.   The weakness was also related to his bilateral pleural effusions and  Anemia.  He was put on Levaquin, changed to bactrim and improved.  He has chronic Bicytopenia-: low Hgb and Plts:  He has been seen by Dr Ma in the past--now to F/U with Dr Wise for possible MDS. His right pleural effusion was tapped  on 2/28 for 250cc--most of the fluid was loculated and couldn't be aspirated---there is no growth so far.   His spironolactone was briefly held but resumed when he went to TCU.      TODAY DURING EXAM HPI and ROS: he feels tired but wants to go home soon.  Said his appt with Cards at [facility] was good as was appt with Dr Wise yesterday.  He has no CP, SOB, , dizziness, fevers, chills, HA, N/V, dysuria(*has some incontinence at baseline*) or Bowel Abnormalities. Appetite is down.  No pain,  "sleeping well. C/o sore 3rd toenail on right foot--said it is coming off and 'sore\".  Has occas cough    CODE STATUS/ADVANCE DIRECTIVES DISCUSSION:   CPR/Full code   Patient's living condition: lives with spouse  ALLERGIES: Patient has no known allergies.  PAST MEDICAL HISTORY:  has a past medical history of Congestive heart failure (H), Elevated PSA, Eye hemorrhage, left (02/2019), Non-ischemic cardiomyopathy (H), Permanent atrial fibrillation (2011), Thrombocytopenia (H), and Unspecified essential hypertension. He also has no past medical history of Malignant hyperthermia, PONV (postoperative nausea and vomiting), or Sleep apnea.  PAST SURGICAL HISTORY:   has a past surgical history that includes septic olecranon bursitis[; Mohs micrographic procedure; Herniorrhaphy inguinal (4/30/2014); Explore groin (4/30/2014); Phacoemulsification clear cornea with standard intraocular lens implant (Right, 9/8/2015); carpal tunnel release rt/lt (2014); Bone marrow biopsy, bone specimen, needle/trocar (N/A, 3/4/2019); Abdomen surgery; Esophagoscopy, gastroscopy, duodenoscopy (EGD), combined (N/A, 11/6/2019); Heart Catheterization with Possible Intervention (N/A, 11/15/2019); and Left Ventriculogram (N/A, 11/15/2019).  FAMILY HISTORY: family history is not on file.  SOCIAL HISTORY:   reports that he has never smoked. He has never used smokeless tobacco. He reports that he does not drink alcohol or use drugs.    Post Discharge Medication Reconciliation Status: discharge medications reconciled and changed, per note/orders (see AVS)     Current Outpatient Medications   Medication Sig Dispense Refill     acetaminophen (TYLENOL) 325 MG tablet Take 650 mg by mouth every 6 hours as needed for Pain       digoxin (LANOXIN) 125 MCG tablet Take 1 tablet (125 mcg) by mouth daily (Patient taking differently: Take 125 mcg by mouth daily HOLD if HR <55) 30 tablet 0     melatonin 3 MG tablet Take 3 mg by mouth At Bedtime After 9pm. Please use " overnight depends on pt to allow him to sleep without interruption during the night       metoprolol succinate ER (TOPROL-XL) 50 MG 24 hr tablet Take 1 tablet (50 mg) by mouth daily 180 tablet 3     mirtazapine (REMERON) 15 MG tablet Take 15 mg by mouth At Bedtime Patient started medication for the first time yesterday 10/29/2019.        Multiple Vitamins-Minerals (ICAPS AREDS FORMULA PO) Take 1 tablet by mouth daily Takes in addition to multivitamin with minerals       multivitamin, therapeutic (THERA-VIT) TABS tablet Take 1 tablet by mouth daily       spironolactone (ALDACTONE) 25 MG tablet Take 1 tablet (25 mg) by mouth daily 90 tablet 3     sulfamethoxazole-trimethoprim (BACTRIM DS) 800-160 MG tablet Take 1 tablet by mouth 2 times daily for 7 days (Patient taking differently: Take 1 tablet by mouth 2 times daily FINISHES ON 3/8/2020) 14 tablet 0     Vitamin D, Cholecalciferol, 1000 UNITS TABS Take 3,000 Units by mouth daily        OTHER MEDICAL SUPPLIES CORE PROTOCOL ; DAILY WEIGHTS IN AM.             ROS:  See above under HPI    Vitals:  /88   Pulse 86   Temp 97.9  F (36.6  C)   Resp 18   Ht 1.829 m (6')   Wt 70.3 kg (155 lb)   SpO2 93%   BMI 21.02 kg/m    Exam:  GENERAL APPEARANCE:  Alert, oriented, cooperative, NAD.  ENT:  Mouth with moist mucous membranes, [place].  EYES:  EOM, conjunctivae, lids, pupils and irises normal  NECK:  No adenopathy,masses or thyromegaly  RESP:  respiratory effort  of chest normal, lungs clear to auscultation with occas crackles in bases, slightly decreased right base. NAD  CV:  Auscultation of heart done ,IRRR. Soft systolic murmur.  No Rub or Gallop, trace left foot/ankle pedal edema and +1 right foot/ankle edema with PVD changes fer on right.,   +1 pedal pulses.  ABDOMEN:  normal bowel sounds, soft, nontender.   M/S:   CHIU equally, up with assist-seen in WC  SKIN:  Inspection of skin is at baseline but limited as pt fully dressed. Right foot, 3rd toe nail is longer,  loose and with dried blood. No sign infection--it s wrapped to protect it currently and does not have a shoe on.  NEURO:   Cranial nerves 2-12 are grossly intact and at patient's baseline  PSYCH:  oriented X 3, engaged in conversation.      Lab/Diagnostic data:  Recent Labs   Lab Test 02/28/20  0842 02/27/20  1207    136   POTASSIUM 4.3 4.6   CHLORIDE 105 106   CO2 25 25   ANIONGAP 5 5   GLC 84 84   BUN 21 17   CR 1.04 0.99   BARON 8.1* 8.4*     CBC RESULTS:   Recent Labs   Lab Test 02/28/20  1042 02/28/20  0842   WBC  --  11.1*   RBC  --  2.91*   HGB  --  8.5*   HCT  --  27.0*   MCV  --  93   MCH  --  29.2   MCHC  --  31.5   RDW  --  21.5*   PLT 39* 42*     ASSESSMENT / PLAN:  (N30.00) Acute cystitis without hematuria: Citrobacter  (primary encounter diagnosis)  Comment/Plan: complete bactrim, check BMP on 3/9--improving    (D46.Z) Other myelodysplastic syndromes (H)   (D75.89) Bicytopenia  Comment/Plan: saw DR Wise at [facility] yest--per pt PLts are >50 K and Hgb was 8.8.  Will await note from visit and when next follow-up    (I42.8) Non-ischemic cardiomyopathy (H)   (I36.1) Non rheumatic tricuspid valve regurgitation   (I50.33) Acute on chronic heart failure with preserved ejection fraction (H)   (J90) Bilateral pleural effusion  Comment/Plan: saw DR Perez at [facility] on 2/21--see Echo and note below-- they will see in 6 mos or sooner to discuss Tri-Clip but with better heart function, not emergent.  Cont current meds, has appt 3/24/20 with [facility]. followed by Dr Pugh also    (I48.21) Permanent atrial fibrillation  Comment/Plan: no anticoag--cont BB and DIg, monitor.    (R53.81) Physical deconditioning  Comment/Plan: PT OT      Total time spent with patient visit at the skilled nursing facility was 55 including patient visit, review of past records and phone call to patient contact--left detailed msg on daughter Lorraine's, VM with update, plan of care. Greater than 50% of total time spent with  counseling and coordinating care due to complexities of care.  Answered all of pt's questions and assured him will update CORE and Dr Wise..     Electronically signed by:  ERIC Mir CNP         FROM CARE EVERYWHERE FROM [facility] VISIT:  2.21.20:  Telephone Encounter - Carla Ortiz RN - 02/21/2020 4:25 PM CST  Pt seen by Dr. Perez on 2/17/20 in [place] for tricuspid regurgitation. Echo ordered to assess degree of tricuspid regurgitation.  Echocardiogram report states:  Final Impressions:  1. Normal LV size, severely increased wall thickness, normal global systolic function with an estimated EF of 60 - 65%.  2. Moderately enlarged left atrium.  3. Severe concentric left ventricular hypertrophy.  4. The mitral valve is sclerotic, moderate mitral regurgitation.  5. Severe tricuspid regurgitation.  6. The inferior vena cava is dilated, respiratory size variation less than 50%.  7. The ascending aorta is dilated with a maximal diameter of 3.9 cm.  8. Mildly increased estimated pulmonary pressures by tricuspid regurgitation velocity and right atrial pressure (36 mmHg plus RAP).  9. Trivial pericardial effusion.    Results reviewed by Dr. Perez. In-basket msg received from Dr. Perez:    Please let this patient know his echo ws reviewed by me. He has severe tricuspid regurgitation, which is know to both of us. I plan to see him in a few months to discuss Tri Clip. He was not prepared to move forward with clip during last appt.   Thanks     Pt reached at cell number and informed of results and Dr. Perez's recommendations. Pt is encouraged to call if he has any symptoms or change in overall status. Pt verbalized understanding of information given. Carla Ortiz RN 2/21/2020 4:35 PM

## 2020-03-03 ENCOUNTER — MEDICAL CORRESPONDENCE (OUTPATIENT)
Dept: HEALTH INFORMATION MANAGEMENT | Facility: CLINIC | Age: 85
End: 2020-03-03

## 2020-03-03 ENCOUNTER — NURSING HOME VISIT (OUTPATIENT)
Dept: GERIATRICS | Facility: CLINIC | Age: 85
End: 2020-03-03
Payer: MEDICARE

## 2020-03-03 VITALS
TEMPERATURE: 97.9 F | HEIGHT: 72 IN | RESPIRATION RATE: 18 BRPM | WEIGHT: 155 LBS | BODY MASS INDEX: 20.99 KG/M2 | DIASTOLIC BLOOD PRESSURE: 88 MMHG | SYSTOLIC BLOOD PRESSURE: 131 MMHG | OXYGEN SATURATION: 93 % | HEART RATE: 86 BPM

## 2020-03-03 DIAGNOSIS — J90 BILATERAL PLEURAL EFFUSION: ICD-10-CM

## 2020-03-03 DIAGNOSIS — I48.21 PERMANENT ATRIAL FIBRILLATION (H): ICD-10-CM

## 2020-03-03 DIAGNOSIS — N30.00 ACUTE CYSTITIS WITHOUT HEMATURIA: Primary | ICD-10-CM

## 2020-03-03 DIAGNOSIS — I42.8 NON-ISCHEMIC CARDIOMYOPATHY (H): ICD-10-CM

## 2020-03-03 DIAGNOSIS — D75.89 BICYTOPENIA: ICD-10-CM

## 2020-03-03 DIAGNOSIS — D46.Z OTHER MYELODYSPLASTIC SYNDROMES (H): ICD-10-CM

## 2020-03-03 DIAGNOSIS — I36.1 NONRHEUMATIC TRICUSPID VALVE REGURGITATION: ICD-10-CM

## 2020-03-03 DIAGNOSIS — I50.33 ACUTE ON CHRONIC HEART FAILURE WITH PRESERVED EJECTION FRACTION (H): ICD-10-CM

## 2020-03-03 DIAGNOSIS — R53.81 PHYSICAL DECONDITIONING: ICD-10-CM

## 2020-03-03 LAB — COPATH REPORT: NORMAL

## 2020-03-03 PROCEDURE — 99310 SBSQ NF CARE HIGH MDM 45: CPT | Performed by: NURSE PRACTITIONER

## 2020-03-04 ENCOUNTER — NURSING HOME VISIT (OUTPATIENT)
Dept: GERIATRICS | Facility: CLINIC | Age: 85
End: 2020-03-04
Payer: MEDICARE

## 2020-03-04 VITALS
SYSTOLIC BLOOD PRESSURE: 110 MMHG | BODY MASS INDEX: 21.26 KG/M2 | RESPIRATION RATE: 19 BRPM | WEIGHT: 157 LBS | HEART RATE: 78 BPM | HEIGHT: 72 IN | TEMPERATURE: 97.3 F | DIASTOLIC BLOOD PRESSURE: 74 MMHG | OXYGEN SATURATION: 95 %

## 2020-03-04 DIAGNOSIS — I50.32 CHRONIC DIASTOLIC HEART FAILURE (H): ICD-10-CM

## 2020-03-04 DIAGNOSIS — D75.89 BICYTOPENIA: ICD-10-CM

## 2020-03-04 DIAGNOSIS — I36.1 NONRHEUMATIC TRICUSPID VALVE REGURGITATION: ICD-10-CM

## 2020-03-04 DIAGNOSIS — N30.00 ACUTE CYSTITIS WITHOUT HEMATURIA: Primary | ICD-10-CM

## 2020-03-04 LAB
BACTERIA SPEC CULT: NO GROWTH
SPECIMEN SOURCE: NORMAL

## 2020-03-04 PROCEDURE — 99308 SBSQ NF CARE LOW MDM 20: CPT | Performed by: NURSE PRACTITIONER

## 2020-03-04 ASSESSMENT — MIFFLIN-ST. JEOR: SCORE: 1415.15

## 2020-03-04 NOTE — PROGRESS NOTES
CC: Lab Recheck    HPI:    Jama Paul is a 89 year old  (2/13/1931), who is being seen today for an episodic care visit at [facility]. Today's concern: lab recheck of BMP and CBC         SUBJECTIVE/OBJECTIVE: A & O x 3, NAD. Up in WC. No complaints.    /74   Pulse 78   Temp 97.3  F (36.3  C)   Resp 19   Ht 1.829 m (6')   Wt 71.2 kg (157 lb)   SpO2 95%   BMI 21.29 kg/m      LABS: today:   Recent Labs   Lab Test 3/4/20 02/28/20  0842 02/27/20  1207    135 136   POTASSIUM 4.5 4.3 4.6   CHLORIDE 104 105 106   CO2 21 25 25   ANIONGAP  5 5   GLC  84 84   BUN 16 21 17   CR 1.14/GFR57 1.04 0.99   BARON 8 8.1* 8.4*     CBC RESULTS:    Recent Labs   Lab Test 3/4/20 02/28/20  1042 02/28/20  0842   WBC 5.7  --  11.1*   RBC   --  2.91*   HGB 9.1  --  8.5*   HCT   --  27.0*   MCV   --  93   MCH   --  29.2   MCHC   --  31.5   RDW   --  21.5*   PLT 51 39* 42*     ASSESSMENT / PLAN:  (N30.00) Acute cystitis without hematuria: Citrobacter  (primary encounter diagnosis)  Comment/Plan: feeling better, Abx done 3/8, monitor.    (I50.32) Chronic diastolic heart failure (H)   (I36.1) Nonrheumatic tricuspid valve regurgitation  Comment/Plan: doing well, labs fine, no changes, CORE appt 3/24     (D75.89) Bicytopenia  Comment/Plan: Plts and Hgb steady, check prn on 2-3 week.      Electronically Signed by:    Cheli Johnson RN, CNP

## 2020-03-09 ENCOUNTER — TRANSFERRED RECORDS (OUTPATIENT)
Dept: HEALTH INFORMATION MANAGEMENT | Facility: CLINIC | Age: 85
End: 2020-03-09

## 2020-03-09 LAB
ANION GAP SERPL CALCULATED.3IONS-SCNC: 6 MMOL/L (ref 7–16)
BUN SERPL-MCNC: 19 MG/DL (ref 7–26)
CALCIUM SERPL-MCNC: 8.4 MG/DL (ref 8.4–10.4)
CHLORIDE SERPLBLD-SCNC: 103 MMOL/L (ref 98–109)
CO2 SERPL-SCNC: 23 MMOL/L (ref 20–29)
CREAT SERPL-MCNC: 1.45 MG/DL (ref 0.73–1.18)
DIFFERENTIAL: ABNORMAL
ERYTHROCYTE [DISTWIDTH] IN BLOOD BY AUTOMATED COUNT: 21.1 % (ref 11.9–15.5)
GFR SERPL CREATININE-BSD FRML MDRD: 42 ML/MIN/1.73M2
GLUCOSE SERPL-MCNC: 73 MG/DL (ref 70–100)
HCT VFR BLD AUTO: 33.3 % (ref 38.8–50)
HEMOGLOBIN: 9.8 G/DL (ref 13.5–17.5)
MCH RBC QN AUTO: 28.9 PG (ref 27.6–33.3)
MCHC RBC AUTO-ENTMCNC: 29.4 G/DL (ref 31.5–35.2)
MCV RBC AUTO: 98.2 FL (ref 80–100)
PLATELET # BLD AUTO: 48 X10(9)/L (ref 140–450)
POTASSIUM SERPL-SCNC: 4.5 MMOL/L (ref 3.5–5.1)
RBC # BLD AUTO: 3.39 X10(12)/L (ref 4.32–5.72)
SODIUM SERPL-SCNC: 132 MMOL/L (ref 136–145)
WBC # BLD AUTO: 6.4 X10(9)/L (ref 3.5–10.5)

## 2020-03-09 ASSESSMENT — MIFFLIN-ST. JEOR: SCORE: 1374.32

## 2020-03-10 ENCOUNTER — NURSING HOME VISIT (OUTPATIENT)
Dept: GERIATRICS | Facility: CLINIC | Age: 85
End: 2020-03-10
Payer: MEDICARE

## 2020-03-10 VITALS
DIASTOLIC BLOOD PRESSURE: 72 MMHG | RESPIRATION RATE: 18 BRPM | HEART RATE: 70 BPM | SYSTOLIC BLOOD PRESSURE: 117 MMHG | TEMPERATURE: 97.8 F | OXYGEN SATURATION: 97 % | BODY MASS INDEX: 20.05 KG/M2 | WEIGHT: 148 LBS | HEIGHT: 72 IN

## 2020-03-10 DIAGNOSIS — I36.1 NONRHEUMATIC TRICUSPID VALVE REGURGITATION: ICD-10-CM

## 2020-03-10 DIAGNOSIS — I42.8 NON-ISCHEMIC CARDIOMYOPATHY (H): ICD-10-CM

## 2020-03-10 DIAGNOSIS — D75.89 BICYTOPENIA: ICD-10-CM

## 2020-03-10 DIAGNOSIS — I50.33 ACUTE ON CHRONIC HEART FAILURE WITH PRESERVED EJECTION FRACTION (H): ICD-10-CM

## 2020-03-10 DIAGNOSIS — R53.81 PHYSICAL DECONDITIONING: ICD-10-CM

## 2020-03-10 DIAGNOSIS — D46.Z OTHER MYELODYSPLASTIC SYNDROMES (H): ICD-10-CM

## 2020-03-10 DIAGNOSIS — J90 BILATERAL PLEURAL EFFUSION: ICD-10-CM

## 2020-03-10 DIAGNOSIS — I48.21 PERMANENT ATRIAL FIBRILLATION (H): ICD-10-CM

## 2020-03-10 DIAGNOSIS — N30.00 ACUTE CYSTITIS WITHOUT HEMATURIA: Primary | ICD-10-CM

## 2020-03-10 PROCEDURE — 99309 SBSQ NF CARE MODERATE MDM 30: CPT | Performed by: NURSE PRACTITIONER

## 2020-03-10 NOTE — PROGRESS NOTES
[place] [facility]  [facility] Medical Record Number:  [id number]  Place of Service where encounter took place:  [facility] ([facility] [[id number]]  Chief Complaint   Patient presents with     Nursing Home Acute       HPI:    Jama Paul  is a 89 year old (2/13/1931), who is being seen today for an episodic care visit.  HPI information obtained from: facility chart records, facility staff, patient report and [facility] chart review. Today's concern is:     Acute cystitis without hematuria  Other myelodysplastic syndromes (H)  Bicytopenia  Non-ischemic cardiomyopathy (H)  Nonrheumatic tricuspid valve regurgitation  Acute on chronic heart failure with preserved ejection fraction (H)  Bilateral pleural effusion  Permanent atrial fibrillation  Physical deconditioning      Past Medical and Surgical History reviewed in Epic today.    MEDICATIONS:     Current Outpatient Medications   Medication Sig Dispense Refill     acetaminophen (TYLENOL) 325 MG tablet Take 650 mg by mouth every 6 hours as needed for Pain       digoxin (LANOXIN) 125 MCG tablet Take 1 tablet (125 mcg) by mouth daily (Patient taking differently: Take 125 mcg by mouth daily HOLD if HR <55) 30 tablet 0     melatonin 3 MG tablet Take 3 mg by mouth At Bedtime After 9pm. Please use overnight depends on pt to allow him to sleep without interruption during the night       metoprolol succinate ER (TOPROL-XL) 50 MG 24 hr tablet Take 1 tablet (50 mg) by mouth daily 180 tablet 3     mirtazapine (REMERON) 15 MG tablet Take 15 mg by mouth At Bedtime Patient started medication for the first time yesterday 10/29/2019.        Multiple Vitamins-Minerals (ICAPS AREDS FORMULA PO) Take 1 tablet by mouth daily Takes in addition to multivitamin with minerals       multivitamin, therapeutic (THERA-VIT) TABS tablet Take 1 tablet by mouth daily       OTHER MEDICAL SUPPLIES CORE PROTOCOL ; DAILY WEIGHTS IN AM.       spironolactone (ALDACTONE) 25 MG  "tablet Take 1 tablet (25 mg) by mouth daily 90 tablet 3     Vitamin D, Cholecalciferol, 1000 UNITS TABS Take 3,000 Units by mouth daily        TODAY DURING EXAM/ROS:  No CP, SOB, Cough, dizziness, fevers, chills, HA, N/V, dysuria or Bowel Abnormalities. Appetite is good.  No pains, \"I want to go home, I feel great\"      Objective:  /72   Pulse 70   Temp 97.8  F (36.6  C)   Resp 18   Ht 1.829 m (6')   Wt 67.1 kg (148 lb)   SpO2 97%   BMI 20.07 kg/m    Exam:  GENERAL APPEARANCE:  Alert, oriented, cooperative, NAD.  ENT:  Mouth with moist mucous membranes, [place].  RESP:  respiratory effort  of chest normal, lungs clear to auscultation, slightly decreased bases,NAD  CV:  Auscultation of heart done ,IRRR. Soft systolic murmur.  No Rub or Gallop, trace left foot/ankle pedal edema and +1 right foot/ankle edema with PVD changes fer on right.,   +1 pedal pulses.  ABDOMEN:  normal bowel sounds, soft, nontender.   M/S:   CHIU equally, up with assist-seen in WC  SKIN:  Inspection of skin is at baseline but limited as pt fully dressed. Both shoes on--no longer a drsg on right foot(*nails needed trimming--see PCC*)  NEURO:   Cranial nerves 2-12 are grossly intact and at patient's baseline  PSYCH:  oriented X 3, engaged in conversation.      Labs:   Recent Labs   Lab Test 03/09/20 02/28/20  0842   * 135   POTASSIUM 4.5 4.3   CHLORIDE 103 105   CO2 23 25   ANIONGAP 6* 5   GLC 73 84   BUN 19 21   CR 1.45* 1.04   BARON 8.4 8.1*       CBC RESULTS:   Recent Labs   Lab Test 03/09/20   WBC 6.4   RBC 3.39*   HGB 9.8*   HCT 33.3*   MCV 98.2   MCH 28.9   MCHC 29.4*   RDW 21.1*   PLT 48*       ASSESSMENT / PLAN:  (N30.00) Acute cystitis without hematuria: Citrobacter  (primary encounter diagnosis)  Comment/Plan: completed bactrim, no further symptoms, BMP from yest in same range-stable.    (D46.Z) Other myelodysplastic syndromes (H)   (D75.89) Bicytopenia  Comment/Plan: saw DR Wise at [facility] last week,  Yest Hgb better, up " from 8.8.  Plts 48 to 39K. Monitor and F/U Onc per their recs    (I42.8) Non-ischemic cardiomyopathy (H)   (I36.1) Non rheumatic tricuspid valve regurgitation   (I50.33) Acute on chronic heart failure with preserved ejection fraction (H)   (J90) Bilateral pleural effusion  Comment/Plan: doing well, compensated, weight down from 156# on admit to 148.2# today. Will  cont current meds, has appt 3/24/20 with Jazlyn Zhang NP at [facility].      (I48.21) Permanent atrial fibrillation  Comment/Plan: HR 70-80's--controlled.  On no anticoag--cont BB and DIg, monitor.    (R53.81) Physical deconditioning  Comment/Plan: PT OT, progressing, no LCD yet per d/w therapies.      Electronically signed by:  ERIC Mir CNP

## 2020-03-16 ENCOUNTER — TELEPHONE (OUTPATIENT)
Dept: GERIATRICS | Facility: CLINIC | Age: 85
End: 2020-03-16

## 2020-03-16 NOTE — TELEPHONE ENCOUNTER
Patient is feeling ill and weak; VSS besides increased respiratory rate. LS RML diminished and RLL absent - patient with hx of thoracentesis -requesting CXR. Patient otherwise stable.    ORDER:  -Okay for 2 view CXR to assess for presence of fluid buildup    Dr. Phyllis Quintero, APRN, DNP, A/GNP-[facility]  [address] [place] [address] [address]  [place] [address]     Cell: [phone]  Fax: [phone]  Email: [email or web address]

## 2020-03-18 ENCOUNTER — NURSING HOME VISIT (OUTPATIENT)
Dept: GERIATRICS | Facility: CLINIC | Age: 85
End: 2020-03-18
Payer: MEDICARE

## 2020-03-18 ENCOUNTER — DOCUMENTATION ONLY (OUTPATIENT)
Dept: OTHER | Facility: CLINIC | Age: 85
End: 2020-03-18

## 2020-03-18 VITALS
HEIGHT: 72 IN | DIASTOLIC BLOOD PRESSURE: 62 MMHG | RESPIRATION RATE: 17 BRPM | HEART RATE: 74 BPM | TEMPERATURE: 97.5 F | OXYGEN SATURATION: 97 % | SYSTOLIC BLOOD PRESSURE: 120 MMHG | BODY MASS INDEX: 19.94 KG/M2 | WEIGHT: 147.2 LBS

## 2020-03-18 DIAGNOSIS — R53.81 PHYSICAL DECONDITIONING: ICD-10-CM

## 2020-03-18 DIAGNOSIS — I36.1 NONRHEUMATIC TRICUSPID VALVE REGURGITATION: ICD-10-CM

## 2020-03-18 DIAGNOSIS — J98.11 ATELECTASIS, RIGHT: Primary | ICD-10-CM

## 2020-03-18 DIAGNOSIS — I48.21 PERMANENT ATRIAL FIBRILLATION (H): ICD-10-CM

## 2020-03-18 DIAGNOSIS — I50.33 ACUTE ON CHRONIC HEART FAILURE WITH PRESERVED EJECTION FRACTION (H): ICD-10-CM

## 2020-03-18 DIAGNOSIS — I42.8 NON-ISCHEMIC CARDIOMYOPATHY (H): ICD-10-CM

## 2020-03-18 DIAGNOSIS — D46.Z OTHER MYELODYSPLASTIC SYNDROMES (H): ICD-10-CM

## 2020-03-18 DIAGNOSIS — Z71.89 COUNSELING REGARDING ADVANCED DIRECTIVES: ICD-10-CM

## 2020-03-18 PROCEDURE — 99309 SBSQ NF CARE MODERATE MDM 30: CPT | Performed by: NURSE PRACTITIONER

## 2020-03-18 ASSESSMENT — MIFFLIN-ST. JEOR: SCORE: 1370.69

## 2020-03-18 NOTE — PROGRESS NOTES
[facility]  [place] Medical Record Number:  [id number]  Place of Service where encounter took place:  [facility] ([facility]) [[id number]]  Chief Complaint   Patient presents with     Nursing Home Acute     cough       HPI:    Jama Paul  is a 89 year old (2/13/1931), who is being seen today for an episodic care visit.  HPI information obtained from: facility chart records, facility staff, patient report and [facility] chart review. Today's concern is: saw pt today as he had CXR on the wkd for cough and SOB--he felt fluid was increasing in his right lung base again. In the past, he has been good  with his symptoms of this.  CXR showed  small infiltrated in RLL with some atelectasis.  Since the wkd, his cough has gone away, he is not febrile.  Today he feels better, a little weak though.  He has no SOB< Cough, CP or fever.  No dizziness either. Bowel and bladder function good.       Atelectasis, right  Physical deconditioning  Non-ischemic cardiomyopathy (H)  Nonrheumatic tricuspid valve regurgitation  Acute on chronic heart failure with preserved ejection fraction (H)  Permanent atrial fibrillation  Other myelodysplastic syndromes (H)  Counseling regarding advanced directives      Past Medical and Surgical History reviewed in Epic today.    MEDICATIONS:     Current Outpatient Medications   Medication Sig Dispense Refill     acetaminophen (TYLENOL) 325 MG tablet Take 650 mg by mouth every 6 hours as needed for Pain       digoxin (LANOXIN) 125 MCG tablet Take 1 tablet (125 mcg) by mouth daily (Patient taking differently: Take 125 mcg by mouth daily HOLD if HR <55) 30 tablet 0     melatonin 3 MG tablet Take 3 mg by mouth At Bedtime After 9pm. Please use overnight depends on pt to allow him to sleep without interruption during the night       metoprolol succinate ER (TOPROL-XL) 50 MG 24 hr tablet Take 1 tablet (50 mg) by mouth daily 180 tablet 3     mirtazapine (REMERON) 15  MG tablet Take 15 mg by mouth At Bedtime Patient started medication for the first time yesterday 10/29/2019.        Multiple Vitamins-Minerals (ICAPS AREDS FORMULA PO) Take 1 tablet by mouth daily Takes in addition to multivitamin with minerals       multivitamin, therapeutic (THERA-VIT) TABS tablet Take 1 tablet by mouth daily       OTHER MEDICAL SUPPLIES CORE PROTOCOL ; DAILY WEIGHTS IN AM.       spironolactone (ALDACTONE) 25 MG tablet Take 1 tablet (25 mg) by mouth daily 90 tablet 3     Vitamin D, Cholecalciferol, 1000 UNITS TABS Take 3,000 Units by mouth daily        ROS:  See above under HPI      Objective:  /62   Pulse 74   Temp 97.5  F (36.4  C)   Resp 17   Ht 1.829 m (6')   Wt 66.8 kg (147 lb 3.2 oz)   SpO2 97%   BMI 19.96 kg/m    Exam:  GENERAL APPEARANCE:  Alert, oriented, cooperative, NAD.  ENT:  Mouth with moist mucous membranes, [place].  RESP:  respiratory effort  of chest normal, lungs clear to auscultation NAD  CV:  Auscultation of heart done ,IRRR. Soft systolic murmur.  No Rub or Gallop. Edema not checked to minimize touching pt  M/S:   CHIU equally, seen in WC  SKIN:  Inspection of skin is limited as pt fully dressed.    NEURO:   Cranial nerves 2-12 are grossly intact and at patient's baseline  PSYCH:  oriented X 3, engaged in conversation.      Labs:   Recent Labs   Lab Test 03/09/20 02/28/20  0842   * 135   POTASSIUM 4.5 4.3   CHLORIDE 103 105   CO2 23 25   ANIONGAP 6* 5   GLC 73 84   BUN 19 21   CR 1.45* 1.04   BARON 8.4 8.1*       CBC RESULTS:   Recent Labs   Lab Test 03/09/20   WBC 6.4   RBC 3.39*   HGB 9.8*   HCT 33.3*   MCV 98.2   MCH 28.9   MCHC 29.4*   RDW 21.1*   PLT 48*     ASSESSMENT / PLAN:     (J98.11) Atelectasis, right  Comment/Plan: per CXR--pt feels fine though. Will monitor. I d/w him that we will minimize further CXR due to COVID19 concerns to limit people coming into facility    (R53.81) Physical deconditioning  Comment/Plan: PT OT, slow progression, will need LTC  per Therapy notes.    CARDIAC ISSUES:  (I42.8) Non-ischemic cardiomyopathy (H)   (I36.1) Non rheumatic tricuspid valve regurgitation   (I50.33) Acute on chronic heart failure with preserved ejection fraction (H)  (I48.21) Permanent atrial fibrillation  Comment/Plan: wt is up a bit but looks good. Will consider dose of lasix or Demadex.    ADMIT WT:  156#   146.7# ON 3/14     144# ON 3/15 -147# SINCE WKD    (D46.Z) Other myelodysplastic syndromes (H)  Comment/Plan: Steady last check, f/u Dr Frandy diaz       (Z71.89) Counseling regarding advanced directives  Comment/Plan: d/w pt at distance of >6 feet--he wishes to remain a FULL Code and signed POLST.  He understands that if COVID19 worsens and he had such symptoms, hospitals may choose to not admit and we would treat him here and there is no current cure or treatment for it        Electronically signed by:  ERIC Mir CNP

## 2020-03-23 ENCOUNTER — DOCUMENTATION ONLY (OUTPATIENT)
Dept: CARDIOLOGY | Facility: CLINIC | Age: 85
End: 2020-03-23

## 2020-03-23 ENCOUNTER — MYC MEDICAL ADVICE (OUTPATIENT)
Dept: CARDIOLOGY | Facility: CLINIC | Age: 85
End: 2020-03-23

## 2020-03-23 ENCOUNTER — TRANSFERRED RECORDS (OUTPATIENT)
Dept: HEALTH INFORMATION MANAGEMENT | Facility: CLINIC | Age: 85
End: 2020-03-23

## 2020-03-23 LAB
ANION GAP SERPL CALCULATED.3IONS-SCNC: 9 MMOL/L (ref 7–16)
BUN SERPL-MCNC: 24 MG/DL (ref 7–26)
CALCIUM SERPL-MCNC: 8.6 MG/DL (ref 8.4–10.4)
CHLORIDE SERPLBLD-SCNC: 102 MMOL/L (ref 98–109)
CO2 SERPL-SCNC: 25 MMOL/L (ref 20–29)
CREAT SERPL-MCNC: 1.19 MG/DL (ref 0.73–1.18)
GFR SERPL CREATININE-BSD FRML MDRD: 54 ML/MIN/1.73M2
GLUCOSE SERPL-MCNC: 97 MG/DL (ref 70–100)
POTASSIUM SERPL-SCNC: 4.9 MMOL/L (ref 3.5–5.1)
SODIUM SERPL-SCNC: 136 MMOL/L (ref 136–145)

## 2020-03-23 NOTE — PROGRESS NOTES
Spoke to [facility] regarding BMP to be drawn 3/23/20 prior to 3/24/20 OV.  Pt had labs drawn 3/9/20 that are scanned in, had MATTY Scales review and due to results we have BMP repeated.  They will fax results over.     REBECA Mcfarlane(AAMA) 3/23/20

## 2020-03-24 ENCOUNTER — CARE COORDINATION (OUTPATIENT)
Dept: CARDIOLOGY | Facility: CLINIC | Age: 85
End: 2020-03-24

## 2020-03-24 ENCOUNTER — MYC MEDICAL ADVICE (OUTPATIENT)
Dept: CARDIOLOGY | Facility: CLINIC | Age: 85
End: 2020-03-24

## 2020-03-24 ENCOUNTER — VIRTUAL VISIT (OUTPATIENT)
Dept: CARDIOLOGY | Facility: CLINIC | Age: 85
End: 2020-03-24
Attending: INTERNAL MEDICINE
Payer: MEDICARE

## 2020-03-24 VITALS
WEIGHT: 147 LBS | DIASTOLIC BLOOD PRESSURE: 67 MMHG | BODY MASS INDEX: 21.77 KG/M2 | HEIGHT: 69 IN | SYSTOLIC BLOOD PRESSURE: 120 MMHG

## 2020-03-24 DIAGNOSIS — I50.810 RIGHT-SIDED CONGESTIVE HEART FAILURE, UNSPECIFIED HF CHRONICITY (H): Primary | ICD-10-CM

## 2020-03-24 PROCEDURE — G2012 BRIEF CHECK IN BY MD/QHP: HCPCS | Performed by: PHYSICIAN ASSISTANT

## 2020-03-24 ASSESSMENT — MIFFLIN-ST. JEOR
SCORE: 1364.34
SCORE: 1314.23

## 2020-03-24 NOTE — PROGRESS NOTES
"   Jama Paul is a 89 year old male who is being evaluated via a billable telephone visit.      The patient has been notified of following:     \"This telephone visit will be conducted via a call between you and your physician/provider. We have found that certain health care needs can be provided without the need for a physical exam.  This service lets us provide the care you need with a short phone conversation.  If a prescription is necessary we can send it directly to your pharmacy.  If lab work is needed we can place an order for that and you can then stop by our lab to have the test done at a later time.    If during the course of the call the physician/provider feels a telephone visit is not appropriate, you will not be charged for this service.\"     Jama Paul complains of    Chief Complaint   Patient presents with     Heart Failure       I have reviewed and updated the patient's Past Medical History, Social History, Family History and Medication List.    ALLERGIES  Patient has no known allergies.    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  Please see my prior note from 1/21/2020 for full details regarding his medical history.  Unfortunately, he has had a difficult course this year with recurrent hospitalizations for shortness of breath and recurrent pleural effusions.  He saw Dr. baker in February who discussed his case with Dr. Perez at [facility].  He is was referred there for potential enrollment for a trial of tricuspid clipping.  He was seen by Dr. Perez subsequently who recommended a 6-month follow-up with a repeat echocardiogram at that time as clinically he was " "stable.    Since I last saw him, he was readmitted at the end of February with generalized weakness and a Citrobacter UTI.  He underwent another right-sided thoracentesis on 2/28 with 250 cc of fluid removed.  Of note, per the ultrasound report the majority of the right pleural effusion fluid was complex and loculated and could not be aspirated with a catheter.  He was again discharged to a TCU where he is currently.    Today, he tells me he has been feeling fairly fairly well.  He notes his breathing is \"great.\"  He tells me his weights have been stable between 148 and 150 pounds.  I reviewed the recent notes from the providers at [facility].  About a week ago it appears he had a chest x-ray for some complaints of a cough and weakness.  This per the notes showed a small infiltrate in the right lower lobe with some associated atelectasis.  He was seen on 3/18 and his cough had gone away.  He denied shortness of breath at that time.    He tells me he is busy working with rehab and getting stronger.  It appears at some point his torsemide was discontinued.  I cannot tell when or why this was done.  He remains on Spironolactone 25 mg daily.    Assessment/Plan:  Jama Paul is a pleasant 89-year-old male with a history of recurrent pleural effusions, significant tricuspid regurgitation, tachycardia mediated cardiomyopathy with now normal LV function.  He has had recurrent hospitalizations and recurrent thoracentesis for pleural effusions, most recently at the end of February.  Per the ultrasound note it appears much of this pleural fluid is now loculated.  He is residing at [facility] and tells me he is doing well there.  He is not having symptoms of shortness of breath or cough at this point and a recent chest x-ray appears to be stable.  Again, I am not sure why his torsemide was discontinued but it appears for now he is doing fairly well on just spironolactone alone at 25 mg daily.  If he has " worsening shortness of breath, resumption of low-dose torsemide or Lasix could be considered.  He had a repeat basic metabolic panel done yesterday.  Earlier this month his creatinine had risen up to 1.4.  Is now down to 1.19.  His electrolytes are normal.    He saw Dr. Perez for consideration of tricuspid valve clipping at [facility].  He is to follow-up again with him this summer to reevaluate things at that time.    I suggested no changes today.  I recommended we touch base in a month or 2 or certainly sooner if he is having any issues before then.    Phone call duration: 7 minutes    Jazlyn Cr PA-C

## 2020-03-24 NOTE — TELEPHONE ENCOUNTER
"Ayush Peters,  I talked to your Dad today. I was looking for a phone number to call you but couldn't see one so hopefully this is okay. He told me he's doing well. He said his breathing was \"great.\" He's working with therapy and doing fine. I can see from the notes they did a chest XR recently as he had a cough but it didn't show any significant fluid re accumulation. His kidney function earlier this month was worse than his baseline but he had labs done yesterday that looked much better. I didn't change any of his medications today and said we'd touch base in a month or two. It sounds like Dr. Perez from [facility] wanted to see him back this summer which is great. Hope you're doing well. It must be so hard not to be able to see them! Please let me know if you have any other questions. Thanks! Amalia  "

## 2020-03-24 NOTE — PROGRESS NOTES
Incoming fax w/BMP results(doc sent to scanning):  Component      Latest Ref Rng & Units 3/9/2020 3/23/2020   Sodium      136 - 145 mmol/L 132 (A) 136   Potassium      3.5 - 5.1 mmol/L 4.5 4.9   Chloride      98 - 109 mmol/L 103 102   Carbon Dioxide      20 - 29 mmol/L 23 25   Anion Gap      7 - 16 mmol/L 6 (A) 9   Glucose      70 - 100 mg/dL 73 97   Urea Nitrogen      7 - 26 mg/dL 19 24   Creatinine      0.73 - 1.18 mg/dL 1.45 (A) 1.19 (H)   Calcium      8.4 - 10.4 mg/dL 8.4 8.6   GFR Estimate      >60 ml/min/1.73m2 42 (L) 54 (L)   GFR Estimate If Black      >60 ml/min/1.73m2 49 (L) >60     Patient taking spironolactone 25mg/d. Patient has telephone appt today w/Amalia Cr. Will forward results to her.  Brenda Palacios RN on 3/24/2020 at 9:53 AM

## 2020-03-25 ENCOUNTER — MYC MEDICAL ADVICE (OUTPATIENT)
Dept: CARDIOLOGY | Facility: CLINIC | Age: 85
End: 2020-03-25

## 2020-03-25 ENCOUNTER — NURSING HOME VISIT (OUTPATIENT)
Dept: GERIATRICS | Facility: CLINIC | Age: 85
End: 2020-03-25
Payer: MEDICARE

## 2020-03-25 VITALS
OXYGEN SATURATION: 97 % | RESPIRATION RATE: 18 BRPM | TEMPERATURE: 97.4 F | BODY MASS INDEX: 19.75 KG/M2 | WEIGHT: 145.8 LBS | DIASTOLIC BLOOD PRESSURE: 57 MMHG | SYSTOLIC BLOOD PRESSURE: 114 MMHG | HEIGHT: 72 IN | HEART RATE: 92 BPM

## 2020-03-25 DIAGNOSIS — R53.81 PHYSICAL DECONDITIONING: ICD-10-CM

## 2020-03-25 DIAGNOSIS — I50.32 CHRONIC DIASTOLIC HEART FAILURE (H): ICD-10-CM

## 2020-03-25 DIAGNOSIS — I36.1 NONRHEUMATIC TRICUSPID VALVE REGURGITATION: ICD-10-CM

## 2020-03-25 DIAGNOSIS — I42.8 NON-ISCHEMIC CARDIOMYOPATHY (H): Primary | ICD-10-CM

## 2020-03-25 DIAGNOSIS — N18.30 CKD (CHRONIC KIDNEY DISEASE) STAGE 3, GFR 30-59 ML/MIN (H): ICD-10-CM

## 2020-03-25 DIAGNOSIS — Z91.81 HISTORY OF RECENT FALL: ICD-10-CM

## 2020-03-25 DIAGNOSIS — I48.20 CHRONIC ATRIAL FIBRILLATION (H): ICD-10-CM

## 2020-03-25 DIAGNOSIS — D46.Z OTHER MYELODYSPLASTIC SYNDROMES (H): ICD-10-CM

## 2020-03-25 PROCEDURE — 99309 SBSQ NF CARE MODERATE MDM 30: CPT | Performed by: NURSE PRACTITIONER

## 2020-03-25 NOTE — PROGRESS NOTES
[facility]  [place] [facility] Record Number:  [id number]  Place of Service where encounter took place:  [facility] ([facility]) [[id number]]  Chief Complaint   Patient presents with     Nursing Home Acute     This visit and exam is limited as done mostly from doorway due to COVID19 precautions--see ROS and Exam for actual data.      HPI:    Jama Paul  is a 89 year old (2/13/1931), who is being seen today for an episodic care visit.  HPI information obtained from: facility chart records, facility staff, patient report and [facility] chart review. Today's concern is:pt had fall on Monday---he had been given the ok to walk on own with walker.  He said he slid down and it was not a fall--small scrape on right elbow.  Today he feels better       Non-ischemic cardiomyopathy (H)  Nonrheumatic tricuspid valve regurgitation  Chronic diastolic heart failure (H)  CKD (chronic kidney disease) stage 3, GFR 30-59 ml/min (H)  Other myelodysplastic syndromes (H)  Physical deconditioning  History of recent fall      TODAY DURING EXAM/ROS:  No CP, SOB, Cough, dizziness, fevers, chills, HA, N/V, dysuria or Bowel Abnormalities. Appetite is good.  No pain      Past Medical and Surgical History reviewed in Epic today.    MEDICATIONS:     Current Outpatient Medications   Medication Sig Dispense Refill     acetaminophen (TYLENOL) 325 MG tablet Take 650 mg by mouth every 6 hours as needed for Pain       digoxin (LANOXIN) 125 MCG tablet Take 1 tablet (125 mcg) by mouth daily (Patient taking differently: Take 125 mcg by mouth daily HOLD if HR <55) 30 tablet 0     melatonin 3 MG tablet Take 3 mg by mouth At Bedtime After 9pm. Please use overnight depends on pt to allow him to sleep without interruption during the night       metoprolol succinate ER (TOPROL-XL) 50 MG 24 hr tablet Take 1 tablet (50 mg) by mouth daily 180 tablet 3     mirtazapine (REMERON) 15 MG tablet Take 15 mg by mouth At Bedtime  Patient started medication for the first time yesterday 10/29/2019.        Multiple Vitamins-Minerals (ICAPS AREDS FORMULA PO) Take 1 tablet by mouth daily Takes in addition to multivitamin with minerals       multivitamin, therapeutic (THERA-VIT) TABS tablet Take 1 tablet by mouth daily       OTHER MEDICAL SUPPLIES CORE PROTOCOL ; DAILY WEIGHTS IN AM.       spironolactone (ALDACTONE) 25 MG tablet Take 1 tablet (25 mg) by mouth daily 90 tablet 3     Vitamin D, Cholecalciferol, 1000 UNITS TABS Take 3,000 Units by mouth daily        ROS:  See above under HPI      Objective:  /57   Pulse 92   Temp 97.4  F (36.3  C)   Resp 18   Ht 1.829 m (6')   Wt 66.1 kg (145 lb 12.8 oz)   SpO2 97%   BMI 19.77 kg/m    Exam:  GENERAL APPEARANCE:  Alert, oriented, cooperative, NAD.  ENT:  Mouth with moist mucous membranes, [place].  RESP:  respiratory effort  of chest normal, lungs clear to auscultation but mildly decreased in right base--listened from back  CV:  Auscultation of heart done ,IRRR. Soft systolic murmur. No visible edema  M/S:   CHIU equally, seen in WC  SKIN:  Inspection of skin is limited as pt fully dressed.    NEURO:   Cranial nerves 2-12 are grossly intact and at patient's baseline  PSYCH:  oriented X 3, engaged in conversation.      Labs:   Recent Labs   Lab Test 03/23/20 03/09/20    132*   POTASSIUM 4.9 4.5   CHLORIDE 102 103   CO2 25 23   ANIONGAP 9 6*   GLC 97 73   BUN 24 19   CR 1.19* 1.45*   BARON 8.6 8.4     CBC RESULTS:   Recent Labs   Lab Test 03/09/20   WBC 6.4   RBC 3.39*   HGB 9.8*   HCT 33.3*   MCV 98.2   MCH 28.9   MCHC 29.4*   RDW 21.1*   PLT 48*       ASSESSMENT / PLAN:      (R53.81) Physical deconditioning  Comment/Plan: PT OT, progressing, will likely need LTC per Therapy notes.    CARDIAC ISSUES:  (I42.8) Non-ischemic cardiomyopathy (H)  (I36.1) Non rheumatic tricuspid valve regurgitation   (I50.32) Chronic diastolic heart failure (H)  (I48.21) Permanent atrial  fibrillation  Comment/Plan: wt is up a bit but looks good. Will consider dose of lasix or Demadex.    ADMIT WT:  156#--wt has been as low as 144# but steady in the 146-147# range.    (N18.3) CKD (chronic kidney disease) stage 3, GFR 30-59 ml/min (H)  Comment/Plan: steady labs, check in 2-3 week or prn as condition warrants    (D46.Z) Other myelodysplastic syndromes (H)  Comment/Plan: Steady last check, f/u Dr Frandy diaz       (Z91.81) History of recent fall  Comment/Plan: cont with PT, still some weakness, monitor       Electronically signed by:  ERIC Mir CNP

## 2020-04-06 NOTE — DISCHARGE SUMMARY
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  Please see admission H&P for details on presentation.    Hospital Course   Jama Paul was admitted on 2/27/2020.  The following problems were addressed during his hospitalization:    # Generalized weakness   Likely secondary to Citrobacter UTI however under likely contributory factors were anemia and recurrent pleural effusion.  On admission he had a wbc 17, abnormal UA and CXR with b/l pleural effusion, rt>left; Urine culture 2/27 grew Citrobacter, pansensitive; he was treated with Levaquin was switched to PO Bactrim for discharge.  He was evaluated by therapy and returned back to TCU at [facility].     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 Been evaluated by Dr Ma from oncology in past; triend IV Steroids in past; some concern for MDS;  counts stable; patient had follow up appointment with Dr Wise in the week following discharge.     # Recurrent bilateral pleural effusions; possibly secondary to CHF  Status post thoracentesis 2/28, fluid cultures with no growth so far; fluid analysis likely transudative (using protein and LDH levels from the day before). Cytology pending.     # Atrial fibrillation  Continue on digoxin he is not on any anticoagulation, continue his metoprolol.     Escobar Yepez MD    Significant Results and Procedures   As outlined above.    Pending Results     Unresulted Labs Ordered in the Past 30 Days of this Admission     No orders found from 1/28/2020 to 2/28/2020.          Code Status   Full Code       Primary Care Physician   Mariusz Shields        Discharge Disposition   Transitional care unit.  Condition at discharge: Stable    Consultations This Hospital Stay   PHYSICAL THERAPY ADULT IP CONSULT  OCCUPATIONAL THERAPY ADULT IP CONSULT  PHYSICAL THERAPY ADULT IP CONSULT  CARE TRANSITION RN/SW IP CONSULT    Time Spent on this Encounter   I, Escobar Yepez MD, personally saw the patient today and spent greater than 30 minutes discharging this patient.    Discharge Orders      Reason for your hospital stay    You were admitted with weakness, bladder infection and also had fluid drained from around the lung.     Follow-up and recommended labs and tests     Follow up with primary care provider, Mariusz Shields, within 7 days for hospital follow- up.  The following labs/tests are recommended: repeat CBC, BMP.    PCP / Hematology to follow up on the cytology from pleural fluid which is pending at the time of discharge.    Follow up with hematologist Dr Wise as scheduled.     General info for SNF    Length of Stay Estimate: Short Term Care: Estimated # of Days <30  Condition at Discharge: Improving  Level of care:skilled   Rehabilitation Potential: Good  Admission H&P remains valid and up-to-date: Yes  Recent Chemotherapy:  N/A  Use Nursing Home Standing Orders: Yes     Mantoux instructions    Give two-step Mantoux (PPD) Per Facility Policy Yes     Follow Up and recommended labs and tests    Follow up with Nursing home physician.     Activity - Up ad fernanda     General info for SNF    Length of Stay Estimate: Short Term Care: Estimated # of Days <30  Condition at Discharge: Stable  Level of care:skilled   Rehabilitation Potential: Good  Admission H&P remains valid and up-to-date: Yes  Recent Chemotherapy: N/A  Use Nursing Home Standing Orders: Yes     Mantoux instructions    Give two-step Mantoux (PPD) Per Facility Policy Yes     Follow Up and recommended labs and tests    Follow up with primary care provider in 7 days for hospital discharge follow up.     Physical Therapy Adult Consult    Evaluate and treat as clinically indicated.    Reason:  deconditioning     Advance Diet as Tolerated    Follow this diet upon discharge: Regular Diet Adult     Discharge Medications   Discharge Medication List as of 3/1/2020 12:37 PM      CONTINUE these medications which have CHANGED    Details   metoprolol succinate ER (TOPROL-XL) 50 MG 24 hr tablet Take 1 tablet (50 mg) by mouth daily, Disp-180 tablet, R-3, No Print Out      sulfamethoxazole-trimethoprim (BACTRIM DS) 800-160 MG tablet Take 1 tablet by mouth 2 times daily for 7 days, Disp-14 tablet, R-0, Transitional         CONTINUE these medications which have NOT CHANGED    Details   acetaminophen (TYLENOL) 325 MG tablet Take 650 mg by mouth 2 times daily every 6 hours as needed for Pain, Historical      digoxin (LANOXIN) 125 MCG tablet Take 1 tablet (125 mcg) by mouth daily, Disp-30 tablet, R-0, E-PrescribeFurther refill from his PCP      melatonin 3 MG tablet Take 3 mg by mouth At Bedtime After 9pm. Please use overnight depends on pt to allow him to sleep without interruption during the night, Historical      mirtazapine (REMERON) 15 MG tablet Take 15 mg by mouth At Bedtime Patient started  medication for the first time yesterday 10/29/2019. , Historical      Multiple Vitamins-Minerals (ICAPS AREDS FORMULA PO) Take 1 tablet by mouth daily Takes in addition to multivitamin with minerals, Historical      multivitamin, therapeutic (THERA-VIT) TABS tablet Take 1 tablet by mouth daily, Historical      OTHER MEDICAL SUPPLIES CORE PROTOCOL ; DAILY WEIGHTS IN AM., Historical      spironolactone (ALDACTONE) 25 MG tablet Take 1 tablet (25 mg) by mouth daily, Disp-90 tablet, R-3, E-Prescribe      Vitamin D, Cholecalciferol, 1000 UNITS TABS Take 3,000 Units by mouth daily , Historical           Allergies   No Known Allergies

## 2020-04-09 ENCOUNTER — PATIENT OUTREACH (OUTPATIENT)
Dept: CARE COORDINATION | Facility: CLINIC | Age: 85
End: 2020-04-09

## 2020-04-09 NOTE — PROGRESS NOTES
Clinic Care Coordination Contact    Situation: Patient chart reviewed by care coordinator.    Background: Pt was in the hospital 2/27-3/1/2020 for generalize weakness.    Assessment: [facility] to [facility].    Plan/Recommendations: KIRAN BETH will continue to follow pt's progress at TCU and outreach upon his discharge.    JASMINA Martinez, [facility]  Clinic Care Coordinator  [facility]  [phone]  [email or web address]

## 2020-04-13 ASSESSMENT — MIFFLIN-ST. JEOR: SCORE: 1383.4

## 2020-04-14 ENCOUNTER — VIRTUAL VISIT (OUTPATIENT)
Dept: GERIATRICS | Facility: CLINIC | Age: 85
End: 2020-04-14
Payer: MEDICARE

## 2020-04-14 VITALS
BODY MASS INDEX: 20.26 KG/M2 | HEART RATE: 74 BPM | RESPIRATION RATE: 18 BRPM | DIASTOLIC BLOOD PRESSURE: 82 MMHG | SYSTOLIC BLOOD PRESSURE: 126 MMHG | HEIGHT: 72 IN | WEIGHT: 149.6 LBS | OXYGEN SATURATION: 98 % | TEMPERATURE: 98.7 F

## 2020-04-14 DIAGNOSIS — I36.1 NONRHEUMATIC TRICUSPID VALVE REGURGITATION: ICD-10-CM

## 2020-04-14 DIAGNOSIS — R53.81 PHYSICAL DECONDITIONING: ICD-10-CM

## 2020-04-14 DIAGNOSIS — D46.Z OTHER MYELODYSPLASTIC SYNDROMES (H): ICD-10-CM

## 2020-04-14 DIAGNOSIS — I48.20 CHRONIC ATRIAL FIBRILLATION (H): ICD-10-CM

## 2020-04-14 DIAGNOSIS — I50.32 CHRONIC DIASTOLIC HEART FAILURE (H): ICD-10-CM

## 2020-04-14 DIAGNOSIS — N18.30 CKD (CHRONIC KIDNEY DISEASE) STAGE 3, GFR 30-59 ML/MIN (H): ICD-10-CM

## 2020-04-14 DIAGNOSIS — I42.8 NON-ISCHEMIC CARDIOMYOPATHY (H): Primary | ICD-10-CM

## 2020-04-14 PROCEDURE — 99308 SBSQ NF CARE LOW MDM 20: CPT | Mod: 95 | Performed by: NURSE PRACTITIONER

## 2020-04-14 ASSESSMENT — MIFFLIN-ST. JEOR: SCORE: 1381.58

## 2020-04-14 NOTE — PROGRESS NOTES
"[facility]   Jama Paul is being evaluated via a billable video visit due to the restrictions of the Covid-19 pandemic.   The patient has been notified of following:   \"This video visit will be conducted via a call between you and your provider. We have found that certain health care needs can be provided without the need for an in-person physical exam.  This service lets us provide the care you need with a video conversation. If during the course of the call the provider feels a video visit is not appropriate, you will not be charged for this service.\"   The provider has received verbal consent for a Video Visit from the patient or first contact? Yes  Patient  or facility staff would like the video invitation sent by: N/A   Video Start Time: 1135     [facility] Medical Record Number:  [id number]  Place of Location at the time of visit: [facility]  Chief Complaint   Patient presents with     Nursing Home Acute     HPI:  Jama Paul  is a 89 year old (2/13/1931), who is being seen today for a visit.  HPI information obtained from: facility chart records, facility staff, patient report and [facility] chart review. Today's concern is:     Non-ischemic cardiomyopathy (H)  Nonrheumatic tricuspid valve regurgitation  Chronic diastolic heart failure (H)  Chronic atrial fibrillation  CKD (chronic kidney disease) stage 3, GFR 30-59 ml/min (H)  Other myelodysplastic syndromes (H)  Physical deconditioning      Past Medical and Surgical History reviewed in Epic today.  MEDICATIONS:     Current Outpatient Medications   Medication Sig Dispense Refill     acetaminophen (TYLENOL) 325 MG tablet Take 650 mg by mouth every 6 hours as needed for Pain       digoxin (LANOXIN) 125 MCG tablet Take 1 tablet (125 mcg) by mouth daily (Patient taking differently: Take 125 mcg by mouth daily HOLD if HR <55) 30 tablet 0     melatonin 3 MG tablet Take 3 mg by mouth At Bedtime After 9pm. " Please use overnight depends on pt to allow him to sleep without interruption during the night       metoprolol succinate ER (TOPROL-XL) 50 MG 24 hr tablet Take 1 tablet (50 mg) by mouth daily 180 tablet 3     mirtazapine (REMERON) 15 MG tablet Take 15 mg by mouth At Bedtime Patient started medication for the first time yesterday 10/29/2019.        Multiple Vitamins-Minerals (ICAPS AREDS FORMULA PO) Take 1 tablet by mouth daily Takes in addition to multivitamin with minerals       multivitamin, therapeutic (THERA-VIT) TABS tablet Take 1 tablet by mouth daily       OTHER MEDICAL SUPPLIES CORE PROTOCOL ; DAILY WEIGHTS IN AM.       spironolactone (ALDACTONE) 25 MG tablet Take 1 tablet (25 mg) by mouth daily 90 tablet 3     Vitamin D, Cholecalciferol, 1000 UNITS TABS Take 3,000 Units by mouth daily         TODAY DURING EXAM/ROS:  No CP, SOB, Cough, dizziness, fevers, chills, HA, N/V, dysuria or Bowel Abnormalities. Appetite is good.  No pains      Objective: /82   Pulse 74   Temp 98.7  F (37.1  C)   Resp 18   Ht 1.829 m (6')   Wt 67.9 kg (149 lb 9.6 oz)   SpO2 98%   BMI 20.29 kg/m    Limited visit exam done given COVID-19 precautions.     GENERAL APPEARANCE:  Alert, in no distress, oriented, cooperative  ENT:  Mouth and posterior oropharynx normal, moist mucous membranes, normal hearing acuity  EYES:  EOM, conjunctivae, lids, pupils and irises normal, Conjunctiva and lids normal  RESP:  respiratory effort  of chest normal, no respiratory distress.  LUNGS per nurse exam: CTA  CV:  Nurse exams:no edema  M/S:   CHIU, up with assist and walker  NEURO:   Cranial nerves 2-12 are normal tested and grossly at patient's baseline  PSYCH:  oriented X 3, normal insight, judgement and memory, affect and mood normal    Labs:    Recent Labs   Lab Test 03/23/20 03/09/20    132*   POTASSIUM 4.9 4.5   CHLORIDE 102 103   CO2 25 23   ANIONGAP 9 6*   GLC 97 73   BUN 24 19   CR 1.19* 1.45*   BARON 8.6 8.4     CBC RESULTS:    Recent Labs   Lab Test 03/09/20   WBC 6.4   RBC 3.39*   HGB 9.8*   HCT 33.3*   MCV 98.2   MCH 28.9   MCHC 29.4*   RDW 21.1*   PLT 48*     ASSESSMENT / PLAN:  CV ISSUES:  (I42.8) Non-ischemic cardiomyopathy (H)  (primary encounter diagnosis)   (I36.1) Nonrheumatic tricuspid valve regurgitation   (I50.32) Chronic diastolic heart failure (H)   (I48.20) Chronic atrial fibrillation  Comment/Plan: wt steady,  Well compensated--no changes to POC. Will check BMP in am    (N18.3) CKD (chronic kidney disease) stage 3, GFR 30-59 ml/min (H)  Comment/Plan: doing well, will check BMP in am, cont same meds, no changes    (D46.Z) Other myelodysplastic syndromes (H)  Comment/Plan: checking Hgb, Plts in am    (R53.81) Physical deconditioning  Comment/Plan: PT OT, no LCD yet per Rounds      Electronically signed by:  ERIC Mir CNP       Video-Visit Details  Type of service:  Video Visit  Video End Time (time video stopped): 1135  Distant Location (provider location):  [facility]

## 2020-04-15 ENCOUNTER — TELEPHONE (OUTPATIENT)
Dept: GERIATRICS | Facility: CLINIC | Age: 85
End: 2020-04-15

## 2020-04-16 NOTE — TELEPHONE ENCOUNTER
CC and HPI:  Nurse reports that labs as follows     Recent Labs   Lab Test 4/15/20 03/23/20 03/09/20    136 132*   POTASSIUM 4.7 4.9 4.5   CHLORIDE 103 102 103   CO2 26 25 23   ANIONGAP  9 6*   GLC  97 73   BUN 21 24 19   CR 1.10/GFR59 1.19* 1.45*   BARON 8.8 8.6 8.4     CBC RESULTS:    Recent Labs   Lab Test 4/15/20 03/09/20   WBC  6.4   RBC  3.39*   HGB 10.1 9.8*   HCT  33.3*   MCV  98.2   MCH  28.9   MCHC  29.4*   RDW  21.1*   PLT 44 48*         ASSESSMENT and PLAN:    1. Reasonable labs  2. Check prn or in 2-3 weeks , fer Heme      3. Call if further concerns as well as update patient's  provider/NP.

## 2020-04-20 ENCOUNTER — VIRTUAL VISIT (OUTPATIENT)
Dept: GERIATRICS | Facility: CLINIC | Age: 85
End: 2020-04-20
Payer: MEDICARE

## 2020-04-20 VITALS
BODY MASS INDEX: 20.48 KG/M2 | HEART RATE: 65 BPM | HEIGHT: 72 IN | OXYGEN SATURATION: 100 % | SYSTOLIC BLOOD PRESSURE: 126 MMHG | DIASTOLIC BLOOD PRESSURE: 63 MMHG | TEMPERATURE: 98.2 F | WEIGHT: 151.2 LBS | RESPIRATION RATE: 18 BRPM

## 2020-04-20 DIAGNOSIS — D75.89 BICYTOPENIA: ICD-10-CM

## 2020-04-20 DIAGNOSIS — I50.32 CHRONIC DIASTOLIC HEART FAILURE (H): ICD-10-CM

## 2020-04-20 DIAGNOSIS — R15.9 INCONTINENCE OF FECES, UNSPECIFIED FECAL INCONTINENCE TYPE: ICD-10-CM

## 2020-04-20 DIAGNOSIS — I48.21 PERMANENT ATRIAL FIBRILLATION (H): Primary | ICD-10-CM

## 2020-04-20 DIAGNOSIS — N39.46 MIXED INCONTINENCE: ICD-10-CM

## 2020-04-20 DIAGNOSIS — J90 BILATERAL PLEURAL EFFUSION: ICD-10-CM

## 2020-04-20 PROCEDURE — 99309 SBSQ NF CARE MODERATE MDM 30: CPT | Mod: 95 | Performed by: INTERNAL MEDICINE

## 2020-04-20 ASSESSMENT — MIFFLIN-ST. JEOR: SCORE: 1388.84

## 2020-04-20 NOTE — PROGRESS NOTES
" [facility]  Jama Paul is being evaluated via a billable video visit due to the restrictions of the Covid-19 pandemic.   The patient has been notified of following:  \"This video visit will be conducted via a call between you and your provider. We have found that certain health care needs can be provided without the need for an in-person physical exam.  This service lets us provide the care you need with a video conversation. If during the course of the call the provider feels a video visit is not appropriate, you will not be charged for this service.\"   The provider has received verbal consent for a Video Visit from the patient and or first contact? Yes  Patient/facility staff would like the video invitation sent by: N/A   Video Start Time: 11:15 am  Which Facility the Patient is at during the time of visit: [facility]    Chief Complaint   Patient presents with     Video Visit     [facility] Medical Record Number:  [id number]  Jama Paul  is a 89 year old  (2/13/1931) seen at [facility], moved over from [facility] this month.   Pt initially admitted to TCU in November, hospitalized several times in the interim.   He discharged back to his apartment with his wife and [facility] in January, but hospitalized 2/27/20 with weakness and citrobacter UTI.   He has worked with therapies in TCU, but not able to regain enough mobility to return to independent living.     HPI:    HPI information obtained from: facility staff, patient report and [facility] chart review.   Pt is seen in his room up to chair.   States he feels well, denies any dyspnea, cough or CV symptoms.    States he is eating and sleeping well, spirits are pretty good.   He has transferred to LT for extended care, and no concerns reported by nursing staff there.    Last hospitalization was in February for weakness thought related to citrobacter UTI, anemia, recurrent pleural effusion.  He has anemia " and thrombocytopenia >6 years duration, poss MDS and followed by Hem/Onc; he saw Dr Wise 3/2 who recommended ongoing surveillance.     Pt had [facility] hospitalization 1/11-15 /2020 for recurrent large right pleural effusion.   He was treated with IV Lasix, had thoracentesis removing 2200 mLs of fluid and spironolactone was added to his daily torsemide.    He saw Cardiology and CT surgery to address poly-valvular disease in February.   Noted he is on optimal medical therapy and has a normal EF; follow up scheduled.     Pt initially had [facility] hospitalization 10/30-11/17/19 for shortness of breath.  He was found to have bilateral pleural effusions and had thoracentesis x2 on the left and one time on the right.    Pleural fluid c/w transudate.   ECHO cardiogram showed EF 55-60% with severely dilated RV, severe MR and TR.   Pt needed GI consult and esophageal dilatation to have KATIE done 11/1    Following this procedure he had hypotension requiring pressor support in the ICU.   He was diuresed with IV furosemide and repeat ECHO showed severe TR and moderate MR, with some improvement in both.   He was seen by Cardiology and CV surgery. He underwent left heart catheterization as workup for tricuspid repair or replacement, and this showed normal left and right sided pressures.     He remains on metoprolol and torsemide, but not on Entresto secondary to low bps.         Pt has ITP, poss early myelodysplastic syndrome.   He had a BM biopsy in March 2019 that showed hypercellular marrow with 80% cellularity and normal thrombocytes.   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  He was anticoagulated with warfarin for atrial fib and had a bleed behind his retina, lost vision in his left eye; has not been anticoagulated since then.   He has seen Hem/Onc and been treated with prednisone and IVIG with some improvement.         Patient has been  incontinent of stool, for 3 years by his report.  Etiology unclear, ?functional.      He has had longer standing urinary incontinence for which he wears briefs at home.      Past Medical History:   Diagnosis Date     Congestive heart failure (H)      Elevated PSA      Eye hemorrhage, left 02/2019     Non-ischemic cardiomyopathy (H)     2017, med treatment, echo done 4/18 nl ef, mod to severe tr     Permanent atrial fibrillation 2011     Thrombocytopenia (H)      Unspecified essential hypertension    Cognitive impairment    Past Surgical History:   Procedure Laterality Date     ABDOMEN SURGERY      bowel obstruction     BONE MARROW BIOPSY, BONE SPECIMEN, NEEDLE/TROCAR N/A 3/4/2019    Procedure: BIOPSY BONE MARROW;  Surgeon: Josey Vargas MD;  Location:  GI     CARPAL TUNNEL RELEASE RT/LT  2014     CV HEART CATHETERIZATION WITH POSSIBLE INTERVENTION N/A 11/15/2019    Procedure: Left and right heart Catheterization with Possible Intervention;  Surgeon: Adolfo Paez MD;  Location:  HEART CARDIAC CATH LAB     CV LEFT VENTRICULOGRAM N/A 11/15/2019    Procedure: Left Ventriculogram;  Surgeon: Adolfo Paez MD;  Location:  HEART CARDIAC CATH LAB     ESOPHAGOSCOPY, GASTROSCOPY, DUODENOSCOPY (EGD), COMBINED N/A 11/6/2019    Procedure: ESOPHAGOGASTRODUODENOSCOPY (EGD);  Surgeon: Marixa Aguila MD;  Location:  GI     EXPLORE GROIN  4/30/2014    Procedure: EXPLORE GROIN;  Surgeon: Sam Aguirre MD;  Location:  OR     HERNIORRHAPHY INGUINAL  4/30/2014    Procedure: HERNIORRHAPHY INGUINAL;  Surgeon: Sam Aguirre MD;  Location:  OR     MOHS MICROGRAPHIC PROCEDURE       PHACOEMULSIFICATION CLEAR CORNEA WITH STANDARD INTRAOCULAR LENS IMPLANT Right 9/8/2015    Procedure: PHACOEMULSIFICATION CLEAR CORNEA WITH STANDARD INTRAOCULAR LENS IMPLANT;  Surgeon: Gil Shannon MD;  Location:  EC     septic olecranon bursitis[       :  Previously lived with his wife, ekaterina at [place]  [facility].   They have a daughter Lorraine and son Jama.   Pt is a retired ophthalmologist, worked clinically until age 79      Non smoker    Current Outpatient Medications   Medication Sig Dispense Refill     acetaminophen (TYLENOL) 325 MG tablet Take 650 mg by mouth every 6 hours as needed for Pain       digoxin (LANOXIN) 125 MCG tablet Take 1 tablet (125 mcg) by mouth daily (Patient taking differently: Take 125 mcg by mouth daily HOLD if HR <55) 30 tablet 0     melatonin 3 MG tablet Take 3 mg by mouth At Bedtime After 9pm. Please use overnight depends on pt to allow him to sleep without interruption during the night       metoprolol succinate ER (TOPROL-XL) 50 MG 24 hr tablet Take 1 tablet (50 mg) by mouth daily 180 tablet 3     mirtazapine (REMERON) 15 MG tablet Take 15 mg by mouth At Bedtime Patient started medication for the first time yesterday 10/29/2019.        Multiple Vitamins-Minerals (ICAPS AREDS FORMULA PO) Take 1 tablet by mouth daily Takes in addition to multivitamin with minerals       multivitamin, therapeutic (THERA-VIT) TABS tablet Take 1 tablet by mouth daily       OTHER MEDICAL SUPPLIES CORE PROTOCOL ; DAILY WEIGHTS IN AM.       spironolactone (ALDACTONE) 25 MG tablet Take 1 tablet (25 mg) by mouth daily 90 tablet 3     Vitamin D, Cholecalciferol, 1000 UNITS TABS Take 3,000 Units by mouth daily         ROS: 4 point ROS including Respiratory, CV, GI and , other than that noted in the HPI,  is negative    Vitals:/63   Pulse 65   Temp 98.2  F (36.8  C)   Resp 18   Ht 1.829 m (6')   Wt 68.6 kg (151 lb 3.2 oz)   SpO2 100%   BMI 20.51 kg/m     Limited Visit Exam done given COVID-19 precautions:  GENERAL APPEARANCE:  Alert, in no distress    Up to WC, he appears comfortable and no tachypnea or increased work of breathing.   No extremity edema       Lab/Diagnostic data:    Recent Labs   Lab Test 4/15/20 03/23/20 03/09/20    136 132*   POTASSIUM 4.7 4.9 4.5   CHLORIDE 103 102 103   CO2 26  25 23   ANIONGAP   9 6*   GLC   97 73   BUN 21 24 19   CR 1.10/GFR59 1.19* 1.45*   BARON 8.8 8.6 8.4      CBC RESULTS:          Recent Labs   Lab Test 4/15/20 03/09/20   WBC   6.4   RBC   3.39*   HGB 10.1 9.8*   HCT   33.3*   MCV   98.2   MCH   28.9   MCHC   29.4*   RDW   21.1*   PLT 44 48*             ASSESSMENT/PLAN:  (J90) Bilateral pleural effusion  (I50.32) Chronic diastolic heart failure (H)   (I42.8) Non-ischemic cardiomyopathy (H)  Comment: appears to have stable volume status today   Cardiology notes reviewed.    Plan: spironolactone 25 mg/day   Follow symptoms and exam.       (I48.21) Permanent atrial fibrillation  Comment:   Pulse Readings from Last 4 Encounters:   04/20/20 65   04/14/20 74   03/25/20 92   03/17/20 74      Plan: digoxin 0.125 mg/day and metoprolol 50 mg bid for VR and bp control.     No anticoagulation given low plts and prior bleeding complications.        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     Plan: LTC support for meds, meals, activity    (R15.9) Incontinence of feces, unspecified fecal incontinence type  (N39.46) Mixed incontinence  Comment: not clear how much of this is functional, but has been present several years  Plan: incontinence briefs, assist with personal cares.       POLST discussion: pt elects full code         Electronically signed by:  Jeanette Hernández MD     Video-Visit Details  Type of service:  Video Visit  Video End Time (time video stopped): 11:21  Distant Location (provider location):  [facility]

## 2020-04-20 NOTE — LETTER
"    4/20/2020        RE: Jama Paul  [address] [place] [address]  [facility] [address]         [facility]  Jama Paul is being evaluated via a billable video visit due to the restrictions of the Covid-19 pandemic.   The patient has been notified of following:  \"This video visit will be conducted via a call between you and your provider. We have found that certain health care needs can be provided without the need for an in-person physical exam.  This service lets us provide the care you need with a video conversation. If during the course of the call the provider feels a video visit is not appropriate, you will not be charged for this service.\"   The provider has received verbal consent for a Video Visit from the patient and or first contact? Yes  Patient/facility staff would like the video invitation sent by: N/A   Video Start Time: 11:15 am  Which Facility the Patient is at during the time of visit: [facility]    Chief Complaint   Patient presents with     Video Visit     [place] Medical Record Number:  [id number]  Jama Paul  is a 89 year old  (2/13/1931) seen at [facility], moved over from [facility] this month.   Pt initially admitted to TCU in November, hospitalized several times in the interim.   He discharged back to his apartment with his wife and [facility] in January, but hospitalized 2/27/20 with weakness and citrobacter UTI.   He has worked with therapies in TCU, but not able to regain enough mobility to return to independent living.     HPI:    HPI information obtained from: facility staff, patient report and [facility] chart review.   Pt is seen in his room up to chair.   States he feels well, denies any dyspnea, cough or CV symptoms.    States he is eating and sleeping well, spirits are pretty good.   He has transferred to LT for extended care, and no concerns reported by nursing staff there.    Last hospitalization was in " February for weakness thought related to citrobacter UTI, anemia, recurrent pleural effusion.  He has anemia and thrombocytopenia >6 years duration, poss MDS and followed by Hem/Onc; he saw Dr Wise 3/2 who recommended ongoing surveillance.     Pt had [facility] hospitalization 1/11-15 /2020 for recurrent large right pleural effusion.   He was treated with IV Lasix, had thoracentesis removing 2200 mLs of fluid and spironolactone was added to his daily torsemide.    He saw Cardiology and CT surgery to address poly-valvular disease in February.   Noted he is on optimal medical therapy and has a normal EF; follow up scheduled.     Pt initially had [facility] hospitalization 10/30-11/17/19 for shortness of breath.  He was found to have bilateral pleural effusions and had thoracentesis x2 on the left and one time on the right.    Pleural fluid c/w transudate.   ECHO cardiogram showed EF 55-60% with severely dilated RV, severe MR and TR.   Pt needed GI consult and esophageal dilatation to have KATIE done 11/1    Following this procedure he had hypotension requiring pressor support in the ICU.   He was diuresed with IV furosemide and repeat ECHO showed severe TR and moderate MR, with some improvement in both.   He was seen by Cardiology and CV surgery. He underwent left heart catheterization as workup for tricuspid repair or replacement, and this showed normal left and right sided pressures.     He remains on metoprolol and torsemide, but not on Entresto secondary to low bps.         Pt has ITP, poss early myelodysplastic syndrome.   He had a BM biopsy in March 2019 that showed hypercellular marrow with 80% cellularity and normal thrombocytes.   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  He was anticoagulated with warfarin for atrial fib and had a bleed behind his retina, lost vision in his left eye; has not been anticoagulated since then.   He has  seen Hem/Onc and been treated with prednisone and IVIG with some improvement.         Patient has been incontinent of stool, for 3 years by his report.  Etiology unclear, ?functional.      He has had longer standing urinary incontinence for which he wears briefs at home.      Past Medical History:   Diagnosis Date     Congestive heart failure (H)      Elevated PSA      Eye hemorrhage, left 02/2019     Non-ischemic cardiomyopathy (H)     2017, med treatment, echo done 4/18 nl ef, mod to severe tr     Permanent atrial fibrillation 2011     Thrombocytopenia (H)      Unspecified essential hypertension    Cognitive impairment    Past Surgical History:   Procedure Laterality Date     ABDOMEN SURGERY      bowel obstruction     BONE MARROW BIOPSY, BONE SPECIMEN, NEEDLE/TROCAR N/A 3/4/2019    Procedure: BIOPSY BONE MARROW;  Surgeon: Josey Vargas MD;  Location:  GI     CARPAL TUNNEL RELEASE RT/LT  2014     CV HEART CATHETERIZATION WITH POSSIBLE INTERVENTION N/A 11/15/2019    Procedure: Left and right heart Catheterization with Possible Intervention;  Surgeon: Adolfo Paez MD;  Location:  HEART CARDIAC CATH LAB     CV LEFT VENTRICULOGRAM N/A 11/15/2019    Procedure: Left Ventriculogram;  Surgeon: Adolfo Paez MD;  Location:  HEART CARDIAC CATH LAB     ESOPHAGOSCOPY, GASTROSCOPY, DUODENOSCOPY (EGD), COMBINED N/A 11/6/2019    Procedure: ESOPHAGOGASTRODUODENOSCOPY (EGD);  Surgeon: Marixa Aguila MD;  Location:  GI     EXPLORE GROIN  4/30/2014    Procedure: EXPLORE GROIN;  Surgeon: Sam Aguirre MD;  Location:  OR     HERNIORRHAPHY INGUINAL  4/30/2014    Procedure: HERNIORRHAPHY INGUINAL;  Surgeon: Sam Aguirre MD;  Location:  OR     MOHS MICROGRAPHIC PROCEDURE       PHACOEMULSIFICATION CLEAR CORNEA WITH STANDARD INTRAOCULAR LENS IMPLANT Right 9/8/2015    Procedure: PHACOEMULSIFICATION CLEAR CORNEA WITH STANDARD INTRAOCULAR LENS IMPLANT;  Surgeon: Gil Shannon MD;  Location:   EC     septic olecranon bursitis[       SH:  Previously lived with his wife, ekaterina at [facility].   They have a daughter Lorraine and son Jama.   Pt is a retired ophthalmologist, worked clinically until age 79      Non smoker    Current Outpatient Medications   Medication Sig Dispense Refill     acetaminophen (TYLENOL) 325 MG tablet Take 650 mg by mouth every 6 hours as needed for Pain       digoxin (LANOXIN) 125 MCG tablet Take 1 tablet (125 mcg) by mouth daily (Patient taking differently: Take 125 mcg by mouth daily HOLD if HR <55) 30 tablet 0     melatonin 3 MG tablet Take 3 mg by mouth At Bedtime After 9pm. Please use overnight depends on pt to allow him to sleep without interruption during the night       metoprolol succinate ER (TOPROL-XL) 50 MG 24 hr tablet Take 1 tablet (50 mg) by mouth daily 180 tablet 3     mirtazapine (REMERON) 15 MG tablet Take 15 mg by mouth At Bedtime Patient started medication for the first time yesterday 10/29/2019.        Multiple Vitamins-Minerals (ICAPS AREDS FORMULA PO) Take 1 tablet by mouth daily Takes in addition to multivitamin with minerals       multivitamin, therapeutic (THERA-VIT) TABS tablet Take 1 tablet by mouth daily       OTHER MEDICAL SUPPLIES CORE PROTOCOL ; DAILY WEIGHTS IN AM.       spironolactone (ALDACTONE) 25 MG tablet Take 1 tablet (25 mg) by mouth daily 90 tablet 3     Vitamin D, Cholecalciferol, 1000 UNITS TABS Take 3,000 Units by mouth daily         ROS: 4 point ROS including Respiratory, CV, GI and , other than that noted in the HPI,  is negative    Vitals:/63   Pulse 65   Temp 98.2  F (36.8  C)   Resp 18   Ht 1.829 m (6')   Wt 68.6 kg (151 lb 3.2 oz)   SpO2 100%   BMI 20.51 kg/m     Limited Visit Exam done given COVID-19 precautions:  GENERAL APPEARANCE:  Alert, in no distress    Up to WC, he appears comfortable and no tachypnea or increased work of breathing.   No extremity edema       Lab/Diagnostic data:          Recent Labs    Lab Test 4/15/20 03/23/20 03/09/20    136 132*   POTASSIUM 4.7 4.9 4.5   CHLORIDE 103 102 103   CO2 26 25 23   ANIONGAP   9 6*   GLC   97 73   BUN 21 24 19   CR 1.10/GFR59 1.19* 1.45*   BARON 8.8 8.6 8.4      CBC RESULTS:          Recent Labs   Lab Test 4/15/20 03/09/20   WBC   6.4   RBC   3.39*   HGB 10.1 9.8*   HCT   33.3*   MCV   98.2   MCH   28.9   MCHC   29.4*   RDW   21.1*   PLT 44 48*             ASSESSMENT/PLAN:  (J90) Bilateral pleural effusion  (I50.32) Chronic diastolic heart failure (H)   (I42.8) Non-ischemic cardiomyopathy (H)  Comment: appears to have stable volume status today   Cardiology notes reviewed.    Plan: spironolactone 25 mg/day   Follow symptoms and exam.       (I48.21) Permanent atrial fibrillation  Comment:   Pulse Readings from Last 4 Encounters:   04/20/20 65   04/14/20 74   03/25/20 92   03/17/20 74      Plan: digoxin 0.125 mg/day and metoprolol 50 mg bid for VR and bp control.     No anticoagulation given low plts and prior bleeding complications.        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     Plan: LTC support for meds, meals, activity    (R15.9) Incontinence of feces, unspecified fecal incontinence type  (N39.46) Mixed incontinence  Comment: not clear how much of this is functional, but has been present several years  Plan: incontinence briefs, assist with personal cares.       POLST discussion: pt elects full code         Electronically signed by:  Jeanette Hernández MD     Video-Visit Details  Type of service:  Video Visit  Video End Time (time video stopped): 11:21  Distant Location (provider location):  [place]                 Sincerely,        Jeanette Hernández MD

## 2020-05-21 ENCOUNTER — MYC MEDICAL ADVICE (OUTPATIENT)
Dept: CARDIOLOGY | Facility: CLINIC | Age: 85
End: 2020-05-21

## 2020-05-21 ASSESSMENT — MIFFLIN-ST. JEOR: SCORE: 1402.45

## 2020-05-22 ENCOUNTER — TRANSFERRED RECORDS (OUTPATIENT)
Dept: HEALTH INFORMATION MANAGEMENT | Facility: CLINIC | Age: 85
End: 2020-05-22

## 2020-05-22 ENCOUNTER — VIRTUAL VISIT (OUTPATIENT)
Dept: GERIATRICS | Facility: CLINIC | Age: 85
End: 2020-05-22
Payer: MEDICARE

## 2020-05-22 VITALS
OXYGEN SATURATION: 98 % | RESPIRATION RATE: 17 BRPM | DIASTOLIC BLOOD PRESSURE: 69 MMHG | HEART RATE: 76 BPM | HEIGHT: 72 IN | BODY MASS INDEX: 20.88 KG/M2 | SYSTOLIC BLOOD PRESSURE: 128 MMHG | TEMPERATURE: 97.4 F | WEIGHT: 154.2 LBS

## 2020-05-22 DIAGNOSIS — D46.Z OTHER MYELODYSPLASTIC SYNDROMES (H): ICD-10-CM

## 2020-05-22 DIAGNOSIS — I48.20 CHRONIC ATRIAL FIBRILLATION (H): ICD-10-CM

## 2020-05-22 DIAGNOSIS — I50.32 CHRONIC DIASTOLIC HEART FAILURE (H): ICD-10-CM

## 2020-05-22 DIAGNOSIS — N18.30 CKD (CHRONIC KIDNEY DISEASE) STAGE 3, GFR 30-59 ML/MIN (H): ICD-10-CM

## 2020-05-22 DIAGNOSIS — I42.8 NON-ISCHEMIC CARDIOMYOPATHY (H): ICD-10-CM

## 2020-05-22 DIAGNOSIS — J90 BILATERAL PLEURAL EFFUSION: Primary | ICD-10-CM

## 2020-05-22 DIAGNOSIS — R06.02 SHORTNESS OF BREATH: ICD-10-CM

## 2020-05-22 DIAGNOSIS — I36.1 NONRHEUMATIC TRICUSPID VALVE REGURGITATION: ICD-10-CM

## 2020-05-22 LAB
ANION GAP SERPL CALCULATED.3IONS-SCNC: 7 MMOL/L (ref 7–16)
BUN SERPL-MCNC: 25 MG/DL (ref 7–26)
CALCIUM SERPL-MCNC: 8.5 MG/DL (ref 8.4–10.4)
CHLORIDE SERPLBLD-SCNC: 105 MMOL/L (ref 98–109)
CO2 SERPL-SCNC: 25 MMOL/L (ref 20–29)
CREAT SERPL-MCNC: 1.02 MG/DL (ref 0.73–1.18)
GFR SERPL CREATININE-BSD FRML MDRD: >60 ML/MIN/1.73M2
GLUCOSE SERPL-MCNC: 82 MG/DL (ref 70–100)
HEMOGLOBIN: 9.5 G/DL (ref 13.5–17.5)
PLATELET # BLD AUTO: 43 10^9/L (ref 150–450)
POTASSIUM SERPL-SCNC: 4.6 MMOL/L (ref 3.5–5.1)
SODIUM SERPL-SCNC: 137 MMOL/L (ref 136–145)

## 2020-05-22 PROCEDURE — 99310 SBSQ NF CARE HIGH MDM 45: CPT | Mod: 95 | Performed by: NURSE PRACTITIONER

## 2020-05-22 RX ORDER — POTASSIUM CHLORIDE 1500 MG/1
20 TABLET, EXTENDED RELEASE ORAL
COMMUNITY
Start: 2020-05-25

## 2020-05-22 RX ORDER — TORSEMIDE 10 MG/1
10 TABLET ORAL DAILY
COMMUNITY
Start: 2020-05-23

## 2020-05-22 RX ORDER — FUROSEMIDE 20 MG
20 TABLET ORAL ONCE
COMMUNITY
Start: 2020-05-22 | End: 2020-05-26

## 2020-05-22 NOTE — PROGRESS NOTES
"[facility]   Jama Paul is being evaluated via a billable video visit due to the restrictions of the Covid-19 pandemic.   The patient has been notified of following:   \"This video visit will be conducted via a call between you and your provider. We have found that certain health care needs can be provided without the need for an in-person physical exam.  This service lets us provide the care you need with a video conversation. If during the course of the call the provider feels a video visit is not appropriate, you will not be charged for this service.\"   The provider has received verbal consent for a Video Visit from the patient or first contact? Yes  Patient  or facility staff would like the video invitation sent by: N/A   Video Start Time: 1030     [place] Medical Record Number:  [id number]  Place of Location at the time of visit: [facility]  Chief Complaint   Patient presents with     Nursing Home Acute     HPI:  Jama Paul  is a 89 year old (2/13/1931), who is being seen today for a Regulatory visit.  HPI information obtained from: facility chart records, facility staff, patient report and [facility] chart review. Today's concern is: pt was to be seen for Reg visit today but  Also c/o nurse that he felt SOB. When seen he said felt this way for a couple days off and on. He is worried he is retaining fluid in lungs again.     Bilateral pleural effusion  Shortness of breath  Non-ischemic cardiomyopathy (H)  Nonrheumatic tricuspid valve regurgitation  Chronic diastolic heart failure (H)  Chronic atrial fibrillation  CKD (chronic kidney disease) stage 3, GFR 30-59 ml/min (H)  Other myelodysplastic syndromes (H)      Past Medical and Surgical History reviewed in Epic today.  MEDICATIONS:     Current Outpatient Medications   Medication Sig Dispense Refill     furosemide (LASIX) 20 MG tablet Take 20 mg by mouth once Give now       halflytely solution and " bisacodyl tablets bowel prep kit Take 1 kit by mouth once       [START ON 5/25/2020] potassium chloride ER (K-TAB) 20 MEQ CR tablet Take 20 mEq by mouth three times a week Monday, Wed and Friday       [START ON 5/23/2020] torsemide (DEMADEX) 10 MG tablet Take 10 mg by mouth daily Start 5/23 am       acetaminophen (TYLENOL) 325 MG tablet Take 650 mg by mouth every 6 hours as needed for Pain       digoxin (LANOXIN) 125 MCG tablet Take 1 tablet (125 mcg) by mouth daily (Patient taking differently: Take 125 mcg by mouth daily HOLD if HR <55) 30 tablet 0     melatonin 3 MG tablet Take 3 mg by mouth At Bedtime After 9pm. Please use overnight depends on pt to allow him to sleep without interruption during the night       metoprolol succinate ER (TOPROL-XL) 50 MG 24 hr tablet Take 1 tablet (50 mg) by mouth daily 180 tablet 3     mirtazapine (REMERON) 15 MG tablet Take 15 mg by mouth At Bedtime Patient started medication for the first time yesterday 10/29/2019.        Multiple Vitamins-Minerals (ICAPS AREDS FORMULA PO) Take 1 tablet by mouth daily Takes in addition to multivitamin with minerals       multivitamin, therapeutic (THERA-VIT) TABS tablet Take 1 tablet by mouth daily       OTHER MEDICAL SUPPLIES CORE PROTOCOL ; DAILY WEIGHTS IN AM.       spironolactone (ALDACTONE) 25 MG tablet Take 1 tablet (25 mg) by mouth daily 90 tablet 3     Vitamin D, Cholecalciferol, 1000 UNITS TABS Take 3,000 Units by mouth daily         TODAY DURING EXAM/ROS:  No CP, , Cough, dizziness, fevers, chills, HA, N/V, dysuria or Bowel Abnormalities(*incont as before*). Appetite is fair.  No pains  Feels SOB  Objective: /69   Pulse 76   Temp 97.4  F (36.3  C)   Resp 17   Ht 1.829 m (6')   Wt 69.9 kg (154 lb 3.2 oz)   SpO2 98%   BMI 20.91 kg/m    Limited visit exam done given COVID-19 precautions.   GENERAL APPEARANCE:  Alert, in no distress, oriented, cooperative  ENT:  Mouth with moist mucous membranes, normal hearing acuity  RESP:   "respiratory effort  of chest normal, no respiratory distress.  LUNGS per nurse exam: crackles Right base and mildly decreased  CV & HEART sounds per nurse exam: Regular no murmurs. Trace bilat pedal edema  M/S:   seen in WC, CHIU equally  SKIN:  Inspection of skin and subcutaneous tissue baseline  NEURO:   Cranial nerves 2-12 are normal tested and grossly at patient's baseline  PSYCH:  oriented X 3, normal insight, judgement and memory, affect and mood normal    Labs:    Recent Labs   Lab Test 05/22/20 03/23/20    136   POTASSIUM 4.6 4.9   CHLORIDE 105 102   CO2 25 25   ANIONGAP 7 9   GLC 82 97   BUN 25 24   CR 1.02 1.19*   BARON 8.5 8.6   CBC RESULTS:   Recent Labs   Lab Test 05/22/20 03/09/20   WBC  --  6.4   RBC  --  3.39*   HGB 9.5* 9.8*   HCT  --  33.3*   MCV  --  98.2   MCH  --  28.9   MCHC  --  29.4*   RDW  --  21.1*   PLT 43* 48*     ASSESSMENT / PLAN:  (J90) Bilateral pleural effusion  (primary encounter diagnosis)   (R06.02) Shortness of breath  Comment/Plan:  Weights--153-154# and a week ago 156-56#.  Early APril 145-7#.  He is generally a good  of how he feels.  CXR done and showed: \"Small left pleural effusion and Large Right Pleural effusion with ? Infiltrated\".  Labs as above.  Gave 20 mg Lasix this am and will give 20 mg with supper with KCL 20 Meq. Then start Demadex 10 mg in am.    (I42.8) Non-ischemic cardiomyopathy (H)  (I36.1) Nonrheumatic tricuspid valve regurgitation  (I50.32) Chronic diastolic heart failure (H)  Comment/Plan: see above, SBP runs 110-120's mostly, has been stable but will need to see CORE in a week or so.   Also he is to see [facility] surgeon to discuss Valve surgery later this summer.    (I48.20) Chronic atrial fibrillation  Comment/Plan: rate reasonably controlled, no changes to regimen.    (N18.3) CKD (chronic kidney disease) stage 3, GFR 30-59 ml/min (H)  Comment/Plan: reasonable kidney function, will recheck on 5/27    (D46.Z) Other myelodysplastic syndromes " (H)  Comment/Plan: Hb steady, as is plts, will need f/u with oncology-H[facility] in future also    Total time with patient visit: 60 minutes including discussions about the POC and care coordination with the patient and 2 discussions ([id number] and [id number])with CORE Amalia Cr PA-C to discusslabs, xray and  plans.  Plan of lasix, KCL , then Demadex as well as recheck labs and I will update CORE next week. If nec, she will see the following week or prn. Greater than 50% of total time spent with counseling and coordinating care due to complexities of care.      1159:  I called and left msg with detailed  update, the POC and care coordination.  This included discussion of pending  diagnostic results,  risk and benefits of current and new treatments.  I asked daughter- Lorraine to call [facility] if she has questions and give a time I can call her back. She did not call me back.    Electronically signed by:  ERIC Mir CNP       Video-Visit Details  Type of service:  Video Visit  Video End Time (time video stopped): 1045  Distant Location (provider location):  [facility]

## 2020-05-22 NOTE — TELEPHONE ENCOUNTER
Ayush Peters,  I just spoke with Cheli the nurse practitioner who has been seeing your Dad over at [facility]. She just had a virtual visit with your dad this morning and has ordered blood work and a chest X-ray to follow up as it sounds like he is feeling more short of breath. Her and I are going to touch base after his results are back this afternoon, and we'll make a plan from there.    I wanted to pass along she told me that she is happy to talk with you or any family any time to discuss your Dad and his care. If you call the staff and [facility] and request to speak to her she'll find a time to touch base with you that works. She was going to give you a call today to discuss as well.     Amalia Anderson

## 2020-05-26 ENCOUNTER — NURSING HOME VISIT (OUTPATIENT)
Dept: GERIATRICS | Facility: CLINIC | Age: 85
End: 2020-05-26
Payer: MEDICARE

## 2020-05-26 VITALS
HEART RATE: 79 BPM | SYSTOLIC BLOOD PRESSURE: 106 MMHG | WEIGHT: 147.4 LBS | DIASTOLIC BLOOD PRESSURE: 57 MMHG | TEMPERATURE: 97.8 F | RESPIRATION RATE: 18 BRPM | OXYGEN SATURATION: 94 % | HEIGHT: 72 IN | BODY MASS INDEX: 19.96 KG/M2

## 2020-05-26 DIAGNOSIS — I48.20 CHRONIC ATRIAL FIBRILLATION (H): ICD-10-CM

## 2020-05-26 DIAGNOSIS — N18.30 CKD (CHRONIC KIDNEY DISEASE) STAGE 3, GFR 30-59 ML/MIN (H): ICD-10-CM

## 2020-05-26 DIAGNOSIS — I42.8 NON-ISCHEMIC CARDIOMYOPATHY (H): ICD-10-CM

## 2020-05-26 DIAGNOSIS — I50.32 CHRONIC DIASTOLIC HEART FAILURE (H): ICD-10-CM

## 2020-05-26 DIAGNOSIS — J90 BILATERAL PLEURAL EFFUSION: Primary | ICD-10-CM

## 2020-05-26 DIAGNOSIS — R06.02 SHORTNESS OF BREATH: ICD-10-CM

## 2020-05-26 DIAGNOSIS — I36.1 NONRHEUMATIC TRICUSPID VALVE REGURGITATION: ICD-10-CM

## 2020-05-26 PROCEDURE — 99309 SBSQ NF CARE MODERATE MDM 30: CPT | Performed by: NURSE PRACTITIONER

## 2020-05-26 ASSESSMENT — MIFFLIN-ST. JEOR: SCORE: 1371.6

## 2020-05-26 NOTE — PROGRESS NOTES
[facility]  [facility] Record Number:  [id number]  Place of Service where encounter took place:  [facility] ([facility] [[id number]]  Chief Complaint   Patient presents with     Results       HPI:    Jama Paul  is a 89 year old (2/13/1931), who is being seen today for an episodic care visit.  HPI information obtained from: facility chart records, facility staff, patient report and [facility] chart review. Today's concern is: conts to feel better     Non-ischemic cardiomyopathy (H)  Chronic diastolic congestive heart failure (H)  Bilateral pleural effusion  CKD (chronic kidney disease) stage 3, GFR 30-59 ml/min (H)      Past Medical and Surgical History reviewed in Epic today.    MEDICATIONS:     Current Outpatient Medications   Medication Sig Dispense Refill     acetaminophen (TYLENOL) 325 MG tablet Take 650 mg by mouth every 6 hours as needed for Pain       digoxin (LANOXIN) 125 MCG tablet Take 1 tablet (125 mcg) by mouth daily (Patient taking differently: Take 125 mcg by mouth daily HOLD if HR <55) 30 tablet 0     melatonin 3 MG tablet Take 3 mg by mouth At Bedtime After 9pm. Please use overnight depends on pt to allow him to sleep without interruption during the night       metoprolol succinate ER (TOPROL-XL) 50 MG 24 hr tablet Take 1 tablet (50 mg) by mouth daily 180 tablet 3     mirtazapine (REMERON) 15 MG tablet Take 15 mg by mouth At Bedtime Patient started medication for the first time yesterday 10/29/2019.        Multiple Vitamins-Minerals (ICAPS AREDS FORMULA PO) Take 1 tablet by mouth daily Takes in addition to multivitamin with minerals       multivitamin, therapeutic (THERA-VIT) TABS tablet Take 1 tablet by mouth daily       OTHER MEDICAL SUPPLIES CORE PROTOCOL ; DAILY WEIGHTS IN AM.       potassium chloride ER (K-TAB) 20 MEQ CR tablet Take 20 mEq by mouth three times a week Monday, Wed and Friday       spironolactone (ALDACTONE) 25 MG tablet Take 1 tablet  (25 mg) by mouth daily 90 tablet 3     torsemide (DEMADEX) 10 MG tablet Take 10 mg by mouth daily Start 5/23 am       Vitamin D, Cholecalciferol, 1000 UNITS TABS Take 3,000 Units by mouth daily         TODAY DURING EXAM/ROS:  No CP, SOB, Cough, dizziness, fevers, chills, HA, N/V, dysuria or Bowel Abnormalities. Appetite is good.  No pain.    Objective:  /73   Pulse 83   Temp 98.6  F (37  C)   Resp 18   Ht 1.829 m (6')   Wt 67 kg (147 lb 9.6 oz)   SpO2 98%   BMI 20.02 kg/m    Exam:  GENERAL APPEARANCE:  Alert, in no distress, oriented, cooperative  RESP:  respiratory effort  of chest normal, lungs clear to auscultation   no respiratory distress, diminished breath sounds right lower 1/3.  CV: Auscultation of heart done , IRRR, soft murmur. No rub, or gallop, Trace bilat pedal/ankle edema.  ABDOMEN:  normal bowel sounds, soft, nontender   M/S:   CHIU equally, up with assist, seen in WC in room  SKIN:  Inspection of skin and subcutaneous tissue baseline  NEURO:   Cranial nerves are grossly intact and at patient's baseline  PSYCH:  oriented X 3, affect and mood normal    Labs:       Recent Labs   Lab Test 5/27/20 05/22/20 03/23/20    137 136   POTASSIUM 4.2 4.6 4.9   CHLORIDE 104 105 102   CO2 25 25 25   ANIONGAP  7 9   GLC  82 97   BUN 30 25 24   CR 1.16/GFR 56 1.02 1.19*   BARON 8.9 8.5 8.6     ASSESSMENT / PLAN:  (I42.8) Non-ischemic cardiomyopathy (H)  (primary encounter diagnosis)   (I50.32) Chronic diastolic congestive heart failure (H)   (J90) Bilateral pleural effusion  Comment/Plan: feels a bit better today also, will cont with current regimen, sees SHARON Cr PA-C on 6/1.  Weight 146# today.  See note from yesterday also    (N18.3) CKD (chronic kidney disease) stage 3, GFR 30-59 ml/min (H)  Comment/Plan: BUN, CR up a bit but steady, no changes for today    Electronically signed by:  ERIC Mir CNP

## 2020-05-26 NOTE — PROGRESS NOTES
[facility]  [facility] Medical Record Number:  [id number]  Place of Service where encounter took place:  [facility] ([facility]) [[id number]]  Chief Complaint   Patient presents with     RECHECK       HPI:    Jama Paul  is a 89 year old (2/13/1931), who is being seen today for an episodic care visit.  HPI information obtained from: facility chart records, facility staff, patient report and [facility] chart review. Today's concern is: pt seen in person in room today. He says he feels much better than Friday, see ROS below     Bilateral pleural effusion  Shortness of breath  Non-ischemic cardiomyopathy (H)  Nonrheumatic tricuspid valve regurgitation  Chronic diastolic heart failure (H)  Chronic atrial fibrillation  CKD (chronic kidney disease) stage 3, GFR 30-59 ml/min (H)      Past Medical and Surgical History reviewed in Epic today.    MEDICATIONS:      Current Outpatient Medications   Medication Sig Dispense Refill     acetaminophen (TYLENOL) 325 MG tablet Take 650 mg by mouth every 6 hours as needed for Pain       digoxin (LANOXIN) 125 MCG tablet Take 1 tablet (125 mcg) by mouth daily (Patient taking differently: Take 125 mcg by mouth daily HOLD if HR <55) 30 tablet 0     melatonin 3 MG tablet Take 3 mg by mouth At Bedtime After 9pm. Please use overnight depends on pt to allow him to sleep without interruption during the night       metoprolol succinate ER (TOPROL-XL) 50 MG 24 hr tablet Take 1 tablet (50 mg) by mouth daily 180 tablet 3     mirtazapine (REMERON) 15 MG tablet Take 15 mg by mouth At Bedtime Patient started medication for the first time yesterday 10/29/2019.        Multiple Vitamins-Minerals (ICAPS AREDS FORMULA PO) Take 1 tablet by mouth daily Takes in addition to multivitamin with minerals       multivitamin, therapeutic (THERA-VIT) TABS tablet Take 1 tablet by mouth daily       OTHER MEDICAL SUPPLIES CORE PROTOCOL ; DAILY WEIGHTS IN AM.       potassium chloride  ER (K-TAB) 20 MEQ CR tablet Take 20 mEq by mouth three times a week Monday, Wed and Friday       spironolactone (ALDACTONE) 25 MG tablet Take 1 tablet (25 mg) by mouth daily 90 tablet 3     torsemide (DEMADEX) 10 MG tablet Take 10 mg by mouth daily Start 5/23 am       Vitamin D, Cholecalciferol, 1000 UNITS TABS Take 3,000 Units by mouth daily             TODAY DURING EXAM/ROS:  No CP, SOB, Cough, dizziness, fevers, chills, HA, N/V, dysuria or Bowel Abnormalities. Appetite is fair.  No pain.    Objective:  /57   Pulse 79   Temp 97.8  F (36.6  C)   Resp 18   Ht 1.829 m (6')   Wt 66.9 kg (147 lb 6.4 oz)   SpO2 94%   BMI 19.99 kg/m    Exam:  GENERAL APPEARANCE:  Alert, in no distress, oriented, cooperative  ENT:  Mouth and posterior oropharynx normal, moist mucous membranes, [place]  RESP:  respiratory effort and palpation of chest normal, lungs clear to auscultation except[ a few crackles  right bases, no respiratory distress, diminished breath sounds right lower 1/3.  CV:  Palpation and auscultation of heart done , IRRR, soft  Murmur. No rub, or gallop, Trace bilat pedal/ankle edema.  ABDOMEN:  normal bowel sounds, soft, nontender, no hepatosplenomegaly or other masses  M/S:   CHIU equally, up with assist, seen in WC in room  SKIN:  Inspection of skin and subcutaneous tissue baseline  NEURO:   Cranial nerves 2-12 are normal tested and grossly at patient's baseline  PSYCH:  oriented X 3, affect and mood normal    Labs:   Recent Labs   Lab Test 05/22/20 03/23/20    136   POTASSIUM 4.6 4.9   CHLORIDE 105 102   CO2 25 25   ANIONGAP 7 9   GLC 82 97   BUN 25 24   CR 1.02 1.19*   BARON 8.5 8.6     CBC RESULTS:   Recent Labs   Lab Test 05/22/20 03/09/20   WBC  --  6.4   RBC  --  3.39*   HGB 9.5* 9.8*   HCT  --  33.3*   MCV  --  98.2   MCH  --  28.9   MCHC  --  29.4*   RDW  --  21.1*   PLT 43* 48*       ASSESSMENT / PLAN:  (J90) Bilateral pleural effusion  (primary encounter diagnosis)   (R06.02) Shortness of  breath  Comment/Plan:  Weights--147.6# today, 149# on 5/24, last week:153-154-156#   Early April 145-7#. He feels better today. Has been on demadex since 5/23(got lasix 5/22). Has video visit with CORE Jazlyn MOCTEZUMA on 6.1.20.    (I42.8) Non-ischemic cardiomyopathy (H)  (I36.1) Nonrheumatic tricuspid valve regurgitation  (I50.32) Chronic diastolic heart failure (H)  Comment/Plan: see above, SBP runs 110-120's mostly,  He has an ae is to see [facility] surgeon to discuss Valve surgery later this summer.    (I48.20) Chronic atrial fibrillation  Comment/Plan: rate reasonably controlled, no changes to regimen today    (N18.3) CKD (chronic kidney disease) stage 3, GFR 30-59 ml/min (H)  Comment/Plan: reasonable kidney function, will recheck on 5/27       Electronically signed by:  ERIC Mir CNP

## 2020-05-27 ENCOUNTER — NURSING HOME VISIT (OUTPATIENT)
Dept: GERIATRICS | Facility: CLINIC | Age: 85
End: 2020-05-27
Payer: MEDICARE

## 2020-05-27 VITALS
RESPIRATION RATE: 18 BRPM | WEIGHT: 147.6 LBS | HEART RATE: 83 BPM | BODY MASS INDEX: 19.99 KG/M2 | SYSTOLIC BLOOD PRESSURE: 118 MMHG | DIASTOLIC BLOOD PRESSURE: 73 MMHG | OXYGEN SATURATION: 98 % | HEIGHT: 72 IN | TEMPERATURE: 98.6 F

## 2020-05-27 DIAGNOSIS — I50.32 CHRONIC DIASTOLIC CONGESTIVE HEART FAILURE (H): ICD-10-CM

## 2020-05-27 DIAGNOSIS — J90 BILATERAL PLEURAL EFFUSION: ICD-10-CM

## 2020-05-27 DIAGNOSIS — N18.30 CKD (CHRONIC KIDNEY DISEASE) STAGE 3, GFR 30-59 ML/MIN (H): ICD-10-CM

## 2020-05-27 DIAGNOSIS — I42.8 NON-ISCHEMIC CARDIOMYOPATHY (H): Primary | ICD-10-CM

## 2020-05-27 PROCEDURE — 99308 SBSQ NF CARE LOW MDM 20: CPT | Performed by: NURSE PRACTITIONER

## 2020-05-27 ASSESSMENT — MIFFLIN-ST. JEOR: SCORE: 1372.51

## 2020-05-27 NOTE — Clinical Note
HI The labs are in my notes, NOT in the results section as I cant get them to fax them to epic much with this COVID thing.  THanks

## 2020-06-01 ENCOUNTER — VIRTUAL VISIT (OUTPATIENT)
Dept: CARDIOLOGY | Facility: CLINIC | Age: 85
End: 2020-06-01
Payer: MEDICARE

## 2020-06-01 ENCOUNTER — PATIENT OUTREACH (OUTPATIENT)
Dept: CARE COORDINATION | Facility: CLINIC | Age: 85
End: 2020-06-01

## 2020-06-01 VITALS — OXYGEN SATURATION: 100 % | SYSTOLIC BLOOD PRESSURE: 120 MMHG | DIASTOLIC BLOOD PRESSURE: 67 MMHG

## 2020-06-01 DIAGNOSIS — I36.1 NONRHEUMATIC TRICUSPID VALVE REGURGITATION: ICD-10-CM

## 2020-06-01 DIAGNOSIS — I48.20 CHRONIC ATRIAL FIBRILLATION (H): ICD-10-CM

## 2020-06-01 DIAGNOSIS — I50.810 RIGHT-SIDED CONGESTIVE HEART FAILURE, UNSPECIFIED HF CHRONICITY (H): Primary | ICD-10-CM

## 2020-06-01 DIAGNOSIS — I42.8 NON-ISCHEMIC CARDIOMYOPATHY (H): ICD-10-CM

## 2020-06-01 PROCEDURE — 99442 ZZC PHYSICIAN TELEPHONE EVALUATION 11-20 MIN: CPT | Performed by: PHYSICIAN ASSISTANT

## 2020-06-01 NOTE — LETTER
6/1/2020    Cheli Johnson, APRN CNP  [address]  [facility] [address]    RE: Jama Arroyosen       Dear Colleague,    I had the pleasure of seeing Jama Paul in the [facility].    Jama Paul is a 89 year old male who is being evaluated via a billable telephone visit.      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   Unfortunately, he has had a difficult course over the last year with recurrent hospitalizations for shortness of breath and recurrent pleural effusions.  He saw Dr. baker in February who discussed his case with Dr. Perez at [facility].  He is was referred there for potential enrollment for a trial of tricuspid clipping.  He was seen by Dr. Perez subsequently who recommended a 6-month follow-up with a repeat echocardiogram at that time as clinically he was stable.    He was readmitted at the end of February of 2002 with generalized weakness and a UTI. He had another right sided thoracentesis with 250 ml of fluid removed. Of note, per the [facility] report the majority of the right pleural effusion fluid was complex and loculated and could not be aspirated. He has been at a TCU since that time.    I had a virtual visit with Jama at the end of March. He was doing well at that time. His weight was stable between 148 - 150 lbs, and he had no dyspnea on exertion. He was on Spironolactone 25 mg daily although it appeared his Torsemide was discontinued at some point . As he was doing well, we elected to make no changes.    He has done well since then it appears until recently. His daughter contacted the clinic with concerns about his breathing. He has  "since been seen several times by the NP at the [facility], Ms. Cheli Johnson. His weight had trended up to a peak of 156 lbs. He had a CXR done that showed a small left sided pleural effusion and a moderately large right sided pleural effusion. I spoke with Ms. Johnson and we elected to resume Torsemide 10 mg daily. He has been seen several times since then, last on 5/27/20. Per her note, he was feeling better and his weight had dropped to 146 lbs.     I spoke with Lorraine and his daughter today. His daughter is concerned as she spoke to him yesterday on the phone and was concerned his breathing sounded worse over the phone.     He tells me today his breathing is \"fine.\"  When I asked him if his breathing was worse last week he tells me he did not really think so.  Presently, he is not having any dyspnea on exertion. He has been quarantined in his room but has been walking in circles there and today actually is able to finally sit outside on the patio for the first time since March. He is not having orthopnea, PND, or peripheral edema.     Assessment/plan:  Jama Paul is a delightful 89-year-old male with a history of recurrent right-sided pleural effusions, severe tricuspid regurgitation, and a tachycardia mediated cardiomyopathy with normalization of his LV function.  Over the last year, he has had recurrent hospitalizations and has had several right-sided thoracentesis.  He last had a thoracentesis in February with 250 cc of fluid removed.    Last week, he reported he was having some worsening dyspnea and a chest x-ray showed a moderately large right-sided pleural effusion.  It seems he has improved with the initiation of torsemide.  He currently is on 10 mg daily.  He is not experiencing any dyspnea on exertion at this point and his weight has now dropped back to the mid 140s which is where it was back in April. He last had a labs done on 5/27 which showed stable renal function and electrolytes on the torsemide.    For " now, the as he seems to be improved I recommended we continue his current regimen of spironolactone and torsemide.  We discussed that if symptomatically things get worse we could certainly plan to send him for another thoracentesis although I note on his last thoracentesis a majority of the pleural fluid was complex and loculated and was unable to be aspirated.  Hopefully, he will continue to do well with ongoing diuretic therapy.  I suggested we repeat another basic metabolic panel in sometime next week to keep an eye on his renal function.    He previously saw Dr. Perez for consideration of tricuspid valve clipping at [facility].  He is to follow-up again with him this summer to reevaluate things at that time.    I will plan to see him back in about 2 or 3 months.  Certainly, I be happy to see him sooner with any concerns.  I appreciate Ms. Johnson's help with management and evaluation of Ms. Paul.     Phone call duration: 20 minutes    Thank you for allowing me to participate in the care of your patient.    Sincerely,     Jazlyn Cr PA-C     [facility]

## 2020-06-01 NOTE — PROGRESS NOTES
"Jama Paul is a 89 year old male who is being evaluated via a billable telephone visit.      The patient has been notified of following:     \"This telephone visit will be conducted via a call between you and your physician/provider. We have found that certain health care needs can be provided without the need for a physical exam.  This service lets us provide the care you need with a short phone conversation.  If a prescription is necessary we can send it directly to your pharmacy.  If lab work is needed we can place an order for that and you can then stop by our lab to have the test done at a later time.    Telephone visits are billed at different rates depending on your insurance coverage. During this emergency period, for some insurers they may be billed the same as an in-person visit.  Please reach out to your insurance provider with any questions.    If during the course of the call the physician/provider feels a telephone visit is not appropriate, you will not be charged for this service.\"    Patient has given verbal consent for Telephone visit?  Yes    What phone number would you like to be contacted at? [phone]    How would you like to obtain your AVS? 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   Unfortunately, he has had a difficult course over the last year with recurrent hospitalizations for shortness of breath and recurrent pleural effusions.  He saw Dr. baker in February who discussed his case with Dr. Perez at [facility].  He is was referred there for potential enrollment for a trial of tricuspid clipping.  He was seen by Dr. Perez subsequently who recommended a " "6-month follow-up with a repeat echocardiogram at that time as clinically he was stable.    He was readmitted at the end of February of 2002 with generalized weakness and a UTI. He had another right sided thoracentesis with 250 ml of fluid removed. Of note, per the [facility] report the majority of the right pleural effusion fluid was complex and loculated and could not be aspirated. He has been at a TCU since that time.    I had a virtual visit with Jama at the end of March. He was doing well at that time. His weight was stable between 148 - 150 lbs, and he had no dyspnea on exertion. He was on Spironolactone 25 mg daily although it appeared his Torsemide was discontinued at some point . As he was doing well, we elected to make no changes.    He has done well since then it appears until recently. His daughter contacted the clinic with concerns about his breathing. He has since been seen several times by the NP at the TCU, Ms. Cheli Johnson. His weight had trended up to a peak of 156 lbs. He had a CXR done that showed a small left sided pleural effusion and a moderately large right sided pleural effusion. I spoke with Ms. Johnson and we elected to resume Torsemide 10 mg daily. He has been seen several times since then, last on 5/27/20. Per her note, he was feeling better and his weight had dropped to 146 lbs.     I spoke with Lorraine and his daughter today. His daughter is concerned as she spoke to him yesterday on the phone and was concerned his breathing sounded worse over the phone.     He tells me today his breathing is \"fine.\"  When I asked him if his breathing was worse last week he tells me he did not really think so.  Presently, he is not having any dyspnea on exertion. He has been quarantined in his room but has been walking in circles there and today actually is able to finally sit outside on the patio for the first time since March. He is not having orthopnea, PND, or peripheral edema.     Assessment/plan:  Jama " Humberto is a delightful 89-year-old male with a history of recurrent right-sided pleural effusions, severe tricuspid regurgitation, and a tachycardia mediated cardiomyopathy with normalization of his LV function.  Over the last year, he has had recurrent hospitalizations and has had several right-sided thoracentesis.  He last had a thoracentesis in February with 250 cc of fluid removed.    Last week, he reported he was having some worsening dyspnea and a chest x-ray showed a moderately large right-sided pleural effusion.  It seems he has improved with the initiation of torsemide.  He currently is on 10 mg daily.  He is not experiencing any dyspnea on exertion at this point and his weight has now dropped back to the mid 140s which is where it was back in April. He last had a labs done on 5/27 which showed stable renal function and electrolytes on the torsemide.    For now, the as he seems to be improved I recommended we continue his current regimen of spironolactone and torsemide.  We discussed that if symptomatically things get worse we could certainly plan to send him for another thoracentesis although I note on his last thoracentesis a majority of the pleural fluid was complex and loculated and was unable to be aspirated.  Hopefully, he will continue to do well with ongoing diuretic therapy.  I suggested we repeat another basic metabolic panel in sometime next week to keep an eye on his renal function.    He previously saw Dr. Perez for consideration of tricuspid valve clipping at [facility].  He is to follow-up again with him this summer to reevaluate things at that time.    I will plan to see him back in about 2 or 3 months.  Certainly, I be happy to see him sooner with any concerns.  I appreciate Ms. Johnson's help with management and evaluation of Ms. Paul.     Phone call duration: 20 minutes    Jazlyn Cr PA-C

## 2020-06-01 NOTE — PROGRESS NOTES
Clinic Care Coordination Contact    Situation: Patient chart reviewed by care coordinator.    Background: Pt was in the hospital 2/27-3/1/2020 for generalize weakness.     Ass[facility]: [facility]    Assessment: CC continue to be in the TCU    Plan/Recommendations: CC will follow up when discharged from TCU    [facility]     Danita Arreola RN Care Coordinator   [facility] / [facility]   Phone: [phone]  Email :  [email or web address]

## 2020-06-02 ASSESSMENT — MIFFLIN-ST. JEOR: SCORE: 1352.55

## 2020-06-02 NOTE — NURSING NOTE
Faxed order for BMP to [facility] (fax: [phone]). Reminder sent to RN board to watch for results next week.    Elisabeth Diaz RN BSN   10:20 AM 06/02/20

## 2020-06-03 ASSESSMENT — MIFFLIN-ST. JEOR: SCORE: 1352.1

## 2020-06-04 ENCOUNTER — VIRTUAL VISIT (OUTPATIENT)
Dept: GERIATRICS | Facility: CLINIC | Age: 85
End: 2020-06-04
Payer: MEDICARE

## 2020-06-04 DIAGNOSIS — I50.32 CHRONIC DIASTOLIC HEART FAILURE (H): ICD-10-CM

## 2020-06-04 DIAGNOSIS — I48.20 CHRONIC ATRIAL FIBRILLATION (H): ICD-10-CM

## 2020-06-04 DIAGNOSIS — I34.0 NONRHEUMATIC MITRAL (VALVE) INSUFFICIENCY: ICD-10-CM

## 2020-06-04 DIAGNOSIS — R06.02 SHORTNESS OF BREATH: ICD-10-CM

## 2020-06-04 DIAGNOSIS — R63.0 DECREASED APPETITE: ICD-10-CM

## 2020-06-04 DIAGNOSIS — N18.30 CKD (CHRONIC KIDNEY DISEASE) STAGE 3, GFR 30-59 ML/MIN (H): ICD-10-CM

## 2020-06-04 DIAGNOSIS — J90 BILATERAL PLEURAL EFFUSION: Primary | ICD-10-CM

## 2020-06-04 DIAGNOSIS — I42.8 NON-ISCHEMIC CARDIOMYOPATHY (H): ICD-10-CM

## 2020-06-04 PROCEDURE — 99309 SBSQ NF CARE MODERATE MDM 30: CPT | Mod: 95 | Performed by: NURSE PRACTITIONER

## 2020-06-04 NOTE — PROGRESS NOTES
"[facility]   Jama Paul is being evaluated via a billable video visit due to the restrictions of the Covid-19 pandemic.   The patient has been notified of following:  \"This video visit will be conducted via a call between you and your provider. We have found that certain health care needs can be provided without the need for an in-person physical exam.  This service lets us provide the care you need with a video conversation. If during the course of the call the provider feels a video visit is not appropriate, you will not be charged for this service.\"   The provider has received verbal consent for a Video Visit from the patient or first contact? Yes  Patient  or facility staff would like the video invitation sent by: N/A   Video Start Time: 1419    [facility] Medical Record Number:  [id number]  Place of Location at the time of visit: [facility]  Chief Complaint   Patient presents with     Nursing Home Acute     HPI:  Jama Paul  is a 89 year old (2/13/1931), who is being seen today for a visit.  HPI information obtained from: facility chart records, facility staff, patient report and [facility] chart review. Today he notes feeling a but more week. Issues of concern are:     Bilateral pleural effusion  Shortness of breath  Non-ischemic cardiomyopathy (H)  Nonrheumatic mitral (valve) insufficiency  Chronic diastolic heart failure (H)  Chronic atrial fibrillation  CKD (chronic kidney disease) stage 3, GFR 30-59 ml/min (H)  Decreased appetite    Patient seen today for follow-up visit in TCU. He notes feeling more tired and \"blah\". Denies s/sx of CHF or difficulty breathing. Sleep okay last night. Appetite down. Denies CP, palpitations, nausea, vomiting, increased SOB/RODGERS, fever, chills, and/or b/b concerns today.     Past Medical and Surgical History reviewed in Epic today.  MEDICATIONS:  Current Outpatient Medications   Medication Sig Dispense Refill     " acetaminophen (TYLENOL) 325 MG tablet Take 650 mg by mouth every 6 hours as needed for Pain       digoxin (LANOXIN) 125 MCG tablet Take 1 tablet (125 mcg) by mouth daily (Patient taking differently: Take 125 mcg by mouth daily HOLD if HR <55) 30 tablet 0     melatonin 3 MG tablet Take 3 mg by mouth At Bedtime After 9pm. Please use overnight depends on pt to allow him to sleep without interruption during the night       metoprolol succinate ER (TOPROL-XL) 50 MG 24 hr tablet Take 1 tablet (50 mg) by mouth daily 180 tablet 3     mirtazapine (REMERON) 15 MG tablet Take 15 mg by mouth At Bedtime Patient started medication for the first time yesterday 10/29/2019.        Multiple Vitamins-Minerals (ICAPS AREDS FORMULA PO) Take 1 tablet by mouth daily Takes in addition to multivitamin with minerals       multivitamin, therapeutic (THERA-VIT) TABS tablet Take 1 tablet by mouth daily       OTHER MEDICAL SUPPLIES CORE PROTOCOL ; DAILY WEIGHTS IN AM.       potassium chloride ER (K-TAB) 20 MEQ CR tablet Take 20 mEq by mouth three times a week Monday, Wed and Friday       spironolactone (ALDACTONE) 25 MG tablet Take 1 tablet (25 mg) by mouth daily 90 tablet 3     torsemide (DEMADEX) 10 MG tablet Take 10 mg by mouth daily Start 5/23 am       Vitamin D, Cholecalciferol, 1000 UNITS TABS Take 3,000 Units by mouth daily        REVIEW OF SYSTEMS: 10 point ROS of systems including Constitutional, Eyes, Respiratory, Cardiovascular, Gastroenterology, Genitourinary, Integumentary, Musculoskeletal, Psychiatric were all negative except for pertinent positives noted in my HPI.  Objective: /62   Pulse 84   Temp 97.8  F (36.6  C)   Resp 18   Ht 1.829 m (6')   Wt 64.9 kg (143 lb 1.6 oz)   SpO2 98%   BMI 19.41 kg/m    Limited visit exam done given COVID-19 precautions.    GENERAL APPEARANCE:  Alert, in no distress, oriented, cooperative  RESP:  respiratory effort  of chest normal - cough, none.  CV: race bilat pedal/ankle  "edema.  M/S:   ARAM equally, up with assist, seen in  in room via video  SKIN:  Inspection of skin and subcutaneous tissue baseline2  PSYCH:  oriented X 3, affect and mood normal    Labs:   Recent labs in [facility] reviewed by me today.     ASSESSMENT/PLAN:  (I42.8) Non-ischemic cardiomyopathy (H)  (primary encounter diagnosis)  (I36.1) Nonrheumatic tricuspid valve regurgitation   (I50.32) Chronic diastolic congestive heart failure (H)   (J90) Bilateral pleural effusion  Comment/Plan: feels a bit more weak today, denies SOB or increase leg swelling. Saw SHARON Cr PA-C on 6/1 - no changes to medication regimen - follow-up planned for 2-3 months time or sooner PRN.  Weight 143.2lbs - this is down from 146lbs on 5/27. SBP runs 110-120's mostly,  He has an appt is to see [facility] surgeon to discuss Valve surgery later this summer.    (N18.3) CKD (chronic kidney disease) stage 3, GFR 30-59 ml/min (H)  Comment/Plan: BUN, CR up a bit but steady, no changes for today. Will plan to recheck BMP on 6/5    (J90) Bilateral pleural effusion  (primary encounter diagnosis)   (R06.02) Shortness of breath  Comment/Plan:  Weights--143.1 today, 146# on 5/27, prior week:153-154-156#   Early April 145-7#. He feels more weak today. Has been on demadex since 5/23(got lasix 5/22). No noted SOB today    (I48.20) Chronic atrial fibrillation  Comment/Plan: rate reasonably controlled, no changes to regimen today    (R63.0) Decreased appetite  Comment/Plan: Patient noting a decrease in appetite today with feeling \"blah\" has not been eating much of anything. We discussed this and the importance of intake in his rehab process. He is willing to intake a supplement when he doesn't eat. Will start on 8oz of house supplement BID PRN when he has decreased intake. Will have in-house dietician follow.    Orders written by provider at facilty  BMP/Hgb/Plt 6/5  House supplement 8oz BID while decreased appetite    Electronically signed by:  Dr. Phyllis Quintero, APRN, " [facility]/GNP-[facility]  [address] [place] [address]   [address]  [place] [address]     Cell: [phone]  Fax: [phone]  Email: [email or web address]       Video-Visit Details  Type of service:  Video Visit  Video End Time (time video stopped): 1429  Distant Location (provider location):  [facility]

## 2020-06-08 ENCOUNTER — CARE COORDINATION (OUTPATIENT)
Dept: CARDIOLOGY | Facility: CLINIC | Age: 85
End: 2020-06-08

## 2020-06-08 ENCOUNTER — TRANSFERRED RECORDS (OUTPATIENT)
Dept: HEALTH INFORMATION MANAGEMENT | Facility: CLINIC | Age: 85
End: 2020-06-08

## 2020-06-08 LAB
ANION GAP SERPL CALCULATED.3IONS-SCNC: 8 MMOL/L
ANION GAP SERPL CALCULATED.3IONS-SCNC: 8 MMOL/L (ref 7–16)
BUN SERPL-MCNC: 36 MG/DL
BUN SERPL-MCNC: 36 MG/DL (ref 7–26)
CALCIUM SERPL-MCNC: 8.8 MG/DL
CALCIUM SERPL-MCNC: 8.8 MG/DL (ref 8.4–10.4)
CHLORIDE SERPLBLD-SCNC: 102 MMOL/L
CHLORIDE SERPLBLD-SCNC: 102 MMOL/L (ref 98–109)
CO2 SERPL-SCNC: 28 MMOL/L
CO2 SERPL-SCNC: 28 MMOL/L (ref 20–29)
CREAT SERPL-MCNC: 1.17 MG/DL
CREAT SERPL-MCNC: 1.17 MG/DL (ref 0.73–1.18)
GFR SERPL CREATININE-BSD FRML MDRD: 55 ML/MIN/1.73M2
GFR SERPL CREATININE-BSD FRML MDRD: 55 ML/MIN/1.73M2
GLUCOSE SERPL-MCNC: 150 MG/DL (ref 70–100)
GLUCOSE SERPL-MCNC: 150 MG/DL (ref 70–99)
POTASSIUM SERPL-SCNC: 4.1 MMOL/L
POTASSIUM SERPL-SCNC: 4.1 MMOL/L (ref 3.5–5.1)
SODIUM SERPL-SCNC: 138 MMOL/L
SODIUM SERPL-SCNC: 138 MMOL/L (ref 136–145)

## 2020-06-09 VITALS
RESPIRATION RATE: 18 BRPM | BODY MASS INDEX: 19.38 KG/M2 | OXYGEN SATURATION: 98 % | HEART RATE: 84 BPM | TEMPERATURE: 97.8 F | WEIGHT: 143.1 LBS | HEIGHT: 72 IN | SYSTOLIC BLOOD PRESSURE: 106 MMHG | DIASTOLIC BLOOD PRESSURE: 62 MMHG

## 2020-06-09 ASSESSMENT — MIFFLIN-ST. JEOR: SCORE: 1353.46

## 2020-06-10 ENCOUNTER — NURSING HOME VISIT (OUTPATIENT)
Dept: GERIATRICS | Facility: CLINIC | Age: 85
End: 2020-06-10
Payer: MEDICARE

## 2020-06-10 VITALS
TEMPERATURE: 98 F | OXYGEN SATURATION: 99 % | RESPIRATION RATE: 16 BRPM | BODY MASS INDEX: 19.42 KG/M2 | SYSTOLIC BLOOD PRESSURE: 111 MMHG | HEIGHT: 72 IN | DIASTOLIC BLOOD PRESSURE: 59 MMHG | WEIGHT: 143.4 LBS | HEART RATE: 76 BPM

## 2020-06-10 DIAGNOSIS — D75.89 BICYTOPENIA: ICD-10-CM

## 2020-06-10 DIAGNOSIS — I07.1 TRICUSPID VALVE INSUFFICIENCY, UNSPECIFIED ETIOLOGY: Primary | ICD-10-CM

## 2020-06-10 DIAGNOSIS — J90 BILATERAL PLEURAL EFFUSION: ICD-10-CM

## 2020-06-10 DIAGNOSIS — D69.3 IDIOPATHIC THROMBOCYTOPENIC PURPURA (H): ICD-10-CM

## 2020-06-10 DIAGNOSIS — I48.20 CHRONIC ATRIAL FIBRILLATION (H): ICD-10-CM

## 2020-06-10 DIAGNOSIS — I42.8 NON-ISCHEMIC CARDIOMYOPATHY (H): ICD-10-CM

## 2020-06-10 PROCEDURE — 99309 SBSQ NF CARE MODERATE MDM 30: CPT | Performed by: INTERNAL MEDICINE

## 2020-06-10 NOTE — LETTER
"    6/10/2020        RE: Jama Paul  [id number]  [facility] [id number]         [facility]  [place] Medical Record Number:  [id number]  Jama Paul  is a 89 year old  (2/13/1931) seen Makeda 10, 2020 at [facility] where he has resided for 3 months (admit to TCU 3/2020).   Pt initially admitted to TCU in November, hospitalized several times in the interim.   He discharged back to his apartment with his wife and [facility] in January, but re-hospitalized 2/27/20 with weakness and citrobacter UTI.   He has worked with therapies in TCU, but not able to regain enough mobility to return to independent living and now transitioned to [facility] unit.     Pt is seen in his room up to    States \"I'm about to start my Rehab\"     States he his breathing is okay, but does wonder about his bps which are consistently in the 100-110s range.   He is most concerned about when he is going to have his tricuspid clip procedure.   He saw Dr Perez in February for polyvalvular disease with CHF, dominant lesion TR, on medical therapy.   Options presented and pt elected to pursue optimization of medical therapies at that time.  Follow up with Dr Perez scheduled for August.    Pt initially had [facility] hospitalization in November 2019 for shortness of breath.  He was found to have bilateral pleural effusions and had thoracentesis x2 on the left and one time on the right.    Pleural fluid c/w transudate.   ECHO cardiogram showed EF 55-60% with severely dilated RV, severe MR and TR.   Pt needed GI consult and esophageal dilatation to have KATIE done  Following this procedure he had hypotension requiring pressor support in the ICU.   He was diuresed with IV furosemide and repeat ECHO showed severe TR and moderate MR, with some improvement in both.   He was seen by Cardiology and CV surgery. He underwent left heart catheterization as workup for tricuspid repair or replacement, and this showed " normal left and right sided pressures.     He remains on metoprolol and torsemide, but not on Entresto secondary to low bps.       Pt had another [facility] hospitalization in January 2020 for recurrent large right pleural effusion.   He was treated with IV Lasix, had thoracentesis removing 2200 mLs of fluid and spironolactone was added to his daily torsemide.      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   He had a BM biopsy in March 2019 that showed hypercellular marrow with 80% cellularity and normal thrombocytes.   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  He was anticoagulated with warfarin for atrial fib and had a bleed behind his retina, lost vision in his left eye; has not been anticoagulated since then.   He has seen Hem/Onc and been treated with prednisone and IVIG with some improvement.  Last saw Dr Wise in March, at that time ongoing surveillance was recommended.          Patient has been incontinent of stool, for 3 years by his report.  Etiology unclear, ?functional.  He has had longer standing urinary incontinence for which he wears briefs at home.      Past Medical History:   Diagnosis Date     Congestive heart failure (H)      Elevated PSA      Eye hemorrhage, left 02/2019     Non-ischemic cardiomyopathy (H)     2017, med treatment, echo done 4/18 nl ef, mod to severe tr     Permanent atrial fibrillation 2011     Thrombocytopenia (H) ITP      Unspecified essential hypertension    Cognitive impairment    Past Surgical History:   Procedure Laterality Date     ABDOMEN SURGERY      bowel obstruction     BONE MARROW BIOPSY, BONE SPECIMEN, NEEDLE/TROCAR N/A 3/4/2019    Procedure: BIOPSY BONE MARROW;  Surgeon: Josey Vargas MD;  Location:  GI     CARPAL TUNNEL RELEASE RT/LT  2014     CV HEART CATHETERIZATION WITH POSSIBLE INTERVENTION N/A 11/15/2019     Procedure: Left and right heart Catheterization with Possible Intervention;  Surgeon: Adolfo Paez MD;  Location:  HEART CARDIAC CATH LAB     CV LEFT VENTRICULOGRAM N/A 11/15/2019    Procedure: Left Ventriculogram;  Surgeon: Adolfo Paez MD;  Location:  HEART CARDIAC CATH LAB     ESOPHAGOSCOPY, GASTROSCOPY, DUODENOSCOPY (EGD), COMBINED N/A 11/6/2019    Procedure: ESOPHAGOGASTRODUODENOSCOPY (EGD);  Surgeon: Marixa Aguila MD;  Location:  GI     EXPLORE GROIN  4/30/2014    Procedure: EXPLORE GROIN;  Surgeon: Sam Aguirre MD;  Location:  OR     HERNIORRHAPHY INGUINAL  4/30/2014    Procedure: HERNIORRHAPHY INGUINAL;  Surgeon: Sam Aguirre MD;  Location:  OR     MOHS MICROGRAPHIC PROCEDURE       PHACOEMULSIFICATION CLEAR CORNEA WITH STANDARD INTRAOCULAR LENS IMPLANT Right 9/8/2015    Procedure: PHACOEMULSIFICATION CLEAR CORNEA WITH STANDARD INTRAOCULAR LENS IMPLANT;  Surgeon: Gil Shannon MD;  Location:  EC     septic olecranon bursitis[       SH:  Previously lived with his wife, ekaterina at [facility]; she continues to live there.     They have a daughter Lorraine and son Jama.   Pt is a retired ophthalmologist, worked clinically until age 79      Non smoker    Current Outpatient Medications   Medication Sig Dispense Refill     acetaminophen (TYLENOL) 325 MG tablet Take 650 mg by mouth every 6 hours as needed for Pain       digoxin (LANOXIN) 125 MCG tablet Take 1 tablet (125 mcg) by mouth daily (Patient taking differently: Take 125 mcg by mouth daily HOLD if HR <55) 30 tablet 0     melatonin 3 MG tablet Take 3 mg by mouth At Bedtime After 9pm. Please use overnight depends on pt to allow him to sleep without interruption during the night       metoprolol succinate ER (TOPROL-XL) 50 MG 24 hr tablet Take 1 tablet (50 mg) by mouth daily 180 tablet 3     mirtazapine (REMERON) 15 MG tablet Take 15 mg by mouth At Bedtime Patient started medication for the first time yesterday  10/29/2019.        Multiple Vitamins-Minerals (ICAPS AREDS FORMULA PO) Take 1 tablet by mouth daily Takes in addition to multivitamin with minerals       multivitamin, therapeutic (THERA-VIT) TABS tablet Take 1 tablet by mouth daily       OTHER MEDICAL SUPPLIES CORE PROTOCOL ; DAILY WEIGHTS IN AM.       potassium chloride ER (K-TAB) 20 MEQ CR tablet Take 20 mEq by mouth three times a week Monday, Wed and Friday       spironolactone (ALDACTONE) 25 MG tablet Take 1 tablet (25 mg) by mouth daily 90 tablet 3     torsemide (DEMADEX) 10 MG tablet Take 10 mg by mouth daily Start 5/23 am       Vitamin D, Cholecalciferol, 1000 UNITS TABS Take 3,000 Units by mouth daily         ROS: 4 point ROS including Respiratory, CV, GI and , other than that noted in the HPI,  is negative   Wt Readings from Last 5 Encounters:   06/09/20 65 kg (143 lb 6.4 oz)   06/03/20 64.9 kg (143 lb 1.6 oz)   05/27/20 67 kg (147 lb 9.6 oz)   05/26/20 66.9 kg (147 lb 6.4 oz)   05/21/20 69.9 kg (154 lb 3.2 oz)        Vitals:/59   Pulse 76   Temp 98  F (36.7  C)   Resp 16   Ht 1.829 m (6')   Wt 65 kg (143 lb 6.4 oz)   SpO2 99%   BMI 19.45 kg/m     GENERAL APPEARANCE:  Alert, in no distress    Up to WC, he appears comfortable and no tachypnea or increased work of breathing.   +kyphosis  Neck supple without adenopathy  Lungs with decreased BS, no rales or wheeze  Heart RRR s1s2, 2/6 HSM  Abd soft, NT, no distention, +BS  Ext without edema  Neuro: some repetitiveness and STML, no focal deficits  Psych: affect okay    Last Comprehensive Metabolic Panel:  Sodium   Date Value Ref Range Status   06/08/2020 138 136 - 145 mmol/L Final   06/08/2020 138 136 - 145 mmol/L Final     Potassium   Date Value Ref Range Status   06/08/2020 4.1 3.5 - 5.1 mmol/L Final   06/08/2020 4.1 3.5 - 5.1 mmol/L Final     Chloride   Date Value Ref Range Status   06/08/2020 102 98 - 109 mmol/L Final   06/08/2020 102 98 - 109 mmol/L Final     Carbon Dioxide   Date Value Ref  Range Status   06/08/2020 28 20 - 29 mmol/L Final   06/08/2020 28 20 - 29 mmol/L Final     Anion Gap   Date Value Ref Range Status   06/08/2020 8 7 - 16 mmol/L Final   06/08/2020 8 7 - 16 mmol/L Final     Glucose   Date Value Ref Range Status   06/08/2020 150 (H) 70 - 100 mg/dL Final   06/08/2020 150 (A) 70 - 99 mg/dL Final     Urea Nitrogen   Date Value Ref Range Status   06/08/2020 36 (H) 7 - 26 mg/dL Final   06/08/2020 36 (A) 7 - 26 mg/dL Final     Creatinine   Date Value Ref Range Status   06/08/2020 1.17 0.73 - 1.18 mg/dL Final   06/08/2020 1.17 0.73 - 1.18 mg/dL Final     GFR Estimate   Date Value Ref Range Status   06/08/2020 55 (L) >60 ml/min/1.73m2 Final   06/08/2020 55 (A) >60 ml/min/1.73m2 Final     Calcium   Date Value Ref Range Status   06/08/2020 8.8 8.4 - 10.4 mg/dL Final   06/08/2020 8.8 8.4 - 10.4 mg/dL Final     Lab Results   Component Value Date    HGB 9.5 05/22/2020     Lab Results   Component Value Date    PLT 43 05/22/2020          ASSESSMENT/PLAN:  (J90) Bilateral pleural effusion  (I50.32) Chronic diastolic heart failure (H)   (I42.8) Non-ischemic cardiomyopathy (H)   Torsemide started in May   BP Readings from Last 3 Encounters:   06/09/20 111/59   06/03/20 106/62   06/01/20 120/67      Comment: stable volume status today   Cardiology notes reviewed.    Plan: spironolactone 25 mg/day and torsemide 10 mg/day  Follow up appointment with Dr Perez in August to address TR.      (I48.21) Permanent atrial fibrillation  Comment:   Pulse Readings from Last 4 Encounters:   06/09/20 76   06/03/20 84   05/27/20 83   05/26/20 79      Plan: digoxin 0.125 mg/day and metoprolol 50 mg bid for VR and bp control.     No anticoagulation given low plts and prior bleeding complications.        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     Plan:  LTC support for meds, meals, activity    (R15.9) Incontinence of feces, unspecified fecal incontinence type  (N39.46) Mixed incontinence  Comment: not clear how much of this is functional, but has been present several years  Plan: incontinence briefs, assist with personal cares.       POLST discussion: pt elects full code         Jeanette Hernández MD           Sincerely,        Jeanette Hernández MD

## 2020-06-15 NOTE — PROGRESS NOTES
" [facility]  [place] Medical Record Number:  [id number]  Jama Paul  is a 89 year old  (2/13/1931) seen Makeda 10, 2020 at [facility] where he has resided for 3 months (admit to TCU 3/2020).   Pt initially admitted to TCU in November, hospitalized several times in the interim.   He discharged back to his apartment with his wife and [facility] in January, but re-hospitalized 2/27/20 with weakness and citrobacter UTI.   He has worked with therapies in TCU, but not able to regain enough mobility to return to independent living and now transitioned to [facility] unit.     Pt is seen in his room up to    States \"I'm about to start my Rehab\"     States he his breathing is okay, but does wonder about his bps which are consistently in the 100-110s range.   He is most concerned about when he is going to have his tricuspid clip procedure.   He saw Dr Perez in February for polyvalvular disease with CHF, dominant lesion TR, on medical therapy.   Options presented and pt elected to pursue optimization of medical therapies at that time.  Follow up with Dr Perez scheduled for August.    Pt initially had [facility] hospitalization in November 2019 for shortness of breath.  He was found to have bilateral pleural effusions and had thoracentesis x2 on the left and one time on the right.    Pleural fluid c/w transudate.   ECHO cardiogram showed EF 55-60% with severely dilated RV, severe MR and TR.   Pt needed GI consult and esophageal dilatation to have KATIE done  Following this procedure he had hypotension requiring pressor support in the ICU.   He was diuresed with IV furosemide and repeat ECHO showed severe TR and moderate MR, with some improvement in both.   He was seen by Cardiology and CV surgery. He underwent left heart catheterization as workup for tricuspid repair or replacement, and this showed normal left and right sided pressures.     He remains on metoprolol and torsemide, but not on Entresto " secondary to low bps.       Pt had another [facility] hospitalization in January 2020 for recurrent large right pleural effusion.   He was treated with IV Lasix, had thoracentesis removing 2200 mLs of fluid and spironolactone was added to his daily torsemide.      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   He had a BM biopsy in March 2019 that showed hypercellular marrow with 80% cellularity and normal thrombocytes.   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  He was anticoagulated with warfarin for atrial fib and had a bleed behind his retina, lost vision in his left eye; has not been anticoagulated since then.   He has seen Hem/Onc and been treated with prednisone and IVIG with some improvement.  Last saw Dr Wise in March, at that time ongoing surveillance was recommended.          Patient has been incontinent of stool, for 3 years by his report.  Etiology unclear, ?functional.  He has had longer standing urinary incontinence for which he wears briefs at home.      Past Medical History:   Diagnosis Date     Congestive heart failure (H)      Elevated PSA      Eye hemorrhage, left 02/2019     Non-ischemic cardiomyopathy (H)     2017, med treatment, echo done 4/18 nl ef, mod to severe tr     Permanent atrial fibrillation 2011     Thrombocytopenia (H) ITP      Unspecified essential hypertension    Cognitive impairment    Past Surgical History:   Procedure Laterality Date     ABDOMEN SURGERY      bowel obstruction     BONE MARROW BIOPSY, BONE SPECIMEN, NEEDLE/TROCAR N/A 3/4/2019    Procedure: BIOPSY BONE MARROW;  Surgeon: Josey Vargas MD;  Location:  GI     CARPAL TUNNEL RELEASE RT/LT  2014     CV HEART CATHETERIZATION WITH POSSIBLE INTERVENTION N/A 11/15/2019    Procedure: Left and right heart Catheterization with Possible Intervention;  Surgeon: Adolfo Paez  MD ESTEFANY;  Location:  HEART CARDIAC CATH LAB     CV LEFT VENTRICULOGRAM N/A 11/15/2019    Procedure: Left Ventriculogram;  Surgeon: Adolfo Paez MD;  Location:  HEART CARDIAC CATH LAB     ESOPHAGOSCOPY, GASTROSCOPY, DUODENOSCOPY (EGD), COMBINED N/A 11/6/2019    Procedure: ESOPHAGOGASTRODUODENOSCOPY (EGD);  Surgeon: Marixa Aguila MD;  Location:  GI     EXPLORE GROIN  4/30/2014    Procedure: EXPLORE GROIN;  Surgeon: Sam Aguirre MD;  Location:  OR     HERNIORRHAPHY INGUINAL  4/30/2014    Procedure: HERNIORRHAPHY INGUINAL;  Surgeon: Sam Aguirre MD;  Location:  OR     MOHS MICROGRAPHIC PROCEDURE       PHACOEMULSIFICATION CLEAR CORNEA WITH STANDARD INTRAOCULAR LENS IMPLANT Right 9/8/2015    Procedure: PHACOEMULSIFICATION CLEAR CORNEA WITH STANDARD INTRAOCULAR LENS IMPLANT;  Surgeon: Gil Shannon MD;  Location:  EC     septic olecranon bursitis[       SH:  Previously lived with his wife, ekaterina at [facility]; she continues to live there.     They have a daughter Lorraine and son Jama.   Pt is a retired ophthalmologist, worked clinically until age 79      Non smoker    Current Outpatient Medications   Medication Sig Dispense Refill     acetaminophen (TYLENOL) 325 MG tablet Take 650 mg by mouth every 6 hours as needed for Pain       digoxin (LANOXIN) 125 MCG tablet Take 1 tablet (125 mcg) by mouth daily (Patient taking differently: Take 125 mcg by mouth daily HOLD if HR <55) 30 tablet 0     melatonin 3 MG tablet Take 3 mg by mouth At Bedtime After 9pm. Please use overnight depends on pt to allow him to sleep without interruption during the night       metoprolol succinate ER (TOPROL-XL) 50 MG 24 hr tablet Take 1 tablet (50 mg) by mouth daily 180 tablet 3     mirtazapine (REMERON) 15 MG tablet Take 15 mg by mouth At Bedtime Patient started medication for the first time yesterday 10/29/2019.        Multiple Vitamins-Minerals (ICAPS AREDS FORMULA PO) Take 1 tablet by mouth daily Takes  in addition to multivitamin with minerals       multivitamin, therapeutic (THERA-VIT) TABS tablet Take 1 tablet by mouth daily       OTHER MEDICAL SUPPLIES CORE PROTOCOL ; DAILY WEIGHTS IN AM.       potassium chloride ER (K-TAB) 20 MEQ CR tablet Take 20 mEq by mouth three times a week Monday, Wed and Friday       spironolactone (ALDACTONE) 25 MG tablet Take 1 tablet (25 mg) by mouth daily 90 tablet 3     torsemide (DEMADEX) 10 MG tablet Take 10 mg by mouth daily Start 5/23 am       Vitamin D, Cholecalciferol, 1000 UNITS TABS Take 3,000 Units by mouth daily         ROS: 4 point ROS including Respiratory, CV, GI and , other than that noted in the HPI,  is negative   Wt Readings from Last 5 Encounters:   06/09/20 65 kg (143 lb 6.4 oz)   06/03/20 64.9 kg (143 lb 1.6 oz)   05/27/20 67 kg (147 lb 9.6 oz)   05/26/20 66.9 kg (147 lb 6.4 oz)   05/21/20 69.9 kg (154 lb 3.2 oz)        Vitals:/59   Pulse 76   Temp 98  F (36.7  C)   Resp 16   Ht 1.829 m (6')   Wt 65 kg (143 lb 6.4 oz)   SpO2 99%   BMI 19.45 kg/m     GENERAL APPEARANCE:  Alert, in no distress    Up to WC, he appears comfortable and no tachypnea or increased work of breathing.   +kyphosis  Neck supple without adenopathy  Lungs with decreased BS, no rales or wheeze  Heart RRR s1s2, 2/6 HSM  Abd soft, NT, no distention, +BS  Ext without edema  Neuro: some repetitiveness and STML, no focal deficits  Psych: affect okay    Last Comprehensive Metabolic Panel:  Sodium   Date Value Ref Range Status   06/08/2020 138 136 - 145 mmol/L Final   06/08/2020 138 136 - 145 mmol/L Final     Potassium   Date Value Ref Range Status   06/08/2020 4.1 3.5 - 5.1 mmol/L Final   06/08/2020 4.1 3.5 - 5.1 mmol/L Final     Chloride   Date Value Ref Range Status   06/08/2020 102 98 - 109 mmol/L Final   06/08/2020 102 98 - 109 mmol/L Final     Carbon Dioxide   Date Value Ref Range Status   06/08/2020 28 20 - 29 mmol/L Final   06/08/2020 28 20 - 29 mmol/L Final     Anion Gap    Date Value Ref Range Status   06/08/2020 8 7 - 16 mmol/L Final   06/08/2020 8 7 - 16 mmol/L Final     Glucose   Date Value Ref Range Status   06/08/2020 150 (H) 70 - 100 mg/dL Final   06/08/2020 150 (A) 70 - 99 mg/dL Final     Urea Nitrogen   Date Value Ref Range Status   06/08/2020 36 (H) 7 - 26 mg/dL Final   06/08/2020 36 (A) 7 - 26 mg/dL Final     Creatinine   Date Value Ref Range Status   06/08/2020 1.17 0.73 - 1.18 mg/dL Final   06/08/2020 1.17 0.73 - 1.18 mg/dL Final     GFR Estimate   Date Value Ref Range Status   06/08/2020 55 (L) >60 ml/min/1.73m2 Final   06/08/2020 55 (A) >60 ml/min/1.73m2 Final     Calcium   Date Value Ref Range Status   06/08/2020 8.8 8.4 - 10.4 mg/dL Final   06/08/2020 8.8 8.4 - 10.4 mg/dL Final     Lab Results   Component Value Date    HGB 9.5 05/22/2020     Lab Results   Component Value Date    PLT 43 05/22/2020          ASSESSMENT/PLAN:  (J90) Bilateral pleural effusion  (I50.32) Chronic diastolic heart failure (H)   (I42.8) Non-ischemic cardiomyopathy (H)   Torsemide started in May   BP Readings from Last 3 Encounters:   06/09/20 111/59   06/03/20 106/62   06/01/20 120/67      Comment: stable volume status today   Cardiology notes reviewed.    Plan: spironolactone 25 mg/day and torsemide 10 mg/day  Follow up appointment with Dr Perez in August to address TR.      (I48.21) Permanent atrial fibrillation  Comment:   Pulse Readings from Last 4 Encounters:   06/09/20 76   06/03/20 84   05/27/20 83   05/26/20 79      Plan: digoxin 0.125 mg/day and metoprolol 50 mg bid for VR and bp control.     No anticoagulation given low plts and prior bleeding complications.        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     Plan: LTC support for meds, meals, activity    (R15.9) Incontinence of feces, unspecified fecal incontinence  type  (N39.46) Mixed incontinence  Comment: not clear how much of this is functional, but has been present several years  Plan: incontinence briefs, assist with personal cares.       POLST discussion: pt elects full code         Jeanette Hernández MD

## 2020-07-09 ENCOUNTER — PATIENT OUTREACH (OUTPATIENT)
Dept: CARE COORDINATION | Facility: CLINIC | Age: 85
End: 2020-07-09

## 2020-07-09 NOTE — PROGRESS NOTES
Clinic Care Coordination Contact    Situation: Patient chart reviewed by care coordinator.    Background:  Pt was in the hospital 2/27-3/1/2020 for generalize weakness.    Assessment:Patient transferred to [facility]    Plan/Recommendations: No further care coordination needed   ESTEFANY [facility] [place]ola  RN Care Coordinator   [facility]   Phone: [phone]  Email :  [email or web address]

## 2020-07-29 ENCOUNTER — TRANSFERRED RECORDS (OUTPATIENT)
Dept: HEALTH INFORMATION MANAGEMENT | Facility: CLINIC | Age: 85
End: 2020-07-29

## 2020-08-10 VITALS
BODY MASS INDEX: 19.5 KG/M2 | HEART RATE: 98 BPM | WEIGHT: 144 LBS | DIASTOLIC BLOOD PRESSURE: 59 MMHG | SYSTOLIC BLOOD PRESSURE: 104 MMHG | OXYGEN SATURATION: 98 % | RESPIRATION RATE: 18 BRPM | HEIGHT: 72 IN | TEMPERATURE: 97.9 F

## 2020-08-10 ASSESSMENT — MIFFLIN-ST. JEOR: SCORE: 1356.18

## 2020-08-10 NOTE — PROGRESS NOTES
[place] [facility]  Chief Complaint   Patient presents with     Annual Comprehensive Nursing Home     [place] Medical Record Number:  [id number]  Place of Service where encounter took place:  [facility] ([facility] [[id number]]    HPI:    Jama Paul  is a 89 year old  (2/13/1931), who is being seen today for an annual comprehensive visit. HPI information obtained from: facility chart records, facility staff, patient report and [facility] chart review.  Today's concerns are:     Non-ischemic cardiomyopathy (H)  Chronic diastolic heart failure (H)  Nonrheumatic mitral (valve) insufficiency  Permanent atrial fibrillation (H)  Essential hypertension  CKD (chronic kidney disease) stage 3, GFR 30-59 ml/min (H)  Chronic thrombocytopenic purpura (H)  Other myelodysplastic syndromes (H)  Bowel and bladder incontinence  Routine general medical examination at a health care facility         ALLERGIES: Patient has no known allergies.  PAST MEDICAL HISTORY:  has a past medical history of Congestive heart failure (H), Elevated PSA, Eye hemorrhage, left (02/2019), Non-ischemic cardiomyopathy (H), Permanent atrial fibrillation (H) (2011), Thrombocytopenia (H), and Unspecified essential hypertension. He also has no past medical history of Malignant hyperthermia, PONV (postoperative nausea and vomiting), or Sleep apnea.  PAST SURGICAL HISTORY:  has a past surgical history that includes septic olecranon bursitis[; Mohs micrographic procedure; Herniorrhaphy inguinal (4/30/2014); Explore groin (4/30/2014); Phacoemulsification clear cornea with standard intraocular lens implant (Right, 9/8/2015); carpal tunnel release rt/lt (2014); Bone marrow biopsy, bone specimen, needle/trocar (N/A, 3/4/2019); Abdomen surgery; Esophagoscopy, gastroscopy, duodenoscopy (EGD), combined (N/A, 11/6/2019); Heart Catheterization with Possible Intervention (N/A, 11/15/2019); and Left Ventriculogram (N/A,  11/15/2019).  IMMUNIZATIONS:  Immunization History   Administered Date(s) Administered     FLU 6-35 months 10/05/2009     Flu, Unspecified 11/01/2012, 10/15/2014     HepA-Adult 10/08/2002     HepB-Adult 10/08/2002, 11/05/2002     Influenza (High Dose) 3 valent vaccine 11/04/2014, 10/29/2015, 09/16/2016, 10/07/2018     Influenza (IIV3) PF 10/17/2002, 11/01/2012, 11/27/2012, 01/10/2014, 10/15/2014     Pneumo Conj 13-V (2010&after) 10/29/2015     Pneumococcal 23 valent 06/30/2014     TDAP Vaccine (Adacel) 09/02/2019     Td (Adult), Adsorbed 11/05/2002     Tdap (Adacel,boostrix) 09/02/2019     Typhoid IM 10/17/2002     Zoster vaccine, live 11/01/2012, 11/27/2012     Above immunizations pulled from [facility]. [facility] and facility records also reconciled. Outstanding information sent to  to update [facility]  .  Future immunizations are not needed at this point as all recommended immunizations are up to date.      Current Outpatient Medications   Medication Sig Dispense Refill     acetaminophen (TYLENOL) 325 MG tablet Take 650 mg by mouth every 6 hours as needed for Pain       digoxin (LANOXIN) 125 MCG tablet Take 1 tablet (125 mcg) by mouth daily (Patient taking differently: Take 125 mcg by mouth daily HOLD if HR <55) 30 tablet 0     melatonin 3 MG tablet Take 3 mg by mouth At Bedtime After 9pm. Please use overnight depends on pt to allow him to sleep without interruption during the night       metoprolol succinate ER (TOPROL-XL) 50 MG 24 hr tablet Take 1 tablet (50 mg) by mouth daily 180 tablet 3     mirtazapine (REMERON) 15 MG tablet Take 15 mg by mouth At Bedtime Patient started medication for the first time yesterday 10/29/2019.        Multiple Vitamins-Minerals (ICAPS AREDS FORMULA PO) Take 1 tablet by mouth daily Takes in addition to multivitamin with minerals       multivitamin, therapeutic (THERA-VIT) TABS tablet Take 1 tablet by mouth daily       OTHER MEDICAL SUPPLIES CORE PROTOCOL ; DAILY  WEIGHTS IN AM.       potassium chloride ER (K-TAB) 20 MEQ CR tablet Take 20 mEq by mouth three times a week Monday, Wed and Friday       spironolactone (ALDACTONE) 25 MG tablet Take 1 tablet (25 mg) by mouth daily 90 tablet 3     torsemide (DEMADEX) 10 MG tablet Take 10 mg by mouth daily Start 5/23 am       Vitamin D, Cholecalciferol, 1000 UNITS TABS Take 3,000 Units by mouth daily              Case Management:  I have reviewed the Assisted Living care plan, current immunizations and preventive care/cancer screening. .Future cancer screening is not clinically indicated secondary to age/goals of care Patient's desire to return to the community is not present. Current Level of Care is appropriate.    Advance Directive Discussion:    I reviewed the current advanced directives as reflected in EPIC, the POLST and the facility chart, and verified the congruency of orders  . I d/w patient discussed the plan of Care.  I did review the advance directives with the resident.     Team Discussion:  I communicated with the appropriate disciplines involved with the Plan of Care:   Nursing    Patient's goal is improved activity and hopes to go home to be with wife someday.  Information reviewed:  Medications, vital signs, orders, and nursing notes.    TODAY DURING EXAM/ROS:  No CP, SOB, Cough, dizziness, fevers, chills, HA, N/V, dysuria or Bowel Abnormalities(*does have incont bowel and bladder at times: not new*). Appetite is good.  No pains    Vitals:  /59   Pulse 98   Temp 97.9  F (36.6  C)   Resp 18   Ht 1.829 m (6')   Wt 65.3 kg (144 lb)   SpO2 98%   BMI 19.53 kg/m   Body mass index is 19.53 kg/m .  Exam:  GENERAL APPEARANCE:  Alert, oriented, cooperative, NAD.  ENT:  Mouth with moist mucous membranes, [place].  EYES: Conjunctivae, lids, pupils and irises normal  NECK:  No adenopathy,masses or thyromegaly  RESP:  respiratory effort  of chest normal, lungs clear to auscultation, NAD  CV:  Auscultation of heart done  ",IRRR. Soft systolic murmur.  No Rub or Gallop, trace pedal  Edema, +1 pedal pulses.  ABDOMEN:  normal bowel sounds, soft, nontender.   M/S:   CHIU equally, up with assist-seen in WC  SKIN:  Inspection of skin is at baseline but limited as pt fully dressed.    NEURO:   Cranial nerves 2-12 are grossly intact and at patient's baseline  PSYCH:  oriented X 3, engaged in conversation.     Lab/Diagnostic data:      Recent Labs   Lab Test 06/08/20 05/22/20     138 137   POTASSIUM 4.1  4.1 4.6   CHLORIDE 102  102 105   CO2 28  28 25   ANIONGAP 8  8 7   *  150* 82   BUN 36*  36* 25   CR 1.17  1.17 1.02   BARON 8.8  8.8 8.5     CBC RESULTS:   Recent Labs   Lab Test 05/22/20 03/09/20   WBC  --  6.4   RBC  --  3.39*   HGB 9.5* 9.8*   HCT  --  33.3*   MCV  --  98.2   MCH  --  28.9   MCHC  --  29.4*   RDW  --  21.1*   PLT 43* 48*     ASSESSMENT / PLAN:  (I42.8) Non-ischemic cardiomyopathy (H)  (primary encounter diagnosis)   (I50.32) Chronic diastolic heart failure (H)   (I34.0) Non rheumatic mitral (valve) insufficiency  Comment/Plan:TR and MR:  compensated, wt has been steady at 143-144#, no changes to meds. Check BMP on 8/19.  Pt had an eval  7/2 with [facility] Dr Perez and then a right heart cath on 8/5> he is being considered for \"The  Triluminate \" Trial at [facility] for his valves.  I d/w Carla Ortiz RN with DR Perez's office at [facility]--no date set and the study nurses is Steffi who will contact pt and us/OB if/when he is accepted into trial.    OTHER CARDIAC ISSUES:  (I48.21) Permanent atrial fibrillation (H)   (I10) Essential hypertension  Comment/Plan: SBP mostly 100-110, HR controlled  50-70's. Will update Amalia Zhang from [facility]--his provider there.     (N18.3) CKD (chronic kidney disease) stage 3, GFR 30-59 ml/min (H)  Comment/Plan: check labs on 8/19 has been steady.    (D69.49) Chronic thrombocytopenic purpura (H)   (D46.Z) Other myelodysplastic syndromes (H)  Comment/Plan: check labs on 8/19--cbc with " plts    (R32,  R15.9) Bowel and bladder incontinence  Comment/Plan: chronic. Cont same oc    (Z00.00) Routine general medical examination at a health care facility:(Annual 8/11/2020)        Electronically signed by:  ERIC Mir CNP

## 2020-08-11 ENCOUNTER — NURSING HOME VISIT (OUTPATIENT)
Dept: GERIATRICS | Facility: CLINIC | Age: 85
End: 2020-08-11
Payer: MEDICARE

## 2020-08-11 DIAGNOSIS — N18.30 CKD (CHRONIC KIDNEY DISEASE) STAGE 3, GFR 30-59 ML/MIN (H): ICD-10-CM

## 2020-08-11 DIAGNOSIS — D46.Z OTHER MYELODYSPLASTIC SYNDROMES (H): ICD-10-CM

## 2020-08-11 DIAGNOSIS — I34.0 NONRHEUMATIC MITRAL (VALVE) INSUFFICIENCY: ICD-10-CM

## 2020-08-11 DIAGNOSIS — I10 ESSENTIAL HYPERTENSION: ICD-10-CM

## 2020-08-11 DIAGNOSIS — I48.21 PERMANENT ATRIAL FIBRILLATION (H): ICD-10-CM

## 2020-08-11 DIAGNOSIS — R32 BOWEL AND BLADDER INCONTINENCE: ICD-10-CM

## 2020-08-11 DIAGNOSIS — I42.8 NON-ISCHEMIC CARDIOMYOPATHY (H): Primary | ICD-10-CM

## 2020-08-11 DIAGNOSIS — D69.49 CHRONIC THROMBOCYTOPENIC PURPURA (H): ICD-10-CM

## 2020-08-11 DIAGNOSIS — I50.32 CHRONIC DIASTOLIC HEART FAILURE (H): ICD-10-CM

## 2020-08-11 DIAGNOSIS — R15.9 BOWEL AND BLADDER INCONTINENCE: ICD-10-CM

## 2020-08-11 DIAGNOSIS — Z00.00 ROUTINE GENERAL MEDICAL EXAMINATION AT A HEALTH CARE FACILITY: ICD-10-CM

## 2020-08-11 PROCEDURE — 99318 ZZC ANNUAL NURSING FAC ASSESSMNT, STABLE: CPT | Performed by: NURSE PRACTITIONER

## 2020-08-12 PROBLEM — Z00.00 ROUTINE GENERAL MEDICAL EXAMINATION AT A HEALTH CARE FACILITY: Status: ACTIVE | Noted: 2020-08-12

## 2020-08-18 ASSESSMENT — MIFFLIN-ST. JEOR: SCORE: 1356.63

## 2020-08-19 ENCOUNTER — NURSING HOME VISIT (OUTPATIENT)
Dept: GERIATRICS | Facility: CLINIC | Age: 85
End: 2020-08-19
Payer: MEDICARE

## 2020-08-19 ENCOUNTER — TRANSFERRED RECORDS (OUTPATIENT)
Dept: HEALTH INFORMATION MANAGEMENT | Facility: CLINIC | Age: 85
End: 2020-08-19

## 2020-08-19 VITALS
DIASTOLIC BLOOD PRESSURE: 53 MMHG | HEART RATE: 53 BPM | SYSTOLIC BLOOD PRESSURE: 95 MMHG | OXYGEN SATURATION: 96 % | HEIGHT: 72 IN | TEMPERATURE: 97.7 F | BODY MASS INDEX: 19.52 KG/M2 | WEIGHT: 144.1 LBS | RESPIRATION RATE: 17 BRPM

## 2020-08-19 DIAGNOSIS — N18.30 CKD (CHRONIC KIDNEY DISEASE) STAGE 3, GFR 30-59 ML/MIN (H): ICD-10-CM

## 2020-08-19 DIAGNOSIS — I50.32 CHRONIC DIASTOLIC HEART FAILURE (H): ICD-10-CM

## 2020-08-19 DIAGNOSIS — I42.8 NON-ISCHEMIC CARDIOMYOPATHY (H): Primary | ICD-10-CM

## 2020-08-19 DIAGNOSIS — I95.2 HYPOTENSION DUE TO DRUGS: ICD-10-CM

## 2020-08-19 DIAGNOSIS — D46.Z OTHER MYELODYSPLASTIC SYNDROMES (H): ICD-10-CM

## 2020-08-19 DIAGNOSIS — I34.0 NONRHEUMATIC MITRAL (VALVE) INSUFFICIENCY: ICD-10-CM

## 2020-08-19 DIAGNOSIS — D69.49 CHRONIC THROMBOCYTOPENIC PURPURA (H): ICD-10-CM

## 2020-08-19 DIAGNOSIS — I10 ESSENTIAL HYPERTENSION: ICD-10-CM

## 2020-08-19 LAB
DIFFERENTIAL: ABNORMAL
ERYTHROCYTE [DISTWIDTH] IN BLOOD BY AUTOMATED COUNT: 17.4 % (ref 11.9–15.5)
HCT VFR BLD AUTO: 33.6 % (ref 38.8–50)
HEMOGLOBIN: 10.6 G/DL (ref 13.5–17.5)
MCH RBC QN AUTO: 31.2 PG (ref 27.6–33.3)
MCHC RBC AUTO-ENTMCNC: 31.5 G/DL (ref 31.5–35.2)
MCV RBC AUTO: 98.8 FL (ref 80–100)
PLATELET # BLD AUTO: 38 10^9/L (ref 150–450)
RBC # BLD AUTO: 3.4 10^12/L (ref 4.32–5.72)
WBC # BLD AUTO: 6.3 10^9/L (ref 3.5–10.5)

## 2020-08-19 PROCEDURE — 99309 SBSQ NF CARE MODERATE MDM 30: CPT | Performed by: NURSE PRACTITIONER

## 2020-08-19 RX ORDER — METOPROLOL SUCCINATE 25 MG/1
12.5 TABLET, EXTENDED RELEASE ORAL
COMMUNITY
Start: 2020-11-13 | End: 2020-12-02 | Stop reason: DRUGHIGH

## 2020-08-19 NOTE — Clinical Note
HI Dr Britton,   plz see my note.  Patient's plts cont to be an issue with low plts.  It appears you have seen him in the past.  He is not an anticoagulants due to the low pts and a retinal bleed in the past.  He was turned down for a valve study at [facility] due to the low plts  Dr Hernández and I would like you to see him to review and advise when possible.  I will have staff call your office unless you have a different provider you would daisy him to see.  Thanks Cheli Johnson RN, CNP   And Dr Jeanette Hernández MD  [facility]

## 2020-08-22 ENCOUNTER — MEDICAL CORRESPONDENCE (OUTPATIENT)
Dept: HEALTH INFORMATION MANAGEMENT | Facility: CLINIC | Age: 85
End: 2020-08-22

## 2020-10-06 ASSESSMENT — MIFFLIN-ST. JEOR: SCORE: 1343.03

## 2020-10-06 NOTE — PROGRESS NOTES
"Chief Complaint: left temple lesion recently removed at Dermatology appt-- asked to check pt today    HPI:    Jama Paul is a 89 year old  (2/13/1931), who is being seen today for an episodic care visit at [facility]. 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   Feels breathing well, no CP, SOB, Cough.    /62   Pulse 70   Temp 98  F (36.7  C)   Resp 16   Ht 1.829 m (6')   Wt 64 kg (141 lb 1.6 oz)   SpO2 100%   BMI 19.14 kg/m    GENERAL APPEARANCE:  Alert,oriented x 3, NAD. Lungs CTA , S1/S2 without M/G/R. Trace bilat pedal edema. Abdomen is soft, +BTS . No focal neurological deficits.         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 Will check BMP on 10/21, cont all meds, POC    (D69.49) Chronic thrombocytopenic purpura (H)   (D46.Z) Other myelodysplastic syndromes (H)  Comment/Plan: check Hgb and Plts on 10/21         Electronically Signed by:  ERIC Mir CNP    "

## 2020-10-07 ENCOUNTER — NURSING HOME VISIT (OUTPATIENT)
Dept: GERIATRICS | Facility: CLINIC | Age: 85
End: 2020-10-07
Payer: MEDICARE

## 2020-10-07 VITALS
WEIGHT: 141.1 LBS | HEIGHT: 72 IN | OXYGEN SATURATION: 100 % | DIASTOLIC BLOOD PRESSURE: 62 MMHG | SYSTOLIC BLOOD PRESSURE: 107 MMHG | RESPIRATION RATE: 16 BRPM | TEMPERATURE: 98 F | BODY MASS INDEX: 19.11 KG/M2 | HEART RATE: 70 BPM

## 2020-10-07 DIAGNOSIS — D69.49 CHRONIC THROMBOCYTOPENIC PURPURA (H): ICD-10-CM

## 2020-10-07 DIAGNOSIS — I50.32 CHRONIC DIASTOLIC HEART FAILURE (H): ICD-10-CM

## 2020-10-07 DIAGNOSIS — D46.Z OTHER MYELODYSPLASTIC SYNDROMES (H): ICD-10-CM

## 2020-10-07 DIAGNOSIS — L98.9 SKIN LESION OF FACE: Primary | ICD-10-CM

## 2020-10-07 DIAGNOSIS — N18.30 HYPERTENSIVE HEART AND CHRONIC KIDNEY DISEASE STAGE 3 (H): ICD-10-CM

## 2020-10-07 DIAGNOSIS — I13.10 HYPERTENSIVE HEART AND CHRONIC KIDNEY DISEASE STAGE 3 (H): ICD-10-CM

## 2020-10-07 PROCEDURE — 99308 SBSQ NF CARE LOW MDM 20: CPT | Performed by: NURSE PRACTITIONER

## 2020-10-21 ENCOUNTER — NURSING HOME VISIT (OUTPATIENT)
Dept: GERIATRICS | Facility: CLINIC | Age: 85
End: 2020-10-21
Payer: MEDICARE

## 2020-10-21 ENCOUNTER — TRANSFERRED RECORDS (OUTPATIENT)
Dept: HEALTH INFORMATION MANAGEMENT | Facility: CLINIC | Age: 85
End: 2020-10-21

## 2020-10-21 VITALS
TEMPERATURE: 98.1 F | DIASTOLIC BLOOD PRESSURE: 73 MMHG | SYSTOLIC BLOOD PRESSURE: 111 MMHG | WEIGHT: 140.8 LBS | BODY MASS INDEX: 19.07 KG/M2 | HEART RATE: 84 BPM | HEIGHT: 72 IN | RESPIRATION RATE: 18 BRPM | OXYGEN SATURATION: 94 %

## 2020-10-21 DIAGNOSIS — M62.81 GENERALIZED MUSCLE WEAKNESS: ICD-10-CM

## 2020-10-21 DIAGNOSIS — D69.3 IDIOPATHIC THROMBOCYTOPENIC PURPURA (H): ICD-10-CM

## 2020-10-21 DIAGNOSIS — I50.32 CHRONIC DIASTOLIC HEART FAILURE (H): ICD-10-CM

## 2020-10-21 DIAGNOSIS — I48.21 PERMANENT ATRIAL FIBRILLATION (H): Primary | ICD-10-CM

## 2020-10-21 DIAGNOSIS — I42.8 NON-ISCHEMIC CARDIOMYOPATHY (H): ICD-10-CM

## 2020-10-21 LAB
HEMOGLOBIN: 11.2 G/DL (ref 13.5–17.5)
PLATELET # BLD AUTO: 37 10^9/L (ref 150–450)

## 2020-10-21 PROCEDURE — 99309 SBSQ NF CARE MODERATE MDM 30: CPT | Performed by: INTERNAL MEDICINE

## 2020-10-21 ASSESSMENT — MIFFLIN-ST. JEOR: SCORE: 1341.66

## 2020-10-21 NOTE — PROGRESS NOTES
[place] [facility]  [place] [facility] Record Number:  [id number]  Jama Paul  is a 89 year old  (2/13/1931) seen October 21, 2020 at [facility] where he has resided for 7 months (admit to TCU 3/2020).  He is seen in his room up to ; reports he is feeling well, but is not conversant.   Denies pain, dyspnea or other symptoms.      By chart review, pt initially admitted to TCU in November 2019, hospitalized several times in the interim.   He discharged back to his apartment with his wife and [facility] in January, but re-hospitalized 2/27/20 with weakness and citrobacter UTI.   He has worked with therapies in TCU, but not able to regain enough mobility to return to independent living and thus transitioned to [facility].        He saw Dr Perez in February for polyvalvular disease with CHF, dominant lesion TR, on medical therapy.   Options presented and pt elected to pursue optimization of medical therapies at that time.  Follow up with Dr Perez scheduled for August 2020 at which time he was considered for repair under the Triluminate study.   He was screened, but determined to be ineligible based on his low platelets    Pt initially had [facility] hospitalization in November 2019 for shortness of breath.  He was found to have bilateral pleural effusions and had thoracentesis x2 on the left and one time on the right.    Pleural fluid c/w transudate.   ECHO cardiogram showed EF 55-60% with severely dilated RV, severe MR and TR.   Pt needed GI consult and esophageal dilatation to have KATIE done  Following this procedure he had hypotension requiring pressor support in the ICU.   He was diuresed with IV furosemide and repeat ECHO showed severe TR and moderate MR, with some improvement in both.   He was seen by Cardiology and CV surgery. He underwent left heart catheterization as workup for tricuspid repair or replacement, and this showed normal left and right sided pressures.     He remains on  metoprolol and torsemide, but not on Entresto secondary to low bps.       Pt had another [facility] hospitalization in January 2020 for recurrent large right pleural effusion.   He was treated with IV Lasix, had thoracentesis removing 2200 mLs of fluid and spironolactone was added to his daily torsemide.      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   He had a BM biopsy in March 2019 that showed hypercellular marrow with 80% cellularity and normal thrombocytes.   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  He was anticoagulated with warfarin for atrial fib and had a bleed behind his retina, lost vision in his left eye; has not been anticoagulated since then.   He has seen Hem/Onc and been treated with prednisone and IVIG with some improvement.  Last saw Dr Wise in March 2020, at that time ongoing surveillance was recommended.         Past Medical History:   Diagnosis Date     Congestive heart failure (H)      Elevated PSA      Eye hemorrhage, left 02/2019     Non-ischemic cardiomyopathy (H)     2017, med treatment, echo done 4/18 nl ef, mod to severe tr     Permanent atrial fibrillation 2011     Thrombocytopenia (H) ITP      Unspecified essential hypertension    Cognitive impairment  Urinary and fecal incontinence    Past Surgical History:   Procedure Laterality Date     ABDOMEN SURGERY      bowel obstruction     BONE MARROW BIOPSY, BONE SPECIMEN, NEEDLE/TROCAR N/A 3/4/2019    Procedure: BIOPSY BONE MARROW;  Surgeon: Josey Vargas MD;  Location:  GI     CARPAL TUNNEL RELEASE RT/LT  2014     CV HEART CATHETERIZATION WITH POSSIBLE INTERVENTION N/A 11/15/2019    Procedure: Left and right heart Catheterization with Possible Intervention;  Surgeon: Adolfo Paez MD;  Location:  HEART CARDIAC CATH LAB     CV LEFT VENTRICULOGRAM N/A 11/15/2019    Procedure: Left  Ventriculogram;  Surgeon: Adolfo Paez MD;  Location:  HEART CARDIAC CATH LAB     ESOPHAGOSCOPY, GASTROSCOPY, DUODENOSCOPY (EGD), COMBINED N/A 11/6/2019    Procedure: ESOPHAGOGASTRODUODENOSCOPY (EGD);  Surgeon: Marixa Aguila MD;  Location:  GI     EXPLORE GROIN  4/30/2014    Procedure: EXPLORE GROIN;  Surgeon: Sam Aguirre MD;  Location:  OR     HERNIORRHAPHY INGUINAL  4/30/2014    Procedure: HERNIORRHAPHY INGUINAL;  Surgeon: Sam Aguirre MD;  Location:  OR     MOHS MICROGRAPHIC PROCEDURE       PHACOEMULSIFICATION CLEAR CORNEA WITH STANDARD INTRAOCULAR LENS IMPLANT Right 9/8/2015    Procedure: PHACOEMULSIFICATION CLEAR CORNEA WITH STANDARD INTRAOCULAR LENS IMPLANT;  Surgeon: Gil Shannon MD;  Location:  EC     septic olecranon bursitis[       SH:  Previously lived with his wife, ekaterina at [facility]; she continues to live there.     They have a daughter Lorraine and son Jama.   Pt is a retired ophthalmologist, worked clinically until age 79      Non smoker    Current Outpatient Medications   Medication Sig Dispense Refill     acetaminophen (TYLENOL) 325 MG tablet Take 650 mg by mouth every 6 hours as needed for Pain       digoxin (LANOXIN) 125 MCG tablet Take 1 tablet (125 mcg) by mouth daily (Patient taking differently: Take 125 mcg by mouth daily HOLD if HR <55) 30 tablet 0     melatonin 3 MG tablet Take 3 mg by mouth At Bedtime After 9pm. Please use overnight depends on pt to allow him to sleep without interruption during the night       metoprolol succinate ER (TOPROL-XL) 25 MG 24 hr tablet Take 25 mg by mouth daily (with lunch) Dose decreased to this dose on 8/20/20        mirtazapine (REMERON) 15 MG tablet Take 15 mg by mouth At Bedtime Patient started medication for the first time yesterday 10/29/2019.        Multiple Vitamins-Minerals (ICAPS AREDS FORMULA PO) Take 1 tablet by mouth daily Takes in addition to multivitamin with minerals       multivitamin, therapeutic  (THERA-VIT) TABS tablet Take 1 tablet by mouth daily       OTHER MEDICAL SUPPLIES CORE PROTOCOL ; DAILY WEIGHTS IN AM.       potassium chloride ER (K-TAB) 20 MEQ CR tablet Take 20 mEq by mouth three times a week Monday, Wed and Friday       spironolactone (ALDACTONE) 25 MG tablet Take 1 tablet (25 mg) by mouth daily 90 tablet 3     torsemide (DEMADEX) 10 MG tablet Take 10 mg by mouth daily Start 5/23 am       Vitamin D, Cholecalciferol, 1000 UNITS TABS Take 3,000 Units by mouth daily         ROS: 4 point ROS including Respiratory, CV, GI and , other than that noted in the HPI,  is negative   He has been incontinent of bowel and bladder for several years  Wt Readings from Last 5 Encounters:   10/21/20 63.9 kg (140 lb 12.8 oz)   10/06/20 64 kg (141 lb 1.6 oz)   08/18/20 65.4 kg (144 lb 1.6 oz)   08/10/20 65.3 kg (144 lb)   06/09/20 65 kg (143 lb 6.4 oz)      EXAM: NAD  Vitals:/73   Pulse 84   Temp 98.1  F (36.7  C)   Resp 18   Ht 1.829 m (6')   Wt 63.9 kg (140 lb 12.8 oz)   SpO2 94%   BMI 19.10 kg/m      Up to WC, he appears comfortable and no tachypnea or increased work of breathing.   +kyphosis  Neck supple without adenopathy  Lungs with decreased BS, no rales or wheeze  Heart RRR s1s2, 2/6 HSM  Abd soft, NT, no distention, +BS  Ext without edema  Neuro: some repetitiveness and STML, no focal deficits  Psych: affect okay    Labs 7/29/2020:   WBC 7.2  hgb 11.3  MCV 97  plts 36k  Na 137   K 4.2  BUN/Cr 31/1.11  GFR>60    ECHO 8/5/2020   Final Impressions:   1. Normal left ventricular size, normal wall thickness, normal global systolic function, calculated EF of 66 %.   2. Right ventricular cavity size is mildly to moderately enlarged, global systolic RV function is borderline reduced.   3. Severely enlarged left atrium.   4. The mitral valve is normal, mild mitral regurgitation.   5. Tricuspid valve is non coapting. Severe tricuspid regurgitation.   6. Trivial pericardial  effusion.      ASSESSMENT/PLAN:  (J90) Bilateral pleural effusion  (I50.32) Chronic diastolic heart failure (H)   (I42.8) Non-ischemic cardiomyopathy (H)   BP Readings from Last 3 Encounters:   10/21/20 111/73   10/06/20 107/62   08/18/20 95/53      Comment: stable volume status today   Cardiology notes reviewed.    Plan: spironolactone 25 mg/day and torsemide 10 mg/day    (I48.21) Permanent atrial fibrillation  Comment:   Pulse Readings from Last 4 Encounters:   10/21/20 84   10/06/20 70   08/18/20 53   08/10/20 98      Plan: digoxin 0.125 mg/day and metoprolol 50 mg bid for VR and bp control.     No anticoagulation given low plts and prior bleeding complications.        (D69.3) Idiopathic thrombocytopenic purpura (H)  Comment: varying plt counts, at times quite low     Plan: recheck prn and monitor for any bleeding complications    (R53.1) Generalized weakness  Comment:  Not currently ambulatory.     Plan: LTC support for meds, meals, activity      POLST discussion: pt elects full code         Jeanette Hernández MD

## 2020-10-21 NOTE — LETTER
10/21/2020        RE: Jama Paul  [id number]  [facility] [id number]         [facility]  [place] Medical Record Number:  [id number]  Jama Paul  is a 89 year old  (2/13/1931) seen October 21, 2020 at [facility] where he has resided for 7 months (admit to TCU 3/2020).  He is seen in his room up to ; reports he is feeling well, but is not conversant.   Denies pain, dyspnea or other symptoms.      By chart review, pt initially admitted to TCU in November 2019, hospitalized several times in the interim.   He discharged back to his apartment with his wife and [facility] in January, but re-hospitalized 2/27/20 with weakness and citrobacter UTI.   He has worked with therapies in TCU, but not able to regain enough mobility to return to independent living and thus transitioned to [facility] unit.        He saw Dr Perez in February for polyvalvular disease with CHF, dominant lesion TR, on medical therapy.   Options presented and pt elected to pursue optimization of medical therapies at that time.  Follow up with Dr Perez scheduled for August 2020 at which time he was considered for repair under the Triluminate study.   He was screened, but determined to be ineligible based on his low platelets    Pt initially had [facility] hospitalization in November 2019 for shortness of breath.  He was found to have bilateral pleural effusions and had thoracentesis x2 on the left and one time on the right.    Pleural fluid c/w transudate.   ECHO cardiogram showed EF 55-60% with severely dilated RV, severe MR and TR.   Pt needed GI consult and esophageal dilatation to have KATIE done  Following this procedure he had hypotension requiring pressor support in the ICU.   He was diuresed with IV furosemide and repeat ECHO showed severe TR and moderate MR, with some improvement in both.   He was seen by Cardiology and CV surgery. He underwent left heart catheterization as workup for  tricuspid repair or replacement, and this showed normal left and right sided pressures.     He remains on metoprolol and torsemide, but not on Entresto secondary to low bps.       Pt had another [facility] hospitalization in January 2020 for recurrent large right pleural effusion.   He was treated with IV Lasix, had thoracentesis removing 2200 mLs of fluid and spironolactone was added to his daily torsemide.      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   He had a BM biopsy in March 2019 that showed hypercellular marrow with 80% cellularity and normal thrombocytes.   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  He was anticoagulated with warfarin for atrial fib and had a bleed behind his retina, lost vision in his left eye; has not been anticoagulated since then.   He has seen Hem/Onc and been treated with prednisone and IVIG with some improvement.  Last saw Dr Wise in March 2020, at that time ongoing surveillance was recommended.         Past Medical History:   Diagnosis Date     Congestive heart failure (H)      Elevated PSA      Eye hemorrhage, left 02/2019     Non-ischemic cardiomyopathy (H)     2017, med treatment, echo done 4/18 nl ef, mod to severe tr     Permanent atrial fibrillation 2011     Thrombocytopenia (H) ITP      Unspecified essential hypertension    Cognitive impairment  Urinary and fecal incontinence    Past Surgical History:   Procedure Laterality Date     ABDOMEN SURGERY      bowel obstruction     BONE MARROW BIOPSY, BONE SPECIMEN, NEEDLE/TROCAR N/A 3/4/2019    Procedure: BIOPSY BONE MARROW;  Surgeon: Josey Vargas MD;  Location:  GI     CARPAL TUNNEL RELEASE RT/LT  2014     CV HEART CATHETERIZATION WITH POSSIBLE INTERVENTION N/A 11/15/2019    Procedure: Left and right heart Catheterization with Possible Intervention;  Surgeon: Jeannine  Adolfo ALLISON MD;  Location:  HEART CARDIAC CATH LAB     CV LEFT VENTRICULOGRAM N/A 11/15/2019    Procedure: Left Ventriculogram;  Surgeon: Adolfo Paez MD;  Location:  HEART CARDIAC CATH LAB     ESOPHAGOSCOPY, GASTROSCOPY, DUODENOSCOPY (EGD), COMBINED N/A 11/6/2019    Procedure: ESOPHAGOGASTRODUODENOSCOPY (EGD);  Surgeon: Marixa Aguila MD;  Location:  GI     EXPLORE GROIN  4/30/2014    Procedure: EXPLORE GROIN;  Surgeon: aSm Aguirre MD;  Location:  OR     HERNIORRHAPHY INGUINAL  4/30/2014    Procedure: HERNIORRHAPHY INGUINAL;  Surgeon: Sam Aguirre MD;  Location:  OR     MOHS MICROGRAPHIC PROCEDURE       PHACOEMULSIFICATION CLEAR CORNEA WITH STANDARD INTRAOCULAR LENS IMPLANT Right 9/8/2015    Procedure: PHACOEMULSIFICATION CLEAR CORNEA WITH STANDARD INTRAOCULAR LENS IMPLANT;  Surgeon: Gil Shannon MD;  Location:  EC     septic olecranon bursitis[       SH:  Previously lived with his wife, ekaterina at [facility]; she continues to live there.     They have a daughter Lorraine and son Jama.   Pt is a retired ophthalmologist, worked clinically until age 79      Non smoker    Current Outpatient Medications   Medication Sig Dispense Refill     acetaminophen (TYLENOL) 325 MG tablet Take 650 mg by mouth every 6 hours as needed for Pain       digoxin (LANOXIN) 125 MCG tablet Take 1 tablet (125 mcg) by mouth daily (Patient taking differently: Take 125 mcg by mouth daily HOLD if HR <55) 30 tablet 0     melatonin 3 MG tablet Take 3 mg by mouth At Bedtime After 9pm. Please use overnight depends on pt to allow him to sleep without interruption during the night       metoprolol succinate ER (TOPROL-XL) 25 MG 24 hr tablet Take 25 mg by mouth daily (with lunch) Dose decreased to this dose on 8/20/20        mirtazapine (REMERON) 15 MG tablet Take 15 mg by mouth At Bedtime Patient started medication for the first time yesterday 10/29/2019.        Multiple Vitamins-Minerals (ICAPS AREDS FORMULA  PO) Take 1 tablet by mouth daily Takes in addition to multivitamin with minerals       multivitamin, therapeutic (THERA-VIT) TABS tablet Take 1 tablet by mouth daily       OTHER MEDICAL SUPPLIES CORE PROTOCOL ; DAILY WEIGHTS IN AM.       potassium chloride ER (K-TAB) 20 MEQ CR tablet Take 20 mEq by mouth three times a week Monday, Wed and Friday       spironolactone (ALDACTONE) 25 MG tablet Take 1 tablet (25 mg) by mouth daily 90 tablet 3     torsemide (DEMADEX) 10 MG tablet Take 10 mg by mouth daily Start 5/23 am       Vitamin D, Cholecalciferol, 1000 UNITS TABS Take 3,000 Units by mouth daily         ROS: 4 point ROS including Respiratory, CV, GI and , other than that noted in the HPI,  is negative   He has been incontinent of bowel and bladder for several years  Wt Readings from Last 5 Encounters:   10/21/20 63.9 kg (140 lb 12.8 oz)   10/06/20 64 kg (141 lb 1.6 oz)   08/18/20 65.4 kg (144 lb 1.6 oz)   08/10/20 65.3 kg (144 lb)   06/09/20 65 kg (143 lb 6.4 oz)      EXAM: NAD  Vitals:/73   Pulse 84   Temp 98.1  F (36.7  C)   Resp 18   Ht 1.829 m (6')   Wt 63.9 kg (140 lb 12.8 oz)   SpO2 94%   BMI 19.10 kg/m      Up to WC, he appears comfortable and no tachypnea or increased work of breathing.   +kyphosis  Neck supple without adenopathy  Lungs with decreased BS, no rales or wheeze  Heart RRR s1s2, 2/6 HSM  Abd soft, NT, no distention, +BS  Ext without edema  Neuro: some repetitiveness and STML, no focal deficits  Psych: affect okay    Labs 7/29/2020:   WBC 7.2  hgb 11.3  MCV 97  plts 36k  Na 137   K 4.2  BUN/Cr 31/1.11  GFR>60    ECHO 8/5/2020   Final Impressions:   1. Normal left ventricular size, normal wall thickness, normal global systolic function, calculated EF of 66 %.   2. Right ventricular cavity size is mildly to moderately enlarged, global systolic RV function is borderline reduced.   3. Severely enlarged left atrium.   4. The mitral valve is normal, mild mitral regurgitation.   5.  Tricuspid valve is non coapting. Severe tricuspid regurgitation.   6. Trivial pericardial effusion.      ASSESSMENT/PLAN:  (J90) Bilateral pleural effusion  (I50.32) Chronic diastolic heart failure (H)   (I42.8) Non-ischemic cardiomyopathy (H)   BP Readings from Last 3 Encounters:   10/21/20 111/73   10/06/20 107/62   08/18/20 95/53      Comment: stable volume status today   Cardiology notes reviewed.    Plan: spironolactone 25 mg/day and torsemide 10 mg/day    (I48.21) Permanent atrial fibrillation  Comment:   Pulse Readings from Last 4 Encounters:   10/21/20 84   10/06/20 70   08/18/20 53   08/10/20 98      Plan: digoxin 0.125 mg/day and metoprolol 50 mg bid for VR and bp control.     No anticoagulation given low plts and prior bleeding complications.        (D69.3) Idiopathic thrombocytopenic purpura (H)  Comment: varying plt counts, at times quite low     Plan: recheck prn and monitor for any bleeding complications    (R53.1) Generalized weakness  Comment:  Not currently ambulatory.     Plan: LTC support for meds, meals, activity      POLST discussion: pt elects full code         Jeanette Hernández MD           Sincerely,        Jeanette Hernández MD

## 2020-10-27 PROBLEM — L97.912 ULCER OF RIGHT LOWER EXTREMITY WITH FAT LAYER EXPOSED (H): Status: RESOLVED | Noted: 2019-09-16 | Resolved: 2020-10-27

## 2020-11-12 ASSESSMENT — MIFFLIN-ST. JEOR: SCORE: 1347.11

## 2020-11-13 ENCOUNTER — NURSING HOME VISIT (OUTPATIENT)
Dept: GERIATRICS | Facility: CLINIC | Age: 85
End: 2020-11-13
Payer: MEDICARE

## 2020-11-13 VITALS
DIASTOLIC BLOOD PRESSURE: 51 MMHG | BODY MASS INDEX: 19.23 KG/M2 | WEIGHT: 142 LBS | RESPIRATION RATE: 18 BRPM | SYSTOLIC BLOOD PRESSURE: 110 MMHG | OXYGEN SATURATION: 96 % | HEART RATE: 56 BPM | TEMPERATURE: 97.8 F | HEIGHT: 72 IN

## 2020-11-13 DIAGNOSIS — L98.9 SKIN LESION OF FACE: ICD-10-CM

## 2020-11-13 DIAGNOSIS — I50.32 CHRONIC DIASTOLIC CONGESTIVE HEART FAILURE (H): ICD-10-CM

## 2020-11-13 DIAGNOSIS — I42.8 NON-ISCHEMIC CARDIOMYOPATHY (H): ICD-10-CM
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
## 2020-11-13 DIAGNOSIS — I48.21 PERMANENT ATRIAL FIBRILLATION (H): ICD-10-CM

## 2020-11-13 DIAGNOSIS — I34.0 MITRAL VALVE INSUFFICIENCY, UNSPECIFIED ETIOLOGY: ICD-10-CM

## 2020-11-13 DIAGNOSIS — I95.1 ORTHOSTATIC HYPOTENSION: Primary | ICD-10-CM

## 2020-11-13 PROCEDURE — 99309 SBSQ NF CARE MODERATE MDM 30: CPT | Performed by: NURSE PRACTITIONER

## 2020-11-13 RX ORDER — IMIQUIMOD 12.5 MG/.25G
CREAM TOPICAL
COMMUNITY
Start: 2020-11-06 | End: 2020-11-26

## 2020-11-13 NOTE — Clinical Note
Hi just an FYI, some postural drops with Jama, so decreased the BB and changed the spirono to noon time with the BB.  When checked, drops from about 120's to 90's when standing from lysing. Otherwise he looks fine.

## 2020-11-13 NOTE — PROGRESS NOTES
[facility]  [place] Medical Record Number:  [id number]  Place of Service where encounter took place:  [facility] ([facility]) [[id number]]  Chief Complaint   Patient presents with     Nursing Home Acute     skin lesion/BP's       HPI:    Jama Paul  is a 89 year old (2/13/1931), who is being seen today for an episodic care visit.  HPI information obtained from: facility chart records, facility staff, patient report and [facility] chart review. Today's concern is: has had two non injurious falls lately per staff--see PCC>  posturals have been checked and  Drops have occurred from 120 to low 100 or low 90's and once to mid 80's when standing.  HR runs 56-low 70's     Orthostatic hypotension  Chronic diastolic congestive heart failure (H)  Non-ischemic cardiomyopathy (H)  Mitral valve insufficiency, unspecified etiology  Fall, subsequent encounter  Permanent atrial fibrillation (H)  Skin lesion of face      Past Medical and Surgical History reviewed in Epic today.    MEDICATIONS:     Current Outpatient Medications   Medication Sig Dispense Refill     imiquimod (ALDARA) 5 % external cream Apply topically three times a week Ok to use. Apply topically to affected area on face 5 nights/wk for 3 weeks. Started 11/6/20.       metoprolol succinate ER (TOPROL-XL) 25 MG 24 hr tablet Take 12.5 mg by mouth daily (with lunch) Dose decreased to this dose on 11/13/20 HOLD if SBP <110 OR HR < 55. Call if held x 2 in a row symptomatic       [START ON 11/20/2020] mirtazapine (REMERON) 15 MG tablet Take 7.5 mg by mouth At Bedtime Reduction per Dr. Hernández, to 7.5mg monitor for depressive or withdrawal symptoms.       spironolactone (ALDACTONE) 25 MG tablet Take 1 tablet (25 mg) by mouth daily (Patient taking differently: Take 25 mg by mouth daily Daily at noon (11/13/20) HOLD if SBP <120, and please let NP know if held.) 90 tablet 3     acetaminophen (TYLENOL) 325 MG tablet Take 650 mg by mouth every  6 hours as needed for Pain       digoxin (LANOXIN) 125 MCG tablet Take 1 tablet (125 mcg) by mouth daily (Patient taking differently: Take 125 mcg by mouth daily HOLD if HR <55) 30 tablet 0     melatonin 3 MG tablet Take 3 mg by mouth At Bedtime After 9pm. Please use overnight depends on pt to allow him to sleep without interruption during the night       Multiple Vitamins-Minerals (ICAPS AREDS FORMULA PO) Take 1 tablet by mouth daily Takes in addition to multivitamin with minerals       multivitamin, therapeutic (THERA-VIT) TABS tablet Take 1 tablet by mouth daily       OTHER MEDICAL SUPPLIES CORE PROTOCOL ; DAILY WEIGHTS IN AM.       potassium chloride ER (K-TAB) 20 MEQ CR tablet Take 20 mEq by mouth three times a week Monday, Wed and Friday       torsemide (DEMADEX) 10 MG tablet Take 10 mg by mouth daily Start 5/23 am       Vitamin D, Cholecalciferol, 1000 UNITS TABS Take 3,000 Units by mouth daily         TODAY DURING EXAM/ROS:  No CP, SOB, Cough, dizziness, fevers, chills, HA, N/V, dysuria or Bowel Abnormalities. Appetite is good.  No pain.    Objective:  /51   Pulse 56   Temp 97.8  F (36.6  C)   Resp 18   Ht 1.829 m (6')   Wt 64.4 kg (142 lb)   SpO2 96%   BMI 19.26 kg/m    Exam:  GENERAL APPEARANCE:  Alert, in no distress, oriented, cooperative  RESP:  respiratory effort  of chest normal, lungs clear to auscultation   no respiratory distress, diminished breath sounds slightly in right base  CV: Auscultation of heart done , IRRR, soft murmur. No rub, or gallop, Trace bilat pedal/ankle edema.  ABDOMEN:  normal bowel sounds, soft, nontender   M/S:   CHIU equally, up with assist, seen in WC in room  SKIN:  Inspection of skin and subcutaneous tissue baseline except left forehead.temple area with small drsg in place over skin cancer surgical site.  NEURO:   Cranial nerves are grossly intact and at patient's baseline  PSYCH:  oriented X 3, affect and mood normal    Labs:       Recent Labs   Lab Test  5/27/20 05/22/20 03/23/20    137 136   POTASSIUM 4.2 4.6 4.9   CHLORIDE 104 105 102   CO2 25 25 25   ANIONGAP  7 9   GLC  82 97   BUN 30 25 24   CR 1.16/GFR 56 1.02 1.19*   BARON 8.9 8.5 8.6        ASSESSMENT / PLAN:  (I95.1) Orthostatic hypotension  (primary encounter diagnosis)   (I50.32) Chronic diastolic congestive heart failure (H)   (I42.8) Non-ischemic cardiomyopathy (H)   (I34.0) Mitral valve insufficiency, unspecified etiology  Comment/Plan: wt running 141 to 142# for mostly part past several weeks. Due to the orthostasis, we will decrease BB to 12.5mg XL and give at noon time.  Also change time of spironolactone to noon time.  Keep Demadex and dig in am. Monitor.    (W19.XXXD) Fall, subsequent encounter  Comment/Plan: pt is NOT a delilah so likely postural, will monitor.    (I48.21) Permanent atrial fibrillation (H)  Comment/Plan: BB and dig, monitor.    (L98.9) Skin lesion of face  Comment/Plan: on Aldara per Derm for Skin cancer--lesion , not seen today but me--improving per staff will ask for pic to be taken      Electronically signed by:  ERIC Mir CNP

## 2020-12-01 NOTE — PROGRESS NOTES
[facility]  Chief Complaint   Patient presents with     [facility] Regulatory     [place] Medical Record Number:  [id number]  Place of Service where encounter took place:  [facility] ([facility]) [[id number]]    HPI:    Jama Paul  is 89 year old (2/13/1931), who is being seen today for a federally mandated E/M visit.  HPI information obtained from: facility chart records, facility staff, patient report and [place] Epic chart review. 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  PAST MEDICAL HISTORY:   has a past medical history of Congestive heart failure (H), Elevated PSA, Eye hemorrhage, left (02/2019), Non-ischemic cardiomyopathy (H), Permanent atrial fibrillation (H) (2011), Thrombocytopenia (H), and Unspecified essential hypertension. He also has no past medical history of Malignant hyperthermia, PONV (postoperative nausea and vomiting), or Sleep apnea.  PAST SURGICAL HISTORY:   has a past surgical history that includes septic olecranon bursitis[; Mohs micrographic procedure; Herniorrhaphy inguinal (4/30/2014); Explore groin (4/30/2014); Phacoemulsification clear cornea with standard intraocular lens implant (Right, 9/8/2015); carpal tunnel release rt/lt (2014); Bone marrow biopsy, bone specimen, needle/trocar (N/A, 3/4/2019); Abdomen surgery; Esophagoscopy, gastroscopy, duodenoscopy (EGD), combined (N/A, 11/6/2019); Heart Catheterization with Possible Intervention (N/A, 11/15/2019); and Left Ventriculogram (N/A, 11/15/2019).  FAMILY HISTORY: family history is not on file.  SOCIAL HISTORY:   "reports that he has never smoked. He has never used smokeless tobacco. He reports that he does not drink alcohol or use drugs.    MEDICATIONS:  Current Outpatient Medications   Medication Sig Dispense Refill     acetaminophen (TYLENOL) 325 MG tablet Take 650 mg by mouth every 6 hours as needed for Pain       digoxin (LANOXIN) 125 MCG tablet Take 1 tablet (125 mcg) by mouth daily (Patient taking differently: Take 125 mcg by mouth daily HOLD if HR <55) 30 tablet 0     melatonin 3 MG tablet Take 3 mg by mouth At Bedtime After 9pm. Please use overnight depends on pt to allow him to sleep without interruption during the night       mirtazapine (REMERON) 15 MG tablet Take 7.5 mg by mouth At Bedtime Reduction per Dr. Hernández, to 7.5mg monitor for depressive or withdrawal symptoms.       Multiple Vitamins-Minerals (ICAPS AREDS FORMULA PO) Take 1 tablet by mouth daily Takes in addition to multivitamin with minerals       multivitamin, therapeutic (THERA-VIT) TABS tablet Take 1 tablet by mouth daily       OTHER MEDICAL SUPPLIES CORE PROTOCOL ; DAILY WEIGHTS IN AM.       potassium chloride ER (K-TAB) 20 MEQ CR tablet Take 20 mEq by mouth three times a week Monday, Wed and Friday       spironolactone (ALDACTONE) 25 MG tablet Take 1 tablet (25 mg) by mouth daily (Patient taking differently: Take 25 mg by mouth daily Daily at noon (11/13/20) HOLD if SBP <120, and please let NP know if held.) 90 tablet 3     torsemide (DEMADEX) 10 MG tablet Take 10 mg by mouth daily Start 5/23 am       Vitamin D, Cholecalciferol, 1000 UNITS TABS Take 3,000 Units by mouth daily              Case Management:  I have reviewed the care plan and MDS and do agree with the plan. Patient's desire to return to the community is not present. Information reviewed:  Medications, vital signs, orders, and nursing notes.    TODAY DURING EXAM/ROS:  No CP, SOB, Cough, dizziness, fevers, chills, HA, N/V, dysuria or Bowel Abnormalities. Appetite is good.  No pain.  \"I " "cant stand my hair, can you cut it for me?\"    Vitals:  /79   Pulse 65   Temp 97  F (36.1  C)   Resp 18   Ht 1.829 m (6')   Wt 65.1 kg (143 lb 9.6 oz)   SpO2 95%   BMI 19.48 kg/m    Body mass index is 19.48 kg/m .  Exam:  GENERAL APPEARANCE:  Alert, in no distress, oriented, cooperative  ENT:  Mouth with moist mucous membranes, [place]  RESP:  respiratory effort  of chest normal, lungs clear to auscultation slightly decreased right base, no respiratory distress.  CV:  Auscultation of heart done , IRRR, soft murmur. No rub, or gallop, no edema. Warm feet  ABDOMEN:  normal bowel sounds, soft, nontender.  M/S:   CHIU equally, up with assist, seen in WC in room  SKIN:  Inspection of skin and subcutaneous tissue baseline  NEURO:   Cranial nerves 2-12 are grossly intact and  at patient's baseline  PSYCH:  oriented X 3, affect and mood normal       Lab/Diagnostic data:   Recent Labs   Lab Test 06/08/20 05/22/20     138 137   POTASSIUM 4.1  4.1 4.6   CHLORIDE 102  102 105   CO2 28  28 25   ANIONGAP 8  8 7   *  150* 82   BUN 36*  36* 25   CR 1.17  1.17 1.02   BARON 8.8  8.8 8.5     CBC RESULTS:   Recent Labs   Lab Test 10/21/20 08/19/20   WBC  --  6.3   RBC  --  3.40*   HGB 11.2* 10.6*   HCT  --  33.6*   MCV  --  98.8   MCH  --  31.2   MCHC  --  31.5   RDW  --  17.4*   PLT 37* 38*     ASSESSMENT / PLAN:  (I95.1) Orthostatic hypotension  (primary encounter diagnosis)  Comment/Plan: better since BB decreased but still drops in to high 90 to low 100's often and metoprolol held.  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     (L98.9) Skin lesion of face  Comment/Plan: covered with Band-Aid--f/u derm per their recs     (D69.49) Chronic thrombocytopenic purpura (H)   (D46.Z) Other myelodysplastic syndromes (H)  Comment/Plan: check CBC with plts on 12/16    Total time with patient visit: 40 minutes including discussions about the POC and care coordination with the patient and caregiver re plan of care to discontinue BB, also trimmed pt's hair per his request.. Greater than 50% of total time spent with counseling and coordinating care due to complexities of care as noted in A/P above..      Electronically signed by:  ERIC Mir CNP

## 2020-12-02 ENCOUNTER — NURSING HOME VISIT (OUTPATIENT)
Dept: GERIATRICS | Facility: CLINIC | Age: 85
End: 2020-12-02
Payer: MEDICARE

## 2020-12-02 VITALS
BODY MASS INDEX: 19.45 KG/M2 | OXYGEN SATURATION: 95 % | SYSTOLIC BLOOD PRESSURE: 111 MMHG | DIASTOLIC BLOOD PRESSURE: 79 MMHG | HEIGHT: 72 IN | TEMPERATURE: 97 F | HEART RATE: 65 BPM | WEIGHT: 143.6 LBS | RESPIRATION RATE: 18 BRPM

## 2020-12-02 DIAGNOSIS — I13.0 HYPERTENSIVE HEART AND KIDNEY DISEASE WITH HF AND WITH CKD STAGE III (H): ICD-10-CM

## 2020-12-02 DIAGNOSIS — D69.49 CHRONIC THROMBOCYTOPENIC PURPURA (H): ICD-10-CM

## 2020-12-02 DIAGNOSIS — L98.9 SKIN LESION OF FACE: ICD-10-CM

## 2020-12-02 DIAGNOSIS — I42.8 NON-ISCHEMIC CARDIOMYOPATHY (H): ICD-10-CM

## 2020-12-02 DIAGNOSIS — I50.32 CHRONIC DIASTOLIC CONGESTIVE HEART FAILURE (H): ICD-10-CM

## 2020-12-02 DIAGNOSIS — I48.21 PERMANENT ATRIAL FIBRILLATION (H): ICD-10-CM

## 2020-12-02 DIAGNOSIS — N18.30 HYPERTENSIVE HEART AND KIDNEY DISEASE WITH HF AND WITH CKD STAGE III (H): ICD-10-CM

## 2020-12-02 DIAGNOSIS — D46.Z OTHER MYELODYSPLASTIC SYNDROMES (H): ICD-10-CM

## 2020-12-02 DIAGNOSIS — I95.1 ORTHOSTATIC HYPOTENSION: Primary | ICD-10-CM

## 2020-12-02 DIAGNOSIS — I34.0 MITRAL VALVE INSUFFICIENCY, UNSPECIFIED ETIOLOGY: ICD-10-CM

## 2020-12-02 PROCEDURE — 99309 SBSQ NF CARE MODERATE MDM 30: CPT | Performed by: NURSE PRACTITIONER

## 2020-12-02 ASSESSMENT — MIFFLIN-ST. JEOR: SCORE: 1354.37

## 2020-12-16 PROBLEM — L21.0 CRADLE CAP: Status: ACTIVE | Noted: 2020-01-01

## 2020-12-16 NOTE — PROGRESS NOTES
Chief Complaint: lower Bps with Spironolactone often being held this month.  No C/O dizziness but SBP's <low to mid 90's at times.    HPI:    Jama Paul is a 89 year old  (2/13/1931), who is being seen today for an episodic care visit at [facility]. Today's concern is:     Orthostatic hypotension  Chronic diastolic congestive heart failure (H)  Non-ischemic cardiomyopathy (H)  Mitral valve insufficiency, unspecified etiology  Permanent atrial fibrillation (H)  Skin lesion of face  Cradle cap      TODAY DURING EXAM/ROS:  No CP, SOB, Cough, dizziness, fevers, chills, HA, N/V, dysuria or Bowel Abnormalities. Appetite is good.  No pains.  Asked if he could get his haircut again.    BP 92/55   Pulse 68   Temp 98.3  F (36.8  C)   Resp 16   Ht 1.829 m (6')   Wt 65.9 kg (145 lb 3.2 oz)   SpO2 99%   BMI 19.69 kg/m    GENERAL APPEARANCE:  Alert,oriented x 3,mildly forgetful at times, is in no distress. Lungs CTA , S1/S2--irreg beats, soft systolic Murmur, no G or R. Trace pedal edema. Abdomen is soft, +BTS . No focal neurological deficits. Scalp has dried flaky dandruff C/w Cradle cap.       ASSESSMENT / PLAN:  (I95.1) Orthostatic hypotension  (primary encounter diagnosis)  Comment/Plan: will change spironolactone to 12.5 mg daily,    Other SBP's in the 110 up to 120. Monitor.      OTHER CARDIAC ISSUES  (I50.32) Chronic diastolic congestive heart failure (H)   (I42.8) Non-ischemic cardiomyopathy (H)   (I34.0) Mitral valve insufficiency, unspecified etiology   (I48.21) Permanent atrial fibrillation (H)  Comment/Plan: cont current meds, otherwise.  HR in the  70-80's, cont same POC, monitor.    (L98.9) Skin lesion of face  Comment/Plan: being treated for skin cancer, f/u per Derm orders    (L21.0) Cradle cap  Comment/Plan: hair wash and  Haircut scheduled for this afternoon.      Electronically Signed by:  ERIC Mir CNP

## 2020-12-22 NOTE — PROGRESS NOTES
"Chief Complaint: review of BPs as spironolactone decreased yest.    HPI:    Jama Paul is a 89 year old  (2/13/1931), who is being seen today for an episodic care visit at [facility]. Today's concern is:     Orthostatic hypotension  Chronic diastolic congestive heart failure (H)  Non-ischemic cardiomyopathy (H)  Mitral valve insufficiency, unspecified etiology  Permanent atrial fibrillation (H)  Bicytopenia  Other myelodysplastic syndromes (H)      REVIEW OF SYSTEMS:  No complaints, feels fine but wonders how his heart is doing \"I feel fine but how is my heart doing?\"  He repeated this question 4-5 times even with answers given.    /67   Pulse 69   Temp 97.4  F (36.3  C)   Resp 16   Ht 1.829 m (6')   Wt 66.8 kg (147 lb 3.2 oz)   SpO2 97%   BMI 19.96 kg/m    GENERAL APPEARANCE:  Alert,oriented x 3, repetitive questions today, is in no distress. Lungs CTA with rare crackle,, S1/S2--IRRR with soft murmur.  No G or R.  No  edema. Abdomen is soft, +BTS . No focal neurological deficits.     Recent Labs   Lab Test 12/16/20 06/08/20    138   POTASSIUM 4.2 4.1    CHLORIDE 104 102    CO2 29 28    ANIONGAP 7 8    GLC 87 150*    BUN 31* 36*    CR 1.19* 1.17   BARON 8.5 8.8      CBC RESULTS:   Recent Labs   Lab Test 12/16/20   WBC 4.3   RBC 3.25*   HGB 10.3*   HCT 32.9*   .2*   MCH 31.7   MCHC 31.3*   RDW 17.5*   PLT 41*         ASSESSMENT / PLAN:  (I95.1) Orthostatic hypotension  (primary encounter diagnosis)  Comment/Plan: improved--most SBPs in the low 101-110's. Cont same POC and monitor.    CARDIAC ISSUES:  (I50.32) Chronic diastolic congestive heart failure (H)  (I42.8) Non-ischemic cardiomyopathy (H)  (I34.0) Mitral valve insufficiency, unspecified etiology  (I48.21) Permanent atrial fibrillation (H)  Comment/Plan: Compensated, cont same POC, labs fine last week, check BMP regularly. Will reach out to [facility] as to when/if they wish to see pt after the new year in clinic    (D75.89) " Bicytopenia  (D46.Z) Other myelodysplastic syndromes (H)  Comment/Plan: steady but low Plts.  Got MY CHART msg from daughter, someone had made appt with Dr Britton from [facility] for f/u--she said Dr Wise has been his hematologist and would like to find out if they wish to see him.    **I LEFT MSG on Dc's Lorraine's cell with update and that I would send EPIC Email to Dr Wise and Bharat Case, CNP(Hemo-Onc) and [facility] asking when/if they wanted f/u in the early new year**    Electronically Signed by:  ERIC Mir CNP

## 2020-12-23 NOTE — Clinical Note
BOGDAN Irwin, I and pt wondering if you wish to see him in the New year in person?  He is fine but just checking in .  Cheli

## 2020-12-23 NOTE — Clinical Note
Lorraine Seaman wonders when you need to see pt again in clinic?  Last seen in March 2000.  Plts steady but low.   He is residing in Long Term Care at [facility].  Let me know your thoughts.  Cheli

## 2021-01-01 ENCOUNTER — NURSING HOME VISIT (OUTPATIENT)
Dept: GERIATRICS | Facility: CLINIC | Age: 86
End: 2021-01-01
Payer: MEDICARE

## 2021-01-01 ENCOUNTER — HEALTH MAINTENANCE LETTER (OUTPATIENT)
Age: 86
End: 2021-01-01

## 2021-01-01 ENCOUNTER — TRANSFERRED RECORDS (OUTPATIENT)
Dept: HEALTH INFORMATION MANAGEMENT | Facility: CLINIC | Age: 86
End: 2021-01-01

## 2021-01-01 ENCOUNTER — APPOINTMENT (OUTPATIENT)
Dept: SPEECH THERAPY | Facility: CLINIC | Age: 86
DRG: 871 | End: 2021-01-01
Attending: HOSPITALIST
Payer: MEDICARE

## 2021-01-01 ENCOUNTER — OFFICE VISIT (OUTPATIENT)
Dept: CARDIOLOGY | Facility: CLINIC | Age: 86
End: 2021-01-01
Payer: MEDICARE

## 2021-01-01 ENCOUNTER — APPOINTMENT (OUTPATIENT)
Dept: CT IMAGING | Facility: CLINIC | Age: 86
DRG: 871 | End: 2021-01-01
Attending: EMERGENCY MEDICINE
Payer: MEDICARE

## 2021-01-01 ENCOUNTER — TELEPHONE (OUTPATIENT)
Dept: GERIATRICS | Facility: CLINIC | Age: 86
End: 2021-01-01

## 2021-01-01 ENCOUNTER — TELEPHONE (OUTPATIENT)
Dept: GERIATRICS | Facility: CLINIC | Age: 86
End: 2021-01-01
Payer: MEDICARE

## 2021-01-01 ENCOUNTER — MYC MEDICAL ADVICE (OUTPATIENT)
Dept: GERIATRICS | Facility: CLINIC | Age: 86
End: 2021-01-01

## 2021-01-01 ENCOUNTER — HOSPITAL ENCOUNTER (INPATIENT)
Facility: CLINIC | Age: 86
LOS: 1 days | DRG: 871 | End: 2021-12-14
Attending: EMERGENCY MEDICINE | Admitting: HOSPITALIST
Payer: MEDICARE

## 2021-01-01 VITALS
WEIGHT: 149.2 LBS | BODY MASS INDEX: 20.21 KG/M2 | SYSTOLIC BLOOD PRESSURE: 97 MMHG | DIASTOLIC BLOOD PRESSURE: 56 MMHG | HEIGHT: 72 IN | OXYGEN SATURATION: 96 % | TEMPERATURE: 97 F | RESPIRATION RATE: 18 BRPM | HEART RATE: 68 BPM

## 2021-01-01 VITALS
OXYGEN SATURATION: 99 % | BODY MASS INDEX: 20.1 KG/M2 | TEMPERATURE: 97 F | SYSTOLIC BLOOD PRESSURE: 89 MMHG | RESPIRATION RATE: 16 BRPM | DIASTOLIC BLOOD PRESSURE: 52 MMHG | WEIGHT: 148.4 LBS | HEIGHT: 72 IN | HEART RATE: 72 BPM

## 2021-01-01 VITALS
HEART RATE: 68 BPM | OXYGEN SATURATION: 98 % | RESPIRATION RATE: 18 BRPM | DIASTOLIC BLOOD PRESSURE: 59 MMHG | BODY MASS INDEX: 20.61 KG/M2 | SYSTOLIC BLOOD PRESSURE: 118 MMHG | WEIGHT: 152 LBS | TEMPERATURE: 97.9 F

## 2021-01-01 VITALS
BODY MASS INDEX: 20.05 KG/M2 | HEIGHT: 72 IN | DIASTOLIC BLOOD PRESSURE: 66 MMHG | HEART RATE: 83 BPM | SYSTOLIC BLOOD PRESSURE: 108 MMHG | WEIGHT: 148 LBS

## 2021-01-01 VITALS
OXYGEN SATURATION: 93 % | HEART RATE: 131 BPM | SYSTOLIC BLOOD PRESSURE: 108 MMHG | DIASTOLIC BLOOD PRESSURE: 66 MMHG | TEMPERATURE: 100 F | RESPIRATION RATE: 30 BRPM

## 2021-01-01 VITALS
OXYGEN SATURATION: 98 % | DIASTOLIC BLOOD PRESSURE: 54 MMHG | BODY MASS INDEX: 22.6 KG/M2 | TEMPERATURE: 97.8 F | HEART RATE: 90 BPM | SYSTOLIC BLOOD PRESSURE: 95 MMHG | RESPIRATION RATE: 18 BRPM | WEIGHT: 166.6 LBS

## 2021-01-01 VITALS
SYSTOLIC BLOOD PRESSURE: 125 MMHG | SYSTOLIC BLOOD PRESSURE: 109 MMHG | OXYGEN SATURATION: 92 % | DIASTOLIC BLOOD PRESSURE: 70 MMHG | DIASTOLIC BLOOD PRESSURE: 61 MMHG | WEIGHT: 154 LBS | RESPIRATION RATE: 16 BRPM | OXYGEN SATURATION: 96 % | BODY MASS INDEX: 20.61 KG/M2 | RESPIRATION RATE: 18 BRPM | HEIGHT: 72 IN | BODY MASS INDEX: 20.86 KG/M2 | TEMPERATURE: 97.5 F | WEIGHT: 152 LBS | HEART RATE: 68 BPM | TEMPERATURE: 98.1 F | HEART RATE: 84 BPM

## 2021-01-01 VITALS
BODY MASS INDEX: 19.99 KG/M2 | WEIGHT: 147.4 LBS | DIASTOLIC BLOOD PRESSURE: 66 MMHG | TEMPERATURE: 97.3 F | HEART RATE: 78 BPM | RESPIRATION RATE: 16 BRPM | OXYGEN SATURATION: 98 % | SYSTOLIC BLOOD PRESSURE: 114 MMHG

## 2021-01-01 VITALS
OXYGEN SATURATION: 99 % | SYSTOLIC BLOOD PRESSURE: 108 MMHG | DIASTOLIC BLOOD PRESSURE: 65 MMHG | TEMPERATURE: 97.7 F | WEIGHT: 151.8 LBS | RESPIRATION RATE: 19 BRPM | BODY MASS INDEX: 20.59 KG/M2 | HEART RATE: 84 BPM

## 2021-01-01 VITALS
OXYGEN SATURATION: 96 % | RESPIRATION RATE: 18 BRPM | SYSTOLIC BLOOD PRESSURE: 100 MMHG | HEIGHT: 72 IN | WEIGHT: 148.2 LBS | DIASTOLIC BLOOD PRESSURE: 59 MMHG | BODY MASS INDEX: 20.07 KG/M2 | HEART RATE: 87 BPM | TEMPERATURE: 97.5 F

## 2021-01-01 VITALS
TEMPERATURE: 97.6 F | RESPIRATION RATE: 20 BRPM | SYSTOLIC BLOOD PRESSURE: 152 MMHG | DIASTOLIC BLOOD PRESSURE: 80 MMHG | OXYGEN SATURATION: 98 % | BODY MASS INDEX: 20.64 KG/M2 | HEART RATE: 75 BPM | WEIGHT: 152.2 LBS

## 2021-01-01 VITALS
SYSTOLIC BLOOD PRESSURE: 152 MMHG | BODY MASS INDEX: 20.45 KG/M2 | WEIGHT: 150.8 LBS | HEART RATE: 116 BPM | OXYGEN SATURATION: 99 % | RESPIRATION RATE: 18 BRPM | DIASTOLIC BLOOD PRESSURE: 61 MMHG | TEMPERATURE: 97.9 F

## 2021-01-01 VITALS
RESPIRATION RATE: 18 BRPM | WEIGHT: 147.8 LBS | BODY MASS INDEX: 20.02 KG/M2 | OXYGEN SATURATION: 96 % | HEIGHT: 72 IN | HEART RATE: 84 BPM | SYSTOLIC BLOOD PRESSURE: 114 MMHG | DIASTOLIC BLOOD PRESSURE: 68 MMHG | TEMPERATURE: 98 F

## 2021-01-01 VITALS
SYSTOLIC BLOOD PRESSURE: 109 MMHG | OXYGEN SATURATION: 99 % | TEMPERATURE: 97 F | DIASTOLIC BLOOD PRESSURE: 62 MMHG | WEIGHT: 150 LBS | HEART RATE: 76 BPM | RESPIRATION RATE: 20 BRPM | BODY MASS INDEX: 20.34 KG/M2

## 2021-01-01 VITALS
RESPIRATION RATE: 18 BRPM | SYSTOLIC BLOOD PRESSURE: 100 MMHG | OXYGEN SATURATION: 94 % | BODY MASS INDEX: 20.07 KG/M2 | HEART RATE: 88 BPM | WEIGHT: 148 LBS | DIASTOLIC BLOOD PRESSURE: 57 MMHG | TEMPERATURE: 98.2 F

## 2021-01-01 VITALS
HEIGHT: 72 IN | HEART RATE: 70 BPM | TEMPERATURE: 97.3 F | DIASTOLIC BLOOD PRESSURE: 68 MMHG | SYSTOLIC BLOOD PRESSURE: 122 MMHG | BODY MASS INDEX: 19.96 KG/M2 | RESPIRATION RATE: 16 BRPM | OXYGEN SATURATION: 98 % | WEIGHT: 147.4 LBS

## 2021-01-01 VITALS
WEIGHT: 153.4 LBS | HEART RATE: 78 BPM | OXYGEN SATURATION: 98 % | DIASTOLIC BLOOD PRESSURE: 62 MMHG | TEMPERATURE: 96.8 F | SYSTOLIC BLOOD PRESSURE: 97 MMHG | BODY MASS INDEX: 20.8 KG/M2 | RESPIRATION RATE: 18 BRPM

## 2021-01-01 VITALS
RESPIRATION RATE: 18 BRPM | DIASTOLIC BLOOD PRESSURE: 62 MMHG | HEART RATE: 68 BPM | WEIGHT: 150.6 LBS | OXYGEN SATURATION: 96 % | SYSTOLIC BLOOD PRESSURE: 115 MMHG | TEMPERATURE: 98 F | BODY MASS INDEX: 20.43 KG/M2

## 2021-01-01 VITALS
TEMPERATURE: 97.8 F | BODY MASS INDEX: 19.75 KG/M2 | OXYGEN SATURATION: 98 % | DIASTOLIC BLOOD PRESSURE: 50 MMHG | HEART RATE: 70 BPM | SYSTOLIC BLOOD PRESSURE: 123 MMHG | WEIGHT: 145.6 LBS | RESPIRATION RATE: 18 BRPM

## 2021-01-01 DIAGNOSIS — M25.572 PAIN IN JOINT, ANKLE AND FOOT, LEFT: ICD-10-CM

## 2021-01-01 DIAGNOSIS — H35.30 MACULAR DEGENERATION (SENILE) OF RETINA: ICD-10-CM

## 2021-01-01 DIAGNOSIS — G31.84 MILD COGNITIVE IMPAIRMENT: ICD-10-CM

## 2021-01-01 DIAGNOSIS — I42.8 NON-ISCHEMIC CARDIOMYOPATHY (H): ICD-10-CM

## 2021-01-01 DIAGNOSIS — I48.21 PERMANENT ATRIAL FIBRILLATION (H): ICD-10-CM
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
## 2021-01-01 DIAGNOSIS — I95.1 ORTHOSTATIC HYPOTENSION: Primary | ICD-10-CM

## 2021-01-01 DIAGNOSIS — I50.32 CHRONIC DIASTOLIC CONGESTIVE HEART FAILURE (H): ICD-10-CM

## 2021-01-01 DIAGNOSIS — H66.90 EAR INFECTION: Primary | ICD-10-CM

## 2021-01-01 DIAGNOSIS — R41.89 COGNITIVE IMPAIRMENT: ICD-10-CM

## 2021-01-01 DIAGNOSIS — S80.812D: ICD-10-CM

## 2021-01-01 DIAGNOSIS — H91.93 BILATERAL HEARING LOSS, UNSPECIFIED HEARING LOSS TYPE: ICD-10-CM

## 2021-01-01 DIAGNOSIS — D64.9 ANEMIA, UNSPECIFIED TYPE: ICD-10-CM

## 2021-01-01 DIAGNOSIS — M15.0 PRIMARY OSTEOARTHRITIS INVOLVING MULTIPLE JOINTS: ICD-10-CM

## 2021-01-01 DIAGNOSIS — I07.1 TRICUSPID VALVE INSUFFICIENCY, UNSPECIFIED ETIOLOGY: ICD-10-CM

## 2021-01-01 DIAGNOSIS — S81.812D SKIN TEAR OF LEFT LOWER LEG WITHOUT COMPLICATION, SUBSEQUENT ENCOUNTER: ICD-10-CM

## 2021-01-01 DIAGNOSIS — D46.Z OTHER MYELODYSPLASTIC SYNDROMES (H): ICD-10-CM

## 2021-01-01 DIAGNOSIS — M25.551 HIP PAIN, RIGHT: Primary | ICD-10-CM

## 2021-01-01 DIAGNOSIS — R79.89 ELEVATED LACTIC ACID LEVEL: ICD-10-CM

## 2021-01-01 DIAGNOSIS — R60.0 BILATERAL LEG EDEMA: ICD-10-CM

## 2021-01-01 DIAGNOSIS — G47.00 INSOMNIA, UNSPECIFIED TYPE: ICD-10-CM

## 2021-01-01 DIAGNOSIS — D62 ACUTE POSTHEMORRHAGIC ANEMIA: ICD-10-CM

## 2021-01-01 DIAGNOSIS — N18.31 STAGE 3A CHRONIC KIDNEY DISEASE (H): ICD-10-CM

## 2021-01-01 DIAGNOSIS — S61.412A SKIN TEAR OF HAND WITHOUT COMPLICATION, LEFT, INITIAL ENCOUNTER: Primary | ICD-10-CM

## 2021-01-01 DIAGNOSIS — M25.572 PAIN IN JOINT, ANKLE AND FOOT, LEFT: Primary | ICD-10-CM

## 2021-01-01 DIAGNOSIS — I50.32 CHRONIC DIASTOLIC CONGESTIVE HEART FAILURE (H): Primary | ICD-10-CM

## 2021-01-01 DIAGNOSIS — D69.3 IDIOPATHIC THROMBOCYTOPENIC PURPURA (H): ICD-10-CM

## 2021-01-01 DIAGNOSIS — T14.8XXA HEMATOMA OF SKIN: ICD-10-CM

## 2021-01-01 DIAGNOSIS — J18.9 PNEUMONIA DUE TO INFECTIOUS ORGANISM, UNSPECIFIED LATERALITY, UNSPECIFIED PART OF LUNG: ICD-10-CM

## 2021-01-01 DIAGNOSIS — I34.0 MITRAL VALVE INSUFFICIENCY, UNSPECIFIED ETIOLOGY: ICD-10-CM

## 2021-01-01 DIAGNOSIS — J90 BILATERAL PLEURAL EFFUSION: ICD-10-CM

## 2021-01-01 DIAGNOSIS — G47.00 INSOMNIA, UNSPECIFIED TYPE: Primary | ICD-10-CM

## 2021-01-01 DIAGNOSIS — Z71.89 ADVANCED DIRECTIVES, COUNSELING/DISCUSSION: ICD-10-CM

## 2021-01-01 DIAGNOSIS — R41.0 CONFUSION: ICD-10-CM

## 2021-01-01 DIAGNOSIS — D69.3 IDIOPATHIC THROMBOCYTOPENIC PURPURA (H): Primary | ICD-10-CM
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
## 2021-01-01 DIAGNOSIS — J96.01 ACUTE RESPIRATORY FAILURE WITH HYPOXIA (H): ICD-10-CM

## 2021-01-01 DIAGNOSIS — M62.81 GENERALIZED MUSCLE WEAKNESS: ICD-10-CM

## 2021-01-01 DIAGNOSIS — S81.811A NONINFECTED SKIN TEAR OF RIGHT LOWER EXTREMITY, INITIAL ENCOUNTER: ICD-10-CM

## 2021-01-01 DIAGNOSIS — I48.21 PERMANENT ATRIAL FIBRILLATION (H): Primary | ICD-10-CM

## 2021-01-01 DIAGNOSIS — S81.812D SKIN TEAR OF LEFT LOWER LEG WITHOUT COMPLICATION, SUBSEQUENT ENCOUNTER: Primary | ICD-10-CM

## 2021-01-01 DIAGNOSIS — I48.20 CHRONIC ATRIAL FIBRILLATION (H): ICD-10-CM

## 2021-01-01 DIAGNOSIS — M25.551 HIP PAIN, RIGHT: ICD-10-CM

## 2021-01-01 DIAGNOSIS — D75.89 BICYTOPENIA: ICD-10-CM

## 2021-01-01 DIAGNOSIS — S81.812A SKIN TEAR OF LOWER LEG WITHOUT COMPLICATION, LEFT, INITIAL ENCOUNTER: Primary | ICD-10-CM

## 2021-01-01 DIAGNOSIS — I50.810 RIGHT-SIDED CONGESTIVE HEART FAILURE, UNSPECIFIED HF CHRONICITY (H): ICD-10-CM
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
## 2021-01-01 LAB
ABO/RH(D): NORMAL
ALBUMIN SERPL-MCNC: 2.4 G/DL (ref 3.4–5)
ALBUMIN SERPL-MCNC: 2.6 G/DL (ref 3.4–5)
ALBUMIN SERPL-MCNC: 2.6 G/DL (ref 3.4–5)
ALBUMIN UR-MCNC: 10 MG/DL
ALP SERPL-CCNC: 60 U/L (ref 40–150)
ALP SERPL-CCNC: 63 U/L (ref 40–150)
ALP SERPL-CCNC: 85 U/L (ref 40–150)
ALT SERPL W P-5'-P-CCNC: 22 U/L (ref 0–70)
ALT SERPL W P-5'-P-CCNC: 23 U/L (ref 0–70)
ALT SERPL W P-5'-P-CCNC: 43 U/L (ref 0–70)
ANION GAP SERPL CALCULATED.3IONS-SCNC: 11 MMOL/L (ref 3–14)
ANION GAP SERPL CALCULATED.3IONS-SCNC: 12 MMOL/L (ref 3–14)
ANION GAP SERPL CALCULATED.3IONS-SCNC: 14 MMOL/L (ref 3–14)
ANTIBODY SCREEN: NEGATIVE
APPEARANCE UR: CLEAR
APTT PPP: 53 SECONDS (ref 22–38)
AST SERPL W P-5'-P-CCNC: 22 U/L (ref 0–45)
AST SERPL W P-5'-P-CCNC: 26 U/L (ref 0–45)
AST SERPL W P-5'-P-CCNC: 65 U/L (ref 0–45)
ATRIAL RATE - MUSE: 122 BPM
ATRIAL RATE - MUSE: 357 BPM
BASOPHILS # BLD AUTO: 0.1 10E3/UL (ref 0–0.2)
BASOPHILS NFR BLD AUTO: 0 %
BILIRUB SERPL-MCNC: 1.3 MG/DL (ref 0.2–1.3)
BILIRUB SERPL-MCNC: 1.7 MG/DL (ref 0.2–1.3)
BILIRUB SERPL-MCNC: 2.3 MG/DL (ref 0.2–1.3)
BILIRUB UR QL STRIP: NEGATIVE
BLD PROD TYP BPU: NORMAL
BLD PROD TYP BPU: NORMAL
BLOOD COMPONENT TYPE: NORMAL
BLOOD COMPONENT TYPE: NORMAL
BUN SERPL-MCNC: 52 MG/DL (ref 7–30)
BUN SERPL-MCNC: 55 MG/DL (ref 7–30)
BUN SERPL-MCNC: 56 MG/DL (ref 7–30)
CALCIUM SERPL-MCNC: 8.1 MG/DL (ref 8.5–10.1)
CALCIUM SERPL-MCNC: 8.3 MG/DL (ref 8.5–10.1)
CALCIUM SERPL-MCNC: 8.6 MG/DL (ref 8.5–10.1)
CHLORIDE BLD-SCNC: 103 MMOL/L (ref 94–109)
CHLORIDE BLD-SCNC: 104 MMOL/L (ref 94–109)
CHLORIDE BLD-SCNC: 105 MMOL/L (ref 94–109)
CO2 SERPL-SCNC: 15 MMOL/L (ref 20–32)
CO2 SERPL-SCNC: 18 MMOL/L (ref 20–32)
CO2 SERPL-SCNC: 19 MMOL/L (ref 20–32)
CODING SYSTEM: NORMAL
CODING SYSTEM: NORMAL
COLOR UR AUTO: YELLOW
CREAT SERPL-MCNC: 1.55 MG/DL (ref 0.66–1.25)
CREAT SERPL-MCNC: 1.57 MG/DL (ref 0.66–1.25)
CREAT SERPL-MCNC: 1.64 MG/DL (ref 0.66–1.25)
CROSSMATCH: NORMAL
CROSSMATCH: NORMAL
DIASTOLIC BLOOD PRESSURE - MUSE: NORMAL MMHG
DIASTOLIC BLOOD PRESSURE - MUSE: NORMAL MMHG
DIFFERENTIAL: ABNORMAL
DIGOXIN SERPL-MCNC: 1.7 UG/L
EOSINOPHIL # BLD AUTO: 0 10E3/UL (ref 0–0.7)
EOSINOPHIL NFR BLD AUTO: 0 %
ERYTHROCYTE [DISTWIDTH] IN BLOOD BY AUTOMATED COUNT: 18 % (ref 11.9–15.5)
ERYTHROCYTE [DISTWIDTH] IN BLOOD BY AUTOMATED COUNT: 20.4 % (ref 10–15)
ERYTHROCYTE [DISTWIDTH] IN BLOOD BY AUTOMATED COUNT: 23 % (ref 10–15)
ERYTHROCYTE [DISTWIDTH] IN BLOOD BY AUTOMATED COUNT: 23.4 % (ref 10–15)
FLUAV RNA SPEC QL NAA+PROBE: NEGATIVE
FLUBV RNA RESP QL NAA+PROBE: NEGATIVE
GFR SERPL CREATININE-BSD FRML MDRD: 36 ML/MIN/1.73M2
GFR SERPL CREATININE-BSD FRML MDRD: 38 ML/MIN/1.73M2
GFR SERPL CREATININE-BSD FRML MDRD: 39 ML/MIN/1.73M2
GLUCOSE BLD-MCNC: 86 MG/DL (ref 70–99)
GLUCOSE BLD-MCNC: 93 MG/DL (ref 70–99)
GLUCOSE BLD-MCNC: 94 MG/DL (ref 70–99)
GLUCOSE UR STRIP-MCNC: NEGATIVE MG/DL
HCO3 BLDV-SCNC: 18 MMOL/L (ref 21–28)
HCO3 BLDV-SCNC: 21 MMOL/L (ref 21–28)
HCT VFR BLD AUTO: 21.7 % (ref 40–53)
HCT VFR BLD AUTO: 23.6 % (ref 40–53)
HCT VFR BLD AUTO: 25.2 % (ref 40–53)
HCT VFR BLD AUTO: 33.2 % (ref 38.8–50)
HEMOGLOBIN: 10.2 G/DL (ref 13.5–17.5)
HGB BLD-MCNC: 6.6 G/DL (ref 13.3–17.7)
HGB BLD-MCNC: 7.2 G/DL (ref 13.3–17.7)
HGB BLD-MCNC: 7.7 G/DL (ref 13.3–17.7)
HGB UR QL STRIP: NEGATIVE
HOLD SPECIMEN: NORMAL
HYALINE CASTS: 7 /LPF
IMM GRANULOCYTES # BLD: 0.6 10E3/UL
IMM GRANULOCYTES NFR BLD: 2 %
INR PPP: 1.69 (ref 0.85–1.15)
INTERPRETATION ECG - MUSE: NORMAL
INTERPRETATION ECG - MUSE: NORMAL
ISSUE DATE AND TIME: NORMAL
ISSUE DATE AND TIME: NORMAL
KETONES UR STRIP-MCNC: NEGATIVE MG/DL
L PNEUMO1 AG UR QL IA: POSITIVE
LACTATE BLD-SCNC: 3.4 MMOL/L
LACTATE BLD-SCNC: 3.5 MMOL/L
LACTATE SERPL-SCNC: 3.8 MMOL/L (ref 0.7–2)
LEUKOCYTE ESTERASE UR QL STRIP: NEGATIVE
LYMPHOCYTES # BLD AUTO: 0.5 10E3/UL (ref 0.8–5.3)
LYMPHOCYTES NFR BLD AUTO: 2 %
MCH RBC QN AUTO: 31.8 PG (ref 27.6–33.3)
MCH RBC QN AUTO: 33.8 PG (ref 26.5–33)
MCH RBC QN AUTO: 33.9 PG (ref 26.5–33)
MCH RBC QN AUTO: 34.9 PG (ref 26.5–33)
MCHC RBC AUTO-ENTMCNC: 30.4 G/DL (ref 31.5–36.5)
MCHC RBC AUTO-ENTMCNC: 30.5 G/DL (ref 31.5–36.5)
MCHC RBC AUTO-ENTMCNC: 30.6 G/DL (ref 31.5–36.5)
MCHC RBC AUTO-ENTMCNC: 30.7 G/DL (ref 31.5–35.2)
MCV RBC AUTO: 103.4 FL (ref 80–100)
MCV RBC AUTO: 111 FL (ref 78–100)
MCV RBC AUTO: 111 FL (ref 78–100)
MCV RBC AUTO: 115 FL (ref 78–100)
MONOCYTES # BLD AUTO: 2.8 10E3/UL (ref 0–1.3)
MONOCYTES NFR BLD AUTO: 11 %
NEUTROPHILS # BLD AUTO: 21.5 10E3/UL (ref 1.6–8.3)
NEUTROPHILS NFR BLD AUTO: 85 %
NITRATE UR QL: NEGATIVE
NRBC # BLD AUTO: 0.1 10E3/UL
NRBC BLD AUTO-RTO: 0 /100
P AXIS - MUSE: NORMAL DEGREES
P AXIS - MUSE: NORMAL DEGREES
PCO2 BLDV: 24 MM HG (ref 40–50)
PCO2 BLDV: 32 MM HG (ref 40–50)
PH BLDV: 7.42 [PH] (ref 7.32–7.43)
PH BLDV: 7.47 [PH] (ref 7.32–7.43)
PH UR STRIP: 5 [PH] (ref 5–7)
PLATELET # BLD AUTO: 39 X10(9)/L (ref 150–450)
PLATELET # BLD AUTO: 57 10E3/UL (ref 150–450)
PLATELET # BLD AUTO: 67 10E3/UL (ref 150–450)
PLATELET # BLD AUTO: 73 10E3/UL (ref 150–450)
PO2 BLDV: 18 MM HG (ref 25–47)
PO2 BLDV: 35 MM HG (ref 25–47)
POTASSIUM BLD-SCNC: 4.9 MMOL/L (ref 3.4–5.3)
POTASSIUM BLD-SCNC: 5.1 MMOL/L (ref 3.4–5.3)
POTASSIUM BLD-SCNC: 5.3 MMOL/L (ref 3.4–5.3)
PR INTERVAL - MUSE: NORMAL MS
PR INTERVAL - MUSE: NORMAL MS
PROCALCITONIN SERPL-MCNC: 3.17 NG/ML
PROT SERPL-MCNC: 6 G/DL (ref 6.8–8.8)
PROT SERPL-MCNC: 6.5 G/DL (ref 6.8–8.8)
PROT SERPL-MCNC: 6.5 G/DL (ref 6.8–8.8)
QRS DURATION - MUSE: 88 MS
QRS DURATION - MUSE: 90 MS
QT - MUSE: 278 MS
QT - MUSE: 288 MS
QTC - MUSE: 343 MS
QTC - MUSE: 385 MS
R AXIS - MUSE: 50 DEGREES
R AXIS - MUSE: 53 DEGREES
RBC # BLD AUTO: 1.89 10E6/UL (ref 4.4–5.9)
RBC # BLD AUTO: 2.13 10E6/UL (ref 4.4–5.9)
RBC # BLD AUTO: 2.27 10E6/UL (ref 4.4–5.9)
RBC # BLD AUTO: 3.21 X10(12)/L (ref 4.32–5.72)
RBC URINE: 1 /HPF
SAO2 % BLDV: 30 % (ref 94–100)
SAO2 % BLDV: 73 % (ref 94–100)
SARS-COV-2 RNA RESP QL NAA+PROBE: NEGATIVE
SODIUM SERPL-SCNC: 133 MMOL/L (ref 133–144)
SODIUM SERPL-SCNC: 134 MMOL/L (ref 133–144)
SODIUM SERPL-SCNC: 134 MMOL/L (ref 133–144)
SP GR UR STRIP: 1.02 (ref 1–1.03)
SPECIMEN EXPIRATION DATE: NORMAL
SYSTOLIC BLOOD PRESSURE - MUSE: NORMAL MMHG
SYSTOLIC BLOOD PRESSURE - MUSE: NORMAL MMHG
T AXIS - MUSE: 251 DEGREES
T AXIS - MUSE: 261 DEGREES
UNIT ABO/RH: NORMAL
UNIT ABO/RH: NORMAL
UNIT NUMBER: NORMAL
UNIT NUMBER: NORMAL
UNIT STATUS: NORMAL
UNIT STATUS: NORMAL
UNIT TYPE ISBT: 5100
UNIT TYPE ISBT: 5100
UROBILINOGEN UR STRIP-MCNC: NORMAL MG/DL
VENTRICULAR RATE- MUSE: 108 BPM
VENTRICULAR RATE- MUSE: 92 BPM
WBC # BLD AUTO: 24.4 10E3/UL (ref 4–11)
WBC # BLD AUTO: 25.5 10E3/UL (ref 4–11)
WBC # BLD AUTO: 26.2 10E3/UL (ref 4–11)
WBC # BLD AUTO: 5.1 X10(9)/L (ref 3.5–10.5)
WBC URINE: 1 /HPF

## 2021-01-01 PROCEDURE — P9016 RBC LEUKOCYTES REDUCED: HCPCS | Performed by: INTERNAL MEDICINE

## 2021-01-01 PROCEDURE — 80053 COMPREHEN METABOLIC PANEL: CPT | Performed by: INTERNAL MEDICINE

## 2021-01-01 PROCEDURE — 99309 SBSQ NF CARE MODERATE MDM 30: CPT | Performed by: NURSE PRACTITIONER

## 2021-01-01 PROCEDURE — 87899 AGENT NOS ASSAY W/OPTIC: CPT | Performed by: HOSPITALIST

## 2021-01-01 PROCEDURE — 96375 TX/PRO/DX INJ NEW DRUG ADDON: CPT

## 2021-01-01 PROCEDURE — 71250 CT THORAX DX C-: CPT | Mod: MG

## 2021-01-01 PROCEDURE — 99223 1ST HOSP IP/OBS HIGH 75: CPT | Mod: AI | Performed by: HOSPITALIST

## 2021-01-01 PROCEDURE — C9803 HOPD COVID-19 SPEC COLLECT: HCPCS

## 2021-01-01 PROCEDURE — 92610 EVALUATE SWALLOWING FUNCTION: CPT | Mod: GN

## 2021-01-01 PROCEDURE — 87636 SARSCOV2 & INF A&B AMP PRB: CPT | Performed by: EMERGENCY MEDICINE

## 2021-01-01 PROCEDURE — 99310 SBSQ NF CARE HIGH MDM 45: CPT | Performed by: NURSE PRACTITIONER

## 2021-01-01 PROCEDURE — 250N000011 HC RX IP 250 OP 636: Performed by: INTERNAL MEDICINE

## 2021-01-01 PROCEDURE — 96367 TX/PROPH/DG ADDL SEQ IV INF: CPT

## 2021-01-01 PROCEDURE — 86850 RBC ANTIBODY SCREEN: CPT | Performed by: EMERGENCY MEDICINE

## 2021-01-01 PROCEDURE — 85610 PROTHROMBIN TIME: CPT | Performed by: EMERGENCY MEDICINE

## 2021-01-01 PROCEDURE — 85730 THROMBOPLASTIN TIME PARTIAL: CPT | Performed by: EMERGENCY MEDICINE

## 2021-01-01 PROCEDURE — 99309 SBSQ NF CARE MODERATE MDM 30: CPT | Performed by: INTERNAL MEDICINE

## 2021-01-01 PROCEDURE — P9016 RBC LEUKOCYTES REDUCED: HCPCS | Performed by: EMERGENCY MEDICINE

## 2021-01-01 PROCEDURE — 87040 BLOOD CULTURE FOR BACTERIA: CPT | Performed by: EMERGENCY MEDICINE

## 2021-01-01 PROCEDURE — 36430 TRANSFUSION BLD/BLD COMPNT: CPT

## 2021-01-01 PROCEDURE — 258N000003 HC RX IP 258 OP 636: Performed by: EMERGENCY MEDICINE

## 2021-01-01 PROCEDURE — 36415 COLL VENOUS BLD VENIPUNCTURE: CPT | Performed by: EMERGENCY MEDICINE

## 2021-01-01 PROCEDURE — 96361 HYDRATE IV INFUSION ADD-ON: CPT

## 2021-01-01 PROCEDURE — 99285 EMERGENCY DEPT VISIT HI MDM: CPT | Mod: 25

## 2021-01-01 PROCEDURE — 83605 ASSAY OF LACTIC ACID: CPT

## 2021-01-01 PROCEDURE — 99308 SBSQ NF CARE LOW MDM 20: CPT | Performed by: NURSE PRACTITIONER

## 2021-01-01 PROCEDURE — 80162 ASSAY OF DIGOXIN TOTAL: CPT | Performed by: INTERNAL MEDICINE

## 2021-01-01 PROCEDURE — 80053 COMPREHEN METABOLIC PANEL: CPT | Performed by: EMERGENCY MEDICINE

## 2021-01-01 PROCEDURE — 96365 THER/PROPH/DIAG IV INF INIT: CPT | Mod: 59

## 2021-01-01 PROCEDURE — 36415 COLL VENOUS BLD VENIPUNCTURE: CPT | Performed by: INTERNAL MEDICINE

## 2021-01-01 PROCEDURE — 96365 THER/PROPH/DIAG IV INF INIT: CPT

## 2021-01-01 PROCEDURE — 84145 PROCALCITONIN (PCT): CPT | Performed by: EMERGENCY MEDICINE

## 2021-01-01 PROCEDURE — 250N000009 HC RX 250: Performed by: HOSPITALIST

## 2021-01-01 PROCEDURE — 120N000001 HC R&B MED SURG/OB

## 2021-01-01 PROCEDURE — 250N000011 HC RX IP 250 OP 636: Performed by: HOSPITALIST

## 2021-01-01 PROCEDURE — 96366 THER/PROPH/DIAG IV INF ADDON: CPT

## 2021-01-01 PROCEDURE — 83605 ASSAY OF LACTIC ACID: CPT | Performed by: EMERGENCY MEDICINE

## 2021-01-01 PROCEDURE — 93005 ELECTROCARDIOGRAM TRACING: CPT

## 2021-01-01 PROCEDURE — 81001 URINALYSIS AUTO W/SCOPE: CPT | Performed by: EMERGENCY MEDICINE

## 2021-01-01 PROCEDURE — G1004 CDSM NDSC: HCPCS

## 2021-01-01 PROCEDURE — 99215 OFFICE O/P EST HI 40 MIN: CPT | Performed by: PHYSICIAN ASSISTANT

## 2021-01-01 PROCEDURE — 82803 BLOOD GASES ANY COMBINATION: CPT

## 2021-01-01 PROCEDURE — 85027 COMPLETE CBC AUTOMATED: CPT | Performed by: HOSPITALIST

## 2021-01-01 PROCEDURE — 86923 COMPATIBILITY TEST ELECTRIC: CPT | Performed by: INTERNAL MEDICINE

## 2021-01-01 PROCEDURE — 86923 COMPATIBILITY TEST ELECTRIC: CPT | Performed by: EMERGENCY MEDICINE

## 2021-01-01 PROCEDURE — 85025 COMPLETE CBC W/AUTO DIFF WBC: CPT | Performed by: EMERGENCY MEDICINE

## 2021-01-01 PROCEDURE — 999N000147 HC STATISTIC PT IP EVAL DEFER

## 2021-01-01 PROCEDURE — 84155 ASSAY OF PROTEIN SERUM: CPT | Performed by: HOSPITALIST

## 2021-01-01 PROCEDURE — 99207 PR NO CHARGE LOS: CPT | Performed by: HOSPITALIST

## 2021-01-01 PROCEDURE — 85027 COMPLETE CBC AUTOMATED: CPT | Performed by: INTERNAL MEDICINE

## 2021-01-01 PROCEDURE — 36415 COLL VENOUS BLD VENIPUNCTURE: CPT | Performed by: HOSPITALIST

## 2021-01-01 PROCEDURE — 250N000011 HC RX IP 250 OP 636: Performed by: EMERGENCY MEDICINE

## 2021-01-01 PROCEDURE — 250N000013 HC RX MED GY IP 250 OP 250 PS 637: Performed by: HOSPITALIST

## 2021-01-01 PROCEDURE — 99238 HOSP IP/OBS DSCHRG MGMT 30/<: CPT | Performed by: NURSE PRACTITIONER

## 2021-01-01 RX ORDER — PIPERACILLIN SODIUM, TAZOBACTAM SODIUM 3; .375 G/15ML; G/15ML
3.38 INJECTION, POWDER, LYOPHILIZED, FOR SOLUTION INTRAVENOUS EVERY 6 HOURS
Status: DISCONTINUED | OUTPATIENT
Start: 2021-01-01 | End: 2021-01-01

## 2021-01-01 RX ORDER — TRAMADOL HYDROCHLORIDE 50 MG/1
50 TABLET ORAL DAILY PRN
COMMUNITY

## 2021-01-01 RX ORDER — PROCHLORPERAZINE MALEATE 5 MG
5 TABLET ORAL EVERY 6 HOURS PRN
Status: DISCONTINUED | OUTPATIENT
Start: 2021-01-01 | End: 2021-01-01 | Stop reason: HOSPADM

## 2021-01-01 RX ORDER — CEFTRIAXONE SODIUM 1 G
1 VIAL (EA) INJECTION ONCE
COMMUNITY

## 2021-01-01 RX ORDER — TRAMADOL HYDROCHLORIDE 50 MG/1
50 TABLET ORAL DAILY PRN
Qty: 30 TABLET | Refills: 1 | Status: SHIPPED | OUTPATIENT
Start: 2021-01-01 | End: 2021-01-01

## 2021-01-01 RX ORDER — ACETAMINOPHEN 500 MG
1000 TABLET ORAL 3 TIMES DAILY
COMMUNITY

## 2021-01-01 RX ORDER — ONDANSETRON 2 MG/ML
4 INJECTION INTRAMUSCULAR; INTRAVENOUS EVERY 6 HOURS PRN
Status: DISCONTINUED | OUTPATIENT
Start: 2021-01-01 | End: 2021-01-01 | Stop reason: HOSPADM

## 2021-01-01 RX ORDER — NALOXONE HYDROCHLORIDE 0.4 MG/ML
0.1 INJECTION, SOLUTION INTRAMUSCULAR; INTRAVENOUS; SUBCUTANEOUS
Status: DISCONTINUED | OUTPATIENT
Start: 2021-01-01 | End: 2021-01-01 | Stop reason: HOSPADM

## 2021-01-01 RX ORDER — LEVOFLOXACIN 5 MG/ML
750 INJECTION, SOLUTION INTRAVENOUS
Status: DISCONTINUED | OUTPATIENT
Start: 2021-01-01 | End: 2021-01-01

## 2021-01-01 RX ORDER — ACETAMINOPHEN 325 MG/1
650 TABLET ORAL EVERY 6 HOURS PRN
Status: DISCONTINUED | OUTPATIENT
Start: 2021-01-01 | End: 2021-01-01 | Stop reason: HOSPADM

## 2021-01-01 RX ORDER — LIDOCAINE 40 MG/G
CREAM TOPICAL
Status: DISCONTINUED | OUTPATIENT
Start: 2021-01-01 | End: 2021-01-01 | Stop reason: HOSPADM

## 2021-01-01 RX ORDER — TRAZODONE HYDROCHLORIDE 50 MG/1
TABLET, FILM COATED ORAL
Start: 2021-01-01 | End: 2021-01-01 | Stop reason: DRUGHIGH

## 2021-01-01 RX ORDER — SODIUM CHLORIDE 9 MG/ML
INJECTION, SOLUTION INTRAVENOUS CONTINUOUS
Status: DISCONTINUED | OUTPATIENT
Start: 2021-01-01 | End: 2021-01-01

## 2021-01-01 RX ORDER — DIGOXIN 125 MCG
125 TABLET ORAL DAILY
Status: DISCONTINUED | OUTPATIENT
Start: 2021-01-01 | End: 2021-01-01

## 2021-01-01 RX ORDER — METOPROLOL TARTRATE 1 MG/ML
2.5 INJECTION, SOLUTION INTRAVENOUS EVERY 4 HOURS PRN
Status: DISCONTINUED | OUTPATIENT
Start: 2021-01-01 | End: 2021-01-01 | Stop reason: HOSPADM

## 2021-01-01 RX ORDER — PIPERACILLIN SODIUM, TAZOBACTAM SODIUM 4; .5 G/20ML; G/20ML
4.5 INJECTION, POWDER, LYOPHILIZED, FOR SOLUTION INTRAVENOUS ONCE
Status: COMPLETED | OUTPATIENT
Start: 2021-01-01 | End: 2021-01-01

## 2021-01-01 RX ORDER — PROCHLORPERAZINE 25 MG
12.5 SUPPOSITORY, RECTAL RECTAL EVERY 12 HOURS PRN
Status: DISCONTINUED | OUTPATIENT
Start: 2021-01-01 | End: 2021-01-01 | Stop reason: HOSPADM

## 2021-01-01 RX ORDER — SODIUM CHLORIDE 9 MG/ML
INJECTION, SOLUTION INTRAVENOUS CONTINUOUS
Status: DISCONTINUED | OUTPATIENT
Start: 2021-01-01 | End: 2021-01-01 | Stop reason: HOSPADM

## 2021-01-01 RX ORDER — BISACODYL 10 MG
10 SUPPOSITORY, RECTAL RECTAL
Status: DISCONTINUED | OUTPATIENT
Start: 2021-12-17 | End: 2021-01-01 | Stop reason: HOSPADM

## 2021-01-01 RX ORDER — TRAZODONE HYDROCHLORIDE 50 MG/1
25 TABLET, FILM COATED ORAL AT BEDTIME
COMMUNITY

## 2021-01-01 RX ORDER — ONDANSETRON 4 MG/1
4 TABLET, ORALLY DISINTEGRATING ORAL EVERY 6 HOURS PRN
Status: DISCONTINUED | OUTPATIENT
Start: 2021-01-01 | End: 2021-01-01 | Stop reason: HOSPADM

## 2021-01-01 RX ORDER — VANCOMYCIN HYDROCHLORIDE 1 G/200ML
15 INJECTION, SOLUTION INTRAVENOUS ONCE
Status: DISCONTINUED | OUTPATIENT
Start: 2021-01-01 | End: 2021-01-01

## 2021-01-01 RX ORDER — LORAZEPAM 2 MG/ML
1 INJECTION INTRAMUSCULAR
Status: DISCONTINUED | OUTPATIENT
Start: 2021-01-01 | End: 2021-01-01 | Stop reason: HOSPADM

## 2021-01-01 RX ORDER — ACETAMINOPHEN 650 MG/1
650 SUPPOSITORY RECTAL EVERY 6 HOURS PRN
Status: DISCONTINUED | OUTPATIENT
Start: 2021-01-01 | End: 2021-01-01 | Stop reason: HOSPADM

## 2021-01-01 RX ORDER — MIRTAZAPINE 15 MG/1
15 TABLET, FILM COATED ORAL AT BEDTIME
Status: DISCONTINUED | OUTPATIENT
Start: 2021-01-01 | End: 2021-01-01

## 2021-01-01 RX ORDER — LORAZEPAM 1 MG/1
1 TABLET ORAL
Status: DISCONTINUED | OUTPATIENT
Start: 2021-01-01 | End: 2021-01-01 | Stop reason: HOSPADM

## 2021-01-01 RX ORDER — HYDROMORPHONE HYDROCHLORIDE 1 MG/ML
.3-.5 INJECTION, SOLUTION INTRAMUSCULAR; INTRAVENOUS; SUBCUTANEOUS
Status: DISCONTINUED | OUTPATIENT
Start: 2021-01-01 | End: 2021-01-01 | Stop reason: HOSPADM

## 2021-01-01 RX ORDER — NEOMYCIN SULFATE, POLYMYXIN B SULFATE AND HYDROCORTISONE 10; 3.5; 1 MG/ML; MG/ML; [USP'U]/ML
4 SUSPENSION/ DROPS AURICULAR (OTIC) 3 TIMES DAILY
COMMUNITY
Start: 2021-01-01 | End: 2021-01-01

## 2021-01-01 RX ORDER — HYDROMORPHONE HYDROCHLORIDE 1 MG/ML
1-2 SOLUTION ORAL
Status: DISCONTINUED | OUTPATIENT
Start: 2021-01-01 | End: 2021-01-01 | Stop reason: HOSPADM

## 2021-01-01 RX ORDER — NALOXONE HYDROCHLORIDE 0.4 MG/ML
0.2 INJECTION, SOLUTION INTRAMUSCULAR; INTRAVENOUS; SUBCUTANEOUS
Status: DISCONTINUED | OUTPATIENT
Start: 2021-01-01 | End: 2021-01-01 | Stop reason: HOSPADM

## 2021-01-01 RX ADMIN — DIGOXIN 125 MCG: 125 TABLET ORAL at 09:48

## 2021-01-01 RX ADMIN — PHYTONADIONE 5 MG: 10 INJECTION, EMULSION INTRAMUSCULAR; INTRAVENOUS; SUBCUTANEOUS at 03:50

## 2021-01-01 RX ADMIN — LEVOFLOXACIN 750 MG: 5 INJECTION, SOLUTION INTRAVENOUS at 08:09

## 2021-01-01 RX ADMIN — VANCOMYCIN HYDROCHLORIDE 1500 MG: 5 INJECTION, POWDER, LYOPHILIZED, FOR SOLUTION INTRAVENOUS at 20:28

## 2021-01-01 RX ADMIN — PIPERACILLIN SODIUM AND TAZOBACTAM SODIUM 4.5 G: 4; .5 INJECTION, POWDER, LYOPHILIZED, FOR SOLUTION INTRAVENOUS at 19:11

## 2021-01-01 RX ADMIN — HYDROMORPHONE HYDROCHLORIDE 0.5 MG: 1 INJECTION, SOLUTION INTRAMUSCULAR; INTRAVENOUS; SUBCUTANEOUS at 11:57

## 2021-01-01 RX ADMIN — ACETAMINOPHEN 650 MG: 650 SUPPOSITORY RECTAL at 02:35

## 2021-01-01 RX ADMIN — MIRTAZAPINE 15 MG: 15 TABLET, FILM COATED ORAL at 03:50

## 2021-01-01 RX ADMIN — SODIUM CHLORIDE 1000 ML: 9 INJECTION, SOLUTION INTRAVENOUS at 17:34

## 2021-01-01 RX ADMIN — PIPERACILLIN SODIUM AND TAZOBACTAM SODIUM 3.38 G: 3; .375 INJECTION, POWDER, LYOPHILIZED, FOR SOLUTION INTRAVENOUS at 03:51

## 2021-01-01 ASSESSMENT — MIFFLIN-ST. JEOR
SCORE: 1374.32
SCORE: 1396.54
SCORE: 1368.42
SCORE: 1376.14
SCORE: 1370.23
SCORE: 1371.6
SCORE: 1374.77

## 2021-01-01 ASSESSMENT — ACTIVITIES OF DAILY LIVING (ADL)
ADLS_ACUITY_SCORE: 12
ADLS_ACUITY_SCORE: 11
ADLS_ACUITY_SCORE: 12
ADLS_ACUITY_SCORE: 11
ADLS_ACUITY_SCORE: 12
ADLS_ACUITY_SCORE: 11
ADLS_ACUITY_SCORE: 12
ADLS_ACUITY_SCORE: 11
ADLS_ACUITY_SCORE: 11
ADLS_ACUITY_SCORE: 12
ADLS_ACUITY_SCORE: 12

## 2021-01-01 NOTE — PROGRESS NOTES
[facility]  Hospitalist Progress Note  Name: Jama Paul    MRN: [id number]  Physician:  Neri Burns DO, FHM (Text Page)    Summary of Stay:  Jama Paul is an 88 year-old male with a history of hypertension, atrial fibrillation, nonischemic cardiomyopathy, congestive heart failure, idiopathic thrombocytopenic purpura and hiatal hernia, who is admitted 10/30/2019 with worsening shortness of breath.  He was found to have effusions.  Underwent bilateral thoracentesis as well as aggressive diuresis, subsequently with hypotension requiring dobutamine and then norepinephrine.  Pleural fluid analysis consistent with transudate, consistent with CHF. History of nonischemic cardiomyopathy, although improved to 55-60% on last echocardiogram 1/2019, echocardiogram this admission with EF 55-60%, severely dilated RV, valvular abnormalities as above.  KATIE x2 attempted, unable to pass scope.  GI consulted for EGD with no clear reason for inability to complete KATIE.  Repeat KATIE with GI assistance 11/1.  Following the procedure he had hypotension and required a phenylephrine IV drip in the ICU.    Assessment & Plan    Post-op hypotension: Now resolved at this time.  Off phenylephrine at this time.  -  Suspect multifactorial but largely related to his tenuous cardiac status with his valvular disease and Precedex infusion.  BP improved now.  Recently off phenylephrine for a trial.  Patient feels well.  -    Hold on more IVF as he was bolused around the time of his BP decline last evening and developed crackles and worsened hypoxia.  IVF were held and he has since improved with O2 weaned to 2 L.    Difficulty passing KATIE scope:  -  GI performed a small dilation during the KATIE that allowed the scope to pass.  See procedure note.  Dr. Ontiveros felt he could advance a diet.    Cardiogenic shock episode earlier in stay  Acute on chronic diastolic congestive heart failure with bilateral pleural effusion s/p  bilateral thoracentesis with fluid suggestive of CHF  Mitral regurgitation and significant tricuspid regurgitation  History of nonischemic cardiomyopathy:    -  KATIE  cardiology and CV surgery consulted and following management as per them.  - Remains off of Entresto, resume when indicated per cardiology   Metoprolol 25 mg bid resumed on 11/13   Xray shows right pleural effusion, will ask radiologist to do U/S guided therapeutic Thoracentesis today  He is on IV Lasix drip, dose decrease to 2.5 mg/hr now.   Plan is to repeat the TTE once euvolemic to assess if he need Tiff clip.   Cardiology and [facility] will decide if he need Tricuspid intervention.     Idiopathic thrombocytopenic purpura:  Followed by [facility]AN.  Treated with high-dose steroids without response and platelets, steroids discontinued.  - Platelets stable in 40s range  - As per hematology recommend platelet transfusion prior to invasive cardiac procedure if one performed in order to achieve >= 50,000 plts.     Iatrogenic right-sided pneumothorax status post a right pigtail catheter placement by IR  Secondary to thoracentesis earlier in stay. Pneumothorax resolved after pigtail catheter placement  -Was on room air but post procedure with fluid bolus developed some volume overload and required 5 liters.  Now back down to 2 liters and continuing to wean.  Patient without acute SOB complaints today.  If resp status/hypoxia again worsens would obtain new CXR.    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  - Holding metoprolol XL this AM with hypotension  - Telemetry, currently with controlled ventricular rate     Urinary tract infection, citrobacter  Urinalysis abnormal, urine culture with >100K citrobacter, pansensitive.  -Status post cefuroxime to complete 7 day course(completed 11/9)     Steroid-induced hyperglycemia: Resolved after stopping the prednisone.  Noted during high-dose steroids.   Required low doses of insulin.  Last hemoglobin A1c unknown though blood sugars have now normalized off of steroids, insulin and blood sugar checks have since been discontinued.    Mildly elevated lactic acid of 2.2  This tachycardia is because of rebound as he is not taking his metoprolol.  I will restart his metoprolol.  No sign and symptom of sepsis or septic shock at this time.  He is on diuresis with IV Lasix for CHF.  Will avoid any IV #boluses at this time.  If his blood pressure remains stable we will restart his metoprolol to prevent tachycardia.      U/S guided thoracentesis today  HR improve after restarting the Metoprolol  Cardiology following.           Diet: Room Service  2 Gram Sodium Diet  Snacks/Supplements Adult: Other; offer any supplement w/ meals; With Meals  Fluid restriction 1500 ML FLUID    DVT Prophylaxis: ambulation  Mccullough Catheter: not present  Code Status: Full Code      Disposition Plan   Unclear, depends on procedure and status next couple days.  Expect 2+ more day hospital stay.     Entered: Pato JOVANIKarla Benedict 11/14/2019, 10:15 AM       Interval History   No active complaints this morning, denies any chest pain, SOB, fever, chills, nausea, vomiting, headache or dizziness at this time.     No other significant event overnight.   HR better control with the restart of his metoprolol    -Data reviewed today: I reviewed all new labs and imaging reports over the last 24 hours. I personally reviewed no images or EKG's today.    Physical Exam   Temp: 98  F (36.7  C) Temp src: Oral BP: 103/56 Pulse: 90 Heart Rate: 86 Resp: 16 SpO2: 93 % O2 Device: None (Room air)    Vitals:    11/12/19 0400 11/13/19 0500 11/14/19 0650   Weight: 72.4 kg (159 lb 9.8 oz) 70.4 kg (155 lb 3.3 oz) 67.9 kg (149 lb 11.2 oz)     Vital Signs with Ranges  Temp:  [98  F (36.7  C)-98.1  F (36.7  C)] 98  F (36.7  C)  Pulse:  [] 90  Heart Rate:  [] 86  Resp:  [13-33] 16  BP: ()/(50-78) 103/56  SpO2:  [93 %-98 %]  93 %  I/O last 3 completed shifts:  In: 720 [P.O.:720]  Out: 1125 [Urine:1125]    GEN:  Alert, oriented x 3, appears comfortable, no overt distress.   HEENT:  Normocephalic/atraumatic, no scleral icterus, no nasal discharge, mouth moist.  CV:  Regular rate and rhythm, distant with soft murmur.  LUNGS:  Clear to auscultation upper with mildly decreased breath sounds bases.  Mild base crackles noted.  No wheezes/retractions.  Symmetric chest rise on inhalation noted.  ABD:  Active bowel sounds, soft, non-tender/non-distended.  No rebound/guarding/rigidity.  EXT:  Trace edema.  No cyanosis.  No acute joint synovitis noted.  SKIN:  Dry to touch, no exanthems noted in the visualized areas.    Medications     furosemide (LASIX) infusion ADULT STANDARD 5 mg/hr (11/14/19 1007)     - MEDICATION INSTRUCTIONS -         digoxin  125 mcg Oral Daily     influenza Vac Split High-Dose  0.5 mL Intramuscular Prior to discharge     lidocaine  1.5 mL Other Once     metoprolol succinate ER  25 mg Oral BID     mirtazapine  15 mg Oral At Bedtime     sodium chloride (PF)  10 mL Intracatheter Q7 Days     Data     Recent Labs   Lab 11/14/19  0735 11/13/19  0600 11/11/19  1735   WBC 7.3 7.1 10.5   HGB 9.4* 8.9* 9.6*   HCT 29.5* 28.2* 30.3*   MCV 95 94 95   PLT 71* 59* 57*       Recent Labs   Lab 11/14/19  0735 11/13/19  0600 11/12/19  0430 11/11/19  1735  11/09/19  0620    137 134 137   < > 136   POTASSIUM 3.8 4.2 4.8 4.8   < > 4.5   CHLORIDE 100 102 103 105   < > 103   CO2 32 31 30 29   < > 31   ANIONGAP 3 4 1* 3   < > 2*   GLC 93 94 87 102*   < > 94   BUN 20 18 18 17   < > 26   CR 0.94 0.91 0.84 0.83   < > 0.83   GFRESTIMATED 72 75 78 78   < > 78   GFRESTBLACK 83 87 >90 >90   < > >90   BARON 7.8* 7.4* 7.5* 7.2*   < > 7.5*   MAG  --   --   --  2.5*  --  2.5*   PHOS 3.1  --   --  3.2  --   --    PROTTOTAL  --   --   --  5.4*  --   --    ALBUMIN 2.5*  --   --  2.5*  --   --    BILITOTAL  --   --   --  0.6  --   --    ALKPHOS  --   --   --   47  --   --    AST  --   --   --  9  --   --    ALT  --   --   --  20  --   --     < > = values in this interval not displayed.       Recent Results (from the past 24 hour(s))   XR Chest Port 1 View    Narrative    CHEST ONE VIEW UPRIGHT 11/14/2019 8:45 AM     HISTORY: CHF, pleural effusion.    COMPARISON: November 13, 2019      Impression    IMPRESSION: Increased hazy density in the upper lung on the right may  indicate an increased effusion compared to previous. Continued  effusion with associated atelectasis and/or infiltrate at the right  base. Retrocardiac atelectasis and/or infiltrate also present. No  definite left effusion today.    YINKA DESAI MD      9

## 2021-01-20 NOTE — PROGRESS NOTES
Chief Complaint: called the pt's SBP still dropping at times and spironolactone often held.  SBP running 86- 110 usually    HPI:    Jama Paul is a 89 year old  (2/13/1931), who is being seen today for an episodic care visit at [facility]. Today's concern is:     Orthostatic hypotension  Chronic diastolic congestive heart failure (H)  Mitral valve insufficiency, unspecified etiology  Other myelodysplastic syndromes (H)  Bicytopenia      REVIEW OF SYSTEMS:  No complaints, feels fine, worried about lower bps.    BP (!) 89/52   Pulse 72   Temp 97  F (36.1  C)   Resp 16   Ht 1.829 m (6')   Wt 67.3 kg (148 lb 6.4 oz)   SpO2 99%   BMI 20.13 kg/m    GENERAL APPEARANCE:  Alert,oriented x 3 in no distress. Lungs CTA , S1/S2 without M/G/R--no murmur heard today. No  edema. Abdomen is soft, +BTS . No focal neurological deficits.       ASSESSMENT / PLAN:  (I95.1) Orthostatic hypotension  (primary encounter diagnosis)  Comment/Plan: will discontinue spironolactone--monitor,     (I50.32) Chronic diastolic congestive heart failure (H)   (I34.0) Mitral valve insufficiency, unspecified etiology  Comment/Plan: remains on Demadex, and dig, watch wts, they have been steady, monitor    (D46.Z) Other myelodysplastic syndromes (H)  (D75.89) Bicytopenia  Comment/Plan: labs per Dr Wise request on 1/25 and video visit with Dr Wise at 2pm on 1/27        Electronically Signed by:  ERIC Mir CNP

## 2021-01-26 NOTE — PROGRESS NOTES
Chief Complaint: lab review:CBC and review of  SBP's    HPI:    Jama Paul is a 89 year old  (2/13/1931), who is being seen today for an episodic care visit at [facility]. Today's concern is:     Orthostatic hypotension  Chronic diastolic congestive heart failure (H)  Other myelodysplastic syndromes (H)  Bicytopenia      REVIEW OF SYSTEMS:  No concerns, no dizziness.    /68   Pulse 70   Temp 97.3  F (36.3  C)   Resp 16   Ht 1.829 m (6')   Wt 66.9 kg (147 lb 6.4 oz)   SpO2 98%   BMI 19.99 kg/m    GENERAL APPEARANCE:  Alert,oriented x 3 in no distress. Lungs CTA , S1/S2 without M/G/R  . No  edema. Abdomen is soft, +BTS . No focal neurological deficits.     Recent Labs   Lab Test 12/16/20 06/08/20    138  138   POTASSIUM 4.2 4.1  4.1   CHLORIDE 104 102  102   CO2 29 28  28   ANIONGAP 7 8  8   GLC 87 150*  150*   BUN 31* 36*  36*   CR 1.19* 1.17  1.17   BARON 8.5 8.8  8.8     CBC RESULTS:   Recent Labs   Lab Test 01/25/21   WBC 5.1   RBC 3.21*   HGB 10.2*   HCT 33.2*   .4*   MCH 31.8   MCHC 30.7*   RDW 18.0*   PLT 39*       ASSESSMENT / PLAN:  (I95.1) Orthostatic hypotension  (primary encounter diagnosis)   (I50.32) Chronic diastolic congestive heart failure (H)  Comment/Plan: SBP improving-- with spironolactone dc'd last week. Occas <100 but mostly 110 and 115 SBP, monitor.    (D46.Z) Other myelodysplastic syndromes (H)   (D75.89) Bicytopenia  Comment/Plan: see above, has Video appt with Dr Wise on 1/27/21      Electronically Signed by:  ERIC Mir CNP

## 2021-02-04 NOTE — PROGRESS NOTES
[id number]    40 minutes spent on the date of the encounter doing chart review, review of outside records, review of test results, patient visit, documentation and labs and notes reviewed from [facility] and PN     HPI and Plan:   See dictation    Orders this Visit:  Orders Placed This Encounter   Procedures     Basic metabolic panel     Follow-Up with [facility] - Return MD visit     Orders Placed This Encounter   Medications     UNABLE TO FIND     Sig: 3 times daily Nutritional Supplement 8oz TID     There are no discontinued medications.      Encounter Diagnoses   Name Primary?     Right-sided congestive heart failure, unspecified HF chronicity (H)      Chronic atrial fibrillation (H)        CURRENT MEDICATIONS:  Current Outpatient Medications   Medication Sig Dispense Refill     acetaminophen (TYLENOL) 325 MG tablet Take 650 mg by mouth every 6 hours as needed for Pain       digoxin (LANOXIN) 125 MCG tablet Take 1 tablet (125 mcg) by mouth daily (Patient taking differently: Take 125 mcg by mouth daily HOLD if HR <55) 30 tablet 0     melatonin 3 MG tablet Take 3 mg by mouth At Bedtime After 9pm. Please use overnight depends on pt to allow him to sleep without interruption during the night       mirtazapine (REMERON) 15 MG tablet Take 7.5 mg by mouth At Bedtime Reduction per Dr. Hernández, to 7.5mg monitor for depressive or withdrawal symptoms.       Multiple Vitamins-Minerals (ICAPS AREDS FORMULA PO) Take 1 tablet by mouth daily Takes in addition to multivitamin with minerals       multivitamin, therapeutic (THERA-VIT) TABS tablet Take 1 tablet by mouth daily       potassium chloride ER (K-TAB) 20 MEQ CR tablet Take 20 mEq by mouth three times a week Monday, Wed and Friday       torsemide (DEMADEX) 10 MG tablet Take 10 mg by mouth daily Start 5/23 am       UNABLE TO FIND 3 times daily Nutritional Supplement 8oz TID       Vitamin D, Cholecalciferol, 1000 UNITS TABS Take 3,000 Units by mouth daily        OTHER MEDICAL SUPPLIES  CORE PROTOCOL ; DAILY WEIGHTS IN AM.         ALLERGIES   No Known Allergies    PAST MEDICAL HISTORY:  Past Medical History:   Diagnosis Date     Congestive heart failure (H)      Elevated PSA      Eye hemorrhage, left 02/2019     Non-ischemic cardiomyopathy (H)     2017, med treatment, echo done 4/18 nl ef, mod to severe tr     Permanent atrial fibrillation (H) 2011     Thrombocytopenia (H)      Unspecified essential hypertension        PAST SURGICAL HISTORY:  Past Surgical History:   Procedure Laterality Date     ABDOMEN SURGERY      bowel obstruction     BONE MARROW BIOPSY, BONE SPECIMEN, NEEDLE/TROCAR N/A 3/4/2019    Procedure: BIOPSY BONE MARROW;  Surgeon: Josey Vargas MD;  Location:  GI     CARPAL TUNNEL RELEASE RT/LT  2014     CV HEART CATHETERIZATION WITH POSSIBLE INTERVENTION N/A 11/15/2019    Procedure: Left and right heart Catheterization with Possible Intervention;  Surgeon: Adolfo Paez MD;  Location:  HEART CARDIAC CATH LAB     CV LEFT VENTRICULOGRAM N/A 11/15/2019    Procedure: Left Ventriculogram;  Surgeon: Adolfo Paez MD;  Location:  HEART CARDIAC CATH LAB     ESOPHAGOSCOPY, GASTROSCOPY, DUODENOSCOPY (EGD), COMBINED N/A 11/6/2019    Procedure: ESOPHAGOGASTRODUODENOSCOPY (EGD);  Surgeon: Marixa Aguila MD;  Location:  GI     EXPLORE GROIN  4/30/2014    Procedure: EXPLORE GROIN;  Surgeon: Sam Aguirre MD;  Location:  OR     HERNIORRHAPHY INGUINAL  4/30/2014    Procedure: HERNIORRHAPHY INGUINAL;  Surgeon: Sam Aguirre MD;  Location:  OR     MOHS MICROGRAPHIC PROCEDURE       PHACOEMULSIFICATION CLEAR CORNEA WITH STANDARD INTRAOCULAR LENS IMPLANT Right 9/8/2015    Procedure: PHACOEMULSIFICATION CLEAR CORNEA WITH STANDARD INTRAOCULAR LENS IMPLANT;  Surgeon: Gil Shannon MD;  Location:  EC     septic olecranon bursitis[         FAMILY HISTORY:  Family History   Problem Relation Age of Onset     Heart Disease No family hx of        SOCIAL  HISTORY:  Social History     Socioeconomic History     Marital status:      Spouse name: None     Number of children: 3     Years of education: None     Highest education level: None   Occupational History     None   Social Needs     Financial resource strain: None     Food insecurity     Worry: None     Inability: None     Transportation needs     Medical: None     Non-medical: None   Tobacco Use     Smoking status: Never Smoker     Smokeless tobacco: Never Used   Substance and Sexual Activity     Alcohol use: No     Drug use: No     Sexual activity: None     Comment: not on file   Lifestyle     Physical activity     Days per week: None     Minutes per session: None     Stress: None   Relationships     Social connections     Talks on phone: None     Gets together: None     Attends [employer] service: None     Active member of club or organization: None     Attends meetings of clubs or organizations: None     Relationship status: None     Intimate partner violence     Fear of current or ex partner: None     Emotionally abused: None     Physically abused: None     Forced sexual activity: None   Other Topics Concern     Parent/sibling w/ CABG, MI or angioplasty before 65F 55M? No   Social History Narrative     None       Review of Systems:  Skin:  Negative bruising   Eyes:  Positive for glasses  ENT:  Negative    Respiratory:  Negative    Cardiovascular:  Negative    Gastroenterology: Negative    Genitourinary:  Positive for incontinence  Musculoskeletal:  Negative arthritis  Neurologic:  Negative    Psychiatric:  Negative    Heme/Lymph/Imm:  Negative    Endocrine:  Negative      Physical Exam:  Vitals: /66   Pulse 83   Ht 1.829 m (6')   Wt 67.1 kg (148 lb)   BMI 20.07 kg/m     Please refer to dictation for physical exam    Recent Lab Results:  LIPID RESULTS:  Lab Results   Component Value Date    CHOL 127 04/18/2012    HDL 56 04/18/2012    LDL 55 04/18/2012    TRIG 81 04/18/2012    CHOLHDLRATIO 2.3  04/18/2012       LIVER ENZYME RESULTS:  Lab Results   Component Value Date    AST 10 01/12/2020    ALT 15 01/12/2020       CBC RESULTS:  Lab Results   Component Value Date    WBC 5.1 01/25/2021    RBC 3.21 (A) 01/25/2021    HGB 10.2 (A) 01/25/2021    HCT 33.2 (A) 01/25/2021    .4 (A) 01/25/2021    MCH 31.8 01/25/2021    MCHC 30.7 (A) 01/25/2021    RDW 18.0 (A) 01/25/2021    PLT 39 (A) 01/25/2021       BMP RESULTS:  Lab Results   Component Value Date     12/16/2020    POTASSIUM 4.2 12/16/2020    CHLORIDE 104 12/16/2020    CO2 29 12/16/2020    ANIONGAP 7 12/16/2020    GLC 87 12/16/2020    BUN 31 (H) 12/16/2020    CR 1.19 (H) 12/16/2020    GFRESTIMATED 54 (L) 12/16/2020    GFRESTBLACK >60 06/08/2020    GFRESTBLACK >60 06/08/2020    BARON 8.5 12/16/2020        A1C RESULTS:  No results found for: A1C    INR RESULTS:  Lab Results   Component Value Date    INR 1.74 (H) 02/28/2020    INR 1.46 (H) 01/14/2020           CC  No referring provider defined for this encounter.

## 2021-02-04 NOTE — LETTER
2/4/2021    Mariusz Shields MD  [address]  [place] [address]    RE: Jama Arroyosen       Dear Colleague,    I had the pleasure of seeing Jama Arroyosen in the [facility].      40 minutes spent on the date of the encounter doing chart review, review of outside records, review of test results, patient visit, documentation and labs and notes reviewed from [facility] and [facility]     HPI and Plan:   See dictation    Orders this Visit:  Orders Placed This Encounter   Procedures     Basic metabolic panel     Follow-Up with [facility] - Return MD visit     Orders Placed This Encounter   Medications     UNABLE TO FIND     Sig: 3 times daily Nutritional Supplement 8oz TID     There are no discontinued medications.      Encounter Diagnoses   Name Primary?     Right-sided congestive heart failure, unspecified HF chronicity (H)      Chronic atrial fibrillation (H)        CURRENT MEDICATIONS:  Current Outpatient Medications   Medication Sig Dispense Refill     acetaminophen (TYLENOL) 325 MG tablet Take 650 mg by mouth every 6 hours as needed for Pain       digoxin (LANOXIN) 125 MCG tablet Take 1 tablet (125 mcg) by mouth daily (Patient taking differently: Take 125 mcg by mouth daily HOLD if HR <55) 30 tablet 0     melatonin 3 MG tablet Take 3 mg by mouth At Bedtime After 9pm. Please use overnight depends on pt to allow him to sleep without interruption during the night       mirtazapine (REMERON) 15 MG tablet Take 7.5 mg by mouth At Bedtime Reduction per Dr. Hernández, to 7.5mg monitor for depressive or withdrawal symptoms.       Multiple Vitamins-Minerals (ICAPS AREDS FORMULA PO) Take 1 tablet by mouth daily Takes in addition to multivitamin with minerals       multivitamin, therapeutic (THERA-VIT) TABS tablet Take 1 tablet by mouth daily       potassium chloride ER (K-TAB) 20 MEQ CR tablet Take 20 mEq by mouth three times a week Monday, Wed and Friday       torsemide  (DEMADEX) 10 MG tablet Take 10 mg by mouth daily Start 5/23 am       UNABLE TO FIND 3 times daily Nutritional Supplement 8oz TID       Vitamin D, Cholecalciferol, 1000 UNITS TABS Take 3,000 Units by mouth daily        OTHER MEDICAL SUPPLIES CORE PROTOCOL ; DAILY WEIGHTS IN AM.         ALLERGIES   No Known Allergies    PAST MEDICAL HISTORY:  Past Medical History:   Diagnosis Date     Congestive heart failure (H)      Elevated PSA      Eye hemorrhage, left 02/2019     Non-ischemic cardiomyopathy (H)     2017, med treatment, echo done 4/18 nl ef, mod to severe tr     Permanent atrial fibrillation (H) 2011     Thrombocytopenia (H)      Unspecified essential hypertension        PAST SURGICAL HISTORY:  Past Surgical History:   Procedure Laterality Date     ABDOMEN SURGERY      bowel obstruction     BONE MARROW BIOPSY, BONE SPECIMEN, NEEDLE/TROCAR N/A 3/4/2019    Procedure: BIOPSY BONE MARROW;  Surgeon: Josey Vargas MD;  Location:  GI     CARPAL TUNNEL RELEASE RT/LT  2014     CV HEART CATHETERIZATION WITH POSSIBLE INTERVENTION N/A 11/15/2019    Procedure: Left and right heart Catheterization with Possible Intervention;  Surgeon: Adolfo Paez MD;  Location:  HEART CARDIAC CATH LAB     CV LEFT VENTRICULOGRAM N/A 11/15/2019    Procedure: Left Ventriculogram;  Surgeon: Adolfo Paez MD;  Location:  HEART CARDIAC CATH LAB     ESOPHAGOSCOPY, GASTROSCOPY, DUODENOSCOPY (EGD), COMBINED N/A 11/6/2019    Procedure: ESOPHAGOGASTRODUODENOSCOPY (EGD);  Surgeon: Marixa Aguila MD;  Location:  GI     EXPLORE GROIN  4/30/2014    Procedure: EXPLORE GROIN;  Surgeon: Sam Aguirre MD;  Location:  OR     HERNIORRHAPHY INGUINAL  4/30/2014    Procedure: HERNIORRHAPHY INGUINAL;  Surgeon: Sam Aguirre MD;  Location:  OR     MOHS MICROGRAPHIC PROCEDURE       PHACOEMULSIFICATION CLEAR CORNEA WITH STANDARD INTRAOCULAR LENS IMPLANT Right 9/8/2015    Procedure: PHACOEMULSIFICATION CLEAR CORNEA WITH STANDARD  INTRAOCULAR LENS IMPLANT;  Surgeon: Gil Shannon MD;  Location:  EC     septic olecranon bursitis[         FAMILY HISTORY:  Family History   Problem Relation Age of Onset     Heart Disease No family hx of        SOCIAL HISTORY:  Social History     Socioeconomic History     Marital status:      Spouse name: None     Number of children: 3     Years of education: None     Highest education level: None   Occupational History     None   Social Needs     Financial resource strain: None     Food insecurity     Worry: None     Inability: None     Transportation needs     Medical: None     Non-medical: None   Tobacco Use     Smoking status: Never Smoker     Smokeless tobacco: Never Used   Substance and Sexual Activity     Alcohol use: No     Drug use: No     Sexual activity: None     Comment: not on file   Lifestyle     Physical activity     Days per week: None     Minutes per session: None     Stress: None   Relationships     Social connections     Talks on phone: None     Gets together: None     Attends Mosque service: None     Active member of club or organization: None     Attends meetings of clubs or organizations: None     Relationship status: None     Intimate partner violence     Fear of current or ex partner: None     Emotionally abused: None     Physically abused: None     Forced sexual activity: None   Other Topics Concern     Parent/sibling w/ CABG, MI or angioplasty before 65F 55M? No   Social History Narrative     None       Review of Systems:  Skin:  Negative bruising   Eyes:  Positive for glasses  ENT:  Negative    Respiratory:  Negative    Cardiovascular:  Negative    Gastroenterology: Negative    Genitourinary:  Positive for incontinence  Musculoskeletal:  Negative arthritis  Neurologic:  Negative    Psychiatric:  Negative    Heme/Lymph/Imm:  Negative    Endocrine:  Negative      Physical Exam:  Vitals: /66   Pulse 83   Ht 1.829 m (6')   Wt 67.1 kg (148 lb)   BMI 20.07 kg/m      Please refer to dictation for physical exam    Recent Lab Results:  LIPID RESULTS:  Lab Results   Component Value Date    CHOL 127 2012    HDL 56 2012    LDL 55 2012    TRIG 81 2012    CHOLHDLRATIO 2.3 2012       LIVER ENZYME RESULTS:  Lab Results   Component Value Date    AST 10 2020    ALT 15 2020       CBC RESULTS:  Lab Results   Component Value Date    WBC 5.1 2021    RBC 3.21 (A) 2021    HGB 10.2 (A) 2021    HCT 33.2 (A) 2021    .4 (A) 2021    MCH 31.8 2021    MCHC 30.7 (A) 2021    RDW 18.0 (A) 2021    PLT 39 (A) 2021       BMP RESULTS:  Lab Results   Component Value Date     2020    POTASSIUM 4.2 2020    CHLORIDE 104 2020    CO2 29 2020    ANIONGAP 7 2020    GLC 87 2020    BUN 31 (H) 2020    CR 1.19 (H) 2020    GFRESTIMATED 54 (L) 2020    GFRESTBLACK >60 2020    GFRESTBLACK >60 2020    BARON 8.5 2020        A1C RESULTS:  No results found for: A1C    INR RESULTS:  Lab Results   Component Value Date    INR 1.74 (H) 2020    INR 1.46 (H) 2020       Service Date: 2021      CLINIC VISIT      PRIMARY CARDIOLOGIST:  Dr. Pugh      REASON FOR VISIT:  Heart failure followup.      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  Nonischemic cardiomyopathy that was felt to be tachycardia induced with normalization of his EF and last echocardiogram showing EF 55%-60%, severe tricuspid regurgitation, recurrent and loculated pleural effusions.     2.  Atrial fibrillation, permanent.   3.  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   4.  Some degree of hypotension.      Jama presents today for routine followup.  He actually has done fairly well the last several months.   He lives at [facility] and is now in full care, although is adamant about getting up and walking on his own and dressing sharp every single day.  His blood pressures have been running low and recently, his spironolactone was decreased and discontinued.  Even with that discontinued, his blood pressures are in the 90s or low 100s/50s.  He denies reji orthopnea or PND.  He has had some pedal edema but they religiously put on compression stockings.  His weight has been fairly stable for the last 6 weeks at about 146-148 pounds.  He was as low as 142 pounds in early November but they feel with his move to his current situation, he is eating much better.  He offers no complaints today and he tells me he feels just great.      SOCIAL HISTORY:  He is .  His wife, Paige, lives in another portion of [facility].  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  He performed surgery well into his late 70s.  He started the [facility] which is just down the street here.  He also did a lot of mission trips and started an eye clinic in [facility] to help with cataract surgery there.  He is here today with his daughter, Lorraine, who is a great advocate for him.  He is a lifelong nonsmoker, no alcohol use.      PHYSICAL EXAMINATION:   CONSTITUTIONAL:  Reveals an elderly, somewhat frail-appearing gentleman who appears his stated age, in a wheelchair today.   HEENT:  He is normocephalic, atraumatic.     GENERAL:  He is dressed very well.   HEART:  irregular with a 2/6 systolic murmur heard best at lower left sternal border.   LUNGS:  Diminished in the bases but otherwise clear.     BACK:  He is kyphotic.   EXTREMITIES:  With 2+ pitting edema to his knees.  Compression stockings in place.     NECK:  He has about just visible neck veins at 90 degrees.   SKIN:  Warm and dry.      ASSESSMENT AND PLAN:   1.  Moderate-severe tricuspid  regurgitation that has been evaluated at [facility] and Dr. Perez who noted that since he was clinically stable, this should be followed and they should not pursue intervention.  In addition, with his ITP, his platelets are low and they are not interested in pursuing surgery unless they absolutely have to.  The patient appears relatively euvolemic.  He has some peripheral edema but I think it is unlikely we will be able to get that resolved completely with his TR.  He certainly otherwise is asymptomatic and we will have him continue his current torsemide dose at 10 mg daily.   2.  Atrial fibrillation, permanent.  He is not on anticoagulation given his hx of spontaneous ocular and pelvic hemorrhages in addition to his thrombocytopenia.   3.  Hypertension, reasonable control at this time.  The only other medicine that really could be cut back is his torsemide and I think he needs that for diuretic.  As he still does have some volume, we will continue torsemide 10 mg daily.   4.  Chronic thrombocytopenia, per Hematology.       Thank you for allowing me to participate in this delightful patient's care.  He is doing fairly well considering his comorbidities and where he was several years ago when I met him.  He will see Dr. Pugh back in 4 months, sooner with concerns.      Lawanda Carrera PA-C        D: 2021   T: 2021   MT: ELIJAH      Name:     YINKA GONZALEZ   MRN:      [id number]        Account:      [id number]   :      1931           Service Date: 2021      Document: [id number]        Thank you for allowing me to participate in the care of your patient.    Sincerely,     Lawanda Carrera PA-C     [facility]

## 2021-02-04 NOTE — PROGRESS NOTES
Service Date: 2021      CLINIC VISIT      PRIMARY CARDIOLOGIST:  Dr. Pugh      REASON FOR VISIT:  Heart failure followup.      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  Nonischemic cardiomyopathy that was felt to be tachycardia induced with normalization of his EF and last echocardiogram showing EF 55%-60%, severe tricuspid regurgitation, recurrent and loculated pleural effusions.     2.  Atrial fibrillation, permanent.   3.  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   4.  Some degree of hypotension.      Jama presents today for routine followup.  He actually has done fairly well the last several months.  He lives at [facility] and is now in full care, although is adamant about getting up and walking on his own and dressing sharp every single day.  His blood pressures have been running low and recently, his spironolactone was decreased and discontinued.  Even with that discontinued, his blood pressures are in the 90s or low 100s/50s.  He denies reji orthopnea or PND.  He has had some pedal edema but they religiously put on compression stockings.  His weight has been fairly stable for the last 6 weeks at about 146-148 pounds.  He was as low as 142 pounds in early November but they feel with his move to his current situation, he is eating much better.  He offers no complaints today and he tells me he feels just great.      SOCIAL HISTORY:  He is .  His wife, Paige, lives in another portion of [facility].  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  He performed surgery well into his late 70s.  He started the [facility] which is just down the street here.  He also did a lot of mission trips and started an eye clinic in [facility] to help with  cataract surgery there.  He is here today with his daughter, Lorraine, who is a great advocate for him.  He is a lifelong nonsmoker, no alcohol use.      PHYSICAL EXAMINATION:   CONSTITUTIONAL:  Reveals an elderly, somewhat frail-appearing gentleman who appears his stated age, in a wheelchair today.   HEENT:  He is normocephalic, atraumatic.     GENERAL:  He is dressed very well.   HEART:  irregular with a 2/6 systolic murmur heard best at lower left sternal border.   LUNGS:  Diminished in the bases but otherwise clear.     BACK:  He is kyphotic.   EXTREMITIES:  With 2+ pitting edema to his knees.  Compression stockings in place.     NECK:  He has about just visible neck veins at 90 degrees.   SKIN:  Warm and dry.      ASSESSMENT AND PLAN:   1.  Moderate-severe tricuspid regurgitation that has been evaluated at [facility] and Dr. Perez who noted that since he was clinically stable, this should be followed and they should not pursue intervention.  In addition, with his ITP, his platelets are low and they are not interested in pursuing surgery unless they absolutely have to.  The patient appears relatively euvolemic.  He has some peripheral edema but I think it is unlikely we will be able to get that resolved completely with his TR.  He certainly otherwise is asymptomatic and we will have him continue his current torsemide dose at 10 mg daily.   2.  Atrial fibrillation, permanent.  He is not on anticoagulation given his hx of spontaneous ocular and pelvic hemorrhages in addition to his thrombocytopenia.   3.  Hypertension, reasonable control at this time.  The only other medicine that really could be cut back is his torsemide and I think he needs that for diuretic.  As he still does have some volume, we will continue torsemide 10 mg daily.   4.  Chronic thrombocytopenia, per Hematology.       Thank you for allowing me to participate in this delightful patient's care.  He is doing fairly well considering his  comorbidities and where he was several years ago when I met him.  He will see Dr. Pugh back in 4 months, sooner with concerns.      JANIS Estrada PA-C             D: 2021   T: 2021   MT: ELIJAH      Name:     YINKA GONZALEZ   MRN:      [id number]        Account:      [id number]   :      1931           Service Date: 2021      Document: [id number]

## 2021-02-17 NOTE — LETTER
2/17/2021        RE: Jama Paul  [address]  [facility] [address]        Jama Paul is a 89 year old male seen February 17, 2021 at [facility] where he has resided for almost 1 year (admit to TCU 3/2020).  He is seen in his room up to ; reports he is feeling well   By chart review, pt initially admitted to TCU in November 2019, hospitalized several times in the interim.   He discharged back to his apartment with his wife and [facility] in January, but re-hospitalized in February with weakness and citrobacter UTI.   He has worked with therapies in TCU, but not able to regain enough mobility to return to independent living and thus transitioned to [facility] unit.        He saw Dr Perez Cardiology for polyvalvular disease with CHF, dominant lesion TR, on medical therapy.   Options presented and pt elected to pursue optimization of medical therapies at that time.  Follow up with Dr Perez in August 2020 at which time he was considered for repair under the Triluminate study.   He was screened, but determined to be ineligible based on his low platelets      Pt initially had [facility] hospitalization in November 2019 for shortness of breath.  He was found to have bilateral pleural effusions and had thoracentesis x2 on the left and one time on the right.    Pleural fluid c/w transudate.   ECHO cardiogram showed EF 55-60% with severely dilated RV, severe MR and TR.   Pt needed GI consult and esophageal dilatation to have KATIE done  Following this procedure he had hypotension requiring pressor support in the ICU.   He was diuresed with IV furosemide and repeat ECHO showed severe TR and moderate MR, with some improvement in both.   He was seen by Cardiology and CV surgery. He underwent left heart catheterization as workup for tricuspid repair or replacement, and this showed normal left and right sided pressures.     He remains on torsemide, but not on beta blocker or Entresto  secondary to low bps.       Pt had another [facility] hospitalization in January 2020 for recurrent large right pleural effusion.   He was treated with IV Lasix, had thoracentesis removing 2200 mLs of fluid and spironolactone was added to his daily torsemide.      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   He had a BM biopsy in March 2019 that showed hypercellular marrow with 80% cellularity and normal thrombocytes.   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  He was anticoagulated with warfarin for atrial fib and had a bleed behind his retina, lost vision in his left eye; has not been anticoagulated since then.   He has seen Hem/Onc and been treated with prednisone and IVIG with some improvement.  Last saw Dr Wise in January 2021, at that time ongoing surveillance was recommended.         Past Medical History:   Diagnosis Date     Congestive heart failure (H)      Elevated PSA      Eye hemorrhage, left 02/2019     Non-ischemic cardiomyopathy (H)     2017, med treatment, echo done 4/18 nl ef, mod to severe tr     Permanent atrial fibrillation 2011     Thrombocytopenia (H) ITP      Unspecified essential hypertension    Cognitive impairment  Urinary and fecal incontinence    Past Surgical History:   Procedure Laterality Date     ABDOMEN SURGERY      bowel obstruction     BONE MARROW BIOPSY, BONE SPECIMEN, NEEDLE/TROCAR N/A 3/4/2019    Procedure: BIOPSY BONE MARROW;  Surgeon: Josey Vargas MD;  Location:  GI     CARPAL TUNNEL RELEASE RT/LT  2014     CV HEART CATHETERIZATION WITH POSSIBLE INTERVENTION N/A 11/15/2019    Procedure: Left and right heart Catheterization with Possible Intervention;  Surgeon: Adolfo Paez MD;  Location:  HEART CARDIAC CATH LAB     CV LEFT VENTRICULOGRAM N/A 11/15/2019    Procedure: Left Ventriculogram;  Surgeon: Adolfo Paez  MD ESTEFANY;  Location:  HEART CARDIAC CATH LAB     ESOPHAGOSCOPY, GASTROSCOPY, DUODENOSCOPY (EGD), COMBINED N/A 11/6/2019    Procedure: ESOPHAGOGASTRODUODENOSCOPY (EGD);  Surgeon: Marixa Aguila MD;  Location:  GI     EXPLORE GROIN  4/30/2014    Procedure: EXPLORE GROIN;  Surgeon: Sam Aguirre MD;  Location:  OR     HERNIORRHAPHY INGUINAL  4/30/2014    Procedure: HERNIORRHAPHY INGUINAL;  Surgeon: Sam Aguirre MD;  Location:  OR     MOHS MICROGRAPHIC PROCEDURE       PHACOEMULSIFICATION CLEAR CORNEA WITH STANDARD INTRAOCULAR LENS IMPLANT Right 9/8/2015    Procedure: PHACOEMULSIFICATION CLEAR CORNEA WITH STANDARD INTRAOCULAR LENS IMPLANT;  Surgeon: Gil Shannon MD;  Location:  EC     septic olecranon bursitis[       SH:  Previously lived with his wife, ekaterina at [facility]; she continues to live there.     They have a daughter Lorraine and son Jama.   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 Please use overnight depends on pt to allow him to sleep without interruption during the night       mirtazapine (REMERON) 15 MG tablet Take 7.5 mg by mouth At Bedtime Reduction per Dr. Hernández, to 7.5mg monitor for depressive or withdrawal symptoms.       Multiple Vitamins-Minerals (ICAPS AREDS FORMULA PO) Take 1 tablet by mouth daily Takes in addition to multivitamin with minerals       multivitamin, therapeutic (THERA-VIT) TABS tablet Take 1 tablet by mouth daily       OTHER MEDICAL SUPPLIES CORE PROTOCOL ; DAILY WEIGHTS IN AM.       potassium chloride ER  (K-TAB) 20 MEQ CR tablet Take 20 mEq by mouth three times a week Monday, Wed and Friday       torsemide (DEMADEX) 10 MG tablet Take 10 mg by mouth daily Start 5/23 am       UNABLE TO FIND 3 times daily Nutritional Supplement 8oz TID       Vitamin D, Cholecalciferol, 1000 UNITS TABS Take 3,000 Units by mouth daily         ROS: 4 point ROS including Respiratory, CV, GI and , other than that noted in the HPI,  is negative   He has been incontinent of bowel and bladder for several years    EXAM: NAD  Vitals:/70   Pulse 68   Temp 97.5  F (36.4  C)   Resp 18   Ht 1.829 m (6')   Wt 69.9 kg (154 lb)   SpO2 92%   BMI 20.89 kg/m      Up to WC, he appears comfortable and no tachypnea or increased work of breathing.   +kyphosis  Neck supple without adenopathy  Lungs with decreased BS, no rales or wheeze  Heart RRR s1s2, 2/6 HSM  Abd soft, NT, no distention, +BS  Ext without edema  Neuro: no focal deficits, no tremor or stiffness  Psych: affect okay, pleasant    Last Comprehensive Metabolic Panel:  Sodium   Date Value Ref Range Status   12/16/2020 140 136 - 145 mmol/L Final     Potassium   Date Value Ref Range Status   12/16/2020 4.2 3.5 - 5.1 mmol/L Final     Chloride   Date Value Ref Range Status   12/16/2020 104 98 - 109 mmol/L Final     Carbon Dioxide   Date Value Ref Range Status   12/16/2020 29 20 - 29 mmol/L Final     Anion Gap   Date Value Ref Range Status   12/16/2020 7 7 - 16 mmol/L Final     Glucose   Date Value Ref Range Status   12/16/2020 87 70 - 100 mg/dL Final     Urea Nitrogen   Date Value Ref Range Status   12/16/2020 31 (H) 7 - 26 mg/dL Final     Creatinine   Date Value Ref Range Status   12/16/2020 1.19 (H) 0.73 - 1.18 mg/dL Final     GFR Estimate   Date Value Ref Range Status   12/16/2020 54 (L) >60 ml/min/1.73m2 Final     Calcium   Date Value Ref Range Status   12/16/2020 8.5 8.4 - 10.4 mg/dL Final      Lab Results   Component Value Date    WBC 5.1 01/25/2021      HGB 10.2 01/25/2021      MCV  103.4 01/25/2021      PLT 39 01/25/2021     ECHO 8/5/2020   Final Impressions:   1. Normal left ventricular size, normal wall thickness, normal global systolic function, calculated EF of 66 %.   2. Right ventricular cavity size is mildly to moderately enlarged, global systolic RV function is borderline reduced.   3. Severely enlarged left atrium.   4. The mitral valve is normal, mild mitral regurgitation.   5. Tricuspid valve is non coapting. Severe tricuspid regurgitation.   6. Trivial pericardial effusion.      ASSESSMENT/PLAN:  (I07.1) Tricuspid valve insufficiency, unspecified etiology  (J90) Bilateral pleural effusion  (I50.32) Chronic diastolic heart failure (H)   (I42.8) Non-ischemic cardiomyopathy (H)   BP Readings from Last 3 Encounters:   02/24/21 97/62   02/17/21 125/70   02/04/21 108/66      Comment: stable volume status today, on less medication secondary to low bps   Spironolactone d/c'd 2 weeks ago  2/4/21 Cardiology notes reviewed.    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     Per Oncology notes, BM biopsy c/w myelodysplasia, but no other features.   Plan: follow, would add PPI /Fe if falls further    (R53.1) Generalized weakness  Comment:  Not currently ambulatory.     Plan: LTC support for meds, meals, activity      Advance directive: pt elects full code         Jeanette Hernández MD           Sincerely,        Jeanette Hernández MD

## 2021-02-24 NOTE — PROGRESS NOTES
[facility]  [place] Medical Record Number:  [id number]  Place of Service where encounter took place:  [facility] ([facility]) [[id number]]  Chief Complaint   Patient presents with     RECHECK       HPI:    Jama Paul  is a 89 year old (2/13/1931), who is being seen today for an episodic care visit.  HPI information obtained from: facility chart records, facility staff, patient report, [facility] chart review and family/first contact wife report.   He admitted to this facility in 3/2020, initially to tcu and then transferred to long term care. [place] and is here visiting.     Today's concern is:     Skin tear of lower leg without complication, left, initial encounter-he is seen today after the nurse called late yesterday reporting a skin tear of the left shin with significant bleeding that resolved with applying pressure.  Patient doesn't remember bumping his leg and is unsure how he got the skin tear. No falls. Reports feeling well. Denies pain of his leg.   Chronic diastolic congestive heart failure (H)-denies cough, shortness of breath or chest pain.   Non-ischemic cardiomyopathy (H)  Tricuspid valve insufficiency, unspecified etiology  Bilateral pleural effusion  Bilateral leg edema  Permanent atrial fibrillation (H)-HR: 75-83   Idiopathic thrombocytopenic purpura (H)  Anemia, unspecified type  Stage 3a chronic kidney disease  Uses a wheelchair, able to stand for transfers and toileting. Requires stand by to max assist of 1 with cares.     Past Medical and Surgical History reviewed in Epic today.    MEDICATIONS:    Current Outpatient Medications   Medication Sig Dispense Refill     acetaminophen (TYLENOL) 325 MG tablet Take 650 mg by mouth every 6 hours as needed for Pain       digoxin (LANOXIN) 125 MCG tablet Take 1 tablet (125 mcg) by mouth daily (Patient taking differently: Take 125 mcg by mouth daily HOLD if HR <55) 30 tablet 0      melatonin 3 MG tablet Take 3 mg by mouth At Bedtime        mirtazapine (REMERON) 15 MG tablet Take 7.5 mg by mouth At Bedtime        Multiple Vitamins-Minerals (ICAPS AREDS FORMULA PO) Take 1 tablet by mouth daily Takes in addition to multivitamin with minerals       multivitamin, therapeutic (THERA-VIT) TABS tablet Take 1 tablet by mouth daily       potassium chloride ER (K-TAB) 20 MEQ CR tablet Take 20 mEq by mouth three times a week Daily every Monday, Wed and Friday       torsemide (DEMADEX) 10 MG tablet Take 10 mg by mouth daily        Vitamin D, Cholecalciferol, 1000 UNITS TABS Take 3,000 Units by mouth daily            REVIEW OF SYSTEMS:  4 point ROS including Respiratory, CV, GI and , other than that noted in the HPI,  is negative    Objective:  BP 97/62   Pulse 78   Temp 96.8  F (36  C)   Resp 18   Wt 69.6 kg (153 lb 6.4 oz)   SpO2 98%   BMI 20.80 kg/m    Exam:  GENERAL APPEARANCE:  Alert, in no distress  ENT:  [place], oropharynx clear  EYES:  EOM normal, conjunctiva and lids normal  NECK:  No adenopathy,masses or thyromegaly  RESP:  respiratory effort and palpation of chest normal, lungs clear to auscultation , no respiratory distress  CV:  Palpation and auscultation of heart done , regular rate and rhythm, 2/6 murmur, +2 pedal pulses, peripheral edema 1+ in both LE  ABDOMEN:  soft, non-tender, no distension, no masses  M/S:   wheelchair. CHIU with good strength  SKIN:  small, superficial skin tear left shin, clean, dry, no erythema. No other open areas, no rashes  PSYCH:  oriented X 3, memory impaired , affect and mood normal    Labs:   Recent labs in [facility] reviewed by me today.     ASSESSMENT/PLAN:  (K53.144V) Skin tear of lower leg without complication, left, initial encounter  (primary encounter diagnosis)  Comment: superficial skin tear, no s/s of infection Significant bleeding in part due to chronic thrombocytopenia.   Plan: continue steri strips and monitor     (I50.32) Chronic diastolic  congestive heart failure (H)  (I42.8) Non-ischemic cardiomyopathy (H)  (I07.1) Tricuspid valve insufficiency, unspecified etiology  (J90) Bilateral pleural effusion  (R60.0) Bilateral leg edema  Comment: appears euvolemic with chronic LE edema. Weight stable at 153 lbs. Spironolactone discontinued 1/2021 due to hypotension. BPs remain soft, appears asymptomatic   Plan: continue torsemide. Follow weight, symptoms     (I48.21) Permanent atrial fibrillation (H)  Comment: rate controlled. No anticoagulation due to thrombocytopenia and history of bleed behind his retina with loss of vision in his left eye while on coumadin.   Plan: continue digoxin     (D69.3) Idiopathic thrombocytopenic purpura (H)  (D64.9) Anemia, unspecified type  Comment: platelets 39,000 and Hgb 10.2 on 1/25/2021, stable. Bone marrow biopsy 3/2019 consistent with  MDS. Has been treated with prednisone and IVIG in the past.  Last saw Dr Wise 1/2021.   Plan: follow CBC. Monitor for s/s of bleeding. Follow up with Dr Wise as scheduled.     (N18.31) Stage 3a chronic kidney disease  Comment: renal function at baseline with creatinine 1.19 on 12/16/2020   Plan: follow BMP. Avoid nephrotoxins       Electronically signed by:  ERIC Capone CNP

## 2021-02-24 NOTE — LETTER
2/24/2021        RE: Jama Paul  [address]  [facility] [address]        [facility]  [place] Medical Record Number:  [id number]  Place of Service where encounter took place:  [facility] ([facility]) [[id number]]  Chief Complaint   Patient presents with     RECHECK       HPI:    Jama Paul  is a 89 year old (2/13/1931), who is being seen today for an episodic care visit.  HPI information obtained from: facility chart records, facility staff, patient report, [facility] chart review and family/first contact wife report.   He admitted to this facility in 3/2020, initially to tcu and then transferred to long term care. [place] and is here visiting.     Today's concern is:     Skin tear of lower leg without complication, left, initial encounter-he is seen today after the nurse called late yesterday reporting a skin tear of the left shin with significant bleeding that resolved with applying pressure.  Patient doesn't remember bumping his leg and is unsure how he got the skin tear. No falls. Reports feeling well. Denies pain of his leg.   Chronic diastolic congestive heart failure (H)-denies cough, shortness of breath or chest pain.   Non-ischemic cardiomyopathy (H)  Tricuspid valve insufficiency, unspecified etiology  Bilateral pleural effusion  Bilateral leg edema  Permanent atrial fibrillation (H)-HR: 75-83   Idiopathic thrombocytopenic purpura (H)  Anemia, unspecified type  Stage 3a chronic kidney disease  Uses a wheelchair, able to stand for transfers and toileting. Requires stand by to max assist of 1 with cares.     Past Medical and Surgical History reviewed in Epic today.    MEDICATIONS:    Current Outpatient Medications   Medication Sig Dispense Refill     acetaminophen (TYLENOL) 325 MG tablet Take 650 mg by mouth every 6 hours as needed for Pain       digoxin (LANOXIN) 125 MCG tablet Take 1 tablet (125 mcg) by  mouth daily (Patient taking differently: Take 125 mcg by mouth daily HOLD if HR <55) 30 tablet 0     melatonin 3 MG tablet Take 3 mg by mouth At Bedtime        mirtazapine (REMERON) 15 MG tablet Take 7.5 mg by mouth At Bedtime        Multiple Vitamins-Minerals (ICAPS AREDS FORMULA PO) Take 1 tablet by mouth daily Takes in addition to multivitamin with minerals       multivitamin, therapeutic (THERA-VIT) TABS tablet Take 1 tablet by mouth daily       potassium chloride ER (K-TAB) 20 MEQ CR tablet Take 20 mEq by mouth three times a week Daily every Monday, Wed and Friday       torsemide (DEMADEX) 10 MG tablet Take 10 mg by mouth daily        Vitamin D, Cholecalciferol, 1000 UNITS TABS Take 3,000 Units by mouth daily            REVIEW OF SYSTEMS:  4 point ROS including Respiratory, CV, GI and , other than that noted in the HPI,  is negative    Objective:  BP 97/62   Pulse 78   Temp 96.8  F (36  C)   Resp 18   Wt 69.6 kg (153 lb 6.4 oz)   SpO2 98%   BMI 20.80 kg/m    Exam:  GENERAL APPEARANCE:  Alert, in no distress  ENT:  [place], oropharynx clear  EYES:  EOM normal, conjunctiva and lids normal  NECK:  No adenopathy,masses or thyromegaly  RESP:  respiratory effort and palpation of chest normal, lungs clear to auscultation , no respiratory distress  CV:  Palpation and auscultation of heart done , regular rate and rhythm, 2/6 murmur, +2 pedal pulses, peripheral edema 1+ in both LE  ABDOMEN:  soft, non-tender, no distension, no masses  M/S:   wheelchair. CHIU with good strength  SKIN:  small, superficial skin tear left shin, clean, dry, no erythema. No other open areas, no rashes  PSYCH:  oriented X 3, memory impaired , affect and mood normal    Labs:   Recent labs in [facility] reviewed by me today.     ASSESSMENT/PLAN:  (R36.180O) Skin tear of lower leg without complication, left, initial encounter  (primary encounter diagnosis)  Comment: superficial skin tear, no s/s of infection Significant bleeding in part due to  chronic thrombocytopenia.   Plan: continue steri strips and monitor     (I50.32) Chronic diastolic congestive heart failure (H)  (I42.8) Non-ischemic cardiomyopathy (H)  (I07.1) Tricuspid valve insufficiency, unspecified etiology  (J90) Bilateral pleural effusion  (R60.0) Bilateral leg edema  Comment: appears euvolemic with chronic LE edema. Weight stable at 153 lbs. Spironolactone discontinued 1/2021 due to hypotension. BPs remain soft, appears asymptomatic   Plan: continue torsemide. Follow weight, symptoms     (I48.21) Permanent atrial fibrillation (H)  Comment: rate controlled. No anticoagulation due to thrombocytopenia and history of bleed behind his retina with loss of vision in his left eye while on coumadin.   Plan: continue digoxin     (D69.3) Idiopathic thrombocytopenic purpura (H)  (D64.9) Anemia, unspecified type  Comment: platelets 39,000 and Hgb 10.2 on 1/25/2021, stable. Bone marrow biopsy 3/2019 consistent with  MDS. Has been treated with prednisone and IVIG in the past.  Last saw Dr Wise 1/2021.   Plan: follow CBC. Monitor for s/s of bleeding. Follow up with Dr Wise as scheduled.     (N18.31) Stage 3a chronic kidney disease  Comment: renal function at baseline with creatinine 1.19 on 12/16/2020   Plan: follow BMP. Avoid nephrotoxins       Electronically signed by:  ERIC Capone CNP                 Sincerely,        ERIC Capone CNP

## 2021-02-26 PROBLEM — N18.30 CKD (CHRONIC KIDNEY DISEASE) STAGE 3, GFR 30-59 ML/MIN (H): Status: RESOLVED | Noted: 2019-12-24 | Resolved: 2021-01-01

## 2021-02-26 PROBLEM — N18.30 CHRONIC KIDNEY DISEASE, STAGE 3 (H): Status: ACTIVE | Noted: 2021-01-01

## 2021-02-26 NOTE — PROGRESS NOTES
Jama Paul is a 89 year old male seen February 17, 2021 at [facility] where he has resided for almost 1 year (admit to TCU 3/2020).  He is seen in his room up to ; reports he is feeling well   By chart review, pt initially admitted to TCU in November 2019, hospitalized several times in the interim.   He discharged back to his apartment with his wife and [facility] in January, but re-hospitalized in February with weakness and citrobacter UTI.   He has worked with therapies in TCU, but not able to regain enough mobility to return to independent living and thus transitioned to [facility] unit.        He saw Dr Perez Cardiology for polyvalvular disease with CHF, dominant lesion TR, on medical therapy.   Options presented and pt elected to pursue optimization of medical therapies at that time.  Follow up with Dr Perez in August 2020 at which time he was considered for repair under the Triluminate study.   He was screened, but determined to be ineligible based on his low platelets      Pt initially had [facility] hospitalization in November 2019 for shortness of breath.  He was found to have bilateral pleural effusions and had thoracentesis x2 on the left and one time on the right.    Pleural fluid c/w transudate.   ECHO cardiogram showed EF 55-60% with severely dilated RV, severe MR and TR.   Pt needed GI consult and esophageal dilatation to have KATIE done  Following this procedure he had hypotension requiring pressor support in the ICU.   He was diuresed with IV furosemide and repeat ECHO showed severe TR and moderate MR, with some improvement in both.   He was seen by Cardiology and CV surgery. He underwent left heart catheterization as workup for tricuspid repair or replacement, and this showed normal left and right sided pressures.     He remains on torsemide, but not on beta blocker or Entresto secondary to low bps.       Pt had another [facility] hospitalization in January 2020 for recurrent large right pleural  effusion.   He was treated with IV Lasix, had thoracentesis removing 2200 mLs of fluid and spironolactone was added to his daily torsemide.      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   He had a BM biopsy in March 2019 that showed hypercellular marrow with 80% cellularity and normal thrombocytes.   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  He was anticoagulated with warfarin for atrial fib and had a bleed behind his retina, lost vision in his left eye; has not been anticoagulated since then.   He has seen Hem/Onc and been treated with prednisone and IVIG with some improvement.  Last saw Dr Wise in January 2021, at that time ongoing surveillance was recommended.         Past Medical History:   Diagnosis Date     Congestive heart failure (H)      Elevated PSA      Eye hemorrhage, left 02/2019     Non-ischemic cardiomyopathy (H)     2017, med treatment, echo done 4/18 nl ef, mod to severe tr     Permanent atrial fibrillation 2011     Thrombocytopenia (H) ITP      Unspecified essential hypertension    Cognitive impairment  Urinary and fecal incontinence    Past Surgical History:   Procedure Laterality Date     ABDOMEN SURGERY      bowel obstruction     BONE MARROW BIOPSY, BONE SPECIMEN, NEEDLE/TROCAR N/A 3/4/2019    Procedure: BIOPSY BONE MARROW;  Surgeon: Josey Vargas MD;  Location:  GI     CARPAL TUNNEL RELEASE RT/LT  2014     CV HEART CATHETERIZATION WITH POSSIBLE INTERVENTION N/A 11/15/2019    Procedure: Left and right heart Catheterization with Possible Intervention;  Surgeon: Adolfo Paez MD;  Location: [facility] CARDIAC CATH LAB     CV LEFT VENTRICULOGRAM N/A 11/15/2019    Procedure: Left Ventriculogram;  Surgeon: Adolfo Paez MD;  Location:  HEART CARDIAC CATH LAB     ESOPHAGOSCOPY, GASTROSCOPY, DUODENOSCOPY (EGD), COMBINED N/A  11/6/2019    Procedure: ESOPHAGOGASTRODUODENOSCOPY (EGD);  Surgeon: Marixa Aguila MD;  Location:  GI     EXPLORE GROIN  4/30/2014    Procedure: EXPLORE GROIN;  Surgeon: Sam Aguirre MD;  Location:  OR     HERNIORRHAPHY INGUINAL  4/30/2014    Procedure: HERNIORRHAPHY INGUINAL;  Surgeon: Sam Aguirre MD;  Location:  OR     MOHS MICROGRAPHIC PROCEDURE       PHACOEMULSIFICATION CLEAR CORNEA WITH STANDARD INTRAOCULAR LENS IMPLANT Right 9/8/2015    Procedure: PHACOEMULSIFICATION CLEAR CORNEA WITH STANDARD INTRAOCULAR LENS IMPLANT;  Surgeon: Gil Shannon MD;  Location:  EC     septic olecranon bursitis[       SH:  Previously lived with his wife, ekaterina at [facility]; she continues to live there.     They have a daughter Lorraine and son Jama.   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 Please use overnight depends on pt to allow him to sleep without interruption during the night       mirtazapine (REMERON) 15 MG tablet Take 7.5 mg by mouth At Bedtime Reduction per Dr. Hernández, to 7.5mg monitor for depressive or withdrawal symptoms.       Multiple Vitamins-Minerals (ICAPS AREDS FORMULA PO) Take 1 tablet by mouth daily Takes in addition to multivitamin with minerals       multivitamin, therapeutic (THERA-VIT) TABS tablet Take 1 tablet by mouth daily       OTHER MEDICAL SUPPLIES CORE PROTOCOL ; DAILY WEIGHTS IN AM.       potassium chloride ER (K-TAB) 20 MEQ CR tablet Take 20 mEq by mouth three times a week Monday, Wed and Friday       torsemide  (DEMADEX) 10 MG tablet Take 10 mg by mouth daily Start 5/23 am       UNABLE TO FIND 3 times daily Nutritional Supplement 8oz TID       Vitamin D, Cholecalciferol, 1000 UNITS TABS Take 3,000 Units by mouth daily         ROS: 4 point ROS including Respiratory, CV, GI and , other than that noted in the HPI,  is negative   He has been incontinent of bowel and bladder for several years    EXAM: NAD  Vitals:/70   Pulse 68   Temp 97.5  F (36.4  C)   Resp 18   Ht 1.829 m (6')   Wt 69.9 kg (154 lb)   SpO2 92%   BMI 20.89 kg/m      Up to WC, he appears comfortable and no tachypnea or increased work of breathing.   +kyphosis  Neck supple without adenopathy  Lungs with decreased BS, no rales or wheeze  Heart RRR s1s2, 2/6 HSM  Abd soft, NT, no distention, +BS  Ext without edema  Neuro: no focal deficits, no tremor or stiffness  Psych: affect okay, pleasant    Last Comprehensive Metabolic Panel:  Sodium   Date Value Ref Range Status   12/16/2020 140 136 - 145 mmol/L Final     Potassium   Date Value Ref Range Status   12/16/2020 4.2 3.5 - 5.1 mmol/L Final     Chloride   Date Value Ref Range Status   12/16/2020 104 98 - 109 mmol/L Final     Carbon Dioxide   Date Value Ref Range Status   12/16/2020 29 20 - 29 mmol/L Final     Anion Gap   Date Value Ref Range Status   12/16/2020 7 7 - 16 mmol/L Final     Glucose   Date Value Ref Range Status   12/16/2020 87 70 - 100 mg/dL Final     Urea Nitrogen   Date Value Ref Range Status   12/16/2020 31 (H) 7 - 26 mg/dL Final     Creatinine   Date Value Ref Range Status   12/16/2020 1.19 (H) 0.73 - 1.18 mg/dL Final     GFR Estimate   Date Value Ref Range Status   12/16/2020 54 (L) >60 ml/min/1.73m2 Final     Calcium   Date Value Ref Range Status   12/16/2020 8.5 8.4 - 10.4 mg/dL Final      Lab Results   Component Value Date    WBC 5.1 01/25/2021      HGB 10.2 01/25/2021      .4 01/25/2021      PLT 39 01/25/2021     ECHO 8/5/2020   Final Impressions:   1. Normal left  ventricular size, normal wall thickness, normal global systolic function, calculated EF of 66 %.   2. Right ventricular cavity size is mildly to moderately enlarged, global systolic RV function is borderline reduced.   3. Severely enlarged left atrium.   4. The mitral valve is normal, mild mitral regurgitation.   5. Tricuspid valve is non coapting. Severe tricuspid regurgitation.   6. Trivial pericardial effusion.      ASSESSMENT/PLAN:  (I07.1) Tricuspid valve insufficiency, unspecified etiology  (J90) Bilateral pleural effusion  (I50.32) Chronic diastolic heart failure (H)   (I42.8) Non-ischemic cardiomyopathy (H)   BP Readings from Last 3 Encounters:   02/24/21 97/62   02/17/21 125/70   02/04/21 108/66      Comment: stable volume status today, on less medication secondary to low bps   Spironolactone d/c'd 2 weeks ago  2/4/21 Cardiology notes reviewed.    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     Per Oncology notes, BM biopsy c/w myelodysplasia, but no other features.   Plan: follow, would add PPI /Fe if falls further    (R53.1) Generalized weakness  Comment:  Not currently ambulatory.     Plan: LTC support for meds, meals, activity      Advance directive: pt elects full code         Jeanette Hernández MD

## 2021-03-31 NOTE — PROGRESS NOTES
[facility]  [place] Medical Record Number:  [id number]  Place of Service where encounter took place:  [facility] ([facility]) [[id number]]  Chief Complaint   Patient presents with     RECHECK       HPI:    Jama Paul  is a 89 year old (2/13/1931), who is being seen today for an episodic care visit.  HPI information obtained from: facility chart records, facility staff, patient report and [facility] chart review.  He admitted to this facility in 3/2020, initially to tcu and then transferred to long term care. [place]      Today's concern is:     Skin tear of left lower leg without complication, subsequent encounter-he is seen today to follow up on a skin tear of his left leg noted 2/23/2021. Reports the area is healing well and denies pain. Feeling well. Good appetite .  Chronic diastolic congestive heart failure (H)-denies cough, shortness of breath or chest pain   Permanent atrial fibrillation (H)-HR: 78-84  Non-ischemic cardiomyopathy (H)  Tricuspid valve insufficiency, unspecified etiology  Bilateral pleural effusion  Bilateral leg edema  Macular degeneration (senile) of retina-had pain after a right eye injection last week and reports this has resolved     Past Medical and Surgical History reviewed in Epic today.    MEDICATIONS:    Current Outpatient Medications   Medication Sig Dispense Refill     acetaminophen (TYLENOL) 325 MG tablet Take 650 mg by mouth every 6 hours as needed for Pain       digoxin (LANOXIN) 125 MCG tablet Take 1 tablet (125 mcg) by mouth daily (Patient taking differently: Take 125 mcg by mouth daily HOLD if HR <55) 30 tablet 0     melatonin 3 MG tablet Take 3 mg by mouth At Bedtime        mirtazapine (REMERON) 15 MG tablet Take 7.5 mg by mouth At Bedtime        Multiple Vitamins-Minerals (ICAPS AREDS FORMULA PO) Take 1 tablet by mouth daily Takes in addition to multivitamin with minerals       multivitamin,  therapeutic (THERA-VIT) TABS tablet Take 1 tablet by mouth daily       potassium chloride ER (K-TAB) 20 MEQ CR tablet Take 20 mEq by mouth three times a week Daily every Monday, Wed and Friday       torsemide (DEMADEX) 10 MG tablet Take 10 mg by mouth daily        Vitamin D, Cholecalciferol, 1000 UNITS TABS Take 3,000 Units by mouth daily            REVIEW OF SYSTEMS:  4 point ROS including Respiratory, CV, GI and , other than that noted in the HPI,  is negative    Objective:  /65   Pulse 84   Temp 97.7  F (36.5  C)   Resp 19   Wt 68.9 kg (151 lb 12.8 oz)   SpO2 99%   BMI 20.59 kg/m    Exam:  GENERAL APPEARANCE:  Alert, in no distress  ENT:  [place], oropharynx clear  EYES:  EOM normal, conjunctiva and lids normal  NECK:  No adenopathy,masses or thyromegaly  RESP:  respiratory effort and palpation of chest normal, lungs clear to auscultation , no respiratory distress  CV:  Palpation and auscultation of heart done , regular rate and rhythm, 2/6 murmur, +2 pedal pulses, peripheral edema 1+ in both LE  ABDOMEN:  soft, non-tender, no distension, no masses  M/S:   wheelchair. CHIU with good strength  SKIN: partially scabbed skin tear left lower leg, no erythema. No other open areas, no rashes  PSYCH:  oriented X 3, memory impaired , affect and mood normal    Labs:   Recent labs in [facility] reviewed by me today.     ASSESSMENT / PLAN:  (S88.786T) Skin tear of left lower leg without complication, subsequent encounter  (primary encounter diagnosis)  Comment: healing without signs of infection   Plan: keep area clean and dry     (I48.21) Permanent atrial fibrillation (H)  Comment: rate controlled. No anticoagulation due to thrombocytopenia and history of bleed behind his retina with loss of vision in his left eye while on coumadin.   Plan: continue digoxin      (I50.32) Chronic diastolic congestive heart failure (H)  (I42.8) Non-ischemic cardiomyopathy (H)  (I07.1) Tricuspid valve insufficiency, unspecified  etiology  (J90) Bilateral pleural effusion  (R60.0) Bilateral leg edema  Comment: appears euvolemic with weight stable at 151 lbs and LE edema unchanged. Spironolactone was discontinued 1/2021 due to hypotension. BPs improved: 118/68, 116/62  Plan: continue torsemide. Follow weight, symptoms. Continue compression stockings, elevate legs     (H35.30) Macular degeneration (senile) of retina  Comment: received an injection to his right eye last week, pain has resolved   Plan: Ophthalmology follow up as scheduled.       Electronically signed by:  ERIC Capone CNP

## 2021-03-31 NOTE — LETTER
3/31/2021        RE: Jama Paul  [address] [place] [address]  [facility] [address]        [facility]  [place] Medical Record Number:  [id number]  Place of Service where encounter took place:  [facility] ([facility]) [[id number]]  Chief Complaint   Patient presents with     RECHECK       HPI:    Jama Paul  is a 89 year old (2/13/1931), who is being seen today for an episodic care visit.  HPI information obtained from: facility chart records, facility staff, patient report and [facility] chart review.  He admitted to this facility in 3/2020, initially to tcu and then transferred to long term care. [place].      Today's concern is:     Skin tear of left lower leg without complication, subsequent encounter-he is seen today to follow up on a skin tear of his left leg noted 2/23/2021. Reports the area is healing well and denies pain. Feeling well. Good appetite .  Chronic diastolic congestive heart failure (H)-denies cough, shortness of breath or chest pain   Permanent atrial fibrillation (H)-HR: 78-84  Non-ischemic cardiomyopathy (H)  Tricuspid valve insufficiency, unspecified etiology  Bilateral pleural effusion  Bilateral leg edema  Macular degeneration (senile) of retina-had pain after a right eye injection last week and reports this has resolved     Past Medical and Surgical History reviewed in Epic today.    MEDICATIONS:    Current Outpatient Medications   Medication Sig Dispense Refill     acetaminophen (TYLENOL) 325 MG tablet Take 650 mg by mouth every 6 hours as needed for Pain       digoxin (LANOXIN) 125 MCG tablet Take 1 tablet (125 mcg) by mouth daily (Patient taking differently: Take 125 mcg by mouth daily HOLD if HR <55) 30 tablet 0     melatonin 3 MG tablet Take 3 mg by mouth At Bedtime        mirtazapine (REMERON) 15 MG tablet Take 7.5 mg by mouth At Bedtime        Multiple Vitamins-Minerals (ICAPS AREDS  FORMULA PO) Take 1 tablet by mouth daily Takes in addition to multivitamin with minerals       multivitamin, therapeutic (THERA-VIT) TABS tablet Take 1 tablet by mouth daily       potassium chloride ER (K-TAB) 20 MEQ CR tablet Take 20 mEq by mouth three times a week Daily every Monday, Wed and Friday       torsemide (DEMADEX) 10 MG tablet Take 10 mg by mouth daily        Vitamin D, Cholecalciferol, 1000 UNITS TABS Take 3,000 Units by mouth daily            REVIEW OF SYSTEMS:  4 point ROS including Respiratory, CV, GI and , other than that noted in the HPI,  is negative    Objective:  /65   Pulse 84   Temp 97.7  F (36.5  C)   Resp 19   Wt 68.9 kg (151 lb 12.8 oz)   SpO2 99%   BMI 20.59 kg/m    Exam:  GENERAL APPEARANCE:  Alert, in no distress  ENT:  [place], oropharynx clear  EYES:  EOM normal, conjunctiva and lids normal  NECK:  No adenopathy,masses or thyromegaly  RESP:  respiratory effort and palpation of chest normal, lungs clear to auscultation , no respiratory distress  CV:  Palpation and auscultation of heart done , regular rate and rhythm, 2/6 murmur, +2 pedal pulses, peripheral edema 1+ in both LE  ABDOMEN:  soft, non-tender, no distension, no masses  M/S:   wheelchair. CHIU with good strength  SKIN: partially scabbed skin tear left lower leg, no erythema. No other open areas, no rashes  PSYCH:  oriented X 3, memory impaired , affect and mood normal    Labs:   Recent labs in [facility] reviewed by me today.     ASSESSMENT / PLAN:  (S86.583Q) Skin tear of left lower leg without complication, subsequent encounter  (primary encounter diagnosis)  Comment: healing without signs of infection   Plan: keep area clean and dry     (I48.21) Permanent atrial fibrillation (H)  Comment: rate controlled. No anticoagulation due to thrombocytopenia and history of bleed behind his retina with loss of vision in his left eye while on coumadin.   Plan: continue digoxin      (I50.32) Chronic diastolic congestive heart failure  (H)  (I42.8) Non-ischemic cardiomyopathy (H)  (I07.1) Tricuspid valve insufficiency, unspecified etiology  (J90) Bilateral pleural effusion  (R60.0) Bilateral leg edema  Comment: appears euvolemic with weight stable at 151 lbs and LE edema unchanged. Spironolactone was discontinued 1/2021 due to hypotension. BPs improved: 118/68, 116/62  Plan: continue torsemide. Follow weight, symptoms. Continue compression stockings, elevate legs     (H35.30) Macular degeneration (senile) of retina  Comment: received an injection to his right eye last week, pain has resolved   Plan: Ophthalmology follow up as scheduled.       Electronically signed by:  ERIC Capone CNP                 Sincerely,        ERIC Capone CNP

## 2021-04-06 NOTE — PROGRESS NOTES
[place] [facility]  [place] Medical Record Number:  [id number]  Place of Service where encounter took place:  [facility] ([facility]) [[id number]]  Chief Complaint   Patient presents with     RECHECK       HPI:    Jama Paul  is a 89 year old (2/13/1931), who is being seen today for an episodic care visit.  HPI information obtained from: facility chart records, facility staff, patient report and [facility] chart review.   He admitted to this facility in 3/2020, initially to tcu and then transferred to long term care. [place]   Medical history is significant for diastolic CHF, cardiomyopathy, afib, valvular disease, ITP, anemia, macular degeneration, CKD stage 3.       Today's concern is:  Skin tear of left lower leg without complication, subsequent encounter-he is seen today after the nurse reported worsening of the wounds of his left lower leg with scant drainage.  Skin tears were initially found 2/23/2021 and have been slowly healing. He denies pain of the area. Reports feeling well with good appetite.   Chronic diastolic congestive heart failure (H)-denies cough, shortness of breath or chest pain   Non-ischemic cardiomyopathy (H)  Tricuspid valve insufficiency, unspecified etiology  Bilateral pleural effusion  Bilateral leg edema  Permanent atrial fibrillation (H)-HR: 68-84   Idiopathic thrombocytopenic purpura (H)-denies recent bleeding or bruising. No nose bleeds.   Anemia, unspecified type  Mild cognitive impairment-pleasant and talkative. Fairly good historian.   Wheelchair bound, able to stand for transfers and toileting with assist of 1. Incontinent of bowel and bladder.     Past Medical and Surgical History reviewed in Epic today.    MEDICATIONS:    Current Outpatient Medications   Medication Sig Dispense Refill     acetaminophen (TYLENOL) 325 MG tablet Take 650 mg by mouth every 6 hours as needed for Pain       digoxin (LANOXIN) 125 MCG  tablet Take 1 tablet (125 mcg) by mouth daily (Patient taking differently: Take 125 mcg by mouth daily HOLD if HR <55) 30 tablet 0     melatonin 3 MG tablet Take 3 mg by mouth At Bedtime        mirtazapine (REMERON) 15 MG tablet Take 7.5 mg by mouth At Bedtime        Multiple Vitamins-Minerals (ICAPS AREDS FORMULA PO) Take 1 tablet by mouth daily Takes in addition to multivitamin with minerals       multivitamin, therapeutic (THERA-VIT) TABS tablet Take 1 tablet by mouth daily       potassium chloride ER (K-TAB) 20 MEQ CR tablet Take 20 mEq by mouth three times a week Daily every Monday, Wed and Friday       torsemide (DEMADEX) 10 MG tablet Take 10 mg by mouth daily        Vitamin D, Cholecalciferol, 1000 UNITS TABS Take 3,000 Units by mouth daily            REVIEW OF SYSTEMS:  4 point ROS including Respiratory, CV, GI and , other than that noted in the HPI,  is negative    Objective:  /62   Pulse 68   Temp 98  F (36.7  C)   Resp 18   Wt 68.3 kg (150 lb 9.6 oz)   SpO2 96%   BMI 20.43 kg/m    Exam:  GENERAL APPEARANCE:  Alert, in no distress  ENT:  [place], oropharynx clear  EYES:  EOM normal, conjunctiva and lids normal  NECK:  No adenopathy,masses or thyromegaly  RESP:  respiratory effort and palpation of chest normal, lungs clear to auscultation , no respiratory distress  CV:  Palpation and auscultation of heart done , regular rate and rhythm, 2/6 murmur, +2 pedal pulses, peripheral edema 1+ in both LE  ABDOMEN:  soft, non-tender, no distension, no masses  M/S:   wheelchair. CHIU with good strength  SKIN: 3 small partially scabbed wounds of left lower leg with steri strips, all are clean, dry, no drainage or erythema.  No other open areas, no rashes  PSYCH:  oriented X 3, memory impaired , affect and mood normal    Labs:   Recent labs in [facility] reviewed by me today.     ASSESSMENT / PLAN:  (S22.743J) Skin tear of left lower leg without complication, subsequent encounter  (primary encounter  diagnosis)  Comment: wounds appear to be healing well, but scabs are being pulled off when telfa dressing is removed.   Plan: discontinue telfa. Daily wound care using bacitracin and dry dressing until scabs are intact, then leave open to air.     (I50.32) Chronic diastolic congestive heart failure (H)  (I42.8) Non-ischemic cardiomyopathy (H)  (I07.1) Tricuspid valve insufficiency, unspecified etiology  (J90) Bilateral pleural effusion  (R60.0) Bilateral leg edema  Comment: chronic LE edema appears at baseline. Weight down 3 lbs to 150 lbs. Spironolactone was discontinued 1/21/2021 due to hypotension.Not on beta blocker or Entresto due to hypotension. Recent BPs: 115/62, 112/65, 108/65   Plan: continue torsemide. Follow weight, symptoms. Continue compression stockings, elevate legs     (I48.21) Permanent atrial fibrillation (H)  Comment: rate controlled. He is not anticoagulated due to thrombocytopenia and history of a  bleed behind his retina with loss of vision in his left eye while on coumadin.   Plan: continue digoxin     (D69.3) Idiopathic thrombocytopenic purpura (H)  (D64.9) Anemia, unspecified type  Comment: platelets 39,000 and Hgb 10.2 on 1/25/2021, stable. Bone marrow biopsy 3/2019 consistent with  MDS. Has been treated with prednisone and IVIG in the past.  Last saw Dr Wise 1/2021.   Plan: follow CBC. Monitor for s/s of bleeding. Follow up with Dr Wise as scheduled.    (G31.84) Mild cognitive impairment  Comment: mild impairment, primarily in short term memory   Plan: staff to assist with all cares in the long term care setting       Electronically signed by:  ERIC Capone CNP

## 2021-04-06 NOTE — LETTER
4/6/2021        RE: Jama Paul  [address]  [facility] [address]        [place] [facility]  [place] Medical Record Number:  [id number]  Place of Service where encounter took place:  [facility] ([facility]) [[id number]]  Chief Complaint   Patient presents with     RECHECK       HPI:    Jama Paul  is a 89 year old (2/13/1931), who is being seen today for an episodic care visit.  HPI information obtained from: facility chart records, facility staff, patient report and [facility] chart review.   He admitted to this facility in 3/2020, initially to tcu and then transferred to long term care. [place]   Medical history is significant for diastolic CHF, cardiomyopathy, afib, valvular disease, ITP, anemia, macular degeneration, CKD stage 3.       Today's concern is:  Skin tear of left lower leg without complication, subsequent encounter-he is seen today after the nurse reported worsening of the wounds of his left lower leg with scant drainage.  Skin tears were initially found 2/23/2021 and have been slowly healing. He denies pain of the area. Reports feeling well with good appetite.   Chronic diastolic congestive heart failure (H)-denies cough, shortness of breath or chest pain   Non-ischemic cardiomyopathy (H)  Tricuspid valve insufficiency, unspecified etiology  Bilateral pleural effusion  Bilateral leg edema  Permanent atrial fibrillation (H)-HR: 68-84   Idiopathic thrombocytopenic purpura (H)-denies recent bleeding or bruising. No nose bleeds.   Anemia, unspecified type  Mild cognitive impairment-pleasant and talkative. Fairly good historian.   Wheelchair bound, able to stand for transfers and toileting with assist of 1. Incontinent of bowel and bladder.     Past Medical and Surgical History reviewed in Epic today.    MEDICATIONS:    Current Outpatient Medications   Medication Sig Dispense Refill     acetaminophen (TYLENOL)  325 MG tablet Take 650 mg by mouth every 6 hours as needed for Pain       digoxin (LANOXIN) 125 MCG tablet Take 1 tablet (125 mcg) by mouth daily (Patient taking differently: Take 125 mcg by mouth daily HOLD if HR <55) 30 tablet 0     melatonin 3 MG tablet Take 3 mg by mouth At Bedtime        mirtazapine (REMERON) 15 MG tablet Take 7.5 mg by mouth At Bedtime        Multiple Vitamins-Minerals (ICAPS AREDS FORMULA PO) Take 1 tablet by mouth daily Takes in addition to multivitamin with minerals       multivitamin, therapeutic (THERA-VIT) TABS tablet Take 1 tablet by mouth daily       potassium chloride ER (K-TAB) 20 MEQ CR tablet Take 20 mEq by mouth three times a week Daily every Monday, Wed and Friday       torsemide (DEMADEX) 10 MG tablet Take 10 mg by mouth daily        Vitamin D, Cholecalciferol, 1000 UNITS TABS Take 3,000 Units by mouth daily            REVIEW OF SYSTEMS:  4 point ROS including Respiratory, CV, GI and , other than that noted in the HPI,  is negative    Objective:  /62   Pulse 68   Temp 98  F (36.7  C)   Resp 18   Wt 68.3 kg (150 lb 9.6 oz)   SpO2 96%   BMI 20.43 kg/m    Exam:  GENERAL APPEARANCE:  Alert, in no distress  ENT:  [place], oropharynx clear  EYES:  EOM normal, conjunctiva and lids normal  NECK:  No adenopathy,masses or thyromegaly  RESP:  respiratory effort and palpation of chest normal, lungs clear to auscultation , no respiratory distress  CV:  Palpation and auscultation of heart done , regular rate and rhythm, 2/6 murmur, +2 pedal pulses, peripheral edema 1+ in both LE  ABDOMEN:  soft, non-tender, no distension, no masses  M/S:   wheelchair. CHIU with good strength  SKIN: 3 small partially scabbed wounds of left lower leg with steri strips, all are clean, dry, no drainage or erythema.  No other open areas, no rashes  PSYCH:  oriented X 3, memory impaired , affect and mood normal    Labs:   Recent labs in [facility] reviewed by me today.     ASSESSMENT / PLAN:  (H22.636X) Skin  tear of left lower leg without complication, subsequent encounter  (primary encounter diagnosis)  Comment: wounds appear to be healing well, but scabs are being pulled off when telfa dressing is removed.   Plan: discontinue telfa. Daily wound care using bacitracin and dry dressing until scabs are intact, then leave open to air.     (I50.32) Chronic diastolic congestive heart failure (H)  (I42.8) Non-ischemic cardiomyopathy (H)  (I07.1) Tricuspid valve insufficiency, unspecified etiology  (J90) Bilateral pleural effusion  (R60.0) Bilateral leg edema  Comment: chronic LE edema appears at baseline. Weight down 3 lbs to 150 lbs. Spironolactone was discontinued 1/21/2021 due to hypotension.Not on beta blocker or Entresto due to hypotension. Recent BPs: 115/62, 112/65, 108/65   Plan: continue torsemide. Follow weight, symptoms. Continue compression stockings, elevate legs     (I48.21) Permanent atrial fibrillation (H)  Comment: rate controlled. He is not anticoagulated due to thrombocytopenia and history of a  bleed behind his retina with loss of vision in his left eye while on coumadin.   Plan: continue digoxin     (D69.3) Idiopathic thrombocytopenic purpura (H)  (D64.9) Anemia, unspecified type  Comment: platelets 39,000 and Hgb 10.2 on 1/25/2021, stable. Bone marrow biopsy 3/2019 consistent with  MDS. Has been treated with prednisone and IVIG in the past.  Last saw Dr Wise 1/2021.   Plan: follow CBC. Monitor for s/s of bleeding. Follow up with Dr Wise as scheduled.    (G31.84) Mild cognitive impairment  Comment: mild impairment, primarily in short term memory   Plan: staff to assist with all cares in the long term care setting       Electronically signed by:  ERIC Capone CNP                 Sincerely,        ERIC Capone CNP

## 2021-04-13 NOTE — LETTER
4/13/2021        RE: Jama Paul  [address]  [facility] [place] [facility] [address]        [place] [facility]  [place] Medical Record Number:  [id number]  Place of Service where encounter took place:  [facility] ([facility]) [[id number]]  Chief Complaint   Patient presents with     RECHECK       HPI:    Jama Paul  is a 89 year old (2/13/1931), who is being seen today for an episodic care visit.  HPI information obtained from: facility chart records, facility staff, patient report and [facility] chart review.   He admitted to this facility in 3/2020, initially to tcu and then transferred to long term care. [place]   Medical history is significant for diastolic CHF, cardiomyopathy, afib, valvular disease, ITP, anemia, macular degeneration, CKD stage 3.       Today's concern is:     Fall, initial encounter-he is seen today after the nurse reported a fall last night. Patient states he was trying to get to bed on his own and fell while standing up from the wheelchair. Denies pain of any type. Denies feeling dizzy or lightheaded at the time.  Reports feeling well.   Noninfected skin tear of right lower extremity, initial encounter-during the day yesterday, he was noted to have blood on his pants and the nurse found a small skin tear on his right lower leg. Patient doesn't remember bumping or scratching the area. Nurse applied pressure until the bleeding stopped, then steri strips and a dry dressing.   Skin tear of left lower leg without complication, subsequent encounter  Permanent atrial fibrillation (H)-HR: 60-84   Chronic diastolic congestive heart failure (H)-denies cough, shortness of breath or chest pain   Non-ischemic cardiomyopathy (H)  Tricuspid valve insufficiency, unspecified etiology  Bilateral pleural effusion  Bilateral leg edema  Idiopathic thrombocytopenic purpura (H)  Anemia, unspecified type  Stage 3a chronic kidney  disease  Mild cognitive impairment-pleasant and conversant. Fairly good historian.   Wheelchair bound and transfers with assist of 1. Incontinent of bowel and bladder.     Past Medical and Surgical History reviewed in Epic today.    MEDICATIONS:    Current Outpatient Medications   Medication Sig Dispense Refill     acetaminophen (TYLENOL) 325 MG tablet Take 650 mg by mouth every 6 hours as needed for Pain       digoxin (LANOXIN) 125 MCG tablet Take 1 tablet (125 mcg) by mouth daily (Patient taking differently: Take 125 mcg by mouth daily HOLD if HR <55) 30 tablet 0     melatonin 3 MG tablet Take 3 mg by mouth At Bedtime        mirtazapine (REMERON) 15 MG tablet Take 7.5 mg by mouth At Bedtime        Multiple Vitamins-Minerals (ICAPS AREDS FORMULA PO) Take 1 tablet by mouth daily Takes in addition to multivitamin with minerals       multivitamin, therapeutic (THERA-VIT) TABS tablet Take 1 tablet by mouth daily       potassium chloride ER (K-TAB) 20 MEQ CR tablet Take 20 mEq by mouth three times a week Daily every Monday, Wed and Friday       torsemide (DEMADEX) 10 MG tablet Take 10 mg by mouth daily        Vitamin D, Cholecalciferol, 1000 UNITS TABS Take 3,000 Units by mouth daily            REVIEW OF SYSTEMS:  4 point ROS including Respiratory, CV, GI and , other than that noted in the HPI,  is negative    Objective:  /59   Pulse 68   Temp 97.9  F (36.6  C)   Resp 18   Wt 68.9 kg (152 lb)   SpO2 98%   BMI 20.61 kg/m    Exam:  GENERAL APPEARANCE:  Alert, in no distress  ENT:  [place], oropharynx clear  EYES:  EOM normal, conjunctiva and lids normal  NECK:  No adenopathy,masses or thyromegaly  RESP:  respiratory effort and palpation of chest normal, lungs clear to auscultation , no respiratory distress  CV:  Palpation and auscultation of heart done , regular rate and rhythm, 2/6 murmur, +2 pedal pulses, peripheral edema trace-1+  in both LE  ABDOMEN:  soft, non-tender, no distension, no  masses  M/S:   wheelchair. CHIU with good strength  SKIN: 2-3 small partially scabbed wounds of left lower leg with steri strips, all are clean, dry, no drainage or erythema. 1 small, superficial skin tear right lower leg with steri strips intact, no erythema. Multiple bruises on both LE. No rashes   PSYCH:  oriented X 3, memory impaired , affect and mood normal    Labs:   Recent labs in [facility] reviewed by me today.     ASSESSMENT / PLAN:  (W19.XXXA) Fall, initial encounter  (primary encounter diagnosis)  Comment: likely a mechanical fall. No injury   Plan: monitor. Fall precautions are in place.  Discussed with staff.     (S81.811A) Noninfected skin tear of right lower extremity, initial encounter  Comment: new skin tear with no reported trauma. Skin is fragile and he's more prone to bleeding due to low platelets.   Plan: continue steri strips and protective dry dressing     (S81.812D) Skin tear of left lower leg without complication, subsequent encounter  Comment: wounds are healing well with no signs of infection   Plan: allow steri strips to fall off     (I48.21) Permanent atrial fibrillation (H)  Comment: rate controlled. He is not anticoagulated due to thrombocytopenia and history of bleed behind his left retina resulting in vision loss while on coumadin.   Plan: continue digoxin     (I50.32) Chronic diastolic congestive heart failure (H)  (I42.8) Non-ischemic cardiomyopathy (H)  (I07.1) Tricuspid valve insufficiency, unspecified etiology  (J90) Bilateral pleural effusion  (R60.0) Bilateral leg edema  Comment: LE edema is at baseline. Weight stable at 152 lbs. BPs soft: 118/59, 114/60, 115/62, unchanged and appears asymptomatic.   Plan: continue torsemide. Follow weight, symptoms. Continue compression stockings during the day     (D69.3) Idiopathic thrombocytopenic purpura (H)  (D64.9) Anemia, unspecified type  Comment: platelets 39,000 and Hgb 10.2 on 1/25/2021, stable. Bone marrow biopsy 3/2019 consistent with   MDS. Has been treated with prednisone and IVIG in the past.  Last saw Dr Wise 1/2021.   Plan: follow CBC. Monitor for s/s of bleeding. Follow up with Dr Wise as scheduled.     (N18.31) Stage 3a chronic kidney disease  Comment: renal function at baseline with creatinine 1.19 on 12/16/2020   Plan: follow BMP. Avoid nephrotoxins       (G31.84) Mild cognitive impairment  Comment: impairment is primarily in short term memory   Plan: staff to assist with all cares in long term care       Electronically signed by:  ERIC Capone CNP                 Sincerely,        ERIC Capone CNP

## 2021-04-13 NOTE — PROGRESS NOTES
[place] [facility]  [place] [facility] Record Number:  [id number]  Place of Service where encounter took place:  [facility] ([facility]) [[id number]]  Chief Complaint   Patient presents with     RECHECK       HPI:    Jama Paul  is a 89 year old (2/13/1931), who is being seen today for an episodic care visit.  HPI information obtained from: facility chart records, facility staff, patient report and [facility] chart review.   He admitted to this facility in 3/2020, initially to tcu and then transferred to long term care. [place]   Medical history is significant for diastolic CHF, cardiomyopathy, afib, valvular disease, ITP, anemia, macular degeneration, CKD stage 3.       Today's concern is:     Fall, initial encounter-he is seen today after the nurse reported a fall last night. Patient states he was trying to get to bed on his own and fell while standing up from the wheelchair. Denies pain of any type. Denies feeling dizzy or lightheaded at the time.  Reports feeling well.   Noninfected skin tear of right lower extremity, initial encounter-during the day yesterday, he was noted to have blood on his pants and the nurse found a small skin tear on his right lower leg. Patient doesn't remember bumping or scratching the area. Nurse applied pressure until the bleeding stopped, then steri strips and a dry dressing.   Skin tear of left lower leg without complication, subsequent encounter  Permanent atrial fibrillation (H)-HR: 60-84   Chronic diastolic congestive heart failure (H)-denies cough, shortness of breath or chest pain   Non-ischemic cardiomyopathy (H)  Tricuspid valve insufficiency, unspecified etiology  Bilateral pleural effusion  Bilateral leg edema  Idiopathic thrombocytopenic purpura (H)  Anemia, unspecified type  Stage 3a chronic kidney disease  Mild cognitive impairment-pleasant and conversant. Fairly good historian.   Wheelchair bound and  transfers with assist of 1. Incontinent of bowel and bladder.     Past Medical and Surgical History reviewed in Epic today.    MEDICATIONS:    Current Outpatient Medications   Medication Sig Dispense Refill     acetaminophen (TYLENOL) 325 MG tablet Take 650 mg by mouth every 6 hours as needed for Pain       digoxin (LANOXIN) 125 MCG tablet Take 1 tablet (125 mcg) by mouth daily (Patient taking differently: Take 125 mcg by mouth daily HOLD if HR <55) 30 tablet 0     melatonin 3 MG tablet Take 3 mg by mouth At Bedtime        mirtazapine (REMERON) 15 MG tablet Take 7.5 mg by mouth At Bedtime        Multiple Vitamins-Minerals (ICAPS AREDS FORMULA PO) Take 1 tablet by mouth daily Takes in addition to multivitamin with minerals       multivitamin, therapeutic (THERA-VIT) TABS tablet Take 1 tablet by mouth daily       potassium chloride ER (K-TAB) 20 MEQ CR tablet Take 20 mEq by mouth three times a week Daily every Monday, Wed and Friday       torsemide (DEMADEX) 10 MG tablet Take 10 mg by mouth daily        Vitamin D, Cholecalciferol, 1000 UNITS TABS Take 3,000 Units by mouth daily            REVIEW OF SYSTEMS:  4 point ROS including Respiratory, CV, GI and , other than that noted in the HPI,  is negative    Objective:  /59   Pulse 68   Temp 97.9  F (36.6  C)   Resp 18   Wt 68.9 kg (152 lb)   SpO2 98%   BMI 20.61 kg/m    Exam:  GENERAL APPEARANCE:  Alert, in no distress  ENT:  [place], oropharynx clear  EYES:  EOM normal, conjunctiva and lids normal  NECK:  No adenopathy,masses or thyromegaly  RESP:  respiratory effort and palpation of chest normal, lungs clear to auscultation , no respiratory distress  CV:  Palpation and auscultation of heart done , regular rate and rhythm, 2/6 murmur, +2 pedal pulses, peripheral edema trace-1+  in both LE  ABDOMEN:  soft, non-tender, no distension, no masses  M/S:   wheelchair. CHIU with good strength  SKIN: 2-3 small partially scabbed wounds of left lower leg with steri  strips, all are clean, dry, no drainage or erythema. 1 small, superficial skin tear right lower leg with steri strips intact, no erythema. Multiple bruises on both LE. No rashes   PSYCH:  oriented X 3, memory impaired , affect and mood normal    Labs:   Recent labs in [facility] reviewed by me today.     ASSESSMENT / PLAN:  (W19.XXXA) Fall, initial encounter  (primary encounter diagnosis)  Comment: likely a mechanical fall. No injury   Plan: monitor. Fall precautions are in place.  Discussed with staff.     (S81.811A) Noninfected skin tear of right lower extremity, initial encounter  Comment: new skin tear with no reported trauma. Skin is fragile and he's more prone to bleeding due to low platelets.   Plan: continue steri strips and protective dry dressing     (S81.812D) Skin tear of left lower leg without complication, subsequent encounter  Comment: wounds are healing well with no signs of infection   Plan: allow steri strips to fall off     (I48.21) Permanent atrial fibrillation (H)  Comment: rate controlled. He is not anticoagulated due to thrombocytopenia and history of bleed behind his left retina resulting in vision loss while on coumadin.   Plan: continue digoxin     (I50.32) Chronic diastolic congestive heart failure (H)  (I42.8) Non-ischemic cardiomyopathy (H)  (I07.1) Tricuspid valve insufficiency, unspecified etiology  (J90) Bilateral pleural effusion  (R60.0) Bilateral leg edema  Comment: LE edema is at baseline. Weight stable at 152 lbs. BPs soft: 118/59, 114/60, 115/62, unchanged and appears asymptomatic.   Plan: continue torsemide. Follow weight, symptoms. Continue compression stockings during the day     (D69.3) Idiopathic thrombocytopenic purpura (H)  (D64.9) Anemia, unspecified type  Comment: platelets 39,000 and Hgb 10.2 on 1/25/2021, stable. Bone marrow biopsy 3/2019 consistent with  MDS. Has been treated with prednisone and IVIG in the past.  Last saw Dr Wise 1/2021.   Plan: follow CBC. Monitor for  s/s of bleeding. Follow up with Dr Wise as scheduled.     (N18.31) Stage 3a chronic kidney disease  Comment: renal function at baseline with creatinine 1.19 on 12/16/2020   Plan: follow BMP. Avoid nephrotoxins       (G31.84) Mild cognitive impairment  Comment: impairment is primarily in short term memory   Plan: staff to assist with all cares in long term care       Electronically signed by:  ERIC Capone CNP

## 2021-05-25 NOTE — LETTER
"    5/25/2021        RE: Jama Paul  [address] [place] [id number]  [facility] [address]        [place] [facility]  [place] Medical Record Number:  [id number]  Place of Service where encounter took place:  [facility] ([facility]) [[id number]]  Chief Complaint   Patient presents with     RECHECK       HPI:    Jama Paul  is a 89 year old (2/13/1931), who is being seen today for an episodic care visit.  HPI information obtained from: facility chart records, facility staff, patient report, [facility] chart review and family/first contact daughter Lorraine Guzman  report.   He admitted to this facility in 3/2020, initially to tcu and then transferred to long term care. [place]   Medical history is significant for diastolic CHF, cardiomyopathy, afib, valvular disease, ITP, anemia, macular degeneration, CKD stage 3.     Today's concern is:  He is seen this morning for a new skin tear of his left palm. He's not sure how the injury occurred, but woke up with his bed \"covered in blood.\" Nurse has been unable to stop the bleeding.   He reports feeling well. Good appetite. Denies nosebleeds or other recent bleeding. Wheelchair bound and requires assist with all cares. Incontinent.     Past Medical and Surgical History reviewed in Epic today.    MEDICATIONS:    Current Outpatient Medications   Medication Sig Dispense Refill     acetaminophen (TYLENOL) 325 MG tablet Take 650 mg by mouth every 6 hours as needed for Pain       digoxin (LANOXIN) 125 MCG tablet Take 1 tablet (125 mcg) by mouth daily (Patient taking differently: Take 125 mcg by mouth daily HOLD if HR <55) 30 tablet 0     melatonin 3 MG tablet Take 3 mg by mouth At Bedtime        mirtazapine (REMERON) 15 MG tablet Take 7.5 mg by mouth At Bedtime        Multiple Vitamins-Minerals (ICAPS AREDS FORMULA PO) Take 1 tablet by mouth daily Takes in addition to multivitamin with minerals       " multivitamin, therapeutic (THERA-VIT) TABS tablet Take 1 tablet by mouth daily       potassium chloride ER (K-TAB) 20 MEQ CR tablet Take 20 mEq by mouth three times a week Daily every Monday, Wed and Friday       torsemide (DEMADEX) 10 MG tablet Take 10 mg by mouth daily        Vitamin D, Cholecalciferol, 1000 UNITS TABS Take 3,000 Units by mouth daily            REVIEW OF SYSTEMS:  4 point ROS including Respiratory, CV, GI and , other than that noted in the HPI,  is negative    Objective:  /66   Pulse 78   Temp 97.3  F (36.3  C)   Resp 16   Wt 66.9 kg (147 lb 6.4 oz)   SpO2 98%   BMI 19.99 kg/m    Exam:  GENERAL APPEARANCE:  Alert, in no distress  ENT:  [place], oropharynx clear  EYES:  EOM normal, conjunctiva and lids normal  NECK:  no adenopathy,masses or thyromegaly  RESP:   lungs clear to auscultation , no respiratory distress  CV:  regular rate and rhythm, 2/6 murmur, +2 pedal pulses, peripheral edema trace   in both LE  ABDOMEN:  soft, non-tender, no distension, no masses  M/S:   wheelchair. CHIU with good strength  SKIN:  approx 3 cm skin tear of left palm with mild bruising. No erythema. Multiple bruises and few dried scabs on both LE. No rashes   PSYCH:  oriented X 3, memory impaired , affect and mood normal    Labs:   5/25/2021: WBC 6.8, Hgb 9.7, platelet 47,000, Na 138, K 4.3, BUN 28, creatinine 1.10, GFR 59    ASSESSMENT/PLAN:  (M43.675Q) Skin tear of hand without complication, left, initial encounter  (primary encounter diagnosis)  Comment: noted this am, no trauma or fall reported. High risk of bleeding due to thrombocytopenia. Held pressure on his hand for several minutes and the bleeding stopped. Wound was dressed with steri strips and dry gauze, followed by a tubi  sleeve to apply light pressure.   Plan: keep steri strips in place. Closely monitor for recurrent bleeding.   Discussed over the phone with his daughter Lorraine Guzman.     (J53.166N) Skin tear of left lower leg without  complication, subsequent encounter  Comment: wounds of his LE have scabbed  Plan: continue skin protectors     (D69.3) Idiopathic thrombocytopenic purpura (H)  (D64.9) Anemia, unspecified type  Comment: Hgb at baseline, platelet count slightly up from his baseline.   Bone marrow biopsy 3/2019 consistent with  MDS. Has been treated with prednisone and IVIG in the past.  Last saw Dr Wise 1/2021.   Plan: monitor for s/s of bleeding. Follow up with Dr Wise as scheduled-his daughter manages appts.     (I48.21) Permanent atrial fibrillation (H)  Comment: rate controlled: 71-84. He is not anticoagulated due to thrombocytopenia and history of bleed behind his left retina resulting in vision loss while on coumadin.   Plan: continue digoxin     (I50.32) Chronic diastolic congestive heart failure (H)  (I42.8) Non-ischemic cardiomyopathy (H)  (I07.1) Tricuspid valve insufficiency, unspecified etiology  (J90) Bilateral pleural effusion  (R60.0) Bilateral leg edema  Comment: LE edema appears less today. Weight is down 5 lbs-oral intake appears good. Renal function at baseline   Plan: continue torsemide. Follow weight, symptoms     (R41.89) Cognitive impairment  Comment:gradual decline in cognition and functional status. Appears to have mild to moderate deficits   Plan: staff to assist with all cares in the long term care setting     (Z71.89) Advanced directives, counseling/discussion  Comment: he has a healthcare directive and orders indicate Full Code. Discussed with his daughter, who would prefer DNR/DNI. She will discuss this with her father and family members and get back to me.   Plan: continue Full Code for now        Total time spent with patient visit at the skilled nursing facility was 35 min including patient visit, review of past records and phone call to patient contact. Longer visit time due to the need to apply pressure to stop the bleeding and dressing the wound. Greater than 50% of total time spent with  counseling and coordinating care due to persistent bleeding from skin tear. Coordinating the following with facility staff: wound care,ordering labs, monitoring for recurrent bleeding. Counseling patient re: management of skin tear, rationale for obtaining labs. Counseling daughter re: all of the above and advanced directive.         Electronically signed by:  ERIC Capone CNP                 Sincerely,        ERIC Capone CNP

## 2021-05-25 NOTE — PROGRESS NOTES
"[place] [facility]  [place] Medical Record Number:  [id number]  Place of Service where encounter took place:  [facility] ([facility]) [[id number]]  Chief Complaint   Patient presents with     RECHECK       HPI:    Jama Paul  is a 89 year old (2/13/1931), who is being seen today for an episodic care visit.  HPI information obtained from: facility chart records, facility staff, patient report, [facility] chart review and family/first contact daughter Lorraine Guzman  report.   He admitted to this facility in 3/2020, initially to tcu and then transferred to long term care. [place]   Medical history is significant for diastolic CHF, cardiomyopathy, afib, valvular disease, ITP, anemia, macular degeneration, CKD stage 3.     Today's concern is:  He is seen this morning for a new skin tear of his left palm. He's not sure how the injury occurred, but woke up with his bed \"covered in blood.\" Nurse has been unable to stop the bleeding.   He reports feeling well. Good appetite. Denies nosebleeds or other recent bleeding. Wheelchair bound and requires assist with all cares. Incontinent.     Past Medical and Surgical History reviewed in Epic today.    MEDICATIONS:    Current Outpatient Medications   Medication Sig Dispense Refill     acetaminophen (TYLENOL) 325 MG tablet Take 650 mg by mouth every 6 hours as needed for Pain       digoxin (LANOXIN) 125 MCG tablet Take 1 tablet (125 mcg) by mouth daily (Patient taking differently: Take 125 mcg by mouth daily HOLD if HR <55) 30 tablet 0     melatonin 3 MG tablet Take 3 mg by mouth At Bedtime        mirtazapine (REMERON) 15 MG tablet Take 7.5 mg by mouth At Bedtime        Multiple Vitamins-Minerals (ICAPS AREDS FORMULA PO) Take 1 tablet by mouth daily Takes in addition to multivitamin with minerals       multivitamin, therapeutic (THERA-VIT) TABS tablet Take 1 tablet by mouth daily       potassium chloride ER (K-TAB) " 20 MEQ CR tablet Take 20 mEq by mouth three times a week Daily every Monday, Wed and Friday       torsemide (DEMADEX) 10 MG tablet Take 10 mg by mouth daily        Vitamin D, Cholecalciferol, 1000 UNITS TABS Take 3,000 Units by mouth daily            REVIEW OF SYSTEMS:  4 point ROS including Respiratory, CV, GI and , other than that noted in the HPI,  is negative    Objective:  /66   Pulse 78   Temp 97.3  F (36.3  C)   Resp 16   Wt 66.9 kg (147 lb 6.4 oz)   SpO2 98%   BMI 19.99 kg/m    Exam:  GENERAL APPEARANCE:  Alert, in no distress  ENT:  [place], oropharynx clear  EYES:  EOM normal, conjunctiva and lids normal  NECK:  no adenopathy,masses or thyromegaly  RESP:   lungs clear to auscultation , no respiratory distress  CV:  regular rate and rhythm, 2/6 murmur, +2 pedal pulses, peripheral edema trace   in both LE  ABDOMEN:  soft, non-tender, no distension, no masses  M/S:   wheelchair. CHIU with good strength  SKIN:  approx 3 cm skin tear of left palm with mild bruising. No erythema. Multiple bruises and few dried scabs on both LE. No rashes   PSYCH:  oriented X 3, memory impaired , affect and mood normal    Labs:   5/25/2021: WBC 6.8, Hgb 9.7, platelet 47,000, Na 138, K 4.3, BUN 28, creatinine 1.10, GFR 59    ASSESSMENT/PLAN:  (H65.892V) Skin tear of hand without complication, left, initial encounter  (primary encounter diagnosis)  Comment: noted this am, no trauma or fall reported. High risk of bleeding due to thrombocytopenia. Held pressure on his hand for several minutes and the bleeding stopped. Wound was dressed with steri strips and dry gauze, followed by a tubi  sleeve to apply light pressure.   Plan: keep steri strips in place. Closely monitor for recurrent bleeding.   Discussed over the phone with his daughter Lorraine Guzman.     (Z81.588I) Skin tear of left lower leg without complication, subsequent encounter  Comment: wounds of his LE have scabbed  Plan: continue skin protectors     (D69.3)  Idiopathic thrombocytopenic purpura (H)  (D64.9) Anemia, unspecified type  Comment: Hgb at baseline, platelet count slightly up from his baseline.   Bone marrow biopsy 3/2019 consistent with  MDS. Has been treated with prednisone and IVIG in the past.  Last saw Dr Wise 1/2021.   Plan: monitor for s/s of bleeding. Follow up with Dr Wise as scheduled-his daughter manages appts.     (I48.21) Permanent atrial fibrillation (H)  Comment: rate controlled: 71-84. He is not anticoagulated due to thrombocytopenia and history of bleed behind his left retina resulting in vision loss while on coumadin.   Plan: continue digoxin     (I50.32) Chronic diastolic congestive heart failure (H)  (I42.8) Non-ischemic cardiomyopathy (H)  (I07.1) Tricuspid valve insufficiency, unspecified etiology  (J90) Bilateral pleural effusion  (R60.0) Bilateral leg edema  Comment: LE edema appears less today. Weight is down 5 lbs-oral intake appears good. Renal function at baseline   Plan: continue torsemide. Follow weight, symptoms     (R41.89) Cognitive impairment  Comment:gradual decline in cognition and functional status. Appears to have mild to moderate deficits   Plan: staff to assist with all cares in the long term care setting     (Z71.89) Advanced directives, counseling/discussion  Comment: he has a healthcare directive and orders indicate Full Code. Discussed with his daughter, who would prefer DNR/DNI. She will discuss this with her father and family members and get back to me.   Plan: continue Full Code for now        Total time spent with patient visit at the skilled nursing facility was 35 min including patient visit, review of past records and phone call to patient contact. Longer visit time due to the need to apply pressure to stop the bleeding and dressing the wound. Greater than 50% of total time spent with counseling and coordinating care due to persistent bleeding from skin tear. Coordinating the following with facility staff:  wound care,ordering labs, monitoring for recurrent bleeding. Counseling patient re: management of skin tear, rationale for obtaining labs. Counseling daughter re: all of the above and advanced directive.         Electronically signed by:  ERIC Capone CNP

## 2021-06-10 NOTE — LETTER
6/10/2021        RE: Jama Paul  [id number]  [facility] [id number]        Jama Paul is a 89 year old male seen Makeda 10, 2021 at [facility] where he has resided for 1 year (admit to TCU 3/2020).  He is seen in his room up to , has just been ambulating in the halls with his 4WW.  States he feels well, denies any exertional symptoms or pain.  [facility] Nursing staff reports he has had some falls, recently cut his hand.   Wears daphne-sleeves to protect his fragile skin.          By chart review, pt initially admitted to TCU in November 2019, hospitalized several times in the interim.   He discharged back to his apartment with his wife and [facility] in January 2020, but re-hospitalized in February with weakness and citrobacter UTI.   He worked with therapies in TCU, but not able to regain enough mobility to return to independent living and thus transitioned to [facility] unit.        He saw Dr Perez Cardiology for polyvalvular disease with CHF and severe functional TR, on medical therapy.   Options presented and pt elected to pursue optimization of medical therapies at that time.  Saw Dr Perez in August 2020 at which time he was considered for repair under the Triluminate study.   He was screened, but determined to be ineligible based on his low platelets.  He saw Dr Perez again May 2021, and pt doing well so no changes made.         Pt initially had [facility] hospitalization in November 2019 for shortness of breath.  He was found to have bilateral pleural effusions and had thoracentesis x2 on the left and one time on the right.    Pleural fluid c/w transudate.   ECHO cardiogram showed EF 55-60% with severely dilated RV, severe MR and TR.   Pt needed GI consult and esophageal dilatation to have KATIE done  Following this procedure he had hypotension requiring pressor support in the ICU.   He was diuresed with IV furosemide and repeat ECHO showed severe TR and moderate MR, with some  improvement in both.   He was seen by Cardiology and CV surgery. He underwent left heart catheterization as workup for tricuspid repair or replacement, and this showed normal left and right sided pressures.     He remains on torsemide, but not on beta blocker or Entresto secondary to low bps.       Pt had another [facility] hospitalization in January 2020 for recurrent large right pleural effusion.   He was treated with IV Lasix, had thoracentesis removing 2200 mLs of fluid and spironolactone was added to his daily torsemide.      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   He had a BM biopsy in March 2019 that showed hypercellular marrow with 80% cellularity and normal thrombocytes.   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  He was anticoagulated with warfarin for atrial fib and had a bleed behind his retina, lost vision in his left eye; has not been anticoagulated since then.   He has seen Hem/Onc and been treated with prednisone and IVIG with some improvement.  Last saw Dr Wise in January 2021, at that time ongoing surveillance was recommended.         Past Medical History:   Diagnosis Date     Congestive heart failure (H)      Elevated PSA      Eye hemorrhage, left 02/2019     Non-ischemic cardiomyopathy (H)     2017, med treatment, echo done 4/18 nl ef, mod to severe tr     Permanent atrial fibrillation 2011     Thrombocytopenia (H) ITP      Unspecified essential hypertension    Cognitive impairment  Urinary and fecal incontinence    Past Surgical History:   Procedure Laterality Date     ABDOMEN SURGERY      bowel obstruction     BONE MARROW BIOPSY, BONE SPECIMEN, NEEDLE/TROCAR N/A 3/4/2019    Procedure: BIOPSY BONE MARROW;  Surgeon: Josey Vargas MD;  Location:  GI     CARPAL TUNNEL RELEASE RT/LT  2014     CV HEART CATHETERIZATION WITH POSSIBLE  INTERVENTION N/A 11/15/2019    Procedure: Left and right heart Catheterization with Possible Intervention;  Surgeon: Adolfo Paez MD;  Location:  HEART CARDIAC CATH LAB     CV LEFT VENTRICULOGRAM N/A 11/15/2019    Procedure: Left Ventriculogram;  Surgeon: Adolfo Paez MD;  Location:  HEART CARDIAC CATH LAB     ESOPHAGOSCOPY, GASTROSCOPY, DUODENOSCOPY (EGD), COMBINED N/A 11/6/2019    Procedure: ESOPHAGOGASTRODUODENOSCOPY (EGD);  Surgeon: Marixa Aguila MD;  Location:  GI     EXPLORE GROIN  4/30/2014    Procedure: EXPLORE GROIN;  Surgeon: Sam Aguirre MD;  Location:  OR     HERNIORRHAPHY INGUINAL  4/30/2014    Procedure: HERNIORRHAPHY INGUINAL;  Surgeon: Sam Aguirre MD;  Location:  OR     MOHS MICROGRAPHIC PROCEDURE       PHACOEMULSIFICATION CLEAR CORNEA WITH STANDARD INTRAOCULAR LENS IMPLANT Right 9/8/2015    Procedure: PHACOEMULSIFICATION CLEAR CORNEA WITH STANDARD INTRAOCULAR LENS IMPLANT;  Surgeon: Gil Shannon MD;  Location:  EC     septic olecranon bursitis[       SH:  Previously lived with his wife, ekaterina at [facility]; she continues to live there.     They have a daughter Lorraine and son Jama.   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[employer], worked clinically until age 79      Non smoker    Current Outpatient Medications   Medication Sig Dispense Refill     acetaminophen (TYLENOL) 325 MG tablet Take 650 mg by mouth every 6 hours as needed for Pain       digoxin (LANOXIN) 125 MCG tablet Take 1 tablet (125 mcg) by mouth daily (Patient taking differently: Take 125 mcg by mouth daily HOLD if HR <55) 30 tablet 0     melatonin 3 MG tablet Take 3 mg by mouth At Bedtime        mirtazapine (REMERON) 15 MG tablet Take 7.5 mg by mouth At Bedtime        Multiple Vitamins-Minerals (ICAPS AREDS FORMULA PO) Take 1 tablet by mouth daily Takes in addition to multivitamin with minerals       multivitamin, therapeutic (THERA-VIT) TABS tablet Take 1 tablet by mouth daily        potassium chloride ER (K-TAB) 20 MEQ CR tablet Take 20 mEq by mouth three times a week Daily every Monday, Wed and Friday       torsemide (DEMADEX) 10 MG tablet Take 10 mg by mouth daily        Vitamin D, Cholecalciferol, 1000 UNITS TABS Take 3,000 Units by mouth daily         ROS: 4 point ROS including Respiratory, CV, GI and , other than that noted in the HPI,  is negative   He has been incontinent of bowel and bladder for several years   Wt Readings from Last 5 Encounters:   06/10/21 67.7 kg (149 lb 3.2 oz)   05/25/21 66.9 kg (147 lb 6.4 oz)   04/13/21 68.9 kg (152 lb)   04/06/21 68.3 kg (150 lb 9.6 oz)   03/31/21 68.9 kg (151 lb 12.8 oz)      EXAM: NAD  Vitals:BP 97/56   Pulse 68   Temp 97  F (36.1  C)   Resp 18   Ht 1.829 m (6')   Wt 67.7 kg (149 lb 3.2 oz)   SpO2 96%   BMI 20.24 kg/m     +kyphosis  Neck supple without adenopathy  Lungs with decreased BS, no rales or wheeze  Heart RRR s1s2, 2/6 HSM  Abd soft, NT, no distention, +BS  Ext without edema; cut on his hand is well-healed  Neuro: no focal deficits, no tremor or stiffness  Psych: affect okay, pleasant    Labs reviewed     ECHO 8/5/2020   Final Impressions:   1. Normal left ventricular size, normal wall thickness, normal global systolic function, calculated EF of 66 %.   2. Right ventricular cavity size is mildly to moderately enlarged, global systolic RV function is borderline reduced.   3. Severely enlarged left atrium.   4. The mitral valve is normal, mild mitral regurgitation.   5. Tricuspid valve is non coapting. Severe tricuspid regurgitation.   6. Trivial pericardial effusion.      ASSESSMENT/PLAN:  (I07.1) Tricuspid valve insufficiency, unspecified etiology  (J90) Bilateral pleural effusion  (I50.32) Chronic diastolic heart failure (H)   (I42.8) Non-ischemic cardiomyopathy (H)   BP Readings from Last 3 Encounters:   06/10/21 97/56   05/25/21 114/66   04/13/21 118/59      Comment: stable volume status today, on less medication  secondary to low bps    Cardiology notes reviewed.    Plan: torsemide 10 mg/day and follow    (I48.21) Permanent atrial fibrillation  Comment:   Pulse Readings from Last 4 Encounters:   06/10/21 68   05/25/21 78   04/13/21 68   04/06/21 68      Plan: digoxin 0.125 mg/day for VR control  No anticoagulation given low plts and prior bleeding complications    (N18.31) Stage 3a chronic kidney disease  Comment: stable  Plan: follow BMP on torsemide    (D69.3) Idiopathic thrombocytopenic purpura (H)  Comment: plt counts 30-40k since admission  Plan: recheck prn and monitor for any bleeding complications  Follow up with Dr Wise as scheduled    (D64.9) Anemia, unspecified type  Comment: hgb 10.2 at last check   Per Oncology notes, BM biopsy c/w myelodysplasia, but no other features.   Plan: follow, would add PPI /Fe if falls further    (G31.84) Mild cognitive impairment  (R53.1) Generalized weakness  Comment:  Improved, now ambulatory with walker, but falls a concern  Plan: LTC support for meds, meals, activity      Advance directive: pt elects full code         Jeanette Hernández MD           Sincerely,        Jeanette Hernández MD

## 2021-06-14 NOTE — PROGRESS NOTES
Jama Paul is a 89 year old male seen Makeda 10, 2021 at [facility] where he has resided for 1 year (admit to TCU 3/2020).  He is seen in his room up to , has just been ambulating in the halls with his 4WW.  States he feels well, denies any exertional symptoms or pain.  AL Nursing staff reports he has had some falls, recently cut his hand.   Wears daphne-sleeves to protect his fragile skin.          By chart review, pt initially admitted to TCU in November 2019, hospitalized several times in the interim.   He discharged back to his apartment with his wife and [facility] in January 2020, but re-hospitalized in February with weakness and citrobacter UTI.   He worked with therapies in TCU, but not able to regain enough mobility to return to independent living and thus transitioned to [facility] unit.        He saw Dr Perez Cardiology for polyvalvular disease with CHF and severe functional TR, on medical therapy.   Options presented and pt elected to pursue optimization of medical therapies at that time.  Saw Dr Perez in August 2020 at which time he was considered for repair under the Triluminate study.   He was screened, but determined to be ineligible based on his low platelets.  He saw Dr Perez again May 2021, and pt doing well so no changes made.         Pt initially had [facility] hospitalization in November 2019 for shortness of breath.  He was found to have bilateral pleural effusions and had thoracentesis x2 on the left and one time on the right.    Pleural fluid c/w transudate.   ECHO cardiogram showed EF 55-60% with severely dilated RV, severe MR and TR.   Pt needed GI consult and esophageal dilatation to have KATIE done  Following this procedure he had hypotension requiring pressor support in the ICU.   He was diuresed with IV furosemide and repeat ECHO showed severe TR and moderate MR, with some improvement in both.   He was seen by Cardiology and CV surgery. He underwent left heart catheterization as  workup for tricuspid repair or replacement, and this showed normal left and right sided pressures.     He remains on torsemide, but not on beta blocker or Entresto secondary to low bps.       Pt had another [facility] hospitalization in January 2020 for recurrent large right pleural effusion.   He was treated with IV Lasix, had thoracentesis removing 2200 mLs of fluid and spironolactone was added to his daily torsemide.      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   He had a BM biopsy in March 2019 that showed hypercellular marrow with 80% cellularity and normal thrombocytes.   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  He was anticoagulated with warfarin for atrial fib and had a bleed behind his retina, lost vision in his left eye; has not been anticoagulated since then.   He has seen Hem/Onc and been treated with prednisone and IVIG with some improvement.  Last saw Dr Wise in January 2021, at that time ongoing surveillance was recommended.         Past Medical History:   Diagnosis Date     Congestive heart failure (H)      Elevated PSA      Eye hemorrhage, left 02/2019     Non-ischemic cardiomyopathy (H)     2017, med treatment, echo done 4/18 nl ef, mod to severe tr     Permanent atrial fibrillation 2011     Thrombocytopenia (H) ITP      Unspecified essential hypertension    Cognitive impairment  Urinary and fecal incontinence    Past Surgical History:   Procedure Laterality Date     ABDOMEN SURGERY      bowel obstruction     BONE MARROW BIOPSY, BONE SPECIMEN, NEEDLE/TROCAR N/A 3/4/2019    Procedure: BIOPSY BONE MARROW;  Surgeon: Josey Vargas MD;  Location:  GI     CARPAL TUNNEL RELEASE RT/LT  2014     CV HEART CATHETERIZATION WITH POSSIBLE INTERVENTION N/A 11/15/2019    Procedure: Left and right heart Catheterization with Possible Intervention;  Surgeon:  Adolfo Paez MD;  Location:  HEART CARDIAC CATH LAB     CV LEFT VENTRICULOGRAM N/A 11/15/2019    Procedure: Left Ventriculogram;  Surgeon: Adolfo Paez MD;  Location:  HEART CARDIAC CATH LAB     ESOPHAGOSCOPY, GASTROSCOPY, DUODENOSCOPY (EGD), COMBINED N/A 11/6/2019    Procedure: ESOPHAGOGASTRODUODENOSCOPY (EGD);  Surgeon: Marixa Aguila MD;  Location:  GI     EXPLORE GROIN  4/30/2014    Procedure: EXPLORE GROIN;  Surgeon: Sam Aguirre MD;  Location:  OR     HERNIORRHAPHY INGUINAL  4/30/2014    Procedure: HERNIORRHAPHY INGUINAL;  Surgeon: Sam Aguirre MD;  Location:  OR     MOHS MICROGRAPHIC PROCEDURE       PHACOEMULSIFICATION CLEAR CORNEA WITH STANDARD INTRAOCULAR LENS IMPLANT Right 9/8/2015    Procedure: PHACOEMULSIFICATION CLEAR CORNEA WITH STANDARD INTRAOCULAR LENS IMPLANT;  Surgeon: Gil Shannon MD;  Location:  EC     septic olecranon bursitis[       SH:  Previously lived with his wife, ekaterina at [facility]; she continues to live there.     They have a daughter Lorraine and son Jama.   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[employer], worked clinically until age 79      Non smoker    Current Outpatient Medications   Medication Sig Dispense Refill     acetaminophen (TYLENOL) 325 MG tablet Take 650 mg by mouth every 6 hours as needed for Pain       digoxin (LANOXIN) 125 MCG tablet Take 1 tablet (125 mcg) by mouth daily (Patient taking differently: Take 125 mcg by mouth daily HOLD if HR <55) 30 tablet 0     melatonin 3 MG tablet Take 3 mg by mouth At Bedtime        mirtazapine (REMERON) 15 MG tablet Take 7.5 mg by mouth At Bedtime        Multiple Vitamins-Minerals (ICAPS AREDS FORMULA PO) Take 1 tablet by mouth daily Takes in addition to multivitamin with minerals       multivitamin, therapeutic (THERA-VIT) TABS tablet Take 1 tablet by mouth daily       potassium chloride ER (K-TAB) 20 MEQ CR tablet Take 20 mEq by mouth three times a week Daily every Monday, Wed  and Friday       torsemide (DEMADEX) 10 MG tablet Take 10 mg by mouth daily        Vitamin D, Cholecalciferol, 1000 UNITS TABS Take 3,000 Units by mouth daily         ROS: 4 point ROS including Respiratory, CV, GI and , other than that noted in the HPI,  is negative   He has been incontinent of bowel and bladder for several years   Wt Readings from Last 5 Encounters:   06/10/21 67.7 kg (149 lb 3.2 oz)   05/25/21 66.9 kg (147 lb 6.4 oz)   04/13/21 68.9 kg (152 lb)   04/06/21 68.3 kg (150 lb 9.6 oz)   03/31/21 68.9 kg (151 lb 12.8 oz)      EXAM: NAD  Vitals:BP 97/56   Pulse 68   Temp 97  F (36.1  C)   Resp 18   Ht 1.829 m (6')   Wt 67.7 kg (149 lb 3.2 oz)   SpO2 96%   BMI 20.24 kg/m     +kyphosis  Neck supple without adenopathy  Lungs with decreased BS, no rales or wheeze  Heart RRR s1s2, 2/6 HSM  Abd soft, NT, no distention, +BS  Ext without edema; cut on his hand is well-healed  Neuro: no focal deficits, no tremor or stiffness  Psych: affect okay, pleasant    Labs reviewed     ECHO 8/5/2020   Final Impressions:   1. Normal left ventricular size, normal wall thickness, normal global systolic function, calculated EF of 66 %.   2. Right ventricular cavity size is mildly to moderately enlarged, global systolic RV function is borderline reduced.   3. Severely enlarged left atrium.   4. The mitral valve is normal, mild mitral regurgitation.   5. Tricuspid valve is non coapting. Severe tricuspid regurgitation.   6. Trivial pericardial effusion.      ASSESSMENT/PLAN:  (I07.1) Tricuspid valve insufficiency, unspecified etiology  (J90) Bilateral pleural effusion  (I50.32) Chronic diastolic heart failure (H)   (I42.8) Non-ischemic cardiomyopathy (H)   BP Readings from Last 3 Encounters:   06/10/21 97/56   05/25/21 114/66   04/13/21 118/59      Comment: stable volume status today, on less medication secondary to low bps    Cardiology notes reviewed.    Plan: torsemide 10 mg/day and follow    (I48.21) Permanent atrial  fibrillation  Comment:   Pulse Readings from Last 4 Encounters:   06/10/21 68   05/25/21 78   04/13/21 68   04/06/21 68      Plan: digoxin 0.125 mg/day for VR control  No anticoagulation given low plts and prior bleeding complications    (N18.31) Stage 3a chronic kidney disease  Comment: stable  Plan: follow BMP on torsemide    (D69.3) Idiopathic thrombocytopenic purpura (H)  Comment: plt counts 30-40k since admission  Plan: recheck prn and monitor for any bleeding complications  Follow up with Dr Wise as scheduled    (D64.9) Anemia, unspecified type  Comment: hgb 10.2 at last check   Per Oncology notes, BM biopsy c/w myelodysplasia, but no other features.   Plan: follow, would add PPI /Fe if falls further    (G31.84) Mild cognitive impairment  (R53.1) Generalized weakness  Comment:  Improved, now ambulatory with walker, but falls a concern  Plan: LTC support for meds, meals, activity      Advance directive: pt elects full code         Jeanette Hernández MD

## 2021-08-13 NOTE — LETTER
8/13/2021        RE: Jama Paul  [id number]  [facility] [address]        [place] [facility]  Chief Complaint   Patient presents with     Annual Comprehensive Nursing Home     [place] Medical Record Number:  [id number]  Place of Service where encounter took place:  [facility] ([facility]) [[id number]]    HPI:    Jama Paul  is a 89 year old  (2/13/1931), who is being seen today for an annual comprehensive visit. HPI information obtained from: facility chart records, facility staff, patient report and [facility] chart review.    He admitted to this facility in 3/2020, initially to tcu and then transferred to long term care. [place]   Medical history is significant for diastolic CHF, cardiomyopathy, afib, valvular disease, ITP, anemia, macular degeneration, CKD stage 3.       Today's concerns are:     Chronic diastolic congestive heart failure (H)  Non-ischemic cardiomyopathy (H)  Tricuspid valve insufficiency, unspecified etiology  Bilateral leg edema  Permanent atrial fibrillation (H)  Idiopathic thrombocytopenic purpura (H)  Other myelodysplastic syndromes (H)  Stage 3a chronic kidney disease  Insomnia, unspecified type  Cognitive impairment     He reports feeling well with no concerns. Good appetite. Denies cough, shortness of breath or chest pain. Melatonin was recently increased for insomnia and he reports sleeping well last night. Denies feeling depressed. Abrasions of extremities have healed. No recent falls. Wheelchair bound, able to stand for transfers and toileting with assist of 1.  He was seen by Audiology 6/30/2021 with no change in plan of care    ALLERGIES: Patient has no known allergies.  PAST MEDICAL HISTORY:  has a past medical history of Congestive heart failure (H), Elevated PSA, Eye hemorrhage, left (02/2019), Non-ischemic cardiomyopathy (H), Permanent atrial fibrillation (H) (2011), Thrombocytopenia  (H), and Unspecified essential hypertension. He also has no past medical history of Malignant hyperthermia, PONV (postoperative nausea and vomiting), or Sleep apnea.  PAST SURGICAL HISTORY:  has a past surgical history that includes septic olecranon bursitis[; Mohs micrographic procedure; Herniorrhaphy inguinal (4/30/2014); Explore groin (4/30/2014); Phacoemulsification clear cornea with standard intraocular lens implant (Right, 9/8/2015); carpal tunnel release rt/lt (2014); Bone marrow biopsy, bone specimen, needle/trocar (N/A, 3/4/2019); Abdomen surgery; Esophagoscopy, gastroscopy, duodenoscopy (EGD), combined (N/A, 11/6/2019); Heart Catheterization with Possible Intervention (N/A, 11/15/2019); and Left Ventriculogram (N/A, 11/15/2019).  IMMUNIZATIONS:  Immunization History   Administered Date(s) Administered     COVID-19,PF,Moderna 12/28/2020, 01/25/2021     FLU 6-35 months 10/05/2009     Flu, Unspecified 11/01/2012, 10/15/2014     HepA-Adult 10/08/2002     HepB-Adult 10/08/2002, 11/05/2002     Influenza (High Dose) 3 valent vaccine 11/04/2014, 10/29/2015, 09/16/2016, 10/07/2018, 09/17/2020     Influenza (IIV3) PF 10/17/2002, 11/01/2012, 11/27/2012, 01/10/2014, 10/15/2014     Pneumo Conj 13-V (2010&after) 10/29/2015     Pneumococcal 23 valent 06/30/2014     TDAP Vaccine (Adacel) 09/02/2019     Td (Adult), Adsorbed 11/05/2002     Tdap (Adacel,boostrix) 09/02/2019     Typhoid IM 10/17/2002     Zoster vaccine, live 11/01/2012, 11/27/2012     Above immunizations pulled from [facility]. MIIC and facility records also reconciled. Outstanding information sent to  to update [facility] .  Future immunizations are not needed at this point as all recommended immunizations are up to date.     Current Outpatient Medications   Medication Sig Dispense Refill     acetaminophen (TYLENOL) 325 MG tablet Take 650 mg by mouth every 6 hours as needed for Pain       digoxin (LANOXIN) 125 MCG tablet Take 1 tablet  (125 mcg) by mouth daily (Patient taking differently: Take 125 mcg by mouth daily HOLD if HR <55) 30 tablet 0     melatonin 3 MG tablet Take 6 mg by mouth At Bedtime        mirtazapine (REMERON) 15 MG tablet Take 7.5 mg by mouth At Bedtime        Multiple Vitamins-Minerals (ICAPS AREDS FORMULA PO) Take 1 tablet by mouth daily Takes in addition to multivitamin with minerals       multivitamin, therapeutic (THERA-VIT) TABS tablet Take 1 tablet by mouth daily       potassium chloride ER (K-TAB) 20 MEQ CR tablet Take 20 mEq by mouth three times a week Daily every Monday, Wed and Friday       torsemide (DEMADEX) 10 MG tablet Take 10 mg by mouth daily        Vitamin D, Cholecalciferol, 1000 UNITS TABS Take 3,000 Units by mouth daily          Case Management:  I have reviewed the facility/SNF care plan/MDS, including the falls risk, nutrition and pain screening. I also reviewed the current immunizations, and preventive care. .Future cancer screening is not clinically indicated secondary to age/goals of care Patient's desire to return to the community is not present. Current Level of Care is appropriate.    Advance Directive Discussion:    I reviewed the current advanced directives as reflected in EPIC, the POLST and the facility chart, and verified the congruency of orders . Recently spoke with daughter Lorraine Guzman  and discussed the plan of care as well as code status, she confirmed Full Code at this time.  I did not due to cognitive impairment review the advance directives with the resident.     Team Discussion:  I communicated with the appropriate disciplines involved with the Plan of Care:   Nursing    Patient's goal is pain control and comfort.  Information reviewed:  Medications, vital signs, orders, and nursing notes.    ROS:  4 point ROS including Respiratory, CV, GI and , other than that noted in the HPI,  is negative    Vitals:  /57   Pulse 88   Temp 98.2  F (36.8  C)   Resp 18   Wt 67.1 kg (148  lb)   SpO2 94%   BMI 20.07 kg/m   Body mass index is 20.07 kg/m .  Exam:  GENERAL APPEARANCE:  Alert, in no distress  ENT:  [place], oropharynx clear  EYES:  EOM normal, conjunctiva and lids normal  NECK:  no adenopathy,masses or thyromegaly  RESP:   lungs clear to auscultation , no respiratory distress  CV:  regular rate and rhythm, 2/6 murmur, +2 pedal pulses, trace bilateral LE edema   ABDOMEN:  soft, non-tender, no distension, no masses  M/S:   wheelchair. CHIU with good strength, kyphosis   SKIN:  no rashes or open areas   PSYCH:  oriented X 3, memory impaired , affect and mood normal    Lab/Diagnostic data:   Recent labs in [facility] reviewed by me today.     ASSESSMENT / PLAN:  (I50.32) Chronic diastolic congestive heart failure (H)  (primary encounter diagnosis)  (I42.8) Non-ischemic cardiomyopathy (H)   (I07.1) Tricuspid valve insufficiency, unspecified etiology  (R60.0) Bilateral leg edema  Comment: volume status stable with weight 148 lbs. History of bilateral pleural effusions. Medications have been limited  due to hypotension. Recent BPs: 100/57, 118/64, 112/50   ECHO 1/14/2020: EF 55-60%, moderately severe TR, mild MR  Last saw Cardiology 2/4/2021.   Plan: continue torsemide. Follow weight,symptoms. Cardiology follow up as scheduled.     (I48.21) Permanent atrial fibrillation (H)  Comment: rate controlled: 75-93   He is not anticoagulated due to thrombocytopenia and history of bleed behind his left retina with vision loss while on coumadin.   Plan: continue digoxin     (D69.3) Idiopathic thrombocytopenic purpura (H)  (D46.Z) Other myelodysplastic syndromes (H)  Comment: blood counts stable on 5/25/2021: Hgb 9.7, platelets 47,000. No s/s of active bleeding. All abrasions have healed.  Bone marrow biopsy 3/2019 consistent with  MDS. Has been treated with prednisone and IVIG in the past.  Last saw Dr Wise 1/2021.   Plan: follow labs. Follow up with Dr Wise per usual schedule-daughter manages appts      (N18.31) Stage 3a chronic kidney disease  Comment: renal function at baseline with creatinine 1.10 on 5/25/2021  Plan: follow BMP. Avoid nephrotoxins     (G47.00) Insomnia, unspecified type  Comment: chronic, managed   Plan: continue melatonin and mirtazapine     (R41.89) Cognitive impairment  Comment: mild to moderate deficits with decline in functional status   Plan: staff to assist with all cares in long term care setting               Electronically signed by:  ERIC Capone CNP              Sincerely,        ERIC Capone CNP     Home

## 2021-08-13 NOTE — PROGRESS NOTES
[place] [facility]  Chief Complaint   Patient presents with     Annual Comprehensive Nursing Home     [place] Medical Record Number:  [id number]  Place of Service where encounter took place:  [facility] ([facility]) [[id number]]    HPI:    Jama Paul  is a 89 year old  (2/13/1931), who is being seen today for an annual comprehensive visit. HPI information obtained from: facility chart records, facility staff, patient report and [facility] chart review.    He admitted to this facility in 3/2020, initially to tcu and then transferred to long term care. [place]   Medical history is significant for diastolic CHF, cardiomyopathy, afib, valvular disease, ITP, anemia, macular degeneration, CKD stage 3.       Today's concerns are:     Chronic diastolic congestive heart failure (H)  Non-ischemic cardiomyopathy (H)  Tricuspid valve insufficiency, unspecified etiology  Bilateral leg edema  Permanent atrial fibrillation (H)  Idiopathic thrombocytopenic purpura (H)  Other myelodysplastic syndromes (H)  Stage 3a chronic kidney disease  Insomnia, unspecified type  Cognitive impairment     He reports feeling well with no concerns. Good appetite. Denies cough, shortness of breath or chest pain. Melatonin was recently increased for insomnia and he reports sleeping well last night. Denies feeling depressed. Abrasions of extremities have healed. No recent falls. Wheelchair bound, able to stand for transfers and toileting with assist of 1.  He was seen by Audiology 6/30/2021 with no change in plan of care    ALLERGIES: Patient has no known allergies.  PAST MEDICAL HISTORY:  has a past medical history of Congestive heart failure (H), Elevated PSA, Eye hemorrhage, left (02/2019), Non-ischemic cardiomyopathy (H), Permanent atrial fibrillation (H) (2011), Thrombocytopenia (H), and Unspecified essential hypertension. He also has no past medical history of Malignant  hyperthermia, PONV (postoperative nausea and vomiting), or Sleep apnea.  PAST SURGICAL HISTORY:  has a past surgical history that includes septic olecranon bursitis[; Mohs micrographic procedure; Herniorrhaphy inguinal (4/30/2014); Explore groin (4/30/2014); Phacoemulsification clear cornea with standard intraocular lens implant (Right, 9/8/2015); carpal tunnel release rt/lt (2014); Bone marrow biopsy, bone specimen, needle/trocar (N/A, 3/4/2019); Abdomen surgery; Esophagoscopy, gastroscopy, duodenoscopy (EGD), combined (N/A, 11/6/2019); Heart Catheterization with Possible Intervention (N/A, 11/15/2019); and Left Ventriculogram (N/A, 11/15/2019).  IMMUNIZATIONS:  Immunization History   Administered Date(s) Administered     COVID-19,PF,Moderna 12/28/2020, 01/25/2021     FLU 6-35 months 10/05/2009     Flu, Unspecified 11/01/2012, 10/15/2014     HepA-Adult 10/08/2002     HepB-Adult 10/08/2002, 11/05/2002     Influenza (High Dose) 3 valent vaccine 11/04/2014, 10/29/2015, 09/16/2016, 10/07/2018, 09/17/2020     Influenza (IIV3) PF 10/17/2002, 11/01/2012, 11/27/2012, 01/10/2014, 10/15/2014     Pneumo Conj 13-V (2010&after) 10/29/2015     Pneumococcal 23 valent 06/30/2014     TDAP Vaccine (Adacel) 09/02/2019     Td (Adult), Adsorbed 11/05/2002     Tdap (Adacel,boostrix) 09/02/2019     Typhoid IM 10/17/2002     Zoster vaccine, live 11/01/2012, 11/27/2012     Above immunizations pulled from [facility]. MIIC and facility records also reconciled. Outstanding information sent to  to update [facility] .  Future immunizations are not needed at this point as all recommended immunizations are up to date.     Current Outpatient Medications   Medication Sig Dispense Refill     acetaminophen (TYLENOL) 325 MG tablet Take 650 mg by mouth every 6 hours as needed for Pain       digoxin (LANOXIN) 125 MCG tablet Take 1 tablet (125 mcg) by mouth daily (Patient taking differently: Take 125 mcg by mouth daily HOLD if HR  <55) 30 tablet 0     melatonin 3 MG tablet Take 6 mg by mouth At Bedtime        mirtazapine (REMERON) 15 MG tablet Take 7.5 mg by mouth At Bedtime        Multiple Vitamins-Minerals (ICAPS AREDS FORMULA PO) Take 1 tablet by mouth daily Takes in addition to multivitamin with minerals       multivitamin, therapeutic (THERA-VIT) TABS tablet Take 1 tablet by mouth daily       potassium chloride ER (K-TAB) 20 MEQ CR tablet Take 20 mEq by mouth three times a week Daily every Monday, Wed and Friday       torsemide (DEMADEX) 10 MG tablet Take 10 mg by mouth daily        Vitamin D, Cholecalciferol, 1000 UNITS TABS Take 3,000 Units by mouth daily          Case Management:  I have reviewed the facility/SNF care plan/MDS, including the falls risk, nutrition and pain screening. I also reviewed the current immunizations, and preventive care. .Future cancer screening is not clinically indicated secondary to age/goals of care Patient's desire to return to the community is not present. Current Level of Care is appropriate.    Advance Directive Discussion:    I reviewed the current advanced directives as reflected in EPIC, the POLST and the facility chart, and verified the congruency of orders . Recently spoke with daughter Lorraine Guzman  and discussed the plan of care as well as code status, she confirmed Full Code at this time.  I did not due to cognitive impairment review the advance directives with the resident.     Team Discussion:  I communicated with the appropriate disciplines involved with the Plan of Care:   Nursing    Patient's goal is pain control and comfort.  Information reviewed:  Medications, vital signs, orders, and nursing notes.    ROS:  4 point ROS including Respiratory, CV, GI and , other than that noted in the HPI,  is negative    Vitals:  /57   Pulse 88   Temp 98.2  F (36.8  C)   Resp 18   Wt 67.1 kg (148 lb)   SpO2 94%   BMI 20.07 kg/m   Body mass index is 20.07 kg/m .  Exam:  GENERAL  APPEARANCE:  Alert, in no distress  ENT:  [place], oropharynx clear  EYES:  EOM normal, conjunctiva and lids normal  NECK:  no adenopathy,masses or thyromegaly  RESP:   lungs clear to auscultation , no respiratory distress  CV:  regular rate and rhythm, 2/6 murmur, +2 pedal pulses, trace bilateral LE edema   ABDOMEN:  soft, non-tender, no distension, no masses  M/S:   wheelchair. CHIU with good strength, kyphosis   SKIN:  no rashes or open areas   PSYCH:  oriented X 3, memory impaired , affect and mood normal    Lab/Diagnostic data:   Recent labs in [facility] reviewed by me today.     ASSESSMENT / PLAN:  (I50.32) Chronic diastolic congestive heart failure (H)  (primary encounter diagnosis)  (I42.8) Non-ischemic cardiomyopathy (H)   (I07.1) Tricuspid valve insufficiency, unspecified etiology  (R60.0) Bilateral leg edema  Comment: volume status stable with weight 148 lbs. History of bilateral pleural effusions. Medications have been limited  due to hypotension. Recent BPs: 100/57, 118/64, 112/50   ECHO 1/14/2020: EF 55-60%, moderately severe TR, mild MR  Last saw Cardiology 2/4/2021.   Plan: continue torsemide. Follow weight,symptoms. Cardiology follow up as scheduled.     (I48.21) Permanent atrial fibrillation (H)  Comment: rate controlled: 75-93   He is not anticoagulated due to thrombocytopenia and history of bleed behind his left retina with vision loss while on coumadin.   Plan: continue digoxin     (D69.3) Idiopathic thrombocytopenic purpura (H)  (D46.Z) Other myelodysplastic syndromes (H)  Comment: blood counts stable on 5/25/2021: Hgb 9.7, platelets 47,000. No s/s of active bleeding. All abrasions have healed.  Bone marrow biopsy 3/2019 consistent with  MDS. Has been treated with prednisone and IVIG in the past.  Last saw Dr Wise 1/2021.   Plan: follow labs. Follow up with Dr Wise per usual schedule-daughter manages appts     (N18.31) Stage 3a chronic kidney disease  Comment: renal function at baseline with  creatinine 1.10 on 5/25/2021  Plan: follow BMP. Avoid nephrotoxins     (G47.00) Insomnia, unspecified type  Comment: chronic, managed   Plan: continue melatonin and mirtazapine     (R41.89) Cognitive impairment  Comment: mild to moderate deficits with decline in functional status   Plan: staff to assist with all cares in long term care setting               Electronically signed by:  ERIC Capone CNP

## 2021-09-02 PROBLEM — I36.1 NONRHEUMATIC TRICUSPID VALVE REGURGITATION: Status: ACTIVE | Noted: 2019-11-19

## 2021-09-02 PROBLEM — T14.8XXA HEMATOMA: Status: ACTIVE | Noted: 2017-10-04

## 2021-09-02 PROBLEM — I95.9 HYPOTENSION: Status: ACTIVE | Noted: 2018-02-09

## 2021-09-02 PROBLEM — D62 ANEMIA DUE TO ACUTE BLOOD LOSS: Status: ACTIVE | Noted: 2017-11-01

## 2021-09-02 PROBLEM — L97.919 ULCER OF RIGHT LOWER EXTREMITY (H): Status: ACTIVE | Noted: 2018-02-09

## 2021-09-02 NOTE — PROGRESS NOTES
[facility] GERIATRIC SERVICES  [place] Medical Record Number: [id number]  Place of Service where encounter took place: [facility] ([facility]) [[id number]]  Chief Complaint   Patient presents with     RECHECK     HPI:    Jama Paul is a 89 year old (2/13/1931), who is being seen today for an episodic care visit. HPI information obtained from: facility chart records, facility staff, patient report and [facility] chart review.  He admitted to this facility in 3/2020, initially to tcu and then transferred to long term care. [place]   Medical history is significant for diastolic CHF, cardiomyopathy, afib, valvular disease, ITP, anemia, macular degeneration, CKD stage 3.       Today's concern is:     Ear infection  Chronic diastolic congestive heart failure (H)  Non-ischemic cardiomyopathy (H)  Tricuspid valve insufficiency, unspecified etiology  Bilateral leg edema  Permanent atrial fibrillation (H)  Idiopathic thrombocytopenic purpura (H)  Other myelodysplastic syndromes (H)  Insomnia, unspecified type  Cognitive impairment   He is seen today after the nurse called yesterday reporting right ear pain. Patient and family requested antibiotic ear drops as he has had recurrent infections and ear pain in the past. Cortisporin drops were started. He reports pain has improved today and his hearing is at baseline. He saw Audiology 6/30/2021 for hearing test.   Denies feeling ill. Good appetite. Denies cough, shortness of breath or chest pain. All open wounds on his legs have healed. Wheelchair bound and requires assist of 1 with transfers and toileting.     Past Medical and Surgical History reviewed in Epic today.    MEDICATIONS:  Current Outpatient Medications   Medication Sig Dispense Refill     neomycin-polymyxin-hydrocortisone (CORTISPORIN) 3.5-41572-9 otic suspension Place 4 drops into the right ear 3 times daily       acetaminophen (TYLENOL) 325 MG tablet  Take 650 mg by mouth every 6 hours as needed for Pain       digoxin (LANOXIN) 125 MCG tablet Take 1 tablet (125 mcg) by mouth daily (Patient taking differently: Take 125 mcg by mouth daily HOLD if HR <55) 30 tablet 0     melatonin 3 MG tablet Take 6 mg by mouth At Bedtime        mirtazapine (REMERON) 15 MG tablet Take 7.5 mg by mouth At Bedtime        Multiple Vitamins-Minerals (ICAPS AREDS FORMULA PO) Take 1 tablet by mouth daily Takes in addition to multivitamin with minerals       multivitamin, therapeutic (THERA-VIT) TABS tablet Take 1 tablet by mouth daily       potassium chloride ER (K-TAB) 20 MEQ CR tablet Take 20 mEq by mouth three times a week Daily every Monday, Wed and Friday       torsemide (DEMADEX) 10 MG tablet Take 10 mg by mouth daily        Vitamin D, Cholecalciferol, 1000 UNITS TABS Take 3,000 Units by mouth daily        REVIEW OF SYSTEMS:  4 point ROS including Respiratory, CV, GI and , other than that noted in the HPI,  is negative    Objective:  /68   Pulse 84   Temp 98  F (36.7  C)   Resp 18   Ht 1.829 m (6')   Wt 67 kg (147 lb 12.8 oz)   SpO2 96%   BMI 20.05 kg/m    Exam:  GENERAL APPEARANCE:  Alert, in no distress  ENT:  [place], oropharynx clear. No erythema, warmth or tenderness of outer ear.   EYES:  EOM normal, conjunctiva and lids normal  NECK:  no adenopathy,masses or thyromegaly  RESP:   lungs clear to auscultation , no respiratory distress  CV:  regular rate and rhythm, 2/6 murmur,no LE edema, wearing compression stockings   ABDOMEN:  soft, non-tender, no distension, no masses  M/S:   wheelchair. CHIU with good strength, kyphosis   SKIN:  no rashes or open areas   PSYCH:  oriented X 2-3, memory impaired , affect and mood normal    Labs:   Recent labs in [facility] reviewed by me today.     ASSESSMENT / PLAN:  (H66.90) Ear infection  (primary encounter diagnosis)  Comment: acute pain and infection of right ear, improved after cortisporin was started yesterday  Plan: complete 7 day  course of cortisporin drops and monitor symptoms     (I50.32) Chronic diastolic congestive heart failure (H)  (I42.8) Non-ischemic cardiomyopathy (H)  (I07.1) Tricuspid valve insufficiency, unspecified etiology  (R60.0) Bilateral leg edema  Comment: no edema on exam today, weight stable at 147 lbs.   Plan: continue torsemide. Follow weight, symptoms, BMP. Cardiology follow up per usual schedule     (I48.21) Permanent atrial fibrillation (H)  Comment: rate controlled: 81-87   He is not anticoagulated due to thrombocytopenia and history of bleed behind his left retina with vision loss while on coumadin.   Plan: continue digoxin     (D69.3) Idiopathic thrombocytopenic purpura (H)  (D46.Z) Other myelodysplastic syndromes (H)  Comment: labs were stable 5/25/2021L Hgb 9.7, platelets 47,000. No s/s of active bleeding   Plan: follow lbs. Follow up with Dr Wise as scheduled     (G47.00) Insomnia, unspecified type  Comment: long standing, managed   Plan: continue melatonin and mirtazapine     (R41.89) Cognitive impairment  Comment: moderate deficits with poor functional status. Requires 24 hr care. Very pleasant and cooperative   Plan: staff to assist with all cares in long term care      Electronically signed by:  ERIC Capone CNP

## 2021-09-03 NOTE — LETTER
9/3/2021        RE: Jama aPul  [address]  [address]  [facility] [address]        [facility] GERIATRIC SERVICES  [place] Medical Record Number: [id number]  Place of Service where encounter took place: [facility] ([facility]) [[id number]]  Chief Complaint   Patient presents with     RECHECK     HPI:    Jama Paul is a 89 year old (2/13/1931), who is being seen today for an episodic care visit. HPI information obtained from: facility chart records, facility staff, patient report and [facility] chart review.  He admitted to this facility in 3/2020, initially to tcu and then transferred to long term care. [place]   Medical history is significant for diastolic CHF, cardiomyopathy, afib, valvular disease, ITP, anemia, macular degeneration, CKD stage 3.       Today's concern is:     Ear infection  Chronic diastolic congestive heart failure (H)  Non-ischemic cardiomyopathy (H)  Tricuspid valve insufficiency, unspecified etiology  Bilateral leg edema  Permanent atrial fibrillation (H)  Idiopathic thrombocytopenic purpura (H)  Other myelodysplastic syndromes (H)  Insomnia, unspecified type  Cognitive impairment   He is seen today after the nurse called yesterday reporting right ear pain. Patient and family requested antibiotic ear drops as he has had recurrent infections and ear pain in the past. Cortisporin drops were started. He reports pain has improved today and his hearing is at baseline. He saw Audiology 6/30/2021 for hearing test.   Denies feeling ill. Good appetite. Denies cough, shortness of breath or chest pain. All open wounds on his legs have healed. Wheelchair bound and requires assist of 1 with transfers and toileting.     Past Medical and Surgical History reviewed in Epic today.    MEDICATIONS:  Current Outpatient Medications   Medication Sig Dispense Refill     neomycin-polymyxin-hydrocortisone (CORTISPORIN)  [id number] otic suspension Place 4 drops into the right ear 3 times daily       acetaminophen (TYLENOL) 325 MG tablet Take 650 mg by mouth every 6 hours as needed for Pain       digoxin (LANOXIN) 125 MCG tablet Take 1 tablet (125 mcg) by mouth daily (Patient taking differently: Take 125 mcg by mouth daily HOLD if HR <55) 30 tablet 0     melatonin 3 MG tablet Take 6 mg by mouth At Bedtime        mirtazapine (REMERON) 15 MG tablet Take 7.5 mg by mouth At Bedtime        Multiple Vitamins-Minerals (ICAPS AREDS FORMULA PO) Take 1 tablet by mouth daily Takes in addition to multivitamin with minerals       multivitamin, therapeutic (THERA-VIT) TABS tablet Take 1 tablet by mouth daily       potassium chloride ER (K-TAB) 20 MEQ CR tablet Take 20 mEq by mouth three times a week Daily every Monday, Wed and Friday       torsemide (DEMADEX) 10 MG tablet Take 10 mg by mouth daily        Vitamin D, Cholecalciferol, 1000 UNITS TABS Take 3,000 Units by mouth daily        REVIEW OF SYSTEMS:  4 point ROS including Respiratory, CV, GI and , other than that noted in the HPI,  is negative    Objective:  /68   Pulse 84   Temp 98  F (36.7  C)   Resp 18   Ht 1.829 m (6')   Wt 67 kg (147 lb 12.8 oz)   SpO2 96%   BMI 20.05 kg/m    Exam:  GENERAL APPEARANCE:  Alert, in no distress  ENT:  [place], oropharynx clear. No erythema, warmth or tenderness of outer ear.   EYES:  EOM normal, conjunctiva and lids normal  NECK:  no adenopathy,masses or thyromegaly  RESP:   lungs clear to auscultation , no respiratory distress  CV:  regular rate and rhythm, 2/6 murmur,no LE edema, wearing compression stockings   ABDOMEN:  soft, non-tender, no distension, no masses  M/S:   wheelchair. CHIU with good strength, kyphosis   SKIN:  no rashes or open areas   PSYCH:  oriented X 2-3, memory impaired , affect and mood normal    Labs:   Recent labs in [facility] reviewed by me today.     ASSESSMENT / PLAN:  (H66.90) Ear infection  (primary encounter  diagnosis)  Comment: acute pain and infection of right ear, improved after cortisporin was started yesterday  Plan: complete 7 day course of cortisporin drops and monitor symptoms     (I50.32) Chronic diastolic congestive heart failure (H)  (I42.8) Non-ischemic cardiomyopathy (H)  (I07.1) Tricuspid valve insufficiency, unspecified etiology  (R60.0) Bilateral leg edema  Comment: no edema on exam today, weight stable at 147 lbs.   Plan: continue torsemide. Follow weight, symptoms, BMP. Cardiology follow up per usual schedule     (I48.21) Permanent atrial fibrillation (H)  Comment: rate controlled: 81-87   He is not anticoagulated due to thrombocytopenia and history of bleed behind his left retina with vision loss while on coumadin.   Plan: continue digoxin     (D69.3) Idiopathic thrombocytopenic purpura (H)  (D46.Z) Other myelodysplastic syndromes (H)  Comment: labs were stable 5/25/2021L Hgb 9.7, platelets 47,000. No s/s of active bleeding   Plan: follow lbs. Follow up with Dr Wise as scheduled     (G47.00) Insomnia, unspecified type  Comment: long standing, managed   Plan: continue melatonin and mirtazapine     (R41.89) Cognitive impairment  Comment: moderate deficits with poor functional status. Requires 24 hr care. Very pleasant and cooperative   Plan: staff to assist with all cares in long term care      Electronically signed by:  ERIC Capone CNP         Sincerely,        ERIC Capone CNP

## 2021-09-10 NOTE — LETTER
9/10/2021        RE: Jama Paul  [address]  [address]  [facility] [address]        [place] GERIATRIC SERVICES  [place] Medical Record Number:  [id number]  Place of Service where encounter took place:  [facility] ([facility]) [[id number]]  Chief Complaint   Patient presents with     RECHECK       HPI:    Jama Paul  is a 89 year old (2/13/1931), who is being seen today for an episodic care visit.  HPI information obtained from: facility chart records, facility staff, patient report and [facility] chart review.   He admitted to this facility in 3/2020, initially to tcu and then transferred to long term care. [place]   Medical history is significant for diastolic CHF, cardiomyopathy, afib, valvular disease, ITP, anemia, macular degeneration, CKD stage 3.    Today's concern is:     Ear infection  Chronic diastolic congestive heart failure (H)  Non-ischemic cardiomyopathy (H)  Tricuspid valve insufficiency, unspecified etiology  Bilateral leg edema  Permanent atrial fibrillation (H)  Cognitive impairment   He is seen today to follow up on right ear pain and infection. Completed cortisporin drops yesterday. Reports pain has resolved and he's feeling well. Afebrile. Good appetite. Denies cough, shortness of breath or chest pain. Wheelchair bound and requires assist of 1 with transfers and cares.     Past Medical and Surgical History reviewed in Epic today.    MEDICATIONS:    Current Outpatient Medications   Medication Sig Dispense Refill     acetaminophen (TYLENOL) 325 MG tablet Take 650 mg by mouth every 6 hours as needed for Pain       digoxin (LANOXIN) 125 MCG tablet Take 1 tablet (125 mcg) by mouth daily (Patient taking differently: Take 125 mcg by mouth daily HOLD if HR <55) 30 tablet 0     melatonin 3 MG tablet Take 6 mg by mouth At Bedtime        mirtazapine (REMERON) 15 MG tablet Take 7.5 mg by mouth At Bedtime        Multiple  Vitamins-Minerals (ICAPS AREDS FORMULA PO) Take 1 tablet by mouth daily Takes in addition to multivitamin with minerals       multivitamin, therapeutic (THERA-VIT) TABS tablet Take 1 tablet by mouth daily       potassium chloride ER (K-TAB) 20 MEQ CR tablet Take 20 mEq by mouth three times a week Daily every Monday, Wed and Friday       torsemide (DEMADEX) 10 MG tablet Take 10 mg by mouth daily        Vitamin D, Cholecalciferol, 1000 UNITS TABS Take 3,000 Units by mouth daily            REVIEW OF SYSTEMS:  Limited secondary to cognitive impairment. Positives as noted above     Objective:  /50   Pulse 70   Temp 97.8  F (36.6  C)   Resp 18   Wt 66 kg (145 lb 9.6 oz)   SpO2 98%   BMI 19.75 kg/m    Exam:  GENERAL APPEARANCE:  Alert, in no distress  ENT:  [place], oropharynx clear. No erythema, warmth or tenderness of outer ear. Hearing aid in place   EYES:  EOM normal, conjunctiva and lids normal  NECK:  no adenopathy,masses or thyromegaly  RESP:   lungs clear to auscultation , no respiratory distress  CV:  regular rate and rhythm, 2/6 murmur,no LE edema   ABDOMEN:  soft, non-tender, no distension, no masses  M/S:   wheelchair. CHIU with good strength, kyphosis   SKIN:  no rashes or open areas   PSYCH:  oriented to self, place, situation, memory impaired , affect and mood normal    Labs:   Recent labs in [facility] reviewed by me today.     ASSESSMENT / PLAN:  (H66.90) Ear infection  (primary encounter diagnosis)  Comment: pain and infection appear resolved. Completed cortisporin 9/9/2021  Plan: monitor for recurrent symptoms     (I50.32) Chronic diastolic congestive heart failure (H)  (I42.8) Non-ischemic cardiomyopathy (H)  (I07.1) Tricuspid valve insufficiency, unspecified etiology  (R60.0) Bilateral leg edema  Comment: euvolemic with no edema on exam. Weight stable at 148 lbs.   Plan: continue torsemide. Follow BMP. Cardiology follow up as scheduled     (I48.21) Permanent atrial fibrillation (H)  Comment: rate  controlled   Plan: continue digoxin. He is not anticoagulated due to thrombocytopenia and history of bleed behind his left retina while on coumadin     (R41.89) Cognitive impairment  Comment: moderate deficits with poor functional status, consistent with dementia. Requires 24 hr care  Plan: staff to assist with all cares in long term care       Electronically signed by:  ERIC Capone CNP                 Sincerely,        ERIC Capone CNP

## 2021-09-10 NOTE — PROGRESS NOTES
[place] GERIATRIC SERVICES  [place] Medical Record Number:  [id number]  Place of Service where encounter took place:  [facility] ([facility]) [[id number]]  Chief Complaint   Patient presents with     RECHECK       HPI:    Jama Paul  is a 89 year old (2/13/1931), who is being seen today for an episodic care visit.  HPI information obtained from: facility chart records, facility staff, patient report and [facility] chart review.   He admitted to this facility in 3/2020, initially to tcu and then transferred to long term care. [place]   Medical history is significant for diastolic CHF, cardiomyopathy, afib, valvular disease, ITP, anemia, macular degeneration, CKD stage 3.    Today's concern is:     Ear infection  Chronic diastolic congestive heart failure (H)  Non-ischemic cardiomyopathy (H)  Tricuspid valve insufficiency, unspecified etiology  Bilateral leg edema  Permanent atrial fibrillation (H)  Cognitive impairment   He is seen today to follow up on right ear pain and infection. Completed cortisporin drops yesterday. Reports pain has resolved and he's feeling well. Afebrile. Good appetite. Denies cough, shortness of breath or chest pain. Wheelchair bound and requires assist of 1 with transfers and cares.     Past Medical and Surgical History reviewed in Epic today.    MEDICATIONS:    Current Outpatient Medications   Medication Sig Dispense Refill     acetaminophen (TYLENOL) 325 MG tablet Take 650 mg by mouth every 6 hours as needed for Pain       digoxin (LANOXIN) 125 MCG tablet Take 1 tablet (125 mcg) by mouth daily (Patient taking differently: Take 125 mcg by mouth daily HOLD if HR <55) 30 tablet 0     melatonin 3 MG tablet Take 6 mg by mouth At Bedtime        mirtazapine (REMERON) 15 MG tablet Take 7.5 mg by mouth At Bedtime        Multiple Vitamins-Minerals (ICAPS AREDS FORMULA PO) Take 1 tablet by mouth daily Takes in addition to multivitamin with  minerals       multivitamin, therapeutic (THERA-VIT) TABS tablet Take 1 tablet by mouth daily       potassium chloride ER (K-TAB) 20 MEQ CR tablet Take 20 mEq by mouth three times a week Daily every Monday, Wed and Friday       torsemide (DEMADEX) 10 MG tablet Take 10 mg by mouth daily        Vitamin D, Cholecalciferol, 1000 UNITS TABS Take 3,000 Units by mouth daily            REVIEW OF SYSTEMS:  Limited secondary to cognitive impairment. Positives as noted above     Objective:  /50   Pulse 70   Temp 97.8  F (36.6  C)   Resp 18   Wt 66 kg (145 lb 9.6 oz)   SpO2 98%   BMI 19.75 kg/m    Exam:  GENERAL APPEARANCE:  Alert, in no distress  ENT:  [place], oropharynx clear. No erythema, warmth or tenderness of outer ear. Hearing aid in place   EYES:  EOM normal, conjunctiva and lids normal  NECK:  no adenopathy,masses or thyromegaly  RESP:   lungs clear to auscultation , no respiratory distress  CV:  regular rate and rhythm, 2/6 murmur,no LE edema   ABDOMEN:  soft, non-tender, no distension, no masses  M/S:   wheelchair. CHIU with good strength, kyphosis   SKIN:  no rashes or open areas   PSYCH:  oriented to self, place, situation, memory impaired , affect and mood normal    Labs:   Recent labs in [facility] reviewed by me today.     ASSESSMENT / PLAN:  (H66.90) Ear infection  (primary encounter diagnosis)  Comment: pain and infection appear resolved. Completed cortisporin 9/9/2021  Plan: monitor for recurrent symptoms     (I50.32) Chronic diastolic congestive heart failure (H)  (I42.8) Non-ischemic cardiomyopathy (H)  (I07.1) Tricuspid valve insufficiency, unspecified etiology  (R60.0) Bilateral leg edema  Comment: euvolemic with no edema on exam. Weight stable at 148 lbs.   Plan: continue torsemide. Follow BMP. Cardiology follow up as scheduled     (I48.21) Permanent atrial fibrillation (H)  Comment: rate controlled   Plan: continue digoxin. He is not anticoagulated due to thrombocytopenia and history of bleed behind  his left retina while on coumadin     (R41.89) Cognitive impairment  Comment: moderate deficits with poor functional status, consistent with dementia. Requires 24 hr care  Plan: staff to assist with all cares in long term care       Electronically signed by:  ERIC Capone CNP

## 2021-10-05 PROBLEM — L97.919 ULCER OF RIGHT LOWER EXTREMITY (H): Status: RESOLVED | Noted: 2018-02-09 | Resolved: 2021-01-01

## 2021-10-05 NOTE — LETTER
10/5/2021        RE: Jama Paul  [address]  [address]  [facility] [address]        Jama Paul is a 89 year old male seen October 5, 2021 at [facility] where he has resided for 1 and a half years (admit to TCU 3/2020).  He is seen in his room up to ; his wife and daughter have been visiting with him out in the lobby.   Wife still here but his daughter has left.    States he feels well, denies any exertional symptoms or pain.  He does note insomnia.  States he goes to bed at 9pm and sleeps until 2am, then is awake for the rest of the night.   Feels quite tired at this point.   Tried on melatonin, but he doesn't think that is working.     By chart review, pt initially admitted to TCU in November 2019, hospitalized several times in the interim.   He discharged back to his apartment with his wife and [facility] in January 2020, but re-hospitalized in February with weakness and citrobacter UTI.   He worked with therapies in TCU, but not able to regain enough mobility to return to independent living [facility]        He saw Dr Perez Cardiology for polyvalvular disease with CHF and severe functional TR, on medical therapy.   Options presented and pt elected to pursue optimization of medical therapies at that time.  Saw Dr Perez in August 2020 at which time he was considered for repair under the Triluminate study.   He was screened, but determined to be ineligible based on his low platelets.  He saw Dr Perez again May 2021, and pt doing well so no changes made.         Pt initially had [facility] hospitalization in November 2019 for shortness of breath.  He was found to have bilateral pleural effusions and had thoracentesis x2 on the left and one time on the right.    Pleural fluid c/w transudate.   ECHO cardiogram showed EF 55-60% with severely dilated RV, severe MR and TR.   Pt needed GI consult and esophageal dilatation to have KATIE done  Following this  Med check OV and then est care later   procedure he had hypotension requiring pressor support in the ICU.   He was diuresed with IV furosemide and repeat ECHO showed severe TR and moderate MR, with some improvement in both.   He was seen by Cardiology and CV surgery. He underwent left heart catheterization as workup for tricuspid repair or replacement, and this showed normal left and right sided pressures.     He remains on torsemide, but not on beta blocker or Entresto secondary to low bps.       Pt had another [facility] hospitalization in January 2020 for recurrent large right pleural effusion.   He was treated with IV Lasix, had thoracentesis removing 2200 mLs of fluid and spironolactone was added to his daily torsemide.      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   He had a BM biopsy in March 2019 that showed hypercellular marrow with 80% cellularity and normal thrombocytes.   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  He was anticoagulated with warfarin for atrial fib and had a bleed behind his retina, lost vision in his left eye; has not been anticoagulated since then.   He has seen Hem/Onc and been treated with prednisone and IVIG with some improvement.  Last saw Dr Wise in January 2021, at that time ongoing surveillance was recommended.         Past Medical History:   Diagnosis Date     Congestive heart failure (H)      Elevated PSA      Eye hemorrhage, left 02/2019     Non-ischemic cardiomyopathy (H)     2017, med treatment, echo done 4/18 nl ef, mod to severe tr     Permanent atrial fibrillation 2011     Thrombocytopenia (H) ITP      Unspecified essential hypertension    Cognitive impairment  Urinary and fecal incontinence    Past Surgical History:   Procedure Laterality Date     ABDOMEN SURGERY      bowel obstruction     BONE MARROW BIOPSY, BONE SPECIMEN, NEEDLE/TROCAR N/A 3/4/2019    Procedure:  BIOPSY BONE MARROW;  Surgeon: Josey Vargas MD;  Location:  GI     CARPAL TUNNEL RELEASE RT/LT  2014     CV HEART CATHETERIZATION WITH POSSIBLE INTERVENTION N/A 11/15/2019    Procedure: Left and right heart Catheterization with Possible Intervention;  Surgeon: Adolfo Paez MD;  Location:  HEART CARDIAC CATH LAB     CV LEFT VENTRICULOGRAM N/A 11/15/2019    Procedure: Left Ventriculogram;  Surgeon: Adolfo Paez MD;  Location:  HEART CARDIAC CATH LAB     ESOPHAGOSCOPY, GASTROSCOPY, DUODENOSCOPY (EGD), COMBINED N/A 11/6/2019    Procedure: ESOPHAGOGASTRODUODENOSCOPY (EGD);  Surgeon: Marixa Aguila MD;  Location:  GI     EXPLORE GROIN  4/30/2014    Procedure: EXPLORE GROIN;  Surgeon: Sam Aguirre MD;  Location:  OR     HERNIORRHAPHY INGUINAL  4/30/2014    Procedure: HERNIORRHAPHY INGUINAL;  Surgeon: Sam Aguirre MD;  Location:  OR     MOHS MICROGRAPHIC PROCEDURE       PHACOEMULSIFICATION CLEAR CORNEA WITH STANDARD INTRAOCULAR LENS IMPLANT Right 9/8/2015    Procedure: PHACOEMULSIFICATION CLEAR CORNEA WITH STANDARD INTRAOCULAR LENS IMPLANT;  Surgeon: Gil Shannon MD;  Location:  EC     septic olecranon bursitis[       SH:  Previously lived with his wife, ekaterina at [facility]; she continues to live there.     They have a daughter Lorraine and son Jama.   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[employer], worked clinically until age 79      Non smoker    Current Outpatient Medications   Medication Sig Dispense Refill     acetaminophen (TYLENOL) 325 MG tablet Take 650 mg by mouth every 6 hours as needed for Pain       digoxin (LANOXIN) 125 MCG tablet Take 1 tablet (125 mcg) by mouth daily (Patient taking differently: Take 125 mcg by mouth daily HOLD if HR <55) 30 tablet 0     melatonin 3 MG tablet Take 6 mg by mouth At Bedtime        mirtazapine (REMERON) 15 MG tablet Take 7.5 mg by mouth At Bedtime        Multiple Vitamins-Minerals (ICAPS AREDS FORMULA PO) Take  1 tablet by mouth daily Takes in addition to multivitamin with minerals       multivitamin, therapeutic (THERA-VIT) TABS tablet Take 1 tablet by mouth daily       potassium chloride ER (K-TAB) 20 MEQ CR tablet Take 20 mEq by mouth three times a week Daily every Monday, Wed and Friday       torsemide (DEMADEX) 10 MG tablet Take 10 mg by mouth daily        Vitamin D, Cholecalciferol, 1000 UNITS TABS Take 3,000 Units by mouth daily         ROS: 4 point ROS including Respiratory, CV, GI and , other than that noted in the HPI,  is negative   He has been incontinent of bowel and bladder for several years   Wt Readings from Last 5 Encounters:   10/05/21 67.2 kg (148 lb 3.2 oz)   09/10/21 66 kg (145 lb 9.6 oz)   09/03/21 67 kg (147 lb 12.8 oz)   08/13/21 67.1 kg (148 lb)   06/10/21 67.7 kg (149 lb 3.2 oz)      EXAM: frail, NAD  Vitals:/59   Pulse 87   Temp 97.5  F (36.4  C)   Resp 18   Ht 1.829 m (6')   Wt 67.2 kg (148 lb 3.2 oz)   SpO2 96%   BMI 20.10 kg/m     +kyphosis  Neck supple without adenopathy  Lungs with decreased BS, no rales or wheeze  Heart RRR s1s2, 2/6 HSM  Abd soft, NT, no distention or guarding, +BS  Ext without edema  Neuro: no focal deficits, no tremor or stiffness, WC bound  Psych: affect okay, pleasant    Labs reviewed     ECHO 8/5/2020   Final Impressions:   1. Normal left ventricular size, normal wall thickness, normal global systolic function, calculated EF of 66 %.   2. Right ventricular cavity size is mildly to moderately enlarged, global systolic RV function is borderline reduced.   3. Severely enlarged left atrium.   4. The mitral valve is normal, mild mitral regurgitation.   5. Tricuspid valve is non coapting. Severe tricuspid regurgitation.   6. Trivial pericardial effusion.      ASSESSMENT/PLAN:  (G47.00) Insomnia, unspecified type   Comment: as above, concerning to patient and his wife  Plan: add traZODone (DESYREL) 50 MG tablet        1/2 tablet at HS  He is also on  mirtazapine 7.5 mg/HS      (I07.1) Tricuspid valve insufficiency, unspecified etiology  (J90) Bilateral pleural effusion  (I50.32) Chronic diastolic heart failure (H)   (I42.8) Non-ischemic cardiomyopathy (H)   BP Readings from Last 3 Encounters:   10/05/21 100/59   09/10/21 123/50   09/03/21 114/68      Comment: stable volume status today, on less medication secondary to low bps    Cardiology notes reviewed.    Plan: torsemide 10 mg/day and follow    (I48.21) Permanent atrial fibrillation  Comment:   Pulse Readings from Last 4 Encounters:   10/05/21 87   09/10/21 70   09/03/21 84   08/13/21 88      Plan: digoxin 0.125 mg/day for VR control  No anticoagulation given low plts and prior bleeding complications    (N18.31) Stage 3a chronic kidney disease  Comment: stable  Plan: follow BMP on torsemide    (D69.3) Idiopathic thrombocytopenic purpura (H)  Comment: plt counts 30-40k since admission  Plan: recheck prn and monitor for any bleeding complications  Follow up with Dr Wise as scheduled    (D64.9) Anemia, unspecified type  Comment: hgb 10.2 >>> 9.7 in May 2021   Per Oncology notes, BM biopsy c/w myelodysplasia, but no other features.   Plan: follow, would add PPI /Fe if falls further    (G31.84) Mild cognitive impairment  (R53.1) Generalized weakness  Comment: generally WC bound, stand pivot transfers with assist of one  Plan: LTC support for meds, meals, activity      Advance directive: pt elects full code         Jeanette Hernández MD           Sincerely,        Jeanette Hernández MD

## 2021-10-05 NOTE — PROGRESS NOTES
Jama Paul is a 89 year old male seen October 5, 2021 at [facility] where he has resided for 1 and a half years (admit to TCU 3/2020).  He is seen in his room up to ; his wife and daughter have been visiting with him out in the lobby.   Wife still here but his daughter has left.    States he feels well, denies any exertional symptoms or pain.  He does note insomnia.  States he goes to bed at 9pm and sleeps until 2am, then is awake for the rest of the night.   Feels quite tired at this point.   Tried on melatonin, but he doesn't think that is working.     By chart review, pt initially admitted to TCU in November 2019, hospitalized several times in the interim.   He discharged back to his apartment with his wife and [facility] in January 2020, but re-hospitalized in February with weakness and citrobacter UTI.   He worked with therapies in TCU, but not able to regain enough mobility to return to independent living and thus transitioned to [facility] unit.        He saw Dr Perez Cardiology for polyvalvular disease with CHF and severe functional TR, on medical therapy.   Options presented and pt elected to pursue optimization of medical therapies at that time.  Saw Dr Perez in August 2020 at which time he was considered for repair under the Triluminate study.   He was screened, but determined to be ineligible based on his low platelets.  He saw Dr Perez again May 2021, and pt doing well so no changes made.         Pt initially had [facility] hospitalization in November 2019 for shortness of breath.  He was found to have bilateral pleural effusions and had thoracentesis x2 on the left and one time on the right.    Pleural fluid c/w transudate.   ECHO cardiogram showed EF 55-60% with severely dilated RV, severe MR and TR.   Pt needed GI consult and esophageal dilatation to have KATIE done  Following this procedure he had hypotension requiring pressor support in the ICU.   He was diuresed with IV furosemide and  repeat ECHO showed severe TR and moderate MR, with some improvement in both.   He was seen by Cardiology and CV surgery. He underwent left heart catheterization as workup for tricuspid repair or replacement, and this showed normal left and right sided pressures.     He remains on torsemide, but not on beta blocker or Entresto secondary to low bps.       Pt had another [facility] hospitalization in January 2020 for recurrent large right pleural effusion.   He was treated with IV Lasix, had thoracentesis removing 2200 mLs of fluid and spironolactone was added to his daily torsemide.      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   He had a BM biopsy in March 2019 that showed hypercellular marrow with 80% cellularity and normal thrombocytes.   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  He was anticoagulated with warfarin for atrial fib and had a bleed behind his retina, lost vision in his left eye; has not been anticoagulated since then.   He has seen Hem/Onc and been treated with prednisone and IVIG with some improvement.  Last saw Dr Wise in January 2021, at that time ongoing surveillance was recommended.         Past Medical History:   Diagnosis Date     Congestive heart failure (H)      Elevated PSA      Eye hemorrhage, left 02/2019     Non-ischemic cardiomyopathy (H)     2017, med treatment, echo done 4/18 nl ef, mod to severe tr     Permanent atrial fibrillation 2011     Thrombocytopenia (H) ITP      Unspecified essential hypertension    Cognitive impairment  Urinary and fecal incontinence    Past Surgical History:   Procedure Laterality Date     ABDOMEN SURGERY      bowel obstruction     BONE MARROW BIOPSY, BONE SPECIMEN, NEEDLE/TROCAR N/A 3/4/2019    Procedure: BIOPSY BONE MARROW;  Surgeon: Josey Vargas MD;  Location:  GI     CARPAL TUNNEL RELEASE RT/LT   2014     CV HEART CATHETERIZATION WITH POSSIBLE INTERVENTION N/A 11/15/2019    Procedure: Left and right heart Catheterization with Possible Intervention;  Surgeon: Adolfo Paez MD;  Location:  HEART CARDIAC CATH LAB     CV LEFT VENTRICULOGRAM N/A 11/15/2019    Procedure: Left Ventriculogram;  Surgeon: Adolfo Paez MD;  Location:  HEART CARDIAC CATH LAB     ESOPHAGOSCOPY, GASTROSCOPY, DUODENOSCOPY (EGD), COMBINED N/A 11/6/2019    Procedure: ESOPHAGOGASTRODUODENOSCOPY (EGD);  Surgeon: Marixa Aguila MD;  Location:  GI     EXPLORE GROIN  4/30/2014    Procedure: EXPLORE GROIN;  Surgeon: Sam Aguirre MD;  Location:  OR     HERNIORRHAPHY INGUINAL  4/30/2014    Procedure: HERNIORRHAPHY INGUINAL;  Surgeon: Sam Aguirre MD;  Location:  OR     MOHS MICROGRAPHIC PROCEDURE       PHACOEMULSIFICATION CLEAR CORNEA WITH STANDARD INTRAOCULAR LENS IMPLANT Right 9/8/2015    Procedure: PHACOEMULSIFICATION CLEAR CORNEA WITH STANDARD INTRAOCULAR LENS IMPLANT;  Surgeon: Gil Shannon MD;  Location:  EC     septic olecranon bursitis[       SH:  Previously lived with his wife, ekaterina at [facility]; she continues to live there.     They have a daughter Lorraine and son Jama.   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[employer], worked clinically until age 79      Non smoker    Current Outpatient Medications   Medication Sig Dispense Refill     acetaminophen (TYLENOL) 325 MG tablet Take 650 mg by mouth every 6 hours as needed for Pain       digoxin (LANOXIN) 125 MCG tablet Take 1 tablet (125 mcg) by mouth daily (Patient taking differently: Take 125 mcg by mouth daily HOLD if HR <55) 30 tablet 0     melatonin 3 MG tablet Take 6 mg by mouth At Bedtime        mirtazapine (REMERON) 15 MG tablet Take 7.5 mg by mouth At Bedtime        Multiple Vitamins-Minerals (ICAPS AREDS FORMULA PO) Take 1 tablet by mouth daily Takes in addition to multivitamin with minerals       multivitamin, therapeutic  (THERA-VIT) TABS tablet Take 1 tablet by mouth daily       potassium chloride ER (K-TAB) 20 MEQ CR tablet Take 20 mEq by mouth three times a week Daily every Monday, Wed and Friday       torsemide (DEMADEX) 10 MG tablet Take 10 mg by mouth daily        Vitamin D, Cholecalciferol, 1000 UNITS TABS Take 3,000 Units by mouth daily         ROS: 4 point ROS including Respiratory, CV, GI and , other than that noted in the HPI,  is negative   He has been incontinent of bowel and bladder for several years   Wt Readings from Last 5 Encounters:   10/05/21 67.2 kg (148 lb 3.2 oz)   09/10/21 66 kg (145 lb 9.6 oz)   09/03/21 67 kg (147 lb 12.8 oz)   08/13/21 67.1 kg (148 lb)   06/10/21 67.7 kg (149 lb 3.2 oz)      EXAM: frail, NAD  Vitals:/59   Pulse 87   Temp 97.5  F (36.4  C)   Resp 18   Ht 1.829 m (6')   Wt 67.2 kg (148 lb 3.2 oz)   SpO2 96%   BMI 20.10 kg/m     +kyphosis  Neck supple without adenopathy  Lungs with decreased BS, no rales or wheeze  Heart RRR s1s2, 2/6 HSM  Abd soft, NT, no distention or guarding, +BS  Ext without edema  Neuro: no focal deficits, no tremor or stiffness, WC bound  Psych: affect okay, pleasant    Labs reviewed     ECHO 8/5/2020   Final Impressions:   1. Normal left ventricular size, normal wall thickness, normal global systolic function, calculated EF of 66 %.   2. Right ventricular cavity size is mildly to moderately enlarged, global systolic RV function is borderline reduced.   3. Severely enlarged left atrium.   4. The mitral valve is normal, mild mitral regurgitation.   5. Tricuspid valve is non coapting. Severe tricuspid regurgitation.   6. Trivial pericardial effusion.      ASSESSMENT/PLAN:  (G47.00) Insomnia, unspecified type   Comment: as above, concerning to patient and his wife  Plan: add traZODone (DESYREL) 50 MG tablet        1/2 tablet at HS  He is also on mirtazapine 7.5 mg/HS      (I07.1) Tricuspid valve insufficiency, unspecified etiology  (J90) Bilateral pleural  effusion  (I50.32) Chronic diastolic heart failure (H)   (I42.8) Non-ischemic cardiomyopathy (H)   BP Readings from Last 3 Encounters:   10/05/21 100/59   09/10/21 123/50   09/03/21 114/68      Comment: stable volume status today, on less medication secondary to low bps    Cardiology notes reviewed.    Plan: torsemide 10 mg/day and follow    (I48.21) Permanent atrial fibrillation  Comment:   Pulse Readings from Last 4 Encounters:   10/05/21 87   09/10/21 70   09/03/21 84   08/13/21 88      Plan: digoxin 0.125 mg/day for VR control  No anticoagulation given low plts and prior bleeding complications    (N18.31) Stage 3a chronic kidney disease  Comment: stable  Plan: follow BMP on torsemide    (D69.3) Idiopathic thrombocytopenic purpura (H)  Comment: plt counts 30-40k since admission  Plan: recheck prn and monitor for any bleeding complications  Follow up with Dr Wise as scheduled    (D64.9) Anemia, unspecified type  Comment: hgb 10.2 >>> 9.7 in May 2021   Per Oncology notes, BM biopsy c/w myelodysplasia, but no other features.   Plan: follow, would add PPI /Fe if falls further    (G31.84) Mild cognitive impairment  (R53.1) Generalized weakness  Comment: generally WC bound, stand pivot transfers with assist of one  Plan: LTC support for meds, meals, activity      Advance directive: pt elects full code         Jeanette Hernández MD

## 2021-10-13 NOTE — TELEPHONE ENCOUNTER
Nurse at [facility] called re: insomnia. Trazodone 25 mg HS was started 10/5/2021 and patient continues to report restlessness and poor sleep.     Order given to increase trazodone to 50 mg HS. Monitor for adverse side effects. Sleep log for 1 week and update NP    Fox REYES CNP

## 2021-10-19 NOTE — PROGRESS NOTES
"[facility]  [facility] Record Number:  [id number]  Place of Service where encounter took place:  [facility] ([facility]) [[id number]]  Chief Complaint   Patient presents with     RECHECK       HPI:    Jama Paul  is a 89 year old (2/13/1931), who is being seen today for an episodic care visit.  HPI information obtained from: facility chart records, facility staff, patient report and [facility] chart review. He admitted to this facility in 3/2020, initially to tcu and then transferred to long term care. [place]   Medical history is significant for diastolic CHF, cardiomyopathy, afib, valvular disease, ITP, anemia, macular degeneration, CKD stage 3.    Today's concern is:     Insomnia, unspecified type  Hip pain, right  Primary osteoarthritis involving multiple joints  Bilateral hearing loss, unspecified hearing loss type  Permanent atrial fibrillation (H)  Tricuspid valve insufficiency, unspecified etiology  Non-ischemic cardiomyopathy (H)  Stage 3a chronic kidney disease (H)  Idiopathic thrombocytopenic purpura (H)  Other myelodysplastic syndromes (H)  Bilateral leg edema  Cognitive impairment   He is seen today after his daughter reported that patient has concerns about his health and requested a visit. Patient reports he has \"no problems\" except for his hearing loss. Reports his daughter is managing Audiology appts to assess his hearing aids. Trazodone was started 10/5/2021 for insomnia and dose was increased 10/13/2021. He reports his sleep has improved. Nurse is not aware of ongoing sleep or other issues.   Tylenol was increased and put on schedule for right hip pain and patient reports this has helped. He reports the hip pain is chronic due to arthritis. Denies pain of any type today. No recent falls or injuries. Good appetite. Denies feeling ill.   He is wheelchair bound and requires assist of 1 with transfers and cares. Incontinent " of bowel and bladder.     Past Medical and Surgical History reviewed in Epic today.    MEDICATIONS:    Current Outpatient Medications   Medication Sig Dispense Refill     acetaminophen (TYLENOL) 500 MG tablet Take 1,000 mg by mouth 3 times daily       digoxin (LANOXIN) 125 MCG tablet Take 1 tablet (125 mcg) by mouth daily (Patient taking differently: Take 125 mcg by mouth daily HOLD if HR <55) 30 tablet 0     mirtazapine (REMERON) 15 MG tablet Take 7.5 mg by mouth At Bedtime        Multiple Vitamins-Minerals (ICAPS AREDS FORMULA PO) Take 1 tablet by mouth daily Takes in addition to multivitamin with minerals       multivitamin, therapeutic (THERA-VIT) TABS tablet Take 1 tablet by mouth daily       potassium chloride ER (K-TAB) 20 MEQ CR tablet Take 20 mEq by mouth three times a week Daily every Monday, Wed and Friday       torsemide (DEMADEX) 10 MG tablet Take 10 mg by mouth daily        traZODone (DESYREL) 50 MG tablet Take 50 mg by mouth At Bedtime       Vitamin D, Cholecalciferol, 1000 UNITS TABS Take 3,000 Units by mouth daily            REVIEW OF SYSTEMS:  Limited secondary to cognitive impairment. Positives as noted above.     Objective:  /61   Pulse 84   Temp 98.1  F (36.7  C)   Resp 16   Wt 68.9 kg (152 lb)   SpO2 96%   BMI 20.61 kg/m    Exam:  GENERAL APPEARANCE:  Alert, in no distress  ENT:  [place], oropharynx clear  EYES:  EOM normal, conjunctiva and lids normal  NECK:  No adenopathy,masses or thyromegaly  RESP:  lungs clear to auscultation , no respiratory distress  CV:  regular rate and rhythm,2/6  murmur, rub, +2 pedal pulses, peripheral edema trace+ in both LE  ABDOMEN:  soft, non-tender, no distension, no masses  M/S:   wheelchair. Kyphosis. CHIU with good strength. No joint inflammation  SKIN:  multiple bruises on all extremities, skin intact. No rashes  PSYCH:  oriented to self, place, situation, insight and judgement impaired, memory impaired , affect and mood normal    Labs:   Recent labs  in EPIC reviewed by me today.     ASSESSMENT / PLAN:  (G47.00) Insomnia, unspecified type  (primary encounter diagnosis)  Comment: improved per patient and staff report   Plan: continue trazodone 50 mg and mirtazapine 7.5 mg at HS. Monitor sleep.     (M25.551) Hip pain, right  (M89.49) Primary osteoarthritis involving multiple joints  Comment: acute flare of pain has improved. No recent falls or trauma reported   Plan: continue tylenol and monitor     (H91.93) Bilateral hearing loss, unspecified hearing loss type  Comment: chronic. Recently received a course of cortisporin drops for ear pain with resolution   Plan: follow up with Audiology for evaluation of his hearing aids as scheduled     (I48.21) Permanent atrial fibrillation (H)  Comment: rate controlled: 74-85   He is not anticoagulated due to thrombocytopenia and history of bleed behind his left retina with vision loss while on coumadin.   Plan: continue digoxin     (I07.1) Tricuspid valve insufficiency, unspecified etiology  (I42.8) Non-ischemic cardiomyopathy (H)  (R60.0) Bilateral leg edema  Comment: weight is up 4 lbs and he has very mild LE edema on exam. No dyspnea and lungs are clear.   ECHO 1/14/2020: EF 55-60%, moderately severe TR, mild MR  Last saw Cardiology 2/4/2021. Medication management has been limited in the past by hypotension. Recent BPs: 109/61, 120/69, 100/59   Plan: continue torsemide 10 mg daily. Monitor weight, symptoms.  Continue tubi .Continue low sodium diet.     (N18.31) Stage 3a chronic kidney disease (H)  Comment: renal function at baseline with creatinine 1.10 on 5/25/2021  Plan: follow BMP prn. Avoid nephrotoxins.     (D69.3) Idiopathic thrombocytopenic purpura (H)  (D46.Z) Other myelodysplastic syndromes (H)  Comment: blood counts stable on 5/25/2021 with Hgb 9.7 and platelets 47,000.   Bone marrow biopsy 3/2019 consistent with  MDS. Has been treated with prednisone and IVIG in the past.  Last saw Dr Wise 1/2021.   Plan:  "he declines lab draw, \" I don't think it's necessary.\" Follow up with Dr Wise per usual schedule.     (R41.89) Cognitive impairment  Comment: progressive decline in cognition with poor functional status. Very pleasant and conversant.   Plan: staff to assist with all cares in long term care       Left a message for his daughter Lorraine Guzman re: today's visit.       Electronically signed by:  ERIC Capone CNP           "

## 2021-10-19 NOTE — LETTER
"    10/19/2021        RE: Jama Paul  [address]  [address]  [facility] [address]        [facility]  [facility] Record Number:  [id number]  Place of Service where encounter took place:  [facility] ([facility]) [[id number]]  Chief Complaint   Patient presents with     RECHECK       HPI:    Jama Paul  is a 89 year old (2/13/1931), who is being seen today for an episodic care visit.  HPI information obtained from: facility chart records, facility staff, patient report and [facility] chart review. He admitted to this facility in 3/2020, initially to tcu and then transferred to long term care. [place]   Medical history is significant for diastolic CHF, cardiomyopathy, afib, valvular disease, ITP, anemia, macular degeneration, CKD stage 3.    Today's concern is:     Insomnia, unspecified type  Hip pain, right  Primary osteoarthritis involving multiple joints  Bilateral hearing loss, unspecified hearing loss type  Permanent atrial fibrillation (H)  Tricuspid valve insufficiency, unspecified etiology  Non-ischemic cardiomyopathy (H)  Stage 3a chronic kidney disease (H)  Idiopathic thrombocytopenic purpura (H)  Other myelodysplastic syndromes (H)  Bilateral leg edema  Cognitive impairment   He is seen today after his daughter reported that patient has concerns about his health and requested a visit. Patient reports he has \"no problems\" except for his hearing loss. Reports his daughter is managing Audiology appts to assess his hearing aids. Trazodone was started 10/5/2021 for insomnia and dose was increased 10/13/2021. He reports his sleep has improved. Nurse is not aware of ongoing sleep or other issues.   Tylenol was increased and put on schedule for right hip pain and patient reports this has helped. He reports the hip pain is chronic due to arthritis. Denies pain of any type today. No recent falls or injuries. Good " appetite. Denies feeling ill.   He is wheelchair bound and requires assist of 1 with transfers and cares. Incontinent of bowel and bladder.     Past Medical and Surgical History reviewed in Epic today.    MEDICATIONS:    Current Outpatient Medications   Medication Sig Dispense Refill     acetaminophen (TYLENOL) 500 MG tablet Take 1,000 mg by mouth 3 times daily       digoxin (LANOXIN) 125 MCG tablet Take 1 tablet (125 mcg) by mouth daily (Patient taking differently: Take 125 mcg by mouth daily HOLD if HR <55) 30 tablet 0     mirtazapine (REMERON) 15 MG tablet Take 7.5 mg by mouth At Bedtime        Multiple Vitamins-Minerals (ICAPS AREDS FORMULA PO) Take 1 tablet by mouth daily Takes in addition to multivitamin with minerals       multivitamin, therapeutic (THERA-VIT) TABS tablet Take 1 tablet by mouth daily       potassium chloride ER (K-TAB) 20 MEQ CR tablet Take 20 mEq by mouth three times a week Daily every Monday, Wed and Friday       torsemide (DEMADEX) 10 MG tablet Take 10 mg by mouth daily        traZODone (DESYREL) 50 MG tablet Take 50 mg by mouth At Bedtime       Vitamin D, Cholecalciferol, 1000 UNITS TABS Take 3,000 Units by mouth daily            REVIEW OF SYSTEMS:  Limited secondary to cognitive impairment. Positives as noted above.     Objective:  /61   Pulse 84   Temp 98.1  F (36.7  C)   Resp 16   Wt 68.9 kg (152 lb)   SpO2 96%   BMI 20.61 kg/m    Exam:  GENERAL APPEARANCE:  Alert, in no distress  ENT:  [place], oropharynx clear  EYES:  EOM normal, conjunctiva and lids normal  NECK:  No adenopathy,masses or thyromegaly  RESP:  lungs clear to auscultation , no respiratory distress  CV:  regular rate and rhythm,2/6  murmur, rub, +2 pedal pulses, peripheral edema trace+ in both LE  ABDOMEN:  soft, non-tender, no distension, no masses  M/S:   wheelchair. Kyphosis. CHIU with good strength. No joint inflammation  SKIN:  multiple bruises on all extremities, skin intact. No rashes  PSYCH:  oriented to  self, place, situation, insight and judgement impaired, memory impaired , affect and mood normal    Labs:   Recent labs in [facility] reviewed by me today.     ASSESSMENT / PLAN:  (G47.00) Insomnia, unspecified type  (primary encounter diagnosis)  Comment: improved per patient and staff report   Plan: continue trazodone 50 mg and mirtazapine 7.5 mg at HS. Monitor sleep.     (M25.551) Hip pain, right  (M89.49) Primary osteoarthritis involving multiple joints  Comment: acute flare of pain has improved. No recent falls or trauma reported   Plan: continue tylenol and monitor     (H91.93) Bilateral hearing loss, unspecified hearing loss type  Comment: chronic. Recently received a course of cortisporin drops for ear pain with resolution   Plan: follow up with Audiology for evaluation of his hearing aids as scheduled     (I48.21) Permanent atrial fibrillation (H)  Comment: rate controlled: 74-85   He is not anticoagulated due to thrombocytopenia and history of bleed behind his left retina with vision loss while on coumadin.   Plan: continue digoxin     (I07.1) Tricuspid valve insufficiency, unspecified etiology  (I42.8) Non-ischemic cardiomyopathy (H)  (R60.0) Bilateral leg edema  Comment: weight is up 4 lbs and he has very mild LE edema on exam. No dyspnea and lungs are clear.   ECHO 1/14/2020: EF 55-60%, moderately severe TR, mild MR  Last saw Cardiology 2/4/2021. Medication management has been limited in the past by hypotension. Recent BPs: 109/61, 120/69, 100/59   Plan: continue torsemide 10 mg daily. Monitor weight, symptoms.  Continue tubi .Continue low sodium diet.     (N18.31) Stage 3a chronic kidney disease (H)  Comment: renal function at baseline with creatinine 1.10 on 5/25/2021  Plan: follow BMP prn. Avoid nephrotoxins.     (D69.3) Idiopathic thrombocytopenic purpura (H)  (D46.Z) Other myelodysplastic syndromes (H)  Comment: blood counts stable on 5/25/2021 with Hgb 9.7 and platelets 47,000.   Bone marrow biopsy  "3/2019 consistent with  MDS. Has been treated with prednisone and IVIG in the past.  Last saw Dr Wise 1/2021.   Plan: he declines lab draw, \" I don't think it's necessary.\" Follow up with Dr Wise per usual schedule.     (R41.89) Cognitive impairment  Comment: progressive decline in cognition with poor functional status. Very pleasant and conversant.   Plan: staff to assist with all cares in long term care       Left a message for his daughter Lorraine Guzman re: today's visit.       Electronically signed by:  ERIC Capone CNP                 Sincerely,        ERIC Capone CNP    "

## 2021-10-26 NOTE — LETTER
"    10/26/2021        RE: Jama Paul  [address]  [address]  [facility] [address]        [facility]  [facility] Record Number:  [id number]  Place of Service where encounter took place:  [facility] ([facility]) [[id number]]  Chief Complaint   Patient presents with     RECHECK       HPI:    Jama Paul  is a 89 year old (2/13/1931), who is being seen today for an episodic care visit.  HPI information obtained from: facility chart records, facility staff, patient report, [facility] chart review and family/first contact Lorraine Guzman report.   He admitted to this facility in 3/2020, initially to tcu and then transferred to long term care. [place]   Medical history is significant for diastolic CHF, cardiomyopathy, afib, valvular disease, ITP, anemia, macular degeneration, CKD stage 3.      Today's concern is:     Pain in joint, ankle and foot, left  Fall, initial encounter  Insomnia, unspecified type  Cognitive impairment  Permanent atrial fibrillation (H)  Tricuspid valve insufficiency, unspecified etiology  Non-ischemic cardiomyopathy (H)  Bilateral leg edema  Stage 3a chronic kidney disease (H)  Idiopathic thrombocytopenic purpura (H)  Other myelodysplastic syndromes (H)   He is seen today for evaluation of left ankle and foot pain. He fell this morning, but reports he's had the pain for several days and it did not contribute to the fall.  He is adamant that the pain is related to a \"nerve running from my knee to my ankle\" and wants to see an Ortho surgeon to have the nerve \"repaired.\"  Reports that he isn't sleeping, but staff reports this is not true.   He is quite irritable today and nurses report this is becoming more frequent behavior.   Wheelchair bound. Able to ambulate short distances with walker and assist. Requires assist of 1 with transfers and cares.     Past Medical and Surgical History reviewed in Epic " today.    MEDICATIONS:    Current Outpatient Medications   Medication Sig Dispense Refill     diclofenac (VOLTAREN) 1 % topical gel Apply 4 g topically 4 times daily Left ankle       acetaminophen (TYLENOL) 500 MG tablet Take 1,000 mg by mouth 3 times daily       digoxin (LANOXIN) 125 MCG tablet Take 1 tablet (125 mcg) by mouth daily (Patient taking differently: Take 125 mcg by mouth daily HOLD if HR <60) 30 tablet 0     mirtazapine (REMERON) 15 MG tablet Take 15 mg by mouth At Bedtime        Multiple Vitamins-Minerals (ICAPS AREDS FORMULA PO) Take 1 tablet by mouth daily Takes in addition to multivitamin with minerals       multivitamin, therapeutic (THERA-VIT) TABS tablet Take 1 tablet by mouth daily       potassium chloride ER (K-TAB) 20 MEQ CR tablet Take 20 mEq by mouth three times a week Daily every Monday, Wed and Friday       torsemide (DEMADEX) 10 MG tablet Take 10 mg by mouth daily        traZODone (DESYREL) 50 MG tablet Take 25 mg by mouth At Bedtime        Vitamin D, Cholecalciferol, 1000 UNITS TABS Take 3,000 Units by mouth daily            REVIEW OF SYSTEMS:  Limited secondary to cognitive impairment. Positives as noted under HPI.     Objective:  BP (!) 152/80   Pulse 75   Temp 97.6  F (36.4  C)   Resp 20   Wt 69 kg (152 lb 3.2 oz)   SpO2 98%   BMI 20.64 kg/m    Exam:  GENERAL APPEARANCE:  Alert, in no distress  ENT:  [place], oropharynx clear  EYES:  EOM normal, conjunctiva and lids normal  NECK: no adenopathy,masses or thyromegaly  RESP:  lungs clear to auscultation , no respiratory distress  CV:  regular rate and rhythm,2/6  murmur, rub, +2 pedal pulses, peripheral edema trace+ in both LE  ABDOMEN:  soft, non-tender, no distension, no masses  M/S:   wheelchair. Kyphosis. CHIU with good strength. No erythema, edema or warmth of left ankle and foot.   SKIN:  multiple bruises on all extremities, skin intact. No rashes  PSYCH:  oriented to self, place, situation, insight and judgement impaired, memory  impaired, irritable        Labs:   Recent labs in [facility] reviewed by me today.     ASSESSMENT / PLAN:  (M25.572) Pain in joint, ankle and foot, left  (primary encounter diagnosis)   (W19.XXXA) Fall, initial encounter  Comment: pain for the past several days, per patient report. He has been reporting pain to daughter but not to staff. No edema or signs of trauma. No increase in pain or new areas of pain after falling this am.   Plan: obtain XR. Start diclofenac gel qid. Continue tylenol. Refer to PHYSICAL THERAPY for strengthening and gait training.   Discussed with his daughter on the phone. Patient wants to see Ortho and his son is going to take him to [facility] today.     (G47.00) Insomnia, unspecified type  Comment: he reports ongoing sleep issues, though this is not confirmed by staff.   Plan: continue trazodone 50 mg daily. Increase mirtazapine to 15 mg HS.     (R41.89) Cognitive impairment  Comment: decline in cognition in recent months with increased confusion, lack of insight and irritability. He has been calling his daughter frequently with various issues that he is not reporting to staff. Suspect this is due to progressive dementia. No s/s of acute illness,  but will obtain labs to rule out an underlying issue. Daughter has a good understanding of the situation.   Plan: CBC, BMP, UA/UC. Increase mirtazapine to 15 mg HS for mood and sleep. Refer to OT for cognitive testing.     (I48.21) Permanent atrial fibrillation (H)  Comment: rate controlled: 75-85   He is not anticoagulated due to thrombocytopenia and history of bleed behind his left retina with vision loss while on coumadin.   Plan: continue digoxin     (I07.1) Tricuspid valve insufficiency, unspecified etiology  (I42.8) Non-ischemic cardiomyopathy (H)  (R60.0) Bilateral leg edema  Comment: no s/s of volume overload.   ECHO 1/14/2020: EF 55-60%, moderately severe TR, mild MR  Last saw Cardiology 2/4/2021. Medication management has been limited in  the past by hypotension. BPs: 152/80, 109/61, 120/69   Plan: continue torsemide 10 mg daily. BMP. Monitor weight,symptoms. Tubi  to both LE during the day.     (N18.31) Stage 3a chronic kidney disease (H)  Comment: creatinine 1.10 on 5/25/2021, which is baseline   Plan: BMP. Avoid nephrotoxins     (D69.3) Idiopathic thrombocytopenic purpura (H)  (D46.Z) Other myelodysplastic syndromes (H)  Comment: bone marrow biopsy 3/2019 consistent with  MDS. Has been treated with prednisone and IVIG in the past.  Last saw Dr Wise 1/2021.  Plan: CBC. Follow up with Dr Wise as scheduled         Total time spent with patient visit at the skilled nursing facility was 40 mins including patient visit, review of past records and phone call with daughter. Greater than 50% of total time spent with counseling and coordinating care due to acute pain, declining cognition and functional status. Coordinating the following with facility staff: obtaining XR, pain management, medication changes, follow up lab. Counseling daughter re: nature of dementia, medications and potential side effects, plan of care.        Electronically signed by:  ERIC Capone CNP                 Sincerely,        ERIC Capone CNP

## 2021-10-26 NOTE — PROGRESS NOTES
"[facility]  [facility] Record Number:  [id number]  Place of Service where encounter took place:  [facility] ([facility]) [[id number]]  Chief Complaint   Patient presents with     RECHECK       HPI:    Jama Paul  is a 89 year old (2/13/1931), who is being seen today for an episodic care visit.  HPI information obtained from: facility chart records, facility staff, patient report, [facility] chart review and family/first contact Lorraine Guzman report.   He admitted to this facility in 3/2020, initially to tcu and then transferred to long term care. [place]   Medical history is significant for diastolic CHF, cardiomyopathy, afib, valvular disease, ITP, anemia, macular degeneration, CKD stage 3.      Today's concern is:     Pain in joint, ankle and foot, left  Fall, initial encounter  Insomnia, unspecified type  Cognitive impairment  Permanent atrial fibrillation (H)  Tricuspid valve insufficiency, unspecified etiology  Non-ischemic cardiomyopathy (H)  Bilateral leg edema  Stage 3a chronic kidney disease (H)  Idiopathic thrombocytopenic purpura (H)  Other myelodysplastic syndromes (H)   He is seen today for evaluation of left ankle and foot pain. He fell this morning, but reports he's had the pain for several days and it did not contribute to the fall.  He is adamant that the pain is related to a \"nerve running from my knee to my ankle\" and wants to see an Ortho surgeon to have the nerve \"repaired.\"  Reports that he isn't sleeping, but staff reports this is not true.   He is quite irritable today and nurses report this is becoming more frequent behavior.   Wheelchair bound. Able to ambulate short distances with walker and assist. Requires assist of 1 with transfers and cares.     Past Medical and Surgical History reviewed in Epic today.    MEDICATIONS:    Current Outpatient Medications   Medication Sig Dispense Refill     diclofenac " (VOLTAREN) 1 % topical gel Apply 4 g topically 4 times daily Left ankle       acetaminophen (TYLENOL) 500 MG tablet Take 1,000 mg by mouth 3 times daily       digoxin (LANOXIN) 125 MCG tablet Take 1 tablet (125 mcg) by mouth daily (Patient taking differently: Take 125 mcg by mouth daily HOLD if HR <60) 30 tablet 0     mirtazapine (REMERON) 15 MG tablet Take 15 mg by mouth At Bedtime        Multiple Vitamins-Minerals (ICAPS AREDS FORMULA PO) Take 1 tablet by mouth daily Takes in addition to multivitamin with minerals       multivitamin, therapeutic (THERA-VIT) TABS tablet Take 1 tablet by mouth daily       potassium chloride ER (K-TAB) 20 MEQ CR tablet Take 20 mEq by mouth three times a week Daily every Monday, Wed and Friday       torsemide (DEMADEX) 10 MG tablet Take 10 mg by mouth daily        traZODone (DESYREL) 50 MG tablet Take 25 mg by mouth At Bedtime        Vitamin D, Cholecalciferol, 1000 UNITS TABS Take 3,000 Units by mouth daily            REVIEW OF SYSTEMS:  Limited secondary to cognitive impairment. Positives as noted under HPI.     Objective:  BP (!) 152/80   Pulse 75   Temp 97.6  F (36.4  C)   Resp 20   Wt 69 kg (152 lb 3.2 oz)   SpO2 98%   BMI 20.64 kg/m    Exam:  GENERAL APPEARANCE:  Alert, in no distress  ENT:  [place], oropharynx clear  EYES:  EOM normal, conjunctiva and lids normal  NECK: no adenopathy,masses or thyromegaly  RESP:  lungs clear to auscultation , no respiratory distress  CV:  regular rate and rhythm,2/6  murmur, rub, +2 pedal pulses, peripheral edema trace+ in both LE  ABDOMEN:  soft, non-tender, no distension, no masses  M/S:   wheelchair. Kyphosis. CHIU with good strength. No erythema, edema or warmth of left ankle and foot.   SKIN:  multiple bruises on all extremities, skin intact. No rashes  PSYCH:  oriented to self, place, situation, insight and judgement impaired, memory impaired, irritable        Labs:   Recent labs in [facility] reviewed by me today.     ASSESSMENT /  PLAN:  (M25.572) Pain in joint, ankle and foot, left  (primary encounter diagnosis)   (W19.XXXA) Fall, initial encounter  Comment: pain for the past several days, per patient report. He has been reporting pain to daughter but not to staff. No edema or signs of trauma. No increase in pain or new areas of pain after falling this am.   Plan: obtain XR. Start diclofenac gel qid. Continue tylenol. Refer to PHYSICAL THERAPY for strengthening and gait training.   Discussed with his daughter on the phone. Patient wants to see Ortho and his son is going to take him to [facility] today.     (G47.00) Insomnia, unspecified type  Comment: he reports ongoing sleep issues, though this is not confirmed by staff.   Plan: continue trazodone 50 mg daily. Increase mirtazapine to 15 mg HS.     (R41.89) Cognitive impairment  Comment: decline in cognition in recent months with increased confusion, lack of insight and irritability. He has been calling his daughter frequently with various issues that he is not reporting to staff. Suspect this is due to progressive dementia. No s/s of acute illness,  but will obtain labs to rule out an underlying issue. Daughter has a good understanding of the situation.   Plan: CBC, BMP, UA/UC. Increase mirtazapine to 15 mg HS for mood and sleep. Refer to OT for cognitive testing.     (I48.21) Permanent atrial fibrillation (H)  Comment: rate controlled: 75-85   He is not anticoagulated due to thrombocytopenia and history of bleed behind his left retina with vision loss while on coumadin.   Plan: continue digoxin     (I07.1) Tricuspid valve insufficiency, unspecified etiology  (I42.8) Non-ischemic cardiomyopathy (H)  (R60.0) Bilateral leg edema  Comment: no s/s of volume overload.   ECHO 1/14/2020: EF 55-60%, moderately severe TR, mild MR  Last saw Cardiology 2/4/2021. Medication management has been limited in the past by hypotension. BPs: 152/80, 109/61, 120/69   Plan: continue torsemide 10 mg daily.  BMP. Monitor weight,symptoms. Tubi  to both LE during the day.     (N18.31) Stage 3a chronic kidney disease (H)  Comment: creatinine 1.10 on 5/25/2021, which is baseline   Plan: BMP. Avoid nephrotoxins     (D69.3) Idiopathic thrombocytopenic purpura (H)  (D46.Z) Other myelodysplastic syndromes (H)  Comment: bone marrow biopsy 3/2019 consistent with  MDS. Has been treated with prednisone and IVIG in the past.  Last saw Dr Wise 1/2021.  Plan: CBC. Follow up with Dr Wise as scheduled         Total time spent with patient visit at the [facility] nursing [facility] was 40 mins including patient visit, review of past records and phone call with daughter. Greater than 50% of total time spent with counseling and coordinating care due to acute pain, declining cognition and functional status. Coordinating the following with facility staff: obtaining XR, pain management, medication changes, follow up lab. Counseling daughter re: nature of dementia, medications and potential side effects, plan of care.        Electronically signed by:  ERIC Capone CNP

## 2021-10-29 NOTE — PROGRESS NOTES
"[facility]  [place] [facility] Record Number:  [id number]  Place of Service where encounter took place:  [facility] ([facility]) [[id number]]  Chief Complaint   Patient presents with     RECHECK       HPI:    Jama Paul  is a 89 year old (2/13/1931), who is being seen today for an episodic care visit.  HPI information obtained from: facility chart records, facility staff, patient report, [facility] chart review and family/first contact daughter report.   He admitted to this facility in 3/2020, initially to tcu and then transferred to long term care. [place]   Medical history is significant for diastolic CHF, cardiomyopathy, afib, valvular disease, ITP, anemia, macular degeneration, CKD stage 3.      Today's concern is:     Pain in joint, ankle and foot, left  Primary osteoarthritis involving multiple joints  Fall, subsequent encounter  Insomnia, unspecified type  Cognitive impairment   He is seen today to follow up on left ankle and foot pain. He fell the morning of 10/26/2021 but reported that pain started several days prior. XR done onsite that day showed no bony abnormalities. He saw [facility] on 10/26/2021 and daughter reports XR there showed osteoarthritis and stenosis of his foot. He is scheduled to see a foot specialist.   He is pleasant and talkative today. Denies pain of any type, including his left foot and ankle. \"It's much better.\" Reports he's sleeping well.   He's had increased behavior issues and declining cognition in recent months and is frequently irritable. Additionally, nurses have found bottles of tylenol and tylenol pm in his room. It's unclear how much, if any, of this he has been using.   Wheelchair bound. Able to ambulate short distances with walker and assist. Requires assist of 1 with transfers and cares.       Past Medical and Surgical History reviewed in Epic today.    MEDICATIONS:    Current Outpatient Medications "   Medication Sig Dispense Refill     acetaminophen (TYLENOL) 500 MG tablet Take 1,000 mg by mouth 3 times daily       diclofenac (VOLTAREN) 1 % topical gel Apply 4 g topically 4 times daily Left ankle       digoxin (LANOXIN) 125 MCG tablet Take 1 tablet (125 mcg) by mouth daily (Patient taking differently: Take 125 mcg by mouth daily HOLD if HR <60) 30 tablet 0     mirtazapine (REMERON) 15 MG tablet Take 15 mg by mouth At Bedtime        Multiple Vitamins-Minerals (ICAPS AREDS FORMULA PO) Take 1 tablet by mouth daily Takes in addition to multivitamin with minerals       multivitamin, therapeutic (THERA-VIT) TABS tablet Take 1 tablet by mouth daily       potassium chloride ER (K-TAB) 20 MEQ CR tablet Take 20 mEq by mouth three times a week Daily every Monday, Wed and Friday       torsemide (DEMADEX) 10 MG tablet Take 10 mg by mouth daily        traZODone (DESYREL) 50 MG tablet Take 25 mg by mouth At Bedtime        Vitamin D, Cholecalciferol, 1000 UNITS TABS Take 3,000 Units by mouth daily            REVIEW OF SYSTEMS:  Limited secondary to cognitive impairment. Positives as noted above     Objective:  BP 95/54   Pulse 90   Temp 97.8  F (36.6  C)   Resp 18   Wt 75.6 kg (166 lb 9.6 oz)   SpO2 98%   BMI 22.60 kg/m    Exam:  GENERAL APPEARANCE:  Alert, in no distress  ENT:  [place], oropharynx clear  EYES:  EOM normal, conjunctiva and lids normal  NECK: no adenopathy,masses or thyromegaly  RESP:  lungs clear to auscultation , no respiratory distress  CV:  regular rate and rhythm,2/6  murmur, peripheral edema trace+ in both LE  ABDOMEN:  soft, non-tender, no distension, no masses  M/S:   wheelchair. Kyphosis. CHIU with good strength. No erythema, edema or warmth of left ankle and foot.   SKIN: no rashes or open areas  PSYCH:  oriented to self, place, situation, insight and judgement impaired, memory impaired, mood normal       Labs:   Recent labs in [facility] reviewed by me today.     ASSESSMENT / PLAN:  (M25.572) Pain in  joint, ankle and foot, left  (primary encounter diagnosis)  (M89.49) Primary osteoarthritis involving multiple joint   (W19.XXXD) Fall, subsequent encounter  Comment: osteoarthritis and stenosis on XR per Ortho and reportedly the plan is for steroid injection. Diclofenac gel was started 10/26/2021.   Plan: continue tylenol and diclofenac gel. Ortho follow up as scheduled.     (G47.00) Insomnia, unspecified type  (R41.89) Cognitive impairment  Comment: progressive decline in cognition and functional status consistent with dementia. He has repeatedly reported poor sleep, but this is not confirmed by staff. Today he reports sleeping well. Behavior and mood have worsened recently-question if this is related to trazodone, which is his only new med.   Plan: decrease trazodone to 25 mg at HS. Increase mirtazapine to 15 mg HS. Daughter agrees with this plan.         Total time spent with patient visit at the skilled nursing facility was 35 min including patient visit, review of past records and phone call to patient contact. Greater than 50% of total time spent with counseling and coordinating care due to pain, declining cognition and plan of care. Coordinating medication orders and follow up appts with facility staff. Counseling daughter re: dementia, medications, potential side effects and upcoming care conference.       Electronically signed by:  ERIC Capone CNP

## 2021-10-29 NOTE — LETTER
"    10/29/2021        RE: Jama Paul  [address]  [address]  [facility] [address]        [facility]  [place] Medical Record Number:  [id number]  Place of Service where encounter took place:  [facility] ([facility]) [[id number]]  Chief Complaint   Patient presents with     RECHECK       HPI:    Jama Paul  is a 89 year old (2/13/1931), who is being seen today for an episodic care visit.  HPI information obtained from: facility chart records, facility staff, patient report, [facility] chart review and family/first contact daughter report.   He admitted to this facility in 3/2020, initially to tcu and then transferred to long term care. [place]   Medical history is significant for diastolic CHF, cardiomyopathy, afib, valvular disease, ITP, anemia, macular degeneration, CKD stage 3.      Today's concern is:     Pain in joint, ankle and foot, left  Primary osteoarthritis involving multiple joints  Fall, subsequent encounter  Insomnia, unspecified type  Cognitive impairment   He is seen today to follow up on left ankle and foot pain. He fell the morning of 10/26/2021 but reported that pain started several days prior. XR done onsite that day showed no bony abnormalities. He saw [facility] on 10/26/2021 and daughter reports XR there showed osteoarthritis and stenosis of his foot. He is scheduled to see a foot specialist.   He is pleasant and talkative today. Denies pain of any type, including his left foot and ankle. \"It's much better.\" Reports he's sleeping well.   He's had increased behavior issues and declining cognition in recent months and is frequently irritable. Additionally, nurses have found bottles of tylenol and tylenol pm in his room. It's unclear how much, if any, of this he has been using.   Wheelchair bound. Able to ambulate short distances with walker and assist. Requires assist of 1 with transfers and cares.       Past " Medical and Surgical History reviewed in Epic today.    MEDICATIONS:    Current Outpatient Medications   Medication Sig Dispense Refill     acetaminophen (TYLENOL) 500 MG tablet Take 1,000 mg by mouth 3 times daily       diclofenac (VOLTAREN) 1 % topical gel Apply 4 g topically 4 times daily Left ankle       digoxin (LANOXIN) 125 MCG tablet Take 1 tablet (125 mcg) by mouth daily (Patient taking differently: Take 125 mcg by mouth daily HOLD if HR <60) 30 tablet 0     mirtazapine (REMERON) 15 MG tablet Take 15 mg by mouth At Bedtime        Multiple Vitamins-Minerals (ICAPS AREDS FORMULA PO) Take 1 tablet by mouth daily Takes in addition to multivitamin with minerals       multivitamin, therapeutic (THERA-VIT) TABS tablet Take 1 tablet by mouth daily       potassium chloride ER (K-TAB) 20 MEQ CR tablet Take 20 mEq by mouth three times a week Daily every Monday, Wed and Friday       torsemide (DEMADEX) 10 MG tablet Take 10 mg by mouth daily        traZODone (DESYREL) 50 MG tablet Take 25 mg by mouth At Bedtime        Vitamin D, Cholecalciferol, 1000 UNITS TABS Take 3,000 Units by mouth daily            REVIEW OF SYSTEMS:  Limited secondary to cognitive impairment. Positives as noted above     Objective:  BP 95/54   Pulse 90   Temp 97.8  F (36.6  C)   Resp 18   Wt 75.6 kg (166 lb 9.6 oz)   SpO2 98%   BMI 22.60 kg/m    Exam:  GENERAL APPEARANCE:  Alert, in no distress  ENT:  [place], oropharynx clear  EYES:  EOM normal, conjunctiva and lids normal  NECK: no adenopathy,masses or thyromegaly  RESP:  lungs clear to auscultation , no respiratory distress  CV:  regular rate and rhythm,2/6  murmur, peripheral edema trace+ in both LE  ABDOMEN:  soft, non-tender, no distension, no masses  M/S:   wheelchair. Kyphosis. CHIU with good strength. No erythema, edema or warmth of left ankle and foot.   SKIN: no rashes or open areas  PSYCH:  oriented to self, place, situation, insight and judgement impaired, memory impaired, mood normal        Labs:   Recent labs in [facility] reviewed by me today.     ASSESSMENT / PLAN:  (M25.572) Pain in joint, ankle and foot, left  (primary encounter diagnosis)  (M89.49) Primary osteoarthritis involving multiple joint   (W19.XXXD) Fall, subsequent encounter  Comment: osteoarthritis and stenosis on XR per Ortho and reportedly the plan is for steroid injection. Diclofenac gel was started 10/26/2021.   Plan: continue tylenol and diclofenac gel. Ortho follow up as scheduled.     (G47.00) Insomnia, unspecified type  (R41.89) Cognitive impairment  Comment: progressive decline in cognition and functional status consistent with dementia. He has repeatedly reported poor sleep, but this is not confirmed by staff. Today he reports sleeping well. Behavior and mood have worsened recently-question if this is related to trazodone, which is his only new med.   Plan: decrease trazodone to 25 mg at HS. Increase mirtazapine to 15 mg HS. Daughter agrees with this plan.         Total time spent with patient visit at the skilled nursing facility was 35 min including patient visit, review of past records and phone call to patient contact. Greater than 50% of total time spent with counseling and coordinating care due to pain, declining cognition and plan of care. Coordinating medication orders and follow up appts with facility staff. Counseling daughter re: dementia, medications, potential side effects and upcoming care conference.       Electronically signed by:  ERIC Capone CNP                 Sincerely,        ERIC Capone CNP

## 2021-11-09 NOTE — PROGRESS NOTES
[facility]  [place] Medical Record Number:  [id number]  Place of Service where encounter took place:  [facility] ([facility]) [[id number]]  Chief Complaint   Patient presents with     RECHECK       HPI:    Jama Paul  is a 89 year old (2/13/1931), who is being seen today for an episodic care visit.  HPI information obtained from: facility chart records, facility staff, patient report and [facility] chart review.   He admitted to this facility in 3/2020, initially to tcu and then transferred to long term care. [place]   Medical history is significant for diastolic CHF, cardiomyopathy, afib, valvular disease, ITP, anemia, macular degeneration, CKD stage 3.      Today's concern is:     Pain in joint, ankle and foot, left  Primary osteoarthritis involving multiple joints  Insomnia, unspecified type  Cognitive impairment  Bilateral leg edema  Abrasion of anterior lower leg, left, subsequent encounter  Fall, subsequent encounter   He is seen today to follow up on an abrasion of his left shin after a recent fall. He had a steroid injection to his left foot/ankle 10/29/2021 by [facility]. Reports pain has improved. Denies pain of the abrasion. History of multiple falls and abrasions of his extremities. Bleeds and bruises easily due to MDS. His only concern today is insomnia. Reports he is awake at 3 am every night. This has not been confirmed by staff.   Wheelchair bound. Able to ambulate short distances with walker and assist. Requires assist of 1 with transfers and cares.     Past Medical and Surgical History reviewed in Epic today.    MEDICATIONS:    Current Outpatient Medications   Medication Sig Dispense Refill     melatonin 5 MG tablet Take 5 mg by mouth nightly as needed for sleep       acetaminophen (TYLENOL) 500 MG tablet Take 1,000 mg by mouth 3 times daily       diclofenac (VOLTAREN) 1 % topical gel Apply 4 g topically 4 times daily Left ankle        digoxin (LANOXIN) 125 MCG tablet Take 1 tablet (125 mcg) by mouth daily (Patient taking differently: Take 125 mcg by mouth daily HOLD if HR <60) 30 tablet 0     mirtazapine (REMERON) 15 MG tablet Take 15 mg by mouth At Bedtime        Multiple Vitamins-Minerals (ICAPS AREDS FORMULA PO) Take 1 tablet by mouth daily Takes in addition to multivitamin with minerals       multivitamin, therapeutic (THERA-VIT) TABS tablet Take 1 tablet by mouth daily       potassium chloride ER (K-TAB) 20 MEQ CR tablet Take 20 mEq by mouth three times a week Daily every Monday, Wed and Friday       torsemide (DEMADEX) 10 MG tablet Take 10 mg by mouth daily        traZODone (DESYREL) 50 MG tablet Take 25 mg by mouth At Bedtime        Vitamin D, Cholecalciferol, 1000 UNITS TABS Take 3,000 Units by mouth daily            REVIEW OF SYSTEMS:  4 point ROS including Respiratory, CV, GI and , other than that noted in the HPI,  is negative    Objective:  /62   Pulse 76   Temp 97  F (36.1  C)   Resp 20   Wt 68 kg (150 lb)   SpO2 99%   BMI 20.34 kg/m    Exam:  GENERAL APPEARANCE:  Alert, in no distress, frail appearing   ENT:  [place], oropharynx clear  EYES:  EOM normal, conjunctiva and lids normal  NECK: no adenopathy,masses or thyromegaly  RESP:  lungs clear to auscultation , no respiratory distress  CV:  regular rate and rhythm,2/6  murmur, rub, +2 pedal pulses, peripheral edema trace+ in both LE  ABDOMEN:  soft, non-tender, no distension, no masses  M/S:   wheelchair. Kyphosis. CHIU with good strength. No erythema, edema or warmth of left ankle and foot.   SKIN:  multiple bruises on all extremities. Superficial open area left shin clean, dry, partial granulation, no erythema.  No rashes  PSYCH:  oriented to self, place, situation, insight and judgement impaired, memory impaired, mood normal     Labs:   Recent labs in [facility] reviewed by me today.     ASSESSMENT / PLAN:  (M25.391) Pain in joint, ankle and foot, left  (primary encounter  diagnosis)  (M89.49) Primary osteoarthritis involving multiple joints  Comment: pain has improved. He received a steroid injection 10/29/2021 (which he does not recall).   Plan: continue tylenol and diclofenac gel. Ortho follow up prn.     (G47.00) Insomnia, unspecified type  Comment: trazodone 25 mg was started 10/5/2021, dose was subsequently increased to 50 mg then back to 25 mg due to poor cognition and irritability. He continues to report poor sleep, which is not confirmed by staff. He would like to take something when he wakes at 3am.   Plan: restart melatonin prn-may take anytime during the night if he requests. Continue trazodone and mirtazapine.     (R41.89) Cognitive impairment  Comment: progressive decline in cognition in recent months. Insight and judgement are poor. He's been more irritable with staff, but was very pleasant today. He was not cooperative with cognitive testing. Mirtazapine was increased 10/26/2021 for mood and sleep   Note: staff has been finding OTC meds in his room that we believe his wife is bringing in. Family was educated at a recent care conference but a bottle of Excedrin was found in his room again today.   Plan: continue mirtazapine.Staff to assist with all cares in the long term care setting.     (R60.0) Bilateral leg edema  Comment: chronic, minimal edema today   Plan: continue compression. Encourage elevation     (S80.812D) Abrasion of anterior lower leg, left, subsequent encounter  (W19.XXXD) Fall, subsequent encounter  Comment: wound is healing without signs of infection   Plan: continue wound care with nonstick dressing.     Message left updating his daughter Lorraine Guzman.       Electronically signed by:  ERIC Capone CNP

## 2021-11-09 NOTE — LETTER
11/9/2021        RE: Jama Paul  [address]  [address]  [facility] [address]        [facility]  [place] Medical Record Number:  [id number]  Place of Service where encounter took place:  [facility] ([facility]) [[id number]]  Chief Complaint   Patient presents with     RECHECK       HPI:    Jama Paul  is a 89 year old (2/13/1931), who is being seen today for an episodic care visit.  HPI information obtained from: facility chart records, facility staff, patient report and [facility] chart review.   He admitted to this facility in 3/2020, initially to tcu and then transferred to long term care. [place]   Medical history is significant for diastolic CHF, cardiomyopathy, afib, valvular disease, ITP, anemia, macular degeneration, CKD stage 3.      Today's concern is:     Pain in joint, ankle and foot, left  Primary osteoarthritis involving multiple joints  Insomnia, unspecified type  Cognitive impairment  Bilateral leg edema  Abrasion of anterior lower leg, left, subsequent encounter  Fall, subsequent encounter   He is seen today to follow up on an abrasion of his left shin after a recent fall. He had a steroid injection to his left foot/ankle 10/29/2021 by [facility]. Reports pain has improved. Denies pain of the abrasion. History of multiple falls and abrasions of his extremities. Bleeds and bruises easily due to MDS. His only concern today is insomnia. Reports he is awake at 3 am every night. This has not been confirmed by staff.   Wheelchair bound. Able to ambulate short distances with walker and assist. Requires assist of 1 with transfers and cares.     Past Medical and Surgical History reviewed in Epic today.    MEDICATIONS:    Current Outpatient Medications   Medication Sig Dispense Refill     melatonin 5 MG tablet Take 5 mg by mouth nightly as needed for sleep       acetaminophen (TYLENOL) 500 MG tablet Take 1,000 mg by  mouth 3 times daily       diclofenac (VOLTAREN) 1 % topical gel Apply 4 g topically 4 times daily Left ankle       digoxin (LANOXIN) 125 MCG tablet Take 1 tablet (125 mcg) by mouth daily (Patient taking differently: Take 125 mcg by mouth daily HOLD if HR <60) 30 tablet 0     mirtazapine (REMERON) 15 MG tablet Take 15 mg by mouth At Bedtime        Multiple Vitamins-Minerals (ICAPS AREDS FORMULA PO) Take 1 tablet by mouth daily Takes in addition to multivitamin with minerals       multivitamin, therapeutic (THERA-VIT) TABS tablet Take 1 tablet by mouth daily       potassium chloride ER (K-TAB) 20 MEQ CR tablet Take 20 mEq by mouth three times a week Daily every Monday, Wed and Friday       torsemide (DEMADEX) 10 MG tablet Take 10 mg by mouth daily        traZODone (DESYREL) 50 MG tablet Take 25 mg by mouth At Bedtime        Vitamin D, Cholecalciferol, 1000 UNITS TABS Take 3,000 Units by mouth daily            REVIEW OF SYSTEMS:  4 point ROS including Respiratory, CV, GI and , other than that noted in the HPI,  is negative    Objective:  /62   Pulse 76   Temp 97  F (36.1  C)   Resp 20   Wt 68 kg (150 lb)   SpO2 99%   BMI 20.34 kg/m    Exam:  GENERAL APPEARANCE:  Alert, in no distress, frail appearing   ENT:  [place], oropharynx clear  EYES:  EOM normal, conjunctiva and lids normal  NECK: no adenopathy,masses or thyromegaly  RESP:  lungs clear to auscultation , no respiratory distress  CV:  regular rate and rhythm,2/6  murmur, rub, +2 pedal pulses, peripheral edema trace+ in both LE  ABDOMEN:  soft, non-tender, no distension, no masses  M/S:   wheelchair. Kyphosis. CHIU with good strength. No erythema, edema or warmth of left ankle and foot.   SKIN:  multiple bruises on all extremities. Superficial open area left shin clean, dry, partial granulation, no erythema.  No rashes  PSYCH:  oriented to self, place, situation, insight and judgement impaired, memory impaired, mood normal     Labs:   Recent labs in [facility]  reviewed by me today.     ASSESSMENT / PLAN:  (M25.572) Pain in joint, ankle and foot, left  (primary encounter diagnosis)  (M89.49) Primary osteoarthritis involving multiple joints  Comment: pain has improved. He received a steroid injection 10/29/2021 (which he does not recall).   Plan: continue tylenol and diclofenac gel. Ortho follow up prn.     (G47.00) Insomnia, unspecified type  Comment: trazodone 25 mg was started 10/5/2021, dose was subsequently increased to 50 mg then back to 25 mg due to poor cognition and irritability. He continues to report poor sleep, which is not confirmed by staff. He would like to take something when he wakes at 3am.   Plan: restart melatonin prn-may take anytime during the night if he requests. Continue trazodone and mirtazapine.     (R41.89) Cognitive impairment  Comment: progressive decline in cognition in recent months. Insight and judgement are poor. He's been more irritable with staff, but was very pleasant today. He was not cooperative with cognitive testing. Mirtazapine was increased 10/26/2021 for mood and sleep   Note: staff has been finding OTC meds in his room that we believe his wife is bringing in. Family was educated at a recent care conference but a bottle of Excedrin was found in his room again today.   Plan: continue mirtazapine.Staff to assist with all cares in the long term care setting.     (R60.0) Bilateral leg edema  Comment: chronic, minimal edema today   Plan: continue compression. Encourage elevation     (S80.812D) Abrasion of anterior lower leg, left, subsequent encounter  (W19.XXXD) Fall, subsequent encounter  Comment: wound is healing without signs of infection   Plan: continue wound care with nonstick dressing.     Message left updating his daughter Lorraine Guzman.       Electronically signed by:  ERIC Capone CNP                 Sincerely,        ERIC Capone CNP

## 2021-11-30 NOTE — PROGRESS NOTES
"[facility]  [place] Medical Record Number:  [id number]  Place of Service where encounter took place:  [facility] ([facility]) [[id number]]  Chief Complaint   Patient presents with     RECHECK       HPI:    Jama Paul  is a 89 year old (2/13/1931), who is being seen today for an episodic care visit.  HPI information obtained from: facility chart records, facility staff, patient report and [facility] chart review.  He admitted to this facility in 3/2020, initially to tcu and then transferred to long term care. [place]   Medical history is significant for diastolic CHF, cardiomyopathy, afib, valvular disease, ITP, anemia, macular degeneration, CKD stage 3.     Today's concern is:     Hip pain, right  Primary osteoarthritis involving multiple joints  Pain in joint, ankle and foot, left  Insomnia, unspecified type  Cognitive impairment  Abrasion of anterior lower leg, left, subsequent encounter  He is seen today due to ongoing issues with insomnia and pain. He reports pain of his right hip and pelvis wakes him every night at 3 am and he's unable to fall back to sleep. Reports he's had the pain for 3 months. This is the first he has reported right hip pain to anyone, including his daughter. He reports the pain of his left ankle and foot \"is completely gone.\"  He's very repetitive  and focused on insomnia and pain meds.  Nursing staff reports that he awakens when they change his incontinent brief, otherwise sleeps all night. Nurses continue to find bottles of OTC pain meds in his room that have been brought in by his wife and son.   He is wheelchair bound, but ambulates short distances with walker and assist. Requires assist of 1 with transfers and cares. Incontinent of bowel and bladder for many years.       Past Medical and Surgical History reviewed in Epic today.    MEDICATIONS:    Current Outpatient Medications   Medication Sig Dispense Refill     " traMADol (ULTRAM) 50 MG tablet Take 50 mg by mouth daily as needed for severe pain One tablet during the night prn pain       acetaminophen (TYLENOL) 500 MG tablet Take 1,000 mg by mouth 3 times daily       diclofenac (VOLTAREN) 1 % topical gel Apply 4 g topically 4 times daily Left ankle       digoxin (LANOXIN) 125 MCG tablet Take 1 tablet (125 mcg) by mouth daily (Patient taking differently: Take 125 mcg by mouth daily HOLD if HR <60) 30 tablet 0     melatonin 5 MG tablet Take 5 mg by mouth nightly as needed for sleep       mirtazapine (REMERON) 15 MG tablet Take 15 mg by mouth At Bedtime        Multiple Vitamins-Minerals (ICAPS AREDS FORMULA PO) Take 1 tablet by mouth daily Takes in addition to multivitamin with minerals       multivitamin, therapeutic (THERA-VIT) TABS tablet Take 1 tablet by mouth daily       potassium chloride ER (K-TAB) 20 MEQ CR tablet Take 20 mEq by mouth three times a week Daily every Monday, Wed and Friday       torsemide (DEMADEX) 10 MG tablet Take 10 mg by mouth daily        traZODone (DESYREL) 50 MG tablet Take 25 mg by mouth At Bedtime        Vitamin D, Cholecalciferol, 1000 UNITS TABS Take 3,000 Units by mouth daily            REVIEW OF SYSTEMS:  4 point ROS including Respiratory, CV, GI and , other than that noted in the HPI,  is negative    Objective:  BP (!) 152/61   Pulse 116   Temp 97.9  F (36.6  C)   Resp 18   Wt 68.4 kg (150 lb 12.8 oz)   SpO2 99%   BMI 20.45 kg/m    Exam:  GENERAL APPEARANCE:  Alert, in no distress, frail appearing   ENT:  [place], oropharynx clear  EYES:  EOM normal, conjunctiva and lids normal  NECK: no adenopathy,masses or thyromegaly  RESP:  lungs clear to auscultation , no respiratory distress  CV:  regular rate and rhythm, 2/6 murmur, +2 pedal pulses, peripheral edema trace+ in both LE  ABDOMEN:  soft, non-tender, no distension, no masses  M/S:   wheelchair. Kyphosis. CHIU with good strength. Full ROM RLE, no tenderness to palpation right hip.    SKIN:  multiple bruises on all extremities. Superficial open area left shin with granulation, no erythema.   PSYCH:  oriented to self, place, situation, insight and judgement impaired, memory impaired, mood normal     Labs:   Recent labs in [facility] reviewed by me today.     ASSESSMENT / PLAN:  (M25.551) Hip pain, right  (primary encounter diagnosis)  (M89.49) Primary osteoarthritis involving multiple joints  Comment: today he reports right hip and pelvis pain for the past 3 months. No recent falls or injury. Able to stand for transfers and ambulate short distances, per usual. Difficult to assess pain due to his poor cognition.  He is becoming increasingly anxious and agitated re: pain meds and not sleeping. Spoke with his daughter Lorraine Gzuman and this is also the first she has heard about right hip and pelvis pain.We discussed a trial of tramadol prn during the night and she is agreeable.   Plan: start tramadol 50 mg po X 1 dose during the night prn pain. Continue tylenol. Daughter is addressing the issue of family members bringing in OTC pain meds.     (M25.572) Pain in joint, ankle and foot, left  Comment: s/p steroid injection by Ortho 10/29/2021. Pain has improved  Plan: continue diclofenac gel, tylenol. Tramadol as above.     (G47.00) Insomnia, unspecified type  Comment: he perseverates on poor sleep, though staff reports he sleeps well. Trazodone 25 mg was started 10/5/2021, dose was subsequently increased to 50 mg then back to 25 mg due to increased irritability and confusion.   Plan: continue trazodone, melatonin  and mirtazapine.     (R41.89) Cognitive impairment  Comment: declining cognition over the past several months with poor insight. He is pleasant today, though very repetitive. He was not cooperative with recent cognitive testing.   Plan: staff to assist with all cares. Continue mirtazapine for mood and sleep.     (S80.906Y) Abrasion of anterior lower leg, left, subsequent encounter  Comment:  healing without sings of infection   Plan: continue wound care         Total time spent with patient visit at the skilled nursing facility was 40 mins including patient visit, review of past records and phone call with daughter. Greater than 50% of total time spent with counseling and coordinating care due to pain, insomnia, progressive dementia.. Coordinating the following with facility staff: medication orders, pain management, plan of care. Counseling patient and daughter re: pain management, potential side effects of tramadol. Counseling daughter re: progressive nature of dementia and concerns re: family bringing in OTC meds.       Electronically signed by:  ERIC Capone CNP

## 2021-11-30 NOTE — LETTER
"    11/30/2021        RE: Jama Paul  [address]  [address]  [facility] [address]        [facility]  [facility] Record Number:  [id number]  [facility] [[id number]]  Chief Complaint   Patient presents with     RECHECK       HPI:    Jama Paul  is a 89 year old (2/13/1931), who is being seen today for an episodic care visit.  HPI information obtained from: facility chart records, facility staff, patient report and [facility] chart review.  He admitted to this facility in 3/2020, initially to tcu and then transferred to long term care. [place] continues [place]   Medical history is significant for diastolic CHF, cardiomyopathy, afib, valvular disease, ITP, anemia, macular degeneration, CKD stage 3.     Today's concern is:     Hip pain, right  Primary osteoarthritis involving multiple joints  Pain in joint, ankle and foot, left  Insomnia, unspecified type  Cognitive impairment  Abrasion of anterior lower leg, left, subsequent encounter  He is seen today due to ongoing issues with insomnia and pain. He reports pain of his right hip and pelvis wakes him every night at 3 am and he's unable to fall back to sleep. Reports he's had the pain for 3 months. This is the first he has reported right hip pain to anyone, including his daughter. He reports the pain of his left ankle and foot \"is completely gone.\"  He's very repetitive  and focused on insomnia and pain meds.  Nursing staff reports that he awakens when they change his incontinent brief, otherwise sleeps all night. Nurses continue to find bottles of OTC pain meds in his room that have been brought in by his wife and son.   He is wheelchair bound, but ambulates short distances with walker and assist. Requires assist of 1 with transfers and cares. Incontinent of bowel and bladder for many years.       Past Medical and Surgical History " reviewed in Epic today.    MEDICATIONS:    Current Outpatient Medications   Medication Sig Dispense Refill     traMADol (ULTRAM) 50 MG tablet Take 50 mg by mouth daily as needed for severe pain One tablet during the night prn pain       acetaminophen (TYLENOL) 500 MG tablet Take 1,000 mg by mouth 3 times daily       diclofenac (VOLTAREN) 1 % topical gel Apply 4 g topically 4 times daily Left ankle       digoxin (LANOXIN) 125 MCG tablet Take 1 tablet (125 mcg) by mouth daily (Patient taking differently: Take 125 mcg by mouth daily HOLD if HR <60) 30 tablet 0     melatonin 5 MG tablet Take 5 mg by mouth nightly as needed for sleep       mirtazapine (REMERON) 15 MG tablet Take 15 mg by mouth At Bedtime        Multiple Vitamins-Minerals (ICAPS AREDS FORMULA PO) Take 1 tablet by mouth daily Takes in addition to multivitamin with minerals       multivitamin, therapeutic (THERA-VIT) TABS tablet Take 1 tablet by mouth daily       potassium chloride ER (K-TAB) 20 MEQ CR tablet Take 20 mEq by mouth three times a week Daily every Monday, Wed and Friday       torsemide (DEMADEX) 10 MG tablet Take 10 mg by mouth daily        traZODone (DESYREL) 50 MG tablet Take 25 mg by mouth At Bedtime        Vitamin D, Cholecalciferol, 1000 UNITS TABS Take 3,000 Units by mouth daily            REVIEW OF SYSTEMS:  4 point ROS including Respiratory, CV, GI and , other than that noted in the HPI,  is negative    Objective:  BP (!) 152/61   Pulse 116   Temp 97.9  F (36.6  C)   Resp 18   Wt 68.4 kg (150 lb 12.8 oz)   SpO2 99%   BMI 20.45 kg/m    Exam:  GENERAL APPEARANCE:  Alert, in no distress, frail appearing   ENT:  [place], oropharynx clear  EYES:  EOM normal, conjunctiva and lids normal  NECK: no adenopathy,masses or thyromegaly  RESP:  lungs clear to auscultation , no respiratory distress  CV:  regular rate and rhythm, 2/6 murmur, +2 pedal pulses, peripheral edema trace+ in both LE  ABDOMEN:  soft, non-tender, no distension, no  masses  M/S:   wheelchair. Kyphosis. CHIU with good strength. Full ROM RLE, no tenderness to palpation right hip.   SKIN:  multiple bruises on all extremities. Superficial open area left shin with granulation, no erythema.   PSYCH:  oriented to self, place, situation, insight and judgement impaired, memory impaired, mood normal     Labs:   Recent labs in [facility] reviewed by me today.     ASSESSMENT / PLAN:  (M25.551) Hip pain, right  (primary encounter diagnosis)  (M89.49) Primary osteoarthritis involving multiple joints  Comment: today he reports right hip and pelvis pain for the past 3 months. No recent falls or injury. Able to stand for transfers and ambulate short distances, per usual. Difficult to assess pain due to his poor cognition.  He is becoming increasingly anxious and agitated re: pain meds and not sleeping. Spoke with his daughter Lorraine Guzman and this is also the first she has heard about right hip and pelvis pain.We discussed a trial of tramadol prn during the night and she is agreeable.   Plan: start tramadol 50 mg po X 1 dose during the night prn pain. Continue tylenol. Daughter is addressing the issue of family members bringing in OTC pain meds.     (M25.572) Pain in joint, ankle and foot, left  Comment: s/p steroid injection by Ortho 10/29/2021. Pain has improved  Plan: continue diclofenac gel, tylenol. Tramadol as above.     (G47.00) Insomnia, unspecified type  Comment: he perseverates on poor sleep, though staff reports he sleeps well. Trazodone 25 mg was started 10/5/2021, dose was subsequently increased to 50 mg then back to 25 mg due to increased irritability and confusion.   Plan: continue trazodone, melatonin  and mirtazapine.     (R41.89) Cognitive impairment  Comment: declining cognition over the past several months with poor insight. He is pleasant today, though very repetitive. He was not cooperative with recent cognitive testing.   Plan: staff to assist with all cares. Continue  mirtazapine for mood and sleep.     (U89.412P) Abrasion of anterior lower leg, left, subsequent encounter  Comment: healing without sings of infection   Plan: continue wound care         Total time spent with patient visit at the [facility] nursing facility was 40 mins including patient visit, review of past records and phone call with daughter. Greater than 50% of total time spent with counseling and coordinating care due to pain, insomnia, progressive dementia.. Coordinating the following with facility staff: medication orders, pain management, plan of care. Counseling patient and daughter re: pain management, potential side effects of tramadol. Counseling daughter re: progressive nature of dementia and concerns re: family bringing in OTC meds.       Electronically signed by:  ERIC Capone CNP                Sincerely,        ERIC Capone CNP

## 2021-12-08 NOTE — TELEPHONE ENCOUNTER
[facility]iatric[facility] Lab Note     Provider: ERIC Hooker CNP   Facility: [facility] Facility Type:  [place]    No Known Allergies    Labs Reviewed by provider: Xray of hip and left femur d/t bruising and pain negative for acute Fx or dislocation.     Verbal Order/Direction given by Provider: NNO    Provider giving Order:  ERIC Murphy CNP      Verbal Order given to: Cheli Clancy RN

## 2021-12-08 NOTE — TELEPHONE ENCOUNTER
[facility] Nurse Triage Telephone Note    Provider: ERIC Hooker CNP   Facility: [facility] Facility Type:  [facility]    Caller: Loretta  Call Back Number: [phone]    Allergies:  No Known Allergies     Reason for call: Nurse called to report that patient has fresh bruising to his left hip that extends to his lower back.  Patient denies falling and last noted fall was on 11/2.  Patient is having pain in the left hip when standing and during repositioning.  Patient went out with family on Sunday to [place] but nothing was reported if patient fell.      Verbal Order/Direction given by Provider: STAT x-ray of left hip and femur.  STAT CBC with platelets and BMP.    Provider giving Order:  ERIC Murphy CNP      Verbal Order given to: Loretta Arrieta RN

## 2021-12-13 PROBLEM — R41.0 CONFUSION: Status: ACTIVE | Noted: 2021-01-01

## 2021-12-13 PROBLEM — J96.01 ACUTE RESPIRATORY FAILURE WITH HYPOXIA (H): Status: ACTIVE | Noted: 2021-01-01

## 2021-12-13 PROBLEM — D62 ACUTE POSTHEMORRHAGIC ANEMIA: Status: ACTIVE | Noted: 2021-01-01

## 2021-12-13 PROBLEM — J18.9 PNEUMONIA DUE TO INFECTIOUS ORGANISM, UNSPECIFIED LATERALITY, UNSPECIFIED PART OF LUNG: Status: ACTIVE | Noted: 2021-01-01

## 2021-12-13 PROBLEM — T14.8XXA HEMATOMA OF SKIN: Status: ACTIVE | Noted: 2021-01-01

## 2021-12-13 NOTE — ED TRIAGE NOTES
Pt dx with pneumonia. Facility called for increased weakness and confusion x2 days as well as low hgb at 6.7.  EMS found pt to have O2 in the 50s, hypotensive

## 2021-12-13 NOTE — ED NOTES
Bed: ED25  Expected date: 12/13/21  Expected time: 5:08 PM  Means of arrival:   Comments:  Hems 449 87 female seizures, rule out CVA-

## 2021-12-13 NOTE — TELEPHONE ENCOUNTER
Nurse from [facility] called this am reporting patient with acute change in status with fever to 101 and O2 sats in the mid 80s. Diminished lung sounds on exam. O2 sat >90% on 2-4 L of supplemental O2. Nurse reports  increased weakness, inability to stand, increased confusion.  CXR obtained and shows extensive right sided infiltrates and small bilateral pleural effusions.   He was given a dose of ceftriaxone IM and Augmentin was ordered.     Nurse called back this afternoon with lab results,critical Hgb 6.7. No bleeding noted by staff.   He has a history of MDS with baseline Hgb in the mid 9 range.     Spoke with his daughter Lorraine Guzman (who is currently out of the country). Will send to ED for further evaluation and treatment.       Fox REYES CNP

## 2021-12-13 NOTE — ED PROVIDER NOTES
History   Chief Complaint:  Weakness      HPI   History supplemented by electronic chart review including Care Everywhere  History limited due to patient condition and poor historian    Jama Paul is a 89 year old male who presents by EMS for evaluation of weakness as well as some confusion over the last few days.  He was recently diagnosed with pneumonia on unclear grounds, and was given an intramuscular injection of Rocephin earlier this afternoon at approximately 4 PM.  Labs were drawn at his care facility at 1245 today, with results showing an anion gap of 18, creatinine 1.64, glucose 94, white blood cell count of 26,000, hemoglobin 6.7, and platelets of 76.  He is apparently full code according to documentation, and his daughter is in [place] with limited cell phone access.  Patient himself is unsure why he is here and unable to provide any meaningful history about his current condition.  His only request is for something to drink.    Review of records through Care Everywhere shows that on October 27, his creatinine was 1.15 and his hemoglobin was 8.4, WBC 5.2, and PLT     Review of Systems   History limited by poor historian    Allergies:  No Known Allergies     Medications:    acetaminophen (TYLENOL) 500 MG tablet  amoxicillin-clavulanate (AUGMENTIN) 875-125 MG tablet  cefTRIAXone-lidocaine (PF) injection  diclofenac (VOLTAREN) 1 % topical gel  digoxin (LANOXIN) 125 MCG tablet  melatonin 5 MG tablet  mirtazapine (REMERON) 15 MG tablet  Multiple Vitamins-Minerals (ICAPS AREDS FORMULA PO)  multivitamin, therapeutic (THERA-VIT) TABS tablet  potassium chloride ER (K-TAB) 20 MEQ CR tablet  torsemide (DEMADEX) 10 MG tablet  traMADol (ULTRAM) 50 MG tablet  traZODone (DESYREL) 50 MG tablet  Vitamin D, Cholecalciferol, 1000 UNITS TABS        Past Medical History:    Past Medical History:   Diagnosis Date     Congestive heart failure (H)      Elevated PSA      Eye hemorrhage, left 02/2019     Non-ischemic  cardiomyopathy (H)      Permanent atrial fibrillation (H) 2011     Thrombocytopenia (H)      Unspecified essential hypertension        Patient Active Problem List    Diagnosis Date Noted     Acute posthemorrhagic anemia 12/13/2021     Priority: Medium     Confusion 12/13/2021     Priority: Medium     Acute respiratory failure with hypoxia (H) 12/13/2021     Priority: Medium     Hematoma of skin 12/13/2021     Priority: Medium     Pneumonia due to infectious organism, unspecified laterality, unspecified part of lung 12/13/2021     Priority: Medium     Chronic kidney disease, stage 3 (H) 02/26/2021     Priority: Medium     Cradle cap 12/16/2020     Priority: Medium     Fall, subsequent encounter 11/13/2020     Priority: Medium     Skin lesion of face 10/07/2020     Priority: Medium     Hypotension due to drugs 08/19/2020     Priority: Medium     Routine general medical examination at a health care facility:(Annual 8/11/2020) 08/12/2020     Priority: Medium     Shortness of breath 05/22/2020     Priority: Medium     History of recent fall 03/25/2020     Priority: Medium     Atelectasis, right 03/18/2020     Priority: Medium     Acute cystitis without hematuria: Citrobacter 03/03/2020     Priority: Medium     Other myelodysplastic syndromes (H) 03/03/2020     Priority: Medium     Bicytopenia 03/03/2020     Priority: Medium     Acute on chronic heart failure with preserved ejection fraction (H) 03/03/2020     Priority: Medium     Weakness 02/27/2020     Priority: Medium     Mitral valve insufficiency, unspecified etiology 02/04/2020     Priority: Medium     Acute on chronic diastolic heart failure due to valvular disease (H) 01/29/2020     Priority: Medium     Anemia in other chronic diseases classified elsewhere 01/29/2020     Priority: Medium     HCAP (healthcare-associated pneumonia) 01/11/2020     Priority: Medium     Infiltrate of lower lobe of right lung present on imaging study 01/08/2020     Priority: Medium      Insomnia, unspecified type 11/22/2019     Priority: Medium     Bowel and bladder incontinence 11/22/2019     Priority: Medium     Status post thoracentesis 11/19/2019     Priority: Medium     Nonrheumatic tricuspid valve regurgitation 11/19/2019     Priority: Medium     Physical deconditioning 11/19/2019     Priority: Medium     Pleural effusion 10/30/2019     Priority: Medium     Bilateral pleural effusion 10/30/2019     Priority: Medium     Chronic diastolic heart failure (H) 10/30/2019     Priority: Medium     Added automatically from request for surgery [id number]       Tricuspid regurgitation 10/30/2019     Priority: Medium     Added automatically from request for surgery [id number]       Chronic thrombocytopenic purpura (H) 04/05/2019     Priority: Medium     Non-ischemic cardiomyopathy (H)      Priority: Medium     2017, med treatment, echo done 4/18 nl ef, mod to severe tr       Eye hemorrhage, left 02/01/2019     Priority: Medium     Hypotension 02/09/2018     Priority: Medium     Chronic diastolic congestive heart failure (H) 01/23/2018     Priority: Medium     Anemia due to acute blood loss 11/01/2017     Priority: Medium     Hematoma 10/04/2017     Priority: Medium     Elevated blood sugar 12/02/2015     Priority: Medium     Neck pain 05/21/2014     Priority: Medium     Cervical radiculopathy 05/19/2014     Priority: Medium     Thrombocytopenia (H) 07/11/2013     Priority: Medium     Elevated serum immunoglobulin free light chains 05/23/2013     Priority: Medium     Macular degeneration 05/23/2013     Priority: Medium     Long-term (current) use of anticoagulants [Z79.01] 11/19/2012     Priority: Medium     Formatting of this note might be different from the original.  Goal: 2.0-2.5       BCC (basal cell carcinoma), face 08/10/2012     Priority: Medium     Colon polyps 04/11/2012     Priority: Medium     Chronic atrial fibrillation 12/07/2011     Priority: Medium     Essential hypertension 12/07/2011      Priority: Medium     Permanent atrial fibrillation (H) 01/01/2011     Priority: Medium        Past Surgical History:    Past Surgical History:   Procedure Laterality Date     ABDOMEN SURGERY      bowel obstruction     BONE MARROW BIOPSY, BONE SPECIMEN, NEEDLE/TROCAR N/A 3/4/2019    Procedure: BIOPSY BONE MARROW;  Surgeon: Josey Vargas MD;  Location:  GI     CARPAL TUNNEL RELEASE RT/LT  2014     CV HEART CATHETERIZATION WITH POSSIBLE INTERVENTION N/A 11/15/2019    Procedure: Left and right heart Catheterization with Possible Intervention;  Surgeon: Adolfo Paez MD;  Location:  HEART CARDIAC CATH LAB     CV LEFT VENTRICULOGRAM N/A 11/15/2019    Procedure: Left Ventriculogram;  Surgeon: Adolfo Paez MD;  Location:  HEART CARDIAC CATH LAB     ESOPHAGOSCOPY, GASTROSCOPY, DUODENOSCOPY (EGD), COMBINED N/A 11/6/2019    Procedure: ESOPHAGOGASTRODUODENOSCOPY (EGD);  Surgeon: Marixa Aguila MD;  Location:  GI     EXPLORE GROIN  4/30/2014    Procedure: EXPLORE GROIN;  Surgeon: Sam Aguirre MD;  Location:  OR     HERNIORRHAPHY INGUINAL  4/30/2014    Procedure: HERNIORRHAPHY INGUINAL;  Surgeon: Sam Aguirre MD;  Location:  OR     MOHS MICROGRAPHIC PROCEDURE       PHACOEMULSIFICATION CLEAR CORNEA WITH STANDARD INTRAOCULAR LENS IMPLANT Right 9/8/2015    Procedure: PHACOEMULSIFICATION CLEAR CORNEA WITH STANDARD INTRAOCULAR LENS IMPLANT;  Surgeon: Gil Shannon MD;  Location:  EC     septic olecranon bursitis[          Family History:    family history is not on file.    Social History:  Daughter currently in [place]    Physical Exam     Patient Vitals for the past 24 hrs:   BP Temp Temp src Pulse Resp SpO2   12/13/21 2245 108/69 99.3  F (37.4  C) -- -- -- --   12/13/21 2230 105/76 -- -- 114 26 --   12/13/21 2227 114/76 97.3  F (36.3  C) -- 118 18 --   12/13/21 1900 -- -- -- 105 15 92 %   12/13/21 1845 -- -- -- 112 10 99 %   12/13/21 1815 (!) 139/103 -- -- (!) 142 (!) 49 (!) 88  %   12/13/21 1800 111/63 -- -- 116 16 91 %   12/13/21 1745 110/74 -- -- 110 (!) 68 (!) 84 %   12/13/21 1723 -- -- -- -- -- 97 %   12/13/21 1722 (!) 87/52 99.1  F (37.3  C) Axillary 93 20 --      Physical Exam  General: Ill-appearing male recumbent in room 25  HENT: mucous membranes somewhat dry, OP clear  CV: rate as above, somewhat regular rhythm, mild symmetric lower extremity edema  Resp: Coarse breath sounds bilaterally, occasional deep cough  GI: abdomen soft and nontender, no guarding  Nontender external genitalia  MSK: no bony tenderness  Skin: Extensive ecchymosis to left flank and lower back extending to left hip, no open wound in this area  Two fairly small chronic open and nonpurulent appearing wounds to the left lower leg with dressings in place  Neuro: alert, eyes open, follows basic commands but poor historian, moves all extremities but diffusely weak, no nuchal rigidity  Psych: cooperative, no evidence of hallucinations    Emergency Department Course   Electrocardiogram  ECG taken at 1732, ECG interpreted at 1759 by MARTHA Julian MD  AFib  Rate 92 bpm. TX interval n/a QRS duration 88. QTc 343      Imaging:    CT Chest Abdomen Pelvis w/o Contrast   Final Result   IMPRESSION:   1.  Large hematoma left gluteus medius muscle.   2.  Large right pleural effusion has decreased in size, however there remains diffuse airspace opacities throughout the right lower lobe concerning for pneumonia.   3.  Small left pleural effusion with minimal change.   4.  Cardiomegaly and small pericardial effusion.   5.  Stable indeterminate hypodense liver lesions.   6.  Splenomegaly.   7.  Constipation.   8.  Diffuse bladder wall thickening, incompletely distended. Cystitis or hypertrophy could have a similar appearance.      CT Head w/o Contrast   Final Result   IMPRESSION:     1.  No evidence of acute intracranial hemorrhage or mass effect.   2.  Mild nonspecific white matter changes.   3.  Mild brain parenchymal volume loss.            Laboratory:  Labs Ordered and Resulted from Time of ED Arrival to Time of ED Departure   COMPREHENSIVE METABOLIC PANEL - Abnormal       Result Value    Sodium 134      Potassium 5.1      Chloride 103      Carbon Dioxide (CO2) 19 (*)     Anion Gap 12      Urea Nitrogen 55 (*)     Creatinine 1.57 (*)     Calcium 8.3 (*)     Glucose 94      Alkaline Phosphatase 63      AST 22      ALT 22      Protein Total 6.5 (*)     Albumin 2.6 (*)     Bilirubin Total 1.3      GFR Estimate 38 (*)    CBC WITH PLATELETS AND DIFFERENTIAL - Abnormal    WBC Count 25.5 (*)     RBC Count 1.89 (*)     Hemoglobin 6.6 (*)     Hematocrit 21.7 (*)      (*)     MCH 34.9 (*)     MCHC 30.4 (*)     RDW 20.4 (*)     Platelet Count 67 (*)     % Neutrophils 85      % Lymphocytes 2      % Monocytes 11      % Eosinophils 0      % Basophils 0      % Immature Granulocytes 2      NRBCs per 100 WBC 0      Absolute Neutrophils 21.5 (*)     Absolute Lymphocytes 0.5 (*)     Absolute Monocytes 2.8 (*)     Absolute Eosinophils 0.0      Absolute Basophils 0.1      Absolute Immature Granulocytes 0.6 (*)     Absolute NRBCs 0.1     ISTAT GASES LACTATE VENOUS POCT - Abnormal    Lactic Acid POCT 3.4 (*)     Bicarbonate Venous POCT 21      O2 Sat, Venous POCT 30 (*)     pCO2V Venous POCT 32 (*)     pH Venous POCT 7.42      pO2 Venous POCT 18 (*)    ROUTINE UA WITH MICROSCOPIC - Abnormal    Color Urine Yellow      Appearance Urine Clear      Glucose Urine Negative      Bilirubin Urine Negative      Ketones Urine Negative      Specific Gravity Urine 1.021      Blood Urine Negative      pH Urine 5.0      Protein Albumin Urine 10  (*)     Urobilinogen Urine Normal      Nitrite Urine Negative      Leukocyte Esterase Urine Negative      RBC Urine 1      WBC Urine 1      Hyaline Casts Urine 7 (*)    PROCALCITONIN - Abnormal    Procalcitonin 3.17 (*)    LACTIC ACID WHOLE BLOOD - Abnormal    Lactic Acid 3.8 (*)    INR - Abnormal    INR 1.69 (*)    PARTIAL  THROMBOPLASTIN TIME - Abnormal    aPTT 53 (*)    INFLUENZA A/B & SARS-COV2 PCR MULTIPLEX - Normal    Influenza A PCR Negative      Influenza B PCR Negative      SARS CoV2 PCR Negative     TYPE AND SCREEN, ADULT    ABO/RH(D) O POS      Antibody Screen Negative      SPECIMEN EXPIRATION DATE [id number]     PREPARE RED BLOOD CELLS (UNIT)    CROSSMATCH Compatible      UNIT ABO/RH O Pos      Unit Number [id number]      Unit Status Issued      Blood Component Type Red Blood Cells      Product Code [id number]      CODING SYSTEM [id number]      UNIT TYPE ISBT 5100      ISSUE DATE AND TIME [id number]     PREPARE RED BLOOD CELLS (UNIT)   BLOOD CULTURE   BLOOD CULTURE   RESPIRATORY AEROBIC BACTERIAL CULTURE   GRAM STAIN   LEGIONELLA PNEUMOPHILA URINARY ANTIGEN   TRANSFUSE RED BLOOD CELLS (UNIT)   ABO/RH TYPE AND SCREEN      Emergency Department Course:  Reviewed:  I reviewed nursing notes, vitals, and past medical history    Assessments:  I obtained history and examined the patient as noted above.     ED Course as of 12/13/21 2322   Mon Dec 13, 2021   1855 Spoke with ED RN - UA sent, , 93% on 5lpm NC.   1946 ED RN spoke with pt's wife   1956 I spoke with Dr. Mckinnon, Hospitalist, who accepts care.   2124 I spoke with ED RN who is talking to lab re: antibodies on type and screen       Consults:   See above in ED Course    Interventions:  Medications   0.9% sodium chloride BOLUS (has no administration in time range)   piperacillin-tazobactam (ZOSYN) 3.375 g vial to attach to  mL bag (has no administration in time range)   vancomycin (VANCOCIN) 750 mg in sodium chloride 0.9 % 250 mL intermittent infusion (has no administration in time range)   0.9% sodium chloride BOLUS (0 mLs Intravenous Stopped 12/13/21 1900)   piperacillin-tazobactam (ZOSYN) 4.5 g vial to attach to  mL bag (0 g Intravenous Stopped 12/13/21 2006)   vancomycin 1500 mg in 0.9% NaCl 250 ml intermittent infusion 1,500 mg (0 mg Intravenous  Stopped 12/13/21 2223)        Disposition:  Admit to Hospitalist    Impression & Plan    Covid-19  Jama Paul was evaluated during a global COVID-19 pandemic, which necessitated consideration that the patient might be at risk for infection with the SARS-CoV-2 virus that causes COVID-19.   Applicable protocols for evaluation were followed during the patient's care.   COVID-19 was considered as part of the patient's evaluation.     Medical Decision Making:  Regarding his hypoxia, I think this is primarily due to pneumonia, which was confirmed on CT.  Given his acute hypoxic respiratory failure, as well as altered mental status and advanced age in combination with location of the infiltrate, he was treated with Zosyn to cover for the possibility of aspiration.  He is additionally found to have significant anemia, for which blood transfusion was indicated and verbal consent was obtained from his wife by phone.  Patient is altered and does not have decision-making capacity.  He is full code.  I think his anemia is likely secondary to soft tissue hemorrhage with evidence on exam and imaging of hematoma.  However, the fact that this hematoma has been present for at least 5 days does argue in favor of clinical stability and against the need for immediate surgical intervention.  He is clearly unfit for discharge and I have arranged for admission to the hospital service.  More aggressive fluid resuscitation was not performed due to his history of cardiac disease as well as the fact that his elevated lactic acid level was thought to be due at least to a significant degree to his anemia, not just infection.  Hospitalist service will follow up on repeat levels.  I have arranged for admission to a monitored bed under the care of the hospitalist.    Diagnosis:    ICD-10-CM    1. Pneumonia due to infectious organism, unspecified laterality, unspecified part of lung  J18.9    2. Acute posthemorrhagic anemia  D62    3.  Hematoma of skin  T14.8XXA    4. Acute respiratory failure with hypoxia (H)  J96.01    5. Confusion  R41.0    6. Elevated lactic acid level  R79.89         12/13/2021   MARTHA Julian MD    This note was completed in part using Dragon voice recognition software. Although reviewed after completion, some word and grammatical errors may occur.           James Julian MD  12/13/21 [id number]

## 2021-12-14 NOTE — DISCHARGE SUMMARY
[facility]    Death Summary - Hospitalist Service     Date of Admission:  2021  Date of Death: 2021  Discharging Provider: Toi Day DO    Discharge Diagnoses   CAP due to legionalla causing severe sepsis  Acute hypoxic respiratory failure    Cause of death: Acute hypoxemic respiratory failure from legionella pneumonia causing severe sepsis    Hospital Course   CAP due to legionalla causing severe sepsis  Acute hypoxic respiratory failure  Assessment: received a dose of ceftriaxone as outpatient on . Not clear when symptoms started, patient is unable to provide history due to delirium. Possible aspiration pna. Lactic acid elevated, wbc elevated, procalcitonin elevated, diffuse airspace opacities through right lobe. covid negative, influenza negative.  Started on IV antibiotics and fluid  : severe respiratory distress and confusion with worsening tachypnea HR 30-50) and feeling he cant breath, rattling breathing.   Discussed with patients wife (health care decision maker, daughter, and son at the bedside). Discussed options including continuing plan of care after which he could improve, or focusing on comfort.    Family concerned about suffering currently, and definately he is struggling to breath even with face mask delivered oxygen  Family elected to change to comfort based care plan.  He  a couple hours later     Anemia  ITP  thromboyctopenia  CARA  Altered mental status  CHF  Non-ischemic cardiomyopathy  Permament afib        Toi Day DO  [facility]  ______________________________________________________________________      Significant Results and Procedures   Most Recent 3 CBC's:Recent Labs   Lab Test 21  0553 21  0402 21  1730   WBC 26.2* 24.4* 25.5*   HGB 7.7* 7.2* 6.6*   * 111* 115*   PLT 73* 57* 67*     Most Recent 3 BMP's:Recent Labs   Lab Test 21  0915 21  0408  12/13/21  1730    133 134   POTASSIUM 5.3 4.9 5.1   CHLORIDE 105 104 103   CO2 15* 18* 19*   BUN 56* 52* 55*   CR 1.64* 1.55* 1.57*   ANIONGAP 14 11 12   BARON 8.6 8.1* 8.3*   GLC 93 86 94     Most Recent 2 LFT's:Recent Labs   Lab Test 12/14/21  0915 12/14/21  0408   AST 65* 26   ALT 43 23   ALKPHOS 85 60   BILITOTAL 2.3* 1.7*     Most Recent 3 INR's:Recent Labs   Lab Test 12/13/21  1732 02/28/20  1002 01/14/20  0704   INR 1.69* 1.74* 1.46*   ,   Results for orders placed or performed during the hospital encounter of 12/13/21   CT Chest Abdomen Pelvis w/o Contrast    Narrative    EXAM: CT CHEST ABDOMEN PELVIS W/O CONTRAST  LOCATION: [facility]  DATE/TIME: 12/13/2021 6:22 PM    INDICATION: hypoxia, weakness, 5 days bruising to L flank and back, confusion; Cr too high for contrast  COMPARISON: CT chest exam 01/11/2020 and CTA chest, abdomen and pelvis exam 11/01/2019  TECHNIQUE: CT scan of the chest, abdomen, and pelvis was performed without IV contrast. Multiplanar reformats were obtained. Dose reduction techniques were used.   CONTRAST: None.    FINDINGS:   LUNGS AND PLEURA: Moderate centrilobular emphysema. Since the previous examination the large right pleural effusion has decreased in size. Airspace consolidation is now visible within the right lower lobe. Loculated fluid along the right major fissure.    MEDIASTINUM/AXILLAE: Cardiomegaly with small pericardial effusion. A few slightly enlarged mediastinal nodes are unchanged. Small esophageal hiatal hernia.    CORONARY ARTERY CALCIFICATION: Moderate.    HEPATOBILIARY: Several indeterminate hypodense liver lesions are unchanged. Distended gallbladder extending into the right lower quadrant.    PANCREAS: Normal.    SPLEEN: Splenomegaly measuring 14 cm, unchanged.    ADRENAL GLANDS: Normal.    KIDNEYS/BLADDER: Right renal cysts, largest measuring 4 cm in the upper pole. No renal calculi or hydronephrosis.    BOWEL: Constipation with  moderate volume of stool distal sigmoid colon and rectum. No evidence for obstruction. Diffuse bladder wall thickening, incompletely distended.    LYMPH NODES: Normal.    VASCULATURE: Atherosclerotic disease abdominal aorta and iliac arteries.     PELVIC ORGANS: Normal.    MUSCULOSKELETAL: Hematoma arising from the left gluteus medius muscle measuring 4.2 x 7.6 x 9.1 cm. Adjacent pelvic bones are intact. No evidence for fracture. Edematous changes subcutaneous tissues. Thoracolumbar scoliosis with advanced degenerative   disc and facet disease thoracolumbar spine. Degenerative changes both shoulders and hips.      Impression    IMPRESSION:  1.  Large hematoma left gluteus medius muscle.  2.  Large right pleural effusion has decreased in size, however there remains diffuse airspace opacities throughout the right lower lobe concerning for pneumonia.  3.  Small left pleural effusion with minimal change.  4.  Cardiomegaly and small pericardial effusion.  5.  Stable indeterminate hypodense liver lesions.  6.  Splenomegaly.  7.  Constipation.  8.  Diffuse bladder wall thickening, incompletely distended. Cystitis or hypertrophy could have a similar appearance.   CT Head w/o Contrast    Narrative    EXAM: CT HEAD W/O CONTRAST  LOCATION: [facility]  DATE/TIME: 12/13/2021 6:22 PM    INDICATION: confusion, AMS, nonfocal neuro exam  COMPARISON: None.  TECHNIQUE: Routine CT Head without IV contrast. Multiplanar reformats. Dose reduction techniques were used.    FINDINGS:  INTRACRANIAL CONTENTS: Motion degraded exam.  No definite evidence of acute intracranial hemorrhage or mass effect. Scattered foci of decreased attenuation within the cerebral hemispheric white matter which are nonspecific, though most commonly ascribed   to chronic small vessel ischemic disease. The ventricles and sulci are prominent consistent with mild brain parenchymal volume loss. Gray-white matter differentiation is maintained.  The basilar cisterns are patent.    VISUALIZED ORBITS/SINUSES/MASTOIDS: Right cataract surgery. The partially imaged globes are otherwise unremarkable. The partially imaged paranasal sinuses, mastoid air cells and middle ear cavities are unremarkable.     BONES/SOFT TISSUES: The visualized skull base and calvarium are unremarkable.      Impression    IMPRESSION:    1.  No evidence of acute intracranial hemorrhage or mass effect.  2.  Mild nonspecific white matter changes.  3.  Mild brain parenchymal volume loss.       Consultations This Hospital Stay   PHARMACY TO [facility]  SPEECH LANGUAGE PATH ADULT IP CONSULT  PHYSICAL THERAPY ADULT IP CONSULT  OCCUPATIONAL THERAPY ADULT IP CONSULT  SPIRITUAL HEALTH SERVICES IP CONSULT    Primary Care Physician   Fox Gooden    Time Spent on this Encounter   I, Toi Day DO, personally saw the patient today and spent greater than 30 minutes discharging this patient.

## 2021-12-14 NOTE — PROGRESS NOTES
[id number]: Pt  at 1411. Previous RN called the ME and donor line. Autopsy ordered for patient. Awaiting security to  body for the morgue.

## 2021-12-14 NOTE — PROGRESS NOTES
SPIRITUAL HEALTH SERVICES Progress Note  [facility]    SH support requested for Ptnt death.    I visited with Jama's wife Paige, his daughter Destini and son Jama.    I offered reflective listening and emotional support, encouraged self-care, and led a prayer and blessing ritual.    SH remains available.    Lexis Garnett  Associate

## 2021-12-14 NOTE — ED NOTES
Patient restless, removing oxygen and monitors. Frequently needing to be reoriented and repositioned often. Small brown stool noted on last repositioning. Patient Spo2 decreases to mid 80s when oxygen off.

## 2021-12-14 NOTE — ED NOTES
DATE:  12/14/2021   TIME OF RECEIPT FROM LAB:  5:58 AM  LAB TEST:  istat lactic   LAB VALUE:  3.54  RESULTS GIVEN WITH READ-BACK TO (PROVIDER):  Dr. Ghotra  TIME LAB VALUE REPORTED TO PROVIDER:   5:58 AM     Sepsis BPA fired. Lactic drawn and Istat done. Lactic down from 3.8 to 3.54. Patient continues to be confused and restless. VSS. Remains on 5LPM oxymask which he often removes.

## 2021-12-14 NOTE — PROGRESS NOTES
RECEIVING UNIT ED HANDOFF REVIEW    ED Nurse Handoff Report was reviewed by: Adwoa Stevens RN on December 14, 2021 at 1:57 PM

## 2021-12-14 NOTE — PROGRESS NOTES
House Officer Death Pronouncement    Called by nursing staff to pronounce Jama Paul dead.    Physical Exam: Unresponsive to noxious stimuli, Spontaneous respirations absent, Breath sounds absent, Carotid pulse absent, Heart sounds absent and Pupillary light reflex absent    Patient was pronounced dead at 211pm: , 2021.    No data filed       Active Problems:    Acute posthemorrhagic anemia    Confusion    Acute respiratory failure with hypoxia (H)    Hematoma of skin    Pneumonia due to infectious organism, unspecified laterality, unspecified part of lung       Infectious disease present?: YES    Communicable disease present? (examples: HIV, chicken pox, TB, Ebola, CJD) :  YES    Multi-drug resistant organism present? (example: MRSA): NO    Please consider an autopsy if any of the following exist:  NO Unexpected or unexplained death during or following any dental, medical, or surgical diagnostic treatment procedures.   NO Death of mother at or up to seven days after delivery.     NO All  and pediatric deaths.     NO Death where the cause is sufficiently obscure to delay completion of the death certificate.   yes Deaths in which autopsy would confirm a suspected illness/condition that would affect surviving family members or recipients of transplanted organs. Family concerned about MS and genetic cardiac conditions     The following deaths must be reported to the 's Office:  NO A death that may be due entirely or in part to any factors other than natural disease (recent surgery, recent trauma, suspected abuse/neglect).   NO A death that may be an accident, suicide, or homicide.     NO Any sudden, unexpected death in which there is no prior history of significant heart disease or any other condition associated with sudden death.   NO A death under suspicious, unusual, or unexpected circumstances.    NO Any death which is apparently due to natural causes but in which the   does not have a personal physician familiar with the patient s medical history, social, or environmental situation or the circumstances of the terminal event.   NO Any death apparently due to Sudden Infant Death Syndrome.     NO Deaths that occur during, in association with, or as consequences of a diagnostic, therapeutic, or anesthetic procedure.   NO Any death in which a fracture of a major bone has occurred within the past (6) six months.   NO A death of persons note seen by their physician within 120 days of demise.     NO Any death in which the  was an inmate of a public institution or was in the custody of Law Enforcement personnel.   NO  All unexpected deaths of children   NO Solid organ donors   NO Unidentified bodies   YES Deaths of persons whose bodies are to be cremated or otherwise disposed of so that the bodies will later be unavailable for examination;   NO Deaths unattended by a physician outside of a licensed healthcare facility or licensed residential hospice program   NO Deaths occurring within 24 hours of arrival to a health care facility if death is unexpected.    NO Deaths associated with the decedent s employment.   NO Deaths attributed to acts of terrorism.   YES Any death in which there is uncertainty as to whether it is a medical examiner s care should be discussed with the medical investigator. Recent fall     Death Certificate to be directed to Toi Day  Attending physician, Dr. Toi Day, notified of death.    Body disposition: Autopsy was discussed with family member:  Daughter in person.  Permission for autopsy was obtained.    I have contacted the [facility] medical examiner who releases jurisdiction to pt's attending physician for completion of death certificate.     Saranya Gaming [facility] HARRY  [phone]     Text Page

## 2021-12-14 NOTE — ED NOTES
DATE:  12/13/2021   TIME OF RECEIPT FROM LAB:  1804  LAB TEST:  hgb  LAB VALUE:  6.6  RESULTS GIVEN WITH READ-BACK TO (PROVIDER):  James Julian, *  TIME LAB VALUE REPORTED TO PROVIDER:   7568

## 2021-12-14 NOTE — PHARMACY-VANCOMYCIN DOSING SERVICE
"Pharmacy Vancomycin Initial Note  Date of Service 2021  Patient's  1931  89 year old, male    Indication: Sepsis    Current estimated CrCl = Estimated Creatinine Clearance: 30.3 mL/min (A) (based on SCr of 1.57 mg/dL (H)).    Creatinine for last 3 days  2021:  5:30 PM Creatinine 1.57 mg/dL    Recent Vancomycin Level(s) for last 3 days  No results found for requested labs within last 72 hours.      Vancomycin IV Administrations (past 72 hours)                   vancomycin 1500 mg in 0.9% NaCl 250 ml intermittent infusion 1,500 mg (mg) 1,500 mg New Bag 21                Nephrotoxins and other renal medications (From now, onward)    Start     Dose/Rate Route Frequency Ordered Stop    21  vancomycin (VANCOCIN) 750 mg in sodium chloride 0.9 % 250 mL intermittent infusion         750 mg  over 60-90 Minutes Intravenous EVERY 24 HOURS 21 0200  piperacillin-tazobactam (ZOSYN) 3.375 g vial to attach to  mL bag        Note to Pharmacy: For [facility], [facility] and [facility]: For Zosyn-naive patients, use the \"Zosyn initial dose + extended infusion\" order panel.    3.375 g  over 30 Minutes Intravenous EVERY 6 HOURS 21            Contrast Orders - past 72 hours (72h ago, onward)            None          [employer] Prediction of Planned Initial Vancomycin Regimen  Loading dose: 1500 mg IV x 1  Regimen: 750 mg IV every 24 hours.  Start time: 20:28 on 2021  Exposure target: AUC24 (range)400-600 mg/L.hr   AUC24,ss: 486 mg/L.hr  Probability of AUC24 > 400: 72 %  Ctrough,ss: 16.3 mg/L  Probability of Ctrough,ss > 20: 29 %  Probability of nephrotoxicity (Lodise NESSA ): 12 %        Plan:  1. Start vancomycin 750 mg IV q24h.   2. Vancomycin monitoring method: AUC  3. Vancomycin therapeutic monitoring goal: 400-600 mg*h/L  4. Pharmacy will check vancomycin levels as appropriate in 1-3 Days.    5. Serum creatinine levels will be ordered daily for the first " week of therapy and at least twice weekly for subsequent weeks.      Ofelia Ram, PharmD, BCPS

## 2021-12-14 NOTE — PROGRESS NOTES
"   12/14/21 [facility]   General Information   Onset of Illness/Injury or Date of Surgery 12/13/21   Referring Physician Dr. 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 The patient was diagnosed with a pneumonia and received a dose of rocephin on 12/13. In the ER the patient was found to be confused and septic. According to the patient's wife, he has become more confused than normal recently. He also had a fall 2 days ago. On my interview the patient only tells me that he wants water and that he is \"fine.\"\"   General Observations Pt awake/alert, with reduced attention, confused.   Past History of Dysphagia None per chart   Pain Assessment   Patient Currently in Pain No   Type of Evaluation   Type of Evaluation Swallow Evaluation   Oral Motor   Oral Musculature generally intact  (suspect generalized weakness)   Mucosal Quality good   Dentition (Oral Motor)   Dentition (Oral Motor) natural dentition;dental appliance/dentures   Dental Appliance/Denture (Oral Motor) upper   Facial Symmetry (Oral Motor)   Facial Symmetry (Oral Motor) WNL   Cough/Swallow/Gag Reflex (Oral Motor)   Volitional Throat Clear/Cough (Oral Motor) unable/difficult to assess   Volitional Swallow (Oral Motor) unable/difficult to assess   Vocal Quality/Secretion Management (Oral Motor)   Vocal Quality (Oral Motor) WFL   Secretion Management (Oral Motor) WNL   General Swallowing Observations   Current Diet/Method of Nutritional Intake (General Swallowing Observations, NIS) NPO   Respiratory Support (General Swallowing Observations) oxygen mask  (pt removing repeatedly)   Swallowing Evaluation Clinical swallow evaluation   Clinical Swallow Evaluation   Feeding Assistance dependent   Clinical Swallow Evaluation Textures Trialed thin liquids;mildly " thick liquids;pureed   Clinical Swallow Eval: Thin Liquid Texture Trial   Mode of Presentation, Thin Liquids cup;spoon;straw;self-fed;fed by clinician   Oral Phase of Swallow   (suspect reduced control/coordination)   Pharyngeal Phase of Swallow suspect impaired;coughing/choking  (delayed swallow)   Clinical Swallow Eval: Mildly Thick Liquids   Mode of Presentation cup;spoon;straw;self-fed;fed by clinician   Oral Phase WFL   Pharyngeal Phase suspect impaired  (delayed swallow)   Clinical Swallow Evaluation: Puree Solid Texture Trial   Mode of Presentation, Puree spoon;fed by clinician   Oral Phase, Puree WFL   Pharyngeal Phase, Puree suspect intact   Swallowing Recommendations   Diet Consistency Recommendations pureed (level 4);mildly thick liquids (level 2)   Supervision Level for Intake 1:1 supervision needed   Mode of Delivery Recommendations bolus size, small;slow rate of intake   Swallowing Maneuver Recommendations alternate food and liquid intake   Monitoring/Assistance Required (Eating/Swallowing) stop eating activities when fatigue is present;monitor for cough or change in vocal quality with intake   Recommended Feeding/Eating Techniques (Swallow Eval) maintain upright posture during/after eating for 30 minutes;provide assist with feeding   Medication Administration Recommendations, Swallowing (SLP) crushed in puree   Instrumental Assessment Recommendations VFSS (videofluoroscopic swallowing study)  (consider completing as indicated)   Comment, Swallowing Recommendations Pt currently presents with mild-moderate/moderate oral and suspected pharyngeal dysphagia. + fair acceptance and adequate containment. Bolus formation/propulsion and lingual coordination appeared ~mild-moderately reduced and prolonged. Noted increased oral transit time without appreciable oral residue. Suspect ?delayed swallow with grossly adequate hyolaryngeal elevation. + persistent cough following 2/2 trials of thin liquid via cup. No  other cough, throat clear, wet vocal quality, eye watering, or increased WOB.   General Therapy Interventions   Planned Therapy Interventions Dysphagia Treatment   Dysphagia treatment Modified diet education;Instruction of safe swallow strategies   SLP Therapy Assessment/Plan   Criteria for Skilled Therapeutic Interventions Met (SLP Eval) yes;treatment indicated   SLP Diagnosis mild-moderate/moderate oral and suspected pharyngeal dysphagia   Rehab Potential (SLP Eval) good, to achieve stated therapy goals   Therapy Frequency (SLP Eval) 5 times/wk   Predicted Duration of Therapy Intervention (SLP Eval) 1 week   Therapy Plan Review/Discharge Plan (SLP)   Therapy Plan Review (SLP) evaluation/treatment results reviewed;patient;participants voiced agreement with care plan;participants included   SLP Discharge Planning    SLP Discharge Recommendation (DC Rec) Transitional Care Facility;home with home care speech therapy  (will require 1:1 A for feeding if d/c to home)   SLP Rationale for DC Rec Pt with dysphagia below baseline and will have ongoing SLP needs   SLP Brief overview of current status  Recommend puree diet (4) with mildly thick (2) liquid with aspiration precautions -- small sips, one at a time; small bites; slow rate; upright posture during and following intake; alternate solids/liquids; feed only when fully awake/alert; discontinue feeding if difficulty or cough, throat clear, wet vocal quality, or increased WOB observed.    Total Evaluation Time   Total Evaluation Time (Minutes) 16

## 2021-12-14 NOTE — PROGRESS NOTES
SPIRITUAL HEALTH SERVICES  SPIRITUAL ASSESSMENT Progress Note  [facility] ED     REFERRAL SOURCE: Staff     I spent time with pt Jama's family today as Jama had been transitioned to comfort measures. His wife Paige, daughter, Destini and son Jama were at bedside.     Pt's family was in distress when I arrived over pt's significant agitation but he was shortly able to receive medication for comfort, which a little later they share seemed to help.    I spent time with family providing grief support and engaging in life review. Family asked some questions about how to best engage this time with Jama. I encouraged them being at bedside with him, sharing things they would like to share and comforting him. I affirmed the ways they are being with him right now, all holding silvestre for him at bedside. We talked about giving him permission to go which they asked about and also that his body is guiding the way and that he will go when ready.     Family asked for scripture/ritual which I provided.     PLAN: [facility] will continue to remain available.     Cailin Flynn  Associate    Phone: [phone]  Pager: [phone]

## 2021-12-14 NOTE — ED NOTES
[facility]  ED Nurse Handoff Report    ED Chief complaint: Shortness of Breath      ED Diagnosis:   Final diagnoses:   Pneumonia due to infectious organism, unspecified laterality, unspecified part of lung   Acute posthemorrhagic anemia   Hematoma of skin   Acute respiratory failure with hypoxia (H)   Confusion   Elevated lactic acid level       Code Status: Full Code    Allergies: No Known Allergies    Patient Story: sob, anemic  Focused Assessment:  Pt here via EMS with increased confusion and weakness for past 2 days.  Facility did labs today and found hgb to be 6.7.  EMS found pt to be hypoxic on RA.  Pt received an IM of rocephin from facility for probably pneumonia.  Pt is alert to self.   NC at 5L O2.    Treatments and/or interventions provided: bolus, vanco, zosyn, O2  Patient's response to treatments and/or interventions: stable     To be done/followed up on inpatient unit:        Does this patient have any cognitive concerns?: Disoriented to situation    Activity level - Baseline/Home:  Stand with Assist  Activity Level - Current:   Stand with assist x2    Patient's Preferred language: English   Needed?: No    Isolation: None  Infection: Not Applicable  Patient tested for COVID 19 prior to admission: YES  Bariatric?: No    Vital Signs:   Vitals:    12/13/21 1800 12/13/21 1815 12/13/21 1845 12/13/21 1900   BP: 111/63 (!) 139/103     Pulse: 116 (!) 142 112 105   Resp: 16 (!) 49 10 15   Temp:       TempSrc:       SpO2: 91% (!) 88% 99% 92%       Cardiac Rhythm:     Was the PSS-3 completed:   Yes  What interventions are required if any?               Family Comments: updates to daughter Destini and wife Paige  OBS brochure/video discussed/provided to patient/family: N/A              Name of person given brochure if not patient:                 Relationship to patient:       For the majority of the shift this patient's behavior was Green.   Behavioral interventions performed were    .    ED NURSE PHONE NUMBER: [id number]

## 2021-12-14 NOTE — H&P
"[facility]    History and Physical - Hospitalist Service       Date of Admission:  12/13/2021    Assessment & Plan      Jama Paul is a 89 year old male admitted on 12/13/2021. He presents with sepsis due to cap with hypoxic respiratory failure.    CAP  Acute hypoxic respiratory failure  Severe Sepsis  Assessment: received a dose of ceftriaxone as outpatient on 12/13. Not clear when symptoms started, patient is unable to provide history due to delirium. Possible aspiration pna. Lactic acid elevated, wbc elevated, procalcitonin elevated, diffuse airspace opacities through right lobe. covid negative, influenza negative.  PLAN  Vancomycin and zosyn  F/u blood culture, sputum culture  IVF 75ml/hr (s/p 2L ns in er)  Supplemental O2  F/u swallow eval    Anemia  ITP  thromboyctopenia  Elevated INR  Gluteal hematoma 2 days  Assessment: patient had a fall two days ago, found to have bruising of buttocks and gluteal hematoma on imaging. Patient is chronically anemic on review of chart, likely worse due to hematoma. Thrombocytopenia has improved since blood work in January.   PLAN  Monitor Hb, repeat check in am  Transfusing 1 unit prbc  5mg vitamin K    CARA  Likely due to sepsis, Crt was 1.15 on 10/27/21.  Monitor renal function    Altered mental status  Assessment likely component of delirium and cognitive impairment at baseline  PLAN  Frequent re-orientation.  Patient unable to make decisions will defer to family.    CHF  Non-ischemic cardiomyopathy  Permament afib  plan  F/u digoxin level  C/w digoxin     Diet:   npo until swallow eval  DVT Prophylaxis: Pneumatic Compression Devices  Mccullough Catheter: Not present  Central Lines: None  Code Status:   DNR/DNI spoke with spouse Paige Paul   Goals of care: discussed with the patient's wife Paige Paul. The patient would not want \"extraordinary measures.\" upon further discussion given the patient's age, Paige believes that intubation and " "cpr would be extraordinary and requested that Jama be dnr/dni.   Paige requests that she be updated daily.    Disposition Plan   Expected Discharge:  12/20   Anticipated discharge location:  Awaiting care coordination huddle  Delays:     tcu     The patient's care was discussed with the Patient's Family.    Magdalena Mckinnon MD, MD  [facility]  Securely message with the Vocera Web Console (learn more here)  Text page via [employer] Paging/Directory        ______________________________________________________________________    Chief Complaint   Unknown, patient is confused    History is obtained from the chart and the patient's wife.    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 The patient was diagnosed with a pneumonia and received a dose of rocephin on 12/13. In the ER the patient was found to be confused and septic. According to the patient's wife, he has become more confused than normal recently. He also had a fall 2 days ago. On my interview the patient only tells me that he wants water and that he is \"fine.\"    Review of Systems    Unable to perform due to delerium    Past Medical History    I have reviewed this patient's medical history and updated it with pertinent information if needed.   Past Medical History:   Diagnosis Date     Congestive heart failure (H)      Elevated PSA      Eye hemorrhage, left 02/2019     Non-ischemic cardiomyopathy (H)     2017, med treatment, echo done 4/18 nl ef, mod to severe tr     Permanent atrial fibrillation (H) 2011     Thrombocytopenia (H)      Unspecified essential hypertension        Past Surgical History   I have reviewed this patient's surgical history and updated it with pertinent information if needed.  Past Surgical History:   Procedure Laterality Date     ABDOMEN SURGERY      bowel obstruction     BONE MARROW BIOPSY, BONE " SPECIMEN, NEEDLE/TROCAR N/A 3/4/2019    Procedure: BIOPSY BONE MARROW;  Surgeon: Josey Vargas MD;  Location:  GI     CARPAL TUNNEL RELEASE RT/LT  2014     CV HEART CATHETERIZATION WITH POSSIBLE INTERVENTION N/A 11/15/2019    Procedure: Left and right heart Catheterization with Possible Intervention;  Surgeon: Adolfo Paez MD;  Location:  HEART CARDIAC CATH LAB     CV LEFT VENTRICULOGRAM N/A 11/15/2019    Procedure: Left Ventriculogram;  Surgeon: Adolfo Paez MD;  Location:  HEART CARDIAC CATH LAB     ESOPHAGOSCOPY, GASTROSCOPY, DUODENOSCOPY (EGD), COMBINED N/A 11/6/2019    Procedure: ESOPHAGOGASTRODUODENOSCOPY (EGD);  Surgeon: Marixa Aguila MD;  Location:  GI     EXPLORE GROIN  4/30/2014    Procedure: EXPLORE GROIN;  Surgeon: Sam Aguirre MD;  Location:  OR     HERNIORRHAPHY INGUINAL  4/30/2014    Procedure: HERNIORRHAPHY INGUINAL;  Surgeon: Sam Aguirre MD;  Location:  OR     MOHS MICROGRAPHIC PROCEDURE       PHACOEMULSIFICATION CLEAR CORNEA WITH STANDARD INTRAOCULAR LENS IMPLANT Right 9/8/2015    Procedure: PHACOEMULSIFICATION CLEAR CORNEA WITH STANDARD INTRAOCULAR LENS IMPLANT;  Surgeon: Gil Shannon MD;  Location:  EC     septic olecranon bursitis[         Social History   I have reviewed this patient's social history and updated it with pertinent information if needed.  Social History     Tobacco Use     Smoking status: Never Smoker     Smokeless tobacco: Never Used   Substance Use Topics     Alcohol use: No     Drug use: No       Family History   Unable to obtain due to: delerium    Prior to Admission Medications   Prior to Admission Medications   Prescriptions Last Dose Informant Patient Reported? Taking?   Multiple Vitamins-Minerals (ICAPS AREDS FORMULA PO) 12/13/2021 at 0800 Self Yes Yes   Sig: Take 1 tablet by mouth daily Takes in addition to multivitamin with minerals   Vitamin D, Cholecalciferol, 1000 UNITS TABS 12/13/2021 at 0800 Self Yes Yes    Sig: Take 3,000 Units by mouth daily    acetaminophen (TYLENOL) 500 MG tablet 12/13/2021 at 1200  Yes Yes   Sig: Take 1,000 mg by mouth 3 times daily   amoxicillin-clavulanate (AUGMENTIN) 875-125 MG tablet  at not yet started  Yes Yes   Sig: Take 1 tablet by mouth 2 times daily   cefTRIAXone-lidocaine (PF) injection 12/13/2021 at Unknown time  Yes Yes   Sig: Inject 1 g into the muscle once   diclofenac (VOLTAREN) 1 % topical gel 12/13/2021 at 1200  Yes Yes   Sig: Apply 4 g topically 4 times daily Left ankle   digoxin (LANOXIN) 125 MCG tablet 12/13/2021 at 0800  No Yes   Sig: Take 1 tablet (125 mcg) by mouth daily   Patient taking differently: Take 125 mcg by mouth daily HOLD if HR <60   melatonin 5 MG tablet 12/12/2021 at 2000  Yes Yes   Sig: Take 5 mg by mouth At Bedtime    mirtazapine (REMERON) 15 MG tablet 12/12/2021 at 2000 Pharmacy Yes Yes   Sig: Take 15 mg by mouth At Bedtime    multivitamin, therapeutic (THERA-VIT) TABS tablet 12/13/2021 at 0800  Yes Yes   Sig: Take 1 tablet by mouth daily   potassium chloride ER (K-TAB) 20 MEQ CR tablet 12/13/2021 at 0800  Yes Yes   Sig: Take 20 mEq by mouth three times a week Daily every Monday, Wed and Friday   torsemide (DEMADEX) 10 MG tablet 12/13/2021 at 0800  Yes Yes   Sig: Take 10 mg by mouth daily    traMADol (ULTRAM) 50 MG tablet 12/12/2021 at 0200  Yes Yes   Sig: Take 50 mg by mouth daily as needed for severe pain One tablet during the night prn pain   traZODone (DESYREL) 50 MG tablet 12/12/2021 at 2000  Yes Yes   Sig: Take 25 mg by mouth At Bedtime       Facility-Administered Medications: None     Allergies   No Known Allergies    Physical Exam   Vital Signs: Temp: 99.1  F (37.3  C) Temp src: Axillary BP: (!) 139/103 Pulse: 105   Resp: 15 SpO2: 92 % O2 Device: Non-rebreather mask Oxygen Delivery: 10 LPM  Weight: 0 lbs 0 oz    Physical Exam  Vitals reviewed.   Constitutional:       Appearance: Normal appearance. He is ill-appearing.   HENT:      Head:  Normocephalic and atraumatic.   Eyes:      Conjunctiva/sclera: Conjunctivae normal.      Pupils: Pupils are equal, round, and reactive to light.   Cardiovascular:      Rate and Rhythm: Tachycardia present. Rhythm irregular.      Pulses: Normal pulses.   Pulmonary:      Effort: Pulmonary effort is normal. No respiratory distress.      Breath sounds: Rhonchi present.   Abdominal:      General: Abdomen is flat. Bowel sounds are normal.      Palpations: Abdomen is soft.   Musculoskeletal:         General: Swelling present.      Comments: Bilateral lower extremity swelling 2+  Bruising over buttocks    Neurological:      General: No focal deficit present.      Mental Status: He is alert. He is disoriented.      Cranial Nerves: No cranial nerve deficit.         Data   Data reviewed today: I reviewed all medications, new labs and imaging results over the last 24 hours. I personally reviewed the EKG tracing showing atrial fibrillation.    Recent Labs   Lab 12/13/21  1732 12/13/21  1730   WBC  --  25.5*   HGB  --  6.6*   MCV  --  115*   PLT  --  67*   INR 1.69*  --    NA  --  134   POTASSIUM  --  5.1   CHLORIDE  --  103   CO2  --  19*   BUN  --  55*   CR  --  1.57*   ANIONGAP  --  12   BARON  --  8.3*   GLC  --  94   ALBUMIN  --  2.6*   PROTTOTAL  --  6.5*   BILITOTAL  --  1.3   ALKPHOS  --  63   ALT  --  22   AST  --  22

## 2021-12-14 NOTE — PLAN OF CARE
PT: Eval orders received, chart reviewed. Noted pt is on comfort care plan. No skilled PT indicated. Will complete orders

## 2021-12-14 NOTE — PROVIDER NOTIFICATION
Brief update:    Positive Legionella antigen in urine    Discontinuing vancomycin and Zosyn, have switched to levofloxacin 750 mg every 48 hours in the setting of his renal dysfunction.  May be upward adjusted to every 24 hours if renal function improves.    Nursing confirming with micro lab that samples will be sent to [facility] and positive result reported.    Vic Ghotra MD  7:06 AM

## 2021-12-14 NOTE — ED NOTES
DATE:  12/14/2021   TIME OF RECEIPT FROM LAB:  7:01 AM  LAB TEST:  Legionella  LAB VALUE:  Positive   RESULTS GIVEN WITH READ-BACK TO (PROVIDER):  Dr. Ghotra  TIME LAB VALUE REPORTED TO PROVIDER:   7:02 AM

## 2021-12-14 NOTE — PHARMACY-ADMISSION MEDICATION HISTORY
Pharmacy Medication History  Admission medication history interview status for the 12/13/2021  admission is complete. See EPIC admission navigator for prior to admission medications     Location of Interview: Outside patient room but on unit  Medication history sources: MAR ([place])    Significant changes made to the medication list:  Added:   -ceftriaxone (1 g x 1 dose for PNA)  -Augmentin to start 12/13 PM x 10 days (not yet started)    In the past week, patient estimated taking medication this percent of the time: greater than 90%    Additional medication history information:   None    Medication reconciliation completed by provider prior to medication history? No    Time spent in this activity: 15 min    Prior to Admission medications    Medication Sig Last Dose Taking? Auth Provider   acetaminophen (TYLENOL) 500 MG tablet Take 1,000 mg by mouth 3 times daily 12/13/2021 at 1200 Yes Reported, Patient   amoxicillin-clavulanate (AUGMENTIN) 875-125 MG tablet Take 1 tablet by mouth 2 times daily  at not yet started Yes Unknown, Entered By History   cefTRIAXone-lidocaine (PF) injection Inject 1 g into the muscle once 12/13/2021 at Unknown time Yes Unknown, Entered By History   diclofenac (VOLTAREN) 1 % topical gel Apply 4 g topically 4 times daily Left ankle 12/13/2021 at 1200 Yes Reported, Patient   digoxin (LANOXIN) 125 MCG tablet Take 1 tablet (125 mcg) by mouth daily  Patient taking differently: Take 125 mcg by mouth daily HOLD if HR <60 12/13/2021 at 0800 Yes Pato Benedict MD   melatonin 5 MG tablet Take 5 mg by mouth At Bedtime  12/12/2021 at 2000 Yes Reported, Patient   mirtazapine (REMERON) 15 MG tablet Take 15 mg by mouth At Bedtime  12/12/2021 at 2000 Yes Unknown, Entered By History   Multiple Vitamins-Minerals (ICAPS AREDS FORMULA PO) Take 1 tablet by mouth daily Takes in addition to multivitamin with minerals 12/13/2021 at 0800 Yes Unknown, Entered By History   multivitamin, therapeutic  (THERA-VIT) TABS tablet Take 1 tablet by mouth daily 12/13/2021 at 0800 Yes Unknown, Entered By History   potassium chloride ER (K-TAB) 20 MEQ CR tablet Take 20 mEq by mouth three times a week Daily every Monday, Wed and Friday 12/13/2021 at 0800 Yes Reported, Patient   torsemide (DEMADEX) 10 MG tablet Take 10 mg by mouth daily  12/13/2021 at 0800 Yes Reported, Patient   traMADol (ULTRAM) 50 MG tablet Take 50 mg by mouth daily as needed for severe pain One tablet during the night prn pain 12/12/2021 at 0200 Yes Reported, Patient   traZODone (DESYREL) 50 MG tablet Take 25 mg by mouth At Bedtime  12/12/2021 at 2000 Yes Reported, Patient   Vitamin D, Cholecalciferol, 1000 UNITS TABS Take 3,000 Units by mouth daily  12/13/2021 at 0800 Yes Reported, Patient       The information provided in this note is only as accurate as the sources available at the time of update(s)

## 2021-12-14 NOTE — PROGRESS NOTES
[facility]    Medicine Progress Note - Hospitalist Service       Date of Admission:  12/13/2021    Assessment & Plan         CAP due to legionalla causing severe sepsis  Acute hypoxic respiratory failure  Assessment: received a dose of ceftriaxone as outpatient on 12/13. Not clear when symptoms started, patient is unable to provide history due to delirium. Possible aspiration pna. Lactic acid elevated, wbc elevated, procalcitonin elevated, diffuse airspace opacities through right lobe. covid negative, influenza negative.  Started on IV antibiotics and fluid  12/14: severe respiratory distress and confusion with worsening tachypnea HR 30-50) and feeling he cant breath, rattling breathing.   Discussed with patients wife (health care decision maker, daughter, and son at the bedside). Discussed options including continuing plan of care after which he could improve, or focusing on comfort.  They had already determined no aggressive level cares  Family concerned about suffering currently, and definately he is struggling to breath even with face mask delivered oxygen  Family elected to change to comfort based care plan.  If his respiratory status does not improve he could die overnight, and family is aware  PLAN  - comfort care  - stop antibiotics, blood transfusion, and labs  - prn ativan and dilaudid  - liberalize family involvement to come in  - stopped IV fluids, labs, and antibiotics     Anemia  ITP  thromboyctopenia  Elevated INR  Gluteal hematoma 2 days  Assessment: patient had a fall two days ago, found to have bruising of buttocks and gluteal hematoma on imaging. Patient is chronically anemic on review of chart, likely worse due to hematoma. Thrombocytopenia has improved since blood work in January.   PLAN  - now comfort based care plan     CARA  Likely due to sepsis, Crt was 1.15 on 10/27/21.  Received      Altered mental status  Assessment likely component of delirium and cognitive  impairment at baseline  PLAN  Frequent re-orientation.  Patient unable to make decisions will defer to family.     CHF  Non-ischemic cardiomyopathy  Permament afib  plan  F/u digoxin level  C/w digoxin          Diet: Combination Diet Pureed Diet (level 4); Mildly Thick (level 2)    DVT Prophylaxis: Pneumatic Compression Devices  Mccullough Catheter: Not present  Central Lines: None  Code Status: No CPR- Do NOT Intubate      Disposition Plan   Expected Discharge:  TBD   Anticipated discharge location:  Awaiting care coordination huddle  Delays:   either moribund or to hospice unless recovers, which is unlikely        The patient's care was discussed with the Patient and Patient's Family.    Toi Day, DO  Hospitalist Service  [facility]  Securely message with the Vocera Web Console (learn more here)  Text page via [employer] Paging/Directory         Clinically Significant Risk Factors Present on Admission             # Coagulation Defect: INR = 1.69 (Ref range: 0.85 - 1.15) and/or PTT = 53 Seconds (Ref range: 22 - 38 Seconds) on admission, will monitor for bleeding  # Thrombocytopenia: Plts = 73 10e3/uL (Ref range: 150 - 450 10e3/uL) on admission, will monitor for bleeding      ______________________________________________________________________    Interval History   Records reviewed and patient elaluated. Very dyspnea. States he has sob. Not in pain. 2-3 word sentences    Data reviewed today: I reviewed all medications, new labs and imaging results over the last 24 hours. I personally reviewed no images or EKG's today.    Physical Exam   Vital Signs: Temp: 99  F (37.2  C) Temp src: Temporal BP: 118/75 Pulse: (!) 133   Resp: (!) 33 SpO2: 93 % O2 Device: Oxymask Oxygen Delivery: 6 LPM  Weight: 0 lbs 0 oz  Constitutional:       Appearance: Normal appearance. He is ill-appearing.   HENT:      Head: Normocephalic and atraumatic.   Eyes:      Conjunctiva/sclera: Conjunctivae normal.       Pupils: Pupils are equal, round, and reactive to light.   Cardiovascular:      Rate and Rhythm: Tachycardia present. Rhythm irregular.      Pulses: Normal pulses.   Pulmonary:      Effort: WOB is severely decreased with respiratory distress present. Audible crackles with respirations present to ear.     Breath sounds: Rhonchi present, worse on the left  Abdominal:      General: Abdomen is flat. Bowel sounds are normal.      Palpations: Abdomen is soft.   Musculoskeletal:         General: Swelling present.      Comments: Bilateral lower extremity swelling 2+  Bruising over buttocks    Neurological:      General: No focal deficit present.      Mental Status: He is alert. He is disoriented. Oriented to person and year, not to place     Cranial Nerves: No cranial nerve deficit.   Data   Recent Labs   Lab 12/14/21  0915 12/14/21  0553 12/14/21  0408 12/14/21  0402 12/13/21  1732 12/13/21  1730   WBC  --  26.2*  --  24.4*  --  25.5*   HGB  --  7.7*  --  7.2*  --  6.6*   MCV  --  111*  --  111*  --  115*   PLT  --  73*  --  57*  --  67*   INR  --   --   --   --  1.69*  --      --  133  --   --  134   POTASSIUM 5.3  --  4.9  --   --  5.1   CHLORIDE 105  --  104  --   --  103   CO2 15*  --  18*  --   --  19*   BUN 56*  --  52*  --   --  55*   CR 1.64*  --  1.55*  --   --  1.57*   ANIONGAP 14  --  11  --   --  12   BARON 8.6  --  8.1*  --   --  8.3*   GLC 93  --  86  --   --  94   ALBUMIN 2.6*  --  2.4*  --   --  2.6*   PROTTOTAL 6.5*  --  6.0*  --   --  6.5*   BILITOTAL 2.3*  --  1.7*  --   --  1.3   ALKPHOS 85  --  60  --   --  63   ALT 43  --  23  --   --  22   AST 65*  --  26  --   --  22     Recent Results (from the past 24 hour(s))   CT Head w/o Contrast    Narrative    EXAM: CT HEAD W/O CONTRAST  LOCATION: [facility]  DATE/TIME: 12/13/2021 6:22 PM    INDICATION: confusion, AMS, nonfocal neuro exam  COMPARISON: None.  TECHNIQUE: Routine CT Head without IV contrast. Multiplanar reformats.  Dose reduction techniques were used.    FINDINGS:  INTRACRANIAL CONTENTS: Motion degraded exam.  No definite evidence of acute intracranial hemorrhage or mass effect. Scattered foci of decreased attenuation within the cerebral hemispheric white matter which are nonspecific, though most commonly ascribed   to chronic small vessel ischemic disease. The ventricles and sulci are prominent consistent with mild brain parenchymal volume loss. Gray-white matter differentiation is maintained. The basilar cisterns are patent.    VISUALIZED ORBITS/SINUSES/MASTOIDS: Right cataract surgery. The partially imaged globes are otherwise unremarkable. The partially imaged paranasal sinuses, mastoid air cells and middle ear cavities are unremarkable.     BONES/SOFT TISSUES: The visualized skull base and calvarium are unremarkable.      Impression    IMPRESSION:    1.  No evidence of acute intracranial hemorrhage or mass effect.  2.  Mild nonspecific white matter changes.  3.  Mild brain parenchymal volume loss.   CT Chest Abdomen Pelvis w/o Contrast    Narrative    EXAM: CT CHEST ABDOMEN PELVIS W/O CONTRAST  LOCATION: [facility]  DATE/TIME: 12/13/2021 6:22 PM    INDICATION: hypoxia, weakness, 5 days bruising to L flank and back, confusion; Cr too high for contrast  COMPARISON: CT chest exam 01/11/2020 and CTA chest, abdomen and pelvis exam 11/01/2019  TECHNIQUE: CT scan of the chest, abdomen, and pelvis was performed without IV contrast. Multiplanar reformats were obtained. Dose reduction techniques were used.   CONTRAST: None.    FINDINGS:   LUNGS AND PLEURA: Moderate centrilobular emphysema. Since the previous examination the large right pleural effusion has decreased in size. Airspace consolidation is now visible within the right lower lobe. Loculated fluid along the right major fissure.    MEDIASTINUM/AXILLAE: Cardiomegaly with small pericardial effusion. A few slightly enlarged mediastinal nodes are  unchanged. Small esophageal hiatal hernia.    CORONARY ARTERY CALCIFICATION: Moderate.    HEPATOBILIARY: Several indeterminate hypodense liver lesions are unchanged. Distended gallbladder extending into the right lower quadrant.    PANCREAS: Normal.    SPLEEN: Splenomegaly measuring 14 cm, unchanged.    ADRENAL GLANDS: Normal.    KIDNEYS/BLADDER: Right renal cysts, largest measuring 4 cm in the upper pole. No renal calculi or hydronephrosis.    BOWEL: Constipation with moderate volume of stool distal sigmoid colon and rectum. No evidence for obstruction. Diffuse bladder wall thickening, incompletely distended.    LYMPH NODES: Normal.    VASCULATURE: Atherosclerotic disease abdominal aorta and iliac arteries.     PELVIC ORGANS: Normal.    MUSCULOSKELETAL: Hematoma arising from the left gluteus medius muscle measuring 4.2 x 7.6 x 9.1 cm. Adjacent pelvic bones are intact. No evidence for fracture. Edematous changes subcutaneous tissues. Thoracolumbar scoliosis with advanced degenerative   disc and facet disease thoracolumbar spine. Degenerative changes both shoulders and hips.      Impression    IMPRESSION:  1.  Large hematoma left gluteus medius muscle.  2.  Large right pleural effusion has decreased in size, however there remains diffuse airspace opacities throughout the right lower lobe concerning for pneumonia.  3.  Small left pleural effusion with minimal change.  4.  Cardiomegaly and small pericardial effusion.  5.  Stable indeterminate hypodense liver lesions.  6.  Splenomegaly.  7.  Constipation.  8.  Diffuse bladder wall thickening, incompletely distended. Cystitis or hypertrophy could have a similar appearance.     Medications     sodium chloride

## 2021-12-14 NOTE — ED PROVIDER NOTES
This patient was signed out to me he had been admitted to hospital.  Due to prolonged boarding times the patient is now been in the emergency room for 18 hours.  The hospitalist came down to round on the patient and speak with family.  The patient is progressively gotten worse.  At this time the patient will be made comfort cares.  I was made aware as the patient will likely be down here for quite some time with no bed assigned currently.  We will continue to monitor his status.     Anila Shepherd MD  12/17/21 [id number]

## 2021-12-19 LAB
BACTERIA BLD CULT: NO GROWTH
BACTERIA BLD CULT: NO GROWTH

## 2022-02-11 NOTE — PLAN OF CARE
Pt admit to ICU at 0500 on Dobutamine gtt, BP hypotensive with MAP < 65, tele Afib RVR. Pt started on Levophed gtt, at 0.03. gtts now Dobutamine at 8 mcg/kg/min, levophed at 0.12, HR in 90s. Titrating dobutamine gtt off as able. All other VSS. Pt is A/O x 4, [place]. LS diminished on 2L NC. Pt tolerating PO diet, up with A x 2 when OOB. No other change in pt status.   Detail Level: Detailed Bill [id number]: Yes Eis Number: 4 - Positive Eis Score - 0 Text: An EIS score of 0 is considered a negative test and this lesion is unlikely to be a melanoma. Eis Score - 1 Text: An EIS score of 1 is considered a negative test and this lesion is unlikely to be a melanoma. Eis Score - 2 Text: An EIS score of 2 is considered a negative test and this lesion is unlikely to be a melanoma. Eis Score - 3 Text: An EIS score of 3 is considered a negative test and this lesion is unlikely to be a melanoma. Eis Score - 4 Text: An EIS score of 4 is considered a positive test and this lesion is more likely to be a melanoma compared to lesions with an EIS Score of 0-3. Eis Score - 5 Text: An EIS score of 5 is considered a positive test and this lesion is more likely to be a melanoma compared to lesions with an EIS Score of 0-3. Eis Score - 6 Text: An EIS score of 6 is considered a positive test and this lesion is more likely to be a melanoma compared to lesions with an EIS Score of 0-3. Eis Score - 7 Text: An EIS score of 7 is considered a positive test and this lesion is more likely to be a melanoma compared to lesions with an EIS Score of 0-3. Eis Score - 8 Text: An EIS score of 8 is considered a positive test and this lesion is more likely to be a melanoma compared to lesions with an EIS Score of 0-3. Eis Score - 9 Text: An EIS score of 9 is considered a positive test and this lesion is more likely to be a melanoma compared to lesions with an EIS Score of 0-3. Eis Score - 10 Text: An EIS score of 10 is considered a positive test and this lesion is more likely to be a melanoma compared to lesions with an EIS Score of 0-3. Pre-Procedure Text: After consent was obtained the lesion and the surrounding skin was cleaned with alcohol and a physiologic saline solution was applied. A control value was then obtained from normal skin immediately surrounding the lesion to be tested. Results are noted above. Consent was obtained prior to proceeding with the Nevisense procedure. I explained that further diagnostic and therapeutic procedures may be necessary in the future. Eis Number: 5 - Positive

## 2022-09-30 NOTE — PROGRESS NOTES
0845 Time Out done.    0848 procedure started     Thoracentesis: Pt tolerated well.  VSS. 550cc dark yellow fluid removed without difficulty. Bandaid applied to site - CDI.     0855 procedure completed    0859 Pt back to rm [id number] per cart & transport.  will place post orders.         Osteopenia  Had stoped cholecalciferol because of verapamil  Not on actonel  Check DEXA. Okay to continue vitamin D.   Check vitamin D level

## 2023-05-16 NOTE — MR AVS SNAPSHOT
After Visit Summary   2/14/2018    Jama Paul    MRN: [id number]           Patient Information     Date Of Birth          2/13/1931        Visit Information        Provider Department      2/14/2018 9:30 AM Winnie Garcia, ERIC HAMMOND [facility]        Today's Diagnoses     Acute on chronic combined systolic and diastolic CHF (congestive heart failure) (H)          Care Instructions    Call [facility] nurse for any questions or concerns:  [phone]   *If you have concerns after hours, please call [phone], option 2 to speak with on call Cardiologist.    Dry weight 172 pounds    1. Medication changes from today:   Increase your furosemide to 40 mg daily for 3 days then go back to 20 mg daily     2. Weigh yourself daily and write it down.     3. Call [facility] nurse if your weight is up more than 2 pounds in one day or 5 pounds in one week.     4. Call CORE nurse if you feel more short of breath, have more abdominal bloating, or leg swelling.     5. Continue low sodium diet (less than 2000 mg daily). If you eat less salt, you will retain less fluid.     6. Alcohol can weaken your heart further. You should avoid alcohol or limit its use to special times, such as a holiday or birthday.      7. Do NOT take Aleve or ibuprofen without talking to your doctor first.      8. Lab Results: **     Component      Latest Ref Rng & Units 1/29/2018 2/13/2018   Sodium      136 - 145 mmol/L 137 134 (L)   Potassium      3.5 - 5.1 mmol/L 4.6 4.6   Chloride      98 - 107 mmol/L 105 103   Carbon Dioxide      23 - 29 mmol/L 28 27   Anion Gap      6 - 17 mmol/L 8.6 8.6   Glucose      70 - 105 mg/dL 124 (H) 133 (H)   Urea Nitrogen      7 - 30 mg/dL 23 21   Creatinine      0.70 - 1.30 mg/dL 1.22 1.49 (H)   GFR Estimate      >60 mL/min/1.7m2 56 (L) 45 (L)   GFR Estimate If Black      >60 mL/min/1.7m2 68 54 (L)   Calcium      8.5 - 10.5 mg/dL 8.9 8.4 (L)     [facility]: Cardiomyopathy,  ----- Message from Jay Perez sent at 5/16/2023  4:18 PM CDT -----  Regarding: Refill Request  .Type: RX Refill Request    Who Called: Self     Have you contacted your pharmacy: Yes     Refill or New Rx: Refill    RX Name and Strength: REBIF REBIDOSE 8.8mcg/0.2mL-22 mcg/0.5mL (6) PnIj     Preferred Pharmacy with phone number:   [address] [address]  Phone: [phone] Fax: [phone]     Local or Mail Order: Local    Ordering Provider: Treva    Would the patient rather a call back or a response via My [facility]? Call back    Best Call Back Number: [phone]     Additional Information: Pt states this medication was sent to the [facility] but she needs it to be transferred over to the pharmacy listed above. Please advise.        Called pt about Dr. Tilley refusing the refill because she doesn't know how its given or the dosage and updated her pharmacy for this specific med refill. Pt said that Dr. Nina usually would send it to [facility] in [place], [place] for refill. Please advise.      Called pt about her calling her pharmacy about sending an electronic refill request, left message for her to return call.   Optimization, Rehabilitation, Education  The [facility] is a heart failure specialty clinic within the [facility] where you will work with specialized nurse practitioners, physician assistants, doctors, and registered nurses. They are dedicated to helping patients with heart failure to carefully adjust medications, receive education, and learn who and when to call if symptoms develop. They specialize in helping you better understand your condition, slow the progression of your disease, improve the length and quality of your life, help you detect future heart problems before they become life threatening, and avoid hospitalizations.              Follow-ups after your visit        Additional Services     Follow-Up with [facility] - HARRY visit                 Follow-up notes from your care team     Return in about 4 weeks (around 3/14/2018).      Your next 10 appointments already scheduled     Feb 19, 2018 10:30 AM CST   Return Visit with Wesley Benjamin MD   [facility] [place] ([facility])    [address]  [place] [facility] [address]   [phone]            Apr 16, 2018 12:45 PM CDT   Ech Complete with [id number]   [facility] CV Echocardiography (Cardiovascular Imaging at [facility])    [address]  [address]  [place] [facility] [address] [phone]           1. Please bring or wear a comfortable two-piece outfit. 2. You may eat, drink and take your normal medicines. 3. For any questions that cannot be answered, please contact the ordering physician            Apr 16, 2018  2:00 PM CDT   LAB with [facility]   [facility] ([facility])    [address]  [place] [facility] [address] [phone]           Please do not eat 10-12 hours before your appointment if you are coming in fasting for labs on lipids, cholesterol, or glucose (sugar). This does not apply to pregnant  "women. Water, hot tea and black coffee (with nothing added) are okay. Do not drink other fluids, diet soda or chew gum.            Apr 18, 2018 10:45 AM CDT   Return Visit with Long Pugh MD   [facility] ([facility])    [address] [place] [address] [address]  [place] [address]   [phone]              Future tests that were ordered for you today     Open Future Orders        Priority Expected Expires Ordered    Basic metabolic panel Routine 3/13/2018 2/14/2019 2/14/2018    Follow-Up with [facility] - HARRY visit Routine 3/13/2018 2/14/2019 2/14/2018            Who to contact     If you have questions or need follow up information about today's clinic visit or your schedule please contact [facility] directly at [phone].  Normal or non-critical lab and imaging results will be communicated to you by [employer], letter or phone within 4 business days after the clinic has received the results. If you do not hear from us within 7 days, please contact the clinic through [employer] or phone. If you have a critical or abnormal lab result, we will notify you by phone as soon as possible.  Submit refill requests through [employer] or call your pharmacy and they will forward the refill request to us. Please allow 3 business days for your refill to be completed.          Additional Information About Your Visit        Audio[employer] Information     [employer] lets you send messages to your doctor, view your test results, renew your prescriptions, schedule appointments and more. To sign up, go to [email or web address] . Click on \"Log in\" on the left side of the screen, which will take you to the Welcome page. Then click on \"Sign up Now\" on the right side of the page.     You will be asked to enter the access code listed below, as well as some personal information. Please follow the directions to create your username and password.     Your access code is: " [email or web address]  Expires: 4/3/2018  2:08 PM     Your access code will  in 90 days. If you need help or a new code, please call your [place] clinic or [phone].        Care EveryWhere ID     This is your Care EveryWhere ID. This could be used by other organizations to access your [place] medical records  [id number]        Your Vitals Were     Pulse Height Pulse Oximetry BMI (Body Mass Index)          96 1.74 m (5' 8.5\") 100% 26.25 kg/m2         Blood Pressure from Last 3 Encounters:   18 112/70   18 105/71   18 93/53    Weight from Last 3 Encounters:   18 79.5 kg (175 lb 3.2 oz)   18 73.4 kg (161 lb 14.4 oz)   18 74 kg (163 lb 1.6 oz)              We Performed the Following     Follow-Up with [facility] - HARRY visit        Primary Care Provider Office Phone # Fax #    Clint Pizarro [phone]       [place] [address]        Equal Access to Services     [facility] : Hadii aad ku hadasho Soofelia, waaxda luqadaha, qaybta kaalmada adeegyada, emani avalos. So [facility] [phone].    ATENCIÓN: Si habla español, tiene a cleaning disposición servicios gratuitos de asistencia lingüística. [facility] [phone].    We comply with applicable federal civil rights laws and [place] laws. We do not discriminate on the basis of race, color, national origin, age, disability, sex, sexual orientation, or gender identity.            Thank you!     Thank you for choosing [facility]  for your care. Our goal is always to provide you with excellent care. Hearing back from our patients is one way we can continue to improve our services. Please take a few minutes to complete the written survey that you may receive in the mail after your visit with us. Thank you!             Your Updated Medication List - Protect others around you: Learn how to safely use, store and " throw away your medicines at www.disposemymeds.org.          This list is accurate as of 2/14/18 10:20 AM.  Always use your most recent med list.                   Brand Name Dispense Instructions for use Diagnosis    CYANOCOBALAMIN PO      Take 500 mcg by mouth daily        LASIX 20 MG tablet   Generic drug:  furosemide      Take 20 mg by mouth daily        lisinopril 5 MG tablet    PRINIVIL/ZESTRIL    30 tablet    Take 1 tablet (5 mg) by mouth daily    Essential hypertension, Acute congestive heart failure, unspecified congestive heart failure type (H)       MAGNESIA PO      Take by mouth daily        metoprolol succinate 100 MG 24 hr tablet    TOPROL-XL    180 tablet    Take 1 tablet (100 mg) by mouth 2 times daily    Atrial fibrillation with rapid ventricular response (H)       * Multi-vitamin Tabs tablet      Take 1 tablet by mouth daily        * ICAPS AREDS FORMULA PO      Take 1 tablet by mouth 2 times daily        potassium chloride SA 20 MEQ CR tablet    K-DUR/KLOR-CON M    30 tablet    Take 1 tablet (20 mEq) by mouth daily    Acute congestive heart failure, unspecified congestive heart failure type (H)       VITAMIN C PO      Take 500 mg by mouth daily        Vitamin D (Cholecalciferol) 1000 UNITS Tabs      Take 1,000 Units by mouth daily        Warfarin Therapy Reminder      1 each continuous        * Notice:  This list has 2 medication(s) that are the same as other medications prescribed for you. Read the directions carefully, and ask your doctor or other care provider to review them with you.

## 2023-08-23 NOTE — ED NOTES
Bed: ED19  Expected date:   Expected time:   Means of arrival:   Comments:  518  88 m diff sob/had pneumonia  1855   fall

## 2023-10-02 NOTE — LETTER
2/14/2018    Clint Pizarro MD  [address] [address]    RE: Jama Paul       Dear Colleague,    I had the pleasure of seeing Jama Paul in the [facility].    HPI and Plan:   See dictation    Orders Placed This Encounter   Procedures     Basic metabolic panel     Follow-Up with [facility] - HARRY visit     Orders Placed This Encounter   Medications     furosemide (LASIX) 20 MG tablet     Sig: Take 20 mg by mouth daily     Vitamin D, Cholecalciferol, 1000 UNITS TABS     Sig: Take 1,000 Units by mouth daily     Magnesium Hydroxide (MAGNESIA PO)     Sig: Take by mouth daily     Medications Discontinued During This Encounter   Medication Reason     Furosemide (LASIX PO) Stopped by Patient     melatonin 1 MG TABS tablet Stopped by Patient     VITAMIN D, CHOLECALCIFEROL, PO Stopped by Patient         Encounter Diagnosis   Name Primary?     Acute on chronic combined systolic and diastolic CHF (congestive heart failure) (H)        CURRENT MEDICATIONS:  Current Outpatient Prescriptions   Medication Sig Dispense Refill     furosemide (LASIX) 20 MG tablet Take 20 mg by mouth daily       Vitamin D, Cholecalciferol, 1000 UNITS TABS Take 1,000 Units by mouth daily       Magnesium Hydroxide (MAGNESIA PO) Take by mouth daily       Warfarin Therapy Reminder 1 each continuous       lisinopril (PRINIVIL/ZESTRIL) 5 MG tablet Take 1 tablet (5 mg) by mouth daily 30 tablet 0     potassium chloride SA (K-DUR/KLOR-CON M) 20 MEQ CR tablet Take 1 tablet (20 mEq) by mouth daily 30 tablet 0     metoprolol (TOPROL-XL) 100 MG 24 hr tablet Take 1 tablet (100 mg) by mouth 2 times daily 180 tablet 3     Multiple Vitamins-Minerals (ICAPS AREDS FORMULA PO) Take 1 tablet by mouth 2 times daily        CYANOCOBALAMIN PO Take 500 mcg by mouth daily       Ascorbic Acid (VITAMIN C PO) Take 500 mg by mouth daily       multivitamin, therapeutic with minerals  How Did The Hair Loss Occur?: sudden in onset "(MULTI-VITAMIN) TABS Take 1 tablet by mouth daily         ALLERGIES   No Known Allergies    PAST MEDICAL HISTORY:  Past Medical History:   Diagnosis Date     Antiplatelet or antithrombotic long-term use      Arrhythmia      Atrial fibrillation (H)      Elevated PSA      Unspecified essential hypertension        PAST SURGICAL HISTORY:  Past Surgical History:   Procedure Laterality Date     COLONOSCOPY       EXPLORE GROIN  4/30/2014    Procedure: EXPLORE GROIN;  Surgeon: Sam Aguirre MD;  Location:  OR     HERNIORRHAPHY INGUINAL       HERNIORRHAPHY INGUINAL  4/30/2014    Procedure: HERNIORRHAPHY INGUINAL;  Surgeon: Sam Aguirre MD;  Location:  OR     MOHS MICROGRAPHIC PROCEDURE       PHACOEMULSIFICATION CLEAR CORNEA WITH STANDARD INTRAOCULAR LENS IMPLANT Right 9/8/2015    Procedure: PHACOEMULSIFICATION CLEAR CORNEA WITH STANDARD INTRAOCULAR LENS IMPLANT;  Surgeon: Gil Shannon MD;  Location:  EC     septic olecranon bursitis[         FAMILY HISTORY:  Family History   Problem Relation Age of Onset     HEART DISEASE No family hx of        SOCIAL HISTORY:  Social History     Social History     Marital status:      Spouse name: N/A     Number of children: N/A     Years of education: N/A     Social History Main Topics     Smoking status: Never Smoker     Smokeless tobacco: Never Used     Alcohol use No     Drug use: No     Sexual activity: Not Asked     Other Topics Concern     None     Social History Narrative       Review of Systems:  Skin:  Positive for bruising   Eyes:  Positive for glasses  ENT:  Negative    Respiratory:  Negative    Cardiovascular:  Negative    Gastroenterology: Negative    Genitourinary:  Positive for urinary frequency  Musculoskeletal:  Negative    Neurologic:  Negative    Psychiatric:  Negative    Heme/Lymph/Imm:       Endocrine:         Physical Exam:  Vitals: /70  Pulse 96  Ht 1.74 m (5' 8.5\")  Wt 79.5 kg (175 lb 3.2 oz)  SpO2 100%  BMI 26.25 " How Severe Is Your Hair Loss?: moderate Additional History: Patient had had a lymph node surgery in 2/2023. Patient had gone to urgent care and was prescribed triamcinolone cream, which has not helped and is awkward to apply. kg/m2    Constitutional:  cooperative;well developed frail      Skin:  warm and dry to the touch;no apparent skin lesions or masses noted          Head:  normocephalic        Eyes:  pupils equal and round        Lymph:      ENT:  no pallor or cyanosis        Neck:    JVP elevated      Respiratory:  clear to auscultation;normal symmetry         Cardiac:   irregular rhythm                                                       GI:  abdomen soft        Extremities and Muscular Skeletal:      bilateral LE edema;trace;1+          Neurological:  affect appropriate        Psych:  affect appropriate, oriented to time, person and place Anxious      Recent Lab Results:  LIPID RESULTS:  Lab Results   Component Value Date    CHOL 127 04/18/2012    HDL 56 04/18/2012    LDL 55 04/18/2012    TRIG 81 04/18/2012    CHOLHDLRATIO 2.3 04/18/2012       LIVER ENZYME RESULTS:  Lab Results   Component Value Date    AST 21 01/19/2018    ALT 27 01/19/2018       CBC RESULTS:  Lab Results   Component Value Date    WBC 5.6 01/25/2018    RBC 3.77 (L) 01/25/2018    HGB 11.7 (L) 01/25/2018    HCT 36.6 (L) 01/25/2018    MCV 97.1 01/25/2018    MCH 31.5 01/23/2018    MCHC 32.7 01/23/2018    RDW 18.7 (H) 01/25/2018    PLT 40 (L) 01/25/2018       BMP RESULTS:  Lab Results   Component Value Date     (L) 02/13/2018    POTASSIUM 4.6 02/13/2018    CHLORIDE 103 02/13/2018    CO2 27 02/13/2018    ANIONGAP 8.6 02/13/2018     (H) 02/13/2018    BUN 21 02/13/2018    CR 1.49 (H) 02/13/2018    GFRESTIMATED 45 (L) 02/13/2018    GFRESTBLACK 54 (L) 02/13/2018    BARON 8.4 (L) 02/13/2018        A1C RESULTS:  No results found for: A1C    INR RESULTS:  Lab Results   Component Value Date    INR 1.19 (H) 01/23/2018    INR 1.39 (H) 01/22/2018           CC  Brooks Andres MD  [address] [address]                  Service Date: 02/14/2018      HISTORY OF PRESENT ILLNESS:  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 at the [facility].  He recently was evaluated by Dr. Pugh, one of the physicians in the [facility].  He has a nonischemic cardiomyopathy with a reduced ejection fraction by echo of 30-35%.  Recent echo, reviewed by Dr. Pugh, showed an ejection fraction of 40% with overall moderately reduced LV systolic function with moderate global hypokinesia.  The RV appears moderately reduced with severe right atrial enlargement and moderate left atrial enlargement with 2-3+ tricuspid regurgitation and 2+ mitral regurgitation.  The patient has had 2 recent admissions for decompensated heart failure.  He required IV diuresing.  With the second admission, he underwent right heart catheterization that showed normal filling of both right and left-sided pressures with normal cardiac output.  His RA mean was 6, PA mean was 19, the wedge mean was 11.  Cardiac output was 6.37 liters with an index of 3.38.  He recovered at [facility] after his second discharge.  He was discharged on furosemide for a diuretic.  When he was seen 2 weeks ago by Dr. Pugh, his weight in the office was 163.  His weight today in the office is 175 pounds.  Today both his wife and his daughter who are here with him and Jama said a weight of 160 or 163 is a very low weight.  Jama tells me that a normal weight for him is more 172-175 pounds.  Since he has been discharged from [facility], his appetite has improved.  He is eating out more, but he does admit he is eating more salt.  He has not kept track of his sodium intake.  Likewise, he has not started to check his weight daily.      He has paroxysmal atrial fibrillation.  He is on warfarin therapy.  His INRs are managed through Dr. Pizarro's office.  His last INR was 2.      A basic metabolic panel was drawn today.  It showed a sodium of 134, potassium 4.6, BUN 21, a creatinine of 1.49, GFR 45.  We reviewed his medication list.  It appears he is taking his pills as  recommended, although he did not bring his pills in today.      His CHADS2-VASc score is 5.  He is on appropriate anticoagulation medication.  There are concerns of balance issue.        Today, he denies any chest pain or chest pressure.  He denies lightheadedness or dizziness.  He does report increase in urination after taking his furosemide.      PHYSICAL EXAMINATION:   GENERAL:  The patient is alert and oriented.  Skin warm and dry.  He is frail.  He needs assistance on and off the exam table.   LUNGS:  Clear, but somewhat diminished breath sounds in the bases.   CARDIAC:  Heart tones are irregular.  He has a 2/6 systolic murmur heard best left lower sternal border. No S3, S4, rubs or murmurs.   ABDOMEN:  Soft with positive hepatic jugular distention.   EXTREMITIES:  Lower extremities with marked ecchymosis with 1+ bilateral edema.      IMPRESSION AND PLAN:  This is an 87-year-old gentleman with nonischemic cardiomyopathy with a depressed LV systolic dysfunction in the 35-40% range, depending on imaging.  He has established care in the [facility].  He was seen 2 weeks ago by Dr. Pugh.  His weight now is increased.  Dr. Pugh is wondering if there is a component of AFib with rapid ventricular response contributing to his heart failure.  His rate appears to be well controlled on beta blocker.  Today we have spent more than 50% of this clinic visit talking about the importance of daily weights, reviewing the weight charts, a 2-gram sodium diet and foods to avoid.  Due to his signs of fluid overload, I am going to push up his furosemide to 40 mg daily for 3 days.  He will start weighing himself at home.  After 1 week, we will get a feel of what his daily weights are.  We will watch his lab work closely to prevent worsening renal function or hyponatremia.  I will see him back in 1 month with a BMP.  Dr. Pugh wants to see him in April.  That appointment has been made.  A repeat echo is also scheduled in April.  I  will consider placing a 24-hour Holter monitor in a month or perhaps before that if he remains fluid volume up.      It has been my pleasure to meet Yinka today.         ERIC OLIVARES, CNP             D: 2018   T: 2018   MT: MARTIN      Name:     YINKA GONZALEZ   MRN:      [id number]        Account:      [id number]   :      1931           Service Date: 2018      Document: [id number]       Thank you for allowing me to participate in the care of your patient.      Sincerely,     ERIC Olivares CNP     [facility]    cc:   Brooks Andres MD  [address] [address]

## 2023-10-09 NOTE — PROGRESS NOTES
[facility]    Cardiology Progress Note     Assessment & Plan   Jama Paul is a 88 year old male who was admitted on 10/30/2019.      1. Heart failure with preserved ejection fraction  2. Moderately-severe tricuspid valve insufficiency  3. Plan for KATIE Monday to assess MV and TV for possible clip procedure  4. Atrial fibrillation not on anticoagulation due to prior bleeding and thrombocytopenia  5. Thrombocytopenia    Overall, he is doing quite well today.  He continues to have some mild desaturations whenever he does any activities but is saturating well at rest today compared to yesterday.  He does have some crackles at the bases.  Otherwise lower extremity edema is improved.  Will give 1 more dose of IV Lasix today and then I think he is probably about his medically optimized as feasible prior to evaluation of his mitral and tricuspid valves.  His ins and outs are not accurate due to incontinence.  I would recommend standing weights only moving forward.    Ultimately goal is to repeat a KATIE tomorrow to assess his mitral and tricuspid valve anatomy to determine if he is a candidate for mitral or tricuspid clipping.  KATIE will be with GI support with anesthesia.  He was already listed for the procedure tomorrow when I came on service.    Platelet counts remain stable and overall I do not think he warrants a platelet transfusion prior to the procedure unless there is any concerns for active bleeding.  We will discuss with the team in the morning so the platelets can be ordered early in the day if necessary.    Cardiology will continue to follow along.  Please page with any questions or concerns.    Stan Carcamo MD    Interval History   No overnight events.  Remains off oxygen therapy this morning though desaturates to the high 80s with activity.  Denies any current shortness of breath.  No chest pain or chest discomfort.  Lower extremity edema is improved.    Physical Exam   Temp: 97.6  F  Care Management Initial Consult    General Information  Assessment completed with: Patient, Spouse or significant other,    Type of CM/SW Visit: Initial Assessment    Primary Care Provider verified and updated as needed: Yes   Readmission within the last 30 days: no previous admission in last 30 days      Reason for Consult: discharge planning  Advance Care Planning: Advance Care Planning Reviewed: present on chart        Communication Assessment  Patient's communication style: spoken language (English or Bilingual)    Hearing Difficulty or Deaf: no   Wear Glasses or Blind: yes    Cognitive  Cognitive/Neuro/Behavioral: .WDL except, orientation  Level of Consciousness: intermittent confusion  Arousal Level: opens eyes spontaneously  Orientation: disoriented to, time  Mood/Behavior: calm, cooperative  Best Language: 1 - Mild to moderate  Speech: slow, logical, hesitant    Living Environment:   People in home: spouse     Current living Arrangements: assisted living  [facility]   Able to return to prior arrangements: yes    Family/Social Support:  Care provided by: spouse/significant other, self, other (see comments), homecare agency ([facility] staff)  Provides care for: no one  Marital Status:   , Children          Description of Support System: Supportive, Involved    Support Assessment: Adequate family and caregiver support    Current Resources:   Patient receiving home care services: Yes  Skilled Home Care Services: Speech Therapy, Physical Therapy, Occupational Therapy  Community Resources: None  Equipment currently used at home: commode chair, grab bar, toilet, grab bar, tub/shower, hospital bed, raised toilet seat, shower chair, walker, rolling, wheelchair, manual, other (see comments) (Stand lift)  Supplies currently used at home: None    Employment/Financial:  Employment Status: retired        Financial Concerns: No concerns identified   Referral to Financial Worker: No     Does  (36.4  C) Temp src: Oral BP: 98/72 Pulse: 98 Heart Rate: 94 Resp: 20 SpO2: 96 % O2 Device: None (Room air)    Vitals:    11/08/19 0800 11/09/19 0516 11/10/19 0623   Weight: 70.3 kg (155 lb) 71.2 kg (156 lb 15.5 oz) 71 kg (156 lb 9.6 oz)     Vital Signs with Ranges  Temp:  [97  F (36.1  C)-97.9  F (36.6  C)] 97.6  F (36.4  C)  Pulse:  [74-98] 98  Heart Rate:  [85-94] 94  Resp:  [18-20] 20  BP: ()/(52-72) 98/72  SpO2:  [92 %-98 %] 96 %  I/O last 3 completed shifts:  In: 240 [P.O.:240]  Out: -     Constitutional: No apparent distress.   Eyes: No xanthelasma or conjunctivitis  Respiratory: Mild crackles at the bases bilaterally.  Cardiovascular: Irregulrly-irregular rhythm with a normal rate. Systolic murmur heard best at LLSB.   Extremities: Trivial bilateral peripheral edema.  Neurologic: Moving all extremities. No facial assymmetry.  Psychiatric: Answers questions appropriately.     Medications       furosemide  10 mg Intravenous Once     influenza Vac Split High-Dose  0.5 mL Intramuscular Prior to discharge     metoprolol succinate ER  25 mg Oral BID     mirtazapine  15 mg Oral At Bedtime     sodium chloride (PF)  10 mL Intracatheter Q7 Days       Data   Results for orders placed or performed during the hospital encounter of 10/30/19 (from the past 24 hour(s))   Basic metabolic panel   Result Value Ref Range    Sodium 135 133 - 144 mmol/L    Potassium 4.7 3.4 - 5.3 mmol/L    Chloride 102 94 - 109 mmol/L    Carbon Dioxide 29 20 - 32 mmol/L    Anion Gap 4 3 - 14 mmol/L    Glucose 96 70 - 99 mg/dL    Urea Nitrogen 21 7 - 30 mg/dL    Creatinine 0.79 0.66 - 1.25 mg/dL    GFR Estimate 80 >60 mL/min/[1.73_m2]    GFR Estimate If Black >90 >60 mL/min/[1.73_m2]    Calcium 7.5 (L) 8.5 - 10.1 mg/dL        the patient's insurance plan have a 3 day qualifying hospital stay waiver?  No    Lifestyle & Psychosocial Needs:  Social Determinants of Health     Food Insecurity: Not on file   Depression: Not on file   Housing Stability: Not on file   Tobacco Use: Low Risk  (9/26/2023)    Patient History     Smoking Tobacco Use: Never     Smokeless Tobacco Use: Never     Passive Exposure: Not on file   Financial Resource Strain: Not on file   Alcohol Use: Not on file   Transportation Needs: Not on file   Physical Activity: Not on file   Interpersonal Safety: Not on file   Stress: Not on file   Social Connections: Not on file     Functional Status:  Prior to admission patient needed assistance:   Dependent ADLs:: Wheelchair-with assist, Transfers, Dressing, Bathing, Incontinence  Dependent IADLs:: Cleaning, Cooking, Laundry, Shopping, Meal Preparation, Medication Management, Money Management, Transportation  Assesssment of Functional Status: At functional baseline    Mental Health Status:  Mental Health Status: No Current Concerns       Chemical Dependency Status:  Chemical Dependency Status: No Current Concerns           Values/Beliefs:  Spiritual, Cultural Beliefs, Nondenominational Practices, Values that affect care: no             Additional Information:  Per H&P, patient is a 78 year old female admitted on 10/6/2023. She has history of L frontoparietal glioblastoma s/p resection,chemotherapy and radiation with remission in 2021, recently found to have recurrence in September 2023, with baseline cognitive and functional impairment, h/o seizures, h/o DVT on xarelto, HTN, and depression who presented 10/6/23 with acute worsening of baseline aphasia as well as new facial twitching. Admitted to internal medicine for further work up and management.     Writer met with patient and spouse at bedside to complete initial assessment due to discharge planning. Writer introduced self, role and duties. Patient resides at [facility]. She receives assistance with transfers, toileting, dressing, showering, meals, cleaning, laundry, medications, transportation. Her spouse and children are supportive and involved in her care. She and spouse deny any financial, mental health or chemical dependency concerns.     Patient uses a sit to stand lift, wheelchair, walker, shower chair and grab bars at home. Writer spoke with patient and spouse regarding the recommendation for TCU. Writer provided them with medicare care compare document. Patient's spouse would prefer to have patient return to Assisted Living with outside services. Writer to continue to follow for support and discharge planning needs that arise.    [facility]  [address]  [place] [address]  [phone]    [facility]  [address] [place] [address] [phone] (Alana RN)    OPAL Esparza, MSW  6 Med Surg and 10 ICU Rooms 1037 and 1039  Phone [phone]  Pager [phone]

## 2023-10-17 NOTE — Clinical Note
Stop Maxzide  Start Amlodipine 2.5 mg daily. Check BP daily. Can increase to 5 mg daily if not well controlled. If BP elevated while starting Amlodipine, take 1/2 tab of Maxzide for a few days. Follow up with me in 1 year.    Okay to take any cough medicine without a decongestant Thanks, Amalia!

## 2024-02-21 NOTE — PLAN OF CARE
----- Message from Kandance M Johnson, RPH sent at 2/19/2024  4:43 PM CST -----  Regarding: Tecentriq teaching 2/26/24  Dulce Melendez has a teaching appointment on 2/26/24 for upcoming treatment: Tecentriq. Pharmacist has reviewed patient's upcoming treatment and screened for potential drug therapy concerns.    Patient has a history of a reaction to both pembrolizumab and avelumab.  Consider premedications for this patient, such as Benadryl 25mg IV, Pepcid 20mg IV or loratadine 10mg PO. All are available in clinic.  Patient could take loratadine one hour prior to clinic visit.     Potential drug-drug interactions requiring changes:  none    Please consider sending prescriptions for the following supportive care medications prior to treatment:  prochlorperazine (Compazine)      Please contact pharmacist for any additional concerns or clarifications to the details above.     Thank you,    Kandance M Johnson, RPH         Discharge Planner OT   Patient plan for discharge: Return to [facility]  Current status: OT orders received, initial eval complete, treatment initiated. Pt admitted for generalized weakness, w/finding of UTI. Hx of;  chronic kidney disease, heart failure, AFib and hypertension, who was  admitted to [facility] from 01/11 through 01/15 with acute on chronic diastolic heart failure and mild left pleural effusion, who underwent bilateral thoracentesis, per chart review. Pt lives w/his spouse in an ILF. Pt receives assist for donning/doffing compression socks, and bathing. Pt uses a walker for mobility. Receives daily meals from facility.     Pt sit<>stand SBA w/FWW. Pt able to don/doff slippers w/SBA. I to complete 2 g/h tasks seated in chair.      Barriers to return to prior living situation: None   Recommendations for discharge: Return to [facility] w/prior level of assist for LE dressing and bathing.   Rationale for recommendations: Pt is at baseline for ADL's. Anticipate pt will be safe to discharge to home once medically stable. No IP OT needs. Orders complete.        Entered by: Maxine Meadows 02/29/2020 11:23 AM        Occupational Therapy Discharge Summary    Reason for therapy discharge:    No IP OT needs    Progress towards therapy goal(s). See goals on Care Plan in [facility] electronic health record for goal details.  NO IP OT needs    Therapy recommendation(s):    Continue w/therapies at [facility].

## 2025-06-12 NOTE — PROGRESS NOTES
Dr. Shields has not seen this patient yet reached out to patient and will schedule a follow up OV to discuss platelets.   Flakita Tate is a 72 y.o. male.   Chief Complaint   Patient presents with   • Cyst     left side of left, noticed a couple of weeks ago, denies any pain, got to about quarter size at biggest. not hard just solid        History of Present Illness patient is unaware of an injury to his left foot he noticed a swollen area lateral dorsum of the foot.  Happened about 2 weeks ago since then it has gotten a lot smaller and is really not giving him a lot of pain at this point    The following portions of the patient's history were reviewed and updated as appropriate: allergies, current medications, past family history, past medical history, past social history, past surgical history and problem list.    Review of Systems   Constitutional: Negative for activity change, appetite change, fatigue, fever, unexpected weight gain and unexpected weight loss.   HENT: Negative for swollen glands, trouble swallowing and voice change.    Eyes: Negative for blurred vision and visual disturbance.   Respiratory: Negative for cough and shortness of breath.    Cardiovascular: Negative for chest pain, palpitations and leg swelling.   Gastrointestinal: Negative for abdominal pain, constipation, diarrhea, nausea, vomiting and indigestion.   Endocrine: Negative for cold intolerance, heat intolerance, polydipsia and polyphagia.   Genitourinary: Negative for dysuria and frequency.   Musculoskeletal: Negative for arthralgias, back pain, joint swelling and neck pain.   Skin: Negative for color change, rash and skin lesions.   Neurological: Negative for dizziness, weakness, headache, memory problem and confusion.   Hematological: Does not bruise/bleed easily.   Psychiatric/Behavioral: Negative for agitation, hallucinations and suicidal ideas. The patient is not nervous/anxious.        Objective   Past Medical History:   Diagnosis Date   • Cancer (CMS/[facility])    • Diabetes mellitus without complication (CMS/[facility]) 8/4/2020   • Heart attack  REFERRAL APPROVAL NOTICE         Sent on June 12, 2025                   Jose DELGADO Cathy  [address]   [address]  [facility] [address]                   Dear Mr. Morgan,    After a careful review of the medical information and benefit coverage, [facility] has processed your referral. See below for additional details.    If applicable, you must be actively enrolled with your insurance for coverage of the authorized service. If you have any questions regarding your coverage, please contact your insurance directly.    REFERRAL INFORMATION   Referral #:  [id number]  Referred-To Department    Referred-By Provider:  Audiology    Mino Chavez D.O.   [facility]      [address] [address]  [phone] [address] [facility] [id number] [phone]    Referral Start Date:  06/05/2025  Referral End Date:   06/05/2026             SCHEDULING  If you do not already have an appointment, please call [phone] to make an appointment.     MORE INFORMATION  If you do not already have a [employer] account, sign up at: [email or web address]  You can access your medical information, make appointments, see lab results, billing information, and more.  If you have questions regarding this referral, please contact  the [facility] Referrals department at:             [phone]. Monday - Friday 8:00AM - 5:00PM.     Sincerely,    [facility]     (CMS/HCC)    • Hypertension       Past Surgical History:   Procedure Laterality Date   • ANGIOPLASTY     • CARDIAC CATHETERIZATION     • CARDIAC SURGERY  11/2006    2 heart stents   • GANGLION CYST EXCISION     • ORCHIECTOMY  11/1981   • PERIPHERAL ARTERIAL STENT GRAFT     • TRANSMYOCARDIAL REVASCULARIZATION     • VASECTOMY  1979        Current Outpatient Medications:   •  aspirin 81 MG EC tablet, Take 81 mg by mouth Daily., Disp: , Rfl:   •  carvedilol (COREG) 6.25 MG tablet, Take 1 tablet by mouth twice daily, Disp: 180 tablet, Rfl: 3  •  coenzyme Q10 100 MG capsule, Take 100 mg by mouth Daily., Disp: , Rfl:   •  glucose blood test strip, 1 daily, Disp: 100 each, Rfl: 3  •  glucose monitor monitoring kit, 1 each Daily., Disp: 1 each, Rfl: 0  •  Lancets misc, 1 each Daily., Disp: 100 each, Rfl: 3  •  losartan (COZAAR) 100 MG tablet, Take 1 tablet by mouth Daily., Disp: 90 tablet, Rfl: 3  •  Multiple Vitamins-Minerals (MULTIVITAMIN ADULT PO), Take  by mouth Daily., Disp: , Rfl:   •  Omega-3 Fatty Acids (FISH OIL) 1000 MG capsule capsule, Take 1,000 mg by mouth Daily With Breakfast., Disp: , Rfl:   •  simvastatin (ZOCOR) 40 MG tablet, Take 1 tablet by mouth once daily, Disp: 90 tablet, Rfl: 3     Vitals:    04/01/21 0819   BP: 138/90   Pulse: 73   Temp: 97.9 °F (36.6 °C)   SpO2: 100%         04/01/21 0819   Weight: 87.5 kg (193 lb)     Patient's Body mass index is 28.5 kg/m². BMI is .      Physical Exam  Vitals and nursing note reviewed.   Constitutional:       General: He is not in acute distress.     Appearance: Normal appearance. He is well-developed.   HENT:      Head: Normocephalic and atraumatic.      Right Ear: External ear normal.      Left Ear: External ear normal.      Nose: Nose normal.   Eyes:      Extraocular Movements: Extraocular movements intact.      Conjunctiva/sclera: Conjunctivae normal.      Pupils: Pupils are equal, round, and reactive to light.   Cardiovascular:      Rate and Rhythm: Normal  rate and regular rhythm.      Pulses: Normal pulses.      Heart sounds: Normal heart sounds.   Pulmonary:      Effort: Pulmonary effort is normal.      Breath sounds: Normal breath sounds.   Abdominal:      General: Bowel sounds are normal.      Palpations: Abdomen is soft.   Musculoskeletal:         General: Swelling ( Patient has a quarter sized area of swelling over the dorsum of the lateral left foot in the midfoot area.) present. Normal range of motion.      Cervical back: Normal range of motion and neck supple. No rigidity. No muscular tenderness.   Skin:     General: Skin is warm and dry.   Neurological:      General: No focal deficit present.      Mental Status: He is alert and oriented to person, place, and time.   Psychiatric:         Mood and Affect: Mood normal.         Behavior: Behavior normal.               Assessment/Plan   Diagnoses and all orders for this visit:    1. Foot sprain, left, initial encounter (Primary)      At this point patient is not having a lot of pain in his foot the swelling is minimal I have encouraged him to wear shoes that give his arch good support no x-rays are indicated at this point and we will see him back for his routine follow-up.

## (undated) DEVICE — CATH DIAG 4FR ANG PIG 538453S

## (undated) DEVICE — KIT HAND CONTROL ANGIOTOUCH ACIST 65CM AT-P65

## (undated) DEVICE — PEN MARKING SKIN

## (undated) DEVICE — CATH ANGIO INFINITI 3DRC 4FRX100CM 538476

## (undated) DEVICE — DEFIB PRO-PADZ LVP LQD GEL ADULT 8900-2105-01

## (undated) DEVICE — INTRO SHEATH 7FRX10CM PINNACLE RSS702

## (undated) DEVICE — TOTE ANGIO CORP PC15AT SAN32CC83O

## (undated) DEVICE — Device

## (undated) DEVICE — INTRO SHEATH 4FRX10CM PINNACLE RSS402

## (undated) DEVICE — MANIFOLD KIT ANGIO AUTOMATED 014613

## (undated) DEVICE — CATH DIAG 4FR JL 4.5 538417

## (undated) DEVICE — SPECIMEN CONTAINER 60MLW/10% FORMALIN 59601

## (undated) RX ORDER — REGADENOSON 0.08 MG/ML
INJECTION, SOLUTION INTRAVENOUS
Status: DISPENSED
Start: 2017-12-15

## (undated) RX ORDER — FENTANYL CITRATE 50 UG/ML
INJECTION, SOLUTION INTRAMUSCULAR; INTRAVENOUS
Status: DISPENSED
Start: 2019-11-15

## (undated) RX ORDER — LIDOCAINE HYDROCHLORIDE 40 MG/ML
SOLUTION TOPICAL
Status: DISPENSED
Start: 2019-11-11

## (undated) RX ORDER — LIDOCAINE HYDROCHLORIDE 40 MG/ML
SOLUTION TOPICAL
Status: DISPENSED
Start: 2019-11-04

## (undated) RX ORDER — HEPARIN SODIUM 1000 [USP'U]/ML
INJECTION, SOLUTION INTRAVENOUS; SUBCUTANEOUS
Status: DISPENSED
Start: 2019-11-15

## (undated) RX ORDER — FENTANYL CITRATE 50 UG/ML
INJECTION, SOLUTION INTRAMUSCULAR; INTRAVENOUS
Status: DISPENSED
Start: 2019-11-04

## (undated) RX ORDER — HEPARIN SODIUM 200 [USP'U]/100ML
INJECTION, SOLUTION INTRAVENOUS
Status: DISPENSED
Start: 2019-11-15

## (undated) RX ORDER — AMINOPHYLLINE 25 MG/ML
INJECTION, SOLUTION INTRAVENOUS
Status: DISPENSED
Start: 2017-12-15

## (undated) RX ORDER — FENTANYL CITRATE 50 UG/ML
INJECTION, SOLUTION INTRAMUSCULAR; INTRAVENOUS
Status: DISPENSED
Start: 2019-11-06

## (undated) RX ORDER — HEPARIN SODIUM 1000 [USP'U]/ML
INJECTION, SOLUTION INTRAVENOUS; SUBCUTANEOUS
Status: DISPENSED
Start: 2018-01-22

## (undated) RX ORDER — GLYCOPYRROLATE 0.2 MG/ML
INJECTION, SOLUTION INTRAMUSCULAR; INTRAVENOUS
Status: DISPENSED
Start: 2019-11-04

## (undated) RX ORDER — FENTANYL CITRATE 50 UG/ML
INJECTION, SOLUTION INTRAMUSCULAR; INTRAVENOUS
Status: DISPENSED
Start: 2019-03-04

## (undated) RX ORDER — NALOXONE HYDROCHLORIDE 0.4 MG/ML
INJECTION, SOLUTION INTRAMUSCULAR; INTRAVENOUS; SUBCUTANEOUS
Status: DISPENSED
Start: 2019-11-04

## (undated) RX ORDER — FENTANYL CITRATE 50 UG/ML
INJECTION, SOLUTION INTRAMUSCULAR; INTRAVENOUS
Status: DISPENSED
Start: 2018-01-22

## (undated) RX ORDER — LIDOCAINE HYDROCHLORIDE 10 MG/ML
INJECTION, SOLUTION EPIDURAL; INFILTRATION; INTRACAUDAL; PERINEURAL
Status: DISPENSED
Start: 2018-01-22

## (undated) RX ORDER — VERAPAMIL HYDROCHLORIDE 2.5 MG/ML
INJECTION, SOLUTION INTRAVENOUS
Status: DISPENSED
Start: 2018-01-22

## (undated) RX ORDER — GLYCOPYRROLATE 0.2 MG/ML
INJECTION, SOLUTION INTRAMUSCULAR; INTRAVENOUS
Status: DISPENSED
Start: 2019-11-11

## (undated) RX ORDER — LIDOCAINE HYDROCHLORIDE 10 MG/ML
INJECTION, SOLUTION EPIDURAL; INFILTRATION; INTRACAUDAL; PERINEURAL
Status: DISPENSED
Start: 2019-11-15

## (undated) RX ORDER — FLUMAZENIL 0.1 MG/ML
INJECTION, SOLUTION INTRAVENOUS
Status: DISPENSED
Start: 2019-11-04